# Patient Record
Sex: FEMALE | Race: WHITE | NOT HISPANIC OR LATINO | Employment: OTHER | URBAN - METROPOLITAN AREA
[De-identification: names, ages, dates, MRNs, and addresses within clinical notes are randomized per-mention and may not be internally consistent; named-entity substitution may affect disease eponyms.]

---

## 2017-06-15 ENCOUNTER — OFFICE VISIT (OUTPATIENT)
Dept: ORTHOPEDICS | Facility: CLINIC | Age: 67
End: 2017-06-15
Payer: COMMERCIAL

## 2017-06-15 ENCOUNTER — HOSPITAL ENCOUNTER (OUTPATIENT)
Dept: RADIOLOGY | Facility: HOSPITAL | Age: 67
Discharge: HOME | End: 2017-06-15
Attending: ORTHOPAEDIC SURGERY
Payer: COMMERCIAL

## 2017-06-15 VITALS
HEART RATE: 57 BPM | BODY MASS INDEX: 27.67 KG/M2 | HEIGHT: 66 IN | SYSTOLIC BLOOD PRESSURE: 154 MMHG | DIASTOLIC BLOOD PRESSURE: 64 MMHG | WEIGHT: 172.19 LBS

## 2017-06-15 DIAGNOSIS — S93.401A SPRAIN OF RIGHT ANKLE, UNSPECIFIED LIGAMENT, INITIAL ENCOUNTER: ICD-10-CM

## 2017-06-15 DIAGNOSIS — M25.571 ACUTE RIGHT ANKLE PAIN: ICD-10-CM

## 2017-06-15 DIAGNOSIS — M79.671 RIGHT FOOT PAIN: ICD-10-CM

## 2017-06-15 DIAGNOSIS — M25.571 ACUTE RIGHT ANKLE PAIN: Primary | ICD-10-CM

## 2017-06-15 PROCEDURE — 1125F AMNT PAIN NOTED PAIN PRSNT: CPT | Mod: S$GLB,,, | Performed by: PHYSICIAN ASSISTANT

## 2017-06-15 PROCEDURE — 99203 OFFICE O/P NEW LOW 30 MIN: CPT | Mod: S$GLB,,, | Performed by: PHYSICIAN ASSISTANT

## 2017-06-15 PROCEDURE — 73630 X-RAY EXAM OF FOOT: CPT | Mod: 26,RT,, | Performed by: RADIOLOGY

## 2017-06-15 PROCEDURE — 1159F MED LIST DOCD IN RCRD: CPT | Mod: S$GLB,,, | Performed by: PHYSICIAN ASSISTANT

## 2017-06-15 PROCEDURE — 73630 X-RAY EXAM OF FOOT: CPT | Mod: TC,RT

## 2017-06-15 PROCEDURE — 99999 PR PBB SHADOW E&M-EST. PATIENT-LVL III: CPT | Mod: PBBFAC,,, | Performed by: PHYSICIAN ASSISTANT

## 2017-06-15 PROCEDURE — 73610 X-RAY EXAM OF ANKLE: CPT | Mod: TC,RT

## 2017-06-15 PROCEDURE — 73610 X-RAY EXAM OF ANKLE: CPT | Mod: 26,RT,, | Performed by: RADIOLOGY

## 2017-06-15 RX ORDER — ATENOLOL 50 MG/1
50 TABLET ORAL 2 TIMES DAILY
COMMUNITY
End: 2018-03-09 | Stop reason: SDUPTHER

## 2017-06-15 RX ORDER — HYDROCODONE BITARTRATE AND ACETAMINOPHEN 5; 325 MG/1; MG/1
1 TABLET ORAL EVERY 6 HOURS PRN
COMMUNITY
End: 2017-06-15

## 2017-06-15 RX ORDER — MELOXICAM 15 MG/1
15 TABLET ORAL DAILY
Qty: 30 TABLET | Refills: 2 | Status: SHIPPED | OUTPATIENT
Start: 2017-06-15 | End: 2017-07-15

## 2017-06-15 RX ORDER — HYDROCODONE BITARTRATE AND ACETAMINOPHEN 5; 325 MG/1; MG/1
1 TABLET ORAL EVERY 6 HOURS PRN
Qty: 30 TABLET | Refills: 0 | Status: SHIPPED | OUTPATIENT
Start: 2017-06-15 | End: 2017-06-29 | Stop reason: SDUPTHER

## 2017-06-15 NOTE — PROGRESS NOTES
SUBJECTIVE:     Chief Complaint & History of Present Illness:  Alesha Toney is a  New  patient 66 y.o. female who is seen here today with a complaint of    Chief Complaint   Patient presents with    Right Ankle - Injury, Pain    .  She reports suffered an inversion injury of her right ankle approximately 7 days ago when she stepped in a hole.  Was seen and treated at [facility] emergency room x-rays demonstrated no evidence of fracture dislocation she continues to have significant swelling ecchymosis and pain in the ankle and difficulty with weightbearing.  She has been crutch walking since the time of injury is here today for continuing care  On a scale of 1-10, with 10 being worst pain imaginable, he rates this pain as 4 on good days and 7 on bad days.  she describes the pain as tender and sore.    Review of patient's allergies indicates:   Allergen Reactions    Iodinated contrast media - oral and iv dye     Pcn [penicillins]     Phenergan plain     Tramadol     Vancomycin analogues          Current Outpatient Prescriptions   Medication Sig Dispense Refill    atenolol (TENORMIN) 50 MG tablet Take 50 mg by mouth 2 (two) times daily.      multivitamin with minerals tablet Take 1 tablet by mouth once daily.      hydrocodone-acetaminophen 5-325mg (NORCO) 5-325 mg per tablet Take 1 tablet by mouth every 6 (six) hours as needed for Pain. 30 tablet 0    meloxicam (MOBIC) 15 MG tablet Take 1 tablet (15 mg total) by mouth once daily. 30 tablet 2     No current facility-administered medications for this visit.        Past Medical History:   Diagnosis Date    Cardiac arrhythmia     History of hysterectomy     20yrs ago    Hypertension        History reviewed. No pertinent surgical history.    Vital Signs (Most Recent)  Vitals:    06/15/17 0830   BP: (!) 154/64   Pulse: (!) 57       Review of Systems:  ROS:  Constitutional: no fever or chills  Eyes: no visual changes  ENT: no nasal congestion or  "sore throat  Respiratory: no cough or shortness of breath  Cardiovascular: no chest pain or palpitations  Gastrointestinal: no nausea or vomiting, tolerating diet  Genitourinary: no hematuria or dysuria  Integument/Breast: no rash or pruritis  Hematologic/Lymphatic: no easy bruising or lymphadenopathy  Musculoskeletal: no arthralgias or myalgias  Neurological: no seizures or tremors  Behavioral/Psych: no auditory or visual hallucinations  Endocrine: no heat or cold intolerance      OBJECTIVE:     PHYSICAL EXAM:  Height: 5' 6" (167.6 cm) Weight: 78.1 kg (172 lb 2.9 oz), General Appearance: Well nourished, well developed, in no acute distress.  Neurological: Mood & affect are normal.  right Foot/Ankle    Skin: bruising  Swelling: moderate and diffuse  Warmth: no warmth  Tenderness: diffuse and severe  ROM: 90 degrees dorsiflexion, 115 degrees plantarflexion, 30 degrees inversion and 25 degrees eversion  Strength: limited by pain  Gait: antalgic  Stability: anterior drawer: negative, exterior rotation test: negative, Lachman: negative and Cotton test: negative    soft tissue swelling noted over the Or/aspect of the foot from the talus to the distal metatarsals          RADIOGRAPHS:  X-rays of the ankle taken today films reviewed by me demonstrate no evidence of fracture dislocation mortise is well-maintained soft tissue swelling can be noted  X-rays of the foot demonstrate no evidence of fracture dislocation    ASSESSMENT/PLAN:     Plan:  We'll place patient in a tall fracture boot weightbearing as tolerated  Rest ice elevation  Meloxicam 15 mg daily with food  Hydrocodone 5/325 mg refilled  Follow-up in 2 weeks sooner symptoms dictate    "

## 2017-06-29 ENCOUNTER — OFFICE VISIT (OUTPATIENT)
Dept: ORTHOPEDICS | Facility: CLINIC | Age: 67
End: 2017-06-29
Payer: COMMERCIAL

## 2017-06-29 VITALS — WEIGHT: 171.94 LBS | BODY MASS INDEX: 27.63 KG/M2 | HEIGHT: 66 IN

## 2017-06-29 DIAGNOSIS — S93.401D SPRAIN OF RIGHT ANKLE, UNSPECIFIED LIGAMENT, SUBSEQUENT ENCOUNTER: Primary | ICD-10-CM

## 2017-06-29 PROCEDURE — 99213 OFFICE O/P EST LOW 20 MIN: CPT | Mod: S$GLB,,, | Performed by: PHYSICIAN ASSISTANT

## 2017-06-29 PROCEDURE — 99999 PR PBB SHADOW E&M-EST. PATIENT-LVL III: CPT | Mod: PBBFAC,,, | Performed by: PHYSICIAN ASSISTANT

## 2017-06-29 PROCEDURE — 1159F MED LIST DOCD IN RCRD: CPT | Mod: S$GLB,,, | Performed by: PHYSICIAN ASSISTANT

## 2017-06-29 PROCEDURE — 1125F AMNT PAIN NOTED PAIN PRSNT: CPT | Mod: S$GLB,,, | Performed by: PHYSICIAN ASSISTANT

## 2017-06-29 RX ORDER — HYDROCODONE BITARTRATE AND ACETAMINOPHEN 5; 325 MG/1; MG/1
1 TABLET ORAL EVERY 6 HOURS PRN
Qty: 30 TABLET | Refills: 0 | Status: SHIPPED | OUTPATIENT
Start: 2017-06-29 | End: 2017-07-20 | Stop reason: SDUPTHER

## 2017-06-29 NOTE — PROGRESS NOTES
SUBJECTIVE:     Chief Complaint & History of Present Illness:  Alesha Toney is a  Established  patient 66 y.o. female who is seen here today with a complaint of    Chief Complaint   Patient presents with    Right Ankle - Pain, Swelling    .  She is patient well known to me was last seen treated clinic 6/15/2017 for her right ankle sprain.  She reports she continues to have tenderness and soreness in the dorsal and lateral aspects of the ankle and difficulty with weightbearing.  She has been icing and elevating as best she can and removing her boot to 3 times a day for active range of motion exercises  On a scale of 1-10, with 10 being worst pain imaginable, he rates this pain as 5 on good days and 8 on bad days.  she describes the pain as tender and sore.    Review of patient's allergies indicates:   Allergen Reactions    Iodinated contrast media - oral and iv dye     Pcn [penicillins]     Phenergan plain     Tramadol     Vancomycin analogues          Current Outpatient Prescriptions   Medication Sig Dispense Refill    atenolol (TENORMIN) 50 MG tablet Take 50 mg by mouth 2 (two) times daily.      hydrocodone-acetaminophen 5-325mg (NORCO) 5-325 mg per tablet Take 1 tablet by mouth every 6 (six) hours as needed for Pain. 30 tablet 0    meloxicam (MOBIC) 15 MG tablet Take 1 tablet (15 mg total) by mouth once daily. 30 tablet 2    multivitamin with minerals tablet Take 1 tablet by mouth once daily.       No current facility-administered medications for this visit.        Past Medical History:   Diagnosis Date    Cardiac arrhythmia     History of hysterectomy     20yrs ago    Hypertension        History reviewed. No pertinent surgical history.    Vital Signs (Most Recent)  There were no vitals filed for this visit.    Review of Systems:  ROS:  Constitutional: no fever or chills  Eyes: no visual changes  ENT: no nasal congestion or sore throat  Respiratory: no cough or shortness of  "breath  Cardiovascular: no chest pain or palpitations  Gastrointestinal: no nausea or vomiting, tolerating diet  Genitourinary: no hematuria or dysuria  Integument/Breast: no rash or pruritis  Hematologic/Lymphatic: no easy bruising or lymphadenopathy  Musculoskeletal: no arthralgias or myalgias  Neurological: no seizures or tremors  Behavioral/Psych: no auditory or visual hallucinations  Endocrine: no heat or cold intolerance      OBJECTIVE:     PHYSICAL EXAM:  Height: 5' 6" (167.6 cm) Weight: 78 kg (171 lb 15.3 oz), General Appearance: Well nourished, well developed, in no acute distress.  Neurological: Mood & affect are normal.  right Foot/Ankle     Skin: bruising  Swelling: moderate and diffuse  Warmth: no warmth  Tenderness: diffuse and severe  ROM: 90 degrees dorsiflexion, 170 degrees plantarflexion, 30 degrees inversion and 35 degrees eversion  Strength: limited by pain  Gait: antalgic  Stability: anterior drawer: negative, exterior rotation test: negative, Lachman: negative and Cotton test: negative     soft tissue swelling noted over the Lateral heel and second third and fourth toes.  mild ecchymosis of the same areas              ASSESSMENT/PLAN:     Plan:  Patient continue in tall fracture boot weightbearing as tolerated ice and elevation  Physical therapy for iontophoresis ultrasound range of motion exercise  Pain medication was refilled today  Follow-up in 2 weeks sooner symptoms dictate    "

## 2017-07-16 ENCOUNTER — OFFICE VISIT (OUTPATIENT)
Dept: URGENT CARE | Facility: CLINIC | Age: 67
End: 2017-07-16
Payer: COMMERCIAL

## 2017-07-16 VITALS
WEIGHT: 165 LBS | HEART RATE: 67 BPM | TEMPERATURE: 98 F | HEIGHT: 66 IN | RESPIRATION RATE: 18 BRPM | DIASTOLIC BLOOD PRESSURE: 80 MMHG | SYSTOLIC BLOOD PRESSURE: 160 MMHG | OXYGEN SATURATION: 98 % | BODY MASS INDEX: 26.52 KG/M2

## 2017-07-16 DIAGNOSIS — M25.572 LEFT ANKLE PAIN, UNSPECIFIED CHRONICITY: ICD-10-CM

## 2017-07-16 DIAGNOSIS — S93.402A SPRAIN OF LEFT ANKLE, UNSPECIFIED LIGAMENT, INITIAL ENCOUNTER: Primary | ICD-10-CM

## 2017-07-16 PROCEDURE — 1159F MED LIST DOCD IN RCRD: CPT | Mod: S$GLB,,, | Performed by: NURSE PRACTITIONER

## 2017-07-16 PROCEDURE — 1125F AMNT PAIN NOTED PAIN PRSNT: CPT | Mod: S$GLB,,, | Performed by: NURSE PRACTITIONER

## 2017-07-16 PROCEDURE — 99203 OFFICE O/P NEW LOW 30 MIN: CPT | Mod: S$GLB,,, | Performed by: NURSE PRACTITIONER

## 2017-07-16 RX ORDER — HYDROCODONE BITARTRATE AND ACETAMINOPHEN 5; 325 MG/1; MG/1
1 TABLET ORAL EVERY 6 HOURS PRN
Qty: 15 TABLET | Refills: 0 | Status: SHIPPED | OUTPATIENT
Start: 2017-07-16 | End: 2017-07-20

## 2017-07-16 NOTE — PATIENT INSTRUCTIONS
Ankle Sprain, with X-Ray   A sprain is an injury to the ligaments that hold the ankle joint together. There are no broken bones. Most sprains take about 4 to 6 weeks to heal. A severe sprain, where the ligament is completely torn, can take several months to recover.  Mild to moderate sprains may be treated with an elastic wrap or an in-shoe splint. This will provide support and prevent re-injury. A mild sprain may not need any additional support. A severe sprain may require surgery to repair. In some cases a cast or boot may be needed.  Home care  · Stay off your injured ankle as much as possible until you can walk on it without pain. If you have a lot of pain with walking, then crutches or a walker may be prescribed. These can be rented or purchased at many pharmacies and surgical or orthopedic supply stores. Follow your healthcare providers advice about when to begin bearing weight on that leg.  · Keep your leg elevated to reduce pain and swelling. When sleeping, place a pillow under your injured ankle. When sitting, support your injured ankle and leg so they are level with your waist. This is very important during the first 48 hours.  · Place an ice pack over the injured area for no more than 20 minutes. Do this every 3 to 6 hours for the first 24 to 48 hours, or as instructed. To make an ice pack, put ice cubes in a plastic bag that seals at the top. Wrap the bag in a clean, thin towel or cloth. You can place the ice pack directly over the wrap, splint, or cast. Never place an ice pack directly on your skin. As the ice melts, be careful not to get any wrap, splint, or cast wet. Keep using ice packs as needed to ease pain and swelling.    · If you have a boot, open it to apply an ice pack, unless told otherwise by your provider. Wrap the ice pack in a clean, thin towel or cloth. Never put an ice pack directly on your skin.  · After 48 hours, it may also be helpful to apply heat for no more than 20 minutes,  several times a day. You can do this with a heating pad or warm compress. Or you may want to go back and forth between using ice and heat. Never apply heat directly to the skin. Always wrap the heating pad or warm compress in a clean, thin towel or cloth.  · You may use over-the-counter pain medicine to ease pain, unless another pain medicine was prescribed. Talk with your provider before using these medicines if you have chronic liver or kidney disease, or have ever had a stomach ulcer or GI (gastrointestinal) bleeding.  · You may return to sports after your ankle heals, when you can run without pain.  · You are at risk of getting injured again for the first 6 weeks. During that time, protect your ankle with an in-shoe splint that prevents your ankle from tilting side to side. This is very important if you do active work or play sports during that time.  Follow-up care  Any X-rays you had today dont show any broken bones, breaks, or fractures. Bruises and sprains can sometimes hurt as much as a fracture. These injuries can take time to heal completely. If your symptoms dont improve or they get worse, talk with your healthcare provider. You may need a repeat X-ray.  When to seek medical advice  Call your healthcare provider right away if any of these occur:  · The plaster cast or splint gets wet or soft  · The fiberglass cast or splint gets wet and does not dry for 24 hours  · The pain or swelling increases, or redness appears  · The cast has a bad smell  · Fever of 100.4 F (38 C) or higher, or as directed  · Your toes become cold, blue, numb, or tingly  · You re-injure your ankle  Date Last Reviewed: 11/20/2015  © 1564-3762 [employer]. [address] [place] [address], [place] [address]. All rights reserved. This information is not intended as a substitute for professional medical care. Always follow your healthcare professional's instructions.      Follow up with [facility] as previously scheduled

## 2017-07-16 NOTE — PROGRESS NOTES
Subjective:       Patient ID: Alesha Toney is a 66 y.o. female.    Chief Complaint: Trauma and Ankle Pain (Pt says she twisted her lt ankle while stepping off of a curb)    Trauma   The incident occurred 6 to 12 hours ago. The incident occurred in the street. The injury mechanism was a twisted joint. The pain is moderate. Pertinent negatives include no abdominal pain, chest pain, neck pain, numbness or weakness. There have been no prior injuries to these areas. Her tetanus status is unknown.   Ankle Pain    Pertinent negatives include no numbness.     Review of Systems   Constitution: Negative for weakness and malaise/fatigue.   HENT: Negative for nosebleeds.    Cardiovascular: Negative for chest pain and syncope.   Respiratory: Negative for shortness of breath.    Musculoskeletal: Positive for joint pain and joint swelling. Negative for back pain and neck pain.   Gastrointestinal: Negative for abdominal pain.   Genitourinary: Negative for hematuria.   Neurological: Negative for dizziness and numbness.       Objective:      Physical Exam   Constitutional: She is oriented to person, place, and time. She appears well-developed and well-nourished. She is cooperative.  Non-toxic appearance. She does not appear ill. No distress.   HENT:   Head: Normocephalic and atraumatic. Head is without abrasion, without contusion and without laceration.   Right Ear: Hearing, tympanic membrane, external ear and ear canal normal. No hemotympanum.   Left Ear: Hearing, tympanic membrane, external ear and ear canal normal. No hemotympanum.   Nose: Nose normal. No mucosal edema, rhinorrhea or nasal deformity. No epistaxis. Right sinus exhibits no maxillary sinus tenderness and no frontal sinus tenderness. Left sinus exhibits no maxillary sinus tenderness and no frontal sinus tenderness.   Mouth/Throat: Uvula is midline, oropharynx is clear and moist and mucous membranes are normal. No trismus in the jaw. Normal dentition. No  uvula swelling. No posterior oropharyngeal erythema.   Eyes: Conjunctivae, EOM and lids are normal. Pupils are equal, round, and reactive to light. Right eye exhibits no discharge. Left eye exhibits no discharge. No scleral icterus.   Sclera clear bilat   Neck: Trachea normal, normal range of motion, full passive range of motion without pain and phonation normal. Neck supple. No spinous process tenderness and no muscular tenderness present. No neck rigidity. No tracheal deviation present.   Cardiovascular: Normal rate, regular rhythm, normal heart sounds, intact distal pulses and normal pulses.    Pulmonary/Chest: Effort normal and breath sounds normal. No respiratory distress.   Abdominal: Soft. Normal appearance and bowel sounds are normal. She exhibits no distension, no pulsatile midline mass and no mass. There is no tenderness.   Musculoskeletal: She exhibits no edema or deformity.        Left ankle: She exhibits decreased range of motion and swelling. Tenderness. Lateral malleolus tenderness found.        Feet:    Neurological: She is alert and oriented to person, place, and time. She has normal strength. No cranial nerve deficit or sensory deficit. She exhibits normal muscle tone. She displays no seizure activity. Coordination normal. GCS eye subscore is 4. GCS verbal subscore is 5. GCS motor subscore is 6.   Skin: Skin is warm, dry and intact. Capillary refill takes less than 2 seconds. No abrasion, no bruising, no burn, no ecchymosis and no laceration noted. She is not diaphoretic. No pallor.   Psychiatric: She has a normal mood and affect. Her speech is normal and behavior is normal. Judgment and thought content normal. Cognition and memory are normal.   Nursing note and vitals reviewed.      Assessment:       1. Sprain of left ankle, unspecified ligament, initial encounter    2. Left ankle pain, unspecified chronicity        Plan:         Sprain of left ankle, unspecified ligament, initial encounter  -      hydrocodone-acetaminophen 5-325mg (NORCO) 5-325 mg per tablet; Take 1 tablet by mouth every 6 (six) hours as needed for Pain.  Dispense: 15 tablet; Refill: 0    Left ankle pain, unspecified chronicity  -     X-Ray Ankle Complete 3 View Left; Future; Expected date: 07/16/2017  -     hydrocodone-acetaminophen 5-325mg (NORCO) 5-325 mg per tablet; Take 1 tablet by mouth every 6 (six) hours as needed for Pain.  Dispense: 15 tablet; Refill: 0

## 2017-07-20 ENCOUNTER — OFFICE VISIT (OUTPATIENT)
Dept: ORTHOPEDICS | Facility: CLINIC | Age: 67
End: 2017-07-20
Payer: COMMERCIAL

## 2017-07-20 VITALS — BODY MASS INDEX: 28.04 KG/M2 | HEIGHT: 66 IN | WEIGHT: 174.5 LBS

## 2017-07-20 DIAGNOSIS — S93.402A SPRAIN OF LEFT ANKLE, UNSPECIFIED LIGAMENT, INITIAL ENCOUNTER: ICD-10-CM

## 2017-07-20 DIAGNOSIS — S93.401D SPRAIN OF RIGHT ANKLE, UNSPECIFIED LIGAMENT, SUBSEQUENT ENCOUNTER: Primary | ICD-10-CM

## 2017-07-20 PROCEDURE — 99999 PR PBB SHADOW E&M-EST. PATIENT-LVL III: CPT | Mod: PBBFAC,,, | Performed by: PHYSICIAN ASSISTANT

## 2017-07-20 PROCEDURE — 1159F MED LIST DOCD IN RCRD: CPT | Mod: S$GLB,,, | Performed by: PHYSICIAN ASSISTANT

## 2017-07-20 PROCEDURE — 99213 OFFICE O/P EST LOW 20 MIN: CPT | Mod: S$GLB,,, | Performed by: PHYSICIAN ASSISTANT

## 2017-07-20 RX ORDER — HYDROCODONE BITARTRATE AND ACETAMINOPHEN 5; 325 MG/1; MG/1
1 TABLET ORAL EVERY 6 HOURS PRN
Qty: 30 TABLET | Refills: 0 | Status: SHIPPED | OUTPATIENT
Start: 2017-07-20 | End: 2017-08-03 | Stop reason: SDUPTHER

## 2017-07-22 NOTE — PROGRESS NOTES
SUBJECTIVE:     Chief Complaint & History of Present Illness:  Alesha Toney is a  Established  patient 66 y.o. female who is seen here today with a complaint of    Chief Complaint   Patient presents with    Right Ankle - Pain    Left Ankle - Pain    .Patient was previously treated in clinic for right ankle sprain.  She suffered a left ankle sprain on 07/16/2017..  She reports she continues to have tenderness and soreness in the dorsal and lateral aspects of the right ankle and difficulty with weightbearing.  She has been icing and elevating.  Patient has diffuse pain to her left ankle. Pain is increased with range of motion and weight bearing. She denied numbness or tingling   She is using crutches to asssit with ambulation.     Review of patient's allergies indicates:   Allergen Reactions    Iodinated contrast media - oral and iv dye     Pcn [penicillins]     Phenergan plain     Tramadol     Vancomycin analogues          Current Outpatient Prescriptions   Medication Sig Dispense Refill    atenolol (TENORMIN) 50 MG tablet Take 50 mg by mouth 2 (two) times daily.      hydrocodone-acetaminophen 5-325mg (NORCO) 5-325 mg per tablet Take 1 tablet by mouth every 6 (six) hours as needed for Pain. 30 tablet 0    multivitamin with minerals tablet Take 1 tablet by mouth once daily.       No current facility-administered medications for this visit.        Past Medical History:   Diagnosis Date    Cardiac arrhythmia     History of hysterectomy     20yrs ago    Hypertension        No past surgical history on file.    Vital Signs (Most Recent)  There were no vitals filed for this visit.    Review of Systems:  ROS:  Constitutional: no fever or chills  Eyes: no visual changes  ENT: no nasal congestion or sore throat  Respiratory: no cough or shortness of breath  Cardiovascular: no chest pain or palpitations  Gastrointestinal: no nausea or vomiting, tolerating diet  Genitourinary: no hematuria or  "dysuria  Integument/Breast: no rash or pruritis  Hematologic/Lymphatic: no easy bruising or lymphadenopathy  Musculoskeletal: no arthralgias or myalgias  Neurological: no seizures or tremors  Behavioral/Psych: no auditory or visual hallucinations  Endocrine: no heat or cold intolerance      OBJECTIVE:     PHYSICAL EXAM:  Height: 5' 6" (167.6 cm) Weight: 79.2 kg (174 lb 7.9 oz), General Appearance: Well nourished, well developed, in no acute distress.  Neurological: Mood & affect are normal.  right Foot/Ankle     Skin: bruising  Swelling: moderate and diffuse  Warmth: no warmth  Tenderness: diffuse and mild  ROM: 90 degrees dorsiflexion, 170 degrees plantarflexion, 30 degrees inversion and 35 degrees eversion  Strength: limited by pain  Gait: antalgic  Stability: anterior drawer: negative, exterior rotation test: negative, Lachman: negative and Cotton test: negative     Left Foot/Ankle     Skin: bruising  Swelling: moderate and diffuse  Warmth: no warmth  Tenderness: diffuse and severe  ROM: 90 degrees dorsiflexion, 170 degrees plantarflexion, 30 degrees inversion and 35 degrees eversion  Strength: limited by pain  Gait: antalgic  Stability: anterior drawer: negative, exterior rotation test: negative, Lachman: negative and Cotton test: negative         ASSESSMENT/PLAN:     Plan:  weightbearing as tolerated ice and elevation  - OTC lace up ankle brace  Physical therapy for iontophoresis ultrasound range of motion exercise, previously placed on right only  Pain medication was refilled today  Follow-up in 4 weeks if continued symptoms  sooner symptoms dictate  "

## 2017-07-27 ENCOUNTER — PATIENT MESSAGE (OUTPATIENT)
Dept: ORTHOPEDICS | Facility: CLINIC | Age: 67
End: 2017-07-27

## 2017-07-27 ENCOUNTER — HOSPITAL ENCOUNTER (OUTPATIENT)
Dept: RADIOLOGY | Facility: HOSPITAL | Age: 67
Discharge: HOME | End: 2017-07-27
Attending: ORTHOPAEDIC SURGERY
Payer: COMMERCIAL

## 2017-07-27 DIAGNOSIS — S93.402A SPRAIN OF LEFT ANKLE, UNSPECIFIED LIGAMENT, INITIAL ENCOUNTER: Primary | ICD-10-CM

## 2017-07-27 DIAGNOSIS — S93.402A SPRAIN OF LEFT ANKLE, UNSPECIFIED LIGAMENT, INITIAL ENCOUNTER: ICD-10-CM

## 2017-07-27 PROCEDURE — 73630 X-RAY EXAM OF FOOT: CPT | Mod: TC,LT

## 2017-07-27 PROCEDURE — 73630 X-RAY EXAM OF FOOT: CPT | Mod: 26,LT,, | Performed by: RADIOLOGY

## 2017-08-02 ENCOUNTER — PATIENT MESSAGE (OUTPATIENT)
Dept: ORTHOPEDICS | Facility: CLINIC | Age: 67
End: 2017-08-02

## 2017-08-03 RX ORDER — HYDROCODONE BITARTRATE AND ACETAMINOPHEN 5; 325 MG/1; MG/1
1 TABLET ORAL EVERY 6 HOURS PRN
Qty: 30 TABLET | Refills: 0 | Status: SHIPPED | OUTPATIENT
Start: 2017-08-03 | End: 2017-08-03 | Stop reason: SDUPTHER

## 2017-08-03 RX ORDER — HYDROCODONE BITARTRATE AND ACETAMINOPHEN 5; 325 MG/1; MG/1
1 TABLET ORAL EVERY 6 HOURS PRN
Qty: 30 TABLET | Refills: 0 | Status: SHIPPED | OUTPATIENT
Start: 2017-08-03 | End: 2017-10-24 | Stop reason: ALTCHOICE

## 2017-08-07 ENCOUNTER — OFFICE VISIT (OUTPATIENT)
Dept: URGENT CARE | Facility: CLINIC | Age: 67
End: 2017-08-07
Payer: COMMERCIAL

## 2017-08-07 VITALS
DIASTOLIC BLOOD PRESSURE: 110 MMHG | HEIGHT: 66 IN | TEMPERATURE: 99 F | BODY MASS INDEX: 27.97 KG/M2 | HEART RATE: 76 BPM | OXYGEN SATURATION: 98 % | SYSTOLIC BLOOD PRESSURE: 180 MMHG | WEIGHT: 174 LBS | RESPIRATION RATE: 18 BRPM

## 2017-08-07 DIAGNOSIS — N75.0 CYST OF LEFT BARTHOLIN'S GLAND: Primary | ICD-10-CM

## 2017-08-07 PROCEDURE — 99213 OFFICE O/P EST LOW 20 MIN: CPT | Mod: 25,S$GLB,, | Performed by: FAMILY MEDICINE

## 2017-08-07 PROCEDURE — 56420 I&D BARTHOLINS GLAND ABSCESS: CPT | Mod: S$GLB,,, | Performed by: FAMILY MEDICINE

## 2017-08-07 PROCEDURE — 1159F MED LIST DOCD IN RCRD: CPT | Mod: S$GLB,,, | Performed by: FAMILY MEDICINE

## 2017-08-07 PROCEDURE — 3008F BODY MASS INDEX DOCD: CPT | Mod: S$GLB,,, | Performed by: FAMILY MEDICINE

## 2017-08-07 RX ORDER — HYDROCODONE BITARTRATE AND ACETAMINOPHEN 5; 325 MG/1; MG/1
1 TABLET ORAL EVERY 8 HOURS PRN
Qty: 6 TABLET | Refills: 0 | Status: SHIPPED | OUTPATIENT
Start: 2017-08-07 | End: 2017-08-07 | Stop reason: SDUPTHER

## 2017-08-07 RX ORDER — CLINDAMYCIN HYDROCHLORIDE 300 MG/1
300 CAPSULE ORAL 2 TIMES DAILY
COMMUNITY
End: 2017-10-24 | Stop reason: SDUPTHER

## 2017-08-07 RX ORDER — CLINDAMYCIN HYDROCHLORIDE 300 MG/1
300 CAPSULE ORAL EVERY 8 HOURS
Qty: 15 CAPSULE | Refills: 0 | Status: SHIPPED | OUTPATIENT
Start: 2017-08-07 | End: 2017-08-12

## 2017-08-07 RX ORDER — HYDROCODONE BITARTRATE AND ACETAMINOPHEN 5; 325 MG/1; MG/1
1 TABLET ORAL EVERY 8 HOURS PRN
Qty: 6 TABLET | Refills: 0 | Status: SHIPPED | OUTPATIENT
Start: 2017-08-07 | End: 2017-10-24 | Stop reason: SDUPTHER

## 2017-08-08 NOTE — PATIENT INSTRUCTIONS
Understanding Bartholin Cyst and Abscess    A woman has two Bartholin glands. They are located on the sides of the vaginal opening. These pea-sized glands make mucus. This mucus lubricates the outside part of the vagina (vulva). If a tube (duct) in one of these glands becomes blocked, it can cause a cyst or abscess.  What causes a Bartholin cyst or abscess?  A cyst can form when the duct of a Bartholin gland becomes blocked. The mucus cant come out of the gland. It builds up. If the cyst becomes infected, it may turn into an abscess.  A blockage in the gland can occur from an injury or an infection. It may also be linked to a sexually transmitted disease.  Symptoms of a Bartholin cyst or abscess  A Bartholin cyst starts as a small bump. It may cause no other symptoms. Or it may grow bigger. It can then cause swelling and pain. If an abscess forms, it can be very painful. You may have trouble walking, sitting, or having sex.  Treatment for a Bartholin cyst or abscess  A cyst that doesnt cause any symptoms may not need to be treated. It may go away on its own. But if you feel discomfort or pain, treatment options include:  · Medicine. Over-the-counter pain medicines can help. In some cases, you may need antibiotics if an infection is severe or a cyst or abscess comes back.  · Sitz bath. Soaking the genital area in warm water can ease pain.  · Drainage. Cutting open the cyst allows the fluid inside it to drain. This eases discomfort and pain. The doctor may insert a tube (catheter) to help with drainage. This catheter may need to stay in place for up to 3 weeks.  · Surgery. You may need to have the Bartholin glands removed if other treatments dont work.  When to call your healthcare provider  Call your healthcare provider right away if you have any of these:  · Fever of 100.4°F (38°C) or higher, or as directed  · Redness, swelling, or fluid leaking from the cyst that gets worse  · Pain that gets worse  · Symptoms  that dont get better, or get worse  · New symptoms   Date Last Reviewed: 6/1/2016  © 1688-8439 [employer]. [address] [place] [address], [place] [address]. All rights reserved. This information is not intended as a substitute for professional medical care. Always follow your healthcare professional's instructions.      RECHECK IN 48 HOURS HERE AS WELL AS FOLLOW UP WITH OB/GYN OR PCP AS INDICATED  Alesha was seen today for abscess.    Diagnoses and all orders for this visit:    Cyst of left Bartholin's gland  -     INCISION AND DRAINAGE  -     clindamycin (CLEOCIN) 300 MG capsule; Take 1 capsule (300 mg total) by mouth every 8 (eight) hours.  -     hydrocodone-acetaminophen 5-325mg (NORCO) 5-325 mg per tablet; Take 1 tablet by mouth every 8 (eight) hours as needed for Pain.            Follow Up Comments   Make sure that you follow up with your primary care doctor in the next 2-5 days if needed .  Return to the Urgent Care if signs or symptoms change and certainly if you have worsening symptoms go to the nearest emergency department for further evaluation.     Rossana Ag MD

## 2017-08-08 NOTE — PROGRESS NOTES
"Subjective:       Patient ID: Alesha Toney is a 66 y.o. female.    Vitals:  height is 5' 6" (1.676 m) and weight is 78.9 kg (174 lb). Her temperature is 98.6 °F (37 °C). Her blood pressure is 180/110 (abnormal) and her pulse is 76. Her respiration is 18 and oxygen saturation is 98%.     Chief Complaint: Abscess    Abscess   Chronicity:  NewProgression Since Onset: gradually worsening  Abscess location: LT LABIA.  Associated Symptoms: no fever, no chills  Characteristics: painful and redness    Pain Scale:  6/10  Treatments Tried:  Oral antibiotics and warm water soaks  Relieved by:  Nothing    Review of Systems   Constitution: Negative for chills and fever.   HENT: Negative for sore throat.    Respiratory: Negative for shortness of breath.    Skin: Negative for itching and rash.   Musculoskeletal: Negative for joint pain.       Objective:      Physical Exam   Genitourinary:       There is tenderness on the left labia. There is no lesion or injury on the left labia.   Lymphadenopathy:        Left: Inguinal adenopathy present.       Assessment:       1. Cyst of left Bartholin's gland        Plan:         Cyst of left Bartholin's gland  -     INCISION AND DRAINAGE  -     clindamycin (CLEOCIN) 300 MG capsule; Take 1 capsule (300 mg total) by mouth every 8 (eight) hours.  Dispense: 15 capsule; Refill: 0  -     hydrocodone-acetaminophen 5-325mg (NORCO) 5-325 mg per tablet; Take 1 tablet by mouth every 8 (eight) hours as needed for Pain.  Dispense: 6 tablet; Refill: 0      Alesha was seen today for abscess.    Diagnoses and all orders for this visit:    Cyst of left Bartholin's gland  -     INCISION AND DRAINAGE  -     clindamycin (CLEOCIN) 300 MG capsule; Take 1 capsule (300 mg total) by mouth every 8 (eight) hours.  -     hydrocodone-acetaminophen 5-325mg (NORCO) 5-325 mg per tablet; Take 1 tablet by mouth every 8 (eight) hours as needed for Pain.      Patient is advised to follow up here in 48 hours for " recheck as well as with PCP as described.       Follow Up Comments   Make sure that you follow up with your primary care doctor in the next 2-5 days if needed .  Return to the Urgent Care if signs or symptoms change and certainly if you have worsening symptoms go to the nearest emergency department for further evaluation.     Rossana Ag MD

## 2017-08-08 NOTE — PROCEDURES
"Incision & Drainage  Date/Time: 8/7/2017 7:31 PM  Performed by: ELGIN GUTIERREZ  Authorized by: ELGIN GUTIERREZ     Time out: Immediately prior to procedure a "time out" was called to verify the correct patient, procedure, equipment, support staff and site/side marked as required.    Consent Done?:  Yes (Verbal)    Type:  Abscess  Body area:  Anogenital  Location details:  Bartholin's gland  Anesthesia:  Local infiltration  Local anesthetic: lidocaine 1% with epinephrine  Anesthetic total (ml):  4  Scalpel size:  11  Incision type:  Single straight  Complexity:  Simple  Drainage:  Bloody and purulent  Drainage amount:  Moderate  Wound treatment:  Wound packed  Packing material:  1/4 in gauze  Patient tolerance:  Patient tolerated the procedure well with no immediate complications      "

## 2017-08-09 ENCOUNTER — OFFICE VISIT (OUTPATIENT)
Dept: URGENT CARE | Facility: CLINIC | Age: 67
End: 2017-08-09

## 2017-08-09 VITALS
HEART RATE: 70 BPM | WEIGHT: 170 LBS | RESPIRATION RATE: 20 BRPM | BODY MASS INDEX: 27.32 KG/M2 | SYSTOLIC BLOOD PRESSURE: 171 MMHG | HEIGHT: 66 IN | DIASTOLIC BLOOD PRESSURE: 80 MMHG | TEMPERATURE: 99 F | OXYGEN SATURATION: 97 %

## 2017-08-09 DIAGNOSIS — N75.0 CYST OF LEFT BARTHOLIN'S GLAND: Primary | ICD-10-CM

## 2017-08-09 PROCEDURE — 99024 POSTOP FOLLOW-UP VISIT: CPT | Mod: S$GLB,,, | Performed by: FAMILY MEDICINE

## 2017-08-09 PROCEDURE — 99499 UNLISTED E&M SERVICE: CPT | Mod: S$GLB,,, | Performed by: FAMILY MEDICINE

## 2017-08-09 NOTE — PATIENT INSTRUCTIONS
Understanding Bartholin Cyst and Abscess    A woman has two Bartholin glands. They are located on the sides of the vaginal opening. These pea-sized glands make mucus. This mucus lubricates the outside part of the vagina (vulva). If a tube (duct) in one of these glands becomes blocked, it can cause a cyst or abscess.  What causes a Bartholin cyst or abscess?  A cyst can form when the duct of a Bartholin gland becomes blocked. The mucus cant come out of the gland. It builds up. If the cyst becomes infected, it may turn into an abscess.  A blockage in the gland can occur from an injury or an infection. It may also be linked to a sexually transmitted disease.  Symptoms of a Bartholin cyst or abscess  A Bartholin cyst starts as a small bump. It may cause no other symptoms. Or it may grow bigger. It can then cause swelling and pain. If an abscess forms, it can be very painful. You may have trouble walking, sitting, or having sex.  Treatment for a Bartholin cyst or abscess  A cyst that doesnt cause any symptoms may not need to be treated. It may go away on its own. But if you feel discomfort or pain, treatment options include:  · Medicine. Over-the-counter pain medicines can help. In some cases, you may need antibiotics if an infection is severe or a cyst or abscess comes back.  · Sitz bath. Soaking the genital area in warm water can ease pain.  · Drainage. Cutting open the cyst allows the fluid inside it to drain. This eases discomfort and pain. The doctor may insert a tube (catheter) to help with drainage. This catheter may need to stay in place for up to 3 weeks.  · Surgery. You may need to have the Bartholin glands removed if other treatments dont work.  When to call your healthcare provider  Call your healthcare provider right away if you have any of these:  · Fever of 100.4°F (38°C) or higher, or as directed  · Redness, swelling, or fluid leaking from the cyst that gets worse  · Pain that gets worse  · Symptoms  that dont get better, or get worse  · New symptoms   Date Last Reviewed: 6/1/2016  © 3401-3714 [employer]. [address] [place] [address], [place] [address]. All rights reserved. This information is not intended as a substitute for professional medical care. Always follow your healthcare professional's instructions.      FOR RECURRENCE, FOLLOW UP WITH YOUR GYNECOLOGIST.  CONTINUE YOUR ANTIBIOTIC UNTIL GONE AND WARM SOAKS EITHER AS SITZ BATHS OR TOPICALLY.

## 2017-08-09 NOTE — PROGRESS NOTES
"Subjective:       Patient ID: Alesha Toney is a 66 y.o. female.    Vitals:  height is 5' 6" (1.676 m) and weight is 77.1 kg (170 lb). Her oral temperature is 98.7 °F (37.1 °C). Her blood pressure is 171/80 (abnormal) and her pulse is 70. Her respiration is 20 and oxygen saturation is 97%.     Chief Complaint: Wound Check    Wound Check   She was originally treated 3 to 5 days ago (monday). Previous treatment included I&D of abscess. Her temperature was unmeasured prior to arrival. The temperature was taken using an oral thermometer. There has been clear discharge from the wound. The redness has improved. The swelling has improved. The pain has improved. She has no difficulty moving the affected extremity or digit.     Review of Systems   Skin: Positive for suspicious lesions.       Objective:      Physical Exam   Constitutional: She is oriented to person, place, and time. She appears well-developed and well-nourished.   HENT:   Head: Normocephalic and atraumatic. Head is without abrasion, without contusion and without laceration.   Eyes: Lids are normal.   Neck: Trachea normal, full passive range of motion without pain and phonation normal.   Pulmonary/Chest: Effort normal.   Genitourinary:       There is tenderness on the left labia.   Musculoskeletal: Normal range of motion.   Neurological: She is alert and oriented to person, place, and time.   Skin: Skin is warm, dry and intact. Capillary refill takes less than 2 seconds. No abrasion, no bruising, no burn, no ecchymosis, no laceration, no lesion and no rash noted. There is erythema.   Psychiatric: She has a normal mood and affect. Her speech is normal and behavior is normal. Judgment and thought content normal. Cognition and memory are normal.   Nursing note and vitals reviewed.      Assessment:       1. Cyst of left Bartholin's gland        Plan:         Cyst of left Bartholin's gland        Alesha was seen today for wound check.    Diagnoses and all " orders for this visit:    Cyst of left Bartholin's gland            Follow Up Comments   Make sure that you follow up with your primary care doctor in the next 2-5 days if needed .  Return to the Urgent Care if signs or symptoms change and certainly if you have worsening symptoms go to the nearest emergency department for further evaluation.     Rossana Ag MD

## 2017-10-20 ENCOUNTER — OFFICE VISIT (OUTPATIENT)
Dept: URGENT CARE | Facility: CLINIC | Age: 67
End: 2017-10-20
Payer: COMMERCIAL

## 2017-10-20 VITALS
DIASTOLIC BLOOD PRESSURE: 78 MMHG | HEIGHT: 66 IN | HEART RATE: 71 BPM | OXYGEN SATURATION: 99 % | TEMPERATURE: 98 F | SYSTOLIC BLOOD PRESSURE: 186 MMHG

## 2017-10-20 DIAGNOSIS — S50.362A INFECTED INSECT BITE OF LEFT ELBOW, INITIAL ENCOUNTER: Primary | ICD-10-CM
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
## 2017-10-20 DIAGNOSIS — L08.9 INFECTED INSECT BITE OF LEFT ELBOW, INITIAL ENCOUNTER: Primary | ICD-10-CM

## 2017-10-20 PROCEDURE — 99214 OFFICE O/P EST MOD 30 MIN: CPT | Mod: S$GLB,,, | Performed by: FAMILY MEDICINE

## 2017-10-20 RX ORDER — PREDNISONE 20 MG/1
40 TABLET ORAL DAILY
Qty: 10 TABLET | Refills: 0 | Status: SHIPPED | OUTPATIENT
Start: 2017-10-20 | End: 2017-10-24 | Stop reason: ALTCHOICE

## 2017-10-20 RX ORDER — CLINDAMYCIN HYDROCHLORIDE 300 MG/1
300 CAPSULE ORAL EVERY 8 HOURS
Qty: 15 CAPSULE | Refills: 0 | Status: SHIPPED | OUTPATIENT
Start: 2017-10-20 | End: 2017-10-24 | Stop reason: ALTCHOICE

## 2017-10-20 NOTE — PROGRESS NOTES
"Subjective:       Patient ID: Alesha Toney is a 67 y.o. female.    Vitals:  height is 5' 6" (1.676 m). Her oral temperature is 98.1 °F (36.7 °C). Her blood pressure is 186/78 (abnormal) and her pulse is 71. Her oxygen saturation is 99%.     Chief Complaint: Insect Bite (left elbow )    Insect Bite   This is a new problem. The current episode started in the past 7 days. The problem has been gradually worsening. Pertinent negatives include no chills, fever, rash or sore throat. She has tried ice (OTC steroid creams) for the symptoms. The treatment provided no relief.     Review of Systems   Constitution: Negative for chills and fever.   HENT: Negative for sore throat.    Respiratory: Negative for shortness of breath.    Skin: Positive for itching. Negative for rash.        Insect bite left elbow   Musculoskeletal: Negative for joint pain.       Objective:      Physical Exam   Constitutional: She is oriented to person, place, and time. She appears well-developed and well-nourished.   HENT:   Head: Normocephalic and atraumatic. Head is without abrasion, without contusion and without laceration.   Right Ear: External ear normal.   Left Ear: External ear normal.   Nose: Nose normal.   Mouth/Throat: Oropharynx is clear and moist.   Eyes: Conjunctivae, EOM and lids are normal. Pupils are equal, round, and reactive to light.   Neck: Trachea normal, full passive range of motion without pain and phonation normal. Neck supple.   Cardiovascular: Normal rate, regular rhythm and normal heart sounds.    Pulmonary/Chest: Effort normal and breath sounds normal. No stridor. No respiratory distress.   Musculoskeletal: Normal range of motion.   Neurological: She is alert and oriented to person, place, and time.   Skin: Skin is warm, dry and intact. Capillary refill takes less than 2 seconds. No abrasion, no bruising, no burn, no ecchymosis, no laceration, no lesion and no rash noted. No erythema.   Left elbow swelling 6 cm and " redness and pruritus. No red streaking.   Psychiatric: She has a normal mood and affect. Her speech is normal and behavior is normal. Judgment and thought content normal. Cognition and memory are normal.   Nursing note and vitals reviewed.      Assessment:       1. Infected insect bite of left elbow, initial encounter        Plan:     Alesha was seen today for insect bite.    Diagnoses and all orders for this visit:    Infected insect bite of left elbow, initial encounter    Other orders  -     clindamycin (CLEOCIN) 300 MG capsule; Take 1 capsule (300 mg total) by mouth every 8 (eight) hours.  -     predniSONE (DELTASONE) 20 MG tablet; Take 2 tablets (40 mg total) by mouth once daily.      Cold compresses.  Wear long sleeves.  Monitor for signs of infection.   FF up with Specialist if not any better after 5 days.  Go to ER for any severe signs of infection as discussed.  Patientvoiced agreement and understanding.

## 2017-10-20 NOTE — LETTER
October 20, 2017      [facility]  [place] [address]  Phone: [phone]  Fax: [phone]       Patient: Alesha Toney   YOB: 1950  Date of Visit: 10/20/2017    To Whom It May Concern:    Steff Toney  was at [facility] on 10/20/2017. SHE may return to work/school on 10/21/2017 with no restrictions. If you have any questions or concerns, or if I can be of further assistance, please do not hesitate to contact me.    Sincerely,    Sydney Angeles MD

## 2017-10-23 ENCOUNTER — TELEPHONE (OUTPATIENT)
Dept: URGENT CARE | Facility: CLINIC | Age: 67
End: 2017-10-23

## 2017-10-24 ENCOUNTER — OFFICE VISIT (OUTPATIENT)
Dept: INTERNAL MEDICINE | Facility: CLINIC | Age: 67
End: 2017-10-24
Payer: COMMERCIAL

## 2017-10-24 VITALS
HEIGHT: 66 IN | TEMPERATURE: 99 F | DIASTOLIC BLOOD PRESSURE: 72 MMHG | BODY MASS INDEX: 28.38 KG/M2 | HEART RATE: 56 BPM | OXYGEN SATURATION: 98 % | SYSTOLIC BLOOD PRESSURE: 143 MMHG | WEIGHT: 176.56 LBS

## 2017-10-24 DIAGNOSIS — Z23 NEED FOR VACCINATION FOR ZOSTER: ICD-10-CM

## 2017-10-24 DIAGNOSIS — Z78.0 POSTMENOPAUSAL ESTROGEN DEFICIENCY: ICD-10-CM

## 2017-10-24 DIAGNOSIS — Z76.89 ENCOUNTER TO ESTABLISH CARE WITH NEW DOCTOR: ICD-10-CM

## 2017-10-24 DIAGNOSIS — Z12.39 SCREENING FOR BREAST CANCER: ICD-10-CM

## 2017-10-24 DIAGNOSIS — I47.10 SVT (SUPRAVENTRICULAR TACHYCARDIA): ICD-10-CM

## 2017-10-24 DIAGNOSIS — Z00.00 ANNUAL PHYSICAL EXAM: Primary | ICD-10-CM

## 2017-10-24 DIAGNOSIS — Z11.59 ENCOUNTER FOR HEPATITIS C SCREENING TEST FOR LOW RISK PATIENT: ICD-10-CM

## 2017-10-24 DIAGNOSIS — Z23 NEED FOR TD VACCINE: ICD-10-CM

## 2017-10-24 DIAGNOSIS — R21 RASH: ICD-10-CM

## 2017-10-24 DIAGNOSIS — R03.0 ELEVATED BP WITHOUT DIAGNOSIS OF HYPERTENSION: ICD-10-CM

## 2017-10-24 DIAGNOSIS — Z23 VACCINE FOR STREPTOCOCCUS PNEUMONIAE AND INFLUENZA: ICD-10-CM

## 2017-10-24 PROBLEM — I10 HTN (HYPERTENSION): Status: ACTIVE | Noted: 2017-10-24

## 2017-10-24 PROBLEM — I10 HTN (HYPERTENSION): Status: RESOLVED | Noted: 2017-10-24 | Resolved: 2017-10-24

## 2017-10-24 PROCEDURE — 90471 IMMUNIZATION ADMIN: CPT | Mod: S$GLB,,, | Performed by: INTERNAL MEDICINE

## 2017-10-24 PROCEDURE — 99387 INIT PM E/M NEW PAT 65+ YRS: CPT | Mod: 25,S$GLB,, | Performed by: INTERNAL MEDICINE

## 2017-10-24 PROCEDURE — 90732 PPSV23 VACC 2 YRS+ SUBQ/IM: CPT | Mod: S$GLB,,, | Performed by: INTERNAL MEDICINE

## 2017-10-24 PROCEDURE — 99999 PR PBB SHADOW E&M-EST. PATIENT-LVL IV: CPT | Mod: PBBFAC,,, | Performed by: INTERNAL MEDICINE

## 2017-10-24 NOTE — PROGRESS NOTES
Subjective:       Patient ID: Alesha Toney is a 67 y.o. female.    Chief Complaint: Annual Exam and Establish Care    HPI   67 y.o. female here for annual physical exam.     Chronic medical issues:  Borderline Bp: she has no hx of HTN, no CP no SOB, no dizziness/lightheadedness, has not been checking it at home    SVT: currently on atenolol, no palpitation, stable    Recent UTI x 2 with hx of kidney stones, follows with urology, will make follow up appt, had dysuria and hematuria with these but has resolved, no fevers, chills, weight loss or night sweats    Hx tobacco abuse- quit 4 years ago, 30 pack year hx    Health Maintenance    Vaccines: Influenza UTD   Tetanus due   Pneumovax due   Prevnar due  Zoster (>59yo) due   A1c: due  HIV: declines ages 15-65  Sexual Screening: negative  STD screening: declines  Eye exam: UTD Annual  DDS exam: UTD q6 mos.  Breast Cancer: Mammogram due   Cervical Cancer: Pap Smear declines , s/p RENNY  Colorectal Cancer: Colonoscopy 2015, due in 5 years 2020  Hepatitis C: due one time born between 4920-2280  Lipid disorders: due ages >/= 45 or >/= 20 if increased ASCVD risk   Lung Cancer: CT chest done in 2010  Osteoporosis: DEXA due >/= 65, every 2 years      Exercise: she was exercising but sprained her ankles, she is starting back on exercising  Diet: eats healthy for most part, avoids too much fried food     Past Medical History:   Diagnosis Date    Cardiac arrhythmia     History of hysterectomy     20yrs ago      Past Surgical History:   Procedure Laterality Date    HYSTERECTOMY       Social History     Social History    Marital status: Single     Spouse name: N/A    Number of children: N/A    Years of education: N/A     Occupational History    Not on file.     Social History Main Topics    Smoking status: Former Smoker     Types: Cigarettes    Smokeless tobacco: Never Used    Alcohol use Yes    Drug use: No    Sexual activity: Yes     Partners: Female     Other  Topics Concern    Not on file     Social History Narrative    No narrative on file     Review of patient's allergies indicates:   Allergen Reactions    Iodinated contrast- oral and iv dye     Pcn [penicillins]     Phenergan plain     Tramadol     Vancomycin analogues      Ms. Toney had no medications administered during this visit.      Review of Systems   Constitutional: Positive for unexpected weight change. Negative for activity change, chills and fever.   HENT: Negative for hearing loss, rhinorrhea and trouble swallowing.    Eyes: Negative for discharge and visual disturbance.   Respiratory: Negative for chest tightness and wheezing.    Cardiovascular: Negative for chest pain and palpitations.   Gastrointestinal: Negative for blood in stool, constipation, diarrhea and vomiting.   Endocrine: Negative for polydipsia and polyuria.   Genitourinary: Positive for dysuria (with UTI) and hematuria (with UTI). Negative for difficulty urinating and menstrual problem.   Musculoskeletal: Positive for arthralgias. Negative for joint swelling and neck pain.   Neurological: Negative for weakness and headaches.   Psychiatric/Behavioral: Negative for confusion and dysphoric mood.     Objective:     Vitals:    10/24/17 1453   BP: (!) 143/72   Pulse: (!) 56   Temp: 98.7 °F (37.1 °C)    Body mass index is 28.5 kg/m².  Physical Exam   Constitutional: She is oriented to person, place, and time. She appears well-developed and well-nourished.   HENT:   Head: Normocephalic and atraumatic.   Mouth/Throat: Oropharynx is clear and moist.   Eyes: Conjunctivae are normal. Pupils are equal, round, and reactive to light.   Neck: Normal range of motion. No tracheal deviation present. No thyromegaly present.   Cardiovascular: Normal rate, regular rhythm, normal heart sounds and intact distal pulses.  Exam reveals no gallop and no friction rub.    No murmur heard.  Pulmonary/Chest: Effort normal and breath sounds normal. She has no  wheezes. She has no rales.   Abdominal: Soft. Bowel sounds are normal. There is no tenderness. There is no guarding.   Musculoskeletal: She exhibits no edema.   Lymphadenopathy:     She has no cervical adenopathy.   Neurological: She is alert and oriented to person, place, and time.   Skin: Skin is warm and dry. Rash noted.   Erythematous urticarial rash noted on legs, central punctum   Psychiatric: She has a normal mood and affect. Judgment normal.     Assessment:     1. Annual physical exam    2. Encounter to establish care with new doctor    3. Postmenopausal estrogen deficiency    4. Screening for breast cancer    5. Encounter for hepatitis C screening test for low risk patient    6. Need for vaccination for zoster    7. Need for TD vaccine    8. SVT (supraventricular tachycardia)    9. Rash    10. Vaccine for streptococcus pneumoniae and influenza    11. Elevated BP without diagnosis of hypertension      Plan:   1. Annual physical exam  - Comprehensive metabolic panel; Future  - CBC auto differential; Future  - Hemoglobin A1c; Future  - Lipid panel; Future  - TSH; Future  - Urinalysis Microscopic; Future  - Counseled patient on need to get regular exercise at least 30 minutes a day at high intensity 5 days a week.  - counseled on heart healthy diet, increasing protein    2. Encounter to establish care with new doctor    3. Postmenopausal estrogen deficiency  - DXA Bone Density Spine And Hip; Future    4. Screening for breast cancer  - Mammo Digital Screening Bilat with CAD; Future    5. Encounter for hepatitis C screening test for low risk patient  - Hepatitis C antibody; Future    6. Need for vaccination for zoster  - zoster vaccine live, PF, (ZOSTAVAX, PF,) 19,400 unit/0.65 mL injection; Inject 19,400 Units into the skin once.  Dispense: 1 vial; Refill: 0    7. Need for TD vaccine  - tetanus toxoid, adsorbed, PF, 5 LF unit/0.5 mL Susp; Inject 0.5 mLs into the muscle once.  Dispense: 0.5 mL; Refill: 0    8.  SVT (supraventricular tachycardia)  - Ambulatory Referral to Cardiology  - stable on atenolol    9. Rash  - start OTC anti-histamine such as zyrtec, claritin    10. Need for pneumovax  - given today    11. Elevated BP without HTN  - will monitor BP at home daily and keep log  - will send to me by portal in 2 weeks      Return to clinic in one year for annual exam or sooner if dictated by labs or illness.

## 2017-11-01 ENCOUNTER — PATIENT MESSAGE (OUTPATIENT)
Dept: INTERNAL MEDICINE | Facility: CLINIC | Age: 67
End: 2017-11-01

## 2017-11-01 DIAGNOSIS — I10 HYPERTENSION, UNSPECIFIED TYPE: Primary | ICD-10-CM

## 2017-11-01 DIAGNOSIS — I27.20 PULMONARY HYPERTENSION, MILD: ICD-10-CM

## 2017-11-02 ENCOUNTER — HOSPITAL ENCOUNTER (OUTPATIENT)
Dept: RADIOLOGY | Facility: HOSPITAL | Age: 67
Discharge: HOME | End: 2017-11-02
Attending: INTERNAL MEDICINE
Payer: COMMERCIAL

## 2017-11-02 DIAGNOSIS — Z12.39 SCREENING FOR BREAST CANCER: ICD-10-CM

## 2017-11-02 PROCEDURE — 77063 BREAST TOMOSYNTHESIS BI: CPT | Mod: 26,,, | Performed by: RADIOLOGY

## 2017-11-02 PROCEDURE — 77067 SCR MAMMO BI INCL CAD: CPT | Mod: TC

## 2017-11-02 PROCEDURE — 77067 SCR MAMMO BI INCL CAD: CPT | Mod: 26,,, | Performed by: RADIOLOGY

## 2017-11-02 RX ORDER — AMLODIPINE BESYLATE 5 MG/1
5 TABLET ORAL DAILY
Qty: 30 TABLET | Refills: 2 | Status: SHIPPED | OUTPATIENT
Start: 2017-11-02 | End: 2018-06-25

## 2017-11-02 NOTE — TELEPHONE ENCOUNTER
Will start norvasc 5mg    Please schedule 1 month follow up for BP check    Also will refer to pulmonology for pulm HTN.  Please arrange.      Cori Galloway MD

## 2017-11-02 NOTE — PATIENT INSTRUCTIONS
Taking Amlodipine  Amlodipine ([id number]) is a calcium channel blocker. It helps relax your blood vessels and get more blood and oxygen to your heart. Relaxing the blood vessels also helps lower your blood pressure and relieve any chest pain you may have.     Each time you take your medicine, jackie it on the calendar so you won't forget.     Medication tips  · Read the fact sheet that comes with your medicine. It tells you when and how to take it. Ask for a sheet if you dont get one.  · Take your medicine at the same time each day. If it upsets your stomach, take it with food or milk.  · If you miss a dose, take it as soon as you remember -- unless it is almost time for your next dose. If so, skip the missed dose. Do not take a double dose.  · Call your doctor or pharmacist if you have any questions about taking your medicine.  · Take your medicine even if you feel well. Most people with high blood pressure dont feel sick.  For your safety  · Ask your doctor or pharmacist if there are any foods or medicines you should avoid.  · To prevent dizziness, get up slowly after sitting or lying down.  · Do not stop taking your medicine unless your doctor tells you to. Doing so can make your condition worse. When stopping this medicine, the dose may need to be slowly decreased.  · Tell your doctor or pharmacist before taking any other prescription or over-the-counter medicines. This includes vitamin or mineral supplements and herbal remedies.  · Talk to your doctor or pharmacist about whether drinking alcohol is safe while taking amlodipine.  · Be sure to refill your prescription before you run out.  · Do not share your medicine with anyone.  · Ask your doctor how often you should have your blood pressure checked.  When to seek medical advice  Call your healthcare provider right away if any of these occur:  · You notice swelling in your ankles or feet or your skin flushes  · You have a headache or nausea  · You feel  tired or weak  · You have severe dizziness  · You feel chest pain  · You have trouble breathing  · You develop a skin rash or itching   Date Last Reviewed: 6/1/2016  © 7564-2392 [employer]. [address] [place] [address], [place] [address]. All rights reserved. This information is not intended as a substitute for professional medical care. Always follow your healthcare professional's instructions.

## 2017-11-03 ENCOUNTER — TELEPHONE (OUTPATIENT)
Dept: INTERNAL MEDICINE | Facility: CLINIC | Age: 67
End: 2017-11-03

## 2017-11-03 DIAGNOSIS — Z12.11 COLON CANCER SCREENING: ICD-10-CM

## 2017-11-03 DIAGNOSIS — I27.20 PULMONARY HTN: Primary | ICD-10-CM

## 2017-11-04 LAB
ALBUMIN SERPL-MCNC: 4.3 G/DL (ref 3.6–5.1)
ALBUMIN/GLOB SERPL: 1.6 (CALC) (ref 1–2.5)
ALP SERPL-CCNC: 47 U/L (ref 33–130)
ALT SERPL-CCNC: 17 U/L (ref 6–29)
APPEARANCE UR: CLEAR
AST SERPL-CCNC: 21 U/L (ref 10–35)
BACTERIA #/AREA URNS HPF: NORMAL /HPF
BASOPHILS # BLD AUTO: 57 CELLS/UL (ref 0–200)
BASOPHILS NFR BLD AUTO: 0.7 %
BILIRUB SERPL-MCNC: 0.4 MG/DL (ref 0.2–1.2)
BILIRUB UR QL STRIP: NEGATIVE
BUN SERPL-MCNC: 20 MG/DL (ref 7–25)
BUN/CREAT SERPL: ABNORMAL (CALC) (ref 6–22)
CALCIUM SERPL-MCNC: 10 MG/DL (ref 8.6–10.4)
CHLORIDE SERPL-SCNC: 106 MMOL/L (ref 98–110)
CHOLEST SERPL-MCNC: 197 MG/DL
CHOLEST/HDLC SERPL: 3.5 (CALC)
CO2 SERPL-SCNC: 30 MMOL/L (ref 20–31)
COLOR UR: NORMAL
CREAT SERPL-MCNC: 0.99 MG/DL (ref 0.5–0.99)
EOSINOPHIL # BLD AUTO: 389 CELLS/UL (ref 15–500)
EOSINOPHIL NFR BLD AUTO: 4.8 %
ERYTHROCYTE [DISTWIDTH] IN BLOOD BY AUTOMATED COUNT: 12.3 % (ref 11–15)
GFR SERPL CREATININE-BSD FRML MDRD: 59 ML/MIN/1.73M2
GLOBULIN SER CALC-MCNC: 2.7 G/DL (CALC) (ref 1.9–3.7)
GLUCOSE SERPL-MCNC: 114 MG/DL (ref 65–99)
GLUCOSE UR QL STRIP: NEGATIVE
HBA1C MFR BLD: 5.8 % OF TOTAL HGB
HCT VFR BLD AUTO: 43.4 % (ref 35–45)
HCV AB S/CO SERPL IA: 0.01
HCV AB SERPL QL IA: NORMAL
HDLC SERPL-MCNC: 56 MG/DL
HGB BLD-MCNC: 14.3 G/DL (ref 11.7–15.5)
HGB UR QL STRIP: NEGATIVE
HYALINE CASTS #/AREA URNS LPF: NORMAL /LPF
KETONES UR QL STRIP: NEGATIVE
LDLC SERPL CALC-MCNC: 120 MG/DL (CALC)
LEUKOCYTE ESTERASE UR QL STRIP: NEGATIVE
LYMPHOCYTES # BLD AUTO: 2867 CELLS/UL (ref 850–3900)
LYMPHOCYTES NFR BLD AUTO: 35.4 %
MCH RBC QN AUTO: 29.9 PG (ref 27–33)
MCHC RBC AUTO-ENTMCNC: 32.9 G/DL (ref 32–36)
MCV RBC AUTO: 90.8 FL (ref 80–100)
MONOCYTES # BLD AUTO: 737 CELLS/UL (ref 200–950)
MONOCYTES NFR BLD AUTO: 9.1 %
NEUTROPHILS # BLD AUTO: 4050 CELLS/UL (ref 1500–7800)
NEUTROPHILS NFR BLD AUTO: 50 %
NITRITE UR QL STRIP: NEGATIVE
NONHDLC SERPL-MCNC: 141 MG/DL (CALC)
PH UR STRIP: 6 [PH] (ref 5–8)
PLATELET # BLD AUTO: 254 THOUSAND/UL (ref 140–400)
PMV BLD REES-ECKER: 11.5 FL (ref 7.5–12.5)
POTASSIUM SERPL-SCNC: 5.3 MMOL/L (ref 3.5–5.3)
PROT SERPL-MCNC: 7 G/DL (ref 6.1–8.1)
PROT UR QL STRIP: NEGATIVE
RBC # BLD AUTO: 4.78 MILLION/UL (ref 3.8–5.1)
RBC #/AREA URNS HPF: NORMAL /HPF
SODIUM SERPL-SCNC: 143 MMOL/L (ref 135–146)
SP GR UR STRIP: 1.02 (ref 1–1.03)
SQUAMOUS #/AREA URNS HPF: NORMAL /HPF
TRIGL SERPL-MCNC: 104 MG/DL
TSH SERPL-ACNC: 1.11 MIU/L (ref 0.4–4.5)
WBC # BLD AUTO: 8.1 THOUSAND/UL (ref 3.8–10.8)
WBC #/AREA URNS HPF: NORMAL /HPF

## 2017-11-06 ENCOUNTER — PATIENT MESSAGE (OUTPATIENT)
Dept: INTERNAL MEDICINE | Facility: CLINIC | Age: 67
End: 2017-11-06

## 2017-11-06 ENCOUNTER — HOSPITAL ENCOUNTER (OUTPATIENT)
Dept: RADIOLOGY | Facility: HOSPITAL | Age: 67
Discharge: HOME | End: 2017-11-06
Attending: INTERNAL MEDICINE
Payer: COMMERCIAL

## 2017-11-06 ENCOUNTER — TELEPHONE (OUTPATIENT)
Dept: INTERNAL MEDICINE | Facility: CLINIC | Age: 67
End: 2017-11-06

## 2017-11-06 DIAGNOSIS — Z78.0 POSTMENOPAUSAL ESTROGEN DEFICIENCY: ICD-10-CM

## 2017-11-06 DIAGNOSIS — R73.03 PREDIABETES: ICD-10-CM

## 2017-11-06 DIAGNOSIS — N18.30 CKD (CHRONIC KIDNEY DISEASE) STAGE 3, GFR 30-59 ML/MIN: ICD-10-CM

## 2017-11-06 DIAGNOSIS — E78.5 HYPERLIPIDEMIA, UNSPECIFIED HYPERLIPIDEMIA TYPE: Primary | ICD-10-CM

## 2017-11-06 PROCEDURE — 77080 DXA BONE DENSITY AXIAL: CPT | Mod: TC

## 2017-11-06 PROCEDURE — 77080 DXA BONE DENSITY AXIAL: CPT | Mod: 26,,, | Performed by: RADIOLOGY

## 2017-11-06 NOTE — TELEPHONE ENCOUNTER
Prediabetic, borderline HLD    CKD stage 3- counseled on no NSAIDs    Recheck labs in 3 months    I have reviewed rest of lab results which are normal or stable. Results released to patient portal.      Cori Galloway MD

## 2017-11-06 NOTE — TELEPHONE ENCOUNTER
Bone density consistent with osteopenia.  Recommend OTC vitamin D/calcium supplement.  No signs of osteoporosis.  Repeat in 2 years.    Results released to patient portal.    Cori Galloway MD

## 2017-11-14 ENCOUNTER — OFFICE VISIT (OUTPATIENT)
Dept: CARDIOLOGY | Facility: CLINIC | Age: 67
End: 2017-11-14
Payer: COMMERCIAL

## 2017-11-14 VITALS
HEART RATE: 68 BPM | HEIGHT: 66 IN | SYSTOLIC BLOOD PRESSURE: 166 MMHG | DIASTOLIC BLOOD PRESSURE: 84 MMHG | BODY MASS INDEX: 27.92 KG/M2 | WEIGHT: 173.75 LBS

## 2017-11-14 DIAGNOSIS — I10 ESSENTIAL HYPERTENSION: Primary | ICD-10-CM

## 2017-11-14 DIAGNOSIS — I47.10 PAROXYSMAL SVT (SUPRAVENTRICULAR TACHYCARDIA): ICD-10-CM

## 2017-11-14 PROCEDURE — 99243 OFF/OP CNSLTJ NEW/EST LOW 30: CPT | Mod: S$GLB,,, | Performed by: INTERNAL MEDICINE

## 2017-11-14 PROCEDURE — 93000 ELECTROCARDIOGRAM COMPLETE: CPT | Mod: S$GLB,,, | Performed by: INTERNAL MEDICINE

## 2017-11-14 PROCEDURE — 99999 PR PBB SHADOW E&M-EST. PATIENT-LVL III: CPT | Mod: PBBFAC,,, | Performed by: INTERNAL MEDICINE

## 2017-11-14 NOTE — PROGRESS NOTES
Subjective:     Patient ID:  Alesha Toney is a 67 y.o. female who presents for evaluation of SVT      Problem List:  COPD - mild  SVT (atrial tachycardia)  HTN onset 2017    HPI:     Alesha Toney is being referred by Dr. Galloway. She takes atenolol for a history of SVT. Recently her BPs were elevated and amlodipine was prescribed, but she has not started it yet. She attributes the increase in BP to recent weight gain that she hopes to be able to reverse. She reports shortness of breath described as difficulty taking in a deep breath. This occurs at rest and also sometimes with exertion. She recalls a smothering sensation that occurred only while sleeping on her back x2.  An exercise treadmill test 5 years ago was negative. A Holter in 11/10 revelead short runs of atrial tachycardia.  She was evaluated by Dr. Hearn in 2010 for mild COPD. Dr. Hearn documented that she was evaluated by Dr. Deng at [facility] for nonsustained VT. The patient met with Dr. Osorio once but he did not have Dr. Deng's notes. The patient then established with Dr. Freeman Shukla at [facility]. An echo ordered by him in 6/17 revealed normal LV systolic function, EF 60%, mild mitral regurgitation, LA enlargement and a PA systolic pressure in the 30s.  She smoked 1 ppd x 30 yrs and quit in 6/15.      Review of Systems   Constitution: Positive for weight gain. Negative for weakness, malaise/fatigue and weight loss.   Cardiovascular: Positive for dyspnea on exertion, irregular heartbeat and palpitations. Negative for chest pain, claudication, leg swelling, orthopnea, paroxysmal nocturnal dyspnea and syncope.   Respiratory: Negative for cough, sputum production and wheezing.    Musculoskeletal: Positive for back pain. Negative for falls, joint pain, muscle cramps, muscle weakness and myalgias.   Gastrointestinal: Positive for heartburn. Negative for abdominal pain and melena.   Genitourinary: Negative for frequency, hematuria and nocturia.  "  Neurological: Negative for dizziness, light-headedness, loss of balance and paresthesias.   Psychiatric/Behavioral: Negative for depression.         Current Outpatient Prescriptions   Medication Sig    atenolol (TENORMIN) 50 MG tablet Take 50 mg by mouth 2 (two) times daily.    multivitamin with minerals tablet Take 1 tablet by mouth once daily.    amLODIPine (NORVASC) 5 MG tablet Not started     No current facility-administered medications for this visit.          Past Medical History:   Diagnosis Date    Breast cyst     Cardiac arrhythmia     Fibrocystic breast     History of hysterectomy     20yrs ago         Social History   Substance Use Topics    Smoking status: Former Smoker     Packs/day: 1.00     Years: 30.00     Types: Cigarettes     Quit date: 6/1/2005    Smokeless tobacco: Never Used    Alcohol use Yes         Family History   Problem Relation Age of Onset    Stroke Mother     Cancer Father      colon cancer         Objective:     Physical Exam   Constitutional: She is oriented to person, place, and time. She appears well-developed and well-nourished.   BP (!) 166/84   Pulse 68   Ht 5' 6" (1.676 m)   Wt 78.8 kg (173 lb 11.6 oz)   LMP  (LMP Unknown)   BMI 28.04 kg/m²      HENT:   Head: Normocephalic.   Neck: No JVD present. Carotid bruit is not present.   Cardiovascular: Normal rate, regular rhythm, S1 normal and S2 normal.  Exam reveals no gallop.    No murmur heard.  Pulses:       Posterior tibial pulses are 2+ on the right side, and 2+ on the left side.   Pulmonary/Chest: Effort normal. She has no wheezes. She has no rales. Chest wall is not dull to percussion.   Abdominal: Soft. She exhibits no abdominal bruit. There is no splenomegaly or hepatomegaly. There is no tenderness.   Musculoskeletal:        Right lower leg: She exhibits no edema.        Left lower leg: She exhibits no edema.   Neurological: She is alert and oriented to person, place, and time. Gait normal.   Skin: Skin is " warm and dry. No bruising and no rash noted. No cyanosis. Nails show no clubbing.   Psychiatric: She has a normal mood and affect. Her speech is normal and behavior is normal. Judgment normal. Cognition and memory are normal.         Lab Results   Component Value Date    CHOL 197 11/03/2017    CHOL 203 (H) 06/25/2008    CHOL 184 03/24/2004     Lab Results   Component Value Date    HDL 56 11/03/2017    HDL 49 06/25/2008    HDL 57.0 03/24/2004     Lab Results   Component Value Date    LDLCALC 120 (H) 11/03/2017    LDLCALC 136.4 (H) 06/25/2008    LDLCALC 116.6 03/24/2004     Lab Results   Component Value Date    TRIG 104 11/03/2017    TRIG 88 06/25/2008    TRIG 52 03/24/2004     Lab Results   Component Value Date    CHOLHDL 3.5 11/03/2017    CHOLHDL 24.1 06/25/2008    CHOLHDL 31.0 03/24/2004     Lab Results   Component Value Date    ALT 17 11/03/2017     Lab Results   Component Value Date    ALT 17 11/03/2017    AST 21 11/03/2017    ALKPHOS 47 11/03/2017    BILITOT 0.4 11/03/2017      Lab Results   Component Value Date    CREATININE 0.9 11/16/2017    BUN 19 11/16/2017     11/16/2017    K 4.7 11/16/2017     11/16/2017    CO2 31 (H) 11/16/2017         Assessment and Plan:     1. Essential hypertension    2. Paroxysmal SVT (supraventricular tachycardia)         Essential hypertension  Comments:  Monitor BP at home and review in 1-2 weeks. Consider losartan if K is normal instead of amlodipine.  Orders:  -     IN OFFICE EKG 12-LEAD (to [facility]); Future  -     Basic metabolic panel; Future; Expected date: 04/14/2018    Paroxysmal SVT (supraventricular tachycardia)  Comments:  Continue atenolol      RTC 6 months.

## 2017-11-14 NOTE — PATIENT INSTRUCTIONS
LDL - bad type - improves with diet and medications: typically statins; most other                   medications that lower LDL but have not been proven to prevent heart attacks.             May not improve significantly with exercise alone.             Ideally less than 100    HDL - good type - improves with exercise             Ideally greater than 50    TGs (triglycerides) - also bad - can change very quickly and considerably with food -           improve with diet and exercise            In some cases a low carbohydrate diet will lower TGs better than a low fat diet.            Ideal range     Sugar, fat and cholesterol in food:     A sensible diet that limits the intake of sugars, saturated (bad) fats and trans fats while increasing the intake of unsaturated (good)  fats and plant proteins is the basis of the current dietary recommendations.      We now recommend drastically reducing the intake of sugar. There is less emphasis on excluding fat.   Cholesterol in our food is no longer a significant concern, because it is generally present in small amounts. However please do not confuse this with the role of cholesterol in our blood and arteries. Make no mistake, it is cholesterol that clogs up our arteries whether it comes from our food or is manufactured by our bodies.       Most foods that are high in cholesterol are also high in saturated fat. But there is way more saturated fat than cholesterol in these foods. On the other hand there are a handful of foods that are high in cholesterol but do not contain much saturated fat: eggs, shrimp, crab legs and crawfish are OK to eat.       Saturated fat is the bad fat - you should limit your intake of this. Deep fried foods, meats and other animal fats are high in saturated fat. Cookies, donuts and most dessert and cakes are usually high in both saturated fat and sugar.       Unsaturated fat is the good fat. It contains the same number of calories as saturated  fat but does not get deposited in our arteries. The Mediterranean style diet encourages the intake of unsaturated fat - olive oil, avocado and unsalted nuts.      You should eat a few servings of vegetables (and fruit as long as you are not diabetic) everyday. Substitute some plant proteins in place of meat: beans, lentils, quinoa and oatmeal.      Do not use stick butter or stick margarine. Butter that comes in a tub is soft butter. It consists of 1/2 butter and 1/2 canola or another type of vegetable oil. It is fine to use that.       Trans fats should definitely be avoided. Most foods that are labelled as containing 0 gms of trans fat can still contain several hundred milligrams of trans fat: creamer, margarine, refrigerator dough, deep fried foods, ready made frosting, potato, corn and torilla chips, cakes, cookies, pie crusts and crackers containing shortening made with hydrogenated vegetable oil.

## 2017-11-14 NOTE — LETTER
November 17, 2017      Cori Galloway MD  [address] [place] [address]           [facility]  [address] [address]  Phone: [phone]          Patient: Alesha Toney   MR Number: [id number]   YOB: 1950   Date of Visit: 11/14/2017       Dear Dr. Cori Galloway:    Thank you for referring Alesha Toney to me for evaluation. Attached you will find relevant portions of my assessment and plan of care.    If you have questions, please do not hesitate to call me. I look forward to following Alesha Toney along with you.    Sincerely,    Davion Dugan  CC:  No Recipients    If you would like to receive this communication electronically, please contact [email or web address] or [phone] to request more information on [facility] access.    For providers and/or their staff who would like to refer a patient to [facility], please contact us through our one-stop-shop provider referral line, [facility], at [phone].    If you feel you have received this communication in error or would no longer like to receive these types of communications, please e-mail [email or web address]

## 2017-11-16 ENCOUNTER — LAB VISIT (OUTPATIENT)
Dept: LAB | Facility: HOSPITAL | Age: 67
End: 2017-11-16
Attending: INTERNAL MEDICINE
Payer: COMMERCIAL

## 2017-11-16 DIAGNOSIS — I10 ESSENTIAL HYPERTENSION: ICD-10-CM

## 2017-11-16 LAB
ANION GAP SERPL CALC-SCNC: 7 MMOL/L
BUN SERPL-MCNC: 19 MG/DL
CALCIUM SERPL-MCNC: 10.2 MG/DL
CHLORIDE SERPL-SCNC: 105 MMOL/L
CO2 SERPL-SCNC: 31 MMOL/L
CREAT SERPL-MCNC: 0.9 MG/DL
EST. GFR  (AFRICAN AMERICAN): >60 ML/MIN/1.73 M^2
EST. GFR  (NON AFRICAN AMERICAN): >60 ML/MIN/1.73 M^2
GLUCOSE SERPL-MCNC: 113 MG/DL
POTASSIUM SERPL-SCNC: 4.7 MMOL/L
SODIUM SERPL-SCNC: 143 MMOL/L

## 2017-11-16 PROCEDURE — 80048 BASIC METABOLIC PNL TOTAL CA: CPT

## 2017-11-16 PROCEDURE — 36415 COLL VENOUS BLD VENIPUNCTURE: CPT

## 2017-11-17 ENCOUNTER — PATIENT MESSAGE (OUTPATIENT)
Dept: CARDIOLOGY | Facility: CLINIC | Age: 67
End: 2017-11-17

## 2017-11-19 ENCOUNTER — TELEPHONE (OUTPATIENT)
Dept: CARDIOLOGY | Facility: CLINIC | Age: 67
End: 2017-11-19

## 2017-11-19 DIAGNOSIS — I10 ESSENTIAL HYPERTENSION: Primary | ICD-10-CM

## 2017-11-19 RX ORDER — LOSARTAN POTASSIUM 50 MG/1
50 TABLET ORAL DAILY
Qty: 30 TABLET | Refills: 11 | Status: SHIPPED | OUTPATIENT
Start: 2017-11-19 | End: 2018-06-25

## 2017-11-20 NOTE — TELEPHONE ENCOUNTER
Labs OK. Begin losartan 50 mg instead of amlodipine. If BPs not well controlled or K is high at a later date, will switch losartan to amlodipine.

## 2017-11-24 NOTE — TELEPHONE ENCOUNTER
I had already sent her a prescription for 50 mg of losartan. Pl order a BMP in 1 mo, if not already done.

## 2017-12-01 ENCOUNTER — OFFICE VISIT (OUTPATIENT)
Dept: INTERNAL MEDICINE | Facility: CLINIC | Age: 67
End: 2017-12-01
Payer: COMMERCIAL

## 2017-12-01 VITALS
OXYGEN SATURATION: 99 % | BODY MASS INDEX: 27.77 KG/M2 | TEMPERATURE: 98 F | HEIGHT: 66 IN | HEART RATE: 69 BPM | RESPIRATION RATE: 16 BRPM | DIASTOLIC BLOOD PRESSURE: 82 MMHG | SYSTOLIC BLOOD PRESSURE: 180 MMHG | WEIGHT: 172.81 LBS

## 2017-12-01 DIAGNOSIS — F41.9 ANXIETY: ICD-10-CM

## 2017-12-01 DIAGNOSIS — Z09 FOLLOW UP: ICD-10-CM

## 2017-12-01 DIAGNOSIS — I10 ESSENTIAL HYPERTENSION: Primary | ICD-10-CM

## 2017-12-01 PROCEDURE — 99999 PR PBB SHADOW E&M-EST. PATIENT-LVL III: CPT | Mod: PBBFAC,,, | Performed by: INTERNAL MEDICINE

## 2017-12-01 PROCEDURE — 99214 OFFICE O/P EST MOD 30 MIN: CPT | Mod: S$GLB,,, | Performed by: INTERNAL MEDICINE

## 2017-12-01 RX ORDER — HYDROXYZINE HYDROCHLORIDE 25 MG/1
25 TABLET, FILM COATED ORAL 3 TIMES DAILY PRN
Qty: 30 TABLET | Refills: 2 | Status: SHIPPED | OUTPATIENT
Start: 2017-12-01 | End: 2018-06-25

## 2017-12-01 RX ORDER — HYDRALAZINE HYDROCHLORIDE 25 MG/1
25 TABLET, FILM COATED ORAL 3 TIMES DAILY PRN
Qty: 30 TABLET | Refills: 2 | Status: SHIPPED | OUTPATIENT
Start: 2017-12-01 | End: 2017-12-01 | Stop reason: CLARIF

## 2017-12-01 NOTE — PROGRESS NOTES
Subjective:       Patient ID: Alesha Toney is a 67 y.o. female.    Chief Complaint: Follow-up (HTN)    HPI   Patient here for follow up of HTN.  She notes she was given losartan by cardiology and has not started taking it yet due to concern for side effects.  She has a lot of anxiety that is mostly situational.  She has uses xanax in the past.  She notes most anxiety at night when trying to fall asleep.  She denies any Cp, SOB, n/v, dizziness/lightheadedness or syncope.  She is checking BP at home and running 150's systolic.  She has started exercise and diet and has lost about 4 lbs since last visit.     Review of Systems   Constitutional: Negative for activity change, chills and fever.   HENT: Negative for congestion, sinus pain, sinus pressure and sore throat.    Eyes: Negative for pain and redness.   Respiratory: Negative for cough and shortness of breath.    Cardiovascular: Negative for chest pain.   Gastrointestinal: Negative for abdominal pain, constipation, nausea and vomiting.   Genitourinary: Negative for difficulty urinating.   Musculoskeletal: Negative for arthralgias and joint swelling.   Skin: Negative for rash.   Neurological: Negative for dizziness and light-headedness.   Psychiatric/Behavioral: Negative for dysphoric mood and sleep disturbance. The patient is nervous/anxious.      Objective:     Vitals:    12/01/17 1053   BP: (!) 180/82, recheck 150/64   Pulse: 69   Resp: 16   Temp: 97.7 °F (36.5 °C)    Body mass index is 27.9 kg/m².  Physical Exam   Constitutional: She is oriented to person, place, and time. She appears well-developed and well-nourished.   HENT:   Head: Normocephalic and atraumatic.   Mouth/Throat: Oropharynx is clear and moist.   Eyes: Conjunctivae are normal. Pupils are equal, round, and reactive to light.   Neck: Normal range of motion.   Cardiovascular: Normal rate, regular rhythm, normal heart sounds and intact distal pulses.  Exam reveals no gallop and no friction  rub.    No murmur heard.  Pulmonary/Chest: Effort normal and breath sounds normal. She has no wheezes. She has no rales.   Musculoskeletal: She exhibits no edema.   Neurological: She is alert and oriented to person, place, and time.   Skin: Skin is warm and dry. No rash noted.   Psychiatric: She has a normal mood and affect. Judgment normal.     Assessment:     1. Essential hypertension    2. Anxiety    3. Follow up      Plan:   1. Essential hypertension  - counseled on importance of taking medication to control BP  - continue to monitor BP at home  - continue exercise and weight loss    2. Anxiety  - hydroxyzine prn    3. Follow up        Return to clinic in six months for follow up or sooner if dictated by labs or illness.

## 2017-12-14 ENCOUNTER — PATIENT MESSAGE (OUTPATIENT)
Dept: INTERNAL MEDICINE | Facility: CLINIC | Age: 67
End: 2017-12-14

## 2017-12-14 DIAGNOSIS — F41.1 GAD (GENERALIZED ANXIETY DISORDER): Primary | ICD-10-CM

## 2017-12-14 RX ORDER — ALPRAZOLAM 0.25 MG/1
0.25 TABLET ORAL
Qty: 30 TABLET | Refills: 0 | Status: SHIPPED | OUTPATIENT
Start: 2017-12-14 | End: 2017-12-18 | Stop reason: SDUPTHER

## 2017-12-14 NOTE — PROGRESS NOTES
I will give a few tabs to use only on as needed basis.  If she is needing them more frequently, then please schedule follow up to discuss more long-acting medication.  No driving while on medication.  Script sent to pharmacy.      Cori Galloway MD

## 2017-12-18 ENCOUNTER — TELEPHONE (OUTPATIENT)
Dept: INTERNAL MEDICINE | Facility: CLINIC | Age: 67
End: 2017-12-18

## 2017-12-18 DIAGNOSIS — F41.1 GAD (GENERALIZED ANXIETY DISORDER): ICD-10-CM

## 2017-12-18 RX ORDER — ALPRAZOLAM 0.25 MG/1
0.25 TABLET ORAL DAILY PRN
Qty: 30 TABLET | Refills: 0 | Status: SHIPPED | OUTPATIENT
Start: 2017-12-18 | End: 2018-06-25

## 2017-12-20 ENCOUNTER — PATIENT MESSAGE (OUTPATIENT)
Dept: INTERNAL MEDICINE | Facility: CLINIC | Age: 67
End: 2017-12-20

## 2018-01-11 ENCOUNTER — TELEPHONE (OUTPATIENT)
Dept: INTERNAL MEDICINE | Facility: CLINIC | Age: 68
End: 2018-01-11

## 2018-01-11 NOTE — TELEPHONE ENCOUNTER
Lata, (power of ), stating that the patient has received a bill from [employer] for labs drawn 11/3/17.  They were coded as preventive.    Can you recode? Please advise.

## 2018-01-11 NOTE — TELEPHONE ENCOUNTER
I informed Lata that Dr. Galloway recoded some of the labs. I also gave her the number to billing to see if they can assist.

## 2018-01-22 ENCOUNTER — PATIENT MESSAGE (OUTPATIENT)
Dept: INTERNAL MEDICINE | Facility: CLINIC | Age: 68
End: 2018-01-22

## 2018-01-22 NOTE — TELEPHONE ENCOUNTER
Received a $500.00 lab bill from [facility].  The labs you ordered on 11/03/17.  I need you to recode them with appropriate ICD codes.  Please complete as soon as possible.  The new codes can be faxed to Amanda at [facility] [phone].

## 2018-01-26 ENCOUNTER — TELEPHONE (OUTPATIENT)
Dept: ELECTROPHYSIOLOGY | Facility: CLINIC | Age: 68
End: 2018-01-26

## 2018-01-29 DIAGNOSIS — I49.9 CARDIAC ARRHYTHMIA, UNSPECIFIED CARDIAC ARRHYTHMIA TYPE: Primary | ICD-10-CM

## 2018-01-30 ENCOUNTER — TELEPHONE (OUTPATIENT)
Dept: ELECTROPHYSIOLOGY | Facility: CLINIC | Age: 68
End: 2018-01-30

## 2018-01-30 DIAGNOSIS — M54.50 LUMBAR SPINE PAIN: Primary | ICD-10-CM

## 2018-01-30 DIAGNOSIS — I49.9 CARDIAC ARRHYTHMIA, UNSPECIFIED CARDIAC ARRHYTHMIA TYPE: Primary | ICD-10-CM

## 2018-01-30 NOTE — TELEPHONE ENCOUNTER
"Called pt to review new patient questions for clinic visit in AM with Dr. Lora. Pt reports [facility] to [facility] in AM) and was referred to Dr. Cheek ([facility]). Notified pt still do not have [facility] records. She states no testing performed, Dr. Cheek only increased atenolol at time of visit which helped but recently she has experienced more "arrhythmias" and complains of increased symptoms than in the past. She is requesting visit with Dr. Lora and event monitor.     Pt previously saw Dr. Osorio in 2010. Offered to change apt to Dr. Osorio; however, pt states she does not recall seeing him and would like to see Dr. Lora at this time.     Confirmed apt in AM and requested pt arrive at 8:30 for EKG prior. Pt verbalized understanding and again stated she would [facility].   "

## 2018-01-31 ENCOUNTER — HOSPITAL ENCOUNTER (OUTPATIENT)
Dept: RADIOLOGY | Facility: HOSPITAL | Age: 68
Discharge: HOME | End: 2018-01-31
Attending: ORTHOPAEDIC SURGERY
Payer: COMMERCIAL

## 2018-01-31 ENCOUNTER — OFFICE VISIT (OUTPATIENT)
Dept: ELECTROPHYSIOLOGY | Facility: CLINIC | Age: 68
End: 2018-01-31
Payer: COMMERCIAL

## 2018-01-31 VITALS
SYSTOLIC BLOOD PRESSURE: 138 MMHG | WEIGHT: 175.25 LBS | HEIGHT: 66 IN | BODY MASS INDEX: 28.16 KG/M2 | HEART RATE: 59 BPM | DIASTOLIC BLOOD PRESSURE: 82 MMHG

## 2018-01-31 DIAGNOSIS — I49.9 CARDIAC ARRHYTHMIA, UNSPECIFIED CARDIAC ARRHYTHMIA TYPE: ICD-10-CM

## 2018-01-31 DIAGNOSIS — I47.29 NSVT (NONSUSTAINED VENTRICULAR TACHYCARDIA): ICD-10-CM

## 2018-01-31 DIAGNOSIS — I10 ESSENTIAL HYPERTENSION: Primary | ICD-10-CM

## 2018-01-31 DIAGNOSIS — I47.10 SVT (SUPRAVENTRICULAR TACHYCARDIA): ICD-10-CM

## 2018-01-31 DIAGNOSIS — M54.50 LUMBAR SPINE PAIN: ICD-10-CM

## 2018-01-31 PROCEDURE — 99203 OFFICE O/P NEW LOW 30 MIN: CPT | Mod: S$GLB,,, | Performed by: INTERNAL MEDICINE

## 2018-01-31 PROCEDURE — 93000 ELECTROCARDIOGRAM COMPLETE: CPT | Mod: S$GLB,,, | Performed by: INTERNAL MEDICINE

## 2018-01-31 PROCEDURE — 99999 PR PBB SHADOW E&M-EST. PATIENT-LVL III: CPT | Mod: PBBFAC,,, | Performed by: INTERNAL MEDICINE

## 2018-01-31 PROCEDURE — 72120 X-RAY BEND ONLY L-S SPINE: CPT | Mod: 26,,, | Performed by: RADIOLOGY

## 2018-01-31 PROCEDURE — 72100 X-RAY EXAM L-S SPINE 2/3 VWS: CPT | Mod: TC

## 2018-01-31 PROCEDURE — 72100 X-RAY EXAM L-S SPINE 2/3 VWS: CPT | Mod: 26,,, | Performed by: RADIOLOGY

## 2018-01-31 NOTE — PROGRESS NOTES
Subjective:    Patient ID:  Alesha Toney is a 67 y.o. female who presents for evaluation of Irregular Heart Beat      HPI 66 yo female with palpitations, SVT, Htn.  She is a nurse for [employer].  Has seen Dr. Cheek and Dr. Osorio in the past.  More recently followed by Dr. Robbins at  and now Dr. Mccoy.  Presented with palpitations in 2001.  There is one strip from a hospitalization 12/01 that reveals nsr transitioning to sustained SVT,suspect AT.  Placed on Atenolol.  Event monitor 11/10 revealed nonsustained AT.  Echo 6/12/17 normal biventricular structure and function.  She also brings in a monitor from  6/10.  This reveals nonsustained AT and a 5 beat run of NSVT.  Had an episode in 2010 while driving.  +Presyncope.  Tried to cough to terminate it >> ultimately terminated it.  Hospitalized overnight on [facility].  Atenolol increased to 50 mg BID last year.  Still having episodes, 2-3 times a week, lasting a couple of seconds.  Nothing sustained.  Associated with lightheadedness and anxiety.  ECG reveals nsr with normal baseline intervals.  Walks without problems.  She indicates she is very sensitive to medications.    Review of Systems   Constitution: Negative. Negative for weakness and malaise/fatigue.   Cardiovascular: Positive for palpitations. Negative for chest pain, dyspnea on exertion, irregular heartbeat, leg swelling, near-syncope, orthopnea, paroxysmal nocturnal dyspnea and syncope.   Respiratory: Negative for cough and shortness of breath.    Neurological: Positive for light-headedness. Negative for dizziness.   All other systems reviewed and are negative.       Objective:    Physical Exam   Constitutional: She is oriented to person, place, and time. She appears well-developed and well-nourished.   Eyes: Conjunctivae are normal. No scleral icterus.   Neck: No JVD present. No tracheal deviation present.   Cardiovascular: Normal rate and regular rhythm.  PMI is not displaced.     Pulmonary/Chest: Effort normal and breath sounds normal. No respiratory distress.   Abdominal: Soft. There is no hepatosplenomegaly. There is no tenderness.   Musculoskeletal: She exhibits no edema or tenderness.   Neurological: She is alert and oriented to person, place, and time.   Skin: Skin is warm and dry. No rash noted.   Psychiatric: She has a normal mood and affect. Her behavior is normal.         Assessment:       1. Essential hypertension    2. SVT (supraventricular tachycardia)    3. NSVT (nonsustained ventricular tachycardia)         Plan:       Recurrent symptomatic SVT, strips c/w atrial tachycardia.  Unclear morphology/location.  Discussed options, which include:  1) Observing.  I do not love this approach, as, given her age, frequent AT may ultimately result in AF.  2) Anti-arrhythmic therapy.  Consider a class IC agent.  3) RFA.  We discussed risks and benefits of RFA, and the particular challenge of mapping an atrial tachycardia that is nonsustained.    My recommendation was attempt at RFA first, and if we are not able to induce, consider Propafenone.  She is going to consider her options, and let us know.  I am going to schedule her for 6 month f/u, but she will let us know.

## 2018-02-05 ENCOUNTER — OFFICE VISIT (OUTPATIENT)
Dept: ORTHOPEDICS | Facility: CLINIC | Age: 68
End: 2018-02-05
Payer: COMMERCIAL

## 2018-02-05 VITALS — BODY MASS INDEX: 28.33 KG/M2 | WEIGHT: 176.25 LBS | HEIGHT: 66 IN

## 2018-02-05 DIAGNOSIS — M54.16 LUMBAR RADICULOPATHY: Primary | ICD-10-CM

## 2018-02-05 PROCEDURE — 1159F MED LIST DOCD IN RCRD: CPT | Mod: S$GLB,,, | Performed by: ORTHOPAEDIC SURGERY

## 2018-02-05 PROCEDURE — 3008F BODY MASS INDEX DOCD: CPT | Mod: S$GLB,,, | Performed by: ORTHOPAEDIC SURGERY

## 2018-02-05 PROCEDURE — 99999 PR PBB SHADOW E&M-EST. PATIENT-LVL II: CPT | Mod: PBBFAC,,, | Performed by: ORTHOPAEDIC SURGERY

## 2018-02-05 PROCEDURE — 99214 OFFICE O/P EST MOD 30 MIN: CPT | Mod: S$GLB,,, | Performed by: ORTHOPAEDIC SURGERY

## 2018-02-05 PROCEDURE — 1125F AMNT PAIN NOTED PAIN PRSNT: CPT | Mod: S$GLB,,, | Performed by: ORTHOPAEDIC SURGERY

## 2018-02-05 RX ORDER — METHYLPREDNISOLONE 4 MG/1
TABLET ORAL
Qty: 1 PACKAGE | Refills: 0 | Status: SHIPPED | OUTPATIENT
Start: 2018-02-05 | End: 2018-05-30

## 2018-02-05 RX ORDER — ACETAMINOPHEN AND CODEINE PHOSPHATE 300; 30 MG/1; MG/1
1 TABLET ORAL EVERY 6 HOURS PRN
Qty: 28 TABLET | Refills: 0 | Status: SHIPPED | OUTPATIENT
Start: 2018-02-05 | End: 2018-02-12

## 2018-02-05 NOTE — PROGRESS NOTES
DATE: 2/5/2018  PATIENT: Alesha Toney    Attending Physician: Greyson Bansal M.D.    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 The pain has been present for 2 weeks. She notes that the leg pain is worse than the back pain. The patient describes the pain as achy in nature. Her pain was presents in the groin, which is sharp in nature however the aching pain is posterior and extends to the medial aspect of the ankle.  The pain is worse with activities (onset following waxing of floors) and improved by medications. There is no associated numbness and tingling. There is no subjective weakness. Prior treatments have included NSAIDs, tyelnol, but no surgeries, physical therapy or injections.    The Patient denies myelopathic symptoms such as handwriting changes or difficulty with buttons/coins/keys. Denies perineal paresthesias, bowel/bladder dysfunction.    PAST MEDICAL/SURGICAL HISTORY:  Past Medical History:   Diagnosis Date    Breast cyst     Cardiac arrhythmia     Fibrocystic breast     History of hysterectomy     20yrs ago     Past Surgical History:   Procedure Laterality Date    BREAST BIOPSY Left     at least 40yrs ago in her 20's    BREAST CYST ASPIRATION      BREAST CYST EXCISION      HYSTERECTOMY      @47yrs of age    OOPHORECTOMY      @47yrs of age       Current Medications:   Current Outpatient Prescriptions:     atenolol (TENORMIN) 50 MG tablet, Take 50 mg by mouth 2 (two) times daily., Disp: , Rfl:     multivitamin with minerals tablet, Take 1 tablet by mouth once daily., Disp: , Rfl:     ALPRAZolam (XANAX) 0.25 MG tablet, Take 1 tablet (0.25 mg total) by mouth daily as needed for Anxiety., Disp: 30 tablet, Rfl: 0    amLODIPine (NORVASC) 5 MG tablet, Take 1 tablet (5 mg total) by mouth once daily., Disp: 30 tablet, Rfl: 2    hydrOXYzine HCl  "(ATARAX) 25 MG tablet, Take 1 tablet (25 mg total) by mouth 3 (three) times daily as needed for Itching., Disp: 30 tablet, Rfl: 2    losartan (COZAAR) 50 MG tablet, Take 1 tablet (50 mg total) by mouth once daily., Disp: 30 tablet, Rfl: 11    Social History:   Social History     Social History    Marital status: Single     Spouse name: N/A    Number of children: N/A    Years of education: N/A     Occupational History    Not on file.     Social History Main Topics    Smoking status: Former Smoker     Packs/day: 1.00     Years: 30.00     Types: Cigarettes     Quit date: 6/1/2005    Smokeless tobacco: Never Used    Alcohol use Yes    Drug use: No    Sexual activity: Yes     Partners: Female     Other Topics Concern    Not on file     Social History Narrative    No narrative on file       REVIEW OF SYSTEMS:  Constitution: Negative. Negative for chills, fever and night sweats.   Cardiovascular: Negative for chest pain and syncope.   Respiratory: Negative for cough and shortness of breath.   Gastrointestinal: See HPI. Negative for nausea/vomiting. Negative for abdominal pain.  Genitourinary: See HPI. Negative for discoloration or dysuria.  Hematologic/Lymphatic: nil for bleeding/clotting disorders.   Musculoskeletal: Negative for falls and muscle weakness.   Neurological: See HPI. nil history of seizures. nil history of cranial surgery or shunts.  Neurological: See HPI. No seizures.   Endocrine: Negative for polydipsia, polyphagia and polyuria.   Allergic/Immunologic: Negative for hives and persistent infections.     EXAM:  Ht 5' 6" (1.676 m)   Wt 79.9 kg (176 lb 4.1 oz)   LMP  (LMP Unknown)   BMI 28.45 kg/m²     PHYSICAL EXAMINATION:    General: The patient is a 67 y.o. female in no apparent distress, the patient is orientatied to person, place and time.  Psych: Normal mood and affect  HEENT: Vision grossly intact, hearing intact to the spoken word.  Lungs: Respirations unlabored.  Gait: Normal station and " gait, no difficulty with toe or heel walk.   Skin: Dorsal lumbar skin negative for rashes, lesions, hairy patches and surgical scars. There is mild lumbar tenderness to palpation.  Range of motion: Lumbar range of motion is acceptable.  Spinal Balance: Global saggital and coronal spinal balance acceptable, no significant for scoliosis and kyphosis.  Musculoskeletal: No pain with the range of motion of the bilateral hips. No trochanteric tenderness to palpation.  Vascular: Bilateral lower extremities warm and well perfused, Dorsalis pedis pulses 2+ bilaterally.  Neurological: Normal strength and tone in all major motor groups in the bilateral lower extremities. Normal sensation to light touch in the L2-S1 dermatomes bilaterally.  Deep tendon reflexes symmetric 2+ in the bilateral lower extremities.  Negative Babinski bilaterally. Straight leg raise negative bilaterally.    IMAGING:      Today I personally reviewed AP, Lat and Flex/Ex  upright L-spine that demonstrate mild diffuse DDD with no significant spondylolisthesis or malalignment     ASSESSMENT/PLAN:    Diagnoses and all orders for this visit:    Lumbar radiculopathy    Other orders  -     methylPREDNISolone (MEDROL DOSEPACK) 4 mg tablet; use as directed  -     acetaminophen-codeine 300-30mg (TYLENOL #3) 300-30 mg Tab; Take 1 tablet by mouth every 6 (six) hours as needed.    66yo female with acute onset of LBP and RLE radiculopathy.    Discussed care plan with patient in detail. Her symptoms are acute in nature and likely secondary to her recent increase in activity. Given her CKD and advised avoidance of NSAIDs by her PCP, will arrange for Medrol dose pack. Patient to modify activities and begin home health exercise program. She may follow up as needed pending her symptoms.

## 2018-02-12 ENCOUNTER — TELEPHONE (OUTPATIENT)
Dept: INTERNAL MEDICINE | Facility: CLINIC | Age: 68
End: 2018-02-12

## 2018-02-12 NOTE — TELEPHONE ENCOUNTER
----- Message from Jasper Nassar sent at 2/12/2018 12:43 PM CST -----  Contact: Self [phone]  Pt states her labs were coded incorrectly and needs the new lab order with codes sent to LENNY Patel at [phone] ,please call

## 2018-02-14 ENCOUNTER — PATIENT MESSAGE (OUTPATIENT)
Dept: ORTHOPEDICS | Facility: CLINIC | Age: 68
End: 2018-02-14

## 2018-02-14 ENCOUNTER — TELEPHONE (OUTPATIENT)
Dept: ORTHOPEDICS | Facility: CLINIC | Age: 68
End: 2018-02-14

## 2018-02-14 DIAGNOSIS — M54.2 CERVICAL SPINE PAIN: Primary | ICD-10-CM

## 2018-02-15 ENCOUNTER — HOSPITAL ENCOUNTER (OUTPATIENT)
Dept: RADIOLOGY | Facility: HOSPITAL | Age: 68
Discharge: HOME | End: 2018-02-15
Attending: ORTHOPAEDIC SURGERY
Payer: COMMERCIAL

## 2018-02-15 DIAGNOSIS — M54.2 CERVICAL SPINE PAIN: ICD-10-CM

## 2018-02-15 PROCEDURE — 72050 X-RAY EXAM NECK SPINE 4/5VWS: CPT | Mod: 26,,, | Performed by: RADIOLOGY

## 2018-02-15 PROCEDURE — 72050 X-RAY EXAM NECK SPINE 4/5VWS: CPT | Mod: TC,FY

## 2018-02-19 ENCOUNTER — OFFICE VISIT (OUTPATIENT)
Dept: ORTHOPEDICS | Facility: CLINIC | Age: 68
End: 2018-02-19
Payer: COMMERCIAL

## 2018-02-19 VITALS — BODY MASS INDEX: 27.79 KG/M2 | WEIGHT: 172.94 LBS | HEIGHT: 66 IN

## 2018-02-19 DIAGNOSIS — M54.12 CERVICAL RADICULOPATHY: Primary | ICD-10-CM

## 2018-02-19 PROCEDURE — 99999 PR PBB SHADOW E&M-EST. PATIENT-LVL III: CPT | Mod: PBBFAC,,, | Performed by: ORTHOPAEDIC SURGERY

## 2018-02-19 PROCEDURE — 1125F AMNT PAIN NOTED PAIN PRSNT: CPT | Mod: S$GLB,,, | Performed by: ORTHOPAEDIC SURGERY

## 2018-02-19 PROCEDURE — 1159F MED LIST DOCD IN RCRD: CPT | Mod: S$GLB,,, | Performed by: ORTHOPAEDIC SURGERY

## 2018-02-19 PROCEDURE — 3008F BODY MASS INDEX DOCD: CPT | Mod: S$GLB,,, | Performed by: ORTHOPAEDIC SURGERY

## 2018-02-19 PROCEDURE — 99212 OFFICE O/P EST SF 10 MIN: CPT | Mod: S$GLB,,, | Performed by: ORTHOPAEDIC SURGERY

## 2018-02-19 RX ORDER — DIAZEPAM 5 MG/1
5 TABLET ORAL EVERY 6 HOURS PRN
Qty: 1 TABLET | Refills: 0 | Status: SHIPPED | OUTPATIENT
Start: 2018-02-19 | End: 2018-06-14 | Stop reason: SDUPTHER

## 2018-02-19 RX ORDER — ACETAMINOPHEN AND CODEINE PHOSPHATE 300; 30 MG/1; MG/1
1 TABLET ORAL
Qty: 28 TABLET | Refills: 0 | Status: SHIPPED | OUTPATIENT
Start: 2018-02-19 | End: 2018-03-01

## 2018-02-19 NOTE — PROGRESS NOTES
The carmelita returns for follow up.    At our last visit she was having bothersome RLE radiculopathy.    I gave her a medrol dosepak that resolved her pain in the leg, however she has developed increasing LUE radicular pain.    She now reports 8/10 L periscapular pain and L hand paresthesias in the thumb    She has no myelopathic findings.    On exam she has diffuse 4/5 strength in the LUE, possibly limited by pain.  She has decreased SLE over the L thumb    Cervical spine films demonstrate spondylosis at C5/6 with loss of cervical lordosis.    A) LUE radiculopathy  P) MRI C-spine, RTC for results.

## 2018-02-19 NOTE — TELEPHONE ENCOUNTER
Patient informed that Dr. Galloway has tried to recode labs and is unsuccessful. Patient will check with [facility] for further options.

## 2018-02-23 ENCOUNTER — PATIENT MESSAGE (OUTPATIENT)
Dept: ORTHOPEDICS | Facility: CLINIC | Age: 68
End: 2018-02-23

## 2018-02-28 ENCOUNTER — OFFICE VISIT (OUTPATIENT)
Dept: ORTHOPEDICS | Facility: CLINIC | Age: 68
End: 2018-02-28
Payer: COMMERCIAL

## 2018-02-28 ENCOUNTER — HOSPITAL ENCOUNTER (OUTPATIENT)
Dept: RADIOLOGY | Facility: HOSPITAL | Age: 68
Discharge: HOME | End: 2018-02-28
Attending: ORTHOPAEDIC SURGERY
Payer: COMMERCIAL

## 2018-02-28 VITALS — BODY MASS INDEX: 27.77 KG/M2 | WEIGHT: 172.81 LBS | HEIGHT: 66 IN

## 2018-02-28 DIAGNOSIS — M54.12 CERVICAL RADICULOPATHY: Primary | ICD-10-CM

## 2018-02-28 DIAGNOSIS — M54.12 CERVICAL RADICULOPATHY: ICD-10-CM

## 2018-02-28 PROCEDURE — 72141 MRI NECK SPINE W/O DYE: CPT | Mod: TC

## 2018-02-28 PROCEDURE — 72141 MRI NECK SPINE W/O DYE: CPT | Mod: 26,,, | Performed by: RADIOLOGY

## 2018-02-28 PROCEDURE — 99999 PR PBB SHADOW E&M-EST. PATIENT-LVL III: CPT | Mod: PBBFAC,,, | Performed by: ORTHOPAEDIC SURGERY

## 2018-02-28 PROCEDURE — 99212 OFFICE O/P EST SF 10 MIN: CPT | Mod: S$GLB,,, | Performed by: ORTHOPAEDIC SURGERY

## 2018-02-28 RX ORDER — HYDROCODONE BITARTRATE AND ACETAMINOPHEN 5; 325 MG/1; MG/1
1 TABLET ORAL EVERY 6 HOURS PRN
Qty: 28 TABLET | Refills: 0 | Status: SHIPPED | OUTPATIENT
Start: 2018-02-28 | End: 2018-03-12 | Stop reason: SDUPTHER

## 2018-03-02 NOTE — PROGRESS NOTES
The patient returns for follow up.    She has 3 weeks of symptoms of a L C6 radiculopathy.    She has not had any benefit from steroids.    She still has 6/10 L neck and arm pain.    Her MRI demonstrates L sided C5/6 foraminal stenosis.    Today we discussed options.    I have recommended a course of PT.    We can consider an LOBO in the future, she is not interested in this at this time.    I spent 10 minutes with the patient of which greater than 1/2 the time was devoted to counciling the patient regarding treatment options.

## 2018-03-09 ENCOUNTER — PATIENT MESSAGE (OUTPATIENT)
Dept: INTERNAL MEDICINE | Facility: CLINIC | Age: 68
End: 2018-03-09

## 2018-03-09 DIAGNOSIS — I10 ESSENTIAL HYPERTENSION: Primary | ICD-10-CM

## 2018-03-09 RX ORDER — ATENOLOL 50 MG/1
50 TABLET ORAL 2 TIMES DAILY
Qty: 180 TABLET | Refills: 3 | Status: SHIPPED | OUTPATIENT
Start: 2018-03-09 | End: 2018-07-26 | Stop reason: SDUPTHER

## 2018-03-09 NOTE — TELEPHONE ENCOUNTER
Can only write script up to 1 year but will give her enough for that period of time    Cori Galloway MD

## 2018-03-09 NOTE — TELEPHONE ENCOUNTER
Patient need a refill on her Atenolol 50mg BID. She will  a hard copy of the script, because her place of employment has a pharmacy that long term meds are sent to.  She needs the prescription written for a 90 day supply (180 tabs) with 4 refills.  She would like to  that prescription today.  Please let me know when it is ready.

## 2018-03-12 DIAGNOSIS — M54.12 CERVICAL RADICULOPATHY: Primary | ICD-10-CM

## 2018-03-12 RX ORDER — HYDROCODONE BITARTRATE AND ACETAMINOPHEN 5; 325 MG/1; MG/1
1 TABLET ORAL EVERY 6 HOURS PRN
Qty: 28 TABLET | Refills: 0 | Status: SHIPPED | OUTPATIENT
Start: 2018-03-12 | End: 2018-03-29 | Stop reason: SDUPTHER

## 2018-03-14 ENCOUNTER — CLINICAL SUPPORT (OUTPATIENT)
Dept: REHABILITATION | Facility: HOSPITAL | Age: 68
End: 2018-03-14
Payer: COMMERCIAL

## 2018-03-14 DIAGNOSIS — R29.3 POOR POSTURE: ICD-10-CM

## 2018-03-14 DIAGNOSIS — M54.2 NECK PAIN: ICD-10-CM

## 2018-03-14 PROCEDURE — 97110 THERAPEUTIC EXERCISES: CPT | Mod: PN

## 2018-03-14 PROCEDURE — 97162 PT EVAL MOD COMPLEX 30 MIN: CPT | Mod: PN

## 2018-03-14 NOTE — PLAN OF CARE
TIME RECORD    Date: 3/14/2018    Start Time:  8:08 am   Stop Time:  8:53 am     PROCEDURES:    TIMED  Procedure Min.   TE 12'                      UNTIMED  Procedure Min.   Initial evaluation 33'          Total Timed Minutes:  12'   Total Timed Units:  1  Total Untimed Units:  1  Charges Billed/# of units:  2 ( 1 IE, 1 TE)     OUTPATIENT PHYSICAL THERAPY   PATIENT EVALUATION  Onset Date: acute flare up of chronic neck pain   Primary Diagnosis: neck pain, intermittent radiating pain in (L) UE, numbness in (L) thumb, decreased postural awareness  Treatment Diagnosis:   1. Neck pain     2. Poor posture       Past Medical History:   Diagnosis Date    Breast cyst     Cardiac arrhythmia     Fibrocystic breast     History of hysterectomy     20yrs ago     Precautions: standard, HTN  Prior Therapy: n/a  Medications: Alesha Toney has a current medication list which includes the following prescription(s): alprazolam, amlodipine, atenolol, diazepam, hydrocodone-acetaminophen 5-325mg, hydroxyzine hcl, losartan, methylprednisolone, and multivitamin with minerals.  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 Pt stated that she lives with another nurse.   Place of Residence (Steps/Adaptations): n/a  Functional Deficits Leading to Referral/Nature of Injury: sitting at computer for prolonged period of time, looking up, turning head to (L)   Patient Therapy Goals: decrease pain    Subjective     Alesha Toney states that she has had neck problems for 15 years. Pt stated that about a month ago she was at [facility] and carried rolling suitcase with (L) UE and woke up with pain in (L) UE. Pt stated that initially pain was radiating into both UE. Pt stated that after a few days pain subsided, but then pain came back, and she  "started expereincing numbness in (L) thumb. Pt stated that she has undergone an xray and MRI and was told bulging disc at c5-c6. Pt reported that radiating pain into (L) UE was constant, but is now intermittent. Pt stated that symptoms have been gradually improving. Pt stated that she is right hand dominant.     Pain:   Location:  neck pain with intermittent pain radiating into (L) UE  Description: Aching and Grabbing, tingling  Activities Which Increase Pain: sitting at computer for prolonged period time, (L) side bending, looking up, turning head to (L)   Activities Which Decrease Pain: pain medication and heating pad  Pain Scale: 2/10 at best 2/10 now  9/10 at worst    Objective     Posture: rounded shoulders, forward head, flexed trunk in sitting  Palpation: Pt c/o tenderness to palpation along (B) UT region (L) > (R), C5 spinous process   Sensation: (B) UE grossly intact to light touch sensation     Range of Motion/Strength:     Cervical AROM: Pain/Dysfunction with Movement:   Flexion 45 degrees    Extension 35 degrees C/o increased pain in (L) UT region    Right side bending 30 degrees C/o pain/pulling in (L) UT region    Left side bending 30 degrees C/o Tingling in (L) thumb    Right rotation 65 degrees    Left rotation 60 degrees      Shoulder Right  Left  Pain/Dysfunction with Movement    AROM MMT AROM MMT    flexion WFL 5/5 WFL 5/5    abduction WFL 5/5 WFL 5/5    Internal rotation WFL 5/5 WFL 5/5    ER at 90° abd WFL NT WFL NT    ER at 0° abd NT 5/5 NT 5/5        Flexibility: decreased (B) UT, LS, and pectoralis flexibility  Special Tests: n/a      Tool: FOTO neck SURVEY  Limitation: 48%      TREATMENT     Time In: 8:41 am   Time Out: 8:53 am    PT Evaluation Completed? Yes  Discussed Plan of Care with patient: Yes    Alesha received 12 minutes of therapeutic exercise & instruction including:    Date 3/14/18   Visit 1/20   POC 3/14/18   FOTO 1/5   MHP    MT    UT Str. 3x30" L   LV Str. 3x30" L   Pec Str. " "3x30" corner   Chin Tuck -   DNF    Cervical Arnol    Lats    Rows RTB 2x10   Horizontal Abd    Scaption    Wall Slides    Initials ALAN Eduardo received 0 minutes of manual therapy including:      Written Home Exercises Provided: yes see above ( see media section)   Alesha demo good understanding of the education provided. Patient demo good return demo of skill of exercises.      Assessment       Initial Assessment (Pertinent finding, problem list and factors affecting outcome): Ms. Toney is a 67 year old female with c/o neck pain, intermittent radiating pain into (L) UE, and intermittent numbness in (L) thumb. Pt presents with decreased/painful cervical AROM, decreased postural awareness, and decreased functional mobility. Pt will benefit from skilled PT to address the limitations listed above in order to return pt to her highest level of function with decreased pain and limitation.     History  Co-morbidities and personal factors that may impact the plan of care Examination  Body Structures and Functions, activity limitations and participation restrictions that may impact the plan of care    Clinical Presentation   Co-morbidities:   HTN        Personal Factors:   no deficits Body Regions:   neck  upper extremities    Body Systems:    ROM  strength  sensation            Participation Restrictions:   n/a     Activity limitations:   Learning and applying knowledge  no deficits    General Tasks and Commands  no deficits    Communication  no deficits    Mobility  no deficits    Self care  no deficits    Domestic Life  no deficits    Interactions/Relationships  no deficits    Life Areas  no deficits    Community and Social Life  no deficits         evolving clinical presentation with changing clinical characteristics                      moderate   moderate  moderate Decision Making/ Complexity Score:  moderate       Rehab Potiential: good  Barriers to Rehab: none  Short Term Goals (4 Weeks): 4/14/18    1. Pt will " be independent with HEP to supplement PT in improving pain free cervical mobility  2. Pt will improve cervical AROM 10 deg in all planes to improve cervical mobility.  3. Pt will demonstrate improved sitting posture to decrease pain experienced in head and neck.    Long Term Goals (8 Weeks): 8  1. Pt will improve FOTO to </=30% limitation to improve perceived limitation with changing and maintaining mobility.  2. Pt will improve cervical AROM to WFL in all planes to improve cervical mobility.  3. Pt will report pain </= 2/10 at worst to promote return to PLOF.  4. Pt will report pain </= 2/10 with cervical AROM in all planes to promote functional QOL.      Plan     Certification Period: 3/14/18 to 5/14/18  Recommended Treatment Plan: 2 times per week for 8 weeks: Group Therapy, Locomotor Training, Manual Therapy, Moist Heat/ Ice, Neuromuscular Re-ed, Patient Education, Self Care, Therapeutic Activites and Therapeutic Exercise  Other Recommendations: modalities prn, ASTYM prn, kinesiotape prn, Functional Dry Needling prn       Therapist: Kathy Ross, PT    I CERTIFY THE NEED FOR THESE SERVICES FURNISHED UNDER THIS PLAN OF TREATMENT AND WHILE UNDER MY CARE    Physician's comments: ________________________________________________________________________________________________________________________________________________      Physician's Name: ___________________________________

## 2018-03-20 ENCOUNTER — CLINICAL SUPPORT (OUTPATIENT)
Dept: REHABILITATION | Facility: HOSPITAL | Age: 68
End: 2018-03-20
Payer: COMMERCIAL

## 2018-03-20 DIAGNOSIS — R29.3 POOR POSTURE: ICD-10-CM

## 2018-03-20 DIAGNOSIS — M54.2 NECK PAIN: ICD-10-CM

## 2018-03-20 PROCEDURE — 97140 MANUAL THERAPY 1/> REGIONS: CPT | Mod: PN

## 2018-03-20 PROCEDURE — 97110 THERAPEUTIC EXERCISES: CPT | Mod: PN

## 2018-03-20 NOTE — PROGRESS NOTES
"DAILY TREATMENT NOTE      Start Time:  6:00 pm   Stop Time:  6:35 pm     PROCEDURES:    TIMED  Procedure Min.   TE 20'   MT 15'                 UNTIMED  Procedure Min.             Total Timed Minutes:  35'   Total Timed Units:  2  Total Untimed Units:  0  Charges Billed/# of units:  2 ( 1 TE, 1 MT)       Progress/Current Status    Subjective:     Patient ID: Alesha Toney is a 67 y.o. female.  Diagnosis:   1. Neck pain     2. Poor posture       Pain: 3/10 (L) UT region prior to therapy session.   Pt stated that last Wednesday night she started experiencing a lot of pain radiating into (L) UE. Pt stated that she had to take pain medicine. Pt stated that she is feeling better now, but is still experiencing pain in (L) UT region. Pt stated that every once in a while gets shooting pain down her (L) UE. Pt stated that she has been doing her exercises and none of the exercises bother her or increase her pain.     Objective:     Pt received MT consisting of gentle STM to (B) UT region, focus on (L) x15'. Pt participated in therex per log below 1:1 with PT x 20'.     Date 3/20/18 3/14/18   Visit 2/20 1/20   POC 5/14/18 3/14/18   FOTO 2/5 1/5   MHP      MT      UT Str. 3x30" L only 3x30" L   LV Str. 3x30" L only 3x30" L   Pec Str. 3x30"  3x30" corner   Chin Tuck 5"x10  -   DNF      Cervical Arnol      Lats RTB 2x10 w/scap retract     Rows RTB 2x10 RTB 2x10   Horizontal Abd      Scaption      Wall Slides      Initials CK CK         Assessment:     Pt was instructed to notify therapist if any therex caused increased pain and if so that particular therex would be held. Pt verbalized understanding. Pt reported increased pain in (L) UE at the end of performing lat therex. Pt was able to tolerate all other therex without any c/o increased pain/numbness. Pt reported experiencing 4/10 pain at the end of therapy session. Monitor pt's response to therapy session and will progress as tolerated.     Patient Education/Response: "     Continue with HEP. Pt was instructed to stop performing particular therex if she experiences increased pain when performing particular therex. Pt verbalized understanding.     Plans and Goals:   Continue per POC, progress as tolerated    Short Term Goals (4 Weeks): 4/14/18     1. Pt will be independent with HEP to supplement PT in improving pain free cervical mobility  2. Pt will improve cervical AROM 10 deg in all planes to improve cervical mobility.  3. Pt will demonstrate improved sitting posture to decrease pain experienced in head and neck.     Long Term Goals (8 Weeks): 8  1. Pt will improve FOTO to </=30% limitation to improve perceived limitation with changing and maintaining mobility.  2. Pt will improve cervical AROM to WFL in all planes to improve cervical mobility.  3. Pt will report pain </= 2/10 at worst to promote return to PLOF.  4. Pt will report pain </= 2/10 with cervical AROM in all planes to promote functional QOL.

## 2018-03-26 ENCOUNTER — CLINICAL SUPPORT (OUTPATIENT)
Dept: REHABILITATION | Facility: HOSPITAL | Age: 68
End: 2018-03-26
Payer: COMMERCIAL

## 2018-03-26 DIAGNOSIS — M54.2 NECK PAIN: ICD-10-CM

## 2018-03-26 DIAGNOSIS — R29.3 POOR POSTURE: ICD-10-CM

## 2018-03-26 PROCEDURE — 97140 MANUAL THERAPY 1/> REGIONS: CPT | Mod: PN

## 2018-03-26 PROCEDURE — 97110 THERAPEUTIC EXERCISES: CPT | Mod: PN

## 2018-03-26 NOTE — PROGRESS NOTES
"DAILY TREATMENT NOTE      Start Time:  6:03 pm   Stop Time:  6:35 pm     PROCEDURES:    TIMED  Procedure Min.   TE 22'   MT 10'                  UNTIMED  Procedure Min.             Total Timed Minutes:  32'  Total Timed Units:  2  Total Untimed Units:  0  Charges Billed/# of units:  2 ( 1 TE, 1 MT)       Progress/Current Status    Subjective:     Patient ID: Alesha Toney is a 67 y.o. female.  Diagnosis:   1. Neck pain     2. Poor posture       Pain: 3 /10 (L) UT region prior to therapy session  Pt stated that she felt a little sore after last session, but no increased pain past week.     Objective:     Pt received MT consisting of gentle STM to (L) UT region x 10'. Pt participated in therex per log below x22'     Date 3/26/18 3/20/18 3/14/18   Visit 3/20 2/20 1/20   POC 5/14/18 5/14/18 3/14/18   FOTO 3/5 2/5 1/5   MHP       MT 10'       UT Str. 3x30" L only 3x30" L only 3x30" L   LV Str. 3x30" L only  3x30" L only 3x30" L   Pec Str. 3x30" 3x30"  3x30" corner   Chin Tuck 5"x15 5"x10  -   SL gator 2x10 L w/scap retract     SL ER 2x10 L w/scap retract     DNF       Cervical Arnol       Lats - RTB 2x10 w/scap retract     Rows RTB 2x15 RTB 2x10 RTB 2x10   Horizontal Abd       Scaption       Wall Slides       Initials CK CK CK       Assessment:     During MT pt stated that she started experiencing radiating pain into (L) UE proximal to elbow. Additional MT was held. After MT was stopped pt reported that radiating pain gradually dissipated. Pt was able to tolerate all therex including additional therex noted per log above without c/o increased pain. Pt reported no increased pain at the end of therapy session.     Patient Education/Response:     Continue with HE    Plans and Goals:     Continue per POC, progress as tolerated    Short Term Goals (4 Weeks): 4/14/18     1. Pt will be independent with HEP to supplement PT in improving pain free cervical mobility  2. Pt will improve cervical AROM 10 deg in all planes to " improve cervical mobility.  3. Pt will demonstrate improved sitting posture to decrease pain experienced in head and neck.     Long Term Goals (8 Weeks): 8  1. Pt will improve FOTO to </=30% limitation to improve perceived limitation with changing and maintaining mobility.  2. Pt will improve cervical AROM to WFL in all planes to improve cervical mobility.  3. Pt will report pain </= 2/10 at worst to promote return to PLOF.  4. Pt will report pain </= 2/10 with cervical AROM in all planes to promote functional QOL.

## 2018-03-29 DIAGNOSIS — M54.12 CERVICAL RADICULOPATHY: ICD-10-CM

## 2018-04-02 RX ORDER — HYDROCODONE BITARTRATE AND ACETAMINOPHEN 5; 325 MG/1; MG/1
1 TABLET ORAL EVERY 6 HOURS PRN
Qty: 28 TABLET | Refills: 0 | Status: SHIPPED | OUTPATIENT
Start: 2018-04-02 | End: 2018-05-30

## 2018-04-03 ENCOUNTER — CLINICAL SUPPORT (OUTPATIENT)
Dept: REHABILITATION | Facility: HOSPITAL | Age: 68
End: 2018-04-03
Payer: COMMERCIAL

## 2018-04-03 DIAGNOSIS — M54.2 NECK PAIN: ICD-10-CM

## 2018-04-03 DIAGNOSIS — R29.3 POOR POSTURE: ICD-10-CM

## 2018-04-03 PROCEDURE — 97110 THERAPEUTIC EXERCISES: CPT | Mod: PN

## 2018-04-03 NOTE — PROGRESS NOTES
"DAILY TREATMENT NOTE    DATE: 4/3/2018    Start Time:  4:02 pm   Stop Time:  4:29 pm    PROCEDURES:    TIMED  Procedure Min.   TE 27'                      UNTIMED  Procedure Min.             Total Timed Minutes:  27'  Total Timed Units:  2  Total Untimed Units:  0  Charges Billed/# of units:  2 TE      Progress/Current Status    Subjective:     Patient ID: Alesha Toney is a 67 y.o. female.  Diagnosis:   1. Neck pain     2. Poor posture       Pain: 0 /10  Pt stated that she was feeling pretty good today. Pt stated that she had a headache earlier today extending (L) side of eye to head, but went away. Pt stated that she felt fine after last therapy session.     Objective:     Pt participated in therex per log below x27' 1:1 with PT    Date 4/3/18 3/26/18 3/20/18 3/14/18   Visit 4/20 3/20 2/20 1/20   POC 5/14/18 5/14/18 5/14/18 3/14/18   FOTO 4/5 3/5 2/5 1/5   MHP        MT - 10'       UT Str. 3x30" L only 3x30" L only 3x30" L only 3x30" L   LV Str. 3x30" L only  3x30" L only  3x30" L only 3x30" L   Pec Str. 3x30"  3x30" 3x30"  3x30" corner   Chin Tuck 5"x20  5"x15 5"x10  -   SL gator 2x10 B w/ scap retract 2x10 L w/scap retract     SL ER 2x10 L w/scap retract 2x10 L w/scap retract     DNF        Cervical Arnol        Lats  - RTB 2x10 w/scap retract     Rows RTB 2x15  RTB 2x15 RTB 2x10 RTB 2x10   Horizontal Abd        Scaption        Wall Slides        Initials CK CK CK CK       Assessment:     Pt was able to tolerate all therex noted per log above without any c/o increased pain during or after therapy session. Pt will continue to benefit from progression of periscapular strengthening therex as tolerated.     Patient Education/Response:     Continue with HEP. Pt was educated on and given handout on updated HEP. Pt demo/verbalized understanding. (see media section)     Plans and Goals:     Continue per POC, progress as tolerated    Short Term Goals (4 Weeks): 4/14/18     1. Pt will be independent with HEP to " supplement PT in improving pain free cervical mobility  2. Pt will improve cervical AROM 10 deg in all planes to improve cervical mobility.  3. Pt will demonstrate improved sitting posture to decrease pain experienced in head and neck.     Long Term Goals (8 Weeks): 8  1. Pt will improve FOTO to </=30% limitation to improve perceived limitation with changing and maintaining mobility.  2. Pt will improve cervical AROM to WFL in all planes to improve cervical mobility.  3. Pt will report pain </= 2/10 at worst to promote return to PLOF.  4. Pt will report pain </= 2/10 with cervical AROM in all planes to promote functional QOL.

## 2018-04-17 ENCOUNTER — CLINICAL SUPPORT (OUTPATIENT)
Dept: REHABILITATION | Facility: HOSPITAL | Age: 68
End: 2018-04-17
Payer: COMMERCIAL

## 2018-04-17 DIAGNOSIS — R29.3 POOR POSTURE: ICD-10-CM

## 2018-04-17 DIAGNOSIS — M54.2 NECK PAIN: ICD-10-CM

## 2018-04-17 PROCEDURE — 97110 THERAPEUTIC EXERCISES: CPT | Mod: PN

## 2018-04-17 NOTE — PROGRESS NOTES
"DAILY TREATMENT NOTE    DATE: 4/17/2018    Start Time:  6:05 pm   Stop Time:  6:40 pm     PROCEDURES:    TIMED  Procedure Min.   TE 35'                     UNTIMED  Procedure Min.             Total Timed Minutes:  35'   Total Timed Units:  2  Total Untimed Units:  0  Charges Billed/# of units:  2 TE      Progress/Current Status    Subjective:     Patient ID: Alesha Toney is a 67 y.o. female.  Diagnosis:   1. Neck pain     2. Poor posture       Pain: 3/10 pain   Pt stated that she went to [employer] two weekends ago and lifted suitcase out of trunk, which flared up pain in neck and radiating pain into (L) UE. Pt stated that pain is gradually getting better. Pt also reported that sitting at computer is taxing on her.     Objective:     Pt participated in therex per log below x 35'.     Date 4/17/18 4/3/18 3/26/18 3/20/18 3/14/18   Visit 5/20 4/20 3/20 2/20 1/20   POC 5/14/18 5/14/18 5/14/18 5/14/18 3/14/18   FOTO 5/5   done 4/5 3/5 2/5 1/5   MHP         MT  - 10'       UT Str. 3x30" L only  3x30" L only 3x30" L only 3x30" L only 3x30" L   LV Str. 3x30" L only  3x30" L only  3x30" L only  3x30" L only 3x30" L   Pec Str. 3x30"  3x30"  3x30" 3x30"  3x30" corner   Chin Tuck 5"x20  5"x20  5"x15 5"x10  -   DNF 3"x5       SL gator 2x12 B w/scap retract 2x10 B w/ scap retract 2x10 L w/scap retract     SL ER 2x12 B w/scap retract 2x10 L w/scap retract 2x10 L w/scap retract     SL flexion  2x10 B w/scap retract        Cervical Arnol         Lats   - RTB 2x10 w/scap retract     Rows RTB 2x15 RTB 2x15  RTB 2x15 RTB 2x10 RTB 2x10   Horizontal Abd         Scaption         Wall Slides         Initials CK CK CK CK CK       Assessment:     Pt was able to tolerate progression of therex noted per log above. Pt tolerated all therex without any c/o increased pain during or after therapy session.     Patient Education/Response:     Continue with HEP    Plans and Goals:     Continue per POC, progress as tolerated    Short Term " Goals (4 Weeks): 4/14/18     1. Pt will be independent with HEP to supplement PT in improving pain free cervical mobility  2. Pt will improve cervical AROM 10 deg in all planes to improve cervical mobility.  3. Pt will demonstrate improved sitting posture to decrease pain experienced in head and neck.     Long Term Goals (8 Weeks): 8  1. Pt will improve FOTO to </=30% limitation to improve perceived limitation with changing and maintaining mobility.  2. Pt will improve cervical AROM to WFL in all planes to improve cervical mobility.  3. Pt will report pain </= 2/10 at worst to promote return to PLOF.  4. Pt will report pain </= 2/10 with cervical AROM in all planes to promote functional QOL.

## 2018-04-26 DIAGNOSIS — M54.12 CERVICAL RADICULOPATHY: ICD-10-CM

## 2018-04-27 ENCOUNTER — TELEPHONE (OUTPATIENT)
Dept: CARDIOLOGY | Facility: CLINIC | Age: 68
End: 2018-04-27

## 2018-04-27 RX ORDER — HYDROCODONE BITARTRATE AND ACETAMINOPHEN 5; 325 MG/1; MG/1
1 TABLET ORAL EVERY 6 HOURS PRN
Qty: 28 TABLET | Refills: 0 | OUTPATIENT
Start: 2018-04-27

## 2018-04-27 NOTE — TELEPHONE ENCOUNTER
Called pt to schedule a follow up visit with . Pt states she is not interested in scheduling a follow up appointment. 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 Pt states she will continue to monitor BP and follow up with her PCP at her yearly visit.

## 2018-05-01 ENCOUNTER — DOCUMENTATION ONLY (OUTPATIENT)
Dept: REHABILITATION | Facility: HOSPITAL | Age: 68
End: 2018-05-01

## 2018-05-02 NOTE — PROGRESS NOTES
Pt arrived to therapy session and stated that she could not participate in therapy today. Pt reported that last Thursday she was drying her hair with hair dryer in her (R) hand and since then she has been experiencing radiating pain into (R) UE and extending to (R) scapula. Pt stated that she is not currently able to tolerate performing HEP at home 2/2 to pain. Recommended that pt schedule an apt with MD. Pt verbalized understanding.

## 2018-05-30 ENCOUNTER — OFFICE VISIT (OUTPATIENT)
Dept: ORTHOPEDICS | Facility: CLINIC | Age: 68
End: 2018-05-30
Payer: COMMERCIAL

## 2018-05-30 ENCOUNTER — HOSPITAL ENCOUNTER (OUTPATIENT)
Dept: RADIOLOGY | Facility: HOSPITAL | Age: 68
Discharge: HOME | End: 2018-05-30
Attending: PHYSICIAN ASSISTANT
Payer: COMMERCIAL

## 2018-05-30 VITALS — DIASTOLIC BLOOD PRESSURE: 72 MMHG | SYSTOLIC BLOOD PRESSURE: 154 MMHG | HEART RATE: 69 BPM | HEIGHT: 66 IN

## 2018-05-30 DIAGNOSIS — M25.511 RIGHT SHOULDER PAIN, UNSPECIFIED CHRONICITY: Primary | ICD-10-CM

## 2018-05-30 DIAGNOSIS — M25.511 RIGHT SHOULDER PAIN, UNSPECIFIED CHRONICITY: ICD-10-CM

## 2018-05-30 DIAGNOSIS — M54.2 CERVICAL SPINE PAIN: ICD-10-CM

## 2018-05-30 DIAGNOSIS — M54.12 CERVICAL RADICULOPATHY: ICD-10-CM

## 2018-05-30 PROCEDURE — 99999 PR PBB SHADOW E&M-EST. PATIENT-LVL III: CPT | Mod: PBBFAC,,, | Performed by: PHYSICIAN ASSISTANT

## 2018-05-30 PROCEDURE — 3078F DIAST BP <80 MM HG: CPT | Mod: CPTII,S$GLB,, | Performed by: PHYSICIAN ASSISTANT

## 2018-05-30 PROCEDURE — 3077F SYST BP >= 140 MM HG: CPT | Mod: CPTII,S$GLB,, | Performed by: PHYSICIAN ASSISTANT

## 2018-05-30 PROCEDURE — 73030 X-RAY EXAM OF SHOULDER: CPT | Mod: 26,RT,, | Performed by: RADIOLOGY

## 2018-05-30 PROCEDURE — 99213 OFFICE O/P EST LOW 20 MIN: CPT | Mod: S$GLB,,, | Performed by: PHYSICIAN ASSISTANT

## 2018-05-30 PROCEDURE — 73030 X-RAY EXAM OF SHOULDER: CPT | Mod: TC,RT

## 2018-05-30 RX ORDER — METHYLPREDNISOLONE 4 MG/1
TABLET ORAL
Qty: 1 PACKAGE | Refills: 0 | Status: SHIPPED | OUTPATIENT
Start: 2018-05-30 | End: 2018-06-25

## 2018-05-30 RX ORDER — ACETAMINOPHEN AND CODEINE PHOSPHATE 300; 30 MG/1; MG/1
1-2 TABLET ORAL EVERY 6 HOURS PRN
Qty: 30 TABLET | Refills: 0 | Status: SHIPPED | OUTPATIENT
Start: 2018-05-30 | End: 2018-06-14 | Stop reason: SDUPTHER

## 2018-05-30 RX ORDER — CYCLOBENZAPRINE HCL 10 MG
10 TABLET ORAL 3 TIMES DAILY PRN
Qty: 30 TABLET | Refills: 0 | Status: SHIPPED | OUTPATIENT
Start: 2018-05-30 | End: 2018-06-09

## 2018-05-30 NOTE — PROGRESS NOTES
SUBJECTIVE:     Chief Complaint & History of Present Illness:  Alesha Toney is a  Established  patient 67 y.o. female who is seen here today with a complaint of    Chief Complaint   Patient presents with    Right Shoulder - Pain    .  She well known to us was last seen treated clinic O2/28/2018 for a cervical radiculopathy primarily of the left cervical region.  Underwent a course of physical therapy and has done very well.  She's a return of symptoms but this time more on the right with the pain in the triceps region radiating down the shoulder towards the elbow and the forearm.  Exacerbated with rotation of the neck.  There's been no history of trauma or injury to the shoulder  On a scale of 1-10, with 10 being worst pain imaginable, he rates this pain as 3 on good days and 6 on bad days.  she describes the pain as sore and aching.    Review of patient's allergies indicates:   Allergen Reactions    Iodinated contrast- oral and iv dye     Pcn [penicillins]     Phenergan plain     Tramadol     Vancomycin analogues          Current Outpatient Prescriptions   Medication Sig Dispense Refill    atenolol (TENORMIN) 50 MG tablet Take 1 tablet (50 mg total) by mouth 2 (two) times daily. 180 tablet 3    multivitamin with minerals tablet Take 1 tablet by mouth once daily.      acetaminophen-codeine 300-30mg (TYLENOL #3) 300-30 mg Tab Take 1-2 tablets by mouth every 6 (six) hours as needed (Pain). 30 tablet 0    ALPRAZolam (XANAX) 0.25 MG tablet Take 1 tablet (0.25 mg total) by mouth daily as needed for Anxiety. 30 tablet 0    amLODIPine (NORVASC) 5 MG tablet Take 1 tablet (5 mg total) by mouth once daily. 30 tablet 2    cyclobenzaprine (FLEXERIL) 10 MG tablet Take 1 tablet (10 mg total) by mouth 3 (three) times daily as needed for Muscle spasms. 30 tablet 0    diazePAM (VALIUM) 5 MG tablet Take 1 tablet (5 mg total) by mouth every 6 (six) hours as needed for Anxiety (once). 1 tablet 0    hydrOXYzine  "HCl (ATARAX) 25 MG tablet Take 1 tablet (25 mg total) by mouth 3 (three) times daily as needed for Itching. 30 tablet 2    losartan (COZAAR) 50 MG tablet Take 1 tablet (50 mg total) by mouth once daily. 30 tablet 11    methylPREDNISolone (MEDROL DOSEPACK) 4 mg tablet use as directed 1 Package 0     No current facility-administered medications for this visit.        Past Medical History:   Diagnosis Date    Breast cyst     Cardiac arrhythmia     Fibrocystic breast     History of hysterectomy     20yrs ago       Past Surgical History:   Procedure Laterality Date    BREAST BIOPSY Left     at least 40yrs ago in her 20's    BREAST CYST ASPIRATION      BREAST CYST EXCISION      HYSTERECTOMY      @47yrs of age    OOPHORECTOMY      @47yrs of age       Vital Signs (Most Recent)  Vitals:    05/30/18 1540   BP: (!) 154/72   Pulse: 69       Review of Systems:  ROS:  Constitutional: no fever or chills  Eyes: no visual changes  ENT: no nasal congestion or sore throat  Respiratory: no cough or shortness of breath  Cardiovascular: no chest pain or palpitations  Gastrointestinal: no nausea or vomiting, tolerating diet  Genitourinary: no hematuria or dysuria  Integument/Breast: no rash or pruritis  Hematologic/Lymphatic: no easy bruising or lymphadenopathy  Musculoskeletal: no arthralgias or myalgias  Neurological: no seizures or tremors  Behavioral/Psych: no auditory or visual hallucinations  Endocrine: no heat or cold intolerance        OBJECTIVE:     PHYSICAL EXAM:  Height: 5' 6" (167.6 cm)  , General Appearance: Well nourished, well developed, in no acute distress.  Neurological: Mood & affect are normal.  Shoulder exam: right  Tenderness: deltoid muscle, trapezius muscle  ROM: forward flexion 180/180, extension 45/45, full abduction 180/180, abduction-glenohumeral 90/90, external rotation 50/50, pain at the extremes of mobility  Shoulder Strength: biceps 5/5, triceps 5/5, abduction 5/5, adduction 5/5, external " rotation 5/5 with shoulder at side, flexion 5/5, and extension 5/5  normal active ROM, no tenderness, no impingement sign  Stability tests: anterior apprehension test negative and posterior apprehension test negative  Special Tests:Cross-chest abduction: negative                     RADIOGRAPHS:  X-rays the shoulder taken today films reviewed by me demonstrate well-preserved joint spaces throughout the shoulder no evidence of fracture dislocation or advanced degenerative joint disease    ASSESSMENT/PLAN:     Plan: We discussed with the patient at length all the different treatment options available for her rightshoulder including anti-inflammatories, acetaminophen, rest, ice, Physical therapy to include strengthening exercise, occasional cortisone injections for temporary relief, arthroscopic surgical repair, and finally shoulder arthroplasty.   Medrol Dosepak as per package directions  Cyclobenzaprine 10 mg 3 times a day ×2 days followed by every afternoon  Tylenol 3 for breakthrough pain  Follow-up with spine service

## 2018-06-01 ENCOUNTER — OFFICE VISIT (OUTPATIENT)
Dept: INTERNAL MEDICINE | Facility: CLINIC | Age: 68
End: 2018-06-01
Payer: COMMERCIAL

## 2018-06-01 VITALS
SYSTOLIC BLOOD PRESSURE: 184 MMHG | WEIGHT: 171.5 LBS | BODY MASS INDEX: 27.56 KG/M2 | TEMPERATURE: 98 F | OXYGEN SATURATION: 99 % | HEIGHT: 66 IN | HEART RATE: 70 BPM | DIASTOLIC BLOOD PRESSURE: 98 MMHG | RESPIRATION RATE: 16 BRPM

## 2018-06-01 DIAGNOSIS — I10 ESSENTIAL HYPERTENSION: ICD-10-CM

## 2018-06-01 DIAGNOSIS — R91.8 LUNG MASS: Primary | ICD-10-CM

## 2018-06-01 DIAGNOSIS — M54.2 NECK PAIN: ICD-10-CM

## 2018-06-01 DIAGNOSIS — Z87.891 FORMER SMOKER: ICD-10-CM

## 2018-06-01 PROCEDURE — 99214 OFFICE O/P EST MOD 30 MIN: CPT | Mod: S$GLB,,, | Performed by: INTERNAL MEDICINE

## 2018-06-01 PROCEDURE — 99999 PR PBB SHADOW E&M-EST. PATIENT-LVL IV: CPT | Mod: PBBFAC,,, | Performed by: INTERNAL MEDICINE

## 2018-06-01 PROCEDURE — 3080F DIAST BP >= 90 MM HG: CPT | Mod: CPTII,S$GLB,, | Performed by: INTERNAL MEDICINE

## 2018-06-01 PROCEDURE — 3077F SYST BP >= 140 MM HG: CPT | Mod: CPTII,S$GLB,, | Performed by: INTERNAL MEDICINE

## 2018-06-01 NOTE — PROGRESS NOTES
"Subjective:       Patient ID: Alesha Toney is a 67 y.o. female.    Chief Complaint: Follow-up (RT shoulder showed "mass at RT apex of lung" radiologist suggested follow up with PCP -per pt ) and Hypertension ("situational hypertension"; pt is extremely anxious and nervous after reading results of latest imaging)    Patient reported:  Hypertension   Associated symptoms include chest pain (from neck pain) and neck pain. Pertinent negatives include no headaches or shortness of breath.   Back Pain   This is a recurrent problem. The current episode started 1 to 4 weeks ago. The problem occurs constantly. The problem has been waxing and waning since onset. The pain is present in the thoracic spine. The quality of the pain is described as aching and burning. The pain is at a severity of 7/10. The pain is moderate. The pain is worse during the day. The symptoms are aggravated by sitting and standing. Stiffness is present all day. Associated symptoms include chest pain (from neck pain). Pertinent negatives include no abdominal pain, bladder incontinence, bowel incontinence, dysuria, fever, headaches, leg pain, numbness, paresis, paresthesias, pelvic pain, perianal numbness, tingling, weakness or weight loss. She has tried analgesics for the symptoms. The treatment provided no relief.      Patient presenting following abnormal x-ray result.  Patient has history of emphysematous changes and had seen pulmonology, Dr. Hearn in the past.  Patient had a x-ray of her right shoulder done 5/30/18 which showed a lesion in the right upper lobe concerning for malignancy.  On CT scan done in 2010 that lesion was also present.  Radiology is recommending a CT scan for further workup.  Patient denies any fevers, chills, night sweats, or weight loss.  She denies any appetite change.  She denies any cough or hemoptysis.  Patient notes a lot of anxiety associated with this x-ray results.  She also notes neck pain and has appointment " with Neurosurgery coming up to discuss this further.  Of note, patient does have history of hypertension.  She notes she had been checking at home and numbers were running normal. Since she read this x-ray result, she has been very anxious and stressed about the result.  She also has a lot of pain in her shoulder and thinks this is contributing to her elevated blood pressure.  She is a former smoker.      XR right shoulder    Impression       See above    Right upper lobe lung mass worrisome for malignancy.  CT is recommended.  No bone trauma seen.    This report was flagged in Epic as abnormal.      Electronically signed by: Trae Gonzalez MD  Date: 05/30/2018         Review of Systems   Constitutional: Negative for activity change, chills, fever and weight loss.   HENT: Negative for congestion, sinus pain, sinus pressure and sore throat.    Eyes: Negative for pain and redness.   Respiratory: Negative for cough and shortness of breath.    Cardiovascular: Positive for chest pain (from neck pain).   Gastrointestinal: Negative for abdominal pain, bowel incontinence, constipation, nausea and vomiting.   Genitourinary: Negative for bladder incontinence, difficulty urinating, dysuria, hematuria and pelvic pain.   Musculoskeletal: Positive for back pain and neck pain. Negative for arthralgias and joint swelling.   Skin: Negative for rash.   Neurological: Negative for dizziness, tingling, weakness, light-headedness, numbness, headaches and paresthesias.   Psychiatric/Behavioral: Negative for dysphoric mood and sleep disturbance.     Objective:     Vitals:    06/01/18 1505   BP: (!) 184/98   Pulse: 70   Resp: 16   Temp: 98 °F (36.7 °C)    Body mass index is 27.68 kg/m².  Physical Exam   Constitutional: She is oriented to person, place, and time. She appears well-developed and well-nourished.   HENT:   Head: Normocephalic and atraumatic.   Mouth/Throat: Oropharynx is clear and moist.   Eyes: Conjunctivae are normal. Pupils  are equal, round, and reactive to light.   Neck: Normal range of motion. No tracheal deviation present. No thyromegaly present.   Cardiovascular: Normal rate, regular rhythm, normal heart sounds and intact distal pulses.  Exam reveals no gallop and no friction rub.    No murmur heard.  Pulmonary/Chest: Effort normal and breath sounds normal. She has no wheezes. She has no rales.   Musculoskeletal: She exhibits no edema.   Lymphadenopathy:     She has no cervical adenopathy.     She has no axillary adenopathy.   Neurological: She is alert and oriented to person, place, and time.   Skin: Skin is warm and dry. No rash noted.   Psychiatric: She has a normal mood and affect. Judgment normal.     Assessment:     1. Lung mass    2. Essential hypertension    3. Former smoker    4. Neck pain      Plan:   1. Lung mass  - she will check with insurance to see if cheaper to do at OSH  - CT Chest Without Contrast; Future  - CT Chest Without Contrast; Future  - CT Chest Without Contrast    2. Essential hypertension  - instructed to continue to monitor  - will notify me if no improvement    3. Former smoker    4. Neck pain  - follow up with neurosx      No follow up on file

## 2018-06-05 ENCOUNTER — OFFICE VISIT (OUTPATIENT)
Dept: SPINE | Facility: CLINIC | Age: 68
End: 2018-06-05
Payer: COMMERCIAL

## 2018-06-05 VITALS
SYSTOLIC BLOOD PRESSURE: 178 MMHG | WEIGHT: 171 LBS | DIASTOLIC BLOOD PRESSURE: 78 MMHG | HEIGHT: 66 IN | BODY MASS INDEX: 27.48 KG/M2 | HEART RATE: 57 BPM

## 2018-06-05 DIAGNOSIS — M47.812 CERVICAL SPONDYLOSIS WITHOUT MYELOPATHY: ICD-10-CM

## 2018-06-05 DIAGNOSIS — M54.12 BRACHIAL NEURITIS: ICD-10-CM

## 2018-06-05 DIAGNOSIS — M54.2 NECK PAIN: ICD-10-CM

## 2018-06-05 DIAGNOSIS — M48.02 FORAMINAL STENOSIS OF CERVICAL REGION: Primary | ICD-10-CM

## 2018-06-05 DIAGNOSIS — M25.511 RIGHT SHOULDER PAIN, UNSPECIFIED CHRONICITY: ICD-10-CM

## 2018-06-05 PROCEDURE — 99999 PR PBB SHADOW E&M-EST. PATIENT-LVL IV: CPT | Mod: PBBFAC,,, | Performed by: PHYSICIAN ASSISTANT

## 2018-06-05 PROCEDURE — 3078F DIAST BP <80 MM HG: CPT | Mod: CPTII,S$GLB,, | Performed by: PHYSICIAN ASSISTANT

## 2018-06-05 PROCEDURE — 3077F SYST BP >= 140 MM HG: CPT | Mod: CPTII,S$GLB,, | Performed by: PHYSICIAN ASSISTANT

## 2018-06-05 PROCEDURE — 99214 OFFICE O/P EST MOD 30 MIN: CPT | Mod: S$GLB,,, | Performed by: PHYSICIAN ASSISTANT

## 2018-06-05 RX ORDER — GABAPENTIN 300 MG/1
CAPSULE ORAL
Qty: 90 CAPSULE | Refills: 2 | Status: SHIPPED | OUTPATIENT
Start: 2018-06-05 | End: 2018-06-25

## 2018-06-05 NOTE — PROGRESS NOTES
Subjective:      Patient ID: Alesha Toney is a 67 y.o. female.    Chief Complaint: Follow-up      HPI  ([place])    History of HTN and SVT. Undergoing workup for possible lung mass- she had workup of this area 8 years ago and it was found to be scar tissue.     She saw Nimisha back in February. Known L sided C5/6 foraminal stenosis. He recommended PT and discussed possible LOBO if no better.      Her left sided pain improved with PT. About 4 weeks ago, she was drying her hair and had immediate onset of pain in right shoulder blade, right anterior shoulder, and right arm pain into her middle/ring finger. This pain is intermittent. Shoulder blade pain > right arm pain. No left sided neck or arm pain. Pain is better with laying flat, but it comes back as soon as she gets up. Pain is worse with driving or doing things out in front of her such as typing. She rates her on a scale of 1-10. No numbness, tingling, or weakness in right arm. She is right hand dominant.     She was in PT, but they stopped it when she developed new symptoms. She has one more day of medrol dose pack with no relief. No improvement with tylenol #3. She thinks the flexeril helps at night. No cervical ESIs or surgery.     Patient denies balance issues, changes in handwriting, problems with hand dexterity, and frequent dropping of items.      Review of Systems   Constitution: Negative for chills, fever, night sweats and weight gain.   Gastrointestinal: Negative for bowel incontinence, nausea and vomiting.   Genitourinary: Negative for bladder incontinence.   Neurological: Negative for disturbances in coordination and loss of balance.         Objective:        General: Alesha is well-developed, well-nourished, appears stated age, in no acute distress, alert and oriented to time, place and person.     Ortho/SPM Exam    Gait: normal, Romberg is normal, tandem walking is normal and she is able to heel/toe stand.     On exam of the cervical  spine, pt has no posterior cervical tenderness.     Patient has limited ROM of cervical spine with looking over left shoulder and putting ear to right/left shoulder.     Skin in the cervical region is warm to the touch without visible rashes.     Strength Testing of Bilateral UEs shows  Right :  +5/5   Left :  +5/5  Right deltoid:  +5/5   Left deltoid:  +5/5  Right biceps:  +5/5   Left biceps:  +5/5  Right triceps:  +5/5   Left triceps:  +5/5  Right wrist extension:  +5/5  Left wrist extension:  +5/5  Right wrist flexion:  +5/5  Left wrist flexion:  +5/5  Right interosseus:  +5/5  Left interosseus:  +5/5    Sensation is grossly intact to light touch in C5, C6, C7, C8, T1 distribution.     Right shoulder has pain with IR/ER. She has limited abduction and forward flexion with ROM. She has clicking and pain over sternoclavicular joint on right side. She has pain with stress of RC.    Left shoulder has no pain with IR/ER. Good ROM of shoulder and no tenderness.     negative clonus in bilateral LEs.    negative hoffmans bilateral UEs.    DTRs:  Right biceps:  +2     Left biceps:  +2   Right brachioradialis:  +2  Left brachioradialis:  +2      XRAY INTERPRETATION:  X-rays of cervical spine (AP/lat/flex/ext) dated 2/15/18 are personally reviewed and show cervical spondylosis C4-C7. I do not appreciate a slip at C2-C3.    MRI of cervical spine dated 2/28/18 is personally reviewed and shows spur/disc C5-C6 with bilateral foraminal stenosis left > right. Right sided spur/disc C6-C7.         Assessment:       1. Foraminal stenosis of cervical region    2. Right shoulder pain, unspecified chronicity    3. Cervical spondylosis without myelopathy    4. Brachial neuritis    5. Neck pain           Plan:       Orders Placed This Encounter    Ambulatory referral to Physical Therapy - Cervical    gabapentin (NEURONTIN) 300 MG capsule       Improvement in previous left sided neck/shoulder pain. Now with 4 week history of  intermittent pain in right shoulder blade, right anterior shoulder, and right arm pain into her middle/ring finger. Shoulder blade pain > right arm pain. No left sided neck or arm pain. Known cervical spondylosis with spur/disc C5-C6 with bilateral foraminal stenosis left > right and right sided spur/disc C6-C7. Pain likely from both levels, but mostly C5-C6 along with myofascial component. Treatment options reviewed with patient along with above cervical XRs/MRI. Following plan made:     - Restart PT for cervical spine and right shoulder. No obvious instability on cervical XRs. Okay to do traction. Internal orders done.   - Refer back to ortho for evaluation of right shoulder pain and clicking/pain sternoclavicular joint.   - Trial of neurontin 300mg to start q hs and increase to tid as tolerated. Reviewed dosing and side effects.   - Continue prn flexeril.   - If no improvement with above, consider cervical LOBO (C7-T1 ([place]) with pain management.   - She will email me with update.     Follow-up: Follow-up if symptoms worsen or fail to improve. If there are any questions prior to this, the patient was instructed to contact the office.

## 2018-06-06 ENCOUNTER — TELEPHONE (OUTPATIENT)
Dept: INTERNAL MEDICINE | Facility: CLINIC | Age: 68
End: 2018-06-06

## 2018-06-06 NOTE — TELEPHONE ENCOUNTER
----- Message from Fabiola Martin sent at 6/6/2018 11:37 AM CDT -----  Contact: Rhea from [facility] [phone]   Calling to get a prior authorization completed for pts CT scan. Please advise.

## 2018-06-07 ENCOUNTER — TELEPHONE (OUTPATIENT)
Dept: INTERNAL MEDICINE | Facility: CLINIC | Age: 68
End: 2018-06-07

## 2018-06-07 NOTE — TELEPHONE ENCOUNTER
----- Message from My Miller sent at 6/7/2018  1:52 PM CDT -----  Contact: Oliver MARINA [employer]. [phone]  ext  [phone]  She need to know what type of Ct Scan you ordered for patient.

## 2018-06-12 ENCOUNTER — HOSPITAL ENCOUNTER (OUTPATIENT)
Dept: RADIOLOGY | Facility: HOSPITAL | Age: 68
Discharge: HOME | End: 2018-06-12
Attending: INTERNAL MEDICINE
Payer: COMMERCIAL

## 2018-06-12 DIAGNOSIS — R91.8 LUNG MASS: ICD-10-CM

## 2018-06-12 PROCEDURE — 71250 CT THORAX DX C-: CPT | Mod: TC

## 2018-06-12 PROCEDURE — 71250 CT THORAX DX C-: CPT | Mod: 26,,, | Performed by: RADIOLOGY

## 2018-06-13 ENCOUNTER — PATIENT MESSAGE (OUTPATIENT)
Dept: HEPATOLOGY | Facility: HOSPITAL | Age: 68
End: 2018-06-13

## 2018-06-13 DIAGNOSIS — R91.8 LUNG MASS: Primary | ICD-10-CM

## 2018-06-13 NOTE — TELEPHONE ENCOUNTER
Called patient with CT result, shows a right upper lobe lung mass measuring 3.5 cm, recommend IR guided biopsy to evaluate further.  It is concerning for malignancy and I explained this to the patient.  She has a follow-up with her pulmonologist in 4 weeks but if biopsy comes back positive we can try to get that sooner.      Please arrange IR guided biopsy

## 2018-06-14 ENCOUNTER — PATIENT MESSAGE (OUTPATIENT)
Dept: INTERNAL MEDICINE | Facility: CLINIC | Age: 68
End: 2018-06-14

## 2018-06-14 ENCOUNTER — TELEPHONE (OUTPATIENT)
Dept: INTERNAL MEDICINE | Facility: CLINIC | Age: 68
End: 2018-06-14

## 2018-06-14 DIAGNOSIS — M25.511 CHRONIC RIGHT SHOULDER PAIN: Primary | ICD-10-CM

## 2018-06-14 DIAGNOSIS — G89.29 CHRONIC RIGHT SHOULDER PAIN: Primary | ICD-10-CM

## 2018-06-14 DIAGNOSIS — R91.8 LUNG MASS: Primary | ICD-10-CM

## 2018-06-14 DIAGNOSIS — G47.00 INSOMNIA, UNSPECIFIED TYPE: ICD-10-CM

## 2018-06-14 RX ORDER — ACETAMINOPHEN AND CODEINE PHOSPHATE 300; 30 MG/1; MG/1
1-2 TABLET ORAL EVERY 8 HOURS PRN
Qty: 30 TABLET | Refills: 0 | Status: SHIPPED | OUTPATIENT
Start: 2018-06-14 | End: 2018-06-22

## 2018-06-14 RX ORDER — DIAZEPAM 5 MG/1
5 TABLET ORAL NIGHTLY PRN
Qty: 30 TABLET | Refills: 0 | Status: SHIPPED | OUTPATIENT
Start: 2018-06-14 | End: 2018-07-20 | Stop reason: SDUPTHER

## 2018-06-14 NOTE — TELEPHONE ENCOUNTER
Please see [employer] message and advise.  Daniela is working on scheduling the biopsy.  Patient would like a refill on Tylenol #3 due to pain in right shoulder,chest, and arm.   Patient is also requesting Valium Rx due to anxiety.

## 2018-06-14 NOTE — TELEPHONE ENCOUNTER
Will sent refill on Tylenol 3, can you ask if she still has the xanax that I prescribed before?  She can use that for sleep.  If not I can send short course of valium    Thanks  Cori Galloway MD

## 2018-06-14 NOTE — TELEPHONE ENCOUNTER
Dr. Galloway ordered a IR Biopsy Lung to be done stat.  Order needs to be changed to read as follows:  CT BIOPSY LUNG.

## 2018-06-14 NOTE — TELEPHONE ENCOUNTER
Will write for short course, please do not drink alcohol on medication or operate a car after taking it, it is an addictive medication so only take it you really need to

## 2018-06-15 ENCOUNTER — TELEPHONE (OUTPATIENT)
Dept: INTERNAL MEDICINE | Facility: CLINIC | Age: 68
End: 2018-06-15

## 2018-06-15 DIAGNOSIS — R91.1 LUNG NODULE: Primary | ICD-10-CM

## 2018-06-19 ENCOUNTER — TELEPHONE (OUTPATIENT)
Dept: INTERVENTIONAL RADIOLOGY/VASCULAR | Facility: HOSPITAL | Age: 68
End: 2018-06-19

## 2018-06-20 ENCOUNTER — HOSPITAL ENCOUNTER (OUTPATIENT)
Facility: HOSPITAL | Age: 68
Discharge: HOME | End: 2018-06-20
Attending: RADIOLOGY | Admitting: RADIOLOGY
Payer: COMMERCIAL

## 2018-06-20 VITALS
BODY MASS INDEX: 27.32 KG/M2 | DIASTOLIC BLOOD PRESSURE: 83 MMHG | TEMPERATURE: 99 F | HEART RATE: 52 BPM | OXYGEN SATURATION: 96 % | HEIGHT: 66 IN | RESPIRATION RATE: 16 BRPM | SYSTOLIC BLOOD PRESSURE: 137 MMHG | WEIGHT: 170 LBS

## 2018-06-20 DIAGNOSIS — R91.1 LUNG NODULE: ICD-10-CM

## 2018-06-20 LAB
ALBUMIN SERPL BCP-MCNC: 4.3 G/DL
ALP SERPL-CCNC: 55 U/L
ALT SERPL W/O P-5'-P-CCNC: 24 U/L
ANION GAP SERPL CALC-SCNC: 9 MMOL/L
APTT BLDCRRT: 27.8 SEC
AST SERPL-CCNC: 26 U/L
BASOPHILS # BLD AUTO: 0.03 K/UL
BASOPHILS NFR BLD: 0.3 %
BILIRUB SERPL-MCNC: 0.5 MG/DL
BUN SERPL-MCNC: 18 MG/DL
CALCIUM SERPL-MCNC: 10.4 MG/DL
CHLORIDE SERPL-SCNC: 102 MMOL/L
CO2 SERPL-SCNC: 29 MMOL/L
CREAT SERPL-MCNC: 1 MG/DL
DIFFERENTIAL METHOD: ABNORMAL
EOSINOPHIL # BLD AUTO: 0.2 K/UL
EOSINOPHIL NFR BLD: 2.1 %
ERYTHROCYTE [DISTWIDTH] IN BLOOD BY AUTOMATED COUNT: 12.7 %
EST. GFR  (AFRICAN AMERICAN): >60 ML/MIN/1.73 M^2
EST. GFR  (NON AFRICAN AMERICAN): 58 ML/MIN/1.73 M^2
GLUCOSE SERPL-MCNC: 120 MG/DL
HCT VFR BLD AUTO: 44.2 %
HGB BLD-MCNC: 14.3 G/DL
INR PPP: 0.9
LYMPHOCYTES # BLD AUTO: 2.4 K/UL
LYMPHOCYTES NFR BLD: 20.9 %
MCH RBC QN AUTO: 30 PG
MCHC RBC AUTO-ENTMCNC: 32.4 G/DL
MCV RBC AUTO: 93 FL
MONOCYTES # BLD AUTO: 0.8 K/UL
MONOCYTES NFR BLD: 6.6 %
NEUTROPHILS # BLD AUTO: 8.1 K/UL
NEUTROPHILS NFR BLD: 70 %
PLATELET # BLD AUTO: 249 K/UL
PMV BLD AUTO: 10.8 FL
POTASSIUM SERPL-SCNC: 4.1 MMOL/L
PROT SERPL-MCNC: 7.9 G/DL
PROTHROMBIN TIME: 10 SEC
RBC # BLD AUTO: 4.77 M/UL
SODIUM SERPL-SCNC: 140 MMOL/L
WBC # BLD AUTO: 11.5 K/UL

## 2018-06-20 PROCEDURE — 88305 TISSUE EXAM BY PATHOLOGIST: CPT | Performed by: PATHOLOGY

## 2018-06-20 PROCEDURE — 88305 TISSUE EXAM BY PATHOLOGIST: CPT | Mod: 26,,, | Performed by: PATHOLOGY

## 2018-06-20 PROCEDURE — 88333 PATH CONSLTJ SURG CYTO XM 1: CPT | Mod: 26,,, | Performed by: PATHOLOGY

## 2018-06-20 PROCEDURE — 80053 COMPREHEN METABOLIC PANEL: CPT

## 2018-06-20 PROCEDURE — 85610 PROTHROMBIN TIME: CPT

## 2018-06-20 PROCEDURE — 25000003 PHARM REV CODE 250: Performed by: RADIOLOGY

## 2018-06-20 PROCEDURE — 36415 COLL VENOUS BLD VENIPUNCTURE: CPT

## 2018-06-20 PROCEDURE — 85730 THROMBOPLASTIN TIME PARTIAL: CPT

## 2018-06-20 PROCEDURE — 63600175 PHARM REV CODE 636 W HCPCS: Performed by: RADIOLOGY

## 2018-06-20 PROCEDURE — 85025 COMPLETE CBC W/AUTO DIFF WBC: CPT

## 2018-06-20 RX ORDER — SODIUM CHLORIDE 9 MG/ML
INJECTION, SOLUTION INTRAVENOUS CONTINUOUS
Status: DISCONTINUED | OUTPATIENT
Start: 2018-06-20 | End: 2018-06-21 | Stop reason: HOSPADM

## 2018-06-20 RX ORDER — OXYCODONE AND ACETAMINOPHEN 10; 325 MG/1; MG/1
1 TABLET ORAL ONCE
Status: COMPLETED | OUTPATIENT
Start: 2018-06-20 | End: 2018-06-20

## 2018-06-20 RX ORDER — FENTANYL CITRATE 50 UG/ML
INJECTION, SOLUTION INTRAMUSCULAR; INTRAVENOUS CODE/TRAUMA/SEDATION MEDICATION
Status: COMPLETED | OUTPATIENT
Start: 2018-06-20 | End: 2018-06-20

## 2018-06-20 RX ORDER — MIDAZOLAM HYDROCHLORIDE 1 MG/ML
INJECTION INTRAMUSCULAR; INTRAVENOUS CODE/TRAUMA/SEDATION MEDICATION
Status: COMPLETED | OUTPATIENT
Start: 2018-06-20 | End: 2018-06-20

## 2018-06-20 RX ADMIN — MIDAZOLAM HYDROCHLORIDE 1 MG: 1 INJECTION, SOLUTION INTRAMUSCULAR; INTRAVENOUS at 09:06

## 2018-06-20 RX ADMIN — OXYCODONE HYDROCHLORIDE AND ACETAMINOPHEN 1 TABLET: 10; 325 TABLET ORAL at 10:06

## 2018-06-20 RX ADMIN — FENTANYL CITRATE 50 MCG: 50 INJECTION, SOLUTION INTRAMUSCULAR; INTRAVENOUS at 09:06

## 2018-06-20 RX ADMIN — MIDAZOLAM HYDROCHLORIDE 2 MG: 1 INJECTION, SOLUTION INTRAMUSCULAR; INTRAVENOUS at 09:06

## 2018-06-20 NOTE — DISCHARGE SUMMARY
Radiology Discharge Summary      Hospital Course: No complications    Admit Date: 6/20/2018  Discharge Date: 06/20/2018     Instructions Given to Patient: Yes  Diet: Resume prior diet  Activity: activity as tolerated and no driving for today    Description of Condition on Discharge: Stable  Vital Signs (Most Recent): Temp: 98.8 °F (37.1 °C) (06/20/18 0826)  Pulse: (!) 59 (06/20/18 1000)  Resp: 15 (06/20/18 1000)  BP: (!) 134/58 (06/20/18 1000)  SpO2: 100 % (06/20/18 1000)    Discharge Disposition: Home    Discharge Diagnosis: Right lung mass s/p lung biopsy. Follow up as scheduled.    Arsalan Lantigua M.D.  Diagnostic and Interventional Radiologist  Department of Radiology  Pager: [phone]

## 2018-06-20 NOTE — PROCEDURES
Radiology Post-Procedure Note    Pre Op Diagnosis: Right lung mass    Post Op Diagnosis: Right lung mass    Procedure: right lung biopsy    Procedure performed by: Arsalan Lantigua MD    Written Informed Consent Obtained: Yes    Specimen Removed: YES 8 x 18 gauge cores    Estimated Blood Loss: Minimal    Findings:   Using CT guidance a 17g sheath needle was placed into a Right lung mass. 18g monopty biopsy gun used to take 8 core biopsy samples. Specimen sent to pathology.     Additional specimen sent to lab: No    Patient tolerated procedure well.    Arsalan Lantigua M.D.  Diagnostic and Interventional Radiologist  Department of Radiology  Pager: [phone]

## 2018-06-20 NOTE — H&P
Radiology History & Physical      SUBJECTIVE:     Chief Complaint: Lung mass    History of Present Illness:  Alesha Toney is a 67 y.o. female who presents for right lung mass biopsy  Past Medical History:   Diagnosis Date    Breast cyst     Cardiac arrhythmia     Fibrocystic breast     History of hysterectomy     20yrs ago     Past Surgical History:   Procedure Laterality Date    BREAST BIOPSY Left     at least 40yrs ago in her 20's    BREAST CYST ASPIRATION      BREAST CYST EXCISION      HYSTERECTOMY      @47yrs of age    OOPHORECTOMY      @47yrs of age       Home Meds:   Prior to Admission medications    Medication Sig Start Date End Date Taking? Authorizing Provider   acetaminophen-codeine 300-30mg (TYLENOL #3) 300-30 mg Tab Take 1-2 tablets by mouth every 8 (eight) hours as needed (Pain). 6/14/18  Yes Cori Galloway MD   amLODIPine (NORVASC) 5 MG tablet Take 1 tablet (5 mg total) by mouth once daily. 11/2/17 11/2/18 Yes Cori Galloway MD   atenolol (TENORMIN) 50 MG tablet Take 1 tablet (50 mg total) by mouth 2 (two) times daily. 3/9/18  Yes Cori Galloway MD   diazePAM (VALIUM) 5 MG tablet Take 1 tablet (5 mg total) by mouth nightly as needed for Insomnia. 6/14/18 7/14/18 Yes Cori Galloway MD   gabapentin (NEURONTIN) 300 MG capsule 1 po q hs x 3 days, then 1 po bid x 3 days, then 1 po tid. 6/5/18  Yes Marleny Moran PA-C   losartan (COZAAR) 50 MG tablet Take 1 tablet (50 mg total) by mouth once daily. 11/19/17 11/19/18 Yes Carlosyabelinda Mccoy MD   multivitamin with minerals tablet Take 1 tablet by mouth once daily.   Yes Historical Provider, MD   ALPRAZolam (XANAX) 0.25 MG tablet Take 1 tablet (0.25 mg total) by mouth daily as needed for Anxiety. 12/18/17 1/31/18  Cori Galloway MD   hydrOXYzine HCl (ATARAX) 25 MG tablet Take 1 tablet (25 mg total) by mouth 3 (three) times daily as needed for Itching. 12/1/17   Cori Galloway MD   methylPREDNISolone (MEDROL DOSEPACK) 4 mg tablet use as  directed 5/30/18   Albin Tapia PA-C     Anticoagulants/Antiplatelets: no anticoagulation    Allergies:   Review of patient's allergies indicates:   Allergen Reactions    Iodinated contrast- oral and iv dye     Pcn [penicillins]     Phenergan plain     Tramadol     Vancomycin analogues      Sedation History:  no adverse reactions    Review of Systems:   Hematological: no known coagulopathies  Respiratory: no shortness of breath  Cardiovascular: no chest pain  Gastrointestinal: no abdominal pain  Genito-Urinary: no dysuria  Musculoskeletal: negative  Neurological: no TIA or stroke symptoms         OBJECTIVE:     Vital Signs (Most Recent)  Temp: 98.8 °F (37.1 °C) (06/20/18 0826)  Pulse: 69 (06/20/18 0826)  Resp: 16 (06/20/18 0826)  BP: (!) 195/81 (06/20/18 0826)  SpO2: 99 % (06/20/18 0826)    Physical Exam:  ASA: 2  Mallampati: 2    General: no acute distress  Mental Status: alert and oriented to person, place and time  HEENT: normocephalic, atraumatic  Chest: unlabored breathing  Heart: regular heart rate  Abdomen: nondistended  Extremity: moves all extremities    Laboratory  Lab Results   Component Value Date    INR 0.9 06/20/2018       Lab Results   Component Value Date    WBC 11.50 06/20/2018    HGB 14.3 06/20/2018    HCT 44.2 06/20/2018    MCV 93 06/20/2018     06/20/2018      Lab Results   Component Value Date     (H) 06/20/2018     06/20/2018    K 4.1 06/20/2018     06/20/2018    CO2 29 06/20/2018    BUN 18 06/20/2018    CREATININE 1.0 06/20/2018    CALCIUM 10.4 06/20/2018    MG 2.2 04/30/2010    ALT 24 06/20/2018    AST 26 06/20/2018    ALBUMIN 4.3 06/20/2018    BILITOT 0.5 06/20/2018       ASSESSMENT/PLAN:     Sedation Plan: Moderate  Patient will undergo right lung mass biopsy.    Arsalan Lantigua M.D.  Diagnostic and Interventional Radiologist  Department of Radiology  Pager: [phone]

## 2018-06-22 ENCOUNTER — TELEPHONE (OUTPATIENT)
Dept: INTERNAL MEDICINE | Facility: CLINIC | Age: 68
End: 2018-06-22

## 2018-06-22 ENCOUNTER — TELEPHONE (OUTPATIENT)
Dept: HEMATOLOGY/ONCOLOGY | Facility: CLINIC | Age: 68
End: 2018-06-22

## 2018-06-22 ENCOUNTER — OFFICE VISIT (OUTPATIENT)
Dept: INTERNAL MEDICINE | Facility: CLINIC | Age: 68
End: 2018-06-22
Payer: COMMERCIAL

## 2018-06-22 VITALS
HEART RATE: 76 BPM | SYSTOLIC BLOOD PRESSURE: 202 MMHG | WEIGHT: 171.94 LBS | RESPIRATION RATE: 16 BRPM | BODY MASS INDEX: 27.63 KG/M2 | DIASTOLIC BLOOD PRESSURE: 90 MMHG | OXYGEN SATURATION: 98 % | HEIGHT: 66 IN | TEMPERATURE: 98 F

## 2018-06-22 DIAGNOSIS — C34.90 ADENOCARCINOMA OF LUNG, UNSPECIFIED LATERALITY: Primary | ICD-10-CM

## 2018-06-22 DIAGNOSIS — C34.90 ADENOCARCINOMA OF LUNG, UNSPECIFIED LATERALITY: ICD-10-CM

## 2018-06-22 DIAGNOSIS — G89.3 CANCER-RELATED PAIN: Primary | ICD-10-CM

## 2018-06-22 PROCEDURE — 99999 PR PBB SHADOW E&M-EST. PATIENT-LVL IV: CPT | Mod: PBBFAC,,, | Performed by: INTERNAL MEDICINE

## 2018-06-22 PROCEDURE — 99214 OFFICE O/P EST MOD 30 MIN: CPT | Mod: S$GLB,,, | Performed by: INTERNAL MEDICINE

## 2018-06-22 PROCEDURE — 3077F SYST BP >= 140 MM HG: CPT | Mod: CPTII,S$GLB,, | Performed by: INTERNAL MEDICINE

## 2018-06-22 PROCEDURE — 3080F DIAST BP >= 90 MM HG: CPT | Mod: CPTII,S$GLB,, | Performed by: INTERNAL MEDICINE

## 2018-06-22 RX ORDER — HYDROCODONE BITARTRATE AND ACETAMINOPHEN 5; 325 MG/1; MG/1
1 TABLET ORAL EVERY 8 HOURS PRN
Qty: 21 TABLET | Refills: 0 | Status: SHIPPED | OUTPATIENT
Start: 2018-06-22 | End: 2018-07-06 | Stop reason: SDUPTHER

## 2018-06-22 NOTE — PATIENT INSTRUCTIONS
What Is Lung Cancer?     Lung cancer can spread to the other lung, the liver, brain, or bones.      Lung cancer causes some cells in the lungs to grow out of control. These cells are called cancer cells. Cancer cells grow at a different rate than normal cells. Their size and shape are also not normal. Cancer cells can spread to other areas in the lungs. Or, they can travel to other parts of the body.  Non-small cell lung cancer  Most cases of lung cancer are called non-small cell. There are a few different types of non-small cell lung cancer. These include squamous cell carcinoma, adenocarcinoma, and large cell carcinoma. When cancer cells grow together in one area, they form a tumor.  Small cell lung cancer  Small cell lung cancer is less common than non-small cell lung cancer. Small cell cancer is sometimes called oat cell. (The cells are shaped like oats when viewed under a microscope.) This type of lung cancer may grow and spread faster than non-small cell cancer.  How cancer spreads  Cancer cells dont always stay in one place. They may enter the lymph system (a network of nodes and vessels that help your body fight disease). Cancer can also enter the bloodstream and spread to other parts of the body. When cancer spreads, the process is called metastasis. Lung cancer that spreads (metastasizes) often goes to the other lung or the liver, brain, or bones.   Date Last Reviewed: 1/3/2016  © 2515-7480 [employer]. [address] [place] [address]. All rights reserved. This information is not intended as a substitute for professional medical care. Always follow your healthcare professional's instructions.

## 2018-06-22 NOTE — PROGRESS NOTES
Subjective:       Patient ID: Alesha Toney is a 67 y.o. female.    Chief Complaint: Results (discuss results (lung biopsy) )    HPI   Patient here with her significant other to discuss lung biopsy result.  Spoke with patient this morning and notified her that the lung biopsy consistent with non-small cell lung cancer.  We are in the process of getting her an appointment with Hematology Oncology.  Patient just wanted to discuss more about the process from here.  Patient also notes that she still has a lot of pain in that right shoulder which is where the cancer is.  She has been using Tylenol 3 with no relief.  She also has been using her Valium as needed for anxiety.  She notes she is only taking a quarter of a tab.    Review of Systems   Constitutional: Negative for activity change, chills and fever.   HENT: Negative for congestion, sinus pain, sinus pressure and sore throat.    Eyes: Negative for pain and redness.   Respiratory: Negative for cough and shortness of breath.    Cardiovascular: Negative for chest pain.   Gastrointestinal: Negative for abdominal pain, constipation, nausea and vomiting.   Genitourinary: Negative for difficulty urinating.   Musculoskeletal: Positive for arthralgias (Right shoulder). Negative for joint swelling.   Skin: Negative for rash.   Neurological: Negative for dizziness and light-headedness.   Psychiatric/Behavioral: Negative for dysphoric mood and sleep disturbance.     Objective:     Vitals:    06/22/18 1058   BP: (!) 202/90   Pulse: 76   Resp: 16   Temp: 98 °F (36.7 °C)    Body mass index is 27.75 kg/m².  Physical Exam   Constitutional: She is oriented to person, place, and time. She appears well-developed and well-nourished.   HENT:   Head: Normocephalic and atraumatic.   Mouth/Throat: Oropharynx is clear and moist.   Eyes: Conjunctivae are normal. Pupils are equal, round, and reactive to light.   Neck: Normal range of motion. No tracheal deviation present. No  thyromegaly present.   Musculoskeletal: She exhibits no edema.   Lymphadenopathy:     She has no cervical adenopathy.   Neurological: She is alert and oriented to person, place, and time.   Skin: Skin is warm and dry. No rash noted.   Psychiatric: Judgment normal. Her mood appears anxious (Due to recent cancer diagnosis).     Assessment:     1. Cancer-related pain    2. Adenocarcinoma of lung, unspecified laterality      Plan:   1. Cancer-related pain  - discontinue Tylenol 3  - continue Valium as needed  - HYDROcodone-acetaminophen (NORCO) 5-325 mg per tablet; Take 1 tablet by mouth every 8 (eight) hours as needed for Pain.  Dispense: 21 tablet; Refill: 0    2. Adenocarcinoma of lung, unspecified laterality  - already referred to Hematology Oncology in process of getting appointment  - long discussion with patient about the next steps in the process including staging and treatment options, patient interested in going to [facility] for 2nd opinion and I agree if she is interested that would be a good idea   - I would like her to get established with Heme-Onc here in case she does need to be followed for treatment locally  - answered all questions from patient and spouse      20 minute appointment (face to face time) with greater than half spent counseling patient on lung cancer diagnosis

## 2018-06-22 NOTE — TELEPHONE ENCOUNTER
Called patient with lung bx results, positive for adenocarcinoma of the lung, will refer to hem/onc for stat appointment, all questions answered      PLEASE ARRANGE HEM/ONC APPT FOR NEXT WEEK IF POSSIBLE

## 2018-06-25 ENCOUNTER — INITIAL CONSULT (OUTPATIENT)
Dept: HEMATOLOGY/ONCOLOGY | Facility: CLINIC | Age: 68
End: 2018-06-25
Payer: COMMERCIAL

## 2018-06-25 VITALS
HEIGHT: 66 IN | TEMPERATURE: 98 F | WEIGHT: 171.75 LBS | HEART RATE: 58 BPM | DIASTOLIC BLOOD PRESSURE: 92 MMHG | RESPIRATION RATE: 18 BRPM | SYSTOLIC BLOOD PRESSURE: 218 MMHG | BODY MASS INDEX: 27.6 KG/M2 | OXYGEN SATURATION: 99 %

## 2018-06-25 DIAGNOSIS — C34.11 MALIGNANT NEOPLASM OF UPPER LOBE OF RIGHT LUNG: Primary | ICD-10-CM

## 2018-06-25 PROBLEM — C34.31 MALIGNANT NEOPLASM OF LOWER LOBE OF RIGHT LUNG: Status: ACTIVE | Noted: 2018-06-20

## 2018-06-25 PROCEDURE — 3077F SYST BP >= 140 MM HG: CPT | Mod: CPTII,S$GLB,, | Performed by: INTERNAL MEDICINE

## 2018-06-25 PROCEDURE — 99999 PR PBB SHADOW E&M-EST. PATIENT-LVL III: CPT | Mod: PBBFAC,,, | Performed by: INTERNAL MEDICINE

## 2018-06-25 PROCEDURE — 3080F DIAST BP >= 90 MM HG: CPT | Mod: CPTII,S$GLB,, | Performed by: INTERNAL MEDICINE

## 2018-06-25 PROCEDURE — 99205 OFFICE O/P NEW HI 60 MIN: CPT | Mod: S$GLB,,, | Performed by: INTERNAL MEDICINE

## 2018-06-25 NOTE — PROGRESS NOTES
REASON FOR VISIT:  New diagnosis of lung cancer.    REFERRING PHYSICIAN:  Dr. Galloway with Internal Medicine.    HISTORY OF PRESENT ILLNESS:  Ms. Alesha Toney is a 67-year-old otherwise   healthy female who started having some shoulder pain, which was lasting for over   six weeks.  She went and saw her primary care physician and underwent an x-ray,   which revealed right upper lobe lung mass worrisome for malignancy and a CT   scan was recommended, which was done on and that revealed a newly developing   mass, pleural based, lateral posterior segment, right upper lobe 3.5 x 3.5 cm,   surrounding stellate margin and patchy infiltrates, particularly superiorly,   anteriorly infiltrates extend perihilar into the anterior segment.  She then   underwent CT-guided biopsy, which revealed well-differentiated adenocarcinoma.    The markers are pending.  She comes in to discuss initial management.  Her main   complaint is the right shoulder pain.  Otherwise, she feels well and denies any   complaints at this time.  She is accompanied to the clinic today with her   girlfriend.    REVIEW OF SYSTEMS:  CONSTITUTIONAL:  Negative for appetite change, fatigue and unexpected weight   change.  HEENT:  Negative for mouth sores.  EYES:  Negative for visual disturbance.  RESPIRATORY:  Negative for cough and shortness of breath.  CARDIOVASCULAR:  Negative for chest pain.  GASTROINTESTINAL:  Negative for diarrhea.  GENITOURINARY:  Negative for frequency.  MUSCULOSKELETAL:  Reports right shoulder pain.  SKIN:  Negative for rash.  NEUROLOGIC:  Negative for headaches.  HEMATOLOGIC:  Negative for adenopathy.  PSYCHIATRIC AND BEHAVIORAL:  She is not nervous or anxious.    COMORBID MEDICAL CONDITIONS:  Include hypertension, cardiac arrhythmias,   fibrocystic disease, history of hysterectomy.    PAST SURGICAL HISTORY:  Breast cyst aspiration, breast cyst excision,   hysterectomy, oophorectomy, appendectomy and tonsillectomy.    FAMILY HISTORY:   Brother alive with diabetes, heart failure and hypertension.    Father  with colon cancer.  Paternal aunt, diabetes and heart problems.    SOCIAL HISTORY:  Former smoker, quit in 2015, one pack per day for 30   years.  Drinks alcohol weekly.    PHYSICAL EXAMINATION:  VITAL SIGNS:  Reviewed in EPIC.  GENERAL:  The patient is oriented to person, place and time, appears moderately   built, moderately nourished, in no acute distress.  HEENT:  Right and left ears are normal.  Oropharynx is clear and moist, no   oropharyngeal exudate.  Teeth, gums and lips are normal.  No sinus tenderness.    Palate, tongue, posterior pharynx are normal.  EYES:  Conjunctivae and lids are normal.  NECK:  Supple.  No JVD.  No thyromegaly.  CARDIOVASCULAR:  Regular rate and rhythm.  Normal heart sounds.  Intact distal   pulses.  No murmurs heard.  No edema or tenderness in the extremities.  PULMONARY AND CHEST:  Bilateral breath sounds heard.  No rales, rhonchi or   wheezes.  ABDOMEN:  Soft, nontender, nondistended.  No hepatomegaly or splenomegaly.  MUSCULOSKELETAL:  Normal range of motion.  Gait is normal.  No clubbing or   cyanosis.  LYMPHADENOPATHY:  No submental, submandibular, cervical, supraclavicular lymph   nodes noted.  She is alert and oriented to person, place and time.  Normal   strength, normal reflexes.  No sensory deficit.  Gait is normal.  SKIN:  Warm, dry and intact.  No bruising, no lesions.  No rashes.  No cyanosis.    Nails show no clubbing.  PSYCHIATRIC:  Normal mood and affect.  Speech, behavior, judgment, thought   content, cognition and memory are normal.    LABORATORY DATA:  From 2018 reveals a normal CBC and a normal CMP except   mildly elevated glucose at 120.    IMAGING:  As discussed above.    ASSESSMENT AND PLAN:  Ms. Alesha Toney is a 67-year-old female with a new   diagnosis of right upper lobe lung adenocarcinoma.  She requires completion of   staging including PET scan and MRI brain  for completion of staging and then the   decision on treatment planning.  The rationale for this was extensively   discussed with the patient.  We briefly discussed managements of stage I, II and   III as well as stage IV lung cancer.  All of the patient's questions were   answered to her satisfaction.  Distress score is 5 and no intervention is   indicated.      SPS/HN  dd: 06/25/2018 17:09:57 (CDT)  td: 06/26/2018 12:28:08 (CDT)  Doc ID   #[id number]  Job ID #[id number]    CC:        Distress Score    Distress Score: 5        Practical Problems Physical Problems   : No Appearance: No   Housing: No Bathing / Dressing: No   Insurance / Financial: No Breathing: No    Transportation: No  Changes in Urination: No    Work / School: No  Constipation: No   Treatment Decisions: Yes  Diarrhea: No     Eating: No    Family Problems Fatigue: No    Dealing with Children: No Feeling Swollen: No    Dealing with Partner: No Fevers: No    Ability to Have Children: No  Getting Around: No    Family Health Issues: No  Indigestion: No     Memory / Concentration: No   Emotional Problems Mouth Sores: No    Depression: No  Nausea: No    Fears: No  Nose Dry / Congested: No    Nervousness: Yes  Pain: No    Sadness: No Sexual: No    Worry: Yes Skin Dry / Itchy: No    Loss of Interest in Usual Activities: No Sleep: No     Substance Abuse: No    Spiritual/Religions Concerns Tingling in Hands / Feet: No   Spritual / Protestant Concerns: No         Other Problems                Answers for HPI/ROS submitted by the patient on 6/24/2018   appetite change : No  unexpected weight change: No  visual disturbance: No  cough: No  shortness of breath: No  chest pain: Yes  abdominal pain: No  diarrhea: No  frequency: No  back pain: Yes  rash: No  headaches: No  adenopathy: No  nervous/ anxious: Yes    Dictated # [id number]

## 2018-06-25 NOTE — LETTER
June 25, 2018      Cori Galloway MD  [address] [facility]  [place] [address]           [facility] Hematology Oncology  [address]  [place] [address]  Phone: [phone]          Patient: Alesha Toney   MR Number: [id number]   YOB: 1950   Date of Visit: 6/25/2018       Dear Dr. Cori Galloway:    Thank you for referring Alesha Toney to me for evaluation. Attached you will find relevant portions of my assessment and plan of care.    If you have questions, please do not hesitate to call me. I look forward to following Alesha Toney along with you.    Sincerely,    Pam Dhaliwal MD    Enclosure  CC:  No Recipients    If you would like to receive this communication electronically, please contact [email or web address] or [phone] to request more information on [employer] Link access.    For providers and/or their staff who would like to refer a patient to [facility], please contact us through our one-stop-shop provider referral line, [facility] , at [phone].    If you feel you have received this communication in error or would no longer like to receive these types of communications, please e-mail [email or web address]

## 2018-06-26 ENCOUNTER — PATIENT MESSAGE (OUTPATIENT)
Dept: HEMATOLOGY/ONCOLOGY | Facility: CLINIC | Age: 68
End: 2018-06-26

## 2018-06-26 ENCOUNTER — TELEPHONE (OUTPATIENT)
Dept: HEMATOLOGY/ONCOLOGY | Facility: CLINIC | Age: 68
End: 2018-06-26

## 2018-06-26 NOTE — TELEPHONE ENCOUNTER
----- Message from Pam Dhaliwal MD sent at 6/25/2018  5:11 PM CDT -----  Can you schedule her to see me probably Friday

## 2018-06-26 NOTE — TELEPHONE ENCOUNTER
She rescheduled her MRI and PET scan to end of June. So schedule her next week to see me after the tests

## 2018-06-27 ENCOUNTER — PATIENT MESSAGE (OUTPATIENT)
Dept: HEMATOLOGY/ONCOLOGY | Facility: CLINIC | Age: 68
End: 2018-06-27

## 2018-06-27 ENCOUNTER — HOSPITAL ENCOUNTER (OUTPATIENT)
Dept: RADIOLOGY | Facility: HOSPITAL | Age: 68
Discharge: HOME | End: 2018-06-27
Attending: INTERNAL MEDICINE
Payer: COMMERCIAL

## 2018-06-27 ENCOUNTER — PATIENT MESSAGE (OUTPATIENT)
Dept: INTERNAL MEDICINE | Facility: CLINIC | Age: 68
End: 2018-06-27

## 2018-06-27 DIAGNOSIS — I10 ESSENTIAL HYPERTENSION: ICD-10-CM

## 2018-06-27 DIAGNOSIS — C34.11 MALIGNANT NEOPLASM OF UPPER LOBE OF RIGHT LUNG: ICD-10-CM

## 2018-06-27 DIAGNOSIS — I10 ESSENTIAL HYPERTENSION: Primary | ICD-10-CM

## 2018-06-27 PROCEDURE — 70553 MRI BRAIN STEM W/O & W/DYE: CPT | Mod: TC

## 2018-06-27 PROCEDURE — 25500020 PHARM REV CODE 255: Performed by: INTERNAL MEDICINE

## 2018-06-27 PROCEDURE — A9585 GADOBUTROL INJECTION: HCPCS | Performed by: INTERNAL MEDICINE

## 2018-06-27 PROCEDURE — 70553 MRI BRAIN STEM W/O & W/DYE: CPT | Mod: 26,,, | Performed by: RADIOLOGY

## 2018-06-27 RX ORDER — GADOBUTROL 604.72 MG/ML
8 INJECTION INTRAVENOUS
Status: COMPLETED | OUTPATIENT
Start: 2018-06-27 | End: 2018-06-27

## 2018-06-27 RX ORDER — CLONIDINE HYDROCHLORIDE 0.1 MG/1
0.1 TABLET ORAL EVERY 8 HOURS PRN
Qty: 60 TABLET | Refills: 1 | Status: SHIPPED | OUTPATIENT
Start: 2018-06-27 | End: 2018-06-27 | Stop reason: SDUPTHER

## 2018-06-27 RX ORDER — CLONIDINE HYDROCHLORIDE 0.1 MG/1
TABLET ORAL
Qty: 270 TABLET | Refills: 1 | Status: SHIPPED | OUTPATIENT
Start: 2018-06-27 | End: 2019-05-31

## 2018-06-27 RX ADMIN — GADOBUTROL 8 ML: 604.72 INJECTION INTRAVENOUS at 06:06

## 2018-06-29 ENCOUNTER — PATIENT MESSAGE (OUTPATIENT)
Dept: INTERNAL MEDICINE | Facility: CLINIC | Age: 68
End: 2018-06-29

## 2018-06-29 ENCOUNTER — HOSPITAL ENCOUNTER (OUTPATIENT)
Dept: RADIOLOGY | Facility: HOSPITAL | Age: 68
Discharge: HOME | End: 2018-06-29
Attending: INTERNAL MEDICINE
Payer: COMMERCIAL

## 2018-06-29 DIAGNOSIS — C34.11 MALIGNANT NEOPLASM OF UPPER LOBE OF RIGHT LUNG: ICD-10-CM

## 2018-06-29 LAB — POCT GLUCOSE: 118 MG/DL (ref 70–110)

## 2018-06-29 PROCEDURE — A9552 F18 FDG: HCPCS

## 2018-06-29 PROCEDURE — 78815 PET IMAGE W/CT SKULL-THIGH: CPT | Mod: TC

## 2018-06-29 PROCEDURE — 78815 PET IMAGE W/CT SKULL-THIGH: CPT | Mod: 26,PI,, | Performed by: RADIOLOGY

## 2018-07-02 ENCOUNTER — TELEPHONE (OUTPATIENT)
Dept: PULMONOLOGY | Facility: CLINIC | Age: 68
End: 2018-07-02

## 2018-07-02 ENCOUNTER — OFFICE VISIT (OUTPATIENT)
Dept: HEMATOLOGY/ONCOLOGY | Facility: CLINIC | Age: 68
End: 2018-07-02
Payer: COMMERCIAL

## 2018-07-02 VITALS
BODY MASS INDEX: 27.6 KG/M2 | TEMPERATURE: 98 F | SYSTOLIC BLOOD PRESSURE: 202 MMHG | WEIGHT: 171.75 LBS | RESPIRATION RATE: 18 BRPM | HEIGHT: 66 IN | DIASTOLIC BLOOD PRESSURE: 84 MMHG | HEART RATE: 76 BPM | OXYGEN SATURATION: 100 %

## 2018-07-02 DIAGNOSIS — C34.91 BRONCHOGENIC CANCER OF RIGHT LUNG: Primary | ICD-10-CM

## 2018-07-02 DIAGNOSIS — C34.11 MALIGNANT NEOPLASM OF UPPER LOBE OF RIGHT LUNG: Primary | ICD-10-CM

## 2018-07-02 PROCEDURE — 3079F DIAST BP 80-89 MM HG: CPT | Mod: CPTII,S$GLB,, | Performed by: INTERNAL MEDICINE

## 2018-07-02 PROCEDURE — 99215 OFFICE O/P EST HI 40 MIN: CPT | Mod: S$GLB,,, | Performed by: INTERNAL MEDICINE

## 2018-07-02 PROCEDURE — 3077F SYST BP >= 140 MM HG: CPT | Mod: CPTII,S$GLB,, | Performed by: INTERNAL MEDICINE

## 2018-07-02 PROCEDURE — 99999 PR PBB SHADOW E&M-EST. PATIENT-LVL III: CPT | Mod: PBBFAC,,, | Performed by: INTERNAL MEDICINE

## 2018-07-02 RX ORDER — LOSARTAN POTASSIUM 50 MG/1
25 TABLET ORAL NIGHTLY
COMMUNITY
End: 2019-02-07 | Stop reason: SDUPTHER

## 2018-07-02 RX ORDER — ACETAMINOPHEN AND CODEINE PHOSPHATE 300; 15 MG/1; MG/1
1 TABLET ORAL EVERY 4 HOURS PRN
COMMUNITY
End: 2018-07-06 | Stop reason: SDUPTHER

## 2018-07-02 NOTE — PROGRESS NOTES
Subjective:       Patient ID: Alesha Toney is a 67 y.o. female.    Chief Complaint: Malignant neoplasm of upper lobe of right lung  Ms. Alesha Toney is a 67-year-old otherwise healthy female who started having some shoulder pain, which was lasting for over six weeks.  She went and saw her primary care physician and underwent an x-ray, which revealed right upper lobe lung mass worrisome for malignancy and a CT scan was recommended, which was done on 6/12/18 and that revealed a newly developing mass, pleural based, lateral posterior segment, right upper lobe 3.5 x 3.5 cm, surrounding stellate margin and patchy infiltrates, particularly superiorly,   anteriorly infiltrates extend perihilar into the anterior segment.  She then underwent CT-guided biopsy, which revealed well-differentiated adenocarcinoma.      [facility] [place] in to review her PET scan which reveals There is physiologic intracranial, head, and neck activity.  There is a large right upper lobe mass SUV max 9.11.  No local or distant metastatic disease seen.  There is physiologic liver, spleen, GI and  activity.  There are a few liver cysts.  No adenopathy is seen.  The adrenal glands are not enlarged.  No adenopathy is seen.  No suspicious bone lesions are seen.  Left lung is clear.  MRI brain is negative    Review of Systems   Constitutional: Negative for appetite change, fatigue and unexpected weight change.   HENT: Negative for mouth sores.    Eyes: Negative for visual disturbance.   Respiratory: Negative for cough and shortness of breath.    Cardiovascular: Negative for chest pain.   Gastrointestinal: Negative for abdominal pain and diarrhea.   Genitourinary: Negative for frequency.   Musculoskeletal: Negative for back pain.   Skin: Negative for rash.   Neurological: Negative for headaches.   Hematological: Negative for adenopathy.   Psychiatric/Behavioral: The patient is not nervous/anxious.    All other systems reviewed and are negative.       PMFSH: all information reviewed and updated as relevant to today's visit  Objective:      Physical Exam   Constitutional: She is oriented to person, place, and time. She appears well-developed and well-nourished.   HENT:   Mouth/Throat: No oropharyngeal exudate.   Cardiovascular: Normal rate and normal heart sounds.    Pulmonary/Chest: Effort normal and breath sounds normal. She has no wheezes.   Abdominal: Soft. Bowel sounds are normal. There is no tenderness.   Musculoskeletal: She exhibits no edema or tenderness.   Lymphadenopathy:     She has no cervical adenopathy.   Neurological: She is alert and oriented to person, place, and time. Coordination normal.   Skin: Skin is warm and dry. No rash noted.   Psychiatric: She has a normal mood and affect. Judgment and thought content normal.   Vitals reviewed.      LABS:  WBC   Date Value Ref Range Status   06/20/2018 11.50 3.90 - 12.70 K/uL Final     Hemoglobin   Date Value Ref Range Status   06/20/2018 14.3 12.0 - 16.0 g/dL Final     Hematocrit   Date Value Ref Range Status   06/20/2018 44.2 37.0 - 48.5 % Final     Platelets   Date Value Ref Range Status   06/20/2018 249 150 - 350 K/uL Final     Gran # (ANC)   Date Value Ref Range Status   06/20/2018 8.1 (H) 1.8 - 7.7 K/uL Final     Gran%   Date Value Ref Range Status   06/20/2018 70.0 38.0 - 73.0 % Final       Chemistry        Component Value Date/Time     06/20/2018 0822    K 4.1 06/20/2018 0822     06/20/2018 0822    CO2 29 06/20/2018 0822    BUN 18 06/20/2018 0822    CREATININE 1.0 06/20/2018 0822     (H) 06/20/2018 0822        Component Value Date/Time    CALCIUM 10.4 06/20/2018 0822    ALKPHOS 55 06/20/2018 0822    AST 26 06/20/2018 0822    ALT 24 06/20/2018 0822    BILITOT 0.5 06/20/2018 0822    ESTGFRAFRICA >60 06/20/2018 0822    EGFRNONAA 58 (A) 06/20/2018 0822          Assessment:       1. Malignant neoplasm of upper lobe of right lung        Plan:        1. Reviewed her PET scan [facility]  patient discussed case with both Dr. Messina and Dr. Hearn and plan to do EBUS. Dr. Hearn's office to contact patient to schedule EBUS. Also might need MRI chest to further evaluate resection    Above care plan was discussed with patient and accompanying girl friend and all questions were addressed to their satisfaction

## 2018-07-05 ENCOUNTER — OFFICE VISIT (OUTPATIENT)
Dept: PULMONOLOGY | Facility: CLINIC | Age: 68
End: 2018-07-05
Payer: COMMERCIAL

## 2018-07-05 ENCOUNTER — HOSPITAL ENCOUNTER (OUTPATIENT)
Dept: PULMONOLOGY | Facility: CLINIC | Age: 68
Discharge: HOME | End: 2018-07-05
Payer: COMMERCIAL

## 2018-07-05 ENCOUNTER — PATIENT MESSAGE (OUTPATIENT)
Dept: INTERNAL MEDICINE | Facility: CLINIC | Age: 68
End: 2018-07-05

## 2018-07-05 ENCOUNTER — PATIENT MESSAGE (OUTPATIENT)
Dept: SURGERY | Facility: HOSPITAL | Age: 68
End: 2018-07-05

## 2018-07-05 VITALS
SYSTOLIC BLOOD PRESSURE: 170 MMHG | WEIGHT: 172.19 LBS | BODY MASS INDEX: 27.67 KG/M2 | HEART RATE: 64 BPM | HEIGHT: 66 IN | OXYGEN SATURATION: 99 % | DIASTOLIC BLOOD PRESSURE: 80 MMHG

## 2018-07-05 DIAGNOSIS — J43.9 PULMONARY EMPHYSEMA, UNSPECIFIED EMPHYSEMA TYPE: ICD-10-CM

## 2018-07-05 DIAGNOSIS — C34.91 PRIMARY LUNG ADENOCARCINOMA, RIGHT: ICD-10-CM

## 2018-07-05 DIAGNOSIS — C34.91: ICD-10-CM

## 2018-07-05 DIAGNOSIS — I47.10 SVT (SUPRAVENTRICULAR TACHYCARDIA): ICD-10-CM

## 2018-07-05 DIAGNOSIS — C78.7: ICD-10-CM

## 2018-07-05 DIAGNOSIS — J43.9 PULMONARY EMPHYSEMA, UNSPECIFIED EMPHYSEMA TYPE: Primary | ICD-10-CM

## 2018-07-05 LAB
PRE FEV1 FVC: 65
PRE FEV1: 2.19
PRE FVC: 3.35
PREDICTED FEV1 FVC: 78
PREDICTED FEV1: 2.44
PREDICTED FVC: 3.12

## 2018-07-05 PROCEDURE — 99244 OFF/OP CNSLTJ NEW/EST MOD 40: CPT | Mod: 25,S$GLB,, | Performed by: INTERNAL MEDICINE

## 2018-07-05 PROCEDURE — 94010 BREATHING CAPACITY TEST: CPT | Mod: S$GLB,,, | Performed by: INTERNAL MEDICINE

## 2018-07-05 PROCEDURE — 99999 PR PBB SHADOW E&M-EST. PATIENT-LVL II: CPT | Mod: PBBFAC,,, | Performed by: INTERNAL MEDICINE

## 2018-07-05 PROCEDURE — 94729 DIFFUSING CAPACITY: CPT | Mod: S$GLB,,, | Performed by: INTERNAL MEDICINE

## 2018-07-05 NOTE — ASSESSMENT & PLAN NOTE
Mediastinal staging for micrometastasis scheduled for July 10.  PFT today to plan for surgery  Patient oriented to procedure and patient verbalized an understanding.  All questions were answered to patient's satisfaction.  Written consent was signed and will be placed on chart.

## 2018-07-05 NOTE — PROGRESS NOTES
"Subjective:       Patient ID: Alesha Toney is a 67 y.o. female.  Consult from Dr. Dhaliwal for mediastinal staging.  Chief Complaint: neoplasm lung    67 year old former smoker who quit 5 years ago.  I had previously seen her in 2010 for a right upper lobe apical scar.  Patient complained of right scapular pain radiating to her arm and a CXR was found to be abnormal.  3.5 cm RUL lung mass was found and percutaneous biopsy was positive for adenocarcinoma.  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      Review of Systems   Constitutional: Negative for weight loss and weight gain.   HENT: Negative for trouble swallowing.    Eyes: Negative for itching.   Respiratory: Negative for cough, wheezing and dyspnea on extertion.    Cardiovascular: Negative for leg swelling.   Genitourinary: Negative for difficulty urinating.   Endocrine: Negative for cold intolerance and heat intolerance.    Musculoskeletal: Positive for back pain. Negative for arthralgias.   Gastrointestinal: Negative for acid reflux.   Neurological: Negative for headaches.   Hematological: Negative for adenopathy.   Psychiatric/Behavioral: Negative for confusion.       Past medical and surgical history reviewed.  Social and family history reviewed.  Allergies and medications reviewed.  Objective:       Vitals:    07/05/18 1536   BP: (!) 170/80   Pulse: 64   SpO2: 99%   Weight: 78.1 kg (172 lb 2.9 oz)   Height: 5' 6" (1.676 m)     Physical Exam   Constitutional: She is oriented to person, place, and time. She appears well-developed and well-nourished.   HENT:   Head: Normocephalic.   Nose: Nose normal.   Neck: Normal range of motion. Neck supple. No tracheal deviation present.   Cardiovascular: Normal rate, regular rhythm, normal heart sounds and intact distal pulses.    Pulmonary/Chest: Normal expansion, symmetric chest wall expansion, effort normal and breath sounds normal.   Abdominal: Soft. Bowel sounds " are normal. There is no hepatosplenomegaly.   Musculoskeletal: Normal range of motion. She exhibits no edema.   Lymphadenopathy: No supraclavicular adenopathy is present.     She has no cervical adenopathy.   Neurological: She is alert and oriented to person, place, and time. No cranial nerve deficit.   Skin: Skin is warm and dry.   Psychiatric: She has a normal mood and affect.   Vitals reviewed.    Personal Diagnostic Review  CT of chest performed on 6/2018  revealed RUL mass no adenopathy or hypermetabolic activity..  No flowsheet data found.      Assessment:       1. Pulmonary emphysema, unspecified emphysema type    2. Primary lung adenocarcinoma, right    3. SVT (supraventricular tachycardia)        Outpatient Encounter Prescriptions as of 7/5/2018   Medication Sig Dispense Refill    atenolol (TENORMIN) 50 MG tablet Take 1 tablet (50 mg total) by mouth 2 (two) times daily. 180 tablet 3    diazePAM (VALIUM) 5 MG tablet Take 1 tablet (5 mg total) by mouth nightly as needed for Insomnia. 30 tablet 0    losartan (COZAAR) 50 MG tablet Take 25 mg by mouth 2 (two) times daily.      multivitamin with minerals tablet Take 1 tablet by mouth once daily.      [DISCONTINUED] acetaminophen-codeine 300-15mg (TYLENOL #2) 300-15 mg per tablet Take 1 tablet by mouth every 4 (four) hours as needed for Pain.      cloNIDine (CATAPRES) 0.1 MG tablet TAKE 1 TABLET BY MOUTH EVERY 8 HOURS AS NEEDED 270 tablet 1    [DISCONTINUED] HYDROcodone-acetaminophen (NORCO) 5-325 mg per tablet Take 1 tablet by mouth every 8 (eight) hours as needed for Pain. 21 tablet 0     No facility-administered encounter medications on file as of 7/5/2018.      Orders Placed This Encounter   Procedures    Spirometry with/without bronchodilator     Standing Status:   Future     Number of Occurrences:   1     Standing Expiration Date:   7/5/2019    DLCO-Carbon Monoxide Diffusing Capacity     Standing Status:   Future     Number of Occurrences:   1      Standing Expiration Date:   7/5/2019     Plan:     Problem List Items Addressed This Visit     SVT (supraventricular tachycardia)    Current Assessment & Plan     Patient is instructed to continue beta blocker  See cards notes.         Pulmonary emphysema - Primary    Relevant Orders    Spirometry with/without bronchodilator (Completed)    DLCO-Carbon Monoxide Diffusing Capacity (Completed)    Primary lung adenocarcinoma, right    Current Assessment & Plan     Mediastinal staging for micrometastasis scheduled for July 10.  PFT today to plan for surgery  Patient oriented to procedure and patient verbalized an understanding.  All questions were answered to patient's satisfaction.  Written consent was signed and will be placed on chart.         Relevant Orders    Spirometry with/without bronchodilator (Completed)    DLCO-Carbon Monoxide Diffusing Capacity (Completed)

## 2018-07-05 NOTE — LETTER
July 5, 2018      Cori Galloway MD  [address] [facility] [place] [address]           [facility] - Pulmonary Services  [address]  [place] [address]  Phone: [phone]          Patient: Alesha Toney   MR Number: [id number]   YOB: 1950   Date of Visit: 7/5/2018       Dear Dr. Cori Galloway:    Thank you for referring Alesha Toney to me for evaluation. Attached you will find relevant portions of my assessment and plan of care.    If you have questions, please do not hesitate to call me. I look forward to following Alesha Toney along with you.    Sincerely,    Sri Hearn MD    Enclosure  CC:  No Recipients    If you would like to receive this communication electronically, please contact [email or web address] or [phone] to request more information on [employer] Link access.    For providers and/or their staff who would like to refer a patient to [facility], please contact us through our one-stop-shop provider referral line, [facility] , at [phone].    If you feel you have received this communication in error or would no longer like to receive these types of communications, please e-mail [email or web address]

## 2018-07-06 ENCOUNTER — PATIENT MESSAGE (OUTPATIENT)
Dept: HEMATOLOGY/ONCOLOGY | Facility: CLINIC | Age: 68
End: 2018-07-06

## 2018-07-06 DIAGNOSIS — G89.3 CANCER-RELATED PAIN: ICD-10-CM

## 2018-07-06 RX ORDER — ACETAMINOPHEN AND CODEINE PHOSPHATE 300; 15 MG/1; MG/1
1 TABLET ORAL EVERY 4 HOURS PRN
Qty: 90 TABLET | Refills: 0 | Status: SHIPPED | OUTPATIENT
Start: 2018-07-06 | End: 2018-07-09 | Stop reason: ALTCHOICE

## 2018-07-06 RX ORDER — HYDROCODONE BITARTRATE AND ACETAMINOPHEN 5; 325 MG/1; MG/1
1 TABLET ORAL EVERY 6 HOURS PRN
Qty: 120 TABLET | Refills: 0 | Status: ON HOLD | OUTPATIENT
Start: 2018-07-06 | End: 2018-08-09

## 2018-07-06 NOTE — TELEPHONE ENCOUNTER
We are not treating her presently--would you advise her to continue getting pain medications from her original prescriber?   terrell

## 2018-07-08 ENCOUNTER — PATIENT MESSAGE (OUTPATIENT)
Dept: HEMATOLOGY/ONCOLOGY | Facility: CLINIC | Age: 68
End: 2018-07-08

## 2018-07-08 DIAGNOSIS — R52 PAIN: Primary | ICD-10-CM

## 2018-07-09 RX ORDER — ACETAMINOPHEN AND CODEINE PHOSPHATE 300; 60 MG/1; MG/1
1 TABLET ORAL EVERY 6 HOURS PRN
Qty: 60 TABLET | Refills: 0 | Status: SHIPPED | OUTPATIENT
Start: 2018-07-09 | End: 2018-07-19

## 2018-07-10 ENCOUNTER — HOSPITAL ENCOUNTER (OUTPATIENT)
Facility: HOSPITAL | Age: 68
Discharge: HOME | End: 2018-07-10
Attending: INTERNAL MEDICINE | Admitting: INTERNAL MEDICINE
Payer: COMMERCIAL

## 2018-07-10 ENCOUNTER — SURGERY (OUTPATIENT)
Age: 68
End: 2018-07-10

## 2018-07-10 ENCOUNTER — ANESTHESIA EVENT (OUTPATIENT)
Dept: SURGERY | Facility: HOSPITAL | Age: 68
End: 2018-07-10
Payer: COMMERCIAL

## 2018-07-10 ENCOUNTER — ANESTHESIA (OUTPATIENT)
Dept: SURGERY | Facility: HOSPITAL | Age: 68
End: 2018-07-10
Payer: COMMERCIAL

## 2018-07-10 ENCOUNTER — PATIENT MESSAGE (OUTPATIENT)
Dept: HEMATOLOGY/ONCOLOGY | Facility: CLINIC | Age: 68
End: 2018-07-10

## 2018-07-10 VITALS
WEIGHT: 169 LBS | OXYGEN SATURATION: 100 % | RESPIRATION RATE: 12 BRPM | BODY MASS INDEX: 27.16 KG/M2 | DIASTOLIC BLOOD PRESSURE: 76 MMHG | HEIGHT: 66 IN | HEART RATE: 53 BPM | TEMPERATURE: 98 F | SYSTOLIC BLOOD PRESSURE: 172 MMHG

## 2018-07-10 DIAGNOSIS — C34.91 PRIMARY LUNG ADENOCARCINOMA, RIGHT: ICD-10-CM

## 2018-07-10 PROCEDURE — 36000704 HC OR TIME LEV I 1ST 15 MIN: Performed by: INTERNAL MEDICINE

## 2018-07-10 PROCEDURE — D9220A PRA ANESTHESIA: Mod: CRNA,,, | Performed by: NURSE ANESTHETIST, CERTIFIED REGISTERED

## 2018-07-10 PROCEDURE — 88177 CYTP FNA EVAL EA ADDL: CPT | Mod: 26,,, | Performed by: PATHOLOGY

## 2018-07-10 PROCEDURE — D9220A PRA ANESTHESIA: Mod: ANES,,, | Performed by: ANESTHESIOLOGY

## 2018-07-10 PROCEDURE — 88305 TISSUE EXAM BY PATHOLOGIST: CPT | Performed by: PATHOLOGY

## 2018-07-10 PROCEDURE — 71000044 HC DOSC ROUTINE RECOVERY FIRST HOUR: Performed by: INTERNAL MEDICINE

## 2018-07-10 PROCEDURE — 36000705 HC OR TIME LEV I EA ADD 15 MIN: Performed by: INTERNAL MEDICINE

## 2018-07-10 PROCEDURE — 27201423 OPTIME MED/SURG SUP & DEVICES STERILE SUPPLY: Performed by: INTERNAL MEDICINE

## 2018-07-10 PROCEDURE — 63600175 PHARM REV CODE 636 W HCPCS: Performed by: NURSE ANESTHETIST, CERTIFIED REGISTERED

## 2018-07-10 PROCEDURE — 88172 CYTP DX EVAL FNA 1ST EA SITE: CPT | Mod: 26,,, | Performed by: PATHOLOGY

## 2018-07-10 PROCEDURE — 25000003 PHARM REV CODE 250: Performed by: NURSE ANESTHETIST, CERTIFIED REGISTERED

## 2018-07-10 PROCEDURE — 88173 CYTOPATH EVAL FNA REPORT: CPT | Mod: 26,,, | Performed by: PATHOLOGY

## 2018-07-10 PROCEDURE — 27800903 OPTIME MED/SURG SUP & DEVICES OTHER IMPLANTS: Performed by: INTERNAL MEDICINE

## 2018-07-10 PROCEDURE — 71000015 HC POSTOP RECOV 1ST HR: Performed by: INTERNAL MEDICINE

## 2018-07-10 PROCEDURE — 25000003 PHARM REV CODE 250: Performed by: INTERNAL MEDICINE

## 2018-07-10 PROCEDURE — 31653 BRONCH EBUS SAMPLNG 3/> NODE: CPT | Mod: ,,, | Performed by: INTERNAL MEDICINE

## 2018-07-10 PROCEDURE — 37000008 HC ANESTHESIA 1ST 15 MINUTES: Performed by: INTERNAL MEDICINE

## 2018-07-10 PROCEDURE — 37000009 HC ANESTHESIA EA ADD 15 MINS: Performed by: INTERNAL MEDICINE

## 2018-07-10 RX ORDER — PROPOFOL 10 MG/ML
VIAL (ML) INTRAVENOUS
Status: DISCONTINUED | OUTPATIENT
Start: 2018-07-10 | End: 2018-07-10

## 2018-07-10 RX ORDER — SODIUM CHLORIDE 9 MG/ML
INJECTION, SOLUTION INTRAVENOUS CONTINUOUS PRN
Status: DISCONTINUED | OUTPATIENT
Start: 2018-07-10 | End: 2018-07-10

## 2018-07-10 RX ORDER — FENTANYL CITRATE 50 UG/ML
25 INJECTION, SOLUTION INTRAMUSCULAR; INTRAVENOUS EVERY 5 MIN PRN
Status: DISCONTINUED | OUTPATIENT
Start: 2018-07-10 | End: 2018-07-10 | Stop reason: HOSPADM

## 2018-07-10 RX ORDER — MIDAZOLAM HYDROCHLORIDE 1 MG/ML
INJECTION, SOLUTION INTRAMUSCULAR; INTRAVENOUS
Status: DISCONTINUED | OUTPATIENT
Start: 2018-07-10 | End: 2018-07-10

## 2018-07-10 RX ORDER — FENTANYL CITRATE 50 UG/ML
INJECTION, SOLUTION INTRAMUSCULAR; INTRAVENOUS
Status: DISCONTINUED | OUTPATIENT
Start: 2018-07-10 | End: 2018-07-10

## 2018-07-10 RX ORDER — PROPOFOL 10 MG/ML
VIAL (ML) INTRAVENOUS CONTINUOUS PRN
Status: DISCONTINUED | OUTPATIENT
Start: 2018-07-10 | End: 2018-07-10

## 2018-07-10 RX ORDER — LIDOCAINE HCL/PF 100 MG/5ML
SYRINGE (ML) INTRAVENOUS
Status: DISCONTINUED | OUTPATIENT
Start: 2018-07-10 | End: 2018-07-10

## 2018-07-10 RX ORDER — LIDOCAINE HYDROCHLORIDE 10 MG/ML
INJECTION, SOLUTION EPIDURAL; INFILTRATION; INTRACAUDAL; PERINEURAL
Status: DISCONTINUED | OUTPATIENT
Start: 2018-07-10 | End: 2018-07-10 | Stop reason: HOSPADM

## 2018-07-10 RX ORDER — SODIUM CHLORIDE 0.9 % (FLUSH) 0.9 %
3 SYRINGE (ML) INJECTION
Status: DISCONTINUED | OUTPATIENT
Start: 2018-07-10 | End: 2018-07-10 | Stop reason: HOSPADM

## 2018-07-10 RX ADMIN — LIDOCAINE HYDROCHLORIDE 12 ML: 10 INJECTION, SOLUTION EPIDURAL; INFILTRATION; INTRACAUDAL; PERINEURAL at 07:07

## 2018-07-10 RX ADMIN — PROPOFOL 50 MG: 10 INJECTION, EMULSION INTRAVENOUS at 07:07

## 2018-07-10 RX ADMIN — LIDOCAINE HYDROCHLORIDE 100 MG: 20 INJECTION, SOLUTION INTRAVENOUS at 07:07

## 2018-07-10 RX ADMIN — FENTANYL CITRATE 25 MCG: 50 INJECTION, SOLUTION INTRAMUSCULAR; INTRAVENOUS at 07:07

## 2018-07-10 RX ADMIN — PROPOFOL 100 MG: 10 INJECTION, EMULSION INTRAVENOUS at 07:07

## 2018-07-10 RX ADMIN — PROPOFOL 150 MCG/KG/MIN: 10 INJECTION, EMULSION INTRAVENOUS at 07:07

## 2018-07-10 RX ADMIN — MIDAZOLAM HYDROCHLORIDE 2 MG: 1 INJECTION, SOLUTION INTRAMUSCULAR; INTRAVENOUS at 06:07

## 2018-07-10 RX ADMIN — SODIUM CHLORIDE: 0.9 INJECTION, SOLUTION INTRAVENOUS at 06:07

## 2018-07-10 NOTE — INTERVAL H&P NOTE
The patient has been examined and the H&P has been reviewed:    I concur with the findings and no changes have occurred since H&P was written.    Anesthesia/Surgery risks, benefits and alternative options discussed and understood by patient/family.          Active Hospital Problems    Diagnosis  POA    Primary lung adenocarcinoma, right [C34.91]  Yes      Resolved Hospital Problems    Diagnosis Date Resolved POA   No resolved problems to display.     I have explained the risks, benefits and alternatives of the procedure in detail.  The patient voices understanding and all questions have been answered.  The patient agrees to proceed as planned.

## 2018-07-10 NOTE — TELEPHONE ENCOUNTER
You are presenting her at conference tomorrow, if I remember correctly---everything will be [place] [facility]  [place]rell

## 2018-07-10 NOTE — TRANSFER OF CARE
"Anesthesia Transfer of Care Note    Patient: Alesha Toney    Procedure(s) Performed: Procedure(s) (LRB):  ULTRASOUND, ENDOBRONCHIAL (N/A)    Patient location: [facility]    Anesthesia Type: general    Transport from OR: Transported from OR on 6-10 L/min O2 by face mask with adequate spontaneous ventilation    Post pain: adequate analgesia    Post assessment: no apparent anesthetic complications and tolerated procedure well    Post vital signs: stable    Level of consciousness: sedated    Nausea/Vomiting: no nausea/vomiting    Complications: none    Transfer of care protocol was followed      Last vitals:   Visit Vitals  /60 (BP Location: Right arm, Patient Position: Lying)   Pulse (!) 59   Temp 36 °C (96.8 °F) (Temporal)   Resp (!) 8   Ht 5' 6" (1.676 m)   Wt 76.7 kg (169 lb)   LMP  (LMP Unknown)   SpO2 100%   Breastfeeding? No   BMI 27.28 kg/m²     "

## 2018-07-10 NOTE — ANESTHESIA POSTPROCEDURE EVALUATION
"Anesthesia Post Evaluation    Patient: Alesha Toney    Procedure(s) Performed: Procedure(s) (LRB):  ULTRASOUND, ENDOBRONCHIAL (N/A)    Final Anesthesia Type: general  Patient location during evaluation: PACU  Patient participation: Yes- Able to Participate  Level of consciousness: awake and alert and oriented  Post-procedure vital signs: reviewed and stable  Pain management: adequate  Airway patency: patent  PONV status at discharge: No PONV  Anesthetic complications: no      Cardiovascular status: blood pressure returned to baseline  Respiratory status: unassisted, spontaneous ventilation and room air  Hydration status: euvolemic  Follow-up not needed.        Visit Vitals  /63 (BP Location: Right arm, Patient Position: Lying)   Pulse (!) 52   Temp 36 °C (96.8 °F) (Temporal)   Resp 10   Ht 5' 6" (1.676 m)   Wt 76.7 kg (169 lb)   LMP  (LMP Unknown)   SpO2 100%   Breastfeeding? No   BMI 27.28 kg/m²       Pain/Grady Score: Pain Assessment Performed: Yes (7/10/2018  8:22 AM)  Presence of Pain: non-verbal indicators absent (7/10/2018  8:22 AM)  Grady Score: 4 (7/10/2018  8:22 AM)      "

## 2018-07-10 NOTE — DISCHARGE SUMMARY
[facility]  Pulmonology  Discharge Summary      Patient Name: Alesha Toney  MRN: [id number]  Admission Date: 7/10/2018  Hospital Length of Stay: 0 days  Discharge Date and Time:  07/10/2018 8:15 AM  Attending Physician: Sri Hearn MD   Discharging Provider: Sri Hearn MD  Primary Care Provider: Cori Galloway MD    HPI: Patient with RUL adenocarcinoma with need for mediastinal staging    Procedure(s) (LRB):  ULTRASOUND, ENDOBRONCHIAL (N/A)    Indwelling Lines/Drains at Time of Discharge:   Lines/Drains/Airways          No matching active lines, drains, or airways          Hospital Course: Bronchoscopy and EBUS complete.  Tolerated well.        Significant Labs:  All pertinent labs within the past 24 hours have been reviewed.    Significant Imaging:  I have reviewed all pertinent imaging results/findings within the past 24 hours.    Pending Diagnostic Studies:     None        Final Active Diagnoses:    Diagnosis Date Noted POA    Primary lung adenocarcinoma, right [C34.91]  Yes      Problems Resolved During this Admission:    Diagnosis Date Noted Date Resolved POA       Discharged Condition: stable    Disposition:     Follow Up:  Follow-up Information     Call in 1 week to follow up.               Patient Instructions:     Diet general       Medications:  Reconciled Home Medications:      Medication List      CONTINUE taking these medications    acetaminophen-codeine 300-60mg 300-60 mg Tab  Commonly known as:  TYLENOL #4  Take 1 tablet by mouth every 6 (six) hours as needed.     atenolol 50 MG tablet  Commonly known as:  TENORMIN  Take 1 tablet (50 mg total) by mouth 2 (two) times daily.     cloNIDine 0.1 MG tablet  Commonly known as:  CATAPRES  TAKE 1 TABLET BY MOUTH EVERY 8 HOURS AS NEEDED     diazePAM 5 MG tablet  Commonly known as:  VALIUM  Take 1 tablet (5 mg total) by mouth nightly as needed for Insomnia.     HYDROcodone-acetaminophen 5-325 mg per tablet  Commonly known as:   NORCO  Take 1 tablet by mouth every 6 (six) hours as needed for Pain.     losartan 50 MG tablet  Commonly known as:  COZAAR  Take 25 mg by mouth 2 (two) times daily.     multivitamin with minerals tablet  Take 1 tablet by mouth once daily.            Sri Hearn MD  Pulmonology  [facility]

## 2018-07-10 NOTE — DISCHARGE INSTRUCTIONS
Flexible Bronchoscopy  A flexible bronchoscopy is an exam of the airways of your lungs. A thin, flexible tube called a bronchoscope is used. It has a light and small camera that allow the healthcare provider to view your airways.    Before your test  · Follow your healthcare provider's instructions carefully. If you dont, the exam may be canceled. Or you may need to take it again.  · If you are taking blood-thinning medicine, ask your healthcare provider if you should stop taking the medicine before this test.  · Have no food or drink for at least 8 hours before the test. Also, avoid smoking for 24 hours before the test.  · You will need to remove any dentures or removable devices from your mouth.  · Right before the test, you will be given sedating medicines to help you relax. The medicine may be given by an IV (intravenously) into one of your veins. In addition, your nose and throat may be numbed with a special spray to help prevent gagging and coughing.  · If you are having this test as an outpatient, make sure you have an adult friend or family member to drive you home.  During your test  Bronchoscopy takes 45 to 60 minutes and includes the following steps:  · You may be given medicine (anesthesia) so that you are unconscious or asleep during the procedure.  · The healthcare provider inserts the tube into your nose or mouth.  · If you have not been given anesthesia, you might feel a gagging sensation. To help ease this feeling, you will be told to swallow or take deep breaths. Your airway will remain open even with the tube in place. But you wont be able to talk.  · The provider checks your breathing passage. He or she may also remove tiny tissue samples for biopsy.  After your test  · You may have a mild sore throat or cough. Your voice may also be hoarse.  · Don't eat or drink until the anesthesia wears off.  · If you had a biopsy, you might see traces of blood being coughed up.  When to call your  healthcare provider  Call your healthcare provider right away if you have any of the following:  · Shortness of breath  · Chest pain  · Bleeding from your nose or throat  · Coughing up a large amount of blood  · A fever above 100.4°F (38°C) for more than 24 hours  Call 911  Call 911 if you have:  · Chest pain  · Severe shortness of breath

## 2018-07-10 NOTE — TELEPHONE ENCOUNTER
Plan was to do EBUS, if negative for cancer then Dr. Messina will see her. So I am waiting for confirmation on path. In the interim will also discuss at conference

## 2018-07-10 NOTE — ANESTHESIA PREPROCEDURE EVALUATION
07/10/2018  Alesha Toney is a 67 y.o., female.    Anesthesia Evaluation    I have reviewed the Patient Summary Reports.    I have reviewed the Nursing Notes.   I have reviewed the Medications.     Review of Systems  Anesthesia Hx:  No problems with previous Anesthesia  History of prior surgery of interest to airway management or planning: Denies Family Hx of Anesthesia complications.   Denies Personal Hx of Anesthesia complications.   Hematology/Oncology:  Hematology Normal      Current/Recent Cancer.   EENT/Dental:EENT/Dental Normal   Cardiovascular:   Exercise tolerance: good Hypertension, well controlled Dysrhythmias (pSVT) ECG has been reviewed.    Pulmonary:   COPD, moderate    Renal/:  Renal/ Normal     Hepatic/GI:   GERD, well controlled    Musculoskeletal:  Musculoskeletal Normal    Neurological:  Neurology Normal    Endocrine:  Endocrine Normal    Dermatological:  Skin Normal    Psych:  Psychiatric Normal           Physical Exam  General:  Well nourished    Airway/Jaw/Neck:  Airway Findings: Mouth Opening: Normal Tongue: Normal  General Airway Assessment: Adult  Mallampati: II  TM Distance: Normal, at least 6 cm        Eyes/Ears/Nose:  EYES/EARS/NOSE FINDINGS: Normal   Dental:  Dental Findings:    Chest/Lungs:  Chest/Lungs Clear    Heart/Vascular:  Heart Findings: Normal Heart murmur: negative Vascular Findings: Normal    Abdomen:  Abdomen Findings: Normal    Musculoskeletal:  Musculoskeletal Findings: Normal   Skin:  Skin Findings: Normal    Mental Status:  Mental Status Findings: Normal        Anesthesia Plan  Type of Anesthesia, risks & benefits discussed:  Anesthesia Type:  general  Patient's Preference:   Intra-op Monitoring Plan: standard ASA monitors  Intra-op Monitoring Plan Comments:   Post Op Pain Control Plan:   Post Op Pain Control Plan Comments:   Induction:   IV  Beta Blocker:   Patient is on a Beta-Blocker and has received one dose within the past 24 hours (No further documentation required).       Informed Consent: Patient understands risks and agrees with Anesthesia plan.  Questions answered. Anesthesia consent signed with patient.  ASA Score: 3     Day of Surgery Review of History & Physical:    H&P update referred to the provider.         Ready For Surgery From Anesthesia Perspective.

## 2018-07-10 NOTE — PROGRESS NOTES
Pt recovered to baseline and verbalizes readiness for discharge; discharge instructions reviewed with Pt and Pt's family; understanding verbalized and handout copy given. VSS. No c/o of nausea; only mild c/o of pain rated at a 3/10 to throat noted. Pt tolerating clear liquids by mouth without difficulty. PIV removed as ordered. Pt escorted to vehicle via wheelchair.

## 2018-07-11 ENCOUNTER — TELEPHONE (OUTPATIENT)
Dept: HEMATOLOGY/ONCOLOGY | Facility: CLINIC | Age: 68
End: 2018-07-11

## 2018-07-11 ENCOUNTER — DOCUMENTATION ONLY (OUTPATIENT)
Dept: CARDIOTHORACIC SURGERY | Facility: HOSPITAL | Age: 68
End: 2018-07-11

## 2018-07-11 ENCOUNTER — PATIENT MESSAGE (OUTPATIENT)
Dept: HEMATOLOGY/ONCOLOGY | Facility: CLINIC | Age: 68
End: 2018-07-11

## 2018-07-11 DIAGNOSIS — Z12.11 COLON CANCER SCREENING: Primary | ICD-10-CM

## 2018-07-11 NOTE — PATIENT CARE CONFERENCE
[facility]      THORACIC MULTIDISCIPLINARY TUMOR BOARD  PATIENT REVIEW FORM  ________________________________________________________________________    CLINIC #: [id number]  DATE: 07/11/2018    Diagnosis:   NSCLC    PRESENTER:   Dr. Dhaliwal     PATIENT SUMMARY:   67 year old former smoker with SVT and newly diagnosed RUL adenocarcinoma. Previously seen in 2010 for right upper lobe apical scar but recently presented with right scapular pain radiating to arm. CXR prompted Chest CT and IR biopsy which was positive for adenocarcinoma. PET with uptake focal RUL SUV max 9.11 but no other local or distant metastatic disease. Brain MRI negative. PFTs - FEV1 2.19 90%, DLCO 18  96%. EBUS to evaluate mediastinum - pathology pending.   Quit smoking 5 years ago.        BOARD RECOMMENDATIONS:   Mass does not appear to be a true superior sulcus tumor to cause initial shoulder pain. EBUS pathology pending. If EBUS pathology negative refer to Thoracic Surgery for evaluation of resection. If the EBUS is positive then treat with definitive chemoradiation.   Also recommend colonoscopy/TUAN to evaluate rectal uptake on PET.      CONSULT NEEDED:     [x] Surgery    [] Hem/Onc    [] Rad/Onc    [] Dietary                 [] Social Service    [] Psychology       [] Pulmonology    Clinical Stage: Tumor 2b Node(s) 0 Metastasis 0  Pathologic Stage: Tumor  Node(s)  Metastasis       GROUP STAGE:     [] O    [] 1A    [] IB    [x] IIA    [] IIB     [] IIIA     [] IIIB     [] IIIC    [] IV                               [] Local recurrence     [] Regional recurrence     [] Distant recurrence                   [] NSCLC     [] SCLC     Tumor type     Unstageable:      [] Yes     [] No  Metastatic site(s): n/a         [x] Rita'l Treatment Guidelines reviewed and care planned is consistent with guidelines.         (i.e., NCCN, NCI, PD, ACO, AUA, etc.)    PRESENTATION AT CANCER CONFERENCE:         [] Prospective    [] Retrospective     []  Follow-Up          [] Eligible for clinical trial

## 2018-07-11 NOTE — TELEPHONE ENCOUNTER
Case discussed in Thoracic conference and consensus is to wait on results of EBUS and if negative she will proceed with surgery. She also needs a colonoscopy to evaluate uptake noted in rectum.  Above discussed with patient

## 2018-07-11 NOTE — TELEPHONE ENCOUNTER
Spoke with patient's wife, sol---  She is calling to ask nurse to review what was just told to her wife by dr kenney.  Nurse informed sol--she was not certain what was exactly said--as nurse was not at conference---but did see dr kenney's phone note----  Nurse will call her back after speaking with dr kenney for clarification.    Explained to patient and wife----  petscan report did not mention anything concerning r/t rectum.  Patient had EBUS yesterday----path is pending.  Case was presented at conference today---radiologist there noticed uptake in rectum---which needs to be evaluated---consensus was colonoscopy.  If EBUS path is negative and c-scope is OK---she will go to surgery with dr mandel.  Wife and patient voiced understanding.

## 2018-07-11 NOTE — TELEPHONE ENCOUNTER
----- Message from Alicia Armijo sent at 7/11/2018  3:12 PM CDT -----  Contact: Significant Other Lata [phone]  She says the pt just was given some test results but they would like a call back to discuss them more at length. Please call back to discuss/

## 2018-07-12 ENCOUNTER — PATIENT MESSAGE (OUTPATIENT)
Dept: HEMATOLOGY/ONCOLOGY | Facility: CLINIC | Age: 68
End: 2018-07-12

## 2018-07-12 ENCOUNTER — PATIENT MESSAGE (OUTPATIENT)
Dept: PULMONOLOGY | Facility: CLINIC | Age: 68
End: 2018-07-12

## 2018-07-12 DIAGNOSIS — C34.11 MALIGNANT NEOPLASM OF UPPER LOBE OF RIGHT LUNG: Primary | ICD-10-CM

## 2018-07-12 NOTE — TELEPHONE ENCOUNTER
That's great news. Please let her know that her EBUS is negative. Please schedule her to see Dr. Messina or Dr. Johnson in Thoracic surgery. Al[place]so did she schedule her colonoscopy?

## 2018-07-12 NOTE — TELEPHONE ENCOUNTER
EBUS is negative.  Please let me know when you can see for consult.  I am more than happy to contact her with appointment.  ~sol

## 2018-07-13 ENCOUNTER — OFFICE VISIT (OUTPATIENT)
Dept: CARDIOTHORACIC SURGERY | Facility: CLINIC | Age: 68
End: 2018-07-13
Payer: COMMERCIAL

## 2018-07-13 VITALS
DIASTOLIC BLOOD PRESSURE: 76 MMHG | HEART RATE: 68 BPM | SYSTOLIC BLOOD PRESSURE: 173 MMHG | BODY MASS INDEX: 27.63 KG/M2 | HEIGHT: 66 IN | WEIGHT: 171.94 LBS | OXYGEN SATURATION: 100 %

## 2018-07-13 DIAGNOSIS — I47.10 SVT (SUPRAVENTRICULAR TACHYCARDIA): Primary | ICD-10-CM

## 2018-07-13 DIAGNOSIS — C34.91 PRIMARY LUNG ADENOCARCINOMA, RIGHT: Primary | ICD-10-CM

## 2018-07-13 PROCEDURE — 3077F SYST BP >= 140 MM HG: CPT | Mod: CPTII,S$GLB,, | Performed by: THORACIC SURGERY (CARDIOTHORACIC VASCULAR SURGERY)

## 2018-07-13 PROCEDURE — 99999 PR PBB SHADOW E&M-EST. PATIENT-LVL IV: CPT | Mod: PBBFAC,,, | Performed by: THORACIC SURGERY (CARDIOTHORACIC VASCULAR SURGERY)

## 2018-07-13 PROCEDURE — 99204 OFFICE O/P NEW MOD 45 MIN: CPT | Mod: S$GLB,,, | Performed by: THORACIC SURGERY (CARDIOTHORACIC VASCULAR SURGERY)

## 2018-07-13 PROCEDURE — 3078F DIAST BP <80 MM HG: CPT | Mod: CPTII,S$GLB,, | Performed by: THORACIC SURGERY (CARDIOTHORACIC VASCULAR SURGERY)

## 2018-07-13 NOTE — PROGRESS NOTES
History & Physical    SUBJECTIVE:     History of Present Illness:  Patient is a 67 y.o. female previous smoker, h/o SVT referred for new adenocarcinoma of the RUL. EBUS bx negative, PET without distant mets (although hypermetabolic area in rectum needs f/u). She had an xray done for shoulder pain which identified the mass. She denies dyspnea, chest pain, cough, fatigue, weight loss. 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    Chief Complaint   Patient presents with    Consult       Review of patient's allergies indicates:   Allergen Reactions    Iodinated contrast- oral and iv dye     Pcn [penicillins]     Phenergan plain     Tramadol     Vancomycin analogues     Adhesive Blisters, Itching and Rash       Current Outpatient Prescriptions   Medication Sig Dispense Refill    acetaminophen-codeine 300-60mg (TYLENOL #4) 300-60 mg Tab Take 1 tablet by mouth every 6 (six) hours as needed. 60 tablet 0    atenolol (TENORMIN) 50 MG tablet Take 1 tablet (50 mg total) by mouth 2 (two) times daily. 180 tablet 3    cloNIDine (CATAPRES) 0.1 MG tablet TAKE 1 TABLET BY MOUTH EVERY 8 HOURS AS NEEDED 270 tablet 1    diazePAM (VALIUM) 5 MG tablet Take 1 tablet (5 mg total) by mouth nightly as needed for Insomnia. 30 tablet 0    HYDROcodone-acetaminophen (NORCO) 5-325 mg per tablet Take 1 tablet by mouth every 6 (six) hours as needed for Pain. 120 tablet 0    losartan (COZAAR) 50 MG tablet Take 25 mg by mouth 2 (two) times daily.      multivitamin with minerals tablet Take 1 tablet by mouth once daily.       No current facility-administered medications for this visit.        Past Medical History:   Diagnosis Date    Breast cyst     Cardiac arrhythmia     Fibrocystic breast     History of hysterectomy     20yrs ago    History of kidney stones     Lung cancer      Past Surgical History:   Procedure Laterality Date    ADENOIDECTOMY  17yo    APPENDECTOMY      BONE RESECTION, RIB  1980    BREAST  "BIOPSY Left     at least 40yrs ago in her 20's    BREAST CYST ASPIRATION      BREAST CYST EXCISION      ENDOBRONCHIAL ULTRASOUND N/A 7/10/2018    Procedure: ULTRASOUND, ENDOBRONCHIAL;  Surgeon: Sri Hearn MD;  Location: [facility] OR [address] [place] [address];  Service: Pulmonary;  Laterality: N/A;    HYSTERECTOMY      @47yrs of age    KIDNEY SURGERY      17yo    OOPHORECTOMY      @47yrs of age    TONSILLECTOMY       Family History   Problem Relation Age of Onset    Stroke Mother     Cancer Father         colon cancer    Diabetes Brother     Heart failure Brother     Hypertension Brother     Heart attack Paternal Aunt     Heart attack Maternal Grandfather     Diabetes Paternal Aunt      Social History   Substance Use Topics    Smoking status: Former Smoker     Packs/day: 1.00     Years: 30.00     Types: Cigarettes     Quit date: 2015    Smokeless tobacco: Never Used    Alcohol use 4.2 oz/week     7 Glasses of wine per week      Comment: socially         Review of Systems:  Review of Systems   Constitutional: Negative for activity change, appetite change, chills and fever.   HENT: Negative for congestion and trouble swallowing.    Eyes: Negative for visual disturbance.   Respiratory: Negative for cough and shortness of breath.    Cardiovascular: Negative for chest pain and leg swelling.   Gastrointestinal: Negative for abdominal distention, abdominal pain, constipation, diarrhea, nausea and vomiting.   Endocrine: Negative for cold intolerance and heat intolerance.   Genitourinary: Negative for difficulty urinating and dysuria.   Musculoskeletal: Negative for arthralgias and back pain.   Skin: Negative for rash and wound.   Neurological: Negative for dizziness and headaches.   Psychiatric/Behavioral: Negative for agitation and behavioral problems.       OBJECTIVE:     Vital Signs (Most Recent)  Pulse: 68 (07/13/18 1109)  BP: (!) 173/76 (07/13/18 1109)  SpO2: 100 % (07/13/18 1109)  5' 6" (1.676 m)  78 kg (171 lb 15.3 oz) "     Physical Exam:  Physical Exam   Constitutional: She is oriented to person, place, and time. She appears well-developed and well-nourished. No distress.   Cardiovascular: Normal rate and regular rhythm.  Exam reveals no gallop and no friction rub.    No murmur heard.  Pulmonary/Chest: Effort normal and breath sounds normal. No respiratory distress. She has no wheezes. She has no rales.   Musculoskeletal: Normal range of motion. She exhibits no edema.   Neurological: She is alert and oriented to person, place, and time.   Skin: Skin is warm and dry.   Psychiatric: She has a normal mood and affect. Her behavior is normal.       Laboratory  Reviewed    Diagnostic Results:  PET Scan: Reviewed  Large mass in the RUL that also appears to involve the superior segment of the RLL. No distant mets (but hypermetabolic activity in rectum)     PFT   FEV1 2.19, 90%  DLCO 18, 96%    ASSESSMENT/PLAN:     68yo woman with new large right lung adenocarcinoma  -SVT well controlled on meds    PLAN:Plan     Plan for VATS approach, likely open. Need to resect RUL and at least part of the RLL (superior segment). Possible chest wall resection. Lymphadenectomy.   Counseled that this tumor is likely not the cause of her pain and this will not improve with surgery.  Counseled on need for colonoscopy (she has never had one).     ATTENDING ATTESTATION:    I evaluated the patient and I agree with the assessment and plan.  She will need a preoperative stress test.  I will consult the Acute Pain Service for epidural catheter placement.  I will perform a right VATS to assess the relation between the tumor and chest wall in an attempt to plan chest wall resection.  I will perform as much dissection as possible thoracoscopically.  I will then convert to a right posterior or parascapular thoracotomy based upon intraoperative conditions. I have discussed the technical aspects, risks and benefits of the procedure with the patient.  I did inform the  patient that the risks are the most common risks and that there are other less likely risks that are too numerous to elaborate.  The patient is aware and has agreed to undergo the procedure as detailed on the consent form.

## 2018-07-13 NOTE — LETTER
[place] - Thoracic Surgery  [address]  [facility] [address]  Phone: [phone]  Fax: [phone] July 13, 2018        Pam Dhaliwal MD  [address]  [facility] [address]    Patient: Alesha Toney   MR Number: [id number]   YOB: 1950   Date of Visit: 7/13/2018       Dear Dr. Dhaliwal:    Thank you for referring Alesha Toney to me for evaluation. She presents with a newly diagnosed right upper lobe adenocarcinoma with possible chest wall invasion. Ms. Toney has some pain radiation into her right arm but doesn't have radiographic evidence of a Pancoast Tumor.    As discussed in lung conference, I will offer resection.  I will perform a right VATS to assess the relation between the tumor and chest wall in an attempt to plan chest wall resection.  I will perform as much dissection as possible thoracoscopically.  I will then convert to a right posterior or parascapular thoracotomy based upon intraoperative conditions.     If you have questions, please do not hesitate to call me. I look forward to following Alesha along with you.    Sincerely,      Philip Messina MD, ARIELLA, FACS  Department of Surgery  Section of Cardiothoracic Surgery        CC  Cori Galloway MD

## 2018-07-18 ENCOUNTER — TELEPHONE (OUTPATIENT)
Dept: INTERNAL MEDICINE | Facility: CLINIC | Age: 68
End: 2018-07-18

## 2018-07-18 ENCOUNTER — PATIENT MESSAGE (OUTPATIENT)
Dept: SURGERY | Facility: HOSPITAL | Age: 68
End: 2018-07-18

## 2018-07-18 NOTE — TELEPHONE ENCOUNTER
----- Message from Cori Galloway MD sent at 7/18/2018  9:52 AM CDT -----  Regarding: RE: Surgery Clearance  Alexa-    Can we get her in for pre-op please?    Thanks  ----- Message -----  From: Diaz Guzman MA  Sent: 7/18/2018   9:40 AM  To: Pat Porter RN, Cori Galloway MD  Subject: Surgery Clearance                                Ms. Alesha Toney is scheduled for surgery on 8/9/18 with Dr. Bret Messina . Anesthesia Pre-Op is requesting an clearance. Thank you.

## 2018-07-19 NOTE — PROGRESS NOTES
Subjective:       Patient ID: Alesha Toney is a 67 y.o. female.    Chief Complaint: Pre-op Exam    HPI   67 y.o. female here for pre-op evaluation of VATS with lobectomy by Dr. Messina planned for 8/9/18.    History of prior anesthetic complications: -  Chronic Steroid usage: -  - tobacco, - EtOH, - Illicit substances     Surgical Risk Assessment     Active cardiac issues:  Active decompensated heart failure? No   Unstable angina?  No   Significant uncontrolled arrhythmias? No   Severe valvular heart disease-Aortic or Mitral Stenosis? No   Recent MI or coronary revascularization < 30 days? No     Clinical risk factors predicting perioperative major adverse cardiac events per RCRI  High risk surgery (suprainguinal vascular, intraperitoneal, or intrathoracic surgery)? Yes   History of CAD/ischemic heart disease? No   History of cerebrovascular disease (CVA or TIA)? No   History of compensated heart failure? No   Type 2 diabetes requiring insulin? No   Serum Creatinine > 2? No   Total cardiac risk factors 1     0 predictors = 0.4%, 1 predictor = 0.9%, 2 predictors = 6.6%, ?3 predictors = >11%    According to the revised cardiac risk index, the risk of megan-procedural major cardiac complications (cardiac death, nonfatal MI, nonfatal cardiac arrest, postoperative cardiogenic pulmonary edema, complete heart block) is: 0.9%     If patient has a low risk of MACE (<1%), proceed to surgery. If the patient is at elevated risk of MACE, then determine functional capacity (pt reported activity or DASI model).     If the patient has moderate, good, or excellent functional capacity (?4 METs), then proceed to surgery without further evaluation. If patient has poor or unknown functional capacity, will further testing impact decision making or perioperative care? If yes, then pharmacological stress testing is appropriate. In those patients with unknown functional capacity, exercise stress testing may be reasonable to perform.   Can also refer to cardiology if appropriate.       Patient's functional mets: >4 METS    < 4 METs -unable to walk > 2 blocks on level ground without stopping due to symptoms  - eating, dressing, toileting, walking indoors, light housework. POOR   > 4 METs -climbing > 1 flight of stairs without stopping  -walking up hill > 1-2 blocks  -scrubbing floors  -moving furniture  - golf, bowling, dancing or tennis  -running short distance MODERATE to EXCELLENT     OR   [email or web address]        Review of Systems   Constitutional: Negative for activity change, chills and fever.   HENT: Negative for congestion, sinus pain, sinus pressure and sore throat.    Eyes: Negative for pain and redness.   Respiratory: Negative for cough and shortness of breath.    Cardiovascular: Negative for chest pain.   Gastrointestinal: Negative for abdominal pain, constipation, nausea and vomiting.   Genitourinary: Negative for difficulty urinating.   Musculoskeletal: Negative for arthralgias and joint swelling.   Skin: Negative for rash.   Neurological: Negative for dizziness and light-headedness.   Psychiatric/Behavioral: Negative for dysphoric mood and sleep disturbance. The patient is nervous/anxious (with cancer diagnosis).      Objective:     Vitals:    07/20/18 1253   BP: (!) 152/62   Pulse: (!) 54   Resp: 18   Temp: 98.7 °F (37.1 °C)    Body mass index is 27.3 kg/m².  Physical Exam   Constitutional: She is oriented to person, place, and time. She appears well-developed and well-nourished.   HENT:   Head: Normocephalic and atraumatic.   Mouth/Throat: Oropharynx is clear and moist.   Eyes: Conjunctivae are normal. Pupils are equal, round, and reactive to light.   Neck: Normal range of motion. No tracheal deviation present. No thyromegaly present.   Cardiovascular: Normal rate, regular rhythm, normal heart sounds and intact distal pulses.  Exam reveals no gallop and no friction rub.    No murmur  heard.  Pulmonary/Chest: Effort normal and breath sounds normal. She has no wheezes. She has no rales.   Abdominal: Soft. Bowel sounds are normal. There is no tenderness. There is no guarding.   Musculoskeletal: She exhibits no edema.   Lymphadenopathy:     She has no cervical adenopathy.   Neurological: She is alert and oriented to person, place, and time.   Skin: Skin is warm and dry. No rash noted.   Psychiatric: She has a normal mood and affect. Judgment normal.     Assessment:     1. Preop examination    2. Insomnia, unspecified type    3. Primary lung adenocarcinoma, right    4. Pre-op chest exam    5. Pre-operative cardiovascular examination    6. Anxiety    7. SVT (supraventricular tachycardia)      Plan:   1. Preop examination  - IN OFFICE EKG 12-LEAD (to Muse)  - CBC auto differential; Future  - Basic metabolic panel; Future  - TYPE & SCREEN; Future  - Ambulatory Referral to Cardiology    2. Insomnia, unspecified type  - Checked  for LA, AL, AR, MS, Tx.  Patient has no prescriptions that are concerning.    - diazePAM (VALIUM) 5 MG tablet; Take 1 tablet (5 mg total) by mouth nightly as needed for Anxiety or Insomnia.  Dispense: 30 tablet; Refill: 0    3. Primary lung adenocarcinoma, right  - follow up with CT surgery and hem/onc as scheduled    4. Pre-op chest exam  - IN OFFICE EKG 12-LEAD (to Muse)  - X-Ray Chest PA And Lateral; Future    5. Pre-operative cardiovascular examination  - Ambulatory Referral to Cardiology  - with high risk surgery, would like to have cardiac clearance  - echo scheduled for next week    6. Anxiety  - diazePAM (VALIUM) 5 MG tablet; Take 1 tablet (5 mg total) by mouth nightly as needed for Anxiety or Insomnia.  Dispense: 30 tablet; Refill: 0    7. SVT (supraventricular tachycardia)  - Ambulatory Referral to Cardiology      According to the revised cardiac risk index, the risk of megan-procedural major cardiac complications (cardiac death, nonfatal MI, nonfatal cardiac arrest,  postoperative cardiogenic pulmonary edema, complete heart block) is: 0.9%       Follow up with cards for cardiac clearance

## 2018-07-20 ENCOUNTER — HOSPITAL ENCOUNTER (OUTPATIENT)
Dept: RADIOLOGY | Facility: HOSPITAL | Age: 68
Discharge: HOME | End: 2018-07-20
Attending: INTERNAL MEDICINE
Payer: COMMERCIAL

## 2018-07-20 ENCOUNTER — OFFICE VISIT (OUTPATIENT)
Dept: INTERNAL MEDICINE | Facility: CLINIC | Age: 68
End: 2018-07-20
Payer: COMMERCIAL

## 2018-07-20 ENCOUNTER — ANESTHESIA EVENT (OUTPATIENT)
Dept: SURGERY | Facility: HOSPITAL | Age: 68
DRG: 164 | End: 2018-07-20
Payer: COMMERCIAL

## 2018-07-20 ENCOUNTER — PATIENT MESSAGE (OUTPATIENT)
Dept: INTERNAL MEDICINE | Facility: CLINIC | Age: 68
End: 2018-07-20

## 2018-07-20 VITALS
TEMPERATURE: 99 F | RESPIRATION RATE: 18 BRPM | SYSTOLIC BLOOD PRESSURE: 152 MMHG | WEIGHT: 171.75 LBS | HEART RATE: 54 BPM | BODY MASS INDEX: 26.96 KG/M2 | DIASTOLIC BLOOD PRESSURE: 62 MMHG | OXYGEN SATURATION: 98 % | HEIGHT: 67 IN

## 2018-07-20 DIAGNOSIS — Z01.818 PREOP TESTING: Primary | ICD-10-CM

## 2018-07-20 DIAGNOSIS — Z01.811 PRE-OP CHEST EXAM: ICD-10-CM

## 2018-07-20 DIAGNOSIS — G47.00 INSOMNIA, UNSPECIFIED TYPE: ICD-10-CM

## 2018-07-20 DIAGNOSIS — Z01.810 PRE-OPERATIVE CARDIOVASCULAR EXAMINATION: ICD-10-CM

## 2018-07-20 DIAGNOSIS — C34.91 PRIMARY LUNG ADENOCARCINOMA, RIGHT: ICD-10-CM

## 2018-07-20 DIAGNOSIS — F41.9 ANXIETY: ICD-10-CM

## 2018-07-20 DIAGNOSIS — Z01.818 PREOP EXAMINATION: Primary | ICD-10-CM

## 2018-07-20 DIAGNOSIS — I47.10 SVT (SUPRAVENTRICULAR TACHYCARDIA): ICD-10-CM

## 2018-07-20 PROCEDURE — 71046 X-RAY EXAM CHEST 2 VIEWS: CPT | Mod: 26,,, | Performed by: RADIOLOGY

## 2018-07-20 PROCEDURE — 71046 X-RAY EXAM CHEST 2 VIEWS: CPT | Mod: TC,PO

## 2018-07-20 PROCEDURE — 99214 OFFICE O/P EST MOD 30 MIN: CPT | Mod: S$GLB,,, | Performed by: INTERNAL MEDICINE

## 2018-07-20 PROCEDURE — 93005 ELECTROCARDIOGRAM TRACING: CPT | Mod: S$GLB,,, | Performed by: INTERNAL MEDICINE

## 2018-07-20 PROCEDURE — 3078F DIAST BP <80 MM HG: CPT | Mod: CPTII,S$GLB,, | Performed by: INTERNAL MEDICINE

## 2018-07-20 PROCEDURE — 3077F SYST BP >= 140 MM HG: CPT | Mod: CPTII,S$GLB,, | Performed by: INTERNAL MEDICINE

## 2018-07-20 PROCEDURE — 99999 PR PBB SHADOW E&M-EST. PATIENT-LVL IV: CPT | Mod: PBBFAC,,, | Performed by: INTERNAL MEDICINE

## 2018-07-20 PROCEDURE — 93010 ELECTROCARDIOGRAM REPORT: CPT | Mod: S$GLB,,, | Performed by: INTERNAL MEDICINE

## 2018-07-20 RX ORDER — DIAZEPAM 5 MG/1
5 TABLET ORAL NIGHTLY PRN
Qty: 30 TABLET | Refills: 0 | Status: SHIPPED | OUTPATIENT
Start: 2018-07-20 | End: 2018-09-13

## 2018-07-20 NOTE — ANESTHESIA PREPROCEDURE EVALUATION
" Anesthesia Assessment: Preoperative EQUATION    Planned Procedure: Procedure(s) (LRB):  VATS, WITH LOBECTOMY Possible chest wall resection (Right)  BRONCHOSCOPY, WITH BIOPSY (N/A)  THORACOTOMY (Right)  LYMPHADENECTOMY (N/A)  Requested Anesthesia Type:Epidural  Surgeon: Philip Messina MD  Service: Thoracic  Known or anticipated Date of Surgery:8/9/2018    Surgeon notes: reviewed and Primary lung adenocarcinoma, right    Previous anesthesia records:7/10/18-Ultrasound-Endobronchial-General-no apparent anesthetic complications and tolerated procedure well    Last PCP note: within 3 months , [facility] , focused visit   Subspecialty notes: Cardiology: General, Hematology/Oncology, Pulmonary, Orthopedics/Spine Services/Urgent Care  Tests already available:  Results have been reviewed.Labs-6/29/18-POCT glucose/ 6/20/18-APTT/PT-INR/CBC/CMP/CXR 1 view-6/20/18/ EKG-1/31/18      Plan:    Testing:  T&S      Patient  has previously scheduled Medical Appointment:Appointments on 7/25/18, 7/26/18, & 7/31/18, prior to surgery date.    Navigation: Tests Scheduled. Lab-T&S on 7/31/18 @ 9:30a             Consults scheduled.POC on 7/31/18 @ 10a & PCP-Dr. KACY Galloway appt. on 7/20/18, @ 1p:PCP did not "Clear" patient for surgery &  referred pt. to see Cards-Dr. Mccoy on  7/26/18, @ 8:30a for "Clearance"-PENDING             Results will be tracked by Preop Clinic.                Pat Porter RN  7/20/18    Addendum: Patient "Cleared" for surgery by Cards.- on 7/26/18.  Pat Porter RN  7/27/18 07/20/2018  Alesha Toney is a 67 y.o., female.    Anesthesia Evaluation         Review of Systems  Anesthesia Hx:  No problems with previous Anesthesia History of prior surgery of interest to airway management or planning: Previous anesthesia: General EBUS 7/10/18 with general anesthesia.  Procedure performed at an " [facility]. Airway issues documented on chart review include laryngeal mask airway used  Denies Family Hx of Anesthesia complications.    Social:  Former Smoker Quit 6/2015  1ppd x 30 yrs  Wine 1 glass 3 times weekly   Hematology/Oncology:  Hematology Normal      Current/Recent Cancer. (RUL lung)   EENT/Dental:   Contact lenses   Cardiovascular:   Hypertension Dysrhythmias (SVT, NSVT-takes atenolol every 6 hrs (this schedule is used to prevent dizziness)  Functional Capacity good / => 4 METS, home exercises, walks the dog, 7000 steps a day; denies SOB , CP     Pulmonary:   COPD Denies Sleep Apnea. RUL Lung cancer   Renal/:   renal calculi    Hepatic/GI:   GERD (Tums or Zantac prn)    Musculoskeletal:   MRI 2/2018 Spine Disorders: cervical Disc disease    Neurological:  Neurology Normal  Pain , onset is acute , location of R shoulder, upper chest , quality of aching/dull , radiating to R arm into fingers , severity is a 8    Endocrine:   Denies Diabetes.    Psych:   anxiety          Physical Exam  General:  Well nourished    Airway/Jaw/Neck:  Airway Findings: Mouth Opening: Normal Tongue: Normal  General Airway Assessment: Adult  Jaw/Neck Findings:     Neck ROM: Normal ROM      Dental:  Dental Findings: molar caps   Chest/Lungs:  Chest/Lungs Findings: Clear to auscultation, Normal Respiratory Rate     Heart/Vascular:  Heart Findings: Rate: Bradycardia  Rhythm: Regular Rhythm  Sounds: Normal        Mental Status:  Mental Status Findings:  Cooperative, Alert and Oriented       Pt was seen in POC 7/31/18; cardiology clearance by Dr Mccoy 7/26/18:  Cleared for surgery and anesthesia.  Continue atenolol 25 mg q.6 hours.  SVT (supraventricular tachycardia)              This is a longstanding problem and does not require ablation.               Recommend continuing atenolol perioperatively exactly the same way that she is currently taking.  If blood pressure drops perioperatively losartan should be discontinued  and if possible atenolol should be continued.               Findings discussed with Dr Boogie; pt to have epidural catheter for pain control per Dr Messina's note/Tierney Marcano RN        Anesthesia Plan  Type of Anesthesia, risks & benefits discussed:  Anesthesia Type:  general  Patient's Preference: General  Intra-op Monitoring Plan: standard ASA monitors and arterial line  Intra-op Monitoring Plan Comments: Standard ASA monitors.   Post Op Pain Control Plan: per primary service following discharge from PACU, epidural analgesia and multimodal analgesia  Post Op Pain Control Plan Comments: Per primary service.     Induction:   IV  Beta Blocker:  Patient is not currently on a Beta-Blocker (No further documentation required).       Informed Consent: Patient understands risks and agrees with Anesthesia plan.  Questions answered. Anesthesia consent signed with patient.  ASA Score: 3     Day of Surgery Review of History & Physical:    H&P update referred to the surgeon.     Anesthesia Plan Notes: Chart reviewed, patient interviewed and examined.  The plan for general anesthesia was explained.  Questions were answered and the consent was signed.  Mone MACKENZIE         Ready For Surgery From Anesthesia Perspective.

## 2018-07-20 NOTE — PRE ADMISSION SCREENING
"Anesthesia Assessment: Preoperative EQUATION    Planned Procedure: Procedure(s) (LRB):  VATS, WITH LOBECTOMY Possible chest wall resection (Right)  BRONCHOSCOPY, WITH BIOPSY (N/A)  THORACOTOMY (Right)  LYMPHADENECTOMY (N/A)  Requested Anesthesia Type:Epidural  Surgeon: Philip Messina MD  Service: Thoracic  Known or anticipated Date of Surgery:8/9/2018    Surgeon notes: reviewed and Primary lung adenocarcinoma, right    Previous anesthesia records:7/10/18-Ultrasound-Endobronchial-General-no apparent anesthetic complications and tolerated procedure well    Last PCP note: within 3 months , [facility]   Subspecialty notes: Cardiology: General, Hematology/Oncology, Pulmonary, Orthopedics/Spine Services/Urgent Care  Tests already available:  Results have been reviewed.Labs-6/29/18-POCT glucose/ 6/20/18-APTT/PT-INR/CBC/CMP/CXR 1 view-6/20/18/ EKG-1/31/18      Plan:    Testing:  T&S      Patient  has previously scheduled Medical Appointment:Appointments on 7/25/18, 7/26/18, & 7/31/18, prior to surgery date.    Navigation: Tests Scheduled. Lab-T&S on 7/31/18 @ 9:30a             Consults scheduled.POC on 7/31/18 @ 10a & PCP-Dr. KACY Galloway appt. on 7/20/18, @ 1p:PCP did not "Clear" patient for surgery &  referred pt. to see Cards-Dr. Mccoy on  7/26/18, @ 8:30a for "Clearance"-PENDING             Results will be tracked by Preop Clinic.                Pat Porter RN  7/20/18  "

## 2018-07-25 ENCOUNTER — PATIENT MESSAGE (OUTPATIENT)
Dept: CARDIOTHORACIC SURGERY | Facility: HOSPITAL | Age: 68
End: 2018-07-25

## 2018-07-25 ENCOUNTER — HOSPITAL ENCOUNTER (OUTPATIENT)
Dept: CARDIOLOGY | Facility: CLINIC | Age: 68
Discharge: HOME | End: 2018-07-25
Attending: PHYSICIAN ASSISTANT
Payer: COMMERCIAL

## 2018-07-25 DIAGNOSIS — I47.10 SVT (SUPRAVENTRICULAR TACHYCARDIA): ICD-10-CM

## 2018-07-25 LAB
DIASTOLIC DYSFUNCTION: NO
MITRAL VALVE MOBILITY: NORMAL
RETIRED EF AND QEF - SEE NOTES: 60 (ref 55–65)

## 2018-07-25 PROCEDURE — 93351 STRESS TTE COMPLETE: CPT | Mod: S$GLB,,, | Performed by: INTERNAL MEDICINE

## 2018-07-25 PROCEDURE — 93321 DOPPLER ECHO F-UP/LMTD STD: CPT | Mod: S$GLB,,, | Performed by: INTERNAL MEDICINE

## 2018-07-26 ENCOUNTER — OFFICE VISIT (OUTPATIENT)
Dept: CARDIOLOGY | Facility: CLINIC | Age: 68
End: 2018-07-26
Payer: COMMERCIAL

## 2018-07-26 VITALS
SYSTOLIC BLOOD PRESSURE: 148 MMHG | DIASTOLIC BLOOD PRESSURE: 66 MMHG | BODY MASS INDEX: 27.39 KG/M2 | WEIGHT: 170.44 LBS | HEART RATE: 60 BPM | HEIGHT: 66 IN

## 2018-07-26 DIAGNOSIS — J43.9 PULMONARY EMPHYSEMA, UNSPECIFIED EMPHYSEMA TYPE: ICD-10-CM

## 2018-07-26 DIAGNOSIS — C34.11 MALIGNANT NEOPLASM OF UPPER LOBE OF RIGHT LUNG: Primary | ICD-10-CM

## 2018-07-26 DIAGNOSIS — I47.10 SVT (SUPRAVENTRICULAR TACHYCARDIA): ICD-10-CM

## 2018-07-26 DIAGNOSIS — I10 ESSENTIAL HYPERTENSION: ICD-10-CM

## 2018-07-26 PROCEDURE — 3077F SYST BP >= 140 MM HG: CPT | Mod: CPTII,S$GLB,, | Performed by: INTERNAL MEDICINE

## 2018-07-26 PROCEDURE — 3078F DIAST BP <80 MM HG: CPT | Mod: CPTII,S$GLB,, | Performed by: INTERNAL MEDICINE

## 2018-07-26 PROCEDURE — 99214 OFFICE O/P EST MOD 30 MIN: CPT | Mod: S$GLB,,, | Performed by: INTERNAL MEDICINE

## 2018-07-26 PROCEDURE — 99999 PR PBB SHADOW E&M-EST. PATIENT-LVL III: CPT | Mod: PBBFAC,,, | Performed by: INTERNAL MEDICINE

## 2018-07-26 RX ORDER — ATENOLOL 50 MG/1
25 TABLET ORAL 4 TIMES DAILY
Qty: 180 TABLET | Refills: 3 | Status: SHIPPED | OUTPATIENT
Start: 2018-07-26 | End: 2019-02-27 | Stop reason: SDUPTHER

## 2018-07-26 NOTE — LETTER
July 26, 2018      Cori Galloway MD  [address] [address]           [place] - Cardiology  [address] [address]  Phone: [phone]          Patient: Alesha Toney   MR Number: [id number]   YOB: 1950   Date of Visit: 7/26/2018       Dear Dr. Cori Galloway:    Thank you for referring Alesha Toney to me for evaluation. Attached you will find relevant portions of my assessment and plan of care.    If you have questions, please do not hesitate to call me. I look forward to following Alesha Toney along with you.    Sincerely,    Mitra Mccoy MD    Enclosure  CC:  No Recipients    If you would like to receive this communication electronically, please contact [email or web address] or [phone] to request more information on n[employer]rypted Cloud Link access.    For providers and/or their staff who would like to refer a patient to [facility], please contact us through our one-stop-shop provider referral line, [facility], at [phone].    If you feel you have received this communication in error or would no longer like to receive these types of communications, please e-mail [email or web address]

## 2018-07-26 NOTE — PROGRESS NOTES
Subjective:   Patient ID:  Alesha Toney is a 67 y.o. female who presents for follow-up of preop clearance      Problem List:  COPD - mild  SVT (atrial tachycardia)  HTN onset 2017  Adenocarcinoma lung 6/18    HPI:   Alesha Toney has a h/o SVT. She prefers to take atenolol 25 mg 4 times a day. She became lightheaded/dizzy when taking 50 mg bid.  She also takes a losartan 25 mg b.i.d. for hypertension.  Rare palpitations.  She does not report chest discomfort with exertion.    An x-ray performed for evaluation of pain in the shoulder revealed a lung mass.  Further workup revealed that it was an adenocarcinoma.  She is scheduled to have surgery with Dr. Messina at the end of the month.  A stress echocardiogram was recommended and completed yesterday.  It revealed normal LV systolic function at rest and no echo evidence of ischemia.  A short burst of SVT occurred during early recovery at an average rate of 220 bpm.    She smoked 1 ppd x 30 yrs and quit in 6/15.      Review of Systems   Constitution: Negative for malaise/fatigue, weight gain and weight loss.   Cardiovascular: Positive for chest pain, dyspnea on exertion and irregular heartbeat. Negative for leg swelling and palpitations.   Respiratory: Negative for cough and hemoptysis.    Hematologic/Lymphatic: Does not bruise/bleed easily.   Musculoskeletal: Negative for arthritis and muscle cramps.   Gastrointestinal: Negative for abdominal pain, heartburn and melena.   Genitourinary: Negative for hematuria and nocturia.   Neurological: Negative for headaches, light-headedness and seizures.   Psychiatric/Behavioral: Negative for depression.       Current Outpatient Prescriptions   Medication Sig    acetaminophen with codeine (TYLENOL-CODEINE #4 ORAL) Take 1 tablet by mouth every 6 (six) hours as needed.    atenolol (TENORMIN) 50 MG tablet Take 1/2 tablet (25 mg total) by mouth 4 (four) times daily.    cloNIDine (CATAPRES) 0.1 MG tablet TAKE 1 TABLET BY  "MOUTH EVERY 8 HOURS AS NEEDED    diazePAM (VALIUM) 5 MG tablet Take 1 tablet (5 mg total) by mouth nightly as needed for Anxiety or Insomnia.    HYDROcodone-acetaminophen (NORCO) 5-325 mg per tablet Take 1 tablet by mouth every 6 (six) hours as needed for Pain.    losartan (COZAAR) 50 MG tablet Take 25 mg by mouth 2 (two) times daily.    multivitamin with minerals tablet Take 1 tablet by mouth once daily.         Social History   Substance Use Topics    Smoking status: Former Smoker     Packs/day: 1.00     Years: 30.00     Types: Cigarettes     Quit date: 2015    Smokeless tobacco: Never Used    Alcohol use 4.2 oz/week     7 Glasses of wine per week      Comment: socially          Objective:     Physical Exam   Constitutional: She is oriented to person, place, and time. She appears well-developed and well-nourished.   BP (!) 148/66   Pulse 60   Ht 5' 6" (1.676 m)   Wt 77.3 kg (170 lb 6.7 oz)   BMI 27.51 kg/m²      Neck: No JVD present.   Cardiovascular: Normal rate and regular rhythm.    No murmur heard.  Pulses:       Radial pulses are 2+ on the right side, and 2+ on the left side.   Pulmonary/Chest: She has no decreased breath sounds. She has no wheezes. She has no rales. Chest wall is not dull to percussion.   Abdominal: Soft. Normal appearance. There is no splenomegaly or hepatomegaly. There is no tenderness.   Musculoskeletal:        Right lower leg: She exhibits no edema.        Left lower leg: She exhibits no edema.   Neurological: She is alert and oriented to person, place, and time.   Skin: Skin is warm. No bruising noted. Nails show no clubbing.   Psychiatric: Her speech is normal and behavior is normal. Cognition and memory are normal.           Lab Results   Component Value Date    CHOL 197 11/03/2017    HDL 56 11/03/2017    LDLCALC 120 (H) 11/03/2017    TRIG 104 11/03/2017    CHOLHDL 3.5 11/03/2017     Lab Results   Component Value Date     07/20/2018    CREATININE 1.0 07/20/2018    BUN " 18 07/20/2018     07/20/2018    K 4.7 07/20/2018     07/20/2018    CO2 29 07/20/2018     Lab Results   Component Value Date    ALT 24 06/20/2018    AST 26 06/20/2018    ALKPHOS 55 06/20/2018    BILITOT 0.5 06/20/2018           Assessment and Plan:     Malignant neoplasm of upper lobe of right lung   Cleared for surgery and anesthesia.  Continue atenolol 25 mg q.6 hours.    SVT (supraventricular tachycardia)   This is a longstanding problem and does not require ablation.    Recommend continuing atenolol perioperatively exactly the same way that she is currently taking.  If blood pressure drops perioperatively losartan should be discontinued and if possible atenolol should be continued.    If she requires chemotherapy, agents that may cause tachyarrhythmias are not contraindicated.    Essential hypertension   Continue losartan 25 mg b.i.d.    Pulmonary emphysema, unspecified emphysema type        Follow-up if symptoms worsen or fail to improve.

## 2018-07-26 NOTE — Clinical Note
Cleared for surgery and anesthesia. MAXWELL Mandel, FACC, CH[facility]  [facility] Tel: [phone]

## 2018-07-31 ENCOUNTER — HOSPITAL ENCOUNTER (OUTPATIENT)
Dept: PREADMISSION TESTING | Facility: HOSPITAL | Age: 68
Discharge: HOME | End: 2018-07-31
Attending: ANESTHESIOLOGY
Payer: COMMERCIAL

## 2018-07-31 VITALS
RESPIRATION RATE: 18 BRPM | DIASTOLIC BLOOD PRESSURE: 74 MMHG | HEART RATE: 52 BPM | HEIGHT: 66 IN | SYSTOLIC BLOOD PRESSURE: 170 MMHG | OXYGEN SATURATION: 97 % | BODY MASS INDEX: 27.43 KG/M2 | TEMPERATURE: 98 F | WEIGHT: 170.69 LBS

## 2018-07-31 NOTE — DISCHARGE INSTRUCTIONS
Your surgery has been scheduled for:__________________________________________    You should report to:  ____[facility], located on the [place] side of the first floor of the           [facility] ([phone])  ____[facility], located on the [facility] side of the            Second floor of the [facility] ([phone])  ____3rd Floor [facility] located on the [facility] side of the [facility] [phone]  Please Note   - Tell your doctor if you take Aspirin, products containing Aspirin, herbal medications  or blood thinners, such as Coumadin, Ticlid, or Plavix.  (Consult your provider regarding holding or stopping before surgery).  - Arrange for someone to drive you home following surgery.  You will not be allowed to leave the surgical facility alone or drive yourself home following sedation and anesthesia.  Before Surgery  - Stop taking all herbal medications 14days prior to surgery  - No Motrin/Advil (Ibuprofen) 7 days before surgery  - No Aleve (Naproxen) 7 days before surgery  - Stop Taking Asprin, products containing Asprin _____days before surgery  - Stop taking blood thinners_______days before surgery  - No Goody's/BC  Powder 7 days before surgery  - Refrain from drinking alcoholic beverages for 24hours before and after surgery  - Stop or limit smoking _________days before surgery  - You may take Tylenol for pain  Night before Surgery  NOTHING TO EAT OR DRINK AFTER MIDNIGHT (or follow surgeon's instructions)  - Take a shower or bath (shower is recommended).  Bathe with Hibiclens soap or an antibacterial soap from the neck down.  If not supplied by your surgeon, hibiclens soap will need to be purchased over the counter in pharmacy.  Rinse soap off thoroughly.  - Shampoo your hair with your regular shampoo  The Day of Surgery  ·  If you are told to take medication on the morning of surgery, it may be taken with  a sip of water.   - Take another bath or shower with hibiclens or any antibacterial soap, to reduce the chance of infection.  - Take heart and blood pressure medications with a small sip of water, as advised by the perioperative team.  - Do not take fluid pills  - You may brush your teeth and rinse your mouth, but do not swall any additional water.   - Do not apply perfumes, powder, body lotions or deodorant on the day of surgery.  - Nail polish should be removed.  - Do not wear makeup or moisturizer  - Wear comfortable clothes, such as a button front shirt and loose fitting pants.  - Leave all jewelry, including body piercings, and valuables at home.    - Bring any devices you will neeed after surgery such as crutches or canes.  - If you have sleep apnea, please bring your CPAP machine  In the event that your physical condition changes including the onset of a cold or respiratory illness, or if you have to delay or cancel your surgery, please notify your surgeon.    Anesthesia: General Anesthesia  Youre due to have surgery. During surgery, youll be given medication called anesthesia. (It is also called anesthetic.) This will keep you comfortable and pain-free. Your anesthesia provider will use general anesthesia. This sheet tells you more about it.  What is general anesthesia?     You are watched continuously during your procedure by the anesthesia provider   General anesthesia puts you into a state like deep sleep. It goes into the bloodstream (IV anesthetics), into the lungs (gas anesthetics), or both. You feel nothing during the procedure. You will not remember it. During the procedure, the anesthesia provider monitors you continuously. He or she checks your heart rate and rhythm, blood pressure, breathing, and blood oxygen.  · IV Anesthetics. IV anesthetics are given through an IV line in your arm. Theyre often given first. This is so you are asleep before a gas anesthetic is started. Some kinds of IV  anesthetics relieve pain. Others relax you. Your doctor will decide which kind is best in your case.  · Gas Anesthetics. Gas anesthetics are breathed into the lungs. They are often used to keep you asleep. They can be given through a facemask or a tube placed in your larynx or trachea (breathing tube).  ? If you have a facemask, your anesthesia provider will most likely place it over your nose and mouth while youre still awake. Youll breathe oxygen through the mask as your IV anesthetic is started. Gas anesthetic may be added through the mask.  ? If you have a tube in the larynx or trachea, it will be inserted into your throat after youre asleep.  Anesthesia tools and medications  You will likely have:  · IV anesthetics. These are put into an IV line into your bloodstream.  · Gas anesthetics. You breathe these anesthetics into your lungs, where they pass into your bloodstream.  · Pulse oximeter. This is a small clip that is attached to the end of your finger. This measures your blood oxygen level.  · Electrocardiography leads (electrodes). These are small sticky pads that are placed on your chest. They record your heart rate and rhythm.  · Blood pressure cuff. This reads your blood pressure.  Risks and possible complications  General anesthesia has some risks. These include:  · Breathing problems  · Nausea and vomiting  · Sore throat or hoarseness (usually temporary)  · Allergic reaction to the anesthetic  · Irregular heartbeat (rare)  · Cardiac arrest (rare)   Anesthesia safety  · Follow all instructions you are given for how long not to eat or drink before your procedure.  · Be sure your doctor knows what medications and drugs you take. This includes over-the-counter medications, herbs, supplements, alcohol or other drugs. You will be asked when those were last taken.  · Have an adult family member or friend drive you home after the procedure.  · For the first 24 hours after your surgery:  ? Do not drive or use  heavy equipment.  ? Have a trusted family member or spouse make important decisions or sign documents.  ? Avoid alcohol.  ? Have a responsible adult stay with you. He or she can watch for problems and help keep you safe.  Date Last Reviewed: 10/16/2014  © 1676-0723 [employer]. [address] [place] [address]. All rights reserved. This information is not intended as a substitute for professional medical care. Always follow your healthcare professional's instructions.

## 2018-08-04 ENCOUNTER — HOSPITAL ENCOUNTER (EMERGENCY)
Facility: HOSPITAL | Age: 68
Discharge: HOME | End: 2018-08-04
Attending: EMERGENCY MEDICINE
Payer: COMMERCIAL

## 2018-08-04 VITALS
DIASTOLIC BLOOD PRESSURE: 88 MMHG | OXYGEN SATURATION: 98 % | WEIGHT: 170 LBS | SYSTOLIC BLOOD PRESSURE: 179 MMHG | HEART RATE: 57 BPM | HEIGHT: 66 IN | TEMPERATURE: 99 F | BODY MASS INDEX: 27.32 KG/M2 | RESPIRATION RATE: 16 BRPM

## 2018-08-04 DIAGNOSIS — S20.211A CONTUSION OF RIB ON RIGHT SIDE, INITIAL ENCOUNTER: Primary | ICD-10-CM

## 2018-08-04 DIAGNOSIS — T14.90XA TRAUMA: ICD-10-CM

## 2018-08-04 PROCEDURE — 99283 EMERGENCY DEPT VISIT LOW MDM: CPT | Mod: 25

## 2018-08-04 PROCEDURE — 25000003 PHARM REV CODE 250: Performed by: EMERGENCY MEDICINE

## 2018-08-04 PROCEDURE — 94799 UNLISTED PULMONARY SVC/PX: CPT

## 2018-08-04 RX ORDER — HYDROCODONE BITARTRATE AND ACETAMINOPHEN 5; 325 MG/1; MG/1
1 TABLET ORAL
Status: COMPLETED | OUTPATIENT
Start: 2018-08-04 | End: 2018-08-04

## 2018-08-04 RX ORDER — KETOROLAC TROMETHAMINE 30 MG/ML
30 INJECTION, SOLUTION INTRAMUSCULAR; INTRAVENOUS
Status: DISCONTINUED | OUTPATIENT
Start: 2018-08-04 | End: 2018-08-04 | Stop reason: HOSPADM

## 2018-08-04 RX ADMIN — HYDROCODONE BITARTRATE AND ACETAMINOPHEN 1 TABLET: 5; 325 TABLET ORAL at 07:08

## 2018-08-04 NOTE — ED TRIAGE NOTES
Pt reports was in back yard with dog on leash and dog ran causing her to fall onto outstretched L arm and on R side.  Pain to R cheek from hitting face on , pain to R ribs from fall, mild pain to l wrist with no deformity noted.  Abrasion to distal 3rd digit at nailbed and palmar base of L 5th digit. Denies loss of consciousness.

## 2018-08-05 ENCOUNTER — PATIENT MESSAGE (OUTPATIENT)
Dept: SURGERY | Facility: HOSPITAL | Age: 68
End: 2018-08-05

## 2018-08-05 NOTE — ED NOTES
Dr. Ovalle notified that patient is reporting that she feels as if there is a splinter in her R hand near the puncture/laceration wound.  MD states he will assess.  Suture removal kit placed at bedside.

## 2018-08-05 NOTE — ED NOTES
Dr Ovalle notified that patient is requesting to take Norco for pain because she was instructed not to take NSAIDS due to upcoming VATS procedure.  She has a Rx for Norco at home. Her pain is 6/10 at this time. Awaiting further orders

## 2018-08-05 NOTE — ED PROVIDER NOTES
NAME:  Alesha Toney  CSN:     [id number]  MRN:    [id number]  ADMIT DATE: 8/4/2018        eMERGENCY dEPARTMENT eNCOUnter    CHIEF COMPLAINT    Chief Complaint   Patient presents with    Fall     pt reports she was outside with dog on the leash who was trying to jump the fence. pt fell foward and fell on right gordon eof body and face. pt c/o right rib pain. denies LOC  pt reports she is scheduled for right lobectomy on Thursday        HPI      Alesha Toney is a 67 y.o. female who presents to the ED for evaluation of chest pain after fall.  Patient states that the dog pulled her over and she landed on the right side of her chest on a brick paper.  The patient is now complaining of severe pain to the right lateral chest wall.  She complains of pain with respirations.  She states that she struck her face when she fell but there is no LOC.  She complains of some pain to the right cheek.  Patient is not on blood thinners.        ALLERGIES    Review of patient's allergies indicates:   Allergen Reactions    Adhesive Itching, Rash and Blisters     INCLUDES EKG PADS    Iodinated contrast- oral and iv dye     Pcn [penicillins]     Phenergan plain     Tramadol     Vancomycin analogues        PAST MEDICAL HISTORY  Past Medical History:   Diagnosis Date    Breast cyst     Cardiac arrhythmia     Fibrocystic breast     History of hysterectomy     20yrs ago    History of kidney stones     Lung cancer        SURGICAL HISTORY    Past Surgical History:   Procedure Laterality Date    ADENOIDECTOMY  17yo    APPENDECTOMY      BONE RESECTION, RIB  1980    BREAST BIOPSY Left     at least 40yrs ago in her 20's    BREAST CYST ASPIRATION      BREAST CYST EXCISION      ENDOBRONCHIAL ULTRASOUND N/A 7/10/2018    Procedure: ULTRASOUND, ENDOBRONCHIAL;  Surgeon: Sri Hearn MD;  Location: [facility] [address] [place] [address];  Service: Pulmonary;  Laterality: N/A;    HYSTERECTOMY      @47yrs of age    KIDNEY SURGERY      17yo    OOPHORECTOMY    "   @47yrs of age    TONSILLECTOMY         SOCIAL HISTORY    Social History     Social History    Marital status: Single     Spouse name: N/A    Number of children: N/A    Years of education: N/A     Social History Main Topics    Smoking status: Former Smoker     Packs/day: 1.00     Years: 30.00     Types: Cigarettes     Quit date: 2015    Smokeless tobacco: Never Used    Alcohol use 4.2 oz/week     7 Glasses of wine per week      Comment: socially     Drug use: No    Sexual activity: Yes     Partners: Female     Other Topics Concern    None     Social History Narrative    None       FAMILY HISTORY    Family History   Problem Relation Age of Onset    Stroke Mother     Cancer Father         colon cancer    Diabetes Brother     Heart failure Brother     Hypertension Brother     Heart attack Paternal Aunt     Heart attack Maternal Grandfather     Diabetes Paternal Aunt     Anesthesia problems Neg Hx        REVIEW OF SYSTEMS   ROS  All Systems otherwise negative except as noted in the History of Present Illness.        PHYSICAL EXAM    Reviewed Triage Note  VITAL SIGNS:   ED Triage Vitals [08/04/18 1748]   Enc Vitals Group      BP (!) 183/78      Pulse 61      Resp 19      Temp 98.8 °F (37.1 °C)      Temp src Oral      SpO2 96 %      Weight 170 lb      Height 5' 6"      Head Circumference       Peak Flow       Pain Score       Pain Loc       Pain Edu?       Excl. in GC?        Patient Vitals for the past 24 hrs:   BP Temp Temp src Pulse Resp SpO2 Height Weight   08/04/18 1748 (!) 183/78 98.8 °F (37.1 °C) Oral 61 19 96 % 5' 6" (1.676 m) 77.1 kg (170 lb)           Physical Exam    Constitutional:  Well-developed, well-nourished. No acute distress  HENT:  Normocephalic, atraumatic.  Eyes:  EOMI. Conjunctiva normal without discharge.   Neck: Normal range of motion.No stridor. No meningismus.   Respiratory:  No respiratory distress, retractions, or conversational dyspnea.   Chest:  Tenderness to palpation " of right lateral chest wall  Cardiovascular:  Normal heart rate. No pitting lower extremity edema.   Musculoskeletal:  No gross deformity or limited range of motion of all major joints.   Integument:  Warm and dry. No rash.  Neurologic:  Normal motor function. No focal deficits noted. Alert and Interactive.  Psychiatric:  Affect normal. Mood normal.         LABS  Pertinent labs reviewed. (See chart for details)   Labs Reviewed - No data to display      RADIOLOGY    Imaging Results          X-Ray Ribs 2 View Right (Final result)  Result time 08/04/18 19:20:28    Final result by Silas Espinoza MD (08/04/18 19:20:28)                 Impression:      No displaced fracture or bone destruction involving the right rib cage.    Previously demonstrated right upper lobe mass and right upper lobe atelectasis appear similar to prior.      Electronically signed by: Silas Espinoza MD  Date:    08/04/2018  Time:    19:20             Narrative:    EXAMINATION:  XR RIBS 2 VIEW RIGHT    CLINICAL HISTORY:  Injury, unspecified, initial encounter    TECHNIQUE:  Two views of the right ribs were performed.    COMPARISON:  Chest x-ray July 20, 2018.    FINDINGS:  No displaced fracture or bone destruction involving the right rib cage.    Previously demonstrated right upper lobe mass and right upper lobe atelectasis appear similar to prior.                                PROCEDURES    Procedures      EKG     Interpreted by ERP:         ED COURSE & MEDICAL DECISION MAKING    Pertinent & Imaging studies reviewed. (See chart for details and specific orders.)        Medications   ketorolac injection 30 mg (30 mg Intramuscular Not Given 8/4/18 1900)   HYDROcodone-acetaminophen 5-325 mg per tablet 1 tablet (1 tablet Oral Given 8/4/18 1919)          No obvious fracture on rib series.  Patient likely suffering from a rib contusion versus nondisplaced rib fracture.  She will be discharged with an incentive spirometer.  Patient is already on  Norco at home.       DISPOSITION  Patient discharged in stable condition at No discharge date for patient encounter.      DISCHARGE INSTRUCTIONS & MEDS     Alesha Toney   Home Medication Instructions SUMEET:[id number]    Printed on:08/04/18 2016   Medication Information                      acetaminophen with codeine (TYLENOL-CODEINE #4 ORAL)  Take 1 tablet by mouth every 6 (six) hours as needed.             atenolol (TENORMIN) 50 MG tablet  Take 0.5 tablets (25 mg total) by mouth 4 (four) times daily.             cloNIDine (CATAPRES) 0.1 MG tablet  TAKE 1 TABLET BY MOUTH EVERY 8 HOURS AS NEEDED             diazePAM (VALIUM) 5 MG tablet  Take 1 tablet (5 mg total) by mouth nightly as needed for Anxiety or Insomnia.             HYDROcodone-acetaminophen (NORCO) 5-325 mg per tablet  Take 1 tablet by mouth every 6 (six) hours as needed for Pain.             losartan (COZAAR) 50 MG tablet  Take 25 mg by mouth every evening.              multivitamin with minerals tablet  Take 1 tablet by mouth once daily.             ranitidine (ZANTAC) 75 MG tablet  Take 75 mg by mouth nightly.                   New Prescriptions    No medications on file           FINAL IMPRESSION    1. Contusion of rib on right side, initial encounter    2. Trauma              Blood Pressure Follow-Up Advised  Patient advised to follow up with PCP within 3-5 days for blood pressure re-check if blood pressure is equal to or greater than 120/80.         Critical care time spent with this patient (not including separately billable items) was  0 minutes.     DISCLAIMER: This note was prepared with Dragon NaturallySpeaking voice recognition transcription software. Garbled syntax, mangled pronouns, and other bizarre constructions may be attributed to that software system.      Rick Ovalle MD  08/04/2018  8:16 PM          Rick Ovalle MD  08/04/18 2019

## 2018-08-08 ENCOUNTER — TELEPHONE (OUTPATIENT)
Dept: CARDIOTHORACIC SURGERY | Facility: CLINIC | Age: 68
End: 2018-08-08

## 2018-08-09 ENCOUNTER — HOSPITAL ENCOUNTER (INPATIENT)
Facility: HOSPITAL | Age: 68
LOS: 4 days | Discharge: HOME | DRG: 164 | End: 2018-08-13
Attending: THORACIC SURGERY (CARDIOTHORACIC VASCULAR SURGERY) | Admitting: THORACIC SURGERY (CARDIOTHORACIC VASCULAR SURGERY)
Payer: COMMERCIAL

## 2018-08-09 ENCOUNTER — ANESTHESIA (OUTPATIENT)
Dept: SURGERY | Facility: HOSPITAL | Age: 68
DRG: 164 | End: 2018-08-09
Payer: COMMERCIAL

## 2018-08-09 DIAGNOSIS — C34.90 NON-SMALL CELL LUNG CANCER (NSCLC): ICD-10-CM

## 2018-08-09 DIAGNOSIS — C34.11 MALIGNANT NEOPLASM OF UPPER LOBE OF RIGHT LUNG: Primary | ICD-10-CM

## 2018-08-09 PROBLEM — M54.2 NECK PAIN: Status: RESOLVED | Noted: 2018-03-14 | Resolved: 2018-08-09

## 2018-08-09 PROBLEM — I47.29 NSVT (NONSUSTAINED VENTRICULAR TACHYCARDIA): Status: RESOLVED | Noted: 2018-01-31 | Resolved: 2018-08-09

## 2018-08-09 PROBLEM — L08.9: Status: RESOLVED | Noted: 2017-10-20 | Resolved: 2018-08-09

## 2018-08-09 PROBLEM — N75.0 CYST OF LEFT BARTHOLIN'S GLAND: Status: RESOLVED | Noted: 2017-08-07 | Resolved: 2018-08-09

## 2018-08-09 PROBLEM — R29.3 POOR POSTURE: Status: RESOLVED | Noted: 2018-03-14 | Resolved: 2018-08-09

## 2018-08-09 PROBLEM — S50.362A: Status: RESOLVED | Noted: 2017-10-20 | Resolved: 2018-08-09
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
## 2018-08-09 LAB
GLUCOSE SERPL-MCNC: 144 MG/DL (ref 70–110)
HCO3 UR-SCNC: 25.3 MMOL/L (ref 24–28)
HCT VFR BLD CALC: 33 %PCV (ref 36–54)
PCO2 BLDA: 37.7 MMHG (ref 35–45)
PH SMN: 7.43 [PH] (ref 7.35–7.45)
PO2 BLDA: 137 MMHG (ref 80–100)
POC BE: 1 MMOL/L
POC IONIZED CALCIUM: 1.16 MMOL/L (ref 1.06–1.42)
POC SATURATED O2: 99 % (ref 95–100)
POC TCO2: 26 MMOL/L (ref 23–27)
POTASSIUM BLD-SCNC: 3.9 MMOL/L (ref 3.5–5.1)
SAMPLE: ABNORMAL
SODIUM BLD-SCNC: 139 MMOL/L (ref 136–145)

## 2018-08-09 PROCEDURE — 25000003 PHARM REV CODE 250: Performed by: SURGERY

## 2018-08-09 PROCEDURE — 25000003 PHARM REV CODE 250: Performed by: STUDENT IN AN ORGANIZED HEALTH CARE EDUCATION/TRAINING PROGRAM

## 2018-08-09 PROCEDURE — 25000003 PHARM REV CODE 250: Performed by: NURSE ANESTHETIST, CERTIFIED REGISTERED

## 2018-08-09 PROCEDURE — 27201423 OPTIME MED/SURG SUP & DEVICES STERILE SUPPLY: Performed by: THORACIC SURGERY (CARDIOTHORACIC VASCULAR SURGERY)

## 2018-08-09 PROCEDURE — 88331 PATH CONSLTJ SURG 1 BLK 1SPC: CPT | Performed by: PATHOLOGY

## 2018-08-09 PROCEDURE — 0BTC4ZZ RESECTION OF RIGHT UPPER LUNG LOBE, PERCUTANEOUS ENDOSCOPIC APPROACH: ICD-10-PCS | Performed by: THORACIC SURGERY (CARDIOTHORACIC VASCULAR SURGERY)

## 2018-08-09 PROCEDURE — 27201037 HC PRESSURE MONITORING SET UP

## 2018-08-09 PROCEDURE — D9220A PRA ANESTHESIA: Mod: CRNA,,, | Performed by: NURSE ANESTHETIST, CERTIFIED REGISTERED

## 2018-08-09 PROCEDURE — 63600175 PHARM REV CODE 636 W HCPCS: Performed by: ANESTHESIOLOGY

## 2018-08-09 PROCEDURE — 32663 THORACOSCOPY W/LOBECTOMY: CPT | Mod: RT,,, | Performed by: THORACIC SURGERY (CARDIOTHORACIC VASCULAR SURGERY)

## 2018-08-09 PROCEDURE — 36620 INSERTION CATHETER ARTERY: CPT | Mod: 59,,, | Performed by: ANESTHESIOLOGY

## 2018-08-09 PROCEDURE — 88309 TISSUE EXAM BY PATHOLOGIST: CPT | Mod: 26,,, | Performed by: PATHOLOGY

## 2018-08-09 PROCEDURE — 88331 PATH CONSLTJ SURG 1 BLK 1SPC: CPT | Mod: 26,,, | Performed by: PATHOLOGY

## 2018-08-09 PROCEDURE — 71000039 HC RECOVERY, EACH ADD'L HOUR: Performed by: THORACIC SURGERY (CARDIOTHORACIC VASCULAR SURGERY)

## 2018-08-09 PROCEDURE — 94761 N-INVAS EAR/PLS OXIMETRY MLT: CPT

## 2018-08-09 PROCEDURE — 27000221 HC OXYGEN, UP TO 24 HOURS

## 2018-08-09 PROCEDURE — 88305 TISSUE EXAM BY PATHOLOGIST: CPT | Mod: 26,,, | Performed by: PATHOLOGY

## 2018-08-09 PROCEDURE — 07B74ZX EXCISION OF THORAX LYMPHATIC, PERCUTANEOUS ENDOSCOPIC APPROACH, DIAGNOSTIC: ICD-10-PCS | Performed by: THORACIC SURGERY (CARDIOTHORACIC VASCULAR SURGERY)

## 2018-08-09 PROCEDURE — 88305 TISSUE EXAM BY PATHOLOGIST: CPT | Performed by: PATHOLOGY

## 2018-08-09 PROCEDURE — 32674 THORACOSCOPY LYMPH NODE EXC: CPT | Mod: ,,, | Performed by: THORACIC SURGERY (CARDIOTHORACIC VASCULAR SURGERY)

## 2018-08-09 PROCEDURE — 36000710: Performed by: THORACIC SURGERY (CARDIOTHORACIC VASCULAR SURGERY)

## 2018-08-09 PROCEDURE — 25000003 PHARM REV CODE 250: Performed by: PHYSICIAN ASSISTANT

## 2018-08-09 PROCEDURE — S0077 INJECTION, CLINDAMYCIN PHOSP: HCPCS | Performed by: PHYSICIAN ASSISTANT

## 2018-08-09 PROCEDURE — 71000033 HC RECOVERY, INTIAL HOUR: Performed by: THORACIC SURGERY (CARDIOTHORACIC VASCULAR SURGERY)

## 2018-08-09 PROCEDURE — D9220A PRA ANESTHESIA: Mod: ANES,,, | Performed by: ANESTHESIOLOGY

## 2018-08-09 PROCEDURE — 63600175 PHARM REV CODE 636 W HCPCS: Performed by: NURSE ANESTHETIST, CERTIFIED REGISTERED

## 2018-08-09 PROCEDURE — C1729 CATH, DRAINAGE: HCPCS | Performed by: THORACIC SURGERY (CARDIOTHORACIC VASCULAR SURGERY)

## 2018-08-09 PROCEDURE — 37000008 HC ANESTHESIA 1ST 15 MINUTES: Performed by: THORACIC SURGERY (CARDIOTHORACIC VASCULAR SURGERY)

## 2018-08-09 PROCEDURE — 36000711: Performed by: THORACIC SURGERY (CARDIOTHORACIC VASCULAR SURGERY)

## 2018-08-09 PROCEDURE — 63600175 PHARM REV CODE 636 W HCPCS: Performed by: STUDENT IN AN ORGANIZED HEALTH CARE EDUCATION/TRAINING PROGRAM

## 2018-08-09 PROCEDURE — 20600001 HC STEP DOWN PRIVATE ROOM

## 2018-08-09 PROCEDURE — 37000009 HC ANESTHESIA EA ADD 15 MINS: Performed by: THORACIC SURGERY (CARDIOTHORACIC VASCULAR SURGERY)

## 2018-08-09 PROCEDURE — C1751 CATH, INF, PER/CENT/MIDLINE: HCPCS | Performed by: NURSE ANESTHETIST, CERTIFIED REGISTERED

## 2018-08-09 PROCEDURE — 27100025 HC TUBING, SET FLUID WARMER: Performed by: NURSE ANESTHETIST, CERTIFIED REGISTERED

## 2018-08-09 RX ORDER — CLINDAMYCIN PHOSPHATE 900 MG/50ML
900 INJECTION, SOLUTION INTRAVENOUS
Status: COMPLETED | OUTPATIENT
Start: 2018-08-09 | End: 2018-08-09

## 2018-08-09 RX ORDER — OXYCODONE HYDROCHLORIDE 5 MG/1
15 TABLET ORAL
Status: DISCONTINUED | OUTPATIENT
Start: 2018-08-09 | End: 2018-08-13 | Stop reason: HOSPADM

## 2018-08-09 RX ORDER — ONDANSETRON 2 MG/ML
4 INJECTION INTRAMUSCULAR; INTRAVENOUS EVERY 8 HOURS PRN
Status: DISCONTINUED | OUTPATIENT
Start: 2018-08-09 | End: 2018-08-10

## 2018-08-09 RX ORDER — NALBUPHINE HYDROCHLORIDE 10 MG/ML
2.5 INJECTION, SOLUTION INTRAMUSCULAR; INTRAVENOUS; SUBCUTANEOUS
Status: DISCONTINUED | OUTPATIENT
Start: 2018-08-09 | End: 2018-08-10

## 2018-08-09 RX ORDER — DEXMEDETOMIDINE HYDROCHLORIDE 100 UG/ML
INJECTION, SOLUTION INTRAVENOUS
Status: DISCONTINUED | OUTPATIENT
Start: 2018-08-09 | End: 2018-08-09

## 2018-08-09 RX ORDER — HYDROMORPHONE HYDROCHLORIDE 1 MG/ML
0.25 INJECTION, SOLUTION INTRAMUSCULAR; INTRAVENOUS; SUBCUTANEOUS
Status: DISCONTINUED | OUTPATIENT
Start: 2018-08-09 | End: 2018-08-10

## 2018-08-09 RX ORDER — OXYCODONE HYDROCHLORIDE 5 MG/1
5 TABLET ORAL
Status: DISCONTINUED | OUTPATIENT
Start: 2018-08-09 | End: 2018-08-13 | Stop reason: HOSPADM

## 2018-08-09 RX ORDER — SODIUM CHLORIDE 9 MG/ML
INJECTION, SOLUTION INTRAVENOUS CONTINUOUS
Status: DISCONTINUED | OUTPATIENT
Start: 2018-08-09 | End: 2018-08-10

## 2018-08-09 RX ORDER — FENTANYL CITRATE 50 UG/ML
INJECTION, SOLUTION INTRAMUSCULAR; INTRAVENOUS
Status: DISCONTINUED | OUTPATIENT
Start: 2018-08-09 | End: 2018-08-09

## 2018-08-09 RX ORDER — ONDANSETRON 2 MG/ML
INJECTION INTRAMUSCULAR; INTRAVENOUS
Status: DISCONTINUED | OUTPATIENT
Start: 2018-08-09 | End: 2018-08-09

## 2018-08-09 RX ORDER — FENTANYL CITRATE 50 UG/ML
25 INJECTION, SOLUTION INTRAMUSCULAR; INTRAVENOUS EVERY 5 MIN PRN
Status: COMPLETED | OUTPATIENT
Start: 2018-08-09 | End: 2018-08-09

## 2018-08-09 RX ORDER — HEPARIN SODIUM 5000 [USP'U]/ML
5000 INJECTION, SOLUTION INTRAVENOUS; SUBCUTANEOUS EVERY 12 HOURS
Status: DISCONTINUED | OUTPATIENT
Start: 2018-08-10 | End: 2018-08-09

## 2018-08-09 RX ORDER — HYDROMORPHONE HYDROCHLORIDE 1 MG/ML
1 INJECTION, SOLUTION INTRAMUSCULAR; INTRAVENOUS; SUBCUTANEOUS
Status: DISCONTINUED | OUTPATIENT
Start: 2018-08-09 | End: 2018-08-13 | Stop reason: HOSPADM

## 2018-08-09 RX ORDER — ACETAMINOPHEN 10 MG/ML
1000 INJECTION, SOLUTION INTRAVENOUS ONCE
Status: COMPLETED | OUTPATIENT
Start: 2018-08-09 | End: 2018-08-09

## 2018-08-09 RX ORDER — CALCIUM CARBONATE 200(500)MG
1000 TABLET,CHEWABLE ORAL 3 TIMES DAILY PRN
Status: DISCONTINUED | OUTPATIENT
Start: 2018-08-09 | End: 2018-08-13 | Stop reason: HOSPADM

## 2018-08-09 RX ORDER — NALOXONE HCL 0.4 MG/ML
0.02 VIAL (ML) INJECTION
Status: DISCONTINUED | OUTPATIENT
Start: 2018-08-09 | End: 2018-08-13 | Stop reason: HOSPADM

## 2018-08-09 RX ORDER — FENTANYL CITRATE 50 UG/ML
25 INJECTION, SOLUTION INTRAMUSCULAR; INTRAVENOUS EVERY 5 MIN PRN
Status: DISCONTINUED | OUTPATIENT
Start: 2018-08-09 | End: 2018-08-09

## 2018-08-09 RX ORDER — POLYETHYLENE GLYCOL 3350 17 G/17G
17 POWDER, FOR SOLUTION ORAL DAILY
Status: DISCONTINUED | OUTPATIENT
Start: 2018-08-10 | End: 2018-08-13 | Stop reason: HOSPADM

## 2018-08-09 RX ORDER — HYDROMORPHONE HYDROCHLORIDE 1 MG/ML
0.5 INJECTION, SOLUTION INTRAMUSCULAR; INTRAVENOUS; SUBCUTANEOUS
Status: DISCONTINUED | OUTPATIENT
Start: 2018-08-09 | End: 2018-08-10

## 2018-08-09 RX ORDER — SODIUM CHLORIDE 9 MG/ML
INJECTION, SOLUTION INTRAVENOUS CONTINUOUS PRN
Status: DISCONTINUED | OUTPATIENT
Start: 2018-08-09 | End: 2018-08-09

## 2018-08-09 RX ORDER — IPRATROPIUM BROMIDE AND ALBUTEROL SULFATE 2.5; .5 MG/3ML; MG/3ML
3 SOLUTION RESPIRATORY (INHALATION) EVERY 4 HOURS PRN
Status: DISCONTINUED | OUTPATIENT
Start: 2018-08-09 | End: 2018-08-09 | Stop reason: HOSPADM

## 2018-08-09 RX ORDER — DIPHENHYDRAMINE HYDROCHLORIDE 50 MG/ML
25 INJECTION INTRAMUSCULAR; INTRAVENOUS EVERY 6 HOURS PRN
Status: DISCONTINUED | OUTPATIENT
Start: 2018-08-09 | End: 2018-08-09 | Stop reason: HOSPADM

## 2018-08-09 RX ORDER — LIDOCAINE HYDROCHLORIDE 10 MG/ML
1 INJECTION, SOLUTION EPIDURAL; INFILTRATION; INTRACAUDAL; PERINEURAL ONCE
Status: DISCONTINUED | OUTPATIENT
Start: 2018-08-09 | End: 2018-08-09

## 2018-08-09 RX ORDER — ONDANSETRON 2 MG/ML
4 INJECTION INTRAMUSCULAR; INTRAVENOUS EVERY 8 HOURS PRN
Status: DISCONTINUED | OUTPATIENT
Start: 2018-08-09 | End: 2018-08-09

## 2018-08-09 RX ORDER — ACETAMINOPHEN 500 MG
1000 TABLET ORAL EVERY 6 HOURS
Status: DISPENSED | OUTPATIENT
Start: 2018-08-09 | End: 2018-08-11

## 2018-08-09 RX ORDER — KETAMINE HCL IN 0.9 % NACL 50 MG/5 ML
SYRINGE (ML) INTRAVENOUS
Status: DISCONTINUED | OUTPATIENT
Start: 2018-08-09 | End: 2018-08-09

## 2018-08-09 RX ORDER — ROCURONIUM BROMIDE 10 MG/ML
INJECTION, SOLUTION INTRAVENOUS
Status: DISCONTINUED | OUTPATIENT
Start: 2018-08-09 | End: 2018-08-09

## 2018-08-09 RX ORDER — AMOXICILLIN 250 MG
1 CAPSULE ORAL 2 TIMES DAILY
Status: DISCONTINUED | OUTPATIENT
Start: 2018-08-10 | End: 2018-08-13 | Stop reason: HOSPADM

## 2018-08-09 RX ORDER — ATENOLOL 25 MG/1
25 TABLET ORAL 4 TIMES DAILY
Status: DISCONTINUED | OUTPATIENT
Start: 2018-08-09 | End: 2018-08-13 | Stop reason: HOSPADM

## 2018-08-09 RX ORDER — MIDAZOLAM HYDROCHLORIDE 1 MG/ML
INJECTION, SOLUTION INTRAMUSCULAR; INTRAVENOUS
Status: DISCONTINUED | OUTPATIENT
Start: 2018-08-09 | End: 2018-08-09

## 2018-08-09 RX ORDER — PROPOFOL 10 MG/ML
VIAL (ML) INTRAVENOUS
Status: DISCONTINUED | OUTPATIENT
Start: 2018-08-09 | End: 2018-08-09

## 2018-08-09 RX ORDER — FAMOTIDINE 20 MG/1
20 TABLET, FILM COATED ORAL 2 TIMES DAILY
Status: DISCONTINUED | OUTPATIENT
Start: 2018-08-09 | End: 2018-08-13 | Stop reason: HOSPADM

## 2018-08-09 RX ORDER — PREGABALIN 150 MG/1
150 CAPSULE ORAL 2 TIMES DAILY
Status: DISCONTINUED | OUTPATIENT
Start: 2018-08-09 | End: 2018-08-11

## 2018-08-09 RX ORDER — EPHEDRINE SULFATE 50 MG/ML
INJECTION, SOLUTION INTRAVENOUS
Status: DISCONTINUED | OUTPATIENT
Start: 2018-08-09 | End: 2018-08-09

## 2018-08-09 RX ORDER — OXYCODONE HYDROCHLORIDE 5 MG/1
10 TABLET ORAL
Status: DISCONTINUED | OUTPATIENT
Start: 2018-08-09 | End: 2018-08-13 | Stop reason: HOSPADM

## 2018-08-09 RX ORDER — SODIUM CHLORIDE 9 MG/ML
INJECTION, SOLUTION INTRAVENOUS CONTINUOUS
Status: DISCONTINUED | OUTPATIENT
Start: 2018-08-09 | End: 2018-08-09

## 2018-08-09 RX ORDER — MIDAZOLAM HYDROCHLORIDE 1 MG/ML
0.5 INJECTION INTRAMUSCULAR; INTRAVENOUS
Status: DISCONTINUED | OUTPATIENT
Start: 2018-08-09 | End: 2018-08-09

## 2018-08-09 RX ADMIN — ROCURONIUM BROMIDE 20 MG: 10 INJECTION, SOLUTION INTRAVENOUS at 10:08

## 2018-08-09 RX ADMIN — FAMOTIDINE 20 MG: 20 TABLET ORAL at 09:08

## 2018-08-09 RX ADMIN — FENTANYL CITRATE 25 MCG: 50 INJECTION INTRAMUSCULAR; INTRAVENOUS at 01:08

## 2018-08-09 RX ADMIN — EPHEDRINE SULFATE 5 MG: 50 INJECTION, SOLUTION INTRAMUSCULAR; INTRAVENOUS; SUBCUTANEOUS at 12:08

## 2018-08-09 RX ADMIN — FENTANYL CITRATE 5 ML/HR: 50 INJECTION, SOLUTION INTRAMUSCULAR; INTRAVENOUS at 01:08

## 2018-08-09 RX ADMIN — PROPOFOL 50 MG: 10 INJECTION, EMULSION INTRAVENOUS at 08:08

## 2018-08-09 RX ADMIN — MIDAZOLAM HYDROCHLORIDE 1 MG: 1 INJECTION, SOLUTION INTRAMUSCULAR; INTRAVENOUS at 07:08

## 2018-08-09 RX ADMIN — Medication 20 MG: at 08:08

## 2018-08-09 RX ADMIN — ACETAMINOPHEN 1000 MG: 10 INJECTION, SOLUTION INTRAVENOUS at 01:08

## 2018-08-09 RX ADMIN — DEXMEDETOMIDINE HYDROCHLORIDE 8 MCG: 100 INJECTION, SOLUTION, CONCENTRATE INTRAVENOUS at 11:08

## 2018-08-09 RX ADMIN — ONDANSETRON 4 MG: 2 INJECTION INTRAMUSCULAR; INTRAVENOUS at 02:08

## 2018-08-09 RX ADMIN — FENTANYL CITRATE 50 MCG: 50 INJECTION, SOLUTION INTRAMUSCULAR; INTRAVENOUS at 12:08

## 2018-08-09 RX ADMIN — CLINDAMYCIN IN 5 PERCENT DEXTROSE 900 MG: 18 INJECTION, SOLUTION INTRAVENOUS at 08:08

## 2018-08-09 RX ADMIN — ACETAMINOPHEN 500 MG: 500 TABLET ORAL at 11:08

## 2018-08-09 RX ADMIN — ROCURONIUM BROMIDE 10 MG: 10 INJECTION, SOLUTION INTRAVENOUS at 10:08

## 2018-08-09 RX ADMIN — SUGAMMADEX 200 MG: 100 INJECTION, SOLUTION INTRAVENOUS at 12:08

## 2018-08-09 RX ADMIN — EPHEDRINE SULFATE 5 MG: 50 INJECTION, SOLUTION INTRAMUSCULAR; INTRAVENOUS; SUBCUTANEOUS at 09:08

## 2018-08-09 RX ADMIN — ATENOLOL 25 MG: 25 TABLET ORAL at 05:08

## 2018-08-09 RX ADMIN — PREGABALIN 150 MG: 150 CAPSULE ORAL at 09:08

## 2018-08-09 RX ADMIN — FENTANYL CITRATE 100 MCG: 50 INJECTION INTRAMUSCULAR; INTRAVENOUS at 07:08

## 2018-08-09 RX ADMIN — EPHEDRINE SULFATE 5 MG: 50 INJECTION, SOLUTION INTRAMUSCULAR; INTRAVENOUS; SUBCUTANEOUS at 11:08

## 2018-08-09 RX ADMIN — ATENOLOL 25 MG: 25 TABLET ORAL at 10:08

## 2018-08-09 RX ADMIN — SODIUM CHLORIDE: 0.9 INJECTION, SOLUTION INTRAVENOUS at 01:08

## 2018-08-09 RX ADMIN — OXYCODONE HYDROCHLORIDE 15 MG: 5 TABLET ORAL at 09:08

## 2018-08-09 RX ADMIN — HYDROMORPHONE HYDROCHLORIDE 0.5 MG: 1 INJECTION, SOLUTION INTRAMUSCULAR; INTRAVENOUS; SUBCUTANEOUS at 10:08

## 2018-08-09 RX ADMIN — MIDAZOLAM HYDROCHLORIDE 2 MG: 1 INJECTION, SOLUTION INTRAMUSCULAR; INTRAVENOUS at 07:08

## 2018-08-09 RX ADMIN — ACETAMINOPHEN 1000 MG: 500 TABLET ORAL at 06:08

## 2018-08-09 RX ADMIN — ROCURONIUM BROMIDE 10 MG: 10 INJECTION, SOLUTION INTRAVENOUS at 09:08

## 2018-08-09 RX ADMIN — ROCURONIUM BROMIDE 10 MG: 10 INJECTION, SOLUTION INTRAVENOUS at 11:08

## 2018-08-09 RX ADMIN — Medication 10 MG: at 09:08

## 2018-08-09 RX ADMIN — Medication 10 MG: at 11:08

## 2018-08-09 RX ADMIN — SODIUM CHLORIDE, SODIUM GLUCONATE, SODIUM ACETATE, POTASSIUM CHLORIDE, MAGNESIUM CHLORIDE, SODIUM PHOSPHATE, DIBASIC, AND POTASSIUM PHOSPHATE: .53; .5; .37; .037; .03; .012; .00082 INJECTION, SOLUTION INTRAVENOUS at 07:08

## 2018-08-09 RX ADMIN — DEXMEDETOMIDINE HYDROCHLORIDE 12 MCG: 100 INJECTION, SOLUTION, CONCENTRATE INTRAVENOUS at 12:08

## 2018-08-09 RX ADMIN — PROPOFOL 150 MG: 10 INJECTION, EMULSION INTRAVENOUS at 07:08

## 2018-08-09 RX ADMIN — ROCURONIUM BROMIDE 10 MG: 10 INJECTION, SOLUTION INTRAVENOUS at 08:08

## 2018-08-09 RX ADMIN — FENTANYL CITRATE 50 MCG: 50 INJECTION, SOLUTION INTRAMUSCULAR; INTRAVENOUS at 08:08

## 2018-08-09 RX ADMIN — FENTANYL CITRATE 50 MCG: 50 INJECTION, SOLUTION INTRAMUSCULAR; INTRAVENOUS at 07:08

## 2018-08-09 RX ADMIN — SODIUM CHLORIDE, SODIUM GLUCONATE, SODIUM ACETATE, POTASSIUM CHLORIDE, MAGNESIUM CHLORIDE, SODIUM PHOSPHATE, DIBASIC, AND POTASSIUM PHOSPHATE: .53; .5; .37; .037; .03; .012; .00082 INJECTION, SOLUTION INTRAVENOUS at 08:08

## 2018-08-09 RX ADMIN — SODIUM CHLORIDE: 0.9 INJECTION, SOLUTION INTRAVENOUS at 06:08

## 2018-08-09 RX ADMIN — DEXMEDETOMIDINE HYDROCHLORIDE 8 MCG: 100 INJECTION, SOLUTION, CONCENTRATE INTRAVENOUS at 12:08

## 2018-08-09 RX ADMIN — Medication 5 ML/HR: at 08:08

## 2018-08-09 RX ADMIN — ONDANSETRON 4 MG: 2 INJECTION INTRAMUSCULAR; INTRAVENOUS at 12:08

## 2018-08-09 RX ADMIN — ROCURONIUM BROMIDE 50 MG: 10 INJECTION, SOLUTION INTRAVENOUS at 07:08

## 2018-08-09 NOTE — INTERVAL H&P NOTE
The patient has been examined and the H&P has been reviewed:    I concur with the findings and no changes have occurred since H&P was written.     Recent visit to the ED for fall on her right ribs. No fractures or dislocations. Discharged without new prescriptions. Took her home norco for pain.       Anesthesia/Surgery risks, benefits and alternative options discussed and understood by patient/family.          Active Hospital Problems    Diagnosis  POA    Non-small cell lung cancer (NSCLC) [C34.90]  Yes      Resolved Hospital Problems    Diagnosis Date Resolved POA   No resolved problems to display.

## 2018-08-09 NOTE — NURSING
Patient arrived to floor A&Ox4. VSS on RA. Chest tubes x2 intact. Oriented to room, call light, bed locked in lowest position. Patient resting quietly. Will continue to monitor.

## 2018-08-09 NOTE — BRIEF OP NOTE
[facility]  Brief Operative Note    SUMMARY     Surgery Date: 8/9/2018     Surgeon(s) and Role:     * Ro Smith MD - Resident - Assisting     * Philip Messina MD - Primary        Rick Strauss Resident        Pre-op Diagnosis:  Lung cancer right lung    Post-op Diagnosis:  Post-Op Diagnosis Codes:  Same    Procedure(s) (LRB):  VATS, WITH LOBECTOMY Upper right lobe  LYMPHADENECTOMY/mediastinal (N/A)    Anesthesia: Epidural    Description of Procedure: As above    Description of the findings of the procedure: As above    Estimated Blood Loss: 200 mL         Specimens:   Specimen (12h ago through future)    Start     Ordered    08/09/18 1208  Specimen to Pathology - Surgery  Once     Comments:  1.Level 10 lymph node - F.S.2. Level 10 lympn node - perm3. Level 12 lymph node - perm4. Level 7 lymph node superior -perm5. RUL- perm6. Level 2 & 4 lymph nodes - perm7. Level 9 lymph node- perm8.  Level 7 lymphnode-perm      08/09/18 1209

## 2018-08-09 NOTE — NURSING TRANSFER
Nursing Transfer Note      8/9/2018     Transfer 603A  Transfer via bed    Transfer with iv pole, chest tubes, continuous monitoring  Transported by RN and Tech    Medicines sent: epidural     Chart send with patient: yes  Notified: Partner    Patient reassessed at: 8/9 @ 7168

## 2018-08-09 NOTE — ANESTHESIA PROCEDURE NOTES
Arterial    Diagnosis: lung cancer    Patient location during procedure: done in OR  Procedure start time: 8/9/2018 8:05 AM  Procedure end time: 8/9/2018 8:10 AM  Staffing  Anesthesiologist: HERACLIO VASQUES  Resident/CRNA: ALIN KING  Performed: anesthesiologist and resident/CRNA   Anesthesiologist was present at the time of the procedure.  Preanesthetic Checklist  Completed: patient identified, site marked, surgical consent, pre-op evaluation, timeout performed, IV checked, risks and benefits discussed, monitors and equipment checked and anesthesia consent givenArterial  Skin Prep: chlorhexidine gluconate  Local Infiltration: none  Orientation: left  Location: radial  Catheter Size: 20 G  Catheter placement by Anatomical landmarks. Heme positive aspiration all ports.Insertion Attempts: 2  Assessment  Dressing: secured with tape and tegaderm

## 2018-08-09 NOTE — ANESTHESIA PROCEDURE NOTES
Thoracic Epidural    Patient location during procedure: pre-op  Block not for primary anesthetic.  Reason for block: at surgeon's request, post-op pain management  Post-op Pain Location: right chest wall pain  Start time: 8/9/2018 6:48 AM  Timeout: 8/9/2018 6:46 AM  End time: 8/9/2018 7:22 AM  Staffing  Anesthesiologist: Sandra Flores MD  Other anesthesia staff: Jim Shaver MD  Performed: anesthesiologist   Preanesthetic Checklist  Completed: patient identified, site marked, surgical consent, pre-op evaluation, timeout performed, IV checked, risks and benefits discussed, monitors and equipment checked, anesthesia consent given, hand hygiene performed and patient being monitored  Preparation  Patient position: sitting  Prep: ChloraPrep  Patient monitoring: ECG, Pulse Ox and Blood Pressure  Epidural  Skin Anesthetic: lidocaine 1%  Skin Wheal: 3 mL  Administration type: continuous  Approach: right paramedian  Interspace: T3-4  Injection technique: NICOLE saline  Needle and Epidural Catheter  Needle type: Tuohy   Needle gauge: 17  Needle length: 3.5 inches  Needle insertion depth: 4 cm  Catheter type: springwound  Catheter size: 19 G  Catheter at skin depth: 8 cm  Test dose: 3 mL of lidocaine 1.5% with Epi 1-to-200,000  Additional Documentation: negative aspiration for heme and CSF and no paresthesia on injection  Needle localization: anatomical landmarks  Medications:  Bolus administered: 5 mL of 0.25% bupivacaine  Epinephrine added: none  Assessment  Ease of block: difficult  Patient's tolerance of the procedure: comfortable throughout block

## 2018-08-09 NOTE — ANESTHESIA POSTPROCEDURE EVALUATION
"Anesthesia Post Evaluation    Patient: Alesha Toney    Procedure(s) Performed: Procedure(s) (LRB):  VATS, WITH LOBECTOMY Possible chest wall resection (Right)  THORACOTOMY (Right)  LYMPHADENECTOMY/mediastinal (N/A)    Final Anesthesia Type: general  Patient location during evaluation: PACU  Patient participation: Yes- Able to Participate  Level of consciousness: awake and alert and oriented  Post-procedure vital signs: reviewed and stable  Pain management: adequate  Airway patency: patent  PONV status at discharge: No PONV  Anesthetic complications: no      Cardiovascular status: blood pressure returned to baseline and hemodynamically stable  Respiratory status: unassisted  Hydration status: euvolemic  Follow-up not needed.        Visit Vitals  BP (!) 145/67   Pulse 72   Temp 36.5 °C (97.7 °F) (Temporal)   Resp 14   Ht 5' 6" (1.676 m)   Wt 76.7 kg (169 lb)   LMP  (LMP Unknown)   SpO2 99%   Breastfeeding? No   BMI 27.28 kg/m²       Pain/Grady Score: Pain Assessment Performed: Yes (8/9/2018  5:55 AM)  Presence of Pain: denies (8/9/2018  7:18 AM)  Pain Assessment Performed: Yes (8/9/2018  1:00 PM)  Presence of Pain: non-verbal indicators absent (8/9/2018  1:00 PM)  Pain Rating Prior to Med Admin: 10 (8/9/2018  1:52 PM)  Grady Score: 6 (8/9/2018  1:00 PM)      "

## 2018-08-09 NOTE — H&P (VIEW-ONLY)
History & Physical    SUBJECTIVE:     History of Present Illness:  Patient is a 67 y.o. female previous smoker, h/o SVT referred for new adenocarcinoma of the RUL. EBUS bx negative, PET without distant mets (although hypermetabolic area in rectum needs f/u). She had an xray done for shoulder pain which identified the mass. She denies dyspnea, chest pain, cough, fatigue, weight loss. 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    Chief Complaint   Patient presents with    Consult       Review of patient's allergies indicates:   Allergen Reactions    Iodinated contrast- oral and iv dye     Pcn [penicillins]     Phenergan plain     Tramadol     Vancomycin analogues     Adhesive Blisters, Itching and Rash       Current Outpatient Prescriptions   Medication Sig Dispense Refill    acetaminophen-codeine 300-60mg (TYLENOL #4) 300-60 mg Tab Take 1 tablet by mouth every 6 (six) hours as needed. 60 tablet 0    atenolol (TENORMIN) 50 MG tablet Take 1 tablet (50 mg total) by mouth 2 (two) times daily. 180 tablet 3    cloNIDine (CATAPRES) 0.1 MG tablet TAKE 1 TABLET BY MOUTH EVERY 8 HOURS AS NEEDED 270 tablet 1    diazePAM (VALIUM) 5 MG tablet Take 1 tablet (5 mg total) by mouth nightly as needed for Insomnia. 30 tablet 0    HYDROcodone-acetaminophen (NORCO) 5-325 mg per tablet Take 1 tablet by mouth every 6 (six) hours as needed for Pain. 120 tablet 0    losartan (COZAAR) 50 MG tablet Take 25 mg by mouth 2 (two) times daily.      multivitamin with minerals tablet Take 1 tablet by mouth once daily.       No current facility-administered medications for this visit.        Past Medical History:   Diagnosis Date    Breast cyst     Cardiac arrhythmia     Fibrocystic breast     History of hysterectomy     20yrs ago    History of kidney stones     Lung cancer      Past Surgical History:   Procedure Laterality Date    ADENOIDECTOMY  17yo    APPENDECTOMY      BONE RESECTION, RIB  1980    BREAST  "BIOPSY Left     at least 40yrs ago in her 20's    BREAST CYST ASPIRATION      BREAST CYST EXCISION      ENDOBRONCHIAL ULTRASOUND N/A 7/10/2018    Procedure: ULTRASOUND, ENDOBRONCHIAL;  Surgeon: Sri Hearn MD;  Location: [facility] OR [address] [place] [address];  Service: Pulmonary;  Laterality: N/A;    HYSTERECTOMY      @47yrs of age    KIDNEY SURGERY      17yo    OOPHORECTOMY      @47yrs of age    TONSILLECTOMY       Family History   Problem Relation Age of Onset    Stroke Mother     Cancer Father         colon cancer    Diabetes Brother     Heart failure Brother     Hypertension Brother     Heart attack Paternal Aunt     Heart attack Maternal Grandfather     Diabetes Paternal Aunt      Social History   Substance Use Topics    Smoking status: Former Smoker     Packs/day: 1.00     Years: 30.00     Types: Cigarettes     Quit date: 2015    Smokeless tobacco: Never Used    Alcohol use 4.2 oz/week     7 Glasses of wine per week      Comment: socially         Review of Systems:  Review of Systems   Constitutional: Negative for activity change, appetite change, chills and fever.   HENT: Negative for congestion and trouble swallowing.    Eyes: Negative for visual disturbance.   Respiratory: Negative for cough and shortness of breath.    Cardiovascular: Negative for chest pain and leg swelling.   Gastrointestinal: Negative for abdominal distention, abdominal pain, constipation, diarrhea, nausea and vomiting.   Endocrine: Negative for cold intolerance and heat intolerance.   Genitourinary: Negative for difficulty urinating and dysuria.   Musculoskeletal: Negative for arthralgias and back pain.   Skin: Negative for rash and wound.   Neurological: Negative for dizziness and headaches.   Psychiatric/Behavioral: Negative for agitation and behavioral problems.       OBJECTIVE:     Vital Signs (Most Recent)  Pulse: 68 (07/13/18 1109)  BP: (!) 173/76 (07/13/18 1109)  SpO2: 100 % (07/13/18 1109)  5' 6" (1.676 m)  78 kg (171 lb 15.3 oz) "     Physical Exam:  Physical Exam   Constitutional: She is oriented to person, place, and time. She appears well-developed and well-nourished. No distress.   Cardiovascular: Normal rate and regular rhythm.  Exam reveals no gallop and no friction rub.    No murmur heard.  Pulmonary/Chest: Effort normal and breath sounds normal. No respiratory distress. She has no wheezes. She has no rales.   Musculoskeletal: Normal range of motion. She exhibits no edema.   Neurological: She is alert and oriented to person, place, and time.   Skin: Skin is warm and dry.   Psychiatric: She has a normal mood and affect. Her behavior is normal.       Laboratory  Reviewed    Diagnostic Results:  PET Scan: Reviewed  Large mass in the RUL that also appears to involve the superior segment of the RLL. No distant mets (but hypermetabolic activity in rectum)     PFT   FEV1 2.19, 90%  DLCO 18, 96%    ASSESSMENT/PLAN:     68yo woman with new large right lung adenocarcinoma  -SVT well controlled on meds    PLAN:Plan     Plan for VATS approach, likely open. Need to resect RUL and at least part of the RLL (superior segment). Possible chest wall resection. Lymphadenectomy.   Counseled that this tumor is likely not the cause of her pain and this will not improve with surgery.  Counseled on need for colonoscopy (she has never had one).     ATTENDING ATTESTATION:    I evaluated the patient and I agree with the assessment and plan.  She will need a preoperative stress test.  I will consult the Acute Pain Service for epidural catheter placement.  I will perform a right VATS to assess the relation between the tumor and chest wall in an attempt to plan chest wall resection.  I will perform as much dissection as possible thoracoscopically.  I will then convert to a right posterior or parascapular thoracotomy based upon intraoperative conditions. I have discussed the technical aspects, risks and benefits of the procedure with the patient.  I did inform the  patient that the risks are the most common risks and that there are other less likely risks that are too numerous to elaborate.  The patient is aware and has agreed to undergo the procedure as detailed on the consent form.

## 2018-08-09 NOTE — TRANSFER OF CARE
"Anesthesia Transfer of Care Note    Patient: Alesha Toney    Procedure(s) Performed: Procedure(s) (LRB):  VATS, WITH LOBECTOMY Possible chest wall resection (Right)  THORACOTOMY (Right)  LYMPHADENECTOMY/mediastinal (N/A)    Patient location: PACU    Anesthesia Type: general and epidural    Transport from OR: Transported from OR on 6-10 L/min O2 by face mask with adequate spontaneous ventilation    Post pain: adequate analgesia    Post assessment: no apparent anesthetic complications    Post vital signs: stable    Level of consciousness: responds to stimulation and sedated    Nausea/Vomiting: no nausea/vomiting    Complications: none    Transfer of care protocol was followed      Last vitals:   Visit Vitals  BP (!) 172/67 (BP Location: Right arm, Patient Position: Lying)   Pulse (!) 57   Temp 36.7 °C (98 °F) (Oral)   Resp 18   Ht 5' 6" (1.676 m)   Wt 76.7 kg (169 lb)   LMP  (LMP Unknown)   SpO2 100%   Breastfeeding? No   BMI 27.28 kg/m²     "

## 2018-08-10 LAB
ALBUMIN SERPL BCP-MCNC: 3.1 G/DL
ALP SERPL-CCNC: 40 U/L
ALT SERPL W/O P-5'-P-CCNC: 18 U/L
ANION GAP SERPL CALC-SCNC: 5 MMOL/L
AST SERPL-CCNC: 30 U/L
BASOPHILS # BLD AUTO: 0.03 K/UL
BASOPHILS NFR BLD: 0.3 %
BILIRUB SERPL-MCNC: 0.6 MG/DL
BUN SERPL-MCNC: 16 MG/DL
CALCIUM SERPL-MCNC: 8.6 MG/DL
CHLORIDE SERPL-SCNC: 103 MMOL/L
CO2 SERPL-SCNC: 27 MMOL/L
CREAT SERPL-MCNC: 0.9 MG/DL
DIFFERENTIAL METHOD: ABNORMAL
EOSINOPHIL # BLD AUTO: 0 K/UL
EOSINOPHIL NFR BLD: 0.3 %
ERYTHROCYTE [DISTWIDTH] IN BLOOD BY AUTOMATED COUNT: 12.8 %
EST. GFR  (AFRICAN AMERICAN): >60 ML/MIN/1.73 M^2
EST. GFR  (NON AFRICAN AMERICAN): >60 ML/MIN/1.73 M^2
GLUCOSE SERPL-MCNC: 130 MG/DL
HCT VFR BLD AUTO: 36.3 %
HGB BLD-MCNC: 12.1 G/DL
IMM GRANULOCYTES # BLD AUTO: 0.02 K/UL
IMM GRANULOCYTES NFR BLD AUTO: 0.2 %
LYMPHOCYTES # BLD AUTO: 2.5 K/UL
LYMPHOCYTES NFR BLD: 25.1 %
MAGNESIUM SERPL-MCNC: 1.9 MG/DL
MCH RBC QN AUTO: 31.2 PG
MCHC RBC AUTO-ENTMCNC: 33.3 G/DL
MCV RBC AUTO: 94 FL
MONOCYTES # BLD AUTO: 1.2 K/UL
MONOCYTES NFR BLD: 11.6 %
NEUTROPHILS # BLD AUTO: 6.2 K/UL
NEUTROPHILS NFR BLD: 62.5 %
NRBC BLD-RTO: 0 /100 WBC
PHOSPHATE SERPL-MCNC: 3.6 MG/DL
PLATELET # BLD AUTO: 210 K/UL
PMV BLD AUTO: 11.4 FL
POTASSIUM SERPL-SCNC: 3.9 MMOL/L
PROT SERPL-MCNC: 5.8 G/DL
RBC # BLD AUTO: 3.88 M/UL
SODIUM SERPL-SCNC: 135 MMOL/L
WBC # BLD AUTO: 9.9 K/UL

## 2018-08-10 PROCEDURE — 63600175 PHARM REV CODE 636 W HCPCS: Performed by: PHYSICIAN ASSISTANT

## 2018-08-10 PROCEDURE — 83735 ASSAY OF MAGNESIUM: CPT

## 2018-08-10 PROCEDURE — 80053 COMPREHEN METABOLIC PANEL: CPT

## 2018-08-10 PROCEDURE — 84100 ASSAY OF PHOSPHORUS: CPT

## 2018-08-10 PROCEDURE — 25000003 PHARM REV CODE 250: Performed by: STUDENT IN AN ORGANIZED HEALTH CARE EDUCATION/TRAINING PROGRAM

## 2018-08-10 PROCEDURE — 85025 COMPLETE CBC W/AUTO DIFF WBC: CPT

## 2018-08-10 PROCEDURE — 25000003 PHARM REV CODE 250: Performed by: SURGERY

## 2018-08-10 PROCEDURE — 20600001 HC STEP DOWN PRIVATE ROOM

## 2018-08-10 PROCEDURE — 94761 N-INVAS EAR/PLS OXIMETRY MLT: CPT

## 2018-08-10 PROCEDURE — 25000003 PHARM REV CODE 250: Performed by: PHYSICIAN ASSISTANT

## 2018-08-10 PROCEDURE — 01996 DLY HOSP MGMT EDRL RX ADMIN: CPT | Mod: ,,, | Performed by: ANESTHESIOLOGY

## 2018-08-10 PROCEDURE — 63600175 PHARM REV CODE 636 W HCPCS: Performed by: ANESTHESIOLOGY

## 2018-08-10 RX ORDER — ONDANSETRON 2 MG/ML
4 INJECTION INTRAMUSCULAR; INTRAVENOUS EVERY 6 HOURS PRN
Status: DISCONTINUED | OUTPATIENT
Start: 2018-08-10 | End: 2018-08-11

## 2018-08-10 RX ORDER — ENOXAPARIN SODIUM 100 MG/ML
40 INJECTION SUBCUTANEOUS DAILY
Status: DISCONTINUED | OUTPATIENT
Start: 2018-08-10 | End: 2018-08-13 | Stop reason: HOSPADM

## 2018-08-10 RX ORDER — LOSARTAN POTASSIUM 25 MG/1
25 TABLET ORAL NIGHTLY
Status: DISCONTINUED | OUTPATIENT
Start: 2018-08-10 | End: 2018-08-13 | Stop reason: HOSPADM

## 2018-08-10 RX ORDER — ONDANSETRON 2 MG/ML
4 INJECTION INTRAMUSCULAR; INTRAVENOUS EVERY 4 HOURS PRN
Status: DISCONTINUED | OUTPATIENT
Start: 2018-08-10 | End: 2018-08-10

## 2018-08-10 RX ADMIN — OXYCODONE HYDROCHLORIDE 5 MG: 5 TABLET ORAL at 12:08

## 2018-08-10 RX ADMIN — LOSARTAN POTASSIUM 25 MG: 25 TABLET, FILM COATED ORAL at 10:08

## 2018-08-10 RX ADMIN — ATENOLOL 25 MG: 25 TABLET ORAL at 12:08

## 2018-08-10 RX ADMIN — SENNOSIDES AND DOCUSATE SODIUM 1 TABLET: 8.6; 5 TABLET ORAL at 08:08

## 2018-08-10 RX ADMIN — ACETAMINOPHEN 1000 MG: 500 TABLET ORAL at 05:08

## 2018-08-10 RX ADMIN — OXYCODONE HYDROCHLORIDE 5 MG: 5 TABLET ORAL at 10:08

## 2018-08-10 RX ADMIN — SENNOSIDES AND DOCUSATE SODIUM 1 TABLET: 8.6; 5 TABLET ORAL at 10:08

## 2018-08-10 RX ADMIN — OXYCODONE HYDROCHLORIDE 5 MG: 5 TABLET ORAL at 07:08

## 2018-08-10 RX ADMIN — ONDANSETRON 4 MG: 2 INJECTION INTRAMUSCULAR; INTRAVENOUS at 08:08

## 2018-08-10 RX ADMIN — ATENOLOL 25 MG: 25 TABLET ORAL at 05:08

## 2018-08-10 RX ADMIN — ACETAMINOPHEN 1000 MG: 500 TABLET ORAL at 12:08

## 2018-08-10 RX ADMIN — ENOXAPARIN SODIUM 40 MG: 100 INJECTION SUBCUTANEOUS at 08:08

## 2018-08-10 RX ADMIN — FAMOTIDINE 20 MG: 20 TABLET ORAL at 08:08

## 2018-08-10 RX ADMIN — ATENOLOL 25 MG: 25 TABLET ORAL at 08:08

## 2018-08-10 RX ADMIN — FAMOTIDINE 20 MG: 20 TABLET ORAL at 10:08

## 2018-08-10 RX ADMIN — ATENOLOL 25 MG: 25 TABLET ORAL at 10:08

## 2018-08-10 NOTE — PLAN OF CARE
Problem: Patient Care Overview  Goal: Plan of Care Review  Outcome: Revised  Care plan reviewed and understood by patient and partner. Resp rate even and unlabored. VSS. Right chest tube intact and patent. Patient with reports of nausea zofran given. Peppermint infusing in room for nausea control. Patient ate very little but did state nausea is better. Patient remain free of falls. No acute pain or distress noted. Will continue to monitor.

## 2018-08-10 NOTE — ADDENDUM NOTE
Addendum  created 08/10/18 0910 by Dariusz Diaz MD    Charge Capture section accepted, Sign clinical note

## 2018-08-10 NOTE — ANESTHESIA POST-OP PAIN MANAGEMENT
Acute Pain Service Progress Note  Called to bedside, patient complaining 10/10 pain and leaking from epidural site. Patient had no level when assessed by ice. Epidural site checked, catheter at 5 cm. Catheter removed, tip intact.  Patient with multimodals already ordered. Will add oxycodone po prn and IV breakthrough medications. Plan discussed with patient and RN.    Evaluator Vivek Rubio

## 2018-08-10 NOTE — HOSPITAL COURSE
8/9/18 - Right VATS RULobectomy. Epidural per anesthesia. Not working post-op, removed. Transitioned to PO pain meds. CLD.    8/10/18 - Regular diet. 1 chest tube removed. Remaining to water seal.    8/11/18 - clamp trial on remaining chest tube. Regular diet. PO pain meds with prn nausea meds   8/12/18 - ambulating/ good UOP. No BM. Stable on RA.   8/13/18 - magnesium citrate. VSS. Stable for discharge.

## 2018-08-10 NOTE — HPI
Patient is a 67 y.o. female previous smoker, TOS s/p 1st rib resection,  h/o SVT referred for new adenocarcinoma of the RUL. EBUS bx negative, PET without distant mets (although hypermetabolic area in rectum needs f/u). She had an xray done for shoulder pain which identified the mass. She denies dyspnea, chest pain, cough, fatigue, weight loss. 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

## 2018-08-10 NOTE — ANESTHESIA POST-OP PAIN MANAGEMENT
Acute Pain Service Progress Note    Alesha Toney is a 67 y.o., female, [id number]. Overnight patient's epidural catheter was dislodged and subsequently removed due to  in analgesia. Patient reports adequate pain control this morning with oral medications.     Surgery:  VATS    Post Op Day #: 1    Catheter type: epidural T3/T4    Infusion type: Ropivacaine 0.1% with Fentanyl 5mcg/mL  5 basal    Problem List:    Active Hospital Problems    Diagnosis  POA    *Non-small cell lung cancer (NSCLC) [C34.90]  Yes    Malignant neoplasm of upper lobe of right lung [C34.11]  Yes    SVT (supraventricular tachycardia) [I47.1]  Yes      Resolved Hospital Problems    Diagnosis Date Resolved POA   No resolved problems to display.       Subjective:     General appearance of alert, oriented, no complaints   Pain with rest: 2    Numbers   Pain with movement: 7    Numbers   Side Effects    1. Pruritis No    2. Nausea No    Objective:      Vitals   Vitals:    08/10/18 0400   BP: (!) 112/52   Pulse: 63   Resp: 11   Temp:         Labs    Admission on 2018   Component Date Value Ref Range Status    POC PH 2018 7.434  7.35 - 7.45 Final    POC PCO2 2018 37.7  35 - 45 mmHg Final    POC PO2 2018 137* 80 - 100 mmHg Final    POC HCO3 2018 25.3  24 - 28 mmol/L Final    POC BE 2018 1  -2 to 2 mmol/L Final    POC SATURATED O2 2018 99  95 - 100 % Final    POC Glucose 2018 144* 70 - 110 mg/dL Final    POC Sodium 2018 139  136 - 145 mmol/L Final    POC Potassium 2018 3.9  3.5 - 5.1 mmol/L Final    POC TCO2 2018 26  23 - 27 mmol/L Final    POC Ionized Calcium 2018 1.16  1.06 - 1.42 mmol/L Final    POC Hematocrit 2018 33* 36 - 54 %PCV Final    Sample 2018 ARTERIAL   Final    Sodium 08/10/2018 135* 136 - 145 mmol/L Final    Potassium 08/10/2018 3.9  3.5 - 5.1 mmol/L Final    Chloride 08/10/2018 103  95 - 110 mmol/L Final    CO2 08/10/2018 27  23  - 29 mmol/L Final    Glucose 08/10/2018 130* 70 - 110 mg/dL Final    BUN, Bld 08/10/2018 16  8 - 23 mg/dL Final    Creatinine 08/10/2018 0.9  0.5 - 1.4 mg/dL Final    Calcium 08/10/2018 8.6* 8.7 - 10.5 mg/dL Final    Total Protein 08/10/2018 5.8* 6.0 - 8.4 g/dL Final    Albumin 08/10/2018 3.1* 3.5 - 5.2 g/dL Final    Total Bilirubin 08/10/2018 0.6  0.1 - 1.0 mg/dL Final    Alkaline Phosphatase 08/10/2018 40* 55 - 135 U/L Final    AST 08/10/2018 30  10 - 40 U/L Final    ALT 08/10/2018 18  10 - 44 U/L Final    Anion Gap 08/10/2018 5* 8 - 16 mmol/L Final    eGFR if African American 08/10/2018 >60.0  >60 mL/min/1.73 m^2 Final    eGFR if non African American 08/10/2018 >60.0  >60 mL/min/1.73 m^2 Final    Magnesium 08/10/2018 1.9  1.6 - 2.6 mg/dL Final    WBC 08/10/2018 9.90  3.90 - 12.70 K/uL Final    RBC 08/10/2018 3.88* 4.00 - 5.40 M/uL Final    Hemoglobin 08/10/2018 12.1  12.0 - 16.0 g/dL Final    Hematocrit 08/10/2018 36.3* 37.0 - 48.5 % Final    MCV 08/10/2018 94  82 - 98 fL Final    MCH 08/10/2018 31.2* 27.0 - 31.0 pg Final    MCHC 08/10/2018 33.3  32.0 - 36.0 g/dL Final    RDW 08/10/2018 12.8  11.5 - 14.5 % Final    Platelets 08/10/2018 210  150 - 350 K/uL Final    MPV 08/10/2018 11.4  9.2 - 12.9 fL Final    Immature Granulocytes 08/10/2018 0.2  0.0 - 0.5 % Final    Gran # (ANC) 08/10/2018 6.2  1.8 - 7.7 K/uL Final    Immature Grans (Abs) 08/10/2018 0.02  0.00 - 0.04 K/uL Final    Lymph # 08/10/2018 2.5  1.0 - 4.8 K/uL Final    Mono # 08/10/2018 1.2* 0.3 - 1.0 K/uL Final    Eos # 08/10/2018 0.0  0.0 - 0.5 K/uL Final    Baso # 08/10/2018 0.03  0.00 - 0.20 K/uL Final    nRBC 08/10/2018 0  0 /100 WBC Final    Gran% 08/10/2018 62.5  38.0 - 73.0 % Final    Lymph% 08/10/2018 25.1  18.0 - 48.0 % Final    Mono% 08/10/2018 11.6  4.0 - 15.0 % Final    Eosinophil% 08/10/2018 0.3  0.0 - 8.0 % Final    Basophil% 08/10/2018 0.3  0.0 - 1.9 % Final    Differential Method 08/10/2018 Automated    Final    Phosphorus 08/10/2018 3.6  2.7 - 4.5 mg/dL Final        Meds   Current Facility-Administered Medications   Medication Dose Route Frequency Provider Last Rate Last Dose    acetaminophen tablet 1,000 mg  1,000 mg Oral Q6H Renetta Rizzo MD   500 mg at 08/09/18 2359    atenolol tablet 25 mg  25 mg Oral QID Ro Smith MD   25 mg at 08/09/18 2215    calcium carbonate 200 mg calcium (500 mg) chewable tablet 1,000 mg  1,000 mg Oral TID PRN Ro Smith MD        enoxaparin injection 40 mg  40 mg Subcutaneous Daily Aminta Bahena PA-C        famotidine tablet 20 mg  20 mg Oral BID Ro Smith MD   20 mg at 08/09/18 2113    oxyCODONE immediate release tablet 5 mg  5 mg Oral Q3H PRN Vivek Sena MD   5 mg at 08/10/18 0015    And    oxyCODONE immediate release tablet 10 mg  10 mg Oral Q3H PRN Vivek Sena MD        And    oxyCODONE immediate release tablet 15 mg  15 mg Oral Q3H PRN Vivek Sena MD   15 mg at 08/09/18 2113    And    HYDROmorphone injection 0.25 mg  0.25 mg Intravenous Q3H PRN Vivek Sena MD        And    HYDROmorphone injection 0.5 mg  0.5 mg Intravenous Q3H PRN Vivek Sena MD   0.5 mg at 08/09/18 2216    And    HYDROmorphone injection 1 mg  1 mg Intravenous Q3H PRN Vivek Sena MD        naloxone 0.4 mg/mL injection 0.02 mg  0.02 mg Intravenous PRN Jim Shaver MD        ondansetron injection 4 mg  4 mg Intravenous Q8H PRN Jim Shaver MD   4 mg at 08/09/18 1430    polyethylene glycol packet 17 g  17 g Oral Daily Ro Smith MD        pregabalin capsule 150 mg  150 mg Oral BID Renetta Rizzo MD   150 mg at 08/09/18 2113    senna-docusate 8.6-50 mg per tablet 1 tablet  1 tablet Oral BID Ro Smith MD            Anticoagulant dose lovenox 40mg daily    Assessment:     Pain control adequate    Plan:     Patient doing well, continue present treatment.     - tylenol 1000mg q6hrs   -lyrica 150mg daily   -hydrocodone 5/10/15mg PRN   - dilaudid for pain not controlled with oral medications    Renetta Rizzo, PGY-4  Pain Management Service  [id number]    I have seen the patient, reviewed the Resident's assessment and plan. I have personally interviewed and examined the patient at bedside and: agree with the findings.  Epidural inadvertently removed overnight.  Pain controlled now on oral medications.  Will sign off.  Please call with questions.

## 2018-08-10 NOTE — OP NOTE
DATE OF PROCEDURE:  08/09/2018.    PRIMARY SURGEON:  Philip Messina M.D.    RESIDENT SURGEON:  Ro Smith M.D. (RES) and Rick Strauss, Resident.    PREOPERATIVE DIAGNOSIS:  Right upper lobe lung cancer.    POSTOPERATIVE DIAGNOSIS:  Right upper lobe lung cancer.    PROCEDURES:  1.  VATS with right upper lobectomy.  2.  Mediastinal lymphadenectomy.    ANESTHESIA:  Epidural, general endotracheal anesthesia, and double lumen   endotracheal tube.    ESTIMATED BLOOD LOSS:  200 mL.    SPECIMENS:  Level 10, 12, 7, 5, 2, 4, 9, 7 lymph node and right upper lobectomy.    COMPLICATIONS:  None.    INDICATION FOR PROCEDURE:  Alesha Toney is a 67-year-old woman who was   referred to [facility] with a new diagnosis of a right upper lobe   non-small cell lung cancer.  She had undergone a negative EBUS and a PET scan   did not show any distant metastases other than a small hypermetabolic area in   the rectum that will require followup.  She underwent preoperative testing and   was found to be a good candidate for upfront surgery.  We recommended a right   upper lobectomy with possible chest wall resection due to the location and size   of the tumor.  She did agree to proceed.    PROCEDURE IN DETAIL:  After informed consent was obtained, the patient was taken   to the Operating Room and placed supine on the operating table.  A previous   epidural catheter was placed and general endotracheal anesthesia was induced.  A   double lumen endotracheal tube was confirmed to be in the correct position via   bronchoscopy by Anesthesia.  She was placed in the left lateral decubitus   position and prepped and draped in the usual sterile fashion.  A timeout was   called prior to beginning of the procedure and antibiotics were confirmed to be   administered prior to incision.  Access to the chest cavity was obtained on   single lung ventilation at the sixth rib interspace.  An 8-mm incision was   created with a #10 blade  and dissection was taken down with Bovie cautery.  The   chest cavity was entered bluntly with a tonsil.  The chest cavity was inspected   and no distant metastases were noted.  We placed additional trocars,   one anterior at the sixth rib interspace and two posterior at the sixth   rib interspace and fifth rib interspaces.  A utility incision was made at the fourth  rib interspace anterior axillary line.  A small Michael retractor was placed   in this wound.  The chest cavity was then inspected and we found the tumor to be   well contained within the right upper lobe without any evidence of invasion   into the other lobes, structures or chest wall.  The lung was elevated and we   began mobilizing the hilar structures.  The superior and inferior pulmonary   veins were identified.  The superior pulmonary vein was dissected and isolated.    We were then able to find the truncus arteriosus and this was dissected as well   and encircled with a vessel loop.  The superior pulmonary vein was divided using a   linear endostapler vascular load, sparing the middle lobe branch.  The truncus arteriosus  was divided using a vascular load.   The right upper lobe bronchus was divided with a  green load after confirming that the middle and lower lobe bronchii were widely patent.  We had good hemostasis from the stump.  The posterior segmental artery was divided using hemoclips.  The horizontal and oblique fissures were  divided in a sequential fashion utilizing a   blue load stapler.  The specimen was then placed in a large bag and removed and   sent for Pathology.  Lymphadenectomy was performed harvesting lymph nodes from   levels 9, 10, 7, 2, 4 and 12.  The dissection site was then inspected for   hemostasis and pneumostasis.  Jasmin was placed in the dissection plane.  We   divided the inferior pulmonary ligament.  Two chest tubes were placed in the   apex of the lung utilizing the existing trocar sites.  The lung was reinflated    under direct vision.    The chest tubes were sutured in place utilizing heavy   silk suture. All incisions  were closed in a layered fashion utilizing a deep 2-0 suture   and a 3-0 Vicryl and 4-0 Monocryl suture.  Sterile dressings were applied.    The patient was awoken in the Operating Room and taken to the PACU in   good condition.  Dr. Messina was present and scrubbed for the entirety of the procedure.    DICTATED BY:  Ro Smith M.D. (RES).      LORIE/GIO  dd: 08/10/2018 09:14:43 (CDT)  td: 08/10/2018 10:12:58 (CDT)  Doc ID   #[id number]  Job ID #[id number]    CC:

## 2018-08-10 NOTE — ASSESSMENT & PLAN NOTE
68 yo female s/p right VATS RULobectomy with MLND.     Epidural removed overnight. PO pain meds  Remove chest tube with lower output. Chest tube to water seal.   Regular diet   OOB, IS, ambulation  PT/OT  Dvt/gi ppx   Wean supplemental oxygen for spO2 > 90%

## 2018-08-10 NOTE — PLAN OF CARE
Problem: Patient Care Overview  Goal: Plan of Care Review  Outcome: Ongoing (interventions implemented as appropriate)  Patient is lethargic this am. States pain is being controlled with current pain med regimen.  VS stable. Telemetry monitoring. 2  Right sided chest tube to suction.Voids per bedpan. Verbalizes  Understanding of plan of care. Nurse will continue to monitor.

## 2018-08-10 NOTE — PROGRESS NOTES
[facility]  Thoracic Surgery  Progress Note    Subjective:     History of Present Illness:  Patient is a 67 y.o. female previous smoker, TOS s/p 1st rib resection,  h/o SVT referred for new adenocarcinoma of the RUL. EBUS bx negative, PET without distant mets (although hypermetabolic area in rectum needs f/u). She had an xray done for shoulder pain which identified the mass. She denies dyspnea, chest pain, cough, fatigue, weight loss. 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         Post-Op Info:  Procedure(s) (LRB):  VATS, WITH LOBECTOMY Possible chest wall resection (Right)  THORACOTOMY (Right)  LYMPHADENECTOMY/mediastinal (N/A)   1 Day Post-Op     Interval History: NAEON. Chest tubes with 150, 70. SS output, no air leak. CLD. Epidural removed overnight. PO pain meds. Good UOP.     Medications:  Continuous Infusions:  Scheduled Meds:   acetaminophen  1,000 mg Oral Q6H    atenolol  25 mg Oral QID    famotidine  20 mg Oral BID    polyethylene glycol  17 g Oral Daily    pregabalin  150 mg Oral BID    senna-docusate 8.6-50 mg  1 tablet Oral BID     PRN Meds:calcium carbonate, oxyCODONE **AND** oxyCODONE **AND** oxyCODONE **AND** HYDROmorphone **AND** HYDROmorphone **AND** HYDROmorphone, naloxone, ondansetron     Review of patient's allergies indicates:   Allergen Reactions    Adhesive Itching, Rash and Blisters     INCLUDES EKG PADS    Iodinated contrast- oral and iv dye     Pcn [penicillins]     Phenergan plain     Tramadol     Vancomycin analogues      Objective:     Vital Signs (Most Recent):  Temp: 99 °F (37.2 °C) (08/10/18 0000)  Pulse: 63 (08/10/18 0400)  Resp: 11 (08/10/18 0400)  BP: (!) 112/52 (08/10/18 0400)  SpO2: 97 % (08/10/18 0400) Vital Signs (24h Range):  Temp:  [97.7 °F (36.5 °C)-99 °F (37.2 °C)] 99 °F (37.2 °C)  Pulse:  [63-92] 63  Resp:  [10-28] 11  SpO2:  [92 %-100 %] 97 %  BP: (107-182)/(51-77) 112/52  Arterial Line BP: (152-157)/(55-59) 157/59      Intake/Output - Last 3 Shifts       08/08 0700 - 08/09 0659 08/09 0700 - 08/10 0659 08/10 0700 - 08/11 0659    P.O.  240     I.V. (mL/kg)  2341.3 (30.5)     Total Intake(mL/kg)  2581.3 (33.7)     Urine (mL/kg/hr)  885 (0.5)     Blood  200 (0.1)     Chest Tube  220 (0.1)     Total Output   1305      Net   +1276.3                   SpO2: 97 %  O2 Device (Oxygen Therapy): nasal cannula    Physical Exam   Constitutional: She is oriented to person, place, and time. She appears well-nourished. No distress.   HENT:   Head: Normocephalic and atraumatic.   Eyes: EOM are normal.   Neck: Neck supple. No tracheal deviation present.   Cardiovascular: Normal rate, regular rhythm and normal heart sounds.    Pulmonary/Chest: Effort normal. She exhibits tenderness (expected).   2 right sided chest tubes, ss, no air leak    Abdominal: Soft.   Musculoskeletal: Normal range of motion.   Neurological: She is alert and oriented to person, place, and time.   Skin: Skin is warm and dry.   Psychiatric: She has a normal mood and affect. Thought content normal.   Vitals reviewed.      Significant Labs:  BMP:   Recent Labs  Lab 08/10/18  0530   *   *   K 3.9      CO2 27   BUN 16   CREATININE 0.9   CALCIUM 8.6*   MG 1.9     CBC:   Recent Labs  Lab 08/10/18  0530   WBC 9.90   RBC 3.88*   HGB 12.1   HCT 36.3*      MCV 94   MCH 31.2*   MCHC 33.3       Significant Diagnostics:  CXR: I have reviewed all pertinent results/findings within the past 24 hours    VTE Risk Mitigation         Ordered     IP VTE HIGH RISK PATIENT  Once      08/09/18 1241        Assessment/Plan:     * Non-small cell lung cancer (NSCLC)    68 yo female s/p right VATS RULobectomy with MLND.     Epidural removed overnight. PO pain meds  Remove chest tube with lower output. Will do clamp trial on remaining chest tube.   Regular diet   OOB, IS, ambulation  PT/OT  Dvt/gi ppx   Wean supplemental oxygen for spO2 > 90%        Essential hypertension     Restart [place] meds             Aminta Bahena PA-C  Thoracic Surgery  [facility]

## 2018-08-10 NOTE — PROGRESS NOTES
Notified Dr. Cheung (anesthesia) that patient is leaking from epidural site. Pain rating 8 out of 10. MD stated to assess epidural by placing ice on stomach and left lateral chest. Test results indicated epidural not working. Anesthesia came to bedside to assess and order pain management meds. Nurse will continue to monitor.

## 2018-08-10 NOTE — SUBJECTIVE & OBJECTIVE
Interval History: NAEON. Chest tubes with 150, 70. SS output, no air leak. CLD. Epidural removed overnight. PO pain meds. Good UOP.     Medications:  Continuous Infusions:  Scheduled Meds:   acetaminophen  1,000 mg Oral Q6H    atenolol  25 mg Oral QID    famotidine  20 mg Oral BID    polyethylene glycol  17 g Oral Daily    pregabalin  150 mg Oral BID    senna-docusate 8.6-50 mg  1 tablet Oral BID     PRN Meds:calcium carbonate, oxyCODONE **AND** oxyCODONE **AND** oxyCODONE **AND** HYDROmorphone **AND** HYDROmorphone **AND** HYDROmorphone, naloxone, ondansetron     Review of patient's allergies indicates:   Allergen Reactions    Adhesive Itching, Rash and Blisters     INCLUDES EKG PADS    Iodinated contrast- oral and iv dye     Pcn [penicillins]     Phenergan plain     Tramadol     Vancomycin analogues      Objective:     Vital Signs (Most Recent):  Temp: 99 °F (37.2 °C) (08/10/18 0000)  Pulse: 63 (08/10/18 0400)  Resp: 11 (08/10/18 0400)  BP: (!) 112/52 (08/10/18 0400)  SpO2: 97 % (08/10/18 0400) Vital Signs (24h Range):  Temp:  [97.7 °F (36.5 °C)-99 °F (37.2 °C)] 99 °F (37.2 °C)  Pulse:  [63-92] 63  Resp:  [10-28] 11  SpO2:  [92 %-100 %] 97 %  BP: (107-182)/(51-77) 112/52  Arterial Line BP: (152-157)/(55-59) 157/59     Intake/Output - Last 3 Shifts       08/08 0700 - 08/09 0659 08/09 0700 - 08/10 0659 08/10 0700 - 08/11 0659    P.O.  240     I.V. (mL/kg)  2341.3 (30.5)     Total Intake(mL/kg)  2581.3 (33.7)     Urine (mL/kg/hr)  885 (0.5)     Blood  200 (0.1)     Chest Tube  220 (0.1)     Total Output   1305      Net   +1276.3                   SpO2: 97 %  O2 Device (Oxygen Therapy): nasal cannula    Physical Exam   Constitutional: She is oriented to person, place, and time. She appears well-nourished. No distress.   HENT:   Head: Normocephalic and atraumatic.   Eyes: EOM are normal.   Neck: Neck supple. No tracheal deviation present.   Cardiovascular: Normal rate, regular rhythm and normal heart  sounds.    Pulmonary/Chest: Effort normal. She exhibits tenderness (expected).   2 right sided chest tubes, ss, no air leak    Abdominal: Soft.   Musculoskeletal: Normal range of motion.   Neurological: She is alert and oriented to person, place, and time.   Skin: Skin is warm and dry.   Psychiatric: She has a normal mood and affect. Thought content normal.   Vitals reviewed.      Significant Labs:  BMP:   Recent Labs  Lab 08/10/18  0530   *   *   K 3.9      CO2 27   BUN 16   CREATININE 0.9   CALCIUM 8.6*   MG 1.9     CBC:   Recent Labs  Lab 08/10/18  0530   WBC 9.90   RBC 3.88*   HGB 12.1   HCT 36.3*      MCV 94   MCH 31.2*   MCHC 33.3       Significant Diagnostics:  CXR: I have reviewed all pertinent results/findings within the past 24 hours    VTE Risk Mitigation         Ordered     IP VTE HIGH RISK PATIENT  Once      08/09/18 1241

## 2018-08-10 NOTE — PLAN OF CARE
VATS, WITH LOBECTOMY Upper right lobe  LYMPHADENECTOMY/mediastinal on 8/9/18. CM met with pt and Geo) to discuss LOS and dc needs. Pt is a nurse here. No dc needs noted at this time. Will cont to follow.        08/10/18 1526   Discharge Assessment   Assessment Type Discharge Planning Assessment   Confirmed/corrected address and phone number on facesheet? Yes   Assessment information obtained from? Patient   Expected Length of Stay (days) 4.5   Communicated expected length of stay with patient/caregiver yes   Prior to hospitilization cognitive status: Alert/Oriented   Prior to hospitalization functional status: Independent   Current cognitive status: Alert/Oriented   Current Functional Status: Independent   Lives With significant other   Able to Return to Prior Arrangements yes   Is patient able to care for self after discharge? Yes   Who are your caregiver(s) and their phone number(s)? andrez Guido [phone]   Patient's perception of discharge disposition home or selfcare   Readmission Within The Last 30 Days no previous admission in last 30 days   Patient currently being followed by outpatient case management? No   Patient currently receives any other outside agency services? No   Equipment Currently Used at Home none   Do you have any problems affording any of your prescribed medications? No   Is the patient taking medications as prescribed? yes   Does the patient have transportation home? Yes   Transportation Available family or friend will provide   Does the patient receive services at the [facility]? No   Discharge Plan A Home with family   Discharge Plan B Home with family;Home Health   Patient/Family In Agreement With Plan yes

## 2018-08-11 PROCEDURE — 25000003 PHARM REV CODE 250: Performed by: PHYSICIAN ASSISTANT

## 2018-08-11 PROCEDURE — 25000003 PHARM REV CODE 250: Performed by: SURGERY

## 2018-08-11 PROCEDURE — G8987 SELF CARE CURRENT STATUS: HCPCS | Mod: CJ

## 2018-08-11 PROCEDURE — 99900035 HC TECH TIME PER 15 MIN (STAT)

## 2018-08-11 PROCEDURE — 97530 THERAPEUTIC ACTIVITIES: CPT

## 2018-08-11 PROCEDURE — 94799 UNLISTED PULMONARY SVC/PX: CPT

## 2018-08-11 PROCEDURE — 97165 OT EVAL LOW COMPLEX 30 MIN: CPT

## 2018-08-11 PROCEDURE — 20600001 HC STEP DOWN PRIVATE ROOM

## 2018-08-11 PROCEDURE — 25000003 PHARM REV CODE 250: Performed by: STUDENT IN AN ORGANIZED HEALTH CARE EDUCATION/TRAINING PROGRAM

## 2018-08-11 PROCEDURE — 63600175 PHARM REV CODE 636 W HCPCS: Performed by: PHYSICIAN ASSISTANT

## 2018-08-11 PROCEDURE — G8988 SELF CARE GOAL STATUS: HCPCS | Mod: CI

## 2018-08-11 RX ORDER — ONDANSETRON 2 MG/ML
4 INJECTION INTRAMUSCULAR; INTRAVENOUS EVERY 6 HOURS
Status: DISCONTINUED | OUTPATIENT
Start: 2018-08-11 | End: 2018-08-12

## 2018-08-11 RX ORDER — SCOLOPAMINE TRANSDERMAL SYSTEM 1 MG/1
1 PATCH, EXTENDED RELEASE TRANSDERMAL
Status: DISCONTINUED | OUTPATIENT
Start: 2018-08-11 | End: 2018-08-13 | Stop reason: HOSPADM

## 2018-08-11 RX ORDER — KETOROLAC TROMETHAMINE 30 MG/ML
30 INJECTION, SOLUTION INTRAMUSCULAR; INTRAVENOUS EVERY 6 HOURS
Status: DISCONTINUED | OUTPATIENT
Start: 2018-08-11 | End: 2018-08-13 | Stop reason: HOSPADM

## 2018-08-11 RX ADMIN — ATENOLOL 25 MG: 25 TABLET ORAL at 05:08

## 2018-08-11 RX ADMIN — ATENOLOL 25 MG: 25 TABLET ORAL at 08:08

## 2018-08-11 RX ADMIN — SENNOSIDES AND DOCUSATE SODIUM 1 TABLET: 8.6; 5 TABLET ORAL at 08:08

## 2018-08-11 RX ADMIN — FAMOTIDINE 20 MG: 20 TABLET ORAL at 08:08

## 2018-08-11 RX ADMIN — POLYETHYLENE GLYCOL 3350 17 G: 17 POWDER, FOR SOLUTION ORAL at 08:08

## 2018-08-11 RX ADMIN — OXYCODONE HYDROCHLORIDE 5 MG: 5 TABLET ORAL at 08:08

## 2018-08-11 RX ADMIN — LOSARTAN POTASSIUM 25 MG: 25 TABLET, FILM COATED ORAL at 08:08

## 2018-08-11 RX ADMIN — OXYCODONE HYDROCHLORIDE 5 MG: 5 TABLET ORAL at 12:08

## 2018-08-11 RX ADMIN — OXYCODONE HYDROCHLORIDE 5 MG: 5 TABLET ORAL at 06:08

## 2018-08-11 RX ADMIN — OXYCODONE HYDROCHLORIDE 5 MG: 5 TABLET ORAL at 02:08

## 2018-08-11 RX ADMIN — ACETAMINOPHEN 1000 MG: 500 TABLET ORAL at 02:08

## 2018-08-11 RX ADMIN — OXYCODONE HYDROCHLORIDE 5 MG: 5 TABLET ORAL at 09:08

## 2018-08-11 RX ADMIN — ATENOLOL 25 MG: 25 TABLET ORAL at 12:08

## 2018-08-11 RX ADMIN — ENOXAPARIN SODIUM 40 MG: 100 INJECTION SUBCUTANEOUS at 08:08

## 2018-08-11 NOTE — PT/OT/SLP EVAL
Occupational Therapy   Evaluation    Name: Alesha Toney  MRN: [id number]  Admitting Diagnosis:  Non-small cell lung cancer (NSCLC) 2 Days Post-Op    Recommendations:     Discharge Recommendations: home health OT  Discharge Equipment Recommendations:   (TBD)  Barriers to discharge:  None    History:     Occupational Profile:  Living Environment: Pt lives with partner in 1SH with threshold GUILLERMINA. Home has tub/shower and walk-in shower; pt uses walk-in and reports has grab bars  Previous level of function: PTA, pt was independent with all ADL and IADL, including driving. Pt was not using any AD for ambulation or ADLs. Pt works full-time as a HH RN. Pt reports dog at home has caused falls and injuries in the past  Roles and Routines: partner, HH RN  Equipment Owned:  none  Assistance upon Discharge: Partner to assist as needed following d/c     Past Medical History:   Diagnosis Date    Breast cyst     Cardiac arrhythmia     Fibrocystic breast     History of hysterectomy     20yrs ago    History of kidney stones     Lung cancer        Past Surgical History:   Procedure Laterality Date    ADENOIDECTOMY  19yo    APPENDECTOMY      BONE RESECTION, RIB  1980    BREAST BIOPSY Left     at least 40yrs ago in her 20's    BREAST CYST ASPIRATION      BREAST CYST EXCISION      ENDOBRONCHIAL ULTRASOUND N/A 7/10/2018    Procedure: ULTRASOUND, ENDOBRONCHIAL;  Surgeon: Sri Hearn MD;  Location: [facility] OR [address] [place] [address];  Service: Pulmonary;  Laterality: N/A;    HYSTERECTOMY      @47yrs of age    KIDNEY SURGERY      19yo    OOPHORECTOMY      @47yrs of age    TONSILLECTOMY      VIDEO-ASSISTED THORACOSCOPIC LOBECTOMY Right 8/9/2018    Procedure: VATS, WITH LOBECTOMY Possible chest wall resection;  Surgeon: Philip Messina MD;  Location: [facility] OR [address] [place] [address];  Service: Thoracic;  Laterality: Right;  Have open pan available.       Subjective     Chief Complaint: pain  Patient/Family stated goals: return to PLOF  Communicated with:  RN prior to session.  Pain/Comfort:  · Pain Rating 1: 3/10  · Location - Side 1: Right  · Location - Orientation 1: upper  · Location 1: chest  · Pain Addressed 1: Reposition, Distraction, Cessation of Activity, Nurse notified  · Pain Rating Post-Intervention 1: 3/10    Patients cultural, spiritual, Latter-day conflicts given the current situation: none reported    Objective:     Patient found with: telemetry, pulse ox (continuous), chest tube    General Precautions: Standard, fall   Orthopedic Precautions:N/A   Braces: N/A     Occupational Performance:    Bed Mobility:    · Patient completed Scooting/Bridging with contact guard assistance  · Patient completed Supine to Sit with minimum assistance    Functional Mobility/Transfers:  · Patient completed Sit <> Stand Transfer with contact guard assistance  with  no assistive device   · Patient completed Bed <> Chair Transfer using Step Transfer technique with contact guard assistance with no assistive device  · Functional Mobility: Pt ambulate 75 ft with CGA using no AD; pt use slow, small steps during ambulation    Activities of Daily Living:  · Upper Body Dressing: minimum assistance to don backward gown while seated EOB 2/2 decreased ROM from R side pain  · Lower Body Dressing: independence to manage B socks while seated UIC using figure-four position    Cognitive/Visual Perceptual:  Cognitive/Psychosocial Skills:     -       Oriented to: Person, Place and Situation   -       Follows Commands/attention:Follows multistep  commands  -       Communication: clear/fluent  -       Memory: No Deficits noted  -       Safety awareness/insight to disability: intact   -       Mood/Affect/Coping skills/emotional control: Appropriate to situation  Visual/Perceptual:      -wears glasses    Physical Exam:  Balance:    -       good sitting/standing balance  Postural examination/scapula alignment:    -       No postural abnormalities identified  Skin integrity: Visible skin  "intact  Edema:  Mild BLE  Sensation:    -       Intact  Dominant hand:    -       R hand  Upper Extremity Range of Motion:     -       Right Upper Extremity: shoulder AROM to 90 2/2 pain; elbow ROM is WFL  -       Left Upper Extremity: WFL  Upper Extremity Strength:    -       Right Upper Extremity: grossly 4-/5; pain-limited  -       Left Upper Extremity: WFL   Strength:    -       Right Upper Extremity: WFL  -       Left Upper Extremity: WFL  Fine Motor Coordination:    -       Intact  Gross motor coordination:   WFL    Patient left up in chair with all lines intact, call button in reach, RN notified and partner, friend present    [facility] 6 Click:  [facility] Total Score: 21    Treatment & Education:  Pt educated on role of OT/POC  Pt educated on importance of ambulation/UIC  Pt educated on and in agreement with  rec  Pt encouraged to ambulate with partner and RN throughout day  White board/communication board updated  Education:    Assessment:     Alesha Toney is a 67 y.o. female with a medical diagnosis of Non-small cell lung cancer (NSCLC).  She presents with pain limiting RUE ROM and independent functional mobility. Pt cautious about movement and taking small, slow steps during ambulation.  Performance deficits affecting function are weakness, impaired endurance, impaired functional mobilty, impaired self care skills, impaired balance, gait instability, pain, decreased upper extremity function.      Rehab Prognosis:  Good; patient would benefit from acute skilled OT services to address these deficits and reach maximum level of function.         Clinical Decision Makin.  OT Low:  "Pt evaluation falls under low complexity for evaluation coding due to performance deficits noted in 1-3 areas as stated above and 0 co-morbities affecting current functional status. Data obtained from problem focused assessments. No modifications or assistance was required for completion of evaluation. Only brief " "occupational profile and history review completed."     Plan:     Patient to be seen 3 x/week to address the above listed problems via self-care/home management, therapeutic activities, therapeutic exercises  · Plan of Care Expires: 09/10/18  · Plan of Care Reviewed with: patient, significant other    This Plan of care has been discussed with the patient who was involved in its development and understands and is in agreement with the identified goals and treatment plan    GOALS:    Occupational Therapy Goals        Problem: Occupational Therapy Goal    Goal Priority Disciplines Outcome Interventions   Occupational Therapy Goal     OT, PT/OT Ongoing (interventions implemented as appropriate)    Description:  Goals to be met by: 8/18/18     Patient will increase functional independence with ADLs by performing:    UE Dressing with Set-up Assistance.  Grooming while standing at sink with Supervision.  Toileting from toilet with Supervision for hygiene and clothing management.   Supine to sit with Supervision.  Toilet transfer to toilet with Supervision.                      Time Tracking:     OT Date of Treatment: 08/11/18  OT Start Time: 0935  OT Stop Time: 1003  OT Total Time (min): 28 min    Billable Minutes:Evaluation 20  Therapeutic Activity 8    Maria Luisa Downing OT  8/11/2018    "

## 2018-08-11 NOTE — PLAN OF CARE
Problem: Patient Care Overview  Goal: Plan of Care Review  Outcome: Ongoing (interventions implemented as appropriate)  Plan of Care reviewed w/ pt and family at bedside. BPs elevated up to 170s/70s, resolved to 160/60s w/ scheduled atenolol. Sats >90% on RA-1L NC. R chest tube incision w/ gauze and tegaderm CDI, R chest tube now to -20 wall sxn. Tolerating regular diet, voiding CYU in bathroom. Ambulating in halls w/ standby assist. PRN Oxycodone for pain w/ effect.

## 2018-08-11 NOTE — PROGRESS NOTES
[facility]  Thoracic Surgery  Progress Note    Subjective:     History of Present Illness:  Patient is a 67 y.o. female previous smoker, TOS s/p 1st rib resection,  h/o SVT referred for new adenocarcinoma of the RUL. EBUS bx negative, PET without distant mets (although hypermetabolic area in rectum needs f/u). She had an xray done for shoulder pain which identified the mass. She denies dyspnea, chest pain, cough, fatigue, weight loss. 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         Post-Op Info:  Procedure(s) (LRB):  VATS, WITH LOBECTOMY Possible chest wall resection (Right)  THORACOTOMY (Right)  LYMPHADENECTOMY/mediastinal (N/A)   2 Days Post-Op     Interval History: Having pain, didn't take any pain medicine yesterday. Nauseated. Overall a bit better this am though. No respiratory distress. Hasn't walked in blas.      Medications:  Continuous Infusions:  Scheduled Meds:   acetaminophen  1,000 mg Oral Q6H    atenolol  25 mg Oral QID    enoxaparin  40 mg Subcutaneous Daily    famotidine  20 mg Oral BID    losartan  25 mg Oral QHS    polyethylene glycol  17 g Oral Daily    pregabalin  150 mg Oral BID    senna-docusate 8.6-50 mg  1 tablet Oral BID     PRN Meds:calcium carbonate, oxyCODONE **AND** oxyCODONE **AND** oxyCODONE **AND** [DISCONTINUED] HYDROmorphone **AND** [DISCONTINUED] HYDROmorphone **AND** HYDROmorphone, naloxone, ondansetron     Review of patient's allergies indicates:   Allergen Reactions    Adhesive Itching, Rash and Blisters     INCLUDES EKG PADS    Iodinated contrast- oral and iv dye     Pcn [penicillins]     Phenergan plain     Tramadol     Vancomycin analogues      Objective:     Vital Signs (Most Recent):  Temp: 98.4 °F (36.9 °C) (08/11/18 0815)  Pulse: 67 (08/11/18 0815)  Resp: 20 (08/11/18 0815)  BP: (!) 178/79 (08/11/18 0815)  SpO2: 96 % (08/11/18 0815) Vital Signs (24h Range):  Temp:  [98.1 °F (36.7 °C)-99 °F (37.2 °C)] 98.4 °F (36.9  °C)  Pulse:  [56-80] 67  Resp:  [8-29] 20  SpO2:  [91 %-99 %] 96 %  BP: (140-178)/(61-79) 178/79     Intake/Output - Last 3 Shifts       08/09 0700 - 08/10 0659 08/10 0700 - 08/11 0659 08/11 0700 - 08/12 0659    P.O. 240 1690     I.V. (mL/kg) 2461.3 (32.1)      Total Intake(mL/kg) 2701.3 (35.2) 1690 (22)     Urine (mL/kg/hr) 885 (0.5) 1475 (0.8)     Blood 200 (0.1)      Chest Tube 291 (0.2) 310 (0.2)     Total Output 1376 1785      Net +1325.3 -95             Urine Occurrence 0 x 1 x           SpO2: 96 %  O2 Device (Oxygen Therapy): room air    Physical Exam   Constitutional: She is oriented to person, place, and time. She appears well-nourished. No distress.   HENT:   Head: Normocephalic and atraumatic.   Eyes: EOM are normal.   Neck: Neck supple. No tracheal deviation present.   Cardiovascular: Normal rate, regular rhythm and normal heart sounds.    Pulmonary/Chest: Effort normal. She exhibits tenderness (expected).   No airleak today. SS drainage   Abdominal: Soft.   Musculoskeletal: Normal range of motion.   Neurological: She is alert and oriented to person, place, and time.   Skin: Skin is warm and dry.   Psychiatric: She has a normal mood and affect. Thought content normal.   Vitals reviewed.      Significant Labs:  BMP:     Recent Labs  Lab 08/10/18  0530   *   *   K 3.9      CO2 27   BUN 16   CREATININE 0.9   CALCIUM 8.6*   MG 1.9     CBC:     Recent Labs  Lab 08/10/18  0530   WBC 9.90   RBC 3.88*   HGB 12.1   HCT 36.3*      MCV 94   MCH 31.2*   MCHC 33.3       Significant Diagnostics:  CXR: I have reviewed all pertinent results/findings within the past 24 hours    VTE Risk Mitigation         Ordered     enoxaparin injection 40 mg  Daily      08/10/18 0724     IP VTE HIGH RISK PATIENT  Once      08/09/18 1241        Assessment/Plan:     * Non-small cell lung cancer (NSCLC)    66 yo female s/p right VATS RULobectomy with MLND.     Encouraged appropriate pain management. Ambulate  today, she is motivated.  Chest tube clamped, remove if no issues and CXR stable at 11am  Regular diet   OOB, IS, ambulation  PT/OT  Dvt/gi ppx   Wean supplemental oxygen for spO2 > 90%    Dispo: plan for d/c tomorrow        Essential hypertension    Restart home meds             Ro Smith MD  Thoracic Surgery  [facility]

## 2018-08-11 NOTE — SUBJECTIVE & OBJECTIVE
Interval History: Having pain, didn't take any pain medicine yesterday. Nauseated. Overall a bit better this am though. No respiratory distress. Hasn't walked in blas.      Medications:  Continuous Infusions:  Scheduled Meds:   acetaminophen  1,000 mg Oral Q6H    atenolol  25 mg Oral QID    enoxaparin  40 mg Subcutaneous Daily    famotidine  20 mg Oral BID    losartan  25 mg Oral QHS    polyethylene glycol  17 g Oral Daily    pregabalin  150 mg Oral BID    senna-docusate 8.6-50 mg  1 tablet Oral BID     PRN Meds:calcium carbonate, oxyCODONE **AND** oxyCODONE **AND** oxyCODONE **AND** [DISCONTINUED] HYDROmorphone **AND** [DISCONTINUED] HYDROmorphone **AND** HYDROmorphone, naloxone, ondansetron     Review of patient's allergies indicates:   Allergen Reactions    Adhesive Itching, Rash and Blisters     INCLUDES EKG PADS    Iodinated contrast- oral and iv dye     Pcn [penicillins]     Phenergan plain     Tramadol     Vancomycin analogues      Objective:     Vital Signs (Most Recent):  Temp: 98.4 °F (36.9 °C) (08/11/18 0815)  Pulse: 67 (08/11/18 0815)  Resp: 20 (08/11/18 0815)  BP: (!) 178/79 (08/11/18 0815)  SpO2: 96 % (08/11/18 0815) Vital Signs (24h Range):  Temp:  [98.1 °F (36.7 °C)-99 °F (37.2 °C)] 98.4 °F (36.9 °C)  Pulse:  [56-80] 67  Resp:  [8-29] 20  SpO2:  [91 %-99 %] 96 %  BP: (140-178)/(61-79) 178/79     Intake/Output - Last 3 Shifts       08/09 0700 - 08/10 0659 08/10 0700 - 08/11 0659 08/11 0700 - 08/12 0659    P.O. 240 1690     I.V. (mL/kg) 2461.3 (32.1)      Total Intake(mL/kg) 2701.3 (35.2) 1690 (22)     Urine (mL/kg/hr) 885 (0.5) 1475 (0.8)     Blood 200 (0.1)      Chest Tube 291 (0.2) 310 (0.2)     Total Output 1376 1785      Net +1325.3 -95             Urine Occurrence 0 x 1 x           SpO2: 96 %  O2 Device (Oxygen Therapy): room air    Physical Exam   Constitutional: She is oriented to person, place, and time. She appears well-nourished. No distress.   HENT:   Head: Normocephalic and  atraumatic.   Eyes: EOM are normal.   Neck: Neck supple. No tracheal deviation present.   Cardiovascular: Normal rate, regular rhythm and normal heart sounds.    Pulmonary/Chest: Effort normal. She exhibits tenderness (expected).   No airleak today. SS drainage   Abdominal: Soft.   Musculoskeletal: Normal range of motion.   Neurological: She is alert and oriented to person, place, and time.   Skin: Skin is warm and dry.   Psychiatric: She has a normal mood and affect. Thought content normal.   Vitals reviewed.      Significant Labs:  BMP:     Recent Labs  Lab 08/10/18  0530   *   *   K 3.9      CO2 27   BUN 16   CREATININE 0.9   CALCIUM 8.6*   MG 1.9     CBC:     Recent Labs  Lab 08/10/18  0530   WBC 9.90   RBC 3.88*   HGB 12.1   HCT 36.3*      MCV 94   MCH 31.2*   MCHC 33.3       Significant Diagnostics:  CXR: I have reviewed all pertinent results/findings within the past 24 hours    VTE Risk Mitigation         Ordered     enoxaparin injection 40 mg  Daily      08/10/18 0724     IP VTE HIGH RISK PATIENT  Once      08/09/18 1241

## 2018-08-11 NOTE — RESIDENT HANDOFF
After speaking with Dr. Whitaker, decision was made to pull CT.  2 hour post CT x-ray ordered and will f/u.

## 2018-08-11 NOTE — PLAN OF CARE
Problem: Patient Care Overview  Goal: Plan of Care Review  Outcome: Ongoing (interventions implemented as appropriate)  Patient is awake, alert, and oriented. Complains of incisional pain. States pain is being controlled with current pain med regimen.  VS stable. Telemetry monitoring. Right sided chest tube to water seal. Ambulated to bathroom. Verbalizes  understanding of plan of care. Nurse will continue to monitor.

## 2018-08-11 NOTE — PLAN OF CARE
Problem: Occupational Therapy Goal  Goal: Occupational Therapy Goal  Goals to be met by: 8/18/18     Patient will increase functional independence with ADLs by performing:    UE Dressing with Set-up Assistance.  Grooming while standing at sink with Supervision.  Toileting from toilet with Supervision for hygiene and clothing management.   Supine to sit with Supervision.  Toilet transfer to toilet with Supervision.    Outcome: Ongoing (interventions implemented as appropriate)  Eval completed; goals established    Comments: Initiate OT JACK Downing OT  8/11/2018

## 2018-08-11 NOTE — ASSESSMENT & PLAN NOTE
66 yo female s/p right VATS RULobectomy with MLND.     Encouraged appropriate pain management. Ambulate today, she is motivated.  Chest tube clamped, remove if no issues and CXR stable at 11am  Regular diet   OOB, IS, ambulation  PT/OT  Dvt/gi ppx   Wean supplemental oxygen for spO2 > 90%    Dispo: plan for d/c tomorrow

## 2018-08-12 PROBLEM — C34.90 NON-SMALL CELL LUNG CANCER (NSCLC): Chronic | Status: ACTIVE | Noted: 2018-08-09

## 2018-08-12 PROCEDURE — 63600175 PHARM REV CODE 636 W HCPCS: Performed by: SURGERY

## 2018-08-12 PROCEDURE — 94761 N-INVAS EAR/PLS OXIMETRY MLT: CPT

## 2018-08-12 PROCEDURE — 25000003 PHARM REV CODE 250: Performed by: PHYSICIAN ASSISTANT

## 2018-08-12 PROCEDURE — 94799 UNLISTED PULMONARY SVC/PX: CPT

## 2018-08-12 PROCEDURE — 20600001 HC STEP DOWN PRIVATE ROOM

## 2018-08-12 PROCEDURE — 97161 PT EVAL LOW COMPLEX 20 MIN: CPT

## 2018-08-12 PROCEDURE — 63600175 PHARM REV CODE 636 W HCPCS: Performed by: PHYSICIAN ASSISTANT

## 2018-08-12 PROCEDURE — 25000003 PHARM REV CODE 250: Performed by: STUDENT IN AN ORGANIZED HEALTH CARE EDUCATION/TRAINING PROGRAM

## 2018-08-12 PROCEDURE — 25000003 PHARM REV CODE 250: Performed by: SURGERY

## 2018-08-12 RX ORDER — CLONIDINE HYDROCHLORIDE 0.1 MG/1
0.1 TABLET ORAL 3 TIMES DAILY
Status: DISCONTINUED | OUTPATIENT
Start: 2018-08-12 | End: 2018-08-12

## 2018-08-12 RX ORDER — ADHESIVE BANDAGE
30 BANDAGE TOPICAL ONCE
Status: COMPLETED | OUTPATIENT
Start: 2018-08-12 | End: 2018-08-12

## 2018-08-12 RX ORDER — ONDANSETRON 4 MG/1
4 TABLET, FILM COATED ORAL EVERY 6 HOURS PRN
Status: DISCONTINUED | OUTPATIENT
Start: 2018-08-12 | End: 2018-08-13

## 2018-08-12 RX ORDER — OXYCODONE AND ACETAMINOPHEN 5; 325 MG/1; MG/1
TABLET ORAL
Qty: 41 TABLET | Refills: 0 | Status: SHIPPED | OUTPATIENT
Start: 2018-08-12 | End: 2018-09-11 | Stop reason: SDUPTHER

## 2018-08-12 RX ORDER — POLYETHYLENE GLYCOL 3350 17 G/17G
17 POWDER, FOR SOLUTION ORAL DAILY
Qty: 119 G | Refills: 0 | Status: SHIPPED | OUTPATIENT
Start: 2018-08-12 | End: 2018-10-04 | Stop reason: CLARIF

## 2018-08-12 RX ORDER — ONDANSETRON 4 MG/1
4 TABLET, ORALLY DISINTEGRATING ORAL EVERY 8 HOURS PRN
Qty: 20 TABLET | Refills: 0 | Status: ON HOLD | OUTPATIENT
Start: 2018-08-12 | End: 2018-10-15 | Stop reason: HOSPADM

## 2018-08-12 RX ADMIN — OXYCODONE HYDROCHLORIDE 5 MG: 5 TABLET ORAL at 08:08

## 2018-08-12 RX ADMIN — FAMOTIDINE 20 MG: 20 TABLET ORAL at 08:08

## 2018-08-12 RX ADMIN — ONDANSETRON HYDROCHLORIDE 4 MG: 4 TABLET, FILM COATED ORAL at 08:08

## 2018-08-12 RX ADMIN — ATENOLOL 25 MG: 25 TABLET ORAL at 07:08

## 2018-08-12 RX ADMIN — ATENOLOL 25 MG: 25 TABLET ORAL at 12:08

## 2018-08-12 RX ADMIN — ATENOLOL 25 MG: 25 TABLET ORAL at 04:08

## 2018-08-12 RX ADMIN — MAGNESIUM HYDROXIDE 2400 MG: 400 SUSPENSION ORAL at 10:08

## 2018-08-12 RX ADMIN — LOSARTAN POTASSIUM 25 MG: 25 TABLET, FILM COATED ORAL at 08:08

## 2018-08-12 RX ADMIN — POLYETHYLENE GLYCOL 3350 17 G: 17 POWDER, FOR SOLUTION ORAL at 09:08

## 2018-08-12 RX ADMIN — OXYCODONE HYDROCHLORIDE 5 MG: 5 TABLET ORAL at 09:08

## 2018-08-12 RX ADMIN — FAMOTIDINE 20 MG: 20 TABLET ORAL at 09:08

## 2018-08-12 RX ADMIN — SENNOSIDES AND DOCUSATE SODIUM 1 TABLET: 8.6; 5 TABLET ORAL at 09:08

## 2018-08-12 RX ADMIN — ONDANSETRON 4 MG: 2 INJECTION INTRAMUSCULAR; INTRAVENOUS at 07:08

## 2018-08-12 RX ADMIN — ENOXAPARIN SODIUM 40 MG: 100 INJECTION SUBCUTANEOUS at 09:08

## 2018-08-12 RX ADMIN — SENNOSIDES AND DOCUSATE SODIUM 1 TABLET: 8.6; 5 TABLET ORAL at 08:08

## 2018-08-12 RX ADMIN — ATENOLOL 25 MG: 25 TABLET ORAL at 08:08

## 2018-08-12 NOTE — ASSESSMENT & PLAN NOTE
68 yo female s/p right VATS RULobectomy with MLND.     Regular diet   OOB, IS, ambulation  PT/OT  Dvt/gi ppx   Wean supplemental oxygen for spO2 > 90%    Dispo: d/c today

## 2018-08-12 NOTE — PROGRESS NOTES
Pt BP in the 180s/70s even after scheduled home BP meds given; HR in the 60s. Pt says she is fine and it is only high because of the stress of being in the hospital. Pt does not want any PRN BP meds to be given to her. MD on call notified of BP and pts refusal. MD verbalized understanding. No new orders at this time. Will continue to monitor.

## 2018-08-12 NOTE — PLAN OF CARE
Problem: Patient Care Overview  Goal: Plan of Care Review  Outcome: Ongoing (interventions implemented as appropriate)  Plan of Care reviewed w/ pt and family at bedside. SBPs elevated up to 180s, resolved to 140s w/ scheduled atenolol. Sats >90% on RA. R chest tube incision w/ gauze and tegaderm CDI. Tolerating regular diet, voiding CYU in bathroom. Ambulating in halls w/ standby assist. PRN Oxycodone for pain w/ effect. Plan for discharge home tomorrow.

## 2018-08-12 NOTE — PLAN OF CARE
Problem: Physical Therapy Goal  Goal: Physical Therapy Goal  Pt does not require additional acute PT services at this time as pt able to manage self care c assistance from partner as needed. Amb 150ft c S no AD.    Pt educated on asking medical staff for PT consult if changes in functional status occurs. - v/u        Outcome: Outcome(s) achieved Date Met: 08/12/18  Priti and D/C from acute PT services    Rick Negrete DPT  8/12/2018

## 2018-08-12 NOTE — PT/OT/SLP EVAL
"Physical Therapy Evaluation and Discharge Note    Patient Name:  Alesha Toney   MRN:  [id number]    Recommendations:     Discharge Recommendations:  home health PT, home health OT   Discharge Equipment Recommendations:     Barriers to discharge: None    Assessment:     Alesha Toney is a 67 y.o. female admitted with a medical diagnosis of Non-small cell lung cancer (NSCLC). .  At this time, patient is functioning at their prior level of function and does not require further acute PT services.     Recent Surgery: Procedure(s) (LRB):  VATS, WITH LOBECTOMY Possible chest wall resection (Right)  THORACOTOMY (Right)  LYMPHADENECTOMY/mediastinal (N/A) 3 Days Post-Op    Plan:     During this hospitalization, patient does not require further acute PT services.  Please re-consult if situation changes.     Plan of Care Reviewed with: patient, family    Subjective     Communicated with RN prior to session.  Patient found HOB elevated and partner present upon PT entry to room, agreeable to evaluation.      Chief Complaint: R flank pain  Patient comments/goals: "It's a burning pain and it gets worse when I stand up." - referring to flank pain  Pain/Comfort:  · Pain Rating 1: 6/10  · Location - Side 1: Right  · Location - Orientation 1: upper  · Location 1: flank  · Pain Addressed 1: Distraction, Reposition, Nurse notified  · Pain Rating Post-Intervention 1: 6/10    Patients cultural, spiritual, Yazidism conflicts given the current situation: none    Living Environment:  Pt lives c partner in [facility] c [facility].  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  PTA pt reports multiple falls d/t dog at home.  Prior to admission, patients level of function was independent.  Patient has the following equipment: none.  DME owned (not currently used): none.  Upon discharge, patient will have assistance from partner.    Objective:     Patient found with: telemetry, pulse ox (continuous)     General Precautions: Standard, fall   Orthopedic " Precautions:N/A   Braces: N/A     Exams:  · Cognitive Exam:  Patient is oriented to Person, Place, Time and Situation and follows 100% of all commands   · Gross Motor Coordination:  WFL  · Sensation:    · -       Intact  · RLE ROM: WFL  · RLE Strength: WFL  · LLE ROM: WFL  · LLE Strength: WFL    Functional Mobility:  · Bed Mobility:     · Rolling Right: independence  · Scooting: independence  · Supine to Sit: independence  · Sit to Supine: independence  · Transfers:     · Sit to Stand:  supervision with no AD 2x from bed  · Gait: 150ft c no AD S  · Steady gait, decreased gait speed, narrow TAO, c/o R flank pain  · Balance: dynamic standing (S)    AM-PAC 6 CLICK MOBILITY  Total Score:24       Therapeutic Activities and Exercises:  Educated on PT role/POC; safety c mobility; benefits of OOB activities; d/c recs - v/u    Patient left HOB elevated with all lines intact, call button in reach, RN notified and partner present.    GOALS:   Multidisciplinary Problems     Physical Therapy Goals     Not on file          Multidisciplinary Problems (Resolved)        Problem: Physical Therapy Goal    Goal Priority Disciplines Outcome Goal Variances Interventions   Physical Therapy Goal   (Resolved)     PT, PT/OT Outcome(s) achieved     Description:  Pt does not require additional acute PT services at this time as pt able to manage self care c assistance from partner as needed. Amb 150ft c S no AD.    Pt educated on asking medical staff for PT consult if changes in functional status occurs. - v/u                          History:     Past Medical History:   Diagnosis Date    Breast cyst     Cardiac arrhythmia     Fibrocystic breast     History of hysterectomy     20yrs ago    History of kidney stones     Lung cancer        Past Surgical History:   Procedure Laterality Date    ADENOIDECTOMY  17yo    APPENDECTOMY      BONE RESECTION, RIB  1980    BREAST BIOPSY Left     at least 40yrs ago in her 20's    BREAST CYST  ASPIRATION      BREAST CYST EXCISION      HYSTERECTOMY      @47yrs of age    KIDNEY SURGERY      17yo    OOPHORECTOMY      @47yrs of age    TONSILLECTOMY         Clinical Decision Making:     History  Co-morbidities and personal factors that may impact the plan of care Examination  Body Structures and Functions, activity limitations and participation restrictions that may impact the plan of care Clinical Presentation   Decision Making/ Complexity Score   Co-morbidities:   [] Time since onset of injury / illness / exacerbation  [] Status of current condition  []Patient's cognitive status and safety concerns    [] Multiple Medical Problems (see med hx)  Personal Factors:   [] Patient's age  [] Prior Level of function   [] Patient's home situation (environment and family support)  [] Patient's level of motivation  [] Expected progression of patient      HISTORY:(criteria)    [x] 12880 - no personal factors/history    [] 47021 - has 1-2 personal factor/comorbidity     [] 67883 - has >3 personal factor/comorbidity     Body Regions:  [] Objective examination findings  [] Head     []  Neck  [] Trunk   [] Upper Extremity  [] Lower Extremity    Body Systems:  [] For communication ability, affect, cognition, language, and learning style: the assessment of the ability to make needs known, consciousness, orientation (person, place, and time), expected emotional /behavioral responses, and learning preferences (eg, learning barriers, education  needs)  [] For the neuromuscular system: a general assessment of gross coordinated movement (eg, balance, gait, locomotion, transfers, and transitions) and motor function  (motor control and motor learning)  [] For the musculoskeletal system: the assessment of gross symmetry, gross range of motion, gross strength, height, and weight  [] For the integumentary system: the assessment of pliability(texture), presence of scar formation, skin color, and skin integrity  [x] For  cardiovascular/pulmonary system: the assessment of heart rate, respiratory rate, blood pressure, and edema     Activity limitations:    [] Patient's cognitive status and saf ety concerns          [] Status of current condition      [] Weight bearing restriction  [] Cardiopulmunary Restriction    Participation Restrictions:   [] Goals and goal agreement with the patient     [] Rehab potential (prognosis) and probable outcome      Examination of Body System: (criteria)    [x] 06302 - addressing 1-2 elements    [] 46308 - addressing a total of 3 or more elements     [] 78950 -  Addressing a total of 4 or more elements         Clinical Presentation: (criteria)  Stable - 61521     On examination of body system using standardized tests and measures patient presents with 1-2 elements from any of the following: body structures and functions, activity limitations, and/or participation restrictions.  Leading to a clinical presentation that is considered stable and/or uncomplicated                              Clinical Decision Making  (Eval Complexity):  Low- 37122     Time Tracking:     PT Received On: 08/12/18  PT Start Time: 0850     PT Stop Time: 0901  PT Total Time (min): 11 min     Billable Minutes: Evaluation 11 min      Rick Negrete, PT  08/12/2018

## 2018-08-12 NOTE — PROGRESS NOTES
[facility]  Thoracic Surgery  Progress Note    Subjective:     History of Present Illness:  Patient is a 67 y.o. female previous smoker, TOS s/p 1st rib resection,  h/o SVT referred for new adenocarcinoma of the RUL. EBUS bx negative, PET without distant mets (although hypermetabolic area in rectum needs f/u). She had an xray done for shoulder pain which identified the mass. She denies dyspnea, chest pain, cough, fatigue, weight loss. 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         Post-Op Info:  Procedure(s) (LRB):  VATS, WITH LOBECTOMY Possible chest wall resection (Right)  THORACOTOMY (Right)  LYMPHADENECTOMY/mediastinal (N/A)   3 Days Post-Op     Interval History: Patient is doing better since ct out. Ambulated yesterday. Hasn't had a bm and is nauseated still.     Medications:  Continuous Infusions:  Scheduled Meds:   atenolol  25 mg Oral QID    enoxaparin  40 mg Subcutaneous Daily    famotidine  20 mg Oral BID    ketorolac  30 mg Intravenous Q6H    losartan  25 mg Oral QHS    magnesium hydroxide 400 mg/5 ml  30 mL Oral Once    ondansetron  4 mg Intravenous Q6H    polyethylene glycol  17 g Oral Daily    scopolamine  1 patch Transdermal Q3 Days    senna-docusate 8.6-50 mg  1 tablet Oral BID     PRN Meds:calcium carbonate, oxyCODONE **AND** oxyCODONE **AND** oxyCODONE **AND** [DISCONTINUED] HYDROmorphone **AND** [DISCONTINUED] HYDROmorphone **AND** HYDROmorphone, naloxone     Review of patient's allergies indicates:   Allergen Reactions    Adhesive Itching, Rash and Blisters     INCLUDES EKG PADS    Iodinated contrast- oral and iv dye     Pcn [penicillins]     Phenergan plain     Tramadol     Vancomycin analogues      Objective:     Vital Signs (Most Recent):  Temp: 98.1 °F (36.7 °C) (08/12/18 0412)  Pulse: 67 (08/12/18 0412)  Resp: 16 (08/12/18 0412)  BP: (!) 167/72(MD aware) (08/12/18 0412)  SpO2: 96 % (08/12/18 0412) Vital Signs (24h Range):  Temp:  [98  °F (36.7 °C)-99.8 °F (37.7 °C)] 98.1 °F (36.7 °C)  Pulse:  [56-72] 67  Resp:  [9-38] 16  SpO2:  [86 %-99 %] 96 %  BP: (160-189)/(72-80) 167/72     Intake/Output - Last 3 Shifts     ** Patient Encounter Information Not Found **          SpO2: 96 %  O2 Device (Oxygen Therapy): nasal cannula    Physical Exam   Constitutional: She is oriented to person, place, and time. She appears well-nourished. No distress.   HENT:   Head: Normocephalic and atraumatic.   Eyes: EOM are normal.   Neck: Neck supple. No tracheal deviation present.   Cardiovascular: Normal rate, regular rhythm and normal heart sounds.   Pulmonary/Chest: Effort normal. She exhibits tenderness (expected).   Incisions c/d/i   Abdominal: Soft.   Musculoskeletal: Normal range of motion.   Neurological: She is alert and oriented to person, place, and time.   Skin: Skin is warm and dry.   Psychiatric: She has a normal mood and affect. Thought content normal.   Vitals reviewed.      Significant Labs:  BMP:   No results for input(s): GLU, NA, K, CL, CO2, BUN, CREATININE, CALCIUM, MG in the last 48 hours.  CBC:   No results for input(s): WBC, RBC, HGB, HCT, PLT, MCV, MCH, MCHC in the last 48 hours.    Significant Diagnostics:  CXR: I have reviewed all pertinent results/findings within the past 24 hours    VTE Risk Mitigation (From admission, onward)        Ordered     enoxaparin injection 40 mg  Daily      08/10/18 0724     IP VTE HIGH RISK PATIENT  Once      08/09/18 1241        Assessment/Plan:     * Non-small cell lung cancer (NSCLC)    68 yo female s/p right VATS RULobectomy with MLND.     Regular diet   OOB, IS, ambulation  PT/OT  Dvt/gi ppx   Wean supplemental oxygen for spO2 > 90%    Dispo: d/c today        Essential hypertension    Restart home meds             Ro Smith MD  Thoracic Surgery  [facility]

## 2018-08-12 NOTE — PLAN OF CARE
Problem: Patient Care Overview  Goal: Plan of Care Review  Outcome: Ongoing (interventions implemented as appropriate)  POC reviewed with pt who verbalized understanding. AAOx4. BP elevated (see previous note), VSS otherwise. Remains free of falls and injury. Right chest incisions intact with gauze and tegaderm. Pain managed with PO PRN meds per MAR. Up with standby assist to bathroom. Ambulated in halls 1x this shift. No SOB or O2 sat drop after ambulation, however, pt will drop into the 80s while sleeping - placed on 1L NC (MD aware). Tolerating diet; no complaints of nausea. Tele and  monitored throughout shift. TEDs in place. IS at bedside. Resting well between care. No acute events. No distress noted. Family at bedside. Will continue to monitor.

## 2018-08-12 NOTE — SUBJECTIVE & OBJECTIVE
Interval History: Patient is doing better since ct out. Ambulated yesterday. Hasn't had a bm and is nauseated still.     Medications:  Continuous Infusions:  Scheduled Meds:   atenolol  25 mg Oral QID    enoxaparin  40 mg Subcutaneous Daily    famotidine  20 mg Oral BID    ketorolac  30 mg Intravenous Q6H    losartan  25 mg Oral QHS    magnesium hydroxide 400 mg/5 ml  30 mL Oral Once    ondansetron  4 mg Intravenous Q6H    polyethylene glycol  17 g Oral Daily    scopolamine  1 patch Transdermal Q3 Days    senna-docusate 8.6-50 mg  1 tablet Oral BID     PRN Meds:calcium carbonate, oxyCODONE **AND** oxyCODONE **AND** oxyCODONE **AND** [DISCONTINUED] HYDROmorphone **AND** [DISCONTINUED] HYDROmorphone **AND** HYDROmorphone, naloxone     Review of patient's allergies indicates:   Allergen Reactions    Adhesive Itching, Rash and Blisters     INCLUDES EKG PADS    Iodinated contrast- oral and iv dye     Pcn [penicillins]     Phenergan plain     Tramadol     Vancomycin analogues      Objective:     Vital Signs (Most Recent):  Temp: 98.1 °F (36.7 °C) (08/12/18 0412)  Pulse: 67 (08/12/18 0412)  Resp: 16 (08/12/18 0412)  BP: (!) 167/72(MD aware) (08/12/18 0412)  SpO2: 96 % (08/12/18 0412) Vital Signs (24h Range):  Temp:  [98 °F (36.7 °C)-99.8 °F (37.7 °C)] 98.1 °F (36.7 °C)  Pulse:  [56-72] 67  Resp:  [9-38] 16  SpO2:  [86 %-99 %] 96 %  BP: (160-189)/(72-80) 167/72     Intake/Output - Last 3 Shifts     ** Patient Encounter Information Not Found **          SpO2: 96 %  O2 Device (Oxygen Therapy): nasal cannula    Physical Exam   Constitutional: She is oriented to person, place, and time. She appears well-nourished. No distress.   HENT:   Head: Normocephalic and atraumatic.   Eyes: EOM are normal.   Neck: Neck supple. No tracheal deviation present.   Cardiovascular: Normal rate, regular rhythm and normal heart sounds.   Pulmonary/Chest: Effort normal. She exhibits tenderness (expected).   Incisions c/d/i    Abdominal: Soft.   Musculoskeletal: Normal range of motion.   Neurological: She is alert and oriented to person, place, and time.   Skin: Skin is warm and dry.   Psychiatric: She has a normal mood and affect. Thought content normal.   Vitals reviewed.      Significant Labs:  BMP:   No results for input(s): GLU, NA, K, CL, CO2, BUN, CREATININE, CALCIUM, MG in the last 48 hours.  CBC:   No results for input(s): WBC, RBC, HGB, HCT, PLT, MCV, MCH, MCHC in the last 48 hours.    Significant Diagnostics:  CXR: I have reviewed all pertinent results/findings within the past 24 hours    VTE Risk Mitigation (From admission, onward)        Ordered     enoxaparin injection 40 mg  Daily      08/10/18 0724     IP VTE HIGH RISK PATIENT  Once      08/09/18 1241

## 2018-08-13 VITALS
HEART RATE: 59 BPM | BODY MASS INDEX: 27.16 KG/M2 | SYSTOLIC BLOOD PRESSURE: 159 MMHG | RESPIRATION RATE: 14 BRPM | TEMPERATURE: 98 F | WEIGHT: 169 LBS | HEIGHT: 66 IN | DIASTOLIC BLOOD PRESSURE: 68 MMHG | OXYGEN SATURATION: 96 %

## 2018-08-13 DIAGNOSIS — C34.90 MALIGNANT NEOPLASM OF LUNG, UNSPECIFIED LATERALITY, UNSPECIFIED PART OF LUNG: Primary | ICD-10-CM

## 2018-08-13 PROCEDURE — 25000003 PHARM REV CODE 250: Performed by: SURGERY

## 2018-08-13 PROCEDURE — 63600175 PHARM REV CODE 636 W HCPCS: Performed by: PHYSICIAN ASSISTANT

## 2018-08-13 PROCEDURE — 25000003 PHARM REV CODE 250: Performed by: PHYSICIAN ASSISTANT

## 2018-08-13 PROCEDURE — 25000003 PHARM REV CODE 250: Performed by: STUDENT IN AN ORGANIZED HEALTH CARE EDUCATION/TRAINING PROGRAM

## 2018-08-13 RX ORDER — ONDANSETRON 4 MG/1
4 TABLET, ORALLY DISINTEGRATING ORAL ONCE AS NEEDED
Status: COMPLETED | OUTPATIENT
Start: 2018-08-13 | End: 2018-08-13

## 2018-08-13 RX ORDER — SYRING-NEEDL,DISP,INSUL,0.3 ML 29 G X1/2"
296 SYRINGE, EMPTY DISPOSABLE MISCELLANEOUS ONCE
Status: COMPLETED | OUTPATIENT
Start: 2018-08-13 | End: 2018-08-13

## 2018-08-13 RX ADMIN — OXYCODONE HYDROCHLORIDE 5 MG: 5 TABLET ORAL at 04:08

## 2018-08-13 RX ADMIN — FAMOTIDINE 20 MG: 20 TABLET ORAL at 08:08

## 2018-08-13 RX ADMIN — OXYCODONE HYDROCHLORIDE 5 MG: 5 TABLET ORAL at 10:08

## 2018-08-13 RX ADMIN — MAGESIUM CITRATE 296 ML: 1.75 LIQUID ORAL at 08:08

## 2018-08-13 RX ADMIN — SENNOSIDES AND DOCUSATE SODIUM 1 TABLET: 8.6; 5 TABLET ORAL at 08:08

## 2018-08-13 RX ADMIN — ENOXAPARIN SODIUM 40 MG: 100 INJECTION SUBCUTANEOUS at 08:08

## 2018-08-13 RX ADMIN — POLYETHYLENE GLYCOL 3350 17 G: 17 POWDER, FOR SOLUTION ORAL at 08:08

## 2018-08-13 RX ADMIN — ONDANSETRON 4 MG: 4 TABLET, ORALLY DISINTEGRATING ORAL at 10:08

## 2018-08-13 RX ADMIN — ONDANSETRON HYDROCHLORIDE 4 MG: 4 TABLET, FILM COATED ORAL at 04:08

## 2018-08-13 RX ADMIN — ATENOLOL 25 MG: 25 TABLET ORAL at 08:08

## 2018-08-13 NOTE — NURSING
Discharge instructions provided to the pt and her daughter, both verbalized understanding.  Medications reconciled, pt verbalized understanding.  2 PIVs DCd, pt tolerated.  Patient waiting for wheelchair and escort.

## 2018-08-13 NOTE — PLAN OF CARE
Problem: Patient Care Overview  Goal: Plan of Care Review  Outcome: Ongoing (interventions implemented as appropriate)  POC reviewed with pt who verbalized understanding. AAOx4. VSS on 1L NC. Remains free of falls and injury. Right chest incision intact with steri strips. Old chest tube site dressing C/D/I. Pain and nausea managed with PO PRN meds per MAR. Up with standby assist to bathroom. Tolerating diet; no complaints of nausea. Tele and  monitored throughout shift. TEDs in place. IS at bedside. Resting well between care. No acute events. No distress noted. Family at bedside. Will continue to monitor.

## 2018-08-13 NOTE — DISCHARGE SUMMARY
[facility]  Thoracic Surgery  Discharge Summary    Patient Name: Alesha Toney  MRN: [id number]  Admission Date: 8/9/2018  Hospital Length of Stay: 4 days  Discharge Date and Time:  08/13/2018 8:19 AM  Attending Physician: Philip Messina MD   Discharging Provider: Aminta Bahena PA-C  Primary Care Provider: Cori Galloway MD    HPI:   Patient is a 67 y.o. female previous smoker, TOS s/p 1st rib resection,  h/o SVT referred for new adenocarcinoma of the RUL. EBUS bx negative, PET without distant mets (although hypermetabolic area in rectum needs f/u). She had an xray done for shoulder pain which identified the mass. She denies dyspnea, chest pain, cough, fatigue, weight loss. 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         Procedure(s) (LRB):  VATS, WITH LOBECTOMY Possible chest wall resection (Right)  THORACOTOMY (Right)  LYMPHADENECTOMY/mediastinal (N/A)      Hospital Course: 8/9/18 - Right VATS RULobectomy. Epidural per anesthesia. Not working post-op, removed. Transitioned to PO pain meds. CLD.    8/10/18 - Regular diet. 1 chest tube removed. Remaining to water seal.    8/11/18 - clamp trial on remaining chest tube. Regular diet. PO pain meds with prn nausea meds   8/12/18 - ambulating/ good UOP. No BM. Stable on RA.   8/13/18 - magnesium citrate. VSS. Stable for discharge.         Significant Diagnostic Studies: Radiology: X-Ray: CXR: X-Ray Chest 1 View (CXR):   Results for orders placed or performed during the hospital encounter of 08/09/18   X-Ray Chest 1 View    Narrative    EXAMINATION:  XR CHEST 1 VIEW    CLINICAL HISTORY:  s/p thoracic surgery;    TECHNIQUE:  Single frontal view of the chest was performed.    COMPARISON:  08/11/2018    FINDINGS:  Mild elevation RIGHT hemidiaphragm.  Lungs are clear.  No evidence for congestion, effusion, or infiltrate.  Osseous thorax is intact.      Impression    No acute cardiopulmonary disease or interval  change.      Electronically signed by: Bill Alejandro MD  Date:    08/12/2018  Time:    11:42       Pending Diagnostic Studies:     None        Final Active Diagnoses:    Diagnosis Date Noted POA    PRINCIPAL PROBLEM:  Non-small cell lung cancer (NSCLC) [C34.90] 08/09/2018 Yes     Chronic    Malignant neoplasm of upper lobe of right lung [C34.11] 06/20/2018 Yes    SVT (supraventricular tachycardia) [I47.1] 01/31/2018 Yes    Essential hypertension [I10] 10/24/2017 Yes      Problems Resolved During this Admission:      Discharged Condition: good    Disposition: Home or Self Care    Follow Up:    Patient Instructions:      Diet Adult Regular     Call MD for:  temperature >100.4     Call MD for:  persistent nausea and vomiting or diarrhea     Call MD for:  severe uncontrolled pain     Call MD for:  redness, tenderness, or signs of infection (pain, swelling, redness, odor or green/yellow discharge around incision site)     Call MD for:  difficulty breathing or increased cough     Call MD for:  severe persistent headache     Call MD for:  worsening rash     Call MD for:  persistent dizziness, light-headedness, or visual disturbances     Call MD for:  increased confusion or weakness     Remove dressing in 24 hours   Order Comments: Steri strips will fall off on their own     Activity as tolerated     Shower on day dressing removed (No bath)     Medications:  Reconciled Home Medications:      Medication List      START taking these medications    lactulose 20 gram Pack  Commonly known as:  CEPHULAC  Take 1 packet (20 g total) by mouth daily as needed (constipation).     ondansetron 4 MG Tbdl  Commonly known as:  ZOFRAN-ODT  Take 1 tablet (4 mg total) by mouth every 8 (eight) hours as needed.     oxyCODONE-acetaminophen 5-325 mg per tablet  Commonly known as:  PERCOCET  Take one tablet by mouth every four hours as needed for pain     polyethylene glycol 17 gram/dose powder  Commonly known as:  GLYCOLAX  Take 17 g by  mouth once daily.        CONTINUE taking these medications    atenolol 50 MG tablet  Commonly known as:  TENORMIN  Take 0.5 tablets (25 mg total) by mouth 4 (four) times daily.     CALCIUM ANTACID 300 mg (750 mg) Chew  Generic drug:  calcium carbonate  Take by mouth.     cloNIDine 0.1 MG tablet  Commonly known as:  CATAPRES  TAKE 1 TABLET BY MOUTH EVERY 8 HOURS AS NEEDED     diazePAM 5 MG tablet  Commonly known as:  VALIUM  Take 1 tablet (5 mg total) by mouth nightly as needed for Anxiety or Insomnia.     losartan 50 MG tablet  Commonly known as:  COZAAR  Take 25 mg by mouth every evening.     multivitamin with minerals tablet  Take 1 tablet by mouth once daily.     ranitidine 75 MG tablet  Commonly known as:  ZANTAC  Take 150 mg by mouth nightly.        STOP taking these medications    HYDROcodone-acetaminophen 5-325 mg per tablet  Commonly known as:  NORCO     TYLENOL-CODEINE #4 ORAL            Aminta Bahena PA-C  Thoracic Surgery  [facility]

## 2018-08-14 ENCOUNTER — TELEPHONE (OUTPATIENT)
Dept: CARDIOTHORACIC SURGERY | Facility: CLINIC | Age: 68
End: 2018-08-14

## 2018-08-14 NOTE — TELEPHONE ENCOUNTER
Called patent to see how she is doing on her first day home from the hospital.  Patient has pain on both sides of lungs at the base of ribs in the back,  Also wants to use a heating pad  Heating pad okay.  Instructed patient to place a barrier between her skin and the heating pad.  Patient wants directions for taking extra strength Tylenol.  Patient instructed not to exceed a total of 3 grams of Tylenol a day and to count the tylenol in the percocet.     Patient states that she has been sitting up and is doing good.  She states that she had a bowel movement this morning.  Patient's follow-up appointment given.  Patient verbalizes her understanding of the above.l

## 2018-08-22 NOTE — ADDENDUM NOTE
Addendum  created 08/22/18 1125 by Jim Shaver MD    Intraprocedure Blocks edited, LDA created via procedure documentation, Sign clinical note

## 2018-08-22 NOTE — ADDENDUM NOTE
Addendum  created 08/22/18 1120 by Jim Shaver MD    Intraprocedure Blocks edited, Sign clinical note

## 2018-08-24 ENCOUNTER — OFFICE VISIT (OUTPATIENT)
Dept: CARDIOTHORACIC SURGERY | Facility: CLINIC | Age: 68
End: 2018-08-24
Payer: COMMERCIAL

## 2018-08-24 ENCOUNTER — HOSPITAL ENCOUNTER (OUTPATIENT)
Dept: RADIOLOGY | Facility: HOSPITAL | Age: 68
Discharge: HOME | End: 2018-08-24
Attending: THORACIC SURGERY (CARDIOTHORACIC VASCULAR SURGERY)
Payer: COMMERCIAL

## 2018-08-24 VITALS
OXYGEN SATURATION: 96 % | BODY MASS INDEX: 26.79 KG/M2 | DIASTOLIC BLOOD PRESSURE: 80 MMHG | HEART RATE: 68 BPM | HEIGHT: 66 IN | WEIGHT: 166.69 LBS | SYSTOLIC BLOOD PRESSURE: 176 MMHG

## 2018-08-24 DIAGNOSIS — C34.90 MALIGNANT NEOPLASM OF LUNG, UNSPECIFIED LATERALITY, UNSPECIFIED PART OF LUNG: ICD-10-CM

## 2018-08-24 DIAGNOSIS — C34.11 MALIGNANT NEOPLASM OF UPPER LOBE OF RIGHT LUNG: Primary | ICD-10-CM

## 2018-08-24 PROCEDURE — 99999 PR PBB SHADOW E&M-EST. PATIENT-LVL III: CPT | Mod: PBBFAC,,, | Performed by: THORACIC SURGERY (CARDIOTHORACIC VASCULAR SURGERY)

## 2018-08-24 PROCEDURE — 99024 POSTOP FOLLOW-UP VISIT: CPT | Mod: S$GLB,,, | Performed by: THORACIC SURGERY (CARDIOTHORACIC VASCULAR SURGERY)

## 2018-08-24 PROCEDURE — 71046 X-RAY EXAM CHEST 2 VIEWS: CPT | Mod: 26,,, | Performed by: RADIOLOGY

## 2018-08-24 PROCEDURE — 71046 X-RAY EXAM CHEST 2 VIEWS: CPT | Mod: TC,FY

## 2018-08-24 RX ORDER — OXYCODONE AND ACETAMINOPHEN 5; 325 MG/1; MG/1
1 TABLET ORAL EVERY 4 HOURS PRN
Qty: 28 TABLET | Refills: 0 | Status: SHIPPED | OUTPATIENT
Start: 2018-08-24 | End: 2018-09-11

## 2018-08-24 NOTE — PROGRESS NOTES
Subjective:       Patient ID: Alesha Toney is a 67 y.o. female.    Chief Complaint: Post-op Evaluation    Diagnosis:  adenocarcinoma    Pre-operative therapy: none    Procedure(s) and date(s): right VATS lower lobectomy with MLND    Pathology:  7cm moderately differentiated mixed invasive adenocarcinoma, level 2,4,7,9,10,12 negative, no VPI, no LVI, pT3N0     Post-operative therapy: chemotherapy     HPI   68 yo female here today for 2 week follow-up s/p right VATS lower lobectomy with MLND. Pathology as above. Uncomplicated post-operative course. Today she is recovering well but does endorse incisional pain. Breathing improving.     Review of Systems   Constitutional: Positive for activity change (improving). Negative for fever.   Eyes: Negative.    Respiratory: Positive for chest tightness (incisional pain ). Negative for shortness of breath.    Cardiovascular: Negative for chest pain and palpitations.   Gastrointestinal: Negative.    Genitourinary: Negative.    Musculoskeletal: Negative.    Skin: Negative.    Hematological: Negative.    Psychiatric/Behavioral: Negative.          Objective:      Physical Exam   Constitutional: She is oriented to person, place, and time. She appears well-developed and well-nourished.   HENT:   Head: Normocephalic and atraumatic.   Eyes: EOM are normal.   Cardiovascular: Normal rate and intact distal pulses.   Pulmonary/Chest: Effort normal and breath sounds normal. She exhibits tenderness (incisional ).   Neurological: She is alert and oriented to person, place, and time.   Skin: Skin is warm and dry.   VATS incisions healing well with no drainage or erythema   Psychiatric: She has a normal mood and affect. Thought content normal.   Vitals reviewed.        Assessment:       68 yo female here today for 2 week follow-up s/p right VATS lower lobectomy with MLND for a T3N0 adenocarcinoma.     Plan:       Doing well from surgery standpoint. Still reports incisional pain. Will refill  rx for percocet and sned rx for pain cream.   She will see Dr. Dhaliwal for adjuvant chemotherapy.     ATTENDING ATTESTATION:    I evaluated the patient and I agree with the assessment and plan.  There is a survival benefit from adjuvant chemotherapy based upon the tumor size.  RTC in 6 months.  Adjuvant chemotherapy per Dr. Dhaliwal.

## 2018-08-26 ENCOUNTER — PATIENT MESSAGE (OUTPATIENT)
Dept: CARDIOTHORACIC SURGERY | Facility: CLINIC | Age: 68
End: 2018-08-26

## 2018-08-27 ENCOUNTER — PATIENT MESSAGE (OUTPATIENT)
Dept: INTERNAL MEDICINE | Facility: CLINIC | Age: 68
End: 2018-08-27

## 2018-09-07 ENCOUNTER — TELEPHONE (OUTPATIENT)
Dept: HEMATOLOGY/ONCOLOGY | Facility: CLINIC | Age: 68
End: 2018-09-07

## 2018-09-07 NOTE — TELEPHONE ENCOUNTER
Scheduled patient for next Tuesday with dr kenney.  Patient is agreeable to date/time and thanked nurse.

## 2018-09-07 NOTE — TELEPHONE ENCOUNTER
"----- Message from Pam Dhaliwal MD sent at 9/7/2018  9:51 AM CDT -----  Regarding: RE: patient f/u  OMG she needs to see me sol Del Rosario, Please schedule her to see me  ----- Message -----  From: Lavern Hicks RN  Sent: 9/7/2018   8:56 AM  To: Pam hDaliwal MD, Nayeli Morataya RN, #  Subject: patient f/u                                      Dr. Dhaliwal,    I was screening for trials and came across Mrs. Toney on Dr. Messina's schedule on 8/24/18. Per his note, "There is a survival benefit from adjuvant chemotherapy based upon the tumor size.  RTC in 6 months.  Adjuvant chemotherapy per Dr. Dhaliwal." I have not seen any scheduled appointments with you since. Is she still a patient of yours? I was thinking ALCHEMIST post adjuvant chemo if so.    Thanks,    Lavern     "

## 2018-09-11 ENCOUNTER — PATIENT MESSAGE (OUTPATIENT)
Dept: HEMATOLOGY/ONCOLOGY | Facility: CLINIC | Age: 68
End: 2018-09-11

## 2018-09-11 ENCOUNTER — OFFICE VISIT (OUTPATIENT)
Dept: HEMATOLOGY/ONCOLOGY | Facility: CLINIC | Age: 68
End: 2018-09-11
Payer: COMMERCIAL

## 2018-09-11 ENCOUNTER — TELEPHONE (OUTPATIENT)
Dept: HEMATOLOGY/ONCOLOGY | Facility: CLINIC | Age: 68
End: 2018-09-11

## 2018-09-11 VITALS
HEART RATE: 71 BPM | DIASTOLIC BLOOD PRESSURE: 82 MMHG | HEIGHT: 66 IN | OXYGEN SATURATION: 99 % | TEMPERATURE: 98 F | WEIGHT: 167.56 LBS | SYSTOLIC BLOOD PRESSURE: 182 MMHG | RESPIRATION RATE: 19 BRPM | BODY MASS INDEX: 26.93 KG/M2

## 2018-09-11 DIAGNOSIS — C34.11 MALIGNANT NEOPLASM OF UPPER LOBE OF RIGHT LUNG: Primary | ICD-10-CM

## 2018-09-11 DIAGNOSIS — G89.12 ACUTE POST-THORACOTOMY PAIN: ICD-10-CM

## 2018-09-11 PROCEDURE — 96372 THER/PROPH/DIAG INJ SC/IM: CPT | Mod: S$GLB,,, | Performed by: INTERNAL MEDICINE

## 2018-09-11 PROCEDURE — 99999 PR PBB SHADOW E&M-EST. PATIENT-LVL III: CPT | Mod: PBBFAC,,, | Performed by: INTERNAL MEDICINE

## 2018-09-11 PROCEDURE — 1101F PT FALLS ASSESS-DOCD LE1/YR: CPT | Mod: CPTII,S$GLB,, | Performed by: INTERNAL MEDICINE

## 2018-09-11 PROCEDURE — 3077F SYST BP >= 140 MM HG: CPT | Mod: CPTII,S$GLB,, | Performed by: INTERNAL MEDICINE

## 2018-09-11 PROCEDURE — 99215 OFFICE O/P EST HI 40 MIN: CPT | Mod: 25,S$GLB,, | Performed by: INTERNAL MEDICINE

## 2018-09-11 PROCEDURE — 3079F DIAST BP 80-89 MM HG: CPT | Mod: CPTII,S$GLB,, | Performed by: INTERNAL MEDICINE

## 2018-09-11 RX ORDER — PROMETHAZINE HYDROCHLORIDE 25 MG/1
25 TABLET ORAL EVERY 6 HOURS PRN
Qty: 60 TABLET | Refills: 7 | Status: SHIPPED | OUTPATIENT
Start: 2018-09-11 | End: 2018-09-13

## 2018-09-11 RX ORDER — DEXAMETHASONE 6 MG/1
TABLET ORAL
Qty: 32 TABLET | Refills: 0 | Status: SHIPPED | OUTPATIENT
Start: 2018-09-11 | End: 2018-09-13 | Stop reason: SDUPTHER

## 2018-09-11 RX ORDER — ONDANSETRON 8 MG/1
8 TABLET, ORALLY DISINTEGRATING ORAL EVERY 6 HOURS PRN
Qty: 60 TABLET | Refills: 4 | Status: ON HOLD | OUTPATIENT
Start: 2018-09-11 | End: 2018-10-15 | Stop reason: HOSPADM

## 2018-09-11 RX ORDER — CYANOCOBALAMIN 1000 UG/ML
1000 INJECTION, SOLUTION INTRAMUSCULAR; SUBCUTANEOUS ONCE
Status: COMPLETED | OUTPATIENT
Start: 2018-09-11 | End: 2018-09-11

## 2018-09-11 RX ORDER — OXYCODONE AND ACETAMINOPHEN 5; 325 MG/1; MG/1
TABLET ORAL
Qty: 41 TABLET | Refills: 0 | Status: ON HOLD | OUTPATIENT
Start: 2018-09-11 | End: 2018-10-15 | Stop reason: HOSPADM

## 2018-09-11 RX ORDER — FOLIC ACID 1 MG/1
1 TABLET ORAL DAILY
Qty: 100 TABLET | Refills: 3 | Status: SHIPPED | OUTPATIENT
Start: 2018-09-11 | End: 2019-02-13

## 2018-09-11 RX ADMIN — CYANOCOBALAMIN 1000 MCG: 1000 INJECTION, SOLUTION INTRAMUSCULAR; SUBCUTANEOUS at 08:09

## 2018-09-11 NOTE — TELEPHONE ENCOUNTER
It is only 4 cycle. The plan pull up like that. I usually delete the 2 cycles when treatment starts

## 2018-09-11 NOTE — TELEPHONE ENCOUNTER
spoke with pt on today in regards to upcoming appointment on 09/13/18 for signing consent, pt is aware and has confirm.

## 2018-09-11 NOTE — PATIENT INSTRUCTIONS
Cisplatin injection  What is this medicine?  CISPLATIN (SIS carmen tin) is a chemotherapy drug. It targets fast dividing cells, like cancer cells, and causes these cells to die. This medicine is used to treat many types of cancer like bladder, ovarian, and testicular cancers.  How should I use this medicine?  This drug is given as an infusion into a vein. It is administered in a hospital or clinic by a specially trained health care professional.  Talk to your pediatrician regarding the use of this medicine in children. Special care may be needed.  What side effects may I notice from receiving this medicine?  Side effects that you should report to your doctor or health care professional as soon as possible:  · allergic reactions like skin rash, itching or hives, swelling of the face, lips, or tongue  · signs of infection - fever or chills, cough, sore throat, pain or difficulty passing urine  · signs of decreased platelets or bleeding - bruising, pinpoint red spots on the skin, black, tarry stools, nosebleeds  · signs of decreased red blood cells - unusually weak or tired, fainting spells, lightheadedness  · breathing problems  · changes in hearing  · gout pain  · low blood counts - This drug may decrease the number of white blood cells, red blood cells and platelets. You may be at increased risk for infections and bleeding.  · nausea and vomiting  · pain, swelling, redness or irritation at the injection site  · pain, tingling, numbness in the hands or feet  · problems with balance, movement  · trouble passing urine or change in the amount of urine  Side effects that usually do not require medical attention (report to your doctor or health care professional if they continue or are bothersome):  · changes in vision  · loss of appetite  · metallic taste in the mouth or changes in taste  What may interact with this medicine?  · dofetilide  · foscarnet  · medicines for seizures  · medicines to increase blood counts like  filgrastim, pegfilgrastim, sargramostim  · probenecid  · pyridoxine used with altretamine  · rituximab  · some antibiotics like amikacin, gentamicin, neomycin, polymyxin B, streptomycin, tobramycin  · sulfinpyrazone  · vaccines  · zalcitabine  Talk to your doctor or health care professional before taking any of these medicines:  · acetaminophen  · aspirin  · ibuprofen  · ketoprofen  · naproxen  What if I miss a dose?  It is important not to miss a dose. Call your doctor or health care professional if you are unable to keep an appointment.  Where should I keep my medicine?  This drug is given in a hospital or clinic and will not be stored at home.  What should I tell my health care provider before I take this medicine?  They need to know if you have any of these conditions:  · blood disorders  · hearing problems  · kidney disease  · recent or ongoing radiation therapy  · an unusual or allergic reaction to cisplatin, carboplatin, other chemotherapy, other medicines, foods, dyes, or preservatives  · pregnant or trying to get pregnant  · breast-feeding  What should I watch for while using this medicine?  Your condition will be monitored carefully while you are receiving this medicine. You will need important blood work done while you are taking this medicine.  This drug may make you feel generally unwell. This is not uncommon, as chemotherapy can affect healthy cells as well as cancer cells. Report any side effects. Continue your course of treatment even though you feel ill unless your doctor tells you to stop.  In some cases, you may be given additional medicines to help with side effects. Follow all directions for their use.  Call your doctor or health care professional for advice if you get a fever, chills or sore throat, or other symptoms of a cold or flu. Do not treat yourself. This drug decreases your body's ability to fight infections. Try to avoid being around people who are sick.  This medicine may increase  your risk to bruise or bleed. Call your doctor or health care professional if you notice any unusual bleeding.  Be careful brushing and flossing your teeth or using a toothpick because you may get an infection or bleed more easily. If you have any dental work done, tell your dentist you are receiving this medicine.  Avoid taking products that contain aspirin, acetaminophen, ibuprofen, naproxen, or ketoprofen unless instructed by your doctor. These medicines may hide a fever.  Do not become pregnant while taking this medicine. Women should inform their doctor if they wish to become pregnant or think they might be pregnant. There is a potential for serious side effects to an unborn child. Talk to your health care professional or pharmacist for more information. Do not breast-feed an infant while taking this medicine.  Drink fluids as directed while you are taking this medicine. This will help protect your kidneys.  Call your doctor or health care professional if you get diarrhea. Do not treat yourself.  NOTE:This sheet is a summary. It may not cover all possible information. If you have questions about this medicine, talk to your doctor, pharmacist, or health care provider. Copyright© 2017 [employer]        Pemetrexed injection  What is this medicine?  PEMETREXED (PEM e TREX ed) is a chemotherapy drug. This medicine affects cells that are rapidly growing, such as cancer cells and cells in your mouth and stomach. It is usually used to treat lung cancers like non-small cell lung cancer and mesothelioma. It may also be used to treat other cancers.  How should I use this medicine?  This drug is given as an infusion into a vein. It is administered in a hospital or clinic by a specially trained health care professional.  Talk to your pediatrician regarding the use of this medicine in children. Special care may be needed.  What side effects may I notice from receiving this medicine?  Side effects that you should report to  your doctor or health care professional as soon as possible:  · allergic reactions like skin rash, itching or hives, swelling of the face, lips, or tongue  · low blood counts - this medicine may decrease the number of white blood cells, red blood cells and platelets. You may be at increased risk for infections and bleeding.  · signs of infection - fever or chills, cough, sore throat, pain or difficulty passing urine  · signs of decreased platelets or bleeding - bruising, pinpoint red spots on the skin, black, tarry stools, blood in the urine  · signs of decreased red blood cells - unusually weak or tired, fainting spells, lightheadedness  · breathing problems, like a dry cough  · changes in emotions or moods  · chest pain  · confusion  · diarrhea  · high blood pressure  · mouth or throat sores or ulcers  · pain, swelling, warmth in the leg  · pain on swallowing  · swelling of the ankles, feet, hands  · trouble passing urine or change in the amount of urine  · vomiting  · yellowing of the eyes or skin  Side effects that usually do not require medical attention (report to your doctor or health care professional if they continue or are bothersome):  · hair loss  · loss of appetite  · nausea  · stomach upset  What may interact with this medicine?  · aspirin and aspirin-like medicines  · medicines to increase blood counts like filgrastim, pegfilgrastim, sargramostim  · methotrexate  · NSAIDS, medicines for pain and inflammation, like ibuprofen or naproxen  · probenecid  · pyrimethamine  · vaccines  Talk to your doctor or health care professional before taking any of these medicines:  · acetaminophen  · aspirin  · ibuprofen  · ketoprofen  · naproxen  What if I miss a dose?  It is important not to miss your dose. Call your doctor or health care professional if you are unable to keep an appointment.  Where should I keep my medicine?  This drug is given in a hospital or clinic and will not be stored at home.  What should I  tell my health care provider before I take this medicine?  They need to know if you have any of these conditions:  · if you frequently drink alcohol containing beverages  · infection (especially a virus infection such as chickenpox, cold sores, or herpes)  · kidney disease  · liver disease  · low blood counts, like low platelets, red bloods, or white blood cells  · an unusual or allergic reaction to pemetrexed, mannitol, other medicines, foods, dyes, or preservatives  · pregnant or trying to get pregnant  · breast-feeding  What should I watch for while using this medicine?  Visit your doctor for checks on your progress. This drug may make you feel generally unwell. This is not uncommon, as chemotherapy can affect healthy cells as well as cancer cells. Report any side effects. Continue your course of treatment even though you feel ill unless your doctor tells you to stop.  In some cases, you may be given additional medicines to help with side effects. Follow all directions for their use.  Call your doctor or health care professional for advice if you get a fever, chills or sore throat, or other symptoms of a cold or flu. Do not treat yourself. This drug decreases your body's ability to fight infections. Try to avoid being around people who are sick.  This medicine may increase your risk to bruise or bleed. Call your doctor or health care professional if you notice any unusual bleeding.  Be careful brushing and flossing your teeth or using a toothpick because you may get an infection or bleed more easily. If you have any dental work done, tell your dentist you are receiving this medicine.  Avoid taking products that contain aspirin, acetaminophen, ibuprofen, naproxen, or ketoprofen unless instructed by your doctor. These medicines may hide a fever.  Call your doctor or health care professional if you get diarrhea or mouth sores. Do not treat yourself.  To protect your kidneys, drink water or other fluids as directed  while you are taking this medicine.  Men and women must use effective birth control while taking this medicine. You may also need to continue using effective birth control for a time after stopping this medicine. Do not become pregnant while taking this medicine. Tell your doctor right away if you think that you or your partner might be pregnant. There is a potential for serious side effects to an unborn child. Talk to your health care professional or pharmacist for more information. Do not breast-feed an infant while taking this medicine. This medicine may lower sperm counts.  NOTE:This sheet is a summary. It may not cover all possible information. If you have questions about this medicine, talk to your doctor, pharmacist, or health care provider. Copyright© 2017 [employer]

## 2018-09-11 NOTE — PROGRESS NOTES
"Subjective:       Patient ID: Alesha Toney is a 67 y.o. female.    Chief Complaint: Lung Cancer; 5/10 breast/axillary/back pain; Shortness of Breath; and dry cough  Ms. Alesha Toney is a 67-year-old otherwise healthy female who started having some shoulder pain, which was lasting for over six weeks.  She went and saw her primary care physician and underwent an x-ray, which revealed right upper lobe lung mass worrisome for malignancy and a CT scan was recommended, which was done on 6/12/18 and that revealed a newly developing mass, pleural based, lateral posterior segment, right upper lobe 3.5 x 3.5 cm, surrounding stellate margin and patchy infiltrates, particularly superiorly,   anteriorly infiltrates extend perihilar into the anterior segment.  She then underwent CT-guided biopsy, which revealed well-differentiated adenocarcinoma.       Her PET scan from 6/29/18 There is physiologic intracranial, head, and neck activity.  There is a large right upper lobe mass SUV max 9.11.  No local or distant metastatic disease seen.  There is physiologic liver, spleen, GI and  activity.  There are a few liver cysts.  No adenopathy is seen.  The adrenal glands are not enlarged.  No adenopathy is seen.  No suspicious bone lesions are seen.    Renuka underwent VATS with right upper lobectomy. Mediastinal lymphadenectomy on 8/9/18. Pathology revealed "Tumor site: Upper lobe.Tumor size: 7 x 4 x 2.7 cm.Tumor focality: Single tumor.  Histologic type: Mixed invasive mucinous and nonmucinous adenocarcinoma. Histologic grade: G2: Moderately differentiated.Spread through air spaces (STATS): Present. Visceral pleura invasion: Not identified.  Lymphovascular invasion: Not identified. Direct invasion of adjacent structures: No adjacent structures present. Margins: All margins are uninvolved by carcinoma.Distance of invasive carcinoma from closest margin: 1 cm from the bronchial margin.Treatment effect: No known presurgical therapy. " "Regional lymph nodes: Number of lymph nodes involved: 0. Number of lymph nodes examined: 11. Pathologic Stage Classification: pT3 N0"      Review of Systems   Constitutional: Negative for appetite change and unexpected weight change.   Eyes: Negative for visual disturbance.   Respiratory: Positive for cough and shortness of breath.    Cardiovascular: Positive for chest pain.   Gastrointestinal: Negative for abdominal pain and diarrhea.   Genitourinary: Negative for frequency.   Musculoskeletal: Positive for back pain.   Skin: Negative for rash.   Neurological: Negative for headaches.   Hematological: Negative for adenopathy.   Psychiatric/Behavioral: The patient is not nervous/anxious.        Objective:      Physical Exam   Constitutional: She is oriented to person, place, and time. She appears well-developed and well-nourished.   HENT:   Mouth/Throat: No oropharyngeal exudate.   Cardiovascular: Normal rate and normal heart sounds.   Pulmonary/Chest: Effort normal and breath sounds normal. She has no wheezes.   Abdominal: Soft. Bowel sounds are normal. There is no tenderness.   Musculoskeletal: She exhibits no edema or tenderness.   Lymphadenopathy:     She has no cervical adenopathy.   Neurological: She is alert and oriented to person, place, and time. Coordination normal.   Skin: Skin is warm and dry. No rash noted.   Psychiatric: She has a normal mood and affect. Judgment and thought content normal.   Vitals reviewed.      LABS:  WBC   Date Value Ref Range Status   08/10/2018 9.90 3.90 - 12.70 K/uL Final     Hemoglobin   Date Value Ref Range Status   08/10/2018 12.1 12.0 - 16.0 g/dL Final     POC Hematocrit   Date Value Ref Range Status   08/09/2018 33 (L) 36 - 54 %PCV Final     Hematocrit   Date Value Ref Range Status   08/10/2018 36.3 (L) 37.0 - 48.5 % Final     Platelets   Date Value Ref Range Status   08/10/2018 210 150 - 350 K/uL Final     Gran # (ANC)   Date Value Ref Range Status   08/10/2018 6.2 1.8 - 7.7 " K/uL Final     Gran%   Date Value Ref Range Status   08/10/2018 62.5 38.0 - 73.0 % Final       Chemistry        Component Value Date/Time     (L) 08/10/2018 0530    K 3.9 08/10/2018 0530     08/10/2018 0530    CO2 27 08/10/2018 0530    BUN 16 08/10/2018 0530    CREATININE 0.9 08/10/2018 0530     (H) 08/10/2018 0530        Component Value Date/Time    CALCIUM 8.6 (L) 08/10/2018 0530    ALKPHOS 40 (L) 08/10/2018 0530    AST 30 08/10/2018 0530    ALT 18 08/10/2018 0530    BILITOT 0.6 08/10/2018 0530    ESTGFRAFRICA >60.0 08/10/2018 0530    EGFRNONAA >60.0 08/10/2018 0530          Assessment:       1. Malignant neoplasm of upper lobe of right lung    2. Acute post-thoracotomy pain        Plan:        1,2. SHe will procee dwith adjuvant chemo with Cisplatin and ALimta. Briefly discussed ALCHEMIST trial and she is interested, will start after completion of adjuvant chemo. Will plan on CT scans witjhout contrast prior to starting adjuvant chemo.  She will take gabapentin and refilled Oxycodone      Above care plan was discussed with patient and accompanying significant other and all questions were addressed to their satisfaction

## 2018-09-11 NOTE — Clinical Note
Schedule CBC,CMp and see me in 1 week and start Cisplatin and ALimta. IV fluids day 2.Needs to start next week as it will be 6 weeks since surgery.Chemo needs auth

## 2018-09-13 ENCOUNTER — TELEPHONE (OUTPATIENT)
Dept: HEMATOLOGY/ONCOLOGY | Facility: CLINIC | Age: 68
End: 2018-09-13

## 2018-09-13 ENCOUNTER — OFFICE VISIT (OUTPATIENT)
Dept: HEMATOLOGY/ONCOLOGY | Facility: CLINIC | Age: 68
End: 2018-09-13
Payer: COMMERCIAL

## 2018-09-13 VITALS
SYSTOLIC BLOOD PRESSURE: 186 MMHG | DIASTOLIC BLOOD PRESSURE: 82 MMHG | OXYGEN SATURATION: 99 % | TEMPERATURE: 99 F | BODY MASS INDEX: 27.07 KG/M2 | HEART RATE: 70 BPM | HEIGHT: 66 IN | RESPIRATION RATE: 20 BRPM | WEIGHT: 168.44 LBS

## 2018-09-13 DIAGNOSIS — C34.11 MALIGNANT NEOPLASM OF UPPER LOBE OF RIGHT LUNG: ICD-10-CM

## 2018-09-13 DIAGNOSIS — C34.11 MALIGNANT NEOPLASM OF UPPER LOBE OF RIGHT LUNG: Primary | ICD-10-CM

## 2018-09-13 DIAGNOSIS — T45.1X5A CHEMOTHERAPY INDUCED NAUSEA AND VOMITING: ICD-10-CM

## 2018-09-13 DIAGNOSIS — F41.9 ANXIETY: ICD-10-CM

## 2018-09-13 DIAGNOSIS — R11.2 CHEMOTHERAPY INDUCED NAUSEA AND VOMITING: ICD-10-CM

## 2018-09-13 DIAGNOSIS — C34.90 MALIGNANT NEOPLASM OF LUNG, UNSPECIFIED LATERALITY, UNSPECIFIED PART OF LUNG: Primary | ICD-10-CM

## 2018-09-13 PROCEDURE — 99215 OFFICE O/P EST HI 40 MIN: CPT | Mod: S$GLB,,, | Performed by: INTERNAL MEDICINE

## 2018-09-13 PROCEDURE — 3079F DIAST BP 80-89 MM HG: CPT | Mod: CPTII,S$GLB,, | Performed by: INTERNAL MEDICINE

## 2018-09-13 PROCEDURE — 3077F SYST BP >= 140 MM HG: CPT | Mod: CPTII,S$GLB,, | Performed by: INTERNAL MEDICINE

## 2018-09-13 PROCEDURE — 99999 PR PBB SHADOW E&M-EST. PATIENT-LVL IV: CPT | Mod: PBBFAC,,, | Performed by: INTERNAL MEDICINE

## 2018-09-13 PROCEDURE — 1101F PT FALLS ASSESS-DOCD LE1/YR: CPT | Mod: CPTII,S$GLB,, | Performed by: INTERNAL MEDICINE

## 2018-09-13 RX ORDER — DEXAMETHASONE 6 MG/1
TABLET ORAL
Qty: 32 TABLET | Refills: 0 | Status: SHIPPED | OUTPATIENT
Start: 2018-09-13 | End: 2018-09-13 | Stop reason: SDUPTHER

## 2018-09-13 RX ORDER — ALPRAZOLAM 0.5 MG/1
0.5 TABLET ORAL 3 TIMES DAILY PRN
Qty: 45 TABLET | Refills: 0 | Status: SHIPPED | OUTPATIENT
Start: 2018-09-13 | End: 2018-11-13

## 2018-09-13 RX ORDER — HEPARIN 100 UNIT/ML
500 SYRINGE INTRAVENOUS
Status: CANCELLED | OUTPATIENT
Start: 2018-11-09

## 2018-09-13 RX ORDER — OLANZAPINE 10 MG/1
TABLET ORAL
Qty: 32 TABLET | Refills: 2 | Status: SHIPPED | OUTPATIENT
Start: 2018-09-13 | End: 2018-09-26

## 2018-09-13 RX ORDER — SODIUM CHLORIDE 0.9 % (FLUSH) 0.9 %
10 SYRINGE (ML) INJECTION
Status: CANCELLED | OUTPATIENT
Start: 2018-11-09

## 2018-09-13 RX ORDER — SODIUM CHLORIDE 0.9 % (FLUSH) 0.9 %
10 SYRINGE (ML) INJECTION
Status: CANCELLED | OUTPATIENT
Start: 2018-11-08

## 2018-09-13 RX ORDER — DEXAMETHASONE 6 MG/1
TABLET ORAL
Qty: 32 TABLET | Refills: 0 | Status: SHIPPED | OUTPATIENT
Start: 2018-09-13 | End: 2019-02-13

## 2018-09-13 RX ORDER — HEPARIN 100 UNIT/ML
500 SYRINGE INTRAVENOUS
Status: CANCELLED | OUTPATIENT
Start: 2018-11-08

## 2018-09-13 NOTE — PROGRESS NOTES
"Subjective:       Patient ID: Alesha Toney is a 67 y.o. female.    Chief Complaint: Malignant neoplasm of upper lobe of right lung  Ms. Alesha Toney is a 67-year-old otherwise healthy female who started having some shoulder pain, which was lasting for over six weeks.  She went and saw her primary care physician and underwent an x-ray, which revealed right upper lobe lung mass worrisome for malignancy and a CT scan was recommended, which was done on 6/12/18 and that revealed a newly developing mass, pleural based, lateral posterior segment, right upper lobe 3.5 x 3.5 cm, surrounding stellate margin and patchy infiltrates, particularly superiorly,   anteriorly infiltrates extend perihilar into the anterior segment.  She then underwent CT-guided biopsy, which revealed well-differentiated adenocarcinoma.       Her PET scan from 6/29/18 There is physiologic intracranial, head, and neck activity.  There is a large right upper lobe mass SUV max 9.11.  No local or distant metastatic disease seen.  There is physiologic liver, spleen, GI and  activity.  There are a few liver cysts.  No adenopathy is seen.  The adrenal glands are not enlarged.  No adenopathy is seen.  No suspicious bone lesions are seen.     She underwent VATS with right upper lobectomy. Mediastinal lymphadenectomy on 8/9/18. Pathology revealed "Tumor site: Upper lobe.Tumor size: 7 x 4 x 2.7 cm.Tumor focality: Single tumor.  Histologic type: Mixed invasive mucinous and nonmucinous adenocarcinoma. Histologic grade: G2: Moderately differentiated.Spread through air spaces (STATS): Present. Visceral pleura invasion: Not identified.  Lymphovascular invasion: Not identified. Direct invasion of adjacent structures: No adjacent structures present. Margins: All margins are uninvolved by carcinoma.Distance of invasive carcinoma from closest margin: 1 cm from the bronchial margin.Treatment effect: No known presurgical therapy. Regional lymph nodes: Number of " "lymph nodes involved: 0. Number of lymph nodes examined: 11. Pathologic Stage Classification: pT3 N0"    HPI She come in today to start adjuvant chemo    Review of Systems   Constitutional: Negative for appetite change and unexpected weight change.   Eyes: Negative for visual disturbance.   Respiratory: Positive for cough and shortness of breath.    Cardiovascular: Positive for chest pain.   Gastrointestinal: Negative for abdominal pain and diarrhea.   Genitourinary: Negative for frequency.   Musculoskeletal: Positive for back pain.   Skin: Negative for rash.   Neurological: Negative for headaches.   Hematological: Negative for adenopathy.   Psychiatric/Behavioral: The patient is nervous/anxious.        Objective:      Physical Exam   Constitutional: She is oriented to person, place, and time. She appears well-developed and well-nourished.   HENT:   Mouth/Throat: No oropharyngeal exudate.   Cardiovascular: Normal rate and normal heart sounds.   Pulmonary/Chest: Effort normal and breath sounds normal. She has no wheezes.   Abdominal: Soft. Bowel sounds are normal. There is no tenderness.   Musculoskeletal: She exhibits no edema or tenderness.   Lymphadenopathy:     She has no cervical adenopathy.   Neurological: She is alert and oriented to person, place, and time. Coordination normal.   Skin: Skin is warm and dry. No rash noted.   Psychiatric: She has a normal mood and affect. Judgment and thought content normal.   Vitals reviewed.        LABS:  WBC   Date Value Ref Range Status   08/10/2018 9.90 3.90 - 12.70 K/uL Final     Hemoglobin   Date Value Ref Range Status   08/10/2018 12.1 12.0 - 16.0 g/dL Final     POC Hematocrit   Date Value Ref Range Status   08/09/2018 33 (L) 36 - 54 %PCV Final     Hematocrit   Date Value Ref Range Status   08/10/2018 36.3 (L) 37.0 - 48.5 % Final     Platelets   Date Value Ref Range Status   08/10/2018 210 150 - 350 K/uL Final     Gran # (ANC)   Date Value Ref Range Status   08/10/2018 " 6.2 1.8 - 7.7 K/uL Final     Gran%   Date Value Ref Range Status   08/10/2018 62.5 38.0 - 73.0 % Final       Chemistry        Component Value Date/Time     (L) 08/10/2018 0530    K 3.9 08/10/2018 0530     08/10/2018 0530    CO2 27 08/10/2018 0530    BUN 16 08/10/2018 0530    CREATININE 0.9 08/10/2018 0530     (H) 08/10/2018 0530        Component Value Date/Time    CALCIUM 8.6 (L) 08/10/2018 0530    ALKPHOS 40 (L) 08/10/2018 0530    AST 30 08/10/2018 0530    ALT 18 08/10/2018 0530    BILITOT 0.6 08/10/2018 0530    ESTGFRAFRICA >60.0 08/10/2018 0530    EGFRNONAA >60.0 08/10/2018 0530           Assessment:       1. Malignant neoplasm of upper lobe of right lung    2. Chemotherapy induced nausea and vomiting    3. Anxiety        Plan:        1,2,3. She is doing well clinically and will proceed with cycle 1 of Cisplatin and ALimta on 9/27/18 per her request and will return in 3 weeks for cycle 2.    Patient was consented for chemotherapy today 9/13/2018 .   An extensive discussion was had which included a thorough discussion of the risk and benefits of treatment and alternatives.  Risks, including but not limited to, possible hair loss, bone marrow damage (anemia, thrombocytopenia, immune suppression, neutropenia), damage to body organs (brain, heart, liver, kidney, lungs, nervous system, skin, and others), allergic reactions, sterility, nausea/vomiting, constipation/diarrhea, sores in the mouth, secondary cancers, local damage at possible injection sites, and rarely death were all discussed.  The patient agrees with the plan, and all questions have been answered to their satisfaction.  Consent was signed the patient, provider, and a third party witness.      2. Escribed Olanzapine for delayed nausea and vomiting  3. Escribed Xanax.    Above care plan was discussed with patient and all questions were addressed to her satisfaction

## 2018-09-13 NOTE — TELEPHONE ENCOUNTER
----- Message from Pam Dhaliwal MD sent at 9/13/2018  1:49 PM CDT -----  Also needs IR PORT on 9/25 or 9/26  Also schedule weekly labs CBC,CMp, Mag and phose between the cycle 1 and cycle 2 of chemo

## 2018-09-13 NOTE — Clinical Note
Schedule Cisplatin and Alimta on 9/27/18 (thursday). IV fluids on day 2(Cancel chemo on 9/17/18)Schedule CBC, CMp and see me 3 weeks after the above and for Cisplatin and ALimta. IV fluids on day 2

## 2018-09-13 NOTE — Clinical Note
Also needs IR PORT on 9/25 or 9/26Also schedule weekly labs CBC,CMp, Mag and phose between the cycle 1 and cycle 2 of chemo

## 2018-09-14 ENCOUNTER — TELEPHONE (OUTPATIENT)
Dept: HEMATOLOGY/ONCOLOGY | Facility: CLINIC | Age: 68
End: 2018-09-14

## 2018-09-14 ENCOUNTER — HOSPITAL ENCOUNTER (OUTPATIENT)
Dept: RADIOLOGY | Facility: HOSPITAL | Age: 68
Discharge: HOME | End: 2018-09-14
Attending: INTERNAL MEDICINE
Payer: COMMERCIAL

## 2018-09-14 DIAGNOSIS — C34.11 MALIGNANT NEOPLASM OF UPPER LOBE OF RIGHT LUNG: ICD-10-CM

## 2018-09-14 PROCEDURE — 71250 CT THORAX DX C-: CPT | Mod: 26,,, | Performed by: RADIOLOGY

## 2018-09-14 PROCEDURE — 74150 CT ABDOMEN W/O CONTRAST: CPT | Mod: 26,,, | Performed by: RADIOLOGY

## 2018-09-14 PROCEDURE — 71250 CT THORAX DX C-: CPT | Mod: TC

## 2018-09-14 PROCEDURE — A9698 NON-RAD CONTRAST MATERIALNOC: HCPCS | Performed by: INTERNAL MEDICINE

## 2018-09-14 PROCEDURE — 74150 CT ABDOMEN W/O CONTRAST: CPT | Mod: TC

## 2018-09-14 PROCEDURE — 25500020 PHARM REV CODE 255: Performed by: INTERNAL MEDICINE

## 2018-09-14 RX ADMIN — Medication 450 ML: at 09:09

## 2018-09-17 ENCOUNTER — TELEPHONE (OUTPATIENT)
Dept: HEMATOLOGY/ONCOLOGY | Facility: CLINIC | Age: 68
End: 2018-09-17

## 2018-09-17 ENCOUNTER — PATIENT MESSAGE (OUTPATIENT)
Dept: HEMATOLOGY/ONCOLOGY | Facility: CLINIC | Age: 68
End: 2018-09-17

## 2018-09-17 NOTE — LETTER
September 17, 2018    Alesha Toney  [address]  [place] [address]             [facility]  [address]  [place] [address]  Phone: [phone] To Whom It May Concern:    Alesha Toney is presently under my care for a diagnosis of Lung Cancer, for which she is receiving active chemotherapy. Please extend her [employer] A/FMLA dated 9/20/2018 to 11/20/2018, at which time I will reassess her. Please feel free to contact me at my office if you need any additional information.    Sincerely,          Pam Dhaliwal MD

## 2018-09-17 NOTE — TELEPHONE ENCOUNTER
spoke with pt on today in regards to port placement schedule on 09/25/18 @11:30, pt is aware and has confirm.

## 2018-09-18 ENCOUNTER — PATIENT MESSAGE (OUTPATIENT)
Dept: CARDIOTHORACIC SURGERY | Facility: CLINIC | Age: 68
End: 2018-09-18

## 2018-09-18 DIAGNOSIS — C34.90 MALIGNANT NEOPLASM OF LUNG, UNSPECIFIED LATERALITY, UNSPECIFIED PART OF LUNG: Primary | ICD-10-CM

## 2018-09-21 ENCOUNTER — TELEPHONE (OUTPATIENT)
Dept: HEMATOLOGY/ONCOLOGY | Facility: CLINIC | Age: 68
End: 2018-09-21

## 2018-09-21 DIAGNOSIS — C34.90 NON-SMALL CELL LUNG CANCER, UNSPECIFIED LATERALITY: Primary | ICD-10-CM

## 2018-09-21 RX ORDER — CYCLOBENZAPRINE HCL 5 MG
5 TABLET ORAL 3 TIMES DAILY PRN
Qty: 31 TABLET | Refills: 0 | Status: SHIPPED | OUTPATIENT
Start: 2018-09-21 | End: 2018-10-01

## 2018-09-21 NOTE — TELEPHONE ENCOUNTER
Spoke with wife. She notes increasingly worsening pain to patient's R shoulder--radiating to chest and back, since this past Wednesday, after spending a few hours [facility]. This is the exact same pain which she had prior to surgery. Patient's vats/lobectomy was 8/9---and she had been doing great until this past wed. Wife states patient called her home from work, as it was too painful to sit up and/or walk. She rates her pain always at a 7-8/10.  Nurse spoke with cary in thoracic sx---she reviewed recent CT which revealed nothing concerning for this pain. Chest Xray ordered----nurse left  for wife to contact clinic to coordinate time.  Nurse left  informing wife cary prescribed a muscle relaxant for her.    Message routed to dr kenney/cary

## 2018-09-21 NOTE — TELEPHONE ENCOUNTER
spoke with Marty lopez today in regards to x-ray schedule at [facility], pt is aware and has confirm.

## 2018-09-21 NOTE — TELEPHONE ENCOUNTER
----- Message from Angela English sent at 9/21/2018  9:57 AM CDT -----  Contact: Lata   Patient Returning Call from [facility]    Who Left Message for Patient:Nurse Lata   Communication Preference:Phone [phone]  Additional Information: would like to the chest xray can be done at [place]   Thank You  JASWINDER English

## 2018-09-21 NOTE — TELEPHONE ENCOUNTER
----- Message from Angela English sent at 9/21/2018  8:36 AM CDT -----  Contact: sol caregiver   sol caregiver called to speak with nurse sol on pt having pain in the chest where they did the surgery  Callback#[phone]  Thank You  JASWINDER English

## 2018-09-22 ENCOUNTER — HOSPITAL ENCOUNTER (EMERGENCY)
Facility: HOSPITAL | Age: 68
Discharge: HOME | End: 2018-09-22
Attending: EMERGENCY MEDICINE
Payer: COMMERCIAL

## 2018-09-22 VITALS
TEMPERATURE: 99 F | WEIGHT: 168 LBS | HEART RATE: 58 BPM | RESPIRATION RATE: 18 BRPM | HEIGHT: 66 IN | DIASTOLIC BLOOD PRESSURE: 62 MMHG | OXYGEN SATURATION: 97 % | SYSTOLIC BLOOD PRESSURE: 141 MMHG | BODY MASS INDEX: 27 KG/M2

## 2018-09-22 DIAGNOSIS — M54.9 UPPER BACK PAIN ON RIGHT SIDE: ICD-10-CM

## 2018-09-22 DIAGNOSIS — R07.89 RIGHT-SIDED CHEST WALL PAIN: Primary | ICD-10-CM

## 2018-09-22 LAB
ALBUMIN SERPL BCP-MCNC: 4.2 G/DL
ALP SERPL-CCNC: 63 U/L
ALT SERPL W/O P-5'-P-CCNC: 26 U/L
AMORPH CRY UR QL COMP ASSIST: ABNORMAL
ANION GAP SERPL CALC-SCNC: 9 MMOL/L
AST SERPL-CCNC: 29 U/L
BACTERIA #/AREA URNS AUTO: ABNORMAL /HPF
BASOPHILS # BLD AUTO: 0.04 K/UL
BASOPHILS NFR BLD: 0.4 %
BILIRUB SERPL-MCNC: 0.4 MG/DL
BILIRUB UR QL STRIP: NEGATIVE
BUN SERPL-MCNC: 19 MG/DL
CALCIUM SERPL-MCNC: 10.6 MG/DL
CHLORIDE SERPL-SCNC: 103 MMOL/L
CLARITY UR REFRACT.AUTO: ABNORMAL
CO2 SERPL-SCNC: 27 MMOL/L
COLOR UR AUTO: YELLOW
CREAT SERPL-MCNC: 0.9 MG/DL
D DIMER PPP IA.FEU-MCNC: 0.41 MG/L FEU
DIFFERENTIAL METHOD: NORMAL
EOSINOPHIL # BLD AUTO: 0.1 K/UL
EOSINOPHIL NFR BLD: 1.5 %
ERYTHROCYTE [DISTWIDTH] IN BLOOD BY AUTOMATED COUNT: 12.2 %
EST. GFR  (AFRICAN AMERICAN): >60 ML/MIN/1.73 M^2
EST. GFR  (NON AFRICAN AMERICAN): >60 ML/MIN/1.73 M^2
GLUCOSE SERPL-MCNC: 130 MG/DL
GLUCOSE UR QL STRIP: NEGATIVE
HCT VFR BLD AUTO: 44.9 %
HGB BLD-MCNC: 15.3 G/DL
HGB UR QL STRIP: NEGATIVE
IMM GRANULOCYTES # BLD AUTO: 0.03 K/UL
IMM GRANULOCYTES NFR BLD AUTO: 0.3 %
KETONES UR QL STRIP: NEGATIVE
LEUKOCYTE ESTERASE UR QL STRIP: NEGATIVE
LYMPHOCYTES # BLD AUTO: 2 K/UL
LYMPHOCYTES NFR BLD: 20.2 %
MCH RBC QN AUTO: 30.9 PG
MCHC RBC AUTO-ENTMCNC: 34.1 G/DL
MCV RBC AUTO: 91 FL
MICROSCOPIC COMMENT: ABNORMAL
MONOCYTES # BLD AUTO: 0.6 K/UL
MONOCYTES NFR BLD: 5.9 %
NEUTROPHILS # BLD AUTO: 6.9 K/UL
NEUTROPHILS NFR BLD: 71.7 %
NITRITE UR QL STRIP: NEGATIVE
NRBC BLD-RTO: 0 /100 WBC
PH UR STRIP: 7 [PH] (ref 5–8)
PLATELET # BLD AUTO: 279 K/UL
PMV BLD AUTO: 10.4 FL
POTASSIUM SERPL-SCNC: 4.5 MMOL/L
PROT SERPL-MCNC: 7.9 G/DL
PROT UR QL STRIP: NEGATIVE
RBC # BLD AUTO: 4.95 M/UL
RBC #/AREA URNS AUTO: 2 /HPF (ref 0–4)
SODIUM SERPL-SCNC: 139 MMOL/L
SP GR UR STRIP: 1.01 (ref 1–1.03)
SQUAMOUS #/AREA URNS AUTO: 0 /HPF
TROPONIN I SERPL DL<=0.01 NG/ML-MCNC: 0.01 NG/ML
URN SPEC COLLECT METH UR: ABNORMAL
UROBILINOGEN UR STRIP-ACNC: NEGATIVE EU/DL
WBC # BLD AUTO: 9.63 K/UL

## 2018-09-22 PROCEDURE — 80053 COMPREHEN METABOLIC PANEL: CPT

## 2018-09-22 PROCEDURE — 96374 THER/PROPH/DIAG INJ IV PUSH: CPT

## 2018-09-22 PROCEDURE — 81001 URINALYSIS AUTO W/SCOPE: CPT

## 2018-09-22 PROCEDURE — 99285 EMERGENCY DEPT VISIT HI MDM: CPT | Mod: 25

## 2018-09-22 PROCEDURE — 63600175 PHARM REV CODE 636 W HCPCS: Performed by: PHYSICIAN ASSISTANT

## 2018-09-22 PROCEDURE — 99285 EMERGENCY DEPT VISIT HI MDM: CPT | Mod: ,,, | Performed by: EMERGENCY MEDICINE

## 2018-09-22 PROCEDURE — 85025 COMPLETE CBC W/AUTO DIFF WBC: CPT

## 2018-09-22 PROCEDURE — 93010 ELECTROCARDIOGRAM REPORT: CPT | Mod: ,,, | Performed by: INTERNAL MEDICINE

## 2018-09-22 PROCEDURE — 84484 ASSAY OF TROPONIN QUANT: CPT

## 2018-09-22 PROCEDURE — 96376 TX/PRO/DX INJ SAME DRUG ADON: CPT

## 2018-09-22 PROCEDURE — 93005 ELECTROCARDIOGRAM TRACING: CPT

## 2018-09-22 PROCEDURE — 85379 FIBRIN DEGRADATION QUANT: CPT

## 2018-09-22 RX ORDER — MORPHINE SULFATE 4 MG/ML
4 INJECTION, SOLUTION INTRAMUSCULAR; INTRAVENOUS
Status: COMPLETED | OUTPATIENT
Start: 2018-09-22 | End: 2018-09-22

## 2018-09-22 RX ORDER — NAPROXEN 500 MG/1
500 TABLET ORAL 2 TIMES DAILY WITH MEALS
Qty: 20 TABLET | Refills: 0 | Status: SHIPPED | OUTPATIENT
Start: 2018-09-22 | End: 2018-09-23 | Stop reason: SDUPTHER

## 2018-09-22 RX ORDER — DIAZEPAM 5 MG/1
5 TABLET ORAL EVERY 6 HOURS PRN
COMMUNITY
End: 2018-11-13 | Stop reason: SDUPTHER

## 2018-09-22 RX ORDER — METHOCARBAMOL 500 MG/1
1000 TABLET, FILM COATED ORAL 3 TIMES DAILY
Qty: 30 TABLET | Refills: 0 | Status: SHIPPED | OUTPATIENT
Start: 2018-09-22 | End: 2018-09-27

## 2018-09-22 RX ORDER — KETOROLAC TROMETHAMINE 30 MG/ML
10 INJECTION, SOLUTION INTRAMUSCULAR; INTRAVENOUS
Status: DISCONTINUED | OUTPATIENT
Start: 2018-09-22 | End: 2018-09-22 | Stop reason: HOSPADM

## 2018-09-22 RX ADMIN — MORPHINE SULFATE 4 MG: 4 INJECTION INTRAVENOUS at 11:09

## 2018-09-22 RX ADMIN — MORPHINE SULFATE 4 MG: 4 INJECTION INTRAVENOUS at 10:09

## 2018-09-22 RX ADMIN — MORPHINE SULFATE 4 MG: 4 INJECTION INTRAVENOUS at 09:09

## 2018-09-22 NOTE — ED NOTES
LOC: The patient is awake and alert; oriented x 3 and speaking appropriately.  APPEARANCE: Patient resting comfortably but pain begins w/ movement, patient is clean and well groomed  SKIN: warm and dry, normal skin turgor & moist mucus membranes, skin intact, no breakdown noted.  MUSCULOSKELETAL: Patient moving all extremities well, no obvious swelling or deformities noted. Pain in rt shoulder blade radiating to rt arm to fingers  RESPIRATORY: Airway is open and patent,  respirations are spontaneous, normal effort and rate  CARDIAC: Patient has a normal rate, no peripheral edema noted, capillary refill < 3 seconds; No complaints of chest pain   ABDOMEN: Soft and non tender to palpation, no distention noted. Denies abd pain .

## 2018-09-22 NOTE — ED TRIAGE NOTES
Rt shoulder pain radiating down rt arm to back x 2 day . Denies trauma.  Hx lung cancer . Has not started chemo yet- scheduled for 9/27 w/ portacath to be inserted prior to that. Having a numbness to rt fingers all started 2 days ago. Pain is in rt shoulder blade when she flexes her neck or moves radiating down arm to fingers. Rates pain as 7/10. Last took 1/2 a  percocet PTA this am approx 7am. Denies fever or chills or SOB or abd pain . Took zofran PTA b/c pain med on empty stomach.

## 2018-09-22 NOTE — ED PROVIDER NOTES
"Encounter Date: 9/22/2018    SCRIBE #1 NOTE: I, Gisella Johnson, am scribing for, and in the presence of, . I have scribed the following portions of the note - the APC attestation.       History     Chief Complaint   Patient presents with    Shoulder Pain     non traumatic, right shoulder pain radiating to back     67-year-old white female with history of lung cancer scheduled to start chemotherapy on 9/27 s/p VATS with right upper lobectomy on 8/9/2018 presents to the ED complaining of right upper chest pain and right upper back pain since Thursday.  The pain is worse with any movement and is currently 7/10.  Pain is not been relieved with her at-home Percocet.  She denies any falls or trauma. She has had a dry cough since her lobectomy that has been improving since onset.  Pain is not worsened with deep breaths.  She denies fever, chills, shortness of breath, abdominal pain, nausea, vomiting, dysuria, diarrhea, headache, lightheadedness.  She quit smoking 3 years ago.      The history is provided by the patient.     Review of patient's allergies indicates:   Allergen Reactions    Adhesive Itching, Rash and Blisters     INCLUDES EKG PADS    Iodinated contrast- oral and iv dye     Pcn [penicillins]     Phenergan plain Other (See Comments)     "crazy behavior"    Tramadol     Vancomycin analogues      Past Medical History:   Diagnosis Date    Breast cyst     Cardiac arrhythmia     Fibrocystic breast     History of hysterectomy     20yrs ago    History of kidney stones     Hypertension     Lung cancer     Lung cancer      Past Surgical History:   Procedure Laterality Date    ADENOIDECTOMY  17yo    APPENDECTOMY      BONE RESECTION, RIB  1980    BREAST BIOPSY Left     at least 40yrs ago in her 20's    BREAST CYST ASPIRATION      BREAST CYST EXCISION      ENDOBRONCHIAL ULTRASOUND N/A 7/10/2018    Procedure: ULTRASOUND, ENDOBRONCHIAL;  Surgeon: Sri Hearn MD;  [facility];  " Service: Pulmonary;  Laterality: N/A;    HYSTERECTOMY      @47yrs of age    KIDNEY SURGERY      19yo    LYMPHADENECTOMY/mediastinal N/A 8/9/2018    Performed by [facility] at [facility] OR [address] [place] [address]    OOPHORECTOMY      @47yrs of age    THORACOTOMY Right 8/9/2018    Performed by [facility] MD at [facility] OR [address] [place] [address]    TONSILLECTOMY      ULTRASOUND, ENDOBRONCHIAL N/A 7/10/2018    Performed by [facility]n MD at [facility] OR [address] [place] [address]    VATS, WITH LOBECTOMY Possible chest wall resection Right 8/9/2018    Performed by [facility] MD at [facility] OR [address] [place] [address]    VIDEO-ASSISTED THORACOSCOPIC LOBECTOMY Right 8/9/2018    Procedure: VATS, WITH LOBECTOMY Possible chest wall resection;  Surgeon: Philip Messina MD;  Location: [facility] OR [address] [place] [address];  Service: Thoracic;  Laterality: Right;  Have open pan available.     Family History   Problem Relation Age of Onset    Stroke Mother     Cancer Father         colon cancer    Diabetes Brother     Heart failure Brother     Hypertension Brother     Heart attack Paternal Aunt     Heart attack Maternal Grandfather     Diabetes Paternal Aunt     Anesthesia problems Neg Hx      Social History     Tobacco Use    Smoking status: Former Smoker     Packs/day: 1.00     Years: 30.00     Pack years: 30.00     Types: Cigarettes     Last attempt to quit: 2015     Years since quitting: 3.7    Smokeless tobacco: Never Used   Substance Use Topics    Alcohol use: Yes     Alcohol/week: 4.2 oz     Types: 7 Glasses of wine per week     Comment: socially     Drug use: No     Review of Systems   Constitutional: Negative for chills and fever.   HENT: Negative for congestion, rhinorrhea and sore throat.    Eyes: Negative for photophobia and visual disturbance.   Respiratory: Positive for cough (dry x 1 month, improving). Negative for shortness of breath.    Cardiovascular: Positive for chest pain. Negative for leg swelling.   Gastrointestinal: Negative for abdominal pain,  constipation, diarrhea, nausea and vomiting.   Genitourinary: Negative for dysuria and hematuria.   Musculoskeletal: Positive for arthralgias and back pain. Negative for neck pain and neck stiffness.   Skin: Negative for rash and wound.   Neurological: Negative for light-headedness, numbness and headaches.   Psychiatric/Behavioral: Negative for confusion.       Physical Exam     Initial Vitals [09/22/18 0855]   BP Pulse Resp Temp SpO2   (!) 170/80 70 16 98.5 °F (36.9 °C) 100 %      MAP       --         Physical Exam    Nursing note and vitals reviewed.  Constitutional: She appears well-developed and well-nourished. She is not diaphoretic. No distress.   HENT:   Head: Normocephalic and atraumatic.   Neck: Normal range of motion. Neck supple.   Cardiovascular: Normal rate, regular rhythm and normal heart sounds. Exam reveals no gallop and no friction rub.    No murmur heard.  No LE edema   Pulmonary/Chest: She has decreased breath sounds in the right upper field. She has no wheezes. She has no rhonchi. She has no rales. She exhibits tenderness (minimal R upper chest wall tenderness.).   Abdominal: Soft. Bowel sounds are normal. There is no tenderness. There is no rebound and no guarding.   Musculoskeletal: Normal range of motion.   Neurological: She is alert and oriented to person, place, and time. GCS score is 15. GCS eye subscore is 4. GCS verbal subscore is 5. GCS motor subscore is 6.   Skin: Skin is warm and dry. No rash noted. No erythema.   Psychiatric: She has a normal mood and affect.         ED Course   Procedures  Labs Reviewed   COMPREHENSIVE METABOLIC PANEL - Abnormal; Notable for the following components:       Result Value    Glucose 130 (*)     Calcium 10.6 (*)     All other components within normal limits   URINALYSIS, REFLEX TO URINE CULTURE - Abnormal; Notable for the following components:    Appearance, UA Cloudy (*)     All other components within normal limits    Narrative:     Preferred  Collection Type->Urine, Clean Catch  CUP   URINALYSIS MICROSCOPIC - Abnormal; Notable for the following components:    Bacteria, UA Few (*)     Amorphous, UA Many (*)     All other components within normal limits    Narrative:     Preferred Collection Type->Urine, Clean Catch  CUP   CBC W/ AUTO DIFFERENTIAL   TROPONIN I   D DIMER, QUANTITATIVE          Imaging Results          CT Chest Without Contrast (Final result)     Abnormal  Result time 09/22/18 12:28:55    Final result by Nola Almeida MD (09/22/18 12:28:55)                 Impression:      Left lower lobe centrilobular nodularity, likely infectious/inflammatory bronchiolitis.  Recommend 6-8 week imaging follow-up to ensure resolution.  Small right pleural effusion.    Stable postoperative change status post right upper lobectomy.  No evidence for recurrent or metastatic disease, noting limited evaluation of the liver on this noncontrast exam.    This report was flagged in Epic as abnormal.    Electronically signed by resident: Ck Durham  Date:    09/22/2018  Time:    12:01    Electronically signed by: Nola Almeida  Date:    09/22/2018  Time:    12:28             Narrative:    EXAMINATION:  CT CHEST WITHOUT CONTRAST    CLINICAL HISTORY:  R upper chest pain; h/o lung cancer s/p R upper lobectomy;    TECHNIQUE:  Low dose axial images, sagittal and coronal reformations were obtained from the thoracic inlet to the lung bases. Contrast was not administered.    COMPARISON:  CT chest abdomen 09/14/2018    FINDINGS:  Base of Neck: Stable 14 mm left thyroid nodule.    Thoracic soft tissues: Normal.    Aorta: Left-sided aortic arch which is normal in caliber, contour, and course with mild calcific atherosclerosis.    Heart: Normal size. No effusion.  Left anterior descending coronary artery calcifications.    Pulmonary vasculature: Remaining pulmonary vasculature distributes normally without obvious abnormality on this noncontrast examination.    Judy/Mediastinum:  Postoperative change present.  No significant lymphadenopathy.    Airways: Trachea and remaining proximal airways remain patent without significant abnormality.    Lungs/Pleura: Postoperative change status post right upper lobectomy with postoperative scarring and surgical clips noted within the mediastinum and right hilum.  Lungs well expanded bilaterally without significant consolidation.  No pneumothorax.  No suspicious pulmonary nodules or masses.  Small right pleural effusion.  Redemonstration of small cluster of centrilobular nodules within the left lower lobe likely representing infectious/inflammatory process.    Esophagus: Normal.    Upper Abdomen: Redemonstration of multiple hepatic cysts with the largest measuring approximately 4.8 cm in greatest diameter.  Again, evaluation for liver metastases limited without intravenous contrast.  No free intraperitoneal air, abnormal bowel dilatation, or biliary dilatation.    Bones: No acute fracture or aggressive osseous lesion.                               X-Ray Chest PA And Lateral (Final result)  Result time 09/22/18 10:36:07    Final result by Nola Almeida MD (09/22/18 10:36:07)                 Impression:      No acute cardiopulmonary abnormality.      Electronically signed by: Nola Almeida  Date:    09/22/2018  Time:    10:36             Narrative:    EXAMINATION:  XR CHEST PA AND LATERAL    CLINICAL HISTORY:  R upper chest pain; h/o lung cancer s/p R upper lobectomy;    TECHNIQUE:  PA and lateral views of the chest were performed.    COMPARISON:  Multiple priors, most recent 08/24/2018; CT chest 09/14/2018    FINDINGS:  Surgical changes at the right upper lobe.  No focal consolidation, pleural effusion or pneumothorax.  Normal cardiomediastinal contour.    No acute or aggressive osseous abnormality.                                 Medical Decision Making:   History:   Old Medical Records: I decided to obtain old medical records.  Old Records Summarized:  records from previous admission(s) and records from clinic visits.       <> Summary of Records: H/o lung cancer followed by Dr Dhaliwal. Scheduled to start chemo on 9/27/18. Had VATS with R upper lobectomy and mediastinal lymphadenectomy on 8/9/18.  Clinical Tests:   Lab Tests: Ordered and Reviewed  Radiological Study: Ordered and Reviewed  Medical Tests: Reviewed and Ordered       APC / Resident Notes:   67-year-old white female with history of lung cancer scheduled to start chemotherapy on 9/27 s/p VATS with right upper lobectomy on 8/9/2018 presents to the ED complaining of right upper chest pain and right upper back pain since Thursday. VSS. Decreased breath sounds to R upper lung. R upper chest wall tenderness but pain is not completely reproducible with palpation. No LE edema. Abdomen soft and nontender. DDx includes but is not limited to worsening lung cancer, pneumothorax, pneumonia, pleural effusion, PE. Will get labs, CXR.    UA with no infection. No leukocytosis. Minimal hypercalcemia noted at 10.6. Troponin normal.    CXR with no acute abnormality.    Patient states she thinks the pain is coming from her neck. With movement of her neck she has some pain radiating down the R arm but she denies any neck pain. When she moves she grads her R upper chest. Patient unable to have IV contrast due to anaphylaxis allergy to iodine. She had CT chest last Friday but this pain is new and began after that CT. Will obtain CT chest without contrast and add on D-Dimer. Concern for PE.     D-dimer negative. CT chest with no acute abnormality.    Symptoms likely muscular. I do not feel that she needs any further labs or imaging at this time. Stable for discharge.    She was discharged with prescriptions for robaxin and naproxen.  She will follow up with her oncologist, Dr Dhaliwal.  All of the patient's questions were answered.  I reviewed the patient's chart, labs, and imaging and discussed the case with my supervising  physician.            Attending Attestation:     Physician Attestation Statement for NP/PA:   I have conducted a face to face encounter with this patient in addition to the NP/PA, due to Medical Complexity    Other NP/PA Attestation Additions:      Medical Decision Making: Patient with shoulder and back pain, worsened with movement in certain positions. Pain relieved in other positions. CT shows no findings, appears to be musculoskeletal. Will discharge with anti-inflammatory and muscle relaxer.                    Clinical Impression:     1. Right-sided chest wall pain    2. Upper back pain on right side            Disposition:   Disposition: Discharged  Condition: Stable                        CHRISTIANA NavarreteC  09/22/18 3099

## 2018-09-23 ENCOUNTER — PATIENT MESSAGE (OUTPATIENT)
Dept: HEMATOLOGY/ONCOLOGY | Facility: CLINIC | Age: 68
End: 2018-09-23

## 2018-09-23 DIAGNOSIS — M54.9 BACK PAIN, UNSPECIFIED BACK LOCATION, UNSPECIFIED BACK PAIN LATERALITY, UNSPECIFIED CHRONICITY: Primary | ICD-10-CM

## 2018-09-24 ENCOUNTER — TELEPHONE (OUTPATIENT)
Dept: SPINE | Facility: CLINIC | Age: 68
End: 2018-09-24

## 2018-09-24 DIAGNOSIS — C34.90 MALIGNANT NEOPLASM OF LUNG, UNSPECIFIED LATERALITY, UNSPECIFIED PART OF LUNG: Primary | ICD-10-CM

## 2018-09-24 DIAGNOSIS — M50.30 DDD (DEGENERATIVE DISC DISEASE), CERVICAL: Primary | ICD-10-CM

## 2018-09-24 RX ORDER — NAPROXEN 500 MG/1
TABLET ORAL
Qty: 180 TABLET | Refills: 0 | Status: ON HOLD | OUTPATIENT
Start: 2018-09-24 | End: 2018-10-15 | Stop reason: HOSPADM

## 2018-09-25 ENCOUNTER — HOSPITAL ENCOUNTER (OUTPATIENT)
Facility: HOSPITAL | Age: 68
Discharge: HOME | End: 2018-09-25
Attending: INTERNAL MEDICINE | Admitting: INTERNAL MEDICINE
Payer: COMMERCIAL

## 2018-09-25 VITALS
BODY MASS INDEX: 26.2 KG/M2 | HEIGHT: 66 IN | OXYGEN SATURATION: 97 % | TEMPERATURE: 97 F | RESPIRATION RATE: 18 BRPM | WEIGHT: 163 LBS | HEART RATE: 65 BPM | DIASTOLIC BLOOD PRESSURE: 59 MMHG | SYSTOLIC BLOOD PRESSURE: 143 MMHG

## 2018-09-25 DIAGNOSIS — C34.90 MALIGNANT NEOPLASM OF LUNG, UNSPECIFIED LATERALITY, UNSPECIFIED PART OF LUNG: ICD-10-CM

## 2018-09-25 PROCEDURE — 63600175 PHARM REV CODE 636 W HCPCS: Performed by: RADIOLOGY

## 2018-09-25 RX ORDER — MIDAZOLAM HYDROCHLORIDE 1 MG/ML
INJECTION INTRAMUSCULAR; INTRAVENOUS CODE/TRAUMA/SEDATION MEDICATION
Status: COMPLETED | OUTPATIENT
Start: 2018-09-25 | End: 2018-09-25

## 2018-09-25 RX ORDER — FENTANYL CITRATE 50 UG/ML
50 INJECTION, SOLUTION INTRAMUSCULAR; INTRAVENOUS
Status: DISCONTINUED | OUTPATIENT
Start: 2018-09-25 | End: 2018-09-25 | Stop reason: HOSPADM

## 2018-09-25 RX ORDER — HEPARIN SODIUM 200 [USP'U]/100ML
500 INJECTION, SOLUTION INTRAVENOUS CONTINUOUS
Status: DISCONTINUED | OUTPATIENT
Start: 2018-09-25 | End: 2018-09-25 | Stop reason: HOSPADM

## 2018-09-25 RX ORDER — MIDAZOLAM HYDROCHLORIDE 1 MG/ML
1 INJECTION INTRAMUSCULAR; INTRAVENOUS
Status: DISCONTINUED | OUTPATIENT
Start: 2018-09-25 | End: 2018-09-25 | Stop reason: HOSPADM

## 2018-09-25 RX ORDER — IBUPROFEN 200 MG
200 TABLET ORAL EVERY 6 HOURS PRN
Status: ON HOLD | COMMUNITY
End: 2018-10-15 | Stop reason: HOSPADM

## 2018-09-25 RX ORDER — CLINDAMYCIN PHOSPHATE 900 MG/50ML
900 INJECTION, SOLUTION INTRAVENOUS ONCE
Status: DISCONTINUED | OUTPATIENT
Start: 2018-09-25 | End: 2018-09-25

## 2018-09-25 RX ORDER — CEFAZOLIN SODIUM 1 G/50ML
SOLUTION INTRAVENOUS
Status: COMPLETED | OUTPATIENT
Start: 2018-09-25 | End: 2018-09-25

## 2018-09-25 RX ORDER — SODIUM CHLORIDE 9 MG/ML
500 INJECTION, SOLUTION INTRAVENOUS ONCE
Status: DISCONTINUED | OUTPATIENT
Start: 2018-09-25 | End: 2018-09-25 | Stop reason: HOSPADM

## 2018-09-25 RX ORDER — FENTANYL CITRATE 50 UG/ML
INJECTION, SOLUTION INTRAMUSCULAR; INTRAVENOUS CODE/TRAUMA/SEDATION MEDICATION
Status: COMPLETED | OUTPATIENT
Start: 2018-09-25 | End: 2018-09-25

## 2018-09-25 RX ADMIN — FENTANYL CITRATE 50 MCG: 50 INJECTION, SOLUTION INTRAMUSCULAR; INTRAVENOUS at 03:09

## 2018-09-25 RX ADMIN — MIDAZOLAM HYDROCHLORIDE 1 MG: 1 INJECTION, SOLUTION INTRAMUSCULAR; INTRAVENOUS at 03:09

## 2018-09-25 RX ADMIN — MIDAZOLAM HYDROCHLORIDE 1 MG: 1 INJECTION, SOLUTION INTRAMUSCULAR; INTRAVENOUS at 02:09

## 2018-09-25 RX ADMIN — CEFAZOLIN SODIUM 1 G: 1 SOLUTION INTRAVENOUS at 03:09

## 2018-09-25 NOTE — DISCHARGE INSTRUCTIONS
For scheduling: Call Ana at [phone]    For questions or concerns call: BEN MON[facility] 8 AM- 5PM [phone].   After hours call Radiology resident on call at [phone].    For immediate concerns that are not emergent, you may call our radiology clinic at: [phone]

## 2018-09-25 NOTE — H&P
Radiology History & Physical      SUBJECTIVE:     Chief Complaint: preprocedure    History of Present Illness:  Alesha Toney is a 67 y.o. female with a history of lung cancer scheduled to start chemotherapy on 9/27 s/p VATS with right upper lobectomy on 8/9/2018 who presents for port placement.    with a hisotry who presents for port placement.   Past Medical History:   Diagnosis Date    Breast cyst     Cardiac arrhythmia     Fibrocystic breast     History of hysterectomy     20yrs ago    History of kidney stones     Hypertension     Lung cancer     Lung cancer      Past Surgical History:   Procedure Laterality Date    ADENOIDECTOMY  19yo    APPENDECTOMY      BONE RESECTION, RIB  1980    BREAST BIOPSY Left     at least 40yrs ago in her 20's    BREAST CYST ASPIRATION      BREAST CYST EXCISION      ENDOBRONCHIAL ULTRASOUND N/A 7/10/2018    Procedure: ULTRASOUND, ENDOBRONCHIAL;  Surgeon: Sri Hearn MD;  Location: [facility] OR [address] [place] [address];  Service: Pulmonary;  Laterality: N/A;    HYSTERECTOMY      @47yrs of age    KIDNEY SURGERY      19yo    LYMPHADENECTOMY/mediastinal N/A 8/9/2018    Performed by Philip Messina MD at [facility] OR [address] [place] [address]    OOPHORECTOMY      @47yrs of age    THORACOTOMY Right 8/9/2018    Performed by Philip Messina MD at [facility] OR [address] [place] [address]    TONSILLECTOMY      ULTRASOUND, ENDOBRONCHIAL N/A 7/10/2018    Performed by Sri Hearn MD at [facility] OR [address] [place] [address]    VATS, WITH LOBECTOMY Possible chest wall resection Right 8/9/2018    Performed by Philip Messina MD at [facility] OR [address] [place] [address]    VIDEO-ASSISTED THORACOSCOPIC LOBECTOMY Right 8/9/2018    Procedure: VATS, WITH LOBECTOMY Possible chest wall resection;  Surgeon: Philip Messina MD;  Location: [facility] OR [address] [place] [address];  Service: Thoracic;  Laterality: Right;  Have open pan available.       Home Meds:   Prior to Admission medications    Medication Sig Start Date End Date Taking? Authorizing Provider   atenolol (TENORMIN) 50 MG tablet Take  0.5 tablets (25 mg total) by mouth 4 (four) times daily. 7/26/18  Yes Mitra Mccoy MD   calcium carbonate (CALCIUM ANTACID) 300 mg (750 mg) Chew Take by mouth.   Yes Historical Provider, MD   diazePAM (VALIUM) 5 MG tablet Take 5 mg by mouth every 6 (six) hours as needed for Anxiety.   Yes Historical Provider, MD   folic acid (FOLVITE) 1 MG tablet Take 1 tablet (1 mg total) by mouth once daily. Start today 9/11/18 9/11/19 Yes Pam Dhaliwal MD   ibuprofen (ADVIL,MOTRIN) 200 MG tablet Take 200 mg by mouth every 6 (six) hours as needed for Pain.   Yes Historical Provider, MD   losartan (COZAAR) 50 MG tablet Take 25 mg by mouth every evening.    Yes Historical Provider, MD   multivitamin with minerals tablet Take 1 tablet by mouth once daily.   Yes Historical Provider, MD   ondansetron (ZOFRAN-ODT) 4 MG TbDL Take 1 tablet (4 mg total) by mouth every 8 (eight) hours as needed. 8/12/18  Yes Ro Smith MD   oxyCODONE-acetaminophen (PERCOCET) 5-325 mg per tablet Take one tablet by mouth every four hours as needed for pain 9/11/18  Yes Pam Dhaliwal MD   ALPRAZolam (XANAX) 0.5 MG tablet Take 1 tablet (0.5 mg total) by mouth 3 (three) times daily as needed for Insomnia or Anxiety. 9/13/18 9/13/19  Pam Dhaliwal MD   cloNIDine (CATAPRES) 0.1 MG tablet TAKE 1 TABLET BY MOUTH EVERY 8 HOURS AS NEEDED 6/27/18   Cori Galloway MD   cyclobenzaprine (FLEXERIL) 5 MG tablet Take 1 tablet (5 mg total) by mouth 3 (three) times daily as needed for Muscle spasms (Muscle pain). 9/21/18 10/1/18  GENNARO Adhikari   dexamethasone (DECADRON) 6 MG tablet Take one tablet by mouth twice a day with meals. Start the day before chemo and take for 3 days after chemo. Do not take on the day of chemo. 9/13/18   Pam Dhaliwal MD   lactulose (CEPHULAC) 20 gram Pack Take 1 packet (20 g total) by mouth daily as needed (constipation). 8/13/18   Aminta Bahena PA-C   methocarbamol (ROBAXIN) 500 MG Tab Take 2 tablets (1,000 mg total)  "by mouth 3 (three) times daily. for 5 days 9/22/18 9/27/18  Alison Baker PA-C   naproxen (NAPROSYN) 500 MG tablet TAKE 1 TABLET BY MOUTH TWICE DAILY WITH MEALS 9/24/18   Pam Dhaliwal MD   OLANZapine (ZYPREXA) 10 MG tablet Take 1 tablet starting on day of chemo and take for total of 4 days 9/13/18   Pam Dhaliwal MD   ondansetron (ZOFRAN-ODT) 8 MG TbDL Take 1 tablet (8 mg total) by mouth every 6 (six) hours as needed. 9/11/18 9/11/19  Pam Dhaliwal MD   polyethylene glycol (GLYCOLAX) 17 gram/dose powder Take 17 g by mouth once daily. 8/12/18   Ro Smith MD   ranitidine (ZANTAC) 75 MG tablet Take 150 mg by mouth nightly.     Historical Provider, MD     Anticoagulants/Antiplatelets: no anticoagulation    Allergies:   Review of patient's allergies indicates:   Allergen Reactions    Adhesive Itching, Rash and Blisters     INCLUDES EKG PADS    Iodinated contrast- oral and iv dye     Pcn [penicillins]     Phenergan plain Other (See Comments)     "crazy behavior"    Tramadol     Vancomycin analogues      Sedation History:  no adverse reactions    Review of Systems:   Hematological: no known coagulopathies  Respiratory: no shortness of breath  Cardiovascular: no chest pain  Gastrointestinal: no abdominal pain  Genito-Urinary: no dysuria  Musculoskeletal: negative  Neurological: no TIA or stroke symptoms         OBJECTIVE:     Vital Signs (Most Recent)  Temp: 98.2 °F (36.8 °C) (09/25/18 1213)  Pulse: 71 (09/25/18 1213)  Resp: 16 (09/25/18 1213)  BP: (!) 192/84 (09/25/18 1213)  SpO2: 98 % (09/25/18 1213)    Physical Exam:  ASA: 2  Mallampati: 2  General: no acute distress  Mental Status: alert and oriented to person, place and time  HEENT: normocephalic, atraumatic  Chest: unlabored breathing  Heart: regular heart rate  Abdomen: nondistended  Extremity: moves all extremities    Laboratory  Lab Results   Component Value Date    INR 0.9 09/25/2018       Lab Results   Component Value Date    WBC 9.63 " 09/22/2018    HGB 15.3 09/22/2018    HCT 44.9 09/22/2018    MCV 91 09/22/2018     09/22/2018      Lab Results   Component Value Date     (H) 09/22/2018     09/22/2018    K 4.5 09/22/2018     09/22/2018    CO2 27 09/22/2018    BUN 19 09/22/2018    CREATININE 0.9 09/22/2018    CALCIUM 10.6 (H) 09/22/2018    MG 1.9 08/10/2018    ALT 26 09/22/2018    AST 29 09/22/2018    ALBUMIN 4.2 09/22/2018    BILITOT 0.4 09/22/2018       ASSESSMENT/PLAN:     Sedation Plan: Moderate Sedation  Patient will undergo port placement.    Daron Mendosa MD  Interventional Radiology PGY-2  [facility]

## 2018-09-25 NOTE — PLAN OF CARE
Pt given discharge instructions and handout.  Family at bedside.  Verbalized understanding of instructions.  Dsg to left chest CDI.  IV d/c'd with cath tip intact.  NAD noted.  Pt to use wheelchair for discharge.

## 2018-09-25 NOTE — PROGRESS NOTES
Per Dr. Galindo, patient can have Ancef 1 gm, per pharmacy, based on patient's history of taking Keeflex without adverse affects or allergy

## 2018-09-25 NOTE — PROCEDURES
"Radiology Post-Procedure Note    Pre Op Diagnosis: Lung Cancer    Post Op Diagnosis: Same    Procedure: PORT placement    Procedure performed by: Lupillo    Written Informed Consent Obtained: Yes    Specimen Removed: No    Estimated Blood Loss: Minimal    Findings:   Using realtime U/S guidance a 8 Fr port catheter was placed into the left Internal Jugular vein with tip of the catheter in the SVC.    Port is ready for use.     Bill Shelby MD ([facility])  Radiology PGY-5  [phone]      "

## 2018-09-26 ENCOUNTER — HOSPITAL ENCOUNTER (OUTPATIENT)
Dept: RADIOLOGY | Facility: HOSPITAL | Age: 68
Discharge: HOME | End: 2018-09-26
Attending: ORTHOPAEDIC SURGERY
Payer: COMMERCIAL

## 2018-09-26 ENCOUNTER — OFFICE VISIT (OUTPATIENT)
Dept: ORTHOPEDICS | Facility: CLINIC | Age: 68
End: 2018-09-26
Payer: COMMERCIAL

## 2018-09-26 VITALS — BODY MASS INDEX: 26.59 KG/M2 | WEIGHT: 165.44 LBS | HEIGHT: 66 IN

## 2018-09-26 DIAGNOSIS — M54.12 CERVICAL RADICULOPATHY: Primary | ICD-10-CM

## 2018-09-26 DIAGNOSIS — C34.90 NON-SMALL CELL LUNG CANCER, UNSPECIFIED LATERALITY: Chronic | ICD-10-CM

## 2018-09-26 DIAGNOSIS — M50.30 DDD (DEGENERATIVE DISC DISEASE), CERVICAL: ICD-10-CM

## 2018-09-26 PROCEDURE — 1101F PT FALLS ASSESS-DOCD LE1/YR: CPT | Mod: CPTII,S$GLB,, | Performed by: ORTHOPAEDIC SURGERY

## 2018-09-26 PROCEDURE — 99213 OFFICE O/P EST LOW 20 MIN: CPT | Mod: S$GLB,,, | Performed by: ORTHOPAEDIC SURGERY

## 2018-09-26 PROCEDURE — 99999 PR PBB SHADOW E&M-EST. PATIENT-LVL III: CPT | Mod: PBBFAC,,, | Performed by: ORTHOPAEDIC SURGERY

## 2018-09-26 PROCEDURE — 72050 X-RAY EXAM NECK SPINE 4/5VWS: CPT | Mod: TC

## 2018-09-26 PROCEDURE — 72050 X-RAY EXAM NECK SPINE 4/5VWS: CPT | Mod: 26,,, | Performed by: RADIOLOGY

## 2018-09-28 ENCOUNTER — PATIENT MESSAGE (OUTPATIENT)
Dept: HEMATOLOGY/ONCOLOGY | Facility: CLINIC | Age: 68
End: 2018-09-28

## 2018-09-28 ENCOUNTER — HOSPITAL ENCOUNTER (OUTPATIENT)
Dept: RADIOLOGY | Facility: HOSPITAL | Age: 68
Discharge: HOME | End: 2018-09-28
Attending: ORTHOPAEDIC SURGERY
Payer: COMMERCIAL

## 2018-09-28 DIAGNOSIS — M54.12 CERVICAL RADICULOPATHY: ICD-10-CM

## 2018-09-28 DIAGNOSIS — C34.90 NON-SMALL CELL LUNG CANCER, UNSPECIFIED LATERALITY: Chronic | ICD-10-CM

## 2018-09-28 PROCEDURE — 72156 MRI NECK SPINE W/O & W/DYE: CPT | Mod: 26,,, | Performed by: RADIOLOGY

## 2018-09-28 PROCEDURE — A9585 GADOBUTROL INJECTION: HCPCS | Performed by: ORTHOPAEDIC SURGERY

## 2018-09-28 PROCEDURE — 25500020 PHARM REV CODE 255: Performed by: ORTHOPAEDIC SURGERY

## 2018-09-28 PROCEDURE — 72156 MRI NECK SPINE W/O & W/DYE: CPT | Mod: TC

## 2018-09-28 RX ORDER — GADOBUTROL 604.72 MG/ML
8 INJECTION INTRAVENOUS
Status: COMPLETED | OUTPATIENT
Start: 2018-09-28 | End: 2018-09-28

## 2018-09-28 RX ADMIN — GADOBUTROL 8 ML: 604.72 INJECTION INTRAVENOUS at 06:09

## 2018-09-29 NOTE — PROGRESS NOTES
The patient returns for follow up.    She is a 67 year old female s/p R upper lobectomy in august for Non Small Cell lung CA.    6 days ago she awoke with severe neck and R periscapular pain with radiation into the R index finger.    She went to the ER.    She now endorses RUE weakness and difficulty with fine motor.    On examination she has notable 4/5 RUE tripceps and wrist flexor.    Today we discussed options, given her history of CA, new onset weakenss and mild myelopathic sympotms I have ordered an MRI C spine w/wo contrast.    F/U to discuss.    I spent 15 minutes with the patient of which greater than 1/2 the time was devoted to counciling the patient regarding treatment options.

## 2018-10-01 ENCOUNTER — OFFICE VISIT (OUTPATIENT)
Dept: ORTHOPEDICS | Facility: CLINIC | Age: 68
End: 2018-10-01
Payer: COMMERCIAL

## 2018-10-01 VITALS — WEIGHT: 164 LBS | HEIGHT: 66 IN | BODY MASS INDEX: 26.36 KG/M2

## 2018-10-01 DIAGNOSIS — M54.12 CERVICAL RADICULOPATHY: Primary | ICD-10-CM

## 2018-10-01 PROCEDURE — 1101F PT FALLS ASSESS-DOCD LE1/YR: CPT | Mod: CPTII,S$GLB,, | Performed by: ORTHOPAEDIC SURGERY

## 2018-10-01 PROCEDURE — 99213 OFFICE O/P EST LOW 20 MIN: CPT | Mod: S$GLB,,, | Performed by: ORTHOPAEDIC SURGERY

## 2018-10-01 PROCEDURE — 99999 PR PBB SHADOW E&M-EST. PATIENT-LVL III: CPT | Mod: PBBFAC,,, | Performed by: ORTHOPAEDIC SURGERY

## 2018-10-01 RX ORDER — OXYCODONE AND ACETAMINOPHEN 10; 325 MG/1; MG/1
1 TABLET ORAL EVERY 4 HOURS PRN
Qty: 60 TABLET | Refills: 0 | Status: ON HOLD | OUTPATIENT
Start: 2018-10-01 | End: 2018-10-15 | Stop reason: HOSPADM

## 2018-10-01 RX ORDER — DIAZEPAM 5 MG/1
5 TABLET ORAL NIGHTLY PRN
Qty: 60 TABLET | Refills: 0 | Status: SHIPPED | OUTPATIENT
Start: 2018-10-01 | End: 2018-10-31

## 2018-10-01 RX ORDER — MUPIROCIN 20 MG/G
OINTMENT TOPICAL
Status: CANCELLED | OUTPATIENT
Start: 2018-10-01

## 2018-10-01 RX ORDER — SODIUM CHLORIDE 9 MG/ML
INJECTION, SOLUTION INTRAVENOUS CONTINUOUS
Status: CANCELLED | OUTPATIENT
Start: 2018-10-01

## 2018-10-01 RX ORDER — METHYLPREDNISOLONE 4 MG/1
TABLET ORAL
Qty: 1 PACKAGE | Refills: 0 | Status: SHIPPED | OUTPATIENT
Start: 2018-10-01 | End: 2018-10-25

## 2018-10-02 ENCOUNTER — PATIENT MESSAGE (OUTPATIENT)
Dept: HEMATOLOGY/ONCOLOGY | Facility: CLINIC | Age: 68
End: 2018-10-02

## 2018-10-02 ENCOUNTER — PATIENT MESSAGE (OUTPATIENT)
Dept: ORTHOPEDICS | Facility: CLINIC | Age: 68
End: 2018-10-02

## 2018-10-02 NOTE — TELEPHONE ENCOUNTER
see other note.   Dr. Bansal gave her medrol dose gail to see if it will help. He understands chemo should be started within 8 weeks  I told him that I am going to start. I think regla will see how she does with medro dose gail and then decide

## 2018-10-02 NOTE — TELEPHONE ENCOUNTER
Dr. Bansal did contact me and I told him that her benefit of adjuvant chemo is if she starts within 8 weeks. He was under the impression that the benefit is upto 120 days based on what patient told him. He then asked how long will the chemo be and I told him 3 months and I am waitibng to hear from him

## 2018-10-03 ENCOUNTER — PATIENT MESSAGE (OUTPATIENT)
Dept: HEMATOLOGY/ONCOLOGY | Facility: CLINIC | Age: 68
End: 2018-10-03

## 2018-10-03 ENCOUNTER — TELEPHONE (OUTPATIENT)
Dept: HEMATOLOGY/ONCOLOGY | Facility: CLINIC | Age: 68
End: 2018-10-03

## 2018-10-03 NOTE — TELEPHONE ENCOUNTER
----- Message from Pam Dhaliwal MD sent at 10/3/2018 11:00 AM CDT -----  As long as she understands the risks of not doing adjuvant chemo in time  ----- Message -----  From: Greyson Bansal MD  Sent: 10/3/2018   9:58 AM  To: Pam Dhaliwal MD    Apparently she wants surgery now, plan for 10/15. Is that ok with you?  ----- Message -----  From: Pam Dhaliwal MD  Sent: 10/2/2018   3:17 PM  To: Lata Flores RN, Greyson Bansal MD    Thank you. I will let her and at least get the first cycle going this week  ----- Message -----  From: Greyson Bansal MD  Sent: 10/2/2018   3:10 PM  To: Pam Dhaliwal MD    Tough situation. Lets see how she does over the next few days.    ----- Message -----  From: Pam Dhaliwal MD  Sent: 10/2/2018  10:16 AM  To: Greyson Bansal MD    So can she wait.   She is reluctant to start chemo unless you feel it is OK to wait on the surgery  ----- Message -----  From: Greyson Bansal MD  Sent: 10/1/2018   4:36 PM  To: MD Aster Ruiz. That is a while. Maybe we can see how she does with the medrol dosepak I gave her.    ----- Message -----  From: Pam Dhaliwal MD  Sent: 10/1/2018   4:31 PM  To: Greyson Bansal MD    3 months total   ----- Message -----  From: Greyson Bansal MD  Sent: 10/1/2018   4:15 PM  To: Pam Dhaliwal MD    Ok, she had mentioned 120 days, but obviously that is not true. How long does it go for?    ----- Message -----  From: Pam Dhaliwal MD  Sent: 10/1/2018   3:54 PM  To: Greyson Bansal MD    So she had surgery on 8/9/18 and her benefit from adjuvant chemo is maximum when chemo is started between 6-8 weeks.  She was originally scheduled to start chemo on 9/27 and she rescheduled it to 10/4. I am worried that if we push chemo to early September then we will loose the benefit of the chemo    ----- Message -----  From: Greyson Bansal MD  Sent: 10/1/2018   3:50 PM  To: Pam Dhaliwal MD    Good Afternoon,    Ms Toney has a very symptomatic  cervical disc herniation. We are discussing surgery, however I am aware that you are planning on starting chemotherapy. I was thinking about surgery 10/15. She could start chemo 3 weeks afterwards. Is this a reasonable plan from your perspective. She is pretty miserable.  -PC

## 2018-10-03 NOTE — PROGRESS NOTES
The patient returns for follow up.    She has a history of a Right UE myeloradiculopathy.    Her new MRI demonstrates a large R C6/7 disc herniation.    Today we discussed options, she is miserable and having RUE weakness. I think she is a reasonable candidate for a C6/7 ACDF. This will slightly delay her chemotherapy for her lung CA.    I will discuss with Dr. Dhaliwal as well, potentially plan for a C6/7 ACDF on 10/15.    I spent 15 minutes with the patient of which greater than 1/2 the time was devoted to counciling the patient regarding treatment options.

## 2018-10-04 ENCOUNTER — TELEPHONE (OUTPATIENT)
Dept: INTERNAL MEDICINE | Facility: CLINIC | Age: 68
End: 2018-10-04

## 2018-10-04 ENCOUNTER — ANESTHESIA EVENT (OUTPATIENT)
Dept: SURGERY | Facility: HOSPITAL | Age: 68
End: 2018-10-04
Payer: COMMERCIAL

## 2018-10-04 DIAGNOSIS — Z01.818 PREOPERATIVE TESTING: Primary | ICD-10-CM

## 2018-10-04 NOTE — PRE-PROCEDURE INSTRUCTIONS
Patient stated has not had problems with anesthesia in the past. Will need medical clearance from your PCP. She said Dr. Cori Galloway is no longer there but was recommended Dr. Yolanda Katz. She will try to make an appt with her. Only other thing needed is T& S . Will get this lab in the am of surgery.Preop instructions given. Hold asa, asa containing products, nsaids, vitamins and supplements one week prior to surgery.Nothing to eat after midnight prior to surgery. May have gum, and hard candy until 8 hours before surgery/procedure time.  May have clear liquids( water, gatorade, powerade or apple juice) until 2 hours prior to surgery/procedure time.  No red or orange drinks. If in doubt , drink water. Nothing to drink 2 hours before arrival time for surgery/procedure. If you are told to take medication in the morning of surgery, it may be taken with a sip of water. If your doctor tells you something different pertaining to when to stop eating or drinking, follow your doctor's instructions. Call for changes in status or questions.

## 2018-10-04 NOTE — TELEPHONE ENCOUNTER
----- Message from Shelbie Talbot RN sent at 10/4/2018  4:08 PM CDT -----  Patient was patient of Dr. Cori Britton. Patient says she is no longer there and was recommended to see Dr. Katz as her new PCP. Patient is scheduled for Cervical spine discectomy, anterior approach with fusion on 10/15/2018 with Dr. Bansal.( approximately 180 minutes of general anesthesia) . Patient needs an appt for medical clearance for this surgery. Thanks!  Shelbie Talbot RN BSN  [facility]

## 2018-10-04 NOTE — PRE ADMISSION SCREENING
Anesthesia Assessment: Preoperative EQUATION    Planned Procedure: Procedure(s) (LRB):  DISCECTOMY, SPINE, CERVICAL, ANTERIOR APPROACH, WITH FUSION C6/7  [employer] SNS: Motors/SSEP/Vocal Cord Regular OR table (N/A)  Requested Anesthesia Type:General  Surgeon: Greyson Bansal MD  Service: Neurosurgery  Known or anticipated Date of Surgery:10/15/2018    Surgeon notes: reviewed    Electronic QUestionnaire Assessment completed via nurse interview with patient.    NO AQ    Triage considerations:     The patient has no apparent active cardiac condition (No unstable coronary Syndrome such as severe unstable angina or recent [<1 month] myocardial infarction, decompensated CHF, severe valvular   disease or significant arrhythmia)    Previous anesthesia records:GETA and No problems  8/9/2018 VATS WITH LOBECTOMY(R CHEST), THORACOTOMY(R CHEST), LYMPHADENECTOMY  Airway/Jaw/Neck:  Airway Findings: Mouth Opening: Normal Tongue: Normal  General Airway Assessment: Adult  Jaw/Neck Findings:     Neck ROM: Normal ROM      Airway Present Prior to Hospital Arrival?: No Placement Date: 08/09/18 Placement Time: 0755 Method of Intubation: Fiberoptic Intubation , First attempt direct laryngoscopy - could not pass tube; switched to fiberoptic  Inserted by: Anesthesia MD Airway Device: Endotracheal Tube;Other (Comment) , Left Double Lumen Tube  Mask Ventilation: Easy - oral Intubated: Postinduction Blade: Trevino #2 Airway Device Size: -- Style: Cuffed Cuff Inflation: Minimal occlusive pressure Placement Verified By: Auscultation;Capnometry Grade: Grade I Complicating Factors: None Findings Post-Intubation: Positive EtCO2;Bilateral breath sounds;Atraumatic/Condition of teeth unchanged Secured at: Lips Complications: None Breath Sounds: Equal Bilateral Insertion attempts (enter comment if more than 2 attempts): 1 Removal Date: 08/09/18 Removal Time: 0755          Last PCP note: within 3 months , outside [facility]   Subspecialty notes: Cardiology:  General, Hematology/Oncology, CARDIOTHORACIC, ORTHOPEDICS, AND SPINE SERVICE    Other important co-morbidities:HTN, H/O SVT, NSC LUNG CANCER S/P RECENT R LOBECTOMY,       Tests already available:  Available tests,  within 1 month , within [facility] .9/26/2018 MRI CERVICAL SPINE W WO CONTRAST 9/25/2018 PT/INR, 9/24/2018 XRAY CERVICAL SPINE AP LAT WITH FLES AND EXT, 9/22/2018 EKG, UA, MICRO UA, CMP, CBC,              Instructions given. (See in Nurse's note)    Optimization:  Anesthesia Preop Clinic Assessment  Indicated    Medical Opinion Indicated           Plan:    Testing:  T&S   Pre-anesthesia  visit          Consultation:PCP for reevaluationa and optimization statement / medical clearance     Patient  has previously scheduled Medical Appointment:none    Navigation: Tests Scheduled. tbd             Consults scheduled.tbd             Results will be tracked by [facility].                 10/4 Discussed case with Dr. Leopold. Last surgery patient had double lumen ETTube placed by fiberoptic intubation. No complicating factors listed. May want to have a glidescope in the room in case is needed.

## 2018-10-04 NOTE — ANESTHESIA PREPROCEDURE EVALUATION
Anesthesia Assessment: Preoperative EQUATION     Planned Procedure: Procedure(s) (LRB):  DISCECTOMY, SPINE, CERVICAL, ANTERIOR APPROACH, WITH FUSION C6/7  [employer] SNS: [employer]/SSEP/V[employer] Regular OR table (N/A)  Requested Anesthesia Type:General  Surgeon: Greyson Bansal MD  Service: Neurosurgery  Known or anticipated Date of Surgery:10/15/2018     Surgeon notes: reviewed     Electronic QUestionnaire Assessment completed via nurse interview with patient.    NO AQ     Triage considerations:      The patient has no apparent active cardiac condition (No unstable coronary Syndrome such as severe unstable angina or recent [<1 month] myocardial infarction, decompensated CHF, severe valvular   disease or significant arrhythmia)     Previous anesthesia records:GETA and No problems  8/9/2018 VATS WITH LOBECTOMY(R CHEST), THORACOTOMY(R CHEST), LYMPHADENECTOMY  Airway/Jaw/Neck:  Airway Findings: Mouth Opening: Normal Tongue: Normal  General Airway Assessment: Adult  Jaw/Neck Findings:     Neck ROM: Normal ROM      Airway Present Prior to Hospital Arrival?: No Placement Date: 08/09/18 Placement Time: 0755 Method of Intubation: Fiberoptic Intubation , First attempt direct laryngoscopy - could not pass tube; switched to fiberoptic  Inserted by: Anesthesia MD Airway Device: Endotracheal Tube;Other (Comment) , Left Double Lumen Tube  Mask Ventilation: Easy - oral Intubated: Postinduction Blade: Trevino #2 Airway Device Size: -- Style: Cuffed Cuff Inflation: Minimal occlusive pressure Placement Verified By: Auscultation;Capnometry Grade: Grade I Complicating Factors: None Findings Post-Intubation: Positive EtCO2;Bilateral breath sounds;Atraumatic/Condition of teeth unchanged Secured at: Lips Complications: None Breath Sounds: Equal Bilateral Insertion attempts (enter comment if more than 2 attempts): 1 Removal Date: 08/09/18 Removal Time: 0755            Last PCP note: within 3 months , outside [facility]   Subspecialty notes:  Cardiology: General, Hematology/Oncology, CARDIOTHORACIC, ORTHOPEDICS, AND SPINE SERVICE     Other important co-morbidities:HTN, H/O SVT, NSC LUNG CANCER S/P RECENT R LOBECTOMY,       Tests already available:  Available tests,  within 1 month , within [facility] .9/26/2018 MRI CERVICAL SPINE W WO CONTRAST 9/25/2018 PT/INR, 9/24/2018 XRAY CERVICAL SPINE AP LAT WITH FLES AND EXT, 9/22/2018 EKG, UA, MICRO UA, CMP, CBC,                              Instructions given. (See in Nurse's note)     Optimization:  Anesthesia Preop Clinic Assessment  Indicated    Medical Opinion Indicated                                        Plan:    Testing:  T&S   Pre-anesthesia  visit                                                                 Consultation:PCP for reevaluationa and optimization statement / medical clearance                           Patient  has previously scheduled Medical Appointment:none     Navigation: Tests Scheduled. tbd                        Consults scheduled.tbd                        Results will be tracked by [facility].                           10/4 Discussed case with Dr. Leopold. Last surgery patient had double lumen ETTube placed by fiberoptic intubation. No complicating factors listed. May want to have a glidescope in the room in case is needed.          Electronically signed by Shelbie Talbot RN at 10/4/2018  3:52 PM       Pre-admit on 10/15/2018            Detailed Report      10/10 Medical clearance for surgery by Dr. Yolanda Katz, not dated 10/9.   Shelbie Talbot RN BSN  [facility]                                                                                                            10/04/2018  Alesha Toney is a 67 y.o., female with a pre-operative diagnosis of Cervical radiculopathy [M54.12] who is scheduled for Procedure(s) (LRB):  DISCECTOMY, SPINE, CERVICAL, ANTERIOR APPROACH, WITH FUSION C6/7  [facility] SNS: Motors/SSEP/Vocal Cord Regular OR table (N/A).     Requested anesthesia type:  "General  Surgeon: Greyson Bansal MD  Allergies:   Review of patient's allergies indicates:   Allergen Reactions    Adhesive Itching, Rash and Blisters     INCLUDES EKG PADS    Iodinated contrast- oral and iv dye     Pcn [penicillins]      rash    Phenergan plain Other (See Comments)     "crazy behavior"    Tramadol     Vancomycin analogues      Red man syndrome     Vital Sign Range:    Chronic Medications:   Medications Prior to Admission   Medication Sig Dispense Refill Last Dose    atenolol (TENORMIN) 50 MG tablet Take 0.5 tablets (25 mg total) by mouth 4 (four) times daily. 180 tablet 3 Taking    calcium carbonate (CALCIUM ANTACID) 300 mg (750 mg) Chew Take by mouth.   Taking    diazePAM (VALIUM) 5 MG tablet Take 1 tablet (5 mg total) by mouth nightly as needed for Anxiety (muscle spasms). 60 tablet 0 Taking    ibuprofen (ADVIL,MOTRIN) 200 MG tablet Take 200 mg by mouth every 6 (six) hours as needed for Pain.   Taking    losartan (COZAAR) 50 MG tablet Take 25 mg by mouth every evening.    Taking    methylPREDNISolone (MEDROL DOSEPACK) 4 mg tablet use as directed 1 Package 0 Taking    naproxen (NAPROSYN) 500 MG tablet TAKE 1 TABLET BY MOUTH TWICE DAILY WITH MEALS 180 tablet 0 Taking    oxyCODONE-acetaminophen (PERCOCET) 5-325 mg per tablet Take one tablet by mouth every four hours as needed for pain 41 tablet 0 Taking    ALPRAZolam (XANAX) 0.5 MG tablet Take 1 tablet (0.5 mg total) by mouth 3 (three) times daily as needed for Insomnia or Anxiety. 45 tablet 0 Taking    cloNIDine (CATAPRES) 0.1 MG tablet TAKE 1 TABLET BY MOUTH EVERY 8 HOURS AS NEEDED 270 tablet 1 Taking    dexamethasone (DECADRON) 6 MG tablet Take one tablet by mouth twice a day with meals. Start the day before chemo and take for 3 days after chemo. Do not take on the day of chemo. 32 tablet 0 Taking    diazePAM (VALIUM) 5 MG tablet Take 5 mg by mouth every 6 (six) hours as needed for Anxiety.   Taking    folic acid (FOLVITE) 1 " MG tablet Take 1 tablet (1 mg total) by mouth once daily. Start today 100 tablet 3 Taking    multivitamin with minerals tablet Take 1 tablet by mouth once daily.   Taking    ondansetron (ZOFRAN-ODT) 4 MG TbDL Take 1 tablet (4 mg total) by mouth every 8 (eight) hours as needed. 20 tablet 0 Taking    ondansetron (ZOFRAN-ODT) 8 MG TbDL Take 1 tablet (8 mg total) by mouth every 6 (six) hours as needed. 60 tablet 4 Taking    oxyCODONE-acetaminophen (PERCOCET)  mg per tablet Take 1 tablet by mouth every 4 (four) hours as needed. 60 tablet 0 Taking    ranitidine (ZANTAC) 75 MG tablet Take 150 mg by mouth nightly.    Taking     Current Medications:   No current facility-administered medications for this encounter.      Medical History:   Past Medical History:   Diagnosis Date    Breast cyst     Cardiac arrhythmia     Fibrocystic breast     History of hysterectomy     20yrs ago    History of kidney stones     Hypertension     Lung cancer     Lung cancer      .    Anesthesia Evaluation    I have reviewed the Patient Summary Reports.    I have reviewed the Nursing Notes.   I have reviewed the Medications.     Review of Systems  Anesthesia Hx:  No problems with previous Anesthesia  History of prior surgery of interest to airway management or planning: Previous anesthesia: General EBUS 7/10/18 with general anesthesia.  Procedure performed at an [facility] Facility. Airway issues documented on chart review include laryngeal mask airway used  Denies Family Hx of Anesthesia complications.   Denies Personal Hx of Anesthesia complications.   Social:  Former Smoker, No Alcohol Use Quit 6/2015  1ppd x 30 yrs  Wine 1 glass 3 times weekly   Hematology/Oncology:  Hematology Normal       Lung Current/Recent Cancer. (NSC LUNG CANCER, RECENT R LOBECTOMY) right surgery  Oncology Comments: lung     EENT/Dental:  EENT/Dental Normal Contact lenses   Cardiovascular:   Hypertension Dysrhythmias (SVT, NSVT-takes atenolol every 6 hrs  (this schedule is used to prevent dizziness)  Denies Angina.  Functional Capacity 4 METS, ABLE TO CLIMB 2 FLIGHTS OF STAIRS  Hypertension, Essential Hypertension  Disorder of Cardiac Rhythm (H/O SVT)    Pulmonary:   COPD Denies Recent URI.  Denies Sleep Apnea. RUL Lung cancer Pulmonary Symptoms: (A LITTLE SOB, RECENT R LOBECTOMY)  Hx of Lung/Chest Surgery or Procedure: Recent, right, Lobectomy    Renal/:  Renal/ Normal     Hepatic/GI:   GERD (Tums or Zantac prn)  Hepatic/GI Symptoms:  Esophageal / Stomach Disorders Gerd (CONTROLLED WITH TUMS)    Musculoskeletal:   MRI 2/2018 Musculoskeletal General/Symptoms: Functional capacity is ambulatory without assistance. CHEST PAIN RELATED TO RECENT R LUNG LOBECTOMY SURGERY Spine Disorders: cervical Disc disease  Cervical Spine Disorder, Cervical Disc Disease, Radiculopathy    Neurological:   Neuromuscular Disease,  Neuro Symptoms RUE WEAKNESS, R INDEX FINGER PAINPain , onset is acute , location of R shoulder, upper chest , quality of aching/dull , radiating to R arm into fingers , severity is a 8   Peripheral Neuropathy  Neuromuscular Disease CERVICAL RADICULOPATHY  Endocrine:  Endocrine Normal Denies Diabetes.    Psych:  Psychiatric Normal           Physical Exam  General:  Well nourished    Airway/Jaw/Neck:  Airway Findings: Mouth Opening: Normal Tongue: Normal  General Airway Assessment: Adult  Mallampati: II  TM Distance: Normal, at least 6 cm  Jaw/Neck Findings:     Neck ROM: Normal ROM      Dental:  Dental Findings: molar caps, In tact    Chest/Lungs:  Chest/Lungs Findings: Clear to auscultation, Normal Respiratory Rate, Decreased Breathe Sounds Right     Heart/Vascular:  Heart Findings: Rate: Bradycardia  Rhythm: Regular Rhythm  Sounds: Normal        Mental Status:  Mental Status Findings:  Cooperative, Alert and Oriented         Anesthesia Plan  Type of Anesthesia, risks & benefits discussed:  Anesthesia Type:  general  Patient's Preference: as  indicated  Intra-op Monitoring Plan: standard ASA monitors  Intra-op Monitoring Plan Comments:   Post Op Pain Control Plan: per primary service following discharge from PACU  Post Op Pain Control Plan Comments:   Induction:   IV  Beta Blocker:  Patient is not currently on a Beta-Blocker (No further documentation required).       Informed Consent: Patient understands risks and agrees with Anesthesia plan.  Questions answered. Anesthesia consent signed with patient.  ASA Score: 3     Day of Surgery Review of History & Physical:  There are no significant changes.  H&P update referred to the provider.     Anesthesia Plan Notes: Risks of dental and eye injury reviewed with patient, agrees to proceed. Reassurance given.        Ready For Surgery From Anesthesia Perspective.

## 2018-10-05 ENCOUNTER — PATIENT MESSAGE (OUTPATIENT)
Dept: SURGERY | Facility: HOSPITAL | Age: 68
End: 2018-10-05

## 2018-10-09 ENCOUNTER — OFFICE VISIT (OUTPATIENT)
Dept: INTERNAL MEDICINE | Facility: CLINIC | Age: 68
End: 2018-10-09
Payer: COMMERCIAL

## 2018-10-09 VITALS
HEIGHT: 66 IN | DIASTOLIC BLOOD PRESSURE: 74 MMHG | HEART RATE: 63 BPM | WEIGHT: 166.44 LBS | SYSTOLIC BLOOD PRESSURE: 148 MMHG | TEMPERATURE: 98 F | BODY MASS INDEX: 26.75 KG/M2 | RESPIRATION RATE: 16 BRPM

## 2018-10-09 DIAGNOSIS — Z01.818 PRE-OP EXAMINATION: Primary | ICD-10-CM

## 2018-10-09 DIAGNOSIS — I47.10 SVT (SUPRAVENTRICULAR TACHYCARDIA): ICD-10-CM

## 2018-10-09 DIAGNOSIS — Z23 NEED FOR TETANUS BOOSTER: ICD-10-CM

## 2018-10-09 PROCEDURE — 1101F PT FALLS ASSESS-DOCD LE1/YR: CPT | Mod: CPTII,S$GLB,, | Performed by: INTERNAL MEDICINE

## 2018-10-09 PROCEDURE — 99999 PR PBB SHADOW E&M-EST. PATIENT-LVL III: CPT | Mod: PBBFAC,,, | Performed by: INTERNAL MEDICINE

## 2018-10-09 PROCEDURE — 3077F SYST BP >= 140 MM HG: CPT | Mod: CPTII,S$GLB,, | Performed by: INTERNAL MEDICINE

## 2018-10-09 PROCEDURE — 99214 OFFICE O/P EST MOD 30 MIN: CPT | Mod: 25,S$GLB,, | Performed by: INTERNAL MEDICINE

## 2018-10-09 PROCEDURE — 3078F DIAST BP <80 MM HG: CPT | Mod: CPTII,S$GLB,, | Performed by: INTERNAL MEDICINE

## 2018-10-09 PROCEDURE — 90662 IIV NO PRSV INCREASED AG IM: CPT | Mod: S$GLB,,, | Performed by: INTERNAL MEDICINE

## 2018-10-09 PROCEDURE — 90471 IMMUNIZATION ADMIN: CPT | Mod: S$GLB,,, | Performed by: INTERNAL MEDICINE

## 2018-10-09 NOTE — PROGRESS NOTES
Subjective:       Patient ID: Alesha Toney is a 67 y.o. female.    Chief Complaint: Establish Care and Pre-op Exam    Back Pain   This is a recurrent problem. The current episode started 1 to 4 weeks ago. The problem occurs constantly. The problem has been waxing and waning since onset. The pain is present in the thoracic spine. The quality of the pain is described as aching. The pain is at a severity of 6/10. The pain is moderate. The pain is the same all the time. The symptoms are aggravated by sitting. Stiffness is present all day. Associated symptoms include chest pain (d/t lobectomy and referred neck pain), numbness (d/t cervical radiculopathy) and weakness (d/t cervical neck pain). Pertinent negatives include no abdominal pain, bladder incontinence, bowel incontinence, dysuria, fever, headaches, leg pain, paresis, paresthesias, pelvic pain, perianal numbness, tingling or weight loss. Risk factors include history of cancer. She has tried analgesics, NSAIDs, heat and ice for the symptoms. The treatment provided no relief.       67 y.o. female here for pre-op evaluation of C6/C7 Discectomy by Dr. Bansal planned for 10/15/18.    History of prior anesthetic complications: no  Chronic Steroid usage: no  - tobacco, - EtOH, - Illicit substances     Surgical Risk Assessment     Active cardiac issues:  Active decompensated heart failure? No   Unstable angina?  No   Significant uncontrolled arrhythmias? No   Severe valvular heart disease-Aortic or Mitral Stenosis? No   Recent MI or coronary revascularization < 30 days? No     Clinical risk factors predicting perioperative major adverse cardiac events per RCRI  High risk surgery (suprainguinal vascular, intraperitoneal, or intrathoracic surgery)? No   History of CAD/ischemic heart disease? No   History of cerebrovascular disease (CVA or TIA)? No   History of compensated heart failure? No   Type 2 diabetes requiring insulin? No   Serum Creatinine > 2? No   Total  cardiac risk factors 0     0 predictors = 0.4%, 1 predictor = 0.9%, 2 predictors = 6.6%, ?3 predictors = >11%    According to the revised cardiac risk index, the risk of megan-procedural major cardiac complications (cardiac death, nonfatal MI, nonfatal cardiac arrest, postoperative cardiogenic pulmonary edema, complete heart block) is: 0.4%     If patient has a low risk of MACE (<1%), proceed to surgery. If the patient is at elevated risk of MACE, then determine functional capacity (pt reported activity or DASI model).     If the patient has moderate, good, or excellent functional capacity (?4 METs), then proceed to surgery without further evaluation. If patient has poor or unknown functional capacity, will further testing impact decision making or perioperative care? If yes, then pharmacological stress testing is appropriate. In those patients with unknown functional capacity, exercise stress testing may be reasonable to perform.     Patient's functional mets: >4 METs    < 4 METs -unable to walk > 2 blocks on level ground without stopping due to symptoms  - eating, dressing, toileting, walking indoors, light housework. POOR   > 4 METs -climbing > 1 flight of stairs without stopping  -walking up hill > 1-2 blocks  -scrubbing floors  -moving furniture  - golf, bowling, dancing or tennis  -running short distance MODERATE to EXCELLENT     OR   [email or web address]    Review of Systems   Constitutional: Negative for chills, fever and weight loss.   HENT: Negative for trouble swallowing.    Respiratory: Negative for shortness of breath.    Cardiovascular: Positive for chest pain (d/t lobectomy and referred neck pain).   Gastrointestinal: Negative for abdominal pain and bowel incontinence.   Genitourinary: Negative for bladder incontinence, dysuria, hematuria and pelvic pain.   Musculoskeletal: Positive for back pain and neck pain.   Neurological: Positive for weakness (d/t cervical neck  "pain) and numbness (d/t cervical radiculopathy). Negative for tingling, headaches and paresthesias.       Objective:        Vitals:    10/09/18 1531 10/09/18 1600   BP: (!) 151/75 (!) 148/74   Pulse: 63    Resp: 16    Temp: 98.4 °F (36.9 °C)    TempSrc: Oral    Weight: 75.5 kg (166 lb 7.2 oz)    Height: 5' 6" (1.676 m)        Body mass index is 26.87 kg/m².    Physical Exam   Constitutional: She is oriented to person, place, and time. She appears well-developed and well-nourished. No distress.   HENT:   Head: Normocephalic and atraumatic.   Nose: Nose normal.   Mouth/Throat: Oropharynx is clear and moist.   Eyes: Conjunctivae and EOM are normal. Right eye exhibits no discharge. Left eye exhibits no discharge.   Neck: Normal range of motion. Neck supple.   Cardiovascular: Normal rate, regular rhythm, normal heart sounds and intact distal pulses.   No murmur heard.  Pulmonary/Chest: Effort normal and breath sounds normal. She has no wheezes.   Port incision along left anterior chest wall healing well   Abdominal: Soft. Bowel sounds are normal.   Musculoskeletal: She exhibits no edema.   Neurological: She is alert and oriented to person, place, and time.   Skin: Skin is warm and dry. She is not diaphoretic. No erythema.   Psychiatric: She has a normal mood and affect. Her behavior is normal. Thought content normal.       Assessment:     1. Pre-op examination    2. SVT (supraventricular tachycardia)    3. Need for tetanus booster           Plan:         1. Pre-op examination  - No contraindications to anesthesia or surgery at this time. Proceed to planned surgery.  - continue atenolol perioperatively. Okay to take losartan the night before surgery.   - hold all NSAIDs prior to surgery.      2. SVT (supraventricular tachycardia)  - continue atenolol    3. Need for tetanus booster  - DIPH,PERTUSS,ACEL,,TET VAC,PF, ADULT (ADACEL) 2 Lf-(2.5-5-3-5 mcg)-5Lf/0.5 mL Syrg; Inject 0.5 mLs into the muscle once. for 1 dose  " Dispense: 0.5 mL; Refill: 0           Patient note was created using MModal Dictation.  Any errors in syntax or even information may not have been identified and edited on initial review prior to signing this note.

## 2018-10-12 ENCOUNTER — PATIENT MESSAGE (OUTPATIENT)
Dept: SURGERY | Facility: HOSPITAL | Age: 68
End: 2018-10-12

## 2018-10-15 ENCOUNTER — HOSPITAL ENCOUNTER (OUTPATIENT)
Facility: HOSPITAL | Age: 68
Discharge: HOME | End: 2018-10-16
Attending: ORTHOPAEDIC SURGERY | Admitting: ORTHOPAEDIC SURGERY
Payer: COMMERCIAL

## 2018-10-15 ENCOUNTER — ANESTHESIA (OUTPATIENT)
Dept: SURGERY | Facility: HOSPITAL | Age: 68
End: 2018-10-15
Payer: COMMERCIAL

## 2018-10-15 DIAGNOSIS — Z01.818 PREOPERATIVE TESTING: ICD-10-CM

## 2018-10-15 DIAGNOSIS — M54.12 CERVICAL RADICULOPATHY: ICD-10-CM

## 2018-10-15 LAB
ABO + RH BLD: NORMAL
ANION GAP SERPL CALC-SCNC: 8 MMOL/L
BLD GP AB SCN CELLS X3 SERPL QL: NORMAL
BUN SERPL-MCNC: 15 MG/DL
CALCIUM SERPL-MCNC: 9.5 MG/DL
CHLORIDE SERPL-SCNC: 109 MMOL/L
CO2 SERPL-SCNC: 22 MMOL/L
CREAT SERPL-MCNC: 0.8 MG/DL
EST. GFR  (AFRICAN AMERICAN): >60 ML/MIN/1.73 M^2
EST. GFR  (NON AFRICAN AMERICAN): >60 ML/MIN/1.73 M^2
GLUCOSE SERPL-MCNC: 115 MG/DL
POTASSIUM SERPL-SCNC: 4 MMOL/L
SODIUM SERPL-SCNC: 139 MMOL/L

## 2018-10-15 PROCEDURE — 80048 BASIC METABOLIC PNL TOTAL CA: CPT

## 2018-10-15 PROCEDURE — 25000003 PHARM REV CODE 250: Performed by: NURSE ANESTHETIST, CERTIFIED REGISTERED

## 2018-10-15 PROCEDURE — 88304 TISSUE EXAM BY PATHOLOGIST: CPT | Mod: 26,,, | Performed by: PATHOLOGY

## 2018-10-15 PROCEDURE — D9220A PRA ANESTHESIA: Mod: ANES,,, | Performed by: ANESTHESIOLOGY

## 2018-10-15 PROCEDURE — 27201423 OPTIME MED/SURG SUP & DEVICES STERILE SUPPLY: Performed by: ORTHOPAEDIC SURGERY

## 2018-10-15 PROCEDURE — 25000003 PHARM REV CODE 250: Performed by: STUDENT IN AN ORGANIZED HEALTH CARE EDUCATION/TRAINING PROGRAM

## 2018-10-15 PROCEDURE — 27800903 OPTIME MED/SURG SUP & DEVICES OTHER IMPLANTS: Performed by: ORTHOPAEDIC SURGERY

## 2018-10-15 PROCEDURE — 36000711: Performed by: ORTHOPAEDIC SURGERY

## 2018-10-15 PROCEDURE — S0028 INJECTION, FAMOTIDINE, 20 MG: HCPCS | Performed by: ANESTHESIOLOGY

## 2018-10-15 PROCEDURE — C1729 CATH, DRAINAGE: HCPCS | Performed by: ORTHOPAEDIC SURGERY

## 2018-10-15 PROCEDURE — 36000710: Performed by: ORTHOPAEDIC SURGERY

## 2018-10-15 PROCEDURE — 22853 INSJ BIOMECHANICAL DEVICE: CPT | Mod: ,,, | Performed by: ORTHOPAEDIC SURGERY

## 2018-10-15 PROCEDURE — 71000033 HC RECOVERY, INTIAL HOUR: Performed by: ORTHOPAEDIC SURGERY

## 2018-10-15 PROCEDURE — 25000003 PHARM REV CODE 250: Performed by: ANESTHESIOLOGY

## 2018-10-15 PROCEDURE — 94640 AIRWAY INHALATION TREATMENT: CPT

## 2018-10-15 PROCEDURE — 20930 SP BONE ALGRFT MORSEL ADD-ON: CPT | Mod: ,,, | Performed by: ORTHOPAEDIC SURGERY

## 2018-10-15 PROCEDURE — 27000221 HC OXYGEN, UP TO 24 HOURS

## 2018-10-15 PROCEDURE — 86901 BLOOD TYPING SEROLOGIC RH(D): CPT

## 2018-10-15 PROCEDURE — 22845 INSERT SPINE FIXATION DEVICE: CPT | Mod: 59,,, | Performed by: ORTHOPAEDIC SURGERY

## 2018-10-15 PROCEDURE — 94761 N-INVAS EAR/PLS OXIMETRY MLT: CPT

## 2018-10-15 PROCEDURE — 37000009 HC ANESTHESIA EA ADD 15 MINS: Performed by: ORTHOPAEDIC SURGERY

## 2018-10-15 PROCEDURE — 20936 SP BONE AGRFT LOCAL ADD-ON: CPT | Mod: ,,, | Performed by: ORTHOPAEDIC SURGERY

## 2018-10-15 PROCEDURE — 25000242 PHARM REV CODE 250 ALT 637 W/ HCPCS: Performed by: STUDENT IN AN ORGANIZED HEALTH CARE EDUCATION/TRAINING PROGRAM

## 2018-10-15 PROCEDURE — C1713 ANCHOR/SCREW BN/BN,TIS/BN: HCPCS | Performed by: ORTHOPAEDIC SURGERY

## 2018-10-15 PROCEDURE — 71000039 HC RECOVERY, EACH ADD'L HOUR: Performed by: ORTHOPAEDIC SURGERY

## 2018-10-15 PROCEDURE — D9220A PRA ANESTHESIA: Mod: CRNA,,, | Performed by: NURSE ANESTHETIST, CERTIFIED REGISTERED

## 2018-10-15 PROCEDURE — 37000008 HC ANESTHESIA 1ST 15 MINUTES: Performed by: ORTHOPAEDIC SURGERY

## 2018-10-15 PROCEDURE — 88304 TISSUE EXAM BY PATHOLOGIST: CPT | Performed by: PATHOLOGY

## 2018-10-15 PROCEDURE — 36415 COLL VENOUS BLD VENIPUNCTURE: CPT

## 2018-10-15 PROCEDURE — 63600175 PHARM REV CODE 636 W HCPCS: Performed by: NURSE ANESTHETIST, CERTIFIED REGISTERED

## 2018-10-15 PROCEDURE — 25000003 PHARM REV CODE 250: Performed by: ORTHOPAEDIC SURGERY

## 2018-10-15 PROCEDURE — 63600175 PHARM REV CODE 636 W HCPCS: Performed by: ANESTHESIOLOGY

## 2018-10-15 PROCEDURE — 63600175 PHARM REV CODE 636 W HCPCS: Performed by: STUDENT IN AN ORGANIZED HEALTH CARE EDUCATION/TRAINING PROGRAM

## 2018-10-15 PROCEDURE — 22551 ARTHRD ANT NTRBDY CERVICAL: CPT | Mod: ,,, | Performed by: ORTHOPAEDIC SURGERY

## 2018-10-15 DEVICE — SCREW BONE SPINAL ANT 16MM: Type: IMPLANTABLE DEVICE | Site: SPINE CERVICAL | Status: FUNCTIONAL

## 2018-10-15 DEVICE — PUTTY DBX 1CC: Type: IMPLANTABLE DEVICE | Site: SPINE CERVICAL | Status: FUNCTIONAL

## 2018-10-15 DEVICE — IMPLANTABLE DEVICE: Type: IMPLANTABLE DEVICE | Site: SPINE CERVICAL | Status: FUNCTIONAL

## 2018-10-15 RX ORDER — MUPIROCIN 20 MG/G
OINTMENT TOPICAL
Status: DISCONTINUED | OUTPATIENT
Start: 2018-10-15 | End: 2018-10-15

## 2018-10-15 RX ORDER — DEXAMETHASONE SODIUM PHOSPHATE 4 MG/ML
INJECTION, SOLUTION INTRA-ARTICULAR; INTRALESIONAL; INTRAMUSCULAR; INTRAVENOUS; SOFT TISSUE
Status: DISCONTINUED | OUTPATIENT
Start: 2018-10-15 | End: 2018-10-15

## 2018-10-15 RX ORDER — SODIUM CHLORIDE 9 MG/ML
INJECTION, SOLUTION INTRAVENOUS CONTINUOUS PRN
Status: DISCONTINUED | OUTPATIENT
Start: 2018-10-15 | End: 2018-10-15

## 2018-10-15 RX ORDER — DIAZEPAM 5 MG/1
5 TABLET ORAL NIGHTLY PRN
Status: DISCONTINUED | OUTPATIENT
Start: 2018-10-15 | End: 2018-10-16 | Stop reason: HOSPADM

## 2018-10-15 RX ORDER — PROPOFOL 10 MG/ML
VIAL (ML) INTRAVENOUS
Status: DISCONTINUED | OUTPATIENT
Start: 2018-10-15 | End: 2018-10-15

## 2018-10-15 RX ORDER — LACTULOSE 10 G/15ML
20 SOLUTION ORAL EVERY 6 HOURS PRN
Status: DISCONTINUED | OUTPATIENT
Start: 2018-10-15 | End: 2018-10-16 | Stop reason: HOSPADM

## 2018-10-15 RX ORDER — ACETAMINOPHEN 10 MG/ML
1000 INJECTION, SOLUTION INTRAVENOUS EVERY 8 HOURS
Status: COMPLETED | OUTPATIENT
Start: 2018-10-15 | End: 2018-10-16

## 2018-10-15 RX ORDER — ACETAMINOPHEN 10 MG/ML
INJECTION, SOLUTION INTRAVENOUS
Status: DISCONTINUED | OUTPATIENT
Start: 2018-10-15 | End: 2018-10-15

## 2018-10-15 RX ORDER — HYDROMORPHONE HYDROCHLORIDE 1 MG/ML
1 INJECTION, SOLUTION INTRAMUSCULAR; INTRAVENOUS; SUBCUTANEOUS EVERY 4 HOURS PRN
Status: DISCONTINUED | OUTPATIENT
Start: 2018-10-15 | End: 2018-10-16 | Stop reason: HOSPADM

## 2018-10-15 RX ORDER — MEPERIDINE HYDROCHLORIDE 50 MG/ML
12.5 INJECTION INTRAMUSCULAR; INTRAVENOUS; SUBCUTANEOUS ONCE AS NEEDED
Status: DISCONTINUED | OUTPATIENT
Start: 2018-10-15 | End: 2018-10-15 | Stop reason: HOSPADM

## 2018-10-15 RX ORDER — PROPOFOL 10 MG/ML
VIAL (ML) INTRAVENOUS CONTINUOUS PRN
Status: DISCONTINUED | OUTPATIENT
Start: 2018-10-15 | End: 2018-10-15

## 2018-10-15 RX ORDER — OXYCODONE HYDROCHLORIDE 5 MG/1
10 TABLET ORAL EVERY 4 HOURS PRN
Status: DISCONTINUED | OUTPATIENT
Start: 2018-10-15 | End: 2018-10-16 | Stop reason: HOSPADM

## 2018-10-15 RX ORDER — ONDANSETRON 8 MG/1
8 TABLET, ORALLY DISINTEGRATING ORAL EVERY 8 HOURS PRN
Status: DISCONTINUED | OUTPATIENT
Start: 2018-10-15 | End: 2018-10-16 | Stop reason: HOSPADM

## 2018-10-15 RX ORDER — ONDANSETRON HYDROCHLORIDE 8 MG/1
8 TABLET, FILM COATED ORAL EVERY 12 HOURS PRN
Qty: 60 TABLET | Refills: 0 | Status: SHIPPED | OUTPATIENT
Start: 2018-10-15 | End: 2019-05-29

## 2018-10-15 RX ORDER — LOSARTAN POTASSIUM 25 MG/1
25 TABLET ORAL NIGHTLY
Status: DISCONTINUED | OUTPATIENT
Start: 2018-10-15 | End: 2018-10-16 | Stop reason: HOSPADM

## 2018-10-15 RX ORDER — LORAZEPAM 2 MG/ML
0.25 INJECTION INTRAMUSCULAR ONCE AS NEEDED
Status: DISCONTINUED | OUTPATIENT
Start: 2018-10-15 | End: 2018-10-15 | Stop reason: HOSPADM

## 2018-10-15 RX ORDER — ROCURONIUM BROMIDE 10 MG/ML
INJECTION, SOLUTION INTRAVENOUS
Status: DISCONTINUED | OUTPATIENT
Start: 2018-10-15 | End: 2018-10-15

## 2018-10-15 RX ORDER — BACITRACIN 50000 [IU]/1
INJECTION, POWDER, FOR SOLUTION INTRAMUSCULAR
Status: DISCONTINUED | OUTPATIENT
Start: 2018-10-15 | End: 2018-10-15 | Stop reason: HOSPADM

## 2018-10-15 RX ORDER — LIDOCAINE HCL/PF 100 MG/5ML
SYRINGE (ML) INTRAVENOUS
Status: DISCONTINUED | OUTPATIENT
Start: 2018-10-15 | End: 2018-10-15

## 2018-10-15 RX ORDER — SODIUM CHLORIDE 9 MG/ML
INJECTION, SOLUTION INTRAVENOUS CONTINUOUS
Status: ACTIVE | OUTPATIENT
Start: 2018-10-15 | End: 2018-10-15

## 2018-10-15 RX ORDER — FENTANYL CITRATE 50 UG/ML
25 INJECTION, SOLUTION INTRAMUSCULAR; INTRAVENOUS EVERY 5 MIN PRN
Status: COMPLETED | OUTPATIENT
Start: 2018-10-15 | End: 2018-10-15

## 2018-10-15 RX ORDER — CEFAZOLIN SODIUM 1 G/3ML
INJECTION, POWDER, FOR SOLUTION INTRAMUSCULAR; INTRAVENOUS
Status: DISCONTINUED | OUTPATIENT
Start: 2018-10-15 | End: 2018-10-15

## 2018-10-15 RX ORDER — CLONIDINE HYDROCHLORIDE 0.1 MG/1
0.1 TABLET ORAL EVERY 8 HOURS PRN
Status: DISCONTINUED | OUTPATIENT
Start: 2018-10-15 | End: 2018-10-16 | Stop reason: HOSPADM

## 2018-10-15 RX ORDER — GLYCOPYRROLATE 0.2 MG/ML
INJECTION INTRAMUSCULAR; INTRAVENOUS
Status: DISCONTINUED | OUTPATIENT
Start: 2018-10-15 | End: 2018-10-15

## 2018-10-15 RX ORDER — ALPRAZOLAM 0.25 MG/1
0.5 TABLET ORAL 3 TIMES DAILY PRN
Status: DISCONTINUED | OUTPATIENT
Start: 2018-10-15 | End: 2018-10-16 | Stop reason: HOSPADM

## 2018-10-15 RX ORDER — LIDOCAINE HYDROCHLORIDE 10 MG/ML
1 INJECTION, SOLUTION EPIDURAL; INFILTRATION; INTRACAUDAL; PERINEURAL ONCE
Status: COMPLETED | OUTPATIENT
Start: 2018-10-15 | End: 2018-10-15

## 2018-10-15 RX ORDER — SUCCINYLCHOLINE CHLORIDE 20 MG/ML
INJECTION INTRAMUSCULAR; INTRAVENOUS
Status: DISCONTINUED | OUTPATIENT
Start: 2018-10-15 | End: 2018-10-15

## 2018-10-15 RX ORDER — ONDANSETRON 2 MG/ML
INJECTION INTRAMUSCULAR; INTRAVENOUS
Status: DISCONTINUED | OUTPATIENT
Start: 2018-10-15 | End: 2018-10-15

## 2018-10-15 RX ORDER — FAMOTIDINE 10 MG/ML
20 INJECTION INTRAVENOUS ONCE
Status: COMPLETED | OUTPATIENT
Start: 2018-10-15 | End: 2018-10-15

## 2018-10-15 RX ORDER — MIDAZOLAM HYDROCHLORIDE 1 MG/ML
INJECTION, SOLUTION INTRAMUSCULAR; INTRAVENOUS
Status: DISCONTINUED | OUTPATIENT
Start: 2018-10-15 | End: 2018-10-15

## 2018-10-15 RX ORDER — FAMOTIDINE 20 MG/1
20 TABLET, FILM COATED ORAL 2 TIMES DAILY
Status: DISCONTINUED | OUTPATIENT
Start: 2018-10-15 | End: 2018-10-16 | Stop reason: HOSPADM

## 2018-10-15 RX ORDER — POLYETHYLENE GLYCOL 3350 17 G/17G
17 POWDER, FOR SOLUTION ORAL DAILY
Status: DISCONTINUED | OUTPATIENT
Start: 2018-10-15 | End: 2018-10-16 | Stop reason: HOSPADM

## 2018-10-15 RX ORDER — DOCUSATE SODIUM 100 MG/1
100 CAPSULE, LIQUID FILLED ORAL 2 TIMES DAILY
Qty: 60 CAPSULE | Refills: 0 | Status: SHIPPED | OUTPATIENT
Start: 2018-10-15 | End: 2018-10-25

## 2018-10-15 RX ORDER — OXYCODONE AND ACETAMINOPHEN 10; 325 MG/1; MG/1
1 TABLET ORAL EVERY 4 HOURS PRN
Qty: 91 TABLET | Refills: 0 | Status: SHIPPED | OUTPATIENT
Start: 2018-10-15 | End: 2018-11-26

## 2018-10-15 RX ORDER — SODIUM CHLORIDE 0.9 % (FLUSH) 0.9 %
5 SYRINGE (ML) INJECTION
Status: DISCONTINUED | OUTPATIENT
Start: 2018-10-15 | End: 2018-10-16 | Stop reason: HOSPADM

## 2018-10-15 RX ORDER — ONDANSETRON 2 MG/ML
4 INJECTION INTRAMUSCULAR; INTRAVENOUS DAILY PRN
Status: DISCONTINUED | OUTPATIENT
Start: 2018-10-15 | End: 2018-10-15 | Stop reason: HOSPADM

## 2018-10-15 RX ORDER — KETAMINE HYDROCHLORIDE 10 MG/ML
INJECTION, SOLUTION INTRAMUSCULAR; INTRAVENOUS
Status: DISCONTINUED | OUTPATIENT
Start: 2018-10-15 | End: 2018-10-15

## 2018-10-15 RX ORDER — OXYCODONE HYDROCHLORIDE 5 MG/1
5 TABLET ORAL EVERY 4 HOURS PRN
Status: DISCONTINUED | OUTPATIENT
Start: 2018-10-15 | End: 2018-10-16 | Stop reason: HOSPADM

## 2018-10-15 RX ORDER — ATENOLOL 25 MG/1
25 TABLET ORAL 4 TIMES DAILY
Status: DISCONTINUED | OUTPATIENT
Start: 2018-10-15 | End: 2018-10-16 | Stop reason: HOSPADM

## 2018-10-15 RX ORDER — SODIUM CHLORIDE 9 MG/ML
INJECTION, SOLUTION INTRAVENOUS CONTINUOUS
Status: DISCONTINUED | OUTPATIENT
Start: 2018-10-15 | End: 2018-10-15

## 2018-10-15 RX ORDER — LIDOCAINE HYDROCHLORIDE AND EPINEPHRINE 10; 10 MG/ML; UG/ML
INJECTION, SOLUTION INFILTRATION; PERINEURAL
Status: DISCONTINUED | OUTPATIENT
Start: 2018-10-15 | End: 2018-10-15 | Stop reason: HOSPADM

## 2018-10-15 RX ORDER — IPRATROPIUM BROMIDE AND ALBUTEROL SULFATE 2.5; .5 MG/3ML; MG/3ML
3 SOLUTION RESPIRATORY (INHALATION)
Status: DISCONTINUED | OUTPATIENT
Start: 2018-10-15 | End: 2018-10-16 | Stop reason: HOSPADM

## 2018-10-15 RX ORDER — PHENYLEPHRINE HYDROCHLORIDE 10 MG/ML
INJECTION INTRAVENOUS
Status: DISCONTINUED | OUTPATIENT
Start: 2018-10-15 | End: 2018-10-15

## 2018-10-15 RX ORDER — DIPHENHYDRAMINE HCL 25 MG
25 CAPSULE ORAL EVERY 6 HOURS PRN
Status: DISCONTINUED | OUTPATIENT
Start: 2018-10-15 | End: 2018-10-16 | Stop reason: HOSPADM

## 2018-10-15 RX ORDER — SODIUM CHLORIDE 0.9 % (FLUSH) 0.9 %
3 SYRINGE (ML) INJECTION
Status: DISCONTINUED | OUTPATIENT
Start: 2018-10-15 | End: 2018-10-15 | Stop reason: HOSPADM

## 2018-10-15 RX ADMIN — ONDANSETRON 4 MG: 2 INJECTION INTRAMUSCULAR; INTRAVENOUS at 09:10

## 2018-10-15 RX ADMIN — KETAMINE HYDROCHLORIDE 20 MG: 10 INJECTION, SOLUTION INTRAMUSCULAR; INTRAVENOUS at 07:10

## 2018-10-15 RX ADMIN — LIDOCAINE HYDROCHLORIDE 100 MG: 20 INJECTION, SOLUTION INTRAVENOUS at 07:10

## 2018-10-15 RX ADMIN — GLYCOPYRROLATE 0.2 MG: 0.2 INJECTION, SOLUTION INTRAMUSCULAR; INTRAVENOUS at 07:10

## 2018-10-15 RX ADMIN — HYDROMORPHONE HYDROCHLORIDE 1 MG: 1 INJECTION, SOLUTION INTRAMUSCULAR; INTRAVENOUS; SUBCUTANEOUS at 11:10

## 2018-10-15 RX ADMIN — LOSARTAN POTASSIUM 25 MG: 25 TABLET, FILM COATED ORAL at 08:10

## 2018-10-15 RX ADMIN — DEXAMETHASONE SODIUM PHOSPHATE 10 MG: 4 INJECTION, SOLUTION INTRAMUSCULAR; INTRAVENOUS at 08:10

## 2018-10-15 RX ADMIN — FENTANYL CITRATE 25 MCG: 50 INJECTION INTRAMUSCULAR; INTRAVENOUS at 10:10

## 2018-10-15 RX ADMIN — ATENOLOL 25 MG: 25 TABLET ORAL at 12:10

## 2018-10-15 RX ADMIN — FAMOTIDINE 20 MG: 20 TABLET ORAL at 08:10

## 2018-10-15 RX ADMIN — SUCCINYLCHOLINE CHLORIDE 160 MG: 20 INJECTION, SOLUTION INTRAMUSCULAR; INTRAVENOUS at 07:10

## 2018-10-15 RX ADMIN — SODIUM CHLORIDE 1000 ML: 0.9 INJECTION, SOLUTION INTRAVENOUS at 06:10

## 2018-10-15 RX ADMIN — REMIFENTANIL HYDROCHLORIDE 0.05 MCG/KG/MIN: 1 INJECTION, POWDER, LYOPHILIZED, FOR SOLUTION INTRAVENOUS at 07:10

## 2018-10-15 RX ADMIN — DIAZEPAM 5 MG: 5 TABLET ORAL at 09:10

## 2018-10-15 RX ADMIN — SODIUM CHLORIDE, SODIUM GLUCONATE, SODIUM ACETATE, POTASSIUM CHLORIDE, MAGNESIUM CHLORIDE, SODIUM PHOSPHATE, DIBASIC, AND POTASSIUM PHOSPHATE: .53; .5; .37; .037; .03; .012; .00082 INJECTION, SOLUTION INTRAVENOUS at 07:10

## 2018-10-15 RX ADMIN — PROPOFOL 150 MCG/KG/MIN: 10 INJECTION, EMULSION INTRAVENOUS at 07:10

## 2018-10-15 RX ADMIN — IPRATROPIUM BROMIDE AND ALBUTEROL SULFATE 3 ML: .5; 3 SOLUTION RESPIRATORY (INHALATION) at 04:10

## 2018-10-15 RX ADMIN — IPRATROPIUM BROMIDE AND ALBUTEROL SULFATE 3 ML: .5; 3 SOLUTION RESPIRATORY (INHALATION) at 11:10

## 2018-10-15 RX ADMIN — FAMOTIDINE 20 MG: 10 INJECTION, SOLUTION INTRAVENOUS at 06:10

## 2018-10-15 RX ADMIN — PHENYLEPHRINE HYDROCHLORIDE 100 MCG: 10 INJECTION INTRAVENOUS at 09:10

## 2018-10-15 RX ADMIN — SODIUM CHLORIDE: 0.9 INJECTION, SOLUTION INTRAVENOUS at 07:10

## 2018-10-15 RX ADMIN — LIDOCAINE HYDROCHLORIDE: 10 INJECTION, SOLUTION EPIDURAL; INFILTRATION; INTRACAUDAL; PERINEURAL at 07:10

## 2018-10-15 RX ADMIN — PHENYLEPHRINE HYDROCHLORIDE 50 MCG: 10 INJECTION INTRAVENOUS at 08:10

## 2018-10-15 RX ADMIN — CEFAZOLIN 2 G: 330 INJECTION, POWDER, FOR SOLUTION INTRAMUSCULAR; INTRAVENOUS at 08:10

## 2018-10-15 RX ADMIN — FENTANYL CITRATE 25 MCG: 50 INJECTION INTRAMUSCULAR; INTRAVENOUS at 11:10

## 2018-10-15 RX ADMIN — ATENOLOL 25 MG: 25 TABLET ORAL at 08:10

## 2018-10-15 RX ADMIN — PHENYLEPHRINE HYDROCHLORIDE 100 MCG: 10 INJECTION INTRAVENOUS at 08:10

## 2018-10-15 RX ADMIN — ATENOLOL 25 MG: 25 TABLET ORAL at 04:10

## 2018-10-15 RX ADMIN — ACETAMINOPHEN 1000 MG: 10 INJECTION, SOLUTION INTRAVENOUS at 09:10

## 2018-10-15 RX ADMIN — ROCURONIUM BROMIDE 5 MG: 10 INJECTION, SOLUTION INTRAVENOUS at 07:10

## 2018-10-15 RX ADMIN — OXYCODONE HYDROCHLORIDE 5 MG: 5 TABLET ORAL at 08:10

## 2018-10-15 RX ADMIN — FAMOTIDINE 20 MG: 20 TABLET ORAL at 10:10

## 2018-10-15 RX ADMIN — SODIUM CHLORIDE: 0.9 INJECTION, SOLUTION INTRAVENOUS at 10:10

## 2018-10-15 RX ADMIN — PROPOFOL 150 MG: 10 INJECTION, EMULSION INTRAVENOUS at 07:10

## 2018-10-15 RX ADMIN — ACETAMINOPHEN 1000 MG: 10 INJECTION, SOLUTION INTRAVENOUS at 06:10

## 2018-10-15 RX ADMIN — HYDROMORPHONE HYDROCHLORIDE 1 MG: 1 INJECTION, SOLUTION INTRAMUSCULAR; INTRAVENOUS; SUBCUTANEOUS at 01:10

## 2018-10-15 RX ADMIN — OXYCODONE HYDROCHLORIDE 5 MG: 5 TABLET ORAL at 04:10

## 2018-10-15 RX ADMIN — OXYCODONE HYDROCHLORIDE 10 MG: 5 TABLET ORAL at 10:10

## 2018-10-15 RX ADMIN — MIDAZOLAM HYDROCHLORIDE 2 MG: 1 INJECTION, SOLUTION INTRAMUSCULAR; INTRAVENOUS at 07:10

## 2018-10-15 RX ADMIN — IPRATROPIUM BROMIDE AND ALBUTEROL SULFATE 3 ML: .5; 3 SOLUTION RESPIRATORY (INHALATION) at 06:10

## 2018-10-15 RX ADMIN — ONDANSETRON 8 MG: 8 TABLET, ORALLY DISINTEGRATING ORAL at 11:10

## 2018-10-15 RX ADMIN — MUPIROCIN: 20 OINTMENT TOPICAL at 06:10

## 2018-10-15 RX ADMIN — POLYETHYLENE GLYCOL 3350 17 G: 17 POWDER, FOR SOLUTION ORAL at 10:10

## 2018-10-15 NOTE — ANESTHESIA POSTPROCEDURE EVALUATION
"Anesthesia Post Evaluation    Patient: Alesha Toney    Procedure(s) Performed: Procedure(s) (LRB):  DISCECTOMY, SPINE, CERVICAL, ANTERIOR APPROACH, WITH FUSION C6/7  [facility]/SSEP/Vocal Cord Regular OR table (N/A)    Final Anesthesia Type: general  Patient location during evaluation: PACU  Patient participation: Yes- Able to Participate  Level of consciousness: awake and alert and oriented  Post-procedure vital signs: reviewed and stable  Pain management: adequate  Airway patency: patent  PONV status at discharge: No PONV  Anesthetic complications: no      Cardiovascular status: stable  Respiratory status: unassisted, spontaneous ventilation and room air  Hydration status: euvolemic  Follow-up not needed.        Visit Vitals  BP (!) 161/72 (BP Location: Right arm, Patient Position: Lying)   Pulse 63   Temp 36.4 °C (97.5 °F) (Temporal)   Resp 12   Ht 5' 6" (1.676 m)   Wt 73.9 kg (163 lb)   LMP  (LMP Unknown)   SpO2 100%   Breastfeeding? No   BMI 26.31 kg/m²     Vocal cord phonation of A, E and whisper are within normal limits.    Pain/Grady Score: Pain Assessment Performed: Yes (10/15/2018 10:50 AM)  Presence of Pain: complains of pain/discomfort (10/15/2018 10:50 AM)  Pain Rating Prior to Med Admin: 6 (10/15/2018 11:20 AM)  Grady Score: 9 (10/15/2018 10:50 AM)        "

## 2018-10-15 NOTE — PROGRESS NOTES
Dr. Ewing with Anesthesia notified of patient's elevated BP (195/93). Dr. Watt states he will address when patient is in OR

## 2018-10-15 NOTE — PLAN OF CARE
"[facility]    HOME HEALTH ORDERS  FACE TO FACE ENCOUNTER    Patient Name: Alesha Toney  YOB: 1950    PCP: Yolanda Katz MD   PCP Address: [address] [place] [address]  PCP Phone Number: [phone]  PCP Fax: [phone]    Encounter Date: 10/15/2018    Admit to Home Health    Diagnoses:  Active Hospital Problems    Diagnosis  POA    *Cervical radiculopathy [M54.12]  Yes      Resolved Hospital Problems   No resolved problems to display.       Future Appointments   Date Time Provider Department Center   11/2/2018  9:30 AM LAB, HEMONC CANCER BLDG [facility]   11/8/2018  7:45 AM Pam Dhaliwal MD [facility] HEM ONC [facility]   11/8/2018  8:30 AM [facility], [facility]   11/9/2018  2:00 PM [facility], [facility]           I have seen and examined this patient face to face today. My clinical findings that support the need for the home health skilled services and home bound status are the following:  Requiring assistive device to leave home due to unsteady gait caused by Surgery.    Allergies:  Review of patient's allergies indicates:   Allergen Reactions    Adhesive Itching, Rash and Blisters     INCLUDES EKG PADS    Iodinated contrast- oral and iv dye     Pcn [penicillins]      rash    Phenergan plain Other (See Comments)     "crazy behavior"    Tramadol     Vancomycin analogues      Red man syndrome       Diet: regular diet    Activities: activity as tolerated    Home Health Admitting Clinician:   SN/PT to complete comprehensive assessment including routine vital signs. Instruct on disease process and s/s of complications to report to MD. Follow specific home health arthoplasty protocol. Review/verify medication list sent home with the patient at time of discharge  and instruct patient/caregiver as needed. If coumadin ordered, coumadin clinic to manage INR with INR draws 2x per week with a goal to maintain INR between 1.8 " and 2.2. Frequency may be adjusted depending on start of care date.    Notify MD if SBP > 160 or < 90; DBP > 90 or < 50; HR > 120 or < 50; Temp > 101    Home Medical Equipment:  Walker, 3-1 bedside commode, transfer tub bench    CONSULTS:    Physical Therapy may admit if patient not on coumadin, PT to perform comprehensive assessment if performing admit visit and generate therapy plan of care. Evaluate for home safety and equipment needs; Establish/upgrade home exercise program. Perform/instruct on therapeutic exercises, gait training, transfer training, and Range of Motion.      OTHER: (only select if patient needs other therapy disciplines)  Occupational Therapy to evaluate and treat. Evaluate home environment for safety and equipment needs. Perform/Instruct on transfers, ADL training, ROM, and therapeutic exercises.    MISCELLANEOUS CARE:      WOUND CARE ORDERS:  Assess Surgical Incision/DSRG each TX  Aquacel AG drsg applied post-op leave on 14 days post op. Call MD if any drainage reaches border to border of drsg horizontally, s/s of infection, temp >101, induration, swelling or redness.  If dressing is removed per MD order, then apply island dressing, change/teach caregiver to perform daily dressing change if island dressing present.    Medications: Review discharge medications with patient and family and provide education.      Current Discharge Medication List      START taking these medications    Details   docusate sodium (COLACE) 100 MG capsule Take 1 capsule (100 mg total) by mouth 2 (two) times daily. Available OTC as well  Qty: 60 capsule, Refills: 0      ondansetron (ZOFRAN) 8 MG tablet Take 1 tablet (8 mg total) by mouth every 12 (twelve) hours as needed for Nausea.  Qty: 60 tablet, Refills: 0         CONTINUE these medications which have CHANGED    Details   oxyCODONE-acetaminophen (PERCOCET)  mg per tablet Take 1 tablet by mouth every 4 (four) hours as needed for Pain.  Qty: 91 tablet,  Refills: 0         CONTINUE these medications which have NOT CHANGED    Details   atenolol (TENORMIN) 50 MG tablet Take 0.5 tablets (25 mg total) by mouth 4 (four) times daily.  Qty: 180 tablet, Refills: 3    Associated Diagnoses: Essential hypertension      calcium carbonate (CALCIUM ANTACID) 300 mg (750 mg) Chew Take by mouth.      !! diazePAM (VALIUM) 5 MG tablet Take 5 mg by mouth every 6 (six) hours as needed for Anxiety.      !! diazePAM (VALIUM) 5 MG tablet Take 1 tablet (5 mg total) by mouth nightly as needed for Anxiety (muscle spasms).  Qty: 60 tablet, Refills: 0      losartan (COZAAR) 50 MG tablet Take 25 mg by mouth every evening.       multivitamin with minerals tablet Take 1 tablet by mouth once daily.      ranitidine (ZANTAC) 75 MG tablet Take 150 mg by mouth nightly.       ALPRAZolam (XANAX) 0.5 MG tablet Take 1 tablet (0.5 mg total) by mouth 3 (three) times daily as needed for Insomnia or Anxiety.  Qty: 45 tablet, Refills: 0    Associated Diagnoses: Anxiety      cloNIDine (CATAPRES) 0.1 MG tablet TAKE 1 TABLET BY MOUTH EVERY 8 HOURS AS NEEDED  Qty: 270 tablet, Refills: 1    Comments: **Patient requests 90 days supply**  Associated Diagnoses: Essential hypertension      dexamethasone (DECADRON) 6 MG tablet Take one tablet by mouth twice a day with meals. Start the day before chemo and take for 3 days after chemo. Do not take on the day of chemo.  Qty: 32 tablet, Refills: 0    Associated Diagnoses: Malignant neoplasm of upper lobe of right lung      folic acid (FOLVITE) 1 MG tablet Take 1 tablet (1 mg total) by mouth once daily. Start today  Qty: 100 tablet, Refills: 3    Associated Diagnoses: Malignant neoplasm of upper lobe of right lung      methylPREDNISolone (MEDROL DOSEPACK) 4 mg tablet use as directed  Qty: 1 Package, Refills: 0       !! - Potential duplicate medications found. Please discuss with provider.      STOP taking these medications       ibuprofen (ADVIL,MOTRIN) 200 MG tablet  Comments:   Reason for Stopping:         naproxen (NAPROSYN) 500 MG tablet Comments:   Reason for Stopping:         oxyCODONE-acetaminophen (PERCOCET) 5-325 mg per tablet Comments:   Reason for Stopping:         ondansetron (ZOFRAN-ODT) 4 MG TbDL Comments:   Reason for Stopping:         ondansetron (ZOFRAN-ODT) 8 MG TbDL Comments:   Reason for Stopping:               I certify that this patient is confined to her home and needs intermittent skilled nursing care, physical therapy and occupational therapy.

## 2018-10-15 NOTE — BRIEF OP NOTE
DATE OF PROCEDURE: 10/15/2018.     SURGEON: Greyson Bansal M.D.     FIRST ASSISTANT SURGEON: Sheng Mesa MD, PGY-5    POSTOPERATIVE DIAGNOSIS: C6/7 disc herniation with myeloradiculopathy.     PROCEDURES PERFORMED:  1. C6/7 ACDF     FINDINGS: Large fragmented disc herniation     ESTIMATED BLOOD LOSS: 25 mL.      SPECIMENS: Disc herniation to pathology.

## 2018-10-15 NOTE — ANESTHESIA RELEASE NOTE
"Anesthesia Release from PACU Note    Patient: Alesha Toney    Procedure(s) Performed: Procedure(s) (LRB):  DISCECTOMY, SPINE, CERVICAL, ANTERIOR APPROACH, WITH FUSION C6/7  [place] SNS: Motors/SSEP/Vocal Cord Regular OR table (N/A)    Anesthesia type: GEN    Post pain: Adequate analgesia reported    Post assessment: no apparent anesthetic complications, tolerated procedure well and no evidence of recall    Post vital signs: BP (!) 142/63 (BP Location: Right arm, Patient Position: Lying)   Pulse 62   Temp 36.4 °C (97.5 °F) (Temporal)   Resp 10   Ht 5' 6" (1.676 m)   Wt 73.9 kg (163 lb)   LMP  (LMP Unknown)   SpO2 100%   Breastfeeding? No   BMI 26.31 kg/m²     Level of consciousness: awake, alert and oriented    Nausea/Vomiting: no nausea/no vomiting    Complications: none    Airway Patency: patent    Respiratory: unassisted, spontaneous ventilation, room air    Cardiovascular: stable and blood pressure at baseline    Hydration: euvolemic    "

## 2018-10-15 NOTE — DISCHARGE INSTRUCTIONS
DR. HAVEN MAURER    POSTOPERATIVE ANTERIOR CERVICAL    DISCECTOMY AND FUSION INSTRUCTIONS    Antibiotics: You do not need additional antibiotics at home.    NSAIDs: Please refrain from taking ibuprofen (Advil), naproxen (Aleve), and other non steroidal anti-inflammatory medications as they may inhibit bone healing in the postoperative period.    Wound Care: You may remove your dressing and shower 5 days after surgery. Until then please keep your wound clean and dry. Sponge baths are acceptable. Do not go in a pool or hot tub until seen in clinic. Please leave the small steri-strips covering your wound in place until they fall off naturally (2 weeks). You may notice clear suture ends hanging from the sides of your incision after the steri-strips are removed, it is ok to clip these with scissors.    Cervical Collar: If given a collar after surgery you should wear it at all times. The only times it may be removed are for eating and showering.    Pain: You will be given a prescription for pain medicines before discharge. If you pain is not adequately controlled with these medications, please call [phone].    Difficulty Swallowing: This is very common in the postoperative period. You may find it easier to eat a pureed diet for the first 1-2 weeks after surgery. Ice chips and menthol cough drops may also be soothing.    Difficulty Breathing: This is uncommon after surgery and may represent dangerous swelling in your neck. If you experience difficulty breathing please call [phone] immediately.    Infection: Signs of infection include increasing wound drainage and redness around the wound, as well as a temperature over 101.5 degrees. It is unnecessary to take your temperature on a routine basis. Please call the above number if you are concerned about an infection.    Driving and Work: It is ok to return to driving and work as long as you are not taking narcotic pain medications or wearing your cervical collar. Please  do not lift over 5 pounds or participate in exercise or sports until cleared by Dr. Bansal.    Deep Venous Thrombosis (Blood Clots): Symptoms include swelling in the legs and shortness of breath. Please call the of?ce or proceed to the nearest emergency room if you have any of these symptoms.    Physical Therapy: The best physical therapy after surgery is walking. Please try to walk as much as possible.    Follow-up: You will be scheduled for a follow-up appointment in 2-3 weeks.    Questions: During business hours please call [phone] for routine questions. For after hours questions please call [phone] and ask to speak with the orthopaedic resident on call.

## 2018-10-15 NOTE — TRANSFER OF CARE
"Anesthesia Transfer of Care Note    Patient: Alesha Toney    Procedure(s) Performed: Procedure(s) (LRB):  DISCECTOMY, SPINE, CERVICAL, ANTERIOR APPROACH, WITH FUSION C6/7  [place] SNS: Motors/SSEP/Vocal Cord Regular OR table (N/A)    Patient location: PACU    Anesthesia Type: general    Transport from OR: Transported from OR on 2-3 L/min O2 by NC with adequate spontaneous ventilation    Post pain: adequate analgesia    Post assessment: no apparent anesthetic complications    Post vital signs: stable    Level of consciousness: sedated    Nausea/Vomiting: no nausea/vomiting    Complications: none    Transfer of care protocol was followed      Last vitals:   Visit Vitals  BP (!) 144/64 (BP Location: Right arm, Patient Position: Lying)   Pulse 82   Temp 36.4 °C (97.5 °F) (Temporal)   Resp 18   Ht 5' 6" (1.676 m)   Wt 73.9 kg (163 lb)   LMP  (LMP Unknown)   SpO2 100%   Breastfeeding? No   BMI 26.31 kg/m²     "

## 2018-10-15 NOTE — OP NOTE
DATE OF PROCEDURE: 10/15/2018.     SURGEON: Greyson Bansal M.D.     FIRST ASSISTANT SURGEON: Sheng Mesa MD, PGY-5     PREOPERATIVE DIAGNOSIS: Cervical Myeloradiculopathy.     POSTOPERATIVE DIAGNOSIS: Cervical Myeloradiculopathy.     PROCEDURES PERFORMED:  1. Anterior cervical diskectomy and instrumented spinal fusion, including decompression of neurological elements, C6/7.  2. Application of carbon fiber reinforced polyetheretherketone titanium intervertebral spacer to C6/7 disk space.  3. Anterior instrumentation, C6/7.  4. Cadaveric and local bone grafting.     ANESTHESIA: General endotracheal anesthesia.     ESTIMATED BLOOD LOSS: 25 mL.     IMPLANTS: Depuy [place] Plate and [place] CFRP Cage.     SPECIMENS: None.     FINDINGS: Large disc herniation at the C6/7 interspace.     DRAINS: One deep.     COMPLICATIONS: None.     SPONGE AND NEEDLE COUNT: Correct x2.     NEUROLOGICAL MONITORING: Motor evoked potentials, somatosensory evoked    potential, free-running EMG, and vocal fold monitoring.  There were no changes to preincisional MEP and SSEP baselines.  There was intermittent recurrent laryngeal nerve activity that responded to decreasing air in the ETT balloon and no significant EMG activity.     REASON FOR OPERATION AND BRIEF HISTORY AND PHYSICAL: Alesha Howard a    68 y.o. female with refractory cervical radiculopathy. They have failed conservative management and are here for surgery today.      DESCRIPTION OF INFORMED CONSENT: Please see my last clinic note for a full    description of the informed consent process that I had with the patient.     DESCRIPTION OF PROCEDURE: The patient was met in the preoperative area where    their right-sided neck was marked as the operative site. Subsequently, they were    brought to the Operating Room where they was placed under general endotracheal    anesthesia. Sequential compression devices were placed in the patient's    bilateral calves and run throughout the  case. A Estrella catheter was not placed. At  this point, a shoulder roll was placed and the patient's neck was gently    hyperextended. The neck was stacked in multiple stack sheets as well as a gel    roll. At this point, the patient's anterior cervical spine was prepped and    draped in a normal sterile fashion.     A full timeout was then called, identifying the patient, the procedural site and  levels, the availability of all instruments and implants, and no specific    nursing, surgical, anesthetic, or neurological monitoring concerns. Finally, it  was safe to proceed with surgery and the patient was given weight-appropriate    dose of Ancef by the Anesthesia Service as well as 8 mg of Decadron.     I then infiltrated my planned incision with 10 mL of 0.5% Marcaine with    epinephrine.     I then made a transverse incision centered over the patient's anterior cervical    spine at the level of the hyoid and carried dissection down through skin and    subcutaneous tissues using a combination of sharp dissection and electrocautery    where necessary. The platysma was identified and transected in line with the    skin incision and subplatysmal flaps were elevated. At this point, I performed a standard Dawn Alcantara approach tothe anterior cervical spine. Any large bridging veins were tied and ligated.  Small veins were coagulated with bipolar electrocautery. At this point, I  visualized the anterior cervical spine. Peanut dissection allowed me to    visualize the vertebral body. I placed a needle in what I took to be the C6 body     took a lateral radiograph and thus confirmed my  level. I then finalized my exposure. At the conclusion of my exposure, I could see from the inferior half of the C6 vertebrae to the superior half of the C7 vertebra  and the disk space from uncinate process to uncinate process.     I then performed a subtotal diskectomy under loupe magnification using a disk    knife as well as straight  and angled curettes, Kerrison punches, and pituitary    rongeurs.     The Operating Room microscope was then brought in, and I drilled down the    posterior aspect of the disk space with a high-speed drill to reveal the    posterior longitudinal ligament. I used a 4-0 forward-angle curette to enter    the median raphe of the ligament, followed by #1 and #2 Kerrison punches to    resect the posterior longitudinal ligament. At the end of my neurological    decompression, I could see dura from uncinate process to uncinate process.    Epidural hemostasis was finalized with patties and FloSeal. The wound was then    thoroughly irrigated. The disk space was sized for a size 6 wide spacer and this was  filled with 1 mL of DBX putty as well as locally harvested bone and gently impacted into place. An anterior  plate was then applied in the standard fashion, spanning C6/7.     AP and lateral  radiographs were taken, demonstrating correct level as well as adequate positionof all implants. The wound was then thoroughly irrigated. The plate screw    capture mechanism was then locked with the torque wrench. A deep drain was placed. The platysma was  closed with 3-0 Vicryl, followed by 4-0 and 5-0 Monocryl for the skin. A 2-0  silk suture was used to secure down the drain. A soft dressing was placed. The  patient was subsequently transferred to his hospital bed, awoken, and    transferred to the Recovery Room in stable condition.

## 2018-10-15 NOTE — PLAN OF CARE
Pt AAOx4 Pt remained stable during pacu stay. VSS. Patient states pain is tolerable after IV and po pain meds. No N&V. Tolerated sips of water and pill intake. Dressing to anterior of neck intact with LORIE drain in place to bulb suction.  Teds/SCD's in place throughout duration in PACU.

## 2018-10-16 VITALS
DIASTOLIC BLOOD PRESSURE: 81 MMHG | WEIGHT: 163 LBS | HEIGHT: 66 IN | TEMPERATURE: 99 F | OXYGEN SATURATION: 97 % | RESPIRATION RATE: 16 BRPM | SYSTOLIC BLOOD PRESSURE: 148 MMHG | BODY MASS INDEX: 26.2 KG/M2 | HEART RATE: 84 BPM

## 2018-10-16 PROCEDURE — 97165 OT EVAL LOW COMPLEX 30 MIN: CPT

## 2018-10-16 PROCEDURE — G8980 MOBILITY D/C STATUS: HCPCS | Mod: CH

## 2018-10-16 PROCEDURE — 97161 PT EVAL LOW COMPLEX 20 MIN: CPT

## 2018-10-16 PROCEDURE — G8979 MOBILITY GOAL STATUS: HCPCS | Mod: CH

## 2018-10-16 PROCEDURE — G8978 MOBILITY CURRENT STATUS: HCPCS | Mod: CH

## 2018-10-16 PROCEDURE — 63600175 PHARM REV CODE 636 W HCPCS: Performed by: STUDENT IN AN ORGANIZED HEALTH CARE EDUCATION/TRAINING PROGRAM

## 2018-10-16 PROCEDURE — 25000003 PHARM REV CODE 250: Performed by: STUDENT IN AN ORGANIZED HEALTH CARE EDUCATION/TRAINING PROGRAM

## 2018-10-16 RX ADMIN — OXYCODONE HYDROCHLORIDE 5 MG: 5 TABLET ORAL at 06:10

## 2018-10-16 RX ADMIN — ATENOLOL 25 MG: 25 TABLET ORAL at 09:10

## 2018-10-16 RX ADMIN — OXYCODONE HYDROCHLORIDE 5 MG: 5 TABLET ORAL at 03:10

## 2018-10-16 RX ADMIN — FAMOTIDINE 20 MG: 20 TABLET ORAL at 09:10

## 2018-10-16 RX ADMIN — ACETAMINOPHEN 1000 MG: 10 INJECTION, SOLUTION INTRAVENOUS at 02:10

## 2018-10-16 RX ADMIN — OXYCODONE HYDROCHLORIDE 5 MG: 5 TABLET ORAL at 10:10

## 2018-10-16 RX ADMIN — ACETAMINOPHEN 1000 MG: 10 INJECTION, SOLUTION INTRAVENOUS at 10:10

## 2018-10-16 NOTE — NURSING TRANSFER
Nursing Transfer Note      10/15/2018     Transfer To: [id number]    Transfer via stretcher    Transfer with     Transported by PCT    Medicines sent: n/a    Chart send with patient: Yes    Notified: friend    Patient reassessed at:10/15/18    Upon arrival to floor:

## 2018-10-16 NOTE — SUBJECTIVE & OBJECTIVE
"Principal Problem:Cervical radiculopathy    Principal Orthopedic Problem: Cervical Radiculopathy - S/p C6/7 ACDF    Interval History: NAEO, Pain controlled, Denies N/V or difficulty swallowing. No voice change. No drain output overnight    Review of patient's allergies indicates:   Allergen Reactions    Adhesive Itching, Rash and Blisters     INCLUDES EKG PADS    Iodinated contrast- oral and iv dye     Pcn [penicillins]      rash    Phenergan plain Other (See Comments)     "crazy behavior"    Tramadol     Vancomycin analogues      Red man syndrome       Current Facility-Administered Medications   Medication    acetaminophen (10 mg/mL) injection 1,000 mg    albuterol-ipratropium 2.5 mg-0.5 mg/3 mL nebulizer solution 3 mL    ALPRAZolam tablet 0.5 mg    atenolol tablet 25 mg    cloNIDine tablet 0.1 mg    diazePAM tablet 5 mg    diphenhydrAMINE capsule 25 mg    famotidine tablet 20 mg    HYDROmorphone injection 1 mg    lactulose 20 gram/30 mL solution Soln 20 g    losartan tablet 25 mg    ondansetron disintegrating tablet 8 mg    oxyCODONE immediate release tablet 10 mg    oxyCODONE immediate release tablet 5 mg    polyethylene glycol packet 17 g    sodium chloride 0.9% flush 5 mL     Objective:     Vital Signs (Most Recent):  Temp: 97.7 °F (36.5 °C) (10/16/18 0307)  Pulse: 70 (10/16/18 0307)  Resp: 16 (10/16/18 0307)  BP: (!) 145/66 (10/16/18 0307)  SpO2: 98 % (10/16/18 0307) Vital Signs (24h Range):  Temp:  [97.3 °F (36.3 °C)-98.3 °F (36.8 °C)] 97.7 °F (36.5 °C)  Pulse:  [62-82] 70  Resp:  [10-20] 16  SpO2:  [95 %-100 %] 98 %  BP: (133-195)/(61-93) 145/66     Weight: 73.9 kg (163 lb)  Height: 5' 6" (167.6 cm)  Body mass index is 26.31 kg/m².      Intake/Output Summary (Last 24 hours) at 10/16/2018 0611  Last data filed at 10/16/2018 0200  Gross per 24 hour   Intake 940 ml   Output 400 ml   Net 540 ml       Ortho/SPM Exam   PE:    AA&O x 4.  NAD  HEENT:  NCAT, sclera nonicteric  Lungs:  " Respirations are equal and unlabored.  CV:  2+ bilateral upper and lower extremity pulses.  Skin:  Intact throughout.    MSK:  Incision CDI  No soft tissue edema  Motor intact to BUE / BLE  SILT +ve          Significant Labs: All pertinent labs within the past 24 hours have been reviewed.

## 2018-10-16 NOTE — PT/OT/SLP EVAL
Occupational Therapy   Evaluation and Discharge Note    Name: Alesha Toney  MRN: [id number]  Admitting Diagnosis:  Cervical radiculopathy 1 Day Post-Op    Recommendations:     Discharge Recommendations: home  Discharge Equipment Recommendations:  none  Barriers to discharge:  None    History:     Occupational Profile:  Living Environment: Pt lives with daughter who will provide all required assistance   Previous level of function: Pt was independent with self care prior to procedure   Equipment Owned:  none  Assistance upon Discharge: Daughter available to assist     Past Medical History:   Diagnosis Date    Breast cyst     Cardiac arrhythmia     Fibrocystic breast     History of hysterectomy     20yrs ago    History of kidney stones     Hypertension     Lung cancer     Lung cancer        Past Surgical History:   Procedure Laterality Date    ADENOIDECTOMY  19yo    ANTERIOR CERVICAL DISCECTOMY W/ FUSION N/A 10/15/2018    Procedure: DISCECTOMY, SPINE, CERVICAL, ANTERIOR APPROACH, WITH FUSION C6/7  Depuy SNS: Motors/SSEP/Vocal Cord Regular OR table;  Surgeon: Greyson Bansal MD;  Location: [address];  Service: Neurosurgery;  Laterality: N/A;    APPENDECTOMY      BONE RESECTION, RIB  1980    BREAST BIOPSY Left     at least 40yrs ago in her 20's    BREAST CYST ASPIRATION      BREAST CYST EXCISION      DISCECTOMY, SPINE, CERVICAL, ANTERIOR APPROACH, WITH FUSION C6/7  Depuy SNS: Motors/SSEP/Vocal Cord Regular OR table N/A 10/15/2018    Performed by Greyson Bansal MD at [facility] OR [address] [place] [address]    ENDOBRONCHIAL ULTRASOUND N/A 7/10/2018    Procedure: ULTRASOUND, ENDOBRONCHIAL;  Surgeon: Sri Hearn MD;  Location: [facility] OR [address] [place] [address];  Service: Pulmonary;  Laterality: N/A;    HYSTERECTOMY      @47yrs of age    INSERTION OF TUNNELED CENTRAL VENOUS CATHETER (CVC) WITH SUBCUTANEOUS PORT N/A 9/25/2018    Procedure: [id number]-CATH;  Surgeon: Dosc Diagnostic Provider;  Location: [facility] OR [address] [place] [address];  Service:  Radiology;  Laterality: N/A;    [id number]-SDCD-Q-CATH N/A 9/25/2018    Performed by [facility] Diagnostic Provider at [facility] OR [address] [place] [address]    KIDNEY SURGERY      17yo    LYMPHADENECTOMY/mediastinal N/A 8/9/2018    Performed by Philip Messina MD at [facility] OR [address] [place] [address]    OOPHORECTOMY      @47yrs of age    THORACOTOMY Right 8/9/2018    Performed by Philip Messina MD at [facility] OR [address] [place] [address]    TONSILLECTOMY      ULTRASOUND, ENDOBRONCHIAL N/A 7/10/2018    Performed by Sri Hearn MD at [facility] OR [address] [place] [address]    VATS, WITH LOBECTOMY Possible chest wall resection Right 8/9/2018    Performed by Philip Messina MD at [facility] OR [address] [place] [address]    VIDEO-ASSISTED THORACOSCOPIC LOBECTOMY Right 8/9/2018    Procedure: VATS, WITH LOBECTOMY Possible chest wall resection;  Surgeon: Philip Messina MD;  Location: [facility] OR [address] [place] [address];  Service: Thoracic;  Laterality: Right;  Have open pan available.       Subjective     Chief Complaint: no complaints and prepared for DC   Patient/Family stated goals: return to home   Communicated with: RN prior to session.  Pain/Comfort:  · Pain Rating 1: 0/10    Patients cultural, spiritual, Latter day conflicts given the current situation: none stated     Objective:     Patient found with:  family present and all lines intact    General Precautions: Standard, fall   Orthopedic Precautions:spinal precautions   Braces: N/A     Occupational Performance:    Bed Mobility:    · Pt with SBA for safe bed mobility     Activities of Daily Living:  · Pt with overall very min A for self care completion     Cognitive/Visual Perceptual:  Cognitive/Psychosocial Skills:     -       Oriented to: Person, Place, Time and Situation   -       Follows Commands/attention:Follows two-step commands  -       Communication: clear/fluent  -       Memory: No Deficits noted  -       Safety awareness/insight to disability: intact   -       Mood/Affect/Coping skills/emotional control: Appropriate to situation    Physical Exam:  Upper  "Extremity Range of Motion:     -       Right Upper Extremity: WFL    -       Left Upper Extremity: WFL      Upper Extremity Strength:    -       Right Upper Extremity: WFL  -       Left Upper Extremity: WFL      Patient left up in chair with all lines intact    AMPA 6 Click:  [facility] Total Score: 21    Treatment & Education:  Evaluation complete.  Pt educated on safety, role of OT, importance of increased participation in self care for gains , expectations for participation, expectations for gains, POC, energy conservation, caregiver strain. White board updated.     Education:    Assessment:     Alesha Toney is a 68 y.o. female with a medical diagnosis of Cervical radiculopathy. At this time, patient is functioning at their prior level of function and does not require further acute OT services.     Clinical Decision Makin.  OT Low:  "Pt evaluation falls under low complexity for evaluation coding due to performance deficits noted in 1-3 areas as stated above and 0 co-morbities affecting current functional status. Data obtained from problem focused assessments. No modifications or assistance was required for completion of evaluation. Only brief occupational profile and history review completed."     Plan:     During this hospitalization, patient does not require further acute OT services.  Please re-consult if situation changes.    · Plan of Care Reviewed with: patient, family    This Plan of care has been discussed with the patient who was involved in its development and understands and is in agreement with the identified goals and treatment plan    GOALS:   Multidisciplinary Problems     Occupational Therapy Goals     Not on file                Time Tracking:     OT Date of Treatment: 10/16/18  OT Start Time: 0700  OT Stop Time: 0715  OT Total Time (min): 15 min    Billable Minutes:Evaluation 15    Phuong Ro, OT  10/16/2018    "

## 2018-10-16 NOTE — PT/OT/SLP EVAL
Physical Therapy Evaluation and Discharge Note    Patient Name:  Alesha Toney   MRN:  [id number]    Recommendations:     Discharge Recommendations:  outpatient PT   Discharge Equipment Recommendations: none   Barriers to discharge: None    Assessment:     Alehsa Toney is a 68 y.o. female admitted with a medical diagnosis of Cervical radiculopathy. .  At this time, patient is functioning at their prior level of function and does not require further acute PT services.     Recent Surgery: Procedure(s) (LRB):  DISCECTOMY, SPINE, CERVICAL, ANTERIOR APPROACH, WITH FUSION C6/7  Depuy SNS: [employer]/SSEP/Vocal Cord Regular OR table (N/A) 1 Day Post-Op    Plan:     During this hospitalization, patient does not require further acute PT services.  Please re-consult if situation changes.      Subjective     Chief Complaint: none stated   Patient/Family Comments/goals: pt states she wants to get back to therapy  Pain/Comfort:  · Pain Rating 1: 0/10  · Pain Rating Post-Intervention 1: 0/10    Patients cultural, spiritual, Mosque conflicts given the current situation: none stated    Living Environment:  Pt lives in a single story home with threshold to enter with her daughter.   Prior to admission, patients level of function was ( I), working and driving.  Equipment used at home: none.  DME owned (not currently used): none.  Upon discharge, patient will have assistance from family.    Objective:     Communicated with RN  prior to session.  Patient found supine with HOB elevated upon PT entry to room found with: (no active lines)     General Precautions: Standard, fall   Orthopedic Precautions:spinal precautions(cervical)   Braces: N/A     Exams:  · Cognitive Exam:  Patient is oriented to Person, Place, Time and Situation  · Fine Motor Coordination: -       Intact  · Gross Motor Coordination:  WFL  · Postural Exam:  Patient presented with the following abnormalities: -       Rounded shoulders  · -       Forward  head  · Sensation: -       Impaired  light/touch at R index finger; pt states all other sensation intact and improved compared to prior to sx  · Skin Integrity/Edema:      · -       Skin integrity: Visible skin intact  · RLE ROM: WFL  · RLE Strength: WFL  · LLE ROM: WFL  · LLE Strength: WFL   · Pt with 3+/5 R triceps compared to 4-/5 L     Functional Mobility:  · Bed Mobility:  Rolling Left:  independence  · Rolling Right: independence  · Scooting: independence  · Supine to Sit: independence  · Transfers:  Sit to Stand:  independence with no AD  · Gait: x >250 feet ( I) with no AD; no increase in sway or LOB  · Balance: ( I) with gait and sitting balance    AM-PAC 6 CLICK MOBILITY  Total Score:24       Therapeutic Activities and Exercises:  ·  Whiteboard updated in patients room to current assistance level  · All of patients questions were answered within the scope of PT    Patient education  · Patient educated on the role of PT and POC  · Patient educated on importance  activity while in the hosptial per tolerance for improved endurance and to limit deconditioning   · Patient educated on safe transfers with nursing as appropriate  · Patient educated on energy conservation, pursed lip breathing  · Patient educated on proper transfer mechanics and safety    AM-PAC 6 CLICK MOBILITY  Total Score:24     Patient left sitting EOB  with all lines intact, call button in reach and family present.    GOALS:   Multidisciplinary Problems     Physical Therapy Goals     Not on file          Multidisciplinary Problems (Resolved)        Problem: Physical Therapy Goal    Goal Priority Disciplines Outcome Goal Variances Interventions   Physical Therapy Goal   (Resolved)     PT, PT/OT Outcome(s) achieved                     History:     Past Medical History:   Diagnosis Date    Breast cyst     Cardiac arrhythmia     Fibrocystic breast     History of hysterectomy     20yrs ago    History of kidney stones     Hypertension      Lung cancer     Lung cancer        Past Surgical History:   Procedure Laterality Date    ADENOIDECTOMY  17yo    ANTERIOR CERVICAL DISCECTOMY W/ FUSION N/A 10/15/2018    Procedure: DISCECTOMY, SPINE, CERVICAL, ANTERIOR APPROACH, WITH FUSION C6/7  Depuy SNS: Motors/SSEP/Vocal Cord Regular OR table;  Surgeon: Greyson Bansal MD;  Location: [address];  Service: Neurosurgery;  Laterality: N/A;    APPENDECTOMY      BONE RESECTION, RIB  1980    BREAST BIOPSY Left     at least 40yrs ago in her 20's    BREAST CYST ASPIRATION      BREAST CYST EXCISION      DISCECTOMY, SPINE, CERVICAL, ANTERIOR APPROACH, WITH FUSION C6/7  Depuy SNS: Motors/SSEP/Vocal Cord Regular OR table N/A 10/15/2018    Performed by Greyson Bansal MD at [facility] OR [address] [place] [address]    ENDOBRONCHIAL ULTRASOUND N/A 7/10/2018    Procedure: ULTRASOUND, ENDOBRONCHIAL;  Surgeon: Sri Hearn MD;  Location: [address];  Service: Pulmonary;  Laterality: N/A;    HYSTERECTOMY      @47yrs of age    INSERTION OF TUNNELED CENTRAL VENOUS CATHETER (CVC) WITH SUBCUTANEOUS PORT N/A 9/25/2018    Procedure: [id number]-CATH;  Surgeon: [facility] Diagnostic Provider;  Location: [address];  Service: Radiology;  Laterality: N/A;    [id number]-O-CATH N/A 9/25/2018    Performed by [facility] Diagnostic Provider at [facility] OR [address] [place] [address]    KIDNEY SURGERY      17yo    LYMPHADENECTOMY/mediastinal N/A 8/9/2018    Performed by Philip Messina MD at [facility] OR [address] [place] [address]    OOPHORECTOMY      @47yrs of age    THORACOTOMY Right 8/9/2018    Performed by Philip Messina MD at [facility] OR [address] [place] [address]    TONSILLECTOMY      ULTRASOUND, ENDOBRONCHIAL N/A 7/10/2018    Performed by Sri Hearn MD at [facility] OR [address] [place] [address]    VATS, WITH LOBECTOMY Possible chest wall resection Right 8/9/2018    Performed by Philip Messina MD at [facility] OR [address] [place] [address]    VIDEO-ASSISTED THORACOSCOPIC LOBECTOMY Right 8/9/2018    Procedure: VATS, WITH LOBECTOMY Possible chest wall resection;  Surgeon: Philip  DALI Messina MD;  Location: [facility] OR [address] [place] [address];  Service: Thoracic;  Laterality: Right;  Have open pan available.       Clinical Decision Making:     History  Co-morbidities and personal factors that may impact the plan of care Examination  Body Structures and Functions, activity limitations and participation restrictions that may impact the plan of care Clinical Presentation   Decision Making/ Complexity Score   Co-morbidities:   [] Time since onset of injury / illness / exacerbation  [] Status of current condition  []Patient's cognitive status and safety concerns    [x] Multiple Medical Problems (see med hx)  Personal Factors:   [x] Patient's age  [] Prior Level of function   [] Patient's home situation (environment and family support)  [] Patient's level of motivation  [] Expected progression of patient      HISTORY:(criteria)    [] 57519 - no personal factors/history    [x] 36275 - has 1-2 personal factor/comorbidity     [] 12357 - has >3 personal factor/comorbidity     Body Regions:  [] Objective examination findings  [] Head     []  Neck  [] Trunk   [] Upper Extremity  [x] Lower Extremity    Body Systems:  [] For communication ability, affect, cognition, language, and learning style: the assessment of the ability to make needs known, consciousness, orientation (person, place, and time), expected emotional /behavioral responses, and learning preferences (eg, learning barriers, education  needs)  [] For the neuromuscular system: a general assessment of gross coordinated movement (eg, balance, gait, locomotion, transfers, and transitions) and motor function  (motor control and motor learning)  [x] For the musculoskeletal system: the assessment of gross symmetry, gross range of motion, gross strength, height, and weight  [] For the integumentary system: the assessment of pliability(texture), presence of scar formation, skin color, and skin integrity  [] For cardiovascular/pulmonary system: the assessment of heart  rate, respiratory rate, blood pressure, and edema     Activity limitations:    [] Patient's cognitive status and saf ety concerns          [] Status of current condition      [] Weight bearing restriction  [] Cardiopulmunary Restriction    Participation Restrictions:   [] Goals and goal agreement with the patient     [] Rehab potential (prognosis) and probable outcome      Examination of Body System: (criteria)    [] 35707 - addressing 1-2 elements    [x] 50179 - addressing a total of 3 or more elements     [] 02200 -  Addressing a total of 4 or more elements         Clinical Presentation: (criteria)  Stable - 89538     On examination of body system using standardized tests and measures patient presents with 1-2 elements from any of the following: body structures and functions, activity limitations, and/or participation restrictions.  Leading to a clinical presentation that is considered stable and/or uncomplicated                              Clinical Decision Making  (Eval Complexity):  Low- 38900     Time Tracking:     PT Received On: 10/16/18  PT Start Time: 1158     PT Stop Time: 1212  PT Total Time (min): 14 min     Billable Minutes: Evaluation 14 min      Jay Jay Cohen PT  10/16/2018

## 2018-10-16 NOTE — DISCHARGE SUMMARY
"[facility]  Orthopedics  Discharge Summary      Patient Name: Alesha Toney  MRN: [id number]  Admission Date: 10/15/2018  Hospital Length of Stay: 0 days  Discharge Date and Time: 10/16/2018  1:17 PM  Attending Physician: No att. providers found   Discharging Provider: Sheng Aguilera MD  Primary Care Provider: Yolanda Katz MD      Procedure(s) (LRB):  DISCECTOMY, SPINE, CERVICAL, ANTERIOR APPROACH, WITH FUSION C6/7  Depuy SNS: Motors/SSEP/Vocal Cord Regular OR table (N/A)      Hospital Course:  On 10/15, the patient arrived to the [facility] on the second floor of [facility] for proper pre-operative management.  Upon completion of pre-operative preparation, the patient was taken back to the operative theatre.  A ACDF was performed without complication and the patient was transported to the post anesthesia care unit in stable condition.   Estrella: nil    Drain: yes    Pain Management:     - Regional Anesthetics: nil    After appropriate recovery from the anaesthetic agents used during the surgery the patient was transported to the IP floor for overnight observation. The duration of said observation period was without complication and the patient was discharged home on post-operative day one without complication.        Consults (From admission, onward)        Status Ordering Provider     Inpatient consult to Social Work/Case Management  Once     Provider:  (Not yet assigned)    Acknowledged SHENG AGUILERA          Significant Diagnostic Studies:     Pending Diagnostic Studies:     None        Final Active Diagnoses:    Diagnosis Date Noted POA    PRINCIPAL PROBLEM:  Cervical radiculopathy [M54.12] 10/01/2018 Yes      Problems Resolved During this Admission:      Discharged Condition: fair    Disposition: Home or Self Care    Follow Up:    Patient Instructions:      WALKER FOR HOME USE     Order Specific Question Answer Comments   Type of Walker: Adult (5'4"-6'6")    With " "wheels? No    Height: 5' 6" (1.676 m)    Weight: 73.9 kg (163 lb)    Length of need (1-99 months): 99      Other restrictions (specify):   Order Comments: Per instructions sheet     Notify your health care provider if you experience any of the following:  increased confusion or weakness     Notify your health care provider if you experience any of the following:  persistent dizziness, light-headedness, or visual disturbances     Notify your health care provider if you experience any of the following:  worsening rash     Notify your health care provider if you experience any of the following:  severe persistent headache     Notify your health care provider if you experience any of the following:  difficulty breathing or increased cough     Notify your health care provider if you experience any of the following:  redness, tenderness, or signs of infection (pain, swelling, redness, odor or green/yellow discharge around incision site)     Notify your health care provider if you experience any of the following:  severe uncontrolled pain     Notify your health care provider if you experience any of the following:  persistent nausea and vomiting or diarrhea     Notify your health care provider if you experience any of the following:  temperature >100.4     Change dressing (specify)   Order Comments: Per instructions sheet     Medications:  Reconciled Home Medications:      Medication List      START taking these medications    docusate sodium 100 MG capsule  Commonly known as:  COLACE  Take 1 capsule (100 mg total) by mouth 2 (two) times daily. Available OTC as well     ondansetron 8 MG tablet  Commonly known as:  ZOFRAN  Take 1 tablet (8 mg total) by mouth every 12 (twelve) hours as needed for Nausea.        CHANGE how you take these medications    oxyCODONE-acetaminophen  mg per tablet  Commonly known as:  PERCOCET  Take 1 tablet by mouth every 4 (four) hours as needed for Pain.  What changed:    · reasons to take " this  · Another medication with the same name was removed. Continue taking this medication, and follow the directions you see here.        CONTINUE taking these medications    ALPRAZolam 0.5 MG tablet  Commonly known as:  XANAX  Take 1 tablet (0.5 mg total) by mouth 3 (three) times daily as needed for Insomnia or Anxiety.     atenolol 50 MG tablet  Commonly known as:  TENORMIN  Take 0.5 tablets (25 mg total) by mouth 4 (four) times daily.     CALCIUM ANTACID 300 mg (750 mg) Chew  Generic drug:  calcium carbonate  Take by mouth.     cloNIDine 0.1 MG tablet  Commonly known as:  CATAPRES  TAKE 1 TABLET BY MOUTH EVERY 8 HOURS AS NEEDED     dexamethasone 6 MG tablet  Commonly known as:  DECADRON  Take one tablet by mouth twice a day with meals. Start the day before chemo and take for 3 days after chemo. Do not take on the day of chemo.     * diazePAM 5 MG tablet  Commonly known as:  VALIUM  Take 5 mg by mouth every 6 (six) hours as needed for Anxiety.     * diazePAM 5 MG tablet  Commonly known as:  VALIUM  Take 1 tablet (5 mg total) by mouth nightly as needed for Anxiety (muscle spasms).     folic acid 1 MG tablet  Commonly known as:  FOLVITE  Take 1 tablet (1 mg total) by mouth once daily. Start today     losartan 50 MG tablet  Commonly known as:  COZAAR  Take 25 mg by mouth every evening.     methylPREDNISolone 4 mg tablet  Commonly known as:  MEDROL DOSEPACK  use as directed     multivitamin with minerals tablet  Take 1 tablet by mouth once daily.     ranitidine 75 MG tablet  Commonly known as:  ZANTAC  Take 150 mg by mouth nightly.         * This list has 2 medication(s) that are the same as other medications prescribed for you. Read the directions carefully, and ask your doctor or other care provider to review them with you.            STOP taking these medications    ibuprofen 200 MG tablet  Commonly known as:  ADVIL,MOTRIN     naproxen 500 MG tablet  Commonly known as:  NAPROSYN     ondansetron 4 MG Tbdl  Commonly  known as:  ZOFRAN-ODT     ondansetron 8 MG Tbdl  Commonly known as:  ZOFRAN-ODT            Sheng Mesa MD  Orthopedics  [facility]

## 2018-10-16 NOTE — PLAN OF CARE
Problem: Physical Therapy Goal  Goal: Physical Therapy Goal  Outcome: Outcome(s) achieved Date Met: 10/16/18  Patient evaluated today. All goals established are appropriate for patient progression at this time.   Jay Jay Cohen PT, DPT  10/16/2018  Pager: [phone]

## 2018-10-16 NOTE — PROGRESS NOTES
"[facility]  Orthopedics  Progress Note    Patient Name: Alesha Toney  MRN: [id number]  Admission Date: 10/15/2018  Hospital Length of Stay: 0 days  Attending Provider: Greyson Bansal MD  Primary Care Provider: Yolanda Katz MD  Follow-up For: Procedure(s) (LRB):  DISCECTOMY, SPINE, CERVICAL, ANTERIOR APPROACH, WITH FUSION C6/7  Depuy SNS: Motors/SSEP/Vocal Cord Regular OR table (N/A)    Post-Operative Day: 1 Day Post-Op  Subjective:     Principal Problem:Cervical radiculopathy    Principal Orthopedic Problem: Cervical Radiculopathy - S/p C6/7 ACDF    Interval History: NAEO, Pain controlled, Denies N/V or difficulty swallowing. No voice change. No drain output overnight    Review of patient's allergies indicates:   Allergen Reactions    Adhesive Itching, Rash and Blisters     INCLUDES EKG PADS    Iodinated contrast- oral and iv dye     Pcn [penicillins]      rash    Phenergan plain Other (See Comments)     "crazy behavior"    Tramadol     Vancomycin analogues      Red man syndrome       Current Facility-Administered Medications   Medication    acetaminophen (10 mg/mL) injection 1,000 mg    albuterol-ipratropium 2.5 mg-0.5 mg/3 mL nebulizer solution 3 mL    ALPRAZolam tablet 0.5 mg    atenolol tablet 25 mg    cloNIDine tablet 0.1 mg    diazePAM tablet 5 mg    diphenhydrAMINE capsule 25 mg    famotidine tablet 20 mg    HYDROmorphone injection 1 mg    lactulose 20 gram/30 mL solution Soln 20 g    losartan tablet 25 mg    ondansetron disintegrating tablet 8 mg    oxyCODONE immediate release tablet 10 mg    oxyCODONE immediate release tablet 5 mg    polyethylene glycol packet 17 g    sodium chloride 0.9% flush 5 mL     Objective:     Vital Signs (Most Recent):  Temp: 97.7 °F (36.5 °C) (10/16/18 0307)  Pulse: 70 (10/16/18 0307)  Resp: 16 (10/16/18 0307)  BP: (!) 145/66 (10/16/18 0307)  SpO2: 98 % (10/16/18 0307) Vital Signs (24h Range):  Temp:  [97.3 °F (36.3 °C)-98.3 °F (36.8 " "°C)] 97.7 °F (36.5 °C)  Pulse:  [62-82] 70  Resp:  [10-20] 16  SpO2:  [95 %-100 %] 98 %  BP: (133-195)/(61-93) 145/66     Weight: 73.9 kg (163 lb)  Height: 5' 6" (167.6 cm)  Body mass index is 26.31 kg/m².      Intake/Output Summary (Last 24 hours) at 10/16/2018 0611  Last data filed at 10/16/2018 0200  Gross per 24 hour   Intake 940 ml   Output 400 ml   Net 540 ml       Ortho/SPM Exam   PE:    AA&O x 4.  NAD  HEENT:  NCAT, sclera nonicteric  Lungs:  Respirations are equal and unlabored.  CV:  2+ bilateral upper and lower extremity pulses.  Skin:  Intact throughout.    MSK:  Incision CDI  No soft tissue edema  Motor intact to BUE / BLE  SILT +ve          Significant Labs: All pertinent labs within the past 24 hours have been reviewed.      Assessment/Plan:     * Cervical radiculopathy    68 y.o. female POD 1 status post: C6/7 ACDF     Pain control  PT/OT: WBAT   DVT PPx: SCDs at all times when not ambulating  Abx: Postop  Labs: Reviewed  Drain(s): Deep: Minimal output     Dispo: Stable, plan for XR this morning with PT/OT. Discharge home this afternoon                    Sheng Mesa MD  Orthopedics  [facility]  "

## 2018-10-16 NOTE — PLAN OF CARE
Problem: Patient Care Overview  Goal: Plan of Care Review  Outcome: Ongoing (interventions implemented as appropriate)  Pt received from PACU. Pt  AAOx4. VSS. Ambulated x2 to bathroom with assistance. Neurochecks WNL baseline numbness to right index  Finger improved post op . Pt and significant other oriented to room. Minimal drainage to LORIE drain- < 5cc. Pain managed throughout shift.  MARIELA stockings and SCD in place. IS encouraged and witness by RN.  Call bell placed within reach and use encouraged.

## 2018-10-16 NOTE — ASSESSMENT & PLAN NOTE
68 y.o. female POD 1 status post: C6/7 ACDF     Pain control  PT/OT: WBAT   DVT PPx: SCDs at all times when not ambulating  Abx: Postop  Labs: Reviewed  Drain(s): Deep: Minimal output     Dispo: Stable, plan for XR this morning with PT/OT. Discharge home this afternoon

## 2018-10-17 ENCOUNTER — PATIENT MESSAGE (OUTPATIENT)
Dept: ORTHOPEDICS | Facility: CLINIC | Age: 68
End: 2018-10-17

## 2018-10-22 ENCOUNTER — PATIENT MESSAGE (OUTPATIENT)
Dept: HEMATOLOGY/ONCOLOGY | Facility: CLINIC | Age: 68
End: 2018-10-22

## 2018-10-25 ENCOUNTER — INFUSION (OUTPATIENT)
Dept: INFUSION THERAPY | Facility: HOSPITAL | Age: 68
End: 2018-10-25
Attending: INTERNAL MEDICINE
Payer: COMMERCIAL

## 2018-10-25 DIAGNOSIS — Z12.31 ENCOUNTER FOR SCREENING MAMMOGRAM FOR MALIGNANT NEOPLASM OF BREAST: Primary | ICD-10-CM

## 2018-10-25 DIAGNOSIS — C34.90 MALIGNANT NEOPLASM OF LUNG: Primary | ICD-10-CM

## 2018-10-25 PROCEDURE — 63600175 PHARM REV CODE 636 W HCPCS: Performed by: INTERNAL MEDICINE

## 2018-10-25 PROCEDURE — A4216 STERILE WATER/SALINE, 10 ML: HCPCS | Performed by: INTERNAL MEDICINE

## 2018-10-25 PROCEDURE — 25000003 PHARM REV CODE 250: Performed by: INTERNAL MEDICINE

## 2018-10-25 PROCEDURE — 96523 IRRIG DRUG DELIVERY DEVICE: CPT

## 2018-10-25 RX ORDER — SODIUM CHLORIDE 0.9 % (FLUSH) 0.9 %
10 SYRINGE (ML) INJECTION
Status: CANCELLED | OUTPATIENT
Start: 2018-10-25

## 2018-10-25 RX ORDER — HEPARIN 100 UNIT/ML
500 SYRINGE INTRAVENOUS
Status: CANCELLED | OUTPATIENT
Start: 2018-10-25

## 2018-10-25 RX ORDER — SODIUM CHLORIDE 0.9 % (FLUSH) 0.9 %
10 SYRINGE (ML) INJECTION
Status: COMPLETED | OUTPATIENT
Start: 2018-10-25 | End: 2018-10-25

## 2018-10-25 RX ORDER — HEPARIN 100 UNIT/ML
500 SYRINGE INTRAVENOUS
Status: COMPLETED | OUTPATIENT
Start: 2018-10-25 | End: 2018-10-25

## 2018-10-25 RX ADMIN — SODIUM CHLORIDE, PRESERVATIVE FREE 10 ML: 5 INJECTION INTRAVENOUS at 11:10

## 2018-10-25 RX ADMIN — HEPARIN 500 UNITS: 100 SYRINGE at 11:10

## 2018-10-25 NOTE — NURSING
Pt arrived for port flush.  Port flushes easily with good blood return.  Port de accessed.  Pt discharged to home

## 2018-10-29 ENCOUNTER — TELEPHONE (OUTPATIENT)
Dept: HEMATOLOGY/ONCOLOGY | Facility: CLINIC | Age: 68
End: 2018-10-29

## 2018-10-29 ENCOUNTER — OFFICE VISIT (OUTPATIENT)
Dept: SPINE | Facility: CLINIC | Age: 68
End: 2018-10-29
Attending: ORTHOPAEDIC SURGERY
Payer: COMMERCIAL

## 2018-10-29 VITALS
BODY MASS INDEX: 26.2 KG/M2 | WEIGHT: 163 LBS | HEART RATE: 61 BPM | HEIGHT: 66 IN | DIASTOLIC BLOOD PRESSURE: 77 MMHG | SYSTOLIC BLOOD PRESSURE: 184 MMHG

## 2018-10-29 DIAGNOSIS — Z98.1 HISTORY OF FUSION OF CERVICAL SPINE: Primary | ICD-10-CM

## 2018-10-29 PROCEDURE — 99999 PR PBB SHADOW E&M-EST. PATIENT-LVL III: CPT | Mod: PBBFAC,,, | Performed by: ORTHOPAEDIC SURGERY

## 2018-10-29 PROCEDURE — 99024 POSTOP FOLLOW-UP VISIT: CPT | Mod: S$GLB,,, | Performed by: ORTHOPAEDIC SURGERY

## 2018-10-29 NOTE — PROGRESS NOTES
Date of Surgery: 10/15/18    Procedure: C6/7 ACDF    History: Alesha Toney is seen today for follow-up following the above listed procedure. Overall the patient is doing well but today notes mild dysphagia that is persistent since surgery. Able to tolerate all foods without issue except steak. Very pleased with progress thus far. Denies hoarseness to voice. Minimal narcotic usage currently.    Exam: Incision is healing well. There is no sign of infection. Neuro exam is stable. No signs of DVT.    Assessment/Plan: Doing well postoperatively. I will plan to see the patient back for the next postop visit in 6 weeks. Thank you for the opportunity to participate in this patient's care. Please give me a call if there are any concerns or questions.

## 2018-10-29 NOTE — TELEPHONE ENCOUNTER
----- Message from Greyson Bansal MD sent at 10/29/2018  2:02 PM CDT -----  She is ready to start Chemotherapy.

## 2018-10-31 ENCOUNTER — PATIENT MESSAGE (OUTPATIENT)
Dept: HEMATOLOGY/ONCOLOGY | Facility: CLINIC | Age: 68
End: 2018-10-31

## 2018-11-05 ENCOUNTER — PATIENT MESSAGE (OUTPATIENT)
Dept: HEMATOLOGY/ONCOLOGY | Facility: CLINIC | Age: 68
End: 2018-11-05

## 2018-11-05 ENCOUNTER — CLINICAL SUPPORT (OUTPATIENT)
Dept: HEMATOLOGY/ONCOLOGY | Facility: CLINIC | Age: 68
End: 2018-11-05
Payer: COMMERCIAL

## 2018-11-05 ENCOUNTER — LAB VISIT (OUTPATIENT)
Dept: LAB | Facility: HOSPITAL | Age: 68
End: 2018-11-05
Attending: INTERNAL MEDICINE
Payer: COMMERCIAL

## 2018-11-05 DIAGNOSIS — R11.2 NAUSEA AND VOMITING, INTRACTABILITY OF VOMITING NOT SPECIFIED, UNSPECIFIED VOMITING TYPE: Primary | ICD-10-CM

## 2018-11-05 DIAGNOSIS — C34.11 MALIGNANT NEOPLASM OF UPPER LOBE OF RIGHT LUNG: ICD-10-CM

## 2018-11-05 LAB
ALBUMIN SERPL BCP-MCNC: 3.7 G/DL
ALP SERPL-CCNC: 66 U/L
ALT SERPL W/O P-5'-P-CCNC: 20 U/L
ANION GAP SERPL CALC-SCNC: 7 MMOL/L
AST SERPL-CCNC: 24 U/L
BILIRUB SERPL-MCNC: 0.3 MG/DL
BUN SERPL-MCNC: 18 MG/DL
CALCIUM SERPL-MCNC: 10.3 MG/DL
CHLORIDE SERPL-SCNC: 106 MMOL/L
CO2 SERPL-SCNC: 28 MMOL/L
CREAT SERPL-MCNC: 0.9 MG/DL
ERYTHROCYTE [DISTWIDTH] IN BLOOD BY AUTOMATED COUNT: 12.2 %
EST. GFR  (AFRICAN AMERICAN): >60 ML/MIN/1.73 M^2
EST. GFR  (NON AFRICAN AMERICAN): >60 ML/MIN/1.73 M^2
GLUCOSE SERPL-MCNC: 115 MG/DL
HCT VFR BLD AUTO: 39.6 %
HGB BLD-MCNC: 13.1 G/DL
IMM GRANULOCYTES # BLD AUTO: 0.01 K/UL
MCH RBC QN AUTO: 30.8 PG
MCHC RBC AUTO-ENTMCNC: 33.1 G/DL
MCV RBC AUTO: 93 FL
NEUTROPHILS # BLD AUTO: 3.6 K/UL
PLATELET # BLD AUTO: 236 K/UL
PMV BLD AUTO: 10.8 FL
POTASSIUM SERPL-SCNC: 4.7 MMOL/L
PROT SERPL-MCNC: 7 G/DL
RBC # BLD AUTO: 4.25 M/UL
SODIUM SERPL-SCNC: 141 MMOL/L
WBC # BLD AUTO: 6.59 K/UL

## 2018-11-05 PROCEDURE — 36415 COLL VENOUS BLD VENIPUNCTURE: CPT

## 2018-11-05 PROCEDURE — 80053 COMPREHEN METABOLIC PANEL: CPT

## 2018-11-05 PROCEDURE — 99999 PR PBB SHADOW E&M-EST. PATIENT-LVL I: CPT | Mod: PBBFAC,,,

## 2018-11-05 PROCEDURE — 85027 COMPLETE CBC AUTOMATED: CPT

## 2018-11-05 RX ORDER — OLANZAPINE 10 MG/1
TABLET ORAL
Qty: 20 TABLET | Refills: 0 | Status: SHIPPED | OUTPATIENT
Start: 2018-11-05 | End: 2018-11-08

## 2018-11-05 NOTE — PROGRESS NOTES
Patient completed chemo class:  Viewed video presentation, received binder that has medication information specific to the patient, participated in Q&A.    Speakers included the  (Gayla) and Dietician (Nena).   Navigator had one-on-one discussion of patient's treatment regimen.  The group was oriented to the Infusion Center.

## 2018-11-08 ENCOUNTER — OFFICE VISIT (OUTPATIENT)
Dept: HEMATOLOGY/ONCOLOGY | Facility: CLINIC | Age: 68
End: 2018-11-08
Payer: COMMERCIAL

## 2018-11-08 ENCOUNTER — SOCIAL WORK (OUTPATIENT)
Dept: HEMATOLOGY/ONCOLOGY | Facility: CLINIC | Age: 68
End: 2018-11-08

## 2018-11-08 ENCOUNTER — INFUSION (OUTPATIENT)
Dept: INFUSION THERAPY | Facility: HOSPITAL | Age: 68
End: 2018-11-08
Attending: INTERNAL MEDICINE
Payer: COMMERCIAL

## 2018-11-08 VITALS
DIASTOLIC BLOOD PRESSURE: 76 MMHG | BODY MASS INDEX: 26.12 KG/M2 | OXYGEN SATURATION: 100 % | RESPIRATION RATE: 18 BRPM | SYSTOLIC BLOOD PRESSURE: 183 MMHG | TEMPERATURE: 98 F | HEIGHT: 66 IN | WEIGHT: 162.5 LBS | HEART RATE: 84 BPM

## 2018-11-08 VITALS
HEART RATE: 74 BPM | TEMPERATURE: 98 F | BODY MASS INDEX: 26.03 KG/M2 | WEIGHT: 162 LBS | SYSTOLIC BLOOD PRESSURE: 150 MMHG | RESPIRATION RATE: 18 BRPM | DIASTOLIC BLOOD PRESSURE: 67 MMHG | HEIGHT: 66 IN

## 2018-11-08 DIAGNOSIS — R11.0 NAUSEA: ICD-10-CM

## 2018-11-08 DIAGNOSIS — C34.90 NON-SMALL CELL LUNG CANCER, UNSPECIFIED LATERALITY: Primary | ICD-10-CM

## 2018-11-08 DIAGNOSIS — C34.11 MALIGNANT NEOPLASM OF UPPER LOBE OF RIGHT LUNG: Primary | ICD-10-CM

## 2018-11-08 DIAGNOSIS — Z09 CHEMOTHERAPY FOLLOW-UP EXAMINATION: ICD-10-CM

## 2018-11-08 PROCEDURE — 96411 CHEMO IV PUSH ADDL DRUG: CPT

## 2018-11-08 PROCEDURE — 3078F DIAST BP <80 MM HG: CPT | Mod: CPTII,S$GLB,, | Performed by: INTERNAL MEDICINE

## 2018-11-08 PROCEDURE — 63600175 PHARM REV CODE 636 W HCPCS: Mod: JG | Performed by: INTERNAL MEDICINE

## 2018-11-08 PROCEDURE — 1101F PT FALLS ASSESS-DOCD LE1/YR: CPT | Mod: CPTII,S$GLB,, | Performed by: INTERNAL MEDICINE

## 2018-11-08 PROCEDURE — 99999 PR PBB SHADOW E&M-EST. PATIENT-LVL IV: CPT | Mod: PBBFAC,,, | Performed by: INTERNAL MEDICINE

## 2018-11-08 PROCEDURE — A4216 STERILE WATER/SALINE, 10 ML: HCPCS | Performed by: INTERNAL MEDICINE

## 2018-11-08 PROCEDURE — 96367 TX/PROPH/DG ADDL SEQ IV INF: CPT

## 2018-11-08 PROCEDURE — 99215 OFFICE O/P EST HI 40 MIN: CPT | Mod: S$GLB,,, | Performed by: INTERNAL MEDICINE

## 2018-11-08 PROCEDURE — 96365 THER/PROPH/DIAG IV INF INIT: CPT

## 2018-11-08 PROCEDURE — 96366 THER/PROPH/DIAG IV INF ADDON: CPT

## 2018-11-08 PROCEDURE — 25000003 PHARM REV CODE 250: Performed by: INTERNAL MEDICINE

## 2018-11-08 PROCEDURE — 96413 CHEMO IV INFUSION 1 HR: CPT

## 2018-11-08 PROCEDURE — 3077F SYST BP >= 140 MM HG: CPT | Mod: CPTII,S$GLB,, | Performed by: INTERNAL MEDICINE

## 2018-11-08 RX ORDER — HEPARIN 100 UNIT/ML
500 SYRINGE INTRAVENOUS
Status: DISCONTINUED | OUTPATIENT
Start: 2018-11-08 | End: 2018-11-08 | Stop reason: HOSPADM

## 2018-11-08 RX ORDER — LORAZEPAM 1 MG/1
0.5 TABLET ORAL
Status: CANCELLED
Start: 2018-11-08

## 2018-11-08 RX ORDER — LORAZEPAM 0.5 MG/1
0.5 TABLET ORAL
Status: DISCONTINUED | OUTPATIENT
Start: 2018-11-08 | End: 2018-11-08 | Stop reason: HOSPADM

## 2018-11-08 RX ORDER — SODIUM CHLORIDE 0.9 % (FLUSH) 0.9 %
10 SYRINGE (ML) INJECTION
Status: DISCONTINUED | OUTPATIENT
Start: 2018-11-08 | End: 2018-11-08 | Stop reason: HOSPADM

## 2018-11-08 RX ORDER — METOCLOPRAMIDE 10 MG/1
10 TABLET ORAL
Qty: 90 TABLET | Refills: 1 | Status: SHIPPED | OUTPATIENT
Start: 2018-11-08 | End: 2019-02-13

## 2018-11-08 RX ADMIN — DEXAMETHASONE SODIUM PHOSPHATE 10 MG: 4 INJECTION, SOLUTION INTRA-ARTICULAR; INTRALESIONAL; INTRAMUSCULAR; INTRAVENOUS; SOFT TISSUE at 10:11

## 2018-11-08 RX ADMIN — HEPARIN 500 UNITS: 100 SYRINGE at 03:11

## 2018-11-08 RX ADMIN — CISPLATIN 140 MG: 1 INJECTION INTRAVENOUS at 12:11

## 2018-11-08 RX ADMIN — PALONOSETRON HYDROCHLORIDE 0.25 MG: 0.25 INJECTION, SOLUTION INTRAVENOUS at 11:11

## 2018-11-08 RX ADMIN — SODIUM CHLORIDE 925 MG: 9 INJECTION, SOLUTION INTRAVENOUS at 12:11

## 2018-11-08 RX ADMIN — Medication 10 ML: at 03:11

## 2018-11-08 RX ADMIN — MAGNESIUM SULFATE HEPTAHYDRATE: 500 INJECTION, SOLUTION INTRAMUSCULAR; INTRAVENOUS at 01:11

## 2018-11-08 RX ADMIN — SODIUM CHLORIDE 150 MG: 0.9 INJECTION, SOLUTION INTRAVENOUS at 11:11

## 2018-11-08 RX ADMIN — MAGNESIUM SULFATE: 500 INJECTION, SOLUTION INTRAMUSCULAR; INTRAVENOUS at 09:11

## 2018-11-08 NOTE — PLAN OF CARE
Problem: Chemotherapy Effects (Adult)  Goal: Signs and Symptoms of Listed Potential Problems Will be Absent, Minimized or Managed (Chemotherapy Effects)  Signs and symptoms of listed potential problems will be absent, minimized or managed by discharge/transition of care (reference Chemotherapy Effects (Adult) CPG).  Outcome: Ongoing (interventions implemented as appropriate)  Pt here for Alimta/Cisplatin/ IVF D1C1, labs, hx, meds, allergies reviewed, pt with no complaints at this time, did attend chemo class. Pt recline din chair, warm blanket provided, continue to monitor

## 2018-11-08 NOTE — PROGRESS NOTES
"Subjective:       Patient ID: Alesha Toney is a 68 y.o. female.    Chief Complaint: Malignant neoplasm of upper lobe of right lung  Ms. Alesha Toney is a 67-year-old otherwise healthy female who started having some shoulder pain, which was lasting for over six weeks.  She went and saw her primary care physician and underwent an x-ray, which revealed right upper lobe lung mass worrisome for malignancy and a CT scan was recommended, which was done on 6/12/18 and that revealed a newly developing mass, pleural based, lateral posterior segment, right upper lobe 3.5 x 3.5 cm, surrounding stellate margin and patchy infiltrates, particularly superiorly,   anteriorly infiltrates extend perihilar into the anterior segment.  She then underwent CT-guided biopsy, which revealed well-differentiated adenocarcinoma.       Her PET scan from 6/29/18 There is physiologic intracranial, head, and neck activity.  There is a large right upper lobe mass SUV max 9.11.  No local or distant metastatic disease seen.  There is physiologic liver, spleen, GI and  activity.  There are a few liver cysts.  No adenopathy is seen.  The adrenal glands are not enlarged.  No adenopathy is seen.  No suspicious bone lesions are seen.     She underwent VATS with right upper lobectomy. Mediastinal lymphadenectomy on 8/9/18. Pathology revealed "Tumor site: Upper lobe.Tumor size: 7 x 4 x 2.7 cm.Tumor focality: Single tumor.  Histologic type: Mixed invasive mucinous and nonmucinous adenocarcinoma. Histologic grade: G2: Moderately differentiated.Spread through air spaces (STATS): Present. Visceral pleura invasion: Not identified.  Lymphovascular invasion: Not identified. Direct invasion of adjacent structures: No adjacent structures present. Margins: All margins are uninvolved by carcinoma.Distance of invasive carcinoma from closest margin: 1 cm from the bronchial margin.Treatment effect: No known presurgical therapy. Regional lymph nodes: Number of " "lymph nodes involved: 0. Number of lymph nodes examined: 11. Pathologic Stage Classification: pT3 N0"     HPI She come in today to start adjuvant chemo with Cisplatin and Alimta.  She feels well and denies any new complaints      Review of Systems   Constitutional: Negative for appetite change, fatigue and unexpected weight change.   HENT: Negative for mouth sores.    Eyes: Negative for visual disturbance.   Respiratory: Negative for cough and shortness of breath.    Cardiovascular: Negative for chest pain.   Gastrointestinal: Negative for abdominal pain and diarrhea.   Genitourinary: Negative for frequency.   Musculoskeletal: Negative for back pain.   Skin: Negative for rash.   Neurological: Negative for headaches.   Hematological: Negative for adenopathy.   Psychiatric/Behavioral: The patient is not nervous/anxious.    All other systems reviewed and are negative.      Objective:      Physical Exam   Constitutional: She is oriented to person, place, and time. She appears well-developed and well-nourished.   HENT:   Mouth/Throat: No oropharyngeal exudate.   Cardiovascular: Normal rate and normal heart sounds.   Pulmonary/Chest: Effort normal and breath sounds normal. She has no wheezes.   Abdominal: Soft. Bowel sounds are normal. There is no tenderness.   Musculoskeletal: She exhibits no edema or tenderness.   Lymphadenopathy:     She has no cervical adenopathy.   Neurological: She is alert and oriented to person, place, and time. Coordination normal.   Skin: Skin is warm and dry. No rash noted.   Psychiatric: She has a normal mood and affect. Judgment and thought content normal.   Vitals reviewed.      LABS:  WBC   Date Value Ref Range Status   11/05/2018 6.59 3.90 - 12.70 K/uL Final     Hemoglobin   Date Value Ref Range Status   11/05/2018 13.1 12.0 - 16.0 g/dL Final     POC Hematocrit   Date Value Ref Range Status   08/09/2018 33 (L) 36 - 54 %PCV Final     Hematocrit   Date Value Ref Range Status   11/05/2018 " 39.6 37.0 - 48.5 % Final     Platelets   Date Value Ref Range Status   11/05/2018 236 150 - 350 K/uL Final     Gran # (ANC)   Date Value Ref Range Status   11/05/2018 3.6 1.8 - 7.7 K/uL Final     Comment:     The ANC is based on a white cell differential from an   automated cell counter. It has not been microscopically   reviewed for the presence of abnormal cells. Clinical   correlation is required.         Chemistry        Component Value Date/Time     11/05/2018 1334    K 4.7 11/05/2018 1334     11/05/2018 1334    CO2 28 11/05/2018 1334    BUN 18 11/05/2018 1334    CREATININE 0.9 11/05/2018 1334     (H) 11/05/2018 1334        Component Value Date/Time    CALCIUM 10.3 11/05/2018 1334    ALKPHOS 66 11/05/2018 1334    AST 24 11/05/2018 1334    ALT 20 11/05/2018 1334    BILITOT 0.3 11/05/2018 1334    ESTGFRAFRICA >60.0 11/05/2018 1334    EGFRNONAA >60.0 11/05/2018 1334          Assessment:       1. Malignant neoplasm of upper lobe of right lung    2. Chemotherapy follow-up examination    3. Nausea        Plan:        1,2,3. She is finally starting adjuvant chemo with Cisplatin and Alimta and will return in 3 weeks for next cycle      Above care plan was discussed with patient and accompanying partner and all questions were addressed to their satisfaction

## 2018-11-08 NOTE — PLAN OF CARE
Problem: Patient Care Overview  Goal: Plan of Care Review  Outcome: Ongoing (interventions implemented as appropriate)  Pt tolerated alimta/cisplatin anf ivf without issue, pt to rtc 11/9/18 for ivf, no distress noted upon d/c to home

## 2018-11-08 NOTE — PROGRESS NOTES
Received message in Epic from phone staff as follows:    [facility]   Caller: Lata - Significant other (Today, 11:35 AM)             Needs Advice     Reason for call: has a question regarding FMLA paper work. Hopes you can answer it for her.          Communication Preference:[phone]     Additional Information:        Called and spoke with patient's significant other Lata and with patient. Met with both in the [facility]fusion [facility]. Patient received a letter and copy of the FMLA form that had been completed and submitted previously; request for more specific information documented by the physician. Patient requested that her chemo treatment schedule, drugs, and potential side effects be noted on the form. Today is the first of the four planned chemotherapy treatments, with three weeks in between treatments. Patient is anticipating being able to return to work some time in January 2019. Delivered copy of the letter and form to NAVJOT Guido/Dr. Dhaliwal in clinic. Patient given the original letter and copy of the form back. No other needs indicated at this time. Will continue to follow.

## 2018-11-09 ENCOUNTER — PATIENT MESSAGE (OUTPATIENT)
Dept: HEMATOLOGY/ONCOLOGY | Facility: CLINIC | Age: 68
End: 2018-11-09

## 2018-11-09 ENCOUNTER — INFUSION (OUTPATIENT)
Dept: INFUSION THERAPY | Facility: HOSPITAL | Age: 68
End: 2018-11-09
Attending: INTERNAL MEDICINE
Payer: COMMERCIAL

## 2018-11-09 VITALS
TEMPERATURE: 98 F | HEART RATE: 82 BPM | HEIGHT: 66 IN | RESPIRATION RATE: 18 BRPM | DIASTOLIC BLOOD PRESSURE: 82 MMHG | BODY MASS INDEX: 27.16 KG/M2 | SYSTOLIC BLOOD PRESSURE: 190 MMHG | WEIGHT: 169 LBS

## 2018-11-09 DIAGNOSIS — C34.90 NON-SMALL CELL LUNG CANCER, UNSPECIFIED LATERALITY: Primary | ICD-10-CM

## 2018-11-09 PROCEDURE — 96360 HYDRATION IV INFUSION INIT: CPT

## 2018-11-09 PROCEDURE — A4216 STERILE WATER/SALINE, 10 ML: HCPCS | Performed by: INTERNAL MEDICINE

## 2018-11-09 PROCEDURE — 25000003 PHARM REV CODE 250: Performed by: INTERNAL MEDICINE

## 2018-11-09 PROCEDURE — 63600175 PHARM REV CODE 636 W HCPCS: Performed by: INTERNAL MEDICINE

## 2018-11-09 RX ORDER — SODIUM CHLORIDE 0.9 % (FLUSH) 0.9 %
10 SYRINGE (ML) INJECTION
Status: DISCONTINUED | OUTPATIENT
Start: 2018-11-09 | End: 2018-11-09 | Stop reason: HOSPADM

## 2018-11-09 RX ORDER — HEPARIN 100 UNIT/ML
500 SYRINGE INTRAVENOUS
Status: DISCONTINUED | OUTPATIENT
Start: 2018-11-09 | End: 2018-11-09 | Stop reason: HOSPADM

## 2018-11-09 RX ADMIN — Medication 10 ML: at 03:11

## 2018-11-09 RX ADMIN — HEPARIN 500 UNITS: 100 SYRINGE at 03:11

## 2018-11-09 RX ADMIN — SODIUM CHLORIDE: 0.9 INJECTION, SOLUTION INTRAVENOUS at 03:11

## 2018-11-09 NOTE — PLAN OF CARE
Problem: Patient Care Overview  Goal: Plan of Care Review  Outcome: Ongoing (interventions implemented as appropriate)  Pt tolerated ivf without issue, pt to rtc 11/29/18, no distress noted upon d/c to home

## 2018-11-09 NOTE — PLAN OF CARE
Problem: Chemotherapy Effects (Adult)  Goal: Signs and Symptoms of Listed Potential Problems Will be Absent, Minimized or Managed (Chemotherapy Effects)  Signs and symptoms of listed potential problems will be absent, minimized or managed by discharge/transition of care (reference Chemotherapy Effects (Adult) CPG).  Outcome: Ongoing (interventions implemented as appropriate)  Pt here for ivf, had  A weight gain since yesterday 7 pounds and concerned, blood work ordered, will monitor, pt concerned about getting extra fluids, pt with no edema or shortness of breath, will monitor

## 2018-11-12 ENCOUNTER — PATIENT MESSAGE (OUTPATIENT)
Dept: HEMATOLOGY/ONCOLOGY | Facility: CLINIC | Age: 68
End: 2018-11-12

## 2018-11-12 NOTE — TELEPHONE ENCOUNTER
She needs to try the Reglan. Other option is she can come and get IV fluids and IV antiemetics.   Can you check to see what was issue on her Olanzepine

## 2018-11-13 ENCOUNTER — PATIENT MESSAGE (OUTPATIENT)
Dept: HEMATOLOGY/ONCOLOGY | Facility: CLINIC | Age: 68
End: 2018-11-13

## 2018-11-13 DIAGNOSIS — R11.0 NAUSEA: ICD-10-CM

## 2018-11-13 DIAGNOSIS — F41.9 ANXIETY: ICD-10-CM

## 2018-11-13 DIAGNOSIS — F41.9 ANXIETY: Primary | ICD-10-CM

## 2018-11-13 RX ORDER — ONDANSETRON 4 MG/1
4 TABLET, ORALLY DISINTEGRATING ORAL EVERY 6 HOURS PRN
Qty: 90 TABLET | Refills: 3 | Status: SHIPPED | OUTPATIENT
Start: 2018-11-13 | End: 2019-05-29

## 2018-11-13 RX ORDER — DIAZEPAM 5 MG/1
5 TABLET ORAL EVERY 6 HOURS PRN
Qty: 60 TABLET | Refills: 0 | Status: SHIPPED | OUTPATIENT
Start: 2018-11-13 | End: 2019-03-09 | Stop reason: SDUPTHER

## 2018-11-13 RX ORDER — OLANZAPINE 10 MG/1
10 TABLET ORAL
COMMUNITY
End: 2018-11-13 | Stop reason: SDUPTHER

## 2018-11-13 RX ORDER — DIAZEPAM 5 MG/1
5 TABLET ORAL EVERY 6 HOURS PRN
Qty: 60 TABLET | Refills: 0 | Status: SHIPPED | OUTPATIENT
Start: 2018-11-13 | End: 2018-11-13 | Stop reason: SDUPTHER

## 2018-11-13 RX ORDER — OLANZAPINE 10 MG/1
TABLET ORAL
Qty: 16 TABLET | Refills: 0 | Status: SHIPPED | OUTPATIENT
Start: 2018-11-13 | End: 2018-12-20

## 2018-11-13 RX ORDER — ONDANSETRON 4 MG/1
4 TABLET, ORALLY DISINTEGRATING ORAL EVERY 6 HOURS PRN
Qty: 90 TABLET | Refills: 3 | Status: SHIPPED | OUTPATIENT
Start: 2018-11-13 | End: 2018-11-13 | Stop reason: SDUPTHER

## 2018-11-13 RX ORDER — ONDANSETRON 4 MG/1
4 TABLET, ORALLY DISINTEGRATING ORAL EVERY 6 HOURS PRN
COMMUNITY
End: 2018-11-13 | Stop reason: SDUPTHER

## 2018-11-14 ENCOUNTER — LAB VISIT (OUTPATIENT)
Dept: LAB | Facility: HOSPITAL | Age: 68
End: 2018-11-14
Attending: INTERNAL MEDICINE
Payer: COMMERCIAL

## 2018-11-14 ENCOUNTER — PATIENT MESSAGE (OUTPATIENT)
Dept: HEMATOLOGY/ONCOLOGY | Facility: CLINIC | Age: 68
End: 2018-11-14

## 2018-11-14 DIAGNOSIS — C34.11 MALIGNANT NEOPLASM OF UPPER LOBE OF RIGHT LUNG: ICD-10-CM

## 2018-11-14 LAB
ALBUMIN SERPL BCP-MCNC: 3.5 G/DL
ALP SERPL-CCNC: 51 U/L
ALT SERPL W/O P-5'-P-CCNC: 57 U/L
ANION GAP SERPL CALC-SCNC: 11 MMOL/L
AST SERPL-CCNC: 45 U/L
BILIRUB SERPL-MCNC: 0.4 MG/DL
BUN SERPL-MCNC: 25 MG/DL
CALCIUM SERPL-MCNC: 9.3 MG/DL
CHLORIDE SERPL-SCNC: 103 MMOL/L
CO2 SERPL-SCNC: 21 MMOL/L
CREAT SERPL-MCNC: 1.1 MG/DL
ERYTHROCYTE [DISTWIDTH] IN BLOOD BY AUTOMATED COUNT: 12 %
EST. GFR  (AFRICAN AMERICAN): 60 ML/MIN/1.73 M^2
EST. GFR  (NON AFRICAN AMERICAN): 52 ML/MIN/1.73 M^2
GLUCOSE SERPL-MCNC: 179 MG/DL
HCT VFR BLD AUTO: 40 %
HGB BLD-MCNC: 13.7 G/DL
MAGNESIUM SERPL-MCNC: 2.1 MG/DL
MCH RBC QN AUTO: 30.4 PG
MCHC RBC AUTO-ENTMCNC: 34.3 G/DL
MCV RBC AUTO: 89 FL
NEUTROPHILS # BLD AUTO: 2.8 K/UL
PLATELET # BLD AUTO: 201 K/UL
PMV BLD AUTO: 11.1 FL
POTASSIUM SERPL-SCNC: 3.8 MMOL/L
PROT SERPL-MCNC: 6.6 G/DL
RBC # BLD AUTO: 4.51 M/UL
SODIUM SERPL-SCNC: 135 MMOL/L
WBC # BLD AUTO: 4.96 K/UL

## 2018-11-14 PROCEDURE — 80053 COMPREHEN METABOLIC PANEL: CPT

## 2018-11-14 PROCEDURE — 36415 COLL VENOUS BLD VENIPUNCTURE: CPT

## 2018-11-14 PROCEDURE — 85027 COMPLETE CBC AUTOMATED: CPT

## 2018-11-14 PROCEDURE — 83735 ASSAY OF MAGNESIUM: CPT

## 2018-11-15 ENCOUNTER — PATIENT MESSAGE (OUTPATIENT)
Dept: HEMATOLOGY/ONCOLOGY | Facility: CLINIC | Age: 68
End: 2018-11-15

## 2018-11-16 ENCOUNTER — PATIENT MESSAGE (OUTPATIENT)
Dept: HEMATOLOGY/ONCOLOGY | Facility: CLINIC | Age: 68
End: 2018-11-16

## 2018-11-20 ENCOUNTER — PATIENT MESSAGE (OUTPATIENT)
Dept: HEMATOLOGY/ONCOLOGY | Facility: CLINIC | Age: 68
End: 2018-11-20

## 2018-11-21 ENCOUNTER — LAB VISIT (OUTPATIENT)
Dept: LAB | Facility: HOSPITAL | Age: 68
End: 2018-11-21
Attending: INTERNAL MEDICINE
Payer: COMMERCIAL

## 2018-11-21 DIAGNOSIS — C34.11 MALIGNANT NEOPLASM OF UPPER LOBE OF RIGHT LUNG: ICD-10-CM

## 2018-11-21 LAB
ALBUMIN SERPL BCP-MCNC: 3.6 G/DL
ALP SERPL-CCNC: 60 U/L
ALT SERPL W/O P-5'-P-CCNC: 31 U/L
ANION GAP SERPL CALC-SCNC: 6 MMOL/L
AST SERPL-CCNC: 25 U/L
BILIRUB SERPL-MCNC: 0.4 MG/DL
BUN SERPL-MCNC: 14 MG/DL
CALCIUM SERPL-MCNC: 10 MG/DL
CHLORIDE SERPL-SCNC: 104 MMOL/L
CO2 SERPL-SCNC: 30 MMOL/L
CREAT SERPL-MCNC: 0.8 MG/DL
ERYTHROCYTE [DISTWIDTH] IN BLOOD BY AUTOMATED COUNT: 12.8 %
EST. GFR  (AFRICAN AMERICAN): >60 ML/MIN/1.73 M^2
EST. GFR  (NON AFRICAN AMERICAN): >60 ML/MIN/1.73 M^2
GLUCOSE SERPL-MCNC: 128 MG/DL
HCT VFR BLD AUTO: 38.6 %
HGB BLD-MCNC: 12.6 G/DL
MAGNESIUM SERPL-MCNC: 1.9 MG/DL
MCH RBC QN AUTO: 30.4 PG
MCHC RBC AUTO-ENTMCNC: 32.6 G/DL
MCV RBC AUTO: 93 FL
NEUTROPHILS # BLD AUTO: 2.9 K/UL
PLATELET # BLD AUTO: 200 K/UL
PMV BLD AUTO: 10 FL
POTASSIUM SERPL-SCNC: 4.6 MMOL/L
PROT SERPL-MCNC: 6.6 G/DL
RBC # BLD AUTO: 4.15 M/UL
SODIUM SERPL-SCNC: 140 MMOL/L
WBC # BLD AUTO: 5.46 K/UL

## 2018-11-21 PROCEDURE — 83735 ASSAY OF MAGNESIUM: CPT

## 2018-11-21 PROCEDURE — 36415 COLL VENOUS BLD VENIPUNCTURE: CPT

## 2018-11-21 PROCEDURE — 85027 COMPLETE CBC AUTOMATED: CPT

## 2018-11-21 PROCEDURE — 80053 COMPREHEN METABOLIC PANEL: CPT

## 2018-11-26 ENCOUNTER — PATIENT MESSAGE (OUTPATIENT)
Dept: HEMATOLOGY/ONCOLOGY | Facility: CLINIC | Age: 68
End: 2018-11-26

## 2018-11-26 DIAGNOSIS — G89.3 NEOPLASM RELATED PAIN: Primary | ICD-10-CM

## 2018-11-26 RX ORDER — HYDROCODONE BITARTRATE AND ACETAMINOPHEN 5; 325 MG/1; MG/1
1 TABLET ORAL EVERY 4 HOURS PRN
Qty: 90 TABLET | Refills: 0 | Status: SHIPPED | OUTPATIENT
Start: 2018-11-26 | End: 2019-01-09 | Stop reason: SDUPTHER

## 2018-11-26 RX ORDER — HYDROCODONE BITARTRATE AND ACETAMINOPHEN 5; 325 MG/1; MG/1
1 TABLET ORAL EVERY 4 HOURS PRN
COMMUNITY
End: 2018-11-26 | Stop reason: SDUPTHER

## 2018-11-26 NOTE — TELEPHONE ENCOUNTER
Are you OK with prescribing NORCO for her?    I asked her about percocet, as this is on her med card.

## 2018-11-28 ENCOUNTER — LAB VISIT (OUTPATIENT)
Dept: LAB | Facility: HOSPITAL | Age: 68
End: 2018-11-28
Attending: INTERNAL MEDICINE
Payer: COMMERCIAL

## 2018-11-28 ENCOUNTER — PATIENT MESSAGE (OUTPATIENT)
Dept: HEMATOLOGY/ONCOLOGY | Facility: CLINIC | Age: 68
End: 2018-11-28

## 2018-11-28 DIAGNOSIS — C34.11 MALIGNANT NEOPLASM OF UPPER LOBE OF RIGHT LUNG: ICD-10-CM

## 2018-11-28 LAB
ALBUMIN SERPL BCP-MCNC: 3.7 G/DL
ALP SERPL-CCNC: 69 U/L
ALT SERPL W/O P-5'-P-CCNC: 24 U/L
ANION GAP SERPL CALC-SCNC: 4 MMOL/L
AST SERPL-CCNC: 23 U/L
BILIRUB SERPL-MCNC: 0.3 MG/DL
BUN SERPL-MCNC: 20 MG/DL
CALCIUM SERPL-MCNC: 10.2 MG/DL
CHLORIDE SERPL-SCNC: 105 MMOL/L
CO2 SERPL-SCNC: 30 MMOL/L
CREAT SERPL-MCNC: 0.9 MG/DL
ERYTHROCYTE [DISTWIDTH] IN BLOOD BY AUTOMATED COUNT: 13 %
EST. GFR  (AFRICAN AMERICAN): >60 ML/MIN/1.73 M^2
EST. GFR  (NON AFRICAN AMERICAN): >60 ML/MIN/1.73 M^2
GLUCOSE SERPL-MCNC: 142 MG/DL
HCT VFR BLD AUTO: 38.6 %
HGB BLD-MCNC: 12.5 G/DL
MAGNESIUM SERPL-MCNC: 2.2 MG/DL
MCH RBC QN AUTO: 30.6 PG
MCHC RBC AUTO-ENTMCNC: 32.4 G/DL
MCV RBC AUTO: 94 FL
NEUTROPHILS # BLD AUTO: 1.2 K/UL
PLATELET # BLD AUTO: 277 K/UL
PMV BLD AUTO: 9.6 FL
POTASSIUM SERPL-SCNC: 5 MMOL/L
PROT SERPL-MCNC: 7.2 G/DL
RBC # BLD AUTO: 4.09 M/UL
SODIUM SERPL-SCNC: 139 MMOL/L
WBC # BLD AUTO: 3.38 K/UL

## 2018-11-28 PROCEDURE — 80053 COMPREHEN METABOLIC PANEL: CPT

## 2018-11-28 PROCEDURE — 36415 COLL VENOUS BLD VENIPUNCTURE: CPT

## 2018-11-28 PROCEDURE — 83735 ASSAY OF MAGNESIUM: CPT

## 2018-11-28 PROCEDURE — 85027 COMPLETE CBC AUTOMATED: CPT

## 2018-11-29 ENCOUNTER — OFFICE VISIT (OUTPATIENT)
Dept: HEMATOLOGY/ONCOLOGY | Facility: CLINIC | Age: 68
End: 2018-11-29
Payer: COMMERCIAL

## 2018-11-29 ENCOUNTER — INFUSION (OUTPATIENT)
Dept: INFUSION THERAPY | Facility: HOSPITAL | Age: 68
End: 2018-11-29
Attending: INTERNAL MEDICINE
Payer: COMMERCIAL

## 2018-11-29 VITALS
TEMPERATURE: 98 F | TEMPERATURE: 98 F | RESPIRATION RATE: 18 BRPM | WEIGHT: 162 LBS | HEART RATE: 75 BPM | WEIGHT: 162.25 LBS | RESPIRATION RATE: 18 BRPM | OXYGEN SATURATION: 100 % | BODY MASS INDEX: 26.03 KG/M2 | DIASTOLIC BLOOD PRESSURE: 79 MMHG | BODY MASS INDEX: 26.08 KG/M2 | SYSTOLIC BLOOD PRESSURE: 177 MMHG | HEIGHT: 66 IN | HEART RATE: 87 BPM | DIASTOLIC BLOOD PRESSURE: 80 MMHG | SYSTOLIC BLOOD PRESSURE: 180 MMHG | HEIGHT: 66 IN

## 2018-11-29 DIAGNOSIS — Z91.041 CONTRAST MEDIA ALLERGY: ICD-10-CM

## 2018-11-29 DIAGNOSIS — C34.90 NON-SMALL CELL LUNG CANCER, UNSPECIFIED LATERALITY: Primary | ICD-10-CM

## 2018-11-29 DIAGNOSIS — R60.9 EDEMA, UNSPECIFIED TYPE: ICD-10-CM

## 2018-11-29 DIAGNOSIS — C34.90 MALIGNANT NEOPLASM OF LUNG, UNSPECIFIED LATERALITY, UNSPECIFIED PART OF LUNG: Primary | ICD-10-CM

## 2018-11-29 DIAGNOSIS — Z91.041 CONTRAST MEDIA ALLERGY: Primary | ICD-10-CM

## 2018-11-29 DIAGNOSIS — Z09 CHEMOTHERAPY FOLLOW-UP EXAMINATION: ICD-10-CM

## 2018-11-29 DIAGNOSIS — C34.11 MALIGNANT NEOPLASM OF UPPER LOBE OF RIGHT LUNG: Primary | ICD-10-CM

## 2018-11-29 PROCEDURE — 96365 THER/PROPH/DIAG IV INF INIT: CPT | Mod: 59

## 2018-11-29 PROCEDURE — 1101F PT FALLS ASSESS-DOCD LE1/YR: CPT | Mod: CPTII,S$GLB,, | Performed by: INTERNAL MEDICINE

## 2018-11-29 PROCEDURE — 96367 TX/PROPH/DG ADDL SEQ IV INF: CPT

## 2018-11-29 PROCEDURE — 96413 CHEMO IV INFUSION 1 HR: CPT

## 2018-11-29 PROCEDURE — 3078F DIAST BP <80 MM HG: CPT | Mod: CPTII,S$GLB,, | Performed by: INTERNAL MEDICINE

## 2018-11-29 PROCEDURE — 99999 PR PBB SHADOW E&M-EST. PATIENT-LVL III: CPT | Mod: PBBFAC,,, | Performed by: INTERNAL MEDICINE

## 2018-11-29 PROCEDURE — 96372 THER/PROPH/DIAG INJ SC/IM: CPT

## 2018-11-29 PROCEDURE — 36593 DECLOT VASCULAR DEVICE: CPT

## 2018-11-29 PROCEDURE — 25000003 PHARM REV CODE 250: Performed by: INTERNAL MEDICINE

## 2018-11-29 PROCEDURE — 96411 CHEMO IV PUSH ADDL DRUG: CPT

## 2018-11-29 PROCEDURE — A4216 STERILE WATER/SALINE, 10 ML: HCPCS | Performed by: INTERNAL MEDICINE

## 2018-11-29 PROCEDURE — 99215 OFFICE O/P EST HI 40 MIN: CPT | Mod: S$GLB,,, | Performed by: INTERNAL MEDICINE

## 2018-11-29 PROCEDURE — 63600175 PHARM REV CODE 636 W HCPCS: Performed by: INTERNAL MEDICINE

## 2018-11-29 PROCEDURE — 96366 THER/PROPH/DIAG IV INF ADDON: CPT

## 2018-11-29 PROCEDURE — 3077F SYST BP >= 140 MM HG: CPT | Mod: CPTII,S$GLB,, | Performed by: INTERNAL MEDICINE

## 2018-11-29 RX ORDER — FUROSEMIDE 20 MG/1
20 TABLET ORAL DAILY PRN
Qty: 30 TABLET | Refills: 11 | Status: SHIPPED | OUTPATIENT
Start: 2018-11-29 | End: 2019-05-29

## 2018-11-29 RX ORDER — HEPARIN 100 UNIT/ML
500 SYRINGE INTRAVENOUS
Status: CANCELLED | OUTPATIENT
Start: 2018-11-30

## 2018-11-29 RX ORDER — DIPHENHYDRAMINE HCL 25 MG
CAPSULE ORAL
Qty: 2 CAPSULE | Refills: 0 | Status: SHIPPED | OUTPATIENT
Start: 2018-11-29 | End: 2018-12-13 | Stop reason: SDUPTHER

## 2018-11-29 RX ORDER — CYANOCOBALAMIN 1000 UG/ML
1000 INJECTION, SOLUTION INTRAMUSCULAR; SUBCUTANEOUS ONCE
Status: COMPLETED | OUTPATIENT
Start: 2018-11-29 | End: 2018-11-29

## 2018-11-29 RX ORDER — SODIUM CHLORIDE 0.9 % (FLUSH) 0.9 %
10 SYRINGE (ML) INJECTION
Status: DISCONTINUED | OUTPATIENT
Start: 2018-11-29 | End: 2018-11-29 | Stop reason: HOSPADM

## 2018-11-29 RX ORDER — HEPARIN 100 UNIT/ML
500 SYRINGE INTRAVENOUS
Status: CANCELLED | OUTPATIENT
Start: 2018-11-29

## 2018-11-29 RX ORDER — LORAZEPAM 1 MG/1
0.5 TABLET ORAL
Status: CANCELLED
Start: 2018-11-29

## 2018-11-29 RX ORDER — CYANOCOBALAMIN 1000 UG/ML
1000 INJECTION, SOLUTION INTRAMUSCULAR; SUBCUTANEOUS ONCE
Status: CANCELLED
Start: 2018-11-29

## 2018-11-29 RX ORDER — SODIUM CHLORIDE 0.9 % (FLUSH) 0.9 %
10 SYRINGE (ML) INJECTION
Status: CANCELLED | OUTPATIENT
Start: 2018-11-30

## 2018-11-29 RX ORDER — HEPARIN 100 UNIT/ML
500 SYRINGE INTRAVENOUS
Status: DISCONTINUED | OUTPATIENT
Start: 2018-11-29 | End: 2018-11-29 | Stop reason: HOSPADM

## 2018-11-29 RX ORDER — PREDNISONE 50 MG/1
TABLET ORAL
Qty: 3 TABLET | Refills: 0 | Status: SHIPPED | OUTPATIENT
Start: 2018-11-29 | End: 2018-12-13 | Stop reason: SDUPTHER

## 2018-11-29 RX ORDER — LORAZEPAM 1 MG/1
0.5 TABLET ORAL
Status: DISCONTINUED | OUTPATIENT
Start: 2018-11-29 | End: 2018-11-29 | Stop reason: HOSPADM

## 2018-11-29 RX ORDER — SODIUM CHLORIDE 0.9 % (FLUSH) 0.9 %
10 SYRINGE (ML) INJECTION
Status: CANCELLED | OUTPATIENT
Start: 2018-11-29

## 2018-11-29 RX ADMIN — MAGNESIUM SULFATE HEPTAHYDRATE: 500 INJECTION, SOLUTION INTRAMUSCULAR; INTRAVENOUS at 03:11

## 2018-11-29 RX ADMIN — PALONOSETRON HYDROCHLORIDE 0.25 MG: 0.25 INJECTION, SOLUTION INTRAVENOUS at 12:11

## 2018-11-29 RX ADMIN — HEPARIN 500 UNITS: 100 SYRINGE at 03:11

## 2018-11-29 RX ADMIN — CYANOCOBALAMIN 1000 MCG: 1000 INJECTION, SOLUTION INTRAMUSCULAR at 02:11

## 2018-11-29 RX ADMIN — MAGNESIUM SULFATE HEPTAHYDRATE: 500 INJECTION, SOLUTION INTRAMUSCULAR; INTRAVENOUS at 11:11

## 2018-11-29 RX ADMIN — ALTEPLASE 2 MG: 2.2 INJECTION, POWDER, LYOPHILIZED, FOR SOLUTION INTRAVENOUS at 01:11

## 2018-11-29 RX ADMIN — ALTEPLASE 2 MG: 2.2 INJECTION, POWDER, LYOPHILIZED, FOR SOLUTION INTRAVENOUS at 09:11

## 2018-11-29 RX ADMIN — DEXAMETHASONE SODIUM PHOSPHATE 10 MG: 4 INJECTION, SOLUTION INTRA-ARTICULAR; INTRALESIONAL; INTRAMUSCULAR; INTRAVENOUS; SOFT TISSUE at 11:11

## 2018-11-29 RX ADMIN — Medication 10 ML: at 03:11

## 2018-11-29 RX ADMIN — SODIUM CHLORIDE 925 MG: 9 INJECTION, SOLUTION INTRAVENOUS at 01:11

## 2018-11-29 RX ADMIN — CISPLATIN 140 MG: 100 INJECTION, SOLUTION INTRAVENOUS at 01:11

## 2018-11-29 NOTE — PLAN OF CARE
Problem: Chemotherapy Effects (Adult)  Goal: Signs and Symptoms of Listed Potential Problems Will be Absent, Minimized or Managed (Chemotherapy Effects)  Signs and symptoms of listed potential problems will be absent, minimized or managed by discharge/transition of care (reference Chemotherapy Effects (Adult) CPG).  Outcome: Ongoing (interventions implemented as appropriate)  Pt here for alimta/cisplatin and IVF, labs, hx, meds, allergies reviewed, pt with no complaints at this time, reclined in chair, continue to monitor

## 2018-11-29 NOTE — PROGRESS NOTES
"Subjective:       Patient ID: Alesha Toney is a 68 y.o. female.    Chief Complaint: Malignant neoplasm of upper lobe of right lung  Ms. Alesha Toney is a 67-year-old otherwise healthy female who started having some shoulder pain, which was lasting for over six weeks.  She went and saw her primary care physician and underwent an x-ray, which revealed right upper lobe lung mass worrisome for malignancy and a CT scan was recommended, which was done on 6/12/18 and that revealed a newly developing mass, pleural based, lateral posterior segment, right upper lobe 3.5 x 3.5 cm, surrounding stellate margin and patchy infiltrates, particularly superiorly,   anteriorly infiltrates extend perihilar into the anterior segment.  She then underwent CT-guided biopsy, which revealed well-differentiated adenocarcinoma.       Her PET scan from 6/29/18 There is physiologic intracranial, head, and neck activity.  There is a large right upper lobe mass SUV max 9.11.  No local or distant metastatic disease seen.  There is physiologic liver, spleen, GI and  activity.  There are a few liver cysts.  No adenopathy is seen.  The adrenal glands are not enlarged.  No adenopathy is seen.  No suspicious bone lesions are seen.     She underwent VATS with right upper lobectomy. Mediastinal lymphadenectomy on 8/9/18. Pathology revealed "Tumor site: Upper lobe.Tumor size: 7 x 4 x 2.7 cm.Tumor focality: Single tumor.  Histologic type: Mixed invasive mucinous and nonmucinous adenocarcinoma. Histologic grade: G2: Moderately differentiated.Spread through air spaces (STATS): Present. Visceral pleura invasion: Not identified.  Lymphovascular invasion: Not identified. Direct invasion of adjacent structures: No adjacent structures present. Margins: All margins are uninvolved by carcinoma.Distance of invasive carcinoma from closest margin: 1 cm from the bronchial margin.Treatment effect: No known presurgical therapy. Regional lymph nodes: Number of " "lymph nodes involved: 0. Number of lymph nodes examined: 11. Pathologic Stage Classification: pT3 N0"      HPI She comes in today for cycle 2 of adjuvant chemo with Cisplatin and Alimta. She noted nausea after cycle 1 of chemo for 5 days after chemo  She denies any vomiting, diarrhea, constipation, abdominal pain, weight loss or loss of appetite, chest pain, shortness of breath, leg swelling, fatigue, pain, headache, dizziness, or mood changes. Her ECOG PS is zero. She is accompanied by her significant other        Review of Systems   Constitutional: Negative for appetite change, fatigue and unexpected weight change.   HENT: Negative for mouth sores.    Eyes: Negative for visual disturbance.   Respiratory: Negative for cough and shortness of breath.    Cardiovascular: Negative for chest pain.   Gastrointestinal: Negative for abdominal pain and diarrhea.   Genitourinary: Negative for frequency.   Musculoskeletal: Negative for back pain.   Skin: Negative for rash.   Neurological: Negative for headaches.   Hematological: Negative for adenopathy.   Psychiatric/Behavioral: The patient is not nervous/anxious.    All other systems reviewed and are negative.      PMFSH: all information reviewed and updated as relevant to today's visit  Objective:      Physical Exam   Constitutional: She is oriented to person, place, and time. She appears well-developed and well-nourished.   HENT:   Mouth/Throat: No oropharyngeal exudate.   Cardiovascular: Normal rate and normal heart sounds.   Pulmonary/Chest: Effort normal and breath sounds normal. She has no wheezes.   Abdominal: Soft. Bowel sounds are normal. There is no tenderness.   Musculoskeletal: She exhibits no edema or tenderness.   Lymphadenopathy:     She has no cervical adenopathy.   Neurological: She is alert and oriented to person, place, and time. Coordination normal.   Skin: Skin is warm and dry. No rash noted.   Psychiatric: She has a normal mood and affect. Judgment and " thought content normal.   Vitals reviewed.        LABS:  WBC   Date Value Ref Range Status   11/28/2018 3.38 (L) 3.90 - 12.70 K/uL Final     Hemoglobin   Date Value Ref Range Status   11/28/2018 12.5 12.0 - 16.0 g/dL Final     POC Hematocrit   Date Value Ref Range Status   08/09/2018 33 (L) 36 - 54 %PCV Final     Hematocrit   Date Value Ref Range Status   11/28/2018 38.6 37.0 - 48.5 % Final     Platelets   Date Value Ref Range Status   11/28/2018 277 150 - 350 K/uL Final     Gran # (ANC)   Date Value Ref Range Status   11/28/2018 1.2 (L) 1.8 - 7.7 K/uL Final     Comment:     The ANC is based on a white cell differential from an   automated cell counter. It has not been microscopically   reviewed for the presence of abnormal cells. Clinical   correlation is required.         Chemistry        Component Value Date/Time     11/28/2018 1145    K 5.0 11/28/2018 1145     11/28/2018 1145    CO2 30 (H) 11/28/2018 1145    BUN 20 11/28/2018 1145    CREATININE 0.9 11/28/2018 1145     (H) 11/28/2018 1145        Component Value Date/Time    CALCIUM 10.2 11/28/2018 1145    ALKPHOS 69 11/28/2018 1145    AST 23 11/28/2018 1145    ALT 24 11/28/2018 1145    BILITOT 0.3 11/28/2018 1145    ESTGFRAFRICA >60 11/28/2018 1145    EGFRNONAA >60 11/28/2018 1145          Assessment:       1. Malignant neoplasm of upper lobe of right lung    2. Chemotherapy follow-up examination    3. Edema, unspecified type        Plan:         1,2,3 She will proceed with cycle 2 of Cisplatin and ALimta and will return in 3 weeks for next cycle.    Above care plan was discussed with patient and accompanying wife and all questions were addressed to their satisfaction

## 2018-11-29 NOTE — NURSING
0930 unable to aspirate blood from port after multiple flushing and position change attempts, 2 ml cath laura instilled to port, continue to monitor

## 2018-11-29 NOTE — Clinical Note
Schedule CBC,CMP, mag and phos and see me in 3 weeks and for Cisplatin and ALimta. IV fluids, IV zofran and Neulasta on day 2.Schedule IV Zofran and neulasta on 11/30/18

## 2018-11-29 NOTE — NURSING
1100 scant amount blood aspirated from port, will start peripheral for tx, port reaccessed per policy without issue, flushes well still unable to aspirate blood.    1300 using peripheral for chemo, cath laura 2 ml instilled to port, will monitor

## 2018-11-29 NOTE — PLAN OF CARE
Problem: Patient Care Overview  Goal: Plan of Care Review  Outcome: Ongoing (interventions implemented as appropriate)  Pt tolerated alimta,cisapltin.ivf without issue, after 2 doses cath laura still unable to aspirate blood, was able to aspirate blood tinged cath laura only, sol owens aware, pt for port study on Monday, left #24 to right hand in for use for tomorrow ivf, no distress noted upon d/c to home

## 2018-11-30 ENCOUNTER — INFUSION (OUTPATIENT)
Dept: INFUSION THERAPY | Facility: HOSPITAL | Age: 68
End: 2018-11-30
Attending: INTERNAL MEDICINE
Payer: COMMERCIAL

## 2018-11-30 ENCOUNTER — PATIENT MESSAGE (OUTPATIENT)
Dept: HEMATOLOGY/ONCOLOGY | Facility: CLINIC | Age: 68
End: 2018-11-30

## 2018-11-30 VITALS
DIASTOLIC BLOOD PRESSURE: 72 MMHG | RESPIRATION RATE: 18 BRPM | HEART RATE: 72 BPM | TEMPERATURE: 98 F | SYSTOLIC BLOOD PRESSURE: 182 MMHG

## 2018-11-30 DIAGNOSIS — C34.90 NON-SMALL CELL LUNG CANCER, UNSPECIFIED LATERALITY: Primary | ICD-10-CM

## 2018-11-30 DIAGNOSIS — C34.90 MALIGNANT NEOPLASM OF LUNG: ICD-10-CM

## 2018-11-30 PROCEDURE — 96372 THER/PROPH/DIAG INJ SC/IM: CPT

## 2018-11-30 PROCEDURE — 63600175 PHARM REV CODE 636 W HCPCS: Mod: JG | Performed by: INTERNAL MEDICINE

## 2018-11-30 PROCEDURE — 25000003 PHARM REV CODE 250: Performed by: INTERNAL MEDICINE

## 2018-11-30 PROCEDURE — 96365 THER/PROPH/DIAG IV INF INIT: CPT

## 2018-11-30 PROCEDURE — 96361 HYDRATE IV INFUSION ADD-ON: CPT

## 2018-11-30 PROCEDURE — A4216 STERILE WATER/SALINE, 10 ML: HCPCS | Performed by: INTERNAL MEDICINE

## 2018-11-30 RX ORDER — ONDANSETRON HCL IN 0.9 % NACL 8 MG/50 ML
8 INTRAVENOUS SOLUTION, PIGGYBACK (ML) INTRAVENOUS
Status: COMPLETED | OUTPATIENT
Start: 2018-11-30 | End: 2018-11-30

## 2018-11-30 RX ORDER — SODIUM CHLORIDE 0.9 % (FLUSH) 0.9 %
10 SYRINGE (ML) INJECTION
Status: CANCELLED | OUTPATIENT
Start: 2018-11-30

## 2018-11-30 RX ORDER — HEPARIN 100 UNIT/ML
500 SYRINGE INTRAVENOUS
Status: COMPLETED | OUTPATIENT
Start: 2018-11-30 | End: 2018-11-30

## 2018-11-30 RX ORDER — HEPARIN 100 UNIT/ML
500 SYRINGE INTRAVENOUS
Status: DISCONTINUED | OUTPATIENT
Start: 2018-11-30 | End: 2018-11-30 | Stop reason: HOSPADM

## 2018-11-30 RX ORDER — SODIUM CHLORIDE 0.9 % (FLUSH) 0.9 %
10 SYRINGE (ML) INJECTION
Status: COMPLETED | OUTPATIENT
Start: 2018-11-30 | End: 2018-11-30

## 2018-11-30 RX ORDER — HEPARIN 100 UNIT/ML
500 SYRINGE INTRAVENOUS
Status: CANCELLED | OUTPATIENT
Start: 2018-11-30

## 2018-11-30 RX ORDER — SODIUM CHLORIDE 0.9 % (FLUSH) 0.9 %
10 SYRINGE (ML) INJECTION
Status: DISCONTINUED | OUTPATIENT
Start: 2018-11-30 | End: 2018-11-30 | Stop reason: HOSPADM

## 2018-11-30 RX ADMIN — ONDANSETRON 8 MG: 2 INJECTION INTRAMUSCULAR; INTRAVENOUS at 02:11

## 2018-11-30 RX ADMIN — Medication 10 ML: at 04:11

## 2018-11-30 RX ADMIN — HEPARIN 500 UNITS: 100 SYRINGE at 04:11

## 2018-11-30 RX ADMIN — PEGFILGRASTIM 6 MG: 6 INJECTION SUBCUTANEOUS at 04:11

## 2018-11-30 RX ADMIN — SODIUM CHLORIDE: 0.9 INJECTION, SOLUTION INTRAVENOUS at 02:11

## 2018-11-30 NOTE — PLAN OF CARE
Problem: Chemotherapy Effects (Adult)  Goal: Signs and Symptoms of Listed Potential Problems Will be Absent, Minimized or Managed (Chemotherapy Effects)  Signs and symptoms of listed potential problems will be absent, minimized or managed by discharge/transition of care (reference Chemotherapy Effects (Adult) CPG).  Outcome: Ongoing (interventions implemented as appropriate)  Here for day 2 ivf's, Zofran, and Neulasta.

## 2018-11-30 NOTE — TELEPHONE ENCOUNTER
So she will be done with Dexamethasone by then? She was supposed to take 3 s\days after chemo but I told her to do 2 days this time. When is her last dose of dex?

## 2018-12-02 ENCOUNTER — PATIENT MESSAGE (OUTPATIENT)
Dept: HEMATOLOGY/ONCOLOGY | Facility: CLINIC | Age: 68
End: 2018-12-02

## 2018-12-03 ENCOUNTER — HOSPITAL ENCOUNTER (OUTPATIENT)
Dept: INTERVENTIONAL RADIOLOGY/VASCULAR | Facility: HOSPITAL | Age: 68
Discharge: HOME | End: 2018-12-03
Attending: INTERNAL MEDICINE

## 2018-12-03 ENCOUNTER — PATIENT MESSAGE (OUTPATIENT)
Dept: HEMATOLOGY/ONCOLOGY | Facility: CLINIC | Age: 68
End: 2018-12-03

## 2018-12-05 ENCOUNTER — LAB VISIT (OUTPATIENT)
Dept: LAB | Facility: HOSPITAL | Age: 68
End: 2018-12-05
Attending: INTERNAL MEDICINE
Payer: COMMERCIAL

## 2018-12-05 DIAGNOSIS — C34.11 MALIGNANT NEOPLASM OF UPPER LOBE OF RIGHT LUNG: ICD-10-CM

## 2018-12-05 LAB
ALBUMIN SERPL BCP-MCNC: 3.4 G/DL
ALP SERPL-CCNC: 128 U/L
ALT SERPL W/O P-5'-P-CCNC: 19 U/L
ANION GAP SERPL CALC-SCNC: 10 MMOL/L
AST SERPL-CCNC: 25 U/L
BILIRUB SERPL-MCNC: 0.3 MG/DL
BUN SERPL-MCNC: 19 MG/DL
CALCIUM SERPL-MCNC: 9.7 MG/DL
CHLORIDE SERPL-SCNC: 101 MMOL/L
CO2 SERPL-SCNC: 27 MMOL/L
CREAT SERPL-MCNC: 1 MG/DL
ERYTHROCYTE [DISTWIDTH] IN BLOOD BY AUTOMATED COUNT: 13.6 %
EST. GFR  (AFRICAN AMERICAN): >60 ML/MIN/1.73 M^2
EST. GFR  (NON AFRICAN AMERICAN): 58 ML/MIN/1.73 M^2
GLUCOSE SERPL-MCNC: 168 MG/DL
HCT VFR BLD AUTO: 38.8 %
HGB BLD-MCNC: 12.6 G/DL
MAGNESIUM SERPL-MCNC: 1.8 MG/DL
MCH RBC QN AUTO: 30 PG
MCHC RBC AUTO-ENTMCNC: 32.5 G/DL
MCV RBC AUTO: 92 FL
NEUTROPHILS # BLD AUTO: 11.3 K/UL
PHOSPHATE SERPL-MCNC: 3.5 MG/DL
PLATELET # BLD AUTO: 143 K/UL
PMV BLD AUTO: 11.5 FL
POTASSIUM SERPL-SCNC: 4.4 MMOL/L
PROT SERPL-MCNC: 6.3 G/DL
RBC # BLD AUTO: 4.2 M/UL
SODIUM SERPL-SCNC: 138 MMOL/L
WBC # BLD AUTO: 15.17 K/UL

## 2018-12-05 PROCEDURE — 85027 COMPLETE CBC AUTOMATED: CPT

## 2018-12-05 PROCEDURE — 80053 COMPREHEN METABOLIC PANEL: CPT

## 2018-12-05 PROCEDURE — 83735 ASSAY OF MAGNESIUM: CPT

## 2018-12-05 PROCEDURE — 84100 ASSAY OF PHOSPHORUS: CPT

## 2018-12-05 PROCEDURE — 36415 COLL VENOUS BLD VENIPUNCTURE: CPT

## 2018-12-06 ENCOUNTER — PATIENT MESSAGE (OUTPATIENT)
Dept: HEMATOLOGY/ONCOLOGY | Facility: CLINIC | Age: 68
End: 2018-12-06

## 2018-12-09 ENCOUNTER — PATIENT MESSAGE (OUTPATIENT)
Dept: HEMATOLOGY/ONCOLOGY | Facility: CLINIC | Age: 68
End: 2018-12-09

## 2018-12-11 ENCOUNTER — HOSPITAL ENCOUNTER (OUTPATIENT)
Dept: INTERVENTIONAL RADIOLOGY/VASCULAR | Facility: HOSPITAL | Age: 68
Discharge: HOME | End: 2018-12-11
Attending: INTERNAL MEDICINE

## 2018-12-12 ENCOUNTER — LAB VISIT (OUTPATIENT)
Dept: LAB | Facility: HOSPITAL | Age: 68
End: 2018-12-12
Attending: INTERNAL MEDICINE
Payer: COMMERCIAL

## 2018-12-12 DIAGNOSIS — C34.11 MALIGNANT NEOPLASM OF UPPER LOBE OF RIGHT LUNG: ICD-10-CM

## 2018-12-12 LAB
ALBUMIN SERPL BCP-MCNC: 3.7 G/DL
ALP SERPL-CCNC: 102 U/L
ALT SERPL W/O P-5'-P-CCNC: 17 U/L
ANION GAP SERPL CALC-SCNC: 11 MMOL/L
AST SERPL-CCNC: 19 U/L
BILIRUB SERPL-MCNC: 0.2 MG/DL
BUN SERPL-MCNC: 14 MG/DL
CALCIUM SERPL-MCNC: 10 MG/DL
CHLORIDE SERPL-SCNC: 103 MMOL/L
CO2 SERPL-SCNC: 30 MMOL/L
CREAT SERPL-MCNC: 0.9 MG/DL
ERYTHROCYTE [DISTWIDTH] IN BLOOD BY AUTOMATED COUNT: 13.6 %
EST. GFR  (AFRICAN AMERICAN): >60 ML/MIN/1.73 M^2
EST. GFR  (NON AFRICAN AMERICAN): >60 ML/MIN/1.73 M^2
GLUCOSE SERPL-MCNC: 140 MG/DL
HCT VFR BLD AUTO: 37.9 %
HGB BLD-MCNC: 12.3 G/DL
MAGNESIUM SERPL-MCNC: 2.2 MG/DL
MCH RBC QN AUTO: 30.6 PG
MCHC RBC AUTO-ENTMCNC: 32.5 G/DL
MCV RBC AUTO: 94 FL
NEUTROPHILS # BLD AUTO: 6.8 K/UL
PHOSPHATE SERPL-MCNC: 3 MG/DL
PLATELET # BLD AUTO: 222 K/UL
PMV BLD AUTO: 9.8 FL
POTASSIUM SERPL-SCNC: 5.7 MMOL/L
PROT SERPL-MCNC: 6.8 G/DL
RBC # BLD AUTO: 4.02 M/UL
SODIUM SERPL-SCNC: 144 MMOL/L
WBC # BLD AUTO: 11.1 K/UL

## 2018-12-12 PROCEDURE — 83735 ASSAY OF MAGNESIUM: CPT

## 2018-12-12 PROCEDURE — 85027 COMPLETE CBC AUTOMATED: CPT

## 2018-12-12 PROCEDURE — 84100 ASSAY OF PHOSPHORUS: CPT

## 2018-12-12 PROCEDURE — 36415 COLL VENOUS BLD VENIPUNCTURE: CPT

## 2018-12-12 PROCEDURE — 80053 COMPREHEN METABOLIC PANEL: CPT

## 2018-12-12 RX ORDER — LOSARTAN POTASSIUM 50 MG/1
TABLET ORAL
Qty: 30 TABLET | Refills: 0 | Status: SHIPPED | OUTPATIENT
Start: 2018-12-12 | End: 2019-01-15 | Stop reason: SDUPTHER

## 2018-12-13 DIAGNOSIS — Z91.041 CONTRAST MEDIA ALLERGY: Primary | ICD-10-CM

## 2018-12-13 RX ORDER — DIPHENHYDRAMINE HCL 50 MG
50 CAPSULE ORAL ONCE
Qty: 1 CAPSULE | Refills: 0 | Status: SHIPPED | OUTPATIENT
Start: 2018-12-13 | End: 2018-12-13

## 2018-12-13 RX ORDER — PREDNISONE 50 MG/1
TABLET ORAL
Qty: 3 TABLET | Refills: 0 | Status: SHIPPED | OUTPATIENT
Start: 2018-12-13 | End: 2019-02-07

## 2018-12-14 ENCOUNTER — HOSPITAL ENCOUNTER (OUTPATIENT)
Dept: INTERVENTIONAL RADIOLOGY/VASCULAR | Facility: HOSPITAL | Age: 68
Discharge: HOME | End: 2018-12-14
Attending: INTERNAL MEDICINE
Payer: COMMERCIAL

## 2018-12-14 DIAGNOSIS — C34.90 MALIGNANT NEOPLASM OF LUNG, UNSPECIFIED LATERALITY, UNSPECIFIED PART OF LUNG: ICD-10-CM

## 2018-12-14 PROCEDURE — 25500020 PHARM REV CODE 255: Performed by: INTERNAL MEDICINE

## 2018-12-14 PROCEDURE — 36598 INJ W/FLUOR EVAL CV DEVICE: CPT

## 2018-12-14 PROCEDURE — 36598 INJ W/FLUOR EVAL CV DEVICE: CPT | Mod: ,,, | Performed by: RADIOLOGY

## 2018-12-14 RX ADMIN — IOHEXOL 5 ML: 300 INJECTION, SOLUTION INTRAVENOUS at 12:12

## 2018-12-14 NOTE — PROCEDURES
Radiology Post-Procedure Note    Pre Op Diagnosis: port check    Post Op Diagnosis: Same    Procedure: port check    Procedure performed by: Sumit/Josie    Written Informed Consent Obtained: Yes    Specimen Removed: No    Estimated Blood Loss: Minimal    Findings:     There is a fibrin sheath which prevents aspiration, the port flushes easily. Port can be used for administrations of medications. If blood draws are needed the port could be exchanged.      Joon Arana MD  Radiology

## 2018-12-14 NOTE — H&P
Radiology History & Physical      SUBJECTIVE:     Chief Complaint: Concern for port dysfunction.    History of Present Illness:  Alesha Toney is a 68 y.o. female who presents for port check. Patient premedicated with 13 hour prep for contrast allergy which consists of hives and tachycardia.    Past Medical History:   Diagnosis Date    Breast cyst     Cardiac arrhythmia     Fibrocystic breast     History of hysterectomy     20yrs ago    History of kidney stones     Hypertension     Lung cancer     Lung cancer      Past Surgical History:   Procedure Laterality Date    ADENOIDECTOMY  19yo    ANTERIOR CERVICAL DISCECTOMY W/ FUSION N/A 10/15/2018    Procedure: DISCECTOMY, SPINE, CERVICAL, ANTERIOR APPROACH, WITH FUSION C6/7  Depuy SNS: [employer]/S[employer] Regular OR table;  Surgeon: Greyson Bansal MD;  Location: [facility] OR [address] [place] [address];  Service: Neurosurgery;  Laterality: N/A;    APPENDECTOMY      BONE RESECTION, RIB  1980    BREAST BIOPSY Left     at least 40yrs ago in her 20's    BREAST CYST ASPIRATION      BREAST CYST EXCISION      DISCECTOMY, SPINE, CERVICAL, ANTERIOR APPROACH, WITH FUSION C6/7  Depuy SNS: [employer]/SSEP/Vocal Cord Regular OR table N/A 10/15/2018    Performed by Greyson Bansal MD at [facility] OR [address] [place] [address]    ENDOBRONCHIAL ULTRASOUND N/A 7/10/2018    Procedure: ULTRASOUND, ENDOBRONCHIAL;  Surgeon: Sri Hearn MD;  Location: [address];  Service: Pulmonary;  Laterality: N/A;    HYSTERECTOMY      @47yrs of age    INSERTION OF TUNNELED CENTRAL VENOUS CATHETER (CVC) WITH SUBCUTANEOUS PORT N/A 9/25/2018    Procedure: [id number]-CATH;  Surgeon: [facility] Diagnostic Provider;  Location: [address];  Service: Radiology;  Laterality: N/A;    HWAOPLQYP-VTVQ-G-CATH N/A 9/25/2018    Performed by [facility] Diagnostic Provider at [facility] OR [address] [place] [address]    KIDNEY SURGERY      19yo    LYMPHADENECTOMY/mediastinal N/A 8/9/2018    Performed by Philip Messina MD at [address]     OOPHORECTOMY      @47yrs of age    THORACOTOMY Right 8/9/2018    [facility]    TONSILLECTOMY      ULTRASOUND, ENDOBRONCHIAL N/A 7/10/2018    [facility]    VATS, WITH LOBECTOMY Possible chest wall resection Right 8/9/2018    [facility]    VIDEO-ASSISTED THORACOSCOPIC LOBECTOMY Right 8/9/2018    Procedure: VATS, WITH LOBECTOMY Possible chest wall resection;  Surgeon: Philip Messina MD;  Location: [facility] OR [address] [place] [address];  Service: Thoracic;  Laterality: Right;  Have open pan available.       Home Meds:   Prior to Admission medications    Medication Sig Start Date End Date Taking? Authorizing Provider   atenolol (TENORMIN) 50 MG tablet Take 0.5 tablets (25 mg total) by mouth 4 (four) times daily. 7/26/18   Homeyar GENO Mccoy MD   calcium carbonate (CALCIUM ANTACID) 300 mg (750 mg) Chew Take by mouth.    Historical Provider, MD   cloNIDine (CATAPRES) 0.1 MG tablet TAKE 1 TABLET BY MOUTH EVERY 8 HOURS AS NEEDED 6/27/18   Cori Galloway MD   dexamethasone (DECADRON) 6 MG tablet Take one tablet by mouth twice a day with meals. Start the day before chemo and take for 3 days after chemo. Do not take on the day of chemo. 9/13/18   Pam Dhaliwal MD   diazePAM (VALIUM) 5 MG tablet Take 1 tablet (5 mg total) by mouth every 6 (six) hours as needed for Anxiety. 11/13/18   Pam Dhaliwal MD   diphenhydrAMINE (BENADRYL) 50 MG capsule Take 1 capsule (50 mg total) by mouth once. With last dose of prednisone for 1 dose 12/13/18 12/13/18  Lata Monaco, NP   folic acid (FOLVITE) 1 MG tablet Take 1 tablet (1 mg total) by mouth once daily. Start today 9/11/18 9/11/19  Pam Dhaliwal MD   furosemide (LASIX) 20 MG tablet Take 1 tablet (20 mg total) by mouth daily as needed. 11/29/18 11/29/19  Pam Dhaliwal MD   HYDROcodone-acetaminophen (NORCO) 5-325 mg per tablet Take 1 tablet by mouth every 4 (four)  "hours as needed. 11/26/18   Pam Dhaliwal MD   losartan (COZAAR) 50 MG tablet Take 25 mg by mouth every evening.     Historical Provider, MD   losartan (COZAAR) 50 MG tablet TAKE 1 TABLET(50 MG) BY MOUTH EVERY DAY 12/12/18   Mitra Mccoy MD   metoclopramide HCl (REGLAN) 10 MG tablet Take 1 tablet (10 mg total) by mouth 3 (three) times daily with meals. 11/8/18 11/8/19  Pam Dhaliwal MD   multivitamin with minerals tablet Take 1 tablet by mouth once daily.    Historical Provider, MD   OLANZapine (ZYPREXA) 10 MG tablet Take 1 tablet (10mg) on the day of chemo and take for a total for 4 days 11/13/18   Pam Dhaliwal MD   ondansetron (ZOFRAN) 8 MG tablet Take 1 tablet (8 mg total) by mouth every 12 (twelve) hours as needed for Nausea. 10/15/18   Sheng Mesa MD   ondansetron (ZOFRAN-ODT) 4 MG TbDL Dissolve 1 tablet (4 mg total) by mouth every 6 (six) hours as needed. 11/13/18   Pam Dhaliwal MD   predniSONE (DELTASONE) 50 MG Tab Please take one tablet 13 hours, 6 hours, and 1 hour prior to port study 12/13/18   Lata Monaco, NP   ranitidine (ZANTAC) 150 MG tablet Please take one tablet 6 hours and 1 hour prior to port study 11/29/18   Pam Dhaliwal MD   ranitidine (ZANTAC) 75 MG tablet Take 150 mg by mouth nightly.     Historical Provider, MD     Anticoagulants/Antiplatelets: no anticoagulation    Allergies:   Review of patient's allergies indicates:   Allergen Reactions    Adhesive Itching, Rash and Blisters     INCLUDES EKG PADS    Iodinated contrast- oral and iv dye     Pcn [penicillins]      rash    Phenergan plain Other (See Comments)     "crazy behavior"    Tramadol     Vancomycin analogues      Red man syndrome     Sedation History:  no adverse reactions    Review of Systems:   Hematological: no known coagulopathies  Respiratory: no shortness of breath  Cardiovascular: no chest pain  Gastrointestinal: no abdominal pain  Genito-Urinary: no dysuria  Musculoskeletal: " negative  Neurological: no TIA or stroke symptoms         OBJECTIVE:     Vital Signs (Most Recent)       Physical Exam:  ASA: 3  Mallampati: 2    General: no acute distress  Mental Status: alert and oriented to person, place and time  HEENT: normocephalic, atraumatic  Chest: unlabored breathing  Heart: no heaves.  Abdomen: nondistended  Extremity: moves all extremities    Laboratory  Lab Results   Component Value Date    INR 0.9 09/25/2018       Lab Results   Component Value Date    WBC 11.10 12/12/2018    HGB 12.3 12/12/2018    HCT 37.9 12/12/2018    MCV 94 12/12/2018     12/12/2018      Lab Results   Component Value Date     (H) 12/12/2018     12/12/2018    K 5.7 (H) 12/12/2018     12/12/2018    CO2 30 (H) 12/12/2018    BUN 14 12/12/2018    CREATININE 0.9 12/12/2018    CALCIUM 10.0 12/12/2018    MG 2.2 12/12/2018    ALT 17 12/12/2018    AST 19 12/12/2018    ALBUMIN 3.7 12/12/2018    BILITOT 0.2 12/12/2018       ASSESSMENT/PLAN:     Sedation Plan: none  Patient will undergo port check.    Joon Arana MD  Radiology

## 2018-12-19 ENCOUNTER — PATIENT MESSAGE (OUTPATIENT)
Dept: HEMATOLOGY/ONCOLOGY | Facility: CLINIC | Age: 68
End: 2018-12-19

## 2018-12-19 ENCOUNTER — LAB VISIT (OUTPATIENT)
Dept: LAB | Facility: HOSPITAL | Age: 68
End: 2018-12-19
Attending: INTERNAL MEDICINE
Payer: COMMERCIAL

## 2018-12-19 DIAGNOSIS — C34.11 MALIGNANT NEOPLASM OF UPPER LOBE OF RIGHT LUNG: ICD-10-CM

## 2018-12-19 LAB
ALBUMIN SERPL BCP-MCNC: 3.7 G/DL
ALP SERPL-CCNC: 81 U/L
ALT SERPL W/O P-5'-P-CCNC: 17 U/L
ANION GAP SERPL CALC-SCNC: 10 MMOL/L
AST SERPL-CCNC: 20 U/L
BILIRUB SERPL-MCNC: 0.3 MG/DL
BUN SERPL-MCNC: 16 MG/DL
CALCIUM SERPL-MCNC: 10 MG/DL
CHLORIDE SERPL-SCNC: 104 MMOL/L
CO2 SERPL-SCNC: 26 MMOL/L
CREAT SERPL-MCNC: 0.9 MG/DL
ERYTHROCYTE [DISTWIDTH] IN BLOOD BY AUTOMATED COUNT: 13.9 %
EST. GFR  (AFRICAN AMERICAN): >60 ML/MIN/1.73 M^2
EST. GFR  (NON AFRICAN AMERICAN): >60 ML/MIN/1.73 M^2
GLUCOSE SERPL-MCNC: 139 MG/DL
HCT VFR BLD AUTO: 38.4 %
HGB BLD-MCNC: 12.3 G/DL
MAGNESIUM SERPL-MCNC: 2.1 MG/DL
MCH RBC QN AUTO: 29.9 PG
MCHC RBC AUTO-ENTMCNC: 32 G/DL
MCV RBC AUTO: 93 FL
NEUTROPHILS # BLD AUTO: 6.5 K/UL
PHOSPHATE SERPL-MCNC: 2.8 MG/DL
PLATELET # BLD AUTO: 292 K/UL
PMV BLD AUTO: 10.2 FL
POTASSIUM SERPL-SCNC: 4.8 MMOL/L
PROT SERPL-MCNC: 6.8 G/DL
RBC # BLD AUTO: 4.12 M/UL
SODIUM SERPL-SCNC: 140 MMOL/L
WBC # BLD AUTO: 9.29 K/UL

## 2018-12-19 PROCEDURE — 84100 ASSAY OF PHOSPHORUS: CPT

## 2018-12-19 PROCEDURE — 83735 ASSAY OF MAGNESIUM: CPT

## 2018-12-19 PROCEDURE — 80053 COMPREHEN METABOLIC PANEL: CPT

## 2018-12-19 PROCEDURE — 85027 COMPLETE CBC AUTOMATED: CPT

## 2018-12-19 PROCEDURE — 36415 COLL VENOUS BLD VENIPUNCTURE: CPT

## 2018-12-20 ENCOUNTER — TELEPHONE (OUTPATIENT)
Dept: HEMATOLOGY/ONCOLOGY | Facility: CLINIC | Age: 68
End: 2018-12-20

## 2018-12-20 ENCOUNTER — OFFICE VISIT (OUTPATIENT)
Dept: HEMATOLOGY/ONCOLOGY | Facility: CLINIC | Age: 68
End: 2018-12-20
Payer: COMMERCIAL

## 2018-12-20 ENCOUNTER — RESEARCH ENCOUNTER (OUTPATIENT)
Dept: HEMATOLOGY/ONCOLOGY | Facility: CLINIC | Age: 68
End: 2018-12-20

## 2018-12-20 ENCOUNTER — INFUSION (OUTPATIENT)
Dept: INFUSION THERAPY | Facility: HOSPITAL | Age: 68
End: 2018-12-20
Attending: INTERNAL MEDICINE
Payer: COMMERCIAL

## 2018-12-20 VITALS
HEART RATE: 95 BPM | WEIGHT: 160.5 LBS | DIASTOLIC BLOOD PRESSURE: 79 MMHG | TEMPERATURE: 99 F | SYSTOLIC BLOOD PRESSURE: 186 MMHG | HEIGHT: 66 IN | BODY MASS INDEX: 25.79 KG/M2 | RESPIRATION RATE: 18 BRPM | OXYGEN SATURATION: 100 %

## 2018-12-20 VITALS
TEMPERATURE: 99 F | WEIGHT: 160.5 LBS | HEART RATE: 78 BPM | SYSTOLIC BLOOD PRESSURE: 149 MMHG | BODY MASS INDEX: 25.79 KG/M2 | RESPIRATION RATE: 18 BRPM | DIASTOLIC BLOOD PRESSURE: 70 MMHG | HEIGHT: 66 IN

## 2018-12-20 DIAGNOSIS — C34.90 MALIGNANT NEOPLASM OF LUNG, UNSPECIFIED LATERALITY, UNSPECIFIED PART OF LUNG: Primary | ICD-10-CM

## 2018-12-20 DIAGNOSIS — C34.90 NON-SMALL CELL LUNG CANCER, UNSPECIFIED LATERALITY: Primary | ICD-10-CM

## 2018-12-20 DIAGNOSIS — R06.02 SHORTNESS OF BREATH: ICD-10-CM

## 2018-12-20 DIAGNOSIS — C34.11 MALIGNANT NEOPLASM OF UPPER LOBE OF RIGHT LUNG: Primary | ICD-10-CM

## 2018-12-20 PROCEDURE — 1101F PT FALLS ASSESS-DOCD LE1/YR: CPT | Mod: CPTII,S$GLB,, | Performed by: INTERNAL MEDICINE

## 2018-12-20 PROCEDURE — 99999 PR PBB SHADOW E&M-EST. PATIENT-LVL III: CPT | Mod: PBBFAC,,, | Performed by: INTERNAL MEDICINE

## 2018-12-20 PROCEDURE — 3077F SYST BP >= 140 MM HG: CPT | Mod: CPTII,S$GLB,, | Performed by: INTERNAL MEDICINE

## 2018-12-20 PROCEDURE — 96411 CHEMO IV PUSH ADDL DRUG: CPT

## 2018-12-20 PROCEDURE — 25000003 PHARM REV CODE 250: Performed by: INTERNAL MEDICINE

## 2018-12-20 PROCEDURE — 3078F DIAST BP <80 MM HG: CPT | Mod: CPTII,S$GLB,, | Performed by: INTERNAL MEDICINE

## 2018-12-20 PROCEDURE — 63600175 PHARM REV CODE 636 W HCPCS: Mod: JG | Performed by: INTERNAL MEDICINE

## 2018-12-20 PROCEDURE — 96367 TX/PROPH/DG ADDL SEQ IV INF: CPT

## 2018-12-20 PROCEDURE — 99215 OFFICE O/P EST HI 40 MIN: CPT | Mod: S$GLB,,, | Performed by: INTERNAL MEDICINE

## 2018-12-20 PROCEDURE — 96413 CHEMO IV INFUSION 1 HR: CPT

## 2018-12-20 RX ORDER — FUROSEMIDE 20 MG/1
20 TABLET ORAL
Status: CANCELLED
Start: 2018-12-20

## 2018-12-20 RX ORDER — SODIUM CHLORIDE 0.9 % (FLUSH) 0.9 %
10 SYRINGE (ML) INJECTION
Status: CANCELLED | OUTPATIENT
Start: 2018-12-20

## 2018-12-20 RX ORDER — SODIUM CHLORIDE 0.9 % (FLUSH) 0.9 %
10 SYRINGE (ML) INJECTION
Status: CANCELLED | OUTPATIENT
Start: 2018-12-21

## 2018-12-20 RX ORDER — FUROSEMIDE 20 MG/1
20 TABLET ORAL
Status: COMPLETED | OUTPATIENT
Start: 2018-12-20 | End: 2018-12-20

## 2018-12-20 RX ORDER — CYANOCOBALAMIN 1000 UG/ML
1000 INJECTION, SOLUTION INTRAMUSCULAR; SUBCUTANEOUS
Status: CANCELLED | OUTPATIENT
Start: 2018-12-20

## 2018-12-20 RX ORDER — HEPARIN 100 UNIT/ML
500 SYRINGE INTRAVENOUS
Status: CANCELLED | OUTPATIENT
Start: 2018-12-20

## 2018-12-20 RX ORDER — HEPARIN 100 UNIT/ML
500 SYRINGE INTRAVENOUS
Status: CANCELLED | OUTPATIENT
Start: 2018-12-21

## 2018-12-20 RX ORDER — HEPARIN 100 UNIT/ML
500 SYRINGE INTRAVENOUS
Status: DISCONTINUED | OUTPATIENT
Start: 2018-12-20 | End: 2018-12-20 | Stop reason: HOSPADM

## 2018-12-20 RX ORDER — SODIUM CHLORIDE 0.9 % (FLUSH) 0.9 %
10 SYRINGE (ML) INJECTION
Status: DISCONTINUED | OUTPATIENT
Start: 2018-12-20 | End: 2018-12-20 | Stop reason: HOSPADM

## 2018-12-20 RX ORDER — LORAZEPAM 1 MG/1
0.5 TABLET ORAL
Status: CANCELLED
Start: 2018-12-20

## 2018-12-20 RX ADMIN — SODIUM CHLORIDE 925 MG: 9 INJECTION, SOLUTION INTRAVENOUS at 02:12

## 2018-12-20 RX ADMIN — CISPLATIN 140 MG: 1 INJECTION INTRAVENOUS at 02:12

## 2018-12-20 RX ADMIN — HEPARIN 500 UNITS: 100 SYRINGE at 05:12

## 2018-12-20 RX ADMIN — MAGNESIUM SULFATE: 500 INJECTION, SOLUTION INTRAMUSCULAR; INTRAVENOUS at 03:12

## 2018-12-20 RX ADMIN — PALONOSETRON 0.25 MG: 0.25 INJECTION, SOLUTION INTRAVENOUS at 01:12

## 2018-12-20 RX ADMIN — MAGNESIUM SULFATE: 500 INJECTION, SOLUTION INTRAMUSCULAR; INTRAVENOUS at 11:12

## 2018-12-20 RX ADMIN — DEXAMETHASONE SODIUM PHOSPHATE 10 MG: 4 INJECTION, SOLUTION INTRA-ARTICULAR; INTRALESIONAL; INTRAMUSCULAR; INTRAVENOUS; SOFT TISSUE at 01:12

## 2018-12-20 RX ADMIN — FUROSEMIDE 20 MG: 20 TABLET ORAL at 05:12

## 2018-12-20 RX ADMIN — SODIUM CHLORIDE 150 MG: 0.9 INJECTION, SOLUTION INTRAVENOUS at 01:12

## 2018-12-20 NOTE — PROGRESS NOTES
"Subjective:       Patient ID: Alesha Toney is a 68 y.o. female.    Chief Complaint: Malignant neoplasm of upper lobe of right lung  Ms. Alesha Toney is a 67-year-old otherwise healthy female who started having some shoulder pain, which was lasting for over six weeks.  She went and saw her primary care physician and underwent an x-ray, which revealed right upper lobe lung mass worrisome for malignancy and a CT scan was recommended, which was done on 6/12/18 and that revealed a newly developing mass, pleural based, lateral posterior segment, right upper lobe 3.5 x 3.5 cm, surrounding stellate margin and patchy infiltrates, particularly superiorly,   anteriorly infiltrates extend perihilar into the anterior segment.  She then underwent CT-guided biopsy, which revealed well-differentiated adenocarcinoma.       Her PET scan from 6/29/18 There is physiologic intracranial, head, and neck activity.  There is a large right upper lobe mass SUV max 9.11.  No local or distant metastatic disease seen.  There is physiologic liver, spleen, GI and  activity.  There are a few liver cysts.  No adenopathy is seen.  The adrenal glands are not enlarged.  No adenopathy is seen.  No suspicious bone lesions are seen.     She underwent VATS with right upper lobectomy. Mediastinal lymphadenectomy on 8/9/18. Pathology revealed "Tumor site: Upper lobe.Tumor size: 7 x 4 x 2.7 cm.Tumor focality: Single tumor.  Histologic type: Mixed invasive mucinous and nonmucinous adenocarcinoma. Histologic grade: G2: Moderately differentiated.Spread through air spaces (STATS): Present. Visceral pleura invasion: Not identified.  Lymphovascular invasion: Not identified. Direct invasion of adjacent structures: No adjacent structures present. Margins: All margins are uninvolved by carcinoma.Distance of invasive carcinoma from closest margin: 1 cm from the bronchial margin.Treatment effect: No known presurgical therapy. Regional lymph nodes: Number of " "lymph nodes involved: 0. Number of lymph nodes examined: 11. Pathologic Stage Classification: pT3 N0"        HPI She comes in today for cycle 3 of adjuvant chemo with Cisplatin and Alimta  She notes shortness of breath right after chemo as she gained weight of 8 lbs overnight    Review of Systems   Constitutional: Negative for appetite change, fatigue and unexpected weight change.   HENT: Negative for mouth sores.    Eyes: Negative for visual disturbance.   Respiratory: Positive for shortness of breath. Negative for cough.    Cardiovascular: Negative for chest pain.   Gastrointestinal: Negative for abdominal pain and diarrhea.   Genitourinary: Negative for frequency.   Musculoskeletal: Negative for back pain.   Skin: Negative for rash.   Neurological: Negative for headaches.   Hematological: Negative for adenopathy.   Psychiatric/Behavioral: The patient is not nervous/anxious.    All other systems reviewed and are negative.      PMFSH: all information reviewed and updated as relevant to today's visit  Objective:      Physical Exam   Constitutional: She is oriented to person, place, and time. She appears well-developed and well-nourished.   HENT:   Mouth/Throat: No oropharyngeal exudate.   Cardiovascular: Normal rate and normal heart sounds.   Pulmonary/Chest: Effort normal and breath sounds normal. She has no wheezes.   Abdominal: Soft. Bowel sounds are normal. There is no tenderness.   Musculoskeletal: She exhibits no edema or tenderness.   Lymphadenopathy:     She has no cervical adenopathy.   Neurological: She is alert and oriented to person, place, and time. Coordination normal.   Skin: Skin is warm and dry. No rash noted.   Psychiatric: She has a normal mood and affect. Judgment and thought content normal.   Vitals reviewed.      LABS:  WBC   Date Value Ref Range Status   12/19/2018 9.29 3.90 - 12.70 K/uL Final     Hemoglobin   Date Value Ref Range Status   12/19/2018 12.3 12.0 - 16.0 g/dL Final     POC " Hematocrit   Date Value Ref Range Status   08/09/2018 33 (L) 36 - 54 %PCV Final     Hematocrit   Date Value Ref Range Status   12/19/2018 38.4 37.0 - 48.5 % Final     Platelets   Date Value Ref Range Status   12/19/2018 292 150 - 350 K/uL Final     Gran # (ANC)   Date Value Ref Range Status   12/19/2018 6.5 1.8 - 7.7 K/uL Final     Comment:     The ANC is based on a white cell differential from an   automated cell counter. It has not been microscopically   reviewed for the presence of abnormal cells. Clinical   correlation is required.         Chemistry        Component Value Date/Time     12/19/2018 1130    K 4.8 12/19/2018 1130     12/19/2018 1130    CO2 26 12/19/2018 1130    BUN 16 12/19/2018 1130    CREATININE 0.9 12/19/2018 1130     (H) 12/19/2018 1130        Component Value Date/Time    CALCIUM 10.0 12/19/2018 1130    ALKPHOS 81 12/19/2018 1130    AST 20 12/19/2018 1130    ALT 17 12/19/2018 1130    BILITOT 0.3 12/19/2018 1130    ESTGFRAFRICA >60 12/19/2018 1130    EGFRNONAA >60 12/19/2018 1130          Assessment:       1. Malignant neoplasm of upper lobe of right lung    2. Shortness of breath        Plan:        1,2. Obtain VQ Scan and 2 D ECHO to evaluate shortness of breath. She will proceed with cycle 3 of Cisplatin and ALimta and will return in 3 weeks for last cycle    Above care plan was discussed with patient and accompanying friend and all questions were addressed to their satisfaction

## 2018-12-20 NOTE — PROGRESS NOTES
Alesha Toney  # [id number]  [id number] (ALCHEMIST)  12/20/2018    INFORMED CONSENT REVIEW    Patient was seen in clinic this morning by Dr Dhaliwal to receive Cycle 3 of adjuvant chemo. Dr Dhaliwal introduced ALCHEMIST trial to patient. I met with the patient today at Dr. Dhaliwal's request in the chemo infusion suite to discuss the trial further. Patient is accompanied by a close friend. She is in good spirits and is interested in clinical trial review.      Informed Consent was reviewed in detail with patient and friend including: purpose; What is the usual approach to my cancer; procedures, including the chart format of trial; What are the possible treatment studies she could be offered based on her genetic test results; extra tests and procedures needed if taking part in the screening study; length of study; risks; potential benefit; costs; payment for participation and/or reimbursement of expenses; alternative methods and treatments; study related questions and compensation for injury; questions about your rights; voluntary participation and withdrawal from the research; confidentiality/privacy; HIPAA section.     Patient and her friend asked multiple questions throughout the consenting process and were also given an opportunity to ask question once consent was completed. All questions were answered to their satisfaction. Pt would like to review the consent w/ her partner prior to signing. Pt stated she would call me if she is interested. Pt was given a copy of consent, along w/ my contact information. I encouraged pt to call or email me  Should any questions arise.     Patient also wanted to know more information regarding the potential tx trial she may fall into ([id number]). There was a brief review of this trial as well. She requested a copy of this consent, and this was provided to her. She had several concerns regarding co-pays and deductibles needing to paid out of pocket while on the trial. Pt stated this might be  an issue for her if she is randomized into the treatment group, she would need time to think about it.    I have updated Dr Dhaliwal and will wait to her back from the pt. .

## 2018-12-20 NOTE — Clinical Note
Schedule V/Q scan ASAP, 2 D ECHO ASAPSchedule CBC, CMP, mag and phos and see me and for Cisplatin and Alimta. IV fluids and Zofran on day 2. Neulasta on day 2

## 2018-12-20 NOTE — TELEPHONE ENCOUNTER
----- Message from Pam Dhaliwal MD sent at 12/20/2018  9:50 AM CST -----  Schedule V/Q scan ASAP, 2 D ECHO ASAP  Schedule CBC, CMP, mag and phos and see me and for Cisplatin and Alimta. IV fluids and Zofran on day 2. Neulasta on day 2

## 2018-12-20 NOTE — PLAN OF CARE
Problem: Nausea and Vomiting (Chemotherapy Effects)  Goal: Fluid and Electrolyte Balance  Outcome: Ongoing (interventions implemented as appropriate)  Patient here for Alimta/cisplatin.  Assessment completed and labs reviewed.  VSS.  No needs at this time.  Chair reclined and blanket offered.  Will continue to monitor.

## 2018-12-21 ENCOUNTER — PATIENT MESSAGE (OUTPATIENT)
Dept: HEMATOLOGY/ONCOLOGY | Facility: CLINIC | Age: 68
End: 2018-12-21

## 2018-12-21 ENCOUNTER — INFUSION (OUTPATIENT)
Dept: INFUSION THERAPY | Facility: HOSPITAL | Age: 68
End: 2018-12-21
Attending: INTERNAL MEDICINE
Payer: COMMERCIAL

## 2018-12-21 VITALS
RESPIRATION RATE: 18 BRPM | WEIGHT: 166 LBS | HEIGHT: 66 IN | SYSTOLIC BLOOD PRESSURE: 180 MMHG | HEART RATE: 64 BPM | BODY MASS INDEX: 26.68 KG/M2 | TEMPERATURE: 98 F | DIASTOLIC BLOOD PRESSURE: 80 MMHG

## 2018-12-21 DIAGNOSIS — C34.90 NON-SMALL CELL LUNG CANCER, UNSPECIFIED LATERALITY: Primary | ICD-10-CM

## 2018-12-21 PROCEDURE — 96361 HYDRATE IV INFUSION ADD-ON: CPT

## 2018-12-21 PROCEDURE — 63600175 PHARM REV CODE 636 W HCPCS: Performed by: INTERNAL MEDICINE

## 2018-12-21 PROCEDURE — A4216 STERILE WATER/SALINE, 10 ML: HCPCS | Performed by: INTERNAL MEDICINE

## 2018-12-21 PROCEDURE — 96367 TX/PROPH/DG ADDL SEQ IV INF: CPT

## 2018-12-21 PROCEDURE — 25000003 PHARM REV CODE 250: Performed by: INTERNAL MEDICINE

## 2018-12-21 PROCEDURE — 96372 THER/PROPH/DIAG INJ SC/IM: CPT

## 2018-12-21 RX ORDER — HEPARIN 100 UNIT/ML
500 SYRINGE INTRAVENOUS
Status: DISCONTINUED | OUTPATIENT
Start: 2018-12-21 | End: 2018-12-21 | Stop reason: HOSPADM

## 2018-12-21 RX ORDER — ONDANSETRON HCL IN 0.9 % NACL 8 MG/50 ML
8 INTRAVENOUS SOLUTION, PIGGYBACK (ML) INTRAVENOUS
Status: COMPLETED | OUTPATIENT
Start: 2018-12-21 | End: 2018-12-21

## 2018-12-21 RX ORDER — SODIUM CHLORIDE 0.9 % (FLUSH) 0.9 %
10 SYRINGE (ML) INJECTION
Status: DISCONTINUED | OUTPATIENT
Start: 2018-12-21 | End: 2018-12-21 | Stop reason: HOSPADM

## 2018-12-21 RX ADMIN — ONDANSETRON 8 MG: 2 INJECTION, SOLUTION INTRAMUSCULAR; INTRAVENOUS at 02:12

## 2018-12-21 RX ADMIN — Medication 10 ML: at 03:12

## 2018-12-21 RX ADMIN — HEPARIN 500 UNITS: 100 SYRINGE at 03:12

## 2018-12-21 RX ADMIN — SODIUM CHLORIDE: 0.9 INJECTION, SOLUTION INTRAVENOUS at 02:12

## 2018-12-21 RX ADMIN — PEGFILGRASTIM 6 MG: 6 INJECTION SUBCUTANEOUS at 03:12

## 2018-12-21 NOTE — PLAN OF CARE
Problem: Adult Inpatient Plan of Care  Goal: Plan of Care Review  Outcome: Ongoing (interventions implemented as appropriate)  Pt tolerated IVF and Neulasta injection without issue, pt to rtc 1/10/18, no distress noted upon d/c to home Dr Dhaliwal did see pt at bedside

## 2018-12-21 NOTE — NURSING
1400 pt here for D2 ivf, zofran, neulasta , hx, meds, allergies reviewed, pt c/o feeling tired and weak, concerned with what appears to be broken blood vessels in her hands, reported to [facility] nurse per pt request, continue to monitor

## 2018-12-26 ENCOUNTER — TELEPHONE (OUTPATIENT)
Dept: CARDIOLOGY | Facility: CLINIC | Age: 68
End: 2018-12-26

## 2018-12-26 ENCOUNTER — HOSPITAL ENCOUNTER (OUTPATIENT)
Dept: CARDIOLOGY | Facility: CLINIC | Age: 68
Discharge: HOME | End: 2018-12-26
Attending: INTERNAL MEDICINE
Payer: COMMERCIAL

## 2018-12-26 VITALS
DIASTOLIC BLOOD PRESSURE: 72 MMHG | HEART RATE: 75 BPM | SYSTOLIC BLOOD PRESSURE: 166 MMHG | WEIGHT: 166 LBS | HEIGHT: 66 IN | BODY MASS INDEX: 26.68 KG/M2

## 2018-12-26 DIAGNOSIS — I25.10 CORONARY ARTERY DISEASE DUE TO CALCIFIED CORONARY LESION: Primary | ICD-10-CM

## 2018-12-26 DIAGNOSIS — I25.84 CORONARY ARTERY DISEASE DUE TO CALCIFIED CORONARY LESION: Primary | ICD-10-CM

## 2018-12-26 DIAGNOSIS — C34.90 MALIGNANT NEOPLASM OF LUNG, UNSPECIFIED LATERALITY, UNSPECIFIED PART OF LUNG: ICD-10-CM

## 2018-12-26 LAB
ASCENDING AORTA: 2.92 CM
BSA FOR ECHO PROCEDURE: 1.87 M2
CV ECHO LV RWT: 0.31 CM
DOP CALC LVOT AREA: 2.8 CM2
DOP CALC LVOT DIAMETER: 1.89 CM
DOP CALC LVOT STROKE VOLUME: 80.79 CM3
DOP CALCLVOT PEAK VEL VTI: 28.81 CM
E WAVE DECELERATION TIME: 183.55 MSEC
E/A RATIO: 0.87
E/E' RATIO: 10.27
ECHO LV POSTERIOR WALL: 0.68 CM (ref 0.6–1.1)
FRACTIONAL SHORTENING: 41 % (ref 28–44)
INTERVENTRICULAR SEPTUM: 0.81 CM (ref 0.6–1.1)
IVRT: 0.11 MSEC
LA MAJOR: 5.02 CM
LA WIDTH: 2.91 CM
LEFT ATRIUM SIZE: 3.56 CM
LEFT INTERNAL DIMENSION IN SYSTOLE: 2.58 CM (ref 2.1–4)
LEFT VENTRICLE DIASTOLIC VOLUME INDEX: 46.82 ML/M2
LEFT VENTRICLE DIASTOLIC VOLUME: 86.52 ML
LEFT VENTRICLE MASS INDEX: 53.4 G/M2
LEFT VENTRICLE SYSTOLIC VOLUME INDEX: 13.1 ML/M2
LEFT VENTRICLE SYSTOLIC VOLUME: 24.19 ML
LEFT VENTRICULAR INTERNAL DIMENSION IN DIASTOLE: 4.37 CM (ref 3.5–6)
LEFT VENTRICULAR MASS: 98.59 G
LV LATERAL E/E' RATIO: 9.63
LV SEPTAL E/E' RATIO: 11
MV PEAK A VEL: 0.89 M/S
MV PEAK E VEL: 0.77 M/S
PISA TR MAX VEL: 3.01 M/S
PULM VEIN S/D RATIO: 1.52
PV PEAK D VEL: 0.54 M/S
PV PEAK S VEL: 0.82 M/S
RA MAJOR: 4.59 CM
RA PRESSURE: 3 MMHG
RA WIDTH: 2.79 CM
RIGHT VENTRICULAR END-DIASTOLIC DIMENSION: 3.4 CM
RV TISSUE DOPPLER FREE WALL SYSTOLIC VELOCITY 1 (APICAL 4 CHAMBER VIEW): 14.24 M/S
SINUS: 3.21 CM
STJ: 2.68 CM
TDI LATERAL: 0.08
TDI SEPTAL: 0.07
TDI: 0.08
TR MAX PG: 36.24 MMHG
TRICUSPID ANNULAR PLANE SYSTOLIC EXCURSION: 2.02 CM
TV REST PULMONARY ARTERY PRESSURE: 39 MMHG

## 2018-12-26 PROCEDURE — 93306 TTE W/DOPPLER COMPLETE: CPT | Mod: S$GLB,,, | Performed by: INTERNAL MEDICINE

## 2018-12-26 RX ORDER — ROSUVASTATIN CALCIUM 10 MG/1
10 TABLET, COATED ORAL DAILY
Qty: 90 TABLET | Refills: 3 | Status: SHIPPED | OUTPATIENT
Start: 2018-12-26 | End: 2019-05-29

## 2018-12-26 NOTE — TELEPHONE ENCOUNTER
Please tell patient that I was reading her echocardiogram ordered by another physician.  The echocardiogram showed normal heart function.  However I noted that she also had a CT scan of the chest ordered by another physician some time ago. It showed some hardening of the arteries within the heart.  Therefore, even though her cholesterol numbers are not elevated, I think she should take a statin.  I sent a prescription for 10 mg of Crestor

## 2018-12-28 ENCOUNTER — PATIENT MESSAGE (OUTPATIENT)
Dept: HEMATOLOGY/ONCOLOGY | Facility: CLINIC | Age: 68
End: 2018-12-28

## 2019-01-03 ENCOUNTER — PATIENT MESSAGE (OUTPATIENT)
Dept: HEMATOLOGY/ONCOLOGY | Facility: CLINIC | Age: 69
End: 2019-01-03

## 2019-01-09 ENCOUNTER — PATIENT MESSAGE (OUTPATIENT)
Dept: HEMATOLOGY/ONCOLOGY | Facility: CLINIC | Age: 69
End: 2019-01-09

## 2019-01-09 ENCOUNTER — LAB VISIT (OUTPATIENT)
Dept: LAB | Facility: HOSPITAL | Age: 69
End: 2019-01-09
Attending: INTERNAL MEDICINE
Payer: COMMERCIAL

## 2019-01-09 DIAGNOSIS — C34.11 MALIGNANT NEOPLASM OF UPPER LOBE OF RIGHT LUNG: ICD-10-CM

## 2019-01-09 DIAGNOSIS — G89.3 NEOPLASM RELATED PAIN: ICD-10-CM

## 2019-01-09 LAB
ALBUMIN SERPL BCP-MCNC: 3.7 G/DL
ALP SERPL-CCNC: 84 U/L
ALT SERPL W/O P-5'-P-CCNC: 17 U/L
ANION GAP SERPL CALC-SCNC: 8 MMOL/L
AST SERPL-CCNC: 20 U/L
BILIRUB SERPL-MCNC: 0.2 MG/DL
BUN SERPL-MCNC: 16 MG/DL
CALCIUM SERPL-MCNC: 10.2 MG/DL
CHLORIDE SERPL-SCNC: 105 MMOL/L
CO2 SERPL-SCNC: 28 MMOL/L
CREAT SERPL-MCNC: 0.9 MG/DL
ERYTHROCYTE [DISTWIDTH] IN BLOOD BY AUTOMATED COUNT: 15.3 %
EST. GFR  (AFRICAN AMERICAN): >60 ML/MIN/1.73 M^2
EST. GFR  (NON AFRICAN AMERICAN): >60 ML/MIN/1.73 M^2
GLUCOSE SERPL-MCNC: 105 MG/DL
HCT VFR BLD AUTO: 36 %
HGB BLD-MCNC: 11.5 G/DL
MAGNESIUM SERPL-MCNC: 2.1 MG/DL
MCH RBC QN AUTO: 30.8 PG
MCHC RBC AUTO-ENTMCNC: 31.9 G/DL
MCV RBC AUTO: 97 FL
NEUTROPHILS # BLD AUTO: 5.6 K/UL
PHOSPHATE SERPL-MCNC: 3.3 MG/DL
PLATELET # BLD AUTO: 287 K/UL
PMV BLD AUTO: 9.9 FL
POTASSIUM SERPL-SCNC: 4.8 MMOL/L
PROT SERPL-MCNC: 6.9 G/DL
RBC # BLD AUTO: 3.73 M/UL
SODIUM SERPL-SCNC: 141 MMOL/L
WBC # BLD AUTO: 8.69 K/UL

## 2019-01-09 PROCEDURE — 84100 ASSAY OF PHOSPHORUS: CPT

## 2019-01-09 PROCEDURE — 80053 COMPREHEN METABOLIC PANEL: CPT

## 2019-01-09 PROCEDURE — 36415 COLL VENOUS BLD VENIPUNCTURE: CPT

## 2019-01-09 PROCEDURE — 85027 COMPLETE CBC AUTOMATED: CPT

## 2019-01-09 PROCEDURE — 83735 ASSAY OF MAGNESIUM: CPT

## 2019-01-09 RX ORDER — HYDROCODONE BITARTRATE AND ACETAMINOPHEN 5; 325 MG/1; MG/1
1 TABLET ORAL EVERY 4 HOURS PRN
Qty: 90 TABLET | Refills: 0 | Status: SHIPPED | OUTPATIENT
Start: 2019-01-09 | End: 2019-05-23 | Stop reason: ALTCHOICE

## 2019-01-10 ENCOUNTER — RESEARCH ENCOUNTER (OUTPATIENT)
Dept: HEMATOLOGY/ONCOLOGY | Facility: CLINIC | Age: 69
End: 2019-01-10

## 2019-01-10 ENCOUNTER — OFFICE VISIT (OUTPATIENT)
Dept: HEMATOLOGY/ONCOLOGY | Facility: CLINIC | Age: 69
End: 2019-01-10
Payer: COMMERCIAL

## 2019-01-10 ENCOUNTER — INFUSION (OUTPATIENT)
Dept: INFUSION THERAPY | Facility: HOSPITAL | Age: 69
End: 2019-01-10
Attending: INTERNAL MEDICINE
Payer: COMMERCIAL

## 2019-01-10 VITALS
HEART RATE: 83 BPM | BODY MASS INDEX: 26.08 KG/M2 | TEMPERATURE: 99 F | OXYGEN SATURATION: 100 % | SYSTOLIC BLOOD PRESSURE: 190 MMHG | HEIGHT: 66 IN | DIASTOLIC BLOOD PRESSURE: 84 MMHG | WEIGHT: 162.25 LBS

## 2019-01-10 VITALS
DIASTOLIC BLOOD PRESSURE: 72 MMHG | TEMPERATURE: 99 F | SYSTOLIC BLOOD PRESSURE: 159 MMHG | RESPIRATION RATE: 18 BRPM | HEART RATE: 74 BPM

## 2019-01-10 DIAGNOSIS — C34.11 MALIGNANT NEOPLASM OF UPPER LOBE OF RIGHT LUNG: Primary | ICD-10-CM

## 2019-01-10 DIAGNOSIS — C34.90 NON-SMALL CELL LUNG CANCER, UNSPECIFIED LATERALITY: Primary | ICD-10-CM

## 2019-01-10 DIAGNOSIS — H57.11 EYE PAIN, RIGHT: ICD-10-CM

## 2019-01-10 PROCEDURE — 96367 TX/PROPH/DG ADDL SEQ IV INF: CPT

## 2019-01-10 PROCEDURE — 1101F PR PT FALLS ASSESS DOC 0-1 FALLS W/OUT INJ PAST YR: ICD-10-PCS | Mod: CPTII,S$GLB,, | Performed by: INTERNAL MEDICINE

## 2019-01-10 PROCEDURE — 3077F SYST BP >= 140 MM HG: CPT | Mod: CPTII,S$GLB,, | Performed by: INTERNAL MEDICINE

## 2019-01-10 PROCEDURE — 96366 THER/PROPH/DIAG IV INF ADDON: CPT

## 2019-01-10 PROCEDURE — 96411 CHEMO IV PUSH ADDL DRUG: CPT

## 2019-01-10 PROCEDURE — 96413 CHEMO IV INFUSION 1 HR: CPT

## 2019-01-10 PROCEDURE — 1101F PT FALLS ASSESS-DOCD LE1/YR: CPT | Mod: CPTII,S$GLB,, | Performed by: INTERNAL MEDICINE

## 2019-01-10 PROCEDURE — 99999 PR PBB SHADOW E&M-EST. PATIENT-LVL V: CPT | Mod: PBBFAC,,, | Performed by: INTERNAL MEDICINE

## 2019-01-10 PROCEDURE — 99215 OFFICE O/P EST HI 40 MIN: CPT | Mod: S$GLB,,, | Performed by: INTERNAL MEDICINE

## 2019-01-10 PROCEDURE — 3079F PR MOST RECENT DIASTOLIC BLOOD PRESSURE 80-89 MM HG: ICD-10-PCS | Mod: CPTII,S$GLB,, | Performed by: INTERNAL MEDICINE

## 2019-01-10 PROCEDURE — 25000003 PHARM REV CODE 250: Performed by: INTERNAL MEDICINE

## 2019-01-10 PROCEDURE — 3079F DIAST BP 80-89 MM HG: CPT | Mod: CPTII,S$GLB,, | Performed by: INTERNAL MEDICINE

## 2019-01-10 PROCEDURE — 3077F PR MOST RECENT SYSTOLIC BLOOD PRESSURE >= 140 MM HG: ICD-10-PCS | Mod: CPTII,S$GLB,, | Performed by: INTERNAL MEDICINE

## 2019-01-10 PROCEDURE — 99999 PR PBB SHADOW E&M-EST. PATIENT-LVL V: ICD-10-PCS | Mod: PBBFAC,,, | Performed by: INTERNAL MEDICINE

## 2019-01-10 PROCEDURE — 99215 PR OFFICE/OUTPT VISIT, EST, LEVL V, 40-54 MIN: ICD-10-PCS | Mod: S$GLB,,, | Performed by: INTERNAL MEDICINE

## 2019-01-10 PROCEDURE — 63600175 PHARM REV CODE 636 W HCPCS: Performed by: INTERNAL MEDICINE

## 2019-01-10 RX ORDER — SODIUM CHLORIDE 0.9 % (FLUSH) 0.9 %
10 SYRINGE (ML) INJECTION
Status: DISCONTINUED | OUTPATIENT
Start: 2019-01-10 | End: 2019-01-10 | Stop reason: HOSPADM

## 2019-01-10 RX ORDER — HEPARIN 100 UNIT/ML
500 SYRINGE INTRAVENOUS
Status: DISCONTINUED | OUTPATIENT
Start: 2019-01-10 | End: 2019-01-10 | Stop reason: HOSPADM

## 2019-01-10 RX ORDER — SODIUM CHLORIDE 0.9 % (FLUSH) 0.9 %
10 SYRINGE (ML) INJECTION
Status: CANCELLED | OUTPATIENT
Start: 2019-01-11

## 2019-01-10 RX ORDER — FUROSEMIDE 20 MG/1
20 TABLET ORAL
Status: COMPLETED | OUTPATIENT
Start: 2019-01-10 | End: 2019-01-10

## 2019-01-10 RX ORDER — HEPARIN 100 UNIT/ML
500 SYRINGE INTRAVENOUS
Status: CANCELLED | OUTPATIENT
Start: 2019-01-10

## 2019-01-10 RX ORDER — FUROSEMIDE 20 MG/1
20 TABLET ORAL
Status: CANCELLED
Start: 2019-01-11

## 2019-01-10 RX ORDER — HEPARIN 100 UNIT/ML
500 SYRINGE INTRAVENOUS
Status: CANCELLED | OUTPATIENT
Start: 2019-01-11

## 2019-01-10 RX ORDER — SODIUM CHLORIDE 0.9 % (FLUSH) 0.9 %
10 SYRINGE (ML) INJECTION
Status: CANCELLED | OUTPATIENT
Start: 2019-01-10

## 2019-01-10 RX ORDER — FUROSEMIDE 20 MG/1
20 TABLET ORAL
Status: CANCELLED
Start: 2019-01-10

## 2019-01-10 RX ADMIN — MAGNESIUM SULFATE: 500 INJECTION, SOLUTION INTRAMUSCULAR; INTRAVENOUS at 10:01

## 2019-01-10 RX ADMIN — SODIUM CHLORIDE 925 MG: 9 INJECTION, SOLUTION INTRAVENOUS at 02:01

## 2019-01-10 RX ADMIN — DEXAMETHASONE SODIUM PHOSPHATE 10 MG: 4 INJECTION, SOLUTION INTRA-ARTICULAR; INTRALESIONAL; INTRAMUSCULAR; INTRAVENOUS; SOFT TISSUE at 12:01

## 2019-01-10 RX ADMIN — PALONOSETRON 0.25 MG: 0.25 INJECTION, SOLUTION INTRAVENOUS at 12:01

## 2019-01-10 RX ADMIN — CISPLATIN 140 MG: 1 INJECTION INTRAVENOUS at 02:01

## 2019-01-10 RX ADMIN — FUROSEMIDE 20 MG: 20 TABLET ORAL at 05:01

## 2019-01-10 RX ADMIN — SODIUM CHLORIDE 150 MG: 0.9 INJECTION, SOLUTION INTRAVENOUS at 01:01

## 2019-01-10 RX ADMIN — HEPARIN SODIUM (PORCINE) LOCK FLUSH IV SOLN 100 UNIT/ML 500 UNITS: 100 SOLUTION at 05:01

## 2019-01-10 RX ADMIN — MAGNESIUM SULFATE: 500 INJECTION, SOLUTION INTRAMUSCULAR; INTRAVENOUS at 03:01

## 2019-01-10 NOTE — PROGRESS NOTES
"Alesha Toney  # [id number]      Study: "Use of a Clinical Trial Screening Tool to Address Cancer Health Disparities in the [facility])  Protocol #: DCP-001   IRB# 2017.201.N     01/10/2019     Informed consent note      Patient was seen in chemo infusion this afternoon to review IC for trial. Pt signed IC today and will be screened for the [id number] trial. I discussed DCP-001 trial with patient as well.      The consent was discussed in full including: The Usual Approach to Data Collection; Purpose; Length of Study and Number of Participants; Procedure; Risks; Potential Benefits; Costs; Payment for Participation and/or Reimbursement; Alternative Methods/Treatments; Study Related Questions and Compensation for Injury; Questions about your Rights; Voluntary Participation and Withdrawal from the Research; Confidentiality/Privacy; HIPAA Authorization to Release Information for Research.      She agreed to sign consent and participate in trial. I reviewed the [facility] Clinical Trial Screening Adult CRF: DCP-001 tool with the patient that the study is requesting. She is aware that the data collection will take place each time that she is screened for a NCI clinical trial. Patient was given the opportunity to ask questions and verbalized understanding of all information. She understands that her participation is strictly voluntary.  Patient given a copy of signed consent along with my contact information. I encouraged her to call if she thinks of any questions.      Eligibility Note     Patient deemed eligible for DCP-001 since she is being screened for [id number].      "

## 2019-01-10 NOTE — PROGRESS NOTES
Alesha Toney  # [id number]  [id number] (ALCHEMIST)  01/10/2019      INFORMED CONSENT     Patient returned to clinic today for Cycle 4 of her chemo. She contacted me while she was in the chemo infusion suite and asked me to come talk to her about the trial b/c she is ready to sign the consent. Pt was accompanied by her partner. Both have had time to read IC and I briefly reviewed the main procedures of the screening process. Both pt and her partner had the opportunity to ask questions and neither had any questions. Pt would like to proceed w/ signing the IC and getting the screening process started.      Informed Consent was signed by the pt and witnessed by myself. Pt was given a copy of the signed IC.      Pt understands I will now request tumor specimen from pathology to send off for testing. Pt has f/u appt w/ Dr Dhaliwal in 4 weeks. I instructed pt that we should have all the results from the testing back by that time and I will follow-up with her at that appt. I have encouraged pt to contact me should she have any questions/concerns in the interim. Pt verbalized understanding.     I will update Dr Dhaliwal and request tumor specimen.

## 2019-01-10 NOTE — LETTER
January 10, 2019    Alesha Toney  [address]  [place] [address]             [facility]  [address]  [place] [address]  Phone: [phone] To Whom It May Concern:    Lata Mathews was under [place] for her wife's surgery from 8/9/18-8/19/18. She returned to work on 8/20/18.      Sincerely,          Pam Dhaliwal MD

## 2019-01-10 NOTE — PROGRESS NOTES
"Subjective:       Patient ID: Alesha Toney is a 68 y.o. female.    Chief Complaint: Malignant neoplasm of upper lobe of right lung  Ms. Alesha Toney is a 67-year-old otherwise healthy female who started having some shoulder pain, which was lasting for over six weeks.  She went and saw her primary care physician and underwent an x-ray, which revealed right upper lobe lung mass worrisome for malignancy and a CT scan was recommended, which was done on 6/12/18 and that revealed a newly developing mass, pleural based, lateral posterior segment, right upper lobe 3.5 x 3.5 cm, surrounding stellate margin and patchy infiltrates, particularly superiorly,   anteriorly infiltrates extend perihilar into the anterior segment.  She then underwent CT-guided biopsy, which revealed well-differentiated adenocarcinoma.       Her PET scan from 6/29/18 There is physiologic intracranial, head, and neck activity.  There is a large right upper lobe mass SUV max 9.11.  No local or distant metastatic disease seen.  There is physiologic liver, spleen, GI and  activity.  There are a few liver cysts.  No adenopathy is seen.  The adrenal glands are not enlarged.  No adenopathy is seen.  No suspicious bone lesions are seen.     She underwent VATS with right upper lobectomy. Mediastinal lymphadenectomy on 8/9/18. Pathology revealed "Tumor site: Upper lobe.Tumor size: 7 x 4 x 2.7 cm.Tumor focality: Single tumor.  Histologic type: Mixed invasive mucinous and nonmucinous adenocarcinoma. Histologic grade: G2: Moderately differentiated.Spread through air spaces (STATS): Present. Visceral pleura invasion: Not identified.  Lymphovascular invasion: Not identified. Direct invasion of adjacent structures: No adjacent structures present. Margins: All margins are uninvolved by carcinoma.Distance of invasive carcinoma from closest margin: 1 cm from the bronchial margin.Treatment effect: No known presurgical therapy. Regional lymph nodes: Number of " "lymph nodes involved: 0. Number of lymph nodes examined: 11. Pathologic Stage Classification: pT3 N0"        HPI She comes in today for cycle 4 of adjuvant chemo with Cisplatin and Alimta  She notes right eye watering and blurred vision X 3 weeks and headache since this morning. Denies ey discharge, no redness, no itching, hurts when she moves her eye towards her nose/medial  On anabella Off nausea, takes Zofran for that  She denies any nausea, constipation, abdominal pain, weight loss or loss of appetite, chest pain, shortness of breath, leg swelling, fatigue, pain, headache, dizziness, or mood changes. Her ECOG PS is zero She is accompanied by her significant other          Review of Systems   Constitutional: Negative for appetite change, fatigue and unexpected weight change.   HENT: Negative for mouth sores.    Eyes: Positive for pain. Negative for visual disturbance.   Respiratory: Negative for cough and shortness of breath.    Cardiovascular: Negative for chest pain.   Gastrointestinal: Negative for abdominal pain and diarrhea.   Genitourinary: Negative for frequency.   Musculoskeletal: Negative for back pain.   Skin: Negative for rash.   Neurological: Negative for headaches.   Hematological: Negative for adenopathy.   Psychiatric/Behavioral: The patient is not nervous/anxious.    All other systems reviewed and are negative.      LABS:  WBC   Date Value Ref Range Status   01/09/2019 8.69 3.90 - 12.70 K/uL Final     Hemoglobin   Date Value Ref Range Status   01/09/2019 11.5 (L) 12.0 - 16.0 g/dL Final     POC Hematocrit   Date Value Ref Range Status   08/09/2018 33 (L) 36 - 54 %PCV Final     Hematocrit   Date Value Ref Range Status   01/09/2019 36.0 (L) 37.0 - 48.5 % Final     Platelets   Date Value Ref Range Status   01/09/2019 287 150 - 350 K/uL Final     Gran # (ANC)   Date Value Ref Range Status   01/09/2019 5.6 1.8 - 7.7 K/uL Final     Comment:     The ANC is based on a white cell differential from an   automated " cell counter. It has not been microscopically   reviewed for the presence of abnormal cells. Clinical   correlation is required.         Chemistry        Component Value Date/Time     01/09/2019 1115    K 4.8 01/09/2019 1115     01/09/2019 1115    CO2 28 01/09/2019 1115    BUN 16 01/09/2019 1115    CREATININE 0.9 01/09/2019 1115     01/09/2019 1115        Component Value Date/Time    CALCIUM 10.2 01/09/2019 1115    ALKPHOS 84 01/09/2019 1115    AST 20 01/09/2019 1115    ALT 17 01/09/2019 1115    BILITOT 0.2 01/09/2019 1115    ESTGFRAFRICA >60 01/09/2019 1115    EGFRNONAA >60 01/09/2019 1115      ,  Objective:      Physical Exam   Constitutional: She is oriented to person, place, and time. She appears well-developed and well-nourished.   HENT:   Mouth/Throat: No oropharyngeal exudate.   Cardiovascular: Normal rate and normal heart sounds.   Pulmonary/Chest: Effort normal and breath sounds normal. She has no wheezes.   Abdominal: Soft. Bowel sounds are normal. There is no tenderness.   Musculoskeletal: She exhibits no edema or tenderness.   Lymphadenopathy:     She has no cervical adenopathy.   Neurological: She is alert and oriented to person, place, and time. Coordination normal.   Skin: Skin is warm and dry. No rash noted.   Psychiatric: She has a normal mood and affect. Judgment and thought content normal.   Vitals reviewed.      LABS:  WBC   Date Value Ref Range Status   01/09/2019 8.69 3.90 - 12.70 K/uL Final     Hemoglobin   Date Value Ref Range Status   01/09/2019 11.5 (L) 12.0 - 16.0 g/dL Final     POC Hematocrit   Date Value Ref Range Status   08/09/2018 33 (L) 36 - 54 %PCV Final     Hematocrit   Date Value Ref Range Status   01/09/2019 36.0 (L) 37.0 - 48.5 % Final     Platelets   Date Value Ref Range Status   01/09/2019 287 150 - 350 K/uL Final     Gran # (ANC)   Date Value Ref Range Status   01/09/2019 5.6 1.8 - 7.7 K/uL Final     Comment:     The ANC is based on a white cell  differential from an   automated cell counter. It has not been microscopically   reviewed for the presence of abnormal cells. Clinical   correlation is required.         Chemistry        Component Value Date/Time     01/09/2019 1115    K 4.8 01/09/2019 1115     01/09/2019 1115    CO2 28 01/09/2019 1115    BUN 16 01/09/2019 1115    CREATININE 0.9 01/09/2019 1115     01/09/2019 1115        Component Value Date/Time    CALCIUM 10.2 01/09/2019 1115    ALKPHOS 84 01/09/2019 1115    AST 20 01/09/2019 1115    ALT 17 01/09/2019 1115    BILITOT 0.2 01/09/2019 1115    ESTGFRAFRICA >60 01/09/2019 1115    EGFRNONAA >60 01/09/2019 1115           Assessment:       1. Malignant neoplasm of upper lobe of right lung    2. Eye pain, right        Plan:        1. She will proceed with cycle 4 of chemo with Cisplatin and Alimta and will return in 4 weeks with labs and CT scans. She notes fatigue which starts 2 weeks after chemo and lasts 4 weeks after chemo  She will call Research coordinator today about ALCHEMIST trial  2. Refer to Ophtho  Above care plan was discussed with patient and accompanying significant other and all questions were addressed to their satisfaction

## 2019-01-11 ENCOUNTER — INFUSION (OUTPATIENT)
Dept: INFUSION THERAPY | Facility: HOSPITAL | Age: 69
End: 2019-01-11
Attending: INTERNAL MEDICINE
Payer: COMMERCIAL

## 2019-01-11 ENCOUNTER — PATIENT MESSAGE (OUTPATIENT)
Dept: HEMATOLOGY/ONCOLOGY | Facility: CLINIC | Age: 69
End: 2019-01-11

## 2019-01-11 ENCOUNTER — TELEPHONE (OUTPATIENT)
Dept: HEMATOLOGY/ONCOLOGY | Facility: CLINIC | Age: 69
End: 2019-01-11

## 2019-01-11 VITALS
WEIGHT: 162 LBS | RESPIRATION RATE: 18 BRPM | HEART RATE: 68 BPM | SYSTOLIC BLOOD PRESSURE: 176 MMHG | BODY MASS INDEX: 26.03 KG/M2 | DIASTOLIC BLOOD PRESSURE: 78 MMHG | TEMPERATURE: 98 F | HEIGHT: 66 IN

## 2019-01-11 DIAGNOSIS — C34.90 NON-SMALL CELL LUNG CANCER, UNSPECIFIED LATERALITY: Primary | ICD-10-CM

## 2019-01-11 PROCEDURE — 63600175 PHARM REV CODE 636 W HCPCS: Performed by: INTERNAL MEDICINE

## 2019-01-11 PROCEDURE — 96361 HYDRATE IV INFUSION ADD-ON: CPT

## 2019-01-11 PROCEDURE — 96372 THER/PROPH/DIAG INJ SC/IM: CPT

## 2019-01-11 PROCEDURE — 96375 TX/PRO/DX INJ NEW DRUG ADDON: CPT

## 2019-01-11 PROCEDURE — 25000003 PHARM REV CODE 250: Performed by: INTERNAL MEDICINE

## 2019-01-11 PROCEDURE — A4216 STERILE WATER/SALINE, 10 ML: HCPCS | Performed by: INTERNAL MEDICINE

## 2019-01-11 RX ORDER — HEPARIN 100 UNIT/ML
500 SYRINGE INTRAVENOUS
Status: DISCONTINUED | OUTPATIENT
Start: 2019-01-11 | End: 2019-01-11 | Stop reason: HOSPADM

## 2019-01-11 RX ORDER — FUROSEMIDE 20 MG/1
20 TABLET ORAL
Status: DISCONTINUED | OUTPATIENT
Start: 2019-01-11 | End: 2019-01-11 | Stop reason: HOSPADM

## 2019-01-11 RX ORDER — ONDANSETRON HCL IN 0.9 % NACL 8 MG/50 ML
8 INTRAVENOUS SOLUTION, PIGGYBACK (ML) INTRAVENOUS
Status: DISCONTINUED | OUTPATIENT
Start: 2019-01-11 | End: 2019-01-11 | Stop reason: HOSPADM

## 2019-01-11 RX ORDER — SODIUM CHLORIDE 0.9 % (FLUSH) 0.9 %
10 SYRINGE (ML) INJECTION
Status: DISCONTINUED | OUTPATIENT
Start: 2019-01-11 | End: 2019-01-11 | Stop reason: HOSPADM

## 2019-01-11 RX ORDER — ONDANSETRON 2 MG/ML
4 INJECTION INTRAMUSCULAR; INTRAVENOUS
Status: COMPLETED | OUTPATIENT
Start: 2019-01-11 | End: 2019-01-11

## 2019-01-11 RX ADMIN — Medication 10 ML: at 02:01

## 2019-01-11 RX ADMIN — SODIUM CHLORIDE: 0.9 INJECTION, SOLUTION INTRAVENOUS at 01:01

## 2019-01-11 RX ADMIN — ONDANSETRON 4 MG: 2 INJECTION INTRAMUSCULAR; INTRAVENOUS at 02:01

## 2019-01-11 RX ADMIN — HEPARIN SODIUM (PORCINE) LOCK FLUSH IV SOLN 100 UNIT/ML 500 UNITS: 100 SOLUTION at 02:01

## 2019-01-11 RX ADMIN — PEGFILGRASTIM 6 MG: 6 INJECTION SUBCUTANEOUS at 02:01

## 2019-01-11 NOTE — PLAN OF CARE
Problem: Adult Inpatient Plan of Care  Goal: Plan of Care Review  Outcome: Outcome(s) achieved Date Met: 01/11/19  Pt tolerated D2 fluids without issue, pt rang bell with friends by her side, no distress noted upon d/c to home

## 2019-01-11 NOTE — PLAN OF CARE
Problem: Adult Inpatient Plan of Care  Goal: Plan of Care Review  Outcome: Ongoing (interventions implemented as appropriate)  Pt here for ivf, zofran and neulasta injection, hx, meds, allergies reviewd, pt with no complaints just feel tired, pt will ring bell today, reclined in chair, continue to monitor

## 2019-01-11 NOTE — PLAN OF CARE
Problem: Adult Inpatient Plan of Care  Goal: Plan of Care Review  Outcome: Ongoing (interventions implemented as appropriate)  1740:  Patient tolerated treatment well, VSS, NAD noted, denies any new issues.  Portacath flushed per protocol and de-accessed at her request.  Will return for IVF's tomorrow.  AVS declined.  Released in stable condition, ambulated off unit, accompanied by a friend.

## 2019-01-16 ENCOUNTER — TELEPHONE (OUTPATIENT)
Dept: OPTOMETRY | Facility: CLINIC | Age: 69
End: 2019-01-16

## 2019-01-16 RX ORDER — LOSARTAN POTASSIUM 50 MG/1
TABLET ORAL
Qty: 30 TABLET | Refills: 11 | Status: SHIPPED | OUTPATIENT
Start: 2019-01-16 | End: 2019-02-27 | Stop reason: SDUPTHER

## 2019-01-16 NOTE — TELEPHONE ENCOUNTER
[facility] [employer] BENEFITS as of 19:       Member Name : PARVIN CARROLL  ID : [id number]  Group : [facility], [facility]   Patient Name : PARVIN CARROLL  Relationship : MEMBER   : 1950     Authorization Issued       Authorization Number: [id number]    Issue Date: 2019    Expiration Date: 2019    Services: Examination    Examination Copayment: $10.00

## 2019-01-17 ENCOUNTER — PATIENT MESSAGE (OUTPATIENT)
Dept: HEMATOLOGY/ONCOLOGY | Facility: CLINIC | Age: 69
End: 2019-01-17

## 2019-01-23 ENCOUNTER — RESEARCH ENCOUNTER (OUTPATIENT)
Dept: HEMATOLOGY/ONCOLOGY | Facility: CLINIC | Age: 69
End: 2019-01-23

## 2019-01-23 NOTE — PROGRESS NOTES
Alesha Toney  # [id number]  ALCHEMIST  ([id number])  01/23/2019        Eligibility (Pre-registration and Registration)     Pre-Registration  Pt satisfies eligibility criteria as evidenced by:  Pt is a post surgical pt who is s/p VATS w/ Rt. Lobectomy on 08/09/2018. Per path all margins are negative and pt has adenocarcinoma. Pt is a Stage IIA, per Dr Dhaliwal, based on the 7th edition (T2bN0).  Per Dr Dhaliwal, pt is ECOG 0   Pt is 69 y/o and has completed her adjuvant chemo treatment.   Pt denies any prior or concurrent malignancies.   Pt has not started any targeted tx.  Pt is not pregnant or lactating.  Pt had EGFR/ALK/ROS testing done and was found to have no mutation. PDL testing done and resulted 0%  Pt has not had post resection CT Scan yet, however there has been no clinical evidence of recurrence, per Dr Dhaliwal.  Pt does have tissue available for required analysis and will be sent off today.     Pt will be pre-registered and registered today, per protocol.

## 2019-01-24 NOTE — PROGRESS NOTES
Alesha Toney  # [id number]  DCP-001  01/24/2019        Registration Note:    Pt was registered this morning through OPEN and assigned ID # [id number].

## 2019-01-24 NOTE — PROGRESS NOTES
Alesha Toney  # [id number]  ALCHEMIST  ([id number])  01/23/2019        Registration and Tumor Submission    Pt was registered to trial today through OPEN and assigned PID # [id number]. Tumor block logged into the [employer] system, shipping manifest and redacted copy of path report printed and packaged w/ block. Block shipped out this afternoon via [employer].

## 2019-01-29 ENCOUNTER — PATIENT MESSAGE (OUTPATIENT)
Dept: HEMATOLOGY/ONCOLOGY | Facility: CLINIC | Age: 69
End: 2019-01-29

## 2019-02-05 ENCOUNTER — PATIENT MESSAGE (OUTPATIENT)
Dept: HEMATOLOGY/ONCOLOGY | Facility: CLINIC | Age: 69
End: 2019-02-05

## 2019-02-06 ENCOUNTER — HOSPITAL ENCOUNTER (OUTPATIENT)
Dept: RADIOLOGY | Facility: HOSPITAL | Age: 69
Discharge: HOME | End: 2019-02-06
Attending: INTERNAL MEDICINE
Payer: COMMERCIAL

## 2019-02-06 DIAGNOSIS — C34.11 MALIGNANT NEOPLASM OF UPPER LOBE OF RIGHT LUNG: ICD-10-CM

## 2019-02-06 DIAGNOSIS — C34.90 MALIGNANT NEOPLASM OF BRONCHUS AND LUNG: Primary | ICD-10-CM

## 2019-02-06 DIAGNOSIS — Z00.6 EXAMINATION OF PARTICIPANT IN CLINICAL TRIAL: ICD-10-CM

## 2019-02-06 PROCEDURE — 71250 CT THORAX DX C-: CPT | Mod: TC

## 2019-02-06 PROCEDURE — 74150 CT ABDOMEN W/O CONTRAST: CPT | Mod: TC

## 2019-02-06 PROCEDURE — A9698 NON-RAD CONTRAST MATERIALNOC: HCPCS | Performed by: INTERNAL MEDICINE

## 2019-02-06 PROCEDURE — 74150 CT ABDOMEN WITHOUT CONTRAST: ICD-10-PCS | Mod: 26,,, | Performed by: RADIOLOGY

## 2019-02-06 PROCEDURE — 71250 CT CHEST WITHOUT CONTRAST: ICD-10-PCS | Mod: 26,,, | Performed by: RADIOLOGY

## 2019-02-06 PROCEDURE — 71250 CT THORAX DX C-: CPT | Mod: 26,,, | Performed by: RADIOLOGY

## 2019-02-06 PROCEDURE — 74150 CT ABDOMEN W/O CONTRAST: CPT | Mod: 26,,, | Performed by: RADIOLOGY

## 2019-02-06 PROCEDURE — 25500020 PHARM REV CODE 255: Performed by: INTERNAL MEDICINE

## 2019-02-06 RX ADMIN — Medication 900 ML: at 07:02

## 2019-02-07 ENCOUNTER — RESEARCH ENCOUNTER (OUTPATIENT)
Dept: HEMATOLOGY/ONCOLOGY | Facility: CLINIC | Age: 69
End: 2019-02-07

## 2019-02-07 ENCOUNTER — OFFICE VISIT (OUTPATIENT)
Dept: HEMATOLOGY/ONCOLOGY | Facility: CLINIC | Age: 69
End: 2019-02-07
Payer: COMMERCIAL

## 2019-02-07 ENCOUNTER — LAB VISIT (OUTPATIENT)
Dept: LAB | Facility: HOSPITAL | Age: 69
End: 2019-02-07
Attending: INTERNAL MEDICINE
Payer: COMMERCIAL

## 2019-02-07 ENCOUNTER — INFUSION (OUTPATIENT)
Dept: INFUSION THERAPY | Facility: HOSPITAL | Age: 69
End: 2019-02-07
Attending: INTERNAL MEDICINE
Payer: COMMERCIAL

## 2019-02-07 VITALS
OXYGEN SATURATION: 100 % | HEART RATE: 70 BPM | DIASTOLIC BLOOD PRESSURE: 79 MMHG | RESPIRATION RATE: 18 BRPM | TEMPERATURE: 98 F | WEIGHT: 163.81 LBS | BODY MASS INDEX: 26.33 KG/M2 | SYSTOLIC BLOOD PRESSURE: 191 MMHG | HEIGHT: 66 IN

## 2019-02-07 DIAGNOSIS — C34.11 MALIGNANT NEOPLASM OF UPPER LOBE OF RIGHT LUNG: ICD-10-CM

## 2019-02-07 DIAGNOSIS — H53.8 BLURRED VISION: ICD-10-CM

## 2019-02-07 DIAGNOSIS — C34.90 MALIGNANT NEOPLASM OF LUNG: Primary | ICD-10-CM

## 2019-02-07 DIAGNOSIS — C34.90 MALIGNANT NEOPLASM OF BRONCHUS AND LUNG: ICD-10-CM

## 2019-02-07 DIAGNOSIS — Z00.6 EXAMINATION OF PARTICIPANT IN CLINICAL TRIAL: ICD-10-CM

## 2019-02-07 DIAGNOSIS — C34.90 NON-SMALL CELL LUNG CANCER, UNSPECIFIED LATERALITY: Primary | Chronic | ICD-10-CM

## 2019-02-07 LAB
ALBUMIN SERPL BCP-MCNC: 3.7 G/DL
ALP SERPL-CCNC: 57 U/L
ALT SERPL W/O P-5'-P-CCNC: 16 U/L
ANION GAP SERPL CALC-SCNC: 7 MMOL/L
AST SERPL-CCNC: 21 U/L
BASOPHILS # BLD AUTO: 0.04 K/UL
BASOPHILS NFR BLD: 0.8 %
BILIRUB SERPL-MCNC: 0.3 MG/DL
BUN SERPL-MCNC: 19 MG/DL
CALCIUM SERPL-MCNC: 10 MG/DL
CHLORIDE SERPL-SCNC: 106 MMOL/L
CO2 SERPL-SCNC: 28 MMOL/L
CREAT SERPL-MCNC: 1.1 MG/DL
DIFFERENTIAL METHOD: ABNORMAL
DRUG STUDY SPECIMEN TYPE: NORMAL
DRUG STUDY TEST NAME: NORMAL
DRUG STUDY TEST RESULT: NORMAL
EOSINOPHIL # BLD AUTO: 0.1 K/UL
EOSINOPHIL NFR BLD: 1.4 %
ERYTHROCYTE [DISTWIDTH] IN BLOOD BY AUTOMATED COUNT: 15.9 %
EST. GFR  (AFRICAN AMERICAN): 59.6 ML/MIN/1.73 M^2
EST. GFR  (NON AFRICAN AMERICAN): 51.7 ML/MIN/1.73 M^2
GLUCOSE SERPL-MCNC: 135 MG/DL
HCT VFR BLD AUTO: 30.7 %
HGB BLD-MCNC: 9.9 G/DL
IMM GRANULOCYTES # BLD AUTO: 0.01 K/UL
IMM GRANULOCYTES NFR BLD AUTO: 0.2 %
LYMPHOCYTES # BLD AUTO: 1.6 K/UL
LYMPHOCYTES NFR BLD: 32.5 %
MCH RBC QN AUTO: 33.1 PG
MCHC RBC AUTO-ENTMCNC: 32.2 G/DL
MCV RBC AUTO: 103 FL
MONOCYTES # BLD AUTO: 0.6 K/UL
MONOCYTES NFR BLD: 11.7 %
NEUTROPHILS # BLD AUTO: 2.6 K/UL
NEUTROPHILS NFR BLD: 53.4 %
NRBC BLD-RTO: 0 /100 WBC
PLATELET # BLD AUTO: 240 K/UL
PMV BLD AUTO: 10.2 FL
POTASSIUM SERPL-SCNC: 4.6 MMOL/L
PROT SERPL-MCNC: 6.7 G/DL
RBC # BLD AUTO: 2.99 M/UL
SODIUM SERPL-SCNC: 141 MMOL/L
WBC # BLD AUTO: 4.89 K/UL

## 2019-02-07 PROCEDURE — A4216 STERILE WATER/SALINE, 10 ML: HCPCS | Performed by: INTERNAL MEDICINE

## 2019-02-07 PROCEDURE — 25000003 PHARM REV CODE 250: Performed by: INTERNAL MEDICINE

## 2019-02-07 PROCEDURE — 63600175 PHARM REV CODE 636 W HCPCS: Performed by: INTERNAL MEDICINE

## 2019-02-07 PROCEDURE — 96523 IRRIG DRUG DELIVERY DEVICE: CPT

## 2019-02-07 PROCEDURE — 99999 PR PBB SHADOW E&M-EST. PATIENT-LVL V: CPT | Mod: PBBFAC,,, | Performed by: INTERNAL MEDICINE

## 2019-02-07 PROCEDURE — 80053 COMPREHEN METABOLIC PANEL: CPT

## 2019-02-07 PROCEDURE — 1101F PR PT FALLS ASSESS DOC 0-1 FALLS W/OUT INJ PAST YR: ICD-10-PCS | Mod: CPTII,S$GLB,, | Performed by: INTERNAL MEDICINE

## 2019-02-07 PROCEDURE — 3078F PR MOST RECENT DIASTOLIC BLOOD PRESSURE < 80 MM HG: ICD-10-PCS | Mod: CPTII,S$GLB,, | Performed by: INTERNAL MEDICINE

## 2019-02-07 PROCEDURE — 3077F SYST BP >= 140 MM HG: CPT | Mod: CPTII,S$GLB,, | Performed by: INTERNAL MEDICINE

## 2019-02-07 PROCEDURE — 3078F DIAST BP <80 MM HG: CPT | Mod: CPTII,S$GLB,, | Performed by: INTERNAL MEDICINE

## 2019-02-07 PROCEDURE — 99214 OFFICE O/P EST MOD 30 MIN: CPT | Mod: S$GLB,,, | Performed by: INTERNAL MEDICINE

## 2019-02-07 PROCEDURE — 1101F PT FALLS ASSESS-DOCD LE1/YR: CPT | Mod: CPTII,S$GLB,, | Performed by: INTERNAL MEDICINE

## 2019-02-07 PROCEDURE — 99999 PR PBB SHADOW E&M-EST. PATIENT-LVL V: ICD-10-PCS | Mod: PBBFAC,,, | Performed by: INTERNAL MEDICINE

## 2019-02-07 PROCEDURE — 99214 PR OFFICE/OUTPT VISIT, EST, LEVL IV, 30-39 MIN: ICD-10-PCS | Mod: S$GLB,,, | Performed by: INTERNAL MEDICINE

## 2019-02-07 PROCEDURE — 99000 SPECIMEN HANDLING OFFICE-LAB: CPT

## 2019-02-07 PROCEDURE — 36415 COLL VENOUS BLD VENIPUNCTURE: CPT

## 2019-02-07 PROCEDURE — 85025 COMPLETE CBC W/AUTO DIFF WBC: CPT

## 2019-02-07 PROCEDURE — 3077F PR MOST RECENT SYSTOLIC BLOOD PRESSURE >= 140 MM HG: ICD-10-PCS | Mod: CPTII,S$GLB,, | Performed by: INTERNAL MEDICINE

## 2019-02-07 RX ORDER — NEOMYCIN SULFATE, POLYMYXIN B SULFATE AND DEXAMETHASONE 3.5; 10000; 1 MG/ML; [USP'U]/ML; MG/ML
SUSPENSION/ DROPS OPHTHALMIC
Refills: 0 | COMMUNITY
Start: 2019-01-30 | End: 2019-03-25

## 2019-02-07 RX ORDER — HEPARIN 100 UNIT/ML
500 SYRINGE INTRAVENOUS
Status: COMPLETED | OUTPATIENT
Start: 2019-02-07 | End: 2019-02-07

## 2019-02-07 RX ORDER — SODIUM CHLORIDE 0.9 % (FLUSH) 0.9 %
10 SYRINGE (ML) INJECTION
Status: CANCELLED | OUTPATIENT
Start: 2019-02-07

## 2019-02-07 RX ORDER — HEPARIN 100 UNIT/ML
500 SYRINGE INTRAVENOUS
Status: CANCELLED | OUTPATIENT
Start: 2019-02-07

## 2019-02-07 RX ORDER — SODIUM CHLORIDE 0.9 % (FLUSH) 0.9 %
10 SYRINGE (ML) INJECTION
Status: COMPLETED | OUTPATIENT
Start: 2019-02-07 | End: 2019-02-07

## 2019-02-07 RX ADMIN — Medication 10 ML: at 12:02

## 2019-02-07 RX ADMIN — HEPARIN 500 UNITS: 100 SYRINGE at 12:02

## 2019-02-07 NOTE — PROGRESS NOTES
"Subjective:       Patient ID: Alesha Toney is a 68 y.o. female.    Chief Complaint: Malignant neoplasm of upper lobe of right lung  Ms. Alesha Toney is a 67-year-old otherwise healthy female who started having some shoulder pain, which was lasting for over six weeks.  She went and saw her primary care physician and underwent an x-ray, which revealed right upper lobe lung mass worrisome for malignancy and a CT scan was recommended, which was done on 6/12/18 and that revealed a newly developing mass, pleural based, lateral posterior segment, right upper lobe 3.5 x 3.5 cm, surrounding stellate margin and patchy infiltrates, particularly superiorly,   anteriorly infiltrates extend perihilar into the anterior segment.  She then underwent CT-guided biopsy, which revealed well-differentiated adenocarcinoma.       Her PET scan from 6/29/18 There is physiologic intracranial, head, and neck activity.  There is a large right upper lobe mass SUV max 9.11.  No local or distant metastatic disease seen.  There is physiologic liver, spleen, GI and  activity.  There are a few liver cysts.  No adenopathy is seen.  The adrenal glands are not enlarged.  No adenopathy is seen.  No suspicious bone lesions are seen.     She underwent VATS with right upper lobectomy. Mediastinal lymphadenectomy on 8/9/18. Pathology revealed "Tumor site: Upper lobe.Tumor size: 7 x 4 x 2.7 cm.Tumor focality: Single tumor.  Histologic type: Mixed invasive mucinous and nonmucinous adenocarcinoma. Histologic grade: G2: Moderately differentiated.Spread through air spaces (STATS): Present. Visceral pleura invasion: Not identified.  Lymphovascular invasion: Not identified. Direct invasion of adjacent structures: No adjacent structures present. Margins: All margins are uninvolved by carcinoma.Distance of invasive carcinoma from closest margin: 1 cm from the bronchial margin.Treatment effect: No known presurgical therapy. Regional lymph nodes: Number of " "lymph nodes involved: 0. Number of lymph nodes examined: 11. Pathologic Stage Classification: pT3 N0"         HPI She completed 4 cycles of adjuvant chemo with Cisplatin and Alimta as of 1/10/19   CT scans from 2/6/19 "Stable postoperative change of right upper lobectomy with unchanged ground-glass opacity at the inferior margin.  Recommend continued attention on follow-up exam. Unchanged left lower lobe centrilobular nodular opacity likely representing chronic/subacute infectious/inflammatory bronchiolitis versus aspiration.  Continue attention on follow-up exam.  There are no measurable lesions per RECIST criteria"  She qualifies for the ALCHEMIST trial but is hesitant as she is concerned about copays.  She still notes fatigue from chemo and is requesting time off for another month from work.    Review of Systems   Constitutional: Positive for fatigue. Negative for appetite change and unexpected weight change.   HENT: Negative for mouth sores.    Eyes: Negative for visual disturbance.   Respiratory: Negative for cough and shortness of breath.    Cardiovascular: Negative for chest pain.   Gastrointestinal: Negative for abdominal pain and diarrhea.   Genitourinary: Negative for frequency.   Musculoskeletal: Negative for back pain.   Skin: Negative for rash.   Neurological: Negative for headaches.   Hematological: Negative for adenopathy.   Psychiatric/Behavioral: The patient is not nervous/anxious.    All other systems reviewed and are negative.      PMFSH: all information reviewed and updated as relevant to today's visit  Objective:      Physical Exam   Constitutional: She is oriented to person, place, and time. She appears well-developed and well-nourished.   HENT:   Mouth/Throat: No oropharyngeal exudate.   Cardiovascular: Normal rate and normal heart sounds.   Pulmonary/Chest: Effort normal and breath sounds normal. She has no wheezes.   Abdominal: Soft. Bowel sounds are normal. There is no tenderness. "   Musculoskeletal: She exhibits no edema or tenderness.   Lymphadenopathy:     She has no cervical adenopathy.   Neurological: She is alert and oriented to person, place, and time. Coordination normal.   Skin: Skin is warm and dry. No rash noted.   Psychiatric: She has a normal mood and affect. Judgment and thought content normal.   Vitals reviewed.        LABS:  WBC   Date Value Ref Range Status   02/07/2019 4.89 3.90 - 12.70 K/uL Final     Hemoglobin   Date Value Ref Range Status   02/07/2019 9.9 (L) 12.0 - 16.0 g/dL Final     POC Hematocrit   Date Value Ref Range Status   08/09/2018 33 (L) 36 - 54 %PCV Final     Hematocrit   Date Value Ref Range Status   02/07/2019 30.7 (L) 37.0 - 48.5 % Final     Platelets   Date Value Ref Range Status   02/07/2019 240 150 - 350 K/uL Final     Gran # (ANC)   Date Value Ref Range Status   02/07/2019 2.6 1.8 - 7.7 K/uL Final     Gran%   Date Value Ref Range Status   02/07/2019 53.4 38.0 - 73.0 % Final       Chemistry        Component Value Date/Time     02/07/2019 0950    K 4.6 02/07/2019 0950     02/07/2019 0950    CO2 28 02/07/2019 0950    BUN 19 02/07/2019 0950    CREATININE 1.1 02/07/2019 0950     (H) 02/07/2019 0950        Component Value Date/Time    CALCIUM 10.0 02/07/2019 0950    ALKPHOS 57 02/07/2019 0950    AST 21 02/07/2019 0950    ALT 16 02/07/2019 0950    BILITOT 0.3 02/07/2019 0950    ESTGFRAFRICA 59.6 (A) 02/07/2019 0950    EGFRNONAA 51.7 (A) 02/07/2019 0950          Assessment:       1. Non-small cell lung cancer, unspecified laterality    2. Malignant neoplasm of upper lobe of right lung    3. Blurred vision        Plan:         1,2,3. She has completed chemo but does not want to go on ALCHEMIST trial   She will be monitored with CT scans every 3 months  She will labs in 1 month for CBC, CMP monitoring to check trend back to baseline.    Above care plan was discussed with patient and accompanying wife and all questions were addressed to their  satisfaction

## 2019-02-07 NOTE — PROGRESS NOTES
"Alesha Toney  # [id number]  ALCHEMIST  ([id number])  02/06/2019    Pt ID # [id number]      Follow-up visit after chemo completion:    Pt had post chemo, restaging scans done yesterday. She had labs, including required research specimens, drawn prior to her appt w/ Dr Dhaliwal. Pt was accompanied by her partner. She is in good spirits. She was seen by Dr Dhaliwal and H&P done.     CT Scans were reviewed and noted per Dr Dhaliwal. These results were discussed w/ the pt and her partner.     ALCHEMIST screening results are still pending. Dr Dhaliwal and pt are aware. Dr Dhaliwal presented the ANVIL trial ([id number]) to the pt and her partner. Pt was given the IC for this trial prior to appt today. She has read over it and is still undecided about whether or not she is going to participate (once we receive the results). Her main complaint/concern is the co-pays she will be responsible for if she would get randomized to the ARM which gets treated every 4 weeks. Pt states she has a $6000 deductible she has to meet.   Per protocol, pt has 240 days from date of sx to be randomized. Her sx was 8/9/2018, which would give her until 4/06/2019. Dr Dhaliwal, the pt and her partner are all aware. Pt stated she will call me before this if she is interested in participating. I have also informed pt that if she decides to participate after the next 4 weeks, she would prob need another CT Scan b/c the current one will have fallen out of window for study.   Pt understands all of the above and has no further questions at this time.    Pt had required research blood drawn (whole blood) this morning for the timepoint of "30 days following registration". Specimens were logged into [employer] system and shipping manifest was printed out and packaged w/ blood. Specimens will be sent out ambient today to [facility] via [employer], per protocol.    For the time being, Dr Dhaliwal will schedule pt to RTC in 3 months for repeat CT Scan/MD visit. Should pt decide to participate in the study " those appts can be changed. Pt has my contact information.

## 2019-02-07 NOTE — Clinical Note
Schedule labs, CBC,CMP in 4 weeksAlso schedule with Ophthalmology next available, not Optometry Schedule CBC, CMP and CT chest, abdomen/pelvis and see me in 3 months

## 2019-02-08 ENCOUNTER — PATIENT MESSAGE (OUTPATIENT)
Dept: HEMATOLOGY/ONCOLOGY | Facility: CLINIC | Age: 69
End: 2019-02-08

## 2019-02-08 ENCOUNTER — TELEPHONE (OUTPATIENT)
Dept: OPHTHALMOLOGY | Facility: CLINIC | Age: 69
End: 2019-02-08

## 2019-02-10 ENCOUNTER — PATIENT MESSAGE (OUTPATIENT)
Dept: HEMATOLOGY/ONCOLOGY | Facility: CLINIC | Age: 69
End: 2019-02-10

## 2019-02-11 NOTE — TELEPHONE ENCOUNTER
Contrast shows mediastinum better.  Typically I do alternate CT chest with contrast wnd without contrast every 3 months. However if she concerned just do without contrast

## 2019-02-13 ENCOUNTER — OFFICE VISIT (OUTPATIENT)
Dept: OPTOMETRY | Facility: CLINIC | Age: 69
End: 2019-02-13
Payer: COMMERCIAL

## 2019-02-13 DIAGNOSIS — H04.123 BILATERAL DRY EYES: ICD-10-CM

## 2019-02-13 DIAGNOSIS — H01.02B SQUAMOUS BLEPHARITIS OF UPPER AND LOWER EYELIDS OF BOTH EYES: ICD-10-CM

## 2019-02-13 DIAGNOSIS — H04.203 BILATERAL EPIPHORA: Primary | ICD-10-CM

## 2019-02-13 DIAGNOSIS — H01.02A SQUAMOUS BLEPHARITIS OF UPPER AND LOWER EYELIDS OF BOTH EYES: ICD-10-CM

## 2019-02-13 PROCEDURE — 99999 PR PBB SHADOW E&M-EST. PATIENT-LVL II: CPT | Mod: PBBFAC,,, | Performed by: OPTOMETRIST

## 2019-02-13 PROCEDURE — 92002 PR EYE EXAM, NEW PATIENT,INTERMED: ICD-10-PCS | Mod: S$GLB,,, | Performed by: OPTOMETRIST

## 2019-02-13 PROCEDURE — 99999 PR PBB SHADOW E&M-EST. PATIENT-LVL II: ICD-10-PCS | Mod: PBBFAC,,, | Performed by: OPTOMETRIST

## 2019-02-13 PROCEDURE — 92002 INTRM OPH EXAM NEW PATIENT: CPT | Mod: S$GLB,,, | Performed by: OPTOMETRIST

## 2019-02-13 NOTE — LETTER
February 13, 2019      Pam Dhaliwal MD  [address]  [facility] [address]           [place] [facility]  [address]  [place] [address]  Phone: [phone]  Fax: [phone]          Patient: Alesha Tnoey   MR Number: [id number]   YOB: 1950   Date of Visit: 2/13/2019       Dear Dr. Pam Dhaliwal:    Thank you for referring Alesha Toney to me for evaluation. Attached you will find relevant portions of my assessment and plan of care.    If you have questions, please do not hesitate to call me. I look forward to following Alesha Toney along with you.    Sincerely,    Kj Tomlinson, OD    Enclosure  CC:  No Recipients    If you would like to receive this communication electronically, please contact [email or web address] or [phone] to request more information on [employer] access.    For providers and/or their staff who would like to refer a patient to [facility], please contact us through our one-stop-shop provider referral line, [facility], at [phone].    If you feel you have received this communication in error or would no longer like to receive these types of communications, please e-mail [email or web address]

## 2019-02-13 NOTE — PROGRESS NOTES
SCOTT URIAS about 1 yr. Ago elsewhere.  Had problem related exam about 2 weeks   ago elsewhere and had punctal dilation and irrigation for epiphora OD   which helped some but not completely.  Patient recently finished chemo   treatment and has noticed problems for the past couple months with OD>OS   watering since.  Patient has noticed blurred vision distance  And near for   the past couple months.  Patient's glasses and contacts are about 1 1/2   ys. Old and they are both corrected to monovision.    Last edited by Thuy Hart on 2/13/2019  3:19 PM. (History)            Assessment /Plan     For exam results, see Encounter Report.    Bilateral epiphora    Squamous blepharitis of upper and lower eyelids of both eyes    Bilateral dry eyes      1. S/P D&I from Dr. Kenny. Some improvement. May consider refer for Nick if no help with lid wipes and art tears for dry eyes/bleph. RTC for follow up and dfe. Pt wants to bring .   2,3. Start lid wipes and art tears for bleph and dry eyes.

## 2019-02-24 ENCOUNTER — PATIENT MESSAGE (OUTPATIENT)
Dept: HEMATOLOGY/ONCOLOGY | Facility: CLINIC | Age: 69
End: 2019-02-24

## 2019-02-25 ENCOUNTER — PATIENT MESSAGE (OUTPATIENT)
Dept: HEMATOLOGY/ONCOLOGY | Facility: CLINIC | Age: 69
End: 2019-02-25

## 2019-02-25 ENCOUNTER — OFFICE VISIT (OUTPATIENT)
Dept: ORTHOPEDICS | Facility: CLINIC | Age: 69
End: 2019-02-25
Payer: COMMERCIAL

## 2019-02-25 VITALS — HEIGHT: 66 IN | WEIGHT: 165.25 LBS | BODY MASS INDEX: 26.56 KG/M2

## 2019-02-25 DIAGNOSIS — Z98.1 HISTORY OF FUSION OF CERVICAL SPINE: ICD-10-CM

## 2019-02-25 DIAGNOSIS — M54.12 CERVICAL RADICULOPATHY: Primary | ICD-10-CM

## 2019-02-25 PROCEDURE — 99213 PR OFFICE/OUTPT VISIT, EST, LEVL III, 20-29 MIN: ICD-10-PCS | Mod: S$GLB,,, | Performed by: ORTHOPAEDIC SURGERY

## 2019-02-25 PROCEDURE — 99213 OFFICE O/P EST LOW 20 MIN: CPT | Mod: S$GLB,,, | Performed by: ORTHOPAEDIC SURGERY

## 2019-02-25 PROCEDURE — 1101F PT FALLS ASSESS-DOCD LE1/YR: CPT | Mod: CPTII,S$GLB,, | Performed by: ORTHOPAEDIC SURGERY

## 2019-02-25 PROCEDURE — 99999 PR PBB SHADOW E&M-EST. PATIENT-LVL III: CPT | Mod: PBBFAC,,, | Performed by: ORTHOPAEDIC SURGERY

## 2019-02-25 PROCEDURE — 1101F PR PT FALLS ASSESS DOC 0-1 FALLS W/OUT INJ PAST YR: ICD-10-PCS | Mod: CPTII,S$GLB,, | Performed by: ORTHOPAEDIC SURGERY

## 2019-02-25 PROCEDURE — 99999 PR PBB SHADOW E&M-EST. PATIENT-LVL III: ICD-10-PCS | Mod: PBBFAC,,, | Performed by: ORTHOPAEDIC SURGERY

## 2019-02-25 RX ORDER — MELOXICAM 15 MG/1
15 TABLET ORAL DAILY
Qty: 21 TABLET | Refills: 0 | Status: SHIPPED | OUTPATIENT
Start: 2019-02-25 | End: 2019-03-25

## 2019-02-25 RX ORDER — METHOCARBAMOL 750 MG/1
750 TABLET, FILM COATED ORAL 3 TIMES DAILY
Qty: 60 TABLET | Refills: 0 | Status: SHIPPED | OUTPATIENT
Start: 2019-02-25 | End: 2019-03-17

## 2019-02-26 ENCOUNTER — PATIENT MESSAGE (OUTPATIENT)
Dept: HEMATOLOGY/ONCOLOGY | Facility: CLINIC | Age: 69
End: 2019-02-26

## 2019-02-27 ENCOUNTER — OFFICE VISIT (OUTPATIENT)
Dept: INTERNAL MEDICINE | Facility: CLINIC | Age: 69
End: 2019-02-27
Payer: COMMERCIAL

## 2019-02-27 ENCOUNTER — PATIENT MESSAGE (OUTPATIENT)
Dept: HEMATOLOGY/ONCOLOGY | Facility: CLINIC | Age: 69
End: 2019-02-27

## 2019-02-27 ENCOUNTER — OFFICE VISIT (OUTPATIENT)
Dept: OPTOMETRY | Facility: CLINIC | Age: 69
End: 2019-02-27
Payer: COMMERCIAL

## 2019-02-27 VITALS
HEART RATE: 72 BPM | OXYGEN SATURATION: 99 % | WEIGHT: 164 LBS | HEIGHT: 66 IN | SYSTOLIC BLOOD PRESSURE: 152 MMHG | BODY MASS INDEX: 26.36 KG/M2 | DIASTOLIC BLOOD PRESSURE: 64 MMHG | TEMPERATURE: 99 F

## 2019-02-27 DIAGNOSIS — H04.203 BILATERAL EPIPHORA: Primary | ICD-10-CM

## 2019-02-27 DIAGNOSIS — I47.10 SVT (SUPRAVENTRICULAR TACHYCARDIA): ICD-10-CM

## 2019-02-27 DIAGNOSIS — H01.02A SQUAMOUS BLEPHARITIS OF UPPER AND LOWER EYELIDS OF BOTH EYES: ICD-10-CM

## 2019-02-27 DIAGNOSIS — H25.13 NUCLEAR SCLEROSIS OF BOTH EYES: ICD-10-CM

## 2019-02-27 DIAGNOSIS — D53.9 MACROCYTIC ANEMIA: ICD-10-CM

## 2019-02-27 DIAGNOSIS — I25.84 CORONARY ARTERY DISEASE DUE TO CALCIFIED CORONARY LESION: ICD-10-CM

## 2019-02-27 DIAGNOSIS — I10 ESSENTIAL HYPERTENSION: Primary | ICD-10-CM

## 2019-02-27 DIAGNOSIS — H01.02B SQUAMOUS BLEPHARITIS OF UPPER AND LOWER EYELIDS OF BOTH EYES: ICD-10-CM

## 2019-02-27 DIAGNOSIS — R73.03 PREDIABETES: ICD-10-CM

## 2019-02-27 DIAGNOSIS — L65.9 HAIR LOSS: ICD-10-CM

## 2019-02-27 DIAGNOSIS — I25.10 CORONARY ARTERY DISEASE DUE TO CALCIFIED CORONARY LESION: ICD-10-CM

## 2019-02-27 PROCEDURE — 99214 OFFICE O/P EST MOD 30 MIN: CPT | Mod: S$GLB,,, | Performed by: INTERNAL MEDICINE

## 2019-02-27 PROCEDURE — 99999 PR PBB SHADOW E&M-EST. PATIENT-LVL IV: CPT | Mod: PBBFAC,,, | Performed by: INTERNAL MEDICINE

## 2019-02-27 PROCEDURE — 1101F PT FALLS ASSESS-DOCD LE1/YR: CPT | Mod: CPTII,S$GLB,, | Performed by: INTERNAL MEDICINE

## 2019-02-27 PROCEDURE — 3078F PR MOST RECENT DIASTOLIC BLOOD PRESSURE < 80 MM HG: ICD-10-PCS | Mod: CPTII,S$GLB,, | Performed by: INTERNAL MEDICINE

## 2019-02-27 PROCEDURE — 99999 PR PBB SHADOW E&M-EST. PATIENT-LVL IV: ICD-10-PCS | Mod: PBBFAC,,, | Performed by: INTERNAL MEDICINE

## 2019-02-27 PROCEDURE — 92014 PR EYE EXAM, EST PATIENT,COMPREHESV: ICD-10-PCS | Mod: S$GLB,,, | Performed by: OPTOMETRIST

## 2019-02-27 PROCEDURE — 99214 PR OFFICE/OUTPT VISIT, EST, LEVL IV, 30-39 MIN: ICD-10-PCS | Mod: S$GLB,,, | Performed by: INTERNAL MEDICINE

## 2019-02-27 PROCEDURE — 1101F PR PT FALLS ASSESS DOC 0-1 FALLS W/OUT INJ PAST YR: ICD-10-PCS | Mod: CPTII,S$GLB,, | Performed by: INTERNAL MEDICINE

## 2019-02-27 PROCEDURE — 3077F PR MOST RECENT SYSTOLIC BLOOD PRESSURE >= 140 MM HG: ICD-10-PCS | Mod: CPTII,S$GLB,, | Performed by: INTERNAL MEDICINE

## 2019-02-27 PROCEDURE — 99999 PR PBB SHADOW E&M-EST. PATIENT-LVL II: ICD-10-PCS | Mod: PBBFAC,,, | Performed by: OPTOMETRIST

## 2019-02-27 PROCEDURE — 99999 PR PBB SHADOW E&M-EST. PATIENT-LVL II: CPT | Mod: PBBFAC,,, | Performed by: OPTOMETRIST

## 2019-02-27 PROCEDURE — 3078F DIAST BP <80 MM HG: CPT | Mod: CPTII,S$GLB,, | Performed by: INTERNAL MEDICINE

## 2019-02-27 PROCEDURE — 3077F SYST BP >= 140 MM HG: CPT | Mod: CPTII,S$GLB,, | Performed by: INTERNAL MEDICINE

## 2019-02-27 PROCEDURE — 92014 COMPRE OPH EXAM EST PT 1/>: CPT | Mod: S$GLB,,, | Performed by: OPTOMETRIST

## 2019-02-27 RX ORDER — ATENOLOL 50 MG/1
25 TABLET ORAL 4 TIMES DAILY
Qty: 180 TABLET | Refills: 3 | Status: SHIPPED | OUTPATIENT
Start: 2019-02-27 | End: 2020-02-27 | Stop reason: SDUPTHER

## 2019-02-27 RX ORDER — LOSARTAN POTASSIUM 50 MG/1
25 TABLET ORAL 2 TIMES DAILY
Qty: 90 TABLET | Refills: 3 | Status: SHIPPED | OUTPATIENT
Start: 2019-02-27 | End: 2019-05-29 | Stop reason: ALTCHOICE

## 2019-02-27 NOTE — PROGRESS NOTES
HPI     Pt is here for epiphora f/u and DFE. Pt reports using Systance and   ocusoft as directed x 2 weeks.   No improvement, OD watering seems worse than OS.  S/P D&I by Dr. Kenny 1 mo ago.  Pt glasses are corrected for monovision.        Last edited by Kj Tomlinson, OD on 2/27/2019  4:05 PM. (History)            Assessment /Plan     For exam results, see Encounter Report.    Bilateral epiphora    Squamous blepharitis of upper and lower eyelids of both eyes    Nuclear sclerosis of both eyes      1. No help with D&I, no help with tobradex, no help with ocusoft and art tears, Refer to Nick for eval of epiphora.  2. Tried ocusoft and tobradex with no help with tearing.  3. Educated pt on presence of cataracts and effects on vision. No surgery at this time. Recheck in one year.

## 2019-02-27 NOTE — MEDICAL/APP STUDENT
Pt is here for epiphora f/u and DFE. Pt reports using Systance and ocusoft as directed x 2 weeks.   No improvement, OD watering seems worse than OS.  S/P D&I by Dr. Kenny 1 mo ago.  Pt glasses are corrected for monovision.

## 2019-02-27 NOTE — PROGRESS NOTES
"Subjective:       Patient ID: Alesha Toney is a 68 y.o. female.    Chief Complaint: Medication Refill and Blood Pressure Check    HPI    68 y.o. female presents for follow up of chronic medical problems including     HTN - Patient is currently on losartan 25mg BID. She does  check her BP at home, and it runs wnl. Side effects of medications note: none.     SVT: atenolol 25mg QID    CAD d/t calcified LAD: rosuvastatin 10mg daily prescribed by Dr. Mccoy, but she is not taking the medication. She would like to have cholesterol checked first.     Hair loss: started after she completed chemo. She reports she was given B12 injection during chemo, but the level was never checked.     Review of Systems   Constitutional: Negative for activity change, diaphoresis and fever.   Eyes: Positive for discharge (watery). Negative for visual disturbance.   Respiratory: Positive for shortness of breath. Negative for chest tightness.    Cardiovascular: Negative for chest pain and leg swelling.   Gastrointestinal: Negative for abdominal pain, blood in stool and vomiting.   Genitourinary: Negative for difficulty urinating.   Musculoskeletal: Negative for myalgias.   Skin: Negative for color change.   Neurological: Negative for syncope, weakness and numbness.       Objective:        Vitals:    02/27/19 1330 02/27/19 1356   BP: (!) 164/63 (!) 152/64   BP Location: Left arm    Pulse: 72    Temp: 99.1 °F (37.3 °C)    TempSrc: Oral    SpO2: 99%    Weight: 74.4 kg (164 lb 0.4 oz)    Height: 5' 6" (1.676 m)        Body mass index is 26.47 kg/m².    Physical Exam   Constitutional: She is oriented to person, place, and time. She appears well-developed and well-nourished. No distress.   HENT:   Head: Normocephalic and atraumatic.   Nose: Nose normal.   Mouth/Throat: Oropharynx is clear and moist.   Eyes: Conjunctivae and EOM are normal. Right eye exhibits no discharge. Left eye exhibits no discharge.   Neck: Normal range of motion. Neck " supple.   Cardiovascular: Normal rate, regular rhythm, normal heart sounds and intact distal pulses.   No murmur heard.  Pulmonary/Chest: Effort normal and breath sounds normal. She has no wheezes.   Abdominal: Soft. Bowel sounds are normal.   Musculoskeletal: She exhibits no edema.   Neurological: She is alert and oriented to person, place, and time.   Skin: Skin is warm and dry. She is not diaphoretic. No erythema.   Psychiatric: She has a normal mood and affect. Her behavior is normal. Thought content normal.       Assessment:     1. Essential hypertension    2. SVT (supraventricular tachycardia)    3. Coronary artery disease due to calcified coronary lesion    4. Macrocytic anemia    5. Prediabetes    6. Hair loss           Plan:         1. Essential hypertension  - losartan (COZAAR) 50 MG tablet; Take 0.5 tablets (25 mg total) by mouth 2 (two) times daily.  Dispense: 90 tablet; Refill: 3  - atenolol (TENORMIN) 50 MG tablet; Take 0.5 tablets (25 mg total) by mouth 4 (four) times daily.  Dispense: 180 tablet; Refill: 3    2. SVT (supraventricular tachycardia)  - atenolol (TENORMIN) 50 MG tablet; Take 0.5 tablets (25 mg total) by mouth 4 (four) times daily.  Dispense: 180 tablet; Refill: 3    3. Coronary artery disease due to calcified coronary lesion  - Lipid panel; Future    4. Macrocytic anemia  - Vitamin B12; Future  - Methylmalonic acid, serum; Future    5. Prediabetes  - Hemoglobin A1c; Future    6. Hair loss  - TSH; Future      rtc after seeing Dr. Dhaliwal in a few months - will discuss further immunizations and healthcare maintenance.     Patient note was created using MModal Dictation.  Any errors in syntax or even information may not have been identified and edited on initial review prior to signing this note.

## 2019-03-01 ENCOUNTER — TELEPHONE (OUTPATIENT)
Dept: OPTOMETRY | Facility: CLINIC | Age: 69
End: 2019-03-01

## 2019-03-03 NOTE — PROGRESS NOTES
Date of Surgery: 10/15/18    Procedure: C6/7 ACDF    History: Alesha Toney is seen today for follow-up following the above listed procedure. Overall the patient had been doing wel. She has a history of a lung mass s/p resection and chemotherapy. For the past 3 weeks she has had recurrent RUE radicular symptoms, there was no inciting event but she has increased her activity level since finishing chemotherapy.    Exam: Incision is healed. There is no sign of infection. Neuro exam is stable. No signs of DVT.    Radiographs: no new films today.    Assessment/Plan: Today we discussed options, I have recommended a course of mobic, gabapentin and robaxin. If not improved in 3 weeks we can get a new xray and further investigate.    I spent 15 minutes with the patient of which greater than 1/2 the time was devoted to counciling the patient regarding treatment options.

## 2019-03-06 ENCOUNTER — PATIENT MESSAGE (OUTPATIENT)
Dept: INTERNAL MEDICINE | Facility: CLINIC | Age: 69
End: 2019-03-06

## 2019-03-06 ENCOUNTER — PATIENT MESSAGE (OUTPATIENT)
Dept: HEMATOLOGY/ONCOLOGY | Facility: CLINIC | Age: 69
End: 2019-03-06

## 2019-03-06 DIAGNOSIS — E04.1 THYROID NODULE: Primary | ICD-10-CM

## 2019-03-06 NOTE — TELEPHONE ENCOUNTER
I know cis and alimta are 1-6% for alopecia. i'm thinking this may be delayed from the chemo she received---but could be thyroid related as well...  What are your thoughts?  ~sol

## 2019-03-07 ENCOUNTER — HOSPITAL ENCOUNTER (OUTPATIENT)
Dept: RADIOLOGY | Facility: HOSPITAL | Age: 69
Discharge: HOME | End: 2019-03-07
Attending: INTERNAL MEDICINE
Payer: COMMERCIAL

## 2019-03-07 ENCOUNTER — LAB VISIT (OUTPATIENT)
Dept: LAB | Facility: HOSPITAL | Age: 69
End: 2019-03-07
Attending: INTERNAL MEDICINE
Payer: COMMERCIAL

## 2019-03-07 ENCOUNTER — DOCUMENTATION ONLY (OUTPATIENT)
Dept: REHABILITATION | Facility: HOSPITAL | Age: 69
End: 2019-03-07

## 2019-03-07 ENCOUNTER — INFUSION (OUTPATIENT)
Dept: INFUSION THERAPY | Facility: HOSPITAL | Age: 69
End: 2019-03-07
Attending: INTERNAL MEDICINE
Payer: COMMERCIAL

## 2019-03-07 DIAGNOSIS — D53.9 MACROCYTIC ANEMIA: ICD-10-CM

## 2019-03-07 DIAGNOSIS — L65.9 HAIR LOSS: ICD-10-CM

## 2019-03-07 DIAGNOSIS — I25.84 CORONARY ARTERY DISEASE DUE TO CALCIFIED CORONARY LESION: ICD-10-CM

## 2019-03-07 DIAGNOSIS — R73.03 PREDIABETES: ICD-10-CM

## 2019-03-07 DIAGNOSIS — C34.90 MALIGNANT NEOPLASM OF LUNG: Primary | ICD-10-CM

## 2019-03-07 DIAGNOSIS — C34.90 NON-SMALL CELL LUNG CANCER, UNSPECIFIED LATERALITY: Chronic | ICD-10-CM

## 2019-03-07 DIAGNOSIS — I25.10 CORONARY ARTERY DISEASE DUE TO CALCIFIED CORONARY LESION: ICD-10-CM

## 2019-03-07 DIAGNOSIS — Z12.31 ENCOUNTER FOR SCREENING MAMMOGRAM FOR MALIGNANT NEOPLASM OF BREAST: ICD-10-CM

## 2019-03-07 LAB
ALBUMIN SERPL BCP-MCNC: 3.8 G/DL
ALP SERPL-CCNC: 54 U/L
ALT SERPL W/O P-5'-P-CCNC: 26 U/L
ANION GAP SERPL CALC-SCNC: 8 MMOL/L
AST SERPL-CCNC: 31 U/L
BILIRUB SERPL-MCNC: 0.4 MG/DL
BUN SERPL-MCNC: 15 MG/DL
CALCIUM SERPL-MCNC: 10.5 MG/DL
CHLORIDE SERPL-SCNC: 106 MMOL/L
CHOLEST SERPL-MCNC: 218 MG/DL
CHOLEST/HDLC SERPL: 4 {RATIO}
CO2 SERPL-SCNC: 29 MMOL/L
CREAT SERPL-MCNC: 1.1 MG/DL
ERYTHROCYTE [DISTWIDTH] IN BLOOD BY AUTOMATED COUNT: 12.4 %
EST. GFR  (AFRICAN AMERICAN): 59.6 ML/MIN/1.73 M^2
EST. GFR  (NON AFRICAN AMERICAN): 51.7 ML/MIN/1.73 M^2
ESTIMATED AVG GLUCOSE: 108 MG/DL
GLUCOSE SERPL-MCNC: 106 MG/DL
HBA1C MFR BLD HPLC: 5.4 %
HCT VFR BLD AUTO: 34 %
HDLC SERPL-MCNC: 55 MG/DL
HDLC SERPL: 25.2 %
HGB BLD-MCNC: 11 G/DL
IMM GRANULOCYTES # BLD AUTO: 0.02 K/UL
LDLC SERPL CALC-MCNC: 138.8 MG/DL
MCH RBC QN AUTO: 32.5 PG
MCHC RBC AUTO-ENTMCNC: 32.4 G/DL
MCV RBC AUTO: 101 FL
NEUTROPHILS # BLD AUTO: 2.8 K/UL
NONHDLC SERPL-MCNC: 163 MG/DL
PLATELET # BLD AUTO: 204 K/UL
PMV BLD AUTO: 10.5 FL
POTASSIUM SERPL-SCNC: 4.7 MMOL/L
PROT SERPL-MCNC: 6.7 G/DL
RBC # BLD AUTO: 3.38 M/UL
SODIUM SERPL-SCNC: 143 MMOL/L
TRIGL SERPL-MCNC: 121 MG/DL
TSH SERPL DL<=0.005 MIU/L-ACNC: 0.93 UIU/ML
VIT B12 SERPL-MCNC: 913 PG/ML
WBC # BLD AUTO: 5.37 K/UL

## 2019-03-07 PROCEDURE — 96523 IRRIG DRUG DELIVERY DEVICE: CPT

## 2019-03-07 PROCEDURE — 77063 BREAST TOMOSYNTHESIS BI: CPT | Mod: 26,,, | Performed by: RADIOLOGY

## 2019-03-07 PROCEDURE — 77067 SCR MAMMO BI INCL CAD: CPT | Mod: 26,,, | Performed by: RADIOLOGY

## 2019-03-07 PROCEDURE — 85027 COMPLETE CBC AUTOMATED: CPT

## 2019-03-07 PROCEDURE — 82607 VITAMIN B-12: CPT

## 2019-03-07 PROCEDURE — 25000003 PHARM REV CODE 250: Performed by: INTERNAL MEDICINE

## 2019-03-07 PROCEDURE — 83036 HEMOGLOBIN GLYCOSYLATED A1C: CPT

## 2019-03-07 PROCEDURE — 83921 ORGANIC ACID SINGLE QUANT: CPT

## 2019-03-07 PROCEDURE — 63600175 PHARM REV CODE 636 W HCPCS: Performed by: INTERNAL MEDICINE

## 2019-03-07 PROCEDURE — 77067 MAMMO DIGITAL SCREENING BILAT WITH TOMOSYNTHESIS_CAD: ICD-10-PCS | Mod: 26,,, | Performed by: RADIOLOGY

## 2019-03-07 PROCEDURE — 77063 MAMMO DIGITAL SCREENING BILAT WITH TOMOSYNTHESIS_CAD: ICD-10-PCS | Mod: 26,,, | Performed by: RADIOLOGY

## 2019-03-07 PROCEDURE — 80061 LIPID PANEL: CPT

## 2019-03-07 PROCEDURE — A4216 STERILE WATER/SALINE, 10 ML: HCPCS | Performed by: INTERNAL MEDICINE

## 2019-03-07 PROCEDURE — 84443 ASSAY THYROID STIM HORMONE: CPT

## 2019-03-07 PROCEDURE — 80053 COMPREHEN METABOLIC PANEL: CPT

## 2019-03-07 PROCEDURE — 77067 SCR MAMMO BI INCL CAD: CPT | Mod: TC

## 2019-03-07 RX ORDER — HEPARIN 100 UNIT/ML
500 SYRINGE INTRAVENOUS
Status: CANCELLED | OUTPATIENT
Start: 2019-03-07

## 2019-03-07 RX ORDER — SODIUM CHLORIDE 0.9 % (FLUSH) 0.9 %
10 SYRINGE (ML) INJECTION
Status: COMPLETED | OUTPATIENT
Start: 2019-03-07 | End: 2019-03-07

## 2019-03-07 RX ORDER — SODIUM CHLORIDE 0.9 % (FLUSH) 0.9 %
10 SYRINGE (ML) INJECTION
Status: CANCELLED | OUTPATIENT
Start: 2019-03-07

## 2019-03-07 RX ORDER — HEPARIN 100 UNIT/ML
500 SYRINGE INTRAVENOUS
Status: COMPLETED | OUTPATIENT
Start: 2019-03-07 | End: 2019-03-07

## 2019-03-07 RX ADMIN — Medication 10 ML: at 10:03

## 2019-03-07 RX ADMIN — HEPARIN SODIUM (PORCINE) LOCK FLUSH IV SOLN 100 UNIT/ML 500 UNITS: 100 SOLUTION at 10:03

## 2019-03-07 NOTE — PROGRESS NOTES
REHAB SERVICES OUTPATIENT DISCHARGE SUMMARY  Physical Therapy      Name:  Alesha Toney  Date:  3/7/19  Date of Evaluation:  3/14/18  Physician:  Greyson Bansal MD  Total # Of Visits:  5   Diagnosis:  No diagnosis found.    Physical/Functional Status:  At time of discharge, patient status unknown 2/2 to pt not attending PT since 4/17/18.     The patient is to be discharged from our Therapy service for the following reason(s):  Patient has not attended therapy since 4/17/18    Degree of Goal Achievement:  Unable to determine    Discharge Plan:  Other:  DC from PT

## 2019-03-08 ENCOUNTER — PATIENT MESSAGE (OUTPATIENT)
Dept: INTERNAL MEDICINE | Facility: CLINIC | Age: 69
End: 2019-03-08

## 2019-03-08 ENCOUNTER — HOSPITAL ENCOUNTER (OUTPATIENT)
Dept: RADIOLOGY | Facility: HOSPITAL | Age: 69
Discharge: HOME | End: 2019-03-08
Attending: INTERNAL MEDICINE
Payer: COMMERCIAL

## 2019-03-08 DIAGNOSIS — E04.1 THYROID NODULE: ICD-10-CM

## 2019-03-08 PROCEDURE — 76536 US SOFT TISSUE HEAD NECK THYROID: ICD-10-PCS | Mod: 26,,, | Performed by: RADIOLOGY

## 2019-03-08 PROCEDURE — 76536 US EXAM OF HEAD AND NECK: CPT | Mod: TC

## 2019-03-08 PROCEDURE — 76536 US EXAM OF HEAD AND NECK: CPT | Mod: 26,,, | Performed by: RADIOLOGY

## 2019-03-09 ENCOUNTER — PATIENT MESSAGE (OUTPATIENT)
Dept: INTERNAL MEDICINE | Facility: CLINIC | Age: 69
End: 2019-03-09

## 2019-03-09 DIAGNOSIS — F41.9 ANXIETY: ICD-10-CM

## 2019-03-09 DIAGNOSIS — E04.1 THYROID NODULE: ICD-10-CM

## 2019-03-09 RX ORDER — DIAZEPAM 5 MG/1
5 TABLET ORAL EVERY 8 HOURS PRN
Qty: 60 TABLET | Refills: 0 | Status: SHIPPED | OUTPATIENT
Start: 2019-03-09 | End: 2019-05-31 | Stop reason: SDUPTHER

## 2019-03-12 LAB — METHYLMALONATE SERPL-SCNC: 0.18 UMOL/L

## 2019-03-13 ENCOUNTER — PATIENT MESSAGE (OUTPATIENT)
Dept: HEMATOLOGY/ONCOLOGY | Facility: CLINIC | Age: 69
End: 2019-03-13

## 2019-03-13 NOTE — TELEPHONE ENCOUNTER
You mentioned at her prior questioning on her hair thinning, this is most likely not a delayed reaction to chemo---  Maybe stress induced?  terrell

## 2019-03-14 ENCOUNTER — PATIENT MESSAGE (OUTPATIENT)
Dept: INTERNAL MEDICINE | Facility: CLINIC | Age: 69
End: 2019-03-14

## 2019-03-14 DIAGNOSIS — L65.9 HAIR LOSS: Primary | ICD-10-CM

## 2019-03-19 NOTE — PROGRESS NOTES
Subjective:      Patient ID: Alesha Toney is a 68 y.o. female.    Chief Complaint:  Thyroid Problem (New patient )    History of Present Illness  Alesha Toney presents today for Evaluation & management of a thyroid nodule. This is her first visit with me.     She has a history of lung cancer and was found incidentally on CT scan. She went through surgery (no mets) she went through chemo.     CT Scan:  9/22/2018  FINDINGS:  Base of Neck: Stable 14 mm left thyroid nodule.    6/12/2018  Nodule left inferior thyroid pole 0.8 cm stable.    Last thyroid US 3/8/2019  FINDINGS:  The right and left lobes of the thyroid measure 4.9 x 1.7 x 1.3 cm and 4.7 x 1.7 x 1.3 cm respectively.  The right lobe contains a 0.5 cm hypoechoic focus within its midportion.  The left lobe contains 2 solid mildly hypoechoic nodules both of which within the lower lobes the largest of which measuring 1.4 x 1 x 1 cm which is the more superficial of the 2.  There are no enlarged cervical lymph nodes.      Impression     Bilateral thyroid nodules the largest of which on the left meets the size criteria for fine needle aspiration.     No difficulty breathing or swallowing   occ feels a globus sensation   No voice changes  No FH of thyroid cancer  No personal history of radiation treatment or exposure     + weight loss 2 surgeries for lung CA & Chemo  No bowel changes  No heat or cold intolerance   No nail or skin changes  + hair loss   No cp, palpations  + sob (occ with lobectomy)     Review of Systems   Constitutional: Negative for fatigue and unexpected weight change.   Eyes: Negative for visual disturbance.   Respiratory: Positive for shortness of breath (occ). Negative for cough.    Cardiovascular: Negative for chest pain.   Gastrointestinal: Negative for abdominal pain.   Endocrine: Negative for cold intolerance, heat intolerance, polydipsia, polyphagia and polyuria.   Musculoskeletal: Negative for arthralgias.   Skin: Negative for wound.    Neurological: Negative for headaches.   Hematological: Does not bruise/bleed easily.   Psychiatric/Behavioral: Negative for sleep disturbance.     Objective:   Physical Exam   Constitutional: She appears well-developed.   HENT:   Right Ear: External ear normal.   Left Ear: External ear normal.   Nose: Nose normal.   Hearing Normal     Neck: No tracheal deviation present. Thyromegaly present.   Cardiovascular: Normal rate.   No murmur heard.  No edema present   Pulmonary/Chest: Effort normal and breath sounds normal.   Left chest port   Abdominal: Soft. She exhibits no mass. No hernia.   Neurological: She is alert. No cranial nerve deficit or sensory deficit.   Skin: No rash noted.   Left thyroid nodule palpable    Psychiatric: She has a normal mood and affect. Judgment normal.   Vitals reviewed.    Body mass index is 26.49 kg/m².    Lab Review:   Lab Results   Component Value Date    HGBA1C 5.4 03/07/2019     Lab Results   Component Value Date    CHOL 218 (H) 03/07/2019    HDL 55 03/07/2019    LDLCALC 138.8 03/07/2019    TRIG 121 03/07/2019    CHOLHDL 25.2 03/07/2019     Lab Results   Component Value Date     03/07/2019    K 4.7 03/07/2019     03/07/2019    CO2 29 03/07/2019     03/07/2019    BUN 15 03/07/2019    CREATININE 1.1 03/07/2019    CALCIUM 10.5 03/07/2019    PROT 6.7 03/07/2019    ALBUMIN 3.8 03/07/2019    BILITOT 0.4 03/07/2019    ALKPHOS 54 (L) 03/07/2019    AST 31 03/07/2019    ALT 26 03/07/2019    ANIONGAP 8 03/07/2019    ESTGFRAFRICA 59.6 (A) 03/07/2019    EGFRNONAA 51.7 (A) 03/07/2019    TSH 0.932 03/07/2019       Assessment and Plan     1. Thyroid nodule  US FNA Thyroid, 1st Lesion     Thyroid nodule  I have reviewed management options including observation, FNA or surgery.  All of the patients questions were answered.  Will proceed with FNA  Discussed indications for repeat FNA as well as surgical indications (abnormal FNA, compressive symptoms or interval change).  Discussed  possible results of FNA- Benign, Malignant, FLUS, or insufficient cells.   She is not on any blood thinners     If FNA is negative then plan RTO in 1 year with TSH and thyroid u/s prior      Follow up in about 1 year (around 3/25/2020).

## 2019-03-21 ENCOUNTER — INITIAL CONSULT (OUTPATIENT)
Dept: DERMATOLOGY | Facility: CLINIC | Age: 69
End: 2019-03-21
Payer: COMMERCIAL

## 2019-03-21 DIAGNOSIS — L65.0 TELOGEN EFFLUVIUM: ICD-10-CM

## 2019-03-21 DIAGNOSIS — L65.9 HAIR LOSS DISORDER: Primary | ICD-10-CM

## 2019-03-21 PROCEDURE — 1101F PR PT FALLS ASSESS DOC 0-1 FALLS W/OUT INJ PAST YR: ICD-10-PCS | Mod: CPTII,S$GLB,, | Performed by: NURSE PRACTITIONER

## 2019-03-21 PROCEDURE — 99999 PR PBB SHADOW E&M-EST. PATIENT-LVL II: ICD-10-PCS | Mod: PBBFAC,,, | Performed by: NURSE PRACTITIONER

## 2019-03-21 PROCEDURE — 99202 OFFICE O/P NEW SF 15 MIN: CPT | Mod: S$GLB,,, | Performed by: NURSE PRACTITIONER

## 2019-03-21 PROCEDURE — 1101F PT FALLS ASSESS-DOCD LE1/YR: CPT | Mod: CPTII,S$GLB,, | Performed by: NURSE PRACTITIONER

## 2019-03-21 PROCEDURE — 99999 PR PBB SHADOW E&M-EST. PATIENT-LVL II: CPT | Mod: PBBFAC,,, | Performed by: NURSE PRACTITIONER

## 2019-03-21 PROCEDURE — 99202 PR OFFICE/OUTPT VISIT, NEW, LEVL II, 15-29 MIN: ICD-10-PCS | Mod: S$GLB,,, | Performed by: NURSE PRACTITIONER

## 2019-03-21 RX ORDER — BIMATOPROST 3 UG/ML
SOLUTION TOPICAL
Qty: 3 ML | Refills: 6 | Status: SHIPPED | OUTPATIENT
Start: 2019-03-21 | End: 2020-06-03

## 2019-03-21 RX ORDER — KETOCONAZOLE 20 MG/ML
SHAMPOO, SUSPENSION TOPICAL
Qty: 120 ML | Refills: 5 | Status: SHIPPED | OUTPATIENT
Start: 2019-03-21 | End: 2019-05-29

## 2019-03-21 RX ORDER — BIMATOPROST 3 UG/ML
SOLUTION TOPICAL
Qty: 3 ML | Refills: 6 | Status: SHIPPED | OUTPATIENT
Start: 2019-03-21 | End: 2019-03-21 | Stop reason: SDUPTHER

## 2019-03-21 NOTE — LETTER
March 21, 2019      Yolanda Katz MD  [address]  [place] [address]           [facility]  [address]  [place] [address]  Phone: [phone]  Fax: [phone]          Patient: Alesha Toney   MR Number: [id number]   YOB: 1950   Date of Visit: 3/21/2019       Dear Dr. Yolanda Katz:    Thank you for referring Alesha Toney to me for evaluation. Attached you will find relevant portions of my assessment and plan of care.    If you have questions, please do not hesitate to call me. I look forward to following Alesha Toney along with you.    Sincerely,    Kelley Millard, NP    Enclosure  CC:  No Recipients    If you would like to receive this communication electronically, please contact [email or web address] or [phone] to request more information on [employer] Link access.    For providers and/or their staff who would like to refer a patient to [facility], please contact us through our one-stop-shop provider referral line, [facility], at [phone].    If you feel you have received this communication in error or would no longer like to receive these types of communications, please e-mail [email or web address]

## 2019-03-21 NOTE — PROGRESS NOTES
Subjective:       Patient ID:  Alesha Toney is a 68 y.o. female who presents for   Chief Complaint   Patient presents with    Hair Loss     thinning, [id number], no tx      Hair Loss  - Initial  Affected locations: scalp  Duration: 2 months  Severity: mild to moderate  Timing: constant  Aggravated by: nothing  Relieving factors/Treatments tried: nothing  Improvement on treatment: no relief      S/p chemo- started Nov 10, 2018 Q 3 weeks for 4 cycles last cycle Jan 2019    Reports diagnosed w/ lung cancer in May 2018  S/p RUL lobectomy 8/2018  ACF surgery  9/2019      Review of Systems   Constitutional: Positive for fatigue. Negative for fever and chills.   Genitourinary: Negative for irregular periods (total hysterectomy ).        Objective:    Physical Exam   Constitutional: She appears well-developed and well-nourished. No distress.   Neurological: She is alert and oriented to person, place, and time. She is not disoriented.   Psychiatric: She has a normal mood and affect.   Skin:   Areas Examined (abnormalities noted in diagram):   Scalp / Hair Palpated and Inspected  Head / Face Inspection Performed  Neck Inspection Performed  Nails and Digits Inspection Performed                  Diagram Legend     Erythematous scaling macule/papule c/w actinic keratosis       Vascular papule c/w angioma      Pigmented verrucoid papule/plaque c/w seborrheic keratosis      Yellow umbilicated papule c/w sebaceous hyperplasia      Irregularly shaped tan macule c/w lentigo     1-2 mm smooth white papules consistent with Milia      Movable subcutaneous cyst with punctum c/w epidermal inclusion cyst      Subcutaneous movable cyst c/w pilar cyst      Firm pink to brown papule c/w dermatofibroma      Pedunculated fleshy papule(s) c/w skin tag(s)      Evenly pigmented macule c/w junctional nevus     Mildly variegated pigmented, slightly irregular-bordered macule c/w mildly atypical nevus      Flesh colored to evenly pigmented papule  c/w intradermal nevus       Pink pearly papule/plaque c/w basal cell carcinoma      Erythematous hyperkeratotic cursted plaque c/w SCC      Surgical scar with no sign of skin cancer recurrence      Open and closed comedones      Inflammatory papules and pustules      Verrucoid papule consistent consistent with wart     Erythematous eczematous patches and plaques     Dystrophic onycholytic nail with subungual debris c/w onychomycosis     Umbilicated papule    Erythematous-base heme-crusted tan verrucoid plaque consistent with inflamed seborrheic keratosis     Erythematous Silvery Scaling Plaque c/w Psoriasis     See annotation    Lab Results   Component Value Date    TSH 0.932 03/07/2019     Lab Results   Component Value Date    WBC 5.37 03/07/2019    HGB 11.0 (L) 03/07/2019    HCT 34.0 (L) 03/07/2019     (H) 03/07/2019     03/07/2019     CMP  Sodium   Date Value Ref Range Status   03/07/2019 143 136 - 145 mmol/L Final     Potassium   Date Value Ref Range Status   03/07/2019 4.7 3.5 - 5.1 mmol/L Final     Chloride   Date Value Ref Range Status   03/07/2019 106 95 - 110 mmol/L Final     CO2   Date Value Ref Range Status   03/07/2019 29 23 - 29 mmol/L Final     Glucose   Date Value Ref Range Status   03/07/2019 106 70 - 110 mg/dL Final     BUN, Bld   Date Value Ref Range Status   03/07/2019 15 8 - 23 mg/dL Final     Creatinine   Date Value Ref Range Status   03/07/2019 1.1 0.5 - 1.4 mg/dL Final     Calcium   Date Value Ref Range Status   03/07/2019 10.5 8.7 - 10.5 mg/dL Final     Total Protein   Date Value Ref Range Status   03/07/2019 6.7 6.0 - 8.4 g/dL Final     Albumin   Date Value Ref Range Status   03/07/2019 3.8 3.5 - 5.2 g/dL Final     Total Bilirubin   Date Value Ref Range Status   03/07/2019 0.4 0.1 - 1.0 mg/dL Final     Comment:     For infants and newborns, interpretation of results should be based  on gestational age, weight and in agreement with clinical  observations.  Premature Infant  recommended reference ranges:  Up to 24 hours.............<8.0 mg/dL  Up to 48 hours............<12.0 mg/dL  3-5 days..................<15.0 mg/dL  6-29 days.................<15.0 mg/dL       Alkaline Phosphatase   Date Value Ref Range Status   03/07/2019 54 (L) 55 - 135 U/L Final     AST   Date Value Ref Range Status   03/07/2019 31 10 - 40 U/L Final     ALT   Date Value Ref Range Status   03/07/2019 26 10 - 44 U/L Final     Anion Gap   Date Value Ref Range Status   03/07/2019 8 8 - 16 mmol/L Final     eGFR if    Date Value Ref Range Status   03/07/2019 59.6 (A) >60 mL/min/1.73 m^2 Final     eGFR if non    Date Value Ref Range Status   03/07/2019 51.7 (A) >60 mL/min/1.73 m^2 Final     Comment:     Calculation used to obtain the estimated glomerular filtration  rate (eGFR) is the CKD-EPI equation.            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 However, exam & h/o consistent w/ TE  -     ketoconazole (NIZORAL) 2 % shampoo; Wash hair with medicated shampoo at least 2x/week - let sit on scalp at least 5 minutes prior to rinsing  Dispense: 120 mL; Refill: 5  -     Discontinue: bimatoprost (LATISSE) 0.03 % ophthalmic solution; Place one drop on applicator and apply evenly along the skin of the upper eyelid at base of eyelashes qhs; repeat for second eye  Dispense: 3 mL; Refill: 6  -     bimatoprost (LATISSE) 0.03 % ophthalmic solution; Place one drop on applicator and apply evenly along the skin of the upper eyelid at base of eyelashes qhs; repeat for second eye  Dispense: 3 mL; Refill: 6                 Follow-up if symptoms worsen or fail to improve.

## 2019-03-25 ENCOUNTER — OFFICE VISIT (OUTPATIENT)
Dept: ENDOCRINOLOGY | Facility: CLINIC | Age: 69
End: 2019-03-25
Payer: COMMERCIAL

## 2019-03-25 VITALS
HEART RATE: 88 BPM | WEIGHT: 164.13 LBS | BODY MASS INDEX: 26.38 KG/M2 | SYSTOLIC BLOOD PRESSURE: 130 MMHG | HEIGHT: 66 IN | DIASTOLIC BLOOD PRESSURE: 82 MMHG

## 2019-03-25 DIAGNOSIS — E04.1 THYROID NODULE: Primary | ICD-10-CM

## 2019-03-25 PROCEDURE — 3075F PR MOST RECENT SYSTOLIC BLOOD PRESS GE 130-139MM HG: ICD-10-PCS | Mod: CPTII,S$GLB,, | Performed by: NURSE PRACTITIONER

## 2019-03-25 PROCEDURE — 3075F SYST BP GE 130 - 139MM HG: CPT | Mod: CPTII,S$GLB,, | Performed by: NURSE PRACTITIONER

## 2019-03-25 PROCEDURE — 99204 PR OFFICE/OUTPT VISIT, NEW, LEVL IV, 45-59 MIN: ICD-10-PCS | Mod: S$GLB,,, | Performed by: NURSE PRACTITIONER

## 2019-03-25 PROCEDURE — 99204 OFFICE O/P NEW MOD 45 MIN: CPT | Mod: S$GLB,,, | Performed by: NURSE PRACTITIONER

## 2019-03-25 PROCEDURE — 3079F DIAST BP 80-89 MM HG: CPT | Mod: CPTII,S$GLB,, | Performed by: NURSE PRACTITIONER

## 2019-03-25 PROCEDURE — 99999 PR PBB SHADOW E&M-EST. PATIENT-LVL III: CPT | Mod: PBBFAC,,, | Performed by: NURSE PRACTITIONER

## 2019-03-25 PROCEDURE — 3079F PR MOST RECENT DIASTOLIC BLOOD PRESSURE 80-89 MM HG: ICD-10-PCS | Mod: CPTII,S$GLB,, | Performed by: NURSE PRACTITIONER

## 2019-03-25 PROCEDURE — 1101F PR PT FALLS ASSESS DOC 0-1 FALLS W/OUT INJ PAST YR: ICD-10-PCS | Mod: CPTII,S$GLB,, | Performed by: NURSE PRACTITIONER

## 2019-03-25 PROCEDURE — 1101F PT FALLS ASSESS-DOCD LE1/YR: CPT | Mod: CPTII,S$GLB,, | Performed by: NURSE PRACTITIONER

## 2019-03-25 PROCEDURE — 99999 PR PBB SHADOW E&M-EST. PATIENT-LVL III: ICD-10-PCS | Mod: PBBFAC,,, | Performed by: NURSE PRACTITIONER

## 2019-03-25 NOTE — ASSESSMENT & PLAN NOTE
I have reviewed management options including observation, FNA or surgery.  All of the patients questions were answered.  Will proceed with FNA  Discussed indications for repeat FNA as well as surgical indications (abnormal FNA, compressive symptoms or interval change).  Discussed possible results of FNA- Benign, Malignant, FLUS, or insufficient cells.   She is not on any blood thinners     If FNA is negative then plan RTO in 1 year with TSH and thyroid u/s prior

## 2019-03-25 NOTE — LETTER
March 25, 2019      Yolanda Katz MD  [address]  [place] [address]           [facility]  [address]  [place] [address]  Phone: [phone]          Patient: Alesha Toney   MR Number: [id number]   YOB: 1950   Date of Visit: 3/25/2019       Dear Dr. Yolanda Katz:    Thank you for referring Alesha Toney to me for evaluation. Attached you will find relevant portions of my assessment and plan of care.    If you have questions, please do not hesitate to call me. I look forward to following Alesha Toney along with you.    Sincerely,    Lili Clark, DOREEN    Enclosure  CC:  No Recipients    If you would like to receive this communication electronically, please contact [email or web address] or [phone] to request more information on [employer] Link access.    For providers and/or their staff who would like to refer a patient to [facility], please contact us through our one-stop-shop provider referral line, [facility], at [phone].    If you feel you have received this communication in error or would no longer like to receive these types of communications, please e-mail [email or web address]

## 2019-03-28 ENCOUNTER — PATIENT MESSAGE (OUTPATIENT)
Dept: ENDOCRINOLOGY | Facility: CLINIC | Age: 69
End: 2019-03-28

## 2019-03-29 ENCOUNTER — PATIENT MESSAGE (OUTPATIENT)
Dept: ENDOCRINOLOGY | Facility: CLINIC | Age: 69
End: 2019-03-29

## 2019-04-02 ENCOUNTER — PATIENT MESSAGE (OUTPATIENT)
Dept: HEMATOLOGY/ONCOLOGY | Facility: CLINIC | Age: 69
End: 2019-04-02

## 2019-04-02 DIAGNOSIS — C34.90 MALIGNANT NEOPLASM OF LUNG, UNSPECIFIED LATERALITY, UNSPECIFIED PART OF LUNG: Primary | ICD-10-CM

## 2019-04-09 ENCOUNTER — HOSPITAL ENCOUNTER (OUTPATIENT)
Dept: ENDOCRINOLOGY | Facility: CLINIC | Age: 69
Discharge: HOME | End: 2019-04-09
Attending: NURSE PRACTITIONER
Payer: COMMERCIAL

## 2019-04-09 ENCOUNTER — INFUSION (OUTPATIENT)
Dept: INFUSION THERAPY | Facility: HOSPITAL | Age: 69
End: 2019-04-09
Attending: INTERNAL MEDICINE
Payer: COMMERCIAL

## 2019-04-09 DIAGNOSIS — C34.90 MALIGNANT NEOPLASM OF LUNG: Primary | ICD-10-CM

## 2019-04-09 DIAGNOSIS — E04.1 THYROID NODULE: ICD-10-CM

## 2019-04-09 PROCEDURE — A4216 STERILE WATER/SALINE, 10 ML: HCPCS | Performed by: INTERNAL MEDICINE

## 2019-04-09 PROCEDURE — 88173 CYTOPATH EVAL FNA REPORT: CPT | Mod: 26,,, | Performed by: PATHOLOGY

## 2019-04-09 PROCEDURE — 88173 CYTOLOGY SPECIMEN-FNA NON-RADIOLOGY CLINICIAN PERFORMED W/O ON SITE: ICD-10-PCS | Mod: 26,,, | Performed by: PATHOLOGY

## 2019-04-09 PROCEDURE — 25000003 PHARM REV CODE 250: Performed by: INTERNAL MEDICINE

## 2019-04-09 PROCEDURE — 88173 CYTOPATH EVAL FNA REPORT: CPT | Performed by: PATHOLOGY

## 2019-04-09 PROCEDURE — 10005 US FINE NEEDLE ASPIRATION THYROID, FIRST LESION: ICD-10-PCS | Mod: S$GLB,,, | Performed by: INTERNAL MEDICINE

## 2019-04-09 PROCEDURE — 63600175 PHARM REV CODE 636 W HCPCS: Performed by: INTERNAL MEDICINE

## 2019-04-09 PROCEDURE — 10005 FNA BX W/US GDN 1ST LES: CPT

## 2019-04-09 PROCEDURE — 10005 FNA BX W/US GDN 1ST LES: CPT | Mod: S$GLB,,, | Performed by: INTERNAL MEDICINE

## 2019-04-09 PROCEDURE — 96523 IRRIG DRUG DELIVERY DEVICE: CPT

## 2019-04-09 RX ORDER — HEPARIN 100 UNIT/ML
500 SYRINGE INTRAVENOUS
Status: CANCELLED | OUTPATIENT
Start: 2019-04-09

## 2019-04-09 RX ORDER — HEPARIN 100 UNIT/ML
500 SYRINGE INTRAVENOUS
Status: COMPLETED | OUTPATIENT
Start: 2019-04-09 | End: 2019-04-09

## 2019-04-09 RX ORDER — SODIUM CHLORIDE 0.9 % (FLUSH) 0.9 %
10 SYRINGE (ML) INJECTION
Status: CANCELLED | OUTPATIENT
Start: 2019-04-09

## 2019-04-09 RX ORDER — SODIUM CHLORIDE 0.9 % (FLUSH) 0.9 %
10 SYRINGE (ML) INJECTION
Status: COMPLETED | OUTPATIENT
Start: 2019-04-09 | End: 2019-04-09

## 2019-04-09 RX ADMIN — HEPARIN 500 UNITS: 100 SYRINGE at 01:04

## 2019-04-09 RX ADMIN — Medication 10 ML: at 01:04

## 2019-04-09 NOTE — NURSING
Patient here for port flush-left chest port accesses easily without blood return- after much flushing and changing position-no pain or swelling at site-tolerated well.

## 2019-04-11 ENCOUNTER — RESEARCH ENCOUNTER (OUTPATIENT)
Dept: HEMATOLOGY/ONCOLOGY | Facility: CLINIC | Age: 69
End: 2019-04-11

## 2019-04-11 NOTE — PROGRESS NOTES
Alesha Toney  # [id number]  ALCHEMIST  ([id number])  04/11/2019    Pt ID # [id number]     NON-ENROLLMENT    Pt has fallen out of window to enroll onto the treatment trial [id number]. Per our previous conversation at her last visit pt did not want me to call her about it. She stated she would call me if she was interested. Pt understood she had until 4/6/19 in order to proceed.     Non-Enrollment Form will be completed in [facility]. Per section 6.1 pt will be followed every 6 months for 5 years on [id number].

## 2019-05-06 ENCOUNTER — PATIENT MESSAGE (OUTPATIENT)
Dept: HEMATOLOGY/ONCOLOGY | Facility: CLINIC | Age: 69
End: 2019-05-06

## 2019-05-06 DIAGNOSIS — T45.1X5A CHEMOTHERAPY-INDUCED PERIPHERAL NEUROPATHY: Primary | ICD-10-CM

## 2019-05-06 DIAGNOSIS — G62.0 CHEMOTHERAPY-INDUCED PERIPHERAL NEUROPATHY: Primary | ICD-10-CM

## 2019-05-06 RX ORDER — GABAPENTIN 300 MG/1
300 CAPSULE ORAL NIGHTLY
COMMUNITY
End: 2019-05-06 | Stop reason: SDUPTHER

## 2019-05-06 RX ORDER — GABAPENTIN 300 MG/1
300 CAPSULE ORAL NIGHTLY
Qty: 30 CAPSULE | Refills: 6 | Status: SHIPPED | OUTPATIENT
Start: 2019-05-06 | End: 2019-05-23

## 2019-05-06 NOTE — TELEPHONE ENCOUNTER
She is not on active chemo. She is requesting NORCO for neuropathy.     NORCO will not help with neuropathy pain very well, right?    Would you like to place referral for neurology or start nel ?      ~sol

## 2019-05-08 ENCOUNTER — PATIENT MESSAGE (OUTPATIENT)
Dept: HEMATOLOGY/ONCOLOGY | Facility: CLINIC | Age: 69
End: 2019-05-08

## 2019-05-13 ENCOUNTER — PATIENT MESSAGE (OUTPATIENT)
Dept: HEMATOLOGY/ONCOLOGY | Facility: CLINIC | Age: 69
End: 2019-05-13

## 2019-05-15 ENCOUNTER — PATIENT MESSAGE (OUTPATIENT)
Dept: HEMATOLOGY/ONCOLOGY | Facility: CLINIC | Age: 69
End: 2019-05-15

## 2019-05-15 DIAGNOSIS — C34.90 NON-SMALL CELL LUNG CANCER, UNSPECIFIED LATERALITY: Primary | ICD-10-CM

## 2019-05-20 ENCOUNTER — PATIENT MESSAGE (OUTPATIENT)
Dept: HEMATOLOGY/ONCOLOGY | Facility: CLINIC | Age: 69
End: 2019-05-20

## 2019-05-21 ENCOUNTER — PATIENT MESSAGE (OUTPATIENT)
Dept: HEMATOLOGY/ONCOLOGY | Facility: CLINIC | Age: 69
End: 2019-05-21

## 2019-05-21 NOTE — TELEPHONE ENCOUNTER
Peer to peer for her CT chest which is scheduled for tomorrow  Phone number is [phone].  Case number is [id number]    Would you like me to get them on the line?    terrell

## 2019-05-23 ENCOUNTER — OFFICE VISIT (OUTPATIENT)
Dept: HEMATOLOGY/ONCOLOGY | Facility: CLINIC | Age: 69
End: 2019-05-23
Payer: COMMERCIAL

## 2019-05-23 ENCOUNTER — PATIENT MESSAGE (OUTPATIENT)
Dept: HEMATOLOGY/ONCOLOGY | Facility: CLINIC | Age: 69
End: 2019-05-23

## 2019-05-23 ENCOUNTER — TELEPHONE (OUTPATIENT)
Dept: HEMATOLOGY/ONCOLOGY | Facility: CLINIC | Age: 69
End: 2019-05-23

## 2019-05-23 VITALS
BODY MASS INDEX: 26.26 KG/M2 | TEMPERATURE: 98 F | HEIGHT: 66 IN | DIASTOLIC BLOOD PRESSURE: 69 MMHG | HEART RATE: 63 BPM | WEIGHT: 163.38 LBS | OXYGEN SATURATION: 100 % | RESPIRATION RATE: 18 BRPM | SYSTOLIC BLOOD PRESSURE: 160 MMHG

## 2019-05-23 DIAGNOSIS — G62.9 NEUROPATHY: ICD-10-CM

## 2019-05-23 DIAGNOSIS — Z85.118 HISTORY OF LUNG CANCER: Primary | ICD-10-CM

## 2019-05-23 DIAGNOSIS — N18.30 STAGE 3 CHRONIC KIDNEY DISEASE: ICD-10-CM

## 2019-05-23 PROCEDURE — 3077F SYST BP >= 140 MM HG: CPT | Mod: CPTII,S$GLB,, | Performed by: INTERNAL MEDICINE

## 2019-05-23 PROCEDURE — 99999 PR PBB SHADOW E&M-EST. PATIENT-LVL V: ICD-10-PCS | Mod: PBBFAC,,, | Performed by: INTERNAL MEDICINE

## 2019-05-23 PROCEDURE — 3078F DIAST BP <80 MM HG: CPT | Mod: CPTII,S$GLB,, | Performed by: INTERNAL MEDICINE

## 2019-05-23 PROCEDURE — 1101F PR PT FALLS ASSESS DOC 0-1 FALLS W/OUT INJ PAST YR: ICD-10-PCS | Mod: CPTII,S$GLB,, | Performed by: INTERNAL MEDICINE

## 2019-05-23 PROCEDURE — 3077F PR MOST RECENT SYSTOLIC BLOOD PRESSURE >= 140 MM HG: ICD-10-PCS | Mod: CPTII,S$GLB,, | Performed by: INTERNAL MEDICINE

## 2019-05-23 PROCEDURE — 99999 PR PBB SHADOW E&M-EST. PATIENT-LVL V: CPT | Mod: PBBFAC,,, | Performed by: INTERNAL MEDICINE

## 2019-05-23 PROCEDURE — 99214 PR OFFICE/OUTPT VISIT, EST, LEVL IV, 30-39 MIN: ICD-10-PCS | Mod: S$GLB,,, | Performed by: INTERNAL MEDICINE

## 2019-05-23 PROCEDURE — 99214 OFFICE O/P EST MOD 30 MIN: CPT | Mod: S$GLB,,, | Performed by: INTERNAL MEDICINE

## 2019-05-23 PROCEDURE — 1101F PT FALLS ASSESS-DOCD LE1/YR: CPT | Mod: CPTII,S$GLB,, | Performed by: INTERNAL MEDICINE

## 2019-05-23 PROCEDURE — 3078F PR MOST RECENT DIASTOLIC BLOOD PRESSURE < 80 MM HG: ICD-10-PCS | Mod: CPTII,S$GLB,, | Performed by: INTERNAL MEDICINE

## 2019-05-23 RX ORDER — ACETAMINOPHEN AND CODEINE PHOSPHATE 300; 30 MG/1; MG/1
1 TABLET ORAL EVERY 8 HOURS PRN
Qty: 60 TABLET | Refills: 0 | Status: SHIPPED | OUTPATIENT
Start: 2019-05-23 | End: 2019-06-02

## 2019-05-23 NOTE — PROGRESS NOTES
"Subjective:       Patient ID: Alesha Toney is a 68 y.o. female.    Chief Complaint: Non-small cell lung cancer, unspecified laterality  Ms. Alesha Toney is a 67-year-old otherwise healthy female who started having some shoulder pain, which was lasting for over six weeks.  She went and saw her primary care physician and underwent an x-ray, which revealed right upper lobe lung mass worrisome for malignancy and a CT scan was recommended, which was done on 6/12/18 and that revealed a newly developing mass, pleural based, lateral posterior segment, right upper lobe 3.5 x 3.5 cm, surrounding stellate margin and patchy infiltrates, particularly superiorly,   anteriorly infiltrates extend perihilar into the anterior segment.  She then underwent CT-guided biopsy, which revealed well-differentiated adenocarcinoma.       Her PET scan from 6/29/18 There is physiologic intracranial, head, and neck activity.  There is a large right upper lobe mass SUV max 9.11.  No local or distant metastatic disease seen.  There is physiologic liver, spleen, GI and  activity.  There are a few liver cysts.  No adenopathy is seen.  The adrenal glands are not enlarged.  No adenopathy is seen.  No suspicious bone lesions are seen.     She underwent VATS with right upper lobectomy. Mediastinal lymphadenectomy on 8/9/18. Pathology revealed "Tumor site: Upper lobe.Tumor size: 7 x 4 x 2.7 cm.Tumor focality: Single tumor.  Histologic type: Mixed invasive mucinous and nonmucinous adenocarcinoma. Histologic grade: G2: Moderately differentiated.Spread through air spaces (STATS): Present. Visceral pleura invasion: Not identified.  Lymphovascular invasion: Not identified. Direct invasion of adjacent structures: No adjacent structures present. Margins: All margins are uninvolved by carcinoma.Distance of invasive carcinoma from closest margin: 1 cm from the bronchial margin.Treatment effect: No known presurgical therapy. Regional lymph nodes: Number of " "lymph nodes involved: 0. Number of lymph nodes examined: 11. Pathologic Stage Classification: pT3 N0"        She completed 4 cycles of adjuvant chemo with Cisplatin and Alimta as of 1/10/19   She is on surveillance.    Renuka comes in to review labs, She continues to have cough and shortness of breath but her insurance is not approving her CT chest    Review of Systems   Constitutional: Negative for appetite change and unexpected weight change.   Eyes: Negative for visual disturbance.   Respiratory: Positive for shortness of breath. Negative for cough.    Cardiovascular: Positive for chest pain.   Gastrointestinal: Negative for abdominal pain and diarrhea.   Genitourinary: Negative for frequency.   Musculoskeletal: Negative for back pain.   Skin: Negative for rash.   Neurological: Negative for headaches.   Hematological: Negative for adenopathy.   Psychiatric/Behavioral: The patient is not nervous/anxious.        Objective:      Physical Exam   Constitutional: She is oriented to person, place, and time. She appears well-developed and well-nourished.   HENT:   Mouth/Throat: No oropharyngeal exudate.   Cardiovascular: Normal rate and normal heart sounds.   Pulmonary/Chest: Effort normal and breath sounds normal. She has no wheezes.   Abdominal: Soft. Bowel sounds are normal. There is no tenderness.   Musculoskeletal: She exhibits no edema or tenderness.   Lymphadenopathy:     She has no cervical adenopathy.   Neurological: She is alert and oriented to person, place, and time. Coordination normal.   Skin: Skin is warm and dry. No rash noted.   Psychiatric: She has a normal mood and affect. Judgment and thought content normal.   Vitals reviewed.        LABS:  WBC   Date Value Ref Range Status   05/22/2019 5.66 3.90 - 12.70 K/uL Final     Hemoglobin   Date Value Ref Range Status   05/22/2019 12.6 12.0 - 16.0 g/dL Final     POC Hematocrit   Date Value Ref Range Status   08/09/2018 33 (L) 36 - 54 %PCV Final     Hematocrit "   Date Value Ref Range Status   05/22/2019 38.1 37.0 - 48.5 % Final     Platelets   Date Value Ref Range Status   05/22/2019 202 150 - 350 K/uL Final     Gran # (ANC)   Date Value Ref Range Status   05/22/2019 2.3 1.8 - 7.7 K/uL Final     Comment:     The ANC is based on a white cell differential from an   automated cell counter. It has not been microscopically   reviewed for the presence of abnormal cells. Clinical   correlation is required.         Chemistry        Component Value Date/Time     05/22/2019 0810    K 4.6 05/22/2019 0810     05/22/2019 0810    CO2 30 (H) 05/22/2019 0810    BUN 20 05/22/2019 0810    CREATININE 1.3 05/22/2019 0810     (H) 05/22/2019 0810        Component Value Date/Time    CALCIUM 10.7 (H) 05/22/2019 0810    ALKPHOS 50 (L) 05/22/2019 0810    AST 26 05/22/2019 0810    ALT 20 05/22/2019 0810    BILITOT 0.3 05/22/2019 0810    ESTGFRAFRICA 49 (A) 05/22/2019 0810    EGFRNONAA 42 (A) 05/22/2019 0810          Assessment:       1. History of lung cancer    2. Stage 3 chronic kidney disease    3. Neuropathy        Plan:        1. Will re request CT chest as patient continues to have cough and shortness of breath  2. Obtain kidney ultrasound and she will see ehr PCP  3. Refilled Tylenol # 3 and have her see Neurology    Above care plan was discussed with patient and all questions were addressed to her satisfaction     Addendum:  Received peer to peer phone call about the most recent CT chest as patient now has symptoms of cough and shortness of breath. CT chest approved. Will call with results. Next Ct will be in 3-6 months based on results

## 2019-05-23 NOTE — Clinical Note
Please call Nadine at [phone], option 1 Case # [id number] called and they told her they are waiting for a peer to peer from us, but we did not get that info.

## 2019-05-27 DIAGNOSIS — Z85.118 HISTORY OF LUNG CANCER: Primary | ICD-10-CM

## 2019-05-28 ENCOUNTER — HOSPITAL ENCOUNTER (OUTPATIENT)
Dept: RADIOLOGY | Facility: HOSPITAL | Age: 69
Discharge: HOME | End: 2019-05-28
Attending: INTERNAL MEDICINE
Payer: COMMERCIAL

## 2019-05-28 DIAGNOSIS — Z85.118 HISTORY OF LUNG CANCER: ICD-10-CM

## 2019-05-28 DIAGNOSIS — N18.30 STAGE 3 CHRONIC KIDNEY DISEASE: ICD-10-CM

## 2019-05-28 PROCEDURE — 76770 US EXAM ABDO BACK WALL COMP: CPT | Mod: 26,,, | Performed by: RADIOLOGY

## 2019-05-28 PROCEDURE — 76770 US EXAM ABDO BACK WALL COMP: CPT | Mod: TC

## 2019-05-28 PROCEDURE — 76770 US RETROPERITONEAL COMPLETE: ICD-10-PCS | Mod: 26,,, | Performed by: RADIOLOGY

## 2019-05-28 PROCEDURE — 71250 CT THORAX DX C-: CPT | Mod: 26,,, | Performed by: RADIOLOGY

## 2019-05-28 PROCEDURE — 71250 CT CHEST WITHOUT CONTRAST: ICD-10-PCS | Mod: 26,,, | Performed by: RADIOLOGY

## 2019-05-28 PROCEDURE — 71250 CT THORAX DX C-: CPT | Mod: TC

## 2019-05-29 ENCOUNTER — LAB VISIT (OUTPATIENT)
Dept: LAB | Facility: HOSPITAL | Age: 69
End: 2019-05-29
Attending: INTERNAL MEDICINE
Payer: COMMERCIAL

## 2019-05-29 ENCOUNTER — PATIENT MESSAGE (OUTPATIENT)
Dept: INTERNAL MEDICINE | Facility: CLINIC | Age: 69
End: 2019-05-29

## 2019-05-29 ENCOUNTER — OFFICE VISIT (OUTPATIENT)
Dept: INTERNAL MEDICINE | Facility: CLINIC | Age: 69
End: 2019-05-29
Payer: COMMERCIAL

## 2019-05-29 VITALS
DIASTOLIC BLOOD PRESSURE: 76 MMHG | SYSTOLIC BLOOD PRESSURE: 166 MMHG | HEART RATE: 62 BPM | TEMPERATURE: 100 F | WEIGHT: 160.94 LBS | BODY MASS INDEX: 25.86 KG/M2 | RESPIRATION RATE: 16 BRPM | HEIGHT: 66 IN

## 2019-05-29 DIAGNOSIS — R73.03 PREDIABETES: ICD-10-CM

## 2019-05-29 DIAGNOSIS — N18.30 STAGE 3 CHRONIC KIDNEY DISEASE: ICD-10-CM

## 2019-05-29 DIAGNOSIS — E83.52 HYPERCALCEMIA: ICD-10-CM

## 2019-05-29 DIAGNOSIS — I25.84 CORONARY ARTERY DISEASE DUE TO CALCIFIED CORONARY LESION: ICD-10-CM

## 2019-05-29 DIAGNOSIS — I10 ESSENTIAL HYPERTENSION: Primary | ICD-10-CM

## 2019-05-29 DIAGNOSIS — I10 ESSENTIAL HYPERTENSION: ICD-10-CM

## 2019-05-29 DIAGNOSIS — I25.10 CORONARY ARTERY DISEASE DUE TO CALCIFIED CORONARY LESION: ICD-10-CM

## 2019-05-29 LAB
25(OH)D3+25(OH)D2 SERPL-MCNC: 38 NG/ML (ref 30–96)
ALBUMIN SERPL BCP-MCNC: 3.9 G/DL (ref 3.5–5.2)
ALP SERPL-CCNC: 50 U/L (ref 55–135)
ALT SERPL W/O P-5'-P-CCNC: 22 U/L (ref 10–44)
ANION GAP SERPL CALC-SCNC: 8 MMOL/L (ref 8–16)
AST SERPL-CCNC: 27 U/L (ref 10–40)
BILIRUB SERPL-MCNC: 0.3 MG/DL (ref 0.1–1)
BUN SERPL-MCNC: 23 MG/DL (ref 8–23)
CALCIUM SERPL-MCNC: 11 MG/DL (ref 8.7–10.5)
CHLORIDE SERPL-SCNC: 107 MMOL/L (ref 95–110)
CO2 SERPL-SCNC: 28 MMOL/L (ref 23–29)
CREAT SERPL-MCNC: 1.4 MG/DL (ref 0.5–1.4)
EST. GFR  (AFRICAN AMERICAN): 44.5 ML/MIN/1.73 M^2
EST. GFR  (NON AFRICAN AMERICAN): 38.6 ML/MIN/1.73 M^2
ESTIMATED AVG GLUCOSE: 120 MG/DL (ref 68–131)
GLUCOSE SERPL-MCNC: 115 MG/DL (ref 70–110)
HBA1C MFR BLD HPLC: 5.8 % (ref 4–5.6)
PHOSPHATE SERPL-MCNC: 3.6 MG/DL (ref 2.7–4.5)
POTASSIUM SERPL-SCNC: 4.7 MMOL/L (ref 3.5–5.1)
PROT SERPL-MCNC: 7.2 G/DL (ref 6–8.4)
PTH-INTACT SERPL-MCNC: 60 PG/ML (ref 9–77)
SODIUM SERPL-SCNC: 143 MMOL/L (ref 136–145)

## 2019-05-29 PROCEDURE — 3078F PR MOST RECENT DIASTOLIC BLOOD PRESSURE < 80 MM HG: ICD-10-PCS | Mod: CPTII,S$GLB,, | Performed by: INTERNAL MEDICINE

## 2019-05-29 PROCEDURE — 3077F PR MOST RECENT SYSTOLIC BLOOD PRESSURE >= 140 MM HG: ICD-10-PCS | Mod: CPTII,S$GLB,, | Performed by: INTERNAL MEDICINE

## 2019-05-29 PROCEDURE — 36415 COLL VENOUS BLD VENIPUNCTURE: CPT | Mod: PO

## 2019-05-29 PROCEDURE — 83036 HEMOGLOBIN GLYCOSYLATED A1C: CPT

## 2019-05-29 PROCEDURE — 99214 OFFICE O/P EST MOD 30 MIN: CPT | Mod: S$GLB,,, | Performed by: INTERNAL MEDICINE

## 2019-05-29 PROCEDURE — 84100 ASSAY OF PHOSPHORUS: CPT

## 2019-05-29 PROCEDURE — 99214 PR OFFICE/OUTPT VISIT, EST, LEVL IV, 30-39 MIN: ICD-10-PCS | Mod: S$GLB,,, | Performed by: INTERNAL MEDICINE

## 2019-05-29 PROCEDURE — 82306 VITAMIN D 25 HYDROXY: CPT

## 2019-05-29 PROCEDURE — 83970 ASSAY OF PARATHORMONE: CPT

## 2019-05-29 PROCEDURE — 3077F SYST BP >= 140 MM HG: CPT | Mod: CPTII,S$GLB,, | Performed by: INTERNAL MEDICINE

## 2019-05-29 PROCEDURE — 1101F PT FALLS ASSESS-DOCD LE1/YR: CPT | Mod: CPTII,S$GLB,, | Performed by: INTERNAL MEDICINE

## 2019-05-29 PROCEDURE — 80053 COMPREHEN METABOLIC PANEL: CPT

## 2019-05-29 PROCEDURE — 99999 PR PBB SHADOW E&M-EST. PATIENT-LVL III: CPT | Mod: PBBFAC,,, | Performed by: INTERNAL MEDICINE

## 2019-05-29 PROCEDURE — 99999 PR PBB SHADOW E&M-EST. PATIENT-LVL III: ICD-10-PCS | Mod: PBBFAC,,, | Performed by: INTERNAL MEDICINE

## 2019-05-29 PROCEDURE — 1101F PR PT FALLS ASSESS DOC 0-1 FALLS W/OUT INJ PAST YR: ICD-10-PCS | Mod: CPTII,S$GLB,, | Performed by: INTERNAL MEDICINE

## 2019-05-29 PROCEDURE — 3078F DIAST BP <80 MM HG: CPT | Mod: CPTII,S$GLB,, | Performed by: INTERNAL MEDICINE

## 2019-05-29 RX ORDER — AMLODIPINE BESYLATE 5 MG/1
5 TABLET ORAL DAILY
Qty: 30 TABLET | Refills: 11 | Status: SHIPPED | OUTPATIENT
Start: 2019-05-29 | End: 2019-09-03

## 2019-05-29 NOTE — PROGRESS NOTES
"Subjective:       Patient ID: Alesha Toney is a 68 y.o. female.    Chief Complaint: Follow-up    HPI  68 y.o. female presents for follow up of chronic medical problems:     HTN - Patient is currently on losartan 25mg BID. She does  check her BP at home, and it runs wnl. Side effects of medications note: none.      SVT: atenolol 25mg QID     CAD d/t calcified LAD: rosuvastatin 10mg daily prescribed by Dr. Mccoy, but she is not taking the medication. She would like to try lifestyle/dietary changes before starting statin.     CKD3: recent diagnosis based on labs by oncologist. She has been off chemo for several months. PPI use- Nexium a while ago, not recently. Only new med was hair/skin/nails MVI. No nsaids. Stays hydrated. No uti symptoms. She denies change in UOP or edema/swelling.    SID: valium prn- no side effects. Rare use.     Review of Systems   Constitutional: Negative for activity change and unexpected weight change.   HENT: Negative for hearing loss, rhinorrhea and trouble swallowing.    Eyes: Negative for discharge and visual disturbance.   Respiratory: Negative for chest tightness and wheezing.    Cardiovascular: Negative for chest pain and palpitations.   Gastrointestinal: Negative for blood in stool, constipation, diarrhea and vomiting.   Endocrine: Negative for polydipsia and polyuria.   Genitourinary: Negative for difficulty urinating, dysuria, hematuria and menstrual problem.   Musculoskeletal: Negative for arthralgias, joint swelling and neck pain.   Neurological: Negative for weakness and headaches.   Psychiatric/Behavioral: Negative for confusion and dysphoric mood.       Objective:        Vitals:    05/29/19 0838   BP: (!) 166/76   Pulse: 62   Resp: 16   Temp: 99.6 °F (37.6 °C)   TempSrc: Oral   Weight: 73 kg (160 lb 15 oz)   Height: 5' 6" (1.676 m)       Body mass index is 25.98 kg/m².    Physical Exam   Constitutional: She is oriented to person, place, and time. She appears well-developed " and well-nourished. No distress.   HENT:   Head: Normocephalic and atraumatic.   Right Ear: External ear normal.   Left Ear: External ear normal.   Eyes: Conjunctivae and EOM are normal.   Neck: Normal range of motion.   Cardiovascular: Normal rate, regular rhythm and intact distal pulses.   Pulmonary/Chest: Effort normal. No respiratory distress.   Musculoskeletal: She exhibits no edema.   Neurological: She is alert and oriented to person, place, and time.   Skin: Skin is warm and dry. No rash noted.   Psychiatric: She has a normal mood and affect. Her behavior is normal.       Assessment:     1. Essential hypertension    2. Coronary artery disease due to calcified coronary lesion    3. Stage 3 chronic kidney disease    4. Prediabetes    5. Hypercalcemia           Plan:         1. Essential hypertension  - stable, continue current meds. BP elevated in clinic due to anxiety regarding medical problems.   - Comprehensive metabolic panel; Future    2. Coronary artery disease due to calcified coronary lesion  - recommend statin    3. Stage 3 chronic kidney disease  - avoid nsaids and other nephrotoxic medications  - continue losartan for now, if creatinine increases will d/c and find alternative anti-HTN.   - if stable/worsening, will refer to nephrology  - Vitamin D; Future  - Urinalysis; Future  - Protein / creatinine ratio, urine; Future  - PHOSPHORUS; Future    4. Prediabetes  - resolved at last check. Monitor d/t decreasing kidney function.   - Hemoglobin A1c; Future    5. Hypercalcemia  - Comprehensive metabolic panel; Future  - Vitamin D; Future  - PTH, intact; Future           Patient note was created using [employer] Dictation.  Any errors in syntax or even information may not have been identified and edited on initial review prior to signing this note.

## 2019-05-30 ENCOUNTER — TELEPHONE (OUTPATIENT)
Dept: INTERVENTIONAL RADIOLOGY/VASCULAR | Facility: HOSPITAL | Age: 69
End: 2019-05-30

## 2019-05-30 ENCOUNTER — TELEPHONE (OUTPATIENT)
Dept: HEMATOLOGY/ONCOLOGY | Facility: CLINIC | Age: 69
End: 2019-05-30

## 2019-05-30 ENCOUNTER — PATIENT MESSAGE (OUTPATIENT)
Dept: HEMATOLOGY/ONCOLOGY | Facility: CLINIC | Age: 69
End: 2019-05-30

## 2019-05-30 ENCOUNTER — PATIENT MESSAGE (OUTPATIENT)
Dept: INTERNAL MEDICINE | Facility: CLINIC | Age: 69
End: 2019-05-30

## 2019-05-30 NOTE — TELEPHONE ENCOUNTER
May I tell her her scan/US look stable---will you just keep a close eye on that new spot?  terrell

## 2019-05-30 NOTE — TELEPHONE ENCOUNTER
"Spoke with wife, sol.  She is calling to verify with dr kenney that the CT report is incorrect in its documentation, as she does not believe the patient has any nodules on the right side. Prior to this scan, all nodules have been only on the left side. Wife wants dr kenney to review imaging to double check the report is accurate. She is OK with nurse calling her back with the information. She states, "paty is just very worried about it being something new."    Message routed to dr kenney   "

## 2019-05-30 NOTE — TELEPHONE ENCOUNTER
----- Message from Cassandra Garay sent at 5/30/2019  1:46 PM CDT -----  Contact: Pt's significant other  Lata called regarding CT results and have questions. Lata would like for Dr. Dhaliwal's nurse to contact her. Thank you.    Contact number [phone]

## 2019-05-30 NOTE — TELEPHONE ENCOUNTER
I tried calling Radiology they are not answering. I looked at CT scan. They are on the right side but look inflammatory, but I can review in thoracic conference next week to be sure  Please add to Thoracic conference

## 2019-05-31 ENCOUNTER — PATIENT MESSAGE (OUTPATIENT)
Dept: HEMATOLOGY/ONCOLOGY | Facility: CLINIC | Age: 69
End: 2019-05-31

## 2019-05-31 ENCOUNTER — INFUSION (OUTPATIENT)
Dept: INFUSION THERAPY | Facility: HOSPITAL | Age: 69
End: 2019-05-31
Attending: INTERNAL MEDICINE
Payer: COMMERCIAL

## 2019-05-31 DIAGNOSIS — C34.90 MALIGNANT NEOPLASM OF LUNG: Primary | ICD-10-CM

## 2019-05-31 DIAGNOSIS — F41.9 ANXIETY: ICD-10-CM

## 2019-05-31 PROCEDURE — A4216 STERILE WATER/SALINE, 10 ML: HCPCS | Performed by: INTERNAL MEDICINE

## 2019-05-31 PROCEDURE — 96523 IRRIG DRUG DELIVERY DEVICE: CPT

## 2019-05-31 PROCEDURE — 25000003 PHARM REV CODE 250: Performed by: INTERNAL MEDICINE

## 2019-05-31 PROCEDURE — 63600175 PHARM REV CODE 636 W HCPCS: Performed by: INTERNAL MEDICINE

## 2019-05-31 RX ORDER — HEPARIN 100 UNIT/ML
500 SYRINGE INTRAVENOUS
Status: COMPLETED | OUTPATIENT
Start: 2019-05-31 | End: 2019-05-31

## 2019-05-31 RX ORDER — SODIUM CHLORIDE 0.9 % (FLUSH) 0.9 %
10 SYRINGE (ML) INJECTION
Status: COMPLETED | OUTPATIENT
Start: 2019-05-31 | End: 2019-05-31

## 2019-05-31 RX ORDER — DIAZEPAM 5 MG/1
5 TABLET ORAL EVERY 8 HOURS PRN
Qty: 60 TABLET | Refills: 0 | Status: SHIPPED | OUTPATIENT
Start: 2019-05-31 | End: 2019-09-20 | Stop reason: SDUPTHER

## 2019-05-31 RX ORDER — SODIUM CHLORIDE 0.9 % (FLUSH) 0.9 %
10 SYRINGE (ML) INJECTION
Status: CANCELLED | OUTPATIENT
Start: 2019-05-31

## 2019-05-31 RX ORDER — HEPARIN 100 UNIT/ML
500 SYRINGE INTRAVENOUS
Status: CANCELLED | OUTPATIENT
Start: 2019-05-31

## 2019-05-31 RX ADMIN — SODIUM CHLORIDE, PRESERVATIVE FREE 10 ML: 5 INJECTION INTRAVENOUS at 11:05

## 2019-05-31 RX ADMIN — HEPARIN 500 UNITS: 100 SYRINGE at 11:05

## 2019-06-04 ENCOUNTER — OFFICE VISIT (OUTPATIENT)
Dept: NEPHROLOGY | Facility: CLINIC | Age: 69
End: 2019-06-04
Payer: COMMERCIAL

## 2019-06-04 VITALS
WEIGHT: 161.19 LBS | DIASTOLIC BLOOD PRESSURE: 70 MMHG | BODY MASS INDEX: 25.9 KG/M2 | OXYGEN SATURATION: 99 % | HEART RATE: 65 BPM | SYSTOLIC BLOOD PRESSURE: 150 MMHG | HEIGHT: 66 IN

## 2019-06-04 DIAGNOSIS — C34.90 NON-SMALL CELL LUNG CANCER, UNSPECIFIED LATERALITY: Chronic | ICD-10-CM

## 2019-06-04 DIAGNOSIS — I10 ESSENTIAL HYPERTENSION: ICD-10-CM

## 2019-06-04 DIAGNOSIS — N17.9 AKI (ACUTE KIDNEY INJURY): Primary | ICD-10-CM

## 2019-06-04 DIAGNOSIS — I25.84 CORONARY ARTERY DISEASE DUE TO CALCIFIED CORONARY LESION: ICD-10-CM

## 2019-06-04 DIAGNOSIS — R73.03 PREDIABETES: ICD-10-CM

## 2019-06-04 DIAGNOSIS — I25.10 CORONARY ARTERY DISEASE DUE TO CALCIFIED CORONARY LESION: ICD-10-CM

## 2019-06-04 DIAGNOSIS — N18.30 STAGE 3 CHRONIC KIDNEY DISEASE: ICD-10-CM

## 2019-06-04 PROCEDURE — 99999 PR PBB SHADOW E&M-EST. PATIENT-LVL III: ICD-10-PCS | Mod: PBBFAC,,, | Performed by: INTERNAL MEDICINE

## 2019-06-04 PROCEDURE — 99244 OFF/OP CNSLTJ NEW/EST MOD 40: CPT | Mod: S$GLB,,, | Performed by: INTERNAL MEDICINE

## 2019-06-04 PROCEDURE — 99244 PR OFFICE CONSULTATION,LEVEL IV: ICD-10-PCS | Mod: S$GLB,,, | Performed by: INTERNAL MEDICINE

## 2019-06-04 PROCEDURE — 99999 PR PBB SHADOW E&M-EST. PATIENT-LVL III: CPT | Mod: PBBFAC,,, | Performed by: INTERNAL MEDICINE

## 2019-06-04 NOTE — LETTER
June 13, 2019      Yolanda Katz MD  [address] [facility] [place] [address]           [facility]  [address] [facility] [address] [place] [address]  Phone: [phone]          Patient: Alesha Toney   MR Number: [id number]   YOB: 1950   Date of Visit: 6/4/2019       Dear Dr. Yolanda Katz:    Thank you for referring Alesha Toney to me for evaluation. Attached you will find relevant portions of my assessment and plan of care.    If you have questions, please do not hesitate to call me. I look forward to following Alesha Toney along with you.    Sincerely,    Davion Dugan  CC:  No Recipients    If you would like to receive this communication electronically, please contact [email or web address] or [phone] to request more information on [employer] Link access.    For providers and/or their staff who would like to refer a patient to [facility], please contact us through our one-stop-shop provider referral line, [facility], at [phone].    If you feel you have received this communication in error or would no longer like to receive these types of communications, please e-mail [email or web address]

## 2019-06-05 ENCOUNTER — PATIENT MESSAGE (OUTPATIENT)
Dept: HEMATOLOGY/ONCOLOGY | Facility: CLINIC | Age: 69
End: 2019-06-05

## 2019-06-05 ENCOUNTER — DOCUMENTATION ONLY (OUTPATIENT)
Dept: CARDIOTHORACIC SURGERY | Facility: HOSPITAL | Age: 69
End: 2019-06-05

## 2019-06-05 ENCOUNTER — TELEPHONE (OUTPATIENT)
Dept: HEMATOLOGY/ONCOLOGY | Facility: CLINIC | Age: 69
End: 2019-06-05

## 2019-06-05 NOTE — TELEPHONE ENCOUNTER
Reviewed CT chest in thoracic conference which reveal stable ground glass densities in the right lung, the new 0.5 cm lesion in right lower lobe needs to be watched , so will obtain CT chest in 3 months

## 2019-06-05 NOTE — PATIENT CARE CONFERENCE
[facility]      THORACIC MULTIDISCIPLINARY TUMOR BOARD  PATIENT REVIEW FORM  ________________________________________________________________________    CLINIC #: [id number]  DATE: 06/05/2019    DIAGNOSIS: NSCLC     PRESENTER: Dr. Dhaliwal    PATIENT SUMMARY: - 68 year old female with history of right lower lobe adenocarcinoma diagnosed in July 2018. Underwent a right VATS right lower lobectomy with MLND on 8/9/2018. Surgical pathology showed a 7cm moderately differentiated mixed invasive adenocarcinoma, level 2,4,7,9,10,12 negative, no VPI, no LVI, pT3N0. She completed 4 cycles of adjuvant chemo with Cisplatin and Alimta as of 1/10/19. She is on surveillance. On surveillance CT on 5/28/19, showed an ill-defined GGO in lateral segment of RLL and new solid 0.5cm nodule in the superior segment of RLL. Superior segment nodule is at the location of a cluster of micro nodules identified on prior CT.      BOARD RECOMMENDATIONS: On radiology review, the chest CT findings do appear new since February 2019. Recommend close follow up with chest CT in 3 months.     CONSULT NEEDED:     [] Surgery    [] Hem/Onc    [] Rad/Onc    [] Dietary                 [] Social Service    [] Psychology       [] Pulmonology    Clinical Stage: Tumor  Node(s)  Metastasis     Pathologic Stage: Tumor- T3 Node(s)- N0 Metastasis- Mx      GROUP STAGE:     [] O    [] 1A    [] IB    [] IIA    [x] IIB     [] IIIA     [] IIIB     [] IIIC    [] IV                               [] Local recurrence     [] Regional recurrence     [] Distant recurrence                   [x] NSCLC     [] SCLC     Tumor type- Adenocarcinoma    Unstageable:      [] Yes     [] No  Metastatic site(s):          [x] Rita'l Treatment Guidelines reviewed and care planned is consistent with guidelines.         (i.e., NCCN, NCI, PD, ACO, AUA, etc.)    PRESENTATION AT CANCER CONFERENCE:         [] Prospective    [] Retrospective     [] Follow-Up          [] Eligible for clinical  trial

## 2019-06-10 ENCOUNTER — PATIENT MESSAGE (OUTPATIENT)
Dept: NEPHROLOGY | Facility: CLINIC | Age: 69
End: 2019-06-10

## 2019-06-11 ENCOUNTER — LAB VISIT (OUTPATIENT)
Dept: LAB | Facility: HOSPITAL | Age: 69
End: 2019-06-11
Attending: PSYCHIATRY & NEUROLOGY
Payer: COMMERCIAL

## 2019-06-11 ENCOUNTER — OFFICE VISIT (OUTPATIENT)
Dept: NEUROLOGY | Facility: CLINIC | Age: 69
End: 2019-06-11
Payer: COMMERCIAL

## 2019-06-11 ENCOUNTER — PATIENT MESSAGE (OUTPATIENT)
Dept: NEPHROLOGY | Facility: CLINIC | Age: 69
End: 2019-06-11

## 2019-06-11 VITALS
HEART RATE: 61 BPM | SYSTOLIC BLOOD PRESSURE: 156 MMHG | WEIGHT: 160.94 LBS | BODY MASS INDEX: 25.86 KG/M2 | DIASTOLIC BLOOD PRESSURE: 79 MMHG | HEIGHT: 66 IN

## 2019-06-11 DIAGNOSIS — N17.9 AKI (ACUTE KIDNEY INJURY): ICD-10-CM

## 2019-06-11 DIAGNOSIS — E83.52 HYPERCALCEMIA: ICD-10-CM

## 2019-06-11 DIAGNOSIS — I10 ESSENTIAL HYPERTENSION: Primary | ICD-10-CM

## 2019-06-11 DIAGNOSIS — G62.9 PERIPHERAL POLYNEUROPATHY: Primary | ICD-10-CM

## 2019-06-11 DIAGNOSIS — G62.9 PERIPHERAL POLYNEUROPATHY: ICD-10-CM

## 2019-06-11 DIAGNOSIS — R20.2 RIGHT HAND PARESTHESIA: ICD-10-CM

## 2019-06-11 PROCEDURE — 3077F PR MOST RECENT SYSTOLIC BLOOD PRESSURE >= 140 MM HG: ICD-10-PCS | Mod: CPTII,S$GLB,, | Performed by: PSYCHIATRY & NEUROLOGY

## 2019-06-11 PROCEDURE — 1101F PT FALLS ASSESS-DOCD LE1/YR: CPT | Mod: CPTII,S$GLB,, | Performed by: PSYCHIATRY & NEUROLOGY

## 2019-06-11 PROCEDURE — 1101F PR PT FALLS ASSESS DOC 0-1 FALLS W/OUT INJ PAST YR: ICD-10-PCS | Mod: CPTII,S$GLB,, | Performed by: PSYCHIATRY & NEUROLOGY

## 2019-06-11 PROCEDURE — 99203 PR OFFICE/OUTPT VISIT, NEW, LEVL III, 30-44 MIN: ICD-10-PCS | Mod: S$GLB,,, | Performed by: PSYCHIATRY & NEUROLOGY

## 2019-06-11 PROCEDURE — 84165 PATHOLOGIST INTERPRETATION SPE: ICD-10-PCS | Mod: 26,,, | Performed by: PATHOLOGY

## 2019-06-11 PROCEDURE — 84165 PROTEIN E-PHORESIS SERUM: CPT

## 2019-06-11 PROCEDURE — 99203 OFFICE O/P NEW LOW 30 MIN: CPT | Mod: S$GLB,,, | Performed by: PSYCHIATRY & NEUROLOGY

## 2019-06-11 PROCEDURE — 86334 PATHOLOGIST INTERPRETATION IFE: ICD-10-PCS | Mod: 26,,, | Performed by: PATHOLOGY

## 2019-06-11 PROCEDURE — 3078F DIAST BP <80 MM HG: CPT | Mod: CPTII,S$GLB,, | Performed by: PSYCHIATRY & NEUROLOGY

## 2019-06-11 PROCEDURE — 99999 PR PBB SHADOW E&M-EST. PATIENT-LVL III: CPT | Mod: PBBFAC,,, | Performed by: PSYCHIATRY & NEUROLOGY

## 2019-06-11 PROCEDURE — 3078F PR MOST RECENT DIASTOLIC BLOOD PRESSURE < 80 MM HG: ICD-10-PCS | Mod: CPTII,S$GLB,, | Performed by: PSYCHIATRY & NEUROLOGY

## 2019-06-11 PROCEDURE — 99999 PR PBB SHADOW E&M-EST. PATIENT-LVL III: ICD-10-PCS | Mod: PBBFAC,,, | Performed by: PSYCHIATRY & NEUROLOGY

## 2019-06-11 PROCEDURE — 3077F SYST BP >= 140 MM HG: CPT | Mod: CPTII,S$GLB,, | Performed by: PSYCHIATRY & NEUROLOGY

## 2019-06-11 PROCEDURE — 84165 PROTEIN E-PHORESIS SERUM: CPT | Mod: 26,,, | Performed by: PATHOLOGY

## 2019-06-11 PROCEDURE — 86334 IMMUNOFIX E-PHORESIS SERUM: CPT

## 2019-06-11 PROCEDURE — 36415 COLL VENOUS BLD VENIPUNCTURE: CPT

## 2019-06-11 PROCEDURE — 86334 IMMUNOFIX E-PHORESIS SERUM: CPT | Mod: 26,,, | Performed by: PATHOLOGY

## 2019-06-11 NOTE — LETTER
July 1, 2019      Pam Dhaliwal MD  [address]  [facility] [address]           [facility]  [address] [place] [address] [address], [address], [facility] [place] [address]  Phone: [phone]  Fax: [phone]          Patient: Alesha Toney   MR Number: [id number]   YOB: 1950   Date of Visit: 6/11/2019       Dear Dr. Pam Dhaliwal:    Thank you for referring Alesha Toney to me for evaluation. Attached you will find relevant portions of my assessment and plan of care.    If you have questions, please do not hesitate to call me. I look forward to following Alesha Toney along with you.    Sincerely,    Rafy Jacobson MD    Enclosure  CC:  No Recipients    If you would like to receive this communication electronically, please contact [email or web address] or [phone] to request more information on [employer] Link access.    For providers and/or their staff who would like to refer a patient to [facility], please contact us through our one-stop-shop provider referral line, [facility], at [phone].    If you feel you have received this communication in error or would no longer like to receive these types of communications, please e-mail [email or web address]

## 2019-06-11 NOTE — PROGRESS NOTES
Neurology Clinic Visit  Primary Care Provider: Yolanda Katz MD   Referring Provider: Pam Dhaliwal MD   Date of Visit: 06/11/2019       chief complaint:   Chief Complaint   Patient presents with    Peripheral Neuropathy     feet and right hand 2nd and 3rd digit       History of Present Illness  Alesha Toney is a 68 y.o. right handed female I have been asked to consult for evaluation of neuropathy. She reports around December 2018 (while being treated for cancer), she developed numbness of right hand primarily fingers 1-2, as well as pins and needle sensation in her feet. She denies neck pain but she does have a history of anterior cervical fusion. She does report mild lower back pain (flank more than back). She does report the paresthesias are painful at times.       Patient Active Problem List    Diagnosis Date Noted    Stage 3 chronic kidney disease 05/29/2019    Thyroid nodule 03/09/2019    Prediabetes 02/27/2019    Macrocytic anemia 02/27/2019    Coronary artery disease due to calcified coronary lesion 12/26/2018    Cervical radiculopathy 10/01/2018    Malignant neoplasm of lung 09/25/2018    Non-small cell lung cancer (NSCLC) 08/09/2018    History of lung cancer 06/20/2018    SVT (supraventricular tachycardia) 01/31/2018    Essential hypertension 10/24/2017     Past Medical History:   Diagnosis Date    Breast cyst     Cardiac arrhythmia     Fibrocystic breast     History of hysterectomy     20yrs ago    History of kidney stones     Hypertension     Lung cancer     Lung cancer      Past Surgical History:   Procedure Laterality Date    ADENOIDECTOMY  19yo    APPENDECTOMY      BONE RESECTION, RIB  1980    BREAST BIOPSY Left     at least 40yrs ago in her 20's    BREAST CYST ASPIRATION      BREAST CYST EXCISION      DISCECTOMY, SPINE, CERVICAL, ANTERIOR APPROACH, WITH FUSION C6/7  Depuy SNS: [employer]/SSE[employer]/Vocal Cord Regular OR table N/A 10/15/2018    Performed by Greyson Bansal MD  at [facility] OR 2ND FLR    HYSTERECTOMY      @47yrs of age    [id number] N/A 9/25/2018    Performed by [facility] at [facility] OR [address] [place] [address]    KIDNEY SURGERY      19yo    LYMPHADENECTOMY/mediastinal N/A 8/9/2018    [facility]    OOPHORECTOMY      @47yrs of age    THORACOTOMY Right 8/9/2018    Performed by Philip Messina MD at [facility] OR [address] [place] [address]    TONSILLECTOMY      ULTRASOUND, ENDOBRONCHIAL N/A 7/10/2018    [facility]n [facility]    VATS, WITH LOBECTOMY Possible chest wall resection Right 8/9/2018    Performed by Philip Messina MD at [facility] OR [address] [place] [address]     Family History   Problem Relation Age of Onset    Stroke Mother     Cataracts Mother     Cancer Father         colon cancer    Diabetes Brother     Heart failure Brother     Hypertension Brother     Heart attack Paternal Aunt     Heart attack Maternal Grandfather     Diabetes Paternal Aunt     Anesthesia problems Neg Hx     Blindness Neg Hx     Amblyopia Neg Hx     Glaucoma Neg Hx     Macular degeneration Neg Hx     Retinal detachment Neg Hx     Strabismus Neg Hx     Thyroid disease Neg Hx          Current Outpatient Medications   Medication Sig    atenolol (TENORMIN) 50 MG tablet Take 0.5 tablets (25 mg total) by mouth 4 (four) times daily.    bimatoprost (LATISSE) 0.03 % ophthalmic solution Place one drop on applicator and apply evenly along the skin of the upper eyelid at base of eyelashes qhs; repeat for second eye    diazePAM (VALIUM) 5 MG tablet Take 1 tablet (5 mg total) by mouth every 8 (eight) hours as needed for Anxiety.    ranitidine (ZANTAC) 75 MG tablet Take 150 mg by mouth nightly.     amLODIPine (NORVASC) 5 MG tablet Take 1 tablet (5 mg total) by mouth once daily.    calcium carbonate (CALCIUM ANTACID) 300 mg (750 mg) Chew Take by mouth.    multivit-min/iron/folic/adl809 (HAIR, SKIN AND NAILS ADVANCED ORAL) Take by mouth 3  "(three) times daily.     No current facility-administered medications for this visit.          Review of patient's allergies indicates:   Allergen Reactions    Adhesive Itching, Rash and Blisters     INCLUDES EKG PADS    Ciprofloxacin Hives     Hives    Iodinated contrast- oral and iv dye     Pcn [penicillins]      rash    Penicillin v potassium Hives     Hives    Phenergan plain Other (See Comments)     "crazy behavior"    Tramadol     Vancomycin analogues      Red man syndrome    Vancomycin hcl Hives     Hives     Social History     Socioeconomic History    Marital status: Single     Spouse name: Not on file    Number of children: Not on file    Years of education: Not on file    Highest education level: Not on file   Occupational History    Not on file   Social Needs    Financial resource strain: Not hard at all    Food insecurity:     Worry: Never true     Inability: Never true    Transportation needs:     Medical: No     Non-medical: No   Tobacco Use    Smoking status: Former Smoker     Packs/day: 1.00     Years: 30.00     Pack years: 30.00     Types: Cigarettes     Last attempt to quit:      Years since quittin.4    Smokeless tobacco: Never Used   Substance and Sexual Activity    Alcohol use: Yes     Alcohol/week: 4.2 oz     Types: 7 Glasses of wine per week     Frequency: Never     Drinks per session: Patient refused     Binge frequency: Never     Comment: socially     Drug use: No    Sexual activity: Yes     Partners: Female   Lifestyle    Physical activity:     Days per week: 0 days     Minutes per session: 0 min    Stress: To some extent   Relationships    Social connections:     Talks on phone: More than three times a week     Gets together: Twice a week     Attends Confucianism service: Not on file     Active member of club or organization: No     Attends meetings of clubs or organizations: Never     Relationship status: Living with partner   Other Topics Concern    Not on " "file   Social History Narrative    Not on file       Review of Systems  Constitutional: negative  Eyes: negative  Ears, nose, mouth, throat, and face: negative  Respiratory: negative  Cardiovascular: negative  Gastrointestinal: negative  Genitourinary:negative  Integument/breast: negative  Hematologic/lymphatic: negative  Musculoskeletal:negative  Neurological: negative  Behavioral/Psych: negative  Endocrine: negative  Allergic/Immunologic: negative    Objective:  Vital signs in last 24 hours:    Vitals:    06/11/19 1332   BP: (!) 156/79   Pulse: 61   Weight: 73 kg (160 lb 15.3 oz)   Height: 5' 6" (1.676 m)       Body mass index is 25.98 kg/m².     General: no acute distress, well nourished, well-groomed  CVS: RRR, no murmur, gallops or rubs  Respiratory: Clear to ausculation  Extremities: no edema    Neurological Examination:    HIGHER INTEGRATIVE FUNCTIONS:  -Normal attention span and concentration; immediately responds to questions and commands  -Oriented to time, place and person  -Recent and remote memory intact  -Language normal (no aphasia or dysarthria)  -Normal fund of knowledge    CN:  -PERRLA, visual fields full, unable to visualize optic discs due to small pupils on fundus exam   -EOMI with normal saccades and smooth pursuit  -Facial sensation intact bilaterally  -Facial strength/movement intact bilaterally  -Hearing intact to voice  -Palate elevates symmetrically  -Normal shoulder shrug and head turn  -Tongue protrudes midline    MOTOR: (left/right graded 1-5)  -UE: 5/5 deltoids; 5/5 biceps, triceps; 5/5 wrist flexors, extensors; 5/5 interosseous; 5/5   -LEs: 5/5 hip flexion, extension; 5/5 knee flexion, extension; 5/5 ankle flexion, extension  -Tone: normal  -No pronator drift, no orbiting    SENSORY:  -Light touch, temperature sensation intact bilaterally; decrease vibration in feet distally    REFLEXES:  -2+ upper and lower bilaterally  -Flexor plantar reflex bilaterally  -No " clonus    COORDINATION:  -FNF, HKS, MARTINA intact bilaterally    GAIT:  -cautious      Assessment/Plan:    1. Peripheral neuropathy, likely from chemotherapy  2. Right hand paresthesia, rule out CTS    Plan:  EMG/NCS  Will send for neuropathy labs    I discussed assessment and plan with patient and answered the questions that he had.     Patient note was created using Dragon Dictation.  Any errors in syntax or even information may not have been identified and edited on initial review prior to signing this note.

## 2019-06-12 ENCOUNTER — LAB VISIT (OUTPATIENT)
Dept: LAB | Facility: HOSPITAL | Age: 69
End: 2019-06-12
Attending: INTERNAL MEDICINE
Payer: COMMERCIAL

## 2019-06-12 DIAGNOSIS — I10 ESSENTIAL HYPERTENSION: ICD-10-CM

## 2019-06-12 DIAGNOSIS — N17.9 AKI (ACUTE KIDNEY INJURY): ICD-10-CM

## 2019-06-12 DIAGNOSIS — E83.52 HYPERCALCEMIA: ICD-10-CM

## 2019-06-12 LAB
ALBUMIN SERPL BCP-MCNC: 3.7 G/DL (ref 3.5–5.2)
ALBUMIN SERPL ELPH-MCNC: 4.2 G/DL (ref 3.35–5.55)
ALPHA1 GLOB SERPL ELPH-MCNC: 0.27 G/DL (ref 0.17–0.41)
ALPHA2 GLOB SERPL ELPH-MCNC: 0.67 G/DL (ref 0.43–0.99)
ANION GAP SERPL CALC-SCNC: 7 MMOL/L (ref 8–16)
B-GLOBULIN SERPL ELPH-MCNC: 0.67 G/DL (ref 0.5–1.1)
BUN SERPL-MCNC: 24 MG/DL (ref 8–23)
CALCIUM SERPL-MCNC: 10.1 MG/DL (ref 8.7–10.5)
CHLORIDE SERPL-SCNC: 106 MMOL/L (ref 95–110)
CO2 SERPL-SCNC: 28 MMOL/L (ref 23–29)
CREAT SERPL-MCNC: 1.3 MG/DL (ref 0.5–1.4)
EST. GFR  (AFRICAN AMERICAN): 49 ML/MIN/1.73 M^2
EST. GFR  (NON AFRICAN AMERICAN): 42 ML/MIN/1.73 M^2
GAMMA GLOB SERPL ELPH-MCNC: 1 G/DL (ref 0.67–1.58)
GLUCOSE SERPL-MCNC: 110 MG/DL (ref 70–110)
INTERPRETATION SERPL IFE-IMP: NORMAL
PATHOLOGIST INTERPRETATION IFE: NORMAL
PATHOLOGIST INTERPRETATION SPE: NORMAL
PHOSPHATE SERPL-MCNC: 3.9 MG/DL (ref 2.7–4.5)
POTASSIUM SERPL-SCNC: 5 MMOL/L (ref 3.5–5.1)
PROT SERPL-MCNC: 6.8 G/DL (ref 6–8.4)
SODIUM SERPL-SCNC: 141 MMOL/L (ref 136–145)

## 2019-06-12 PROCEDURE — 82652 VIT D 1 25-DIHYDROXY: CPT

## 2019-06-12 PROCEDURE — 82397 CHEMILUMINESCENT ASSAY: CPT

## 2019-06-12 PROCEDURE — 36415 COLL VENOUS BLD VENIPUNCTURE: CPT

## 2019-06-12 PROCEDURE — 80069 RENAL FUNCTION PANEL: CPT

## 2019-06-13 ENCOUNTER — OFFICE VISIT (OUTPATIENT)
Dept: HEMATOLOGY/ONCOLOGY | Facility: CLINIC | Age: 69
End: 2019-06-13
Payer: COMMERCIAL

## 2019-06-13 VITALS
TEMPERATURE: 98 F | WEIGHT: 160.94 LBS | HEIGHT: 66 IN | HEART RATE: 63 BPM | OXYGEN SATURATION: 99 % | SYSTOLIC BLOOD PRESSURE: 190 MMHG | DIASTOLIC BLOOD PRESSURE: 79 MMHG | BODY MASS INDEX: 25.86 KG/M2

## 2019-06-13 DIAGNOSIS — Z85.118 HISTORY OF LUNG CANCER: Primary | ICD-10-CM

## 2019-06-13 PROCEDURE — 3078F DIAST BP <80 MM HG: CPT | Mod: CPTII,S$GLB,, | Performed by: INTERNAL MEDICINE

## 2019-06-13 PROCEDURE — 3077F SYST BP >= 140 MM HG: CPT | Mod: CPTII,S$GLB,, | Performed by: INTERNAL MEDICINE

## 2019-06-13 PROCEDURE — 99214 PR OFFICE/OUTPT VISIT, EST, LEVL IV, 30-39 MIN: ICD-10-PCS | Mod: S$GLB,,, | Performed by: INTERNAL MEDICINE

## 2019-06-13 PROCEDURE — 99999 PR PBB SHADOW E&M-EST. PATIENT-LVL III: CPT | Mod: PBBFAC,,, | Performed by: INTERNAL MEDICINE

## 2019-06-13 PROCEDURE — 1101F PT FALLS ASSESS-DOCD LE1/YR: CPT | Mod: CPTII,S$GLB,, | Performed by: INTERNAL MEDICINE

## 2019-06-13 PROCEDURE — 3078F PR MOST RECENT DIASTOLIC BLOOD PRESSURE < 80 MM HG: ICD-10-PCS | Mod: CPTII,S$GLB,, | Performed by: INTERNAL MEDICINE

## 2019-06-13 PROCEDURE — 3077F PR MOST RECENT SYSTOLIC BLOOD PRESSURE >= 140 MM HG: ICD-10-PCS | Mod: CPTII,S$GLB,, | Performed by: INTERNAL MEDICINE

## 2019-06-13 PROCEDURE — 1101F PR PT FALLS ASSESS DOC 0-1 FALLS W/OUT INJ PAST YR: ICD-10-PCS | Mod: CPTII,S$GLB,, | Performed by: INTERNAL MEDICINE

## 2019-06-13 PROCEDURE — 99999 PR PBB SHADOW E&M-EST. PATIENT-LVL III: ICD-10-PCS | Mod: PBBFAC,,, | Performed by: INTERNAL MEDICINE

## 2019-06-13 PROCEDURE — 99214 OFFICE O/P EST MOD 30 MIN: CPT | Mod: S$GLB,,, | Performed by: INTERNAL MEDICINE

## 2019-06-13 NOTE — PROGRESS NOTES
"Subjective:       Patient ID: Alesha Toney is a 68 y.o. female.    Chief Complaint: History of lung cancer  Ms. Alesha Toney is a 67-year-old otherwise healthy female who started having some shoulder pain, which was lasting for over six weeks.  She went and saw her primary care physician and underwent an x-ray, which revealed right upper lobe lung mass worrisome for malignancy and a CT scan was recommended, which was done on 6/12/18 and that revealed a newly developing mass, pleural based, lateral posterior segment, right upper lobe 3.5 x 3.5 cm, surrounding stellate margin and patchy infiltrates, particularly superiorly,   anteriorly infiltrates extend perihilar into the anterior segment.  She then underwent CT-guided biopsy, which revealed well-differentiated adenocarcinoma.       Her PET scan from 6/29/18 There is physiologic intracranial, head, and neck activity.  There is a large right upper lobe mass SUV max 9.11.  No local or distant metastatic disease seen.  There is physiologic liver, spleen, GI and  activity.  There are a few liver cysts.  No adenopathy is seen.  The adrenal glands are not enlarged.  No adenopathy is seen.  No suspicious bone lesions are seen.     She underwent VATS with right upper lobectomy. Mediastinal lymphadenectomy on 8/9/18. Pathology revealed "Tumor site: Upper lobe.Tumor size: 7 x 4 x 2.7 cm.Tumor focality: Single tumor.  Histologic type: Mixed invasive mucinous and nonmucinous adenocarcinoma. Histologic grade: G2: Moderately differentiated.Spread through air spaces (STATS): Present. Visceral pleura invasion: Not identified.  Lymphovascular invasion: Not identified. Direct invasion of adjacent structures: No adjacent structures present. Margins: All margins are uninvolved by carcinoma.Distance of invasive carcinoma from closest margin: 1 cm from the bronchial margin.Treatment effect: No known presurgical therapy. Regional lymph nodes: Number of lymph nodes involved: 0. " "Number of lymph nodes examined: 11. Pathologic Stage Classification: pT3 N0"        She completed 4 cycles of adjuvant chemo with Cisplatin and Alimta as of 1/10/19   She is on surveillance.     HPI She comes in to review labs and CT chest which reveals "Operative change of right upper lobectomy for small-cell lung cancer with several scattered subsolid opacities and a new solid nodule in the right lower lobe measuring up to 0.5 cm.  We recommend continued surveillance with the role and schedule for such surveillance to be determined by clinical considerations. Sided chest port distal tip terminating at the confluence of the brachiocephalic vein in the SVC.  Correlate with device functioning. Apically predominant emphysematous changes, left greater than right. Aortic annular calcification and multi-vessel coronary artery atherosclerosis. Numerous unchanged hepatic hypodensities, likely cysts"  She notes cough and shortness of breath.      Review of Systems   Constitutional: Negative for appetite change and unexpected weight change.   Eyes: Negative for visual disturbance.   Respiratory: Positive for cough and shortness of breath.    Cardiovascular: Negative for chest pain.   Gastrointestinal: Negative for abdominal pain and diarrhea.   Genitourinary: Negative for frequency.   Musculoskeletal: Negative for back pain.   Skin: Negative for rash.   Neurological: Negative for headaches.   Hematological: Negative for adenopathy.   Psychiatric/Behavioral: The patient is nervous/anxious.        Objective:      Physical Exam   Constitutional: She is oriented to person, place, and time. She appears well-developed and well-nourished.   HENT:   Mouth/Throat: No oropharyngeal exudate.   Cardiovascular: Normal rate and normal heart sounds.   Pulmonary/Chest: Effort normal and breath sounds normal. She has no wheezes.   Abdominal: Soft. Bowel sounds are normal. There is no tenderness.   Musculoskeletal: She exhibits no edema or " tenderness.   Lymphadenopathy:     She has no cervical adenopathy.   Neurological: She is alert and oriented to person, place, and time. Coordination normal.   Skin: Skin is warm and dry. No rash noted.   Psychiatric: She has a normal mood and affect. Judgment and thought content normal.   Vitals reviewed.        LABs:  WBC   Date Value Ref Range Status   05/22/2019 5.66 3.90 - 12.70 K/uL Final     Hemoglobin   Date Value Ref Range Status   05/22/2019 12.6 12.0 - 16.0 g/dL Final     POC Hematocrit   Date Value Ref Range Status   08/09/2018 33 (L) 36 - 54 %PCV Final     Hematocrit   Date Value Ref Range Status   05/22/2019 38.1 37.0 - 48.5 % Final     Platelets   Date Value Ref Range Status   05/22/2019 202 150 - 350 K/uL Final     Gran # (ANC)   Date Value Ref Range Status   05/22/2019 2.3 1.8 - 7.7 K/uL Final     Comment:     The ANC is based on a white cell differential from an   automated cell counter. It has not been microscopically   reviewed for the presence of abnormal cells. Clinical   correlation is required.         Chemistry        Component Value Date/Time     06/12/2019 1638    K 5.0 06/12/2019 1638     06/12/2019 1638    CO2 28 06/12/2019 1638    BUN 24 (H) 06/12/2019 1638    CREATININE 1.3 06/12/2019 1638     06/12/2019 1638        Component Value Date/Time    CALCIUM 10.1 06/12/2019 1638    ALKPHOS 50 (L) 05/29/2019 0930    AST 27 05/29/2019 0930    ALT 22 05/29/2019 0930    BILITOT 0.3 05/29/2019 0930    ESTGFRAFRICA 49 (A) 06/12/2019 1638    EGFRNONAA 42 (A) 06/12/2019 1638          Assessment:       1. History of lung cancer        Plan:        1. Personally reviewed CT scans. She has ground glass lesions which need close monitoring. Will repeat CT scan in 6 months unless symptoms worsen then will repeat in 3 months    Above care plan was discussed with patient and accompanying wife and all questions were addressed to their satisfaction

## 2019-06-14 LAB — 1,25(OH)2D3 SERPL-MCNC: 41 PG/ML (ref 20–79)

## 2019-06-16 ENCOUNTER — PATIENT MESSAGE (OUTPATIENT)
Dept: NEPHROLOGY | Facility: CLINIC | Age: 69
End: 2019-06-16

## 2019-06-16 DIAGNOSIS — N17.9 AKI (ACUTE KIDNEY INJURY): Primary | ICD-10-CM

## 2019-06-17 NOTE — PROGRESS NOTES
Subjective:       Patient ID: Alesha Toney is a 68 y.o. White female who presents for new evaluation of Acute Kidney Injury    HPI     She is referred by her PCP for worsening kidney function. She denies NSAID use, recent IV contrast. No recent n/v/d. No new medications. Losartan use for 2 years. Her PCP noted hypercalcemia and did a mini work up, PTH was not elevated, rather low normal. She denies change in urination. No rashes, no increased joint pain. No known family history of kidney disease.    Review of Systems   Constitutional: Negative for activity change, appetite change, fatigue and unexpected weight change.   HENT: Negative for facial swelling.    Eyes: Negative for visual disturbance.   Respiratory: Negative for shortness of breath.    Cardiovascular: Negative for chest pain and leg swelling.   Gastrointestinal: Negative for abdominal distention, diarrhea, nausea and vomiting.   Genitourinary: Negative for difficulty urinating, dysuria, flank pain and hematuria.   Musculoskeletal: Negative for arthralgias.   Skin: Negative for rash.   Neurological: Negative for weakness.   Hematological: Does not bruise/bleed easily.   Psychiatric/Behavioral: Negative for behavioral problems.       Objective:      Physical Exam   Constitutional: She is oriented to person, place, and time. She appears well-nourished.   HENT:   Mouth/Throat: Oropharynx is clear and moist.   Eyes: No scleral icterus.   Neck: No JVD present.   Cardiovascular: S1 normal and S2 normal. Exam reveals no friction rub.   Pulmonary/Chest: Breath sounds normal. She has no wheezes. She has no rales.   Abdominal: Soft.   Musculoskeletal: She exhibits no edema.   Neurological: She is alert and oriented to person, place, and time.   Skin: Skin is warm and dry.   Psychiatric: She has a normal mood and affect.   Nursing note and vitals reviewed.      Assessment:       1. TRAM (acute kidney injury)    2. Stage 3 chronic kidney disease    3. Non-small  cell lung cancer, unspecified laterality    4. Prediabetes    5. Essential hypertension    6. Coronary artery disease due to calcified coronary lesion        Plan:           TRAM on mild CKD  - per timeline this correlates with hypercalcemia which causes vasoconstriction  - she has stopped calcium supplements as well as 'skin and nails' supplement  - complete hypercalcemia work up and will check urine studies as well as renal u/s  - her PCP held losartan, advised her to hold as well but do not start Norvasc quite yet but can use clonidine prn if BP spikes    HTN  - as above but also her HTN is anxiety related      Labs and renal u/s, RTC pending results

## 2019-06-18 LAB — PTH RELATED PROT SERPL-SCNC: 0.7 PMOL/L

## 2019-06-19 ENCOUNTER — OFFICE VISIT (OUTPATIENT)
Dept: DERMATOLOGY | Facility: CLINIC | Age: 69
End: 2019-06-19
Payer: COMMERCIAL

## 2019-06-19 DIAGNOSIS — D48.5 NEOPLASM OF UNCERTAIN BEHAVIOR OF SKIN: Primary | ICD-10-CM

## 2019-06-19 PROCEDURE — 99999 PR PBB SHADOW E&M-EST. PATIENT-LVL III: CPT | Mod: PBBFAC,,, | Performed by: NURSE PRACTITIONER

## 2019-06-19 PROCEDURE — 88305 TISSUE EXAM BY PATHOLOGIST: CPT | Performed by: PATHOLOGY

## 2019-06-19 PROCEDURE — 99499 NO LOS: ICD-10-PCS | Mod: S$GLB,,, | Performed by: NURSE PRACTITIONER

## 2019-06-19 PROCEDURE — 99999 PR PBB SHADOW E&M-EST. PATIENT-LVL III: ICD-10-PCS | Mod: PBBFAC,,, | Performed by: NURSE PRACTITIONER

## 2019-06-19 PROCEDURE — 11102 PR TANGENTIAL BIOPSY, SKIN, SINGLE LESION: ICD-10-PCS | Mod: S$GLB,,, | Performed by: NURSE PRACTITIONER

## 2019-06-19 PROCEDURE — 11102 TANGNTL BX SKIN SINGLE LES: CPT | Mod: S$GLB,,, | Performed by: NURSE PRACTITIONER

## 2019-06-19 PROCEDURE — 99499 UNLISTED E&M SERVICE: CPT | Mod: S$GLB,,, | Performed by: NURSE PRACTITIONER

## 2019-06-19 PROCEDURE — 88305 TISSUE SPECIMEN TO PATHOLOGY, DERMATOLOGY: ICD-10-PCS | Mod: 26,,, | Performed by: PATHOLOGY

## 2019-06-19 NOTE — PATIENT INSTRUCTIONS
Shave Biopsy Wound Care    Your doctor has performed a shave biopsy today.  A band aid and vaseline ointment has been placed over the site.  This should remain in place for 24 hours.  It is recommended that you keep the area dry for the first 24 hours.  After 24 hours, you may remove the band aid and wash the area with warm soap and water and apply Vaseline jelly.  Many patients prefer to use Neosporin or Bacitracin ointment.  This is acceptable; however, know that you can develop an allergy to this medication even if you have used it safely for years.  It is important to keep the area moist.  Letting it dry out and get air slows healing time, and will worsen the scar.  Band aid is optional after first 24 hours.      If you notice increasing redness, tenderness, pain, or yellow drainage at the biopsy site, please notify your doctor.  These are signs of an infection.    If your biopsy site is bleeding, apply firm pressure for 15 minutes straight.  Repeat for another 15 minutes, if it is still bleeding.   If the surgical site continues to bleed, then please contact your doctor.      [facility] [place] [address]/ [phone] FAX/ [email or web address]

## 2019-06-19 NOTE — PROGRESS NOTES
Subjective:       Patient ID:  Alesha Toney is a 68 y.o. female who presents for   Chief Complaint   Patient presents with    Spot     spot on my left upper back x 2 years. I think it may be cancer. it itches, does not bleeds.     Spot  - Initial  Affected locations: back  Duration: 2 years  Signs / symptoms: tender and redness  Severity: mild  Timing: constant  Aggravated by: nothing  Relieving factors/Treatments tried: nothing (patient had been undergoing chemo for lung cancer, assumed chemo would take care of it.)  Improvement on treatment: no relief      Reports previous h/o SCC to rt shoulder ~10 years ago.   Last skin cancer screening was ~10 years ago    Review of Systems   Constitutional: Negative for fever and chills.   Skin: Positive for activity-related sunscreen use. Negative for daily sunscreen use.   Hematologic/Lymphatic: Bruises/bleeds easily.        Objective:    Physical Exam   Constitutional: She appears well-developed and well-nourished. No distress.   Neurological: She is alert and oriented to person, place, and time. She is not disoriented.   Psychiatric: She has a normal mood and affect.   Skin:   Areas Examined (abnormalities noted in diagram):   Chest / Axilla Inspection Performed  Back Inspection Performed              Diagram Legend     Erythematous scaling macule/papule c/w actinic keratosis       Vascular papule c/w angioma      Pigmented verrucoid papule/plaque c/w seborrheic keratosis      Yellow umbilicated papule c/w sebaceous hyperplasia      Irregularly shaped tan macule c/w lentigo     1-2 mm smooth white papules consistent with Milia      Movable subcutaneous cyst with punctum c/w epidermal inclusion cyst      Subcutaneous movable cyst c/w pilar cyst      Firm pink to brown papule c/w dermatofibroma      Pedunculated fleshy papule(s) c/w skin tag(s)      Evenly pigmented macule c/w junctional nevus     Mildly variegated pigmented, slightly irregular-bordered macule c/w  mildly atypical nevus      Flesh colored to evenly pigmented papule c/w intradermal nevus       Pink pearly papule/plaque c/w basal cell carcinoma      Erythematous hyperkeratotic cursted plaque c/w SCC      Surgical scar with no sign of skin cancer recurrence      Open and closed comedones      Inflammatory papules and pustules      Verrucoid papule consistent consistent with wart     Erythematous eczematous patches and plaques     Dystrophic onycholytic nail with subungual debris c/w onychomycosis     Umbilicated papule    Erythematous-base heme-crusted tan verrucoid plaque consistent with inflamed seborrheic keratosis     Erythematous Silvery Scaling Plaque c/w Psoriasis     See annotation              Assessment / Plan:      Pathology Orders:     Normal Orders This Visit    Tissue Specimen To Pathology, Dermatology     Questions:    Directional Terms:  Other(comment)    Clinical Information:  left upper back    Specific Site:  r/o scc vs other        Neoplasm of uncertain behavior of skin  -     Tissue Specimen To Pathology, Dermatology    Shave biopsy procedure note:    Shave biopsy performed after verbal consent including risk of infection, scar, recurrence, need for additional treatment of site. Area prepped with alcohol, anesthetized with approximately 1.0cc of 1% lidocaine with epinephrine. Lesional tissue shaved with razor blade. Hemostasis achieved with application of aluminum chloride followed by hyfrecation. No complications. Dressing applied. Wound care explained.    If biopsy positive for malignancy, refer to Dr. Borjas for Mohs surgery consultation.           Follow up for pending bx results.

## 2019-06-27 ENCOUNTER — PATIENT MESSAGE (OUTPATIENT)
Dept: HEMATOLOGY/ONCOLOGY | Facility: CLINIC | Age: 69
End: 2019-06-27

## 2019-06-28 ENCOUNTER — PATIENT MESSAGE (OUTPATIENT)
Dept: DERMATOLOGY | Facility: CLINIC | Age: 69
End: 2019-06-28

## 2019-07-02 ENCOUNTER — LAB VISIT (OUTPATIENT)
Dept: LAB | Facility: HOSPITAL | Age: 69
End: 2019-07-02
Attending: INTERNAL MEDICINE
Payer: COMMERCIAL

## 2019-07-02 DIAGNOSIS — N17.9 AKI (ACUTE KIDNEY INJURY): ICD-10-CM

## 2019-07-02 LAB
ALBUMIN SERPL BCP-MCNC: 3.5 G/DL (ref 3.5–5.2)
ANION GAP SERPL CALC-SCNC: 7 MMOL/L (ref 8–16)
BUN SERPL-MCNC: 20 MG/DL (ref 8–23)
CALCIUM SERPL-MCNC: 10.1 MG/DL (ref 8.7–10.5)
CHLORIDE SERPL-SCNC: 104 MMOL/L (ref 95–110)
CO2 SERPL-SCNC: 30 MMOL/L (ref 23–29)
CREAT SERPL-MCNC: 1.3 MG/DL (ref 0.5–1.4)
EST. GFR  (AFRICAN AMERICAN): 49 ML/MIN/1.73 M^2
EST. GFR  (NON AFRICAN AMERICAN): 42 ML/MIN/1.73 M^2
GLUCOSE SERPL-MCNC: 100 MG/DL (ref 70–110)
PHOSPHATE SERPL-MCNC: 3.6 MG/DL (ref 2.7–4.5)
POTASSIUM SERPL-SCNC: 4.8 MMOL/L (ref 3.5–5.1)
SODIUM SERPL-SCNC: 141 MMOL/L (ref 136–145)

## 2019-07-02 PROCEDURE — 80069 RENAL FUNCTION PANEL: CPT

## 2019-07-02 PROCEDURE — 36415 COLL VENOUS BLD VENIPUNCTURE: CPT

## 2019-07-05 ENCOUNTER — PATIENT MESSAGE (OUTPATIENT)
Dept: NEPHROLOGY | Facility: CLINIC | Age: 69
End: 2019-07-05

## 2019-07-05 DIAGNOSIS — I10 ESSENTIAL HYPERTENSION: Primary | ICD-10-CM

## 2019-07-10 ENCOUNTER — INFUSION (OUTPATIENT)
Dept: INFUSION THERAPY | Facility: HOSPITAL | Age: 69
End: 2019-07-10
Attending: INTERNAL MEDICINE
Payer: COMMERCIAL

## 2019-07-10 DIAGNOSIS — C34.90 MALIGNANT NEOPLASM OF LUNG: Primary | ICD-10-CM

## 2019-07-10 PROCEDURE — 25000003 PHARM REV CODE 250: Performed by: INTERNAL MEDICINE

## 2019-07-10 PROCEDURE — 63600175 PHARM REV CODE 636 W HCPCS: Performed by: INTERNAL MEDICINE

## 2019-07-10 PROCEDURE — A4216 STERILE WATER/SALINE, 10 ML: HCPCS | Performed by: INTERNAL MEDICINE

## 2019-07-10 PROCEDURE — 96523 IRRIG DRUG DELIVERY DEVICE: CPT

## 2019-07-10 RX ORDER — HEPARIN 100 UNIT/ML
500 SYRINGE INTRAVENOUS
Status: CANCELLED | OUTPATIENT
Start: 2019-07-10

## 2019-07-10 RX ORDER — SODIUM CHLORIDE 0.9 % (FLUSH) 0.9 %
10 SYRINGE (ML) INJECTION
Status: COMPLETED | OUTPATIENT
Start: 2019-07-10 | End: 2019-07-10

## 2019-07-10 RX ORDER — SODIUM CHLORIDE 0.9 % (FLUSH) 0.9 %
10 SYRINGE (ML) INJECTION
Status: CANCELLED | OUTPATIENT
Start: 2019-07-10

## 2019-07-10 RX ORDER — HEPARIN 100 UNIT/ML
500 SYRINGE INTRAVENOUS
Status: COMPLETED | OUTPATIENT
Start: 2019-07-10 | End: 2019-07-10

## 2019-07-10 RX ADMIN — Medication 10 ML: at 03:07

## 2019-07-10 RX ADMIN — HEPARIN 500 UNITS: 100 SYRINGE at 03:07

## 2019-07-10 NOTE — NURSING
Patient here for port flush-left chest port accesses easily without blood return-line flushed without swelling or pain at site-needle removed-patient tolerated well.

## 2019-07-12 ENCOUNTER — TELEPHONE (OUTPATIENT)
Dept: INTERNAL MEDICINE | Facility: CLINIC | Age: 69
End: 2019-07-12

## 2019-07-12 ENCOUNTER — PATIENT MESSAGE (OUTPATIENT)
Dept: NEPHROLOGY | Facility: CLINIC | Age: 69
End: 2019-07-12

## 2019-07-19 ENCOUNTER — PATIENT MESSAGE (OUTPATIENT)
Dept: ORTHOPEDICS | Facility: CLINIC | Age: 69
End: 2019-07-19

## 2019-07-22 ENCOUNTER — TELEPHONE (OUTPATIENT)
Dept: ORTHOPEDICS | Facility: CLINIC | Age: 69
End: 2019-07-22

## 2019-07-22 DIAGNOSIS — Z98.890 S/P SPINAL SURGERY: Primary | ICD-10-CM

## 2019-08-07 ENCOUNTER — PROCEDURE VISIT (OUTPATIENT)
Dept: DERMATOLOGY | Facility: CLINIC | Age: 69
End: 2019-08-07
Payer: COMMERCIAL

## 2019-08-07 VITALS
HEIGHT: 66 IN | BODY MASS INDEX: 25.71 KG/M2 | SYSTOLIC BLOOD PRESSURE: 179 MMHG | DIASTOLIC BLOOD PRESSURE: 78 MMHG | HEART RATE: 71 BPM | WEIGHT: 160 LBS

## 2019-08-07 DIAGNOSIS — C44.519 BASAL CELL CARCINOMA OF BACK: Primary | ICD-10-CM

## 2019-08-07 PROCEDURE — 13101 PR RECMPL WND TRUNK 2.6-7.5 CM: ICD-10-PCS | Mod: 59,S$GLB,, | Performed by: DERMATOLOGY

## 2019-08-07 PROCEDURE — 17313: ICD-10-PCS | Mod: S$GLB,,, | Performed by: DERMATOLOGY

## 2019-08-07 PROCEDURE — 17313 MOHS 1 STAGE T/A/L: CPT | Mod: S$GLB,,, | Performed by: DERMATOLOGY

## 2019-08-07 PROCEDURE — 99499 UNLISTED E&M SERVICE: CPT | Mod: S$GLB,,, | Performed by: DERMATOLOGY

## 2019-08-07 PROCEDURE — 17314: ICD-10-PCS | Mod: S$GLB,,, | Performed by: DERMATOLOGY

## 2019-08-07 PROCEDURE — 99499 NO LOS: ICD-10-PCS | Mod: S$GLB,,, | Performed by: DERMATOLOGY

## 2019-08-07 PROCEDURE — 17314 MOHS ADDL STAGE T/A/L: CPT | Mod: S$GLB,,, | Performed by: DERMATOLOGY

## 2019-08-07 PROCEDURE — 13101 CMPLX RPR TRUNK 2.6-7.5 CM: CPT | Mod: 59,S$GLB,, | Performed by: DERMATOLOGY

## 2019-08-07 RX ORDER — CEPHALEXIN 500 MG/1
500 CAPSULE ORAL 3 TIMES DAILY
Qty: 30 CAPSULE | Refills: 0 | Status: SHIPPED | OUTPATIENT
Start: 2019-08-07 | End: 2019-08-17

## 2019-08-07 RX ORDER — HYDROCODONE BITARTRATE AND ACETAMINOPHEN 5; 325 MG/1; MG/1
1 TABLET ORAL EVERY 6 HOURS PRN
Qty: 10 TABLET | Refills: 0 | Status: SHIPPED | OUTPATIENT
Start: 2019-08-07 | End: 2019-09-03

## 2019-08-07 NOTE — PROGRESS NOTES
PROCEDURE: Mohs' Micrographic Surgery    INDICATION: Tumors with aggressive clinical behavior (rapidly growing, greater than 1 cm in diameter). Tumor with ill-defined borders.    REFERRING PROVIDER: Kelley Millard NP    CASE NUMBER:     ANESTHETIC: 13 cc 0.5% Lidocaine with Epi 1:200,000 mixed 1:1 with 0.5% Bupivacaine    SURGICAL PREP: Hibiclens    SURGEON: Tracee Borjas MD    ASSISTANTS: Enma Sales PA-C and Scarlett Sutton, Surg Tech    PREOPERATIVE DIAGNOSIS: basal cell carcinoma    POSTOPERATIVE DIAGNOSIS: basal cell carcinoma    PATHOLOGIC DIAGNOSIS: basal cell carcinoma- superficial, nodular    HISTOLOGY OF SPECIMENS IN FIRST STAGE:   Tumor Type: Tumor seen. Superficial basal cell carcinoma: Foci of basaloid cells with peripheral palisading and focal retraction artifact arising along the dermoepidermal junction and extending into the papillary dermis.  Nodular basal cell carcinoma: Nodular tumor in dermis composed of basaloid cells exhibiting peripheral palisading and retraction artifact.   Depth of Invasion: epidermis and dermis  Perineural Invasion: No    HISTOLOGY OF SPECIMENS IN SUBSEQUENT STAGES:  · Tumor Type: No tumor seen.    STAGES OF MOHS' SURGERY PERFORMED: 2    TUMOR-FREE PLANE ACHIEVED: Yes    HEMOSTASIS: electrocoagulation     SPECIMENS: 5 (4 in stage A and 1 in stage B)    LOCATION: left upper back. Patient verified location by looking at photo taken prior to procedure.     INITIAL LESION SIZE: 1.5 x 1.5 cm    FINAL DEFECT SIZE: 2.1 x 3.0 cm    WOUND REPAIR/DISPOSITION: The patient tolerated Mohs' Micrographic Surgery for a basal cell carcinoma very well. When the tumor was completely removed, a repair of the surgical defect was undertaken.      PROCEDURE: Complex Linear Repair    INDICATION: Status post Mohs' Micrographic Surgery for basal cell carcinoma.    CASE NUMBER:     SURGEON: Tracee Borjas MD    ASSISTANTS: Enma Sales PA-C and Scarlett Sutton, Surg Tech    ANESTHETIC: 6  "cc 1% Lidocaine with Epinephrine 1:100,000    SURGICAL PREP: Hibiclens, prepped by Scarlett Sutton Surg Tech    LOCATION: left upper back    DEFECT SIZE: 2.1 x 3.0 cm    WOUND REPAIR/DISPOSITION:  After the patient's carcinoma had been completely removed with Mohs' Micrographic Surgery, a repair of the surgical defect was undertaken. The patient was returned to the operating suite where the area of left upper back was prepped, draped, and anesthetized in the usual sterile fashion. The wound was widely undermined in all directions. Then, electrocoagulation was used to obtain meticulous hemostasis. 3-0 Vicryl buried vertical mattress sutures were placed into the subcutaneous and dermal plane to close the wound and alex the cutaneous wound edge. Bilateral dog ears were identified and were removed by a standard Burow's triangle technique. The cutaneous wound edges were closed using interrupted 4-0 Prolene suture.    The patient tolerated the procedure well.    The area was cleaned and dressed appropriately and the patient was given wound care instructions, as well as appointment for follow-up evaluation. Patient was placed on Norco 5-325 mg prn postop pain and Keflex 500 mg TID x 10 days.    LENGTH OF REPAIR: 5.6 cm    Vitals:    08/07/19 1257   BP: (!) 179/78   Pulse: 71   Weight: 72.6 kg (160 lb)   Height: 5' 6.4" (1.687 m)         "

## 2019-08-09 ENCOUNTER — HOSPITAL ENCOUNTER (OUTPATIENT)
Dept: RADIOLOGY | Facility: HOSPITAL | Age: 69
Discharge: HOME | End: 2019-08-09
Attending: ORTHOPAEDIC SURGERY
Payer: COMMERCIAL

## 2019-08-09 ENCOUNTER — OFFICE VISIT (OUTPATIENT)
Dept: ORTHOPEDICS | Facility: CLINIC | Age: 69
End: 2019-08-09
Payer: COMMERCIAL

## 2019-08-09 VITALS — BODY MASS INDEX: 26.19 KG/M2 | HEIGHT: 66 IN | WEIGHT: 162.94 LBS

## 2019-08-09 DIAGNOSIS — Z98.890 S/P SPINAL SURGERY: ICD-10-CM

## 2019-08-09 DIAGNOSIS — M54.12 CERVICAL RADICULOPATHY: Primary | ICD-10-CM

## 2019-08-09 PROCEDURE — 1101F PT FALLS ASSESS-DOCD LE1/YR: CPT | Mod: CPTII,S$GLB,, | Performed by: ORTHOPAEDIC SURGERY

## 2019-08-09 PROCEDURE — 1101F PR PT FALLS ASSESS DOC 0-1 FALLS W/OUT INJ PAST YR: ICD-10-PCS | Mod: CPTII,S$GLB,, | Performed by: ORTHOPAEDIC SURGERY

## 2019-08-09 PROCEDURE — 99999 PR PBB SHADOW E&M-EST. PATIENT-LVL III: ICD-10-PCS | Mod: PBBFAC,,, | Performed by: ORTHOPAEDIC SURGERY

## 2019-08-09 PROCEDURE — 99213 OFFICE O/P EST LOW 20 MIN: CPT | Mod: S$GLB,,, | Performed by: ORTHOPAEDIC SURGERY

## 2019-08-09 PROCEDURE — 72040 X-RAY EXAM NECK SPINE 2-3 VW: CPT | Mod: TC

## 2019-08-09 PROCEDURE — 72040 XR CERVICAL SPINE AP LATERAL: ICD-10-PCS | Mod: 26,,, | Performed by: RADIOLOGY

## 2019-08-09 PROCEDURE — 99999 PR PBB SHADOW E&M-EST. PATIENT-LVL III: CPT | Mod: PBBFAC,,, | Performed by: ORTHOPAEDIC SURGERY

## 2019-08-09 PROCEDURE — 99213 PR OFFICE/OUTPT VISIT, EST, LEVL III, 20-29 MIN: ICD-10-PCS | Mod: S$GLB,,, | Performed by: ORTHOPAEDIC SURGERY

## 2019-08-09 PROCEDURE — 72040 X-RAY EXAM NECK SPINE 2-3 VW: CPT | Mod: 26,,, | Performed by: RADIOLOGY

## 2019-08-09 RX ORDER — ACETAMINOPHEN AND CODEINE PHOSPHATE 300; 30 MG/1; MG/1
1 TABLET ORAL NIGHTLY PRN
Qty: 30 TABLET | Refills: 0 | Status: CANCELLED | OUTPATIENT
Start: 2019-08-09 | End: 2019-08-19

## 2019-08-09 NOTE — TELEPHONE ENCOUNTER
Left message for pt advising that per Dr. Bansal, he is unable to fill the tylenol 3 for her, as he can only fill narcotics prescriptions following surgery. I suggested that she contact her pcp for refill.

## 2019-08-13 ENCOUNTER — PATIENT MESSAGE (OUTPATIENT)
Dept: NEPHROLOGY | Facility: CLINIC | Age: 69
End: 2019-08-13

## 2019-08-13 ENCOUNTER — LAB VISIT (OUTPATIENT)
Dept: LAB | Facility: HOSPITAL | Age: 69
End: 2019-08-13
Attending: INTERNAL MEDICINE
Payer: COMMERCIAL

## 2019-08-13 DIAGNOSIS — I10 ESSENTIAL HYPERTENSION: ICD-10-CM

## 2019-08-13 LAB
BACTERIA #/AREA URNS HPF: NORMAL /HPF
BILIRUB UR QL STRIP: NEGATIVE
CLARITY UR: CLEAR
COLOR UR: YELLOW
GLUCOSE UR QL STRIP: NEGATIVE
HGB UR QL STRIP: NEGATIVE
KETONES UR QL STRIP: NEGATIVE
LEUKOCYTE ESTERASE UR QL STRIP: NEGATIVE
MICROSCOPIC COMMENT: NORMAL
NITRITE UR QL STRIP: NEGATIVE
PH UR STRIP: 6 [PH] (ref 5–8)
PROT UR QL STRIP: NEGATIVE
RBC #/AREA URNS HPF: 0 /HPF (ref 0–4)
SP GR UR STRIP: 1.01 (ref 1–1.03)
SQUAMOUS #/AREA URNS HPF: 0 /HPF
URN SPEC COLLECT METH UR: NORMAL
UROBILINOGEN UR STRIP-ACNC: NEGATIVE EU/DL
WBC #/AREA URNS HPF: 0 /HPF (ref 0–5)

## 2019-08-13 PROCEDURE — 81000 URINALYSIS NONAUTO W/SCOPE: CPT

## 2019-08-14 NOTE — PROGRESS NOTES
Date of Surgery: 10/15/18    Procedure: C6/7 ACDF    History: Alesha Toney is seen today for follow-up following the above listed procedure. She has done very well since surgery, of note she did have a right lung lobectomy for lung cancer.  Recently she reports right index finger pain swelling, and paresthesias.  This is fairly bothersome.  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  She is unable to take gabapentin because of a history of chronic kidney disease.    Exam: Incision is healed. There is no sign of infection. Neuro exam is stable. No signs of DVT.    Radiographs:  New radiographs demonstrate stable implants, no evidence of hardware failure.    Assessment/Plan:  I am concerned for current radiculopathy, although certainly this could be related to her lung surgery as well.  I recommended a MRI of his cervical spine with and without contrast, I will see her back for results    I spent 15 minutes with the patient of which greater than 1/2 the time was devoted to counciling the patient regarding treatment options.

## 2019-08-19 ENCOUNTER — PATIENT MESSAGE (OUTPATIENT)
Dept: ORTHOPEDICS | Facility: CLINIC | Age: 69
End: 2019-08-19

## 2019-08-21 ENCOUNTER — OFFICE VISIT (OUTPATIENT)
Dept: DERMATOLOGY | Facility: CLINIC | Age: 69
End: 2019-08-21
Payer: COMMERCIAL

## 2019-08-21 DIAGNOSIS — Z09 POSTOP CHECK: Primary | ICD-10-CM

## 2019-08-21 PROCEDURE — 99024 POSTOP FOLLOW-UP VISIT: CPT | Mod: S$GLB,,, | Performed by: DERMATOLOGY

## 2019-08-21 PROCEDURE — 99999 PR PBB SHADOW E&M-EST. PATIENT-LVL III: ICD-10-PCS | Mod: PBBFAC,,, | Performed by: DERMATOLOGY

## 2019-08-21 PROCEDURE — 99024 PR POST-OP FOLLOW-UP VISIT: ICD-10-PCS | Mod: S$GLB,,, | Performed by: DERMATOLOGY

## 2019-08-21 PROCEDURE — 99999 PR PBB SHADOW E&M-EST. PATIENT-LVL III: CPT | Mod: PBBFAC,,, | Performed by: DERMATOLOGY

## 2019-08-21 NOTE — PROGRESS NOTES
68 y.o. female patient is here for suture removal following Mohs' surgery.    Patient reports no problems.    WOUND PE:  The L upper back sutures intact. Wound healing well. Good skin edges. No signs or symptoms of infection.    IMPRESSION:  Healing operative site from Mohs' surgery, BCC L upper back s/p Mohs with CLC, postop day #14.    PLAN:  Sutures removed today. Steri-strips applied.  Continue wound care.  Keep moist with Aquaphor.    RTC:  In 3-6 months with Kelley Millard NP for skin check or sooner if new concern arises.

## 2019-08-28 ENCOUNTER — PATIENT MESSAGE (OUTPATIENT)
Dept: HEMATOLOGY/ONCOLOGY | Facility: CLINIC | Age: 69
End: 2019-08-28

## 2019-09-03 ENCOUNTER — INFUSION (OUTPATIENT)
Dept: INFUSION THERAPY | Facility: HOSPITAL | Age: 69
End: 2019-09-03
Attending: INTERNAL MEDICINE
Payer: MEDICARE

## 2019-09-03 ENCOUNTER — OFFICE VISIT (OUTPATIENT)
Dept: INTERNAL MEDICINE | Facility: CLINIC | Age: 69
End: 2019-09-03
Payer: MEDICARE

## 2019-09-03 ENCOUNTER — PATIENT MESSAGE (OUTPATIENT)
Dept: INTERNAL MEDICINE | Facility: CLINIC | Age: 69
End: 2019-09-03

## 2019-09-03 VITALS
DIASTOLIC BLOOD PRESSURE: 84 MMHG | BODY MASS INDEX: 26.05 KG/M2 | HEIGHT: 66 IN | OXYGEN SATURATION: 99 % | WEIGHT: 162.06 LBS | HEART RATE: 65 BPM | SYSTOLIC BLOOD PRESSURE: 140 MMHG

## 2019-09-03 DIAGNOSIS — E78.5 HYPERLIPIDEMIA, UNSPECIFIED HYPERLIPIDEMIA TYPE: ICD-10-CM

## 2019-09-03 DIAGNOSIS — I47.10 SVT (SUPRAVENTRICULAR TACHYCARDIA): ICD-10-CM

## 2019-09-03 DIAGNOSIS — I10 ESSENTIAL HYPERTENSION: ICD-10-CM

## 2019-09-03 DIAGNOSIS — N18.30 STAGE 3 CHRONIC KIDNEY DISEASE: ICD-10-CM

## 2019-09-03 DIAGNOSIS — Z13.6 SCREENING FOR CARDIOVASCULAR CONDITION: ICD-10-CM

## 2019-09-03 DIAGNOSIS — C34.90 NON-SMALL CELL LUNG CANCER, UNSPECIFIED LATERALITY: Chronic | ICD-10-CM

## 2019-09-03 DIAGNOSIS — R13.10 DYSPHAGIA, UNSPECIFIED TYPE: ICD-10-CM

## 2019-09-03 DIAGNOSIS — E04.1 THYROID NODULE: Primary | ICD-10-CM

## 2019-09-03 DIAGNOSIS — C44.519 BASAL CELL CARCINOMA OF BACK: ICD-10-CM

## 2019-09-03 DIAGNOSIS — C34.90 MALIGNANT NEOPLASM OF LUNG: Primary | ICD-10-CM

## 2019-09-03 PROCEDURE — 96523 IRRIG DRUG DELIVERY DEVICE: CPT

## 2019-09-03 PROCEDURE — 99213 OFFICE O/P EST LOW 20 MIN: CPT | Mod: PBBFAC,25 | Performed by: INTERNAL MEDICINE

## 2019-09-03 PROCEDURE — 99999 PR PBB SHADOW E&M-EST. PATIENT-LVL III: CPT | Mod: PBBFAC,,, | Performed by: INTERNAL MEDICINE

## 2019-09-03 PROCEDURE — 99214 PR OFFICE/OUTPT VISIT, EST, LEVL IV, 30-39 MIN: ICD-10-PCS | Mod: S$PBB,,, | Performed by: INTERNAL MEDICINE

## 2019-09-03 PROCEDURE — 99214 OFFICE O/P EST MOD 30 MIN: CPT | Mod: S$PBB,,, | Performed by: INTERNAL MEDICINE

## 2019-09-03 PROCEDURE — 25000003 PHARM REV CODE 250: Performed by: INTERNAL MEDICINE

## 2019-09-03 PROCEDURE — 63600175 PHARM REV CODE 636 W HCPCS: Performed by: INTERNAL MEDICINE

## 2019-09-03 PROCEDURE — A4216 STERILE WATER/SALINE, 10 ML: HCPCS | Performed by: INTERNAL MEDICINE

## 2019-09-03 PROCEDURE — 99999 PR PBB SHADOW E&M-EST. PATIENT-LVL III: ICD-10-PCS | Mod: PBBFAC,,, | Performed by: INTERNAL MEDICINE

## 2019-09-03 RX ORDER — LOSARTAN POTASSIUM 25 MG/1
25 TABLET ORAL DAILY
Qty: 90 TABLET | Refills: 3
Start: 2019-09-03 | End: 2019-09-20

## 2019-09-03 RX ORDER — ATORVASTATIN CALCIUM 20 MG/1
20 TABLET, FILM COATED ORAL DAILY
Qty: 90 TABLET | Refills: 3 | Status: SHIPPED | OUTPATIENT
Start: 2019-09-03 | End: 2019-12-16

## 2019-09-03 RX ORDER — LOSARTAN POTASSIUM 25 MG/1
TABLET ORAL
Refills: 3 | COMMUNITY
Start: 2019-08-13 | End: 2019-09-03 | Stop reason: SDUPTHER

## 2019-09-03 RX ORDER — ATORVASTATIN CALCIUM 20 MG/1
20 TABLET, FILM COATED ORAL DAILY
Qty: 90 TABLET | Refills: 3 | Status: SHIPPED | OUTPATIENT
Start: 2019-09-03 | End: 2019-09-03 | Stop reason: SDUPTHER

## 2019-09-03 RX ORDER — ACETAMINOPHEN AND CODEINE PHOSPHATE 300; 30 MG/1; MG/1
60 TABLET ORAL
COMMUNITY
End: 2019-09-03 | Stop reason: SDUPTHER

## 2019-09-03 RX ORDER — HEPARIN 100 UNIT/ML
500 SYRINGE INTRAVENOUS
Status: COMPLETED | OUTPATIENT
Start: 2019-09-03 | End: 2019-09-03

## 2019-09-03 RX ORDER — HEPARIN 100 UNIT/ML
500 SYRINGE INTRAVENOUS
Status: CANCELLED | OUTPATIENT
Start: 2019-09-03

## 2019-09-03 RX ORDER — ACETAMINOPHEN AND CODEINE PHOSPHATE 300; 30 MG/1; MG/1
1 TABLET ORAL EVERY 6 HOURS PRN
Qty: 60 TABLET | Refills: 0 | Status: SHIPPED | OUTPATIENT
Start: 2019-09-03 | End: 2019-09-04 | Stop reason: SDUPTHER

## 2019-09-03 RX ORDER — SODIUM CHLORIDE 0.9 % (FLUSH) 0.9 %
10 SYRINGE (ML) INJECTION
Status: CANCELLED | OUTPATIENT
Start: 2019-09-03

## 2019-09-03 RX ORDER — SODIUM CHLORIDE 0.9 % (FLUSH) 0.9 %
10 SYRINGE (ML) INJECTION
Status: COMPLETED | OUTPATIENT
Start: 2019-09-03 | End: 2019-09-03

## 2019-09-03 RX ADMIN — HEPARIN SODIUM (PORCINE) LOCK FLUSH IV SOLN 100 UNIT/ML 500 UNITS: 100 SOLUTION at 01:09

## 2019-09-03 RX ADMIN — Medication 10 ML: at 01:09

## 2019-09-03 NOTE — PROGRESS NOTES
Subjective:       Patient ID: Alesha Toney is a 68 y.o. female.    Chief Complaint: Establish Care    HPI   Retired RN.   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      H/o large localized lung cancer (adeno), managed by Dr Dhaliwal.  New nodule RLL is being monitored.  She is apprehensive.  Thoracotomy followed by chemo.    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    10/18- ant cervical fusion for herniated disc, which has caused r hand numbness.    Also with ckd 3, due to cisplatin, managed by Dr Donato.    MN thyroid.   Trouble swallowing.  Chokes on liquid at times.  coughs a lot when turning head certain way.  She has reflux, for which she takes zantac, which controls symptoms.  Symptoms not bad enough for EGD.    Svt an htn.      LAD calcifications by CT scan 9/18. She isn't interested in statin.                Review of Systems   Constitutional: Negative for fever and unexpected weight change.   HENT: Negative for congestion and postnasal drip.    Eyes: Negative for pain, discharge and visual disturbance.   Respiratory: Positive for shortness of breath. Negative for cough, chest tightness and wheezing.    Cardiovascular: Negative for palpitations and leg swelling. Chest pain: at surgical scar - numbness; cw tenderess.   Gastrointestinal: Negative for abdominal pain, constipation, diarrhea and nausea.   Genitourinary: Negative for difficulty urinating, dysuria and hematuria.   Musculoskeletal: Positive for neck pain.   Skin: Negative for rash.   Neurological: Negative for headaches.   Psychiatric/Behavioral: Negative for dysphoric mood and sleep disturbance. The patient is nervous/anxious (before ct scan, etc takes half a valium).        Objective:      Physical Exam   Constitutional: She is oriented to person, place, and time. She appears well-developed and well-nourished. No distress.   HENT:   Head: Normocephalic and atraumatic.   Right Ear: External  ear normal.   Left Ear: External ear normal.   Nose: Nose normal.   Mouth/Throat: Oropharynx is clear and moist.   Eyes: Pupils are equal, round, and reactive to light. Conjunctivae and EOM are normal. Right eye exhibits no discharge. Left eye exhibits no discharge. No scleral icterus.   Neck: Normal range of motion. Neck supple. No JVD present. No thyromegaly present.   Cardiovascular: Normal rate, regular rhythm and normal heart sounds. Exam reveals no gallop.   No murmur heard.  Pulmonary/Chest: Effort normal and breath sounds normal. No respiratory distress. She has no wheezes. She has no rales.   Abdominal: Soft. Bowel sounds are normal. She exhibits no distension and no mass. There is no tenderness. There is no rebound and no guarding.   Musculoskeletal: Normal range of motion. She exhibits no edema or tenderness.   Lymphadenopathy:     She has no cervical adenopathy.   Neurological: She is alert and oriented to person, place, and time. No cranial nerve deficit. Coordination normal.   Skin: Skin is warm and dry. No rash noted.   Psychiatric: She has a normal mood and affect. Her behavior is normal. Judgment and thought content normal.       The 10-year ASCVD risk score ([place] DC Jr., et al., 2013) is: 12.5%    Values used to calculate the score:      Age: 68 years      Sex: Female      Is Non- : No      Diabetic: No      Tobacco smoker: No      Systolic Blood Pressure: 140 mmHg      Is BP treated: Yes      HDL Cholesterol: 55 mg/dL      Total Cholesterol: 218 mg/dL  Assessment:       1. Thyroid nodule    2. SVT (supraventricular tachycardia)    3. Stage 3 chronic kidney disease    4. Non-small cell lung cancer, unspecified laterality    5. Essential hypertension    6. Screening for cardiovascular condition    7. Hyperlipidemia, unspecified hyperlipidemia type        Plan:       Alesha was seen today for establish care.    Diagnoses and all orders for this visit:    Thyroid nodule    SVT  (supraventricular tachycardia)    Stage 3 chronic kidney disease    Non-small cell lung cancer, unspecified laterality    Essential hypertension    Screening for cardiovascular condition  -     Lipid panel; Future    Hyperlipidemia, unspecified hyperlipidemia type  -     Lipid panel; Future  -     Lipid panel; Future  -     AST (SGOT); Future  -     ALT (SGPT); Future    Other orders  -  INCREASE    losartan (COZAAR) 25 MG tablet; Take 1 tablet (25 mg total) by mouth once daily.  -  START   atorvastatin (LIPITOR) 20 MG tablet; Take 1 tablet (20 mg total) by mouth once daily.       we discussed EGD to evaluate dysphagia.     Will observe for now      M onitor home bp.

## 2019-09-03 NOTE — NURSING
Arrives to clinic for PAC flush no blood rtn noted accessed using sterile tech. Hep locked de accessed tolerated well. Covered with band aide. Leaves clinic ambulatory NAD noted instructed to contact provider with changes questions or concern.

## 2019-09-04 ENCOUNTER — TELEPHONE (OUTPATIENT)
Dept: PULMONOLOGY | Facility: CLINIC | Age: 69
End: 2019-09-04

## 2019-09-04 ENCOUNTER — TELEPHONE (OUTPATIENT)
Dept: INTERNAL MEDICINE | Facility: CLINIC | Age: 69
End: 2019-09-04

## 2019-09-04 DIAGNOSIS — R06.02 SOB (SHORTNESS OF BREATH): Primary | ICD-10-CM

## 2019-09-04 RX ORDER — ACETAMINOPHEN AND CODEINE PHOSPHATE 300; 30 MG/1; MG/1
1 TABLET ORAL EVERY 6 HOURS PRN
Qty: 60 TABLET | Refills: 0 | Status: SHIPPED | OUTPATIENT
Start: 2019-09-04 | End: 2019-10-04 | Stop reason: SDUPTHER

## 2019-09-04 NOTE — TELEPHONE ENCOUNTER
----- Message from Leti Garnett sent at 9/4/2019  3:55 PM CDT -----  Contact: [facility]/ Mera [phone]  Patient is returning a phone call.  Who left a message for the Pharmacist; Jodie  Does The Pharmacy know what this is regarding:  Yes, Regarding acetaminophen-codeine 300-30mg (TYLENOL #3) 300-30 mg Tab  she needx the diagnostic code, some history on the patient since she has never filled with them before etc.    She can get this verbally    Thank you

## 2019-09-04 NOTE — TELEPHONE ENCOUNTER
Pharmacy states Tylenol 3 has a drug interaction with Diazepam, can cause an increase in depression      Please advise if you would like to continue with filling the tylenol 3

## 2019-09-04 NOTE — TELEPHONE ENCOUNTER
I spoke with Pharmacist, Mera, from [facility]. She needs to know if prescription for patient tylenol 300mg is for Chronic or Acute pain, and if it for chronic pain she will need a diagnosis code      Please Advise  Thank You

## 2019-09-11 ENCOUNTER — PATIENT MESSAGE (OUTPATIENT)
Dept: INTERNAL MEDICINE | Facility: CLINIC | Age: 69
End: 2019-09-11

## 2019-09-12 ENCOUNTER — PATIENT MESSAGE (OUTPATIENT)
Dept: HEMATOLOGY/ONCOLOGY | Facility: CLINIC | Age: 69
End: 2019-09-12

## 2019-09-12 ENCOUNTER — OFFICE VISIT (OUTPATIENT)
Dept: PULMONOLOGY | Facility: CLINIC | Age: 69
End: 2019-09-12
Payer: MEDICARE

## 2019-09-12 ENCOUNTER — HOSPITAL ENCOUNTER (OUTPATIENT)
Dept: PULMONOLOGY | Facility: CLINIC | Age: 69
Discharge: HOME | End: 2019-09-12
Payer: MEDICARE

## 2019-09-12 VITALS
HEIGHT: 66 IN | SYSTOLIC BLOOD PRESSURE: 179 MMHG | HEART RATE: 57 BPM | BODY MASS INDEX: 26.03 KG/M2 | HEIGHT: 66 IN | WEIGHT: 162 LBS | OXYGEN SATURATION: 98 % | BODY MASS INDEX: 26.03 KG/M2 | WEIGHT: 162 LBS | DIASTOLIC BLOOD PRESSURE: 78 MMHG

## 2019-09-12 DIAGNOSIS — R06.02 SOB (SHORTNESS OF BREATH): ICD-10-CM

## 2019-09-12 DIAGNOSIS — J43.8 OTHER EMPHYSEMA: Primary | ICD-10-CM

## 2019-09-12 DIAGNOSIS — R91.1 LUNG NODULE: ICD-10-CM

## 2019-09-12 DIAGNOSIS — C34.90 NON-SMALL CELL LUNG CANCER, UNSPECIFIED LATERALITY: Chronic | ICD-10-CM

## 2019-09-12 LAB
DLCO ADJ PRE: 15.26 ML/(MIN*MMHG) (ref 17.17–28.64)
DLCO SINGLE BREATH LLN: 17.17
DLCO SINGLE BREATH PRE REF: 66.6 %
DLCO SINGLE BREATH REF: 22.9
DLCOC SBVA LLN: 2.97
DLCOC SBVA PRE REF: 74.9 %
DLCOC SBVA REF: 4.32
DLCOC SINGLE BREATH LLN: 17.17
DLCOC SINGLE BREATH PRE REF: 66.6 %
DLCOC SINGLE BREATH REF: 22.9
DLCOCSBVAULN: 5.67
DLCOCSINGLEBREATHULN: 28.64
DLCOSINGLEBREATHULN: 28.64
DLCOVA LLN: 2.97
DLCOVA PRE REF: 74.9 %
DLCOVA PRE: 3.24 ML/(MIN*MMHG*L) (ref 2.97–5.67)
DLCOVA REF: 4.32
DLCOVAULN: 5.67
DLVAADJ PRE: 3.24 ML/(MIN*MMHG*L) (ref 2.97–5.67)
FEF 25 75 LLN: 0.94
FEF 25 75 PRE REF: 53.4 %
FEF 25 75 REF: 2
FEV05 LLN: 0.98
FEV05 REF: 1.83
FEV1 FVC LLN: 65
FEV1 FVC PRE REF: 79 %
FEV1 FVC REF: 78
FEV1 LLN: 1.76
FEV1 PRE REF: 81.8 %
FEV1 REF: 2.4
FVC LLN: 2.28
FVC PRE REF: 102.7 %
FVC REF: 3.11
IVC PRE: 2.89 L (ref 2.28–3.94)
IVC SINGLE BREATH LLN: 2.28
IVC SINGLE BREATH PRE REF: 92.9 %
IVC SINGLE BREATH REF: 3.11
IVCSINGLEBREATHULN: 3.94
PEF LLN: 4.3
PEF PRE REF: 74.4 %
PEF REF: 6.13
PRE DLCO: 15.26 ML/(MIN*MMHG) (ref 17.17–28.64)
PRE FEF 25 75: 1.07 L/S (ref 0.94–3.07)
PRE FET 100: 7.18 SEC
PRE FEV05 REF: 76.7 %
PRE FEV1 FVC: 61.59 % (ref 64.91–91.07)
PRE FEV1: 1.97 L (ref 1.76–3.05)
PRE FEV5: 1.41 L (ref 0.98–2.69)
PRE FVC: 3.19 L (ref 2.28–3.94)
PRE PEF: 4.56 L/S (ref 4.3–7.95)
VA PRE: 4.72 L (ref 5.15–5.15)
VA SINGLE BREATH LLN: 5.15
VA SINGLE BREATH PRE REF: 91.7 %
VA SINGLE BREATH REF: 5.15
VASINGLEBREATHULN: 5.15

## 2019-09-12 PROCEDURE — 99999 PR PBB SHADOW E&M-EST. PATIENT-LVL III: ICD-10-PCS | Mod: PBBFAC,,, | Performed by: INTERNAL MEDICINE

## 2019-09-12 PROCEDURE — 94618 PULMONARY STRESS TESTING: CPT | Mod: PBBFAC | Performed by: INTERNAL MEDICINE

## 2019-09-12 PROCEDURE — 99999 PR PBB SHADOW E&M-EST. PATIENT-LVL III: CPT | Mod: PBBFAC,,, | Performed by: INTERNAL MEDICINE

## 2019-09-12 PROCEDURE — 99213 OFFICE O/P EST LOW 20 MIN: CPT | Mod: PBBFAC,25 | Performed by: INTERNAL MEDICINE

## 2019-09-12 PROCEDURE — 94729 PR C02/MEMBANE DIFFUSE CAPACITY: ICD-10-PCS | Mod: 26,S$PBB,, | Performed by: INTERNAL MEDICINE

## 2019-09-12 PROCEDURE — 99214 OFFICE O/P EST MOD 30 MIN: CPT | Mod: 25,S$PBB,, | Performed by: INTERNAL MEDICINE

## 2019-09-12 PROCEDURE — 94729 DIFFUSING CAPACITY: CPT | Mod: 26,S$PBB,, | Performed by: INTERNAL MEDICINE

## 2019-09-12 PROCEDURE — 99214 PR OFFICE/OUTPT VISIT, EST, LEVL IV, 30-39 MIN: ICD-10-PCS | Mod: 25,S$PBB,, | Performed by: INTERNAL MEDICINE

## 2019-09-12 PROCEDURE — 94618 PULMONARY STRESS TESTING: CPT | Mod: 26,S$PBB,, | Performed by: INTERNAL MEDICINE

## 2019-09-12 PROCEDURE — 94010 BREATHING CAPACITY TEST: ICD-10-PCS | Mod: 26,S$PBB,, | Performed by: INTERNAL MEDICINE

## 2019-09-12 PROCEDURE — 94010 BREATHING CAPACITY TEST: CPT | Mod: PBBFAC | Performed by: INTERNAL MEDICINE

## 2019-09-12 PROCEDURE — 94010 BREATHING CAPACITY TEST: CPT | Mod: 26,S$PBB,, | Performed by: INTERNAL MEDICINE

## 2019-09-12 PROCEDURE — 94729 DIFFUSING CAPACITY: CPT | Mod: PBBFAC | Performed by: INTERNAL MEDICINE

## 2019-09-12 PROCEDURE — 94618 PULMONARY STRESS TESTING: ICD-10-PCS | Mod: 26,S$PBB,, | Performed by: INTERNAL MEDICINE

## 2019-09-12 RX ORDER — ALBUTEROL SULFATE 90 UG/1
2 AEROSOL, METERED RESPIRATORY (INHALATION) EVERY 6 HOURS PRN
Qty: 1 EACH | Refills: 11 | Status: SHIPPED | OUTPATIENT
Start: 2019-09-12 | End: 2019-12-16

## 2019-09-12 NOTE — H&P (VIEW-ONLY)
"Subjective:       Patient ID: Alesha Toney is a 68 y.o. female.    Chief Complaint: Shortness of Breath    68 year old with a history of lobectomy for non small cell lung cancer.  Patient is doing well but describes intermittent BAIRD on exertion with certain activities.  No wheezes or rales.     Review of Systems    Objective:       Vitals:    09/12/19 0950   BP: (!) 179/78   Pulse: (!) 57   SpO2: 98%   Weight: 73.5 kg (162 lb)   Height: 5' 6" (1.676 m)     Physical Exam   Constitutional: She is oriented to person, place, and time. She appears well-developed and well-nourished.   HENT:   Head: Normocephalic.   Neck: Normal range of motion. Neck supple.   Cardiovascular: Normal rate and regular rhythm.   Pulmonary/Chest: Normal expansion. She has wheezes. She has no rales.   Musculoskeletal: Normal range of motion. She exhibits no edema.   Lymphadenopathy: No supraclavicular adenopathy is present.     She has no cervical adenopathy.   Neurological: She is alert and oriented to person, place, and time.   Skin: Skin is warm.   Psychiatric: She has a normal mood and affect.        Personal Diagnostic Review  CT of chest performed on 5/28/2019 without contrast revealed new 5 mm pulmonary nodules with stable GGO/tree in bud.  Pulmonary function tests: FEV1: 1.97  (82 % predicted), FVC:  3.19 (103 % predicted), FEV1/FVC:  62, DLCO: 15.3 (67 % predicted), No obstruction but mild decrease in diffusion    09/12/2019---------Distance: 426.72 meters (1400 feet)       O2 Sat % Supplemental Oxygen Heart Rate Blood Pressure Josefa Scale   Pre-exercise  (Resting) 100 % Room Air 73 bpm 184/84 mmHg 0   During Exercise 99 % Room Air 98 bpm 200/84 mmHg 2   Post-exercise  (Recovery) 100 % Room Air  71 bpm 179/78 mmHg         No flowsheet data found.      Assessment:       1. Other emphysema    2. Lung nodule    3. Non-small cell lung cancer, unspecified laterality        Outpatient Encounter Medications as of 9/12/2019   Medication Sig " Dispense Refill    acetaminophen-codeine 300-30mg (TYLENOL #3) 300-30 mg Tab Take 1 tablet by mouth every 6 (six) hours as needed. 1 tb po q 6h for chronic neuropathic pain.  Medically necessary 60 tablet 0    atenolol (TENORMIN) 50 MG tablet Take 0.5 tablets (25 mg total) by mouth 4 (four) times daily. 180 tablet 3    atorvastatin (LIPITOR) 20 MG tablet Take 1 tablet (20 mg total) by mouth once daily. 90 tablet 3    bimatoprost (LATISSE) 0.03 % ophthalmic solution Place one drop on applicator and apply evenly along the skin of the upper eyelid at base of eyelashes qhs; repeat for second eye 3 mL 6    calcium carbonate (CALCIUM ANTACID) 300 mg (750 mg) Chew Take by mouth.      diazePAM (VALIUM) 5 MG tablet Take 1 tablet (5 mg total) by mouth every 8 (eight) hours as needed for Anxiety. 60 tablet 0    losartan (COZAAR) 25 MG tablet Take 1 tablet (25 mg total) by mouth once daily. (Patient taking differently: Take 50 mg by mouth once daily. ) 90 tablet 3    ranitidine (ZANTAC) 75 MG tablet Take 150 mg by mouth nightly.       albuterol (VENTOLIN HFA) 90 mcg/actuation inhaler Inhale 2 puffs into the lungs every 6 (six) hours as needed for Wheezing. Rescue 1 each 11     No facility-administered encounter medications on file as of 9/12/2019.      No orders of the defined types were placed in this encounter.    Plan:     Problem List Items Addressed This Visit     Non-small cell lung cancer (NSCLC) (Chronic)    Overview     Status post resection 8/2018 status post adjuvant chemo         Other emphysema - Primary    Overview     Mild.  Visible on CT of chest.  Trial of albuterol with activities on a prn/rescue or prevention.         Relevant Medications    albuterol (VENTOLIN HFA) 90 mcg/actuation inhaler    Lung nodule    Overview     New 5 mm in RLL, detected May 2019.  Schedule follow up.           Scheduled CT ordered by Dr. Dhaliwal.  Will review results as I cc'd to determine if further evaluation for nodule  warranted    RTC on an as needed basis.

## 2019-09-17 ENCOUNTER — HOSPITAL ENCOUNTER (OUTPATIENT)
Dept: RADIOLOGY | Facility: HOSPITAL | Age: 69
Discharge: HOME | End: 2019-09-17
Attending: INTERNAL MEDICINE
Payer: MEDICARE

## 2019-09-17 DIAGNOSIS — Z85.118 HISTORY OF LUNG CANCER: ICD-10-CM

## 2019-09-17 PROCEDURE — 71250 CT CHEST WITHOUT CONTRAST: ICD-10-PCS | Mod: 26,,, | Performed by: RADIOLOGY

## 2019-09-17 PROCEDURE — 71250 CT THORAX DX C-: CPT | Mod: 26,,, | Performed by: RADIOLOGY

## 2019-09-17 PROCEDURE — 71250 CT THORAX DX C-: CPT | Mod: TC

## 2019-09-17 NOTE — PROCEDURES
Alesha Toney is a 68 y.o.  female patient, who presents for a 6 minute walk test ordered by Sir Hearn MD.  The diagnosis is Shortness of Breath.  The patient's BMI is 26.2 kg/m2.  Predicted distance (lower limit of normal) is 318.07 meters.      Test Results:    The test was completed without stopping.  The total time walked was 360 seconds.  During walking, the patient reported:  No complaints.  The patient used no assistive devices during testing.     09/12/2019---------Distance: 426.72 meters (1400 feet)     O2 Sat % Supplemental Oxygen Heart Rate Blood Pressure Josefa Scale   Pre-exercise  (Resting) 100 % Room Air 73 bpm 184/84 mmHg 0   During Exercise 99 % Room Air 98 bpm 200/84 mmHg 2   Post-exercise  (Recovery) 100 % Room Air  71 bpm 179/78 mmHg      Recovery Time:  121 seconds    Performing nurse/tech:  Monroe CERVANTES      PREVIOUS STUDY:   The patient has not had a previous study.      CLINICAL INTERPRETATION:  Six minute walk distance is 426.72 meters (1400 feet) with light dyspnea.  During exercise, there was no significant desaturation while breathing room air.  Blood pressure remained stable and Heart rate increased significantly with walking.  Hypertension was present prior to exercise.  The patient did not report non-pulmonary symptoms during exercise.  No previous study performed.  Based upon age and body mass index, exercise capacity is normal.

## 2019-09-18 ENCOUNTER — TELEPHONE (OUTPATIENT)
Dept: PULMONOLOGY | Facility: CLINIC | Age: 69
End: 2019-09-18

## 2019-09-18 ENCOUNTER — TELEPHONE (OUTPATIENT)
Dept: HEMATOLOGY/ONCOLOGY | Facility: CLINIC | Age: 69
End: 2019-09-18

## 2019-09-18 DIAGNOSIS — C34.91 NON-SMALL CELL CANCER OF RIGHT LUNG: Chronic | ICD-10-CM

## 2019-09-18 DIAGNOSIS — R91.1 PULMONARY NODULE: Primary | ICD-10-CM

## 2019-09-18 NOTE — TELEPHONE ENCOUNTER
Reviewed CT chest in Thoracic conference today about the growing lesion near staple line,, Dr. Hearn will do EBUS. Patient informed and is agreeable with the plan

## 2019-09-18 NOTE — TELEPHONE ENCOUNTER
----- Message from Pam Dhaliwal MD sent at 9/18/2019  2:30 PM CDT -----  I called her and told her what we discussed so you can get her in. Thanks

## 2019-09-19 ENCOUNTER — PATIENT MESSAGE (OUTPATIENT)
Dept: SURGERY | Facility: HOSPITAL | Age: 69
End: 2019-09-19

## 2019-09-19 ENCOUNTER — TELEPHONE (OUTPATIENT)
Dept: PULMONOLOGY | Facility: CLINIC | Age: 69
End: 2019-09-19

## 2019-09-19 NOTE — TELEPHONE ENCOUNTER
I spoke to Ms Toney. I went over EBUS instructions and answered all questions. Pt scheduled for EBUS on 9/24/19 arrival to [facility] for 12pm with Dr Hearn. Pt verbalized understanding.

## 2019-09-19 NOTE — TELEPHONE ENCOUNTER
Called patient and explained location of nodule and why we need the biopsy.  Scheduled for next Tuesday.

## 2019-09-20 ENCOUNTER — PATIENT MESSAGE (OUTPATIENT)
Dept: INTERNAL MEDICINE | Facility: CLINIC | Age: 69
End: 2019-09-20

## 2019-09-20 DIAGNOSIS — F41.9 ANXIETY: ICD-10-CM

## 2019-09-20 DIAGNOSIS — I10 ESSENTIAL HYPERTENSION: Primary | ICD-10-CM

## 2019-09-20 RX ORDER — LOSARTAN POTASSIUM 100 MG/1
100 TABLET ORAL DAILY
Qty: 30 TABLET | Refills: 11 | Status: SHIPPED | OUTPATIENT
Start: 2019-09-20 | End: 2019-10-08 | Stop reason: ALTCHOICE

## 2019-09-20 RX ORDER — DIAZEPAM 5 MG/1
5 TABLET ORAL EVERY 8 HOURS PRN
Qty: 60 TABLET | Refills: 0 | Status: SHIPPED | OUTPATIENT
Start: 2019-09-20 | End: 2019-12-19 | Stop reason: SDUPTHER

## 2019-09-23 ENCOUNTER — PATIENT MESSAGE (OUTPATIENT)
Dept: INTERNAL MEDICINE | Facility: CLINIC | Age: 69
End: 2019-09-23

## 2019-09-24 ENCOUNTER — ANESTHESIA EVENT (OUTPATIENT)
Dept: SURGERY | Facility: HOSPITAL | Age: 69
End: 2019-09-24
Payer: MEDICARE

## 2019-09-24 ENCOUNTER — HOSPITAL ENCOUNTER (OUTPATIENT)
Facility: HOSPITAL | Age: 69
Discharge: HOME | End: 2019-09-24
Attending: INTERNAL MEDICINE | Admitting: INTERNAL MEDICINE
Payer: MEDICARE

## 2019-09-24 ENCOUNTER — ANESTHESIA (OUTPATIENT)
Dept: SURGERY | Facility: HOSPITAL | Age: 69
End: 2019-09-24
Payer: MEDICARE

## 2019-09-24 VITALS
HEIGHT: 66 IN | WEIGHT: 160 LBS | RESPIRATION RATE: 18 BRPM | SYSTOLIC BLOOD PRESSURE: 130 MMHG | OXYGEN SATURATION: 97 % | TEMPERATURE: 98 F | DIASTOLIC BLOOD PRESSURE: 62 MMHG | HEART RATE: 66 BPM | BODY MASS INDEX: 25.71 KG/M2

## 2019-09-24 DIAGNOSIS — R91.8 HILAR MASS: Primary | ICD-10-CM

## 2019-09-24 PROCEDURE — 88173 CYTOLOGY SPECIMEN- PULMONARY MEDICAL CYTOLOGY: ICD-10-PCS | Mod: 26,,, | Performed by: PATHOLOGY

## 2019-09-24 PROCEDURE — 31625 PR BRONCHOSCOPY,BIOPSY: ICD-10-PCS | Mod: 59,RT,, | Performed by: INTERNAL MEDICINE

## 2019-09-24 PROCEDURE — 88305 TISSUE EXAM BY PATHOLOGIST: CPT | Mod: 59 | Performed by: PATHOLOGY

## 2019-09-24 PROCEDURE — 63600175 PHARM REV CODE 636 W HCPCS: Performed by: NURSE ANESTHETIST, CERTIFIED REGISTERED

## 2019-09-24 PROCEDURE — D9220A PRA ANESTHESIA: Mod: ANES,,, | Performed by: ANESTHESIOLOGY

## 2019-09-24 PROCEDURE — 71000015 HC POSTOP RECOV 1ST HR: Performed by: INTERNAL MEDICINE

## 2019-09-24 PROCEDURE — 88305 CYTOLOGY SPECIMEN- PULMONARY MEDICAL CYTOLOGY: ICD-10-PCS | Mod: 26,,, | Performed by: PATHOLOGY

## 2019-09-24 PROCEDURE — 88172 CYTOLOGY SPECIMEN- PULMONARY MEDICAL CYTOLOGY: ICD-10-PCS | Mod: 26,,, | Performed by: PATHOLOGY

## 2019-09-24 PROCEDURE — 88305 TISSUE EXAM BY PATHOLOGIST: CPT | Mod: 26,,, | Performed by: PATHOLOGY

## 2019-09-24 PROCEDURE — 27201423 OPTIME MED/SURG SUP & DEVICES STERILE SUPPLY: Performed by: INTERNAL MEDICINE

## 2019-09-24 PROCEDURE — 25000003 PHARM REV CODE 250: Performed by: INTERNAL MEDICINE

## 2019-09-24 PROCEDURE — 31652 BRONCH EBUS SAMPLNG 1/2 NODE: CPT | Mod: ,,, | Performed by: INTERNAL MEDICINE

## 2019-09-24 PROCEDURE — 31625 BRONCHOSCOPY W/BIOPSY(S): CPT | Mod: 59,RT,, | Performed by: INTERNAL MEDICINE

## 2019-09-24 PROCEDURE — 88305 TISSUE SPECIMEN TO PATHOLOGY - SURGERY: ICD-10-PCS | Mod: 26,,, | Performed by: PATHOLOGY

## 2019-09-24 PROCEDURE — C1726 CATH, BAL DIL, NON-VASCULAR: HCPCS | Performed by: INTERNAL MEDICINE

## 2019-09-24 PROCEDURE — 25000003 PHARM REV CODE 250: Performed by: NURSE ANESTHETIST, CERTIFIED REGISTERED

## 2019-09-24 PROCEDURE — 37000008 HC ANESTHESIA 1ST 15 MINUTES: Performed by: INTERNAL MEDICINE

## 2019-09-24 PROCEDURE — 88172 CYTP DX EVAL FNA 1ST EA SITE: CPT | Mod: 26,,, | Performed by: PATHOLOGY

## 2019-09-24 PROCEDURE — D9220A PRA ANESTHESIA: ICD-10-PCS | Mod: CRNA,,, | Performed by: NURSE ANESTHETIST, CERTIFIED REGISTERED

## 2019-09-24 PROCEDURE — 37000009 HC ANESTHESIA EA ADD 15 MINS: Performed by: INTERNAL MEDICINE

## 2019-09-24 PROCEDURE — 71000044 HC DOSC ROUTINE RECOVERY FIRST HOUR: Performed by: INTERNAL MEDICINE

## 2019-09-24 PROCEDURE — 88172 CYTP DX EVAL FNA 1ST EA SITE: CPT | Performed by: PATHOLOGY

## 2019-09-24 PROCEDURE — D9220A PRA ANESTHESIA: ICD-10-PCS | Mod: ANES,,, | Performed by: ANESTHESIOLOGY

## 2019-09-24 PROCEDURE — 88173 CYTOPATH EVAL FNA REPORT: CPT | Mod: 26,,, | Performed by: PATHOLOGY

## 2019-09-24 PROCEDURE — 36000704 HC OR TIME LEV I 1ST 15 MIN: Performed by: INTERNAL MEDICINE

## 2019-09-24 PROCEDURE — 88305 TISSUE EXAM BY PATHOLOGIST: CPT | Performed by: PATHOLOGY

## 2019-09-24 PROCEDURE — D9220A PRA ANESTHESIA: Mod: CRNA,,, | Performed by: NURSE ANESTHETIST, CERTIFIED REGISTERED

## 2019-09-24 PROCEDURE — 36000705 HC OR TIME LEV I EA ADD 15 MIN: Performed by: INTERNAL MEDICINE

## 2019-09-24 PROCEDURE — 31652 PR BRONCH W/ EBUS, SAMPLING 1 OR 2 NODES, INCL GUIDE: ICD-10-PCS | Mod: ,,, | Performed by: INTERNAL MEDICINE

## 2019-09-24 RX ORDER — ONDANSETRON 2 MG/ML
INJECTION INTRAMUSCULAR; INTRAVENOUS
Status: DISCONTINUED | OUTPATIENT
Start: 2019-09-24 | End: 2019-09-24

## 2019-09-24 RX ORDER — PROPOFOL 10 MG/ML
INJECTION, EMULSION INTRAVENOUS
Status: DISCONTINUED | OUTPATIENT
Start: 2019-09-24 | End: 2019-09-24

## 2019-09-24 RX ORDER — PROPOFOL 10 MG/ML
VIAL (ML) INTRAVENOUS CONTINUOUS PRN
Status: DISCONTINUED | OUTPATIENT
Start: 2019-09-24 | End: 2019-09-24

## 2019-09-24 RX ORDER — LIDOCAINE HCL/PF 100 MG/5ML
SYRINGE (ML) INTRAVENOUS
Status: DISCONTINUED | OUTPATIENT
Start: 2019-09-24 | End: 2019-09-24

## 2019-09-24 RX ORDER — DEXAMETHASONE SODIUM PHOSPHATE 4 MG/ML
INJECTION, SOLUTION INTRA-ARTICULAR; INTRALESIONAL; INTRAMUSCULAR; INTRAVENOUS; SOFT TISSUE
Status: DISCONTINUED | OUTPATIENT
Start: 2019-09-24 | End: 2019-09-24

## 2019-09-24 RX ORDER — SODIUM CHLORIDE 9 MG/ML
INJECTION, SOLUTION INTRAVENOUS CONTINUOUS PRN
Status: DISCONTINUED | OUTPATIENT
Start: 2019-09-24 | End: 2019-09-24

## 2019-09-24 RX ORDER — MIDAZOLAM HYDROCHLORIDE 1 MG/ML
INJECTION, SOLUTION INTRAMUSCULAR; INTRAVENOUS
Status: DISCONTINUED | OUTPATIENT
Start: 2019-09-24 | End: 2019-09-24

## 2019-09-24 RX ORDER — FENTANYL CITRATE 50 UG/ML
INJECTION, SOLUTION INTRAMUSCULAR; INTRAVENOUS
Status: DISCONTINUED | OUTPATIENT
Start: 2019-09-24 | End: 2019-09-24

## 2019-09-24 RX ORDER — GLYCOPYRROLATE 0.2 MG/ML
INJECTION INTRAMUSCULAR; INTRAVENOUS
Status: DISCONTINUED | OUTPATIENT
Start: 2019-09-24 | End: 2019-09-24

## 2019-09-24 RX ORDER — PHENYLEPHRINE HYDROCHLORIDE 10 MG/ML
INJECTION INTRAVENOUS
Status: DISCONTINUED | OUTPATIENT
Start: 2019-09-24 | End: 2019-09-24

## 2019-09-24 RX ORDER — LIDOCAINE HYDROCHLORIDE 10 MG/ML
INJECTION, SOLUTION EPIDURAL; INFILTRATION; INTRACAUDAL; PERINEURAL
Status: DISCONTINUED | OUTPATIENT
Start: 2019-09-24 | End: 2019-09-24 | Stop reason: HOSPADM

## 2019-09-24 RX ADMIN — MIDAZOLAM HYDROCHLORIDE 1 MG: 1 INJECTION, SOLUTION INTRAMUSCULAR; INTRAVENOUS at 11:09

## 2019-09-24 RX ADMIN — ONDANSETRON 4 MG: 2 INJECTION INTRAMUSCULAR; INTRAVENOUS at 12:09

## 2019-09-24 RX ADMIN — PROPOFOL 30 MG: 10 INJECTION, EMULSION INTRAVENOUS at 12:09

## 2019-09-24 RX ADMIN — PROPOFOL 200 MCG/KG/MIN: 10 INJECTION, EMULSION INTRAVENOUS at 11:09

## 2019-09-24 RX ADMIN — PHENYLEPHRINE HYDROCHLORIDE 50 MCG: 10 INJECTION INTRAVENOUS at 12:09

## 2019-09-24 RX ADMIN — FENTANYL CITRATE 50 MCG: 50 INJECTION, SOLUTION INTRAMUSCULAR; INTRAVENOUS at 11:09

## 2019-09-24 RX ADMIN — PROPOFOL 140 MG: 10 INJECTION, EMULSION INTRAVENOUS at 11:09

## 2019-09-24 RX ADMIN — PHENYLEPHRINE HYDROCHLORIDE 100 MCG: 10 INJECTION INTRAVENOUS at 12:09

## 2019-09-24 RX ADMIN — SODIUM CHLORIDE: 0.9 INJECTION, SOLUTION INTRAVENOUS at 11:09

## 2019-09-24 RX ADMIN — GLYCOPYRROLATE 0.2 MG: 0.2 INJECTION, SOLUTION INTRAMUSCULAR; INTRAVENOUS at 12:09

## 2019-09-24 RX ADMIN — LIDOCAINE HYDROCHLORIDE 60 MG: 20 INJECTION, SOLUTION INTRAVENOUS at 11:09

## 2019-09-24 RX ADMIN — DEXAMETHASONE SODIUM PHOSPHATE 4 MG: 4 INJECTION, SOLUTION INTRAMUSCULAR; INTRAVENOUS at 12:09

## 2019-09-24 NOTE — TRANSFER OF CARE
"Anesthesia Transfer of Care Note    Patient: Alesha Toney    Procedure(s) Performed: Procedure(s) (LRB):  ENDOBRONCHIAL ULTRASOUND (EBUS) (N/A)    Patient location: PACU    Anesthesia Type: general    Transport from OR: Transported from OR on 6-10 L/min O2 by face mask with adequate spontaneous ventilation    Post pain: adequate analgesia    Post assessment: no apparent anesthetic complications and tolerated procedure well    Post vital signs: stable    Level of consciousness: awake and alert    Nausea/Vomiting: no nausea/vomiting    Complications: none    Transfer of care protocol was followed      Last vitals:   Visit Vitals  BP (!) 115/57   Pulse 73   Temp 36.3 °C (97.3 °F) (Temporal)   Resp 18   Ht 5' 6" (1.676 m)   Wt 72.6 kg (160 lb)   LMP  (LMP Unknown)   SpO2 100%   Breastfeeding? No   BMI 25.82 kg/m²     "

## 2019-09-24 NOTE — INTERVAL H&P NOTE
The patient has been examined and the H&P has been reviewed:    I concur with the findings and changes have been noted since the H&P was written: Surveillance CT for lung cancer with a growing soft tissue nodule at the suture sight just distal to right mainstem take off.    Anesthesia/Surgery risks, benefits and alternative options discussed and understood by patient/family.          Active Hospital Problems    Diagnosis  POA    Hilar mass [R91.8]  Yes      Resolved Hospital Problems   No resolved problems to display.     Patient will have diagnostic bronchoscopy with EBUS today.  I have explained the risks, benefits and alternatives of the procedure in detail.  The patient voices understanding and all questions have been answered.  The patient agrees to proceed as planned.

## 2019-09-24 NOTE — DISCHARGE SUMMARY
[facility]  Pulmonology  Discharge Summary      Patient Name: Alesha Toney  MRN: [id number]  Admission Date: 9/24/2019  Hospital Length of Stay: 0 days  Discharge Date and Time:  09/24/2019 1:06 PM  Attending Physician: Sri Hearn MD   Discharging Provider: Sri Hearn MD  Primary Care Provider: Margo Caldwell MD    HPI: Patient with history of T3 tumor with mucinous adenoca.  Surveillance scan to monitor for recurrence with right mainstem soft tissue opacity in the area of the suture line.  Tolerated bronchoscopy with EBUS    Procedure(s) (LRB):  ENDOBRONCHIAL ULTRASOUND (EBUS) (N/A)    Indwelling Lines/Drains at Time of Discharge:   Lines/Drains/Airways     Central Venous Catheter Line                 Port A Cath Single Lumen 09/25/18 1543 left subclavian 363 days          Epidural Line                 Neuraxial Analgesia/Anesthesia Assessment (using dermatomes) Epidural 08/09/18 0648 411 days                Hospital Course: Bronchoscopy complete, tolerated well.        Significant Labs:  All pertinent labs within the past 24 hours have been reviewed.    Significant Imaging:  I have reviewed all pertinent imaging results/findings within the past 24 hours.    Pending Diagnostic Studies:     Procedure Component Value Units Date/Time    Cytology Specimen- Pulmonary Medical Cytology [[id number]] Collected:  09/24/19 1230    Order Status:  Sent Lab Status:  No result     Specimen:  Other specimen location (comment)         Final Active Diagnoses:    Diagnosis Date Noted POA    PRINCIPAL PROBLEM:  Hilar mass [R91.8] 09/24/2019 Yes      Problems Resolved During this Admission:       Discharged Condition: stable    Disposition: Home or Self Care    Follow Up: Call in one week    Patient Instructions:      Diet general     Medications:  Reconciled Home Medications:      Medication List      CONTINUE taking these medications    acetaminophen-codeine 300-30mg 300-30 mg Tab  Commonly known as:   TYLENOL #3  Take 1 tablet by mouth every 6 (six) hours as needed. 1 tb po q 6h for chronic neuropathic pain.  Medically necessary     albuterol 90 mcg/actuation inhaler  Commonly known as:  VENTOLIN HFA  Inhale 2 puffs into the lungs every 6 (six) hours as needed for Wheezing. Rescue     atenolol 50 MG tablet  Commonly known as:  TENORMIN  Take 0.5 tablets (25 mg total) by mouth 4 (four) times daily.     atorvastatin 20 MG tablet  Commonly known as:  LIPITOR  Take 1 tablet (20 mg total) by mouth once daily.     bimatoprost 0.03 % ophthalmic solution  Commonly known as:  LATISSE  Place one drop on applicator and apply evenly along the skin of the upper eyelid at base of eyelashes qhs; repeat for second eye     CALCIUM ANTACID 300 mg (750 mg) Chew  Generic drug:  calcium carbonate  Take by mouth.     diazePAM 5 MG tablet  Commonly known as:  VALIUM  Take 1 tablet (5 mg total) by mouth every 8 (eight) hours as needed for Anxiety.     losartan 100 MG tablet  Commonly known as:  COZAAR  Take 1 tablet (100 mg total) by mouth once daily.     ranitidine 75 MG tablet  Commonly known as:  ZANTAC  Take 150 mg by mouth nightly.            Sri Hearn MD  Pulmonology  [facility]

## 2019-09-24 NOTE — PLAN OF CARE
Discharge instructions discussed with patient. Pt verbalizes understanding. Consents in chart, vital signs stable, no complaints. Pt NPO for hour after procedure. Pt tolerating PO liquids.

## 2019-09-24 NOTE — ANESTHESIA RELEASE NOTE
"Anesthesia Release from PACU Note    Patient: Alesha Toney    Procedure(s) Performed: Procedure(s) (LRB):  ENDOBRONCHIAL ULTRASOUND (EBUS) (N/A)    Anesthesia type: general    Post pain: Adequate analgesia    Post assessment: no apparent anesthetic complications    Last Vitals:   Visit Vitals  BP (!) 143/65   Pulse 66   Temp 36.3 °C (97.3 °F) (Temporal)   Resp 18   Ht 5' 6" (1.676 m)   Wt 72.6 kg (160 lb)   LMP  (LMP Unknown)   SpO2 97%   Breastfeeding? No   BMI 25.82 kg/m²       Post vital signs: stable    Level of consciousness: awake    Nausea/Vomiting: no nausea/no vomiting    Complications: none    Airway Patency: patent    Respiratory: unassisted    Cardiovascular: stable and blood pressure at baseline    Hydration: euvolemic  "

## 2019-09-24 NOTE — ANESTHESIA POSTPROCEDURE EVALUATION
Anesthesia Post Evaluation    Patient: Alehsa Toney    Procedure(s) Performed: Procedure(s) (LRB):  ENDOBRONCHIAL ULTRASOUND (EBUS) (N/A)    Final Anesthesia Type: general  Patient location during evaluation: PACU  Patient participation: Yes- Able to Participate  Level of consciousness: awake and alert  Post-procedure vital signs: reviewed and stable  Pain management: adequate  Airway patency: patent  PONV status at discharge: No PONV  Anesthetic complications: no      Cardiovascular status: blood pressure returned to baseline  Respiratory status: unassisted  Hydration status: euvolemic  Follow-up not needed.          Vitals Value Taken Time   /65 9/24/2019  1:00 PM   Temp 36.3 °C (97.3 °F) 9/24/2019 12:38 PM   Pulse 66 9/24/2019  1:00 PM   Resp 18 9/24/2019  1:00 PM   SpO2 97 % 9/24/2019  1:00 PM         No case tracking events are documented in the log.      Pain/Grady Score: No data recorded

## 2019-09-24 NOTE — ANESTHESIA PREPROCEDURE EVALUATION
09/24/2019  Alesha Toney is a 68 y.o., female.    Anesthesia Evaluation         Review of Systems  Anesthesia Hx:  No problems with previous Anesthesia   Social:  Former Smoker, No Alcohol Use    Hematology/Oncology:        Oncology Comments: Non-small cell lung cancer (NSCLC)  Basal cell carcinoma   Cardiovascular:   Hypertension CAD  Dysrhythmias (SVT)     Pulmonary:   COPD Lung cancer   Renal/:   Chronic Renal Disease, CRI    Neurological:   Neuromuscular Disease,        Physical Exam  General:  Well nourished    Airway/Jaw/Neck:  Airway Findings: Mouth Opening: Normal Tongue: Normal  General Airway Assessment: Adult  Mallampati: II  TM Distance: Normal, at least 6 cm  Jaw/Neck Findings:     Neck ROM: Normal ROM      Dental:  Dental Findings: molar caps, In tact         Mental Status:  Mental Status Findings:  Cooperative, Alert and Oriented         Anesthesia Plan  Type of Anesthesia, risks & benefits discussed:  Anesthesia Type:  general  Patient's Preference:   Intra-op Monitoring Plan: standard ASA monitors  Intra-op Monitoring Plan Comments:   Post Op Pain Control Plan:   Post Op Pain Control Plan Comments:   Induction:   IV  Beta Blocker:  Patient is not currently on a Beta-Blocker (No further documentation required).       Informed Consent: Patient understands risks and agrees with Anesthesia plan.  Questions answered. Anesthesia consent signed with patient.  ASA Score: 3     Day of Surgery Review of History & Physical:    H&P update referred to the provider.         Ready For Surgery From Anesthesia Perspective.

## 2019-09-24 NOTE — DISCHARGE INSTRUCTIONS
Recovery After Procedural Sedation (Adult)  You have been given medicine by vein to make you sleep during your surgery. This may have included both a pain medicine and sleeping medicine. Most of the effects have worn off. But you may still have some drowsiness for the next 6 to 8 hours.  Home care  Follow these guidelines when you get home:  · For the next 8 hours, you should be watched by a responsible adult. This person should make sure your condition is not getting worse.  · Don't drink any alcohol for the next 24 hours.  · Don't drive, operate dangerous machinery, or make important business or personal decisions during the next 24 hours.  Note: Your healthcare provider may tell you not to take any medicine by mouth for pain or sleep in the next 4 hours. These medicines may react with the medicines you were given in the hospital. This could cause a much stronger response than usual.  Follow-up care  Follow up with your healthcare provider if you are not alert and back to your usual level of activity within 12 hours.  When to seek medical advice  Call your healthcare provider right away if any of these occur:  · Drowsiness gets worse  · Weakness or dizziness gets worse  · Repeated vomiting  · You can't be awakened   Date Last Reviewed: 10/18/2016  © 4007-9295 [employer]. [address] [place] [address], [place] [address]. All rights reserved. This information is not intended as a substitute for professional medical care. Always follow your healthcare professional's instructions.  Endoscopic Diagnosis of Chest, Lung Problems  Youve been told you need an endoscopic procedure to diagnose a problem in your chest or lung. This procedure allows your healthcare provider to view the airway of your lungs and take a tissue sample (biopsy) or treat a lung condition, if needed.     With bronchoscopy, a flexible scope allows the healthcare provider to view and biopsy the airway.   Bronchoscopy  A bronchoscopy allows  the healthcare provider to look directly into the airways in your lungs. This is done using a bronchoscope. A bronchoscope is a thin, flexible, hollow, lighted tube that lets the doctor see inside the lung. Tools can be passed down the middle of the scope.  Transbronchial biopsy  Transbronchial biopsy (TBB) is a procedure used mainly to take samples of tissue near the airway. This is done using a bronchoscope and tiny forceps. The forceps are passed through the scope into the airway, and a sample is taken.  Endobronchial ultrasound  Endobronchial ultrasound (EBUS) is a type of bronchoscopy. With EBUS, the lungs and the space between the lungs (mediastinum) are looked at using a flexible bronchoscope and ultrasound (images created using sound waves). Ultrasound guides the healthcare provider and allows him or her to see through the airway walls.  Preparing for the procedure  Before your procedure, do the following:  · Follow your healthcare providers instructions about eating and drinking.  · Tell your healthcare provider about the medicines you take. You may need to stop taking some of them before the procedure, especially aspirin, Coumadin, or other blood thinners.  · Talk with your healthcare provider about any allergies and health problems you have.  · Tell your healthcare provider if you are pregnant.  During the procedure  You will get medicine through an intravenous (IV) line to help you relax and sleep during the procedure. You may also receive local anesthesia (numbing medicine) with a needle. Then a special spray is used to numb your throat and nose or mouth. This is to help keep you comfortable and prevent coughing during the procedure.  After the procedure  You are sent to the recovery room until the sedation wears off. This takes about 1 to 2 hours. Once you are fully awake, you can go home. You will need an adult family member or friend to drive you home. Your throat will be sore for a day or two. At  first, there may be a small amount of blood in your sputum. This is normal. It should go away after the second day.  Risks and complications  · Bleeding  · Infection  · Injury to vocal cords  · Pneumothorax (collapsed lung)   When to call your healthcare provider  · Large amounts of blood in sputum  · Blood in sputum after 2 days  · Shortness of breath  · Chest pain  · Fever of 100.4ºF (38°C) or higher, or as directed by your healthcare provider  · Hoarseness that wont go away   Date Last Reviewed: 11/1/2016  © 5723-4446 [employer]. [address] [place] [address]. All rights reserved. This information is not intended as a substitute for professional medical care. Always follow your healthcare professional's instructions.

## 2019-09-25 ENCOUNTER — DOCUMENTATION ONLY (OUTPATIENT)
Dept: CARDIOTHORACIC SURGERY | Facility: CLINIC | Age: 69
End: 2019-09-25

## 2019-09-25 NOTE — PATIENT CARE CONFERENCE
[facility]      THORACIC MULTIDISCIPLINARY TUMOR BOARD  PATIENT REVIEW FORM  ________________________________________________________________________    CLINIC #: [id number]  DATE: 09/25/2019    DIAGNOSIS:   NSCLC - adenocarcinoma     PRESENTER:   Dr. Hearn/ Dr. Dhaliwal     PATIENT SUMMARY:   68 year old female with history of right lower lobe adenocarcinoma diagnosed in July 2018. Underwent a right VATS right lower lobectomy with MLND on 8/9/2018. Surgical pathology showed a 7cm moderately differentiated mixed invasive adenocarcinoma, level 2,4,7,9,10,12 negative, no VPI, no LVI, pT3N0. She completed 4 cycles of adjuvant chemo with Cisplatin and Alimta as of 1/10/19. On surveillance CT on 5/28/19, showed an ill-defined GGO in lateral segment of RLL and new solid 0.5cm nodule in the superior segment of RLL. Superior segment nodule is at the location of a cluster of micro nodules identified on prior CT. Most recently CT surveillance for lung cancer showed growing soft tissue nodule at the suture line. Bronchoscopy and EBUS on 9/24/19 with prelim reports positive for NSCLC.    BOARD RECOMMENDATIONS:   If final pathology confirms local recurrence, recommend PET scan. If only local recurrance recommend referral to radiation oncology. If distant disease will need evaluation by oncology.       CONSULT NEEDED:     [] Surgery    [] Hem/Onc    [x] Rad/Onc    [] Dietary                 [] Social Service    [] Psychology       [] Pulmonology    Clinical Stage: Tumor  Node(s)  Metastasis   Pathologic Stage: Tumor 3 Node(s) 0 Metastasis 0    Pathology pending from EBUS and bronchoscopy     GROUP STAGE:     [] O    [] 1A    [] IB    [] IIA    [] IIB     [] IIIA     [] IIIB     [] IIIC    [] IV                               [x] Local recurrence     [] Regional recurrence     [] Distant recurrence                   [x] NSCLC     [] SCLC     Tumor type:    Unstageable:      [] Yes     [] No  Metastatic site(s):          [x]  Rita'l Treatment Guidelines reviewed and care planned is consistent with guidelines.         (i.e., NCCN, NCI, PD, ACO, AUA, etc.)    PRESENTATION AT CANCER CONFERENCE:         [] Prospective    [] Retrospective     [] Follow-Up          [] Eligible for clinical trial

## 2019-09-26 ENCOUNTER — TELEPHONE (OUTPATIENT)
Dept: HEMATOLOGY/ONCOLOGY | Facility: CLINIC | Age: 69
End: 2019-09-26

## 2019-09-26 DIAGNOSIS — C34.2 MALIGNANT NEOPLASM OF MIDDLE LOBE OF RIGHT LUNG: Primary | ICD-10-CM

## 2019-09-26 NOTE — TELEPHONE ENCOUNTER
Spoke with patient. Scheduled pet scan and RT appointment---for patient. She is agreeable to date/time.  She thanked nurse.

## 2019-09-26 NOTE — TELEPHONE ENCOUNTER
Called to discuss conference recs from yesterday, Cytology from EBUS is positive, spoke to Dr. Hearn, even though it says station 7, it is actually along the staple line. Plan is to do PET scan and then see radiation for focal RT.

## 2019-10-01 ENCOUNTER — HOSPITAL ENCOUNTER (OUTPATIENT)
Dept: RADIOLOGY | Facility: HOSPITAL | Age: 69
Discharge: HOME | End: 2019-10-01
Attending: INTERNAL MEDICINE
Payer: MEDICARE

## 2019-10-01 ENCOUNTER — PATIENT MESSAGE (OUTPATIENT)
Dept: INTERNAL MEDICINE | Facility: CLINIC | Age: 69
End: 2019-10-01

## 2019-10-01 ENCOUNTER — INITIAL CONSULT (OUTPATIENT)
Dept: RADIATION ONCOLOGY | Facility: CLINIC | Age: 69
End: 2019-10-01
Payer: MEDICARE

## 2019-10-01 ENCOUNTER — PATIENT MESSAGE (OUTPATIENT)
Dept: RADIATION ONCOLOGY | Facility: CLINIC | Age: 69
End: 2019-10-01

## 2019-10-01 VITALS
BODY MASS INDEX: 26.38 KG/M2 | DIASTOLIC BLOOD PRESSURE: 85 MMHG | SYSTOLIC BLOOD PRESSURE: 169 MMHG | HEART RATE: 67 BPM | WEIGHT: 164.13 LBS | RESPIRATION RATE: 18 BRPM | OXYGEN SATURATION: 99 % | HEIGHT: 66 IN | TEMPERATURE: 98 F

## 2019-10-01 DIAGNOSIS — C34.2 MALIGNANT NEOPLASM OF MIDDLE LOBE OF RIGHT LUNG: ICD-10-CM

## 2019-10-01 DIAGNOSIS — C34.2 MALIGNANT NEOPLASM OF MIDDLE LOBE OF RIGHT LUNG: Primary | ICD-10-CM

## 2019-10-01 LAB — POCT GLUCOSE: 117 MG/DL (ref 70–110)

## 2019-10-01 PROCEDURE — 99214 OFFICE O/P EST MOD 30 MIN: CPT | Mod: PBBFAC,25 | Performed by: RADIOLOGY

## 2019-10-01 PROCEDURE — 78815 NM PET CT ROUTINE: ICD-10-PCS | Mod: 26,PS,, | Performed by: RADIOLOGY

## 2019-10-01 PROCEDURE — 99999 PR PBB SHADOW E&M-EST. PATIENT-LVL IV: CPT | Mod: PBBFAC,,, | Performed by: RADIOLOGY

## 2019-10-01 PROCEDURE — 99205 OFFICE O/P NEW HI 60 MIN: CPT | Mod: S$PBB,,, | Performed by: RADIOLOGY

## 2019-10-01 PROCEDURE — 78815 PET IMAGE W/CT SKULL-THIGH: CPT | Mod: TC

## 2019-10-01 PROCEDURE — 99205 PR OFFICE/OUTPT VISIT, NEW, LEVL V, 60-74 MIN: ICD-10-PCS | Mod: S$PBB,,, | Performed by: RADIOLOGY

## 2019-10-01 PROCEDURE — A9552 F18 FDG: HCPCS

## 2019-10-01 PROCEDURE — 99999 PR PBB SHADOW E&M-EST. PATIENT-LVL IV: ICD-10-PCS | Mod: PBBFAC,,, | Performed by: RADIOLOGY

## 2019-10-01 PROCEDURE — 78815 PET IMAGE W/CT SKULL-THIGH: CPT | Mod: 26,PS,, | Performed by: RADIOLOGY

## 2019-10-01 NOTE — PLAN OF CARE
START OFF PATHWAY REGIMEN - Non-Small Cell Lung            Paclitaxel (Taxol(R))       Carboplatin (Paraplatin(R))           Additional Orders: Given with concurrent chemoradiotherapy.  GCSF   therapy with RT is a relative contraindication.    **Always confirm dose/schedule in your pharmacy ordering system**    Patient Characteristics:  Local Recurrence  AJCC T Category: T4  Current Disease Status: Local Recurrence  AJCC N Category: NX  AJCC M Category: M0  AJCC 8 Stage Grouping: Unknown  Intent of Therapy:  Curative Intent, Discussed with Patient

## 2019-10-01 NOTE — PROGRESS NOTES
10/01/2019    Radiation Oncology Consultation    Assessment   This is a 68 y.o. y/o female with h/o Stage IIB (pT3 pN0 M0; negative margins) RUL NSCLC (adeno) s/p lobectomy by Dr. Messina 8/9/2018 followed by adjuvant Cis/Alimta with Dr. Dhaliwal completed 1/2019. Surveillance CT Chest 9/17/19 demonstrated uptake in a 1.6 cm area adjacent to staple line next to main airway. This is confirmed recurrent disease by EBUS (9/24/19 with Dr. Hearn) and PET/CT 10/1/19. No evidence of other lung-related disease, though she has persistent uptake in mid-rectal lesion that was present on prior PET 6/2018. She is referred for consideration of treatment options.     Given the location next to large bronchus, I do not recommend SBRT for this focal recurrence. I would prefer definitive concurrent chemo-RT to 60 Gy in 30 fractions, though if she were not a candidate for additional chemotherapy I would consider hypofractionated RT to 50-60 Gy in 15 fractions. Potential short- and long-term side effects of thoracic radiation were reviewed. At the end of our discussion, she indicated she plans to get 2nd opinion at [facility], which I support. After meeting with them, she will notify us if she wants to pursue treatment there or at [facility].         Plan   1) Patient to get 2nd opinion at [facility] and notify us if she wants treatment at [facility].   2) Advised patient to undergo colonoscopy to assess FDG-avid rectal lesion.   3) If patient wants treatment at [facility], will schedule CT Simulation for radiation treatment planning.        Chief Complaint   Patient presents with    Lung Cancer     consult       HPI: Mrs. Toney is a 67 y/o female who underwent resection of RUL pT3 pN0 NSCLC (adeno) by Dr. Messina 8/9/18. Pathology demonstrated negative margins. She received adjuvant Cis/Alimta completed 1/2019. She was followed with surveillance imaging when CT Chest 9/17/19 demonstrated a 1.6 cm soft tissue density near the staple line that was increased  from prior. EBUS by Dr. Hearn 9/24/19 revealed recurrent adenocarcinoma in the lesion. PET/CT today demonstrates slight uptake in that area consistent with recurrence. She also has an FDG avid lesion in the mid-rectum that is stable from her PET in 6/2018. Patient has declined to undergo colonoscopy due to dealing with lung cancer over the past year.     In clinic today, the patient is accompanied by her daughter. She reports stable dyspnea on exertion but denies fever, weight loss, cough, hemoptysis, or difficulty swallowing.       Prior Radiation History: None    Past Medical History:   Diagnosis Date    Basal cell carcinoma     Breast cyst     Cardiac arrhythmia     Disorder of kidney and ureter     renal stone    Fibrocystic breast     Hypertension     Lung cancer     Lung cancer     Squamous cell carcinoma        Past Surgical History:   Procedure Laterality Date    ADENOIDECTOMY  19yo    ANTERIOR CERVICAL DISCECTOMY W/ FUSION N/A 10/15/2018    Procedure: DISCECTOMY, SPINE, CERVICAL, ANTERIOR APPROACH, WITH FUSION C6/7  Depuy SNS: Motors/SSEP/Vocal Cord Regular OR table;  Surgeon: Greyson Bansal MD;  Location: [facility] OR [address] [place] [address];  Service: Neurosurgery;  Laterality: N/A;    APPENDECTOMY      BONE RESECTION, RIB  1980    BREAST BIOPSY Left     at least 40yrs ago in her 20's    BREAST CYST ASPIRATION      BREAST CYST EXCISION      ENDOBRONCHIAL ULTRASOUND N/A 7/10/2018    Procedure: ULTRASOUND, ENDOBRONCHIAL;  Surgeon: Sri Hearn MD;  [facility];  Service: Pulmonary;  Laterality: N/A;    ENDOBRONCHIAL ULTRASOUND N/A 9/24/2019    Procedure: ENDOBRONCHIAL ULTRASOUND (EBUS);  Surgeon: Sri Hearn MD;  [facility];  Service: Pulmonary;  Laterality: N/A;    HYSTERECTOMY      @47yrs of age    INSERTION OF TUNNELED CENTRAL VENOUS CATHETER (CVC) WITH SUBCUTANEOUS PORT N/A 9/25/2018    Procedure: [id number];  [facility];  Location:  [facility] OR 2ND FLR;  Service: Radiology;  Laterality: N/A;    KIDNEY SURGERY      19yo    OOPHORECTOMY      @47yrs of age    TONSILLECTOMY      VIDEO-ASSISTED THORACOSCOPIC LOBECTOMY Right 2018    Procedure: VATS, WITH LOBECTOMY Possible chest wall resection;  Surgeon: Philip Messina MD;  Location: [facility] OR [address] [place] [address];  Service: Thoracic;  Laterality: Right;  Have open pan available.       Social History     Tobacco Use    Smoking status: Former Smoker     Packs/day: 1.00     Years: 30.00     Pack years: 30.00     Types: Cigarettes     Last attempt to quit:      Years since quittin.7    Smokeless tobacco: Never Used   Substance Use Topics    Alcohol use: Not Currently     Frequency: Never     Drinks per session: Patient refused     Binge frequency: Never     Comment: socially     Drug use: No       Cancer-related family history includes Cancer in her father.    Current Outpatient Medications on File Prior to Visit   Medication Sig Dispense Refill    acetaminophen-codeine 300-30mg (TYLENOL #3) 300-30 mg Tab Take 1 tablet by mouth every 6 (six) hours as needed. 1 tb po q 6h for chronic neuropathic pain.  Medically necessary 60 tablet 0    atenolol (TENORMIN) 50 MG tablet Take 0.5 tablets (25 mg total) by mouth 4 (four) times daily. 180 tablet 3    bimatoprost (LATISSE) 0.03 % ophthalmic solution Place one drop on applicator and apply evenly along the skin of the upper eyelid at base of eyelashes qhs; repeat for second eye 3 mL 6    calcium carbonate (CALCIUM ANTACID) 300 mg (750 mg) Chew Take by mouth.      diazePAM (VALIUM) 5 MG tablet Take 1 tablet (5 mg total) by mouth every 8 (eight) hours as needed for Anxiety. 60 tablet 0    losartan (COZAAR) 100 MG tablet Take 1 tablet (100 mg total) by mouth once daily. 30 tablet 11    ranitidine (ZANTAC) 75 MG tablet Take 150 mg by mouth nightly.       albuterol (VENTOLIN HFA) 90 mcg/actuation inhaler Inhale 2 puffs into the lungs every 6 (six) hours  "as needed for Wheezing. Rescue (Patient not taking: Reported on 10/1/2019) 1 each 11    atorvastatin (LIPITOR) 20 MG tablet Take 1 tablet (20 mg total) by mouth once daily. (Patient not taking: Reported on 10/1/2019) 90 tablet 3     No current facility-administered medications on file prior to visit.        Review of patient's allergies indicates:   Allergen Reactions    Adhesive Itching, Rash and Blisters     INCLUDES EKG PADS    Ciprofloxacin Hives     Hives    Iodinated contrast media     Pcn [penicillins]      rash    Phenergan plain Other (See Comments)     "crazy behavior"    Tramadol     Vancomycin analogues      Red man syndrome       Review of Systems   Constitutional: Negative for fever and weight loss.   HENT: Negative for ear pain and sore throat.    Eyes: Negative for blurred vision and double vision.   Respiratory: Positive for shortness of breath. Negative for cough and hemoptysis.    Cardiovascular: Positive for palpitations. Negative for chest pain and leg swelling.   Gastrointestinal: Negative for abdominal pain, constipation, diarrhea, heartburn and nausea.   Genitourinary: Negative for dysuria and hematuria.   Musculoskeletal: Positive for joint pain and neck pain. Negative for falls.   Neurological: Positive for tingling. Negative for speech change, focal weakness, seizures and headaches.   Psychiatric/Behavioral: Negative for depression. The patient is nervous/anxious.         Vital Signs: BP (!) 169/85 (BP Location: Right arm, Patient Position: Sitting)   Pulse 67   Temp 98.1 °F (36.7 °C) (Oral)   Resp 18   Ht 5' 6" (1.676 m)   Wt 74.4 kg (164 lb 1.6 oz)   LMP  (LMP Unknown)   SpO2 99%   BMI 26.49 kg/m²     ECOG Performance Status: 0 - Fully Active    Physical Exam   Constitutional: She is oriented to person, place, and time and well-developed, well-nourished, and in no distress.   HENT:   Head: Normocephalic and atraumatic.   Mouth/Throat: Oropharynx is clear and moist. "   Eyes: EOM are normal. No scleral icterus.   Neck: Normal range of motion. Neck supple.   Pulmonary/Chest: No accessory muscle usage. No respiratory distress.   Abdominal: Soft. Normal appearance. She exhibits no distension.   Musculoskeletal: Normal range of motion. She exhibits no edema.   Lymphadenopathy:     She has no cervical adenopathy.        Right: No supraclavicular adenopathy present.        Left: No supraclavicular adenopathy present.   Neurological: She is alert and oriented to person, place, and time. No cranial nerve deficit. Gait normal.   Skin: Skin is warm and dry.   Psychiatric: Mood, affect and judgment normal.   Vitals reviewed.       Labs:    Imaging: I have personally reviewed the patient's available images and reports and summarized pertinent findings above in HPI.     Pathology: I have personally reviewed the patient's available pathology and summarized pertinent findings above in HPI.       - Thank you for allowing me to participate in the care of your patient.    Viktor Dawn MD, PhD

## 2019-10-01 NOTE — LETTER
October 1, 2019      Pam Dhaliwal MD  [address]  [place] [address]           [facility] - Radiation Oncology  [address]  [place] [address]  Phone: [phone]          Patient: Alesha Toney   MR Number: [id number]   YOB: 1950   Date of Visit: 10/1/2019       Dear Dr. Pam Dhaliwal:    Thank you for referring Alesha Toney to me for evaluation. Attached you will find relevant portions of my assessment and plan of care.    If you have questions, please do not hesitate to call me. I look forward to following Alesha Toney along with you.    Sincerely,    Viktor Dawn MD    Enclosure  CC:  No Recipients    If you would like to receive this communication electronically, please contact [email or web address] or [phone] to request more information on [facility] Link access.    For providers and/or their staff who would like to refer a patient to [facility], please contact us through our one-stop-shop provider referral line, [facility], at [phone].    If you feel you have received this communication in error or would no longer like to receive these types of communications, please e-mail [email or web address]

## 2019-10-02 ENCOUNTER — PATIENT MESSAGE (OUTPATIENT)
Dept: HEMATOLOGY/ONCOLOGY | Facility: CLINIC | Age: 69
End: 2019-10-02

## 2019-10-04 ENCOUNTER — LAB VISIT (OUTPATIENT)
Dept: LAB | Facility: HOSPITAL | Age: 69
End: 2019-10-04
Attending: INTERNAL MEDICINE
Payer: MEDICARE

## 2019-10-04 ENCOUNTER — PATIENT MESSAGE (OUTPATIENT)
Dept: INTERNAL MEDICINE | Facility: CLINIC | Age: 69
End: 2019-10-04

## 2019-10-04 ENCOUNTER — TELEPHONE (OUTPATIENT)
Dept: INTERNAL MEDICINE | Facility: CLINIC | Age: 69
End: 2019-10-04

## 2019-10-04 ENCOUNTER — OFFICE VISIT (OUTPATIENT)
Dept: HEMATOLOGY/ONCOLOGY | Facility: CLINIC | Age: 69
End: 2019-10-04
Payer: MEDICARE

## 2019-10-04 VITALS
SYSTOLIC BLOOD PRESSURE: 187 MMHG | BODY MASS INDEX: 26.29 KG/M2 | RESPIRATION RATE: 20 BRPM | DIASTOLIC BLOOD PRESSURE: 84 MMHG | HEART RATE: 71 BPM | TEMPERATURE: 99 F | HEIGHT: 66 IN | OXYGEN SATURATION: 100 % | WEIGHT: 163.56 LBS

## 2019-10-04 DIAGNOSIS — C34.2 MALIGNANT NEOPLASM OF MIDDLE LOBE OF RIGHT LUNG: Primary | ICD-10-CM

## 2019-10-04 DIAGNOSIS — C34.2 MALIGNANT NEOPLASM OF MIDDLE LOBE OF RIGHT LUNG: ICD-10-CM

## 2019-10-04 LAB
ALBUMIN SERPL BCP-MCNC: 3.9 G/DL (ref 3.5–5.2)
ALP SERPL-CCNC: 54 U/L (ref 55–135)
ALT SERPL W/O P-5'-P-CCNC: 15 U/L (ref 10–44)
ANION GAP SERPL CALC-SCNC: 6 MMOL/L (ref 8–16)
AST SERPL-CCNC: 19 U/L (ref 10–40)
BILIRUB SERPL-MCNC: 0.3 MG/DL (ref 0.1–1)
BUN SERPL-MCNC: 19 MG/DL (ref 8–23)
CALCIUM SERPL-MCNC: 9.9 MG/DL (ref 8.7–10.5)
CHLORIDE SERPL-SCNC: 108 MMOL/L (ref 95–110)
CO2 SERPL-SCNC: 29 MMOL/L (ref 23–29)
CREAT SERPL-MCNC: 1.2 MG/DL (ref 0.5–1.4)
ERYTHROCYTE [DISTWIDTH] IN BLOOD BY AUTOMATED COUNT: 12.5 % (ref 11.5–14.5)
EST. GFR  (AFRICAN AMERICAN): 53.7 ML/MIN/1.73 M^2
EST. GFR  (NON AFRICAN AMERICAN): 46.5 ML/MIN/1.73 M^2
GLUCOSE SERPL-MCNC: 111 MG/DL (ref 70–110)
HCT VFR BLD AUTO: 38.9 % (ref 37–48.5)
HGB BLD-MCNC: 12.3 G/DL (ref 12–16)
IMM GRANULOCYTES # BLD AUTO: 0.02 K/UL (ref 0–0.04)
MCH RBC QN AUTO: 30.2 PG (ref 27–31)
MCHC RBC AUTO-ENTMCNC: 31.6 G/DL (ref 32–36)
MCV RBC AUTO: 96 FL (ref 82–98)
NEUTROPHILS # BLD AUTO: 2.7 K/UL (ref 1.8–7.7)
PLATELET # BLD AUTO: 196 K/UL (ref 150–350)
PMV BLD AUTO: 10.7 FL (ref 9.2–12.9)
POTASSIUM SERPL-SCNC: 4.9 MMOL/L (ref 3.5–5.1)
PROT SERPL-MCNC: 6.6 G/DL (ref 6–8.4)
RBC # BLD AUTO: 4.07 M/UL (ref 4–5.4)
SODIUM SERPL-SCNC: 143 MMOL/L (ref 136–145)
WBC # BLD AUTO: 5.27 K/UL (ref 3.9–12.7)

## 2019-10-04 PROCEDURE — 99999 PR PBB SHADOW E&M-EST. PATIENT-LVL III: CPT | Mod: PBBFAC,,, | Performed by: INTERNAL MEDICINE

## 2019-10-04 PROCEDURE — 80053 COMPREHEN METABOLIC PANEL: CPT

## 2019-10-04 PROCEDURE — 85027 COMPLETE CBC AUTOMATED: CPT

## 2019-10-04 PROCEDURE — 99213 OFFICE O/P EST LOW 20 MIN: CPT | Mod: PBBFAC | Performed by: INTERNAL MEDICINE

## 2019-10-04 PROCEDURE — 99214 OFFICE O/P EST MOD 30 MIN: CPT | Mod: S$PBB,,, | Performed by: INTERNAL MEDICINE

## 2019-10-04 PROCEDURE — 36415 COLL VENOUS BLD VENIPUNCTURE: CPT

## 2019-10-04 PROCEDURE — 99999 PR PBB SHADOW E&M-EST. PATIENT-LVL III: ICD-10-PCS | Mod: PBBFAC,,, | Performed by: INTERNAL MEDICINE

## 2019-10-04 PROCEDURE — 99214 PR OFFICE/OUTPT VISIT, EST, LEVL IV, 30-39 MIN: ICD-10-PCS | Mod: S$PBB,,, | Performed by: INTERNAL MEDICINE

## 2019-10-04 RX ORDER — ACETAMINOPHEN AND CODEINE PHOSPHATE 300; 30 MG/1; MG/1
1 TABLET ORAL EVERY 6 HOURS PRN
Qty: 30 TABLET | Refills: 0 | Status: SHIPPED | OUTPATIENT
Start: 2019-10-04 | End: 2019-10-23 | Stop reason: SDUPTHER

## 2019-10-04 NOTE — TELEPHONE ENCOUNTER
----- Message from Kavita Claudio sent at 10/4/2019  2:38 PM CDT -----  Contact: [facility]   Pharmacy is calling to clarify an RX.  RX name:  acetaminophen-codeine 300-30mg (TYLENOL #3) 300-30 mg Tab  What do they need to clarify:  Diagnosis code, drug interaction with diazePAM (VALIUM) 5 MG tablet and documentation of length of time the patient has been on the acetaminophen-codeine 300-30mg (TYLENOL #3) 300-30 mg Tab  Comments:

## 2019-10-04 NOTE — PROGRESS NOTES
"Subjective:       Patient ID: Alesha Toney is a 68 y.o. female.    Chief Complaint: History of lung cancer  Ms. Alesha Toney is a 67-year-old otherwise healthy female who started having some shoulder pain, which was lasting for over six weeks.  She went and saw her primary care physician and underwent an x-ray, which revealed right upper lobe lung mass worrisome for malignancy and a CT scan was recommended, which was done on 6/12/18 and that revealed a newly developing mass, pleural based, lateral posterior segment, right upper lobe 3.5 x 3.5 cm, surrounding stellate margin and patchy infiltrates, particularly superiorly,   anteriorly infiltrates extend perihilar into the anterior segment.  She then underwent CT-guided biopsy, which revealed well-differentiated adenocarcinoma.       Her PET scan from 6/29/18 There is physiologic intracranial, head, and neck activity.  There is a large right upper lobe mass SUV max 9.11.  No local or distant metastatic disease seen.  There is physiologic liver, spleen, GI and  activity.  There are a few liver cysts.  No adenopathy is seen.  The adrenal glands are not enlarged.  No adenopathy is seen.  No suspicious bone lesions are seen.     She underwent VATS with right upper lobectomy. Mediastinal lymphadenectomy on 8/9/18. Pathology revealed "Tumor site: Upper lobe.Tumor size: 7 x 4 x 2.7 cm.Tumor focality: Single tumor.  Histologic type: Mixed invasive mucinous and nonmucinous adenocarcinoma. Histologic grade: G2: Moderately differentiated.Spread through air spaces (STATS): Present. Visceral pleura invasion: Not identified.  Lymphovascular invasion: Not identified. Direct invasion of adjacent structures: No adjacent structures present. Margins: All margins are uninvolved by carcinoma.Distance of invasive carcinoma from closest margin: 1 cm from the bronchial margin.Treatment effect: No known presurgical therapy. Regional lymph nodes: Number of lymph nodes involved: 0. " "Number of lymph nodes examined: 11. Pathologic Stage Classification: pT3 N0"        She completed 4 cycles of adjuvant chemo with Cisplatin and Alimta as of 1/10/19   She is on surveillance.     CT chest of 9/17/19 which reveals "Operative change of right upper lobectomy for small-cell lung cancer with several scattered subsolid opacities and a new solid nodule in the right lower lobe measuring up to 0.5 cm.  We recommend continued surveillance with the role and schedule for such surveillance to be determined by clinical considerations. Sided chest port distal tip terminating at the confluence of the brachiocephalic vein in the SVC.  Correlate with device functioning. Apically predominant emphysematous changes, left greater than right. Aortic annular calcification and multi-vessel coronary artery atherosclerosis. Numerous unchanged hepatic hypodensities, likely cysts"    HPIPET scan from 10/1/19 reveals "1.6 cm soft tissue lesion re-identified posterior to the bronchus intermedius.  No corresponding focal increased radiotracer uptake to suggest local recurrence or metastatic disease. Stable subcentimeter opacities throughout the bilateral lower lobes, too small to characterize with PET-CT. Focal increased radiotracer uptake and subtle wall thickening in the rectum.  Findings are concerning for primary neoplastic process.  Recommend further evaluation with direct visualization"  She feels well but is very worried.    Review of Systems   Constitutional: Negative for appetite change and unexpected weight change.   Eyes: Negative for visual disturbance.   Respiratory: Positive for shortness of breath. Negative for cough.    Cardiovascular: Positive for chest pain.   Gastrointestinal: Negative for abdominal pain and diarrhea.   Genitourinary: Negative for frequency.   Musculoskeletal: Positive for back pain.   Skin: Negative for rash.   Neurological: Negative for headaches.   Hematological: Negative for adenopathy. "   Psychiatric/Behavioral: The patient is nervous/anxious.        Objective:      Physical Exam   Constitutional: She is oriented to person, place, and time. She appears well-developed and well-nourished.   HENT:   Mouth/Throat: No oropharyngeal exudate.   Cardiovascular: Normal rate and normal heart sounds.   Pulmonary/Chest: Effort normal and breath sounds normal. She has no wheezes.   Abdominal: Soft. Bowel sounds are normal. There is no tenderness.   Musculoskeletal: She exhibits no edema or tenderness.   Lymphadenopathy:     She has no cervical adenopathy.   Neurological: She is alert and oriented to person, place, and time. Coordination normal.   Skin: Skin is warm and dry. No rash noted.   Psychiatric: She has a normal mood and affect. Judgment and thought content normal.   Vitals reviewed.      LABS:  WBC   Date Value Ref Range Status   10/04/2019 5.27 3.90 - 12.70 K/uL Final     Hemoglobin   Date Value Ref Range Status   10/04/2019 12.3 12.0 - 16.0 g/dL Final     POC Hematocrit   Date Value Ref Range Status   08/09/2018 33 (L) 36 - 54 %PCV Final     Hematocrit   Date Value Ref Range Status   10/04/2019 38.9 37.0 - 48.5 % Final     Platelets   Date Value Ref Range Status   10/04/2019 196 150 - 350 K/uL Final     Gran # (ANC)   Date Value Ref Range Status   10/04/2019 2.7 1.8 - 7.7 K/uL Final     Comment:     The ANC is based on a white cell differential from an   automated cell counter. It has not been microscopically   reviewed for the presence of abnormal cells. Clinical   correlation is required.         Chemistry        Component Value Date/Time     10/04/2019 0834    K 4.9 10/04/2019 0834     10/04/2019 0834    CO2 29 10/04/2019 0834    BUN 19 10/04/2019 0834    CREATININE 1.2 10/04/2019 0834     (H) 10/04/2019 0834        Component Value Date/Time    CALCIUM 9.9 10/04/2019 0834    ALKPHOS 54 (L) 10/04/2019 0834    AST 19 10/04/2019 0834    ALT 15 10/04/2019 0834    BILITOT 0.3  10/04/2019 0834    ESTGFRAFRICA 53.7 (A) 10/04/2019 0834    EGFRNONAA 46.5 (A) 10/04/2019 0834           Assessment:       1. Malignant neoplasm of middle lobe of right lung        Plan:        1. Reviewed recent conference recs. She has a staple line recurrence. We discussed options of RT alone versus chemo/RT. She is going to [facility] in 2 weeks and then will decide and let us know how she would want to proceed.    Above care plan was discussed with patient and all questions were addressed to her satisfaction

## 2019-10-04 NOTE — TELEPHONE ENCOUNTER
I understand potential drug interaction    She takes hydrocodone on occasion for neuropathic pain.  D55.1 - it was started may, 2018  They asked the same question last month  pls ask them to put in their records that we have discussed twice.  thanks

## 2019-10-04 NOTE — TELEPHONE ENCOUNTER
Mera from [facility] stated that she did not see anything documented on file. So she documented this time so they can know

## 2019-10-04 NOTE — Clinical Note
Please have her see me after she returns from [facility]. She is going there around 10/16/19 so schedule with me the week of 10/21

## 2019-10-08 ENCOUNTER — OFFICE VISIT (OUTPATIENT)
Dept: INTERNAL MEDICINE | Facility: CLINIC | Age: 69
End: 2019-10-08
Payer: MEDICARE

## 2019-10-08 ENCOUNTER — PATIENT MESSAGE (OUTPATIENT)
Dept: INTERNAL MEDICINE | Facility: CLINIC | Age: 69
End: 2019-10-08

## 2019-10-08 VITALS
BODY MASS INDEX: 26.36 KG/M2 | HEIGHT: 66 IN | HEART RATE: 64 BPM | RESPIRATION RATE: 18 BRPM | TEMPERATURE: 98 F | WEIGHT: 164 LBS | DIASTOLIC BLOOD PRESSURE: 72 MMHG | SYSTOLIC BLOOD PRESSURE: 170 MMHG

## 2019-10-08 DIAGNOSIS — Z23 NEED FOR INFLUENZA VACCINATION: ICD-10-CM

## 2019-10-08 DIAGNOSIS — N18.30 STAGE 3 CHRONIC KIDNEY DISEASE: ICD-10-CM

## 2019-10-08 DIAGNOSIS — I10 ESSENTIAL HYPERTENSION: Primary | ICD-10-CM

## 2019-10-08 PROCEDURE — 90662 IIV NO PRSV INCREASED AG IM: CPT | Mod: PBBFAC,PO

## 2019-10-08 PROCEDURE — 99213 OFFICE O/P EST LOW 20 MIN: CPT | Mod: S$PBB,,, | Performed by: INTERNAL MEDICINE

## 2019-10-08 PROCEDURE — 99213 PR OFFICE/OUTPT VISIT, EST, LEVL III, 20-29 MIN: ICD-10-PCS | Mod: S$PBB,,, | Performed by: INTERNAL MEDICINE

## 2019-10-08 PROCEDURE — 99213 OFFICE O/P EST LOW 20 MIN: CPT | Mod: PBBFAC,PO,25 | Performed by: INTERNAL MEDICINE

## 2019-10-08 PROCEDURE — 99999 PR PBB SHADOW E&M-EST. PATIENT-LVL III: CPT | Mod: PBBFAC,,, | Performed by: INTERNAL MEDICINE

## 2019-10-08 PROCEDURE — 99999 PR PBB SHADOW E&M-EST. PATIENT-LVL III: ICD-10-PCS | Mod: PBBFAC,,, | Performed by: INTERNAL MEDICINE

## 2019-10-08 RX ORDER — FAMOTIDINE 10 MG/1
10 TABLET ORAL DAILY
COMMUNITY
End: 2019-12-16

## 2019-10-08 RX ORDER — AMLODIPINE BESYLATE 5 MG/1
5 TABLET ORAL DAILY
Qty: 30 TABLET | Refills: 11 | Status: SHIPPED | OUTPATIENT
Start: 2019-10-08 | End: 2019-12-16

## 2019-10-08 NOTE — PROGRESS NOTES
"Subjective:       Patient ID: Alesha Toney is a 68 y.o. female.    Chief Complaint: Hypertension and Flu Vaccine    Hypertension   This is a chronic problem. The current episode started more than 1 year ago. The problem has been gradually worsening since onset. The problem is resistant. Associated symptoms include anxiety and shortness of breath. Pertinent negatives include no chest pain, headaches or palpitations. There are no associated agents to hypertension. Risk factors for coronary artery disease include stress. Past treatments include angiotensin blockers and beta blockers. The current treatment provides mild improvement. There are no compliance problems.      Presents for follow-up of hypertension.  She is compliant with losartan 100 mg daily.  She reports that the lowest systolic blood pressure is 140 to 150s.  She denies chest pains or headaches.  She does endorse chronic stable dyspnea since lobectomy.  She believes her blood pressure is elevated in response to anxiety over recurrence of her lung cancer.    Review of Systems   Eyes: Negative for visual disturbance.   Respiratory: Positive for shortness of breath. Negative for chest tightness.    Cardiovascular: Negative for chest pain and palpitations.   Gastrointestinal: Negative for abdominal pain.   Genitourinary: Negative for decreased urine volume and difficulty urinating.   Neurological: Negative for syncope and headaches.   Psychiatric/Behavioral: The patient is nervous/anxious.        Objective:        Vitals:    10/08/19 1033   BP: (!) 170/72   BP Location: Right arm   Patient Position: Sitting   BP Method: Medium (Manual)   Pulse: 64   Resp: 18   Temp: 97.9 °F (36.6 °C)   TempSrc: Oral   Weight: 74.4 kg (164 lb 0.4 oz)   Height: 5' 6" (1.676 m)       Body mass index is 26.47 kg/m².    Physical Exam   Constitutional: She is oriented to person, place, and time. She appears well-developed and well-nourished. No distress.   HENT:   Head: " Normocephalic and atraumatic.   Nose: Nose normal.   Eyes: Conjunctivae and EOM are normal. Right eye exhibits no discharge. Left eye exhibits no discharge.   Neck: Neck supple.   Cardiovascular: Normal rate, regular rhythm, normal heart sounds and intact distal pulses.   Pulmonary/Chest: Effort normal and breath sounds normal.   Abdominal: Soft.   Neurological: She is alert and oriented to person, place, and time.   Skin: Skin is warm and dry. She is not diaphoretic. No erythema.   Psychiatric: She has a normal mood and affect. Her behavior is normal. Thought content normal.       Assessment:     1. Essential hypertension    2. Stage 3 chronic kidney disease    3. Need for influenza vaccination           Plan:         1. Essential hypertension  - d/c losartan- CKD3 precludes us from adding diuretic for potassium balance and additional BP control so will switch to amlodipine. She will continue losartan for the next 5 days while amlodipine is taking effect. Continue to monitor home BP and use clonidine prn. Notify clinic if BP persistently elevated, can increase amlodipine to 10mg. She plans to visit [facility] for second opinion on treatment for her lung cancer next week, repeat bmp will likely be obtained at that time- d/w pt that bmp could be rechecked to monitor K and Cr.   - amLODIPine (NORVASC) 5 MG tablet; Take 1 tablet (5 mg total) by mouth once daily.  Dispense: 30 tablet; Refill: 11    2. Stage 3 chronic kidney disease  - see above    3. Need for influenza vaccination  - flu shot today           Patient note was created using MModal Dictation.  Any errors in syntax or even information may not have been identified and edited on initial review prior to signing this note.

## 2019-10-11 ENCOUNTER — PATIENT MESSAGE (OUTPATIENT)
Dept: INTERNAL MEDICINE | Facility: CLINIC | Age: 69
End: 2019-10-11

## 2019-10-17 ENCOUNTER — PATIENT MESSAGE (OUTPATIENT)
Dept: HEMATOLOGY/ONCOLOGY | Facility: CLINIC | Age: 69
End: 2019-10-17

## 2019-10-17 NOTE — TELEPHONE ENCOUNTER
As far as I know it is in the staple line but it I think they feel that is is very close to the central area. Not in a lymph node as far as I know

## 2019-10-22 ENCOUNTER — PATIENT MESSAGE (OUTPATIENT)
Dept: RADIATION ONCOLOGY | Facility: CLINIC | Age: 69
End: 2019-10-22

## 2019-10-23 ENCOUNTER — PATIENT MESSAGE (OUTPATIENT)
Dept: HEMATOLOGY/ONCOLOGY | Facility: CLINIC | Age: 69
End: 2019-10-23

## 2019-10-23 ENCOUNTER — PATIENT MESSAGE (OUTPATIENT)
Dept: RADIATION ONCOLOGY | Facility: CLINIC | Age: 69
End: 2019-10-23

## 2019-10-23 ENCOUNTER — TELEPHONE (OUTPATIENT)
Dept: HEMATOLOGY/ONCOLOGY | Facility: CLINIC | Age: 69
End: 2019-10-23

## 2019-10-23 ENCOUNTER — OFFICE VISIT (OUTPATIENT)
Dept: HEMATOLOGY/ONCOLOGY | Facility: CLINIC | Age: 69
End: 2019-10-23
Payer: MEDICARE

## 2019-10-23 VITALS — HEIGHT: 66 IN | WEIGHT: 163.81 LBS | BODY MASS INDEX: 26.33 KG/M2

## 2019-10-23 DIAGNOSIS — C34.2 MALIGNANT NEOPLASM OF MIDDLE LOBE OF RIGHT LUNG: Primary | ICD-10-CM

## 2019-10-23 DIAGNOSIS — T45.1X5A CHEMOTHERAPY INDUCED NAUSEA AND VOMITING: ICD-10-CM

## 2019-10-23 DIAGNOSIS — G62.9 NEUROPATHY: ICD-10-CM

## 2019-10-23 DIAGNOSIS — R11.2 CHEMOTHERAPY INDUCED NAUSEA AND VOMITING: ICD-10-CM

## 2019-10-23 DIAGNOSIS — Z45.2 ENCOUNTER FOR ADJUSTMENT AND MANAGEMENT OF VASCULAR ACCESS DEVICE: ICD-10-CM

## 2019-10-23 PROCEDURE — 95115 IMMUNOTHERAPY ONE INJECTION: CPT | Mod: PBBFAC

## 2019-10-23 PROCEDURE — 99215 PR OFFICE/OUTPT VISIT, EST, LEVL V, 40-54 MIN: ICD-10-PCS | Mod: S$PBB,,, | Performed by: INTERNAL MEDICINE

## 2019-10-23 PROCEDURE — 99999 PR PBB SHADOW E&M-EST. PATIENT-LVL II: CPT | Mod: PBBFAC,,, | Performed by: INTERNAL MEDICINE

## 2019-10-23 PROCEDURE — 99215 OFFICE O/P EST HI 40 MIN: CPT | Mod: S$PBB,,, | Performed by: INTERNAL MEDICINE

## 2019-10-23 PROCEDURE — 99212 OFFICE O/P EST SF 10 MIN: CPT | Mod: PBBFAC,25 | Performed by: INTERNAL MEDICINE

## 2019-10-23 PROCEDURE — 96372 THER/PROPH/DIAG INJ SC/IM: CPT | Mod: PBBFAC

## 2019-10-23 PROCEDURE — 99999 PR PBB SHADOW E&M-EST. PATIENT-LVL II: ICD-10-PCS | Mod: PBBFAC,,, | Performed by: INTERNAL MEDICINE

## 2019-10-23 RX ORDER — CYANOCOBALAMIN 1000 UG/ML
1000 INJECTION, SOLUTION INTRAMUSCULAR; SUBCUTANEOUS ONCE
Status: COMPLETED | OUTPATIENT
Start: 2019-10-23 | End: 2019-10-23

## 2019-10-23 RX ORDER — DEXAMETHASONE 6 MG/1
TABLET ORAL
Qty: 60 TABLET | Refills: 0 | Status: SHIPPED | OUTPATIENT
Start: 2019-10-23 | End: 2020-01-09

## 2019-10-23 RX ORDER — PROMETHAZINE HYDROCHLORIDE 25 MG/1
25 TABLET ORAL EVERY 6 HOURS PRN
Qty: 60 TABLET | Refills: 3 | Status: SHIPPED | OUTPATIENT
Start: 2019-10-23 | End: 2019-10-23

## 2019-10-23 RX ORDER — FOLIC ACID 1 MG/1
1 TABLET ORAL DAILY
Qty: 30 TABLET | Refills: 3 | Status: SHIPPED | OUTPATIENT
Start: 2019-10-23 | End: 2020-06-03

## 2019-10-23 RX ORDER — ACETAMINOPHEN AND CODEINE PHOSPHATE 300; 30 MG/1; MG/1
1 TABLET ORAL EVERY 6 HOURS PRN
Qty: 30 TABLET | Refills: 0 | Status: SHIPPED | OUTPATIENT
Start: 2019-10-23 | End: 2019-12-16

## 2019-10-23 RX ORDER — ONDANSETRON 8 MG/1
8 TABLET, ORALLY DISINTEGRATING ORAL EVERY 6 HOURS PRN
Qty: 60 TABLET | Refills: 3 | Status: SHIPPED | OUTPATIENT
Start: 2019-10-23 | End: 2020-06-03

## 2019-10-23 RX ADMIN — CYANOCOBALAMIN 1000 MCG: 1000 INJECTION, SOLUTION INTRAMUSCULAR at 12:10

## 2019-10-23 NOTE — PLAN OF CARE
DISCONTINUE OFF PATHWAY REGIMEN - Non-Small Cell Lung            Paclitaxel (Taxol(R))       Carboplatin (Paraplatin(R))           Additional Orders: Given with concurrent chemoradiotherapy.  GCSF   therapy with RT is a relative contraindication.    **Always confirm dose/schedule in your pharmacy ordering system**    REASON: Other Reason  PRIOR TREATMENT: Off Pathway: Carboplatin + Paclitaxel (2/50) + RT weekly x 6   weeks  TREATMENT RESPONSE: Unable to Evaluate    START OFF PATHWAY REGIMEN - Non-Small Cell Lung            Pemetrexed (Alimta(R))       Carboplatin (Paraplatin(R))           Additional Orders: Note: Patient to receive the following prior to the   initiation of therapy:  1) Dexamethasone 4 mg orally twice daily x 6 doses.    First dose 24 hours before chemotherapy.  2) Folic acid ? 400 mcg orally daily.    First dose at least 5 days prior to the first dose of pemetrexed.  3) Vitamin   B12 1,000 mcg intramuscularly every 9 weeks.  First dose at least 5 days prior   to the first dose of pemetrexed.    All AUC calculations intended to be used in   [place] Formula    **Always confirm dose/schedule in your pharmacy ordering system**    Patient Characteristics:  Local Recurrence  AJCC T Category: T4  Current Disease Status: Local Recurrence  AJCC N Category: NX  AJCC M Category: M0  AJCC 8 Stage Grouping: Unknown  Intent of Therapy:  Curative Intent, Discussed with Patient

## 2019-10-23 NOTE — TELEPHONE ENCOUNTER
----- Message from Pam Dhaliwal MD sent at 10/23/2019 11:47 AM CDT -----  PORT check and removal if not working. Can they place PICC line as she is only going to receive 2 cycles of chemo  Also get auth for Carboplatin and Alimta

## 2019-10-23 NOTE — PROGRESS NOTES
"Subjective:       Patient ID: Alesha Toney is a 69 y.o. female.    Chief Complaint: Malignant neoplasm of middle lobe of right lung  Ms. Alesha Toney is a 67-year-old otherwise healthy female who started having some shoulder pain, which was lasting for over six weeks.  She went and saw her primary care physician and underwent an x-ray, which revealed right upper lobe lung mass worrisome for malignancy and a CT scan was recommended, which was done on 6/12/18 and that revealed a newly developing mass, pleural based, lateral posterior segment, right upper lobe 3.5 x 3.5 cm, surrounding stellate margin and patchy infiltrates, particularly superiorly,   anteriorly infiltrates extend perihilar into the anterior segment.  She then underwent CT-guided biopsy, which revealed well-differentiated adenocarcinoma.       Her PET scan from 6/29/18 There is physiologic intracranial, head, and neck activity.  There is a large right upper lobe mass SUV max 9.11.  No local or distant metastatic disease seen.  There is physiologic liver, spleen, GI and  activity.  There are a few liver cysts.  No adenopathy is seen.  The adrenal glands are not enlarged.  No adenopathy is seen.  No suspicious bone lesions are seen.     She underwent VATS with right upper lobectomy. Mediastinal lymphadenectomy on 8/9/18. Pathology revealed "Tumor site: Upper lobe.Tumor size: 7 x 4 x 2.7 cm.Tumor focality: Single tumor.  Histologic type: Mixed invasive mucinous and nonmucinous adenocarcinoma. Histologic grade: G2: Moderately differentiated.Spread through air spaces (STATS): Present. Visceral pleura invasion: Not identified.  Lymphovascular invasion: Not identified. Direct invasion of adjacent structures: No adjacent structures present. Margins: All margins are uninvolved by carcinoma.Distance of invasive carcinoma from closest margin: 1 cm from the bronchial margin.Treatment effect: No known presurgical therapy. Regional lymph nodes: Number of " "lymph nodes involved: 0. Number of lymph nodes examined: 11. Pathologic Stage Classification: pT3 N0"        She completed 4 cycles of adjuvant chemo with Cisplatin and Alimta as of 1/10/19   She is on surveillance.     CT chest of 9/17/19 which reveals "Operative change of right upper lobectomy for small-cell lung cancer with several scattered subsolid opacities and a new solid nodule in the right lower lobe measuring up to 0.5 cm.  We recommend continued surveillance with the role and schedule for such surveillance to be determined by clinical considerations. Sided chest port distal tip terminating at the confluence of the brachiocephalic vein in the SVC.  Correlate with device functioning. Apically predominant emphysematous changes, left greater than right. Aortic annular calcification and multi-vessel coronary artery atherosclerosis. Numerous unchanged hepatic hypodensities, likely cysts"     PET scan from 10/1/19 reveals "1.6 cm soft tissue lesion re-identified posterior to the bronchus intermedius.  No corresponding focal increased radiotracer uptake to suggest local recurrence or metastatic disease. Stable subcentimeter opacities throughout the bilateral lower lobes, too small to characterize with PET-CT. Focal increased radiotracer uptake and subtle wall thickening in the rectum.  Findings are concerning for primary neoplastic process.  Recommend further evaluation with direct visualization"    Renuka has just returned back from [facility] and was advised to proceed with chemo/RT with either Docetaxel/platinum or Carboplatin/Alimta.  She has not decided yet.      Review of Systems   Constitutional: Negative for appetite change and unexpected weight change.   Eyes: Negative for visual disturbance.   Respiratory: Positive for shortness of breath. Negative for cough.    Cardiovascular: Positive for chest pain.   Gastrointestinal: Negative for abdominal pain and diarrhea.   Genitourinary: Negative for " frequency.   Musculoskeletal: Negative for back pain.   Skin: Negative for rash.   Neurological: Negative for headaches.   Hematological: Negative for adenopathy.   Psychiatric/Behavioral: The patient is nervous/anxious.        Objective:      Physical Exam   Constitutional: She is oriented to person, place, and time. She appears well-developed and well-nourished.   HENT:   Mouth/Throat: No oropharyngeal exudate.   Cardiovascular: Normal rate and normal heart sounds.   Pulmonary/Chest: Effort normal and breath sounds normal. She has no wheezes.   Abdominal: Soft. Bowel sounds are normal. There is no tenderness.   Musculoskeletal: She exhibits no edema or tenderness.   Lymphadenopathy:     She has no cervical adenopathy.   Neurological: She is alert and oriented to person, place, and time. Coordination normal.   Skin: Skin is warm and dry. No rash noted.   Psychiatric: She has a normal mood and affect. Judgment and thought content normal.   Vitals reviewed.        LABS:  WBC   Date Value Ref Range Status   10/04/2019 5.27 3.90 - 12.70 K/uL Final     Hemoglobin   Date Value Ref Range Status   10/04/2019 12.3 12.0 - 16.0 g/dL Final     POC Hematocrit   Date Value Ref Range Status   08/09/2018 33 (L) 36 - 54 %PCV Final     Hematocrit   Date Value Ref Range Status   10/04/2019 38.9 37.0 - 48.5 % Final     Platelets   Date Value Ref Range Status   10/04/2019 196 150 - 350 K/uL Final     Gran # (ANC)   Date Value Ref Range Status   10/04/2019 2.7 1.8 - 7.7 K/uL Final     Comment:     The ANC is based on a white cell differential from an   automated cell counter. It has not been microscopically   reviewed for the presence of abnormal cells. Clinical   correlation is required.         Chemistry        Component Value Date/Time     10/04/2019 0834    K 4.9 10/04/2019 0834     10/04/2019 0834    CO2 29 10/04/2019 0834    BUN 19 10/04/2019 0834    CREATININE 1.2 10/04/2019 0834     (H) 10/04/2019 0834         Component Value Date/Time    CALCIUM 9.9 10/04/2019 0834    ALKPHOS 54 (L) 10/04/2019 0834    AST 19 10/04/2019 0834    ALT 15 10/04/2019 0834    BILITOT 0.3 10/04/2019 0834    ESTGFRAFRICA 53.7 (A) 10/04/2019 0834    EGFRNONAA 46.5 (A) 10/04/2019 0834           Assessment:       1. Malignant neoplasm of middle lobe of right lung    2. Neuropathy        Plan:        1. Re discussed treatment plan extensively. She is agreeable with proceed with chemo/RT.  2. Escribed Tylenol # 3.    Above care plan was discussed with patient and all questions were addressed to her satisfaction

## 2019-10-23 NOTE — Clinical Note
PORT check and removal if not working. Can they place PICC line as she is only going to receive 2 cycles of chemoAlso get auth for Carboplatin and Alimta

## 2019-10-24 ENCOUNTER — PATIENT MESSAGE (OUTPATIENT)
Dept: HEMATOLOGY/ONCOLOGY | Facility: CLINIC | Age: 69
End: 2019-10-24

## 2019-10-24 NOTE — TELEPHONE ENCOUNTER
----- Message from Angela English sent at 10/24/2019  9:06 AM CDT -----  Type:  Pharmacy Calling to Clarify an RX    Name of Caller:avel  Pharmacy Name:[facility]   Prescription Name:acetaminophen-codeine 300-30mg (TYLENOL #3) 300-30 mg Tab  What do they need to clarify?:have some questions about medication   Best Call Back Number:[phone]  Additional Information:   Thank You  JASWINDER English

## 2019-10-24 NOTE — TELEPHONE ENCOUNTER
Hr creatinine clearance on Cockcroft gault equation is 53 so she is good. It has to stay over 45. Planning only 2 cycles of chemo. We will check labs prior to each chemo. She will need to hydrate herself well

## 2019-10-24 NOTE — TELEPHONE ENCOUNTER
Spoke with Olu vallecillo---she is calling to state patient has been getting tylenol #3 from dr thompson for the past few months, last script being from 10/5 for #30 tablets. Nurse spoke with dr kenney-----canceled script with moira.   pharmD thanked nurse.

## 2019-10-25 ENCOUNTER — PATIENT MESSAGE (OUTPATIENT)
Dept: RADIATION ONCOLOGY | Facility: CLINIC | Age: 69
End: 2019-10-25

## 2019-10-28 ENCOUNTER — PATIENT MESSAGE (OUTPATIENT)
Dept: HEMATOLOGY/ONCOLOGY | Facility: CLINIC | Age: 69
End: 2019-10-28

## 2019-10-28 DIAGNOSIS — C34.2 MALIGNANT NEOPLASM OF MIDDLE LOBE OF RIGHT LUNG: Primary | ICD-10-CM

## 2019-10-29 ENCOUNTER — TELEPHONE (OUTPATIENT)
Dept: INTERVENTIONAL RADIOLOGY/VASCULAR | Facility: HOSPITAL | Age: 69
End: 2019-10-29

## 2019-10-29 ENCOUNTER — TELEPHONE (OUTPATIENT)
Dept: HEMATOLOGY/ONCOLOGY | Facility: CLINIC | Age: 69
End: 2019-10-29

## 2019-10-29 NOTE — TELEPHONE ENCOUNTER
tried reaching out to pt on today to discuss appointment , but no answer,a detail message left on v/m.

## 2019-10-30 ENCOUNTER — PATIENT MESSAGE (OUTPATIENT)
Dept: HEMATOLOGY/ONCOLOGY | Facility: CLINIC | Age: 69
End: 2019-10-30

## 2019-10-30 DIAGNOSIS — C34.2 MALIGNANT NEOPLASM OF MIDDLE LOBE OF RIGHT LUNG: Primary | ICD-10-CM

## 2019-10-30 RX ORDER — DIPHENHYDRAMINE HCL 25 MG
CAPSULE ORAL
Qty: 2 CAPSULE | Refills: 0 | Status: CANCELLED | OUTPATIENT
Start: 2019-10-30

## 2019-10-30 RX ORDER — PREDNISONE 50 MG/1
TABLET ORAL
Qty: 3 TABLET | Refills: 0 | Status: CANCELLED | OUTPATIENT
Start: 2019-10-30

## 2019-11-01 ENCOUNTER — HOSPITAL ENCOUNTER (OUTPATIENT)
Dept: RADIATION THERAPY | Facility: HOSPITAL | Age: 69
Discharge: HOME | End: 2019-11-01
Attending: RADIOLOGY
Payer: MEDICARE

## 2019-11-01 ENCOUNTER — PATIENT MESSAGE (OUTPATIENT)
Dept: HEMATOLOGY/ONCOLOGY | Facility: CLINIC | Age: 69
End: 2019-11-01

## 2019-11-01 ENCOUNTER — TELEPHONE (OUTPATIENT)
Dept: PALLIATIVE MEDICINE | Facility: CLINIC | Age: 69
End: 2019-11-01

## 2019-11-01 PROCEDURE — 77290 THER RAD SIMULAJ FIELD CPLX: CPT | Mod: 26,,, | Performed by: RADIOLOGY

## 2019-11-01 PROCEDURE — 77263 THER RADIOLOGY TX PLNG CPLX: CPT | Mod: ,,, | Performed by: RADIOLOGY

## 2019-11-01 PROCEDURE — 77334 RADIATION TREATMENT AID(S): CPT | Mod: 26,,, | Performed by: RADIOLOGY

## 2019-11-01 PROCEDURE — 77334 RADIATION TREATMENT AID(S): CPT | Mod: TC | Performed by: RADIOLOGY

## 2019-11-01 PROCEDURE — 77334 PR  RADN TREATMENT AID(S) COMPLX: ICD-10-PCS | Mod: 26,,, | Performed by: RADIOLOGY

## 2019-11-01 PROCEDURE — 77014 HC CT GUIDANCE RADIATION THERAPY FLDS PLACEMENT: CPT | Mod: TC | Performed by: RADIOLOGY

## 2019-11-01 PROCEDURE — 77263 PR  RADIATION THERAPY PLAN COMPLEX: ICD-10-PCS | Mod: ,,, | Performed by: RADIOLOGY

## 2019-11-01 PROCEDURE — 77290 PR  SET RADN THERAPY FIELD COMPLEX: ICD-10-PCS | Mod: 26,,, | Performed by: RADIOLOGY

## 2019-11-01 PROCEDURE — 77290 THER RAD SIMULAJ FIELD CPLX: CPT | Mod: TC | Performed by: RADIOLOGY

## 2019-11-06 PROCEDURE — 77301 PR  INTEN MOD RADIOTHER PLAN W/DOSE VOL HIST: ICD-10-PCS | Mod: 26,,, | Performed by: RADIOLOGY

## 2019-11-06 PROCEDURE — 77301 RADIOTHERAPY DOSE PLAN IMRT: CPT | Mod: TC | Performed by: RADIOLOGY

## 2019-11-06 PROCEDURE — 77301 RADIOTHERAPY DOSE PLAN IMRT: CPT | Mod: 26,,, | Performed by: RADIOLOGY

## 2019-11-07 PROCEDURE — 77338 PR  MLC IMRT DESIGN & CONSTRUCTION PER IMRT PLAN: ICD-10-PCS | Mod: 26,,, | Performed by: RADIOLOGY

## 2019-11-07 PROCEDURE — 77470 SPECIAL RADIATION TREATMENT: CPT | Mod: 26,59,, | Performed by: RADIOLOGY

## 2019-11-07 PROCEDURE — 77470 SPECIAL RADIATION TREATMENT: CPT | Mod: 59,TC | Performed by: RADIOLOGY

## 2019-11-07 PROCEDURE — 77300 PR RADIATION THERAPY,DOSIMETRY PLAN: ICD-10-PCS | Mod: 26,,, | Performed by: RADIOLOGY

## 2019-11-07 PROCEDURE — 77338 DESIGN MLC DEVICE FOR IMRT: CPT | Mod: 26,,, | Performed by: RADIOLOGY

## 2019-11-07 PROCEDURE — 77338 DESIGN MLC DEVICE FOR IMRT: CPT | Mod: TC | Performed by: RADIOLOGY

## 2019-11-07 PROCEDURE — 77300 RADIATION THERAPY DOSE PLAN: CPT | Mod: TC | Performed by: RADIOLOGY

## 2019-11-07 PROCEDURE — 77470 PR  SPECIAL RADIATION TREATMENT: ICD-10-PCS | Mod: 26,59,, | Performed by: RADIOLOGY

## 2019-11-07 PROCEDURE — 77300 RADIATION THERAPY DOSE PLAN: CPT | Mod: 26,,, | Performed by: RADIOLOGY

## 2019-11-09 ENCOUNTER — PATIENT MESSAGE (OUTPATIENT)
Dept: HEMATOLOGY/ONCOLOGY | Facility: CLINIC | Age: 69
End: 2019-11-09

## 2019-11-11 ENCOUNTER — PATIENT MESSAGE (OUTPATIENT)
Dept: NEPHROLOGY | Facility: CLINIC | Age: 69
End: 2019-11-11

## 2019-11-11 NOTE — TELEPHONE ENCOUNTER
I cannot predict but at least going into this next round of treatment, her kidney function looks quite good .  We will need to follow it.  I would reassure her.

## 2019-11-12 ENCOUNTER — PATIENT MESSAGE (OUTPATIENT)
Dept: HEMATOLOGY/ONCOLOGY | Facility: CLINIC | Age: 69
End: 2019-11-12

## 2019-11-13 ENCOUNTER — LAB VISIT (OUTPATIENT)
Dept: LAB | Facility: HOSPITAL | Age: 69
End: 2019-11-13
Attending: INTERNAL MEDICINE
Payer: MEDICARE

## 2019-11-13 ENCOUNTER — TELEPHONE (OUTPATIENT)
Dept: HEMATOLOGY/ONCOLOGY | Facility: CLINIC | Age: 69
End: 2019-11-13

## 2019-11-13 DIAGNOSIS — Z85.118 HISTORY OF LUNG CANCER: ICD-10-CM

## 2019-11-13 LAB
ALBUMIN SERPL BCP-MCNC: 3.9 G/DL (ref 3.5–5.2)
ALP SERPL-CCNC: 60 U/L (ref 55–135)
ALT SERPL W/O P-5'-P-CCNC: 20 U/L (ref 10–44)
ANION GAP SERPL CALC-SCNC: 5 MMOL/L (ref 8–16)
AST SERPL-CCNC: 23 U/L (ref 10–40)
BILIRUB SERPL-MCNC: 0.3 MG/DL (ref 0.1–1)
BUN SERPL-MCNC: 17 MG/DL (ref 8–23)
CALCIUM SERPL-MCNC: 10.2 MG/DL (ref 8.7–10.5)
CHLORIDE SERPL-SCNC: 103 MMOL/L (ref 95–110)
CO2 SERPL-SCNC: 31 MMOL/L (ref 23–29)
CREAT SERPL-MCNC: 1.2 MG/DL (ref 0.5–1.4)
ERYTHROCYTE [DISTWIDTH] IN BLOOD BY AUTOMATED COUNT: 12.8 % (ref 11.5–14.5)
EST. GFR  (AFRICAN AMERICAN): 53 ML/MIN/1.73 M^2
EST. GFR  (NON AFRICAN AMERICAN): 46 ML/MIN/1.73 M^2
GLUCOSE SERPL-MCNC: 133 MG/DL (ref 70–110)
HCT VFR BLD AUTO: 39.9 % (ref 37–48.5)
HGB BLD-MCNC: 12.9 G/DL (ref 12–16)
MCH RBC QN AUTO: 30.1 PG (ref 27–31)
MCHC RBC AUTO-ENTMCNC: 32.3 G/DL (ref 32–36)
MCV RBC AUTO: 93 FL (ref 82–98)
NEUTROPHILS # BLD AUTO: 5.6 K/UL (ref 1.8–7.7)
PLATELET # BLD AUTO: 209 K/UL (ref 150–350)
PMV BLD AUTO: 9.8 FL (ref 9.2–12.9)
POTASSIUM SERPL-SCNC: 5.3 MMOL/L (ref 3.5–5.1)
PROT SERPL-MCNC: 7 G/DL (ref 6–8.4)
RBC # BLD AUTO: 4.29 M/UL (ref 4–5.4)
SODIUM SERPL-SCNC: 139 MMOL/L (ref 136–145)
WBC # BLD AUTO: 7.47 K/UL (ref 3.9–12.7)

## 2019-11-13 PROCEDURE — 85027 COMPLETE CBC AUTOMATED: CPT

## 2019-11-13 PROCEDURE — 36415 COLL VENOUS BLD VENIPUNCTURE: CPT

## 2019-11-13 PROCEDURE — 80053 COMPREHEN METABOLIC PANEL: CPT

## 2019-11-14 ENCOUNTER — DOCUMENTATION ONLY (OUTPATIENT)
Dept: RADIATION ONCOLOGY | Facility: CLINIC | Age: 69
End: 2019-11-14

## 2019-11-14 ENCOUNTER — INFUSION (OUTPATIENT)
Dept: INFUSION THERAPY | Facility: HOSPITAL | Age: 69
End: 2019-11-14
Attending: INTERNAL MEDICINE
Payer: MEDICARE

## 2019-11-14 ENCOUNTER — OFFICE VISIT (OUTPATIENT)
Dept: HEMATOLOGY/ONCOLOGY | Facility: CLINIC | Age: 69
End: 2019-11-14
Payer: MEDICARE

## 2019-11-14 VITALS
RESPIRATION RATE: 18 BRPM | BODY MASS INDEX: 26.2 KG/M2 | WEIGHT: 163 LBS | DIASTOLIC BLOOD PRESSURE: 78 MMHG | SYSTOLIC BLOOD PRESSURE: 189 MMHG | HEIGHT: 66 IN | HEART RATE: 60 BPM | TEMPERATURE: 98 F

## 2019-11-14 VITALS
BODY MASS INDEX: 26.22 KG/M2 | DIASTOLIC BLOOD PRESSURE: 74 MMHG | WEIGHT: 163.13 LBS | HEIGHT: 66 IN | OXYGEN SATURATION: 100 % | HEART RATE: 69 BPM | TEMPERATURE: 98 F | SYSTOLIC BLOOD PRESSURE: 176 MMHG

## 2019-11-14 DIAGNOSIS — N18.30 STAGE 3 CHRONIC KIDNEY DISEASE: ICD-10-CM

## 2019-11-14 DIAGNOSIS — C34.90 NON-SMALL CELL LUNG CANCER, UNSPECIFIED LATERALITY: ICD-10-CM

## 2019-11-14 DIAGNOSIS — C34.2 MALIGNANT NEOPLASM OF MIDDLE LOBE OF RIGHT LUNG: Primary | ICD-10-CM

## 2019-11-14 DIAGNOSIS — C34.90 MALIGNANT NEOPLASM OF LUNG, UNSPECIFIED LATERALITY, UNSPECIFIED PART OF LUNG: Primary | ICD-10-CM

## 2019-11-14 DIAGNOSIS — E87.5 HYPERKALEMIA: ICD-10-CM

## 2019-11-14 PROCEDURE — 96367 TX/PROPH/DG ADDL SEQ IV INF: CPT

## 2019-11-14 PROCEDURE — 99213 OFFICE O/P EST LOW 20 MIN: CPT | Mod: S$PBB,,, | Performed by: PHYSICIAN ASSISTANT

## 2019-11-14 PROCEDURE — 96413 CHEMO IV INFUSION 1 HR: CPT

## 2019-11-14 PROCEDURE — 77386 HC IMRT, COMPLEX: CPT | Performed by: RADIOLOGY

## 2019-11-14 PROCEDURE — 25000003 PHARM REV CODE 250: Performed by: INTERNAL MEDICINE

## 2019-11-14 PROCEDURE — 96411 CHEMO IV PUSH ADDL DRUG: CPT

## 2019-11-14 PROCEDURE — 99215 OFFICE O/P EST HI 40 MIN: CPT | Mod: PBBFAC,25 | Performed by: PHYSICIAN ASSISTANT

## 2019-11-14 PROCEDURE — 77014 HC CT GUIDANCE RADIATION THERAPY FLDS PLACEMENT: CPT | Mod: TC | Performed by: RADIOLOGY

## 2019-11-14 PROCEDURE — 96376 TX/PRO/DX INJ SAME DRUG ADON: CPT

## 2019-11-14 PROCEDURE — 99999 PR PBB SHADOW E&M-EST. PATIENT-LVL V: CPT | Mod: PBBFAC,,, | Performed by: PHYSICIAN ASSISTANT

## 2019-11-14 PROCEDURE — 63600175 PHARM REV CODE 636 W HCPCS: Mod: JG | Performed by: INTERNAL MEDICINE

## 2019-11-14 PROCEDURE — A4216 STERILE WATER/SALINE, 10 ML: HCPCS | Performed by: INTERNAL MEDICINE

## 2019-11-14 PROCEDURE — 99999 PR PBB SHADOW E&M-EST. PATIENT-LVL V: ICD-10-PCS | Mod: PBBFAC,,, | Performed by: PHYSICIAN ASSISTANT

## 2019-11-14 PROCEDURE — 99213 PR OFFICE/OUTPT VISIT, EST, LEVL III, 20-29 MIN: ICD-10-PCS | Mod: S$PBB,,, | Performed by: PHYSICIAN ASSISTANT

## 2019-11-14 RX ORDER — HEPARIN 100 UNIT/ML
500 SYRINGE INTRAVENOUS
Status: DISCONTINUED | OUTPATIENT
Start: 2019-11-14 | End: 2019-11-14 | Stop reason: HOSPADM

## 2019-11-14 RX ORDER — DIPHENHYDRAMINE HYDROCHLORIDE 50 MG/ML
25 INJECTION INTRAMUSCULAR; INTRAVENOUS ONCE
Status: COMPLETED | OUTPATIENT
Start: 2019-11-14 | End: 2019-11-14

## 2019-11-14 RX ORDER — SODIUM CHLORIDE 0.9 % (FLUSH) 0.9 %
10 SYRINGE (ML) INJECTION
Status: CANCELLED | OUTPATIENT
Start: 2019-11-14

## 2019-11-14 RX ORDER — SODIUM CHLORIDE 0.9 % (FLUSH) 0.9 %
10 SYRINGE (ML) INJECTION
Status: DISCONTINUED | OUTPATIENT
Start: 2019-11-14 | End: 2019-11-14 | Stop reason: HOSPADM

## 2019-11-14 RX ORDER — HEPARIN 100 UNIT/ML
500 SYRINGE INTRAVENOUS
Status: CANCELLED | OUTPATIENT
Start: 2019-11-14

## 2019-11-14 RX ORDER — DIPHENHYDRAMINE HCL 25 MG
25 CAPSULE ORAL
Status: CANCELLED
Start: 2019-11-14

## 2019-11-14 RX ADMIN — DEXAMETHASONE SODIUM PHOSPHATE: 4 INJECTION, SOLUTION INTRA-ARTICULAR; INTRALESIONAL; INTRAMUSCULAR; INTRAVENOUS; SOFT TISSUE at 12:11

## 2019-11-14 RX ADMIN — DIPHENHYDRAMINE HYDROCHLORIDE 25 MG: 50 INJECTION INTRAMUSCULAR; INTRAVENOUS at 12:11

## 2019-11-14 RX ADMIN — SODIUM CHLORIDE: 9 INJECTION, SOLUTION INTRAVENOUS at 12:11

## 2019-11-14 RX ADMIN — APREPITANT 130 MG: 130 INJECTION, EMULSION INTRAVENOUS at 12:11

## 2019-11-14 RX ADMIN — SODIUM CHLORIDE 925 MG: 9 INJECTION, SOLUTION INTRAVENOUS at 01:11

## 2019-11-14 RX ADMIN — Medication 10 ML: at 02:11

## 2019-11-14 RX ADMIN — CARBOPLATIN 385 MG: 10 INJECTION INTRAVENOUS at 01:11

## 2019-11-14 RX ADMIN — HEPARIN 500 UNITS: 100 SYRINGE at 02:11

## 2019-11-14 NOTE — PLAN OF CARE
1210 pt here for carbo/alimta infusion, labs, hx, meds, allergies reviewed, pt with no complaints at this time, reclined in chair, continue to monitor

## 2019-11-14 NOTE — PLAN OF CARE
1415 pt tolerated carbo/alimta infusion without issue, pt to rtc 12/5/19, no distress noted upon d/c to home

## 2019-11-14 NOTE — Clinical Note
Please schedule f/u in 3 weeks with Dr. Dhaliwal with a CBC, CMP and consideration to receive cycle 2 of chemo RT with carboplatin and alimta. Thank you

## 2019-11-14 NOTE — PROGRESS NOTES
Subjective:       Patient ID: Alesha Toney is a 69 y.o. female.    Chief Complaint: Malignant neoplasm of middle lobe of right lung      History:  Past Medical History:   Diagnosis Date    Basal cell carcinoma     Breast cyst     Cardiac arrhythmia     Disorder of kidney and ureter     renal stone    Fibrocystic breast     Hypertension     Lung cancer     Lung cancer     Squamous cell carcinoma      Past Surgical History:   Procedure Laterality Date    ADENOIDECTOMY  19yo    ANTERIOR CERVICAL DISCECTOMY W/ FUSION N/A 10/15/2018    Procedure: DISCECTOMY, SPINE, CERVICAL, ANTERIOR APPROACH, WITH FUSION C6/7  [employer] SNS: [employer] Regular OR table;  Surgeon: Greyson Bansal MD;  [facility];  Service: Neurosurgery;  Laterality: N/A;    APPENDECTOMY      BONE RESECTION, RIB  1980    BREAST BIOPSY Left     at least 40yrs ago in her 20's    BREAST CYST ASPIRATION      BREAST CYST EXCISION      ENDOBRONCHIAL ULTRASOUND N/A 7/10/2018    Procedure: ULTRASOUND, ENDOBRONCHIAL;  Surgeon: Sri Hearn MD;  Location: [facility];  Service: Pulmonary;  Laterality: N/A;    ENDOBRONCHIAL ULTRASOUND N/A 9/24/2019    Procedure: ENDOBRONCHIAL ULTRASOUND (EBUS);  Surgeon: Sri Hearn MD;  Location: [facility];  Service: Pulmonary;  Laterality: N/A;    HYSTERECTOMY      @47yrs of age    INSERTION OF TUNNELED CENTRAL VENOUS CATHETER (CVC) WITH SUBCUTANEOUS PORT N/A 9/25/2018    Procedure: [id number];  Surgeon: [facility] Diagnostic Provider;  Location: [facility] OR [address] [place] [address];  Service: Radiology;  Laterality: N/A;    KIDNEY SURGERY      19yo    OOPHORECTOMY      @47yrs of age    TONSILLECTOMY      VIDEO-ASSISTED THORACOSCOPIC LOBECTOMY Right 8/9/2018    Procedure: VATS, WITH LOBECTOMY Possible chest wall resection;  Surgeon: Philip Messina MD;  Location: [facility];  Service: Thoracic;  Laterality: Right;  Have open pan available.         Non-small cell lung  cancer (NSCLC)    8/9/2018 Initial Diagnosis     Non-small cell lung cancer (NSCLC)      10/8/2019 - 10/8/2019 Chemotherapy     Treatment Summary   Plan Name: OP ESOPHAGEAL PACLITAXEL CARBOPLATIN WEEKLY  Treatment Goal: Curative  Status: Inactive  Start Date: [No treatment day found]  End Date: [No treatment day found]  Provider: Pam Dhaliwal MD  Chemotherapy: CARBOplatin (PARAPLATIN) in sodium chloride 0.9% 250 mL chemo infusion, , Intravenous, Clinic/HOD 1 time, 0 of 1 cycle  PACLitaxel (TAXOL) 50 mg/m2 = 96 mg in sodium chloride 0.9% 250 mL chemo infusion, 50 mg/m2, Intravenous, Clinic/HOD 1 time, 0 of 1 cycle      10/30/2019 -  Chemotherapy     Treatment Summary   Plan Name: OP NSCLC PEMETREXED + CARBOPLATIN (AUC) Q3W  Treatment Goal: Curative  Status: Active  Start Date: 10/30/2019 (Planned)  End Date: 2/12/2020 (Planned)  Provider: Pam Dhaliwal MD  Chemotherapy: CARBOplatin (PARAPLATIN) in sodium chloride 0.9% 250 mL chemo infusion, , Intravenous, Clinic/HOD 1 time, 0 of 6 cycles  PEMEtrexed (ALIMTA) 925 mg in sodium chloride 0.9% 100 mL chemo infusion, 500 mg/m2, Intravenous, Clinic/HOD 1 time, 0 of 6 cycles        Malignant neoplasm of lung    9/25/2018 Initial Diagnosis     Malignant neoplasm of lung      10/30/2019 -  Chemotherapy     Treatment Summary   Plan Name: OP NSCLC PEMETREXED + CARBOPLATIN (AUC) Q3W  Treatment Goal: Curative  Status: Active  Start Date: 10/30/2019 (Planned)  End Date: 2/12/2020 (Planned)  Provider: Pam Dhaliwal MD  Chemotherapy: CARBOplatin (PARAPLATIN) in sodium chloride 0.9% 250 mL chemo infusion, , Intravenous, Clinic/HOD 1 time, 0 of 6 cycles  PEMEtrexed (ALIMTA) 925 mg in sodium chloride 0.9% 100 mL chemo infusion, 500 mg/m2, Intravenous, Clinic/HOD 1 time, 0 of 6 cycles          Allergies:  Review of patient's allergies indicates:   Allergen Reactions    Adhesive Itching, Rash and Blisters     INCLUDES EKG PADS    Ciprofloxacin Hives     Hives    Iodinated contrast  "media     Pcn [penicillins]      rash    Phenergan plain Other (See Comments)     "crazy behavior"    Phenergan [promethazine]     Tramadol     Vancomycin analogues      Red man syndrome        HPI   Oncological History: Ms. Alesha Toney is a 67-year-old otherwise healthy female who started having some shoulder pain, which was lasting for over six weeks.  She went and saw her primary care physician and underwent an x-ray, which revealed right upper lobe lung mass worrisome for malignancy and a CT scan was recommended, which was done on 6/12/18 and that revealed a newly developing mass, pleural based, lateral posterior segment, right upper lobe 3.5 x 3.5 cm, surrounding stellate margin and patchy infiltrates, particularly superiorly,   anteriorly infiltrates extend perihilar into the anterior segment.  She then underwent CT-guided biopsy, which revealed well-differentiated adenocarcinoma.       Her PET scan from 6/29/18 There is physiologic intracranial, head, and neck activity.  There is a large right upper lobe mass SUV max 9.11.  No local or distant metastatic disease seen.  There is physiologic liver, spleen, GI and  activity.  There are a few liver cysts.  No adenopathy is seen.  The adrenal glands are not enlarged.  No adenopathy is seen.  No suspicious bone lesions are seen.     She underwent VATS with right upper lobectomy. Mediastinal lymphadenectomy on 8/9/18. Pathology revealed "Tumor site: Upper lobe.Tumor size: 7 x 4 x 2.7 cm.Tumor focality: Single tumor.  Histologic type: Mixed invasive mucinous and nonmucinous adenocarcinoma. Histologic grade: G2: Moderately differentiated.Spread through air spaces (STATS): Present. Visceral pleura invasion: Not identified.  Lymphovascular invasion: Not identified. Direct invasion of adjacent structures: No adjacent structures present. Margins: All margins are uninvolved by carcinoma.Distance of invasive carcinoma from closest margin: 1 cm from the bronchial " "margin.Treatment effect: No known presurgical therapy. Regional lymph nodes: Number of lymph nodes involved: 0. Number of lymph nodes examined: 11. Pathologic Stage Classification: pT3 N0"      She completed 4 cycles of adjuvant chemo with Cisplatin and Alimta as of 1/10/19   She is on surveillance.     CT chest of 9/17/19 which reveals "Operative change of right upper lobectomy for small-cell lung cancer with several scattered subsolid opacities and a new solid nodule in the right lower lobe measuring up to 0.5 cm.  We recommend continued surveillance with the role and schedule for such surveillance to be determined by clinical considerations. Sided chest port distal tip terminating at the confluence of the brachiocephalic vein in the SVC.  Correlate with device functioning. Apically predominant emphysematous changes, left greater than right. Aortic annular calcification and multi-vessel coronary artery atherosclerosis. Numerous unchanged hepatic hypodensities, likely cysts"     PET scan from 10/1/19 reveals "1.6 cm soft tissue lesion re-identified posterior to the bronchus intermedius.  No corresponding focal increased radiotracer uptake to suggest local recurrence or metastatic disease. Stable subcentimeter opacities throughout the bilateral lower lobes, too small to characterize with PET-CT. Focal increased radiotracer uptake and subtle wall thickening in the rectum.  Findings are concerning for primary neoplastic process.  Recommend further evaluation with direct visualization"     She presented to [facility] and was advised to proceed with chemo/RT with either Docetaxel/platinum or Carboplatin/Alimta. She has elected to begin chemo/RT with Carboplatin and Alimta. She will start her first cycle of concurrent chemo/rad today.     She states that she is feeling fairly well. She is a little anxious about starting a new treatment regimen; however, she tolerated her first round of radiation without complication. " She does have a little fatigue but does not have any weakness. Pte denies fever, n/v, abdominal pain, diarrhea, constipation, chest pain, shortness of breath, hematochezia, hematemesis, dysphagia, loss of appetite or unexplained weight loss. She did have a concern about her potassium level continuing to increase. She denies any heart palpitations or flutter. She thinks that it may be due to her blood pressure medication. She does continue to remain physically active. She eats well and has a good appetite. Overall, she is doing fairly and is ready to start chemotherapy today.     Consent has been signed.        ECO    Review of Systems   Constitutional: Negative for activity change, appetite change, chills, fatigue, fever and unexpected weight change.   HENT: Negative for mouth sores, sore throat and trouble swallowing.    Eyes: Negative for visual disturbance.   Respiratory: Negative for cough, chest tightness and shortness of breath.    Cardiovascular: Negative for chest pain and leg swelling.   Gastrointestinal: Negative for abdominal distention, abdominal pain, anal bleeding, blood in stool, constipation, diarrhea, nausea, rectal pain and vomiting.   Genitourinary: Negative for dysuria and hematuria.   Musculoskeletal: Negative for back pain and gait problem.   Skin: Negative for color change and rash.   Neurological: Negative for dizziness, syncope, weakness, light-headedness and numbness.   Hematological: Negative for adenopathy.   Psychiatric/Behavioral: Negative for behavioral problems, confusion, sleep disturbance and suicidal ideas. The patient is not nervous/anxious.        Objective:      Physical Exam   Constitutional: She is oriented to person, place, and time. She appears well-developed and well-nourished. No distress.   HENT:   Head: Normocephalic and atraumatic.   Mouth/Throat: No oropharyngeal exudate.   Eyes: Pupils are equal, round, and reactive to light. Conjunctivae and EOM are normal. No  scleral icterus.   Neck: Normal range of motion. Neck supple.   Cardiovascular: Normal rate, regular rhythm and normal heart sounds. Exam reveals no gallop and no friction rub.   No murmur heard.  Pulmonary/Chest: Breath sounds normal. No stridor. No respiratory distress. She has no wheezes. She has no rales. She exhibits no tenderness.   Abdominal: Soft. Bowel sounds are normal. She exhibits no distension and no mass. There is no tenderness. There is no rebound and no guarding. No hernia.   Musculoskeletal: Normal range of motion. She exhibits no edema or tenderness.   Lymphadenopathy:     She has no cervical adenopathy.   Neurological: She is alert and oriented to person, place, and time.   Skin: Skin is warm and dry. Capillary refill takes less than 2 seconds. No rash noted. No erythema.   Psychiatric: She has a normal mood and affect. Her behavior is normal. Judgment and thought content normal.   Nursing note and vitals reviewed.      Results:   CBC Oncology   Order: [id number]   Status:  Final result   Visible to patient:  Yes (Patient Portal)   Next appt:  Today at 11:00 AM in Chemotherapy ([facility], CHEMO)   Dx:  History of lung cancer    Ref Range & Units 1d ago   WBC 3.90 - 12.70 K/uL 7.47    RBC 4.00 - 5.40 M/uL 4.29    Hemoglobin 12.0 - 16.0 g/dL 12.9    Hematocrit 37.0 - 48.5 % 39.9    Mean Corpuscular Volume 82 - 98 fL 93    Mean Corpuscular Hemoglobin 27.0 - 31.0 pg 30.1    Mean Corpuscular Hemoglobin Conc 32.0 - 36.0 g/dL 32.3    RDW 11.5 - 14.5 % 12.8    Platelets 150 - 350 K/uL 209    MPV 9.2 - 12.9 fL 9.8    Gran # (ANC) 1.8 - 7.7 K/uL 5.6    Comment: The ANC is based on a white cell differential from an   automated cell counter. It has not been microscopically   reviewed for the presence of abnormal cells. Clinical   correlation is required.            Comprehensive metabolic panel   Order: [id number]   Status:  Final result   Visible to patient:  Yes (Patient Portal)   Next appt:  Today at 11:00 AM in  Chemotherapy ([facility], CHEMO)   Dx:  History of lung cancer    Ref Range & Units 1d ago   Sodium 136 - 145 mmol/L 139    Potassium 3.5 - 5.1 mmol/L 5.3High     Chloride 95 - 110 mmol/L 103    CO2 23 - 29 mmol/L 31High     Glucose 70 - 110 mg/dL 133High     BUN, Bld 8 - 23 mg/dL 17    Creatinine 0.5 - 1.4 mg/dL 1.2    Calcium 8.7 - 10.5 mg/dL 10.2    Total Protein 6.0 - 8.4 g/dL 7.0    Albumin 3.5 - 5.2 g/dL 3.9    Total Bilirubin 0.1 - 1.0 mg/dL 0.3    Comment: For infants and newborns, interpretation of results should be based   on gestational age, weight and in agreement with clinical   observations.   Premature Infant recommended reference ranges:   Up to 24 hours.............<8.0 mg/dL   Up to 48 hours............<12.0 mg/dL   3-5 days..................<15.0 mg/dL   6-29 days.................<15.0 mg/dL    Alkaline Phosphatase 55 - 135 U/L 60    AST 10 - 40 U/L 23    ALT 10 - 44 U/L 20    Anion Gap 8 - 16 mmol/L 5Low     eGFR if African American >60 mL/min/1.73 m^2 53Abnormal     eGFR if non African American >60 mL/min/1.73 m^2 46Abnormal     Comment: Calculation used to obtain the estimated glomerular filtration   rate (eGFR) is the CKD-EPI equation.            Contains abnormal data NM PET CT Routine Skull to Mid Thigh   Order: [id number]   Status:  Final result   Visible to patient:  Yes (Patient Portal)   Next appt:  Today at 11:00 AM in Chemotherapy ([facility], CHEMO)   Dx:  Malignant neoplasm of middle lobe of ...   Details     Reading Physician Reading Date Result Priority   Rick Sutton MD 10/1/2019 Routine      Narrative     EXAMINATION:  NM PET CT ROUTINE    CLINICAL HISTORY:  lung cancer staging; Malignant neoplasm of middle lobe, bronchus or lung    TECHNIQUE:  11.99 mCi of F18-FDG was administered intravenously in the left hand.  After an approximately 60 min distribution time, PET/CT images were acquired from the skull base to the mid thigh. Transmission images were acquired to correct for attenuation  using a whole body low-dose CT scan without contrast with the arms positioned above the head. Glycemia at the time of injection was 117 mg/dL.    COMPARISON:  CT chest without from 09/17/2019 and multiple prior examinations.  FDG PET/CT from 06/29/2018.    FINDINGS:  Quality of the study: Adequate.    Soft tissue lesion re-identified along the inferior margin of the lobectomy stable line, posterior to the bronchus intermedius measuring up to 1.6 cm (previously 1.6 cm).  No corresponding focal increased radiotracer uptake.    Stable subcentimeter opacities throughout the bilateral lower lobes.  Lesions are too small to characterize with PET-CT.    Focal increased radiotracer uptake re-identified in the right aspect of the rectum with subtle underlying wall thickening on noncontrast images, more prominent on today's exam.  SUV max measures 11.7 (previously 7.0).    Physiologic uptake of the tracer is present within the brain, salivary glands, myocardium, GI and  tracts.    Incidental CT findings: Postoperative changes again noted status post right upper lobectomy.  Stable apical predominant emphysematous changes.      Impression       1.6 cm soft tissue lesion re-identified posterior to the bronchus intermedius.  No corresponding focal increased radiotracer uptake to suggest local recurrence or metastatic disease.    Stable subcentimeter opacities throughout the bilateral lower lobes, too small to characterize with PET-CT.    Focal increased radiotracer uptake and subtle wall thickening in the rectum.  Findings are concerning for primary neoplastic process.  Recommend further evaluation with direct visualization.    This report was flagged in Epic as abnormal.    I, Rick Sutton MD, attest that I reviewed and interpreted the images.    Electronically signed by resident: Mark Isaacs  Date: 10/01/2019  Time: 11:18           Assessment:   Alesha Toney is a pleasant 70 y/o female that returns to the clinic to  begin Chemo R/T with Carboplatin AUC 5 with 500mg/m2 Alimta q3 weeks. Pte would like to have benadryl added to her chemotherapy regimen.    1. Malignant neoplasm of middle lobe of R lung  2. Hyperkalemia  3. STG 3 chronic kidney disease  Plan:   1. Begin Chemo RT with Carbo AUC 5 with Alimta 500mg/m2 q3 weeks.   -will return in 3 weeks for physical exam, CBC, CMP and consideration to receive cycle 2. The goal is for her to complete 6 cycles  2. Will continue to monitor. Clinically, pte is stable without dizziness, increased heart rate or concerning palpitations. She is not diaphoretic; however, her increased potassium level could be 2/2 to the Losartan. Hence, she will f/u with her PCP to determine if she can transition to Amlodipine.   3. Stable. Continue to maintain fluid hydration.    Pte and family members displayed understanding of the above encounter and treatment plan. All thoughtful questions were answered to their satisfaction. Pte was advised to notify the care team or proceed to the ER if signs and symptoms worsen.      STAFF NOTE:  Agree with above

## 2019-11-15 PROCEDURE — G6002 STEREOSCOPIC X-RAY GUIDANCE: HCPCS | Mod: 26,,, | Performed by: RADIOLOGY

## 2019-11-15 PROCEDURE — G6002 PR STEREOSCOPIC XRAY GUIDE FOR RADIATION TX DELIV: ICD-10-PCS | Mod: 26,,, | Performed by: RADIOLOGY

## 2019-11-15 PROCEDURE — 77417 THER RADIOLOGY PORT IMAGE(S): CPT | Performed by: RADIOLOGY

## 2019-11-15 PROCEDURE — 77386 HC IMRT, COMPLEX: CPT | Performed by: RADIOLOGY

## 2019-11-18 PROCEDURE — G6002 STEREOSCOPIC X-RAY GUIDANCE: HCPCS | Mod: 26,,, | Performed by: RADIOLOGY

## 2019-11-18 PROCEDURE — 77386 HC IMRT, COMPLEX: CPT | Performed by: RADIOLOGY

## 2019-11-18 PROCEDURE — G6002 PR STEREOSCOPIC XRAY GUIDE FOR RADIATION TX DELIV: ICD-10-PCS | Mod: 26,,, | Performed by: RADIOLOGY

## 2019-11-19 ENCOUNTER — PATIENT OUTREACH (OUTPATIENT)
Dept: ADMINISTRATIVE | Facility: HOSPITAL | Age: 69
End: 2019-11-19

## 2019-11-19 PROCEDURE — G6002 PR STEREOSCOPIC XRAY GUIDE FOR RADIATION TX DELIV: ICD-10-PCS | Mod: 26,,, | Performed by: RADIOLOGY

## 2019-11-19 PROCEDURE — 77386 HC IMRT, COMPLEX: CPT | Performed by: RADIOLOGY

## 2019-11-19 PROCEDURE — G6002 STEREOSCOPIC X-RAY GUIDANCE: HCPCS | Mod: 26,,, | Performed by: RADIOLOGY

## 2019-11-20 ENCOUNTER — DOCUMENTATION ONLY (OUTPATIENT)
Dept: RADIATION ONCOLOGY | Facility: CLINIC | Age: 69
End: 2019-11-20

## 2019-11-20 PROCEDURE — G6002 PR STEREOSCOPIC XRAY GUIDE FOR RADIATION TX DELIV: ICD-10-PCS | Mod: 26,,, | Performed by: RADIOLOGY

## 2019-11-20 PROCEDURE — G6002 STEREOSCOPIC X-RAY GUIDANCE: HCPCS | Mod: 26,,, | Performed by: RADIOLOGY

## 2019-11-20 PROCEDURE — 77386 HC IMRT, COMPLEX: CPT | Performed by: RADIOLOGY

## 2019-11-20 NOTE — PLAN OF CARE
Day 5 of outpatient XRT to the right lung. Reports worsening neuropathy in left and and feet. Denies esophagitis and dyspnea.

## 2019-11-21 PROCEDURE — 77386 HC IMRT, COMPLEX: CPT | Performed by: RADIOLOGY

## 2019-11-21 PROCEDURE — 77014 HC CT GUIDANCE RADIATION THERAPY FLDS PLACEMENT: CPT | Mod: TC | Performed by: RADIOLOGY

## 2019-11-21 PROCEDURE — 77336 RADIATION PHYSICS CONSULT: CPT | Performed by: RADIOLOGY

## 2019-11-22 PROCEDURE — G6002 STEREOSCOPIC X-RAY GUIDANCE: HCPCS | Mod: 26,,, | Performed by: RADIOLOGY

## 2019-11-22 PROCEDURE — G6002 PR STEREOSCOPIC XRAY GUIDE FOR RADIATION TX DELIV: ICD-10-PCS | Mod: 26,,, | Performed by: RADIOLOGY

## 2019-11-22 PROCEDURE — 77386 HC IMRT, COMPLEX: CPT | Performed by: RADIOLOGY

## 2019-11-22 PROCEDURE — 77417 THER RADIOLOGY PORT IMAGE(S): CPT | Performed by: RADIOLOGY

## 2019-11-24 ENCOUNTER — DOCUMENTATION ONLY (OUTPATIENT)
Dept: RADIATION ONCOLOGY | Facility: CLINIC | Age: 69
End: 2019-11-24

## 2019-11-24 PROCEDURE — G6002 STEREOSCOPIC X-RAY GUIDANCE: HCPCS | Mod: 26,,, | Performed by: RADIOLOGY

## 2019-11-24 PROCEDURE — G6002 PR STEREOSCOPIC XRAY GUIDE FOR RADIATION TX DELIV: ICD-10-PCS | Mod: 26,,, | Performed by: RADIOLOGY

## 2019-11-24 PROCEDURE — 77386 HC IMRT, COMPLEX: CPT | Performed by: RADIOLOGY

## 2019-11-25 ENCOUNTER — PATIENT MESSAGE (OUTPATIENT)
Dept: INTERNAL MEDICINE | Facility: CLINIC | Age: 69
End: 2019-11-25

## 2019-11-25 DIAGNOSIS — I10 ESSENTIAL HYPERTENSION: Primary | ICD-10-CM

## 2019-11-25 DIAGNOSIS — N18.30 STAGE 3 CHRONIC KIDNEY DISEASE: ICD-10-CM

## 2019-11-25 PROCEDURE — G6002 STEREOSCOPIC X-RAY GUIDANCE: HCPCS | Mod: 26,,, | Performed by: RADIOLOGY

## 2019-11-25 PROCEDURE — 77386 HC IMRT, COMPLEX: CPT | Performed by: RADIOLOGY

## 2019-11-25 PROCEDURE — G6002 PR STEREOSCOPIC XRAY GUIDE FOR RADIATION TX DELIV: ICD-10-PCS | Mod: 26,,, | Performed by: RADIOLOGY

## 2019-11-26 ENCOUNTER — LAB VISIT (OUTPATIENT)
Dept: LAB | Facility: HOSPITAL | Age: 69
End: 2019-11-26
Attending: INTERNAL MEDICINE
Payer: MEDICARE

## 2019-11-26 DIAGNOSIS — I10 ESSENTIAL HYPERTENSION: ICD-10-CM

## 2019-11-26 DIAGNOSIS — E78.5 HYPERLIPIDEMIA, UNSPECIFIED HYPERLIPIDEMIA TYPE: ICD-10-CM

## 2019-11-26 DIAGNOSIS — N18.30 STAGE 3 CHRONIC KIDNEY DISEASE: ICD-10-CM

## 2019-11-26 LAB
ALBUMIN SERPL BCP-MCNC: 3.8 G/DL (ref 3.5–5.2)
ALT SERPL W/O P-5'-P-CCNC: 47 U/L (ref 10–44)
ANION GAP SERPL CALC-SCNC: 9 MMOL/L (ref 8–16)
AST SERPL-CCNC: 37 U/L (ref 10–40)
BUN SERPL-MCNC: 16 MG/DL (ref 8–23)
CALCIUM SERPL-MCNC: 10.1 MG/DL (ref 8.7–10.5)
CHLORIDE SERPL-SCNC: 108 MMOL/L (ref 95–110)
CHOLEST SERPL-MCNC: 222 MG/DL (ref 120–199)
CHOLEST/HDLC SERPL: 4.1 {RATIO} (ref 2–5)
CO2 SERPL-SCNC: 28 MMOL/L (ref 23–29)
CREAT SERPL-MCNC: 1.1 MG/DL (ref 0.5–1.4)
EST. GFR  (AFRICAN AMERICAN): 59 ML/MIN/1.73 M^2
EST. GFR  (NON AFRICAN AMERICAN): 51 ML/MIN/1.73 M^2
GLUCOSE SERPL-MCNC: 113 MG/DL (ref 70–110)
HDLC SERPL-MCNC: 54 MG/DL (ref 40–75)
HDLC SERPL: 24.3 % (ref 20–50)
LDLC SERPL CALC-MCNC: 134 MG/DL (ref 63–159)
NONHDLC SERPL-MCNC: 168 MG/DL
PHOSPHATE SERPL-MCNC: 3 MG/DL (ref 2.7–4.5)
POTASSIUM SERPL-SCNC: 5.1 MMOL/L (ref 3.5–5.1)
SODIUM SERPL-SCNC: 145 MMOL/L (ref 136–145)
TRIGL SERPL-MCNC: 170 MG/DL (ref 30–150)

## 2019-11-26 PROCEDURE — G6002 PR STEREOSCOPIC XRAY GUIDE FOR RADIATION TX DELIV: ICD-10-PCS | Mod: 26,,, | Performed by: RADIOLOGY

## 2019-11-26 PROCEDURE — 80061 LIPID PANEL: CPT

## 2019-11-26 PROCEDURE — 84450 TRANSFERASE (AST) (SGOT): CPT

## 2019-11-26 PROCEDURE — 80069 RENAL FUNCTION PANEL: CPT

## 2019-11-26 PROCEDURE — 84460 ALANINE AMINO (ALT) (SGPT): CPT

## 2019-11-26 PROCEDURE — 77386 HC IMRT, COMPLEX: CPT | Performed by: RADIOLOGY

## 2019-11-26 PROCEDURE — G6002 STEREOSCOPIC X-RAY GUIDANCE: HCPCS | Mod: 26,,, | Performed by: RADIOLOGY

## 2019-11-26 PROCEDURE — 36415 COLL VENOUS BLD VENIPUNCTURE: CPT

## 2019-11-27 PROCEDURE — G6002 PR STEREOSCOPIC XRAY GUIDE FOR RADIATION TX DELIV: ICD-10-PCS | Mod: 26,,, | Performed by: RADIOLOGY

## 2019-11-27 PROCEDURE — G6002 STEREOSCOPIC X-RAY GUIDANCE: HCPCS | Mod: 26,,, | Performed by: RADIOLOGY

## 2019-11-27 PROCEDURE — 77386 HC IMRT, COMPLEX: CPT | Performed by: RADIOLOGY

## 2019-11-27 PROCEDURE — 77336 RADIATION PHYSICS CONSULT: CPT | Performed by: RADIOLOGY

## 2019-12-02 ENCOUNTER — PATIENT MESSAGE (OUTPATIENT)
Dept: HEMATOLOGY/ONCOLOGY | Facility: CLINIC | Age: 69
End: 2019-12-02

## 2019-12-02 ENCOUNTER — HOSPITAL ENCOUNTER (OUTPATIENT)
Dept: RADIATION THERAPY | Facility: HOSPITAL | Age: 69
Discharge: HOME | End: 2019-12-02
Attending: RADIOLOGY
Payer: MEDICARE

## 2019-12-02 PROCEDURE — 77014 HC CT GUIDANCE RADIATION THERAPY FLDS PLACEMENT: CPT | Mod: TC | Performed by: RADIOLOGY

## 2019-12-02 PROCEDURE — 77386 HC IMRT, COMPLEX: CPT | Performed by: RADIOLOGY

## 2019-12-03 ENCOUNTER — OFFICE VISIT (OUTPATIENT)
Dept: INTERNAL MEDICINE | Facility: CLINIC | Age: 69
End: 2019-12-03
Payer: MEDICARE

## 2019-12-03 VITALS
DIASTOLIC BLOOD PRESSURE: 70 MMHG | SYSTOLIC BLOOD PRESSURE: 170 MMHG | HEART RATE: 69 BPM | HEIGHT: 66 IN | BODY MASS INDEX: 26.42 KG/M2 | WEIGHT: 164.38 LBS | OXYGEN SATURATION: 99 % | TEMPERATURE: 98 F

## 2019-12-03 DIAGNOSIS — C34.90 MALIGNANT NEOPLASM OF LUNG, UNSPECIFIED LATERALITY, UNSPECIFIED PART OF LUNG: Primary | ICD-10-CM

## 2019-12-03 DIAGNOSIS — I10 ESSENTIAL HYPERTENSION: ICD-10-CM

## 2019-12-03 DIAGNOSIS — G62.9 PERIPHERAL POLYNEUROPATHY: ICD-10-CM

## 2019-12-03 DIAGNOSIS — F41.9 ANXIETY: ICD-10-CM

## 2019-12-03 PROBLEM — E87.5 HYPERKALEMIA: Status: RESOLVED | Noted: 2019-11-14 | Resolved: 2019-12-03

## 2019-12-03 PROBLEM — R91.1 LUNG NODULE: Status: RESOLVED | Noted: 2019-09-12 | Resolved: 2019-12-03

## 2019-12-03 PROCEDURE — 99213 OFFICE O/P EST LOW 20 MIN: CPT | Mod: PBBFAC | Performed by: INTERNAL MEDICINE

## 2019-12-03 PROCEDURE — 77417 THER RADIOLOGY PORT IMAGE(S): CPT | Performed by: RADIOLOGY

## 2019-12-03 PROCEDURE — 77386 HC IMRT, COMPLEX: CPT | Performed by: RADIOLOGY

## 2019-12-03 PROCEDURE — 99214 PR OFFICE/OUTPT VISIT, EST, LEVL IV, 30-39 MIN: ICD-10-PCS | Mod: S$PBB,,, | Performed by: INTERNAL MEDICINE

## 2019-12-03 PROCEDURE — 1125F AMNT PAIN NOTED PAIN PRSNT: CPT | Mod: ,,, | Performed by: INTERNAL MEDICINE

## 2019-12-03 PROCEDURE — 99214 OFFICE O/P EST MOD 30 MIN: CPT | Mod: S$PBB,,, | Performed by: INTERNAL MEDICINE

## 2019-12-03 PROCEDURE — 1159F MED LIST DOCD IN RCRD: CPT | Mod: ,,, | Performed by: INTERNAL MEDICINE

## 2019-12-03 PROCEDURE — 1125F PR PAIN SEVERITY QUANTIFIED, PAIN PRESENT: ICD-10-PCS | Mod: ,,, | Performed by: INTERNAL MEDICINE

## 2019-12-03 PROCEDURE — 1159F PR MEDICATION LIST DOCUMENTED IN MEDICAL RECORD: ICD-10-PCS | Mod: ,,, | Performed by: INTERNAL MEDICINE

## 2019-12-03 PROCEDURE — 99999 PR PBB SHADOW E&M-EST. PATIENT-LVL III: ICD-10-PCS | Mod: PBBFAC,,, | Performed by: INTERNAL MEDICINE

## 2019-12-03 PROCEDURE — G6002 PR STEREOSCOPIC XRAY GUIDE FOR RADIATION TX DELIV: ICD-10-PCS | Mod: 26,,, | Performed by: RADIOLOGY

## 2019-12-03 PROCEDURE — G6002 STEREOSCOPIC X-RAY GUIDANCE: HCPCS | Mod: 26,,, | Performed by: RADIOLOGY

## 2019-12-03 PROCEDURE — 99999 PR PBB SHADOW E&M-EST. PATIENT-LVL III: CPT | Mod: PBBFAC,,, | Performed by: INTERNAL MEDICINE

## 2019-12-03 RX ORDER — ESCITALOPRAM OXALATE 10 MG/1
10 TABLET ORAL DAILY
Qty: 30 TABLET | Refills: 11 | Status: SHIPPED | OUTPATIENT
Start: 2019-12-03 | End: 2020-06-03

## 2019-12-03 RX ORDER — ESCITALOPRAM OXALATE 10 MG/1
10 TABLET ORAL DAILY
Qty: 30 TABLET | Refills: 11 | Status: SHIPPED | OUTPATIENT
Start: 2019-12-03 | End: 2019-12-03 | Stop reason: SDUPTHER

## 2019-12-03 RX ORDER — LOSARTAN POTASSIUM 50 MG/1
50 TABLET ORAL 2 TIMES DAILY
Qty: 30 TABLET | Refills: 11 | Status: SHIPPED | OUTPATIENT
Start: 2019-12-03 | End: 2020-02-27 | Stop reason: SDUPTHER

## 2019-12-03 RX ORDER — GABAPENTIN 100 MG/1
CAPSULE ORAL
Qty: 90 CAPSULE | Refills: 11 | Status: SHIPPED | OUTPATIENT
Start: 2019-12-03 | End: 2020-06-03

## 2019-12-03 RX ORDER — HYDROCODONE BITARTRATE AND ACETAMINOPHEN 7.5; 325 MG/1; MG/1
1 TABLET ORAL 3 TIMES DAILY PRN
Qty: 90 TABLET | Refills: 0 | Status: SHIPPED | OUTPATIENT
Start: 2019-12-03 | End: 2020-01-02

## 2019-12-03 RX ORDER — LOSARTAN POTASSIUM 50 MG/1
50 TABLET ORAL 2 TIMES DAILY
COMMUNITY
End: 2019-12-03 | Stop reason: SDUPTHER

## 2019-12-03 NOTE — PROGRESS NOTES
Subjective:       Patient ID: Alesha Toney is a 69 y.o. female.    Chief Complaint: Hypertension (follow up)    HPI   Chemo every 3 weeks.  Radiation daily for 30 treatments.  Fatigued.  Also with pain at site of tumor.  Tumor is very small.  Pain awakens her from sleep.   Tylenol No 3 not helpful     Denies depression.  Anxious most of the time.     BP as been controlled at home but consistently elevated in office.      Review of Systems   Constitutional: Negative for fever and unexpected weight change.   HENT: Negative for congestion and postnasal drip.    Eyes: Negative for pain, discharge and visual disturbance.   Respiratory: Positive for shortness of breath. Negative for cough, chest tightness and wheezing.    Cardiovascular: Positive for chest pain. Negative for leg swelling.   Gastrointestinal: Negative for abdominal pain, constipation, diarrhea and nausea.   Genitourinary: Negative for difficulty urinating, dysuria and hematuria.   Skin: Negative for rash.   Neurological: Negative for headaches.   Psychiatric/Behavioral: Negative for dysphoric mood and sleep disturbance. The patient is not nervous/anxious.        Objective:      Physical Exam   Constitutional: She is oriented to person, place, and time. She appears well-developed and well-nourished. No distress.   Neurological: She is alert and oriented to person, place, and time.   Psychiatric: She has a normal mood and affect. Her behavior is normal.       184/78  Results for orders placed or performed in visit on 11/26/19   Lipid panel   Result Value Ref Range    Cholesterol 222 (H) 120 - 199 mg/dL    Triglycerides 170 (H) 30 - 150 mg/dL    HDL 54 40 - 75 mg/dL    LDL Cholesterol 134.0 63.0 - 159.0 mg/dL    Hdl/Cholesterol Ratio 24.3 20.0 - 50.0 %    Total Cholesterol/HDL Ratio 4.1 2.0 - 5.0    Non-HDL Cholesterol 168 mg/dL   AST (SGOT)   Result Value Ref Range    AST 37 10 - 40 U/L   ALT (SGPT)   Result Value Ref Range    ALT 47 (H) 10 - 44 U/L    Renal function panel   Result Value Ref Range    Glucose 113 (H) 70 - 110 mg/dL    Sodium 145 136 - 145 mmol/L    Potassium 5.1 3.5 - 5.1 mmol/L    Chloride 108 95 - 110 mmol/L    CO2 28 23 - 29 mmol/L    BUN, Bld 16 8 - 23 mg/dL    Calcium 10.1 8.7 - 10.5 mg/dL    Creatinine 1.1 0.5 - 1.4 mg/dL    Albumin 3.8 3.5 - 5.2 g/dL    Phosphorus 3.0 2.7 - 4.5 mg/dL    eGFR if African American 59 (A) >60 mL/min/1.73 m^2    eGFR if non African American 51 (A) >60 mL/min/1.73 m^2    Anion Gap 9 8 - 16 mmol/L     Assessment:       1. Malignant neoplasm of lung, unspecified laterality, unspecified part of lung    2. Anxiety - untx   3. Essential hypertension - uncontrolled   4. Peripheral polyneuropathy, due to chemo - untx       Plan:       Alesha was seen today for hypertension.    Diagnoses and all orders for this visit:    Malignant neoplasm of lung, unspecified laterality, unspecified part of lung    Anxiety    Essential hypertension    Peripheral polyneuropathy    Other orders  -     HYDROcodone-acetaminophen (NORCO) 7.5-325 mg per tablet; Take 1 tablet by mouth 3 (three) times daily as needed for Pain.  -     escitalopram oxalate (LEXAPRO) 10 MG tablet; Take 1 tablet (10 mg total) by mouth once daily.  -     losartan (COZAAR) 50 MG tablet; Take 1 tablet (50 mg total) by mouth 2 (two) times daily.  -     gabapentin (NEURONTIN) 100 MG capsule; 1-3 tabs po tid for foot pain       Start norvasc     Lexapro half- 1 tab daily     Start Gabapentin 1-3 tabs 3 times a day.

## 2019-12-04 ENCOUNTER — LAB VISIT (OUTPATIENT)
Dept: LAB | Facility: HOSPITAL | Age: 69
End: 2019-12-04
Attending: INTERNAL MEDICINE
Payer: MEDICARE

## 2019-12-04 ENCOUNTER — DOCUMENTATION ONLY (OUTPATIENT)
Dept: RADIATION ONCOLOGY | Facility: CLINIC | Age: 69
End: 2019-12-04

## 2019-12-04 DIAGNOSIS — C34.90 NON-SMALL CELL LUNG CANCER, UNSPECIFIED LATERALITY: Chronic | ICD-10-CM

## 2019-12-04 DIAGNOSIS — C34.11 MALIGNANT NEOPLASM OF UPPER LOBE OF RIGHT LUNG: ICD-10-CM

## 2019-12-04 LAB
ALBUMIN SERPL BCP-MCNC: 3.8 G/DL (ref 3.5–5.2)
ALP SERPL-CCNC: 61 U/L (ref 55–135)
ALT SERPL W/O P-5'-P-CCNC: 100 U/L (ref 10–44)
ANION GAP SERPL CALC-SCNC: 10 MMOL/L (ref 8–16)
AST SERPL-CCNC: 63 U/L (ref 10–40)
BILIRUB SERPL-MCNC: 0.3 MG/DL (ref 0.1–1)
BUN SERPL-MCNC: 13 MG/DL (ref 8–23)
CALCIUM SERPL-MCNC: 9.9 MG/DL (ref 8.7–10.5)
CHLORIDE SERPL-SCNC: 106 MMOL/L (ref 95–110)
CO2 SERPL-SCNC: 26 MMOL/L (ref 23–29)
CREAT SERPL-MCNC: 1.1 MG/DL (ref 0.5–1.4)
ERYTHROCYTE [DISTWIDTH] IN BLOOD BY AUTOMATED COUNT: 13.2 % (ref 11.5–14.5)
EST. GFR  (AFRICAN AMERICAN): 59 ML/MIN/1.73 M^2
EST. GFR  (NON AFRICAN AMERICAN): 51 ML/MIN/1.73 M^2
GLUCOSE SERPL-MCNC: 144 MG/DL (ref 70–110)
HCT VFR BLD AUTO: 34.7 % (ref 37–48.5)
HGB BLD-MCNC: 11.4 G/DL (ref 12–16)
MCH RBC QN AUTO: 30.4 PG (ref 27–31)
MCHC RBC AUTO-ENTMCNC: 32.9 G/DL (ref 32–36)
MCV RBC AUTO: 93 FL (ref 82–98)
NEUTROPHILS # BLD AUTO: 1 K/UL (ref 1.8–7.7)
PLATELET # BLD AUTO: 182 K/UL (ref 150–350)
PMV BLD AUTO: 9.5 FL (ref 9.2–12.9)
POTASSIUM SERPL-SCNC: 5 MMOL/L (ref 3.5–5.1)
PROT SERPL-MCNC: 6.8 G/DL (ref 6–8.4)
RBC # BLD AUTO: 3.75 M/UL (ref 4–5.4)
SODIUM SERPL-SCNC: 142 MMOL/L (ref 136–145)
WBC # BLD AUTO: 2.73 K/UL (ref 3.9–12.7)

## 2019-12-04 PROCEDURE — 85027 COMPLETE CBC AUTOMATED: CPT

## 2019-12-04 PROCEDURE — 80053 COMPREHEN METABOLIC PANEL: CPT

## 2019-12-04 PROCEDURE — G6002 PR STEREOSCOPIC XRAY GUIDE FOR RADIATION TX DELIV: ICD-10-PCS | Mod: 26,,, | Performed by: RADIOLOGY

## 2019-12-04 PROCEDURE — G6002 STEREOSCOPIC X-RAY GUIDANCE: HCPCS | Mod: 26,,, | Performed by: RADIOLOGY

## 2019-12-04 PROCEDURE — 36415 COLL VENOUS BLD VENIPUNCTURE: CPT

## 2019-12-04 PROCEDURE — 77386 HC IMRT, COMPLEX: CPT | Performed by: RADIOLOGY

## 2019-12-05 ENCOUNTER — OFFICE VISIT (OUTPATIENT)
Dept: HEMATOLOGY/ONCOLOGY | Facility: CLINIC | Age: 69
End: 2019-12-05
Payer: MEDICARE

## 2019-12-05 ENCOUNTER — INFUSION (OUTPATIENT)
Dept: INFUSION THERAPY | Facility: HOSPITAL | Age: 69
End: 2019-12-05
Attending: INTERNAL MEDICINE
Payer: MEDICARE

## 2019-12-05 ENCOUNTER — PATIENT MESSAGE (OUTPATIENT)
Dept: HEMATOLOGY/ONCOLOGY | Facility: CLINIC | Age: 69
End: 2019-12-05

## 2019-12-05 VITALS
BODY MASS INDEX: 26.19 KG/M2 | HEART RATE: 87 BPM | SYSTOLIC BLOOD PRESSURE: 184 MMHG | WEIGHT: 162.94 LBS | OXYGEN SATURATION: 100 % | DIASTOLIC BLOOD PRESSURE: 81 MMHG | RESPIRATION RATE: 16 BRPM | HEIGHT: 66 IN | TEMPERATURE: 99 F

## 2019-12-05 VITALS
WEIGHT: 162 LBS | HEART RATE: 77 BPM | RESPIRATION RATE: 18 BRPM | DIASTOLIC BLOOD PRESSURE: 76 MMHG | SYSTOLIC BLOOD PRESSURE: 182 MMHG | TEMPERATURE: 98 F | HEIGHT: 66 IN | BODY MASS INDEX: 26.03 KG/M2

## 2019-12-05 DIAGNOSIS — T45.1X5A CHEMOTHERAPY INDUCED NEUTROPENIA: ICD-10-CM

## 2019-12-05 DIAGNOSIS — C34.2 MALIGNANT NEOPLASM OF MIDDLE LOBE OF RIGHT LUNG: Primary | ICD-10-CM

## 2019-12-05 DIAGNOSIS — D70.1 CHEMOTHERAPY INDUCED NEUTROPENIA: ICD-10-CM

## 2019-12-05 DIAGNOSIS — C34.90 NON-SMALL CELL LUNG CANCER, UNSPECIFIED LATERALITY: ICD-10-CM

## 2019-12-05 DIAGNOSIS — C34.90 MALIGNANT NEOPLASM OF LUNG, UNSPECIFIED LATERALITY, UNSPECIFIED PART OF LUNG: Primary | ICD-10-CM

## 2019-12-05 PROCEDURE — G6002 PR STEREOSCOPIC XRAY GUIDE FOR RADIATION TX DELIV: ICD-10-PCS | Mod: 26,,, | Performed by: RADIOLOGY

## 2019-12-05 PROCEDURE — 96375 TX/PRO/DX INJ NEW DRUG ADDON: CPT

## 2019-12-05 PROCEDURE — 99214 OFFICE O/P EST MOD 30 MIN: CPT | Mod: PBBFAC,25 | Performed by: INTERNAL MEDICINE

## 2019-12-05 PROCEDURE — 99999 PR PBB SHADOW E&M-EST. PATIENT-LVL IV: CPT | Mod: PBBFAC,,, | Performed by: INTERNAL MEDICINE

## 2019-12-05 PROCEDURE — 1126F PR PAIN SEVERITY QUANTIFIED, NO PAIN PRESENT: ICD-10-PCS | Mod: ,,, | Performed by: INTERNAL MEDICINE

## 2019-12-05 PROCEDURE — G6002 STEREOSCOPIC X-RAY GUIDANCE: HCPCS | Mod: 26,,, | Performed by: RADIOLOGY

## 2019-12-05 PROCEDURE — 77386 HC IMRT, COMPLEX: CPT | Performed by: RADIOLOGY

## 2019-12-05 PROCEDURE — 99215 OFFICE O/P EST HI 40 MIN: CPT | Mod: S$PBB,,, | Performed by: INTERNAL MEDICINE

## 2019-12-05 PROCEDURE — 1159F PR MEDICATION LIST DOCUMENTED IN MEDICAL RECORD: ICD-10-PCS | Mod: ,,, | Performed by: INTERNAL MEDICINE

## 2019-12-05 PROCEDURE — 96411 CHEMO IV PUSH ADDL DRUG: CPT

## 2019-12-05 PROCEDURE — 96413 CHEMO IV INFUSION 1 HR: CPT

## 2019-12-05 PROCEDURE — 99999 PR PBB SHADOW E&M-EST. PATIENT-LVL IV: ICD-10-PCS | Mod: PBBFAC,,, | Performed by: INTERNAL MEDICINE

## 2019-12-05 PROCEDURE — 63600175 PHARM REV CODE 636 W HCPCS: Performed by: INTERNAL MEDICINE

## 2019-12-05 PROCEDURE — 99215 PR OFFICE/OUTPT VISIT, EST, LEVL V, 40-54 MIN: ICD-10-PCS | Mod: S$PBB,,, | Performed by: INTERNAL MEDICINE

## 2019-12-05 PROCEDURE — 96367 TX/PROPH/DG ADDL SEQ IV INF: CPT

## 2019-12-05 PROCEDURE — 1159F MED LIST DOCD IN RCRD: CPT | Mod: ,,, | Performed by: INTERNAL MEDICINE

## 2019-12-05 PROCEDURE — 1126F AMNT PAIN NOTED NONE PRSNT: CPT | Mod: ,,, | Performed by: INTERNAL MEDICINE

## 2019-12-05 RX ORDER — SODIUM CHLORIDE 0.9 % (FLUSH) 0.9 %
10 SYRINGE (ML) INJECTION
Status: DISCONTINUED | OUTPATIENT
Start: 2019-12-05 | End: 2019-12-05 | Stop reason: HOSPADM

## 2019-12-05 RX ORDER — DIPHENHYDRAMINE HYDROCHLORIDE 50 MG/ML
25 INJECTION INTRAMUSCULAR; INTRAVENOUS
Status: COMPLETED | OUTPATIENT
Start: 2019-12-05 | End: 2019-12-05

## 2019-12-05 RX ORDER — HEPARIN 100 UNIT/ML
500 SYRINGE INTRAVENOUS
Status: DISCONTINUED | OUTPATIENT
Start: 2019-12-05 | End: 2019-12-05 | Stop reason: HOSPADM

## 2019-12-05 RX ORDER — SODIUM CHLORIDE 0.9 % (FLUSH) 0.9 %
10 SYRINGE (ML) INJECTION
Status: CANCELLED | OUTPATIENT
Start: 2019-12-05

## 2019-12-05 RX ORDER — DIPHENHYDRAMINE HYDROCHLORIDE 50 MG/ML
25 INJECTION INTRAMUSCULAR; INTRAVENOUS
Status: CANCELLED
Start: 2019-12-05

## 2019-12-05 RX ORDER — HEPARIN 100 UNIT/ML
500 SYRINGE INTRAVENOUS
Status: CANCELLED | OUTPATIENT
Start: 2019-12-05

## 2019-12-05 RX ADMIN — APREPITANT 130 MG: 130 INJECTION, EMULSION INTRAVENOUS at 10:12

## 2019-12-05 RX ADMIN — PALONOSETRON HYDROCHLORIDE: 0.25 INJECTION INTRAVENOUS at 09:12

## 2019-12-05 RX ADMIN — DIPHENHYDRAMINE HYDROCHLORIDE 25 MG: 50 INJECTION INTRAMUSCULAR; INTRAVENOUS at 09:12

## 2019-12-05 RX ADMIN — CARBOPLATIN 490 MG: 10 INJECTION, SOLUTION INTRAVENOUS at 10:12

## 2019-12-05 RX ADMIN — SODIUM CHLORIDE 925 MG: 9 INJECTION, SOLUTION INTRAVENOUS at 11:12

## 2019-12-05 RX ADMIN — SODIUM CHLORIDE: 0.9 INJECTION, SOLUTION INTRAVENOUS at 09:12

## 2019-12-05 NOTE — PLAN OF CARE
1149 pt tolerated carbo/Alimta infusion without issue, pt has no upcoming appts scheduled at this time, no distress noted upon d/c to home, did send message to [facility] concerning port issue(see notes)

## 2019-12-05 NOTE — NURSING
0950 pts port accessed without issue , njo blood return which has been ongoing issue, dr kenney aware and ok to use, upon pushing IV benadryl pt complained of burning to chest area around port, will deaccess and start peripheral and send note to dr kenney nurse

## 2019-12-05 NOTE — PROGRESS NOTES
"Subjective:       Patient ID: Alesha Toney is a 69 y.o. female.    Chief Complaint: Malignant neoplasm of middle lobe of right lung  Ms. Alesha Toney is a 67-year-old otherwise healthy female who started having some shoulder pain, which was lasting for over six weeks.  She went and saw her primary care physician and underwent an x-ray, which revealed right upper lobe lung mass worrisome for malignancy and a CT scan was recommended, which was done on 6/12/18 and that revealed a newly developing mass, pleural based, lateral posterior segment, right upper lobe 3.5 x 3.5 cm, surrounding stellate margin and patchy infiltrates, particularly superiorly,   anteriorly infiltrates extend perihilar into the anterior segment.  She then underwent CT-guided biopsy, which revealed well-differentiated adenocarcinoma.       Her PET scan from 6/29/18 There is physiologic intracranial, head, and neck activity.  There is a large right upper lobe mass SUV max 9.11.  No local or distant metastatic disease seen.  There is physiologic liver, spleen, GI and  activity.  There are a few liver cysts.  No adenopathy is seen.  The adrenal glands are not enlarged.  No adenopathy is seen.  No suspicious bone lesions are seen.     She underwent VATS with right upper lobectomy. Mediastinal lymphadenectomy on 8/9/18. Pathology revealed "Tumor site: Upper lobe.Tumor size: 7 x 4 x 2.7 cm.Tumor focality: Single tumor.  Histologic type: Mixed invasive mucinous and nonmucinous adenocarcinoma. Histologic grade: G2: Moderately differentiated.Spread through air spaces (STATS): Present. Visceral pleura invasion: Not identified.  Lymphovascular invasion: Not identified. Direct invasion of adjacent structures: No adjacent structures present. Margins: All margins are uninvolved by carcinoma.Distance of invasive carcinoma from closest margin: 1 cm from the bronchial margin.Treatment effect: No known presurgical therapy. Regional lymph nodes: Number of " "lymph nodes involved: 0. Number of lymph nodes examined: 11. Pathologic Stage Classification: pT3 N0"        She completed 4 cycles of adjuvant chemo with Cisplatin and Alimta as of 1/10/19   She is on surveillance.     CT chest of 9/17/19 which reveals "Operative change of right upper lobectomy for small-cell lung cancer with several scattered subsolid opacities and a new solid nodule in the right lower lobe measuring up to 0.5 cm.  We recommend continued surveillance with the role and schedule for such surveillance to be determined by clinical considerations. Sided chest port distal tip terminating at the confluence of the brachiocephalic vein in the SVC.  Correlate with device functioning. Apically predominant emphysematous changes, left greater than right. Aortic annular calcification and multi-vessel coronary artery atherosclerosis. Numerous unchanged hepatic hypodensities, likely cysts"     PET scan from 10/1/19 reveals "1.6 cm soft tissue lesion re-identified posterior to the bronchus intermedius.  No corresponding focal increased radiotracer uptake to suggest local recurrence or metastatic disease. Stable subcentimeter opacities throughout the bilateral lower lobes, too small to characterize with PET-CT. Focal increased radiotracer uptake and subtle wall thickening in the rectum.  Findings are concerning for primary neoplastic process.  Recommend further evaluation with direct visualization"     She returned from [facility] and was advised to proceed with chemo/RT with either Docetaxel/platinum or Carboplatin/Alimta.    HPINereyda is on chemo/RT with cycle 2 of Carboplatin and Alimta with RT. She did well with chemo and denies any new issues.  She notes increased acid reflux takes nexium. She denies any nausea, vomiting, diarrhea, constipation, abdominal pain, weight loss or loss of appetite, chest pain, shortness of breath, leg swelling, fatigue, pain, headache, dizziness, or mood changes. Her ECOG PS is zero. " She is accompanied by her significant other.              Review of Systems   Constitutional: Negative for appetite change, fatigue and unexpected weight change.   HENT: Negative for mouth sores.    Eyes: Negative for visual disturbance.   Respiratory: Negative for cough and shortness of breath.    Cardiovascular: Negative for chest pain.   Gastrointestinal: Negative for abdominal pain and diarrhea.   Genitourinary: Negative for frequency.   Musculoskeletal: Negative for back pain.   Skin: Negative for rash.   Neurological: Negative for headaches.   Hematological: Negative for adenopathy.   Psychiatric/Behavioral: The patient is not nervous/anxious.    All other systems reviewed and are negative.      Objective:      Physical Exam   Constitutional: She is oriented to person, place, and time. She appears well-developed and well-nourished.   HENT:   Mouth/Throat: No oropharyngeal exudate.   Cardiovascular: Normal rate and normal heart sounds.   Pulmonary/Chest: Effort normal and breath sounds normal. She has no wheezes.   Abdominal: Soft. Bowel sounds are normal. There is no tenderness.   Musculoskeletal: She exhibits no edema or tenderness.   Lymphadenopathy:     She has no cervical adenopathy.   Neurological: She is alert and oriented to person, place, and time. Coordination normal.   Skin: Skin is warm and dry. No rash noted.   Psychiatric: She has a normal mood and affect. Judgment and thought content normal.   Vitals reviewed.        LABS:  WBC   Date Value Ref Range Status   12/04/2019 2.73 (L) 3.90 - 12.70 K/uL Final     Hemoglobin   Date Value Ref Range Status   12/04/2019 11.4 (L) 12.0 - 16.0 g/dL Final     POC Hematocrit   Date Value Ref Range Status   08/09/2018 33 (L) 36 - 54 %PCV Final     Hematocrit   Date Value Ref Range Status   12/04/2019 34.7 (L) 37.0 - 48.5 % Final     Platelets   Date Value Ref Range Status   12/04/2019 182 150 - 350 K/uL Final     Gran # (ANC)   Date Value Ref Range Status    12/04/2019 1.0 (L) 1.8 - 7.7 K/uL Final     Comment:     The ANC is based on a white cell differential from an   automated cell counter. It has not been microscopically   reviewed for the presence of abnormal cells. Clinical   correlation is required.         Chemistry        Component Value Date/Time     12/04/2019 1130    K 5.0 12/04/2019 1130     12/04/2019 1130    CO2 26 12/04/2019 1130    BUN 13 12/04/2019 1130    CREATININE 1.1 12/04/2019 1130     (H) 12/04/2019 1130        Component Value Date/Time    CALCIUM 9.9 12/04/2019 1130    ALKPHOS 61 12/04/2019 1130    AST 63 (H) 12/04/2019 1130     (H) 12/04/2019 1130    BILITOT 0.3 12/04/2019 1130    ESTGFRAFRICA 59 (A) 12/04/2019 1130    EGFRNONAA 51 (A) 12/04/2019 1130          Assessment:       1. Malignant neoplasm of middle lobe of right lung    2. Chemotherapy induced neutropenia        Plan:        1,2. She is doing well clinically. Will proceed with carboplatin and ALimta with RT,. Neupogen X 2 days./ Will plan on seeing her 3 weeks after RT is completed with restaging CT scans.    Above care plan was discussed with patient and significant other and all questions were addressed to their satisfaction

## 2019-12-05 NOTE — PLAN OF CARE
0915 pt here for carbo/Alimta D1C2, labs, hx, meds, allergies reviewed, pt with no complaints at this time, reclined in chair, warm blanket provided, continue to monitor

## 2019-12-05 NOTE — PLAN OF CARE
Day 14 of outpatient XRT to the right lung. Reports pain along surgical scar site. Denies dyspnea and difficulty swallowing.

## 2019-12-05 NOTE — Clinical Note
Schedule Neupogen X 2 days, 12/6 and 12/7. Schedule CBC,CMP, CT scans 3 weeks after she completes RT (she completes RT around 12/27/19) so schedule CT scans and see me in 1/20/20)

## 2019-12-06 ENCOUNTER — PATIENT MESSAGE (OUTPATIENT)
Dept: HEMATOLOGY/ONCOLOGY | Facility: CLINIC | Age: 69
End: 2019-12-06

## 2019-12-06 ENCOUNTER — INFUSION (OUTPATIENT)
Dept: INFUSION THERAPY | Facility: HOSPITAL | Age: 69
End: 2019-12-06
Attending: INTERNAL MEDICINE
Payer: MEDICARE

## 2019-12-06 DIAGNOSIS — C34.90 NON-SMALL CELL LUNG CANCER, UNSPECIFIED LATERALITY: ICD-10-CM

## 2019-12-06 DIAGNOSIS — C34.90 MALIGNANT NEOPLASM OF LUNG, UNSPECIFIED LATERALITY, UNSPECIFIED PART OF LUNG: Primary | ICD-10-CM

## 2019-12-06 PROCEDURE — G6002 STEREOSCOPIC X-RAY GUIDANCE: HCPCS | Mod: 26,,, | Performed by: RADIOLOGY

## 2019-12-06 PROCEDURE — 77386 HC IMRT, COMPLEX: CPT | Performed by: RADIOLOGY

## 2019-12-06 PROCEDURE — 77336 RADIATION PHYSICS CONSULT: CPT | Performed by: RADIOLOGY

## 2019-12-06 PROCEDURE — 96372 THER/PROPH/DIAG INJ SC/IM: CPT

## 2019-12-06 PROCEDURE — 63600175 PHARM REV CODE 636 W HCPCS: Mod: JG | Performed by: INTERNAL MEDICINE

## 2019-12-06 PROCEDURE — G6002 PR STEREOSCOPIC XRAY GUIDE FOR RADIATION TX DELIV: ICD-10-PCS | Mod: 26,,, | Performed by: RADIOLOGY

## 2019-12-06 RX ADMIN — FILGRASTIM 480 MCG: 480 INJECTION, SOLUTION INTRAVENOUS; SUBCUTANEOUS at 01:12

## 2019-12-06 NOTE — NURSING
Patient in clinic for Neupogen injection. Medication given SQ into abdomen with no complications. Patient has no c/o pain or discomfort. Reports no changes since last appointment. Patient educated on side effects of medication. AVS provided. Discharged home.

## 2019-12-07 ENCOUNTER — INFUSION (OUTPATIENT)
Dept: INFUSION THERAPY | Facility: HOSPITAL | Age: 69
End: 2019-12-07
Attending: INTERNAL MEDICINE
Payer: MEDICARE

## 2019-12-07 VITALS
HEART RATE: 78 BPM | DIASTOLIC BLOOD PRESSURE: 76 MMHG | RESPIRATION RATE: 18 BRPM | SYSTOLIC BLOOD PRESSURE: 178 MMHG | TEMPERATURE: 98 F

## 2019-12-07 DIAGNOSIS — C34.90 NON-SMALL CELL LUNG CANCER, UNSPECIFIED LATERALITY: ICD-10-CM

## 2019-12-07 DIAGNOSIS — C34.90 MALIGNANT NEOPLASM OF LUNG, UNSPECIFIED LATERALITY, UNSPECIFIED PART OF LUNG: Primary | ICD-10-CM

## 2019-12-07 PROCEDURE — 96372 THER/PROPH/DIAG INJ SC/IM: CPT

## 2019-12-07 NOTE — NURSING
1120 pt here for neupogen injection, tolerated well to abdomen, no distress noted upon d/c to home

## 2019-12-09 PROCEDURE — 77014 HC CT GUIDANCE RADIATION THERAPY FLDS PLACEMENT: CPT | Mod: TC | Performed by: RADIOLOGY

## 2019-12-09 PROCEDURE — 77386 HC IMRT, COMPLEX: CPT | Performed by: RADIOLOGY

## 2019-12-10 ENCOUNTER — PATIENT MESSAGE (OUTPATIENT)
Dept: HEMATOLOGY/ONCOLOGY | Facility: CLINIC | Age: 69
End: 2019-12-10

## 2019-12-10 PROCEDURE — 77386 HC IMRT, COMPLEX: CPT | Performed by: RADIOLOGY

## 2019-12-10 PROCEDURE — G6002 PR STEREOSCOPIC XRAY GUIDE FOR RADIATION TX DELIV: ICD-10-PCS | Mod: 26,,, | Performed by: RADIOLOGY

## 2019-12-10 PROCEDURE — G6002 STEREOSCOPIC X-RAY GUIDANCE: HCPCS | Mod: 26,,, | Performed by: RADIOLOGY

## 2019-12-10 PROCEDURE — 77417 THER RADIOLOGY PORT IMAGE(S): CPT | Performed by: RADIOLOGY

## 2019-12-11 ENCOUNTER — DOCUMENTATION ONLY (OUTPATIENT)
Dept: RADIATION ONCOLOGY | Facility: CLINIC | Age: 69
End: 2019-12-11

## 2019-12-11 PROCEDURE — G6002 STEREOSCOPIC X-RAY GUIDANCE: HCPCS | Mod: 26,,, | Performed by: RADIOLOGY

## 2019-12-11 PROCEDURE — 77386 HC IMRT, COMPLEX: CPT | Performed by: RADIOLOGY

## 2019-12-11 PROCEDURE — G6002 PR STEREOSCOPIC XRAY GUIDE FOR RADIATION TX DELIV: ICD-10-PCS | Mod: 26,,, | Performed by: RADIOLOGY

## 2019-12-12 ENCOUNTER — PATIENT MESSAGE (OUTPATIENT)
Dept: RADIATION ONCOLOGY | Facility: CLINIC | Age: 69
End: 2019-12-12

## 2019-12-12 ENCOUNTER — PATIENT MESSAGE (OUTPATIENT)
Dept: HEMATOLOGY/ONCOLOGY | Facility: CLINIC | Age: 69
End: 2019-12-12

## 2019-12-12 ENCOUNTER — LAB VISIT (OUTPATIENT)
Dept: LAB | Facility: HOSPITAL | Age: 69
End: 2019-12-12
Attending: INTERNAL MEDICINE
Payer: MEDICARE

## 2019-12-12 DIAGNOSIS — C34.2 MALIGNANT NEOPLASM OF MIDDLE LOBE OF RIGHT LUNG: ICD-10-CM

## 2019-12-12 DIAGNOSIS — C34.11 MALIGNANT NEOPLASM OF UPPER LOBE OF RIGHT LUNG: ICD-10-CM

## 2019-12-12 LAB
ALBUMIN SERPL BCP-MCNC: 3.7 G/DL (ref 3.5–5.2)
ALP SERPL-CCNC: 66 U/L (ref 55–135)
ALT SERPL W/O P-5'-P-CCNC: 35 U/L (ref 10–44)
ANION GAP SERPL CALC-SCNC: 4 MMOL/L (ref 8–16)
AST SERPL-CCNC: 25 U/L (ref 10–40)
BILIRUB SERPL-MCNC: 0.4 MG/DL (ref 0.1–1)
BUN SERPL-MCNC: 29 MG/DL (ref 8–23)
CALCIUM SERPL-MCNC: 9.8 MG/DL (ref 8.7–10.5)
CHLORIDE SERPL-SCNC: 104 MMOL/L (ref 95–110)
CO2 SERPL-SCNC: 30 MMOL/L (ref 23–29)
CREAT SERPL-MCNC: 1.4 MG/DL (ref 0.5–1.4)
ERYTHROCYTE [DISTWIDTH] IN BLOOD BY AUTOMATED COUNT: 13.2 % (ref 11.5–14.5)
EST. GFR  (AFRICAN AMERICAN): 44.2 ML/MIN/1.73 M^2
EST. GFR  (NON AFRICAN AMERICAN): 38.4 ML/MIN/1.73 M^2
GLUCOSE SERPL-MCNC: 123 MG/DL (ref 70–110)
HCT VFR BLD AUTO: 33.9 % (ref 37–48.5)
HGB BLD-MCNC: 10.9 G/DL (ref 12–16)
IMM GRANULOCYTES # BLD AUTO: 0.01 K/UL (ref 0–0.04)
MCH RBC QN AUTO: 31 PG (ref 27–31)
MCHC RBC AUTO-ENTMCNC: 32.2 G/DL (ref 32–36)
MCV RBC AUTO: 96 FL (ref 82–98)
NEUTROPHILS # BLD AUTO: 0.7 K/UL (ref 1.8–7.7)
PLATELET # BLD AUTO: 152 K/UL (ref 150–350)
PMV BLD AUTO: 10 FL (ref 9.2–12.9)
POTASSIUM SERPL-SCNC: 5.1 MMOL/L (ref 3.5–5.1)
PROT SERPL-MCNC: 6.7 G/DL (ref 6–8.4)
RBC # BLD AUTO: 3.52 M/UL (ref 4–5.4)
SODIUM SERPL-SCNC: 138 MMOL/L (ref 136–145)
WBC # BLD AUTO: 1.72 K/UL (ref 3.9–12.7)

## 2019-12-12 PROCEDURE — G6002 PR STEREOSCOPIC XRAY GUIDE FOR RADIATION TX DELIV: ICD-10-PCS | Mod: 26,,, | Performed by: RADIOLOGY

## 2019-12-12 PROCEDURE — 36415 COLL VENOUS BLD VENIPUNCTURE: CPT

## 2019-12-12 PROCEDURE — 77336 RADIATION PHYSICS CONSULT: CPT | Performed by: RADIOLOGY

## 2019-12-12 PROCEDURE — 80053 COMPREHEN METABOLIC PANEL: CPT

## 2019-12-12 PROCEDURE — 85027 COMPLETE CBC AUTOMATED: CPT

## 2019-12-12 PROCEDURE — G6002 STEREOSCOPIC X-RAY GUIDANCE: HCPCS | Mod: 26,,, | Performed by: RADIOLOGY

## 2019-12-12 PROCEDURE — 77386 HC IMRT, COMPLEX: CPT | Performed by: RADIOLOGY

## 2019-12-13 PROCEDURE — 77386 HC IMRT, COMPLEX: CPT | Performed by: RADIOLOGY

## 2019-12-13 PROCEDURE — G6002 STEREOSCOPIC X-RAY GUIDANCE: HCPCS | Mod: 26,,, | Performed by: RADIOLOGY

## 2019-12-13 PROCEDURE — G6002 PR STEREOSCOPIC XRAY GUIDE FOR RADIATION TX DELIV: ICD-10-PCS | Mod: 26,,, | Performed by: RADIOLOGY

## 2019-12-16 ENCOUNTER — TELEPHONE (OUTPATIENT)
Dept: HEMATOLOGY/ONCOLOGY | Facility: CLINIC | Age: 69
End: 2019-12-16

## 2019-12-16 ENCOUNTER — OFFICE VISIT (OUTPATIENT)
Dept: INTERNAL MEDICINE | Facility: CLINIC | Age: 69
End: 2019-12-16
Payer: MEDICARE

## 2019-12-16 VITALS
HEIGHT: 66 IN | DIASTOLIC BLOOD PRESSURE: 74 MMHG | SYSTOLIC BLOOD PRESSURE: 154 MMHG | HEART RATE: 97 BPM | TEMPERATURE: 100 F | OXYGEN SATURATION: 98 % | BODY MASS INDEX: 25.9 KG/M2 | WEIGHT: 161.19 LBS

## 2019-12-16 DIAGNOSIS — J32.9 SINUSITIS, UNSPECIFIED CHRONICITY, UNSPECIFIED LOCATION: Primary | ICD-10-CM

## 2019-12-16 PROCEDURE — 77014 HC CT GUIDANCE RADIATION THERAPY FLDS PLACEMENT: CPT | Mod: TC | Performed by: RADIOLOGY

## 2019-12-16 PROCEDURE — 99999 PR PBB SHADOW E&M-EST. PATIENT-LVL IV: ICD-10-PCS | Mod: PBBFAC,,, | Performed by: PHYSICIAN ASSISTANT

## 2019-12-16 PROCEDURE — 77386 HC IMRT, COMPLEX: CPT | Performed by: RADIOLOGY

## 2019-12-16 PROCEDURE — 99999 PR PBB SHADOW E&M-EST. PATIENT-LVL IV: CPT | Mod: PBBFAC,,, | Performed by: PHYSICIAN ASSISTANT

## 2019-12-16 PROCEDURE — 99214 OFFICE O/P EST MOD 30 MIN: CPT | Mod: PBBFAC,25 | Performed by: PHYSICIAN ASSISTANT

## 2019-12-16 PROCEDURE — 1125F PR PAIN SEVERITY QUANTIFIED, PAIN PRESENT: ICD-10-PCS | Mod: ,,, | Performed by: PHYSICIAN ASSISTANT

## 2019-12-16 PROCEDURE — 99213 OFFICE O/P EST LOW 20 MIN: CPT | Mod: S$PBB,,, | Performed by: PHYSICIAN ASSISTANT

## 2019-12-16 PROCEDURE — 99213 PR OFFICE/OUTPT VISIT, EST, LEVL III, 20-29 MIN: ICD-10-PCS | Mod: S$PBB,,, | Performed by: PHYSICIAN ASSISTANT

## 2019-12-16 PROCEDURE — 1159F MED LIST DOCD IN RCRD: CPT | Mod: ,,, | Performed by: PHYSICIAN ASSISTANT

## 2019-12-16 PROCEDURE — 1125F AMNT PAIN NOTED PAIN PRSNT: CPT | Mod: ,,, | Performed by: PHYSICIAN ASSISTANT

## 2019-12-16 PROCEDURE — 1159F PR MEDICATION LIST DOCUMENTED IN MEDICAL RECORD: ICD-10-PCS | Mod: ,,, | Performed by: PHYSICIAN ASSISTANT

## 2019-12-16 RX ORDER — DOXYCYCLINE 100 MG/1
100 CAPSULE ORAL 2 TIMES DAILY
Qty: 20 CAPSULE | Refills: 0 | Status: SHIPPED | OUTPATIENT
Start: 2019-12-16 | End: 2019-12-26

## 2019-12-16 NOTE — TELEPHONE ENCOUNTER
----- Message from Lima Lipscomb sent at 12/16/2019  8:17 AM CST -----  Contact: Lata  Pt have a fever of 102 and right ear pain with sinus issues. Not sure if you want to see the pt or what. Please call to advise.        Pt contact # [phone].      Thanks

## 2019-12-16 NOTE — PROGRESS NOTES
Subjective:       Patient ID: Alesha Toney is a 69 y.o. female.    Chief Complaint: Fever    Patient presents with a 24 hr history of fever.  She states about 2 days ago she started with a right-sided year and jaw pain and a small lymph node in the neck.  She says the pain has continued to worsen and she now has sinus pain and pressure on that 1 side of the face.  She woke up this morning with 100.5 fever.  She is undergoing chemotherapy and radiation for lung cancer and her last white count was 1.  Seven.  She called her oncologist who advised her to go to the ER but she does not want to go to the emergency room.  She denies any runny nose, sneezing, cough.  She does endorse body aches when she has the fever.  She took 2 Vicodin over the last 24 hr to help deal with the pain. She has taken a Tylenol this morning help with the fever.  She has not tried anything else for the pain or the symptoms.    Review of Systems   Constitutional: Negative for activity change, appetite change, chills, fatigue and fever.   HENT: Positive for ear pain, sinus pressure and sinus pain. Negative for congestion, ear discharge, hearing loss, nosebleeds, postnasal drip, rhinorrhea, sneezing, sore throat, tinnitus, trouble swallowing and voice change.    Eyes: Negative for discharge, redness and visual disturbance.   Respiratory: Negative for cough, chest tightness, shortness of breath and wheezing.    Cardiovascular: Negative for chest pain and leg swelling.   Gastrointestinal: Negative.    Musculoskeletal: Negative for neck pain.   Hematological: Positive for adenopathy.       Objective:      Physical Exam   Constitutional: She appears well-developed and well-nourished. No distress.   HENT:   Head: Normocephalic and atraumatic.   Right Ear: External ear normal.   Left Ear: External ear normal.   Nose: Right sinus exhibits maxillary sinus tenderness and frontal sinus tenderness.   Mouth/Throat: Uvula is midline, oropharynx is clear  and moist and mucous membranes are normal. No oral lesions. No uvula swelling. No oropharyngeal exudate, posterior oropharyngeal edema or posterior oropharyngeal erythema.   Eyes: Pupils are equal, round, and reactive to light. Conjunctivae and EOM are normal.   Neck: Normal range of motion. Neck supple. No thyromegaly present.   Cardiovascular: Normal rate, regular rhythm and normal heart sounds. Exam reveals no gallop and no friction rub.   No murmur heard.  Pulmonary/Chest: Effort normal and breath sounds normal. No respiratory distress. She has no wheezes. She has no rales.   Abdominal: Soft. Bowel sounds are normal. There is no tenderness.   Lymphadenopathy:        Head (right side): Submandibular adenopathy present.   Skin: She is not diaphoretic.       Assessment:       1. Sinusitis, unspecified chronicity, unspecified location        Plan:       Alesha was seen today for fever.    Diagnoses and all orders for this visit:    Sinusitis, unspecified chronicity, unspecified location  -     doxycycline (MONODOX) 100 MG capsule; Take 1 capsule (100 mg total) by mouth 2 (two) times daily. for 10 days     Discussed extensively with patient that I cannot rule out an abscess in her sinuses and if symptoms worsen, fever increases or she develops new symptoms it would be in her best interest to go to the emergency room for evaluation.  She expressed understanding and will start her medication tonight.

## 2019-12-16 NOTE — TELEPHONE ENCOUNTER
Spoke with patient's SO, sol. She notes patient having R ear and sinus pressure and tooth pain since Friday. No congestion or drainage. Advised SO to have patient seen by her PCP, possibly dentist, as the patient is having fever 102K as well.   SO, sol, voiced understanding.

## 2019-12-17 PROCEDURE — G6002 STEREOSCOPIC X-RAY GUIDANCE: HCPCS | Mod: 26,,, | Performed by: RADIOLOGY

## 2019-12-17 PROCEDURE — 77417 THER RADIOLOGY PORT IMAGE(S): CPT | Performed by: RADIOLOGY

## 2019-12-17 PROCEDURE — G6002 PR STEREOSCOPIC XRAY GUIDE FOR RADIATION TX DELIV: ICD-10-PCS | Mod: 26,,, | Performed by: RADIOLOGY

## 2019-12-17 PROCEDURE — 77386 HC IMRT, COMPLEX: CPT | Performed by: RADIOLOGY

## 2019-12-18 ENCOUNTER — DOCUMENTATION ONLY (OUTPATIENT)
Dept: RADIATION ONCOLOGY | Facility: CLINIC | Age: 69
End: 2019-12-18

## 2019-12-18 PROCEDURE — G6002 STEREOSCOPIC X-RAY GUIDANCE: HCPCS | Mod: 26,,, | Performed by: RADIOLOGY

## 2019-12-18 PROCEDURE — G6002 PR STEREOSCOPIC XRAY GUIDE FOR RADIATION TX DELIV: ICD-10-PCS | Mod: 26,,, | Performed by: RADIOLOGY

## 2019-12-18 PROCEDURE — 77386 HC IMRT, COMPLEX: CPT | Performed by: RADIOLOGY

## 2019-12-18 NOTE — PLAN OF CARE
Day 24 of outpatient XRT to the right lung. Pt wearing mask due to low WBC. Pt saw PCP and is on antibiotics. Reports it started with fever but states she is feeling better.

## 2019-12-19 ENCOUNTER — PATIENT MESSAGE (OUTPATIENT)
Dept: INTERNAL MEDICINE | Facility: CLINIC | Age: 69
End: 2019-12-19

## 2019-12-19 ENCOUNTER — PATIENT MESSAGE (OUTPATIENT)
Dept: HEMATOLOGY/ONCOLOGY | Facility: CLINIC | Age: 69
End: 2019-12-19

## 2019-12-19 DIAGNOSIS — F41.9 ANXIETY: ICD-10-CM

## 2019-12-19 DIAGNOSIS — D70.9 NEUTROPENIA, UNSPECIFIED TYPE: Primary | ICD-10-CM

## 2019-12-19 DIAGNOSIS — C34.2 MALIGNANT NEOPLASM OF MIDDLE LOBE OF RIGHT LUNG: Primary | ICD-10-CM

## 2019-12-19 PROCEDURE — G6002 PR STEREOSCOPIC XRAY GUIDE FOR RADIATION TX DELIV: ICD-10-PCS | Mod: 26,,, | Performed by: RADIOLOGY

## 2019-12-19 PROCEDURE — 77386 HC IMRT, COMPLEX: CPT | Performed by: RADIOLOGY

## 2019-12-19 PROCEDURE — G6002 STEREOSCOPIC X-RAY GUIDANCE: HCPCS | Mod: 26,,, | Performed by: RADIOLOGY

## 2019-12-19 RX ORDER — DIAZEPAM 5 MG/1
5 TABLET ORAL EVERY 8 HOURS PRN
Qty: 60 TABLET | Refills: 0 | Status: SHIPPED | OUTPATIENT
Start: 2019-12-19 | End: 2020-06-03

## 2019-12-20 ENCOUNTER — PATIENT OUTREACH (OUTPATIENT)
Dept: ADMINISTRATIVE | Facility: HOSPITAL | Age: 69
End: 2019-12-20

## 2019-12-20 ENCOUNTER — TELEPHONE (OUTPATIENT)
Dept: INTERNAL MEDICINE | Facility: CLINIC | Age: 69
End: 2019-12-20

## 2019-12-20 DIAGNOSIS — M81.0 OSTEOPOROSIS, UNSPECIFIED OSTEOPOROSIS TYPE, UNSPECIFIED PATHOLOGICAL FRACTURE PRESENCE: Primary | ICD-10-CM

## 2019-12-20 PROCEDURE — 77336 RADIATION PHYSICS CONSULT: CPT | Performed by: RADIOLOGY

## 2019-12-20 NOTE — TELEPHONE ENCOUNTER
----- Message from Mariluz Goodwin sent at 12/20/2019  7:32 AM CST -----  Contact: [facility] [phone]  Pharmacy called to report a drug interaction with norco and valium. Please call to advise that you are aware of this and it is ok.

## 2019-12-23 PROCEDURE — G6002 STEREOSCOPIC X-RAY GUIDANCE: HCPCS | Mod: 26,,, | Performed by: RADIOLOGY

## 2019-12-23 PROCEDURE — G6002 PR STEREOSCOPIC XRAY GUIDE FOR RADIATION TX DELIV: ICD-10-PCS | Mod: 26,,, | Performed by: RADIOLOGY

## 2019-12-23 PROCEDURE — 77386 HC IMRT, COMPLEX: CPT | Performed by: RADIOLOGY

## 2019-12-24 DIAGNOSIS — K20.90 ESOPHAGITIS: Primary | ICD-10-CM

## 2019-12-24 PROCEDURE — G6002 PR STEREOSCOPIC XRAY GUIDE FOR RADIATION TX DELIV: ICD-10-PCS | Mod: 26,,, | Performed by: RADIOLOGY

## 2019-12-24 PROCEDURE — G6002 STEREOSCOPIC X-RAY GUIDANCE: HCPCS | Mod: 26,,, | Performed by: RADIOLOGY

## 2019-12-24 PROCEDURE — 77386 HC IMRT, COMPLEX: CPT | Performed by: RADIOLOGY

## 2019-12-26 ENCOUNTER — LAB VISIT (OUTPATIENT)
Dept: LAB | Facility: HOSPITAL | Age: 69
End: 2019-12-26
Attending: INTERNAL MEDICINE
Payer: MEDICARE

## 2019-12-26 DIAGNOSIS — D70.9 NEUTROPENIA, UNSPECIFIED TYPE: ICD-10-CM

## 2019-12-26 LAB
ALBUMIN SERPL BCP-MCNC: 3.6 G/DL (ref 3.5–5.2)
ALP SERPL-CCNC: 67 U/L (ref 55–135)
ALT SERPL W/O P-5'-P-CCNC: 55 U/L (ref 10–44)
ANION GAP SERPL CALC-SCNC: 11 MMOL/L (ref 8–16)
AST SERPL-CCNC: 47 U/L (ref 10–40)
BASOPHILS # BLD AUTO: 0.01 K/UL (ref 0–0.2)
BASOPHILS NFR BLD: 0.3 % (ref 0–1.9)
BILIRUB SERPL-MCNC: 0.2 MG/DL (ref 0.1–1)
BUN SERPL-MCNC: 13 MG/DL (ref 8–23)
CALCIUM SERPL-MCNC: 9.8 MG/DL (ref 8.7–10.5)
CHLORIDE SERPL-SCNC: 106 MMOL/L (ref 95–110)
CO2 SERPL-SCNC: 25 MMOL/L (ref 23–29)
CREAT SERPL-MCNC: 1.2 MG/DL (ref 0.5–1.4)
DIFFERENTIAL METHOD: ABNORMAL
EOSINOPHIL # BLD AUTO: 0.1 K/UL (ref 0–0.5)
EOSINOPHIL NFR BLD: 1.7 % (ref 0–8)
ERYTHROCYTE [DISTWIDTH] IN BLOOD BY AUTOMATED COUNT: 14.5 % (ref 11.5–14.5)
EST. GFR  (AFRICAN AMERICAN): 53 ML/MIN/1.73 M^2
EST. GFR  (NON AFRICAN AMERICAN): 46 ML/MIN/1.73 M^2
GLUCOSE SERPL-MCNC: 154 MG/DL (ref 70–110)
HCT VFR BLD AUTO: 29.5 % (ref 37–48.5)
HGB BLD-MCNC: 9.6 G/DL (ref 12–16)
LYMPHOCYTES # BLD AUTO: 0.9 K/UL (ref 1–4.8)
LYMPHOCYTES NFR BLD: 29.9 % (ref 18–48)
MCH RBC QN AUTO: 30.6 PG (ref 27–31)
MCHC RBC AUTO-ENTMCNC: 32.5 G/DL (ref 32–36)
MCV RBC AUTO: 94 FL (ref 82–98)
MONOCYTES # BLD AUTO: 0.6 K/UL (ref 0.3–1)
MONOCYTES NFR BLD: 21.4 % (ref 4–15)
NEUTROPHILS # BLD AUTO: 1.3 K/UL (ref 1.8–7.7)
NEUTROPHILS NFR BLD: 46.7 % (ref 38–73)
PLATELET # BLD AUTO: 213 K/UL (ref 150–350)
PMV BLD AUTO: 9.2 FL (ref 9.2–12.9)
POTASSIUM SERPL-SCNC: 4.8 MMOL/L (ref 3.5–5.1)
PROT SERPL-MCNC: 6.7 G/DL (ref 6–8.4)
RBC # BLD AUTO: 3.14 M/UL (ref 4–5.4)
SODIUM SERPL-SCNC: 142 MMOL/L (ref 136–145)
WBC # BLD AUTO: 2.94 K/UL (ref 3.9–12.7)

## 2019-12-26 PROCEDURE — 36415 COLL VENOUS BLD VENIPUNCTURE: CPT

## 2019-12-26 PROCEDURE — 85025 COMPLETE CBC W/AUTO DIFF WBC: CPT

## 2019-12-26 PROCEDURE — 80053 COMPREHEN METABOLIC PANEL: CPT

## 2019-12-27 ENCOUNTER — DOCUMENTATION ONLY (OUTPATIENT)
Dept: RADIATION ONCOLOGY | Facility: CLINIC | Age: 69
End: 2019-12-27

## 2019-12-27 ENCOUNTER — PATIENT MESSAGE (OUTPATIENT)
Dept: HEMATOLOGY/ONCOLOGY | Facility: CLINIC | Age: 69
End: 2019-12-27

## 2019-12-27 PROCEDURE — 77386 HC IMRT, COMPLEX: CPT | Performed by: RADIOLOGY

## 2019-12-27 PROCEDURE — G6002 PR STEREOSCOPIC XRAY GUIDE FOR RADIATION TX DELIV: ICD-10-PCS | Mod: 26,,, | Performed by: RADIOLOGY

## 2019-12-27 PROCEDURE — G6002 STEREOSCOPIC X-RAY GUIDANCE: HCPCS | Mod: 26,,, | Performed by: RADIOLOGY

## 2019-12-27 NOTE — PLAN OF CARE
Problem: Patient Care Overview (Adult)  Goal: Plan of Care Review  Outcome: Ongoing (interventions implemented as appropriate)    Problem: Pain, Acute (Adult)  Goal: Acceptable Pain Control/Comfort Level  Outcome: Ongoing (interventions implemented as appropriate)    Problem: Fall Risk (Adult)  Goal: Identify Related Risk Factors and Signs and Symptoms  Outcome: Ongoing (interventions implemented as appropriate)  Goal: Absence of Falls  Outcome: Ongoing (interventions implemented as appropriate)    Problem: Pneumonia (Adult)  Goal: Signs and Symptoms of Listed Potential Problems Will be Absent or Manageable (Pneumonia)  Outcome: Ongoing (interventions implemented as appropriate)       Pt. On day 28 of outpt. Xrt to right lung.  Pt. Has SOB.  No n/v.  Still with mild dysphagia.

## 2019-12-30 PROCEDURE — G6002 PR STEREOSCOPIC XRAY GUIDE FOR RADIATION TX DELIV: ICD-10-PCS | Mod: 26,,, | Performed by: RADIOLOGY

## 2019-12-30 PROCEDURE — 77386 HC IMRT, COMPLEX: CPT | Performed by: RADIOLOGY

## 2019-12-30 PROCEDURE — G6002 STEREOSCOPIC X-RAY GUIDANCE: HCPCS | Mod: 26,,, | Performed by: RADIOLOGY

## 2019-12-31 ENCOUNTER — DOCUMENTATION ONLY (OUTPATIENT)
Dept: RADIATION ONCOLOGY | Facility: CLINIC | Age: 69
End: 2019-12-31

## 2019-12-31 PROCEDURE — G6002 PR STEREOSCOPIC XRAY GUIDE FOR RADIATION TX DELIV: ICD-10-PCS | Mod: 26,,, | Performed by: RADIOLOGY

## 2019-12-31 PROCEDURE — G6002 STEREOSCOPIC X-RAY GUIDANCE: HCPCS | Mod: 26,,, | Performed by: RADIOLOGY

## 2019-12-31 PROCEDURE — 77336 RADIATION PHYSICS CONSULT: CPT | Performed by: RADIOLOGY

## 2019-12-31 PROCEDURE — 77386 HC IMRT, COMPLEX: CPT | Performed by: RADIOLOGY

## 2019-12-31 NOTE — PLAN OF CARE
Last day of outpatient XRT to the right lung. Follow up appointment made for 2 months. Appointment slip mailed.

## 2020-01-02 ENCOUNTER — LAB VISIT (OUTPATIENT)
Dept: LAB | Facility: HOSPITAL | Age: 70
End: 2020-01-02
Attending: INTERNAL MEDICINE
Payer: MEDICARE

## 2020-01-02 DIAGNOSIS — C34.2 MALIGNANT NEOPLASM OF MIDDLE LOBE OF RIGHT LUNG: ICD-10-CM

## 2020-01-02 LAB
ERYTHROCYTE [DISTWIDTH] IN BLOOD BY AUTOMATED COUNT: 15.9 % (ref 11.5–14.5)
HCT VFR BLD AUTO: 31 % (ref 37–48.5)
HGB BLD-MCNC: 10 G/DL (ref 12–16)
MCH RBC QN AUTO: 30.6 PG (ref 27–31)
MCHC RBC AUTO-ENTMCNC: 32.3 G/DL (ref 32–36)
MCV RBC AUTO: 95 FL (ref 82–98)
NEUTROPHILS # BLD AUTO: 2 K/UL (ref 1.8–7.7)
PLATELET # BLD AUTO: 310 K/UL (ref 150–350)
PMV BLD AUTO: 9.5 FL (ref 9.2–12.9)
RBC # BLD AUTO: 3.27 M/UL (ref 4–5.4)
WBC # BLD AUTO: 3.73 K/UL (ref 3.9–12.7)

## 2020-01-02 PROCEDURE — 36415 COLL VENOUS BLD VENIPUNCTURE: CPT

## 2020-01-02 PROCEDURE — 85027 COMPLETE CBC AUTOMATED: CPT

## 2020-01-03 ENCOUNTER — PATIENT MESSAGE (OUTPATIENT)
Dept: HEMATOLOGY/ONCOLOGY | Facility: CLINIC | Age: 70
End: 2020-01-03

## 2020-01-09 ENCOUNTER — OFFICE VISIT (OUTPATIENT)
Dept: INTERNAL MEDICINE | Facility: CLINIC | Age: 70
End: 2020-01-09
Payer: MEDICARE

## 2020-01-09 VITALS
DIASTOLIC BLOOD PRESSURE: 74 MMHG | SYSTOLIC BLOOD PRESSURE: 160 MMHG | HEIGHT: 66 IN | OXYGEN SATURATION: 99 % | HEART RATE: 74 BPM | WEIGHT: 163.56 LBS | BODY MASS INDEX: 26.29 KG/M2

## 2020-01-09 DIAGNOSIS — Z12.11 COLON CANCER SCREENING: ICD-10-CM

## 2020-01-09 DIAGNOSIS — I10 ESSENTIAL HYPERTENSION: Primary | ICD-10-CM

## 2020-01-09 DIAGNOSIS — F43.23 ADJUSTMENT DISORDER WITH MIXED ANXIETY AND DEPRESSED MOOD: ICD-10-CM

## 2020-01-09 DIAGNOSIS — R94.8 ABNORMAL PET SCAN OF COLON: ICD-10-CM

## 2020-01-09 PROCEDURE — 1126F AMNT PAIN NOTED NONE PRSNT: CPT | Mod: ,,, | Performed by: INTERNAL MEDICINE

## 2020-01-09 PROCEDURE — 1126F PR PAIN SEVERITY QUANTIFIED, NO PAIN PRESENT: ICD-10-PCS | Mod: ,,, | Performed by: INTERNAL MEDICINE

## 2020-01-09 PROCEDURE — 99214 PR OFFICE/OUTPT VISIT, EST, LEVL IV, 30-39 MIN: ICD-10-PCS | Mod: S$PBB,,, | Performed by: INTERNAL MEDICINE

## 2020-01-09 PROCEDURE — 1159F PR MEDICATION LIST DOCUMENTED IN MEDICAL RECORD: ICD-10-PCS | Mod: ,,, | Performed by: INTERNAL MEDICINE

## 2020-01-09 PROCEDURE — 99214 OFFICE O/P EST MOD 30 MIN: CPT | Mod: S$PBB,,, | Performed by: INTERNAL MEDICINE

## 2020-01-09 PROCEDURE — 99999 PR PBB SHADOW E&M-EST. PATIENT-LVL III: ICD-10-PCS | Mod: PBBFAC,,, | Performed by: INTERNAL MEDICINE

## 2020-01-09 PROCEDURE — 99999 PR PBB SHADOW E&M-EST. PATIENT-LVL III: CPT | Mod: PBBFAC,,, | Performed by: INTERNAL MEDICINE

## 2020-01-09 PROCEDURE — 1159F MED LIST DOCD IN RCRD: CPT | Mod: ,,, | Performed by: INTERNAL MEDICINE

## 2020-01-09 PROCEDURE — 99213 OFFICE O/P EST LOW 20 MIN: CPT | Mod: PBBFAC | Performed by: INTERNAL MEDICINE

## 2020-01-09 RX ORDER — AMLODIPINE BESYLATE 5 MG/1
5 TABLET ORAL DAILY
Qty: 90 TABLET | Refills: 3 | Status: SHIPPED | OUTPATIENT
Start: 2020-01-09 | End: 2020-02-27

## 2020-01-09 RX ORDER — AMLODIPINE BESYLATE 10 MG/1
10 TABLET ORAL DAILY
COMMUNITY
End: 2020-01-09 | Stop reason: SDUPTHER

## 2020-01-09 NOTE — PROGRESS NOTES
Subjective:       Patient ID: Alesha Toney is a 69 y.o. female.    Chief Complaint: Follow-up    HPI   We reviewed last PET scan.  Vague area of uptake in rectum.  She is now ready for colonosopy.    She has completed radiation therapy to chest.  She was concerned by chemo induced leukopenia, so CBC was ordered, which showed improvement.     -150/70 at home.  Not taking amlodipine.    Taking atenolol and losartan.    She is anxious and having trouble coping.  Denies depressed mood, but more introverted and less motivated.  She has considerable anxiety about her health. She was prescribed lexapro but has been apprehensive about starting.  She has a general hesitation to take any medication.      Review of Systems   Respiratory: Positive for shortness of breath.    Cardiovascular: Positive for chest pain. Negative for palpitations.   Musculoskeletal: Negative for neck pain.   Neurological: Negative for headaches.       Objective:      Physical Exam   Constitutional: She is oriented to person, place, and time. She appears well-developed and well-nourished. No distress.   Neurological: She is alert and oriented to person, place, and time.   Psychiatric: She has a normal mood and affect. Her behavior is normal.       Assessment:       1. Essential hypertension    2. Colon cancer screening    3. Abnormal PET scan of colon    4. Adjustment disorder with mixed anxiety and depressed mood        Plan:       Alesha was seen today for follow-up.    Diagnoses and all orders for this visit:    Essential hypertension  -     amLODIPine (NORVASC) 5 MG tablet; Take 1 tablet (5 mg total) by mouth once daily.    Colon cancer screening  -     Case request GI: COLONOSCOPY    Abnormal PET scan of colon  -     Case request GI: COLONOSCOPY    Adjustment disorder with mixed anxiety and depressed mood       Start amlodipine and lexapro

## 2020-02-13 ENCOUNTER — PATIENT OUTREACH (OUTPATIENT)
Dept: ADMINISTRATIVE | Facility: HOSPITAL | Age: 70
End: 2020-02-13

## 2020-02-13 DIAGNOSIS — Z12.11 SCREENING FOR COLON CANCER: Primary | ICD-10-CM

## 2020-02-14 ENCOUNTER — TELEPHONE (OUTPATIENT)
Dept: HEMATOLOGY/ONCOLOGY | Facility: CLINIC | Age: 70
End: 2020-02-14

## 2020-02-14 ENCOUNTER — PATIENT MESSAGE (OUTPATIENT)
Dept: HEMATOLOGY/ONCOLOGY | Facility: CLINIC | Age: 70
End: 2020-02-14

## 2020-02-14 ENCOUNTER — TELEPHONE (OUTPATIENT)
Dept: RADIATION ONCOLOGY | Facility: CLINIC | Age: 70
End: 2020-02-14

## 2020-02-14 NOTE — TELEPHONE ENCOUNTER
----- Message from Alesha Childress sent at 2/14/2020 11:13 AM CST -----  Contact: Patient      ----- Message -----  From: Chintan Abel  Sent: 2/14/2020  10:58 AM CST  To: [facility]  Pool    Returning a call     Caller name:: Pt    Who Left Message for Patient:: Alyssia    Communication preference:: [phone]    Additional info::

## 2020-02-14 NOTE — TELEPHONE ENCOUNTER
----- Message from Alyssia Don sent at 2/14/2020 11:21 AM CST -----  Contact: Patient   will be on vacation on 02/21, pt appointment has been reschedule for the following week, pt would like to know if  needs to be reschedule as well, please reach out to pt to discuss.     ----- Message -----  From: Alesha Childress  Sent: 2/14/2020  11:13 AM CST  To: Alyssia Don        ----- Message -----  From: Chintan Abel  Sent: 2/14/2020  10:58 AM CST  To: [facility]  Pool    Returning a call     Caller name:: Pt    Who Left Message for Patient:: Alyssia    Communication preference:: [phone]    Additional info::

## 2020-02-14 NOTE — TELEPHONE ENCOUNTER
tried reaching out to pt to discuss rescheduling appointments due to  being on vacation, no answer, but a detail  Message left on v/m to contact office to discuss.

## 2020-02-27 ENCOUNTER — HOSPITAL ENCOUNTER (OUTPATIENT)
Dept: RADIOLOGY | Facility: HOSPITAL | Age: 70
Discharge: HOME | End: 2020-02-27
Attending: INTERNAL MEDICINE
Payer: MEDICARE

## 2020-02-27 ENCOUNTER — OFFICE VISIT (OUTPATIENT)
Dept: INTERNAL MEDICINE | Facility: CLINIC | Age: 70
End: 2020-02-27
Payer: MEDICARE

## 2020-02-27 VITALS
DIASTOLIC BLOOD PRESSURE: 74 MMHG | SYSTOLIC BLOOD PRESSURE: 164 MMHG | OXYGEN SATURATION: 99 % | HEIGHT: 66 IN | BODY MASS INDEX: 26.65 KG/M2 | HEART RATE: 57 BPM | WEIGHT: 165.81 LBS

## 2020-02-27 DIAGNOSIS — I47.10 SVT (SUPRAVENTRICULAR TACHYCARDIA): ICD-10-CM

## 2020-02-27 DIAGNOSIS — C34.2 MALIGNANT NEOPLASM OF MIDDLE LOBE OF RIGHT LUNG: ICD-10-CM

## 2020-02-27 DIAGNOSIS — I10 ESSENTIAL HYPERTENSION: ICD-10-CM

## 2020-02-27 DIAGNOSIS — F41.9 ANXIETY: Primary | ICD-10-CM

## 2020-02-27 PROCEDURE — 99213 OFFICE O/P EST LOW 20 MIN: CPT | Mod: PBBFAC,25 | Performed by: INTERNAL MEDICINE

## 2020-02-27 PROCEDURE — 71250 CT CHEST ABDOMEN PELVIS WITHOUT CONTRAST(XPD): ICD-10-PCS | Mod: 26,,, | Performed by: RADIOLOGY

## 2020-02-27 PROCEDURE — 71250 CT THORAX DX C-: CPT | Mod: TC

## 2020-02-27 PROCEDURE — 74176 CT CHEST ABDOMEN PELVIS WITHOUT CONTRAST(XPD): ICD-10-PCS | Mod: 26,,, | Performed by: RADIOLOGY

## 2020-02-27 PROCEDURE — 99214 OFFICE O/P EST MOD 30 MIN: CPT | Mod: S$PBB,,, | Performed by: INTERNAL MEDICINE

## 2020-02-27 PROCEDURE — 71250 CT THORAX DX C-: CPT | Mod: 26,,, | Performed by: RADIOLOGY

## 2020-02-27 PROCEDURE — 99214 PR OFFICE/OUTPT VISIT, EST, LEVL IV, 30-39 MIN: ICD-10-PCS | Mod: S$PBB,,, | Performed by: INTERNAL MEDICINE

## 2020-02-27 PROCEDURE — 99999 PR PBB SHADOW E&M-EST. PATIENT-LVL III: ICD-10-PCS | Mod: PBBFAC,,, | Performed by: INTERNAL MEDICINE

## 2020-02-27 PROCEDURE — 74176 CT ABD & PELVIS W/O CONTRAST: CPT | Mod: 26,,, | Performed by: RADIOLOGY

## 2020-02-27 PROCEDURE — 74176 CT ABD & PELVIS W/O CONTRAST: CPT | Mod: TC

## 2020-02-27 PROCEDURE — 99999 PR PBB SHADOW E&M-EST. PATIENT-LVL III: CPT | Mod: PBBFAC,,, | Performed by: INTERNAL MEDICINE

## 2020-02-27 RX ORDER — LORAZEPAM 0.5 MG/1
0.5 TABLET ORAL EVERY 12 HOURS PRN
Qty: 30 TABLET | Refills: 0 | Status: SHIPPED | OUTPATIENT
Start: 2020-02-27 | End: 2020-02-27 | Stop reason: SDUPTHER

## 2020-02-27 RX ORDER — LOSARTAN POTASSIUM 50 MG/1
TABLET ORAL
Qty: 180 TABLET | Refills: 3 | Status: SHIPPED | OUTPATIENT
Start: 2020-02-27 | End: 2020-02-27 | Stop reason: SDUPTHER

## 2020-02-27 RX ORDER — ATENOLOL 50 MG/1
TABLET ORAL
Qty: 180 TABLET | Refills: 3 | Status: SHIPPED | OUTPATIENT
Start: 2020-02-27 | End: 2021-02-22 | Stop reason: SDUPTHER

## 2020-02-27 RX ORDER — HYDROCODONE BITARTRATE AND ACETAMINOPHEN 5; 325 MG/1; MG/1
1 TABLET ORAL EVERY 12 HOURS PRN
Qty: 90 TABLET | Refills: 0 | Status: SHIPPED | OUTPATIENT
Start: 2020-02-27 | End: 2020-02-27 | Stop reason: SDUPTHER

## 2020-02-27 RX ORDER — LORAZEPAM 0.5 MG/1
0.5 TABLET ORAL EVERY 12 HOURS PRN
Qty: 30 TABLET | Refills: 0 | Status: SHIPPED | OUTPATIENT
Start: 2020-02-27 | End: 2020-05-18 | Stop reason: SDUPTHER

## 2020-02-27 RX ORDER — HYDROCODONE BITARTRATE AND ACETAMINOPHEN 5; 325 MG/1; MG/1
1 TABLET ORAL EVERY 12 HOURS PRN
Qty: 90 TABLET | Refills: 0 | Status: SHIPPED | OUTPATIENT
Start: 2020-02-27 | End: 2020-05-18 | Stop reason: SDUPTHER

## 2020-02-27 RX ORDER — LOSARTAN POTASSIUM 50 MG/1
TABLET ORAL
Qty: 180 TABLET | Refills: 3 | Status: SHIPPED | OUTPATIENT
Start: 2020-02-27 | End: 2021-02-22 | Stop reason: SDUPTHER

## 2020-02-27 RX ORDER — HYDROCHLOROTHIAZIDE 25 MG/1
25 TABLET ORAL DAILY
Qty: 30 TABLET | Refills: 11 | Status: SHIPPED | OUTPATIENT
Start: 2020-02-27 | End: 2020-06-03

## 2020-02-27 NOTE — PROGRESS NOTES
"Subjective:       Patient ID: Alesha Toney is a 69 y.o. female.    Chief Complaint: Follow-up             htn  HPI    Having scans today, so anxious.  BP at home 130-140/70-80.  She stopped amlodipine, as caused "little runs of SVT."    She continues to take atenolol 25 mg qid.      Less anxious.  Saw a , but nothing officially.    Taking half a lexapro and feels better.    She never tried gabapentin.  Would like refill of hydrocodone at night for pain in R lower lateral chest attributed to surgical incision and radiation.  Tylenol not effective.   She is willing to reduce hydrocodone dose to 5 mg.  She is concerned by pain RUQ.    Review of Systems   Respiratory: Positive for shortness of breath.    Cardiovascular: Positive for chest pain. Negative for palpitations.   Musculoskeletal: Positive for neck pain.   Neurological: Negative for headaches.       Objective:      Physical Exam   Constitutional: She is oriented to person, place, and time. She appears well-developed and well-nourished. No distress.   Neurological: She is alert and oriented to person, place, and time.   Psychiatric: She has a normal mood and affect. Her behavior is normal.       Assessment:       1. Anxiety    2. Essential hypertension    3. SVT (supraventricular tachycardia)        Plan:       Alesha was seen today for follow-up.    Diagnoses and all orders for this visit:    Anxiety    Essential hypertension  -     atenoloL (TENORMIN) 50 MG tablet; Half a tab qid    SVT (supraventricular tachycardia)  -     atenoloL (TENORMIN) 50 MG tablet; Half a tab qid    Other orders  -     losartan (COZAAR) 50 MG tablet; 1 tab po bid  -     hydroCHLOROthiazide (HYDRODIURIL) 25 MG tablet; Take 1 tablet (25 mg total) by mouth once daily.  -     Discontinue: LORazepam (ATIVAN) 0.5 MG tablet; Take 1 tablet (0.5 mg total) by mouth every 12 (twelve) hours as needed for Anxiety.  -     Discontinue: HYDROcodone-acetaminophen (NORCO) 5-325 mg per " tablet; Take 1 tablet by mouth every 12 (twelve) hours as needed for Pain.  -     HYDROcodone-acetaminophen (NORCO) 5-325 mg per tablet; Take 1 tablet by mouth every 12 (twelve) hours as needed for Pain.  -     LORazepam (ATIVAN) 0.5 MG tablet; Take 1 tablet (0.5 mg total) by mouth every 12 (twelve) hours as needed for Anxiety.       see pt instructions    Start hctz    Ativan, instead of valium, prn anxiety

## 2020-02-28 ENCOUNTER — TELEPHONE (OUTPATIENT)
Dept: INTERNAL MEDICINE | Facility: CLINIC | Age: 70
End: 2020-02-28

## 2020-02-28 ENCOUNTER — OFFICE VISIT (OUTPATIENT)
Dept: HEMATOLOGY/ONCOLOGY | Facility: CLINIC | Age: 70
End: 2020-02-28
Payer: MEDICARE

## 2020-02-28 ENCOUNTER — LAB VISIT (OUTPATIENT)
Dept: LAB | Facility: HOSPITAL | Age: 70
End: 2020-02-28
Attending: INTERNAL MEDICINE
Payer: MEDICARE

## 2020-02-28 ENCOUNTER — OFFICE VISIT (OUTPATIENT)
Dept: RADIATION ONCOLOGY | Facility: CLINIC | Age: 70
End: 2020-02-28
Payer: MEDICARE

## 2020-02-28 VITALS
SYSTOLIC BLOOD PRESSURE: 195 MMHG | BODY MASS INDEX: 26.94 KG/M2 | DIASTOLIC BLOOD PRESSURE: 82 MMHG | HEIGHT: 66 IN | HEART RATE: 68 BPM | WEIGHT: 167.63 LBS

## 2020-02-28 VITALS
RESPIRATION RATE: 18 BRPM | HEART RATE: 68 BPM | OXYGEN SATURATION: 100 % | HEIGHT: 66 IN | BODY MASS INDEX: 26.54 KG/M2 | TEMPERATURE: 99 F | WEIGHT: 165.13 LBS | SYSTOLIC BLOOD PRESSURE: 179 MMHG | DIASTOLIC BLOOD PRESSURE: 79 MMHG

## 2020-02-28 DIAGNOSIS — C34.2 MALIGNANT NEOPLASM OF MIDDLE LOBE OF RIGHT LUNG: Primary | ICD-10-CM

## 2020-02-28 DIAGNOSIS — C34.2 MALIGNANT NEOPLASM OF MIDDLE LOBE OF RIGHT LUNG: ICD-10-CM

## 2020-02-28 LAB
ALBUMIN SERPL BCP-MCNC: 3.5 G/DL (ref 3.5–5.2)
ALP SERPL-CCNC: 69 U/L (ref 55–135)
ALT SERPL W/O P-5'-P-CCNC: 16 U/L (ref 10–44)
ANION GAP SERPL CALC-SCNC: 5 MMOL/L (ref 8–16)
AST SERPL-CCNC: 24 U/L (ref 10–40)
BILIRUB SERPL-MCNC: 0.3 MG/DL (ref 0.1–1)
BUN SERPL-MCNC: 13 MG/DL (ref 8–23)
CALCIUM SERPL-MCNC: 9.5 MG/DL (ref 8.7–10.5)
CHLORIDE SERPL-SCNC: 109 MMOL/L (ref 95–110)
CO2 SERPL-SCNC: 30 MMOL/L (ref 23–29)
CREAT SERPL-MCNC: 1.3 MG/DL (ref 0.5–1.4)
ERYTHROCYTE [DISTWIDTH] IN BLOOD BY AUTOMATED COUNT: 12.5 % (ref 11.5–14.5)
EST. GFR  (AFRICAN AMERICAN): 48.4 ML/MIN/1.73 M^2
EST. GFR  (NON AFRICAN AMERICAN): 42 ML/MIN/1.73 M^2
GLUCOSE SERPL-MCNC: 124 MG/DL (ref 70–110)
HCT VFR BLD AUTO: 35.3 % (ref 37–48.5)
HGB BLD-MCNC: 10.7 G/DL (ref 12–16)
IMM GRANULOCYTES # BLD AUTO: 0.01 K/UL (ref 0–0.04)
MCH RBC QN AUTO: 30.6 PG (ref 27–31)
MCHC RBC AUTO-ENTMCNC: 30.3 G/DL (ref 32–36)
MCV RBC AUTO: 101 FL (ref 82–98)
NEUTROPHILS # BLD AUTO: 1.9 K/UL (ref 1.8–7.7)
PLATELET # BLD AUTO: 168 K/UL (ref 150–350)
PMV BLD AUTO: 9.6 FL (ref 9.2–12.9)
POTASSIUM SERPL-SCNC: 5.3 MMOL/L (ref 3.5–5.1)
PROT SERPL-MCNC: 6.7 G/DL (ref 6–8.4)
RBC # BLD AUTO: 3.5 M/UL (ref 4–5.4)
SODIUM SERPL-SCNC: 144 MMOL/L (ref 136–145)
WBC # BLD AUTO: 4.03 K/UL (ref 3.9–12.7)

## 2020-02-28 PROCEDURE — 99999 PR PBB SHADOW E&M-EST. PATIENT-LVL IV: ICD-10-PCS | Mod: PBBFAC,,, | Performed by: INTERNAL MEDICINE

## 2020-02-28 PROCEDURE — 99024 PR POST-OP FOLLOW-UP VISIT: ICD-10-PCS | Mod: POP,,, | Performed by: RADIOLOGY

## 2020-02-28 PROCEDURE — 99214 OFFICE O/P EST MOD 30 MIN: CPT | Mod: S$PBB,,, | Performed by: INTERNAL MEDICINE

## 2020-02-28 PROCEDURE — 99213 OFFICE O/P EST LOW 20 MIN: CPT | Mod: PBBFAC,27 | Performed by: RADIOLOGY

## 2020-02-28 PROCEDURE — 99024 POSTOP FOLLOW-UP VISIT: CPT | Mod: POP,,, | Performed by: RADIOLOGY

## 2020-02-28 PROCEDURE — 99999 PR PBB SHADOW E&M-EST. PATIENT-LVL III: CPT | Mod: PBBFAC,,, | Performed by: RADIOLOGY

## 2020-02-28 PROCEDURE — 99214 OFFICE O/P EST MOD 30 MIN: CPT | Mod: PBBFAC | Performed by: INTERNAL MEDICINE

## 2020-02-28 PROCEDURE — 36415 COLL VENOUS BLD VENIPUNCTURE: CPT

## 2020-02-28 PROCEDURE — 99999 PR PBB SHADOW E&M-EST. PATIENT-LVL IV: CPT | Mod: PBBFAC,,, | Performed by: INTERNAL MEDICINE

## 2020-02-28 PROCEDURE — 99214 PR OFFICE/OUTPT VISIT, EST, LEVL IV, 30-39 MIN: ICD-10-PCS | Mod: S$PBB,,, | Performed by: INTERNAL MEDICINE

## 2020-02-28 PROCEDURE — 85027 COMPLETE CBC AUTOMATED: CPT

## 2020-02-28 PROCEDURE — 99999 PR PBB SHADOW E&M-EST. PATIENT-LVL III: ICD-10-PCS | Mod: PBBFAC,,, | Performed by: RADIOLOGY

## 2020-02-28 PROCEDURE — 80053 COMPREHEN METABOLIC PANEL: CPT

## 2020-02-28 RX ORDER — FAMOTIDINE 20 MG/1
20 TABLET, FILM COATED ORAL 2 TIMES DAILY
COMMUNITY
End: 2020-06-03

## 2020-02-28 RX ORDER — OMEPRAZOLE 10 MG/1
10 CAPSULE, DELAYED RELEASE ORAL DAILY
COMMUNITY
End: 2020-06-03

## 2020-02-28 NOTE — TELEPHONE ENCOUNTER
----- Message from Jaqui Cruz sent at 2/28/2020 10:41 AM CST -----  Contact: [facility] [phone] (Phone)  Pharmacy is calling to clarify an RX.  RX name:  LORazepam (ATIVAN) 0.5 MG tablet and HYDROcodone-acetaminophen (NORCO) 5-325 mg per tablet  What do they need to clarify:  Drugs interaction  Comments:

## 2020-02-28 NOTE — PROGRESS NOTES
02/28/2020    Radiation Oncology Follow-Up Visit    Prior Radiation History:    Site  Technique  Energy  Dose/Fx (Gy)  #Fx  Total Dose (Gy)  Start Date  End Date  Elapsed Days    Mediastinum  IMRT  6X  2  30 / 30  60  11/14/2019 12/31/2019  47      Assessment   This is a 69 y.o. y/o female with h/o Stage IIB (pT3 pN0 M0; negative margins) RUL NSCLC (adeno) s/p lobectomy by Dr. Messina 8/9/2018 followed by adjuvant Cis/Alimta with Dr. Dhaliwal completed 1/2019. Surveillance CT Chest 9/17/19 demonstrated uptake in a 1.6 cm area adjacent to staple line next to main airway. This was confirmed recurrent disease by EBUS (9/24/19 with Dr. Hearn) and PET/CT 10/1/19. No evidence of other lung-related disease, though she has persistent uptake in mid-rectal lesion that was present on prior PET 6/2018, which she has refused to work-up further. She completed definitive chemo-RT to 60 Gy in 30 fx to recurrent disease on 12/31/19.     Doing well post-treatment, still with some fatigue. Persistent right lateral chest wall pain since her surgery in 2018, which she reports is stable. Denies worsening dyspnea or cough. Heartburn but no esophagitis. CT C/A/P 2/27/20 demonstrated slight interval decrease in treated recurrence but new nodules in Right lateral lower lobe of unclear significance. Patient reports she did have a respiratory infection several weeks ago.         Plan   1) Patient to undergo PET and case reviewed at next week's Thoracic [facility].   2) I will see her back in 6 months. She will f/u with Dr. Dhaliwal in the interim.         Chief Complaint   Patient presents with    Lung Cancer     2mo f/u       HPI: HPI    Past Medical History:   Diagnosis Date    Basal cell carcinoma     Breast cyst     Cardiac arrhythmia     Disorder of kidney and ureter     renal stone    Fibrocystic breast     Hypertension     Lung cancer     Lung cancer     Squamous cell carcinoma        Past Surgical History:   Procedure Laterality Date     ADENOIDECTOMY  17yo    ANTERIOR CERVICAL DISCECTOMY W/ FUSION N/A 10/15/2018    Procedure: DISCECTOMY, SPINE, CERVICAL, ANTERIOR APPROACH, WITH FUSION C6/7  Depuy SNS: [employer]/S[employer] Regular OR table;  Surgeon: Greyson Bansal MD;  Location: [facility] OR [address] [place] [address];  Service: Neurosurgery;  Laterality: N/A;    APPENDECTOMY      BONE RESECTION, RIB  1980    BREAST BIOPSY Left     at least 40yrs ago in her 20's    BREAST CYST ASPIRATION      BREAST CYST EXCISION      ENDOBRONCHIAL ULTRASOUND N/A 7/10/2018    Procedure: ULTRASOUND, ENDOBRONCHIAL;  Surgeon: Sri Hearn MD;  [facility];  Service: Pulmonary;  Laterality: N/A;    ENDOBRONCHIAL ULTRASOUND N/A 2019    Procedure: ENDOBRONCHIAL ULTRASOUND (EBUS);  Surgeon: Sri Hearn MD;  Location: [facility] OR [address] [place] [address];  Service: Pulmonary;  Laterality: N/A;    HYSTERECTOMY      @47yrs of age    INSERTION OF TUNNELED CENTRAL VENOUS CATHETER (CVC) WITH SUBCUTANEOUS PORT N/A 2018    Procedure: [id number]-[id number]-CATH;  Surgeon: Dosc Diagnostic Provider;  Location: [facility] OR [address] [place] [address];  Service: Radiology;  Laterality: N/A;    KIDNEY SURGERY      17yo    OOPHORECTOMY      @47yrs of age    TONSILLECTOMY      VIDEO-ASSISTED THORACOSCOPIC LOBECTOMY Right 2018    Procedure: VATS, WITH LOBECTOMY Possible chest wall resection;  Surgeon: Philip Messina MD;  Location: [facility] OR [address] [place] [address];  Service: Thoracic;  Laterality: Right;  Have open pan available.       Social History     Tobacco Use    Smoking status: Former Smoker     Packs/day: 1.00     Years: 30.00     Pack years: 30.00     Types: Cigarettes     Last attempt to quit:      Years since quittin.1    Smokeless tobacco: Never Used   Substance Use Topics    Alcohol use: Not Currently     Frequency: Never     Drinks per session: Patient refused     Binge frequency: Never     Comment: socially     Drug use: No       Cancer-related family history includes Cancer in her  father.    Current Outpatient Medications on File Prior to Visit   Medication Sig Dispense Refill    atenoloL (TENORMIN) 50 MG tablet Half a tab qid 180 tablet 3    bimatoprost (LATISSE) 0.03 % ophthalmic solution Place one drop on applicator and apply evenly along the skin of the upper eyelid at base of eyelashes qhs; repeat for second eye 3 mL 6    diazePAM (VALIUM) 5 MG tablet Take 1 tablet (5 mg total) by mouth every 8 (eight) hours as needed for Anxiety. 60 tablet 0    HYDROcodone-acetaminophen (NORCO) 5-325 mg per tablet Take 1 tablet by mouth every 12 (twelve) hours as needed for Pain. 90 tablet 0    losartan (COZAAR) 50 MG tablet 1 tab po bid 180 tablet 3    omeprazole (PRILOSEC) 10 MG capsule Take 10 mg by mouth once daily.      calcium carbonate (CALCIUM ANTACID) 300 mg (750 mg) Chew Take by mouth.      escitalopram oxalate (LEXAPRO) 10 MG tablet Take 1 tablet (10 mg total) by mouth once daily. (Patient not taking: Reported on 12/5/2019) 30 tablet 11    famotidine (PEPCID) 20 MG tablet Take 20 mg by mouth 2 (two) times daily.      folic acid (FOLVITE) 1 MG tablet Take 1 tablet (1 mg total) by mouth once daily. 30 tablet 3    gabapentin (NEURONTIN) 100 MG capsule 1-3 tabs po tid for foot pain (Patient not taking: Reported on 12/5/2019) 90 capsule 11    hydroCHLOROthiazide (HYDRODIURIL) 25 MG tablet Take 1 tablet (25 mg total) by mouth once daily. 30 tablet 11    LORazepam (ATIVAN) 0.5 MG tablet Take 1 tablet (0.5 mg total) by mouth every 12 (twelve) hours as needed for Anxiety. 30 tablet 0    ondansetron (ZOFRAN-ODT) 8 MG TbDL Take 1 tablet (8 mg total) by mouth every 6 (six) hours as needed. 60 tablet 3     No current facility-administered medications on file prior to visit.        Review of patient's allergies indicates:   Allergen Reactions    Adhesive Itching, Rash and Blisters     INCLUDES EKG PADS    Ciprofloxacin Hives     Hives    Iodinated contrast media     Pcn [penicillins]       "rash    Phenergan plain Other (See Comments)     "crazy behavior"    Phenergan [promethazine]     Tramadol     Vancomycin analogues      Red man syndrome       Review of Systems   Constitutional: Positive for malaise/fatigue.   Respiratory: Positive for shortness of breath.    Gastrointestinal: Positive for heartburn.   Musculoskeletal: Positive for back pain.   Neurological: Positive for tingling.   Psychiatric/Behavioral: The patient is nervous/anxious.         Vital Signs: BP (!) 195/82 (BP Location: Right arm, Patient Position: Sitting, BP Method: Large (Automatic))   Pulse 68   Ht 5' 6" (1.676 m)   Wt 76 kg (167 lb 9.6 oz)   LMP  (LMP Unknown)   BMI 27.05 kg/m²     ECOG Performance Status: 1 - Ambulates, capable of light work    Physical Exam   Constitutional: She is oriented to person, place, and time and well-developed, well-nourished, and in no distress.   HENT:   Head: Normocephalic and atraumatic.   Mouth/Throat: Oropharynx is clear and moist.   Eyes: EOM are normal. No scleral icterus.   Neck: Normal range of motion. Neck supple.   Pulmonary/Chest: No accessory muscle usage. No respiratory distress.   Abdominal: Soft. Normal appearance. She exhibits no distension.   Musculoskeletal: Normal range of motion. She exhibits no edema.   Lymphadenopathy:     She has no cervical adenopathy.        Right: No supraclavicular adenopathy present.        Left: No supraclavicular adenopathy present.   Neurological: She is alert and oriented to person, place, and time. No cranial nerve deficit. Gait normal.   Skin: Skin is warm and dry.   Psychiatric: Mood, affect and judgment normal.   Vitals reviewed.       Labs:    Imaging: I have personally reviewed the patient's available images and reports and summarized pertinent findings above in HPI.     Pathology: No new path.     "

## 2020-02-28 NOTE — PROGRESS NOTES
"Subjective:       Patient ID: Alesha Toney is a 69 y.o. female.    Chief Complaint: lignant neoplasm of middle lobe of right lung  Ms. Alesha Toney is a 67-year-old otherwise healthy female who started having some shoulder pain, which was lasting for over six weeks.  She went and saw her primary care physician and underwent an x-ray, which revealed right upper lobe lung mass worrisome for malignancy and a CT scan was recommended, which was done on 6/12/18 and that revealed a newly developing mass, pleural based, lateral posterior segment, right upper lobe 3.5 x 3.5 cm, surrounding stellate margin and patchy infiltrates, particularly superiorly,   anteriorly infiltrates extend perihilar into the anterior segment.  She then underwent CT-guided biopsy, which revealed well-differentiated adenocarcinoma.       Her PET scan from 6/29/18 There is physiologic intracranial, head, and neck activity.  There is a large right upper lobe mass SUV max 9.11.  No local or distant metastatic disease seen.  There is physiologic liver, spleen, GI and  activity.  There are a few liver cysts.  No adenopathy is seen.  The adrenal glands are not enlarged.  No adenopathy is seen.  No suspicious bone lesions are seen.     She underwent VATS with right upper lobectomy. Mediastinal lymphadenectomy on 8/9/18. Pathology revealed "Tumor site: Upper lobe.Tumor size: 7 x 4 x 2.7 cm.Tumor focality: Single tumor.  Histologic type: Mixed invasive mucinous and nonmucinous adenocarcinoma. Histologic grade: G2: Moderately differentiated.Spread through air spaces (STATS): Present. Visceral pleura invasion: Not identified.  Lymphovascular invasion: Not identified. Direct invasion of adjacent structures: No adjacent structures present. Margins: All margins are uninvolved by carcinoma.Distance of invasive carcinoma from closest margin: 1 cm from the bronchial margin.Treatment effect: No known presurgical therapy. Regional lymph nodes: Number of lymph " "nodes involved: 0. Number of lymph nodes examined: 11. Pathologic Stage Classification: pT3 N0"        She completed 4 cycles of adjuvant chemo with Cisplatin and Alimta as of 1/10/19   She is on surveillance.     CT chest of 9/17/19 which reveals "Operative change of right upper lobectomy for small-cell lung cancer with several scattered subsolid opacities and a new solid nodule in the right lower lobe measuring up to 0.5 cm.  We recommend continued surveillance with the role and schedule for such surveillance to be determined by clinical considerations. Sided chest port distal tip terminating at the confluence of the brachiocephalic vein in the SVC.  Correlate with device functioning. Apically predominant emphysematous changes, left greater than right. Aortic annular calcification and multi-vessel coronary artery atherosclerosis. Numerous unchanged hepatic hypodensities, likely cysts"     PET scan from 10/1/19 reveals "1.6 cm soft tissue lesion re-identified posterior to the bronchus intermedius.  No corresponding focal increased radiotracer uptake to suggest local recurrence or metastatic disease. Stable subcentimeter opacities throughout the bilateral lower lobes, too small to characterize with PET-CT. Focal increased radiotracer uptake and subtle wall thickening in the rectum.  Findings are concerning for primary neoplastic process.  Recommend further evaluation with direct visualization"     She returned from [facility] and was advised to proceed with chemo/RT with either Docetaxel/platinum or Carboplatin/Alimta.     HPIShe completed chemo/RT with Carboplatin and Alimta as of 12/31/19  She notes cough and cold symptoms 3 weeks, no fever.    Review of Systems   Constitutional: Negative for appetite change, fatigue and unexpected weight change.   HENT: Negative for mouth sores.    Eyes: Negative for visual disturbance.   Respiratory: Negative for cough and shortness of breath.    Cardiovascular: Negative for " chest pain.   Gastrointestinal: Negative for abdominal pain and diarrhea.   Genitourinary: Negative for frequency.   Musculoskeletal: Negative for back pain.   Skin: Negative for rash.   Neurological: Negative for headaches.   Hematological: Negative for adenopathy.   Psychiatric/Behavioral: The patient is not nervous/anxious.    All other systems reviewed and are negative.      PMFSH: all information reviewed and updated as relevant to today's visit  Objective:      Physical Exam   Constitutional: She is oriented to person, place, and time. She appears well-developed and well-nourished.   HENT:   Mouth/Throat: No oropharyngeal exudate.   Cardiovascular: Normal rate and normal heart sounds.   Pulmonary/Chest: Effort normal and breath sounds normal. She has no wheezes.   Abdominal: Soft. Bowel sounds are normal. There is no tenderness.   Musculoskeletal: She exhibits no edema or tenderness.   Lymphadenopathy:     She has no cervical adenopathy.   Neurological: She is alert and oriented to person, place, and time. Coordination normal.   Skin: Skin is warm and dry. No rash noted.   Psychiatric: She has a normal mood and affect. Judgment and thought content normal.   Vitals reviewed.      LABS:  WBC   Date Value Ref Range Status   02/28/2020 4.03 3.90 - 12.70 K/uL Final     Hemoglobin   Date Value Ref Range Status   02/28/2020 10.7 (L) 12.0 - 16.0 g/dL Final     POC Hematocrit   Date Value Ref Range Status   08/09/2018 33 (L) 36 - 54 %PCV Final     Hematocrit   Date Value Ref Range Status   02/28/2020 35.3 (L) 37.0 - 48.5 % Final     Platelets   Date Value Ref Range Status   02/28/2020 168 150 - 350 K/uL Final     Gran # (ANC)   Date Value Ref Range Status   02/28/2020 1.9 1.8 - 7.7 K/uL Final     Comment:     The ANC is based on a white cell differential from an   automated cell counter. It has not been microscopically   reviewed for the presence of abnormal cells. Clinical   correlation is required.         Chemistry         Component Value Date/Time     02/28/2020 1249    K 5.3 (H) 02/28/2020 1249     02/28/2020 1249    CO2 30 (H) 02/28/2020 1249    BUN 13 02/28/2020 1249    CREATININE 1.3 02/28/2020 1249     (H) 02/28/2020 1249        Component Value Date/Time    CALCIUM 9.5 02/28/2020 1249    ALKPHOS 69 02/28/2020 1249    AST 24 02/28/2020 1249    ALT 16 02/28/2020 1249    BILITOT 0.3 02/28/2020 1249    ESTGFRAFRICA 48.4 (A) 02/28/2020 1249    EGFRNONAA 42.0 (A) 02/28/2020 1249          Assessment:       1. Malignant neoplasm of middle lobe of right lung        Plan:        1. Reviewed CT chest images and we reviewed the new findings. Will review in Thoracic conference next week. May need PET scan.    Above care plan was discussed with patient and accompanying wife and all questions were addressed to their satisfaction

## 2020-02-29 ENCOUNTER — PATIENT MESSAGE (OUTPATIENT)
Dept: HEMATOLOGY/ONCOLOGY | Facility: CLINIC | Age: 70
End: 2020-02-29

## 2020-03-04 ENCOUNTER — TELEPHONE (OUTPATIENT)
Dept: HEMATOLOGY/ONCOLOGY | Facility: CLINIC | Age: 70
End: 2020-03-04

## 2020-03-04 ENCOUNTER — TELEPHONE (OUTPATIENT)
Dept: INTERVENTIONAL RADIOLOGY/VASCULAR | Facility: HOSPITAL | Age: 70
End: 2020-03-04

## 2020-03-04 ENCOUNTER — PATIENT MESSAGE (OUTPATIENT)
Dept: INTERNAL MEDICINE | Facility: CLINIC | Age: 70
End: 2020-03-04

## 2020-03-04 ENCOUNTER — PATIENT MESSAGE (OUTPATIENT)
Dept: HEMATOLOGY/ONCOLOGY | Facility: CLINIC | Age: 70
End: 2020-03-04

## 2020-03-04 NOTE — TELEPHONE ENCOUNTER
----- Message from Alesha Childress sent at 2020  3:03 PM CST -----  Regardin/28 port disconnect       Also schedule with IR to DC pORT

## 2020-03-04 NOTE — TELEPHONE ENCOUNTER
Spoke with patient.  Informed her of consensus from tumor board. She understands ct chest is being recommended in 3 months. No need for petscan tomorrow, as the area is most likely related to inflammation. She voiced understanding.

## 2020-03-04 NOTE — TELEPHONE ENCOUNTER
We discussed her case in conference and the consensus is that it most likely inflammation and no need for PET scan so you can cancel that and she has been recommended a CT chest in 3 months. So please schedule her for that,  Please call her and let her know the above and let me know if she has any other questions

## 2020-03-05 ENCOUNTER — DOCUMENTATION ONLY (OUTPATIENT)
Dept: CARDIOTHORACIC SURGERY | Facility: HOSPITAL | Age: 70
End: 2020-03-05

## 2020-03-05 NOTE — PATIENT CARE CONFERENCE
[facility]      THORACIC MULTIDISCIPLINARY TUMOR BOARD  PATIENT REVIEW FORM  ________________________________________________________________________    CLINIC #: [id number]  DATE: 03/05/2020    DIAGNOSIS: NSCLC     PRESENTER: Dr. Dhaliwal    PATIENT SUMMARY: 69 year old female with history of right lower lobe adenocarcinoma diagnosed in July 2018. Underwent a right VATS right lower lobectomy with MLND on 8/9/2018. Surgical pathology showed a 7cm moderately differentiated mixed invasive adenocarcinoma, level 2,4,7,9,10,12 negative, no VPI, no LVI, pT3N0. She completed 4 cycles of adjuvant chemo with Cisplatin and Alimta as of 1/10/19. On surveillance CT on 5/28/19, showed an ill-defined GGO in lateral segment of RLL and new solid 0.5cm nodule in the superior segment of RLL. Superior segment nodule is at the location of a cluster of micro nodules identified on prior CT. Most recently CT surveillance for lung cancer showed growing soft tissue nodule at the suture line. Bronchoscopy and EBUS on 9/24/19 with pathology positive for recurrent NSCLC. [place] [facility] [place]. PET confirmed only local recurrence. She completed definitive chemo-RT to 60 Gy in 30 fx to recurrent disease on 12/31/19. CT C/A/P 2/27/20 demonstrated slight interval decrease in treated recurrence but new nodules in Right lateral lower lobe of unclear significance. Patient reports she did have a respiratory infection several weeks ago    BOARD RECOMMENDATIONS: On radiology review, cluster of left upper lobe nodules appear consistent with infectious or inflammatory etiology.     CONSULT NEEDED:     [] Surgery    [] Hem/Onc    [] Rad/Onc    [] Dietary                 [] Social Service    [] Psychology       [] Pulmonology    Clinical Stage: Tumor- T3 Node(s)- N0 Metastasis- M0    Pathologic Stage: Tumor  Node(s) Metastasis     GROUP STAGE:     [] O    [] 1A    [] IB    [] IIA    [x] IIB     [] IIIA     [] IIIB     [] IIIC    [] IV                                [x] Local recurrence     [] Regional recurrence     [] Distant recurrence                   [x] NSCLC     [] SCLC     Tumor type- Adenocarcinoma    Unstageable:      [] Yes     [] No  Metastatic site(s):          [] Rita'l Treatment Guidelines reviewed and care planned is consistent with guidelines.         (i.e., NCCN, NCI, PD, ACO, AUA, etc.)    PRESENTATION AT CANCER CONFERENCE:         [] Prospective    [] Retrospective     [] Follow-Up          [] Eligible for clinical trial

## 2020-03-09 ENCOUNTER — PATIENT OUTREACH (OUTPATIENT)
Dept: ADMINISTRATIVE | Facility: OTHER | Age: 70
End: 2020-03-09

## 2020-03-09 DIAGNOSIS — Z12.31 ENCOUNTER FOR SCREENING MAMMOGRAM FOR MALIGNANT NEOPLASM OF BREAST: Primary | ICD-10-CM

## 2020-03-09 NOTE — PROGRESS NOTES
Chart reviewed.   Requested updates from Care Everywhere.  Immunizations reconciled.   Order placed for mammogram.   updated.

## 2020-03-10 ENCOUNTER — PATIENT OUTREACH (OUTPATIENT)
Dept: ADMINISTRATIVE | Facility: HOSPITAL | Age: 70
End: 2020-03-10

## 2020-03-10 ENCOUNTER — TELEPHONE (OUTPATIENT)
Dept: INTERVENTIONAL RADIOLOGY/VASCULAR | Facility: HOSPITAL | Age: 70
End: 2020-03-10

## 2020-03-12 ENCOUNTER — HOSPITAL ENCOUNTER (OUTPATIENT)
Dept: RADIOLOGY | Facility: HOSPITAL | Age: 70
Discharge: HOME | End: 2020-03-12
Attending: INTERNAL MEDICINE
Payer: MEDICARE

## 2020-03-12 ENCOUNTER — OFFICE VISIT (OUTPATIENT)
Dept: GASTROENTEROLOGY | Facility: CLINIC | Age: 70
End: 2020-03-12
Payer: MEDICARE

## 2020-03-12 VITALS — WEIGHT: 164.88 LBS | BODY MASS INDEX: 26.62 KG/M2

## 2020-03-12 DIAGNOSIS — Z12.31 ENCOUNTER FOR SCREENING MAMMOGRAM FOR MALIGNANT NEOPLASM OF BREAST: ICD-10-CM

## 2020-03-12 DIAGNOSIS — Z12.11 SCREENING FOR MALIGNANT NEOPLASM OF COLON: Primary | ICD-10-CM

## 2020-03-12 DIAGNOSIS — R93.5 ABNORMAL CT OF THE ABDOMEN: ICD-10-CM

## 2020-03-12 DIAGNOSIS — K21.9 GASTROESOPHAGEAL REFLUX DISEASE, ESOPHAGITIS PRESENCE NOT SPECIFIED: ICD-10-CM

## 2020-03-12 PROCEDURE — 77067 MAMMO DIGITAL SCREENING BILAT WITH TOMOSYNTHESIS_CAD: ICD-10-PCS | Mod: 26,,, | Performed by: RADIOLOGY

## 2020-03-12 PROCEDURE — 99999 PR PBB SHADOW E&M-EST. PATIENT-LVL III: ICD-10-PCS | Mod: PBBFAC,,, | Performed by: INTERNAL MEDICINE

## 2020-03-12 PROCEDURE — 77063 MAMMO DIGITAL SCREENING BILAT WITH TOMOSYNTHESIS_CAD: ICD-10-PCS | Mod: 26,,, | Performed by: RADIOLOGY

## 2020-03-12 PROCEDURE — 99204 OFFICE O/P NEW MOD 45 MIN: CPT | Mod: S$PBB,,, | Performed by: INTERNAL MEDICINE

## 2020-03-12 PROCEDURE — 77067 SCR MAMMO BI INCL CAD: CPT | Mod: 26,,, | Performed by: RADIOLOGY

## 2020-03-12 PROCEDURE — 99999 PR PBB SHADOW E&M-EST. PATIENT-LVL III: CPT | Mod: PBBFAC,,, | Performed by: INTERNAL MEDICINE

## 2020-03-12 PROCEDURE — 77063 BREAST TOMOSYNTHESIS BI: CPT | Mod: 26,,, | Performed by: RADIOLOGY

## 2020-03-12 PROCEDURE — 77067 SCR MAMMO BI INCL CAD: CPT | Mod: TC

## 2020-03-12 PROCEDURE — 99204 PR OFFICE/OUTPT VISIT, NEW, LEVL IV, 45-59 MIN: ICD-10-PCS | Mod: S$PBB,,, | Performed by: INTERNAL MEDICINE

## 2020-03-12 PROCEDURE — 99213 OFFICE O/P EST LOW 20 MIN: CPT | Mod: PBBFAC,PO | Performed by: INTERNAL MEDICINE

## 2020-03-12 NOTE — PROGRESS NOTES
Subjective:       Patient ID: Alesha Toney is a 69 y.o. female.    Chief Complaint: Advice Only    This is a 69-year-old female with a past medical history of adenocarcinoma of the lung status post lobectomy with local recurrence status post local chest radiation here for evaluation of abnormal imaging.  This was noted on multiple modalities including a CT and PET scan as abnormal, asymmetric wall thickening in the rectum.  No current rectal symptoms, bleeding, changes in bowel habits.  She has never had a colonoscopy.  Family history is positive for her father having colon cancer in his 40s.  No unintentional weight loss.  Also significant suspected reflux with a substernal burning sensation.  This predated her local radiation therapy.  No thrush or odynophagia.  She is on Nexium daily.    The following portions of the patient's history were reviewed and updated as appropriate: allergies, current medications, past family history, past medical history, past social history, past surgical history and problem list.    (Portions of this note were dictated using voice recognition software and may contain dictation related errors in spelling/grammar/syntax not found on text review)  HPI  Review of Systems   Constitutional: Positive for fatigue. Negative for appetite change, chills and fever.   HENT: Negative for postnasal drip and trouble swallowing.    Eyes: Negative for pain and redness.   Respiratory: Negative for cough, choking, chest tightness and shortness of breath.    Cardiovascular: Negative for chest pain and leg swelling.   Gastrointestinal: Negative for abdominal distention, anal bleeding, blood in stool, constipation, nausea, rectal pain and vomiting.   Endocrine: Negative for cold intolerance and heat intolerance.   Genitourinary: Negative for difficulty urinating and hematuria.   Musculoskeletal: Negative for arthralgias and back pain.   Skin: Negative for color change and pallor.    Allergic/Immunologic: Negative for environmental allergies and food allergies.   Neurological: Negative for dizziness and light-headedness.   Hematological: Negative for adenopathy. Does not bruise/bleed easily.   Psychiatric/Behavioral: Negative for agitation and behavioral problems.         Objective:      Physical Exam   Constitutional: She is oriented to person, place, and time. She appears well-developed and well-nourished. No distress.   HENT:   Head: Normocephalic and atraumatic.   Eyes: Conjunctivae are normal. No scleral icterus.   Neck: Normal range of motion. Neck supple. No tracheal deviation present.   Cardiovascular: Normal rate. Exam reveals no friction rub.   Pulmonary/Chest: Effort normal. No respiratory distress.   Abdominal: Soft. Bowel sounds are normal. She exhibits no distension. There is no tenderness.   Musculoskeletal:        Right wrist: She exhibits normal range of motion and no tenderness.        Left wrist: She exhibits normal range of motion and no tenderness.   Lymphadenopathy:        Head (right side): No submental and no submandibular adenopathy present.        Head (left side): No submental and no submandibular adenopathy present.   Neurological: She is alert and oriented to person, place, and time.   Skin: Skin is warm and dry. No rash noted. She is not diaphoretic. No erythema.   Psychiatric: She has a normal mood and affect. Her behavior is normal.   Nursing note and vitals reviewed.        Labs/imaging; reviewed  Assessment:       1. Screening for malignant neoplasm of colon    2. Abnormal CT of the abdomen    3. Gastroesophageal reflux disease, esophagitis presence not specified        Plan:   1. Continue Nexium  2. EGD to evaluate GERD, s/p radiation therapy locally  3. Colonoscopy to evaluate abnormal ct/pet scan

## 2020-03-17 ENCOUNTER — PATIENT MESSAGE (OUTPATIENT)
Dept: INTERNAL MEDICINE | Facility: CLINIC | Age: 70
End: 2020-03-17

## 2020-03-19 ENCOUNTER — PATIENT MESSAGE (OUTPATIENT)
Dept: ENDOSCOPY | Facility: HOSPITAL | Age: 70
End: 2020-03-19

## 2020-05-04 ENCOUNTER — TELEPHONE (OUTPATIENT)
Dept: GASTROENTEROLOGY | Facility: CLINIC | Age: 70
End: 2020-05-04

## 2020-05-04 DIAGNOSIS — Z12.11 SCREENING FOR MALIGNANT NEOPLASM OF COLON: Primary | ICD-10-CM

## 2020-05-05 ENCOUNTER — PATIENT MESSAGE (OUTPATIENT)
Dept: GASTROENTEROLOGY | Facility: CLINIC | Age: 70
End: 2020-05-05

## 2020-05-06 ENCOUNTER — PATIENT MESSAGE (OUTPATIENT)
Dept: GASTROENTEROLOGY | Facility: CLINIC | Age: 70
End: 2020-05-06

## 2020-05-07 ENCOUNTER — PATIENT MESSAGE (OUTPATIENT)
Dept: INTERNAL MEDICINE | Facility: CLINIC | Age: 70
End: 2020-05-07

## 2020-05-07 ENCOUNTER — PATIENT MESSAGE (OUTPATIENT)
Dept: GASTROENTEROLOGY | Facility: CLINIC | Age: 70
End: 2020-05-07

## 2020-05-07 ENCOUNTER — PATIENT OUTREACH (OUTPATIENT)
Dept: ADMINISTRATIVE | Facility: OTHER | Age: 70
End: 2020-05-07

## 2020-05-11 ENCOUNTER — TELEPHONE (OUTPATIENT)
Dept: GASTROENTEROLOGY | Facility: CLINIC | Age: 70
End: 2020-05-11

## 2020-05-11 ENCOUNTER — OFFICE VISIT (OUTPATIENT)
Dept: GASTROENTEROLOGY | Facility: CLINIC | Age: 70
End: 2020-05-11
Payer: MEDICARE

## 2020-05-11 DIAGNOSIS — R94.8 ABNORMAL PET SCAN OF COLON: Primary | ICD-10-CM

## 2020-05-11 DIAGNOSIS — Z12.11 SCREENING FOR MALIGNANT NEOPLASM OF COLON: Primary | ICD-10-CM

## 2020-05-11 PROCEDURE — 99212 PR OFFICE/OUTPT VISIT, EST, LEVL II, 10-19 MIN: ICD-10-PCS | Mod: 95,,, | Performed by: INTERNAL MEDICINE

## 2020-05-11 PROCEDURE — 99212 OFFICE O/P EST SF 10 MIN: CPT | Mod: 95,,, | Performed by: INTERNAL MEDICINE

## 2020-05-11 NOTE — PROGRESS NOTES
Subjective:       Patient ID: Alesha Toney is a 69 y.o. female.    Chief Complaint: Follow up    This is a 69-year-old female for follow-up regarding abnormal imaging.  She has history of lung cancer local recurrence and is noted to have abnormal imaging with a CT/PET scan.  A follow-up scan is set for May.  She had wished to defer endoscopic evaluation of this finding given the COVID pandemic.  She does endorse hemorrhoids which she occasionally states will bleed but no other changes in bowel habits, change in stool caliber.  She has never had a colonoscopy.  No other exacerbating or relieving factors or other associated symptoms.    The following portions of the patient's history were reviewed and updated as appropriate: allergies, current medications, past family history, past medical history, past social history, past surgical history and problem list.    (Portions of this note were dictated using voice recognition software and may contain dictation related errors in spelling/grammar/syntax not found on text review)  HPI  Review of Systems   Constitutional: Negative for diaphoresis and unexpected weight change.   Respiratory: Negative for shortness of breath and wheezing.    Cardiovascular: Negative for palpitations and leg swelling.   Gastrointestinal: Positive for blood in stool. Negative for abdominal distention.         Objective:      Physical Exam   Constitutional: She is oriented to person, place, and time. She appears well-developed and well-nourished. No distress.   HENT:   Head: Normocephalic and atraumatic.   Eyes: Conjunctivae are normal. No scleral icterus.   Neck: No tracheal deviation present.   Pulmonary/Chest: Effort normal. No respiratory distress.   Neurological: She is alert and oriented to person, place, and time.   Psychiatric: She has a normal mood and affect. Her behavior is normal. Thought content normal.   Nursing note and vitals reviewed.        Labs/imaging;  reviewed  Assessment:       1. Abnormal PET scan of colon        Plan:   1. Discussed options at length, she wishes to pursue only a flexible sigmoidoscopy next month    The patient location is: home  The chief complaint leading to consultation is: abnormal imaging  Visit type: audiovisual  Each patient to whom he or she provides medical services by telemedicine is:  (1) informed of the relationship between the physician and patient and the respective role of any other health care provider with respect to management of the patient; and (2) notified that he or she may decline to receive medical services by telemedicine and may withdraw from such care at any time.

## 2020-05-12 ENCOUNTER — PATIENT MESSAGE (OUTPATIENT)
Dept: HEMATOLOGY/ONCOLOGY | Facility: CLINIC | Age: 70
End: 2020-05-12

## 2020-05-12 DIAGNOSIS — C34.2 MALIGNANT NEOPLASM OF MIDDLE LOBE OF RIGHT LUNG: Primary | ICD-10-CM

## 2020-05-14 ENCOUNTER — TELEPHONE (OUTPATIENT)
Dept: GASTROENTEROLOGY | Facility: CLINIC | Age: 70
End: 2020-05-14

## 2020-05-14 ENCOUNTER — PATIENT MESSAGE (OUTPATIENT)
Dept: GASTROENTEROLOGY | Facility: CLINIC | Age: 70
End: 2020-05-14

## 2020-05-18 ENCOUNTER — PATIENT MESSAGE (OUTPATIENT)
Dept: INTERNAL MEDICINE | Facility: CLINIC | Age: 70
End: 2020-05-18

## 2020-05-18 RX ORDER — HYDROCODONE BITARTRATE AND ACETAMINOPHEN 5; 325 MG/1; MG/1
1 TABLET ORAL EVERY 12 HOURS PRN
Qty: 60 TABLET | Refills: 0 | Status: SHIPPED | OUTPATIENT
Start: 2020-05-18 | End: 2020-05-19 | Stop reason: SDUPTHER

## 2020-05-18 RX ORDER — LORAZEPAM 0.5 MG/1
0.5 TABLET ORAL EVERY 12 HOURS PRN
Qty: 30 TABLET | Refills: 0 | Status: SHIPPED | OUTPATIENT
Start: 2020-05-18 | End: 2020-05-19 | Stop reason: SDUPTHER

## 2020-05-19 RX ORDER — HYDROCODONE BITARTRATE AND ACETAMINOPHEN 5; 325 MG/1; MG/1
1 TABLET ORAL EVERY 12 HOURS PRN
Qty: 60 TABLET | Refills: 0 | Status: SHIPPED | OUTPATIENT
Start: 2020-05-19 | End: 2020-07-15 | Stop reason: SDUPTHER

## 2020-05-19 RX ORDER — LORAZEPAM 0.5 MG/1
0.5 TABLET ORAL EVERY 12 HOURS PRN
Qty: 30 TABLET | Refills: 0 | Status: SHIPPED | OUTPATIENT
Start: 2020-05-19 | End: 2020-11-05 | Stop reason: SDUPTHER

## 2020-05-19 NOTE — TELEPHONE ENCOUNTER
----- Message from Justina Flores sent at 5/19/2020  8:37 AM CDT -----  Contact: Mera leon [facility] [phone]  Mera state the patient do not get the Rx HYDROcodone-acetaminophen (NORCO) 5-325 mg per tablet and LORazepam (ATIVAN) 0.5 MG tablet filled at [facility]. Mera state she gets them filled at [facility] and is canceling these Rx. Please call and advise.

## 2020-05-21 ENCOUNTER — PATIENT MESSAGE (OUTPATIENT)
Dept: INTERNAL MEDICINE | Facility: CLINIC | Age: 70
End: 2020-05-21

## 2020-05-21 RX ORDER — CEPHALEXIN 500 MG/1
500 CAPSULE ORAL EVERY 8 HOURS
Qty: 21 CAPSULE | Refills: 0 | Status: SHIPPED | OUTPATIENT
Start: 2020-05-21 | End: 2020-06-03

## 2020-05-26 ENCOUNTER — PATIENT MESSAGE (OUTPATIENT)
Dept: HEMATOLOGY/ONCOLOGY | Facility: CLINIC | Age: 70
End: 2020-05-26

## 2020-05-27 ENCOUNTER — HOSPITAL ENCOUNTER (OUTPATIENT)
Dept: RADIOLOGY | Facility: HOSPITAL | Age: 70
Discharge: HOME | End: 2020-05-27
Attending: INTERNAL MEDICINE
Payer: MEDICARE

## 2020-05-27 ENCOUNTER — TELEPHONE (OUTPATIENT)
Dept: HEMATOLOGY/ONCOLOGY | Facility: CLINIC | Age: 70
End: 2020-05-27

## 2020-05-27 DIAGNOSIS — C34.2 MALIGNANT NEOPLASM OF MIDDLE LOBE OF RIGHT LUNG: ICD-10-CM

## 2020-05-27 DIAGNOSIS — C34.2 MALIGNANT NEOPLASM OF MIDDLE LOBE OF RIGHT LUNG: Primary | ICD-10-CM

## 2020-05-27 PROCEDURE — 71250 CT CHEST WITHOUT CONTRAST: ICD-10-PCS | Mod: 26,,, | Performed by: RADIOLOGY

## 2020-05-27 PROCEDURE — 71250 CT THORAX DX C-: CPT | Mod: 26,,, | Performed by: RADIOLOGY

## 2020-05-27 PROCEDURE — 71250 CT THORAX DX C-: CPT | Mod: TC

## 2020-05-27 NOTE — TELEPHONE ENCOUNTER
Reviewed CT chest, needs PET scan STAT, will review case in Thoracic conference on 6/3/2020  Please schedule her in the afternoon with me on 6/3/2020

## 2020-05-28 ENCOUNTER — PATIENT MESSAGE (OUTPATIENT)
Dept: ENDOSCOPY | Facility: HOSPITAL | Age: 70
End: 2020-05-28

## 2020-05-28 ENCOUNTER — PATIENT MESSAGE (OUTPATIENT)
Dept: INTERNAL MEDICINE | Facility: CLINIC | Age: 70
End: 2020-05-28

## 2020-05-28 ENCOUNTER — TELEPHONE (OUTPATIENT)
Dept: HEMATOLOGY/ONCOLOGY | Facility: CLINIC | Age: 70
End: 2020-05-28

## 2020-05-28 NOTE — TELEPHONE ENCOUNTER
Called patient to give her the appt time on 6/1 for pet scan and follow up with Dr Dhaliwal.      ----- Message from Christal Solis sent at 5/28/2020  7:20 AM CDT -----  Do you want the follow up at 2pm on 6/3? She is already scheduled 6/3 at 8.    ----- Message -----  From: Sol Flores RN  Sent: 5/27/2020   5:11 PM CDT  To: [facility]  [facility]    Please contact patient to schedule PETscan ASAP.  Please schedule her on 6/3 afternoon at 2pm.  ~sol

## 2020-06-01 ENCOUNTER — HOSPITAL ENCOUNTER (OUTPATIENT)
Dept: RADIOLOGY | Facility: HOSPITAL | Age: 70
Discharge: HOME | End: 2020-06-01
Attending: INTERNAL MEDICINE
Payer: MEDICARE

## 2020-06-01 DIAGNOSIS — C34.2 MALIGNANT NEOPLASM OF MIDDLE LOBE OF RIGHT LUNG: ICD-10-CM

## 2020-06-01 PROCEDURE — 78815 PET IMAGE W/CT SKULL-THIGH: CPT | Mod: 26,PS,, | Performed by: RADIOLOGY

## 2020-06-01 PROCEDURE — 78815 NM PET CT ROUTINE: ICD-10-PCS | Mod: 26,PS,, | Performed by: RADIOLOGY

## 2020-06-01 PROCEDURE — A9552 F18 FDG: HCPCS

## 2020-06-01 PROCEDURE — 78815 PET IMAGE W/CT SKULL-THIGH: CPT | Mod: TC

## 2020-06-02 ENCOUNTER — TELEPHONE (OUTPATIENT)
Dept: HEMATOLOGY/ONCOLOGY | Facility: CLINIC | Age: 70
End: 2020-06-02

## 2020-06-02 ENCOUNTER — TELEPHONE (OUTPATIENT)
Dept: GASTROENTEROLOGY | Facility: CLINIC | Age: 70
End: 2020-06-02

## 2020-06-02 LAB — POCT GLUCOSE: 105 MG/DL (ref 70–110)

## 2020-06-03 ENCOUNTER — OFFICE VISIT (OUTPATIENT)
Dept: HEMATOLOGY/ONCOLOGY | Facility: CLINIC | Age: 70
End: 2020-06-03
Payer: MEDICARE

## 2020-06-03 ENCOUNTER — DOCUMENTATION ONLY (OUTPATIENT)
Dept: CARDIOTHORACIC SURGERY | Facility: HOSPITAL | Age: 70
End: 2020-06-03

## 2020-06-03 VITALS
HEIGHT: 66 IN | SYSTOLIC BLOOD PRESSURE: 181 MMHG | HEART RATE: 72 BPM | BODY MASS INDEX: 26.29 KG/M2 | WEIGHT: 163.56 LBS | OXYGEN SATURATION: 99 % | TEMPERATURE: 98 F | DIASTOLIC BLOOD PRESSURE: 75 MMHG | RESPIRATION RATE: 18 BRPM

## 2020-06-03 DIAGNOSIS — Z85.118 HISTORY OF LUNG CANCER: Primary | ICD-10-CM

## 2020-06-03 PROCEDURE — 99999 PR PBB SHADOW E&M-EST. PATIENT-LVL III: ICD-10-PCS | Mod: PBBFAC,,, | Performed by: INTERNAL MEDICINE

## 2020-06-03 PROCEDURE — 99213 OFFICE O/P EST LOW 20 MIN: CPT | Mod: PBBFAC | Performed by: INTERNAL MEDICINE

## 2020-06-03 PROCEDURE — 99214 PR OFFICE/OUTPT VISIT, EST, LEVL IV, 30-39 MIN: ICD-10-PCS | Mod: S$PBB,,, | Performed by: INTERNAL MEDICINE

## 2020-06-03 PROCEDURE — 99999 PR PBB SHADOW E&M-EST. PATIENT-LVL III: CPT | Mod: PBBFAC,,, | Performed by: INTERNAL MEDICINE

## 2020-06-03 PROCEDURE — 99214 OFFICE O/P EST MOD 30 MIN: CPT | Mod: S$PBB,,, | Performed by: INTERNAL MEDICINE

## 2020-06-03 RX ORDER — HYDROGEN PEROXIDE 3 %
20 SOLUTION, NON-ORAL MISCELLANEOUS
COMMUNITY
End: 2020-10-05

## 2020-06-03 NOTE — PATIENT CARE CONFERENCE
[facility]      THORACIC MULTIDISCIPLINARY TUMOR BOARD  PATIENT REVIEW FORM  ________________________________________________________________________    CLINIC #: [id number]  DATE: 06/03/2020    DIAGNOSIS:   NSCLC    PRESENTER:   Dr. Dhaliwal     PATIENT SUMMARY:   69 year old female with history of right lower lobe adenocarcinoma diagnosed in July 2018. Underwent a right VATS right lower lobectomy with MLND on 8/9/2018. Surgical pathology showed a 7cm moderately differentiated mixed invasive adenocarcinoma, level 2,4,7,9,10,12 negative, no VPI, no LVI, pT3N0. She completed 4 cycles of adjuvant chemo with Cisplatin and Alimta as of 1/10/19. On surveillance CT on 5/28/19, showed an ill-defined GGO in lateral segment of RLL and new solid 0.5cm nodule in the superior segment of RLL. Superior segment nodule is at the location of a cluster of micro nodules identified on prior CT. Most recently CT surveillance for lung cancer showed growing soft tissue nodule at the suture line. Bronchoscopy and EBUS on 9/24/19 with pathology positive for recurrent NSCLC. Last presented at [facility] on 9/19/19. PET confirmed only local recurrence. She completed definitive chemo-RT to 60 Gy in 30 fx to recurrent disease on 12/31/19. CT C/A/P 2/27/20 demonstrated slight interval decrease in treated recurrence but new nodules in Right lateral lower lobe of unclear significance. Patient reports she did have a respiratory infection several weeks ago.     Last presented on 3/5/2020, cluster of left upper lobe nodules appear consistent with infectious or inflammatory etiology. Chest CT on 5/27/20 showed enlarging soft tissue opacity at the posterior margin of staple line in RUL. Lesion measures 1.3 x 1.6cm. Additional new 1.7cm opacity at paravertebral pleural margin which is inseparable from RUL lesion. There is a 1 cm opacity in the superior or lateral basal segment of the right lower lobe which has enlarged since 09/17/2019. LLL 1cm lesion is  stable. PET CT on 6/1 with little hypermetabolic activity in RUL staple line and paramediastinal pleura. Interpreted as evolving post radiation changes. Persistent hypermetabolic rectal lesion concerning for malignancy.     BOARD RECOMMENDATIONS:   Agree with PET read that the hilar uptake is consistent with post-radiation changed. Recommend close follow-up with chest CT in 3 months.     CONSULT NEEDED:     [] Surgery    [x] Hem/Onc    [] Rad/Onc    [] Dietary                 [] Social Service    [] Psychology       [] Pulmonology    Clinical Stage: Tumor 3 Node(s) 0 Metastasis   Pathologic Stage: Tumor  Node(s)  Metastasis     GROUP STAGE:     [] O    [] 1A    [] IB    [] IIA    [x] IIB     [] IIIA     [] IIIB     [] IIIC    [] IV                               [] Local recurrence     [] Regional recurrence     [] Distant recurrence                   [x] NSCLC     [] SCLC     Tumor type     Unstageable:      [] Yes     [] No  Metastatic site(s):         [x] Rita'l Treatment Guidelines reviewed and care planned is consistent with guidelines.         (i.e., NCCN, NCI, PD, ACO, AUA, etc.)    PRESENTATION AT CANCER CONFERENCE:         [] Prospective    [] Retrospective     [] Follow-Up          [] Eligible for clinical trial

## 2020-06-03 NOTE — PROGRESS NOTES
"Subjective:       Patient ID: Alesha Toney is a 69 y.o. female.    Chief Complaint: Malignant neoplasm of middle lobe of right lung  Ms. Alesha Toney is a 67-year-old otherwise healthy female who started having some shoulder pain, which was lasting for over six weeks.  She went and saw her primary care physician and underwent an x-ray, which revealed right upper lobe lung mass worrisome for malignancy and a CT scan was recommended, which was done on 6/12/18 and that revealed a newly developing mass, pleural based, lateral posterior segment, right upper lobe 3.5 x 3.5 cm, surrounding stellate margin and patchy infiltrates, particularly superiorly,   anteriorly infiltrates extend perihilar into the anterior segment.  She then underwent CT-guided biopsy, which revealed well-differentiated adenocarcinoma.       Her PET scan from 6/29/18 There is physiologic intracranial, head, and neck activity.  There is a large right upper lobe mass SUV max 9.11.  No local or distant metastatic disease seen.  There is physiologic liver, spleen, GI and  activity.  There are a few liver cysts.  No adenopathy is seen.  The adrenal glands are not enlarged.  No adenopathy is seen.  No suspicious bone lesions are seen.     She underwent VATS with right upper lobectomy. Mediastinal lymphadenectomy on 8/9/18. Pathology revealed "Tumor site: Upper lobe.Tumor size: 7 x 4 x 2.7 cm.Tumor focality: Single tumor.  Histologic type: Mixed invasive mucinous and nonmucinous adenocarcinoma. Histologic grade: G2: Moderately differentiated.Spread through air spaces (STATS): Present. Visceral pleura invasion: Not identified.  Lymphovascular invasion: Not identified. Direct invasion of adjacent structures: No adjacent structures present. Margins: All margins are uninvolved by carcinoma.Distance of invasive carcinoma from closest margin: 1 cm from the bronchial margin.Treatment effect: No known presurgical therapy. Regional lymph nodes: Number of " "lymph nodes involved: 0. Number of lymph nodes examined: 11. Pathologic Stage Classification: pT3 N0"        She completed 4 cycles of adjuvant chemo with Cisplatin and Alimta as of 1/10/19   She is on surveillance.     CT chest of 9/17/19 which reveals "Operative change of right upper lobectomy for small-cell lung cancer with several scattered subsolid opacities and a new solid nodule in the right lower lobe measuring up to 0.5 cm.  We recommend continued surveillance with the role and schedule for such surveillance to be determined by clinical considerations. Sided chest port distal tip terminating at the confluence of the brachiocephalic vein in the SVC.  Correlate with device functioning. Apically predominant emphysematous changes, left greater than right. Aortic annular calcification and multi-vessel coronary artery atherosclerosis. Numerous unchanged hepatic hypodensities, likely cysts"     PET scan from 10/1/19 reveals "1.6 cm soft tissue lesion re-identified posterior to the bronchus intermedius.  No corresponding focal increased radiotracer uptake to suggest local recurrence or metastatic disease. Stable subcentimeter opacities throughout the bilateral lower lobes, too small to characterize with PET-CT. Focal increased radiotracer uptake and subtle wall thickening in the rectum.  Findings are concerning for primary neoplastic process.  Recommend further evaluation with direct visualization"     She returned from [facility] and was advised to proceed with chemo/RT with either Docetaxel/platinum or Carboplatin/Alimta.     She completed chemo/RT with Carboplatin and Alimta as of 12/31/19    HPIShe underwent CT chest on 5/27/2020 which revealed 1. In this patient with history of lung cancer status post right upper lobectomy, there is a soft tissue opacity at the posterior margin of the staple line.  This lesion has been enlarging progressively and now measures 1.3 x 1.6 cm.  There is also a new 1.7 x 1.7 cm " "opacity at the right paravertebral pleural margin which is inseparable from this lesion.  These findings may represent post radiation change in light of the hypermetabolic lesion in this region on PET-CT of 10/01/2019 (image 69/299).There is a 1 cm opacity in the superior or lateral basal segment of the right lower lobe (axial series 4, image 243) which has enlarged since 09/17/2019.  Nature of this lesion is unclear.  Clinical considerations will determine if percutaneous biopsy or metabolic assessment is warranted. 1.0 cm solid opacity with branching configuration (series 4, image 205) is located in the lateral basal segment of the left lower lobe, stable since 02/16/2019 (02/06/2019 axial series 4, image 310). Appearance and bifurcating character suggests mucoid impaction in mildly ectatic peripheral airway, likely not significant. Persistent clustered small centrilobular nodules in the apical segment of the right upper lobe likely reflecting small airway inflammation/infection. Partially visualized left chest port with distal catheter tip at the junction of the brachiocephalic veins and SVC, similar as on 09/17/2019"  She thus underwent PET scan on 6/1/2020 which revealed "No evidence of residual viable lung malignancy.  Evolving post radiation changes in the right paramediastinal lung, with a new irregular subpleural density which may also be related to recent radiation.  Attention on follow-up. Interval decrease in size of a soft tissue lesion along the inferior margin of the lobectomy stable line.  Several stable subcentimeter soft tissue opacities in the lower lobes bilaterally, As above.  Attention on follow-up.  Persistent hypermetabolic rectal lesion concerning for malignancy.  Recommend direct visualization and tissue sampling as clinically indicated"  I discussed her case in thoracic conference today and findings are felt to be related to RT    Review of Systems   Constitutional: Negative for appetite " change, fatigue and unexpected weight change.   HENT: Negative for mouth sores.    Eyes: Negative for visual disturbance.   Respiratory: Positive for shortness of breath. Negative for cough.    Cardiovascular: Negative for chest pain.   Gastrointestinal: Negative for abdominal pain and diarrhea.   Genitourinary: Negative for frequency.   Musculoskeletal: Negative for back pain.   Skin: Negative for rash.   Neurological: Negative for headaches.   Hematological: Negative for adenopathy.   Psychiatric/Behavioral: The patient is not nervous/anxious.    All other systems reviewed and are negative.      Objective:      Physical Exam      LABS:  WBC   Date Value Ref Range Status   05/27/2020 6.19 3.90 - 12.70 K/uL Final     Hemoglobin   Date Value Ref Range Status   05/27/2020 12.1 12.0 - 16.0 g/dL Final     POC Hematocrit   Date Value Ref Range Status   08/09/2018 33 (L) 36 - 54 %PCV Final     Hematocrit   Date Value Ref Range Status   05/27/2020 38.6 37.0 - 48.5 % Final     Platelets   Date Value Ref Range Status   05/27/2020 188 150 - 350 K/uL Final     Gran # (ANC)   Date Value Ref Range Status   05/27/2020 3.5 1.8 - 7.7 K/uL Final     Comment:     The ANC is based on a white cell differential from an   automated cell counter. It has not been microscopically   reviewed for the presence of abnormal cells. Clinical   correlation is required.         Chemistry        Component Value Date/Time     05/27/2020 1306    K 4.8 05/27/2020 1306     05/27/2020 1306    CO2 30 (H) 05/27/2020 1306    BUN 21 05/27/2020 1306    CREATININE 1.3 05/27/2020 1306     (H) 05/27/2020 1306        Component Value Date/Time    CALCIUM 9.8 05/27/2020 1306    ALKPHOS 66 05/27/2020 1306    AST 24 05/27/2020 1306    ALT 22 05/27/2020 1306    BILITOT 0.3 05/27/2020 1306    ESTGFRAFRICA 48.4 (A) 05/27/2020 1306    EGFRNONAA 42.0 (A) 05/27/2020 1306          Assessment:       1. History of lung cancer        Plan:        1.  She is  doing well clinically, reviewed both CT chest and PEt scan, no evidence of recurrence. Reviewed in thoracic conference as well. She will return in 3 months with labs and Ct chest    Above care plan was discussed with patient and accompanying significant other and all questions were addressed to their satisfaction

## 2020-06-09 ENCOUNTER — TELEPHONE (OUTPATIENT)
Dept: GASTROENTEROLOGY | Facility: CLINIC | Age: 70
End: 2020-06-09

## 2020-06-09 ENCOUNTER — LAB VISIT (OUTPATIENT)
Dept: FAMILY MEDICINE | Facility: CLINIC | Age: 70
End: 2020-06-09
Payer: MEDICARE

## 2020-06-09 ENCOUNTER — TELEPHONE (OUTPATIENT)
Dept: ENDOSCOPY | Facility: HOSPITAL | Age: 70
End: 2020-06-09

## 2020-06-09 DIAGNOSIS — Z12.11 SCREENING FOR MALIGNANT NEOPLASM OF COLON: ICD-10-CM

## 2020-06-09 PROCEDURE — U0003 INFECTIOUS AGENT DETECTION BY NUCLEIC ACID (DNA OR RNA); SEVERE ACUTE RESPIRATORY SYNDROME CORONAVIRUS 2 (SARS-COV-2) (CORONAVIRUS DISEASE [COVID-19]), AMPLIFIED PROBE TECHNIQUE, MAKING USE OF HIGH THROUGHPUT TECHNOLOGIES AS DESCRIBED BY CMS-2020-01-R: HCPCS

## 2020-06-09 NOTE — TELEPHONE ENCOUNTER
Spoke with patient about arrival time @0800 thursday     Prep instructions reviewed: the day of procedure take 2 enemas, 1 2hrs prior to arrival and 1 1 hour prior to arrival.               Medications: Do not take Insulin or oral diabetic medications the day of the procedure.  Take as prescribed: heart, seizure and blood pressure medication in the morning with a sip of water (less than an ounce).  Take any breathing medications and bring inhalers to hospital with you Leave all valuables and jewelry at home.     Wear comfortable clothes to procedure to change into hospital gown You cannot drive for 24 hours after your procedure because you will receive sedation for your procedure to make you comfortable.  A ride must be provided at discharge.

## 2020-06-09 NOTE — TELEPHONE ENCOUNTER
Called patient to give instructions on procedure she is having 6/11 Sigmoidoscopy.  Advised,  clear liquids between 7pm and Midnight.  1 enema 2 hours before leaving her house in the morning and then 1 enema 1 hour before leaving her house.  Patient voiced understanding.

## 2020-06-09 NOTE — TELEPHONE ENCOUNTER
Left message instructing patient to call dept @ [phone] between 8am-4pm.    Arrival time to be given @ 0800.  (Message sent via [facility] portal)

## 2020-06-10 LAB — SARS-COV-2 RNA RESP QL NAA+PROBE: NOT DETECTED

## 2020-06-11 ENCOUNTER — ANESTHESIA EVENT (OUTPATIENT)
Dept: ENDOSCOPY | Facility: HOSPITAL | Age: 70
End: 2020-06-11
Payer: MEDICARE

## 2020-06-11 ENCOUNTER — ANESTHESIA (OUTPATIENT)
Dept: ENDOSCOPY | Facility: HOSPITAL | Age: 70
End: 2020-06-11
Payer: MEDICARE

## 2020-06-11 ENCOUNTER — HOSPITAL ENCOUNTER (OUTPATIENT)
Facility: HOSPITAL | Age: 70
Discharge: HOME | End: 2020-06-11
Attending: INTERNAL MEDICINE | Admitting: INTERNAL MEDICINE
Payer: MEDICARE

## 2020-06-11 VITALS
SYSTOLIC BLOOD PRESSURE: 132 MMHG | TEMPERATURE: 98 F | OXYGEN SATURATION: 100 % | RESPIRATION RATE: 20 BRPM | BODY MASS INDEX: 25.71 KG/M2 | HEART RATE: 62 BPM | DIASTOLIC BLOOD PRESSURE: 58 MMHG | WEIGHT: 160 LBS | HEIGHT: 66 IN

## 2020-06-11 DIAGNOSIS — R94.8 ABNORMAL PET SCAN OF COLON: ICD-10-CM

## 2020-06-11 PROCEDURE — 88305 TISSUE EXAM BY PATHOLOGIST: CPT | Mod: 26,,, | Performed by: PATHOLOGY

## 2020-06-11 PROCEDURE — 37000009 HC ANESTHESIA EA ADD 15 MINS: Performed by: INTERNAL MEDICINE

## 2020-06-11 PROCEDURE — 88305 TISSUE EXAM BY PATHOLOGIST: CPT | Performed by: PATHOLOGY

## 2020-06-11 PROCEDURE — 63600175 PHARM REV CODE 636 W HCPCS: Performed by: NURSE ANESTHETIST, CERTIFIED REGISTERED

## 2020-06-11 PROCEDURE — 37000008 HC ANESTHESIA 1ST 15 MINUTES: Performed by: INTERNAL MEDICINE

## 2020-06-11 PROCEDURE — 88305 TISSUE EXAM BY PATHOLOGIST: ICD-10-PCS | Mod: 26,,, | Performed by: PATHOLOGY

## 2020-06-11 PROCEDURE — 25000003 PHARM REV CODE 250: Performed by: INTERNAL MEDICINE

## 2020-06-11 PROCEDURE — 45331 SIGMOIDOSCOPY AND BIOPSY: CPT | Mod: ,,, | Performed by: INTERNAL MEDICINE

## 2020-06-11 PROCEDURE — 27201012 HC FORCEPS, HOT/COLD, DISP: Performed by: INTERNAL MEDICINE

## 2020-06-11 PROCEDURE — 45331 PR SIGMOIDOSCOPY,BIOPSY: ICD-10-PCS | Mod: ,,, | Performed by: INTERNAL MEDICINE

## 2020-06-11 PROCEDURE — 45331 SIGMOIDOSCOPY AND BIOPSY: CPT | Performed by: INTERNAL MEDICINE

## 2020-06-11 RX ORDER — PROPOFOL 10 MG/ML
VIAL (ML) INTRAVENOUS CONTINUOUS PRN
Status: DISCONTINUED | OUTPATIENT
Start: 2020-06-11 | End: 2020-06-11

## 2020-06-11 RX ORDER — SODIUM CHLORIDE 9 MG/ML
INJECTION, SOLUTION INTRAVENOUS CONTINUOUS
Status: DISCONTINUED | OUTPATIENT
Start: 2020-06-11 | End: 2020-06-11 | Stop reason: HOSPADM

## 2020-06-11 RX ORDER — MIDAZOLAM HYDROCHLORIDE 1 MG/ML
INJECTION, SOLUTION INTRAMUSCULAR; INTRAVENOUS
Status: DISCONTINUED | OUTPATIENT
Start: 2020-06-11 | End: 2020-06-11

## 2020-06-11 RX ORDER — LIDOCAINE HYDROCHLORIDE 20 MG/ML
INJECTION INTRAVENOUS
Status: DISCONTINUED | OUTPATIENT
Start: 2020-06-11 | End: 2020-06-11

## 2020-06-11 RX ORDER — SODIUM CHLORIDE 0.9 % (FLUSH) 0.9 %
10 SYRINGE (ML) INJECTION
Status: DISCONTINUED | OUTPATIENT
Start: 2020-06-11 | End: 2020-06-11 | Stop reason: HOSPADM

## 2020-06-11 RX ADMIN — SODIUM CHLORIDE: 0.9 INJECTION, SOLUTION INTRAVENOUS at 09:06

## 2020-06-11 RX ADMIN — LIDOCAINE HYDROCHLORIDE 100 MG: 20 INJECTION, SOLUTION INTRAVENOUS at 09:06

## 2020-06-11 RX ADMIN — MIDAZOLAM 2 MG: 1 INJECTION INTRAMUSCULAR; INTRAVENOUS at 09:06

## 2020-06-11 RX ADMIN — PROPOFOL 150 MCG/KG/MIN: 10 INJECTION, EMULSION INTRAVENOUS at 09:06

## 2020-06-11 NOTE — H&P
[facility]  Gastroenterology  H&P    Patient Name: Alesha Toney  MRN: [id number]  Admission Date: 6/11/2020  Code Status: Full Code    Attending Provider: Gely Yeh MD   Primary Care Physician: Margo Caldwell MD  Principal Problem:<principal problem not specified>    Subjective:     History of Present Illness: Abn imaging    Past Medical History:   Diagnosis Date    Basal cell carcinoma     Breast cyst     Cardiac arrhythmia     Disorder of kidney and ureter     renal stone    Fibrocystic breast     Hypertension     Lung cancer     Lung cancer     Squamous cell carcinoma        Past Surgical History:   Procedure Laterality Date    ADENOIDECTOMY  17yo    ANTERIOR CERVICAL DISCECTOMY W/ FUSION N/A 10/15/2018    Procedure: DISCECTOMY, SPINE, CERVICAL, ANTERIOR APPROACH, WITH FUSION C6/7  Depuy SNS: [employer]/SSE[employer]/Vocal [employer] Regular OR table;  Surgeon: Greyson Bansal MD;  Location: [facility] OR [address] [place] [address];  Service: Neurosurgery;  Laterality: N/A;    APPENDECTOMY      BONE RESECTION, RIB  1980    BREAST BIOPSY Left     at least 40yrs ago in her 20's    BREAST CYST ASPIRATION      BREAST CYST EXCISION      ENDOBRONCHIAL ULTRASOUND N/A 7/10/2018    Procedure: ULTRASOUND, ENDOBRONCHIAL;  Surgeon: Sri Hearn MD;  Location: [facility] OR [address] [place] [address];  Service: Pulmonary;  Laterality: N/A;    ENDOBRONCHIAL ULTRASOUND N/A 9/24/2019    Procedure: ENDOBRONCHIAL ULTRASOUND (EBUS);  Surgeon: Sri Hearn MD;  Location: [facility] OR [address] [place] [address];  Service: Pulmonary;  Laterality: N/A;    HYSTERECTOMY      @47yrs of age    INSERTION OF TUNNELED CENTRAL VENOUS CATHETER (CVC) WITH SUBCUTANEOUS PORT N/A 9/25/2018    Procedure: [id number];  Surgeon: Dosc Diagnostic Provider;  Location: [facility] OR [address] [place] [address];  Service: Radiology;  Laterality: N/A;    KIDNEY SURGERY      17yo    OOPHORECTOMY      @47yrs of age    TONSILLECTOMY      VIDEO-ASSISTED THORACOSCOPIC LOBECTOMY Right 8/9/2018    Procedure: VATS,  "WITH LOBECTOMY Possible chest wall resection;  Surgeon: Philip Messina MD;  Location: [facility] OR [address] [place] [address];  Service: Thoracic;  Laterality: Right;  Have open pan available.       Review of patient's allergies indicates:   Allergen Reactions    Adhesive Itching, Rash and Blisters     INCLUDES EKG PADS    Ciprofloxacin Hives     Hives    Iodinated contrast media     Pcn [penicillins]      rash    Phenergan plain Other (See Comments)     "crazy behavior"    Phenergan [promethazine]     Tramadol     Vancomycin analogues      Red man syndrome     Family History     Problem Relation (Age of Onset)    Cancer Father    Cataracts Mother    Diabetes Brother, Paternal Aunt    Heart attack Paternal Aunt, Maternal Grandfather    Heart failure Brother    Hypertension Brother    Stroke Mother        Tobacco Use    Smoking status: Former Smoker     Packs/day: 1.00     Years: 30.00     Pack years: 30.00     Types: Cigarettes     Last attempt to quit:      Years since quittin.4    Smokeless tobacco: Never Used   Substance and Sexual Activity    Alcohol use: Not Currently     Frequency: Never     Drinks per session: Patient refused     Binge frequency: Never     Comment: socially     Drug use: No    Sexual activity: Yes     Partners: Female     Review of Systems   Constitutional: Negative for chills and unexpected weight change.   Respiratory: Negative for shortness of breath and wheezing.    Cardiovascular: Negative for chest pain and palpitations.   Gastrointestinal: Negative for abdominal distention.     Objective:     Vital Signs (Most Recent):  Temp: 98.3 °F (36.8 °C) (20 08)  Pulse: 72 (20 08)  Resp: 20 (20 08)  BP: (!) 189/84 (20 08)  SpO2: 99 % (20 08) Vital Signs (24h Range):  Temp:  [98.3 °F (36.8 °C)] 98.3 °F (36.8 °C)  Pulse:  [72] 72  Resp:  [20] 20  SpO2:  [99 %] 99 %  BP: (189)/(84) 189/84     Weight: 72.6 kg (160 lb) (20 0842)  Body mass index is 25.82 " kg/m².    No intake or output data in the 24 hours ending 06/11/20 0927    Lines/Drains/Airways     Central Venous Catheter Line                 Port A Cath Single Lumen 09/25/18 1543 left subclavian 624 days          Epidural Line                 Neuraxial Analgesia/Anesthesia Assessment (using dermatomes) Epidural 08/09/18 0648 672 days          Peripheral Intravenous Line                 Peripheral IV - Single Lumen 12/05/19 0955 22 G Left Forearm 188 days         Peripheral IV - Single Lumen 06/11/20 0858 20 G Left Antecubital less than 1 day                Physical Exam   Constitutional: She is oriented to person, place, and time. She appears well-developed and well-nourished. No distress.   HENT:   Head: Normocephalic and atraumatic.   Nose: Nose normal.   Eyes: Conjunctivae and EOM are normal. No scleral icterus.   Neck: Normal range of motion. Neck supple. No thyromegaly present.   Cardiovascular: Normal rate, regular rhythm and normal heart sounds. Exam reveals no gallop and no friction rub.   Pulmonary/Chest: Effort normal and breath sounds normal. No respiratory distress. She has no wheezes.   Abdominal: Soft. Bowel sounds are normal. She exhibits no distension. There is no tenderness.   Neurological: She is alert and oriented to person, place, and time.   Skin: Skin is warm and dry. No rash noted. She is not diaphoretic. No erythema.   Psychiatric: She has a normal mood and affect. Her behavior is normal.   Nursing note and vitals reviewed.          Assessment/Plan:     Active Diagnoses:    Diagnosis Date Noted POA    Abnormal PET scan of colon [R94.8] 06/11/2020 Yes      Problems Resolved During this Admission:     Plan  1. Flexible sigmoidoscopy    Gely Yeh MD  Gastroenterology  [facility]

## 2020-06-11 NOTE — [employer] PATIENT INSTRUCTIONS
Discharge Summary/Instructions after an Endoscopic Procedure  Patient Name: Alesha Toney  Patient MRN: [id number]  Patient YOB: 1950  Thursday, June 11, 2020  Gely Yeh MD  Your health is very important to us during the Covid Crisis. Following your   procedure today, you will receive a daily text for 2 weeks asking about   signs or symptoms of Covid 19.  Please respond to this text when you   receive it so we can follow up and keep you as safe as possible.   RESTRICTIONS:  During your procedure today, you received medications for sedation.  These   medications may affect your judgment, balance and coordination.  Therefore,   for 24 hours, you have the following restrictions:   - DO NOT drive a car, operate machinery, make legal/financial decisions,   sign important papers or drink alcohol.    ACTIVITY:  Today: no heavy lifting, straining or running due to procedural   sedation/anesthesia.  The following day: return to full activity including work.  DIET:  Eat and drink normally unless instructed otherwise.     TREATMENT FOR COMMON SIDE EFFECTS:  - Mild abdominal pain, nausea, belching, bloating or excessive gas:  rest,   eat lightly and use a heating pad.  - Sore Throat: treat with throat lozenges and/or gargle with warm salt   water.  - Because air was used during the procedure, expelling large amounts of air   from your rectum or belching is normal.  - If a bowel prep was taken, you may not have a bowel movement for 1-3 days.    This is normal.  SYMPTOMS TO WATCH FOR AND REPORT TO YOUR PHYSICIAN:  1. Abdominal pain or bloating, other than gas cramps.  2. Chest pain.  3. Back pain.  4. Signs of infection such as: chills or fever occurring within 24 hours   after the procedure.  5. Rectal bleeding, which would show as bright red, maroon, or black stools.   (A tablespoon of blood from the rectum is not serious, especially if   hemorrhoids are present.)  6. Vomiting.  7. Weakness or dizziness.  GO  DIRECTLY TO THE NEAREST EMERGENCY ROOM IF YOU HAVE ANY OF THE FOLLOWING:      Difficulty breathing              Chills and/or fever over 101 F   Persistent vomiting and/or vomiting blood   Severe abdominal pain   Severe chest pain   Black, tarry stools   Bleeding- more than one tablespoon   Any other symptom or condition that you feel may need urgent attention  Your doctor recommends these additional instructions:  If any biopsies were taken, your doctors clinic will contact you in 1 to 2   weeks with any results.  - Discharge patient to home.   - Patient has a contact number available for emergencies.  The signs and   symptoms of potential delayed complications were discussed with the   patient.  Return to normal activities tomorrow.  Written discharge   instructions were provided to the patient.   - Resume previous diet.   - Continue present medications.   - Await pathology results.   - Will discuss with [facility] regarding candidacy for ESD.  For questions, problems or results please call your physician - Gely Yeh MD at Work:  [phone].  EMERGENCY PHONE NUMBER: [phone],  LAB RESULTS: [phone]  IF A COMPLICATION OR EMERGENCY SITUATION ARISES AND YOU ARE UNABLE TO REACH   YOUR PHYSICIAN - GO DIRECTLY TO THE EMERGENCY ROOM.  Gely Yeh MD  6/11/2020 9:55:54 AM  This report has been verified and signed electronically.  [employer]

## 2020-06-11 NOTE — ANESTHESIA POSTPROCEDURE EVALUATION
Anesthesia Post Evaluation    Patient: Alesha Toney    Procedure(s) Performed: Procedure(s) (LRB):  SIGMOIDOSCOPY, FLEXIBLE (N/A)    Final Anesthesia Type: MAC    Patient location during evaluation: GI PACU  Patient participation: Yes- Able to Participate  Level of consciousness: awake and alert  Post-procedure vital signs: reviewed and stable  Pain management: adequate  Airway patency: patent    PONV status at discharge: No PONV  Anesthetic complications: no      Cardiovascular status: blood pressure returned to baseline and hemodynamically stable  Respiratory status: unassisted, spontaneous ventilation and room air  Hydration status: euvolemic  Follow-up not needed.          Vitals Value Taken Time   /84 6/11/2020  8:58 AM   Temp 36.8 °C (98.3 °F) 6/11/2020  8:58 AM   Pulse 72 6/11/2020  8:58 AM   Resp 20 6/11/2020  8:58 AM   SpO2 99 % 6/11/2020  8:58 AM         No case tracking events are documented in the log.      Pain/Grady Score: No data recorded

## 2020-06-11 NOTE — PLAN OF CARE
Past Medical History:   Diagnosis Date    Basal cell carcinoma     Breast cyst     Cardiac arrhythmia     Disorder of kidney and ureter     renal stone    Fibrocystic breast     Hypertension     Lung cancer     Lung cancer     Squamous cell carcinoma      Recovery complete. Patient recovered to baseline. Discharge instructions reviewed with patient. VSS.

## 2020-06-11 NOTE — ANESTHESIA PREPROCEDURE EVALUATION
06/11/2020  Alesha Toney is a 69 y.o., female presents for flex sig under MAC.    Past Medical History:   Diagnosis Date    Basal cell carcinoma     Breast cyst     Cardiac arrhythmia     Disorder of kidney and ureter     renal stone    Fibrocystic breast     Hypertension     Lung cancer     Lung cancer     Squamous cell carcinoma      Past Surgical History:   Procedure Laterality Date    ADENOIDECTOMY  17yo    ANTERIOR CERVICAL DISCECTOMY W/ FUSION N/A 10/15/2018    Procedure: DISCECTOMY, SPINE, CERVICAL, ANTERIOR APPROACH, WITH FUSION C6/7  Depuy SNS: [employer]/SSE[employer]/Vocal Cord Regular OR table;  Surgeon: Greyson Bansal MD;  [facility];  Service: Neurosurgery;  Laterality: N/A;    APPENDECTOMY      BONE RESECTION, RIB  1980    BREAST BIOPSY Left     at least 40yrs ago in her 20's    BREAST CYST ASPIRATION      BREAST CYST EXCISION      ENDOBRONCHIAL ULTRASOUND N/A 7/10/2018    Procedure: ULTRASOUND, ENDOBRONCHIAL;  Surgeon: Sri Hearn MD;  [facility];  Service: Pulmonary;  Laterality: N/A;    ENDOBRONCHIAL ULTRASOUND N/A 9/24/2019    Procedure: ENDOBRONCHIAL ULTRASOUND (EBUS);  Surgeon: Sri Hearn MD;  [facility];  Service: Pulmonary;  Laterality: N/A;    HYSTERECTOMY      @47yrs of age    INSERTION OF TUNNELED CENTRAL VENOUS CATHETER (CVC) WITH SUBCUTANEOUS PORT N/A 9/25/2018    Procedure: [id number]-AXGH-Y-CATH;  Surgeon: [facility] Provider;  Location: [facility] OR [address] [place] [address];  Service: Radiology;  Laterality: N/A;    KIDNEY SURGERY      17yo    OOPHORECTOMY      @47yrs of age    TONSILLECTOMY      VIDEO-ASSISTED THORACOSCOPIC LOBECTOMY Right 8/9/2018    Procedure: VATS, WITH LOBECTOMY Possible chest wall resection;  Surgeon: Philip Messina MD;  Location: [facility] OR [address] [place] [address];  Service: Thoracic;  Laterality: Right;  Have open  pan available.         Anesthesia Evaluation    I have reviewed the Patient Summary Reports.    I have reviewed the Nursing Notes.    I have reviewed the Medications.     Review of Systems  Anesthesia Hx:  No problems with previous Anesthesia  History of prior surgery of interest to airway management or planning: Previous anesthesia: General EBUS 7/10/18 with general anesthesia.  Procedure performed at an [facility]. Airway issues documented on chart review include laryngeal mask airway used  Denies Family Hx of Anesthesia complications.   Denies Personal Hx of Anesthesia complications.   Social:  Former Smoker, No Alcohol Use Quit 6/2015  1ppd x 30 yrs  Wine 1 glass 3 times weekly   Hematology/Oncology:  Hematology Normal       Lung Current/Recent Cancer. (NSC LUNG CANCER, RECENT R LOBECTOMY) right surgery  Oncology Comments: Non-small cell lung cancer (NSCLC)  Basal cell carcinoma     EENT/Dental:  EENT/Dental Normal Contact lenses   Cardiovascular:   Hypertension CAD  Dysrhythmias (SVT)   Denies Angina.  Functional Capacity 4 METS, ABLE TO CLIMB 2 FLIGHTS OF STAIRS  Hypertension, Essential Hypertension  Disorder of Cardiac Rhythm (H/O SVT)    Pulmonary:   COPD Denies Recent URI.  Denies Sleep Apnea. Lung cancer Pulmonary Symptoms: (A LITTLE SOB, RECENT R LOBECTOMY)  Hx of Lung/Chest Surgery or Procedure: Recent, right, Lobectomy    Renal/:   Chronic Renal Disease, CRI    Hepatic/GI:   GERD (Tums or Zantac prn)  Hepatic/GI Symptoms:  Esophageal / Stomach Disorders Gerd (CONTROLLED WITH TUMS)    Musculoskeletal:   MRI 2/2018 Musculoskeletal General/Symptoms: Functional capacity is ambulatory without assistance. CHEST PAIN RELATED TO RECENT R LUNG LOBECTOMY SURGERY Spine Disorders: cervical Disc disease  Cervical Spine Disorder, Cervical Disc Disease, Radiculopathy    Neurological:   Neuromuscular Disease,  Neuro Symptoms RUE WEAKNESS, R INDEX FINGER PAINPain , onset is acute , location of R shoulder, upper  chest , quality of aching/dull , radiating to R arm into fingers , severity is a 8   Peripheral Neuropathy  Neuromuscular Disease CERVICAL RADICULOPATHY  Endocrine:  Endocrine Normal Denies Diabetes.    Psych:  Psychiatric Normal           Physical Exam  General:  Well nourished    Airway/Jaw/Neck:  Airway Findings: Mouth Opening: Normal Tongue: Normal  General Airway Assessment: Adult  Mallampati: II  TM Distance: Normal, at least 6 cm       Chest/Lungs:  Chest/Lungs Findings: Clear to auscultation, Normal Respiratory Rate     Heart/Vascular:  Heart Findings: Rate: Normal  Rhythm: Regular Rhythm  Sounds: Normal        Mental Status:  Mental Status Findings:  Cooperative, Alert and Oriented         Anesthesia Plan  Type of Anesthesia, risks & benefits discussed:  Anesthesia Type:  MAC  Patient's Preference:   Intra-op Monitoring Plan: standard ASA monitors  Intra-op Monitoring Plan Comments:   Post Op Pain Control Plan: multimodal analgesia and per primary service following discharge from PACU  Post Op Pain Control Plan Comments:   Induction:   IV  Beta Blocker:         Informed Consent: Patient understands risks and agrees with Anesthesia plan.  Questions answered. Anesthesia consent signed with patient.  ASA Score: 3     Day of Surgery Review of History & Physical:  There are no significant changes.          Ready For Surgery From Anesthesia Perspective.

## 2020-06-11 NOTE — PLAN OF CARE
Dr. Yeh visited at bedside, discussed findings and recommendations with patient; all questions asked and answered. Verbalized understanding of information given. Handout provided at time of discharge.

## 2020-06-17 LAB
FINAL PATHOLOGIC DIAGNOSIS: NORMAL
GROSS: NORMAL

## 2020-06-17 RX ORDER — MELOXICAM 15 MG/1
TABLET ORAL
Qty: 90 TABLET | Refills: 0 | OUTPATIENT
Start: 2020-06-17

## 2020-06-22 ENCOUNTER — PATIENT MESSAGE (OUTPATIENT)
Dept: GASTROENTEROLOGY | Facility: CLINIC | Age: 70
End: 2020-06-22

## 2020-06-23 ENCOUNTER — PATIENT MESSAGE (OUTPATIENT)
Dept: HEMATOLOGY/ONCOLOGY | Facility: CLINIC | Age: 70
End: 2020-06-23

## 2020-06-23 ENCOUNTER — PATIENT MESSAGE (OUTPATIENT)
Dept: GASTROENTEROLOGY | Facility: CLINIC | Age: 70
End: 2020-06-23

## 2020-06-23 ENCOUNTER — PATIENT MESSAGE (OUTPATIENT)
Dept: INTERNAL MEDICINE | Facility: CLINIC | Age: 70
End: 2020-06-23

## 2020-06-25 ENCOUNTER — OFFICE VISIT (OUTPATIENT)
Dept: HEMATOLOGY/ONCOLOGY | Facility: CLINIC | Age: 70
End: 2020-06-25
Payer: MEDICARE

## 2020-06-25 ENCOUNTER — TELEPHONE (OUTPATIENT)
Dept: HEMATOLOGY/ONCOLOGY | Facility: CLINIC | Age: 70
End: 2020-06-25

## 2020-06-25 DIAGNOSIS — Z85.118 HISTORY OF LUNG CANCER: Primary | ICD-10-CM

## 2020-06-25 DIAGNOSIS — K62.1 RECTAL POLYP: ICD-10-CM

## 2020-06-25 PROCEDURE — 99215 PR OFFICE/OUTPT VISIT, EST, LEVL V, 40-54 MIN: ICD-10-PCS | Mod: 95,,, | Performed by: INTERNAL MEDICINE

## 2020-06-25 PROCEDURE — 99215 OFFICE O/P EST HI 40 MIN: CPT | Mod: 95,,, | Performed by: INTERNAL MEDICINE

## 2020-06-25 NOTE — TELEPHONE ENCOUNTER
----- Message from Pam Dhaliwal MD sent at 6/25/2020 10:59 AM CDT -----  Also schedule CBC,CMP, and CT chest, abdomen/pelvis with contrast and see me in early September

## 2020-06-25 NOTE — Clinical Note
Hi,  I spoke with her and recommended a full colonoscopy and then an ESD to the rectal polyp. She is agreeable, wants to know if both procedures can be done at the same time   Please let me know what you all think. Thank you

## 2020-06-25 NOTE — PROGRESS NOTES
"Subjective:       Patient ID: Alesha Toney is a 69 y.o. female.    Chief Complaint: No chief complaint on file.  Ms. Alesha Toney is a 67-year-old otherwise healthy female who started having some shoulder pain, which was lasting for over six weeks.  She went and saw her primary care physician and underwent an x-ray, which revealed right upper lobe lung mass worrisome for malignancy and a CT scan was recommended, which was done on 6/12/18 and that revealed a newly developing mass, pleural based, lateral posterior segment, right upper lobe 3.5 x 3.5 cm, surrounding stellate margin and patchy infiltrates, particularly superiorly,   anteriorly infiltrates extend perihilar into the anterior segment.  She then underwent CT-guided biopsy, which revealed well-differentiated adenocarcinoma.       Her PET scan from 6/29/18 There is physiologic intracranial, head, and neck activity.  There is a large right upper lobe mass SUV max 9.11.  No local or distant metastatic disease seen.  There is physiologic liver, spleen, GI and  activity.  There are a few liver cysts.  No adenopathy is seen.  The adrenal glands are not enlarged.  No adenopathy is seen.  No suspicious bone lesions are seen.     She underwent VATS with right upper lobectomy. Mediastinal lymphadenectomy on 8/9/18. Pathology revealed "Tumor site: Upper lobe.Tumor size: 7 x 4 x 2.7 cm.Tumor focality: Single tumor.  Histologic type: Mixed invasive mucinous and nonmucinous adenocarcinoma. Histologic grade: G2: Moderately differentiated.Spread through air spaces (STATS): Present. Visceral pleura invasion: Not identified.  Lymphovascular invasion: Not identified. Direct invasion of adjacent structures: No adjacent structures present. Margins: All margins are uninvolved by carcinoma.Distance of invasive carcinoma from closest margin: 1 cm from the bronchial margin.Treatment effect: No known presurgical therapy. Regional lymph nodes: Number of lymph nodes involved: " "0. Number of lymph nodes examined: 11. Pathologic Stage Classification: pT3 N0"        She completed 4 cycles of adjuvant chemo with Cisplatin and Alimta as of 1/10/19   She is on surveillance.     CT chest of 9/17/19 which reveals "Operative change of right upper lobectomy for small-cell lung cancer with several scattered subsolid opacities and a new solid nodule in the right lower lobe measuring up to 0.5 cm.  We recommend continued surveillance with the role and schedule for such surveillance to be determined by clinical considerations. Sided chest port distal tip terminating at the confluence of the brachiocephalic vein in the SVC.  Correlate with device functioning. Apically predominant emphysematous changes, left greater than right. Aortic annular calcification and multi-vessel coronary artery atherosclerosis. Numerous unchanged hepatic hypodensities, likely cysts"     PET scan from 10/1/19 reveals "1.6 cm soft tissue lesion re-identified posterior to the bronchus intermedius.  No corresponding focal increased radiotracer uptake to suggest local recurrence or metastatic disease. Stable subcentimeter opacities throughout the bilateral lower lobes, too small to characterize with PET-CT. Focal increased radiotracer uptake and subtle wall thickening in the rectum.  Findings are concerning for primary neoplastic process.  Recommend further evaluation with direct visualization"     She returned from [facility] and was advised to proceed with chemo/RT with either Docetaxel/platinum or Carboplatin/Alimta.     She completed chemo/RT with Carboplatin and Alimta as of 12/31/19     She underwent CT chest on 5/27/2020 which revealed 1. In this patient with history of lung cancer status post right upper lobectomy, there is a soft tissue opacity at the posterior margin of the staple line.  This lesion has been enlarging progressively and now measures 1.3 x 1.6 cm.  There is also a new 1.7 x 1.7 cm opacity at the right " "paravertebral pleural margin which is inseparable from this lesion.  These findings may represent post radiation change in light of the hypermetabolic lesion in this region on PET-CT of 10/01/2019 (image 69/[id number]).There is a 1 cm opacity in the superior or lateral basal segment of the right lower lobe (axial series 4, image 243) which has enlarged since 09/17/2019.  Nature of this lesion is unclear.  Clinical considerations will determine if percutaneous biopsy or metabolic assessment is warranted. 1.0 cm solid opacity with branching configuration (series 4, image 205) is located in the lateral basal segment of the left lower lobe, stable since 02/16/2019 (02/06/2019 axial series 4, image 310). Appearance and bifurcating character suggests mucoid impaction in mildly ectatic peripheral airway, likely not significant. Persistent clustered small centrilobular nodules in the apical segment of the right upper lobe likely reflecting small airway inflammation/infection. Partially visualized left chest port with distal catheter tip at the junction of the brachiocephalic veins and SVC, similar as on 09/17/2019"  She thus underwent PET scan on 6/1/2020 which revealed "No evidence of residual viable lung malignancy.  Evolving post radiation changes in the right paramediastinal lung, with a new irregular subpleural density which may also be related to recent radiation.  Attention on follow-up. Interval decrease in size of a soft tissue lesion along the inferior margin of the lobectomy stable line.  Several stable subcentimeter soft tissue opacities in the lower lobes bilaterally, As above.  Attention on follow-up.  Persistent hypermetabolic rectal lesion concerning for malignancy.  Recommend direct visualization and tissue sampling as clinically indicated"  I discussed her case in thoracic conference today and findings are felt to be related to RT    HPINereyda has since undergone a flexible sigmoidoscopy with revealed a "A polypoid " "non-obstructing large mass was found in the rectum. The        mass was non-circumferential. The mass measured five cm in length" Pathology reveals high grade dysplasia with no evidence of invasion/  She is concerned about this finding and come sin to discuss further management    Review of Systems   Constitutional: Negative for appetite change, fatigue and unexpected weight change.   HENT: Negative for mouth sores.    Eyes: Negative for visual disturbance.   Respiratory: Negative for cough and shortness of breath.    Cardiovascular: Negative for chest pain.   Gastrointestinal: Negative for abdominal pain and diarrhea.   Genitourinary: Negative for frequency.   Musculoskeletal: Negative for back pain.   Integumentary:  Negative for rash.   Neurological: Negative for headaches.   Hematological: Negative for adenopathy.   Psychiatric/Behavioral: The patient is nervous/anxious.    All other systems reviewed and are negative.        Objective:      Physical Exam    Assessment:       1. History of lung cancer    2. Rectal polyp        Plan:        1, Doing well. Moitor.  2. Reviewed path, recommended a full colonoscopy to make sure no other polyps or cancer in colon. Agree with ESD if it can be done.  Sent messages about the above to Dr. Durbin and Dr. Persaud.    Above care plan was discussed with patient and all questions were addressed to her satisfaction      "

## 2020-07-09 ENCOUNTER — CLINICAL SUPPORT (OUTPATIENT)
Dept: GASTROENTEROLOGY | Facility: CLINIC | Age: 70
End: 2020-07-09
Payer: MEDICARE

## 2020-07-09 PROCEDURE — 99213 PR OFFICE/OUTPT VISIT, EST, LEVL III, 20-29 MIN: ICD-10-PCS | Mod: 95,,, | Performed by: INTERNAL MEDICINE

## 2020-07-09 PROCEDURE — 99213 OFFICE O/P EST LOW 20 MIN: CPT | Mod: 95,,, | Performed by: INTERNAL MEDICINE

## 2020-07-09 NOTE — PROGRESS NOTES
"Telemedicine Video Visit    Subjective:      Patient ID: Alesha Toney is a 69 y.o. female.    Chief Complaint: No chief complaint on file.      HPI:  Alesha Toney is a 69 y.o. female with history of lung Ca followed by Dr. Dhaliwal who was found to have a large rectal polyp on recent flex sig. Biopsies showed HGD but no cancer. Never had a colonoscopy before. Patient is asymptomatic.     Review of patient's allergies indicates:   Allergen Reactions    Adhesive Itching, Rash and Blisters     INCLUDES EKG PADS    Ciprofloxacin Hives     Hives    Iodinated contrast media     Pcn [penicillins]      rash    Phenergan plain Other (See Comments)     "crazy behavior"    Phenergan [promethazine]     Tramadol     Vancomycin analogues      Red man syndrome       Past Medical History:   Diagnosis Date    Basal cell carcinoma     Breast cyst     Cardiac arrhythmia     Disorder of kidney and ureter     renal stone    Fibrocystic breast     Hypertension     Lung cancer     Lung cancer     Squamous cell carcinoma        Past Surgical History:   Procedure Laterality Date    ADENOIDECTOMY  19yo    ANTERIOR CERVICAL DISCECTOMY W/ FUSION N/A 10/15/2018    Procedure: DISCECTOMY, SPINE, CERVICAL, ANTERIOR APPROACH, WITH FUSION C6/7  [facility] Regular OR table;  Surgeon: Greyson Bansal MD;  Location: [facility] OR 2ND FLR;  Service: Neurosurgery;  Laterality: N/A;    APPENDECTOMY      BONE RESECTION, RIB  1980    BREAST BIOPSY Left     at least 40yrs ago in her 20's    BREAST CYST ASPIRATION      BREAST CYST EXCISION      ENDOBRONCHIAL ULTRASOUND N/A 7/10/2018    Procedure: ULTRASOUND, ENDOBRONCHIAL;  Surgeon: Sri Hearn MD;  [facility];  Service: Pulmonary;  Laterality: N/A;    ENDOBRONCHIAL ULTRASOUND N/A 9/24/2019    Procedure: ENDOBRONCHIAL ULTRASOUND (EBUS);  Surgeon: Sri Hearn MD;  Location: [facility] OR 2ND FLR;  Service: Pulmonary;  Laterality: N/A;    " FLEXIBLE SIGMOIDOSCOPY  06/11/2020    with biopsies    FLEXIBLE SIGMOIDOSCOPY N/A 6/11/2020    Procedure: SIGMOIDOSCOPY, FLEXIBLE;  Surgeon: Gely Yeh MD;  Location: [facility];  Service: Endoscopy;  Laterality: N/A;    HYSTERECTOMY      @47yrs of age    INSERTION OF TUNNELED CENTRAL VENOUS CATHETER (CVC) WITH SUBCUTANEOUS PORT N/A 9/25/2018    Procedure: [id number];  Surgeon: Dosc Diagnostic Provider;  Location: [facility] OR [address] [place] [address];  Service: Radiology;  Laterality: N/A;    KIDNEY SURGERY      17yo    OOPHORECTOMY      @47yrs of age    TONSILLECTOMY      VIDEO-ASSISTED THORACOSCOPIC LOBECTOMY Right 8/9/2018    Procedure: VATS, WITH LOBECTOMY Possible chest wall resection;  Surgeon: Philip Messina MD;  Location: [facility] OR [address] [place] [address];  Service: Thoracic;  Laterality: Right;  Have open pan available.       Family History   Problem Relation Age of Onset    Stroke Mother     Cataracts Mother     Cancer Father         colon cancer    Diabetes Brother     Heart failure Brother     Hypertension Brother     Heart attack Paternal Aunt     Heart attack Maternal Grandfather     Diabetes Paternal Aunt     Anesthesia problems Neg Hx     Blindness Neg Hx     Amblyopia Neg Hx     Glaucoma Neg Hx     Macular degeneration Neg Hx     Retinal detachment Neg Hx     Strabismus Neg Hx     Thyroid disease Neg Hx        Social History     Socioeconomic History    Marital status: Significant Other     Spouse name: Not on file    Number of children: Not on file    Years of education: Not on file    Highest education level: Not on file   Occupational History    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   Social Needs    Financial resource strain: Not hard at all    Food insecurity     Worry: Never true     Inability: Never true    Transportation needs     Medical: No     Non-medical: No   Tobacco Use    Smoking status: Former Smoker     Packs/day: 1.00     Years: 30.00     Pack years:  30.00     Types: Cigarettes     Quit date:      Years since quittin.5    Smokeless tobacco: Never Used   Substance and Sexual Activity    Alcohol use: Not Currently     Frequency: Never     Drinks per session: Patient refused     Binge frequency: Never     Comment: socially     Drug use: No    Sexual activity: Yes     Partners: Female   Lifestyle    Physical activity     Days per week: 0 days     Minutes per session: 0 min    Stress: To some extent   Relationships    Social connections     Talks on phone: More than three times a week     Gets together: Twice a week     Attends Nondenominational service: Not on file     Active member of club or organization: No     Attends meetings of clubs or organizations: Never     Relationship status: Living with partner   Other Topics Concern    Are you pregnant or think you may be? Not Asked    Breast-feeding Not Asked   Social History Narrative    Exercise:  Walks 3741-4500 steps daily.       Current Outpatient Medications   Medication Sig Dispense Refill    atenoloL (TENORMIN) 50 MG tablet Half a tab qid 180 tablet 3    calcium carbonate (CALCIUM ANTACID) 300 mg (750 mg) Chew Take by mouth.      esomeprazole (NEXIUM) 20 MG capsule Take 20 mg by mouth before breakfast.      HYDROcodone-acetaminophen (NORCO) 5-325 mg per tablet Take 1 tablet by mouth every 12 (twelve) hours as needed for Pain. 60 tablet 0    LORazepam (ATIVAN) 0.5 MG tablet Take 1 tablet (0.5 mg total) by mouth every 12 (twelve) hours as needed for Anxiety. 30 tablet 0    losartan (COZAAR) 50 MG tablet 1 tab po bid 180 tablet 3     No current facility-administered medications for this visit.           Assessment/Plan:     -Rectal polyp  Bulky per flex sig pictures.   Non cancerous per biopsies.   Appears to be endoscopically removable.   -Will schedule for EMR or hybrid EMR with a full colonoscopy.  -Discussed risks and benefits with the potential of possible surgery if polyp can't be removed  endoscopically or major complications encountered.     The patient location is:  Patient Home   The chief complaint leading to consultation is: rectal polyp.   Visit type: Virtual visit with synchronous audio and video  Total time spent with patient: 20 min  Each patient to whom he or she provides medical services by telemedicine is:  (1) informed of the relationship between the physician and patient and the respective role of any other health care provider with respect to management of the patient; and (2) notified that he or she may decline to receive medical services by telemedicine and may withdraw from such care at any time.

## 2020-07-14 ENCOUNTER — TELEPHONE (OUTPATIENT)
Dept: GASTROENTEROLOGY | Facility: CLINIC | Age: 70
End: 2020-07-14

## 2020-07-14 ENCOUNTER — TELEPHONE (OUTPATIENT)
Dept: GASTROENTEROLOGY | Facility: HOSPITAL | Age: 70
End: 2020-07-14

## 2020-07-14 DIAGNOSIS — Z01.812 PRE-PROCEDURE LAB EXAM: Primary | ICD-10-CM

## 2020-07-14 NOTE — TELEPHONE ENCOUNTER
Dr Marbin Carbajal please let me know what prep is needed for this procedure EMR/ESD and what instruments you need from [facility]. Thank you

## 2020-07-14 NOTE — TELEPHONE ENCOUNTER
Called patient to get her scheduled for rectal EMR/ESD.  As per Dr Marbin Carbajal's message:  Need to schedule at [facility] all morning. Prefer mondays/tuesdays. Let me know of the time to coordinate the whole process.   Patient did not answer left message.

## 2020-07-15 ENCOUNTER — PATIENT MESSAGE (OUTPATIENT)
Dept: INTERNAL MEDICINE | Facility: CLINIC | Age: 70
End: 2020-07-15

## 2020-07-15 RX ORDER — HYDROCODONE BITARTRATE AND ACETAMINOPHEN 5; 325 MG/1; MG/1
1 TABLET ORAL EVERY 12 HOURS PRN
Qty: 60 TABLET | Refills: 0 | Status: SHIPPED | OUTPATIENT
Start: 2020-07-15 | End: 2020-08-17 | Stop reason: SDUPTHER

## 2020-08-03 DIAGNOSIS — M89.8X1 PAIN OF RIGHT SCAPULA: Primary | ICD-10-CM

## 2020-08-04 ENCOUNTER — HOSPITAL ENCOUNTER (OUTPATIENT)
Dept: RADIOLOGY | Facility: HOSPITAL | Age: 70
Discharge: HOME | End: 2020-08-04
Attending: INTERNAL MEDICINE
Payer: MEDICARE

## 2020-08-04 DIAGNOSIS — M89.8X1 PAIN OF RIGHT SCAPULA: ICD-10-CM

## 2020-08-04 PROCEDURE — 71046 XR CHEST PA AND LATERAL: ICD-10-PCS | Mod: 26,,, | Performed by: RADIOLOGY

## 2020-08-04 PROCEDURE — 71046 X-RAY EXAM CHEST 2 VIEWS: CPT | Mod: TC

## 2020-08-04 PROCEDURE — 71046 X-RAY EXAM CHEST 2 VIEWS: CPT | Mod: 26,,, | Performed by: RADIOLOGY

## 2020-08-17 ENCOUNTER — TELEPHONE (OUTPATIENT)
Dept: ORTHOPEDICS | Facility: CLINIC | Age: 70
End: 2020-08-17

## 2020-08-17 ENCOUNTER — PATIENT MESSAGE (OUTPATIENT)
Dept: INTERNAL MEDICINE | Facility: CLINIC | Age: 70
End: 2020-08-17

## 2020-08-17 DIAGNOSIS — M50.30 DDD (DEGENERATIVE DISC DISEASE), CERVICAL: Primary | ICD-10-CM

## 2020-08-17 RX ORDER — HYDROCODONE BITARTRATE AND ACETAMINOPHEN 5; 325 MG/1; MG/1
1 TABLET ORAL EVERY 12 HOURS PRN
Qty: 60 TABLET | Refills: 0 | Status: SHIPPED | OUTPATIENT
Start: 2020-08-17 | End: 2020-08-18 | Stop reason: SDUPTHER

## 2020-08-17 RX ORDER — DIAZEPAM 5 MG/1
TABLET ORAL
Qty: 30 TABLET | Refills: 0 | Status: SHIPPED | OUTPATIENT
Start: 2020-08-17 | End: 2020-08-18 | Stop reason: SDUPTHER

## 2020-08-18 ENCOUNTER — TELEPHONE (OUTPATIENT)
Dept: GASTROENTEROLOGY | Facility: CLINIC | Age: 70
End: 2020-08-18

## 2020-08-18 ENCOUNTER — TELEPHONE (OUTPATIENT)
Dept: ENDOSCOPY | Facility: HOSPITAL | Age: 70
End: 2020-08-18

## 2020-08-18 DIAGNOSIS — K63.5 POLYP OF COLON, UNSPECIFIED PART OF COLON, UNSPECIFIED TYPE: Primary | ICD-10-CM

## 2020-08-18 RX ORDER — HYDROCODONE BITARTRATE AND ACETAMINOPHEN 5; 325 MG/1; MG/1
1 TABLET ORAL EVERY 12 HOURS PRN
Qty: 60 TABLET | Refills: 0 | Status: SHIPPED | OUTPATIENT
Start: 2020-08-18 | End: 2020-09-21 | Stop reason: SDUPTHER

## 2020-08-18 RX ORDER — DIAZEPAM 5 MG/1
TABLET ORAL
Qty: 30 TABLET | Refills: 0 | Status: SHIPPED | OUTPATIENT
Start: 2020-08-18 | End: 2021-05-20

## 2020-08-18 NOTE — TELEPHONE ENCOUNTER
----- Message from Meagan Frederick LPN sent at 8/18/2020  1:04 PM CDT -----  Regarding: RE: EMR/ESD  There is no order we don't know how to order.  ----- Message -----  From: Shasha Strauss MA  Sent: 8/18/2020  12:44 PM CDT  To: Meagan Frederick LPN  Subject: FW: EMR/ESD                                      I scheduled her for 9/11. Can you send me the order?  ----- Message -----  From: Meagan Frederick LPN  Sent: 8/18/2020  11:32 AM CDT  To: Shasha Strauss MA  Subject: EMR/ESD                                          Patient stated she wants this procedure done at [facility]. please call patient.  ----- Message -----  From: Andrew Vargas  Sent: 8/18/2020  11:13 AM CDT  To: Marbin Torres    Type:  Needs Medical Advice    Who Called:  pt   Would the patient rather a call back or a response via [facility]?  Call back   Best Call Back Number:  [phone]  Additional Information:  pt would like a call back regarding scheduling a procedure

## 2020-08-18 NOTE — TELEPHONE ENCOUNTER
Spoke with patient. ESD scheduled for 9/11 at 8:30a. Endo scheduling nurse will contact patient with prep.

## 2020-08-18 NOTE — TELEPHONE ENCOUNTER
From our end it seems that it has already been sent to the [facility] in [place] [address]

## 2020-08-18 NOTE — TELEPHONE ENCOUNTER
----- Message from Andrew Vargas sent at 8/18/2020 11:13 AM CDT -----  Type:  Needs Medical Advice    Who Called:  pt   Would the patient rather a call back or a response via [facility]?  Call back   Best Call Back Number:  [phone]  Additional Information:  pt would like a call back regarding scheduling a procedure

## 2020-08-18 NOTE — TELEPHONE ENCOUNTER
Patient called asking questions regarding prep for EMR/ESD.  I informed patient this procedure had not been done here at [place] and I had to ask Shaneka. She stated she wants this procedure done at [facility]. I informed patient that Dr Marbin Carbajal will do procedure and that he has done these before at [facility]. But patient insisted that she wants it done at [facility]. Sent Shasha a message.

## 2020-09-02 ENCOUNTER — TELEPHONE (OUTPATIENT)
Dept: ENDOSCOPY | Facility: HOSPITAL | Age: 70
End: 2020-09-02

## 2020-09-02 DIAGNOSIS — Z12.11 SPECIAL SCREENING FOR MALIGNANT NEOPLASMS, COLON: Primary | ICD-10-CM

## 2020-09-02 DIAGNOSIS — K62.1 RECTAL POLYP: ICD-10-CM

## 2020-09-02 RX ORDER — SODIUM, POTASSIUM,MAG SULFATES 17.5-3.13G
1 SOLUTION, RECONSTITUTED, ORAL ORAL ONCE
Qty: 1 KIT | Refills: 0 | Status: SHIPPED | OUTPATIENT
Start: 2020-09-02 | End: 2020-09-02

## 2020-09-02 NOTE — TELEPHONE ENCOUNTER
Spoke with patient about Suprep instructions for Colonoscopy/EMR Vs ESD scheduled 9/11/20 at 0830 and covid-19 test 9/8/20 at 1010 at [facility].  Instructions sent by "[employer]antonella.

## 2020-09-08 ENCOUNTER — LAB VISIT (OUTPATIENT)
Dept: LAB | Facility: HOSPITAL | Age: 70
End: 2020-09-08
Attending: INTERNAL MEDICINE
Payer: MEDICARE

## 2020-09-08 DIAGNOSIS — Z85.118 HISTORY OF LUNG CANCER: ICD-10-CM

## 2020-09-08 LAB
ALBUMIN SERPL BCP-MCNC: 4 G/DL (ref 3.5–5.2)
ALP SERPL-CCNC: 55 U/L (ref 55–135)
ALT SERPL W/O P-5'-P-CCNC: 20 U/L (ref 10–44)
ANION GAP SERPL CALC-SCNC: 10 MMOL/L (ref 8–16)
AST SERPL-CCNC: 21 U/L (ref 10–40)
BILIRUB SERPL-MCNC: 0.4 MG/DL (ref 0.1–1)
BUN SERPL-MCNC: 28 MG/DL (ref 8–23)
CALCIUM SERPL-MCNC: 10.1 MG/DL (ref 8.7–10.5)
CHLORIDE SERPL-SCNC: 105 MMOL/L (ref 95–110)
CO2 SERPL-SCNC: 27 MMOL/L (ref 23–29)
CREAT SERPL-MCNC: 1.4 MG/DL (ref 0.5–1.4)
ERYTHROCYTE [DISTWIDTH] IN BLOOD BY AUTOMATED COUNT: 12.3 % (ref 11.5–14.5)
EST. GFR  (AFRICAN AMERICAN): 44 ML/MIN/1.73 M^2
EST. GFR  (NON AFRICAN AMERICAN): 38 ML/MIN/1.73 M^2
GLUCOSE SERPL-MCNC: 131 MG/DL (ref 70–110)
HCT VFR BLD AUTO: 38.7 % (ref 37–48.5)
HGB BLD-MCNC: 12.7 G/DL (ref 12–16)
IMM GRANULOCYTES # BLD AUTO: 0.02 K/UL (ref 0–0.04)
MCH RBC QN AUTO: 31 PG (ref 27–31)
MCHC RBC AUTO-ENTMCNC: 32.8 G/DL (ref 32–36)
MCV RBC AUTO: 94 FL (ref 82–98)
NEUTROPHILS # BLD AUTO: 2.9 K/UL (ref 1.8–7.7)
PLATELET # BLD AUTO: 219 K/UL (ref 150–350)
PMV BLD AUTO: 10.1 FL (ref 9.2–12.9)
POTASSIUM SERPL-SCNC: 5.4 MMOL/L (ref 3.5–5.1)
PROT SERPL-MCNC: 6.9 G/DL (ref 6–8.4)
RBC # BLD AUTO: 4.1 M/UL (ref 4–5.4)
SODIUM SERPL-SCNC: 142 MMOL/L (ref 136–145)
WBC # BLD AUTO: 5.2 K/UL (ref 3.9–12.7)

## 2020-09-08 PROCEDURE — 85027 COMPLETE CBC AUTOMATED: CPT

## 2020-09-08 PROCEDURE — 80053 COMPREHEN METABOLIC PANEL: CPT

## 2020-09-08 PROCEDURE — 36415 COLL VENOUS BLD VENIPUNCTURE: CPT

## 2020-09-09 ENCOUNTER — TELEPHONE (OUTPATIENT)
Dept: ENDOSCOPY | Facility: HOSPITAL | Age: 70
End: 2020-09-09

## 2020-09-09 ENCOUNTER — PATIENT MESSAGE (OUTPATIENT)
Dept: INTERNAL MEDICINE | Facility: CLINIC | Age: 70
End: 2020-09-09

## 2020-09-09 DIAGNOSIS — N28.9 KIDNEY DISEASE: Primary | ICD-10-CM

## 2020-09-09 DIAGNOSIS — Z12.11 SPECIAL SCREENING FOR MALIGNANT NEOPLASMS, COLON: Primary | ICD-10-CM

## 2020-09-09 RX ORDER — POLYETHYLENE GLYCOL 3350, SODIUM SULFATE ANHYDROUS, SODIUM BICARBONATE, SODIUM CHLORIDE, POTASSIUM CHLORIDE 236; 22.74; 6.74; 5.86; 2.97 G/4L; G/4L; G/4L; G/4L; G/4L
4 POWDER, FOR SOLUTION ORAL ONCE
Qty: 4000 ML | Refills: 0 | Status: SHIPPED | OUTPATIENT
Start: 2020-09-09 | End: 2020-09-09

## 2020-09-09 NOTE — TELEPHONE ENCOUNTER
Spoke with patient about instructions for golytely prep (instead of Suprep) for Colonoscopy/EMR vs ESD scheduled 9/11/20.  Also, discussed BMP to be drawn before procedure.  Instructions sent by [employer].

## 2020-09-11 ENCOUNTER — ANESTHESIA (OUTPATIENT)
Dept: ENDOSCOPY | Facility: HOSPITAL | Age: 70
DRG: 908 | End: 2020-09-11
Payer: MEDICARE

## 2020-09-11 ENCOUNTER — HOSPITAL ENCOUNTER (INPATIENT)
Facility: HOSPITAL | Age: 70
LOS: 1 days | Discharge: HOME | DRG: 908 | End: 2020-09-12
Attending: INTERNAL MEDICINE | Admitting: HOSPITALIST
Payer: MEDICARE

## 2020-09-11 ENCOUNTER — ANESTHESIA EVENT (OUTPATIENT)
Dept: ENDOSCOPY | Facility: HOSPITAL | Age: 70
DRG: 908 | End: 2020-09-11
Payer: MEDICARE

## 2020-09-11 DIAGNOSIS — K63.1 PERFORATION BOWEL: Primary | ICD-10-CM

## 2020-09-11 DIAGNOSIS — K62.1 RECTAL POLYP: ICD-10-CM

## 2020-09-11 DIAGNOSIS — R07.9 CHEST PAIN: ICD-10-CM

## 2020-09-11 LAB
BILIRUB UR QL STRIP: NEGATIVE
CLARITY UR REFRACT.AUTO: CLEAR
COLOR UR AUTO: COLORLESS
GLUCOSE UR QL STRIP: NEGATIVE
HGB UR QL STRIP: NEGATIVE
KETONES UR QL STRIP: NEGATIVE
LEUKOCYTE ESTERASE UR QL STRIP: NEGATIVE
NITRITE UR QL STRIP: NEGATIVE
PH UR STRIP: 7 [PH] (ref 5–8)
PROT UR QL STRIP: NEGATIVE
SP GR UR STRIP: 1 (ref 1–1.03)
URN SPEC COLLECT METH UR: ABNORMAL

## 2020-09-11 PROCEDURE — D9220A PRA ANESTHESIA: Mod: CRNA,,, | Performed by: NURSE ANESTHETIST, CERTIFIED REGISTERED

## 2020-09-11 PROCEDURE — 27202363 HC INJECTION AGENT, SUBMUCOSAL, ANY: Performed by: INTERNAL MEDICINE

## 2020-09-11 PROCEDURE — 63600175 PHARM REV CODE 636 W HCPCS: Performed by: ANESTHESIOLOGY

## 2020-09-11 PROCEDURE — 81003 URINALYSIS AUTO W/O SCOPE: CPT

## 2020-09-11 PROCEDURE — 25000003 PHARM REV CODE 250: Performed by: HOSPITALIST

## 2020-09-11 PROCEDURE — 99223 PR INITIAL HOSPITAL CARE,LEVL III: ICD-10-PCS | Mod: 25,GC,, | Performed by: INTERNAL MEDICINE

## 2020-09-11 PROCEDURE — 27201690: Performed by: INTERNAL MEDICINE

## 2020-09-11 PROCEDURE — 27201691: Performed by: INTERNAL MEDICINE

## 2020-09-11 PROCEDURE — 63600175 PHARM REV CODE 636 W HCPCS: Performed by: HOSPITALIST

## 2020-09-11 PROCEDURE — 87040 BLOOD CULTURE FOR BACTERIA: CPT

## 2020-09-11 PROCEDURE — 99223 1ST HOSP IP/OBS HIGH 75: CPT | Mod: AI,,, | Performed by: HOSPITALIST

## 2020-09-11 PROCEDURE — 27201089 HC SNARE, DISP (ANY): Performed by: INTERNAL MEDICINE

## 2020-09-11 PROCEDURE — 45390 COLONOSCOPY W/RESECTION: CPT | Performed by: INTERNAL MEDICINE

## 2020-09-11 PROCEDURE — 25000003 PHARM REV CODE 250: Performed by: NURSE ANESTHETIST, CERTIFIED REGISTERED

## 2020-09-11 PROCEDURE — 27201042 HC RETRIEVAL NET: Performed by: INTERNAL MEDICINE

## 2020-09-11 PROCEDURE — 45390 COLONOSCOPY W/RESECTION: CPT | Mod: ,,, | Performed by: INTERNAL MEDICINE

## 2020-09-11 PROCEDURE — 11000001 HC ACUTE MED/SURG PRIVATE ROOM

## 2020-09-11 PROCEDURE — D9220A PRA ANESTHESIA: ICD-10-PCS | Mod: ANES,,, | Performed by: ANESTHESIOLOGY

## 2020-09-11 PROCEDURE — 88309 TISSUE EXAM BY PATHOLOGIST: CPT | Mod: 26,,, | Performed by: PATHOLOGY

## 2020-09-11 PROCEDURE — S0030 INJECTION, METRONIDAZOLE: HCPCS | Performed by: HOSPITALIST

## 2020-09-11 PROCEDURE — 37000008 HC ANESTHESIA 1ST 15 MINUTES: Performed by: INTERNAL MEDICINE

## 2020-09-11 PROCEDURE — 88309 PR  SURG PATH,LEVEL VI: ICD-10-PCS | Mod: 26,,, | Performed by: PATHOLOGY

## 2020-09-11 PROCEDURE — D9220A PRA ANESTHESIA: Mod: ANES,,, | Performed by: ANESTHESIOLOGY

## 2020-09-11 PROCEDURE — 25000003 PHARM REV CODE 250: Performed by: INTERNAL MEDICINE

## 2020-09-11 PROCEDURE — 94761 N-INVAS EAR/PLS OXIMETRY MLT: CPT

## 2020-09-11 PROCEDURE — 88309 TISSUE EXAM BY PATHOLOGIST: CPT | Performed by: PATHOLOGY

## 2020-09-11 PROCEDURE — 99223 PR INITIAL HOSPITAL CARE,LEVL III: ICD-10-PCS | Mod: AI,,, | Performed by: HOSPITALIST

## 2020-09-11 PROCEDURE — 27201028 HC NEEDLE, SCLERO: Performed by: INTERNAL MEDICINE

## 2020-09-11 PROCEDURE — D9220A PRA ANESTHESIA: ICD-10-PCS | Mod: CRNA,,, | Performed by: NURSE ANESTHETIST, CERTIFIED REGISTERED

## 2020-09-11 PROCEDURE — 63600175 PHARM REV CODE 636 W HCPCS: Performed by: NURSE ANESTHETIST, CERTIFIED REGISTERED

## 2020-09-11 PROCEDURE — 45390: ICD-10-PCS | Mod: ,,, | Performed by: INTERNAL MEDICINE

## 2020-09-11 PROCEDURE — 27202299 HC NEEDLE KNIFE, TISSUE RESECTION: Performed by: INTERNAL MEDICINE

## 2020-09-11 PROCEDURE — 99223 1ST HOSP IP/OBS HIGH 75: CPT | Mod: 25,GC,, | Performed by: INTERNAL MEDICINE

## 2020-09-11 PROCEDURE — 37000009 HC ANESTHESIA EA ADD 15 MINS: Performed by: INTERNAL MEDICINE

## 2020-09-11 RX ORDER — SODIUM CHLORIDE 0.9 % (FLUSH) 0.9 %
10 SYRINGE (ML) INJECTION
Status: DISCONTINUED | OUTPATIENT
Start: 2020-09-11 | End: 2020-09-11

## 2020-09-11 RX ORDER — SUCRALFATE 1 G/10ML
1 SUSPENSION ORAL EVERY 6 HOURS
Status: DISCONTINUED | OUTPATIENT
Start: 2020-09-11 | End: 2020-09-12 | Stop reason: HOSPADM

## 2020-09-11 RX ORDER — ONDANSETRON 2 MG/ML
INJECTION INTRAMUSCULAR; INTRAVENOUS
Status: DISCONTINUED | OUTPATIENT
Start: 2020-09-11 | End: 2020-09-11

## 2020-09-11 RX ORDER — ACETAMINOPHEN 325 MG/1
650 TABLET ORAL EVERY 4 HOURS PRN
Status: DISCONTINUED | OUTPATIENT
Start: 2020-09-11 | End: 2020-09-12 | Stop reason: HOSPADM

## 2020-09-11 RX ORDER — PROPOFOL 10 MG/ML
VIAL (ML) INTRAVENOUS
Status: DISCONTINUED | OUTPATIENT
Start: 2020-09-11 | End: 2020-09-11

## 2020-09-11 RX ORDER — FENTANYL CITRATE 50 UG/ML
INJECTION, SOLUTION INTRAMUSCULAR; INTRAVENOUS
Status: DISCONTINUED | OUTPATIENT
Start: 2020-09-11 | End: 2020-09-11

## 2020-09-11 RX ORDER — IBUPROFEN 200 MG
16 TABLET ORAL
Status: DISCONTINUED | OUTPATIENT
Start: 2020-09-11 | End: 2020-09-12 | Stop reason: HOSPADM

## 2020-09-11 RX ORDER — LIDOCAINE HYDROCHLORIDE 20 MG/ML
INJECTION INTRAVENOUS
Status: DISCONTINUED | OUTPATIENT
Start: 2020-09-11 | End: 2020-09-11

## 2020-09-11 RX ORDER — OXYCODONE HYDROCHLORIDE 5 MG/1
5 TABLET ORAL EVERY 4 HOURS PRN
Status: DISCONTINUED | OUTPATIENT
Start: 2020-09-11 | End: 2020-09-11

## 2020-09-11 RX ORDER — MAG HYDROX/ALUMINUM HYD/SIMETH 200-200-20
30 SUSPENSION, ORAL (FINAL DOSE FORM) ORAL
Status: DISCONTINUED | OUTPATIENT
Start: 2020-09-11 | End: 2020-09-12 | Stop reason: HOSPADM

## 2020-09-11 RX ORDER — CEFTRIAXONE 1 G/1
1 INJECTION, POWDER, FOR SOLUTION INTRAMUSCULAR; INTRAVENOUS
Status: DISCONTINUED | OUTPATIENT
Start: 2020-09-11 | End: 2020-09-12 | Stop reason: HOSPADM

## 2020-09-11 RX ORDER — GLUCAGON 1 MG
1 KIT INJECTION
Status: DISCONTINUED | OUTPATIENT
Start: 2020-09-11 | End: 2020-09-12 | Stop reason: HOSPADM

## 2020-09-11 RX ORDER — SODIUM CHLORIDE 0.9 % (FLUSH) 0.9 %
10 SYRINGE (ML) INJECTION
Status: DISCONTINUED | OUTPATIENT
Start: 2020-09-11 | End: 2020-09-12 | Stop reason: HOSPADM

## 2020-09-11 RX ORDER — AMOXICILLIN 250 MG
1 CAPSULE ORAL DAILY PRN
Status: DISCONTINUED | OUTPATIENT
Start: 2020-09-11 | End: 2020-09-12 | Stop reason: HOSPADM

## 2020-09-11 RX ORDER — HYDROMORPHONE HYDROCHLORIDE 1 MG/ML
0.2 INJECTION, SOLUTION INTRAMUSCULAR; INTRAVENOUS; SUBCUTANEOUS EVERY 5 MIN PRN
Status: DISCONTINUED | OUTPATIENT
Start: 2020-09-11 | End: 2020-09-11 | Stop reason: HOSPADM

## 2020-09-11 RX ORDER — PROPOFOL 10 MG/ML
VIAL (ML) INTRAVENOUS CONTINUOUS PRN
Status: DISCONTINUED | OUTPATIENT
Start: 2020-09-11 | End: 2020-09-11

## 2020-09-11 RX ORDER — SODIUM CHLORIDE 9 MG/ML
INJECTION, SOLUTION INTRAVENOUS CONTINUOUS
Status: DISCONTINUED | OUTPATIENT
Start: 2020-09-11 | End: 2020-09-11

## 2020-09-11 RX ORDER — TALC
6 POWDER (GRAM) TOPICAL NIGHTLY PRN
Status: DISCONTINUED | OUTPATIENT
Start: 2020-09-11 | End: 2020-09-12 | Stop reason: HOSPADM

## 2020-09-11 RX ORDER — IBUPROFEN 200 MG
24 TABLET ORAL
Status: DISCONTINUED | OUTPATIENT
Start: 2020-09-11 | End: 2020-09-12 | Stop reason: HOSPADM

## 2020-09-11 RX ORDER — PHENYLEPHRINE HYDROCHLORIDE 10 MG/ML
INJECTION INTRAVENOUS
Status: DISCONTINUED | OUTPATIENT
Start: 2020-09-11 | End: 2020-09-11

## 2020-09-11 RX ORDER — ONDANSETRON 8 MG/1
8 TABLET, ORALLY DISINTEGRATING ORAL EVERY 8 HOURS PRN
Status: DISCONTINUED | OUTPATIENT
Start: 2020-09-11 | End: 2020-09-12 | Stop reason: HOSPADM

## 2020-09-11 RX ORDER — SODIUM CHLORIDE 9 MG/ML
INJECTION, SOLUTION INTRAVENOUS CONTINUOUS
Status: ACTIVE | OUTPATIENT
Start: 2020-09-11 | End: 2020-09-12

## 2020-09-11 RX ORDER — ONDANSETRON 2 MG/ML
4 INJECTION INTRAMUSCULAR; INTRAVENOUS EVERY 8 HOURS PRN
Status: DISCONTINUED | OUTPATIENT
Start: 2020-09-11 | End: 2020-09-12 | Stop reason: HOSPADM

## 2020-09-11 RX ORDER — ROCURONIUM BROMIDE 10 MG/ML
INJECTION, SOLUTION INTRAVENOUS
Status: DISCONTINUED | OUTPATIENT
Start: 2020-09-11 | End: 2020-09-11

## 2020-09-11 RX ORDER — METRONIDAZOLE 500 MG/100ML
500 INJECTION, SOLUTION INTRAVENOUS
Status: DISCONTINUED | OUTPATIENT
Start: 2020-09-11 | End: 2020-09-12 | Stop reason: HOSPADM

## 2020-09-11 RX ORDER — PANTOPRAZOLE SODIUM 40 MG/1
40 TABLET, DELAYED RELEASE ORAL DAILY
Status: DISCONTINUED | OUTPATIENT
Start: 2020-09-11 | End: 2020-09-12 | Stop reason: HOSPADM

## 2020-09-11 RX ORDER — MIDAZOLAM HYDROCHLORIDE 1 MG/ML
INJECTION, SOLUTION INTRAMUSCULAR; INTRAVENOUS
Status: DISCONTINUED | OUTPATIENT
Start: 2020-09-11 | End: 2020-09-11

## 2020-09-11 RX ORDER — OXYCODONE HYDROCHLORIDE 5 MG/1
10 TABLET ORAL EVERY 4 HOURS PRN
Status: DISCONTINUED | OUTPATIENT
Start: 2020-09-11 | End: 2020-09-11

## 2020-09-11 RX ORDER — HYDROCODONE BITARTRATE AND ACETAMINOPHEN 10; 325 MG/1; MG/1
1 TABLET ORAL EVERY 4 HOURS PRN
Status: DISCONTINUED | OUTPATIENT
Start: 2020-09-11 | End: 2020-09-12 | Stop reason: HOSPADM

## 2020-09-11 RX ORDER — HYDROCODONE BITARTRATE AND ACETAMINOPHEN 5; 325 MG/1; MG/1
1 TABLET ORAL EVERY 4 HOURS PRN
Status: DISCONTINUED | OUTPATIENT
Start: 2020-09-11 | End: 2020-09-12 | Stop reason: HOSPADM

## 2020-09-11 RX ORDER — EPHEDRINE SULFATE 50 MG/ML
INJECTION, SOLUTION INTRAVENOUS
Status: DISCONTINUED | OUTPATIENT
Start: 2020-09-11 | End: 2020-09-11

## 2020-09-11 RX ORDER — POLYETHYLENE GLYCOL 3350 17 G/17G
17 POWDER, FOR SOLUTION ORAL 2 TIMES DAILY PRN
Status: DISCONTINUED | OUTPATIENT
Start: 2020-09-11 | End: 2020-09-11

## 2020-09-11 RX ORDER — LORAZEPAM 0.5 MG/1
0.5 TABLET ORAL EVERY 12 HOURS PRN
Status: DISCONTINUED | OUTPATIENT
Start: 2020-09-11 | End: 2020-09-12 | Stop reason: HOSPADM

## 2020-09-11 RX ADMIN — HYDROCODONE BITARTRATE AND ACETAMINOPHEN 1 TABLET: 5; 325 TABLET ORAL at 08:09

## 2020-09-11 RX ADMIN — ROCURONIUM BROMIDE 20 MG: 10 INJECTION, SOLUTION INTRAVENOUS at 11:09

## 2020-09-11 RX ADMIN — SODIUM CHLORIDE 3 G: 9 INJECTION, SOLUTION INTRAVENOUS at 11:09

## 2020-09-11 RX ADMIN — ATENOLOL 25 MG: 25 TABLET ORAL at 11:09

## 2020-09-11 RX ADMIN — PROPOFOL 150 MCG/KG/MIN: 10 INJECTION, EMULSION INTRAVENOUS at 09:09

## 2020-09-11 RX ADMIN — HYDROMORPHONE HYDROCHLORIDE 0.2 MG: 1 INJECTION, SOLUTION INTRAMUSCULAR; INTRAVENOUS; SUBCUTANEOUS at 01:09

## 2020-09-11 RX ADMIN — HYDROMORPHONE HYDROCHLORIDE 0.2 MG: 1 INJECTION, SOLUTION INTRAMUSCULAR; INTRAVENOUS; SUBCUTANEOUS at 02:09

## 2020-09-11 RX ADMIN — ALUMINUM HYDROXIDE, MAGNESIUM HYDROXIDE, AND SIMETHICONE 30 ML: 200; 200; 20 SUSPENSION ORAL at 05:09

## 2020-09-11 RX ADMIN — SODIUM CHLORIDE, SODIUM GLUCONATE, SODIUM ACETATE, POTASSIUM CHLORIDE, MAGNESIUM CHLORIDE, SODIUM PHOSPHATE, DIBASIC, AND POTASSIUM PHOSPHATE: .53; .5; .37; .037; .03; .012; .00082 INJECTION, SOLUTION INTRAVENOUS at 10:09

## 2020-09-11 RX ADMIN — ROCURONIUM BROMIDE 30 MG: 10 INJECTION, SOLUTION INTRAVENOUS at 09:09

## 2020-09-11 RX ADMIN — CEFTRIAXONE SODIUM 1 G: 1 INJECTION, POWDER, FOR SOLUTION INTRAMUSCULAR; INTRAVENOUS at 06:09

## 2020-09-11 RX ADMIN — METRONIDAZOLE 500 MG: 500 INJECTION, SOLUTION INTRAVENOUS at 11:09

## 2020-09-11 RX ADMIN — FENTANYL CITRATE 25 MCG: 50 INJECTION, SOLUTION INTRAMUSCULAR; INTRAVENOUS at 12:09

## 2020-09-11 RX ADMIN — MIDAZOLAM HYDROCHLORIDE 2 MG: 1 INJECTION, SOLUTION INTRAMUSCULAR; INTRAVENOUS at 09:09

## 2020-09-11 RX ADMIN — PHENYLEPHRINE HYDROCHLORIDE 100 MCG: 10 INJECTION INTRAVENOUS at 10:09

## 2020-09-11 RX ADMIN — LORAZEPAM 0.25 MG: 0.5 TABLET ORAL at 11:09

## 2020-09-11 RX ADMIN — LIDOCAINE HYDROCHLORIDE 75 MG: 20 INJECTION, SOLUTION INTRAVENOUS at 09:09

## 2020-09-11 RX ADMIN — EPHEDRINE SULFATE 5 MG: 50 INJECTION INTRAVENOUS at 10:09

## 2020-09-11 RX ADMIN — ROCURONIUM BROMIDE 20 MG: 10 INJECTION, SOLUTION INTRAVENOUS at 09:09

## 2020-09-11 RX ADMIN — PROPOFOL 175 MG: 10 INJECTION, EMULSION INTRAVENOUS at 09:09

## 2020-09-11 RX ADMIN — FENTANYL CITRATE 50 MCG: 50 INJECTION, SOLUTION INTRAMUSCULAR; INTRAVENOUS at 09:09

## 2020-09-11 RX ADMIN — METRONIDAZOLE 500 MG: 500 INJECTION, SOLUTION INTRAVENOUS at 03:09

## 2020-09-11 RX ADMIN — SUGAMMADEX 200 MG: 100 INJECTION, SOLUTION INTRAVENOUS at 12:09

## 2020-09-11 RX ADMIN — ATENOLOL 25 MG: 25 TABLET ORAL at 05:09

## 2020-09-11 RX ADMIN — PANTOPRAZOLE SODIUM 40 MG: 40 TABLET, DELAYED RELEASE ORAL at 03:09

## 2020-09-11 RX ADMIN — SODIUM CHLORIDE: 0.9 INJECTION, SOLUTION INTRAVENOUS at 08:09

## 2020-09-11 RX ADMIN — ONDANSETRON 4 MG: 2 INJECTION, SOLUTION INTRAMUSCULAR; INTRAVENOUS at 11:09

## 2020-09-11 RX ADMIN — SODIUM CHLORIDE: 0.9 INJECTION, SOLUTION INTRAVENOUS at 03:09

## 2020-09-11 RX ADMIN — HYDROCODONE BITARTRATE AND ACETAMINOPHEN 1 TABLET: 5; 325 TABLET ORAL at 11:09

## 2020-09-11 RX ADMIN — FENTANYL CITRATE 25 MCG: 50 INJECTION, SOLUTION INTRAMUSCULAR; INTRAVENOUS at 10:09

## 2020-09-11 NOTE — CONSULTS
[facility]  Advanced Endoscopy Service  Consult Note    Patient Name: Alesha Toney  MRN: [id number]  Admission Date: 9/11/2020  Hospital Length of Stay: 0 days  Code Status: Full Code   Attending Provider: Lilibeth Carbajal MD   Consulting Provider: Aicha Lehman MD  Primary Care Physician: Margo Caldwell MD  Principal Problem:<principal problem not specified>    Inpatient consult to Advanced Endoscopy Service (AES)  Consult performed by: Aicha Lehman MD  Consult ordered by: Vane Ashby MD        Subjective:     HPI: Alesha Tnoey is a 69 y.o. female with history of adenocarcinoma of the lung status post lobectomy with local recurrence status post local chest radiation who is admitted s/p EMR of a large rectal polyp. She was initially noted to have an abnormal PET/CT scan with rectal thickening when she was first diagnosed with lung CA in 2018 which was recurrent on repeat imaging. She finally underwent a flexible sigmoidoscopy in 6/2020 that showed a large rectal polyp that was biopsied and showed a villous adenoma with high grade dysplasia. She was referred for EMR. She denied any rectal symptoms including hematochezia or abdominal pain prior to the procedure.     She underwent EMR today with the removal of a 3.5 cm polyp. During the procedure there was muscle injury without deep perforation, s/p endoscopic suturing. Due to the high risk procedure, the patient was admitted for observation. She reports having lower abdominal pain 7/10 in severity after the procedure that feels like a full bladder that improved slightly after urinating. She denies nausea or vomiting.        Past Medical History:   Diagnosis Date    Basal cell carcinoma     Breast cyst     Cardiac arrhythmia     Disorder of kidney and ureter     renal stone    Fibrocystic breast     Hypertension     Lung cancer     Rectal polyp     Squamous cell carcinoma        Past Surgical History:   Procedure  Laterality Date    ADENOIDECTOMY  19yo    ANTERIOR CERVICAL DISCECTOMY W/ FUSION N/A 10/15/2018    Procedure: DISCECTOMY, SPINE, CERVICAL, ANTERIOR APPROACH, WITH FUSION C6/7  [employer] SNS: [employer]/SSE[employer]/V[employer] Regular OR table;  Surgeon: Greyson Bansal MD;  Location: [address];  Service: Neurosurgery;  Laterality: N/A;    APPENDECTOMY      BONE RESECTION, RIB  1980    BREAST BIOPSY Left     at least 40yrs ago in her 20's    BREAST CYST ASPIRATION      BREAST CYST EXCISION      ENDOBRONCHIAL ULTRASOUND N/A 7/10/2018    Procedure: ULTRASOUND, ENDOBRONCHIAL;  Surgeon: Sri Hearn MD;  Location: [address];  Service: Pulmonary;  Laterality: N/A;    ENDOBRONCHIAL ULTRASOUND N/A 9/24/2019    Procedure: ENDOBRONCHIAL ULTRASOUND (EBUS);  Surgeon: Sri Hearn MD;  Location: [address];  Service: Pulmonary;  Laterality: N/A;    FLEXIBLE SIGMOIDOSCOPY  06/11/2020    with biopsies    FLEXIBLE SIGMOIDOSCOPY N/A 6/11/2020    Procedure: SIGMOIDOSCOPY, FLEXIBLE;  Surgeon: Gely Yeh MD;  Location: [facility];  Service: Endoscopy;  Laterality: N/A;    HYSTERECTOMY      @47yrs of age    INSERTION OF TUNNELED CENTRAL VENOUS CATHETER (CVC) WITH SUBCUTANEOUS PORT N/A 9/25/2018    Procedure: [id number];  Surgeon: Ronald Diagnostic Provider;  Location: [address];  Service: Radiology;  Laterality: N/A;    KIDNEY SURGERY      19yo    OOPHORECTOMY      @47yrs of age    TONSILLECTOMY      VIDEO-ASSISTED THORACOSCOPIC LOBECTOMY Right 8/9/2018    Procedure: VATS, WITH LOBECTOMY Possible chest wall resection;  Surgeon: Philip Messina MD;  Location: [address];  Service: Thoracic;  Laterality: Right;  Have open pan available.       Family History   Problem Relation Age of Onset    Stroke Mother     Cataracts Mother     Cancer Father         colon cancer    Diabetes Brother     Heart failure Brother     Hypertension Brother     Heart attack Paternal Aunt     Heart  attack Maternal Grandfather     Diabetes Paternal Aunt     Anesthesia problems Neg Hx     Blindness Neg Hx     Amblyopia Neg Hx     Glaucoma Neg Hx     Macular degeneration Neg Hx     Retinal detachment Neg Hx     Strabismus Neg Hx     Thyroid disease Neg Hx        Social History     Socioeconomic History    Marital status: Significant Other     Spouse name: Not on file    Number of children: Not on file    Years of education: Not on file    Highest education level: Not on file   Occupational History    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   Social Needs    Financial resource strain: Not hard at all    Food insecurity     Worry: Never true     Inability: Never true    Transportation needs     Medical: No     Non-medical: No   Tobacco Use    Smoking status: Former Smoker     Packs/day: 1.00     Years: 30.00     Pack years: 30.00     Types: Cigarettes     Quit date:      Years since quittin.6    Smokeless tobacco: Never Used   Substance and Sexual Activity    Alcohol use: Not Currently     Frequency: Never     Drinks per session: Patient refused     Binge frequency: Never     Comment: socially     Drug use: No    Sexual activity: Yes     Partners: Female   Lifestyle    Physical activity     Days per week: 0 days     Minutes per session: 0 min    Stress: To some extent   Relationships    Social connections     Talks on phone: More than three times a week     Gets together: Twice a week     Attends Latter-day service: Not on file     Active member of club or organization: No     Attends meetings of clubs or organizations: Never     Relationship status: Living with partner   Other Topics Concern    Are you pregnant or think you may be? Not Asked    Breast-feeding Not Asked   Social History Narrative    Exercise:  Walks 2190-9548 steps daily.       No current facility-administered medications on file prior to encounter.      Current Outpatient Medications on File Prior to  "Encounter   Medication Sig Dispense Refill    atenoloL (TENORMIN) 50 MG tablet Half a tab qid 180 tablet 3    diazePAM (VALIUM) 5 MG tablet 1 tab po q 12 h prn neck pain 30 tablet 0    esomeprazole (NEXIUM) 20 MG capsule Take 20 mg by mouth before breakfast.      losartan (COZAAR) 50 MG tablet 1 tab po bid 180 tablet 3    calcium carbonate (CALCIUM ANTACID) 300 mg (750 mg) Chew Take by mouth.      HYDROcodone-acetaminophen (NORCO) 5-325 mg per tablet Take 1 tablet by mouth every 12 (twelve) hours as needed for Pain. 60 tablet 0    LORazepam (ATIVAN) 0.5 MG tablet Take 1 tablet (0.5 mg total) by mouth every 12 (twelve) hours as needed for Anxiety. 30 tablet 0       Review of patient's allergies indicates:   Allergen Reactions    Adhesive Itching, Rash and Blisters     INCLUDES EKG PADS    Ciprofloxacin Hives     Hives    Iodinated contrast media     Pcn [penicillins]      rash    Phenergan plain Other (See Comments)     "crazy behavior"    Phenergan [promethazine]     Tramadol     Vancomycin analogues      Red man syndrome       Review of Systems   Constitutional: Negative.    HENT: Negative.    Respiratory: Negative.    Cardiovascular: Negative.    Gastrointestinal: Positive for abdominal pain. Negative for blood in stool, constipation, diarrhea, nausea and vomiting.   Genitourinary:        Bladder fullness   Neurological: Negative.    Psychiatric/Behavioral: Negative.         Objective:     Vitals:    09/11/20 1437   BP:    Pulse:    Resp: 16   Temp:          Constitutional:  not in acute distress and well developed  HENT: Head: Normal, normocephalic, atraumatic.  Eyes: conjunctiva clear and sclera nonicteric  Cardiovascular: regular rate and rhythm  Respiratory: normal chest expansion & respiratory effort   and no accessory muscle use  GI: soft, tender in suprapubic area  Musculoskeletal: no muscular tenderness noted  Skin: normal color  Neurological: alert, oriented x3  Psychiatric: mood and affect " are within normal limits, pt is a good historian; no memory problems were noted    Significant Labs:  Recent Labs   Lab 09/08/20  1027   HGB 12.7       Lab Results   Component Value Date    WBC 5.20 09/08/2020    HGB 12.7 09/08/2020    HCT 38.7 09/08/2020    MCV 94 09/08/2020     09/08/2020       Lab Results   Component Value Date     09/11/2020    K 4.6 09/11/2020     09/11/2020    CO2 28 09/11/2020    BUN 15 09/11/2020    CREATININE 1.2 09/11/2020    CALCIUM 9.7 09/11/2020    ANIONGAP 9 09/11/2020    ESTGFRAFRICA 53.3 (A) 09/11/2020    EGFRNONAA 46.2 (A) 09/11/2020       Lab Results   Component Value Date    ALT 20 09/08/2020    AST 21 09/08/2020    ALKPHOS 55 09/08/2020    BILITOT 0.4 09/08/2020       Lab Results   Component Value Date    INR 0.9 09/25/2018    INR 0.9 06/20/2018    INR 1.0 04/29/2010       Significant Imaging:  Reviewed pertinent radiology findings.       Assessment/Plan:     Alesha Toney is a 69 y.o. female with history of adenocarcinoma of the lung status post lobectomy with local recurrence status post local chest radiation who is admitted s/p EMR of a large rectal polyp.    Problem List:  1. Rectal polyp s/p EMR  2. Patient at risk for perforation    Patient s/p EMR with some muscle injury but no perforation, s/p endoscopic suturing. Due to it being a high risk procedure we recommend admission for observation. The patient's current abdominal pain could be due to bladder distention s/p anaesthesia which improved s/p I/O cath but could also be suggestive of early perforation.     Recommendations:  - Please obtain CT A/P without contrast to r/o perforation  - Advance to CLD for 2 days  - Watch for signs of bleeding and perforation  - Daily CBC  - Abx to cover GI jett, agree with ceftriaxone/flagyl. Can be discharged on PO cedfinir/flagyl when stable  - Repeat colonoscopy in 6 months for surveillance (we will arrange)    Thank you for involving us in the care of Alesha LOPEZ  Feng. Please call with any additional questions, concerns or changes in the patient's clinical status. We will continue to follow.    Aicha Lehman MD  Gastroenterology Fellow PGY IV   [facility]

## 2020-09-11 NOTE — [employer] PATIENT INSTRUCTIONS
Discharge Summary/Instructions after an Endoscopic Procedure  Patient Name: Alesha Toney  Patient MRN: [id number]  Patient YOB: 1950  Friday, September 11, 2020  Lilibeth Carbajal MD  RESTRICTIONS:  During your procedure today, you received medications for sedation.  These   medications may affect your judgment, balance and coordination.  Therefore,   for 24 hours, you have the following restrictions:   - DO NOT drive a car, operate machinery, make legal/financial decisions,   sign important papers or drink alcohol.    ACTIVITY:  Today: no heavy lifting, straining or running due to procedural   sedation/anesthesia.  The following day: return to full activity including work.  DIET:  Eat and drink normally unless instructed otherwise.     TREATMENT FOR COMMON SIDE EFFECTS:  - Mild abdominal pain, nausea, belching, bloating or excessive gas:  rest,   eat lightly and use a heating pad.  - Sore Throat: treat with throat lozenges and/or gargle with warm salt   water.  - Because air was used during the procedure, expelling large amounts of air   from your rectum or belching is normal.  - If a bowel prep was taken, you may not have a bowel movement for 1-3 days.    This is normal.  SYMPTOMS TO WATCH FOR AND REPORT TO YOUR PHYSICIAN:  1. Abdominal pain or bloating, other than gas cramps.  2. Chest pain.  3. Back pain.  4. Signs of infection such as: chills or fever occurring within 24 hours   after the procedure.  5. Rectal bleeding, which would show as bright red, maroon, or black stools.   (A tablespoon of blood from the rectum is not serious, especially if   hemorrhoids are present.)  6. Vomiting.  7. Weakness or dizziness.  GO DIRECTLY TO THE NEAREST EMERGENCY ROOM IF YOU HAVE ANY OF THE FOLLOWING:      Difficulty breathing              Chills and/or fever over 101 F   Persistent vomiting and/or vomiting blood   Severe abdominal pain   Severe chest pain   Black, tarry stools   Bleeding- more than one  tablespoon   Any other symptom or condition that you feel may need urgent attention  Your doctor recommends these additional instructions:  If any biopsies were taken, your doctors clinic will contact you in 1 to 2   weeks with any results.  - Admit the patient to [facility] for observation.   - Clear liquid diet for 2 days.   - Augmentin for 7 days.   - Continue present medications.   - Await pathology results.   - Repeat colonoscopy in 6 months for surveillance.   For questions, problems or results please call your physician - Lilibeth Carbajal MD at Work:  [phone].  [facility], EMERGENCY ROOM PHONE NUMBER: [phone]  IF A COMPLICATION OR EMERGENCY SITUATION ARISES AND YOU ARE UNABLE TO REACH   YOUR PHYSICIAN - GO DIRECTLY TO THE EMERGENCY ROOM.  Lilibeth Carbajal MD  9/11/2020 1:10:27 PM  This report has been verified and signed electronically.  [employer]

## 2020-09-11 NOTE — ANESTHESIA PREPROCEDURE EVALUATION
"                                                                                                             09/11/2020  Alesha Toney is a 69 y.o., female.  Pre-operative evaluation for Procedure(s) (LRB):  COLONOSCOPY/EMR vs ESD (N/A)    Alesha Toney is a 69 y.o. female with history of lung Ca now assessed for [ossible colon mass seen on PET scan. Has moderate exertional dyspnea.     LDA:     Prev airway:     Drips:     Patient Active Problem List   Diagnosis    Essential hypertension    SVT (supraventricular tachycardia)    History of lung cancer    Other emphysema    Non-small cell lung cancer (NSCLC)    Malignant neoplasm of lung    Cervical radiculopathy    Coronary artery disease due to calcified coronary lesion    Prediabetes    Macrocytic anemia    Thyroid nodule    Stage 3 chronic kidney disease    Hilar mass    Adjustment disorder with mixed anxiety and depressed mood    Abnormal PET scan of colon       Review of patient's allergies indicates:   Allergen Reactions    Adhesive Itching, Rash and Blisters     INCLUDES EKG PADS    Ciprofloxacin Hives     Hives    Iodinated contrast media     Pcn [penicillins]      rash    Phenergan plain Other (See Comments)     "crazy behavior"    Phenergan [promethazine]     Tramadol     Vancomycin analogues      Red man syndrome        No current facility-administered medications on file prior to encounter.      Current Outpatient Medications on File Prior to Encounter   Medication Sig Dispense Refill    atenoloL (TENORMIN) 50 MG tablet Half a tab qid 180 tablet 3    calcium carbonate (CALCIUM ANTACID) 300 mg (750 mg) Chew Take by mouth.      diazePAM (VALIUM) 5 MG tablet 1 tab po q 12 h prn neck pain 30 tablet 0    esomeprazole (NEXIUM) 20 MG capsule Take 20 mg by mouth before breakfast.      HYDROcodone-acetaminophen (NORCO) 5-325 mg per tablet Take 1 tablet by mouth every 12 (twelve) hours as needed for Pain. 60 tablet 0    LORazepam " (ATIVAN) 0.5 MG tablet Take 1 tablet (0.5 mg total) by mouth every 12 (twelve) hours as needed for Anxiety. 30 tablet 0    losartan (COZAAR) 50 MG tablet 1 tab po bid 180 tablet 3       Past Surgical History:   Procedure Laterality Date    ADENOIDECTOMY  19yo    ANTERIOR CERVICAL DISCECTOMY W/ FUSION N/A 10/15/2018    Procedure: DISCECTOMY, SPINE, CERVICAL, ANTERIOR APPROACH, WITH FUSION C6/7  Depuy SNS: [employer]/SSE[employer]/Vocal [employer] Regular OR table;  Surgeon: Greyson Bansal MD;  Location: [address];  Service: Neurosurgery;  Laterality: N/A;    APPENDECTOMY      BONE RESECTION, RIB  1980    BREAST BIOPSY Left     at least 40yrs ago in her 20's    BREAST CYST ASPIRATION      BREAST CYST EXCISION      ENDOBRONCHIAL ULTRASOUND N/A 7/10/2018    Procedure: ULTRASOUND, ENDOBRONCHIAL;  Surgeon: Sri Heran MD;  Location: [address];  Service: Pulmonary;  Laterality: N/A;    ENDOBRONCHIAL ULTRASOUND N/A 9/24/2019    Procedure: ENDOBRONCHIAL ULTRASOUND (EBUS);  Surgeon: Sri Hearn MD;  Location: [address];  Service: Pulmonary;  Laterality: N/A;    FLEXIBLE SIGMOIDOSCOPY  06/11/2020    with biopsies    FLEXIBLE SIGMOIDOSCOPY N/A 6/11/2020    Procedure: SIGMOIDOSCOPY, FLEXIBLE;  Surgeon: Gely Yeh MD;  [facility];  Service: Endoscopy;  Laterality: N/A;    HYSTERECTOMY      @47yrs of age    INSERTION OF TUNNELED CENTRAL VENOUS CATHETER (CVC) WITH SUBCUTANEOUS PORT N/A 9/25/2018    Procedure: [id number];  Surgeon: [facility] Diagnostic Provider;  Location: [address];  Service: Radiology;  Laterality: N/A;    KIDNEY SURGERY      19yo    OOPHORECTOMY      @47yrs of age    TONSILLECTOMY      VIDEO-ASSISTED THORACOSCOPIC LOBECTOMY Right 8/9/2018    Procedure: VATS, WITH LOBECTOMY Possible chest wall resection;  Surgeon: Philip Messina MD;  Location: [address];  Service: Thoracic;  Laterality: Right;  Have open pan available.       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   Social Needs    Financial resource strain: Not hard at all    Food insecurity     Worry: Never true     Inability: Never true    Transportation needs     Medical: No     Non-medical: No   Tobacco Use    Smoking status: Former Smoker     Packs/day: 1.00     Years: 30.00     Pack years: 30.00     Types: Cigarettes     Quit date:      Years since quittin.6    Smokeless tobacco: Never Used   Substance and Sexual Activity    Alcohol use: Not Currently     Frequency: Never     Drinks per session: Patient refused     Binge frequency: Never     Comment: socially     Drug use: No    Sexual activity: Yes     Partners: Female   Lifestyle    Physical activity     Days per week: 0 days     Minutes per session: 0 min    Stress: To some extent   Relationships    Social connections     Talks on phone: More than three times a week     Gets together: Twice a week     Attends Yazidi service: Not on file     Active member of club or organization: No     Attends meetings of clubs or organizations: Never     Relationship status: Living with partner   Other Topics Concern    Are you pregnant or think you may be? Not Asked    Breast-feeding Not Asked   Social History Narrative    Exercise:  Walks [id number] steps daily.         Vital Signs Range (Last 24H):  BP: ()/()   Arterial Line BP: ()/()       CBC:   Recent Labs     20  1027   WBC 5.20   RBC 4.10   HGB 12.7   HCT 38.7      MCV 94   MCH 31.0   MCHC 32.8       CMP:   Recent Labs     20  1027      K 5.4*      CO2 27   BUN 28*   CREATININE 1.4   *   CALCIUM 10.1   ALBUMIN 4.0   PROT 6.9   ALKPHOS 55   ALT 20   AST 21   BILITOT 0.4       INR  No  results for input(s): PT, INR, PROTIME, APTT in the last 72 hours.        Diagnostic Studies:      EKD Echo:        Anesthesia Evaluation    I have reviewed the Patient Summary Reports.    I have reviewed the Nursing Notes.    I have reviewed the Medications.     Review of Systems  Anesthesia Hx:  No problems with previous Anesthesia    Social:  Non-Smoker    Hematology/Oncology:  Hematology Normal   Oncology Normal     EENT/Dental:EENT/Dental Normal   Hepatic/GI:  Hepatic/GI Normal    Musculoskeletal:  Musculoskeletal Normal    Endocrine:  Endocrine Normal    Dermatological:  Skin Normal        Physical Exam  General:  Well nourished    Airway/Jaw/Neck:  Airway Findings: Mouth Opening: Normal Tongue: Normal  General Airway Assessment: Adult  Mallampati: I  TM Distance: Normal, at least 6 cm      Dental:  Dental Findings: In tact   Chest/Lungs:  Chest/Lungs Findings: Clear to auscultation     Heart/Vascular:  Heart Findings: Rate: Normal  Rhythm: Regular Rhythm  Sounds: Normal        Mental Status:  Mental Status Findings:  Cooperative, Alert and Oriented         Anesthesia Plan  Type of Anesthesia, risks & benefits discussed:  Anesthesia Type:  general  Patient's Preference:   Intra-op Monitoring Plan:   Intra-op Monitoring Plan Comments:   Post Op Pain Control Plan:   Post Op Pain Control Plan Comments:   Induction:   IV  Beta Blocker:         Informed Consent: Patient understands risks and agrees with Anesthesia plan.  Questions answered. Anesthesia consent signed with patient.  ASA Score: 3     Day of Surgery Review of History & Physical:            Ready For Surgery From Anesthesia Perspective.

## 2020-09-11 NOTE — ANESTHESIA PROCEDURE NOTES
Intubation  Performed by: Kathleen Wang CRNA  Authorized by: Christ Hansen MD     Intubation:     Induction:  Intravenous    Intubated:  Postinduction    Mask Ventilation:  Easy with oral airway    Attempts:  1    Attempted By:  CRNA    Method of Intubation:  Direct    Blade:  Trevino 2    Laryngeal View Grade: Grade I - full view of chords      Difficult Airway Encountered?: No      Complications:  None    Airway Device:  Oral endotracheal tube    Airway Device Size:  7.5    Style/Cuff Inflation:  Cuffed (inflated to minimal occlusive pressure)    Tube secured:  23    Secured at:  The lips    Placement Verified By:  Capnometry    Complicating Factors:  None    Findings Post-Intubation:  BS equal bilateral

## 2020-09-11 NOTE — H&P
Hospital Medicine  History and Physical Exam     Team: Networked reference to record PCT  Vane Ashby MD  Admit Date: 9/11/2020  [facility]   Principal Problem:  Colonic perforation  Patient information was obtained from patient and ER records.   Primary Care Physician: Margo Caldwell MD  Code status: Full Code    HPI: Alesha Toney is a 69 y.o. female with PMH of hypertension, lung cancer, rectal polyp, CKD who presents to [facility] today with a chief complaint of postprocedural complication.  patient with history of rectal polyp, underwent colonoscopy today.  Procedure was complicated with small perforation of the colon.  General surgery was consulted.  Perforation was repaired endoscopically.  Patient admitted for close monitoring. AES consulted.  Patient seen and evaluated in the PACU.  She denies nausea, diarrhea constipation blood in stools, fever, chills,.  Did have some abdominal pain earlier received Dilaudid is patient of urinary retention required in and out catheterization x1.    On admission, patient afebrile, hemodynamically stable, saturating 100% on 6 L O2.  Patient started on ceftriaxone, Flagyl.  Blood cultures obtained.  Patient admitted to Hospital Medicine.    Past Medical History: Patient has a past medical history of Basal cell carcinoma, Breast cyst, Cardiac arrhythmia, Disorder of kidney and ureter, Fibrocystic breast, Hypertension, Lung cancer, Rectal polyp, and Squamous cell carcinoma.    Past Surgical History: Patient has a past surgical history that includes Breast cyst aspiration; Breast cyst excision; Breast biopsy (Left); Hysterectomy; Oophorectomy; Appendectomy; Bone Resection, Rib (1980); Tonsillectomy; Adenoidectomy (17yo); Kidney surgery; Endobronchial ultrasound (N/A, 7/10/2018); Video-assisted thoracoscopic lobectomy (Right, 8/9/2018); Insertion of tunneled central venous catheter (CVC) with subcutaneous port (N/A, 9/25/2018); Anterior cervical discectomy w/ fusion (N/A, 10/15/2018);  Endobronchial ultrasound (N/A, 9/24/2019); Flexible sigmoidoscopy (06/11/2020); and Flexible sigmoidoscopy (N/A, 6/11/2020).    Social History: Patient reports that she quit smoking about 5 years ago. Her smoking use included cigarettes. She has a 30.00 pack-year smoking history. She has never used smokeless tobacco. She reports previous alcohol use. She reports that she does not use drugs.    Family History: family history includes Cancer in her father; Cataracts in her mother; Diabetes in her brother and paternal aunt; Heart attack in her maternal grandfather and paternal aunt; Heart failure in her brother; Hypertension in her brother; Stroke in her mother.    Medications: reviewed     Allergies: Patient is allergic to adhesive; ciprofloxacin; iodinated contrast media; pcn [penicillins]; phenergan plain; phenergan [promethazine]; tramadol; and vancomycin analogues.    Review of Systems: (BOLDED POSITIVE) (REMAINDER NEGATIVE)     General: fever, chills, night sweats, weakness, fatigue    Eyes/Head: vision changes, headache, dizziness   ENT: tinnitus, epistaxis, dysphagia  Respiratory: SOB, cough, wheezing, sputum, hemoptysis   CVS: chest pain, edema, syncope, palpitations, BAIRD  GI:nausea, vomiting, diarrhea, constipation, abdominal pain  : dysuria, hematuria, nocturia, incontinence  Neurological: headace, weakness, slurred speech, numbness/tingling   Heme/Lymph: anemia, easy bruising, swollen glands    MSK:arthralgia, gout, back pain, stiffness     Psychiatric: insomnia, stress, depression, anxiety, SI, HI         Physical Exam:     Temp:  [97 °F (36.1 °C)-98.3 °F (36.8 °C)]   Pulse:  [63-73]   Resp:  [16-20]   BP: (134-177)/(61-85)   SpO2:  [98 %-100 %]   Body mass index is 25.66 kg/m².     Intake/Output Summary (Last 24 hours) at 9/11/2020 1405  Last data filed at 9/11/2020 1251  Gross per 24 hour   Intake 1500 ml   Output 25 ml   Net 1475 ml          General appearance: NAD, , cooperative, alert, appears  stated age    Head: Normocephalic, without obvious abnormality, atraumatic     Eyes: conjunctivae/corneas clear. PERRLA, EOM's intact     Throat: Lips, mucosa, and tongue moist and without lesion     Neck: supple, trachea midline, no adenopathy     Lungs: clear to auscultation bilaterally, no wheezes, no crackles, no rales, no rhonchi   Heart: regular rate and rhythm, S1, S2 normal, no murmur, click, rub or gallop    Abdomen: Soft, non-tender, non-distended, normoactive bowel sounds. No masses, no organomegaly     Extremities: atraumatic, no deformities, no cyanosis or edema     Neurologic: Alert and oriented X 3, Normal symmetric reflexes. CN II-XII intact. No focal neurological deficits.     Musculoskeletal: Normal ROM, normal strength     Skin: No rash, tears, or ecchymosis, capillary refill brisk       Labs:  Recent Results (from the past 24 hour(s))   BASIC METABOLIC PANEL    Collection Time: 09/11/20  7:10 AM   Result Value Ref Range    Sodium 143 136 - 145 mmol/L    Potassium 4.6 3.5 - 5.1 mmol/L    Chloride 106 95 - 110 mmol/L    CO2 28 23 - 29 mmol/L    Glucose 119 (H) 70 - 110 mg/dL    BUN, Bld 15 8 - 23 mg/dL    Creatinine 1.2 0.5 - 1.4 mg/dL    Calcium 9.7 8.7 - 10.5 mg/dL    Anion Gap 9 8 - 16 mmol/L    eGFR if African American 53.3 (A) >60 mL/min/1.73 m^2    eGFR if non  46.2 (A) >60 mL/min/1.73 m^2       Hemoglobin A1C   Date Value Ref Range Status   05/29/2019 5.8 (H) 4.0 - 5.6 % Final     Comment:     ADA Screening Guidelines:  5.7-6.4%  Consistent with prediabetes  >or=6.5%  Consistent with diabetes  High levels of fetal hemoglobin interfere with the HbA1C  assay. Heterozygous hemoglobin variants (HbS, HgC, etc)do  not significantly interfere with this assay.   However, presence of multiple variants may affect accuracy.     03/07/2019 5.4 4.0 - 5.6 % Final     Comment:     ADA Screening Guidelines:  5.7-6.4%  Consistent with prediabetes  >or=6.5%  Consistent with diabetes  High  levels of fetal hemoglobin interfere with the HbA1C  assay. Heterozygous hemoglobin variants (HbS, HgC, etc)do  not significantly interfere with this assay.   However, presence of multiple variants may affect accuracy.         No results for input(s): POCTGLUCOSE in the last 168 hours.    Active Hospital Problems    Diagnosis  POA    Rectal polyp [K62.1]  Yes    Perforation bowel [K63.1]  Yes    Stage 3 chronic kidney disease [N18.3]  Yes    History of lung cancer [Z85.118]  Not Applicable    Essential hypertension [I10]  Yes      Resolved Hospital Problems   No resolved problems to display.       Assessment and Plan:    Colonic perforation:  -patient with history of rectal polyp, underwent colonoscopy today.  Procedure was complicated with small perforation of the colon.  General surgery was consulted.  Perforation was repaired endoscopically.  Patient admitted for close monitor  -GI consulted  -obtain blood cultures  -started on ceftriaxone, Flagyl (allergic to penicillins)  -IV fluids with NS at 100 cc/HR  -keep NPO, will advance diet once approved by GI  -pain control with p.r.n. Tylenol, Norco 5 mg q.4 hours p.r.n. for moderate pain, Norco 10 mg q.4 hours p.r.n. for severe pain, Dilaudid 0.2 mg available for breakthrough pain not controlled with oral analgesics    Hypertension:  -stable  -resume atenolol, hold losartan.  Will resume as hemodynamics permit  -monitor vitals q4h  -SBP goal of <160 in hospital    History of SVT:  -Continue to monitor  -On telemetry continue home atenolol 25 mg q.6 hours  -In maintain magnesium over 2, potassium over 4 to decrease erythematous and potential    GERD:  -continue PPI, sucralfate    DVT PPx:  SCDs    Vane Ashby MD  Hospital Medicine Staff  [phone] pager

## 2020-09-11 NOTE — DISCHARGE INSTRUCTIONS
Colonoscopy     A camera attached to a flexible tube with a viewing lens is used to take video pictures.     Colonoscopy is a test to view the inside of your lower digestive tract (colon and rectum). Sometimes it can show the last part of the small intestine (ileum). During the test, small pieces of tissue may be removed for testing. This is called a biopsy. Small growths, such as polyps, may also be removed.   Why is colonoscopy done?  The test is done to help look for colon cancer. And it can help find the source of abdominal pain, bleeding, and changes in bowel habits. It may be needed once a year, depending on factors such as your:  · Age  · Health history  · Family health history  · Symptoms  · Results from any prior colonoscopy  Risks and possible complications  These include:  · Bleeding               · A puncture or tear in the colon   · Risks of anesthesia  · A cancer lesion not being seen  Getting ready   To prepare for the test:  · Talk with your healthcare provider about the risks of the test (see below). Also ask your healthcare provider about alternatives to the test.  · Tell your healthcare provider about any medicines you take. Also tell him or her about any health conditions you may have.  · Make sure your rectum and colon are empty for the test. Follow the diet and bowel prep instructions exactly. If you dont, the test may need to be rescheduled.  · Plan for a friend or family member to drive you home after the test.     Colonoscopy provides an inside view of the entire colon.     You may discuss the results with your doctor right away or at a future visit.  During the test   The test is usually done in the hospital on an outpatient basis. This means you go home the same day. The procedure takes about 30 minutes. During that time:  · You are given relaxing (sedating) medicine through an IV line. You may be drowsy, or fully asleep.  · The healthcare provider will first give you a physical exam to  check for anal and rectal problems.  · Then the anus is lubricated and the scope inserted.  · If you are awake, you may have a feeling similar to needing to have a bowel movement. You may also feel pressure as air is pumped into the colon. Its OK to pass gas during the procedure.  · Biopsy, polyp removal, or other treatments may be done during the test.  After the test   You may have gas right after the test. It can help to try to pass it to help prevent later bloating. Your healthcare provider may discuss the results with you right away. Or you may need to schedule a follow-up visit to talk about the results. After the test, you can go back to your normal eating and other activities. You may be tired from the sedation and need to rest for a few hours.  Date Last Reviewed: 11/1/2016 © 2000-2017 [employer]. [address] [place] [address], [place] [address]. All rights reserved. This information is not intended as a substitute for professional medical care. Always follow your healthcare professional's instructions.

## 2020-09-11 NOTE — H&P
"History & Physical - Short Stay  Gastroenterology      SUBJECTIVE:     Procedure: Colonoscopy    Chief Complaint/Indication for Procedure: rectal polyp    History of Present Illness:  Patient is a 69 y.o. female with rectal polyp coming for EMR.     PTA Medications   Medication Sig    atenoloL (TENORMIN) 50 MG tablet Half a tab qid    diazePAM (VALIUM) 5 MG tablet 1 tab po q 12 h prn neck pain    esomeprazole (NEXIUM) 20 MG capsule Take 20 mg by mouth before breakfast.    losartan (COZAAR) 50 MG tablet 1 tab po bid    calcium carbonate (CALCIUM ANTACID) 300 mg (750 mg) Chew Take by mouth.    HYDROcodone-acetaminophen (NORCO) 5-325 mg per tablet Take 1 tablet by mouth every 12 (twelve) hours as needed for Pain.    LORazepam (ATIVAN) 0.5 MG tablet Take 1 tablet (0.5 mg total) by mouth every 12 (twelve) hours as needed for Anxiety.       Review of patient's allergies indicates:   Allergen Reactions    Adhesive Itching, Rash and Blisters     INCLUDES EKG PADS    Ciprofloxacin Hives     Hives    Iodinated contrast media     Pcn [penicillins]      rash    Phenergan plain Other (See Comments)     "crazy behavior"    Phenergan [promethazine]     Tramadol     Vancomycin analogues      Red man syndrome        Past Medical History:   Diagnosis Date    Basal cell carcinoma     Breast cyst     Cardiac arrhythmia     Disorder of kidney and ureter     renal stone    Fibrocystic breast     Hypertension     Lung cancer     Rectal polyp     Squamous cell carcinoma      Past Surgical History:   Procedure Laterality Date    ADENOIDECTOMY  17yo    ANTERIOR CERVICAL DISCECTOMY W/ FUSION N/A 10/15/2018    Procedure: DISCECTOMY, SPINE, CERVICAL, ANTERIOR APPROACH, WITH FUSION C6/7  Depuy SNS: [employer]/SSEP/Vocal Cord Regular OR table;  Surgeon: Greyson Bansal MD;  Location: [facility] OR [address] [place] [address];  Service: Neurosurgery;  Laterality: N/A;    APPENDECTOMY      BONE RESECTION, RIB  1980    BREAST BIOPSY Left     at " least 40yrs ago in her 20's    BREAST CYST ASPIRATION      BREAST CYST EXCISION      ENDOBRONCHIAL ULTRASOUND N/A 7/10/2018    Procedure: ULTRASOUND, ENDOBRONCHIAL;  Surgeon: Sri Hearn MD;  Location: [facility] OR [address] [place] [address];  Service: Pulmonary;  Laterality: N/A;    ENDOBRONCHIAL ULTRASOUND N/A 9/24/2019    Procedure: ENDOBRONCHIAL ULTRASOUND (EBUS);  Surgeon: Sri Hearn MD;  Location: [facility] OR [address] [place] [address];  Service: Pulmonary;  Laterality: N/A;    FLEXIBLE SIGMOIDOSCOPY  06/11/2020    with biopsies    FLEXIBLE SIGMOIDOSCOPY N/A 6/11/2020    Procedure: SIGMOIDOSCOPY, FLEXIBLE;  Surgeon: Gely Yeh MD;  Location: [facility];  Service: Endoscopy;  Laterality: N/A;    HYSTERECTOMY      @47yrs of age    INSERTION OF TUNNELED CENTRAL VENOUS CATHETER (CVC) WITH SUBCUTANEOUS PORT N/A 9/25/2018    Procedure: [id number];  Surgeon: [facility] Diagnostic Provider;  Location: [facility] OR [address] [place] [address];  Service: Radiology;  Laterality: N/A;    KIDNEY SURGERY      17yo    OOPHORECTOMY      @47yrs of age    TONSILLECTOMY      VIDEO-ASSISTED THORACOSCOPIC LOBECTOMY Right 8/9/2018    Procedure: VATS, WITH LOBECTOMY Possible chest wall resection;  Surgeon: Philip Messina MD;  Location: [address];  Service: Thoracic;  Laterality: Right;  Have open pan available.     Family History   Problem Relation Age of Onset    Stroke Mother     Cataracts Mother     Cancer Father         colon cancer    Diabetes Brother     Heart failure Brother     Hypertension Brother     Heart attack Paternal Aunt     Heart attack Maternal Grandfather     Diabetes Paternal Aunt     Anesthesia problems Neg Hx     Blindness Neg Hx     Amblyopia Neg Hx     Glaucoma Neg Hx     Macular degeneration Neg Hx     Retinal detachment Neg Hx     Strabismus Neg Hx     Thyroid disease Neg Hx      Social History     Tobacco Use    Smoking status: Former Smoker     Packs/day: 1.00     Years: 30.00     Pack years: 30.00     Types:  Cigarettes     Quit date:      Years since quittin.6    Smokeless tobacco: Never Used   Substance Use Topics    Alcohol use: Not Currently     Frequency: Never     Drinks per session: Patient refused     Binge frequency: Never     Comment: socially     Drug use: No            OBJECTIVE:     Vital Signs (Most Recent)  Temp: 98.3 °F (36.8 °C) (20)  Pulse: 73 (20)  Resp: 17 (20)  BP: (!) 177/80 (20)  SpO2: 99 % (20)       ASSESSMENT/PLAN:     Patient is a 69 y.o. female with rectal polyp coming for EMR.     Plan: Colonoscopy    Anesthesia Plan: General    ASA Grade: ASA 2 - Patient with mild systemic disease with no functional limitations

## 2020-09-11 NOTE — TRANSFER OF CARE
"Anesthesia Transfer of Care Note    Patient: Alesha Toney    Procedure(s) Performed: Procedure(s) (LRB):  RESECTION, MUCOSA, COLON, ENDOSCOPIC (N/A)    Patient location: PACU    Anesthesia Type: general    Transport from OR: Transported from OR on 6-10 L/min O2 by face mask with adequate spontaneous ventilation    Post pain: adequate analgesia    Post assessment: no apparent anesthetic complications and tolerated procedure well    Post vital signs: stable    Level of consciousness: awake and alert    Nausea/Vomiting: no nausea/vomiting    Complications: none    Transfer of care protocol was followed      Last vitals:   Visit Vitals  BP (!) 177/80 (BP Location: Left arm, Patient Position: Lying)   Pulse 73   Temp 36.8 °C (98.3 °F) (Oral)   Resp 17   Ht 5' 6" (1.676 m)   Wt 72.1 kg (159 lb)   LMP  (LMP Unknown)   SpO2 99%   Breastfeeding No   BMI 25.66 kg/m²     "

## 2020-09-12 VITALS
HEART RATE: 67 BPM | WEIGHT: 158.94 LBS | OXYGEN SATURATION: 97 % | RESPIRATION RATE: 18 BRPM | HEIGHT: 66 IN | SYSTOLIC BLOOD PRESSURE: 158 MMHG | BODY MASS INDEX: 25.54 KG/M2 | DIASTOLIC BLOOD PRESSURE: 67 MMHG | TEMPERATURE: 98 F

## 2020-09-12 LAB
ALBUMIN SERPL BCP-MCNC: 2.9 G/DL (ref 3.5–5.2)
ALP SERPL-CCNC: 42 U/L (ref 55–135)
ALT SERPL W/O P-5'-P-CCNC: 15 U/L (ref 10–44)
ANION GAP SERPL CALC-SCNC: 6 MMOL/L (ref 8–16)
AST SERPL-CCNC: 21 U/L (ref 10–40)
BASOPHILS # BLD AUTO: 0.02 K/UL (ref 0–0.2)
BASOPHILS NFR BLD: 0.4 % (ref 0–1.9)
BILIRUB SERPL-MCNC: 0.3 MG/DL (ref 0.1–1)
BUN SERPL-MCNC: 9 MG/DL (ref 8–23)
CALCIUM SERPL-MCNC: 8.2 MG/DL (ref 8.7–10.5)
CHLORIDE SERPL-SCNC: 114 MMOL/L (ref 95–110)
CO2 SERPL-SCNC: 25 MMOL/L (ref 23–29)
CREAT SERPL-MCNC: 1 MG/DL (ref 0.5–1.4)
DIFFERENTIAL METHOD: ABNORMAL
EOSINOPHIL # BLD AUTO: 0.1 K/UL (ref 0–0.5)
EOSINOPHIL NFR BLD: 2.5 % (ref 0–8)
ERYTHROCYTE [DISTWIDTH] IN BLOOD BY AUTOMATED COUNT: 12.5 % (ref 11.5–14.5)
EST. GFR  (AFRICAN AMERICAN): >60 ML/MIN/1.73 M^2
EST. GFR  (NON AFRICAN AMERICAN): 57.6 ML/MIN/1.73 M^2
GLUCOSE SERPL-MCNC: 83 MG/DL (ref 70–110)
HCT VFR BLD AUTO: 31.2 % (ref 37–48.5)
HGB BLD-MCNC: 10.2 G/DL (ref 12–16)
IMM GRANULOCYTES # BLD AUTO: 0.01 K/UL (ref 0–0.04)
IMM GRANULOCYTES NFR BLD AUTO: 0.2 % (ref 0–0.5)
LYMPHOCYTES # BLD AUTO: 1.1 K/UL (ref 1–4.8)
LYMPHOCYTES NFR BLD: 24.9 % (ref 18–48)
MAGNESIUM SERPL-MCNC: 1.7 MG/DL (ref 1.6–2.6)
MCH RBC QN AUTO: 31.6 PG (ref 27–31)
MCHC RBC AUTO-ENTMCNC: 32.7 G/DL (ref 32–36)
MCV RBC AUTO: 97 FL (ref 82–98)
MONOCYTES # BLD AUTO: 0.5 K/UL (ref 0.3–1)
MONOCYTES NFR BLD: 11.5 % (ref 4–15)
NEUTROPHILS # BLD AUTO: 2.7 K/UL (ref 1.8–7.7)
NEUTROPHILS NFR BLD: 60.5 % (ref 38–73)
NRBC BLD-RTO: 0 /100 WBC
PHOSPHATE SERPL-MCNC: 3.4 MG/DL (ref 2.7–4.5)
PLATELET # BLD AUTO: 152 K/UL (ref 150–350)
PMV BLD AUTO: 10 FL (ref 9.2–12.9)
POTASSIUM SERPL-SCNC: 3.8 MMOL/L (ref 3.5–5.1)
PROT SERPL-MCNC: 4.9 G/DL (ref 6–8.4)
RBC # BLD AUTO: 3.23 M/UL (ref 4–5.4)
SODIUM SERPL-SCNC: 145 MMOL/L (ref 136–145)
WBC # BLD AUTO: 4.45 K/UL (ref 3.9–12.7)

## 2020-09-12 PROCEDURE — 84100 ASSAY OF PHOSPHORUS: CPT

## 2020-09-12 PROCEDURE — 99239 PR HOSPITAL DISCHARGE DAY,>30 MIN: ICD-10-PCS | Mod: ,,, | Performed by: HOSPITALIST

## 2020-09-12 PROCEDURE — 83735 ASSAY OF MAGNESIUM: CPT

## 2020-09-12 PROCEDURE — 85025 COMPLETE CBC W/AUTO DIFF WBC: CPT

## 2020-09-12 PROCEDURE — 80053 COMPREHEN METABOLIC PANEL: CPT

## 2020-09-12 PROCEDURE — S0030 INJECTION, METRONIDAZOLE: HCPCS | Performed by: HOSPITALIST

## 2020-09-12 PROCEDURE — 36415 COLL VENOUS BLD VENIPUNCTURE: CPT

## 2020-09-12 PROCEDURE — 63600175 PHARM REV CODE 636 W HCPCS: Performed by: HOSPITALIST

## 2020-09-12 PROCEDURE — 99239 HOSP IP/OBS DSCHRG MGMT >30: CPT | Mod: ,,, | Performed by: HOSPITALIST

## 2020-09-12 PROCEDURE — 25000003 PHARM REV CODE 250: Performed by: HOSPITALIST

## 2020-09-12 RX ORDER — LOSARTAN POTASSIUM 50 MG/1
50 TABLET ORAL 2 TIMES DAILY
Status: DISCONTINUED | OUTPATIENT
Start: 2020-09-12 | End: 2020-09-12 | Stop reason: HOSPADM

## 2020-09-12 RX ORDER — CEFPODOXIME PROXETIL 100 MG/1
100 TABLET, FILM COATED ORAL 2 TIMES DAILY
Qty: 12 TABLET | Refills: 0 | Status: SHIPPED | OUTPATIENT
Start: 2020-09-12 | End: 2020-09-18

## 2020-09-12 RX ORDER — MAGNESIUM SULFATE HEPTAHYDRATE 40 MG/ML
2 INJECTION, SOLUTION INTRAVENOUS ONCE
Status: COMPLETED | OUTPATIENT
Start: 2020-09-12 | End: 2020-09-12

## 2020-09-12 RX ORDER — METRONIDAZOLE 500 MG/1
500 TABLET ORAL EVERY 8 HOURS
Qty: 18 TABLET | Refills: 0 | Status: SHIPPED | OUTPATIENT
Start: 2020-09-12 | End: 2020-09-18

## 2020-09-12 RX ADMIN — ATENOLOL 25 MG: 25 TABLET ORAL at 11:09

## 2020-09-12 RX ADMIN — METRONIDAZOLE 500 MG: 500 INJECTION, SOLUTION INTRAVENOUS at 07:09

## 2020-09-12 RX ADMIN — ALUMINUM HYDROXIDE, MAGNESIUM HYDROXIDE, AND SIMETHICONE 30 ML: 200; 200; 20 SUSPENSION ORAL at 07:09

## 2020-09-12 RX ADMIN — ALUMINUM HYDROXIDE, MAGNESIUM HYDROXIDE, AND SIMETHICONE 30 ML: 200; 200; 20 SUSPENSION ORAL at 10:09

## 2020-09-12 RX ADMIN — LOSARTAN POTASSIUM 50 MG: 50 TABLET ORAL at 09:09

## 2020-09-12 RX ADMIN — ONDANSETRON 8 MG: 8 TABLET, ORALLY DISINTEGRATING ORAL at 12:09

## 2020-09-12 RX ADMIN — ATENOLOL 25 MG: 25 TABLET ORAL at 07:09

## 2020-09-12 RX ADMIN — LORAZEPAM 0.25 MG: 0.5 TABLET ORAL at 10:09

## 2020-09-12 RX ADMIN — PANTOPRAZOLE SODIUM 40 MG: 40 TABLET, DELAYED RELEASE ORAL at 08:09

## 2020-09-12 RX ADMIN — HYDROCODONE BITARTRATE AND ACETAMINOPHEN 1 TABLET: 5; 325 TABLET ORAL at 07:09

## 2020-09-12 RX ADMIN — MAGNESIUM SULFATE IN WATER 2 G: 40 INJECTION, SOLUTION INTRAVENOUS at 09:09

## 2020-09-12 NOTE — PLAN OF CARE
AAOX4, vitals charted. Room air. IV infiltrated and new one inserted. SCds in place.Minimal complaints of pain, managed with prn po pain medication. Pt ambulates to restroom. Safety measures in place, call bell with in reach. No falls at this time.

## 2020-09-12 NOTE — DISCHARGE SUMMARY
Discharge Summary  Hospital Medicine       Name: Alesha Toney  YOB: 1950 (Age: 69 y.o.)  Date of Admission: 9/11/2020  Date of Discharge: 9/12/2020  Attending Provider on Discharge: Vane Ashby MD  Hospital Medicine Team: [facility]OS[facility] MED D  Code status: Full Code    Primary Care Provider: Margo Caldwell MD    Discharge Diagnosis:  Active Hospital Problems    Diagnosis  POA    *Perforation bowel [K63.1]  Yes    Rectal polyp [K62.1]  Yes    Stage 3 chronic kidney disease [N18.3]  Yes    History of lung cancer [Z85.118]  Not Applicable    Essential hypertension [I10]  Yes      Resolved Hospital Problems   No resolved problems to display.       HPI: Alesha Toney is a 69 y.o. female with PMH of hypertension, lung cancer, rectal polyp, CKD who presents to [facility] today with a chief complaint of postprocedural complication.  patient with history of rectal polyp, underwent colonoscopy today.  Procedure was complicated with small perforation of the colon.  General surgery was consulted.  Perforation was repaired endoscopically.  Patient admitted for close monitoring. AES consulted.  Patient seen and evaluated in the PACU.  She denies nausea, diarrhea constipation blood in stools, fever, chills,.  Did have some abdominal pain earlier received Dilaudid is patient of urinary retention required in and out catheterization x1. On admission, patient afebrile, hemodynamically stable, saturating 100% on 6 L O2.  Patient started on ceftriaxone, Flagyl.  Blood cultures obtained.  Patient admitted to Hospital Medicine.    Hospital Course:   Colonic perforation:  -patient with history of rectal polyp, underwent colonoscopy today.  Procedure was complicated with small perforation of the colon.  General surgery was consulted.  Perforation was repaired endoscopically.  Patient admitted for close monitoring  -CT on 9/12 shows Small amount of pneumoperitoneum in the pelvis, probably related to the perforation that  was already repaired during colonoscopy September 11, 2020.  No leak of fecal contents.  Mild perirectal pelvic inflammatory change/fat stranding, probably related to recent procedure.  -GI consulted, appreciate recs  -started on ceftriaxone, Flagyl (allergic to penicillins). Discharged on cefpodoxime and flagyl PO to finish 7 day course of abx.   -clear liquid diet for 3 days, then advance slowly as tolerated  -pain control with p.r.n. Tylenol, Norco prn     Hypertension:  -stable  -resume atenolol, losartan.       History of SVT:  -continue home atenolol 25 mg q.6 hours    GERD:  -continue PPI, sucralfate    Labs:  Recent Labs   Lab 09/08/20  1027 09/12/20  0330   WBC 5.20 4.45   HGB 12.7 10.2*   HCT 38.7 31.2*    152     Recent Labs   Lab 09/08/20  1027 09/11/20  0710 09/12/20  0329    143 145   K 5.4* 4.6 3.8    106 114*   CO2 27 28 25   BUN 28* 15 9   CREATININE 1.4 1.2 1.0   * 119* 83   CALCIUM 10.1 9.7 8.2*   MG  --   --  1.7   PHOS  --   --  3.4     Recent Labs   Lab 09/08/20  1027 09/12/20  0329   ALKPHOS 55 42*   ALT 20 15   AST 21 21   ALBUMIN 4.0 2.9*   PROT 6.9 4.9*   BILITOT 0.4 0.3      No results for input(s): POCTGLUCOSE in the last 168 hours.  No results for input(s): CPK, CPKMB, MB, TROPONINI in the last 72 hours.    ROS (Positive in Bold, otherwise negative)  Constitutional: fever, chills, night sweats  CV: chest pain, edema, palpitations  Resp: SOB, cough, sputum production  GI: changes in appetite, NVDC, pain, melena, hematochezia, GERD, hematemesis  : Dysuria, hematuria, urinary urgency, frequency  MSK: arthralgia/myalgia, joint swelling  Neuro/Psych: anxiety, depression    PEx   Temp:  [96.6 °F (35.9 °C)-97.9 °F (36.6 °C)]   Pulse:  [62-73]   Resp:  [13-20]   BP: (124-166)/(59-71)   SpO2:  [96 %-100 %]      General: no distress   Lungs: clear to ausculation anteriorly and posteriorly   Heart: regular rate and rhythm   Abdomen: normal bowel sounds, soft, no  tenderness   Extremities: no edema. No clubbing or cyanosis       Procedures:  CT abdomen    Consultants:  Gastroenterology    Current Discharge Medication List      START taking these medications    Details   cefpodoxime (VANTIN) 100 MG tablet Take 1 tablet (100 mg total) by mouth 2 (two) times daily. for 6 days  Qty: 12 tablet, Refills: 0      metroNIDAZOLE (FLAGYL) 500 MG tablet Take 1 tablet (500 mg total) by mouth every 8 (eight) hours. for 6 days  Qty: 18 tablet, Refills: 0         CONTINUE these medications which have NOT CHANGED    Details   atenoloL (TENORMIN) 50 MG tablet Half a tab qid  Qty: 180 tablet, Refills: 3    Associated Diagnoses: Essential hypertension; SVT (supraventricular tachycardia)      diazePAM (VALIUM) 5 MG tablet 1 tab po q 12 h prn neck pain  Qty: 30 tablet, Refills: 0      esomeprazole (NEXIUM) 20 MG capsule Take 20 mg by mouth before breakfast.      losartan (COZAAR) 50 MG tablet 1 tab po bid  Qty: 180 tablet, Refills: 3      calcium carbonate (CALCIUM ANTACID) 300 mg (750 mg) Chew Take by mouth.      HYDROcodone-acetaminophen (NORCO) 5-325 mg per tablet Take 1 tablet by mouth every 12 (twelve) hours as needed for Pain.  Qty: 60 tablet, Refills: 0    Comments: Quantity prescribed more than 7 day supply? Yes, quantity medically necessary      LORazepam (ATIVAN) 0.5 MG tablet Take 1 tablet (0.5 mg total) by mouth every 12 (twelve) hours as needed for Anxiety.  Qty: 30 tablet, Refills: 0             The relevant and important risks, side effects, and benefits of their medications were reviewed with patient during hospitalization and at discharge. The patient was given the opportunity to discuss and ask questions about their medications, including target symptoms, potential risks, side effects and benefits of their medications, as well as their expected prognosis if non-medication treatment options were chosen.  The patient expresses understanding of all these options and information and  voluntarily consents to treatment.    Discharge Diet:  Clear liquid diet for 3 days, advance slowly as tolerated after the 3 days over    Activity: activity as tolerated    Discharge Condition: Stable    Disposition: Home or Self Care    Tests pending at the time of discharge: none      Time spent  on the discharge of the patient including review of hospital course with the patient. reviewing discharge medications and arranging follow-up care: 39 mins    Discharge examination Patient was seen and examined on the date of discharge and determined to be suitable for discharge.    Discharge plan and follow up:  It is critical that you make your follow-up appointment(s). If you are discharged on the weekend or after business hours, or if we are unable to schedule these appointments for you for any reason, you or a family member need to call during the next business day to schedule your appointment(s).    -Follow up with you PCP, Margo Caldwell MD within 1-2 weeks as arranged with  and treatment team prior to discharge  -Take all medications as prescribed and listed above.    - Please return to ED or call your physician if you have:         1. Fevers > 101.5 unresponsive to tylenol.       2. Abdominal pain and/or distention       3. Intractable nausea, vomiting or diarrhea       4. Inability to tolerate adequate oral intake of food       5. Neurologic changes, chest pain or shortness of breath         Future Appointments   Date Time Provider Department Center   9/16/2020  2:15 PM [facility] EOS [facility] EOS [facility]   9/16/2020  3:15 PM Greyson Bansal MD [facility] SPINE [address]   10/6/2020  8:40 AM LAB, HEMONC CANCER BLDG [facility] LAB HO [address]   10/6/2020  9:30 AM [facility] BCC CT1 [facility] CT ANA [address]   10/8/2020  2:00 PM Pam Dhaliwal MD [facility] HEMONC3 [address]     Follow-up with PCP, ROSA Ashby MD  Hospital Medicine Staff  [phone] pager

## 2020-09-12 NOTE — NURSING
Pt received and verbalized understanding of AVS, x2 medications being delivered via bedside delivery, wheelchair to be ordered when pt receives medications.

## 2020-09-12 NOTE — NURSING TRANSFER
Nursing Transfer Note      9/11/2020     Transfer To: 530    Transfer via stretcher    Transfer with cardiac monitoring, via central tele    Transported by PCT    Medicines sent: N/A    Chart send with patient: Yes    Notified: Lata (significant other)    Patient reassessed at: 09/11/2020 at 8716

## 2020-09-12 NOTE — TREATMENT PLAN
AES Follow-up Note     Alesha Toney was seen and examined.   1 Day Post-Op s/p EMR  Minimal abdominal discomfort. Tolerating diet.  CT with minimal pneumoperitoneum, no leukocytosis  Vitals stable. Afebrile.     Lab Results   Component Value Date    ALT 15 09/12/2020    AST 21 09/12/2020    ALKPHOS 42 (L) 09/12/2020    BILITOT 0.3 09/12/2020    BILITOT 0.4 09/08/2020    BILITOT 0.3 05/27/2020       No sx/sx of post-EMR or other procedural complications.     -Please have patient discharge on FLD for 3 days  -7 day course of cipro/flagyl    AES will sign off. Please call if any questions/concerns.  Patient will be contacted with follow-up information.     Bigg Jeffries MD  Gastroenterology & Hepatology Fellow

## 2020-09-14 ENCOUNTER — PATIENT OUTREACH (OUTPATIENT)
Dept: ADMINISTRATIVE | Facility: HOSPITAL | Age: 70
End: 2020-09-14

## 2020-09-14 NOTE — ANESTHESIA POSTPROCEDURE EVALUATION
Anesthesia Post Evaluation    Patient: Alesha Toney    Procedure(s) Performed: Procedure(s) (LRB):  RESECTION, MUCOSA, COLON, ENDOSCOPIC (N/A)        Patient location during evaluation: PACU  Patient participation: Yes- Able to Participate  Level of consciousness: awake and alert  Post-procedure vital signs: reviewed and stable  Pain management: adequate  Airway patency: patent    PONV status at discharge: No PONV  Anesthetic complications: no      Cardiovascular status: blood pressure returned to baseline  Respiratory status: unassisted  Hydration status: euvolemic  Follow-up not needed.          Vitals Value Taken Time   /67 09/12/20 1143   Temp 36.6 °C (97.9 °F) 09/12/20 1143   Pulse 67 09/12/20 1500   Resp 18 09/12/20 1143   SpO2 97 % 09/12/20 1143         Event Time   Out of Recovery 19:05:00         Pain/Grady Score: No data recorded

## 2020-09-15 ENCOUNTER — PATIENT OUTREACH (OUTPATIENT)
Dept: ADMINISTRATIVE | Facility: OTHER | Age: 70
End: 2020-09-15

## 2020-09-16 ENCOUNTER — LAB VISIT (OUTPATIENT)
Dept: LAB | Facility: HOSPITAL | Age: 70
End: 2020-09-16
Attending: INTERNAL MEDICINE
Payer: MEDICARE

## 2020-09-16 ENCOUNTER — OFFICE VISIT (OUTPATIENT)
Dept: GASTROENTEROLOGY | Facility: CLINIC | Age: 70
End: 2020-09-16
Payer: MEDICARE

## 2020-09-16 ENCOUNTER — PATIENT MESSAGE (OUTPATIENT)
Dept: ORTHOPEDICS | Facility: CLINIC | Age: 70
End: 2020-09-16

## 2020-09-16 VITALS — HEIGHT: 66 IN | WEIGHT: 166 LBS | RESPIRATION RATE: 16 BRPM | BODY MASS INDEX: 26.68 KG/M2

## 2020-09-16 DIAGNOSIS — B37.0 THRUSH: ICD-10-CM

## 2020-09-16 DIAGNOSIS — R19.7 DIARRHEA, UNSPECIFIED TYPE: ICD-10-CM

## 2020-09-16 DIAGNOSIS — D64.9 ANEMIA, UNSPECIFIED TYPE: ICD-10-CM

## 2020-09-16 DIAGNOSIS — M54.12 CERVICAL RADICULITIS: Primary | ICD-10-CM

## 2020-09-16 DIAGNOSIS — K62.1 RECTAL POLYP: ICD-10-CM

## 2020-09-16 DIAGNOSIS — D64.9 ANEMIA, UNSPECIFIED TYPE: Primary | ICD-10-CM

## 2020-09-16 LAB
BACTERIA BLD CULT: NORMAL
BACTERIA BLD CULT: NORMAL
BASOPHILS # BLD AUTO: 0.03 K/UL (ref 0–0.2)
BASOPHILS NFR BLD: 0.4 % (ref 0–1.9)
DIFFERENTIAL METHOD: ABNORMAL
EOSINOPHIL # BLD AUTO: 0.4 K/UL (ref 0–0.5)
EOSINOPHIL NFR BLD: 5.4 % (ref 0–8)
ERYTHROCYTE [DISTWIDTH] IN BLOOD BY AUTOMATED COUNT: 12.4 % (ref 11.5–14.5)
HCT VFR BLD AUTO: 35.7 % (ref 37–48.5)
HGB BLD-MCNC: 12 G/DL (ref 12–16)
IMM GRANULOCYTES # BLD AUTO: 0.03 K/UL (ref 0–0.04)
IMM GRANULOCYTES NFR BLD AUTO: 0.4 % (ref 0–0.5)
LYMPHOCYTES # BLD AUTO: 1.6 K/UL (ref 1–4.8)
LYMPHOCYTES NFR BLD: 19.9 % (ref 18–48)
MCH RBC QN AUTO: 31.4 PG (ref 27–31)
MCHC RBC AUTO-ENTMCNC: 33.6 G/DL (ref 32–36)
MCV RBC AUTO: 94 FL (ref 82–98)
MONOCYTES # BLD AUTO: 0.7 K/UL (ref 0.3–1)
MONOCYTES NFR BLD: 8.6 % (ref 4–15)
NEUTROPHILS # BLD AUTO: 5.3 K/UL (ref 1.8–7.7)
NEUTROPHILS NFR BLD: 65.3 % (ref 38–73)
NRBC BLD-RTO: 0 /100 WBC
PLATELET # BLD AUTO: 208 K/UL (ref 150–350)
PMV BLD AUTO: 10.4 FL (ref 9.2–12.9)
RBC # BLD AUTO: 3.82 M/UL (ref 4–5.4)
WBC # BLD AUTO: 8.13 K/UL (ref 3.9–12.7)

## 2020-09-16 PROCEDURE — 99214 OFFICE O/P EST MOD 30 MIN: CPT | Mod: S$PBB,,, | Performed by: INTERNAL MEDICINE

## 2020-09-16 PROCEDURE — 99214 PR OFFICE/OUTPT VISIT, EST, LEVL IV, 30-39 MIN: ICD-10-PCS | Mod: S$PBB,,, | Performed by: INTERNAL MEDICINE

## 2020-09-16 PROCEDURE — 99999 PR PBB SHADOW E&M-EST. PATIENT-LVL III: ICD-10-PCS | Mod: PBBFAC,,, | Performed by: INTERNAL MEDICINE

## 2020-09-16 PROCEDURE — 36415 COLL VENOUS BLD VENIPUNCTURE: CPT

## 2020-09-16 PROCEDURE — 99213 OFFICE O/P EST LOW 20 MIN: CPT | Mod: PBBFAC,PO | Performed by: INTERNAL MEDICINE

## 2020-09-16 PROCEDURE — 99999 PR PBB SHADOW E&M-EST. PATIENT-LVL III: CPT | Mod: PBBFAC,,, | Performed by: INTERNAL MEDICINE

## 2020-09-16 PROCEDURE — 85025 COMPLETE CBC W/AUTO DIFF WBC: CPT

## 2020-09-16 RX ORDER — NYSTATIN 100000 [USP'U]/ML
4 SUSPENSION ORAL 4 TIMES DAILY
Qty: 160 ML | Refills: 0 | Status: SHIPPED | OUTPATIENT
Start: 2020-09-16 | End: 2020-09-26

## 2020-09-16 NOTE — PROGRESS NOTES
Subjective:       Patient ID: Alesha Toney is a 69 y.o. female.    Chief Complaint: Follow-up (had procedure this week/colonoscopy)    This is a 68yo female here for a f/u visit being seen initially for an abnormal PET/CT noting an abnormal area in the rectum. Flex sig revealed a large polypoid lesion there and she underwent an ESD on 9/11. There was a small localized perforation which was sutured, CT findings noted. She continues to improve. Some loose stools since her abx started, watery, around 4 daily. No vomiting or bleeding. Mild rlq cramping, afebrile. +thrush. She notes increased fatigue, last hct was decreased from baseline.     The following portions of the patient's history were reviewed and updated as appropriate: allergies, current medications, past family history, past medical history, past social history, past surgical history and problem list.    (Portions of this note were dictated using voice recognition software and may contain dictation related errors in spelling/grammar/syntax not found on text review)  HPI  Review of Systems   Constitutional: Positive for fatigue. Negative for unexpected weight change.   Respiratory: Negative for shortness of breath and wheezing.    Cardiovascular: Negative for chest pain and palpitations.   Gastrointestinal: Positive for abdominal pain and diarrhea.         Objective:      Physical Exam  Vitals signs and nursing note reviewed.   Constitutional:       General: She is not in acute distress.     Appearance: She is well-developed. She is not diaphoretic.   HENT:      Head: Normocephalic and atraumatic.   Eyes:      General: No scleral icterus.     Conjunctiva/sclera: Conjunctivae normal.   Neck:      Musculoskeletal: Normal range of motion and neck supple.      Thyroid: No thyromegaly.      Trachea: No tracheal deviation.   Pulmonary:      Effort: Pulmonary effort is normal. No respiratory distress.   Abdominal:      General: Bowel sounds are normal. There is  no distension.      Palpations: Abdomen is soft.      Tenderness: There is no abdominal tenderness.   Skin:     General: Skin is warm and dry.   Neurological:      Mental Status: She is alert and oriented to person, place, and time.   Psychiatric:         Behavior: Behavior normal.         Thought Content: Thought content normal.           Labs/imaging; reviewed  Assessment:       1. Anemia, unspecified type    2. Thrush    3. Rectal polyp    4. Diarrhea, unspecified type        Plan:   1. CBC  2. Stool sample for c.diff if diarrhea persists  3. Await pathology  4. Nystatin

## 2020-09-21 ENCOUNTER — PATIENT MESSAGE (OUTPATIENT)
Dept: INTERNAL MEDICINE | Facility: CLINIC | Age: 70
End: 2020-09-21

## 2020-09-21 RX ORDER — HYDROCODONE BITARTRATE AND ACETAMINOPHEN 5; 325 MG/1; MG/1
1 TABLET ORAL EVERY 12 HOURS PRN
Qty: 60 TABLET | Refills: 0 | Status: SHIPPED | OUTPATIENT
Start: 2020-09-21 | End: 2020-11-05 | Stop reason: SDUPTHER

## 2020-09-22 ENCOUNTER — PATIENT MESSAGE (OUTPATIENT)
Dept: INTERNAL MEDICINE | Facility: CLINIC | Age: 70
End: 2020-09-22

## 2020-09-22 LAB
FINAL PATHOLOGIC DIAGNOSIS: NORMAL
GROSS: NORMAL
MICROSCOPIC EXAM: NORMAL

## 2020-09-23 NOTE — PHYSICIAN QUERY
PT Name: Alesha Toney  MR #: [id number]    Pathology Findings Clarification     CDS: Brandy Capley, RN  Email: [email or web address]      This form is a permanent document in the medical record.     Query Date: September 23, 2020    By submitting this query, we are merely seeking further clarification of documentation.  Please utilize your independent clinical judgment when addressing the question(s) below.    The medical record contains the following:  Pathology Findings Location in Medical Record     FINAL PATHOLOGIC DIAGNOSIS  Rectal polyp (endoscopic mucosal resection):  Multifocal high-grade dysplasia/intramucosal carcinoma (Tis)  Arising in a villous adenoma  Lateral/mucosal and deep margins negative for dysplasia   Surgical pathology 9/11       Please clarify:    [ x ] Pathology findings noted above are ruled in/confirmed as diagnoses     [  ] Pathology findings noted above are not confirmed as diagnoses     [  ] Other diagnosis (please specify): ___________     [  ] Clinically Undetermined       Please document in your progress notes daily for the duration of treatment until resolved and include in your discharge summary.

## 2020-09-25 ENCOUNTER — PATIENT MESSAGE (OUTPATIENT)
Dept: GASTROENTEROLOGY | Facility: CLINIC | Age: 70
End: 2020-09-25

## 2020-09-30 ENCOUNTER — PATIENT MESSAGE (OUTPATIENT)
Dept: ORTHOPEDICS | Facility: CLINIC | Age: 70
End: 2020-09-30

## 2020-10-01 ENCOUNTER — OFFICE VISIT (OUTPATIENT)
Dept: GASTROENTEROLOGY | Facility: CLINIC | Age: 70
End: 2020-10-01
Payer: MEDICARE

## 2020-10-01 ENCOUNTER — PATIENT MESSAGE (OUTPATIENT)
Dept: OTHER | Facility: OTHER | Age: 70
End: 2020-10-01

## 2020-10-01 ENCOUNTER — PATIENT MESSAGE (OUTPATIENT)
Dept: INTERNAL MEDICINE | Facility: CLINIC | Age: 70
End: 2020-10-01

## 2020-10-01 DIAGNOSIS — K62.1 RECTAL POLYP: Primary | ICD-10-CM

## 2020-10-01 PROCEDURE — 99441 PR PHYSICIAN TELEPHONE EVALUATION 5-10 MIN: ICD-10-PCS | Mod: 95,POP,, | Performed by: INTERNAL MEDICINE

## 2020-10-01 PROCEDURE — 99441 PR PHYSICIAN TELEPHONE EVALUATION 5-10 MIN: CPT | Mod: 95,POP,, | Performed by: INTERNAL MEDICINE

## 2020-10-05 ENCOUNTER — OFFICE VISIT (OUTPATIENT)
Dept: DERMATOLOGY | Facility: CLINIC | Age: 70
End: 2020-10-05
Payer: MEDICARE

## 2020-10-05 DIAGNOSIS — L82.1 SK (SEBORRHEIC KERATOSIS): ICD-10-CM

## 2020-10-05 DIAGNOSIS — D48.5 NEOPLASM OF UNCERTAIN BEHAVIOR OF SKIN: Primary | ICD-10-CM

## 2020-10-05 DIAGNOSIS — L57.0 AK (ACTINIC KERATOSIS): ICD-10-CM

## 2020-10-05 DIAGNOSIS — Z85.828 HISTORY OF NONMELANOMA SKIN CANCER: ICD-10-CM

## 2020-10-05 PROCEDURE — 11102 TANGNTL BX SKIN SINGLE LES: CPT | Mod: S$PBB,,, | Performed by: DERMATOLOGY

## 2020-10-05 PROCEDURE — 99213 PR OFFICE/OUTPT VISIT, EST, LEVL III, 20-29 MIN: ICD-10-PCS | Mod: 25,S$PBB,, | Performed by: DERMATOLOGY

## 2020-10-05 PROCEDURE — 17000 DESTRUCT PREMALG LESION: CPT | Mod: 59,PBBFAC | Performed by: DERMATOLOGY

## 2020-10-05 PROCEDURE — 99999 PR PBB SHADOW E&M-EST. PATIENT-LVL III: CPT | Mod: PBBFAC,,, | Performed by: DERMATOLOGY

## 2020-10-05 PROCEDURE — 99213 OFFICE O/P EST LOW 20 MIN: CPT | Mod: 25,S$PBB,, | Performed by: DERMATOLOGY

## 2020-10-05 PROCEDURE — 11102 PR TANGENTIAL BIOPSY, SKIN, SINGLE LESION: ICD-10-PCS | Mod: S$PBB,,, | Performed by: DERMATOLOGY

## 2020-10-05 PROCEDURE — 99999 PR PBB SHADOW E&M-EST. PATIENT-LVL III: ICD-10-PCS | Mod: PBBFAC,,, | Performed by: DERMATOLOGY

## 2020-10-05 PROCEDURE — 17003 DESTRUCT PREMALG LES 2-14: CPT | Mod: 59,S$PBB,, | Performed by: DERMATOLOGY

## 2020-10-05 PROCEDURE — 99213 OFFICE O/P EST LOW 20 MIN: CPT | Mod: PBBFAC,25 | Performed by: DERMATOLOGY

## 2020-10-05 PROCEDURE — 88305 TISSUE EXAM BY PATHOLOGIST: ICD-10-PCS | Mod: 26,,, | Performed by: PATHOLOGY

## 2020-10-05 PROCEDURE — 88305 TISSUE EXAM BY PATHOLOGIST: CPT | Mod: 26,,, | Performed by: PATHOLOGY

## 2020-10-05 PROCEDURE — 17000 PR DESTRUCTION(LASER SURGERY,CRYOSURGERY,CHEMOSURGERY),PREMALIGNANT LESIONS,FIRST LESION: ICD-10-PCS | Mod: 59,S$PBB,, | Performed by: DERMATOLOGY

## 2020-10-05 PROCEDURE — 88305 TISSUE EXAM BY PATHOLOGIST: CPT | Performed by: PATHOLOGY

## 2020-10-05 PROCEDURE — 11102 TANGNTL BX SKIN SINGLE LES: CPT | Mod: PBBFAC | Performed by: DERMATOLOGY

## 2020-10-05 PROCEDURE — 17003 DESTRUCTION, PREMALIGNANT LESIONS; SECOND THROUGH 14 LESIONS: ICD-10-PCS | Mod: 59,S$PBB,, | Performed by: DERMATOLOGY

## 2020-10-05 PROCEDURE — 17003 DESTRUCT PREMALG LES 2-14: CPT | Mod: 59,PBBFAC | Performed by: DERMATOLOGY

## 2020-10-05 PROCEDURE — 17000 DESTRUCT PREMALG LESION: CPT | Mod: 59,S$PBB,, | Performed by: DERMATOLOGY

## 2020-10-05 RX ORDER — POLYETHYLENE GLYCOL-3350 AND ELECTROLYTES 236; 6.74; 5.86; 2.97; 22.74 G/274.31G; G/274.31G; G/274.31G; G/274.31G; G/274.31G
POWDER, FOR SOLUTION ORAL
COMMUNITY
Start: 2020-09-09 | End: 2021-02-22

## 2020-10-05 RX ORDER — SODIUM, POTASSIUM,MAG SULFATES 17.5-3.13G
SOLUTION, RECONSTITUTED, ORAL ORAL
COMMUNITY
Start: 2020-09-02 | End: 2021-02-22

## 2020-10-05 RX ORDER — FAMOTIDINE 10 MG/1
10 TABLET ORAL DAILY
COMMUNITY
Start: 2020-08-05 | End: 2024-01-07

## 2020-10-05 NOTE — PROGRESS NOTES
Subjective:       Patient ID:  Alesha Toney is a 69 y.o. female who presents for   Chief Complaint   Patient presents with    Skin Check     UBSE     History of Present Illness: The patient presents for follow up of skin check.    The patient was last seen on: 6/2019 by GP for bx of bcc left upper back - excised by SSW  This is a high risk patient here to check for the development of new lesions.    Other skin complaints:   Patient with new complaint of lesion(s)  Location: left thigh  Duration: 3 months  Symptoms: growing  Relieving factors/Previous treatments: picked          Review of Systems   Skin: Positive for daily sunscreen use and activity-related sunscreen use. Negative for recent sunburn.   Hematologic/Lymphatic: Does not bruise/bleed easily.        Objective:    Physical Exam   Constitutional: She appears well-developed and well-nourished. No distress.   Neurological: She is alert and oriented to person, place, and time. She is not disoriented.   Psychiatric: She has a normal mood and affect.   Skin:   Areas Examined (abnormalities noted in diagram):   Head / Face Inspection Performed  Neck Inspection Performed  Chest / Axilla Inspection Performed  Abdomen Inspection Performed  Back Inspection Performed  RUE Inspected  LUE Inspection Performed  LLE Inspection Performed                   Diagram Legend     Erythematous scaling macule/papule c/w actinic keratosis       Vascular papule c/w angioma      Pigmented verrucoid papule/plaque c/w seborrheic keratosis      Yellow umbilicated papule c/w sebaceous hyperplasia      Irregularly shaped tan macule c/w lentigo     1-2 mm smooth white papules consistent with Milia      Movable subcutaneous cyst with punctum c/w epidermal inclusion cyst      Subcutaneous movable cyst c/w pilar cyst      Firm pink to brown papule c/w dermatofibroma      Pedunculated fleshy papule(s) c/w skin tag(s)      Evenly pigmented macule c/w junctional nevus     Mildly  variegated pigmented, slightly irregular-bordered macule c/w mildly atypical nevus      Flesh colored to evenly pigmented papule c/w intradermal nevus       Pink pearly papule/plaque c/w basal cell carcinoma      Erythematous hyperkeratotic cursted plaque c/w SCC      Surgical scar with no sign of skin cancer recurrence      Open and closed comedones      Inflammatory papules and pustules      Verrucoid papule consistent consistent with wart     Erythematous eczematous patches and plaques     Dystrophic onycholytic nail with subungual debris c/w onychomycosis     Umbilicated papule    Erythematous-base heme-crusted tan verrucoid plaque consistent with inflamed seborrheic keratosis     Erythematous Silvery Scaling Plaque c/w Psoriasis     See annotation          Assessment / Plan:      Pathology Orders:     Normal Orders This Visit    Specimen to Pathology, Dermatology     Questions:    Procedure Type: Dermatology and skin neoplasms    Number of Specimens: 1    ------------------------: -------------------------    Spec 1 Procedure: Biopsy        Spec 1 Clinical Impression: r/o scc vs isk    Spec 1 Source: left medial thigh        Neoplasm of uncertain behavior of skin  -     Specimen to Pathology, Dermatology  Shave biopsy procedure note:    Shave biopsy performed after verbal consent including risk of infection, scar, recurrence, need for additional treatment of site. Area prepped with alcohol, anesthetized with approximately 1.0cc of 1% lidocaine with epinephrine. Lesional tissue shaved with razor blade. Hemostasis achieved with application of aluminum chloride followed by hyfrecation. No complications. Dressing applied. Wound care explained.    If biopsy positive, schedule procedure for definitive excision.       AK (actinic keratosis)  Cryosurgery Procedure Note    Verbal consent from the patient is obtained including, but not limited to, risk of hypopigmentation/hyperpigmentation, scar, recurrence of lesion.  The patient is aware of the precancerous quality and need for treatment of these lesions. Liquid nitrogen cryosurgery is applied to the 2 actinic keratoses, as detailed in the physical exam, to produce a freeze injury. The patient is aware that blisters may form and is instructed on wound care with gentle cleansing and use of vaseline ointment to keep moist until healed. The patient is supplied a handout on cryosurgery and is instructed to call if lesions do not completely resolve.      SK (seborrheic keratosis)  These are benign inherited growths without a malignant potential. Reassurance given to patient. No treatment is necessary.       History of nonmelanoma skin cancer  Area(s) of previous NMSC evaluated with no signs of recurrence.    Upper body skin examination performed today including at least 6 points as noted in physical examination. Suspicious lesions noted.             Follow up in about 6 months (around 4/5/2021).

## 2020-10-05 NOTE — LETTER
October 5, 2020      Margo Caldwell MD  [address]  [place] [address]           [facility] D[facility] [address]  [address]  [facility] [address]  Phone: [phone]  Fax: [phone]          Patient: Alesha Toney   MR Number: [id number]   YOB: 1950   Date of Visit: 10/5/2020       Dear Dr. Margo Caldwell:    Thank you for referring Alesha Toney to me for evaluation. Attached you will find relevant portions of my assessment and plan of care.    If you have questions, please do not hesitate to call me. I look forward to following Alesha Toney along with you.    Sincerely,    Tracy Ochoa MD    Enclosure  CC:  No Recipients    If you would like to receive this communication electronically, please contact [email or web address] or [phone] to request more information on [employer] access.    For providers and/or their staff who would like to refer a patient to [facility], please contact us through our one-stop-shop provider referral line, [facility], at [phone].    If you feel you have received this communication in error or would no longer like to receive these types of communications, please e-mail [email or web address]

## 2020-10-05 NOTE — PATIENT INSTRUCTIONS
" Shave Biopsy Wound Care    Your doctor has performed a shave biopsy today.  A band aid and vaseline ointment has been placed over the site.  This should remain in place for 24 hours.  It is recommended that you keep the area dry for the first 24 hours.  After 24 hours, you may remove the band aid and wash the area with warm soap and water and apply Vaseline jelly.  Many patients prefer to use Neosporin or Bacitracin ointment.  This is acceptable; however, know that you can develop an allergy to this medication even if you have used it safely for years.  It is important to keep the area moist.  Letting it dry out and get air slows healing time, and will worsen the scar.  Band aid is optional after first 24 hours.      If you notice increasing redness, tenderness, pain, or yellow drainage at the biopsy site, please notify your doctor.  These are signs of an infection.    If your biopsy site is bleeding, apply firm pressure for 15 minutes straight.  Repeat for another 15 minutes, if it is still bleeding.   If the surgical site continues to bleed, then please contact your doctor.      For [facility] users:   You will receive a [facility] notification after the pathologist has finished reviewing your biopsy specimen. Pathology results, however, will not be released online so you will see a "no content" message. Once your dermatologist reviews and clinically correlates your biopsy results, you will either receive a letter in the mail with the results of a phone call from your doctor's office if further explanation or treatment is warranted.       [address] [place] [address]/ [phone] FAX/ [email or web address]      CRYOSURGERY      Your doctor has used a method called cryosurgery to treat your skin condition. Cryosurgery refers to the use of very cold substances to treat a variety of skin conditions such as warts, pre-skin cancers, molluscum contagiosum, sun spots, and several benign " growths. The substance we use in cryosurgery is liquid nitrogen and is so cold (-195 degrees Celsius) that is burns when administered.     Following treatment in the office, the skin may immediately burn and become red. You may find the area around the lesion is affected as well. It is sometimes necessary to treat not only the lesion, but a small area of the surrounding normal skin to achieve a good response.     A blister, and even a blood filled blister, may form after treatment.   This is a normal response. If the blister is painful, it is acceptable to sterilize a needle and with rubbing alcohol and gently pop the blister. It is important that you gently wash the area with soap and warm water as the blister fluid may contain wart virus if a wart was treated. Do no remove the roof of the blister.     The area treated can take anywhere from 1-3 weeks to heal. Healing time depends on the kind skin lesion treated, the location, and how aggressively the lesion was treated. It is recommended that the areas treated are covered with Vaseline or bacitracin ointment and a band-aid. If a band-aid is not practical, just ointment applied several times per day will do. Keeping these areas moist will speed the healing time.    Treatment with liquid nitrogen can leave a scar. In dark skin, it may be a light or dark scar, in light skin it may be a white or pink scar. These will generally fade with time, but may never go away completely.     If you have any concerns after your treatment, please feel free to call the office.       [facility] [place] [address]/ [phone] FAX/ [email or web address]

## 2020-10-06 ENCOUNTER — PATIENT MESSAGE (OUTPATIENT)
Dept: HEMATOLOGY/ONCOLOGY | Facility: CLINIC | Age: 70
End: 2020-10-06

## 2020-10-06 ENCOUNTER — HOSPITAL ENCOUNTER (OUTPATIENT)
Dept: RADIOLOGY | Facility: HOSPITAL | Age: 70
Discharge: HOME | End: 2020-10-06
Attending: INTERNAL MEDICINE
Payer: MEDICARE

## 2020-10-06 DIAGNOSIS — Z85.118 HISTORY OF LUNG CANCER: Primary | ICD-10-CM

## 2020-10-06 DIAGNOSIS — Z85.118 HISTORY OF LUNG CANCER: ICD-10-CM

## 2020-10-06 PROCEDURE — 71250 CT THORAX DX C-: CPT | Mod: TC

## 2020-10-06 PROCEDURE — 71250 CT THORAX DX C-: CPT | Mod: 26,,, | Performed by: RADIOLOGY

## 2020-10-06 PROCEDURE — 71250 CT CHEST WITHOUT CONTRAST: ICD-10-PCS | Mod: 26,,, | Performed by: RADIOLOGY

## 2020-10-07 NOTE — PROGRESS NOTES
Subjective:       Patient ID: Alesha Toney is a 69 y.o. female.    Chief Complaint: No chief complaint on file.        The following portions of the patient's history were reviewed and updated as appropriate: allergies, current medications, past family history, past medical history, past social history, past surgical history and problem list.    (Portions of this note were dictated using voice recognition software and may contain dictation related errors in spelling/grammar/syntax not found on text review)  HPI  Review of Systems      Objective:      Physical Exam      Labs/imaging; reviewed  Assessment:       1. Rectal polyp        Plan:   - Discussed pathology, plan/results.

## 2020-10-08 ENCOUNTER — OFFICE VISIT (OUTPATIENT)
Dept: HEMATOLOGY/ONCOLOGY | Facility: CLINIC | Age: 70
End: 2020-10-08
Payer: MEDICARE

## 2020-10-08 ENCOUNTER — IMMUNIZATION (OUTPATIENT)
Dept: INTERNAL MEDICINE | Facility: CLINIC | Age: 70
End: 2020-10-08
Payer: MEDICARE

## 2020-10-08 VITALS
BODY MASS INDEX: 26.33 KG/M2 | TEMPERATURE: 98 F | OXYGEN SATURATION: 97 % | HEIGHT: 66 IN | WEIGHT: 163.81 LBS | SYSTOLIC BLOOD PRESSURE: 185 MMHG | DIASTOLIC BLOOD PRESSURE: 78 MMHG | HEART RATE: 68 BPM

## 2020-10-08 DIAGNOSIS — K62.1 RECTAL POLYP: ICD-10-CM

## 2020-10-08 DIAGNOSIS — Z85.118 HISTORY OF LUNG CANCER: Primary | ICD-10-CM

## 2020-10-08 LAB
FINAL PATHOLOGIC DIAGNOSIS: NORMAL
GROSS: NORMAL
MICROSCOPIC EXAM: NORMAL

## 2020-10-08 PROCEDURE — 99999 PR PBB SHADOW E&M-EST. PATIENT-LVL IV: ICD-10-PCS | Mod: PBBFAC,,, | Performed by: INTERNAL MEDICINE

## 2020-10-08 PROCEDURE — 99999 PR PBB SHADOW E&M-EST. PATIENT-LVL IV: CPT | Mod: PBBFAC,,, | Performed by: INTERNAL MEDICINE

## 2020-10-08 PROCEDURE — 99214 OFFICE O/P EST MOD 30 MIN: CPT | Mod: PBBFAC | Performed by: INTERNAL MEDICINE

## 2020-10-08 PROCEDURE — 90694 VACC AIIV4 NO PRSRV 0.5ML IM: CPT | Mod: PBBFAC

## 2020-10-08 PROCEDURE — G0008 ADMIN INFLUENZA VIRUS VAC: HCPCS | Mod: PBBFAC

## 2020-10-08 PROCEDURE — 99214 PR OFFICE/OUTPT VISIT, EST, LEVL IV, 30-39 MIN: ICD-10-PCS | Mod: S$PBB,,, | Performed by: INTERNAL MEDICINE

## 2020-10-08 PROCEDURE — 99214 OFFICE O/P EST MOD 30 MIN: CPT | Mod: S$PBB,,, | Performed by: INTERNAL MEDICINE

## 2020-10-08 NOTE — PROGRESS NOTES
"Subjective:       Patient ID: Alesha Toney is a 69 y.o. female.    Chief Complaint: Results and History of lung cancer  Ms. Alesha Toney is a 67-year-old otherwise healthy female who started having some shoulder pain, which was lasting for over six weeks.  She went and saw her primary care physician and underwent an x-ray, which revealed right upper lobe lung mass worrisome for malignancy and a CT scan was recommended, which was done on 6/12/18 and that revealed a newly developing mass, pleural based, lateral posterior segment, right upper lobe 3.5 x 3.5 cm, surrounding stellate margin and patchy infiltrates, particularly superiorly,   anteriorly infiltrates extend perihilar into the anterior segment.  She then underwent CT-guided biopsy, which revealed well-differentiated adenocarcinoma.       Her PET scan from 6/29/18 There is physiologic intracranial, head, and neck activity.  There is a large right upper lobe mass SUV max 9.11.  No local or distant metastatic disease seen.  There is physiologic liver, spleen, GI and  activity.  There are a few liver cysts.  No adenopathy is seen.  The adrenal glands are not enlarged.  No adenopathy is seen.  No suspicious bone lesions are seen.     She underwent VATS with right upper lobectomy. Mediastinal lymphadenectomy on 8/9/18. Pathology revealed "Tumor site: Upper lobe.Tumor size: 7 x 4 x 2.7 cm.Tumor focality: Single tumor.  Histologic type: Mixed invasive mucinous and nonmucinous adenocarcinoma. Histologic grade: G2: Moderately differentiated.Spread through air spaces (STATS): Present. Visceral pleura invasion: Not identified.  Lymphovascular invasion: Not identified. Direct invasion of adjacent structures: No adjacent structures present. Margins: All margins are uninvolved by carcinoma.Distance of invasive carcinoma from closest margin: 1 cm from the bronchial margin.Treatment effect: No known presurgical therapy. Regional lymph nodes: Number of lymph nodes " "involved: 0. Number of lymph nodes examined: 11. Pathologic Stage Classification: pT3 N0"        She completed 4 cycles of adjuvant chemo with Cisplatin and Alimta as of 1/10/19   She is on surveillance.     CT chest of 9/17/19 which reveals "Operative change of right upper lobectomy for small-cell lung cancer with several scattered subsolid opacities and a new solid nodule in the right lower lobe measuring up to 0.5 cm.  We recommend continued surveillance with the role and schedule for such surveillance to be determined by clinical considerations. Sided chest port distal tip terminating at the confluence of the brachiocephalic vein in the SVC.  Correlate with device functioning. Apically predominant emphysematous changes, left greater than right. Aortic annular calcification and multi-vessel coronary artery atherosclerosis. Numerous unchanged hepatic hypodensities, likely cysts"     PET scan from 10/1/19 reveals "1.6 cm soft tissue lesion re-identified posterior to the bronchus intermedius.  No corresponding focal increased radiotracer uptake to suggest local recurrence or metastatic disease. Stable subcentimeter opacities throughout the bilateral lower lobes, too small to characterize with PET-CT. Focal increased radiotracer uptake and subtle wall thickening in the rectum.  Findings are concerning for primary neoplastic process.  Recommend further evaluation with direct visualization"     She returned from [facility] and was advised to proceed with chemo/RT with either Docetaxel/platinum or Carboplatin/Alimta.     She completed chemo/RT with Carboplatin and Alimta as of 12/31/19     She underwent CT chest on 5/27/2020 which revealed 1. In this patient with history of lung cancer status post right upper lobectomy, there is a soft tissue opacity at the posterior margin of the staple line.  This lesion has been enlarging progressively and now measures 1.3 x 1.6 cm.  There is also a new 1.7 x 1.7 cm opacity at the " "right paravertebral pleural margin which is inseparable from this lesion.  These findings may represent post radiation change in light of the hypermetabolic lesion in this region on PET-CT of 10/01/2019 (image 69/[id number]).There is a 1 cm opacity in the superior or lateral basal segment of the right lower lobe (axial series 4, image 243) which has enlarged since 09/17/2019.  Nature of this lesion is unclear.  Clinical considerations will determine if percutaneous biopsy or metabolic assessment is warranted. 1.0 cm solid opacity with branching configuration (series 4, image 205) is located in the lateral basal segment of the left lower lobe, stable since 02/16/2019 (02/06/2019 axial series 4, image 310). Appearance and bifurcating character suggests mucoid impaction in mildly ectatic peripheral airway, likely not significant. Persistent clustered small centrilobular nodules in the apical segment of the right upper lobe likely reflecting small airway inflammation/infection. Partially visualized left chest port with distal catheter tip at the junction of the brachiocephalic veins and SVC, similar as on 09/17/2019"  She thus underwent PET scan on 6/1/2020 which revealed "No evidence of residual viable lung malignancy.  Evolving post radiation changes in the right paramediastinal lung, with a new irregular subpleural density which may also be related to recent radiation.  Attention on follow-up. Interval decrease in size of a soft tissue lesion along the inferior margin of the lobectomy stable line.  Several stable subcentimeter soft tissue opacities in the lower lobes bilaterally, As above.  Attention on follow-up.  Persistent hypermetabolic rectal lesion concerning for malignancy.  Recommend direct visualization and tissue sampling as clinically indicated"  I discussed her case in thoracic conference today and findings are felt to be related to RT    HPIShe comes in to review her CT chest which reveals "In this patient with " "history of lung cancer status post right upper lobectomy, there is persistent soft tissue opacity at the posterior margin of the staple line, similar in appearance when compared to prior study 05/27/2020.  This soft tissue opacity may represent post radiation change.  No evidence of increased opacity. Persistent 0.9 cm subpleural opacity in superior segment or lateral basal segment of the right lower lobe, previously 1.0 cm.  There is mildly increased uptake on prior PET CT and this may represent infection or noninfectious inflammation with neoplasm not excluded. Redemonstration of a 0.9 cm solid opacity with branch configuration the lateral basal segment of the left lower lobe, which may represent mucoid impaction in a mildly ectatic peripheral airway. 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 Lateral/mucosal and deep margins negative for dysplasia", repeat colonoscopy in 6 months.    Review of Systems   Constitutional: Negative for appetite change, fatigue and unexpected weight change.   HENT: Negative for mouth sores.    Eyes: Negative for visual disturbance.   Respiratory: Negative for cough and shortness of breath.    Cardiovascular: Negative for chest pain.   Gastrointestinal: Negative for abdominal pain and diarrhea.   Genitourinary: Negative for frequency.   Musculoskeletal: Negative for back pain.   Integumentary:  Negative for rash.   Neurological: Negative for headaches.   Hematological: Negative for adenopathy.   Psychiatric/Behavioral: The patient is not nervous/anxious.    All other systems reviewed and are negative.        Objective:      Physical Exam  Vitals signs reviewed.   Constitutional:       Appearance: She is well-developed.   HENT:      Mouth/Throat:      Pharynx: No oropharyngeal exudate. "   Cardiovascular:      Rate and Rhythm: Normal rate.      Heart sounds: Normal heart sounds.   Pulmonary:      Effort: Pulmonary effort is normal.      Breath sounds: Normal breath sounds. No wheezing.   Abdominal:      General: Bowel sounds are normal.      Palpations: Abdomen is soft.      Tenderness: There is no abdominal tenderness.   Musculoskeletal:         General: No tenderness.   Lymphadenopathy:      Cervical: No cervical adenopathy.   Skin:     General: Skin is warm and dry.      Findings: No rash.   Neurological:      Mental Status: She is alert and oriented to person, place, and time.      Coordination: Coordination normal.   Psychiatric:         Thought Content: Thought content normal.         Judgment: Judgment normal.           LABS:  WBC   Date Value Ref Range Status   10/06/2020 4.57 3.90 - 12.70 K/uL Final     Hemoglobin   Date Value Ref Range Status   10/06/2020 12.1 12.0 - 16.0 g/dL Final     POC Hematocrit   Date Value Ref Range Status   08/09/2018 33 (L) 36 - 54 %PCV Final     Hematocrit   Date Value Ref Range Status   10/06/2020 37.9 37.0 - 48.5 % Final     Platelets   Date Value Ref Range Status   10/06/2020 189 150 - 350 K/uL Final     Gran # (ANC)   Date Value Ref Range Status   10/06/2020 2.2 1.8 - 7.7 K/uL Final     Comment:     The ANC is based on a white cell differential from an   automated cell counter. It has not been microscopically   reviewed for the presence of abnormal cells. Clinical   correlation is required.         Chemistry        Component Value Date/Time     10/06/2020 0912    K 4.8 10/06/2020 0912     10/06/2020 0912    CO2 29 10/06/2020 0912    BUN 24 (H) 10/06/2020 0912    CREATININE 1.2 10/06/2020 0912     (H) 10/06/2020 0912        Component Value Date/Time    CALCIUM 9.7 10/06/2020 0912    ALKPHOS 52 (L) 10/06/2020 0912    AST 20 10/06/2020 0912    ALT 19 10/06/2020 0912    BILITOT 0.4 10/06/2020 0912    ESTGFRAFRICA 53.3 (A) 10/06/2020 0912     EGFRNONAA 46.2 (A) 10/06/2020 0912           Assessment:       1. History of lung cancer    2. Rectal polyp        Plan:        1. She is doing well with no evidence of recurrence and will return in 3 months with labs and CT scans  2. S/p ESD doing well, repeat colonoscopy in 6 months    Above care plan was discussed with patient and all questions were addressed to her satisfaction

## 2020-10-12 ENCOUNTER — TELEPHONE (OUTPATIENT)
Dept: DERMATOLOGY | Facility: CLINIC | Age: 70
End: 2020-10-12

## 2020-10-12 NOTE — TELEPHONE ENCOUNTER
Spoke to pt.Informed pt bx results.Procx set in November-SCC L medial thigh.pt verbalized understanding.

## 2020-10-12 NOTE — TELEPHONE ENCOUNTER
----- Message from Tracy Ochoa MD sent at 10/8/2020  1:00 PM CDT -----  Skin, left medial thigh, shave biopsy:   -SQUAMOUS CELL CARCINOMA IN-SITU (VERRUCOUS), EXTENDING TO A PERIPHERAL   BIOPSY EDGE AND BROADLY TRANSECTED THE BASE    procx

## 2020-10-23 ENCOUNTER — TELEPHONE (OUTPATIENT)
Dept: INTERVENTIONAL RADIOLOGY/VASCULAR | Facility: HOSPITAL | Age: 70
End: 2020-10-23

## 2020-11-03 ENCOUNTER — PROCEDURE VISIT (OUTPATIENT)
Dept: DERMATOLOGY | Facility: CLINIC | Age: 70
End: 2020-11-03
Payer: MEDICARE

## 2020-11-03 DIAGNOSIS — C44.729 SCC (SQUAMOUS CELL CARCINOMA), LEG, LEFT: Primary | ICD-10-CM

## 2020-11-03 PROCEDURE — 12032 INTMD RPR S/A/T/EXT 2.6-7.5: CPT | Mod: S$PBB,,, | Performed by: DERMATOLOGY

## 2020-11-03 PROCEDURE — 99499 UNLISTED E&M SERVICE: CPT | Mod: S$PBB,,, | Performed by: DERMATOLOGY

## 2020-11-03 PROCEDURE — 11602 EXC TR-EXT MAL+MARG 1.1-2 CM: CPT | Mod: PBBFAC | Performed by: DERMATOLOGY

## 2020-11-03 PROCEDURE — 88305 TISSUE EXAM BY PATHOLOGIST: ICD-10-PCS | Mod: 26,,, | Performed by: DERMATOLOGY

## 2020-11-03 PROCEDURE — 88342 CHG IMMUNOCYTOCHEMISTRY: ICD-10-PCS | Mod: 26,,, | Performed by: DERMATOLOGY

## 2020-11-03 PROCEDURE — 12032 PR LAYR CLOS WND TRUNK,ARM,LEG 2.6-7.5 CM: ICD-10-PCS | Mod: S$PBB,,, | Performed by: DERMATOLOGY

## 2020-11-03 PROCEDURE — 99499 NO LOS: ICD-10-PCS | Mod: S$PBB,,, | Performed by: DERMATOLOGY

## 2020-11-03 PROCEDURE — 88342 IMHCHEM/IMCYTCHM 1ST ANTB: CPT | Mod: 26,,, | Performed by: DERMATOLOGY

## 2020-11-03 PROCEDURE — 12032 INTMD RPR S/A/T/EXT 2.6-7.5: CPT | Mod: PBBFAC | Performed by: DERMATOLOGY

## 2020-11-03 PROCEDURE — 11602 PR EXC SKIN MALIG 1.1-2 CM TRUNK,ARM,LEG: ICD-10-PCS | Mod: S$PBB,51,, | Performed by: DERMATOLOGY

## 2020-11-03 PROCEDURE — 88342 IMHCHEM/IMCYTCHM 1ST ANTB: CPT | Performed by: DERMATOLOGY

## 2020-11-03 PROCEDURE — 88305 TISSUE EXAM BY PATHOLOGIST: CPT | Mod: 26,,, | Performed by: DERMATOLOGY

## 2020-11-03 PROCEDURE — 88305 TISSUE EXAM BY PATHOLOGIST: CPT | Performed by: DERMATOLOGY

## 2020-11-03 PROCEDURE — 11602 EXC TR-EXT MAL+MARG 1.1-2 CM: CPT | Mod: S$PBB,51,, | Performed by: DERMATOLOGY

## 2020-11-03 NOTE — PATIENT INSTRUCTIONS
Post- Operative Wound Care    Your doctor has performed local skin surgery today.  Vaseline ointment and a pressure bandage were placed after the surgery.  It is very important that you keep this bandage in place for 24 hours.  This will decrease the risk of post - operative infection and bleeding.  After 24 hours, you may remove the band aid and wash the area with warm soap and water and apply Vaseline ointment.  Many patients prefer to use Neosporin or Bacitracin ointment.  This is acceptable; however know that you can develop an allergy to this medication even if you have used it safely for years.  It is important to keep the area moist.  Letting it dry out and get air slows healing time, will worsen the scar, and make it more difficult to remove the stitches.  Band aid is optional after first 24 hours.        If you notice increasing redness, tenderness, pain, or yellow drainage at the biopsy or surgical site, please notify your doctor.  These are signs of an infection.    If your biopsy/surgical site is bleeding, apply firm pressure for 15 minutes straight.  Repeat for another 15 minutes, if it is still bleeding.   If the surgical site continues to bleed, then please contact your doctor.      For [facility] users:   You will receive your pathology results in [facility] as soon as they are available. Please be assured that your physician/provider will review your results and contact you should additional treatment be required. This is one more way [facility] is putting you first.       [facility] [place] [address]/ [phone] FAX/ [email or web address]

## 2020-11-03 NOTE — PROGRESS NOTES
PROCEDURE: Elliptical excision with intermediate layered repair in order to decrease dead space, decrease tension and close large gap.    ANESTHETIC: 9 cc 1% Xylocaine with Epinephrine 1:100,000, buffered    SURGEON: Tracy Ochoa M.D.    ASSISTANTS: Nereida Morales MA    PREOPERATIVE DIAGNOSIS:  Biopsy-proven Squamous Cell Carcinoma    POSTOPERATIVE DIAGNOSIS:  Same as preoperative diagnosis    PATHOLOGIC DIAGNOSIS: Pending    LOCATION: left medial thigh    INITIAL LESION SIZE: 0.6 x 1.0 cm    EXCISED DIAMETER: 1.8 cm    PREPARATION: The diagnosis, procedure, alternatives, benefits and risks, including but not limited to: infection, bleeding/bruising, drug reactions, pain, scar or cosmetic defect, local sensation disturbances, wound dehiscence (separation of wound edges after sutures removed) and/or recurrence of present condition were explained to the patient. The patient elected to proceed.  Patient's identity was verified using 2 patient identifiers and the side and site was verified.  Time out period with surgeon, assistant and patient in surgical suite was taken.    PROCEDURE: The location noted above was prepped, draped, and anesthetized in the usual sterile fashion per Nereida Morales MA. Lesional tissue was carefully marked with at least 4 mm margins of clinically normal skin in all directions. A fusiform elliptical excision was done with #15 blade carried down completely through the dermis into the deep subcutaneous tissues to the level of the non-muscle fascia, and dissection was carried out in that plane.  Electrocoagulation was used to obtain hemostasis. Blood loss was minimal. The wound was then approximated in a layered fashion with subcutaneous and intradermal sutures of 4.0 Monocryl, approximately 3 in number, and the wound was then superficially closed with simple interrupted sutures of 4.0 Prolene.    The patient tolerated the procedure well.    The area was cleaned and dressed  appropriately and the patient was given wound care instructions, as well as an appointment for follow-up evaluation.    LENGTH OF REPAIR: 4.0 cm

## 2020-11-05 ENCOUNTER — PATIENT MESSAGE (OUTPATIENT)
Dept: INTERNAL MEDICINE | Facility: CLINIC | Age: 70
End: 2020-11-05

## 2020-11-05 ENCOUNTER — PATIENT MESSAGE (OUTPATIENT)
Dept: DERMATOLOGY | Facility: CLINIC | Age: 70
End: 2020-11-05

## 2020-11-05 RX ORDER — LORAZEPAM 0.5 MG/1
0.5 TABLET ORAL EVERY 12 HOURS PRN
Qty: 30 TABLET | Refills: 0 | Status: SHIPPED | OUTPATIENT
Start: 2020-11-05 | End: 2021-02-03 | Stop reason: SDUPTHER

## 2020-11-05 RX ORDER — HYDROCODONE BITARTRATE AND ACETAMINOPHEN 5; 325 MG/1; MG/1
1 TABLET ORAL EVERY 12 HOURS PRN
Qty: 60 TABLET | Refills: 0 | Status: SHIPPED | OUTPATIENT
Start: 2020-11-05 | End: 2020-12-15 | Stop reason: SDUPTHER

## 2020-11-06 ENCOUNTER — TELEPHONE (OUTPATIENT)
Dept: INTERVENTIONAL RADIOLOGY/VASCULAR | Facility: HOSPITAL | Age: 70
End: 2020-11-06

## 2020-11-06 RX ORDER — DOXYCYCLINE 100 MG/1
CAPSULE ORAL
Qty: 14 CAPSULE | Refills: 0 | Status: SHIPPED | OUTPATIENT
Start: 2020-11-06 | End: 2021-02-22

## 2020-11-10 LAB
FINAL PATHOLOGIC DIAGNOSIS: NORMAL
GROSS: NORMAL
MICROSCOPIC EXAM: NORMAL

## 2020-11-17 ENCOUNTER — CLINICAL SUPPORT (OUTPATIENT)
Dept: DERMATOLOGY | Facility: CLINIC | Age: 70
End: 2020-11-17
Payer: MEDICARE

## 2020-11-17 DIAGNOSIS — Z48.02 ENCOUNTER FOR REMOVAL OF SUTURES: Primary | ICD-10-CM

## 2020-11-17 PROCEDURE — 99999 PR PBB SHADOW E&M-EST. PATIENT-LVL I: ICD-10-PCS | Mod: PBBFAC,,,

## 2020-11-17 PROCEDURE — 99999 PR PBB SHADOW E&M-EST. PATIENT-LVL I: CPT | Mod: PBBFAC,,,

## 2020-11-17 PROCEDURE — 99024 PR POST-OP FOLLOW-UP VISIT: ICD-10-PCS | Mod: POP,,, | Performed by: DERMATOLOGY

## 2020-11-17 PROCEDURE — 99024 POSTOP FOLLOW-UP VISIT: CPT | Mod: POP,,, | Performed by: DERMATOLOGY

## 2020-11-17 PROCEDURE — 99211 OFF/OP EST MAY X REQ PHY/QHP: CPT | Mod: PBBFAC

## 2020-11-17 NOTE — PROGRESS NOTES
Suture Removal note:  CC: 70 y.o. female patient is here for suture removal.         HPI: Patient is s/p excision of SCC from the left medial thigh on 11/3/20.  Patient reports no problems.    Skin, left medial thigh, excision:   -SCAR (POST-SURGICAL)   -NO RESIDUAL SQUAMOUS CELL CARCINOMA IDENTIFIED    WOUND PE:  Sutures intact.  Wound healing well.  Good approximation of skin edges.  No signs or symptoms of infection.    IMPRESSION:  Excision of SCC from the left medial thigh on 11/3/20. - margins clear.    PLAN:  Sutures removed today.  Continue wound care.    RTC: In 6 months.

## 2020-11-19 ENCOUNTER — PATIENT MESSAGE (OUTPATIENT)
Dept: HEMATOLOGY/ONCOLOGY | Facility: CLINIC | Age: 70
End: 2020-11-19

## 2020-12-11 ENCOUNTER — PATIENT MESSAGE (OUTPATIENT)
Dept: OTHER | Facility: OTHER | Age: 70
End: 2020-12-11

## 2020-12-15 ENCOUNTER — PATIENT MESSAGE (OUTPATIENT)
Dept: INTERNAL MEDICINE | Facility: CLINIC | Age: 70
End: 2020-12-15

## 2020-12-15 RX ORDER — HYDROCODONE BITARTRATE AND ACETAMINOPHEN 5; 325 MG/1; MG/1
1 TABLET ORAL EVERY 12 HOURS PRN
Qty: 60 TABLET | Refills: 0 | Status: CANCELLED | OUTPATIENT
Start: 2020-12-15

## 2020-12-15 RX ORDER — HYDROCODONE BITARTRATE AND ACETAMINOPHEN 5; 325 MG/1; MG/1
1 TABLET ORAL EVERY 12 HOURS PRN
Qty: 60 TABLET | Refills: 0 | Status: SHIPPED | OUTPATIENT
Start: 2020-12-15 | End: 2021-01-22 | Stop reason: SDUPTHER

## 2020-12-16 ENCOUNTER — OFFICE VISIT (OUTPATIENT)
Dept: INTERNAL MEDICINE | Facility: CLINIC | Age: 70
End: 2020-12-16
Payer: MEDICARE

## 2020-12-16 VITALS — SYSTOLIC BLOOD PRESSURE: 132 MMHG | DIASTOLIC BLOOD PRESSURE: 80 MMHG

## 2020-12-16 DIAGNOSIS — G89.29 OTHER CHRONIC PAIN: Primary | ICD-10-CM

## 2020-12-16 DIAGNOSIS — G62.9 PERIPHERAL POLYNEUROPATHY: ICD-10-CM

## 2020-12-16 DIAGNOSIS — I10 ESSENTIAL HYPERTENSION: ICD-10-CM

## 2020-12-16 DIAGNOSIS — N18.31 STAGE 3A CHRONIC KIDNEY DISEASE: ICD-10-CM

## 2020-12-16 DIAGNOSIS — F43.23 ADJUSTMENT DISORDER WITH MIXED ANXIETY AND DEPRESSED MOOD: ICD-10-CM

## 2020-12-16 PROBLEM — K63.1 PERFORATION BOWEL: Status: RESOLVED | Noted: 2020-09-11 | Resolved: 2020-12-16

## 2020-12-16 PROCEDURE — 99214 PR OFFICE/OUTPT VISIT, EST, LEVL IV, 30-39 MIN: ICD-10-PCS | Mod: 95,,, | Performed by: INTERNAL MEDICINE

## 2020-12-16 PROCEDURE — 99214 OFFICE O/P EST MOD 30 MIN: CPT | Mod: 95,,, | Performed by: INTERNAL MEDICINE

## 2020-12-16 NOTE — PROGRESS NOTES
Subjective:      The patient location is: home  The chief complaint leading to consultation is: chronic pain    Visit type: audiovisual    Face to Face time with patient: 25 min  30 minutes of total time spent on the encounter, which includes face to face time and non-face to face time preparing to see the patient (eg, review of tests), Obtaining and/or reviewing separately obtained history, Documenting clinical information in the electronic or other health record, Independently interpreting results (not separately reported) and communicating results to the patient/family/caregiver, or Care coordination (not separately reported).         Each patient to whom he or she provides medical services by telemedicine is:  (1) informed of the relationship between the physician and patient and the respective role of any other health care provider with respect to management of the patient; and (2) notified that he or she may decline to receive medical services by telemedicine and may withdraw from such care at any time.    Notes:    Patient ID: Alesha Toney is a 70 y.o. female.    Chief Complaint: No chief complaint on file.    HPI   Appt made at my request as taking hydrocodone chronically.    C/o persistent side effects from chemo.  Teeth are mobile, and has broken 2.    She has been taking hydrocodone for chemo induced neuropathy.  She also c/o shooting pain of temple into ear, like electric shocks.  Pain may travel into eye.  She takes an NSAid and hydrocodone prn for facial pain, with good results.  Began 3 mo ago.  Not induced by eating.    Typically takes half HC 2-3 times a day for facial, neuropathic, neck pain.    Hands swell and she gets sob when hot.  R sided cp when leisurely walking dog.  Feels better when she gets into ac.  But in summer better when in warm temperature.  She feels pain is  due to lung surgery.     132/80 bp today    Ckd 3, so avoids nsaids.    Walks [id number] steps daily.    No longer taking  ssri and doesn't want to start.  She thinks shei s coping well.                       Review of Systems   Constitutional: Positive for activity change. Negative for unexpected weight change.   HENT: Negative for hearing loss, rhinorrhea and trouble swallowing.    Eyes: Negative for discharge and visual disturbance.   Respiratory: Negative for chest tightness and wheezing.    Cardiovascular: Positive for chest pain. Negative for palpitations.   Gastrointestinal: Negative for blood in stool, constipation, diarrhea and vomiting.   Endocrine: Negative for polydipsia and polyuria.   Genitourinary: Negative for difficulty urinating, dysuria, hematuria and menstrual problem.   Musculoskeletal: Positive for neck pain. Negative for arthralgias and joint swelling.   Neurological: Negative for weakness and headaches.   Psychiatric/Behavioral: Negative for confusion and dysphoric mood.         Objective:      Physical Exam  Constitutional:       Appearance: Normal appearance. She is obese.   Neurological:      General: No focal deficit present.      Mental Status: She is alert and oriented to person, place, and time.   Psychiatric:         Mood and Affect: Mood normal.         Behavior: Behavior normal.         Assessment:       1. Other chronic pain    2. Essential hypertension    3. Peripheral polyneuropathy    4. Stage 3a chronic kidney disease    5. Adjustment disorder with mixed anxiety and depressed mood     6.     Chronic narcotic use  Plan:       Diagnoses and all orders for this visit:    Other chronic pain    Essential hypertension    Peripheral polyneuropathy    Stage 3a chronic kidney disease    Adjustment disorder with mixed anxiety and depressed mood             Restart Gabapentin 100 mg - up to 900 mg daily in divided doses.    Neuro if facial pain can't be controlled.    Face to face visit 3 mo    The patient location is: home  The chief complaint leading to consultation is: chronic pain    Visit type:  audiovisual    Face to Face time with patient: 25  35 minutes of total time spent on the encounter, which includes face to face time and non-face to face time preparing to see the patient (eg, review of tests), Obtaining and/or reviewing separately obtained history, Documenting clinical information in the electronic or other health record, Independently interpreting results (not separately reported) and communicating results to the patient/family/caregiver, or Care coordination (not separately reported).         Each patient to whom he or she provides medical services by telemedicine is:  (1) informed of the relationship between the physician and patient and the respective role of any other health care provider with respect to management of the patient; and (2) notified that he or she may decline to receive medical services by telemedicine and may withdraw from such care at any time.    Notes:

## 2020-12-17 ENCOUNTER — PATIENT MESSAGE (OUTPATIENT)
Dept: INTERNAL MEDICINE | Facility: CLINIC | Age: 70
End: 2020-12-17

## 2020-12-20 NOTE — Clinical Note
62M PMHx seizure disorder, schizophrenia, HTN here with altered mental status.    #Altered mental status, toxic metabolic encephalopathy  possible h/o schizophrenia, suspect psychosis vs. neurocognitive disorder  calm but refusing blood draws, medications  psych consult appreciated-not a candidate for IPP  Zyprexa    #tinea pedis  -Resolved s/p clotrimazole cream    #HTN  norvasc 10  added enalapril, increased dose to 10 mg  Apparently patient has been refusing meds and vitals at times.    #Seizure disorder  depakote 500 bid  keppra 750 bid  Ativan PRN for Seizures    #DVT ppx  lovenox    -DC pending case mgmt/SW; pt is placement issue, citizenship.    #Progress Note Handoff:  Pending (specify):  Consults_________, Tests________, Test Results_______, Other___discharge planning______  Family discussion:d/w pt at bedside re: treatment plan, primary dx  Disposition: Home___/SNF___/Other________/Unknown at this time____x____ Also schedule with IR to DC pORT

## 2020-12-26 ENCOUNTER — TELEPHONE (OUTPATIENT)
Dept: ENDOSCOPY | Facility: HOSPITAL | Age: 70
End: 2020-12-26

## 2020-12-26 DIAGNOSIS — K63.5 POLYP OF COLON, UNSPECIFIED PART OF COLON, UNSPECIFIED TYPE: Primary | ICD-10-CM

## 2021-01-11 ENCOUNTER — PATIENT MESSAGE (OUTPATIENT)
Dept: HEMATOLOGY/ONCOLOGY | Facility: CLINIC | Age: 71
End: 2021-01-11

## 2021-01-12 DIAGNOSIS — Z85.118 HISTORY OF LUNG CANCER: Primary | ICD-10-CM

## 2021-01-20 ENCOUNTER — HOSPITAL ENCOUNTER (OUTPATIENT)
Dept: RADIOLOGY | Facility: HOSPITAL | Age: 71
Discharge: HOME | End: 2021-01-20
Attending: INTERNAL MEDICINE
Payer: MEDICARE

## 2021-01-20 DIAGNOSIS — Z85.118 HISTORY OF LUNG CANCER: ICD-10-CM

## 2021-01-20 PROCEDURE — 74176 CT CHEST ABDOMEN PELVIS WITHOUT CONTRAST(XPD): ICD-10-PCS | Mod: 26,,, | Performed by: RADIOLOGY

## 2021-01-20 PROCEDURE — 25500020 PHARM REV CODE 255: Performed by: INTERNAL MEDICINE

## 2021-01-20 PROCEDURE — 71250 CT CHEST ABDOMEN PELVIS WITHOUT CONTRAST(XPD): ICD-10-PCS | Mod: 26,,, | Performed by: RADIOLOGY

## 2021-01-20 PROCEDURE — A9698 NON-RAD CONTRAST MATERIALNOC: HCPCS | Performed by: INTERNAL MEDICINE

## 2021-01-20 PROCEDURE — 71250 CT THORAX DX C-: CPT | Mod: 26,,, | Performed by: RADIOLOGY

## 2021-01-20 PROCEDURE — 74176 CT ABD & PELVIS W/O CONTRAST: CPT | Mod: 26,,, | Performed by: RADIOLOGY

## 2021-01-20 PROCEDURE — 71250 CT THORAX DX C-: CPT | Mod: TC

## 2021-01-20 PROCEDURE — 74176 CT ABD & PELVIS W/O CONTRAST: CPT | Mod: TC

## 2021-01-20 RX ADMIN — Medication 450 ML: at 02:01

## 2021-01-22 ENCOUNTER — LAB VISIT (OUTPATIENT)
Dept: LAB | Facility: HOSPITAL | Age: 71
End: 2021-01-22
Attending: INTERNAL MEDICINE
Payer: MEDICARE

## 2021-01-22 ENCOUNTER — PATIENT MESSAGE (OUTPATIENT)
Dept: INTERNAL MEDICINE | Facility: CLINIC | Age: 71
End: 2021-01-22

## 2021-01-22 ENCOUNTER — OFFICE VISIT (OUTPATIENT)
Dept: HEMATOLOGY/ONCOLOGY | Facility: CLINIC | Age: 71
End: 2021-01-22
Payer: MEDICARE

## 2021-01-22 VITALS
OXYGEN SATURATION: 100 % | HEIGHT: 66 IN | DIASTOLIC BLOOD PRESSURE: 72 MMHG | WEIGHT: 166.69 LBS | HEART RATE: 61 BPM | RESPIRATION RATE: 18 BRPM | BODY MASS INDEX: 26.79 KG/M2 | SYSTOLIC BLOOD PRESSURE: 185 MMHG

## 2021-01-22 DIAGNOSIS — N18.31 STAGE 3A CHRONIC KIDNEY DISEASE: ICD-10-CM

## 2021-01-22 DIAGNOSIS — C34.81 MALIGNANT NEOPLASM OF OVERLAPPING SITES OF RIGHT BRONCHUS AND LUNG: ICD-10-CM

## 2021-01-22 DIAGNOSIS — Z85.118 HISTORY OF LUNG CANCER: ICD-10-CM

## 2021-01-22 DIAGNOSIS — Z85.118 HISTORY OF LUNG CANCER: Primary | ICD-10-CM

## 2021-01-22 PROCEDURE — 99999 PR PBB SHADOW E&M-EST. PATIENT-LVL III: ICD-10-PCS | Mod: PBBFAC,,, | Performed by: INTERNAL MEDICINE

## 2021-01-22 PROCEDURE — 36415 COLL VENOUS BLD VENIPUNCTURE: CPT

## 2021-01-22 PROCEDURE — 99213 OFFICE O/P EST LOW 20 MIN: CPT | Mod: PBBFAC | Performed by: INTERNAL MEDICINE

## 2021-01-22 PROCEDURE — 99214 OFFICE O/P EST MOD 30 MIN: CPT | Mod: S$PBB,,, | Performed by: INTERNAL MEDICINE

## 2021-01-22 PROCEDURE — 99999 PR PBB SHADOW E&M-EST. PATIENT-LVL III: CPT | Mod: PBBFAC,,, | Performed by: INTERNAL MEDICINE

## 2021-01-22 PROCEDURE — 99214 PR OFFICE/OUTPT VISIT, EST, LEVL IV, 30-39 MIN: ICD-10-PCS | Mod: S$PBB,,, | Performed by: INTERNAL MEDICINE

## 2021-01-22 RX ORDER — HYDROCODONE BITARTRATE AND ACETAMINOPHEN 5; 325 MG/1; MG/1
1 TABLET ORAL EVERY 12 HOURS PRN
Qty: 60 TABLET | Refills: 0 | Status: SHIPPED | OUTPATIENT
Start: 2021-01-22 | End: 2021-02-17 | Stop reason: SDUPTHER

## 2021-01-27 ENCOUNTER — HOSPITAL ENCOUNTER (OUTPATIENT)
Dept: RADIOLOGY | Facility: HOSPITAL | Age: 71
Discharge: HOME | End: 2021-01-27
Attending: INTERNAL MEDICINE
Payer: MEDICARE

## 2021-01-27 ENCOUNTER — TELEPHONE (OUTPATIENT)
Dept: HEMATOLOGY/ONCOLOGY | Facility: CLINIC | Age: 71
End: 2021-01-27

## 2021-01-27 DIAGNOSIS — C34.81 MALIGNANT NEOPLASM OF OVERLAPPING SITES OF RIGHT BRONCHUS AND LUNG: ICD-10-CM

## 2021-01-27 DIAGNOSIS — Z85.118 HISTORY OF LUNG CANCER: ICD-10-CM

## 2021-01-27 LAB — POCT GLUCOSE: 120 MG/DL (ref 70–110)

## 2021-01-27 PROCEDURE — 78815 NM PET CT ROUTINE: ICD-10-PCS | Mod: 26,PS,, | Performed by: RADIOLOGY

## 2021-01-27 PROCEDURE — 78815 PET IMAGE W/CT SKULL-THIGH: CPT | Mod: TC,PS

## 2021-01-27 PROCEDURE — 78815 PET IMAGE W/CT SKULL-THIGH: CPT | Mod: 26,PS,, | Performed by: RADIOLOGY

## 2021-01-28 ENCOUNTER — OFFICE VISIT (OUTPATIENT)
Dept: HEMATOLOGY/ONCOLOGY | Facility: CLINIC | Age: 71
End: 2021-01-28
Payer: MEDICARE

## 2021-01-28 ENCOUNTER — TELEPHONE (OUTPATIENT)
Dept: HEMATOLOGY/ONCOLOGY | Facility: CLINIC | Age: 71
End: 2021-01-28

## 2021-01-28 ENCOUNTER — DOCUMENTATION ONLY (OUTPATIENT)
Dept: CARDIOTHORACIC SURGERY | Facility: HOSPITAL | Age: 71
End: 2021-01-28

## 2021-01-28 VITALS
HEART RATE: 67 BPM | HEIGHT: 66 IN | SYSTOLIC BLOOD PRESSURE: 191 MMHG | TEMPERATURE: 98 F | OXYGEN SATURATION: 100 % | RESPIRATION RATE: 15 BRPM | DIASTOLIC BLOOD PRESSURE: 74 MMHG | WEIGHT: 166.31 LBS | BODY MASS INDEX: 26.73 KG/M2

## 2021-01-28 DIAGNOSIS — C34.91 NON-SMALL CELL CANCER OF RIGHT LUNG: Primary | ICD-10-CM

## 2021-01-28 DIAGNOSIS — C34.91 NON-SMALL CELL CANCER OF RIGHT LUNG: Primary | Chronic | ICD-10-CM

## 2021-01-28 PROCEDURE — 99214 OFFICE O/P EST MOD 30 MIN: CPT | Mod: PBBFAC | Performed by: INTERNAL MEDICINE

## 2021-01-28 PROCEDURE — 99215 OFFICE O/P EST HI 40 MIN: CPT | Mod: S$PBB,,, | Performed by: INTERNAL MEDICINE

## 2021-01-28 PROCEDURE — 99215 PR OFFICE/OUTPT VISIT, EST, LEVL V, 40-54 MIN: ICD-10-PCS | Mod: S$PBB,,, | Performed by: INTERNAL MEDICINE

## 2021-01-28 PROCEDURE — 99999 PR PBB SHADOW E&M-EST. PATIENT-LVL IV: CPT | Mod: PBBFAC,,, | Performed by: INTERNAL MEDICINE

## 2021-01-28 PROCEDURE — 99999 PR PBB SHADOW E&M-EST. PATIENT-LVL IV: ICD-10-PCS | Mod: PBBFAC,,, | Performed by: INTERNAL MEDICINE

## 2021-01-29 ENCOUNTER — PATIENT MESSAGE (OUTPATIENT)
Dept: HEMATOLOGY/ONCOLOGY | Facility: CLINIC | Age: 71
End: 2021-01-29

## 2021-01-29 ENCOUNTER — TELEPHONE (OUTPATIENT)
Dept: INTERVENTIONAL RADIOLOGY/VASCULAR | Facility: HOSPITAL | Age: 71
End: 2021-01-29

## 2021-01-29 ENCOUNTER — PATIENT MESSAGE (OUTPATIENT)
Dept: GASTROENTEROLOGY | Facility: CLINIC | Age: 71
End: 2021-01-29

## 2021-01-29 ENCOUNTER — PATIENT MESSAGE (OUTPATIENT)
Dept: INTERNAL MEDICINE | Facility: CLINIC | Age: 71
End: 2021-01-29

## 2021-02-01 ENCOUNTER — TELEPHONE (OUTPATIENT)
Dept: PULMONOLOGY | Facility: CLINIC | Age: 71
End: 2021-02-01

## 2021-02-01 DIAGNOSIS — C34.91 NON-SMALL CELL CANCER OF RIGHT LUNG: Primary | ICD-10-CM

## 2021-02-02 ENCOUNTER — PATIENT MESSAGE (OUTPATIENT)
Dept: HEMATOLOGY/ONCOLOGY | Facility: CLINIC | Age: 71
End: 2021-02-02

## 2021-02-02 LAB
MISCELLANEOUS TEST NAME: NORMAL
REFERENCE LAB: NORMAL
SPECIMEN TYPE: NORMAL
TEST RESULT: NORMAL

## 2021-02-03 ENCOUNTER — TELEPHONE (OUTPATIENT)
Dept: PULMONOLOGY | Facility: CLINIC | Age: 71
End: 2021-02-03

## 2021-02-03 ENCOUNTER — PATIENT MESSAGE (OUTPATIENT)
Dept: HEMATOLOGY/ONCOLOGY | Facility: CLINIC | Age: 71
End: 2021-02-03

## 2021-02-03 ENCOUNTER — HOSPITAL ENCOUNTER (OUTPATIENT)
Dept: RADIOLOGY | Facility: HOSPITAL | Age: 71
Discharge: HOME | End: 2021-02-03
Attending: INTERNAL MEDICINE
Payer: MEDICARE

## 2021-02-03 ENCOUNTER — PATIENT MESSAGE (OUTPATIENT)
Dept: INTERNAL MEDICINE | Facility: CLINIC | Age: 71
End: 2021-02-03

## 2021-02-03 DIAGNOSIS — C34.91 NON-SMALL CELL CANCER OF RIGHT LUNG: Primary | ICD-10-CM

## 2021-02-03 DIAGNOSIS — R91.1 PULMONARY NODULE: Primary | ICD-10-CM

## 2021-02-03 DIAGNOSIS — C34.91 NON-SMALL CELL CANCER OF RIGHT LUNG: ICD-10-CM

## 2021-02-03 PROCEDURE — 25500020 PHARM REV CODE 255: Performed by: INTERNAL MEDICINE

## 2021-02-03 PROCEDURE — 70553 MRI BRAIN STEM W/O & W/DYE: CPT | Mod: TC

## 2021-02-03 PROCEDURE — 70553 MRI BRAIN STEM W/O & W/DYE: CPT | Mod: 26,,, | Performed by: RADIOLOGY

## 2021-02-03 PROCEDURE — A9585 GADOBUTROL INJECTION: HCPCS | Performed by: INTERNAL MEDICINE

## 2021-02-03 PROCEDURE — 70553 MRI BRAIN W WO CONTRAST: ICD-10-PCS | Mod: 26,,, | Performed by: RADIOLOGY

## 2021-02-03 RX ORDER — GADOBUTROL 604.72 MG/ML
8 INJECTION INTRAVENOUS
Status: COMPLETED | OUTPATIENT
Start: 2021-02-03 | End: 2021-02-03

## 2021-02-03 RX ORDER — LORAZEPAM 0.5 MG/1
0.5 TABLET ORAL EVERY 12 HOURS PRN
Qty: 30 TABLET | Refills: 0 | Status: SHIPPED | OUTPATIENT
Start: 2021-02-03 | End: 2021-03-25 | Stop reason: SDUPTHER

## 2021-02-03 RX ADMIN — GADOBUTROL 8 ML: 604.72 INJECTION INTRAVENOUS at 04:02

## 2021-02-04 ENCOUNTER — OFFICE VISIT (OUTPATIENT)
Dept: HEMATOLOGY/ONCOLOGY | Facility: CLINIC | Age: 71
End: 2021-02-04
Payer: MEDICARE

## 2021-02-04 ENCOUNTER — TELEPHONE (OUTPATIENT)
Dept: GASTROENTEROLOGY | Facility: CLINIC | Age: 71
End: 2021-02-04

## 2021-02-04 ENCOUNTER — TELEPHONE (OUTPATIENT)
Dept: HEMATOLOGY/ONCOLOGY | Facility: CLINIC | Age: 71
End: 2021-02-04

## 2021-02-04 VITALS
SYSTOLIC BLOOD PRESSURE: 186 MMHG | TEMPERATURE: 99 F | BODY MASS INDEX: 26.5 KG/M2 | RESPIRATION RATE: 16 BRPM | OXYGEN SATURATION: 100 % | DIASTOLIC BLOOD PRESSURE: 81 MMHG | WEIGHT: 164.88 LBS | HEIGHT: 66 IN | HEART RATE: 64 BPM

## 2021-02-04 DIAGNOSIS — F43.23 ADJUSTMENT DISORDER WITH MIXED ANXIETY AND DEPRESSED MOOD: ICD-10-CM

## 2021-02-04 DIAGNOSIS — C34.91 NON-SMALL CELL CANCER OF RIGHT LUNG: Primary | Chronic | ICD-10-CM

## 2021-02-04 DIAGNOSIS — N18.31 STAGE 3A CHRONIC KIDNEY DISEASE: ICD-10-CM

## 2021-02-04 DIAGNOSIS — E03.9 HYPOTHYROIDISM, UNSPECIFIED TYPE: ICD-10-CM

## 2021-02-04 DIAGNOSIS — I70.0 AORTIC ATHEROSCLEROSIS: ICD-10-CM

## 2021-02-04 PROCEDURE — 99215 PR OFFICE/OUTPT VISIT, EST, LEVL V, 40-54 MIN: ICD-10-PCS | Mod: S$PBB,,, | Performed by: INTERNAL MEDICINE

## 2021-02-04 PROCEDURE — 99999 PR PBB SHADOW E&M-EST. PATIENT-LVL V: CPT | Mod: PBBFAC,,, | Performed by: INTERNAL MEDICINE

## 2021-02-04 PROCEDURE — 99215 OFFICE O/P EST HI 40 MIN: CPT | Mod: PBBFAC | Performed by: INTERNAL MEDICINE

## 2021-02-04 PROCEDURE — 99215 OFFICE O/P EST HI 40 MIN: CPT | Mod: S$PBB,,, | Performed by: INTERNAL MEDICINE

## 2021-02-04 PROCEDURE — 99999 PR PBB SHADOW E&M-EST. PATIENT-LVL V: ICD-10-PCS | Mod: PBBFAC,,, | Performed by: INTERNAL MEDICINE

## 2021-02-04 RX ORDER — DEXAMETHASONE 6 MG/1
TABLET ORAL
Qty: 8 TABLET | Refills: 0 | Status: SHIPPED | OUTPATIENT
Start: 2021-02-04 | End: 2021-04-27

## 2021-02-05 ENCOUNTER — TELEPHONE (OUTPATIENT)
Dept: INFUSION THERAPY | Facility: HOSPITAL | Age: 71
End: 2021-02-05

## 2021-02-05 ENCOUNTER — PATIENT MESSAGE (OUTPATIENT)
Dept: PULMONOLOGY | Facility: CLINIC | Age: 71
End: 2021-02-05

## 2021-02-05 ENCOUNTER — PATIENT MESSAGE (OUTPATIENT)
Dept: HEMATOLOGY/ONCOLOGY | Facility: CLINIC | Age: 71
End: 2021-02-05

## 2021-02-08 ENCOUNTER — HOSPITAL ENCOUNTER (OUTPATIENT)
Dept: INTERVENTIONAL RADIOLOGY/VASCULAR | Facility: HOSPITAL | Age: 71
Discharge: HOME | End: 2021-02-08
Attending: INTERNAL MEDICINE
Payer: MEDICARE

## 2021-02-08 VITALS
RESPIRATION RATE: 15 BRPM | WEIGHT: 159 LBS | HEART RATE: 57 BPM | SYSTOLIC BLOOD PRESSURE: 151 MMHG | DIASTOLIC BLOOD PRESSURE: 70 MMHG | OXYGEN SATURATION: 98 % | TEMPERATURE: 98 F | BODY MASS INDEX: 25.55 KG/M2 | HEIGHT: 66 IN

## 2021-02-08 VITALS
SYSTOLIC BLOOD PRESSURE: 132 MMHG | HEART RATE: 58 BPM | DIASTOLIC BLOOD PRESSURE: 59 MMHG | RESPIRATION RATE: 16 BRPM | OXYGEN SATURATION: 100 %

## 2021-02-08 DIAGNOSIS — C34.91 NON-SMALL CELL CANCER OF RIGHT LUNG: ICD-10-CM

## 2021-02-08 LAB
INR PPP: 1 (ref 0.8–1.2)
PROTHROMBIN TIME: 10.4 SEC (ref 9–12.5)

## 2021-02-08 PROCEDURE — 36590 IR TUNNELED CATH REMOVAL W/PORT: ICD-10-PCS | Mod: LT,,, | Performed by: RADIOLOGY

## 2021-02-08 PROCEDURE — A4550 SURGICAL TRAYS: HCPCS

## 2021-02-08 PROCEDURE — 99152 MOD SED SAME PHYS/QHP 5/>YRS: CPT | Performed by: RADIOLOGY

## 2021-02-08 PROCEDURE — 99152 MOD SED SAME PHYS/QHP 5/>YRS: CPT

## 2021-02-08 PROCEDURE — 77001 FLUOROGUIDE FOR VEIN DEVICE: CPT | Mod: 26,,, | Performed by: RADIOLOGY

## 2021-02-08 PROCEDURE — 87070 CULTURE OTHR SPECIMN AEROBIC: CPT

## 2021-02-08 PROCEDURE — 76937 US GUIDE VASCULAR ACCESS: CPT | Mod: 26,,, | Performed by: RADIOLOGY

## 2021-02-08 PROCEDURE — 99153 MOD SED SAME PHYS/QHP EA: CPT

## 2021-02-08 PROCEDURE — 99152 MOD SED SAME PHYS/QHP 5/>YRS: CPT | Mod: ,,, | Performed by: RADIOLOGY

## 2021-02-08 PROCEDURE — 25000003 PHARM REV CODE 250: Performed by: RADIOLOGY

## 2021-02-08 PROCEDURE — 63600175 PHARM REV CODE 636 W HCPCS: Performed by: RADIOLOGY

## 2021-02-08 PROCEDURE — 99153 MOD SED SAME PHYS/QHP EA: CPT | Performed by: RADIOLOGY

## 2021-02-08 PROCEDURE — 76937 US GUIDE VASCULAR ACCESS: CPT | Mod: TC | Performed by: RADIOLOGY

## 2021-02-08 PROCEDURE — 36561 IR TUNNELED CATH PLACEMENT WITH PORT: ICD-10-PCS | Mod: LT,,, | Performed by: RADIOLOGY

## 2021-02-08 PROCEDURE — 85610 PROTHROMBIN TIME: CPT

## 2021-02-08 PROCEDURE — C1788 PORT, INDWELLING, IMP: HCPCS

## 2021-02-08 PROCEDURE — 77001 CHG FLUOROGUIDE CNTRL VEN ACCESS,PLACE,REPLACE,REMOVE: ICD-10-PCS | Mod: 26,,, | Performed by: RADIOLOGY

## 2021-02-08 PROCEDURE — 36415 COLL VENOUS BLD VENIPUNCTURE: CPT

## 2021-02-08 PROCEDURE — 99152 PR MOD CONSCIOUS SEDATION, SAME PHYS, 5+ YRS, FIRST 15 MIN: ICD-10-PCS | Mod: ,,, | Performed by: RADIOLOGY

## 2021-02-08 PROCEDURE — 76937 PR  US GUIDE, VASCULAR ACCESS: ICD-10-PCS | Mod: 26,,, | Performed by: RADIOLOGY

## 2021-02-08 PROCEDURE — 36561 INSERT TUNNELED CV CATH: CPT | Performed by: RADIOLOGY

## 2021-02-08 PROCEDURE — 77001 FLUOROGUIDE FOR VEIN DEVICE: CPT | Mod: TC | Performed by: RADIOLOGY

## 2021-02-08 PROCEDURE — 36590 REMOVAL TUNNELED CV CATH: CPT | Performed by: RADIOLOGY

## 2021-02-08 RX ORDER — HYDROCODONE BITARTRATE AND ACETAMINOPHEN 5; 325 MG/1; MG/1
1 TABLET ORAL EVERY 4 HOURS PRN
Status: DISCONTINUED | OUTPATIENT
Start: 2021-02-08 | End: 2021-02-09 | Stop reason: HOSPADM

## 2021-02-08 RX ORDER — LIDOCAINE HYDROCHLORIDE 10 MG/ML
INJECTION INFILTRATION; PERINEURAL CODE/TRAUMA/SEDATION MEDICATION
Status: COMPLETED | OUTPATIENT
Start: 2021-02-08 | End: 2021-02-08

## 2021-02-08 RX ORDER — FENTANYL CITRATE 50 UG/ML
INJECTION, SOLUTION INTRAMUSCULAR; INTRAVENOUS CODE/TRAUMA/SEDATION MEDICATION
Status: COMPLETED | OUTPATIENT
Start: 2021-02-08 | End: 2021-02-08

## 2021-02-08 RX ORDER — MORPHINE SULFATE 10 MG/ML
2 INJECTION, SOLUTION INTRAMUSCULAR; INTRAVENOUS
Status: DISCONTINUED | OUTPATIENT
Start: 2021-02-08 | End: 2021-02-09 | Stop reason: HOSPADM

## 2021-02-08 RX ORDER — MIDAZOLAM HYDROCHLORIDE 1 MG/ML
INJECTION INTRAMUSCULAR; INTRAVENOUS CODE/TRAUMA/SEDATION MEDICATION
Status: COMPLETED | OUTPATIENT
Start: 2021-02-08 | End: 2021-02-08

## 2021-02-08 RX ADMIN — MIDAZOLAM HYDROCHLORIDE 1 MG: 1 INJECTION, SOLUTION INTRAMUSCULAR; INTRAVENOUS at 09:02

## 2021-02-08 RX ADMIN — LIDOCAINE HYDROCHLORIDE 10 ML: 10 INJECTION, SOLUTION INFILTRATION; PERINEURAL at 09:02

## 2021-02-08 RX ADMIN — FENTANYL CITRATE 50 MCG: 50 INJECTION, SOLUTION INTRAMUSCULAR; INTRAVENOUS at 09:02

## 2021-02-08 RX ADMIN — CEFTRIAXONE 1 G: 1 INJECTION, SOLUTION INTRAVENOUS at 08:02

## 2021-02-09 ENCOUNTER — PATIENT MESSAGE (OUTPATIENT)
Dept: PSYCHIATRY | Facility: CLINIC | Age: 71
End: 2021-02-09

## 2021-02-09 ENCOUNTER — TELEPHONE (OUTPATIENT)
Dept: HEMATOLOGY/ONCOLOGY | Facility: CLINIC | Age: 71
End: 2021-02-09

## 2021-02-09 ENCOUNTER — TELEPHONE (OUTPATIENT)
Dept: INFUSION THERAPY | Facility: HOSPITAL | Age: 71
End: 2021-02-09

## 2021-02-11 ENCOUNTER — TELEPHONE (OUTPATIENT)
Dept: HEMATOLOGY/ONCOLOGY | Facility: CLINIC | Age: 71
End: 2021-02-11

## 2021-02-11 ENCOUNTER — PATIENT MESSAGE (OUTPATIENT)
Dept: HEMATOLOGY/ONCOLOGY | Facility: CLINIC | Age: 71
End: 2021-02-11

## 2021-02-11 ENCOUNTER — IMMUNIZATION (OUTPATIENT)
Dept: PHARMACY | Facility: CLINIC | Age: 71
End: 2021-02-11
Payer: MEDICARE

## 2021-02-11 DIAGNOSIS — Z23 NEED FOR VACCINATION: Primary | ICD-10-CM

## 2021-02-11 LAB — BACTERIA CATH TIP CULT: NO GROWTH

## 2021-02-16 ENCOUNTER — PATIENT MESSAGE (OUTPATIENT)
Dept: HEMATOLOGY/ONCOLOGY | Facility: CLINIC | Age: 71
End: 2021-02-16

## 2021-02-17 ENCOUNTER — PATIENT MESSAGE (OUTPATIENT)
Dept: INTERNAL MEDICINE | Facility: CLINIC | Age: 71
End: 2021-02-17

## 2021-02-17 ENCOUNTER — TELEPHONE (OUTPATIENT)
Dept: INFUSION THERAPY | Facility: HOSPITAL | Age: 71
End: 2021-02-17

## 2021-02-17 RX ORDER — HYDROCODONE BITARTRATE AND ACETAMINOPHEN 5; 325 MG/1; MG/1
1 TABLET ORAL EVERY 12 HOURS PRN
Qty: 60 TABLET | Refills: 0 | Status: SHIPPED | OUTPATIENT
Start: 2021-02-17 | End: 2021-03-25 | Stop reason: SDUPTHER

## 2021-02-18 ENCOUNTER — PATIENT MESSAGE (OUTPATIENT)
Dept: HEMATOLOGY/ONCOLOGY | Facility: CLINIC | Age: 71
End: 2021-02-18

## 2021-02-19 ENCOUNTER — PATIENT MESSAGE (OUTPATIENT)
Dept: INTERNAL MEDICINE | Facility: CLINIC | Age: 71
End: 2021-02-19

## 2021-02-22 ENCOUNTER — OFFICE VISIT (OUTPATIENT)
Dept: INTERNAL MEDICINE | Facility: CLINIC | Age: 71
End: 2021-02-22
Payer: MEDICARE

## 2021-02-22 VITALS
WEIGHT: 164 LBS | HEIGHT: 66 IN | OXYGEN SATURATION: 98 % | HEART RATE: 66 BPM | SYSTOLIC BLOOD PRESSURE: 130 MMHG | DIASTOLIC BLOOD PRESSURE: 70 MMHG | BODY MASS INDEX: 26.36 KG/M2

## 2021-02-22 DIAGNOSIS — K21.9 GASTROESOPHAGEAL REFLUX DISEASE, UNSPECIFIED WHETHER ESOPHAGITIS PRESENT: ICD-10-CM

## 2021-02-22 DIAGNOSIS — I10 ESSENTIAL HYPERTENSION: ICD-10-CM

## 2021-02-22 DIAGNOSIS — J43.8 OTHER EMPHYSEMA: ICD-10-CM

## 2021-02-22 DIAGNOSIS — I47.10 SVT (SUPRAVENTRICULAR TACHYCARDIA): ICD-10-CM

## 2021-02-22 DIAGNOSIS — J02.9 PHARYNGITIS, UNSPECIFIED ETIOLOGY: Primary | ICD-10-CM

## 2021-02-22 DIAGNOSIS — R06.00 DYSPNEA, UNSPECIFIED TYPE: ICD-10-CM

## 2021-02-22 PROCEDURE — 99999 PR PBB SHADOW E&M-EST. PATIENT-LVL III: CPT | Mod: PBBFAC,,, | Performed by: INTERNAL MEDICINE

## 2021-02-22 PROCEDURE — 99999 PR PBB SHADOW E&M-EST. PATIENT-LVL III: ICD-10-PCS | Mod: PBBFAC,,, | Performed by: INTERNAL MEDICINE

## 2021-02-22 PROCEDURE — 99214 OFFICE O/P EST MOD 30 MIN: CPT | Mod: S$PBB,,, | Performed by: INTERNAL MEDICINE

## 2021-02-22 PROCEDURE — 99214 PR OFFICE/OUTPT VISIT, EST, LEVL IV, 30-39 MIN: ICD-10-PCS | Mod: S$PBB,,, | Performed by: INTERNAL MEDICINE

## 2021-02-22 PROCEDURE — 99213 OFFICE O/P EST LOW 20 MIN: CPT | Mod: PBBFAC | Performed by: INTERNAL MEDICINE

## 2021-02-22 RX ORDER — ATENOLOL 50 MG/1
TABLET ORAL
Qty: 180 TABLET | Refills: 3 | Status: SHIPPED | OUTPATIENT
Start: 2021-02-22 | End: 2022-01-12 | Stop reason: SDUPTHER

## 2021-02-22 RX ORDER — LOSARTAN POTASSIUM 50 MG/1
TABLET ORAL
Qty: 180 TABLET | Refills: 3 | Status: SHIPPED | OUTPATIENT
Start: 2021-02-22 | End: 2022-01-12 | Stop reason: SDUPTHER

## 2021-02-24 ENCOUNTER — PATIENT MESSAGE (OUTPATIENT)
Dept: HEMATOLOGY/ONCOLOGY | Facility: CLINIC | Age: 71
End: 2021-02-24

## 2021-02-25 ENCOUNTER — OFFICE VISIT (OUTPATIENT)
Dept: PSYCHIATRY | Facility: CLINIC | Age: 71
End: 2021-02-25
Payer: MEDICARE

## 2021-02-25 DIAGNOSIS — F43.23 ADJUSTMENT DISORDER WITH MIXED ANXIETY AND DEPRESSED MOOD: Primary | ICD-10-CM

## 2021-02-25 PROCEDURE — 99212 OFFICE O/P EST SF 10 MIN: CPT | Mod: PBBFAC | Performed by: PSYCHOLOGIST

## 2021-02-25 PROCEDURE — 90791 PR PSYCHIATRIC DIAGNOSTIC EVALUATION: ICD-10-PCS | Mod: ,,, | Performed by: PSYCHOLOGIST

## 2021-02-25 PROCEDURE — 99999 PR PBB SHADOW E&M-EST. PATIENT-LVL II: CPT | Mod: PBBFAC,,, | Performed by: PSYCHOLOGIST

## 2021-02-25 PROCEDURE — 90791 PSYCH DIAGNOSTIC EVALUATION: CPT | Mod: ,,, | Performed by: PSYCHOLOGIST

## 2021-02-25 PROCEDURE — 99999 PR PBB SHADOW E&M-EST. PATIENT-LVL II: ICD-10-PCS | Mod: PBBFAC,,, | Performed by: PSYCHOLOGIST

## 2021-03-01 ENCOUNTER — PATIENT MESSAGE (OUTPATIENT)
Dept: HEMATOLOGY/ONCOLOGY | Facility: CLINIC | Age: 71
End: 2021-03-01

## 2021-03-03 ENCOUNTER — LAB VISIT (OUTPATIENT)
Dept: LAB | Facility: HOSPITAL | Age: 71
End: 2021-03-03
Payer: MEDICARE

## 2021-03-03 ENCOUNTER — CLINICAL SUPPORT (OUTPATIENT)
Dept: HEMATOLOGY/ONCOLOGY | Facility: CLINIC | Age: 71
End: 2021-03-03
Payer: MEDICARE

## 2021-03-03 ENCOUNTER — OFFICE VISIT (OUTPATIENT)
Dept: HEMATOLOGY/ONCOLOGY | Facility: CLINIC | Age: 71
End: 2021-03-03
Payer: MEDICARE

## 2021-03-03 VITALS
TEMPERATURE: 98 F | WEIGHT: 164.88 LBS | BODY MASS INDEX: 26.5 KG/M2 | DIASTOLIC BLOOD PRESSURE: 66 MMHG | RESPIRATION RATE: 18 BRPM | HEIGHT: 66 IN | SYSTOLIC BLOOD PRESSURE: 153 MMHG | HEART RATE: 59 BPM | OXYGEN SATURATION: 100 %

## 2021-03-03 DIAGNOSIS — E03.9 HYPOTHYROIDISM, UNSPECIFIED TYPE: ICD-10-CM

## 2021-03-03 DIAGNOSIS — Z79.899 IMMUNODEFICIENCY DUE TO DRUGS: ICD-10-CM

## 2021-03-03 DIAGNOSIS — Z13.9 ENCOUNTER FOR SCREENING: Primary | ICD-10-CM

## 2021-03-03 DIAGNOSIS — N18.31 STAGE 3A CHRONIC KIDNEY DISEASE: ICD-10-CM

## 2021-03-03 DIAGNOSIS — Z51.11 ENCOUNTER FOR ANTINEOPLASTIC CHEMOTHERAPY: ICD-10-CM

## 2021-03-03 DIAGNOSIS — C34.91 NON-SMALL CELL CANCER OF RIGHT LUNG: Primary | Chronic | ICD-10-CM

## 2021-03-03 DIAGNOSIS — C34.91 NON-SMALL CELL CANCER OF RIGHT LUNG: Chronic | ICD-10-CM

## 2021-03-03 DIAGNOSIS — F41.9 ANXIETY: ICD-10-CM

## 2021-03-03 DIAGNOSIS — D84.821 IMMUNODEFICIENCY DUE TO DRUGS: ICD-10-CM

## 2021-03-03 LAB
ALBUMIN SERPL BCP-MCNC: 3.7 G/DL (ref 3.5–5.2)
ALP SERPL-CCNC: 57 U/L (ref 55–135)
ALT SERPL W/O P-5'-P-CCNC: 18 U/L (ref 10–44)
ANION GAP SERPL CALC-SCNC: 12 MMOL/L (ref 8–16)
AST SERPL-CCNC: 22 U/L (ref 10–40)
BILIRUB SERPL-MCNC: 0.3 MG/DL (ref 0.1–1)
BUN SERPL-MCNC: 25 MG/DL (ref 8–23)
CALCIUM SERPL-MCNC: 9.6 MG/DL (ref 8.7–10.5)
CHLORIDE SERPL-SCNC: 105 MMOL/L (ref 95–110)
CO2 SERPL-SCNC: 23 MMOL/L (ref 23–29)
CORTIS SERPL-MCNC: 16 UG/DL
CREAT SERPL-MCNC: 1.3 MG/DL (ref 0.5–1.4)
ERYTHROCYTE [DISTWIDTH] IN BLOOD BY AUTOMATED COUNT: 12.5 % (ref 11.5–14.5)
EST. GFR  (AFRICAN AMERICAN): 48 ML/MIN/1.73 M^2
EST. GFR  (NON AFRICAN AMERICAN): 41.7 ML/MIN/1.73 M^2
GLUCOSE SERPL-MCNC: 164 MG/DL (ref 70–110)
HCT VFR BLD AUTO: 38.7 % (ref 37–48.5)
HGB BLD-MCNC: 12.3 G/DL (ref 12–16)
IMM GRANULOCYTES # BLD AUTO: 0.02 K/UL (ref 0–0.04)
MCH RBC QN AUTO: 30.9 PG (ref 27–31)
MCHC RBC AUTO-ENTMCNC: 31.8 G/DL (ref 32–36)
MCV RBC AUTO: 97 FL (ref 82–98)
NEUTROPHILS # BLD AUTO: 2.3 K/UL (ref 1.8–7.7)
PLATELET # BLD AUTO: 206 K/UL (ref 150–350)
PMV BLD AUTO: 10.1 FL (ref 9.2–12.9)
POTASSIUM SERPL-SCNC: 5.1 MMOL/L (ref 3.5–5.1)
PROT SERPL-MCNC: 6.9 G/DL (ref 6–8.4)
RBC # BLD AUTO: 3.98 M/UL (ref 4–5.4)
SODIUM SERPL-SCNC: 140 MMOL/L (ref 136–145)
T4 FREE SERPL-MCNC: 1.1 NG/DL (ref 0.71–1.51)
TSH SERPL DL<=0.005 MIU/L-ACNC: 1.9 UIU/ML (ref 0.4–4)
WBC # BLD AUTO: 4.67 K/UL (ref 3.9–12.7)

## 2021-03-03 PROCEDURE — 84439 ASSAY OF FREE THYROXINE: CPT

## 2021-03-03 PROCEDURE — 99999 PR PBB SHADOW E&M-EST. PATIENT-LVL III: CPT | Mod: PBBFAC,,, | Performed by: INTERNAL MEDICINE

## 2021-03-03 PROCEDURE — 99215 PR OFFICE/OUTPT VISIT, EST, LEVL V, 40-54 MIN: ICD-10-PCS | Mod: S$PBB,,, | Performed by: INTERNAL MEDICINE

## 2021-03-03 PROCEDURE — 99215 OFFICE O/P EST HI 40 MIN: CPT | Mod: S$PBB,,, | Performed by: INTERNAL MEDICINE

## 2021-03-03 PROCEDURE — 85027 COMPLETE CBC AUTOMATED: CPT

## 2021-03-03 PROCEDURE — 99999 PR PBB SHADOW E&M-EST. PATIENT-LVL III: ICD-10-PCS | Mod: PBBFAC,,, | Performed by: INTERNAL MEDICINE

## 2021-03-03 PROCEDURE — 82533 TOTAL CORTISOL: CPT

## 2021-03-03 PROCEDURE — 84443 ASSAY THYROID STIM HORMONE: CPT

## 2021-03-03 PROCEDURE — 80053 COMPREHEN METABOLIC PANEL: CPT

## 2021-03-03 PROCEDURE — 82024 ASSAY OF ACTH: CPT

## 2021-03-03 PROCEDURE — 99213 OFFICE O/P EST LOW 20 MIN: CPT | Mod: PBBFAC | Performed by: INTERNAL MEDICINE

## 2021-03-03 RX ORDER — ONDANSETRON 4 MG/1
4 TABLET, FILM COATED ORAL DAILY PRN
Qty: 60 TABLET | Refills: 5 | Status: SHIPPED | OUTPATIENT
Start: 2021-03-03 | End: 2022-03-03

## 2021-03-04 ENCOUNTER — PATIENT MESSAGE (OUTPATIENT)
Dept: HEMATOLOGY/ONCOLOGY | Facility: CLINIC | Age: 71
End: 2021-03-04

## 2021-03-04 DIAGNOSIS — R11.2 CHEMOTHERAPY INDUCED NAUSEA AND VOMITING: Primary | ICD-10-CM

## 2021-03-04 DIAGNOSIS — T45.1X5A CHEMOTHERAPY INDUCED NAUSEA AND VOMITING: Primary | ICD-10-CM

## 2021-03-04 RX ORDER — ONDANSETRON 8 MG/1
8 TABLET, ORALLY DISINTEGRATING ORAL EVERY 6 HOURS PRN
Qty: 60 TABLET | Refills: 1 | Status: SHIPPED | OUTPATIENT
Start: 2021-03-04 | End: 2021-08-20 | Stop reason: DRUGHIGH

## 2021-03-05 ENCOUNTER — CLINICAL SUPPORT (OUTPATIENT)
Dept: HEMATOLOGY/ONCOLOGY | Facility: CLINIC | Age: 71
End: 2021-03-05
Payer: MEDICARE

## 2021-03-05 ENCOUNTER — INFUSION (OUTPATIENT)
Dept: INFUSION THERAPY | Facility: HOSPITAL | Age: 71
End: 2021-03-05
Attending: INTERNAL MEDICINE
Payer: MEDICARE

## 2021-03-05 VITALS
TEMPERATURE: 98 F | DIASTOLIC BLOOD PRESSURE: 66 MMHG | SYSTOLIC BLOOD PRESSURE: 144 MMHG | HEART RATE: 64 BPM | RESPIRATION RATE: 18 BRPM

## 2021-03-05 DIAGNOSIS — C34.90 MALIGNANT NEOPLASM OF LUNG, UNSPECIFIED LATERALITY, UNSPECIFIED PART OF LUNG: Primary | ICD-10-CM

## 2021-03-05 DIAGNOSIS — C34.90 NON-SMALL CELL LUNG CANCER, UNSPECIFIED LATERALITY: ICD-10-CM

## 2021-03-05 DIAGNOSIS — Z45.2 ENCOUNTER FOR ADJUSTMENT AND MANAGEMENT OF VASCULAR ACCESS DEVICE: ICD-10-CM

## 2021-03-05 DIAGNOSIS — Z13.9 ENCOUNTER FOR SCREENING: Primary | ICD-10-CM

## 2021-03-05 DIAGNOSIS — C34.91 NON-SMALL CELL CANCER OF RIGHT LUNG: Primary | ICD-10-CM

## 2021-03-05 LAB
ACTH PLAS-MCNC: 12 PG/ML (ref 0–46)
SARS-COV-2 RDRP RESP QL NAA+PROBE: NEGATIVE

## 2021-03-05 PROCEDURE — 96367 TX/PROPH/DG ADDL SEQ IV INF: CPT

## 2021-03-05 PROCEDURE — U0002 COVID-19 LAB TEST NON-CDC: HCPCS | Performed by: INTERNAL MEDICINE

## 2021-03-05 PROCEDURE — 99211 OFF/OP EST MAY X REQ PHY/QHP: CPT | Mod: PBBFAC,25 | Performed by: INTERNAL MEDICINE

## 2021-03-05 PROCEDURE — 96375 TX/PRO/DX INJ NEW DRUG ADDON: CPT

## 2021-03-05 PROCEDURE — 25000003 PHARM REV CODE 250: Performed by: INTERNAL MEDICINE

## 2021-03-05 PROCEDURE — 63600175 PHARM REV CODE 636 W HCPCS: Performed by: INTERNAL MEDICINE

## 2021-03-05 PROCEDURE — 99999 PR PBB SHADOW E&M-EST. PATIENT-LVL I: CPT | Mod: PBBFAC,,, | Performed by: INTERNAL MEDICINE

## 2021-03-05 PROCEDURE — 99999 PR PBB SHADOW E&M-EST. PATIENT-LVL I: ICD-10-PCS | Mod: PBBFAC,,, | Performed by: INTERNAL MEDICINE

## 2021-03-05 PROCEDURE — 96417 CHEMO IV INFUS EACH ADDL SEQ: CPT

## 2021-03-05 PROCEDURE — 96413 CHEMO IV INFUSION 1 HR: CPT

## 2021-03-05 RX ORDER — DIPHENHYDRAMINE HYDROCHLORIDE 50 MG/ML
25 INJECTION INTRAMUSCULAR; INTRAVENOUS EVERY 10 MIN PRN
Status: DISCONTINUED | OUTPATIENT
Start: 2021-03-05 | End: 2021-03-05 | Stop reason: HOSPADM

## 2021-03-05 RX ORDER — EPINEPHRINE 0.3 MG/.3ML
0.3 INJECTION SUBCUTANEOUS ONCE AS NEEDED
Status: DISCONTINUED | OUTPATIENT
Start: 2021-03-05 | End: 2021-03-05 | Stop reason: HOSPADM

## 2021-03-05 RX ORDER — SODIUM CHLORIDE 0.9 % (FLUSH) 0.9 %
10 SYRINGE (ML) INJECTION
Status: DISCONTINUED | OUTPATIENT
Start: 2021-03-05 | End: 2021-03-05 | Stop reason: HOSPADM

## 2021-03-05 RX ORDER — METHYLPREDNISOLONE SOD SUCC 125 MG
125 VIAL (EA) INJECTION ONCE AS NEEDED
Status: CANCELLED | OUTPATIENT
Start: 2021-03-05

## 2021-03-05 RX ORDER — HEPARIN 100 UNIT/ML
500 SYRINGE INTRAVENOUS
Status: DISCONTINUED | OUTPATIENT
Start: 2021-03-05 | End: 2021-03-05 | Stop reason: HOSPADM

## 2021-03-05 RX ORDER — HEPARIN 100 UNIT/ML
500 SYRINGE INTRAVENOUS
Status: CANCELLED | OUTPATIENT
Start: 2021-03-05

## 2021-03-05 RX ORDER — SODIUM CHLORIDE 0.9 % (FLUSH) 0.9 %
10 SYRINGE (ML) INJECTION
Status: CANCELLED | OUTPATIENT
Start: 2021-03-05

## 2021-03-05 RX ORDER — METHYLPREDNISOLONE SOD SUCC 125 MG
125 VIAL (EA) INJECTION ONCE AS NEEDED
Status: DISCONTINUED | OUTPATIENT
Start: 2021-03-05 | End: 2021-03-05 | Stop reason: HOSPADM

## 2021-03-05 RX ORDER — DIPHENHYDRAMINE HYDROCHLORIDE 50 MG/ML
25 INJECTION INTRAMUSCULAR; INTRAVENOUS EVERY 10 MIN PRN
Status: CANCELLED | OUTPATIENT
Start: 2021-03-05 | End: 2021-03-06

## 2021-03-05 RX ORDER — EPINEPHRINE 0.3 MG/.3ML
0.3 INJECTION SUBCUTANEOUS ONCE AS NEEDED
Status: CANCELLED | OUTPATIENT
Start: 2021-03-05

## 2021-03-05 RX ADMIN — DIPHENHYDRAMINE HYDROCHLORIDE 25 MG: 50 INJECTION INTRAMUSCULAR; INTRAVENOUS at 11:03

## 2021-03-05 RX ADMIN — SODIUM CHLORIDE: 0.9 INJECTION, SOLUTION INTRAVENOUS at 11:03

## 2021-03-05 RX ADMIN — SODIUM CHLORIDE 360 MG: 9 INJECTION, SOLUTION INTRAVENOUS at 12:03

## 2021-03-05 RX ADMIN — SODIUM CHLORIDE 75 MG: 9 INJECTION, SOLUTION INTRAVENOUS at 01:03

## 2021-03-05 RX ADMIN — APREPITANT 130 MG: 130 INJECTION, EMULSION INTRAVENOUS at 12:03

## 2021-03-05 RX ADMIN — DEXAMETHASONE SODIUM PHOSPHATE 0.25 MG: 4 INJECTION, SOLUTION INTRA-ARTICULAR; INTRALESIONAL; INTRAMUSCULAR; INTRAVENOUS; SOFT TISSUE at 12:03

## 2021-03-05 RX ADMIN — PACLITAXEL 190 MG: 100 INJECTION, POWDER, LYOPHILIZED, FOR SUSPENSION INTRAVENOUS at 01:03

## 2021-03-05 RX ADMIN — CARBOPLATIN 365 MG: 10 INJECTION INTRAVENOUS at 02:03

## 2021-03-08 ENCOUNTER — PATIENT MESSAGE (OUTPATIENT)
Dept: HEMATOLOGY/ONCOLOGY | Facility: CLINIC | Age: 71
End: 2021-03-08

## 2021-03-08 DIAGNOSIS — C34.91 NON-SMALL CELL CANCER OF RIGHT LUNG: Primary | ICD-10-CM

## 2021-03-09 ENCOUNTER — PATIENT MESSAGE (OUTPATIENT)
Dept: HEMATOLOGY/ONCOLOGY | Facility: CLINIC | Age: 71
End: 2021-03-09

## 2021-03-09 ENCOUNTER — PATIENT MESSAGE (OUTPATIENT)
Dept: PSYCHIATRY | Facility: CLINIC | Age: 71
End: 2021-03-09

## 2021-03-09 ENCOUNTER — PATIENT MESSAGE (OUTPATIENT)
Dept: ADMINISTRATIVE | Facility: OTHER | Age: 71
End: 2021-03-09

## 2021-03-09 DIAGNOSIS — Z01.818 PREOP TESTING: Primary | ICD-10-CM

## 2021-03-09 DIAGNOSIS — C34.91 NON-SMALL CELL CANCER OF RIGHT LUNG: Primary | ICD-10-CM

## 2021-03-10 ENCOUNTER — PATIENT MESSAGE (OUTPATIENT)
Dept: INTERNAL MEDICINE | Facility: CLINIC | Age: 71
End: 2021-03-10

## 2021-03-10 ENCOUNTER — PATIENT MESSAGE (OUTPATIENT)
Dept: HEMATOLOGY/ONCOLOGY | Facility: CLINIC | Age: 71
End: 2021-03-10

## 2021-03-10 DIAGNOSIS — C34.91 NON-SMALL CELL CANCER OF RIGHT LUNG: Primary | ICD-10-CM

## 2021-03-10 DIAGNOSIS — C34.90 NON-SMALL CELL LUNG CANCER: ICD-10-CM

## 2021-03-10 DIAGNOSIS — R06.02 SHORTNESS OF BREATH: Primary | ICD-10-CM

## 2021-03-10 RX ORDER — SODIUM CHLORIDE 9 MG/ML
INJECTION, SOLUTION INTRAVENOUS CONTINUOUS
Status: CANCELLED | OUTPATIENT
Start: 2021-03-10

## 2021-03-10 RX ORDER — CLINDAMYCIN PHOSPHATE 600 MG/50ML
600 INJECTION, SOLUTION INTRAVENOUS
Status: CANCELLED | OUTPATIENT
Start: 2021-03-15

## 2021-03-11 ENCOUNTER — PATIENT MESSAGE (OUTPATIENT)
Dept: HEMATOLOGY/ONCOLOGY | Facility: CLINIC | Age: 71
End: 2021-03-11

## 2021-03-11 ENCOUNTER — HOSPITAL ENCOUNTER (OUTPATIENT)
Dept: RADIOLOGY | Facility: HOSPITAL | Age: 71
Discharge: HOME | End: 2021-03-11
Attending: INTERNAL MEDICINE
Payer: MEDICARE

## 2021-03-11 DIAGNOSIS — R06.02 SHORTNESS OF BREATH: ICD-10-CM

## 2021-03-11 PROCEDURE — A9540 TC99M MAA: HCPCS

## 2021-03-11 PROCEDURE — 78580 NM LUNG SCAN PERFUSION: ICD-10-PCS | Mod: 26,,, | Performed by: RADIOLOGY

## 2021-03-11 PROCEDURE — 78580 LUNG PERFUSION IMAGING: CPT | Mod: 26,,, | Performed by: RADIOLOGY

## 2021-03-12 ENCOUNTER — LAB VISIT (OUTPATIENT)
Dept: INTERNAL MEDICINE | Facility: CLINIC | Age: 71
End: 2021-03-12
Payer: MEDICARE

## 2021-03-12 ENCOUNTER — OFFICE VISIT (OUTPATIENT)
Dept: HEMATOLOGY/ONCOLOGY | Facility: CLINIC | Age: 71
End: 2021-03-12
Payer: MEDICARE

## 2021-03-12 ENCOUNTER — INFUSION (OUTPATIENT)
Dept: INFUSION THERAPY | Facility: HOSPITAL | Age: 71
End: 2021-03-12
Attending: INTERNAL MEDICINE
Payer: MEDICARE

## 2021-03-12 ENCOUNTER — TELEPHONE (OUTPATIENT)
Dept: SURGERY | Facility: CLINIC | Age: 71
End: 2021-03-12

## 2021-03-12 VITALS
SYSTOLIC BLOOD PRESSURE: 139 MMHG | OXYGEN SATURATION: 100 % | HEIGHT: 66 IN | HEART RATE: 67 BPM | BODY MASS INDEX: 26.26 KG/M2 | DIASTOLIC BLOOD PRESSURE: 62 MMHG | RESPIRATION RATE: 16 BRPM | WEIGHT: 163.38 LBS

## 2021-03-12 VITALS
DIASTOLIC BLOOD PRESSURE: 74 MMHG | WEIGHT: 163.38 LBS | HEIGHT: 66 IN | BODY MASS INDEX: 26.26 KG/M2 | SYSTOLIC BLOOD PRESSURE: 161 MMHG | RESPIRATION RATE: 18 BRPM | HEART RATE: 67 BPM | TEMPERATURE: 98 F

## 2021-03-12 DIAGNOSIS — D84.821 IMMUNODEFICIENCY DUE TO DRUGS: ICD-10-CM

## 2021-03-12 DIAGNOSIS — C34.91 NON-SMALL CELL CANCER OF RIGHT LUNG: Primary | Chronic | ICD-10-CM

## 2021-03-12 DIAGNOSIS — Z01.818 PREOP TESTING: ICD-10-CM

## 2021-03-12 DIAGNOSIS — C34.90 MALIGNANT NEOPLASM OF LUNG, UNSPECIFIED LATERALITY, UNSPECIFIED PART OF LUNG: Primary | ICD-10-CM

## 2021-03-12 DIAGNOSIS — G89.3 NEOPLASM RELATED PAIN: ICD-10-CM

## 2021-03-12 DIAGNOSIS — N18.31 STAGE 3A CHRONIC KIDNEY DISEASE: ICD-10-CM

## 2021-03-12 DIAGNOSIS — C34.01 MALIGNANT NEOPLASM OF RIGHT MAIN BRONCHUS: ICD-10-CM

## 2021-03-12 DIAGNOSIS — C34.90 NON-SMALL CELL LUNG CANCER, UNSPECIFIED LATERALITY: ICD-10-CM

## 2021-03-12 DIAGNOSIS — Z79.899 IMMUNODEFICIENCY DUE TO DRUGS: ICD-10-CM

## 2021-03-12 DIAGNOSIS — E03.9 HYPOTHYROIDISM, UNSPECIFIED TYPE: ICD-10-CM

## 2021-03-12 LAB — SARS-COV-2 RNA RESP QL NAA+PROBE: NOT DETECTED

## 2021-03-12 PROCEDURE — 99999 PR PBB SHADOW E&M-EST. PATIENT-LVL IV: CPT | Mod: PBBFAC,,, | Performed by: INTERNAL MEDICINE

## 2021-03-12 PROCEDURE — 99215 PR OFFICE/OUTPT VISIT, EST, LEVL V, 40-54 MIN: ICD-10-PCS | Mod: S$PBB,,, | Performed by: INTERNAL MEDICINE

## 2021-03-12 PROCEDURE — U0005 INFEC AGEN DETEC AMPLI PROBE: HCPCS | Performed by: SURGERY

## 2021-03-12 PROCEDURE — U0003 INFECTIOUS AGENT DETECTION BY NUCLEIC ACID (DNA OR RNA); SEVERE ACUTE RESPIRATORY SYNDROME CORONAVIRUS 2 (SARS-COV-2) (CORONAVIRUS DISEASE [COVID-19]), AMPLIFIED PROBE TECHNIQUE, MAKING USE OF HIGH THROUGHPUT TECHNOLOGIES AS DESCRIBED BY CMS-2020-01-R: HCPCS | Performed by: SURGERY

## 2021-03-12 PROCEDURE — 99999 PR PBB SHADOW E&M-EST. PATIENT-LVL IV: ICD-10-PCS | Mod: PBBFAC,,, | Performed by: INTERNAL MEDICINE

## 2021-03-12 PROCEDURE — 99214 OFFICE O/P EST MOD 30 MIN: CPT | Mod: PBBFAC | Performed by: INTERNAL MEDICINE

## 2021-03-12 PROCEDURE — 96413 CHEMO IV INFUSION 1 HR: CPT

## 2021-03-12 PROCEDURE — 96367 TX/PROPH/DG ADDL SEQ IV INF: CPT

## 2021-03-12 PROCEDURE — 63600175 PHARM REV CODE 636 W HCPCS: Performed by: INTERNAL MEDICINE

## 2021-03-12 PROCEDURE — 99215 OFFICE O/P EST HI 40 MIN: CPT | Mod: S$PBB,,, | Performed by: INTERNAL MEDICINE

## 2021-03-12 PROCEDURE — 25000003 PHARM REV CODE 250: Performed by: INTERNAL MEDICINE

## 2021-03-12 RX ORDER — HEPARIN 100 UNIT/ML
500 SYRINGE INTRAVENOUS
Status: DISCONTINUED | OUTPATIENT
Start: 2021-03-12 | End: 2021-03-12 | Stop reason: HOSPADM

## 2021-03-12 RX ORDER — DIPHENHYDRAMINE HYDROCHLORIDE 50 MG/ML
25 INJECTION INTRAMUSCULAR; INTRAVENOUS EVERY 10 MIN PRN
Status: CANCELLED | OUTPATIENT
Start: 2021-03-12 | End: 2021-03-13

## 2021-03-12 RX ORDER — METHYLPREDNISOLONE SOD SUCC 125 MG
125 VIAL (EA) INJECTION ONCE AS NEEDED
Status: CANCELLED | OUTPATIENT
Start: 2021-03-12

## 2021-03-12 RX ORDER — SODIUM CHLORIDE 0.9 % (FLUSH) 0.9 %
10 SYRINGE (ML) INJECTION
Status: CANCELLED | OUTPATIENT
Start: 2021-03-12

## 2021-03-12 RX ORDER — EPINEPHRINE 0.3 MG/.3ML
0.3 INJECTION SUBCUTANEOUS ONCE AS NEEDED
Status: CANCELLED | OUTPATIENT
Start: 2021-03-12

## 2021-03-12 RX ORDER — DIPHENHYDRAMINE HYDROCHLORIDE 50 MG/ML
25 INJECTION INTRAMUSCULAR; INTRAVENOUS EVERY 10 MIN PRN
Status: DISCONTINUED | OUTPATIENT
Start: 2021-03-12 | End: 2021-03-12 | Stop reason: HOSPADM

## 2021-03-12 RX ORDER — METHYLPREDNISOLONE SOD SUCC 125 MG
125 VIAL (EA) INJECTION ONCE AS NEEDED
Status: DISCONTINUED | OUTPATIENT
Start: 2021-03-12 | End: 2021-03-12 | Stop reason: HOSPADM

## 2021-03-12 RX ORDER — EPINEPHRINE 0.3 MG/.3ML
0.3 INJECTION SUBCUTANEOUS ONCE AS NEEDED
Status: DISCONTINUED | OUTPATIENT
Start: 2021-03-12 | End: 2021-03-12 | Stop reason: HOSPADM

## 2021-03-12 RX ORDER — SODIUM CHLORIDE 0.9 % (FLUSH) 0.9 %
10 SYRINGE (ML) INJECTION
Status: DISCONTINUED | OUTPATIENT
Start: 2021-03-12 | End: 2021-03-12 | Stop reason: HOSPADM

## 2021-03-12 RX ORDER — HEPARIN 100 UNIT/ML
500 SYRINGE INTRAVENOUS
Status: CANCELLED | OUTPATIENT
Start: 2021-03-12

## 2021-03-12 RX ADMIN — PACLITAXEL 190 MG: 100 INJECTION, POWDER, LYOPHILIZED, FOR SUSPENSION INTRAVENOUS at 09:03

## 2021-03-12 RX ADMIN — DEXAMETHASONE SODIUM PHOSPHATE 12 MG: 4 INJECTION, SOLUTION INTRAMUSCULAR; INTRAVENOUS at 09:03

## 2021-03-12 RX ADMIN — DIPHENHYDRAMINE HYDROCHLORIDE 25 MG: 50 INJECTION, SOLUTION INTRAMUSCULAR; INTRAVENOUS at 08:03

## 2021-03-12 RX ADMIN — SODIUM CHLORIDE: 0.9 INJECTION, SOLUTION INTRAVENOUS at 08:03

## 2021-03-15 ENCOUNTER — HOSPITAL ENCOUNTER (OUTPATIENT)
Facility: HOSPITAL | Age: 71
Discharge: HOME | End: 2021-03-15
Attending: SURGERY | Admitting: SURGERY
Payer: MEDICARE

## 2021-03-15 ENCOUNTER — ANESTHESIA EVENT (OUTPATIENT)
Dept: SURGERY | Facility: HOSPITAL | Age: 71
End: 2021-03-15
Payer: MEDICARE

## 2021-03-15 ENCOUNTER — NURSE TRIAGE (OUTPATIENT)
Dept: ADMINISTRATIVE | Facility: CLINIC | Age: 71
End: 2021-03-15

## 2021-03-15 ENCOUNTER — ANESTHESIA (OUTPATIENT)
Dept: SURGERY | Facility: HOSPITAL | Age: 71
End: 2021-03-15
Payer: MEDICARE

## 2021-03-15 VITALS
TEMPERATURE: 98 F | OXYGEN SATURATION: 99 % | HEART RATE: 54 BPM | WEIGHT: 160 LBS | BODY MASS INDEX: 25.82 KG/M2 | RESPIRATION RATE: 14 BRPM | SYSTOLIC BLOOD PRESSURE: 152 MMHG | DIASTOLIC BLOOD PRESSURE: 68 MMHG

## 2021-03-15 DIAGNOSIS — C34.90 NON-SMALL CELL LUNG CANCER: ICD-10-CM

## 2021-03-15 DIAGNOSIS — C34.91 NON-SMALL CELL CANCER OF RIGHT LUNG: ICD-10-CM

## 2021-03-15 PROCEDURE — 25000003 PHARM REV CODE 250

## 2021-03-15 PROCEDURE — D9220A PRA ANESTHESIA: Mod: CRNA,,, | Performed by: STUDENT IN AN ORGANIZED HEALTH CARE EDUCATION/TRAINING PROGRAM

## 2021-03-15 PROCEDURE — 25000003 PHARM REV CODE 250: Performed by: SURGERY

## 2021-03-15 PROCEDURE — 36590 REMOVAL TUNNELED CV CATH: CPT | Mod: 51,LT,, | Performed by: SURGERY

## 2021-03-15 PROCEDURE — 36561 PR INSERT TUNNELED CV CATH WITH PORT: ICD-10-PCS | Mod: RT,,, | Performed by: SURGERY

## 2021-03-15 PROCEDURE — 63600175 PHARM REV CODE 636 W HCPCS: Performed by: SURGERY

## 2021-03-15 PROCEDURE — C1769 GUIDE WIRE: HCPCS | Performed by: SURGERY

## 2021-03-15 PROCEDURE — D9220A PRA ANESTHESIA: ICD-10-PCS | Mod: CRNA,,, | Performed by: STUDENT IN AN ORGANIZED HEALTH CARE EDUCATION/TRAINING PROGRAM

## 2021-03-15 PROCEDURE — D9220A PRA ANESTHESIA: ICD-10-PCS | Mod: ANES,,, | Performed by: ANESTHESIOLOGY

## 2021-03-15 PROCEDURE — 71000044 HC DOSC ROUTINE RECOVERY FIRST HOUR: Performed by: SURGERY

## 2021-03-15 PROCEDURE — C1788 PORT, INDWELLING, IMP: HCPCS | Performed by: SURGERY

## 2021-03-15 PROCEDURE — 63600175 PHARM REV CODE 636 W HCPCS: Performed by: STUDENT IN AN ORGANIZED HEALTH CARE EDUCATION/TRAINING PROGRAM

## 2021-03-15 PROCEDURE — 37000009 HC ANESTHESIA EA ADD 15 MINS: Performed by: SURGERY

## 2021-03-15 PROCEDURE — 36561 INSERT TUNNELED CV CATH: CPT | Mod: RT,,, | Performed by: SURGERY

## 2021-03-15 PROCEDURE — 25000003 PHARM REV CODE 250: Performed by: STUDENT IN AN ORGANIZED HEALTH CARE EDUCATION/TRAINING PROGRAM

## 2021-03-15 PROCEDURE — 37000008 HC ANESTHESIA 1ST 15 MINUTES: Performed by: SURGERY

## 2021-03-15 PROCEDURE — D9220A PRA ANESTHESIA: Mod: ANES,,, | Performed by: ANESTHESIOLOGY

## 2021-03-15 PROCEDURE — 77001 FLUOROGUIDE FOR VEIN DEVICE: CPT | Mod: 26,,, | Performed by: SURGERY

## 2021-03-15 PROCEDURE — 36000706: Performed by: SURGERY

## 2021-03-15 PROCEDURE — 36590 PR REMOVAL TUNNELED CV CATH W SUBQ PORT OR PUMP: ICD-10-PCS | Mod: 51,LT,, | Performed by: SURGERY

## 2021-03-15 PROCEDURE — 77001 CHG FLUOROGUIDE CNTRL VEN ACCESS,PLACE,REPLACE,REMOVE: ICD-10-PCS | Mod: 26,,, | Performed by: SURGERY

## 2021-03-15 PROCEDURE — 36000707: Performed by: SURGERY

## 2021-03-15 PROCEDURE — 71000015 HC POSTOP RECOV 1ST HR: Performed by: SURGERY

## 2021-03-15 PROCEDURE — 27201037 HC PRESSURE MONITORING SET UP

## 2021-03-15 DEVICE — KIT POWERPORT SINGLE 8FR: Type: IMPLANTABLE DEVICE | Site: SUBCLAVIAN | Status: FUNCTIONAL

## 2021-03-15 RX ORDER — ACETAMINOPHEN 325 MG/1
650 TABLET ORAL EVERY 4 HOURS PRN
Status: DISCONTINUED | OUTPATIENT
Start: 2021-03-15 | End: 2021-03-15 | Stop reason: HOSPADM

## 2021-03-15 RX ORDER — LIDOCAINE HYDROCHLORIDE 20 MG/ML
INJECTION INTRAVENOUS
Status: DISCONTINUED | OUTPATIENT
Start: 2021-03-15 | End: 2021-03-15

## 2021-03-15 RX ORDER — OXYCODONE AND ACETAMINOPHEN 5; 325 MG/1; MG/1
1 TABLET ORAL EVERY 4 HOURS PRN
Qty: 6 TABLET | Refills: 0 | Status: SHIPPED | OUTPATIENT
Start: 2021-03-15 | End: 2021-03-19 | Stop reason: SDUPTHER

## 2021-03-15 RX ORDER — PROPOFOL 10 MG/ML
VIAL (ML) INTRAVENOUS
Status: DISCONTINUED | OUTPATIENT
Start: 2021-03-15 | End: 2021-03-15

## 2021-03-15 RX ORDER — FENTANYL CITRATE 50 UG/ML
INJECTION, SOLUTION INTRAMUSCULAR; INTRAVENOUS
Status: DISCONTINUED | OUTPATIENT
Start: 2021-03-15 | End: 2021-03-15

## 2021-03-15 RX ORDER — ONDANSETRON 2 MG/ML
INJECTION INTRAMUSCULAR; INTRAVENOUS
Status: DISCONTINUED | OUTPATIENT
Start: 2021-03-15 | End: 2021-03-15

## 2021-03-15 RX ORDER — OXYCODONE HYDROCHLORIDE 5 MG/1
10 TABLET ORAL EVERY 4 HOURS PRN
Status: DISCONTINUED | OUTPATIENT
Start: 2021-03-15 | End: 2021-03-15 | Stop reason: HOSPADM

## 2021-03-15 RX ORDER — OXYCODONE HYDROCHLORIDE 5 MG/1
5 TABLET ORAL EVERY 4 HOURS PRN
Status: DISCONTINUED | OUTPATIENT
Start: 2021-03-15 | End: 2021-03-15 | Stop reason: HOSPADM

## 2021-03-15 RX ORDER — SODIUM CHLORIDE 9 MG/ML
INJECTION, SOLUTION INTRAVENOUS CONTINUOUS
Status: DISCONTINUED | OUTPATIENT
Start: 2021-03-15 | End: 2021-03-15 | Stop reason: HOSPADM

## 2021-03-15 RX ORDER — HEPARIN SODIUM (PORCINE) LOCK FLUSH IV SOLN 100 UNIT/ML 100 UNIT/ML
SOLUTION INTRAVENOUS
Status: DISCONTINUED | OUTPATIENT
Start: 2021-03-15 | End: 2021-03-15 | Stop reason: HOSPADM

## 2021-03-15 RX ORDER — PROPOFOL 10 MG/ML
VIAL (ML) INTRAVENOUS CONTINUOUS PRN
Status: DISCONTINUED | OUTPATIENT
Start: 2021-03-15 | End: 2021-03-15

## 2021-03-15 RX ORDER — BUPIVACAINE HYDROCHLORIDE 2.5 MG/ML
INJECTION, SOLUTION EPIDURAL; INFILTRATION; INTRACAUDAL
Status: DISCONTINUED | OUTPATIENT
Start: 2021-03-15 | End: 2021-03-15 | Stop reason: HOSPADM

## 2021-03-15 RX ORDER — HYDROMORPHONE HYDROCHLORIDE 1 MG/ML
0.2 INJECTION, SOLUTION INTRAMUSCULAR; INTRAVENOUS; SUBCUTANEOUS EVERY 5 MIN PRN
Status: DISCONTINUED | OUTPATIENT
Start: 2021-03-15 | End: 2021-03-15 | Stop reason: HOSPADM

## 2021-03-15 RX ORDER — HEPARIN SODIUM 1000 [USP'U]/ML
INJECTION, SOLUTION INTRAVENOUS; SUBCUTANEOUS
Status: DISCONTINUED | OUTPATIENT
Start: 2021-03-15 | End: 2021-03-15 | Stop reason: HOSPADM

## 2021-03-15 RX ORDER — MIDAZOLAM HYDROCHLORIDE 1 MG/ML
INJECTION, SOLUTION INTRAMUSCULAR; INTRAVENOUS
Status: DISCONTINUED | OUTPATIENT
Start: 2021-03-15 | End: 2021-03-15

## 2021-03-15 RX ORDER — CLINDAMYCIN PHOSPHATE 600 MG/50ML
600 INJECTION, SOLUTION INTRAVENOUS
Status: COMPLETED | OUTPATIENT
Start: 2021-03-15 | End: 2021-03-15

## 2021-03-15 RX ADMIN — PROPOFOL 40 MG: 10 INJECTION, EMULSION INTRAVENOUS at 10:03

## 2021-03-15 RX ADMIN — LIDOCAINE HYDROCHLORIDE 100 MG: 20 INJECTION, SOLUTION INTRAVENOUS at 10:03

## 2021-03-15 RX ADMIN — CLINDAMYCIN IN 5 PERCENT DEXTROSE 900 MG: 12 INJECTION, SOLUTION INTRAVENOUS at 10:03

## 2021-03-15 RX ADMIN — PROPOFOL 100 MCG/KG/MIN: 10 INJECTION, EMULSION INTRAVENOUS at 10:03

## 2021-03-15 RX ADMIN — OXYCODONE 10 MG: 5 TABLET ORAL at 12:03

## 2021-03-15 RX ADMIN — SODIUM CHLORIDE: 0.9 INJECTION, SOLUTION INTRAVENOUS at 09:03

## 2021-03-15 RX ADMIN — MIDAZOLAM HYDROCHLORIDE 2 MG: 1 INJECTION, SOLUTION INTRAMUSCULAR; INTRAVENOUS at 09:03

## 2021-03-15 RX ADMIN — ONDANSETRON 4 MG: 2 INJECTION, SOLUTION INTRAMUSCULAR; INTRAVENOUS at 11:03

## 2021-03-15 RX ADMIN — PROPOFOL 20 MG: 10 INJECTION, EMULSION INTRAVENOUS at 11:03

## 2021-03-15 RX ADMIN — FENTANYL CITRATE 25 MCG: 50 INJECTION, SOLUTION INTRAMUSCULAR; INTRAVENOUS at 10:03

## 2021-03-16 ENCOUNTER — PATIENT MESSAGE (OUTPATIENT)
Dept: SURGERY | Facility: CLINIC | Age: 71
End: 2021-03-16

## 2021-03-19 ENCOUNTER — OFFICE VISIT (OUTPATIENT)
Dept: SURGERY | Facility: CLINIC | Age: 71
End: 2021-03-19
Payer: MEDICARE

## 2021-03-19 ENCOUNTER — TELEPHONE (OUTPATIENT)
Dept: HEMATOLOGY/ONCOLOGY | Facility: CLINIC | Age: 71
End: 2021-03-19

## 2021-03-19 ENCOUNTER — PATIENT MESSAGE (OUTPATIENT)
Dept: HEMATOLOGY/ONCOLOGY | Facility: CLINIC | Age: 71
End: 2021-03-19

## 2021-03-19 VITALS
HEART RATE: 68 BPM | BODY MASS INDEX: 26.12 KG/M2 | OXYGEN SATURATION: 100 % | TEMPERATURE: 98 F | HEIGHT: 66 IN | RESPIRATION RATE: 17 BRPM | DIASTOLIC BLOOD PRESSURE: 73 MMHG | WEIGHT: 162.5 LBS | SYSTOLIC BLOOD PRESSURE: 160 MMHG

## 2021-03-19 DIAGNOSIS — C34.91 NON-SMALL CELL CANCER OF RIGHT LUNG: Primary | Chronic | ICD-10-CM

## 2021-03-19 DIAGNOSIS — Z95.828 PORT-A-CATH IN PLACE: ICD-10-CM

## 2021-03-19 PROCEDURE — 99024 POSTOP FOLLOW-UP VISIT: CPT | Mod: POP,,, | Performed by: NURSE PRACTITIONER

## 2021-03-19 PROCEDURE — 99024 PR POST-OP FOLLOW-UP VISIT: ICD-10-PCS | Mod: POP,,, | Performed by: NURSE PRACTITIONER

## 2021-03-19 PROCEDURE — 99999 PR PBB SHADOW E&M-EST. PATIENT-LVL IV: ICD-10-PCS | Mod: PBBFAC,,, | Performed by: NURSE PRACTITIONER

## 2021-03-19 PROCEDURE — 99214 OFFICE O/P EST MOD 30 MIN: CPT | Mod: PBBFAC | Performed by: NURSE PRACTITIONER

## 2021-03-19 PROCEDURE — 99999 PR PBB SHADOW E&M-EST. PATIENT-LVL IV: CPT | Mod: PBBFAC,,, | Performed by: NURSE PRACTITIONER

## 2021-03-19 RX ORDER — OXYCODONE AND ACETAMINOPHEN 5; 325 MG/1; MG/1
1 TABLET ORAL EVERY 4 HOURS PRN
Qty: 30 TABLET | Refills: 0 | Status: SHIPPED | OUTPATIENT
Start: 2021-03-19 | End: 2021-04-05

## 2021-03-22 ENCOUNTER — PATIENT MESSAGE (OUTPATIENT)
Dept: SURGERY | Facility: CLINIC | Age: 71
End: 2021-03-22

## 2021-03-23 ENCOUNTER — PATIENT MESSAGE (OUTPATIENT)
Dept: HEMATOLOGY/ONCOLOGY | Facility: CLINIC | Age: 71
End: 2021-03-23

## 2021-03-24 ENCOUNTER — HOSPITAL ENCOUNTER (OUTPATIENT)
Dept: RADIOLOGY | Facility: HOSPITAL | Age: 71
Discharge: HOME | End: 2021-03-24
Attending: INTERNAL MEDICINE
Payer: MEDICARE

## 2021-03-24 DIAGNOSIS — C34.01 MALIGNANT NEOPLASM OF RIGHT MAIN BRONCHUS: ICD-10-CM

## 2021-03-24 DIAGNOSIS — C34.91 NON-SMALL CELL CANCER OF RIGHT LUNG: ICD-10-CM

## 2021-03-24 LAB — POCT GLUCOSE: 129 MG/DL (ref 70–110)

## 2021-03-24 PROCEDURE — 78815 NM PET CT ROUTINE: ICD-10-PCS | Mod: 26,PS,, | Performed by: RADIOLOGY

## 2021-03-24 PROCEDURE — 78815 PET IMAGE W/CT SKULL-THIGH: CPT | Mod: TC,PS

## 2021-03-24 PROCEDURE — 78815 PET IMAGE W/CT SKULL-THIGH: CPT | Mod: 26,PS,, | Performed by: RADIOLOGY

## 2021-03-25 ENCOUNTER — PATIENT MESSAGE (OUTPATIENT)
Dept: INTERNAL MEDICINE | Facility: CLINIC | Age: 71
End: 2021-03-25

## 2021-03-25 ENCOUNTER — TELEPHONE (OUTPATIENT)
Dept: INTERNAL MEDICINE | Facility: CLINIC | Age: 71
End: 2021-03-25

## 2021-03-25 RX ORDER — HYDROCODONE BITARTRATE AND ACETAMINOPHEN 5; 325 MG/1; MG/1
1 TABLET ORAL EVERY 12 HOURS PRN
Qty: 60 TABLET | Refills: 0 | Status: SHIPPED | OUTPATIENT
Start: 2021-03-25 | End: 2021-04-26 | Stop reason: SDUPTHER

## 2021-03-25 RX ORDER — LORAZEPAM 0.5 MG/1
0.5 TABLET ORAL EVERY 12 HOURS PRN
Qty: 30 TABLET | Refills: 2 | Status: SHIPPED | OUTPATIENT
Start: 2021-03-25 | End: 2021-04-26 | Stop reason: SDUPTHER

## 2021-03-26 ENCOUNTER — TELEPHONE (OUTPATIENT)
Dept: GASTROENTEROLOGY | Facility: CLINIC | Age: 71
End: 2021-03-26

## 2021-03-26 ENCOUNTER — TELEPHONE (OUTPATIENT)
Dept: ENDOSCOPY | Facility: HOSPITAL | Age: 71
End: 2021-03-26

## 2021-03-26 ENCOUNTER — OFFICE VISIT (OUTPATIENT)
Dept: HEMATOLOGY/ONCOLOGY | Facility: CLINIC | Age: 71
End: 2021-03-26
Payer: MEDICARE

## 2021-03-26 ENCOUNTER — INFUSION (OUTPATIENT)
Dept: INFUSION THERAPY | Facility: HOSPITAL | Age: 71
End: 2021-03-26
Payer: MEDICARE

## 2021-03-26 VITALS
HEIGHT: 66 IN | SYSTOLIC BLOOD PRESSURE: 162 MMHG | TEMPERATURE: 98 F | OXYGEN SATURATION: 98 % | WEIGHT: 164 LBS | HEART RATE: 65 BPM | RESPIRATION RATE: 18 BRPM | DIASTOLIC BLOOD PRESSURE: 70 MMHG | BODY MASS INDEX: 26.36 KG/M2

## 2021-03-26 VITALS
TEMPERATURE: 99 F | SYSTOLIC BLOOD PRESSURE: 177 MMHG | WEIGHT: 163.56 LBS | HEIGHT: 66 IN | HEART RATE: 76 BPM | DIASTOLIC BLOOD PRESSURE: 83 MMHG | BODY MASS INDEX: 26.29 KG/M2

## 2021-03-26 DIAGNOSIS — D84.821 IMMUNODEFICIENCY DUE TO DRUGS: ICD-10-CM

## 2021-03-26 DIAGNOSIS — C34.91 NON-SMALL CELL CANCER OF RIGHT LUNG: Primary | Chronic | ICD-10-CM

## 2021-03-26 DIAGNOSIS — Z79.899 IMMUNODEFICIENCY DUE TO DRUGS: ICD-10-CM

## 2021-03-26 DIAGNOSIS — C34.90 NON-SMALL CELL LUNG CANCER, UNSPECIFIED LATERALITY: ICD-10-CM

## 2021-03-26 DIAGNOSIS — K62.1 RECTAL POLYP: ICD-10-CM

## 2021-03-26 DIAGNOSIS — C34.90 MALIGNANT NEOPLASM OF LUNG, UNSPECIFIED LATERALITY, UNSPECIFIED PART OF LUNG: Primary | ICD-10-CM

## 2021-03-26 PROCEDURE — 96375 TX/PRO/DX INJ NEW DRUG ADDON: CPT

## 2021-03-26 PROCEDURE — 99215 OFFICE O/P EST HI 40 MIN: CPT | Mod: S$PBB,,, | Performed by: INTERNAL MEDICINE

## 2021-03-26 PROCEDURE — 99213 OFFICE O/P EST LOW 20 MIN: CPT | Mod: PBBFAC,25 | Performed by: INTERNAL MEDICINE

## 2021-03-26 PROCEDURE — 25000003 PHARM REV CODE 250: Performed by: INTERNAL MEDICINE

## 2021-03-26 PROCEDURE — 63600175 PHARM REV CODE 636 W HCPCS: Mod: JG | Performed by: INTERNAL MEDICINE

## 2021-03-26 PROCEDURE — 99999 PR PBB SHADOW E&M-EST. PATIENT-LVL III: ICD-10-PCS | Mod: PBBFAC,,, | Performed by: INTERNAL MEDICINE

## 2021-03-26 PROCEDURE — 96417 CHEMO IV INFUS EACH ADDL SEQ: CPT

## 2021-03-26 PROCEDURE — A4216 STERILE WATER/SALINE, 10 ML: HCPCS | Performed by: INTERNAL MEDICINE

## 2021-03-26 PROCEDURE — 96367 TX/PROPH/DG ADDL SEQ IV INF: CPT

## 2021-03-26 PROCEDURE — 99999 PR PBB SHADOW E&M-EST. PATIENT-LVL III: CPT | Mod: PBBFAC,,, | Performed by: INTERNAL MEDICINE

## 2021-03-26 PROCEDURE — 96413 CHEMO IV INFUSION 1 HR: CPT

## 2021-03-26 PROCEDURE — 99215 PR OFFICE/OUTPT VISIT, EST, LEVL V, 40-54 MIN: ICD-10-PCS | Mod: S$PBB,,, | Performed by: INTERNAL MEDICINE

## 2021-03-26 RX ORDER — HEPARIN 100 UNIT/ML
500 SYRINGE INTRAVENOUS
Status: DISCONTINUED | OUTPATIENT
Start: 2021-03-26 | End: 2021-03-26 | Stop reason: HOSPADM

## 2021-03-26 RX ORDER — SODIUM CHLORIDE 0.9 % (FLUSH) 0.9 %
10 SYRINGE (ML) INJECTION
Status: DISCONTINUED | OUTPATIENT
Start: 2021-03-26 | End: 2021-03-26 | Stop reason: HOSPADM

## 2021-03-26 RX ORDER — EPINEPHRINE 0.3 MG/.3ML
0.3 INJECTION SUBCUTANEOUS ONCE AS NEEDED
Status: DISCONTINUED | OUTPATIENT
Start: 2021-03-26 | End: 2021-03-26 | Stop reason: HOSPADM

## 2021-03-26 RX ORDER — METHYLPREDNISOLONE SOD SUCC 125 MG
125 VIAL (EA) INJECTION ONCE AS NEEDED
Status: CANCELLED | OUTPATIENT
Start: 2021-03-26

## 2021-03-26 RX ORDER — EPINEPHRINE 0.3 MG/.3ML
0.3 INJECTION SUBCUTANEOUS ONCE AS NEEDED
Status: CANCELLED | OUTPATIENT
Start: 2021-03-26

## 2021-03-26 RX ORDER — HEPARIN 100 UNIT/ML
500 SYRINGE INTRAVENOUS
Status: CANCELLED | OUTPATIENT
Start: 2021-03-26

## 2021-03-26 RX ORDER — SODIUM CHLORIDE 0.9 % (FLUSH) 0.9 %
10 SYRINGE (ML) INJECTION
Status: CANCELLED | OUTPATIENT
Start: 2021-03-26

## 2021-03-26 RX ORDER — DIPHENHYDRAMINE HYDROCHLORIDE 50 MG/ML
25 INJECTION INTRAMUSCULAR; INTRAVENOUS EVERY 10 MIN PRN
Status: DISCONTINUED | OUTPATIENT
Start: 2021-03-26 | End: 2021-03-26 | Stop reason: HOSPADM

## 2021-03-26 RX ORDER — DIPHENHYDRAMINE HYDROCHLORIDE 50 MG/ML
25 INJECTION INTRAMUSCULAR; INTRAVENOUS EVERY 10 MIN PRN
Status: CANCELLED | OUTPATIENT
Start: 2021-03-26 | End: 2021-03-27

## 2021-03-26 RX ORDER — METHYLPREDNISOLONE SOD SUCC 125 MG
125 VIAL (EA) INJECTION ONCE AS NEEDED
Status: DISCONTINUED | OUTPATIENT
Start: 2021-03-26 | End: 2021-03-26 | Stop reason: HOSPADM

## 2021-03-26 RX ADMIN — CARBOPLATIN 460 MG: 10 INJECTION INTRAVENOUS at 11:03

## 2021-03-26 RX ADMIN — DEXAMETHASONE SODIUM PHOSPHATE 0.25 MG: 4 INJECTION, SOLUTION INTRA-ARTICULAR; INTRALESIONAL; INTRAMUSCULAR; INTRAVENOUS; SOFT TISSUE at 09:03

## 2021-03-26 RX ADMIN — SODIUM CHLORIDE 360 MG: 9 INJECTION, SOLUTION INTRAVENOUS at 09:03

## 2021-03-26 RX ADMIN — Medication 10 ML: at 12:03

## 2021-03-26 RX ADMIN — DIPHENHYDRAMINE HYDROCHLORIDE 25 MG: 50 INJECTION INTRAMUSCULAR; INTRAVENOUS at 09:03

## 2021-03-26 RX ADMIN — PACLITAXEL 190 MG: 100 INJECTION, POWDER, LYOPHILIZED, FOR SUSPENSION INTRAVENOUS at 10:03

## 2021-03-26 RX ADMIN — APREPITANT 130 MG: 130 INJECTION, EMULSION INTRAVENOUS at 09:03

## 2021-03-26 RX ADMIN — SODIUM CHLORIDE: 0.9 INJECTION, SOLUTION INTRAVENOUS at 09:03

## 2021-03-27 ENCOUNTER — INFUSION (OUTPATIENT)
Dept: INFUSION THERAPY | Facility: HOSPITAL | Age: 71
End: 2021-03-27
Attending: INTERNAL MEDICINE
Payer: MEDICARE

## 2021-03-27 DIAGNOSIS — C34.90 MALIGNANT NEOPLASM OF LUNG, UNSPECIFIED LATERALITY, UNSPECIFIED PART OF LUNG: Primary | ICD-10-CM

## 2021-03-27 DIAGNOSIS — C34.90 NON-SMALL CELL LUNG CANCER, UNSPECIFIED LATERALITY: ICD-10-CM

## 2021-03-27 PROCEDURE — 96372 THER/PROPH/DIAG INJ SC/IM: CPT

## 2021-03-27 PROCEDURE — 63600175 PHARM REV CODE 636 W HCPCS: Mod: JG | Performed by: INTERNAL MEDICINE

## 2021-03-27 RX ADMIN — FILGRASTIM 480 MCG: 480 INJECTION, SOLUTION INTRAVENOUS; SUBCUTANEOUS at 11:03

## 2021-03-28 ENCOUNTER — PATIENT MESSAGE (OUTPATIENT)
Dept: ENDOSCOPY | Facility: HOSPITAL | Age: 71
End: 2021-03-28

## 2021-03-30 ENCOUNTER — PATIENT MESSAGE (OUTPATIENT)
Dept: SURGERY | Facility: CLINIC | Age: 71
End: 2021-03-30

## 2021-03-30 ENCOUNTER — TELEPHONE (OUTPATIENT)
Dept: SURGERY | Facility: CLINIC | Age: 71
End: 2021-03-30

## 2021-03-31 ENCOUNTER — PATIENT MESSAGE (OUTPATIENT)
Dept: HEMATOLOGY/ONCOLOGY | Facility: CLINIC | Age: 71
End: 2021-03-31

## 2021-04-01 ENCOUNTER — LAB VISIT (OUTPATIENT)
Dept: LAB | Facility: HOSPITAL | Age: 71
End: 2021-04-01
Attending: INTERNAL MEDICINE
Payer: MEDICARE

## 2021-04-01 ENCOUNTER — PATIENT MESSAGE (OUTPATIENT)
Dept: HEMATOLOGY/ONCOLOGY | Facility: CLINIC | Age: 71
End: 2021-04-01

## 2021-04-01 ENCOUNTER — TELEPHONE (OUTPATIENT)
Dept: GASTROENTEROLOGY | Facility: CLINIC | Age: 71
End: 2021-04-01

## 2021-04-01 ENCOUNTER — OFFICE VISIT (OUTPATIENT)
Dept: SURGERY | Facility: CLINIC | Age: 71
End: 2021-04-01
Payer: MEDICARE

## 2021-04-01 VITALS
SYSTOLIC BLOOD PRESSURE: 143 MMHG | WEIGHT: 164.44 LBS | TEMPERATURE: 98 F | DIASTOLIC BLOOD PRESSURE: 70 MMHG | BODY MASS INDEX: 26.55 KG/M2 | RESPIRATION RATE: 18 BRPM | HEART RATE: 65 BPM

## 2021-04-01 DIAGNOSIS — K62.1 RECTAL POLYP: Primary | ICD-10-CM

## 2021-04-01 DIAGNOSIS — C34.91 NON-SMALL CELL CANCER OF RIGHT LUNG: Primary | ICD-10-CM

## 2021-04-01 DIAGNOSIS — Z01.812 PRE-PROCEDURE LAB EXAM: ICD-10-CM

## 2021-04-01 DIAGNOSIS — C34.91 NON-SMALL CELL CANCER OF RIGHT LUNG: ICD-10-CM

## 2021-04-01 DIAGNOSIS — Z85.118 HISTORY OF LUNG CANCER: Primary | ICD-10-CM

## 2021-04-01 LAB
ALBUMIN SERPL BCP-MCNC: 3.6 G/DL (ref 3.5–5.2)
ALP SERPL-CCNC: 57 U/L (ref 55–135)
ALT SERPL W/O P-5'-P-CCNC: 19 U/L (ref 10–44)
ANION GAP SERPL CALC-SCNC: 6 MMOL/L (ref 8–16)
AST SERPL-CCNC: 22 U/L (ref 10–40)
BILIRUB SERPL-MCNC: 0.4 MG/DL (ref 0.1–1)
BUN SERPL-MCNC: 28 MG/DL (ref 8–23)
CALCIUM SERPL-MCNC: 9.1 MG/DL (ref 8.7–10.5)
CHLORIDE SERPL-SCNC: 103 MMOL/L (ref 95–110)
CO2 SERPL-SCNC: 28 MMOL/L (ref 23–29)
CREAT SERPL-MCNC: 1.1 MG/DL (ref 0.5–1.4)
ERYTHROCYTE [DISTWIDTH] IN BLOOD BY AUTOMATED COUNT: 12.9 % (ref 11.5–14.5)
EST. GFR  (AFRICAN AMERICAN): 58.8 ML/MIN/1.73 M^2
EST. GFR  (NON AFRICAN AMERICAN): 51 ML/MIN/1.73 M^2
GLUCOSE SERPL-MCNC: 135 MG/DL (ref 70–110)
HCT VFR BLD AUTO: 33.3 % (ref 37–48.5)
HGB BLD-MCNC: 10.8 G/DL (ref 12–16)
IMM GRANULOCYTES # BLD AUTO: 0.02 K/UL (ref 0–0.04)
MCH RBC QN AUTO: 31.5 PG (ref 27–31)
MCHC RBC AUTO-ENTMCNC: 32.4 G/DL (ref 32–36)
MCV RBC AUTO: 97 FL (ref 82–98)
NEUTROPHILS # BLD AUTO: 1.4 K/UL (ref 1.8–7.7)
PLATELET # BLD AUTO: 133 K/UL (ref 150–450)
PMV BLD AUTO: 9.7 FL (ref 9.2–12.9)
POTASSIUM SERPL-SCNC: 5.1 MMOL/L (ref 3.5–5.1)
PROT SERPL-MCNC: 6.4 G/DL (ref 6–8.4)
RBC # BLD AUTO: 3.43 M/UL (ref 4–5.4)
SODIUM SERPL-SCNC: 137 MMOL/L (ref 136–145)
WBC # BLD AUTO: 2.9 K/UL (ref 3.9–12.7)

## 2021-04-01 PROCEDURE — 99024 PR POST-OP FOLLOW-UP VISIT: ICD-10-PCS | Mod: POP,,, | Performed by: SURGERY

## 2021-04-01 PROCEDURE — 99024 POSTOP FOLLOW-UP VISIT: CPT | Mod: POP,,, | Performed by: SURGERY

## 2021-04-01 PROCEDURE — 99214 OFFICE O/P EST MOD 30 MIN: CPT | Mod: PBBFAC | Performed by: SURGERY

## 2021-04-01 PROCEDURE — 99999 PR PBB SHADOW E&M-EST. PATIENT-LVL IV: ICD-10-PCS | Mod: PBBFAC,,, | Performed by: SURGERY

## 2021-04-01 PROCEDURE — 99999 PR PBB SHADOW E&M-EST. PATIENT-LVL IV: CPT | Mod: PBBFAC,,, | Performed by: SURGERY

## 2021-04-01 PROCEDURE — 85027 COMPLETE CBC AUTOMATED: CPT | Performed by: INTERNAL MEDICINE

## 2021-04-01 PROCEDURE — 36415 COLL VENOUS BLD VENIPUNCTURE: CPT | Performed by: INTERNAL MEDICINE

## 2021-04-01 PROCEDURE — 80053 COMPREHEN METABOLIC PANEL: CPT | Performed by: INTERNAL MEDICINE

## 2021-04-02 ENCOUNTER — PATIENT MESSAGE (OUTPATIENT)
Dept: ADMINISTRATIVE | Facility: OTHER | Age: 71
End: 2021-04-02

## 2021-04-05 ENCOUNTER — HOSPITAL ENCOUNTER (OUTPATIENT)
Dept: RADIOLOGY | Facility: HOSPITAL | Age: 71
Discharge: HOME | End: 2021-04-05
Attending: INTERNAL MEDICINE
Payer: MEDICARE

## 2021-04-05 ENCOUNTER — OFFICE VISIT (OUTPATIENT)
Dept: HEMATOLOGY/ONCOLOGY | Facility: CLINIC | Age: 71
End: 2021-04-05
Payer: MEDICARE

## 2021-04-05 ENCOUNTER — PATIENT MESSAGE (OUTPATIENT)
Dept: HEMATOLOGY/ONCOLOGY | Facility: CLINIC | Age: 71
End: 2021-04-05

## 2021-04-05 ENCOUNTER — INFUSION (OUTPATIENT)
Dept: INFUSION THERAPY | Facility: HOSPITAL | Age: 71
End: 2021-04-05
Attending: INTERNAL MEDICINE
Payer: MEDICARE

## 2021-04-05 VITALS
HEART RATE: 82 BPM | DIASTOLIC BLOOD PRESSURE: 80 MMHG | TEMPERATURE: 97 F | WEIGHT: 163.81 LBS | RESPIRATION RATE: 18 BRPM | HEIGHT: 66 IN | OXYGEN SATURATION: 100 % | BODY MASS INDEX: 26.33 KG/M2 | SYSTOLIC BLOOD PRESSURE: 187 MMHG

## 2021-04-05 DIAGNOSIS — C34.90 MALIGNANT NEOPLASM OF LUNG, UNSPECIFIED LATERALITY, UNSPECIFIED PART OF LUNG: Primary | ICD-10-CM

## 2021-04-05 DIAGNOSIS — T45.1X5A CHEMOTHERAPY INDUCED NEUTROPENIA: ICD-10-CM

## 2021-04-05 DIAGNOSIS — D70.1 CHEMOTHERAPY INDUCED NEUTROPENIA: ICD-10-CM

## 2021-04-05 DIAGNOSIS — E07.9 THYROID DISORDER: ICD-10-CM

## 2021-04-05 DIAGNOSIS — Z79.899 IMMUNODEFICIENCY DUE TO DRUGS: ICD-10-CM

## 2021-04-05 DIAGNOSIS — M79.605 LEG PAIN, ANTERIOR, LEFT: ICD-10-CM

## 2021-04-05 DIAGNOSIS — D84.821 IMMUNODEFICIENCY DUE TO DRUGS: ICD-10-CM

## 2021-04-05 PROCEDURE — 73590 X-RAY EXAM OF LOWER LEG: CPT | Mod: 26,LT,, | Performed by: RADIOLOGY

## 2021-04-05 PROCEDURE — 63600175 PHARM REV CODE 636 W HCPCS: Mod: JG | Performed by: INTERNAL MEDICINE

## 2021-04-05 PROCEDURE — 99999 PR PBB SHADOW E&M-EST. PATIENT-LVL IV: ICD-10-PCS | Mod: PBBFAC,,, | Performed by: INTERNAL MEDICINE

## 2021-04-05 PROCEDURE — 73590 X-RAY EXAM OF LOWER LEG: CPT | Mod: TC,LT

## 2021-04-05 PROCEDURE — 96372 THER/PROPH/DIAG INJ SC/IM: CPT

## 2021-04-05 PROCEDURE — 73590 XR TIBIA FIBULA 2 VIEW LEFT: ICD-10-PCS | Mod: 26,LT,, | Performed by: RADIOLOGY

## 2021-04-05 PROCEDURE — 99215 PR OFFICE/OUTPT VISIT, EST, LEVL V, 40-54 MIN: ICD-10-PCS | Mod: S$PBB,,, | Performed by: INTERNAL MEDICINE

## 2021-04-05 PROCEDURE — 99999 PR PBB SHADOW E&M-EST. PATIENT-LVL IV: CPT | Mod: PBBFAC,,, | Performed by: INTERNAL MEDICINE

## 2021-04-05 PROCEDURE — 99214 OFFICE O/P EST MOD 30 MIN: CPT | Mod: PBBFAC,25 | Performed by: INTERNAL MEDICINE

## 2021-04-05 PROCEDURE — 99215 OFFICE O/P EST HI 40 MIN: CPT | Mod: S$PBB,,, | Performed by: INTERNAL MEDICINE

## 2021-04-05 RX ADMIN — FILGRASTIM 480 MCG: 480 INJECTION, SOLUTION INTRAVENOUS; SUBCUTANEOUS at 03:04

## 2021-04-06 ENCOUNTER — PATIENT MESSAGE (OUTPATIENT)
Dept: HEMATOLOGY/ONCOLOGY | Facility: CLINIC | Age: 71
End: 2021-04-06

## 2021-04-06 ENCOUNTER — HOSPITAL ENCOUNTER (OUTPATIENT)
Dept: CARDIOLOGY | Facility: CLINIC | Age: 71
Discharge: HOME | End: 2021-04-06
Payer: MEDICARE

## 2021-04-06 ENCOUNTER — INFUSION (OUTPATIENT)
Dept: INFUSION THERAPY | Facility: HOSPITAL | Age: 71
End: 2021-04-06
Payer: MEDICARE

## 2021-04-06 DIAGNOSIS — R00.0 TACHYCARDIA: ICD-10-CM

## 2021-04-06 DIAGNOSIS — R94.31 ABNORMAL EKG: Primary | ICD-10-CM

## 2021-04-06 DIAGNOSIS — C34.90 MALIGNANT NEOPLASM OF LUNG, UNSPECIFIED LATERALITY, UNSPECIFIED PART OF LUNG: Primary | ICD-10-CM

## 2021-04-06 DIAGNOSIS — R06.02 SHORTNESS OF BREATH: ICD-10-CM

## 2021-04-06 DIAGNOSIS — R00.0 TACHYCARDIA: Primary | ICD-10-CM

## 2021-04-06 PROCEDURE — 96372 THER/PROPH/DIAG INJ SC/IM: CPT

## 2021-04-06 PROCEDURE — 93010 ELECTROCARDIOGRAM REPORT: CPT | Mod: S$PBB,,, | Performed by: INTERNAL MEDICINE

## 2021-04-06 PROCEDURE — 63600175 PHARM REV CODE 636 W HCPCS: Mod: JG | Performed by: INTERNAL MEDICINE

## 2021-04-06 PROCEDURE — 93010 EKG 12-LEAD: ICD-10-PCS | Mod: S$PBB,,, | Performed by: INTERNAL MEDICINE

## 2021-04-06 PROCEDURE — 93005 ELECTROCARDIOGRAM TRACING: CPT | Mod: PBBFAC | Performed by: INTERNAL MEDICINE

## 2021-04-06 RX ADMIN — FILGRASTIM 480 MCG: 480 INJECTION, SOLUTION INTRAVENOUS; SUBCUTANEOUS at 02:04

## 2021-04-08 ENCOUNTER — HOSPITAL ENCOUNTER (OUTPATIENT)
Dept: CARDIOLOGY | Facility: HOSPITAL | Age: 71
Discharge: HOME | End: 2021-04-08
Attending: INTERNAL MEDICINE
Payer: MEDICARE

## 2021-04-08 VITALS
HEIGHT: 66 IN | WEIGHT: 163 LBS | HEART RATE: 71 BPM | BODY MASS INDEX: 26.2 KG/M2 | SYSTOLIC BLOOD PRESSURE: 150 MMHG | DIASTOLIC BLOOD PRESSURE: 66 MMHG

## 2021-04-08 DIAGNOSIS — R94.31 ABNORMAL EKG: ICD-10-CM

## 2021-04-08 DIAGNOSIS — R06.02 SHORTNESS OF BREATH: ICD-10-CM

## 2021-04-08 LAB
ASCENDING AORTA: 2.82 CM
AV INDEX (PROSTH): 0.91
AV MEAN GRADIENT: 7 MMHG
AV PEAK GRADIENT: 11 MMHG
AV VALVE AREA: 2.49 CM2
AV VELOCITY RATIO: 0.95
BSA FOR ECHO PROCEDURE: 1.86 M2
CV ECHO LV RWT: 0.3 CM
DOP CALC AO PEAK VEL: 1.65 M/S
DOP CALC AO VTI: 38.26 CM
DOP CALC LVOT AREA: 2.7 CM2
DOP CALC LVOT DIAMETER: 1.87 CM
DOP CALC LVOT PEAK VEL: 1.56 M/S
DOP CALC LVOT STROKE VOLUME: 95.09 CM3
DOP CALCLVOT PEAK VEL VTI: 34.64 CM
E WAVE DECELERATION TIME: 259.55 MSEC
E/A RATIO: 0.71
E/E' RATIO: 12.14 M/S
ECHO LV POSTERIOR WALL: 0.72 CM (ref 0.6–1.1)
EJECTION FRACTION: 70 %
FRACTIONAL SHORTENING: 44 % (ref 28–44)
INTERVENTRICULAR SEPTUM: 0.77 CM (ref 0.6–1.1)
IVRT: 88.49 MSEC
LA MAJOR: 5.08 CM
LA MINOR: 5.07 CM
LA WIDTH: 3.35 CM
LEFT ATRIUM SIZE: 3.33 CM
LEFT ATRIUM VOLUME INDEX MOD: 23.5 ML/M2
LEFT ATRIUM VOLUME INDEX: 26.3 ML/M2
LEFT ATRIUM VOLUME MOD: 43.03 CM3
LEFT ATRIUM VOLUME: 48.12 CM3
LEFT INTERNAL DIMENSION IN SYSTOLE: 2.66 CM (ref 2.1–4)
LEFT VENTRICLE DIASTOLIC VOLUME INDEX: 57.62 ML/M2
LEFT VENTRICLE DIASTOLIC VOLUME: 105.45 ML
LEFT VENTRICLE MASS INDEX: 62 G/M2
LEFT VENTRICLE SYSTOLIC VOLUME INDEX: 14.3 ML/M2
LEFT VENTRICLE SYSTOLIC VOLUME: 26.08 ML
LEFT VENTRICULAR INTERNAL DIMENSION IN DIASTOLE: 4.76 CM (ref 3.5–6)
LEFT VENTRICULAR MASS: 113.99 G
LV LATERAL E/E' RATIO: 12.14 M/S
LV SEPTAL E/E' RATIO: 12.14 M/S
MV PEAK A VEL: 1.2 M/S
MV PEAK E VEL: 0.85 M/S
MV STENOSIS PRESSURE HALF TIME: 75.27 MS
MV VALVE AREA P 1/2 METHOD: 2.92 CM2
PISA TR MAX VEL: 3.56 M/S
PULM VEIN S/D RATIO: 1.33
PV PEAK D VEL: 0.67 M/S
PV PEAK S VEL: 0.89 M/S
RA MAJOR: 4.7 CM
RA PRESSURE: 3 MMHG
RA WIDTH: 2.74 CM
RIGHT VENTRICULAR END-DIASTOLIC DIMENSION: 2.57 CM
SINUS: 2.51 CM
STJ: 2.17 CM
TDI LATERAL: 0.07 M/S
TDI SEPTAL: 0.07 M/S
TDI: 0.07 M/S
TR MAX PG: 51 MMHG
TRICUSPID ANNULAR PLANE SYSTOLIC EXCURSION: 1.76 CM
TV REST PULMONARY ARTERY PRESSURE: 54 MMHG

## 2021-04-08 PROCEDURE — 93306 TTE W/DOPPLER COMPLETE: CPT

## 2021-04-08 PROCEDURE — 93306 TTE W/DOPPLER COMPLETE: CPT | Mod: 26,,, | Performed by: INTERNAL MEDICINE

## 2021-04-08 PROCEDURE — 93356 MYOCRD STRAIN IMG SPCKL TRCK: CPT | Mod: ,,, | Performed by: INTERNAL MEDICINE

## 2021-04-08 PROCEDURE — 93306 ECHO (CUPID ONLY): ICD-10-PCS | Mod: 26,,, | Performed by: INTERNAL MEDICINE

## 2021-04-08 PROCEDURE — 93356 ECHO (CUPID ONLY): ICD-10-PCS | Mod: ,,, | Performed by: INTERNAL MEDICINE

## 2021-04-13 ENCOUNTER — PATIENT OUTREACH (OUTPATIENT)
Dept: ADMINISTRATIVE | Facility: OTHER | Age: 71
End: 2021-04-13

## 2021-04-13 DIAGNOSIS — Z12.31 ENCOUNTER FOR SCREENING MAMMOGRAM FOR MALIGNANT NEOPLASM OF BREAST: Primary | ICD-10-CM

## 2021-04-14 ENCOUNTER — OFFICE VISIT (OUTPATIENT)
Dept: CARDIOLOGY | Facility: CLINIC | Age: 71
End: 2021-04-14
Payer: MEDICARE

## 2021-04-14 VITALS
SYSTOLIC BLOOD PRESSURE: 159 MMHG | HEIGHT: 66 IN | DIASTOLIC BLOOD PRESSURE: 70 MMHG | WEIGHT: 163.38 LBS | BODY MASS INDEX: 26.26 KG/M2 | HEART RATE: 72 BPM

## 2021-04-14 DIAGNOSIS — I25.10 CORONARY ARTERY DISEASE DUE TO CALCIFIED CORONARY LESION: ICD-10-CM

## 2021-04-14 DIAGNOSIS — N18.31 STAGE 3A CHRONIC KIDNEY DISEASE: ICD-10-CM

## 2021-04-14 DIAGNOSIS — I47.10 SVT (SUPRAVENTRICULAR TACHYCARDIA): ICD-10-CM

## 2021-04-14 DIAGNOSIS — I25.84 CORONARY ARTERY DISEASE DUE TO CALCIFIED CORONARY LESION: ICD-10-CM

## 2021-04-14 DIAGNOSIS — R73.03 PREDIABETES: ICD-10-CM

## 2021-04-14 DIAGNOSIS — E78.2 MIXED HYPERLIPIDEMIA: ICD-10-CM

## 2021-04-14 DIAGNOSIS — R94.31 ABNORMAL EKG: Primary | ICD-10-CM

## 2021-04-14 DIAGNOSIS — T45.1X5D CHEMOTHERAPY ADVERSE REACTION, SUBSEQUENT ENCOUNTER: ICD-10-CM

## 2021-04-14 DIAGNOSIS — I10 ESSENTIAL HYPERTENSION: ICD-10-CM

## 2021-04-14 PROBLEM — C77.1 SECONDARY AND UNSPECIFIED MALIGNANT NEOPLASM OF INTRATHORACIC LYMPH NODES: Status: ACTIVE | Noted: 2018-09-25

## 2021-04-14 PROBLEM — C34.11 MALIGNANT NEOPLASM OF UPPER LOBE, RIGHT BRONCHUS OR LUNG: Status: ACTIVE | Noted: 2018-08-09

## 2021-04-14 PROCEDURE — 99214 OFFICE O/P EST MOD 30 MIN: CPT | Mod: S$PBB,,, | Performed by: INTERNAL MEDICINE

## 2021-04-14 PROCEDURE — 99214 PR OFFICE/OUTPT VISIT, EST, LEVL IV, 30-39 MIN: ICD-10-PCS | Mod: S$PBB,,, | Performed by: INTERNAL MEDICINE

## 2021-04-14 PROCEDURE — 99214 OFFICE O/P EST MOD 30 MIN: CPT | Mod: PBBFAC,PO | Performed by: INTERNAL MEDICINE

## 2021-04-14 PROCEDURE — 99999 PR PBB SHADOW E&M-EST. PATIENT-LVL IV: CPT | Mod: PBBFAC,,, | Performed by: INTERNAL MEDICINE

## 2021-04-14 PROCEDURE — 99999 PR PBB SHADOW E&M-EST. PATIENT-LVL IV: ICD-10-PCS | Mod: PBBFAC,,, | Performed by: INTERNAL MEDICINE

## 2021-04-15 ENCOUNTER — TELEPHONE (OUTPATIENT)
Dept: ENDOSCOPY | Facility: HOSPITAL | Age: 71
End: 2021-04-15

## 2021-04-15 ENCOUNTER — LAB VISIT (OUTPATIENT)
Dept: LAB | Facility: HOSPITAL | Age: 71
End: 2021-04-15
Attending: INTERNAL MEDICINE
Payer: MEDICARE

## 2021-04-15 DIAGNOSIS — C34.90 MALIGNANT NEOPLASM OF LUNG, UNSPECIFIED LATERALITY, UNSPECIFIED PART OF LUNG: ICD-10-CM

## 2021-04-15 DIAGNOSIS — E07.9 THYROID DISORDER: ICD-10-CM

## 2021-04-15 LAB
ALBUMIN SERPL BCP-MCNC: 3.9 G/DL (ref 3.5–5.2)
ALP SERPL-CCNC: 60 U/L (ref 55–135)
ALT SERPL W/O P-5'-P-CCNC: 17 U/L (ref 10–44)
ANION GAP SERPL CALC-SCNC: 8 MMOL/L (ref 8–16)
AST SERPL-CCNC: 19 U/L (ref 10–40)
BILIRUB SERPL-MCNC: 0.3 MG/DL (ref 0.1–1)
BUN SERPL-MCNC: 23 MG/DL (ref 8–23)
CALCIUM SERPL-MCNC: 9.4 MG/DL (ref 8.7–10.5)
CHLORIDE SERPL-SCNC: 107 MMOL/L (ref 95–110)
CO2 SERPL-SCNC: 26 MMOL/L (ref 23–29)
CORTIS SERPL-MCNC: 11.2 UG/DL
CREAT SERPL-MCNC: 1 MG/DL (ref 0.5–1.4)
ERYTHROCYTE [DISTWIDTH] IN BLOOD BY AUTOMATED COUNT: 14.6 % (ref 11.5–14.5)
EST. GFR  (AFRICAN AMERICAN): >60 ML/MIN/1.73 M^2
EST. GFR  (NON AFRICAN AMERICAN): 57 ML/MIN/1.73 M^2
GLUCOSE SERPL-MCNC: 102 MG/DL (ref 70–110)
HCT VFR BLD AUTO: 31.6 % (ref 37–48.5)
HGB BLD-MCNC: 10.7 G/DL (ref 12–16)
IMM GRANULOCYTES # BLD AUTO: 0.05 K/UL (ref 0–0.04)
MCH RBC QN AUTO: 32.3 PG (ref 27–31)
MCHC RBC AUTO-ENTMCNC: 33.9 G/DL (ref 32–36)
MCV RBC AUTO: 96 FL (ref 82–98)
NEUTROPHILS # BLD AUTO: 2.2 K/UL (ref 1.8–7.7)
PLATELET # BLD AUTO: 109 K/UL (ref 150–450)
PMV BLD AUTO: 9.8 FL (ref 9.2–12.9)
POTASSIUM SERPL-SCNC: 4.7 MMOL/L (ref 3.5–5.1)
PROT SERPL-MCNC: 6.9 G/DL (ref 6–8.4)
RBC # BLD AUTO: 3.31 M/UL (ref 4–5.4)
SODIUM SERPL-SCNC: 141 MMOL/L (ref 136–145)
T4 FREE SERPL-MCNC: 1.16 NG/DL (ref 0.71–1.51)
TSH SERPL DL<=0.005 MIU/L-ACNC: 1.67 UIU/ML (ref 0.4–4)
WBC # BLD AUTO: 4.3 K/UL (ref 3.9–12.7)

## 2021-04-15 PROCEDURE — 84443 ASSAY THYROID STIM HORMONE: CPT | Performed by: INTERNAL MEDICINE

## 2021-04-15 PROCEDURE — 84439 ASSAY OF FREE THYROXINE: CPT | Performed by: INTERNAL MEDICINE

## 2021-04-15 PROCEDURE — 82024 ASSAY OF ACTH: CPT | Performed by: INTERNAL MEDICINE

## 2021-04-15 PROCEDURE — 85027 COMPLETE CBC AUTOMATED: CPT | Performed by: INTERNAL MEDICINE

## 2021-04-15 PROCEDURE — 82533 TOTAL CORTISOL: CPT | Performed by: INTERNAL MEDICINE

## 2021-04-15 PROCEDURE — 80053 COMPREHEN METABOLIC PANEL: CPT | Performed by: INTERNAL MEDICINE

## 2021-04-15 PROCEDURE — 36415 COLL VENOUS BLD VENIPUNCTURE: CPT | Performed by: INTERNAL MEDICINE

## 2021-04-16 ENCOUNTER — OFFICE VISIT (OUTPATIENT)
Dept: HEMATOLOGY/ONCOLOGY | Facility: CLINIC | Age: 71
End: 2021-04-16
Payer: MEDICARE

## 2021-04-16 ENCOUNTER — INFUSION (OUTPATIENT)
Dept: INFUSION THERAPY | Facility: HOSPITAL | Age: 71
End: 2021-04-16
Attending: INTERNAL MEDICINE
Payer: MEDICARE

## 2021-04-16 VITALS
DIASTOLIC BLOOD PRESSURE: 62 MMHG | HEART RATE: 67 BPM | BODY MASS INDEX: 26.01 KG/M2 | HEIGHT: 66 IN | SYSTOLIC BLOOD PRESSURE: 133 MMHG | RESPIRATION RATE: 18 BRPM | WEIGHT: 161.81 LBS

## 2021-04-16 VITALS
RESPIRATION RATE: 16 BRPM | HEART RATE: 70 BPM | HEIGHT: 66 IN | OXYGEN SATURATION: 99 % | SYSTOLIC BLOOD PRESSURE: 168 MMHG | WEIGHT: 161.81 LBS | TEMPERATURE: 99 F | BODY MASS INDEX: 26.01 KG/M2 | DIASTOLIC BLOOD PRESSURE: 72 MMHG

## 2021-04-16 DIAGNOSIS — C34.11 MALIGNANT NEOPLASM OF UPPER LOBE, RIGHT BRONCHUS OR LUNG: ICD-10-CM

## 2021-04-16 DIAGNOSIS — C34.11 MALIGNANT NEOPLASM OF UPPER LOBE, RIGHT BRONCHUS OR LUNG: Primary | ICD-10-CM

## 2021-04-16 DIAGNOSIS — C77.1 SECONDARY AND UNSPECIFIED MALIGNANT NEOPLASM OF INTRATHORACIC LYMPH NODES: Primary | ICD-10-CM

## 2021-04-16 DIAGNOSIS — T45.1X5A CHEMOTHERAPY-INDUCED THROMBOCYTOPENIA: ICD-10-CM

## 2021-04-16 DIAGNOSIS — D69.59 CHEMOTHERAPY-INDUCED THROMBOCYTOPENIA: ICD-10-CM

## 2021-04-16 DIAGNOSIS — N18.31 STAGE 3A CHRONIC KIDNEY DISEASE: ICD-10-CM

## 2021-04-16 DIAGNOSIS — C77.1 SECONDARY AND UNSPECIFIED MALIGNANT NEOPLASM OF INTRATHORACIC LYMPH NODES: ICD-10-CM

## 2021-04-16 LAB — ACTH PLAS-MCNC: 10 PG/ML (ref 0–46)

## 2021-04-16 PROCEDURE — 96367 TX/PROPH/DG ADDL SEQ IV INF: CPT

## 2021-04-16 PROCEDURE — 25000003 PHARM REV CODE 250: Performed by: INTERNAL MEDICINE

## 2021-04-16 PROCEDURE — 99214 OFFICE O/P EST MOD 30 MIN: CPT | Mod: PBBFAC,25 | Performed by: INTERNAL MEDICINE

## 2021-04-16 PROCEDURE — 96413 CHEMO IV INFUSION 1 HR: CPT

## 2021-04-16 PROCEDURE — 99215 OFFICE O/P EST HI 40 MIN: CPT | Mod: S$PBB,,, | Performed by: INTERNAL MEDICINE

## 2021-04-16 PROCEDURE — 99215 PR OFFICE/OUTPT VISIT, EST, LEVL V, 40-54 MIN: ICD-10-PCS | Mod: S$PBB,,, | Performed by: INTERNAL MEDICINE

## 2021-04-16 PROCEDURE — 63600175 PHARM REV CODE 636 W HCPCS: Mod: JG | Performed by: INTERNAL MEDICINE

## 2021-04-16 PROCEDURE — 99999 PR PBB SHADOW E&M-EST. PATIENT-LVL IV: CPT | Mod: PBBFAC,,, | Performed by: INTERNAL MEDICINE

## 2021-04-16 PROCEDURE — 96417 CHEMO IV INFUS EACH ADDL SEQ: CPT

## 2021-04-16 PROCEDURE — A4216 STERILE WATER/SALINE, 10 ML: HCPCS | Performed by: INTERNAL MEDICINE

## 2021-04-16 PROCEDURE — 99999 PR PBB SHADOW E&M-EST. PATIENT-LVL IV: ICD-10-PCS | Mod: PBBFAC,,, | Performed by: INTERNAL MEDICINE

## 2021-04-16 RX ORDER — DIPHENHYDRAMINE HYDROCHLORIDE 50 MG/ML
25 INJECTION INTRAMUSCULAR; INTRAVENOUS EVERY 10 MIN PRN
Status: DISCONTINUED | OUTPATIENT
Start: 2021-04-16 | End: 2021-04-16 | Stop reason: HOSPADM

## 2021-04-16 RX ORDER — HEPARIN 100 UNIT/ML
500 SYRINGE INTRAVENOUS
Status: CANCELLED | OUTPATIENT
Start: 2021-04-16

## 2021-04-16 RX ORDER — SODIUM CHLORIDE 0.9 % (FLUSH) 0.9 %
10 SYRINGE (ML) INJECTION
Status: DISCONTINUED | OUTPATIENT
Start: 2021-04-16 | End: 2021-04-16 | Stop reason: HOSPADM

## 2021-04-16 RX ORDER — METHYLPREDNISOLONE SOD SUCC 125 MG
125 VIAL (EA) INJECTION ONCE AS NEEDED
Status: CANCELLED | OUTPATIENT
Start: 2021-04-16

## 2021-04-16 RX ORDER — EPINEPHRINE 0.3 MG/.3ML
0.3 INJECTION SUBCUTANEOUS ONCE AS NEEDED
Status: DISCONTINUED | OUTPATIENT
Start: 2021-04-16 | End: 2021-04-16 | Stop reason: HOSPADM

## 2021-04-16 RX ORDER — METHYLPREDNISOLONE SOD SUCC 125 MG
125 VIAL (EA) INJECTION ONCE AS NEEDED
Status: DISCONTINUED | OUTPATIENT
Start: 2021-04-16 | End: 2021-04-16 | Stop reason: HOSPADM

## 2021-04-16 RX ORDER — HEPARIN 100 UNIT/ML
500 SYRINGE INTRAVENOUS
Status: DISCONTINUED | OUTPATIENT
Start: 2021-04-16 | End: 2021-04-16 | Stop reason: HOSPADM

## 2021-04-16 RX ORDER — SODIUM CHLORIDE 0.9 % (FLUSH) 0.9 %
10 SYRINGE (ML) INJECTION
Status: CANCELLED | OUTPATIENT
Start: 2021-04-16

## 2021-04-16 RX ORDER — EPINEPHRINE 0.3 MG/.3ML
0.3 INJECTION SUBCUTANEOUS ONCE AS NEEDED
Status: CANCELLED | OUTPATIENT
Start: 2021-04-16

## 2021-04-16 RX ORDER — DIPHENHYDRAMINE HYDROCHLORIDE 50 MG/ML
25 INJECTION INTRAMUSCULAR; INTRAVENOUS EVERY 10 MIN PRN
Status: CANCELLED | OUTPATIENT
Start: 2021-04-16 | End: 2021-04-17

## 2021-04-16 RX ADMIN — HEPARIN 500 UNITS: 100 SYRINGE at 03:04

## 2021-04-16 RX ADMIN — Medication 10 ML: at 03:04

## 2021-04-16 RX ADMIN — SODIUM CHLORIDE 360 MG: 9 INJECTION, SOLUTION INTRAVENOUS at 01:04

## 2021-04-16 RX ADMIN — SODIUM CHLORIDE 75 MG: 9 INJECTION, SOLUTION INTRAVENOUS at 02:04

## 2021-04-16 RX ADMIN — DIPHENHYDRAMINE HYDROCHLORIDE 25 MG: 50 INJECTION INTRAMUSCULAR; INTRAVENOUS at 01:04

## 2021-04-19 ENCOUNTER — ANESTHESIA (OUTPATIENT)
Dept: ENDOSCOPY | Facility: HOSPITAL | Age: 71
End: 2021-04-19
Payer: MEDICARE

## 2021-04-19 ENCOUNTER — ANESTHESIA EVENT (OUTPATIENT)
Dept: ENDOSCOPY | Facility: HOSPITAL | Age: 71
End: 2021-04-19
Payer: MEDICARE

## 2021-04-19 ENCOUNTER — HOSPITAL ENCOUNTER (OUTPATIENT)
Facility: HOSPITAL | Age: 71
Discharge: HOME | End: 2021-04-19
Attending: INTERNAL MEDICINE | Admitting: INTERNAL MEDICINE
Payer: MEDICARE

## 2021-04-19 VITALS
HEART RATE: 57 BPM | BODY MASS INDEX: 25.39 KG/M2 | TEMPERATURE: 98 F | HEIGHT: 66 IN | DIASTOLIC BLOOD PRESSURE: 57 MMHG | SYSTOLIC BLOOD PRESSURE: 133 MMHG | RESPIRATION RATE: 12 BRPM | WEIGHT: 158 LBS | OXYGEN SATURATION: 100 %

## 2021-04-19 DIAGNOSIS — K62.9 RECTAL LESION: ICD-10-CM

## 2021-04-19 PROCEDURE — 45331 SIGMOIDOSCOPY AND BIOPSY: CPT | Mod: 59,,, | Performed by: INTERNAL MEDICINE

## 2021-04-19 PROCEDURE — 88305 TISSUE EXAM BY PATHOLOGIST: CPT | Mod: 59 | Performed by: STUDENT IN AN ORGANIZED HEALTH CARE EDUCATION/TRAINING PROGRAM

## 2021-04-19 PROCEDURE — 45341 PR SIGMOIDOSCOPY W/ENDOSCOPIC US EXAM: ICD-10-PCS | Mod: 51,,, | Performed by: INTERNAL MEDICINE

## 2021-04-19 PROCEDURE — 27201089 HC SNARE, DISP (ANY): Performed by: INTERNAL MEDICINE

## 2021-04-19 PROCEDURE — 37000008 HC ANESTHESIA 1ST 15 MINUTES: Performed by: INTERNAL MEDICINE

## 2021-04-19 PROCEDURE — 27201012 HC FORCEPS, HOT/COLD, DISP: Performed by: INTERNAL MEDICINE

## 2021-04-19 PROCEDURE — 37000009 HC ANESTHESIA EA ADD 15 MINS: Performed by: INTERNAL MEDICINE

## 2021-04-19 PROCEDURE — 63600175 PHARM REV CODE 636 W HCPCS: Performed by: NURSE ANESTHETIST, CERTIFIED REGISTERED

## 2021-04-19 PROCEDURE — 25000003 PHARM REV CODE 250: Performed by: INTERNAL MEDICINE

## 2021-04-19 PROCEDURE — 45349 SIGMOIDOSCOPY W/RESECTION: CPT | Performed by: INTERNAL MEDICINE

## 2021-04-19 PROCEDURE — 27201028 HC NEEDLE, SCLERO: Performed by: INTERNAL MEDICINE

## 2021-04-19 PROCEDURE — 45331 PR SIGMOIDOSCOPY,BIOPSY: ICD-10-PCS | Mod: 59,,, | Performed by: INTERNAL MEDICINE

## 2021-04-19 PROCEDURE — 88305 TISSUE EXAM BY PATHOLOGIST: CPT | Mod: 26,,, | Performed by: STUDENT IN AN ORGANIZED HEALTH CARE EDUCATION/TRAINING PROGRAM

## 2021-04-19 PROCEDURE — 45338 SIGMOIDOSCOPY W/TUMR REMOVE: CPT | Mod: ,,, | Performed by: INTERNAL MEDICINE

## 2021-04-19 PROCEDURE — 45341 SIGMOIDOSCOPY W/ULTRASOUND: CPT | Mod: 51,,, | Performed by: INTERNAL MEDICINE

## 2021-04-19 PROCEDURE — 25000003 PHARM REV CODE 250: Performed by: NURSE ANESTHETIST, CERTIFIED REGISTERED

## 2021-04-19 PROCEDURE — 88305 TISSUE EXAM BY PATHOLOGIST: ICD-10-PCS | Mod: 26,,, | Performed by: STUDENT IN AN ORGANIZED HEALTH CARE EDUCATION/TRAINING PROGRAM

## 2021-04-19 PROCEDURE — 45341 SIGMOIDOSCOPY W/ULTRASOUND: CPT | Performed by: INTERNAL MEDICINE

## 2021-04-19 PROCEDURE — 27202363 HC INJECTION AGENT, SUBMUCOSAL, ANY: Performed by: INTERNAL MEDICINE

## 2021-04-19 PROCEDURE — 45331 SIGMOIDOSCOPY AND BIOPSY: CPT | Performed by: INTERNAL MEDICINE

## 2021-04-19 PROCEDURE — 45338 PR SIGMOIDOSCOPY,REMV LESN,SNARE: ICD-10-PCS | Mod: ,,, | Performed by: INTERNAL MEDICINE

## 2021-04-19 RX ORDER — MIDAZOLAM HYDROCHLORIDE 1 MG/ML
INJECTION, SOLUTION INTRAMUSCULAR; INTRAVENOUS
Status: DISCONTINUED | OUTPATIENT
Start: 2021-04-19 | End: 2021-04-19

## 2021-04-19 RX ORDER — SODIUM CHLORIDE 9 MG/ML
INJECTION, SOLUTION INTRAVENOUS CONTINUOUS
Status: DISCONTINUED | OUTPATIENT
Start: 2021-04-19 | End: 2021-04-19 | Stop reason: HOSPADM

## 2021-04-19 RX ORDER — LIDOCAINE HCL/PF 100 MG/5ML
SYRINGE (ML) INTRAVENOUS
Status: DISCONTINUED | OUTPATIENT
Start: 2021-04-19 | End: 2021-04-19

## 2021-04-19 RX ORDER — PROPOFOL 10 MG/ML
VIAL (ML) INTRAVENOUS
Status: DISCONTINUED | OUTPATIENT
Start: 2021-04-19 | End: 2021-04-19

## 2021-04-19 RX ORDER — PROPOFOL 10 MG/ML
VIAL (ML) INTRAVENOUS CONTINUOUS PRN
Status: DISCONTINUED | OUTPATIENT
Start: 2021-04-19 | End: 2021-04-19

## 2021-04-19 RX ORDER — SODIUM CHLORIDE 0.9 % (FLUSH) 0.9 %
10 SYRINGE (ML) INJECTION
Status: DISCONTINUED | OUTPATIENT
Start: 2021-04-19 | End: 2021-04-19 | Stop reason: HOSPADM

## 2021-04-19 RX ADMIN — PROPOFOL 70 MG: 10 INJECTION, EMULSION INTRAVENOUS at 10:04

## 2021-04-19 RX ADMIN — PROPOFOL 150 MCG/KG/MIN: 10 INJECTION, EMULSION INTRAVENOUS at 10:04

## 2021-04-19 RX ADMIN — MIDAZOLAM 2 MG: 1 INJECTION INTRAMUSCULAR; INTRAVENOUS at 10:04

## 2021-04-19 RX ADMIN — SODIUM CHLORIDE: 0.9 INJECTION, SOLUTION INTRAVENOUS at 09:04

## 2021-04-19 RX ADMIN — LIDOCAINE HYDROCHLORIDE 75 MG: 20 INJECTION, SOLUTION INTRAVENOUS at 10:04

## 2021-04-20 ENCOUNTER — TELEPHONE (OUTPATIENT)
Dept: ENDOSCOPY | Facility: HOSPITAL | Age: 71
End: 2021-04-20

## 2021-04-22 LAB
COMMENT: NORMAL
FINAL PATHOLOGIC DIAGNOSIS: NORMAL
GROSS: NORMAL
Lab: NORMAL
MICROSCOPIC EXAM: NORMAL

## 2021-04-23 ENCOUNTER — PATIENT MESSAGE (OUTPATIENT)
Dept: HEMATOLOGY/ONCOLOGY | Facility: CLINIC | Age: 71
End: 2021-04-23

## 2021-04-26 ENCOUNTER — PATIENT MESSAGE (OUTPATIENT)
Dept: INTERNAL MEDICINE | Facility: CLINIC | Age: 71
End: 2021-04-26

## 2021-04-26 RX ORDER — HYDROCODONE BITARTRATE AND ACETAMINOPHEN 5; 325 MG/1; MG/1
1 TABLET ORAL EVERY 12 HOURS PRN
Qty: 60 TABLET | Refills: 0 | Status: SHIPPED | OUTPATIENT
Start: 2021-04-26 | End: 2021-05-26 | Stop reason: SDUPTHER

## 2021-04-26 RX ORDER — LORAZEPAM 0.5 MG/1
0.5 TABLET ORAL EVERY 12 HOURS PRN
Qty: 30 TABLET | Refills: 2 | Status: SHIPPED | OUTPATIENT
Start: 2021-04-26 | End: 2021-08-26 | Stop reason: SDUPTHER

## 2021-04-27 ENCOUNTER — OFFICE VISIT (OUTPATIENT)
Dept: OPTOMETRY | Facility: CLINIC | Age: 71
End: 2021-04-27
Payer: MEDICARE

## 2021-04-27 DIAGNOSIS — H04.123 DRY EYE SYNDROME OF BOTH EYES: ICD-10-CM

## 2021-04-27 DIAGNOSIS — H04.203 BILATERAL EPIPHORA: Primary | ICD-10-CM

## 2021-04-27 PROCEDURE — 92012 INTRM OPH EXAM EST PATIENT: CPT | Mod: S$PBB,,, | Performed by: OPTOMETRIST

## 2021-04-27 PROCEDURE — 99213 OFFICE O/P EST LOW 20 MIN: CPT | Mod: PBBFAC | Performed by: OPTOMETRIST

## 2021-04-27 PROCEDURE — 99999 PR PBB SHADOW E&M-EST. PATIENT-LVL III: CPT | Mod: PBBFAC,,, | Performed by: OPTOMETRIST

## 2021-04-27 PROCEDURE — 99999 PR PBB SHADOW E&M-EST. PATIENT-LVL III: ICD-10-PCS | Mod: PBBFAC,,, | Performed by: OPTOMETRIST

## 2021-04-27 PROCEDURE — 92012 PR EYE EXAM, EST PATIENT,INTERMED: ICD-10-PCS | Mod: S$PBB,,, | Performed by: OPTOMETRIST

## 2021-04-28 ENCOUNTER — PATIENT MESSAGE (OUTPATIENT)
Dept: OPTOMETRY | Facility: CLINIC | Age: 71
End: 2021-04-28

## 2021-04-28 RX ORDER — PREDNISOLONE ACETATE 10 MG/ML
SUSPENSION/ DROPS OPHTHALMIC
Qty: 1 BOTTLE | Refills: 1 | Status: SHIPPED | OUTPATIENT
Start: 2021-04-28 | End: 2021-05-20

## 2021-05-07 ENCOUNTER — PATIENT MESSAGE (OUTPATIENT)
Dept: HEMATOLOGY/ONCOLOGY | Facility: CLINIC | Age: 71
End: 2021-05-07

## 2021-05-07 ENCOUNTER — INFUSION (OUTPATIENT)
Dept: INFUSION THERAPY | Facility: HOSPITAL | Age: 71
End: 2021-05-07
Payer: MEDICARE

## 2021-05-07 ENCOUNTER — OFFICE VISIT (OUTPATIENT)
Dept: HEMATOLOGY/ONCOLOGY | Facility: CLINIC | Age: 71
End: 2021-05-07
Payer: MEDICARE

## 2021-05-07 VITALS
BODY MASS INDEX: 26.14 KG/M2 | DIASTOLIC BLOOD PRESSURE: 77 MMHG | SYSTOLIC BLOOD PRESSURE: 190 MMHG | RESPIRATION RATE: 16 BRPM | WEIGHT: 162.69 LBS | OXYGEN SATURATION: 99 % | HEIGHT: 66 IN | HEART RATE: 67 BPM | TEMPERATURE: 98 F

## 2021-05-07 VITALS
TEMPERATURE: 99 F | HEART RATE: 79 BPM | DIASTOLIC BLOOD PRESSURE: 77 MMHG | RESPIRATION RATE: 18 BRPM | SYSTOLIC BLOOD PRESSURE: 179 MMHG

## 2021-05-07 DIAGNOSIS — C77.1 SECONDARY AND UNSPECIFIED MALIGNANT NEOPLASM OF INTRATHORACIC LYMPH NODES: Primary | ICD-10-CM

## 2021-05-07 DIAGNOSIS — C34.11 MALIGNANT NEOPLASM OF UPPER LOBE, RIGHT BRONCHUS OR LUNG: ICD-10-CM

## 2021-05-07 DIAGNOSIS — C34.11 MALIGNANT NEOPLASM OF UPPER LOBE, RIGHT BRONCHUS OR LUNG: Primary | ICD-10-CM

## 2021-05-07 DIAGNOSIS — R53.0 NEOPLASTIC MALIGNANT RELATED FATIGUE: ICD-10-CM

## 2021-05-07 DIAGNOSIS — E03.9 HYPOTHYROIDISM, UNSPECIFIED TYPE: ICD-10-CM

## 2021-05-07 DIAGNOSIS — C77.1 SECONDARY AND UNSPECIFIED MALIGNANT NEOPLASM OF INTRATHORACIC LYMPH NODES: ICD-10-CM

## 2021-05-07 PROCEDURE — 96413 CHEMO IV INFUSION 1 HR: CPT

## 2021-05-07 PROCEDURE — A4216 STERILE WATER/SALINE, 10 ML: HCPCS | Performed by: INTERNAL MEDICINE

## 2021-05-07 PROCEDURE — 99215 PR OFFICE/OUTPT VISIT, EST, LEVL V, 40-54 MIN: ICD-10-PCS | Mod: S$PBB,,, | Performed by: INTERNAL MEDICINE

## 2021-05-07 PROCEDURE — 99999 PR PBB SHADOW E&M-EST. PATIENT-LVL IV: CPT | Mod: PBBFAC,,, | Performed by: INTERNAL MEDICINE

## 2021-05-07 PROCEDURE — 63600175 PHARM REV CODE 636 W HCPCS: Performed by: INTERNAL MEDICINE

## 2021-05-07 PROCEDURE — 99214 OFFICE O/P EST MOD 30 MIN: CPT | Mod: PBBFAC,25 | Performed by: INTERNAL MEDICINE

## 2021-05-07 PROCEDURE — 99215 OFFICE O/P EST HI 40 MIN: CPT | Mod: S$PBB,,, | Performed by: INTERNAL MEDICINE

## 2021-05-07 PROCEDURE — 96367 TX/PROPH/DG ADDL SEQ IV INF: CPT

## 2021-05-07 PROCEDURE — 25000003 PHARM REV CODE 250: Performed by: INTERNAL MEDICINE

## 2021-05-07 PROCEDURE — 99999 PR PBB SHADOW E&M-EST. PATIENT-LVL IV: ICD-10-PCS | Mod: PBBFAC,,, | Performed by: INTERNAL MEDICINE

## 2021-05-07 RX ORDER — METHYLPREDNISOLONE SOD SUCC 125 MG
125 VIAL (EA) INJECTION ONCE AS NEEDED
Status: CANCELLED | OUTPATIENT
Start: 2021-05-07

## 2021-05-07 RX ORDER — DIPHENHYDRAMINE HYDROCHLORIDE 50 MG/ML
25 INJECTION INTRAMUSCULAR; INTRAVENOUS EVERY 10 MIN PRN
Status: DISCONTINUED | OUTPATIENT
Start: 2021-05-07 | End: 2021-05-07 | Stop reason: HOSPADM

## 2021-05-07 RX ORDER — EPINEPHRINE 0.3 MG/.3ML
0.3 INJECTION SUBCUTANEOUS ONCE AS NEEDED
Status: CANCELLED | OUTPATIENT
Start: 2021-05-07

## 2021-05-07 RX ORDER — METHYLPHENIDATE HYDROCHLORIDE 5 MG/1
5 TABLET ORAL DAILY
Qty: 30 TABLET | Refills: 0 | Status: SHIPPED | OUTPATIENT
Start: 2021-05-07 | End: 2021-05-20

## 2021-05-07 RX ORDER — DIPHENHYDRAMINE HYDROCHLORIDE 50 MG/ML
25 INJECTION INTRAMUSCULAR; INTRAVENOUS EVERY 10 MIN PRN
Status: CANCELLED | OUTPATIENT
Start: 2021-05-07 | End: 2021-05-08

## 2021-05-07 RX ORDER — SODIUM CHLORIDE 0.9 % (FLUSH) 0.9 %
10 SYRINGE (ML) INJECTION
Status: DISCONTINUED | OUTPATIENT
Start: 2021-05-07 | End: 2021-05-07 | Stop reason: HOSPADM

## 2021-05-07 RX ORDER — EPINEPHRINE 0.3 MG/.3ML
0.3 INJECTION SUBCUTANEOUS ONCE AS NEEDED
Status: DISCONTINUED | OUTPATIENT
Start: 2021-05-07 | End: 2021-05-07 | Stop reason: HOSPADM

## 2021-05-07 RX ORDER — HEPARIN 100 UNIT/ML
500 SYRINGE INTRAVENOUS
Status: CANCELLED | OUTPATIENT
Start: 2021-05-07

## 2021-05-07 RX ORDER — HEPARIN 100 UNIT/ML
500 SYRINGE INTRAVENOUS
Status: DISCONTINUED | OUTPATIENT
Start: 2021-05-07 | End: 2021-05-07 | Stop reason: HOSPADM

## 2021-05-07 RX ORDER — METHYLPREDNISOLONE SOD SUCC 125 MG
125 VIAL (EA) INJECTION ONCE AS NEEDED
Status: DISCONTINUED | OUTPATIENT
Start: 2021-05-07 | End: 2021-05-07 | Stop reason: HOSPADM

## 2021-05-07 RX ORDER — SODIUM CHLORIDE 0.9 % (FLUSH) 0.9 %
10 SYRINGE (ML) INJECTION
Status: CANCELLED | OUTPATIENT
Start: 2021-05-07

## 2021-05-07 RX ADMIN — DIPHENHYDRAMINE HYDROCHLORIDE 25 MG: 50 INJECTION INTRAMUSCULAR; INTRAVENOUS at 02:05

## 2021-05-07 RX ADMIN — SODIUM CHLORIDE 360 MG: 9 INJECTION, SOLUTION INTRAVENOUS at 02:05

## 2021-05-07 RX ADMIN — Medication 10 ML: at 03:05

## 2021-05-07 RX ADMIN — HEPARIN 500 UNITS: 100 SYRINGE at 03:05

## 2021-05-07 RX ADMIN — SODIUM CHLORIDE: 9 INJECTION, SOLUTION INTRAVENOUS at 02:05

## 2021-05-11 ENCOUNTER — TELEPHONE (OUTPATIENT)
Dept: HEMATOLOGY/ONCOLOGY | Facility: CLINIC | Age: 71
End: 2021-05-11

## 2021-05-19 ENCOUNTER — PATIENT OUTREACH (OUTPATIENT)
Dept: ADMINISTRATIVE | Facility: OTHER | Age: 71
End: 2021-05-19

## 2021-05-20 ENCOUNTER — OFFICE VISIT (OUTPATIENT)
Dept: CARDIOLOGY | Facility: CLINIC | Age: 71
End: 2021-05-20
Payer: MEDICARE

## 2021-05-20 VITALS
BODY MASS INDEX: 26.12 KG/M2 | SYSTOLIC BLOOD PRESSURE: 146 MMHG | HEART RATE: 68 BPM | DIASTOLIC BLOOD PRESSURE: 80 MMHG | HEIGHT: 66 IN | WEIGHT: 162.5 LBS

## 2021-05-20 DIAGNOSIS — I10 ESSENTIAL HYPERTENSION: ICD-10-CM

## 2021-05-20 DIAGNOSIS — E78.2 MIXED HYPERLIPIDEMIA: ICD-10-CM

## 2021-05-20 DIAGNOSIS — I27.20 PULMONARY HYPERTENSION: Primary | ICD-10-CM

## 2021-05-20 DIAGNOSIS — I47.10 SVT (SUPRAVENTRICULAR TACHYCARDIA): ICD-10-CM

## 2021-05-20 PROCEDURE — 99215 PR OFFICE/OUTPT VISIT, EST, LEVL V, 40-54 MIN: ICD-10-PCS | Mod: S$PBB,,, | Performed by: INTERNAL MEDICINE

## 2021-05-20 PROCEDURE — 99999 PR PBB SHADOW E&M-EST. PATIENT-LVL IV: CPT | Mod: PBBFAC,,, | Performed by: INTERNAL MEDICINE

## 2021-05-20 PROCEDURE — 99214 OFFICE O/P EST MOD 30 MIN: CPT | Mod: PBBFAC,PO | Performed by: INTERNAL MEDICINE

## 2021-05-20 PROCEDURE — 99999 PR PBB SHADOW E&M-EST. PATIENT-LVL IV: ICD-10-PCS | Mod: PBBFAC,,, | Performed by: INTERNAL MEDICINE

## 2021-05-20 PROCEDURE — 99215 OFFICE O/P EST HI 40 MIN: CPT | Mod: S$PBB,,, | Performed by: INTERNAL MEDICINE

## 2021-05-20 RX ORDER — ROSUVASTATIN CALCIUM 10 MG/1
10 TABLET, COATED ORAL DAILY
Qty: 90 TABLET | Refills: 3 | Status: SHIPPED | OUTPATIENT
Start: 2021-05-20 | End: 2021-08-03

## 2021-05-20 RX ORDER — AMLODIPINE BESYLATE 2.5 MG/1
2.5 TABLET ORAL NIGHTLY
Qty: 90 TABLET | Refills: 3 | Status: SHIPPED | OUTPATIENT
Start: 2021-05-20 | End: 2021-08-03

## 2021-05-26 ENCOUNTER — LAB VISIT (OUTPATIENT)
Dept: LAB | Facility: HOSPITAL | Age: 71
End: 2021-05-26
Attending: INTERNAL MEDICINE
Payer: MEDICARE

## 2021-05-26 ENCOUNTER — OFFICE VISIT (OUTPATIENT)
Dept: INTERNAL MEDICINE | Facility: CLINIC | Age: 71
End: 2021-05-26
Payer: MEDICARE

## 2021-05-26 VITALS — SYSTOLIC BLOOD PRESSURE: 120 MMHG | DIASTOLIC BLOOD PRESSURE: 72 MMHG

## 2021-05-26 DIAGNOSIS — I27.20 PULMONARY HYPERTENSION: ICD-10-CM

## 2021-05-26 DIAGNOSIS — E03.9 HYPOTHYROIDISM, UNSPECIFIED TYPE: ICD-10-CM

## 2021-05-26 DIAGNOSIS — I10 ESSENTIAL HYPERTENSION: ICD-10-CM

## 2021-05-26 DIAGNOSIS — G89.3 CHRONIC PAIN DUE TO MALIGNANT NEOPLASTIC DISEASE: Primary | ICD-10-CM

## 2021-05-26 DIAGNOSIS — C34.11 MALIGNANT NEOPLASM OF UPPER LOBE, RIGHT BRONCHUS OR LUNG: ICD-10-CM

## 2021-05-26 LAB
ALBUMIN SERPL BCP-MCNC: 3.7 G/DL (ref 3.5–5.2)
ALP SERPL-CCNC: 52 U/L (ref 55–135)
ALT SERPL W/O P-5'-P-CCNC: 15 U/L (ref 10–44)
ANION GAP SERPL CALC-SCNC: 8 MMOL/L (ref 8–16)
AST SERPL-CCNC: 21 U/L (ref 10–40)
BILIRUB SERPL-MCNC: 0.4 MG/DL (ref 0.1–1)
BUN SERPL-MCNC: 20 MG/DL (ref 8–23)
CALCIUM SERPL-MCNC: 9.4 MG/DL (ref 8.7–10.5)
CHLORIDE SERPL-SCNC: 105 MMOL/L (ref 95–110)
CO2 SERPL-SCNC: 27 MMOL/L (ref 23–29)
CORTIS SERPL-MCNC: 12.6 UG/DL
CREAT SERPL-MCNC: 1.2 MG/DL (ref 0.5–1.4)
ERYTHROCYTE [DISTWIDTH] IN BLOOD BY AUTOMATED COUNT: 13.6 % (ref 11.5–14.5)
EST. GFR  (AFRICAN AMERICAN): 53 ML/MIN/1.73 M^2
EST. GFR  (NON AFRICAN AMERICAN): 46 ML/MIN/1.73 M^2
GLUCOSE SERPL-MCNC: 114 MG/DL (ref 70–110)
HCT VFR BLD AUTO: 34.6 % (ref 37–48.5)
HGB BLD-MCNC: 11.3 G/DL (ref 12–16)
IMM GRANULOCYTES # BLD AUTO: 0.02 K/UL (ref 0–0.04)
MCH RBC QN AUTO: 31.7 PG (ref 27–31)
MCHC RBC AUTO-ENTMCNC: 32.7 G/DL (ref 32–36)
MCV RBC AUTO: 97 FL (ref 82–98)
NEUTROPHILS # BLD AUTO: 2.3 K/UL (ref 1.8–7.7)
PLATELET # BLD AUTO: 216 K/UL (ref 150–450)
PMV BLD AUTO: 9.8 FL (ref 9.2–12.9)
POTASSIUM SERPL-SCNC: 4.8 MMOL/L (ref 3.5–5.1)
PROT SERPL-MCNC: 6.6 G/DL (ref 6–8.4)
RBC # BLD AUTO: 3.56 M/UL (ref 4–5.4)
SODIUM SERPL-SCNC: 140 MMOL/L (ref 136–145)
T4 FREE SERPL-MCNC: 1.05 NG/DL (ref 0.71–1.51)
TSH SERPL DL<=0.005 MIU/L-ACNC: 0.78 UIU/ML (ref 0.4–4)
WBC # BLD AUTO: 4.49 K/UL (ref 3.9–12.7)

## 2021-05-26 PROCEDURE — 82533 TOTAL CORTISOL: CPT | Performed by: INTERNAL MEDICINE

## 2021-05-26 PROCEDURE — 80053 COMPREHEN METABOLIC PANEL: CPT | Performed by: INTERNAL MEDICINE

## 2021-05-26 PROCEDURE — 85027 COMPLETE CBC AUTOMATED: CPT | Performed by: INTERNAL MEDICINE

## 2021-05-26 PROCEDURE — 84443 ASSAY THYROID STIM HORMONE: CPT | Performed by: INTERNAL MEDICINE

## 2021-05-26 PROCEDURE — 84439 ASSAY OF FREE THYROXINE: CPT | Performed by: INTERNAL MEDICINE

## 2021-05-26 PROCEDURE — 99213 OFFICE O/P EST LOW 20 MIN: CPT | Mod: 95,,, | Performed by: INTERNAL MEDICINE

## 2021-05-26 PROCEDURE — 99213 PR OFFICE/OUTPT VISIT, EST, LEVL III, 20-29 MIN: ICD-10-PCS | Mod: 95,,, | Performed by: INTERNAL MEDICINE

## 2021-05-26 PROCEDURE — 36415 COLL VENOUS BLD VENIPUNCTURE: CPT | Performed by: INTERNAL MEDICINE

## 2021-05-26 PROCEDURE — 82024 ASSAY OF ACTH: CPT | Performed by: INTERNAL MEDICINE

## 2021-05-26 RX ORDER — HYDROCODONE BITARTRATE AND ACETAMINOPHEN 5; 325 MG/1; MG/1
1 TABLET ORAL EVERY 8 HOURS PRN
Qty: 90 TABLET | Refills: 0 | Status: SHIPPED | OUTPATIENT
Start: 2021-05-26 | End: 2021-06-28 | Stop reason: SDUPTHER

## 2021-05-27 ENCOUNTER — OFFICE VISIT (OUTPATIENT)
Dept: HEMATOLOGY/ONCOLOGY | Facility: CLINIC | Age: 71
End: 2021-05-27
Payer: MEDICARE

## 2021-05-27 ENCOUNTER — PATIENT MESSAGE (OUTPATIENT)
Dept: CARDIOLOGY | Facility: CLINIC | Age: 71
End: 2021-05-27

## 2021-05-27 DIAGNOSIS — C77.1 SECONDARY AND UNSPECIFIED MALIGNANT NEOPLASM OF INTRATHORACIC LYMPH NODES: ICD-10-CM

## 2021-05-27 DIAGNOSIS — N18.31 STAGE 3A CHRONIC KIDNEY DISEASE: ICD-10-CM

## 2021-05-27 DIAGNOSIS — C34.11 MALIGNANT NEOPLASM OF UPPER LOBE, RIGHT BRONCHUS OR LUNG: Primary | ICD-10-CM

## 2021-05-27 DIAGNOSIS — I27.20 PULMONARY HYPERTENSION: ICD-10-CM

## 2021-05-27 PROCEDURE — 99215 OFFICE O/P EST HI 40 MIN: CPT | Mod: 95,,, | Performed by: INTERNAL MEDICINE

## 2021-05-27 PROCEDURE — 99215 PR OFFICE/OUTPT VISIT, EST, LEVL V, 40-54 MIN: ICD-10-PCS | Mod: 95,,, | Performed by: INTERNAL MEDICINE

## 2021-05-27 RX ORDER — METHYLPREDNISOLONE SOD SUCC 125 MG
125 VIAL (EA) INJECTION ONCE AS NEEDED
Status: CANCELLED | OUTPATIENT
Start: 2021-05-28

## 2021-05-27 RX ORDER — DIPHENHYDRAMINE HYDROCHLORIDE 50 MG/ML
25 INJECTION INTRAMUSCULAR; INTRAVENOUS EVERY 10 MIN PRN
Status: CANCELLED | OUTPATIENT
Start: 2021-05-28 | End: 2021-05-29

## 2021-05-27 RX ORDER — EPINEPHRINE 0.3 MG/.3ML
0.3 INJECTION SUBCUTANEOUS ONCE AS NEEDED
Status: CANCELLED | OUTPATIENT
Start: 2021-05-28

## 2021-05-27 RX ORDER — HEPARIN 100 UNIT/ML
500 SYRINGE INTRAVENOUS
Status: CANCELLED | OUTPATIENT
Start: 2021-05-28

## 2021-05-27 RX ORDER — SODIUM CHLORIDE 0.9 % (FLUSH) 0.9 %
10 SYRINGE (ML) INJECTION
Status: CANCELLED | OUTPATIENT
Start: 2021-05-28

## 2021-05-28 ENCOUNTER — INFUSION (OUTPATIENT)
Dept: INFUSION THERAPY | Facility: HOSPITAL | Age: 71
End: 2021-05-28
Payer: MEDICARE

## 2021-05-28 VITALS
HEIGHT: 66 IN | HEART RATE: 73 BPM | DIASTOLIC BLOOD PRESSURE: 72 MMHG | BODY MASS INDEX: 26.26 KG/M2 | SYSTOLIC BLOOD PRESSURE: 165 MMHG | WEIGHT: 163.38 LBS

## 2021-05-28 DIAGNOSIS — C34.11 MALIGNANT NEOPLASM OF UPPER LOBE, RIGHT BRONCHUS OR LUNG: ICD-10-CM

## 2021-05-28 DIAGNOSIS — C77.1 SECONDARY AND UNSPECIFIED MALIGNANT NEOPLASM OF INTRATHORACIC LYMPH NODES: Primary | ICD-10-CM

## 2021-05-28 LAB — ACTH PLAS-MCNC: 9 PG/ML (ref 0–46)

## 2021-05-28 PROCEDURE — 96365 THER/PROPH/DIAG IV INF INIT: CPT

## 2021-05-28 PROCEDURE — 25000003 PHARM REV CODE 250: Performed by: INTERNAL MEDICINE

## 2021-05-28 PROCEDURE — 63600175 PHARM REV CODE 636 W HCPCS: Performed by: INTERNAL MEDICINE

## 2021-05-28 PROCEDURE — 96413 CHEMO IV INFUSION 1 HR: CPT

## 2021-05-28 PROCEDURE — 96366 THER/PROPH/DIAG IV INF ADDON: CPT

## 2021-05-28 PROCEDURE — 96417 CHEMO IV INFUS EACH ADDL SEQ: CPT

## 2021-05-28 RX ORDER — METHYLPREDNISOLONE SOD SUCC 125 MG
125 VIAL (EA) INJECTION ONCE AS NEEDED
Status: DISCONTINUED | OUTPATIENT
Start: 2021-05-28 | End: 2021-05-28 | Stop reason: HOSPADM

## 2021-05-28 RX ORDER — EPINEPHRINE 0.3 MG/.3ML
0.3 INJECTION SUBCUTANEOUS ONCE AS NEEDED
Status: DISCONTINUED | OUTPATIENT
Start: 2021-05-28 | End: 2021-05-28 | Stop reason: HOSPADM

## 2021-05-28 RX ORDER — SODIUM CHLORIDE 0.9 % (FLUSH) 0.9 %
10 SYRINGE (ML) INJECTION
Status: DISCONTINUED | OUTPATIENT
Start: 2021-05-28 | End: 2021-05-28 | Stop reason: HOSPADM

## 2021-05-28 RX ORDER — DIPHENHYDRAMINE HYDROCHLORIDE 50 MG/ML
25 INJECTION INTRAMUSCULAR; INTRAVENOUS EVERY 10 MIN PRN
Status: DISCONTINUED | OUTPATIENT
Start: 2021-05-28 | End: 2021-05-28 | Stop reason: HOSPADM

## 2021-05-28 RX ORDER — HEPARIN 100 UNIT/ML
500 SYRINGE INTRAVENOUS
Status: DISCONTINUED | OUTPATIENT
Start: 2021-05-28 | End: 2021-05-28 | Stop reason: HOSPADM

## 2021-05-28 RX ADMIN — DIPHENHYDRAMINE HYDROCHLORIDE 25 MG: 50 INJECTION INTRAMUSCULAR; INTRAVENOUS at 01:05

## 2021-05-28 RX ADMIN — HEPARIN SODIUM (PORCINE) LOCK FLUSH IV SOLN 100 UNIT/ML 500 UNITS: 100 SOLUTION at 03:05

## 2021-05-28 RX ADMIN — SODIUM CHLORIDE 75 MG: 9 INJECTION, SOLUTION INTRAVENOUS at 02:05

## 2021-05-28 RX ADMIN — SODIUM CHLORIDE: 0.9 INJECTION, SOLUTION INTRAVENOUS at 01:05

## 2021-05-28 RX ADMIN — SODIUM CHLORIDE 360 MG: 9 INJECTION, SOLUTION INTRAVENOUS at 01:05

## 2021-06-04 ENCOUNTER — OFFICE VISIT (OUTPATIENT)
Dept: DERMATOLOGY | Facility: CLINIC | Age: 71
End: 2021-06-04
Payer: MEDICARE

## 2021-06-04 DIAGNOSIS — L82.1 SK (SEBORRHEIC KERATOSIS): Primary | ICD-10-CM

## 2021-06-04 DIAGNOSIS — Z85.828 HISTORY OF NONMELANOMA SKIN CANCER: ICD-10-CM

## 2021-06-04 DIAGNOSIS — L65.9 ALOPECIA: ICD-10-CM

## 2021-06-04 PROCEDURE — 99213 OFFICE O/P EST LOW 20 MIN: CPT | Mod: PBBFAC | Performed by: DERMATOLOGY

## 2021-06-04 PROCEDURE — 99212 OFFICE O/P EST SF 10 MIN: CPT | Mod: S$PBB,,, | Performed by: DERMATOLOGY

## 2021-06-04 PROCEDURE — 99999 PR PBB SHADOW E&M-EST. PATIENT-LVL III: ICD-10-PCS | Mod: PBBFAC,,, | Performed by: DERMATOLOGY

## 2021-06-04 PROCEDURE — 99999 PR PBB SHADOW E&M-EST. PATIENT-LVL III: CPT | Mod: PBBFAC,,, | Performed by: DERMATOLOGY

## 2021-06-04 PROCEDURE — 99212 PR OFFICE/OUTPT VISIT, EST, LEVL II, 10-19 MIN: ICD-10-PCS | Mod: S$PBB,,, | Performed by: DERMATOLOGY

## 2021-06-04 RX ORDER — BIMATOPROST 3 UG/ML
SOLUTION TOPICAL
Qty: 3 ML | Refills: 6 | Status: SHIPPED | OUTPATIENT
Start: 2021-06-04 | End: 2021-08-03

## 2021-06-07 ENCOUNTER — PATIENT MESSAGE (OUTPATIENT)
Dept: HEMATOLOGY/ONCOLOGY | Facility: CLINIC | Age: 71
End: 2021-06-07

## 2021-06-08 ENCOUNTER — OFFICE VISIT (OUTPATIENT)
Dept: SURGERY | Facility: CLINIC | Age: 71
End: 2021-06-08
Payer: MEDICARE

## 2021-06-08 VITALS
WEIGHT: 163.13 LBS | BODY MASS INDEX: 26.22 KG/M2 | DIASTOLIC BLOOD PRESSURE: 68 MMHG | HEART RATE: 69 BPM | SYSTOLIC BLOOD PRESSURE: 149 MMHG | HEIGHT: 66 IN

## 2021-06-08 DIAGNOSIS — K62.5 RECTAL BLEEDING: Primary | ICD-10-CM

## 2021-06-08 PROCEDURE — 99203 OFFICE O/P NEW LOW 30 MIN: CPT | Mod: S$PBB,25,, | Performed by: NURSE PRACTITIONER

## 2021-06-08 PROCEDURE — 99999 PR PBB SHADOW E&M-EST. PATIENT-LVL III: ICD-10-PCS | Mod: PBBFAC,,, | Performed by: NURSE PRACTITIONER

## 2021-06-08 PROCEDURE — 99203 PR OFFICE/OUTPT VISIT, NEW, LEVL III, 30-44 MIN: ICD-10-PCS | Mod: S$PBB,25,, | Performed by: NURSE PRACTITIONER

## 2021-06-08 PROCEDURE — 99999 PR PBB SHADOW E&M-EST. PATIENT-LVL III: CPT | Mod: PBBFAC,,, | Performed by: NURSE PRACTITIONER

## 2021-06-08 PROCEDURE — 46600 DIAGNOSTIC ANOSCOPY SPX: CPT | Mod: PBBFAC | Performed by: NURSE PRACTITIONER

## 2021-06-08 PROCEDURE — 46600 PR DIAG2STIC A2SCOPY: ICD-10-PCS | Mod: S$PBB,,, | Performed by: NURSE PRACTITIONER

## 2021-06-08 PROCEDURE — 46600 DIAGNOSTIC ANOSCOPY SPX: CPT | Mod: S$PBB,,, | Performed by: NURSE PRACTITIONER

## 2021-06-08 PROCEDURE — 99213 OFFICE O/P EST LOW 20 MIN: CPT | Mod: PBBFAC | Performed by: NURSE PRACTITIONER

## 2021-06-08 RX ORDER — MESALAMINE 1000 MG/1
1000 SUPPOSITORY RECTAL NIGHTLY
Qty: 30 SUPPOSITORY | Refills: 0 | Status: SHIPPED | OUTPATIENT
Start: 2021-06-08 | End: 2021-06-09 | Stop reason: SDUPTHER

## 2021-06-09 ENCOUNTER — PATIENT MESSAGE (OUTPATIENT)
Dept: SURGERY | Facility: CLINIC | Age: 71
End: 2021-06-09

## 2021-06-09 DIAGNOSIS — K52.9 COLITIS, ACUTE: Primary | ICD-10-CM

## 2021-06-09 RX ORDER — MESALAMINE 1000 MG/1
1000 SUPPOSITORY RECTAL NIGHTLY
Qty: 30 SUPPOSITORY | Refills: 0 | Status: SHIPPED | OUTPATIENT
Start: 2021-06-09 | End: 2021-07-13

## 2021-06-17 ENCOUNTER — OFFICE VISIT (OUTPATIENT)
Dept: HEMATOLOGY/ONCOLOGY | Facility: CLINIC | Age: 71
End: 2021-06-17
Payer: MEDICARE

## 2021-06-17 ENCOUNTER — PATIENT MESSAGE (OUTPATIENT)
Dept: HEMATOLOGY/ONCOLOGY | Facility: CLINIC | Age: 71
End: 2021-06-17

## 2021-06-17 ENCOUNTER — LAB VISIT (OUTPATIENT)
Dept: LAB | Facility: HOSPITAL | Age: 71
End: 2021-06-17
Attending: INTERNAL MEDICINE
Payer: MEDICARE

## 2021-06-17 VITALS
BODY MASS INDEX: 26.22 KG/M2 | RESPIRATION RATE: 16 BRPM | TEMPERATURE: 99 F | HEIGHT: 66 IN | DIASTOLIC BLOOD PRESSURE: 70 MMHG | HEART RATE: 65 BPM | OXYGEN SATURATION: 99 % | SYSTOLIC BLOOD PRESSURE: 159 MMHG | WEIGHT: 163.13 LBS

## 2021-06-17 DIAGNOSIS — C77.1 SECONDARY AND UNSPECIFIED MALIGNANT NEOPLASM OF INTRATHORACIC LYMPH NODES: ICD-10-CM

## 2021-06-17 DIAGNOSIS — C34.11 MALIGNANT NEOPLASM OF UPPER LOBE, RIGHT BRONCHUS OR LUNG: Primary | ICD-10-CM

## 2021-06-17 DIAGNOSIS — M79.10 MYALGIA: Primary | ICD-10-CM

## 2021-06-17 DIAGNOSIS — C34.11 MALIGNANT NEOPLASM OF UPPER LOBE, RIGHT BRONCHUS OR LUNG: ICD-10-CM

## 2021-06-17 DIAGNOSIS — K52.9 COLITIS, ACUTE: ICD-10-CM

## 2021-06-17 LAB
ALBUMIN SERPL BCP-MCNC: 3.5 G/DL (ref 3.5–5.2)
ALP SERPL-CCNC: 50 U/L (ref 55–135)
ALT SERPL W/O P-5'-P-CCNC: 15 U/L (ref 10–44)
ANION GAP SERPL CALC-SCNC: 9 MMOL/L (ref 8–16)
AST SERPL-CCNC: 22 U/L (ref 10–40)
BILIRUB SERPL-MCNC: 0.3 MG/DL (ref 0.1–1)
BUN SERPL-MCNC: 17 MG/DL (ref 8–23)
CALCIUM SERPL-MCNC: 9.7 MG/DL (ref 8.7–10.5)
CHLORIDE SERPL-SCNC: 108 MMOL/L (ref 95–110)
CO2 SERPL-SCNC: 26 MMOL/L (ref 23–29)
CREAT SERPL-MCNC: 1.1 MG/DL (ref 0.5–1.4)
ERYTHROCYTE [DISTWIDTH] IN BLOOD BY AUTOMATED COUNT: 12.6 % (ref 11.5–14.5)
EST. GFR  (AFRICAN AMERICAN): 58.8 ML/MIN/1.73 M^2
EST. GFR  (NON AFRICAN AMERICAN): 51 ML/MIN/1.73 M^2
GLUCOSE SERPL-MCNC: 115 MG/DL (ref 70–110)
HCT VFR BLD AUTO: 33.1 % (ref 37–48.5)
HGB BLD-MCNC: 10.9 G/DL (ref 12–16)
IMM GRANULOCYTES # BLD AUTO: 0.01 K/UL (ref 0–0.04)
MCH RBC QN AUTO: 32.5 PG (ref 27–31)
MCHC RBC AUTO-ENTMCNC: 32.9 G/DL (ref 32–36)
MCV RBC AUTO: 99 FL (ref 82–98)
NEUTROPHILS # BLD AUTO: 2.7 K/UL (ref 1.8–7.7)
PLATELET # BLD AUTO: 204 K/UL (ref 150–450)
PMV BLD AUTO: 9.3 FL (ref 9.2–12.9)
POTASSIUM SERPL-SCNC: 4.8 MMOL/L (ref 3.5–5.1)
PROT SERPL-MCNC: 6.3 G/DL (ref 6–8.4)
RBC # BLD AUTO: 3.35 M/UL (ref 4–5.4)
SODIUM SERPL-SCNC: 143 MMOL/L (ref 136–145)
WBC # BLD AUTO: 5.36 K/UL (ref 3.9–12.7)

## 2021-06-17 PROCEDURE — 99215 PR OFFICE/OUTPT VISIT, EST, LEVL V, 40-54 MIN: ICD-10-PCS | Mod: S$PBB,,, | Performed by: INTERNAL MEDICINE

## 2021-06-17 PROCEDURE — 99213 OFFICE O/P EST LOW 20 MIN: CPT | Mod: PBBFAC | Performed by: INTERNAL MEDICINE

## 2021-06-17 PROCEDURE — 36415 COLL VENOUS BLD VENIPUNCTURE: CPT | Performed by: INTERNAL MEDICINE

## 2021-06-17 PROCEDURE — 80053 COMPREHEN METABOLIC PANEL: CPT | Performed by: INTERNAL MEDICINE

## 2021-06-17 PROCEDURE — 99999 PR PBB SHADOW E&M-EST. PATIENT-LVL III: ICD-10-PCS | Mod: PBBFAC,,, | Performed by: INTERNAL MEDICINE

## 2021-06-17 PROCEDURE — 99215 OFFICE O/P EST HI 40 MIN: CPT | Mod: S$PBB,,, | Performed by: INTERNAL MEDICINE

## 2021-06-17 PROCEDURE — 85027 COMPLETE CBC AUTOMATED: CPT | Performed by: INTERNAL MEDICINE

## 2021-06-17 PROCEDURE — 99999 PR PBB SHADOW E&M-EST. PATIENT-LVL III: CPT | Mod: PBBFAC,,, | Performed by: INTERNAL MEDICINE

## 2021-06-17 RX ORDER — PREDNISONE 10 MG/1
70 TABLET ORAL DAILY
Qty: 60 TABLET | Refills: 3 | Status: SHIPPED | OUTPATIENT
Start: 2021-06-17 | End: 2021-08-20

## 2021-06-18 ENCOUNTER — PATIENT MESSAGE (OUTPATIENT)
Dept: HEMATOLOGY/ONCOLOGY | Facility: CLINIC | Age: 71
End: 2021-06-18

## 2021-06-21 ENCOUNTER — OFFICE VISIT (OUTPATIENT)
Dept: GASTROENTEROLOGY | Facility: CLINIC | Age: 71
End: 2021-06-21
Payer: MEDICARE

## 2021-06-21 VITALS — BODY MASS INDEX: 25.62 KG/M2 | WEIGHT: 158.75 LBS

## 2021-06-21 DIAGNOSIS — K92.1 HEMATOCHEZIA: Primary | ICD-10-CM

## 2021-06-21 PROCEDURE — 99214 PR OFFICE/OUTPT VISIT, EST, LEVL IV, 30-39 MIN: ICD-10-PCS | Mod: S$PBB,,, | Performed by: INTERNAL MEDICINE

## 2021-06-21 PROCEDURE — 99999 PR PBB SHADOW E&M-EST. PATIENT-LVL III: CPT | Mod: PBBFAC,,, | Performed by: INTERNAL MEDICINE

## 2021-06-21 PROCEDURE — 99213 OFFICE O/P EST LOW 20 MIN: CPT | Mod: PBBFAC,PO | Performed by: INTERNAL MEDICINE

## 2021-06-21 PROCEDURE — 99214 OFFICE O/P EST MOD 30 MIN: CPT | Mod: S$PBB,,, | Performed by: INTERNAL MEDICINE

## 2021-06-21 PROCEDURE — 99999 PR PBB SHADOW E&M-EST. PATIENT-LVL III: ICD-10-PCS | Mod: PBBFAC,,, | Performed by: INTERNAL MEDICINE

## 2021-06-22 ENCOUNTER — PATIENT MESSAGE (OUTPATIENT)
Dept: HEMATOLOGY/ONCOLOGY | Facility: CLINIC | Age: 71
End: 2021-06-22

## 2021-06-22 ENCOUNTER — TELEPHONE (OUTPATIENT)
Dept: ENDOSCOPY | Facility: HOSPITAL | Age: 71
End: 2021-06-22

## 2021-06-22 ENCOUNTER — PATIENT MESSAGE (OUTPATIENT)
Dept: GASTROENTEROLOGY | Facility: CLINIC | Age: 71
End: 2021-06-22

## 2021-06-22 DIAGNOSIS — K92.2 GASTROINTESTINAL HEMORRHAGE, UNSPECIFIED GASTROINTESTINAL HEMORRHAGE TYPE: Primary | ICD-10-CM

## 2021-06-23 ENCOUNTER — TELEPHONE (OUTPATIENT)
Dept: ENDOSCOPY | Facility: HOSPITAL | Age: 71
End: 2021-06-23

## 2021-06-23 ENCOUNTER — PATIENT MESSAGE (OUTPATIENT)
Dept: HEMATOLOGY/ONCOLOGY | Facility: CLINIC | Age: 71
End: 2021-06-23

## 2021-06-24 ENCOUNTER — PATIENT MESSAGE (OUTPATIENT)
Dept: HEMATOLOGY/ONCOLOGY | Facility: CLINIC | Age: 71
End: 2021-06-24

## 2021-06-24 ENCOUNTER — LAB VISIT (OUTPATIENT)
Dept: LAB | Facility: HOSPITAL | Age: 71
End: 2021-06-24
Attending: INTERNAL MEDICINE
Payer: MEDICARE

## 2021-06-24 DIAGNOSIS — C34.11 MALIGNANT NEOPLASM OF UPPER LOBE, RIGHT BRONCHUS OR LUNG: ICD-10-CM

## 2021-06-24 LAB
ERYTHROCYTE [DISTWIDTH] IN BLOOD BY AUTOMATED COUNT: 12.6 % (ref 11.5–14.5)
HCT VFR BLD AUTO: 33.7 % (ref 37–48.5)
HGB BLD-MCNC: 11.1 G/DL (ref 12–16)
IMM GRANULOCYTES # BLD AUTO: 0.02 K/UL (ref 0–0.04)
MCH RBC QN AUTO: 32.2 PG (ref 27–31)
MCHC RBC AUTO-ENTMCNC: 32.9 G/DL (ref 32–36)
MCV RBC AUTO: 98 FL (ref 82–98)
NEUTROPHILS # BLD AUTO: 2.6 K/UL (ref 1.8–7.7)
PLATELET # BLD AUTO: 213 K/UL (ref 150–450)
PMV BLD AUTO: 9.7 FL (ref 9.2–12.9)
RBC # BLD AUTO: 3.45 M/UL (ref 4–5.4)
WBC # BLD AUTO: 5.47 K/UL (ref 3.9–12.7)

## 2021-06-24 PROCEDURE — 36415 COLL VENOUS BLD VENIPUNCTURE: CPT | Performed by: INTERNAL MEDICINE

## 2021-06-24 PROCEDURE — 85027 COMPLETE CBC AUTOMATED: CPT | Performed by: INTERNAL MEDICINE

## 2021-06-25 ENCOUNTER — ANESTHESIA (OUTPATIENT)
Dept: ENDOSCOPY | Facility: HOSPITAL | Age: 71
End: 2021-06-25
Payer: MEDICARE

## 2021-06-25 ENCOUNTER — ANESTHESIA EVENT (OUTPATIENT)
Dept: ENDOSCOPY | Facility: HOSPITAL | Age: 71
End: 2021-06-25
Payer: MEDICARE

## 2021-06-25 ENCOUNTER — HOSPITAL ENCOUNTER (OUTPATIENT)
Facility: HOSPITAL | Age: 71
Discharge: HOME | End: 2021-06-25
Attending: INTERNAL MEDICINE | Admitting: INTERNAL MEDICINE
Payer: MEDICARE

## 2021-06-25 VITALS
OXYGEN SATURATION: 98 % | BODY MASS INDEX: 25.39 KG/M2 | SYSTOLIC BLOOD PRESSURE: 129 MMHG | DIASTOLIC BLOOD PRESSURE: 52 MMHG | HEART RATE: 72 BPM | WEIGHT: 158 LBS | TEMPERATURE: 97 F | HEIGHT: 66 IN | RESPIRATION RATE: 18 BRPM

## 2021-06-25 DIAGNOSIS — K92.2 GI HEMORRHAGE: ICD-10-CM

## 2021-06-25 DIAGNOSIS — R94.8 ABNORMAL PET SCAN OF COLON: Primary | ICD-10-CM

## 2021-06-25 LAB — SARS-COV-2 RDRP RESP QL NAA+PROBE: NEGATIVE

## 2021-06-25 PROCEDURE — 88305 TISSUE EXAM BY PATHOLOGIST: ICD-10-PCS | Mod: 26,,, | Performed by: PATHOLOGY

## 2021-06-25 PROCEDURE — 45341 SIGMOIDOSCOPY W/ULTRASOUND: CPT | Performed by: INTERNAL MEDICINE

## 2021-06-25 PROCEDURE — 37000009 HC ANESTHESIA EA ADD 15 MINS: Performed by: INTERNAL MEDICINE

## 2021-06-25 PROCEDURE — 63600175 PHARM REV CODE 636 W HCPCS: Performed by: NURSE ANESTHETIST, CERTIFIED REGISTERED

## 2021-06-25 PROCEDURE — 37000008 HC ANESTHESIA 1ST 15 MINUTES: Performed by: INTERNAL MEDICINE

## 2021-06-25 PROCEDURE — 45341 SIGMOIDOSCOPY W/ULTRASOUND: CPT | Mod: ,,, | Performed by: INTERNAL MEDICINE

## 2021-06-25 PROCEDURE — 88305 TISSUE EXAM BY PATHOLOGIST: CPT | Mod: 26,,, | Performed by: PATHOLOGY

## 2021-06-25 PROCEDURE — 45331 PR SIGMOIDOSCOPY,BIOPSY: ICD-10-PCS | Mod: 51,,, | Performed by: INTERNAL MEDICINE

## 2021-06-25 PROCEDURE — 88342 IMHCHEM/IMCYTCHM 1ST ANTB: CPT | Performed by: PATHOLOGY

## 2021-06-25 PROCEDURE — 27201012 HC FORCEPS, HOT/COLD, DISP: Performed by: INTERNAL MEDICINE

## 2021-06-25 PROCEDURE — 88305 TISSUE EXAM BY PATHOLOGIST: CPT | Performed by: PATHOLOGY

## 2021-06-25 PROCEDURE — 25000003 PHARM REV CODE 250: Performed by: INTERNAL MEDICINE

## 2021-06-25 PROCEDURE — 88342 IMHCHEM/IMCYTCHM 1ST ANTB: CPT | Mod: 26,,, | Performed by: PATHOLOGY

## 2021-06-25 PROCEDURE — U0002 COVID-19 LAB TEST NON-CDC: HCPCS | Performed by: INTERNAL MEDICINE

## 2021-06-25 PROCEDURE — 45331 SIGMOIDOSCOPY AND BIOPSY: CPT | Performed by: INTERNAL MEDICINE

## 2021-06-25 PROCEDURE — 25000003 PHARM REV CODE 250: Performed by: NURSE ANESTHETIST, CERTIFIED REGISTERED

## 2021-06-25 PROCEDURE — 88342 CHG IMMUNOCYTOCHEMISTRY: ICD-10-PCS | Mod: 26,,, | Performed by: PATHOLOGY

## 2021-06-25 PROCEDURE — 45331 SIGMOIDOSCOPY AND BIOPSY: CPT | Mod: 51,,, | Performed by: INTERNAL MEDICINE

## 2021-06-25 PROCEDURE — 45341 PR SIGMOIDOSCOPY W/ENDOSCOPIC US EXAM: ICD-10-PCS | Mod: ,,, | Performed by: INTERNAL MEDICINE

## 2021-06-25 RX ORDER — MIDAZOLAM HYDROCHLORIDE 1 MG/ML
INJECTION, SOLUTION INTRAMUSCULAR; INTRAVENOUS
Status: DISCONTINUED | OUTPATIENT
Start: 2021-06-25 | End: 2021-06-25

## 2021-06-25 RX ORDER — PROPOFOL 10 MG/ML
VIAL (ML) INTRAVENOUS
Status: DISCONTINUED | OUTPATIENT
Start: 2021-06-25 | End: 2021-06-25

## 2021-06-25 RX ORDER — LIDOCAINE HYDROCHLORIDE 20 MG/ML
INJECTION INTRAVENOUS
Status: DISCONTINUED | OUTPATIENT
Start: 2021-06-25 | End: 2021-06-25

## 2021-06-25 RX ORDER — SODIUM CHLORIDE 0.9 % (FLUSH) 0.9 %
3 SYRINGE (ML) INJECTION
Status: DISCONTINUED | OUTPATIENT
Start: 2021-06-25 | End: 2021-06-25 | Stop reason: HOSPADM

## 2021-06-25 RX ORDER — SODIUM CHLORIDE 9 MG/ML
INJECTION, SOLUTION INTRAVENOUS CONTINUOUS
Status: DISCONTINUED | OUTPATIENT
Start: 2021-06-25 | End: 2021-06-25 | Stop reason: HOSPADM

## 2021-06-25 RX ORDER — PROPOFOL 10 MG/ML
VIAL (ML) INTRAVENOUS CONTINUOUS PRN
Status: DISCONTINUED | OUTPATIENT
Start: 2021-06-25 | End: 2021-06-25

## 2021-06-25 RX ADMIN — LIDOCAINE HYDROCHLORIDE 60 MG: 20 INJECTION, SOLUTION INTRAVENOUS at 11:06

## 2021-06-25 RX ADMIN — SODIUM CHLORIDE: 0.9 INJECTION, SOLUTION INTRAVENOUS at 11:06

## 2021-06-25 RX ADMIN — PROPOFOL 150 MCG/KG/MIN: 10 INJECTION, EMULSION INTRAVENOUS at 11:06

## 2021-06-25 RX ADMIN — MIDAZOLAM 2 MG: 1 INJECTION INTRAMUSCULAR; INTRAVENOUS at 11:06

## 2021-06-25 RX ADMIN — PROPOFOL 50 MG: 10 INJECTION, EMULSION INTRAVENOUS at 11:06

## 2021-06-28 ENCOUNTER — PATIENT MESSAGE (OUTPATIENT)
Dept: INTERNAL MEDICINE | Facility: CLINIC | Age: 71
End: 2021-06-28

## 2021-06-28 ENCOUNTER — TELEPHONE (OUTPATIENT)
Dept: ENDOSCOPY | Facility: HOSPITAL | Age: 71
End: 2021-06-28

## 2021-06-28 ENCOUNTER — PATIENT MESSAGE (OUTPATIENT)
Dept: HEMATOLOGY/ONCOLOGY | Facility: CLINIC | Age: 71
End: 2021-06-28

## 2021-06-28 ENCOUNTER — PATIENT MESSAGE (OUTPATIENT)
Dept: GASTROENTEROLOGY | Facility: CLINIC | Age: 71
End: 2021-06-28

## 2021-06-28 DIAGNOSIS — C34.11 MALIGNANT NEOPLASM OF UPPER LOBE, RIGHT BRONCHUS OR LUNG: Primary | ICD-10-CM

## 2021-06-28 RX ORDER — HYDROCODONE BITARTRATE AND ACETAMINOPHEN 5; 325 MG/1; MG/1
1 TABLET ORAL EVERY 8 HOURS PRN
Qty: 90 TABLET | Refills: 0 | Status: SHIPPED | OUTPATIENT
Start: 2021-06-28 | End: 2021-07-28 | Stop reason: SDUPTHER

## 2021-06-29 ENCOUNTER — PATIENT MESSAGE (OUTPATIENT)
Dept: HEMATOLOGY/ONCOLOGY | Facility: CLINIC | Age: 71
End: 2021-06-29

## 2021-07-02 ENCOUNTER — PATIENT MESSAGE (OUTPATIENT)
Dept: GASTROENTEROLOGY | Facility: CLINIC | Age: 71
End: 2021-07-02

## 2021-07-05 ENCOUNTER — PATIENT MESSAGE (OUTPATIENT)
Dept: HEMATOLOGY/ONCOLOGY | Facility: CLINIC | Age: 71
End: 2021-07-05

## 2021-07-06 ENCOUNTER — PATIENT MESSAGE (OUTPATIENT)
Dept: HEMATOLOGY/ONCOLOGY | Facility: CLINIC | Age: 71
End: 2021-07-06

## 2021-07-06 DIAGNOSIS — K62.5 RECTAL BLEED: Primary | ICD-10-CM

## 2021-07-07 ENCOUNTER — PATIENT MESSAGE (OUTPATIENT)
Dept: HEMATOLOGY/ONCOLOGY | Facility: CLINIC | Age: 71
End: 2021-07-07

## 2021-07-07 ENCOUNTER — LAB VISIT (OUTPATIENT)
Dept: LAB | Facility: HOSPITAL | Age: 71
End: 2021-07-07
Attending: INTERNAL MEDICINE
Payer: MEDICARE

## 2021-07-07 DIAGNOSIS — K62.5 RECTAL BLEED: ICD-10-CM

## 2021-07-07 LAB
ERYTHROCYTE [DISTWIDTH] IN BLOOD BY AUTOMATED COUNT: 12.6 % (ref 11.5–14.5)
HCT VFR BLD AUTO: 32.3 % (ref 37–48.5)
HGB BLD-MCNC: 10.7 G/DL (ref 12–16)
IMM GRANULOCYTES # BLD AUTO: 0.02 K/UL (ref 0–0.04)
MCH RBC QN AUTO: 32 PG (ref 27–31)
MCHC RBC AUTO-ENTMCNC: 33.1 G/DL (ref 32–36)
MCV RBC AUTO: 97 FL (ref 82–98)
NEUTROPHILS # BLD AUTO: 1.9 K/UL (ref 1.8–7.7)
PLATELET # BLD AUTO: 215 K/UL (ref 150–450)
PMV BLD AUTO: 9.3 FL (ref 9.2–12.9)
RBC # BLD AUTO: 3.34 M/UL (ref 4–5.4)
WBC # BLD AUTO: 4.68 K/UL (ref 3.9–12.7)

## 2021-07-07 PROCEDURE — 36415 COLL VENOUS BLD VENIPUNCTURE: CPT | Performed by: INTERNAL MEDICINE

## 2021-07-07 PROCEDURE — 85027 COMPLETE CBC AUTOMATED: CPT | Performed by: INTERNAL MEDICINE

## 2021-07-10 ENCOUNTER — PATIENT OUTREACH (OUTPATIENT)
Dept: ADMINISTRATIVE | Facility: OTHER | Age: 71
End: 2021-07-10

## 2021-07-13 ENCOUNTER — PATIENT MESSAGE (OUTPATIENT)
Dept: GASTROENTEROLOGY | Facility: CLINIC | Age: 71
End: 2021-07-13

## 2021-07-13 ENCOUNTER — PROCEDURE VISIT (OUTPATIENT)
Dept: DERMATOLOGY | Facility: CLINIC | Age: 71
End: 2021-07-13
Payer: MEDICARE

## 2021-07-13 DIAGNOSIS — Z41.1 ELECTIVE PROCEDURE FOR UNACCEPTABLE COSMETIC APPEARANCE: Primary | ICD-10-CM

## 2021-07-13 PROCEDURE — 17110 DESTRUCTION B9 LES UP TO 14: CPT | Mod: CSM,S$GLB,, | Performed by: DERMATOLOGY

## 2021-07-13 PROCEDURE — 99499 NO LOS: ICD-10-PCS | Mod: ,,, | Performed by: DERMATOLOGY

## 2021-07-13 PROCEDURE — 99499 UNLISTED E&M SERVICE: CPT | Mod: ,,, | Performed by: DERMATOLOGY

## 2021-07-13 PROCEDURE — 17110 PR DESTRUCTION BENIGN LESIONS UP TO 14: ICD-10-PCS | Mod: CSM,S$GLB,, | Performed by: DERMATOLOGY

## 2021-07-13 RX ORDER — MESALAMINE 4 G/60ML
4 SUSPENSION RECTAL NIGHTLY
Qty: 1800 ML | Refills: 11 | Status: SHIPPED | OUTPATIENT
Start: 2021-07-13 | End: 2021-07-15 | Stop reason: SDUPTHER

## 2021-07-16 ENCOUNTER — TELEPHONE (OUTPATIENT)
Dept: GASTROENTEROLOGY | Facility: HOSPITAL | Age: 71
End: 2021-07-16

## 2021-07-16 RX ORDER — MESALAMINE 4 G/60ML
4 SUSPENSION RECTAL NIGHTLY
Qty: 1800 ML | Refills: 11 | Status: SHIPPED | OUTPATIENT
Start: 2021-07-16 | End: 2021-08-03

## 2021-07-16 RX ORDER — BUDESONIDE 3 MG/1
9 CAPSULE, COATED PELLETS ORAL DAILY
Qty: 90 CAPSULE | Refills: 0 | Status: SHIPPED | OUTPATIENT
Start: 2021-07-16 | End: 2021-08-03

## 2021-07-20 ENCOUNTER — PATIENT MESSAGE (OUTPATIENT)
Dept: HEMATOLOGY/ONCOLOGY | Facility: CLINIC | Age: 71
End: 2021-07-20

## 2021-07-21 ENCOUNTER — OFFICE VISIT (OUTPATIENT)
Dept: HEMATOLOGY/ONCOLOGY | Facility: CLINIC | Age: 71
End: 2021-07-21
Payer: MEDICARE

## 2021-07-21 ENCOUNTER — INFUSION (OUTPATIENT)
Dept: INFUSION THERAPY | Facility: HOSPITAL | Age: 71
End: 2021-07-21
Attending: INTERNAL MEDICINE
Payer: MEDICARE

## 2021-07-21 ENCOUNTER — LAB VISIT (OUTPATIENT)
Dept: LAB | Facility: HOSPITAL | Age: 71
End: 2021-07-21
Attending: INTERNAL MEDICINE
Payer: MEDICARE

## 2021-07-21 VITALS
HEIGHT: 66 IN | OXYGEN SATURATION: 98 % | BODY MASS INDEX: 25.33 KG/M2 | RESPIRATION RATE: 18 BRPM | WEIGHT: 157.63 LBS | TEMPERATURE: 99 F | SYSTOLIC BLOOD PRESSURE: 151 MMHG | DIASTOLIC BLOOD PRESSURE: 70 MMHG | HEART RATE: 67 BPM

## 2021-07-21 DIAGNOSIS — C34.11 MALIGNANT NEOPLASM OF UPPER LOBE, RIGHT BRONCHUS OR LUNG: ICD-10-CM

## 2021-07-21 DIAGNOSIS — C77.1 SECONDARY AND UNSPECIFIED MALIGNANT NEOPLASM OF INTRATHORACIC LYMPH NODES: Primary | ICD-10-CM

## 2021-07-21 DIAGNOSIS — C77.1 SECONDARY AND UNSPECIFIED MALIGNANT NEOPLASM OF INTRATHORACIC LYMPH NODES: ICD-10-CM

## 2021-07-21 DIAGNOSIS — K52.9 COLITIS: ICD-10-CM

## 2021-07-21 DIAGNOSIS — C34.11 MALIGNANT NEOPLASM OF UPPER LOBE, RIGHT BRONCHUS OR LUNG: Primary | ICD-10-CM

## 2021-07-21 LAB
ALBUMIN SERPL BCP-MCNC: 3.2 G/DL (ref 3.5–5.2)
ALP SERPL-CCNC: 59 U/L (ref 55–135)
ALT SERPL W/O P-5'-P-CCNC: 18 U/L (ref 10–44)
ANION GAP SERPL CALC-SCNC: 10 MMOL/L (ref 8–16)
AST SERPL-CCNC: 22 U/L (ref 10–40)
BILIRUB SERPL-MCNC: 0.2 MG/DL (ref 0.1–1)
BUN SERPL-MCNC: 15 MG/DL (ref 8–23)
CALCIUM SERPL-MCNC: 10.4 MG/DL (ref 8.7–10.5)
CHLORIDE SERPL-SCNC: 105 MMOL/L (ref 95–110)
CO2 SERPL-SCNC: 24 MMOL/L (ref 23–29)
CREAT SERPL-MCNC: 1.2 MG/DL (ref 0.5–1.4)
ERYTHROCYTE [DISTWIDTH] IN BLOOD BY AUTOMATED COUNT: 12.5 % (ref 11.5–14.5)
EST. GFR  (AFRICAN AMERICAN): 52.9 ML/MIN/1.73 M^2
EST. GFR  (NON AFRICAN AMERICAN): 45.9 ML/MIN/1.73 M^2
GLUCOSE SERPL-MCNC: 193 MG/DL (ref 70–110)
HCT VFR BLD AUTO: 32 % (ref 37–48.5)
HGB BLD-MCNC: 10.5 G/DL (ref 12–16)
IMM GRANULOCYTES # BLD AUTO: 0.02 K/UL (ref 0–0.04)
MCH RBC QN AUTO: 31.8 PG (ref 27–31)
MCHC RBC AUTO-ENTMCNC: 32.8 G/DL (ref 32–36)
MCV RBC AUTO: 97 FL (ref 82–98)
NEUTROPHILS # BLD AUTO: 2.7 K/UL (ref 1.8–7.7)
PLATELET # BLD AUTO: 274 K/UL (ref 150–450)
PMV BLD AUTO: 9.5 FL (ref 9.2–12.9)
POTASSIUM SERPL-SCNC: 4.7 MMOL/L (ref 3.5–5.1)
PROT SERPL-MCNC: 6.5 G/DL (ref 6–8.4)
RBC # BLD AUTO: 3.3 M/UL (ref 4–5.4)
SODIUM SERPL-SCNC: 139 MMOL/L (ref 136–145)
WBC # BLD AUTO: 4.29 K/UL (ref 3.9–12.7)

## 2021-07-21 PROCEDURE — 99214 OFFICE O/P EST MOD 30 MIN: CPT | Mod: PBBFAC | Performed by: INTERNAL MEDICINE

## 2021-07-21 PROCEDURE — 25000003 PHARM REV CODE 250: Performed by: INTERNAL MEDICINE

## 2021-07-21 PROCEDURE — 85027 COMPLETE CBC AUTOMATED: CPT | Performed by: INTERNAL MEDICINE

## 2021-07-21 PROCEDURE — 63600175 PHARM REV CODE 636 W HCPCS: Performed by: INTERNAL MEDICINE

## 2021-07-21 PROCEDURE — A4216 STERILE WATER/SALINE, 10 ML: HCPCS | Performed by: INTERNAL MEDICINE

## 2021-07-21 PROCEDURE — 36415 COLL VENOUS BLD VENIPUNCTURE: CPT | Performed by: INTERNAL MEDICINE

## 2021-07-21 PROCEDURE — 99215 PR OFFICE/OUTPT VISIT, EST, LEVL V, 40-54 MIN: ICD-10-PCS | Mod: S$PBB,,, | Performed by: INTERNAL MEDICINE

## 2021-07-21 PROCEDURE — 96523 IRRIG DRUG DELIVERY DEVICE: CPT

## 2021-07-21 PROCEDURE — 99999 PR PBB SHADOW E&M-EST. PATIENT-LVL IV: ICD-10-PCS | Mod: PBBFAC,,, | Performed by: INTERNAL MEDICINE

## 2021-07-21 PROCEDURE — 80053 COMPREHEN METABOLIC PANEL: CPT | Performed by: INTERNAL MEDICINE

## 2021-07-21 PROCEDURE — 99999 PR PBB SHADOW E&M-EST. PATIENT-LVL IV: CPT | Mod: PBBFAC,,, | Performed by: INTERNAL MEDICINE

## 2021-07-21 PROCEDURE — 99215 OFFICE O/P EST HI 40 MIN: CPT | Mod: S$PBB,,, | Performed by: INTERNAL MEDICINE

## 2021-07-21 RX ORDER — SODIUM CHLORIDE 0.9 % (FLUSH) 0.9 %
10 SYRINGE (ML) INJECTION
Status: COMPLETED | OUTPATIENT
Start: 2021-07-21 | End: 2021-07-21

## 2021-07-21 RX ORDER — HEPARIN 100 UNIT/ML
500 SYRINGE INTRAVENOUS
Status: COMPLETED | OUTPATIENT
Start: 2021-07-21 | End: 2021-07-21

## 2021-07-21 RX ADMIN — Medication 10 ML: at 04:07

## 2021-07-21 RX ADMIN — HEPARIN 500 UNITS: 100 SYRINGE at 04:07

## 2021-07-22 ENCOUNTER — PATIENT MESSAGE (OUTPATIENT)
Dept: HEMATOLOGY/ONCOLOGY | Facility: CLINIC | Age: 71
End: 2021-07-22

## 2021-07-23 DIAGNOSIS — K90.89 OTHER INTESTINAL MALABSORPTION: ICD-10-CM

## 2021-07-23 DIAGNOSIS — K52.9 COLITIS: Primary | ICD-10-CM

## 2021-07-23 DIAGNOSIS — E04.8 OTHER SPECIFIED NONTOXIC GOITER: ICD-10-CM

## 2021-07-23 DIAGNOSIS — E04.9 NONTOXIC GOITER, UNSPECIFIED: ICD-10-CM

## 2021-07-26 ENCOUNTER — LAB VISIT (OUTPATIENT)
Dept: LAB | Facility: HOSPITAL | Age: 71
End: 2021-07-26
Attending: INTERNAL MEDICINE
Payer: MEDICARE

## 2021-07-26 ENCOUNTER — PATIENT MESSAGE (OUTPATIENT)
Dept: GASTROENTEROLOGY | Facility: CLINIC | Age: 71
End: 2021-07-26

## 2021-07-26 DIAGNOSIS — E04.9 NONTOXIC GOITER, UNSPECIFIED: ICD-10-CM

## 2021-07-26 DIAGNOSIS — K52.9 COLITIS: ICD-10-CM

## 2021-07-26 DIAGNOSIS — E04.8 OTHER SPECIFIED NONTOXIC GOITER: ICD-10-CM

## 2021-07-26 DIAGNOSIS — K90.89 OTHER INTESTINAL MALABSORPTION: ICD-10-CM

## 2021-07-26 LAB
25(OH)D3+25(OH)D2 SERPL-MCNC: 41 NG/ML (ref 30–96)
ALBUMIN SERPL BCP-MCNC: 3.2 G/DL (ref 3.5–5.2)
ALP SERPL-CCNC: 50 U/L (ref 55–135)
ALT SERPL W/O P-5'-P-CCNC: 18 U/L (ref 10–44)
ANION GAP SERPL CALC-SCNC: 12 MMOL/L (ref 8–16)
AST SERPL-CCNC: 19 U/L (ref 10–40)
BASOPHILS # BLD AUTO: 0.04 K/UL (ref 0–0.2)
BASOPHILS NFR BLD: 0.4 % (ref 0–1.9)
BILIRUB SERPL-MCNC: 0.2 MG/DL (ref 0.1–1)
BUN SERPL-MCNC: 24 MG/DL (ref 8–23)
CALCIUM SERPL-MCNC: 9.9 MG/DL (ref 8.7–10.5)
CHLORIDE SERPL-SCNC: 105 MMOL/L (ref 95–110)
CO2 SERPL-SCNC: 22 MMOL/L (ref 23–29)
COMMENT: NORMAL
CREAT SERPL-MCNC: 1.4 MG/DL (ref 0.5–1.4)
CRP SERPL-MCNC: 4 MG/L (ref 0–8.2)
DIFFERENTIAL METHOD: ABNORMAL
EOSINOPHIL # BLD AUTO: 0.1 K/UL (ref 0–0.5)
EOSINOPHIL NFR BLD: 1.1 % (ref 0–8)
ERYTHROCYTE [DISTWIDTH] IN BLOOD BY AUTOMATED COUNT: 12.6 % (ref 11.5–14.5)
EST. GFR  (AFRICAN AMERICAN): 43.9 ML/MIN/1.73 M^2
EST. GFR  (NON AFRICAN AMERICAN): 38.1 ML/MIN/1.73 M^2
FINAL PATHOLOGIC DIAGNOSIS: NORMAL
GLUCOSE SERPL-MCNC: 109 MG/DL (ref 70–110)
GROSS: NORMAL
HBV CORE AB SERPL QL IA: NEGATIVE
HBV SURFACE AG SERPL QL IA: NEGATIVE
HCT VFR BLD AUTO: 31.9 % (ref 37–48.5)
HCV AB SERPL QL IA: NEGATIVE
HEPATITIS A ANTIBODY, IGG: NEGATIVE
HGB BLD-MCNC: 10.4 G/DL (ref 12–16)
HIV 1+2 AB+HIV1 P24 AG SERPL QL IA: NEGATIVE
IGA SERPL-MCNC: 145 MG/DL (ref 40–350)
IMM GRANULOCYTES # BLD AUTO: 0.14 K/UL (ref 0–0.04)
IMM GRANULOCYTES NFR BLD AUTO: 1.5 % (ref 0–0.5)
LYMPHOCYTES # BLD AUTO: 2 K/UL (ref 1–4.8)
LYMPHOCYTES NFR BLD: 21.6 % (ref 18–48)
Lab: NORMAL
MCH RBC QN AUTO: 31.6 PG (ref 27–31)
MCHC RBC AUTO-ENTMCNC: 32.6 G/DL (ref 32–36)
MCV RBC AUTO: 97 FL (ref 82–98)
MONOCYTES # BLD AUTO: 1 K/UL (ref 0.3–1)
MONOCYTES NFR BLD: 10.3 % (ref 4–15)
NEUTROPHILS # BLD AUTO: 6.1 K/UL (ref 1.8–7.7)
NEUTROPHILS NFR BLD: 65.1 % (ref 38–73)
NRBC BLD-RTO: 0 /100 WBC
PLATELET # BLD AUTO: 321 K/UL (ref 150–450)
PMV BLD AUTO: 9.4 FL (ref 9.2–12.9)
POTASSIUM SERPL-SCNC: 3.7 MMOL/L (ref 3.5–5.1)
PROT SERPL-MCNC: 6.5 G/DL (ref 6–8.4)
RBC # BLD AUTO: 3.29 M/UL (ref 4–5.4)
SODIUM SERPL-SCNC: 139 MMOL/L (ref 136–145)
SUPPLEMENTAL DIAGNOSIS: NORMAL
T4 FREE SERPL-MCNC: 1.05 NG/DL (ref 0.71–1.51)
TSH SERPL DL<=0.005 MIU/L-ACNC: 1.01 UIU/ML (ref 0.4–4)
VIT B12 SERPL-MCNC: 543 PG/ML (ref 210–950)
WBC # BLD AUTO: 9.43 K/UL (ref 3.9–12.7)

## 2021-07-26 PROCEDURE — 80053 COMPREHEN METABOLIC PANEL: CPT | Performed by: INTERNAL MEDICINE

## 2021-07-26 PROCEDURE — 85025 COMPLETE CBC W/AUTO DIFF WBC: CPT | Performed by: INTERNAL MEDICINE

## 2021-07-26 PROCEDURE — 82784 ASSAY IGA/IGD/IGG/IGM EACH: CPT | Performed by: INTERNAL MEDICINE

## 2021-07-26 PROCEDURE — 86765 RUBEOLA ANTIBODY: CPT | Performed by: INTERNAL MEDICINE

## 2021-07-26 PROCEDURE — 86735 MUMPS ANTIBODY: CPT | Performed by: INTERNAL MEDICINE

## 2021-07-26 PROCEDURE — 86787 VARICELLA-ZOSTER ANTIBODY: CPT | Performed by: INTERNAL MEDICINE

## 2021-07-26 PROCEDURE — 83516 IMMUNOASSAY NONANTIBODY: CPT | Performed by: INTERNAL MEDICINE

## 2021-07-26 PROCEDURE — 86704 HEP B CORE ANTIBODY TOTAL: CPT | Performed by: INTERNAL MEDICINE

## 2021-07-26 PROCEDURE — 84443 ASSAY THYROID STIM HORMONE: CPT | Performed by: INTERNAL MEDICINE

## 2021-07-26 PROCEDURE — 84439 ASSAY OF FREE THYROXINE: CPT | Performed by: INTERNAL MEDICINE

## 2021-07-26 PROCEDURE — 87389 HIV-1 AG W/HIV-1&-2 AB AG IA: CPT | Performed by: INTERNAL MEDICINE

## 2021-07-26 PROCEDURE — 82607 VITAMIN B-12: CPT | Performed by: INTERNAL MEDICINE

## 2021-07-26 PROCEDURE — 86706 HEP B SURFACE ANTIBODY: CPT | Performed by: INTERNAL MEDICINE

## 2021-07-26 PROCEDURE — 86140 C-REACTIVE PROTEIN: CPT | Performed by: INTERNAL MEDICINE

## 2021-07-26 PROCEDURE — 86790 VIRUS ANTIBODY NOS: CPT | Performed by: INTERNAL MEDICINE

## 2021-07-26 PROCEDURE — 82306 VITAMIN D 25 HYDROXY: CPT | Performed by: INTERNAL MEDICINE

## 2021-07-26 PROCEDURE — 87340 HEPATITIS B SURFACE AG IA: CPT | Performed by: INTERNAL MEDICINE

## 2021-07-26 PROCEDURE — 86762 RUBELLA ANTIBODY: CPT | Performed by: INTERNAL MEDICINE

## 2021-07-26 PROCEDURE — 86803 HEPATITIS C AB TEST: CPT | Performed by: INTERNAL MEDICINE

## 2021-07-26 PROCEDURE — 86480 TB TEST CELL IMMUN MEASURE: CPT | Performed by: INTERNAL MEDICINE

## 2021-07-27 ENCOUNTER — PATIENT MESSAGE (OUTPATIENT)
Dept: HEMATOLOGY/ONCOLOGY | Facility: CLINIC | Age: 71
End: 2021-07-27

## 2021-07-27 ENCOUNTER — LAB VISIT (OUTPATIENT)
Dept: LAB | Facility: HOSPITAL | Age: 71
End: 2021-07-27
Attending: INTERNAL MEDICINE
Payer: MEDICARE

## 2021-07-27 DIAGNOSIS — K52.9 COLITIS: ICD-10-CM

## 2021-07-27 LAB
RUBV IGG SER-ACNC: 39 IU/ML
RUBV IGG SER-IMP: REACTIVE

## 2021-07-27 PROCEDURE — 87045 FECES CULTURE AEROBIC BACT: CPT | Performed by: INTERNAL MEDICINE

## 2021-07-27 PROCEDURE — 87324 CLOSTRIDIUM AG IA: CPT | Performed by: INTERNAL MEDICINE

## 2021-07-27 PROCEDURE — 87449 NOS EACH ORGANISM AG IA: CPT | Performed by: INTERNAL MEDICINE

## 2021-07-27 PROCEDURE — 83993 ASSAY FOR CALPROTECTIN FECAL: CPT | Performed by: INTERNAL MEDICINE

## 2021-07-27 PROCEDURE — 87427 SHIGA-LIKE TOXIN AG IA: CPT | Performed by: INTERNAL MEDICINE

## 2021-07-27 PROCEDURE — 87046 STOOL CULTR AEROBIC BACT EA: CPT | Performed by: INTERNAL MEDICINE

## 2021-07-28 ENCOUNTER — PATIENT MESSAGE (OUTPATIENT)
Dept: INTERNAL MEDICINE | Facility: CLINIC | Age: 71
End: 2021-07-28

## 2021-07-28 LAB
C DIFF GDH STL QL: NEGATIVE
C DIFF TOX A+B STL QL IA: NEGATIVE
MUMPS IGG INTERPRETATION: POSITIVE
MUMPS IGG SCREEN: 2.64 ISR (ref 0–0.9)
RUBEOLA IGG ANTIBODY: 2.03 ISR (ref 0–0.9)
RUBEOLA INTERPRETATION: POSITIVE
VARICELLA INTERPRETATION: POSITIVE
VARICELLA ZOSTER IGG: 2.67 ISR (ref 0–0.9)

## 2021-07-28 RX ORDER — HYDROCODONE BITARTRATE AND ACETAMINOPHEN 5; 325 MG/1; MG/1
1 TABLET ORAL EVERY 8 HOURS PRN
Qty: 90 TABLET | Refills: 0 | Status: CANCELLED | OUTPATIENT
Start: 2021-07-28

## 2021-07-28 RX ORDER — HYDROCODONE BITARTRATE AND ACETAMINOPHEN 5; 325 MG/1; MG/1
1 TABLET ORAL EVERY 8 HOURS PRN
Qty: 90 TABLET | Refills: 0 | Status: SHIPPED | OUTPATIENT
Start: 2021-07-28 | End: 2021-08-26 | Stop reason: SDUPTHER

## 2021-07-29 ENCOUNTER — LAB VISIT (OUTPATIENT)
Dept: LAB | Facility: HOSPITAL | Age: 71
End: 2021-07-29
Attending: INTERNAL MEDICINE
Payer: MEDICARE

## 2021-07-29 DIAGNOSIS — C34.90 MALIGNANT NEOPLASM OF LUNG, UNSPECIFIED LATERALITY, UNSPECIFIED PART OF LUNG: ICD-10-CM

## 2021-07-29 LAB
6-METHYLMERCAPTOPURINE RIBOSIDE: 7.01 NMOL/ML/H (ref 5.04–9.57)
6-METHYLMERCAPTOPURINE: 4.67 NMOL/ML/H (ref 3–6.66)
6-METHYLTHIOGUANINE RIBOSIDE: 5.04 NMOL/ML/H (ref 2.7–5.84)
ALBUMIN SERPL BCP-MCNC: 3.1 G/DL (ref 3.5–5.2)
ALP SERPL-CCNC: 44 U/L (ref 55–135)
ALT SERPL W/O P-5'-P-CCNC: 17 U/L (ref 10–44)
ANION GAP SERPL CALC-SCNC: 3 MMOL/L (ref 8–16)
AST SERPL-CCNC: 15 U/L (ref 10–40)
BILIRUB SERPL-MCNC: 0.2 MG/DL (ref 0.1–1)
BUN SERPL-MCNC: 25 MG/DL (ref 8–23)
CALCIUM SERPL-MCNC: 9.5 MG/DL (ref 8.7–10.5)
CHLORIDE SERPL-SCNC: 106 MMOL/L (ref 95–110)
CO2 SERPL-SCNC: 29 MMOL/L (ref 23–29)
CREAT SERPL-MCNC: 1.3 MG/DL (ref 0.5–1.4)
E COLI SXT1 STL QL IA: NEGATIVE
E COLI SXT2 STL QL IA: NEGATIVE
EST. GFR  (AFRICAN AMERICAN): 48 ML/MIN/1.73 M^2
EST. GFR  (NON AFRICAN AMERICAN): 42 ML/MIN/1.73 M^2
GLUCOSE SERPL-MCNC: 114 MG/DL (ref 70–110)
HBV SURFACE AB SER QL IA: POSITIVE
HBV SURFACE AB SERPL IA-ACNC: 614 MIU/ML
POTASSIUM SERPL-SCNC: 4.5 MMOL/L (ref 3.5–5.1)
PROT SERPL-MCNC: 6 G/DL (ref 6–8.4)
SODIUM SERPL-SCNC: 138 MMOL/L (ref 136–145)
TPMT INTERPRETATION: NORMAL
TPMT REVIEWED BY: NORMAL
TTG IGA SER-ACNC: 4 UNITS

## 2021-07-29 PROCEDURE — 36415 COLL VENOUS BLD VENIPUNCTURE: CPT | Performed by: INTERNAL MEDICINE

## 2021-07-29 PROCEDURE — 80053 COMPREHEN METABOLIC PANEL: CPT | Performed by: INTERNAL MEDICINE

## 2021-07-30 ENCOUNTER — PATIENT MESSAGE (OUTPATIENT)
Dept: HEMATOLOGY/ONCOLOGY | Facility: CLINIC | Age: 71
End: 2021-07-30

## 2021-07-31 LAB
BACTERIA STL CULT: NORMAL
GAMMA INTERFERON BACKGROUND BLD IA-ACNC: 0.07 IU/ML
M TB IFN-G CD4+ BCKGRND COR BLD-ACNC: -0.01 IU/ML
MITOGEN IGNF BCKGRD COR BLD-ACNC: 6.12 IU/ML
TB GOLD PLUS: NEGATIVE
TB2 - NIL: -0.01 IU/ML

## 2021-08-03 ENCOUNTER — OFFICE VISIT (OUTPATIENT)
Dept: GASTROENTEROLOGY | Facility: CLINIC | Age: 71
End: 2021-08-03
Payer: MEDICARE

## 2021-08-03 VITALS
SYSTOLIC BLOOD PRESSURE: 143 MMHG | TEMPERATURE: 98 F | WEIGHT: 156.5 LBS | DIASTOLIC BLOOD PRESSURE: 65 MMHG | OXYGEN SATURATION: 99 % | HEART RATE: 70 BPM | BODY MASS INDEX: 25.26 KG/M2

## 2021-08-03 DIAGNOSIS — K52.9 COLITIS: ICD-10-CM

## 2021-08-03 DIAGNOSIS — C34.11 MALIGNANT NEOPLASM OF UPPER LOBE, RIGHT BRONCHUS OR LUNG: Primary | ICD-10-CM

## 2021-08-03 LAB — CALPROTECTIN STL-MCNT: ABNORMAL MCG/G

## 2021-08-03 PROCEDURE — 99214 PR OFFICE/OUTPT VISIT, EST, LEVL IV, 30-39 MIN: ICD-10-PCS | Mod: S$GLB,,, | Performed by: INTERNAL MEDICINE

## 2021-08-03 PROCEDURE — 99214 OFFICE O/P EST MOD 30 MIN: CPT | Mod: S$GLB,,, | Performed by: INTERNAL MEDICINE

## 2021-08-06 ENCOUNTER — PATIENT MESSAGE (OUTPATIENT)
Dept: HEMATOLOGY/ONCOLOGY | Facility: CLINIC | Age: 71
End: 2021-08-06

## 2021-08-13 ENCOUNTER — PATIENT MESSAGE (OUTPATIENT)
Dept: CARDIOLOGY | Facility: CLINIC | Age: 71
End: 2021-08-13

## 2021-08-13 ENCOUNTER — PATIENT MESSAGE (OUTPATIENT)
Dept: GASTROENTEROLOGY | Facility: CLINIC | Age: 71
End: 2021-08-13

## 2021-08-17 ENCOUNTER — PATIENT MESSAGE (OUTPATIENT)
Dept: CARDIOLOGY | Facility: CLINIC | Age: 71
End: 2021-08-17

## 2021-08-17 ENCOUNTER — PATIENT MESSAGE (OUTPATIENT)
Dept: HEMATOLOGY/ONCOLOGY | Facility: CLINIC | Age: 71
End: 2021-08-17

## 2021-08-17 DIAGNOSIS — C34.11 MALIGNANT NEOPLASM OF UPPER LOBE, RIGHT BRONCHUS OR LUNG: Primary | ICD-10-CM

## 2021-08-18 ENCOUNTER — PATIENT MESSAGE (OUTPATIENT)
Dept: HEMATOLOGY/ONCOLOGY | Facility: CLINIC | Age: 71
End: 2021-08-18

## 2021-08-18 ENCOUNTER — PATIENT MESSAGE (OUTPATIENT)
Dept: INTERNAL MEDICINE | Facility: CLINIC | Age: 71
End: 2021-08-18

## 2021-08-18 ENCOUNTER — PATIENT MESSAGE (OUTPATIENT)
Dept: GASTROENTEROLOGY | Facility: CLINIC | Age: 71
End: 2021-08-18

## 2021-08-18 ENCOUNTER — LAB VISIT (OUTPATIENT)
Dept: LAB | Facility: HOSPITAL | Age: 71
End: 2021-08-18
Attending: INTERNAL MEDICINE
Payer: MEDICARE

## 2021-08-18 DIAGNOSIS — C34.11 MALIGNANT NEOPLASM OF UPPER LOBE, RIGHT BRONCHUS OR LUNG: Primary | ICD-10-CM

## 2021-08-18 DIAGNOSIS — C34.11 MALIGNANT NEOPLASM OF UPPER LOBE, RIGHT BRONCHUS OR LUNG: ICD-10-CM

## 2021-08-18 LAB
ALBUMIN SERPL BCP-MCNC: 3.5 G/DL (ref 3.5–5.2)
ALP SERPL-CCNC: 48 U/L (ref 55–135)
ALT SERPL W/O P-5'-P-CCNC: 18 U/L (ref 10–44)
ANION GAP SERPL CALC-SCNC: 11 MMOL/L (ref 8–16)
AST SERPL-CCNC: 16 U/L (ref 10–40)
BILIRUB SERPL-MCNC: 0.3 MG/DL (ref 0.1–1)
BUN SERPL-MCNC: 30 MG/DL (ref 8–23)
CALCIUM SERPL-MCNC: 10 MG/DL (ref 8.7–10.5)
CHLORIDE SERPL-SCNC: 106 MMOL/L (ref 95–110)
CO2 SERPL-SCNC: 25 MMOL/L (ref 23–29)
CREAT SERPL-MCNC: 1.5 MG/DL (ref 0.5–1.4)
ERYTHROCYTE [DISTWIDTH] IN BLOOD BY AUTOMATED COUNT: 13.2 % (ref 11.5–14.5)
EST. GFR  (AFRICAN AMERICAN): 40 ML/MIN/1.73 M^2
EST. GFR  (NON AFRICAN AMERICAN): 35 ML/MIN/1.73 M^2
GLUCOSE SERPL-MCNC: 230 MG/DL (ref 70–110)
HCT VFR BLD AUTO: 35.1 % (ref 37–48.5)
HGB BLD-MCNC: 11.2 G/DL (ref 12–16)
IMM GRANULOCYTES # BLD AUTO: 0.06 K/UL (ref 0–0.04)
MCH RBC QN AUTO: 31 PG (ref 27–31)
MCHC RBC AUTO-ENTMCNC: 31.9 G/DL (ref 32–36)
MCV RBC AUTO: 97 FL (ref 82–98)
NEUTROPHILS # BLD AUTO: 7.9 K/UL (ref 1.8–7.7)
PLATELET # BLD AUTO: 221 K/UL (ref 150–450)
PMV BLD AUTO: 9.2 FL (ref 9.2–12.9)
POTASSIUM SERPL-SCNC: 5.6 MMOL/L (ref 3.5–5.1)
PROT SERPL-MCNC: 6.7 G/DL (ref 6–8.4)
RBC # BLD AUTO: 3.61 M/UL (ref 4–5.4)
SODIUM SERPL-SCNC: 142 MMOL/L (ref 136–145)
WBC # BLD AUTO: 9.46 K/UL (ref 3.9–12.7)

## 2021-08-18 PROCEDURE — 36415 COLL VENOUS BLD VENIPUNCTURE: CPT | Performed by: INTERNAL MEDICINE

## 2021-08-18 PROCEDURE — 85027 COMPLETE CBC AUTOMATED: CPT | Performed by: INTERNAL MEDICINE

## 2021-08-18 PROCEDURE — 80053 COMPREHEN METABOLIC PANEL: CPT | Performed by: INTERNAL MEDICINE

## 2021-08-19 ENCOUNTER — PATIENT MESSAGE (OUTPATIENT)
Dept: HEMATOLOGY/ONCOLOGY | Facility: CLINIC | Age: 71
End: 2021-08-19

## 2021-08-19 ENCOUNTER — PATIENT OUTREACH (OUTPATIENT)
Dept: ADMINISTRATIVE | Facility: OTHER | Age: 71
End: 2021-08-19

## 2021-08-19 ENCOUNTER — OFFICE VISIT (OUTPATIENT)
Dept: HEMATOLOGY/ONCOLOGY | Facility: CLINIC | Age: 71
End: 2021-08-19
Payer: MEDICARE

## 2021-08-19 ENCOUNTER — TELEPHONE (OUTPATIENT)
Dept: HEMATOLOGY/ONCOLOGY | Facility: CLINIC | Age: 71
End: 2021-08-19

## 2021-08-19 DIAGNOSIS — C77.1 SECONDARY AND UNSPECIFIED MALIGNANT NEOPLASM OF INTRATHORACIC LYMPH NODES: ICD-10-CM

## 2021-08-19 DIAGNOSIS — E03.9 HYPOTHYROIDISM, UNSPECIFIED TYPE: ICD-10-CM

## 2021-08-19 DIAGNOSIS — C34.11 MALIGNANT NEOPLASM OF UPPER LOBE, RIGHT BRONCHUS OR LUNG: Primary | ICD-10-CM

## 2021-08-19 DIAGNOSIS — K52.9 COLITIS: ICD-10-CM

## 2021-08-19 PROCEDURE — 99214 PR OFFICE/OUTPT VISIT, EST, LEVL IV, 30-39 MIN: ICD-10-PCS | Mod: 95,,, | Performed by: INTERNAL MEDICINE

## 2021-08-19 PROCEDURE — 99214 OFFICE O/P EST MOD 30 MIN: CPT | Mod: 95,,, | Performed by: INTERNAL MEDICINE

## 2021-08-20 ENCOUNTER — LAB VISIT (OUTPATIENT)
Dept: LAB | Facility: HOSPITAL | Age: 71
End: 2021-08-20
Attending: INTERNAL MEDICINE
Payer: MEDICARE

## 2021-08-20 ENCOUNTER — OFFICE VISIT (OUTPATIENT)
Dept: CARDIOLOGY | Facility: CLINIC | Age: 71
End: 2021-08-20
Payer: MEDICARE

## 2021-08-20 ENCOUNTER — PATIENT MESSAGE (OUTPATIENT)
Dept: GASTROENTEROLOGY | Facility: CLINIC | Age: 71
End: 2021-08-20

## 2021-08-20 VITALS
HEIGHT: 66 IN | SYSTOLIC BLOOD PRESSURE: 135 MMHG | DIASTOLIC BLOOD PRESSURE: 68 MMHG | BODY MASS INDEX: 24.08 KG/M2 | HEART RATE: 62 BPM | WEIGHT: 149.81 LBS

## 2021-08-20 DIAGNOSIS — I10 ESSENTIAL HYPERTENSION: ICD-10-CM

## 2021-08-20 DIAGNOSIS — I27.20 PULMONARY HYPERTENSION: Primary | ICD-10-CM

## 2021-08-20 DIAGNOSIS — E78.2 MIXED HYPERLIPIDEMIA: ICD-10-CM

## 2021-08-20 DIAGNOSIS — E03.9 HYPOTHYROIDISM, UNSPECIFIED TYPE: ICD-10-CM

## 2021-08-20 DIAGNOSIS — C34.90 NON-SMALL CELL LUNG CANCER, UNSPECIFIED LATERALITY: ICD-10-CM

## 2021-08-20 DIAGNOSIS — I25.84 CORONARY ARTERY DISEASE DUE TO CALCIFIED CORONARY LESION: ICD-10-CM

## 2021-08-20 DIAGNOSIS — C34.11 MALIGNANT NEOPLASM OF UPPER LOBE, RIGHT BRONCHUS OR LUNG: ICD-10-CM

## 2021-08-20 DIAGNOSIS — I25.10 CORONARY ARTERY DISEASE DUE TO CALCIFIED CORONARY LESION: ICD-10-CM

## 2021-08-20 LAB
ACTH PLAS-MCNC: 8 PG/ML (ref 0–46)
ALBUMIN SERPL BCP-MCNC: 3.2 G/DL (ref 3.5–5.2)
ALP SERPL-CCNC: 46 U/L (ref 55–135)
ALT SERPL W/O P-5'-P-CCNC: 19 U/L (ref 10–44)
ANION GAP SERPL CALC-SCNC: 8 MMOL/L (ref 8–16)
AST SERPL-CCNC: 16 U/L (ref 10–40)
BILIRUB SERPL-MCNC: 0.4 MG/DL (ref 0.1–1)
BUN SERPL-MCNC: 23 MG/DL (ref 8–23)
CALCIUM SERPL-MCNC: 9.8 MG/DL (ref 8.7–10.5)
CHLORIDE SERPL-SCNC: 103 MMOL/L (ref 95–110)
CO2 SERPL-SCNC: 28 MMOL/L (ref 23–29)
CORTIS SERPL-MCNC: 4.4 UG/DL
CREAT SERPL-MCNC: 1.1 MG/DL (ref 0.5–1.4)
ERYTHROCYTE [DISTWIDTH] IN BLOOD BY AUTOMATED COUNT: 13.3 % (ref 11.5–14.5)
EST. GFR  (AFRICAN AMERICAN): 58.8 ML/MIN/1.73 M^2
EST. GFR  (NON AFRICAN AMERICAN): 51 ML/MIN/1.73 M^2
GLUCOSE SERPL-MCNC: 94 MG/DL (ref 70–110)
HCT VFR BLD AUTO: 34.2 % (ref 37–48.5)
HGB BLD-MCNC: 11 G/DL (ref 12–16)
IMM GRANULOCYTES # BLD AUTO: 0.06 K/UL (ref 0–0.04)
MCH RBC QN AUTO: 31.4 PG (ref 27–31)
MCHC RBC AUTO-ENTMCNC: 32.2 G/DL (ref 32–36)
MCV RBC AUTO: 98 FL (ref 82–98)
NEUTROPHILS # BLD AUTO: 5.4 K/UL (ref 1.8–7.7)
PLATELET # BLD AUTO: 215 K/UL (ref 150–450)
PMV BLD AUTO: 9.6 FL (ref 9.2–12.9)
POTASSIUM SERPL-SCNC: 4.5 MMOL/L (ref 3.5–5.1)
PROT SERPL-MCNC: 6.4 G/DL (ref 6–8.4)
RBC # BLD AUTO: 3.5 M/UL (ref 4–5.4)
SODIUM SERPL-SCNC: 139 MMOL/L (ref 136–145)
T4 FREE SERPL-MCNC: 0.97 NG/DL (ref 0.71–1.51)
TSH SERPL DL<=0.005 MIU/L-ACNC: 2.05 UIU/ML (ref 0.4–4)
WBC # BLD AUTO: 8.31 K/UL (ref 3.9–12.7)

## 2021-08-20 PROCEDURE — 82533 TOTAL CORTISOL: CPT | Performed by: INTERNAL MEDICINE

## 2021-08-20 PROCEDURE — 80053 COMPREHEN METABOLIC PANEL: CPT | Performed by: INTERNAL MEDICINE

## 2021-08-20 PROCEDURE — 82024 ASSAY OF ACTH: CPT | Performed by: INTERNAL MEDICINE

## 2021-08-20 PROCEDURE — 84439 ASSAY OF FREE THYROXINE: CPT | Performed by: INTERNAL MEDICINE

## 2021-08-20 PROCEDURE — 99214 PR OFFICE/OUTPT VISIT, EST, LEVL IV, 30-39 MIN: ICD-10-PCS | Mod: 95,,, | Performed by: INTERNAL MEDICINE

## 2021-08-20 PROCEDURE — 84443 ASSAY THYROID STIM HORMONE: CPT | Performed by: INTERNAL MEDICINE

## 2021-08-20 PROCEDURE — 99214 OFFICE O/P EST MOD 30 MIN: CPT | Mod: 95,,, | Performed by: INTERNAL MEDICINE

## 2021-08-20 PROCEDURE — 85027 COMPLETE CBC AUTOMATED: CPT | Performed by: INTERNAL MEDICINE

## 2021-08-20 RX ORDER — PREDNISONE 10 MG/1
25 TABLET ORAL DAILY
COMMUNITY
Start: 2021-07-28 | End: 2022-01-12

## 2021-08-20 RX ORDER — ASCORBIC ACID 500 MG
500 TABLET ORAL DAILY
COMMUNITY
End: 2023-12-12

## 2021-08-23 ENCOUNTER — PATIENT MESSAGE (OUTPATIENT)
Dept: INTERNAL MEDICINE | Facility: CLINIC | Age: 71
End: 2021-08-23

## 2021-08-24 DIAGNOSIS — R53.1 WEAKNESS: ICD-10-CM

## 2021-08-24 DIAGNOSIS — C34.11 MALIGNANT NEOPLASM OF UPPER LOBE, RIGHT BRONCHUS OR LUNG: Primary | ICD-10-CM

## 2021-08-25 ENCOUNTER — PATIENT MESSAGE (OUTPATIENT)
Dept: HEMATOLOGY/ONCOLOGY | Facility: CLINIC | Age: 71
End: 2021-08-25

## 2021-08-25 ENCOUNTER — HOSPITAL ENCOUNTER (OUTPATIENT)
Dept: RADIOLOGY | Facility: HOSPITAL | Age: 71
Discharge: HOME | End: 2021-08-25
Attending: INTERNAL MEDICINE
Payer: MEDICARE

## 2021-08-25 DIAGNOSIS — C77.1 SECONDARY AND UNSPECIFIED MALIGNANT NEOPLASM OF INTRATHORACIC LYMPH NODES: ICD-10-CM

## 2021-08-25 DIAGNOSIS — C34.11 MALIGNANT NEOPLASM OF UPPER LOBE, RIGHT BRONCHUS OR LUNG: ICD-10-CM

## 2021-08-25 PROCEDURE — 74176 CT CHEST ABDOMEN PELVIS WITHOUT CONTRAST(XPD): ICD-10-PCS | Mod: 26,,, | Performed by: RADIOLOGY

## 2021-08-25 PROCEDURE — 71250 CT CHEST ABDOMEN PELVIS WITHOUT CONTRAST(XPD): ICD-10-PCS | Mod: 26,,, | Performed by: RADIOLOGY

## 2021-08-25 PROCEDURE — 74176 CT ABD & PELVIS W/O CONTRAST: CPT | Mod: 26,,, | Performed by: RADIOLOGY

## 2021-08-25 PROCEDURE — 74176 CT ABD & PELVIS W/O CONTRAST: CPT | Mod: TC

## 2021-08-25 PROCEDURE — 71250 CT THORAX DX C-: CPT | Mod: 26,,, | Performed by: RADIOLOGY

## 2021-08-25 PROCEDURE — 71250 CT THORAX DX C-: CPT | Mod: TC

## 2021-08-26 ENCOUNTER — PATIENT MESSAGE (OUTPATIENT)
Dept: CARDIOLOGY | Facility: CLINIC | Age: 71
End: 2021-08-26

## 2021-08-26 ENCOUNTER — OFFICE VISIT (OUTPATIENT)
Dept: INTERNAL MEDICINE | Facility: CLINIC | Age: 71
End: 2021-08-26
Payer: MEDICARE

## 2021-08-26 DIAGNOSIS — I47.10 SVT (SUPRAVENTRICULAR TACHYCARDIA): ICD-10-CM

## 2021-08-26 DIAGNOSIS — G89.3 CHRONIC PAIN DUE TO MALIGNANT NEOPLASTIC DISEASE: Primary | ICD-10-CM

## 2021-08-26 DIAGNOSIS — R00.2 HEART PALPITATIONS: ICD-10-CM

## 2021-08-26 DIAGNOSIS — F41.9 ANXIETY: ICD-10-CM

## 2021-08-26 DIAGNOSIS — I10 ESSENTIAL HYPERTENSION: ICD-10-CM

## 2021-08-26 DIAGNOSIS — K52.9 COLITIS: ICD-10-CM

## 2021-08-26 PROCEDURE — 99214 OFFICE O/P EST MOD 30 MIN: CPT | Mod: 95,,, | Performed by: INTERNAL MEDICINE

## 2021-08-26 PROCEDURE — 99214 PR OFFICE/OUTPT VISIT, EST, LEVL IV, 30-39 MIN: ICD-10-PCS | Mod: 95,,, | Performed by: INTERNAL MEDICINE

## 2021-08-26 RX ORDER — HYDROCODONE BITARTRATE AND ACETAMINOPHEN 5; 325 MG/1; MG/1
1 TABLET ORAL EVERY 8 HOURS PRN
Qty: 90 TABLET | Refills: 0 | Status: SHIPPED | OUTPATIENT
Start: 2021-08-26 | End: 2021-10-04 | Stop reason: SDUPTHER

## 2021-08-26 RX ORDER — LORAZEPAM 0.5 MG/1
0.5 TABLET ORAL EVERY 12 HOURS PRN
Qty: 30 TABLET | Refills: 2 | Status: SHIPPED | OUTPATIENT
Start: 2021-08-26 | End: 2022-02-10 | Stop reason: SDUPTHER

## 2021-08-31 RX ORDER — PREDNISONE 10 MG/1
10 TABLET ORAL DAILY
Qty: 30 TABLET | Refills: 2 | Status: CANCELLED | OUTPATIENT
Start: 2021-08-31 | End: 2021-11-29

## 2021-08-31 RX ORDER — PREDNISONE 5 MG/1
5 TABLET ORAL DAILY
Qty: 45 TABLET | Refills: 0 | Status: CANCELLED | OUTPATIENT
Start: 2021-08-31 | End: 2021-11-29

## 2021-09-06 ENCOUNTER — PATIENT MESSAGE (OUTPATIENT)
Dept: HEMATOLOGY/ONCOLOGY | Facility: CLINIC | Age: 71
End: 2021-09-06

## 2021-09-07 ENCOUNTER — OFFICE VISIT (OUTPATIENT)
Dept: HEMATOLOGY/ONCOLOGY | Facility: CLINIC | Age: 71
End: 2021-09-07
Payer: MEDICARE

## 2021-09-07 DIAGNOSIS — C34.11 MALIGNANT NEOPLASM OF UPPER LOBE, RIGHT BRONCHUS OR LUNG: Primary | ICD-10-CM

## 2021-09-07 DIAGNOSIS — K52.9 COLITIS: ICD-10-CM

## 2021-09-07 DIAGNOSIS — C77.1 SECONDARY AND UNSPECIFIED MALIGNANT NEOPLASM OF INTRATHORACIC LYMPH NODES: ICD-10-CM

## 2021-09-07 PROCEDURE — 99214 OFFICE O/P EST MOD 30 MIN: CPT | Mod: 95,,, | Performed by: INTERNAL MEDICINE

## 2021-09-07 PROCEDURE — 99214 PR OFFICE/OUTPT VISIT, EST, LEVL IV, 30-39 MIN: ICD-10-PCS | Mod: 95,,, | Performed by: INTERNAL MEDICINE

## 2021-09-19 ENCOUNTER — PATIENT MESSAGE (OUTPATIENT)
Dept: GASTROENTEROLOGY | Facility: CLINIC | Age: 71
End: 2021-09-19

## 2021-10-03 ENCOUNTER — TELEPHONE (OUTPATIENT)
Dept: ENDOSCOPY | Facility: HOSPITAL | Age: 71
End: 2021-10-03

## 2021-10-04 ENCOUNTER — PATIENT MESSAGE (OUTPATIENT)
Dept: INTERNAL MEDICINE | Facility: CLINIC | Age: 71
End: 2021-10-04

## 2021-10-04 RX ORDER — HYDROCODONE BITARTRATE AND ACETAMINOPHEN 5; 325 MG/1; MG/1
1 TABLET ORAL EVERY 8 HOURS PRN
Qty: 90 TABLET | Refills: 0 | Status: SHIPPED | OUTPATIENT
Start: 2021-10-04 | End: 2021-11-09 | Stop reason: SDUPTHER

## 2021-10-22 ENCOUNTER — TELEPHONE (OUTPATIENT)
Dept: GASTROENTEROLOGY | Facility: CLINIC | Age: 71
End: 2021-10-22

## 2021-10-22 ENCOUNTER — PATIENT MESSAGE (OUTPATIENT)
Dept: HEMATOLOGY/ONCOLOGY | Facility: CLINIC | Age: 71
End: 2021-10-22
Payer: MEDICARE

## 2021-10-25 DIAGNOSIS — C34.11 MALIGNANT NEOPLASM OF UPPER LOBE, RIGHT BRONCHUS OR LUNG: Primary | ICD-10-CM

## 2021-11-05 ENCOUNTER — HOSPITAL ENCOUNTER (OUTPATIENT)
Dept: RADIOLOGY | Facility: HOSPITAL | Age: 71
Discharge: HOME | End: 2021-11-05
Attending: INTERNAL MEDICINE
Payer: MEDICARE

## 2021-11-05 DIAGNOSIS — C34.11 MALIGNANT NEOPLASM OF UPPER LOBE, RIGHT BRONCHUS OR LUNG: ICD-10-CM

## 2021-11-05 PROCEDURE — 71250 CT CHEST ABDOMEN PELVIS WITHOUT CONTRAST(XPD): ICD-10-PCS | Mod: 26,,, | Performed by: RADIOLOGY

## 2021-11-05 PROCEDURE — 71250 CT THORAX DX C-: CPT | Mod: TC

## 2021-11-05 PROCEDURE — 74176 CT ABD & PELVIS W/O CONTRAST: CPT | Mod: 26,,, | Performed by: RADIOLOGY

## 2021-11-05 PROCEDURE — 74176 CT CHEST ABDOMEN PELVIS WITHOUT CONTRAST(XPD): ICD-10-PCS | Mod: 26,,, | Performed by: RADIOLOGY

## 2021-11-05 PROCEDURE — 74176 CT ABD & PELVIS W/O CONTRAST: CPT | Mod: TC

## 2021-11-05 PROCEDURE — 71250 CT THORAX DX C-: CPT | Mod: 26,,, | Performed by: RADIOLOGY

## 2021-11-08 ENCOUNTER — OFFICE VISIT (OUTPATIENT)
Dept: HEMATOLOGY/ONCOLOGY | Facility: CLINIC | Age: 71
End: 2021-11-08
Payer: MEDICARE

## 2021-11-08 ENCOUNTER — PATIENT MESSAGE (OUTPATIENT)
Dept: INTERNAL MEDICINE | Facility: CLINIC | Age: 71
End: 2021-11-08
Payer: MEDICARE

## 2021-11-08 ENCOUNTER — LAB VISIT (OUTPATIENT)
Dept: LAB | Facility: HOSPITAL | Age: 71
End: 2021-11-08
Attending: INTERNAL MEDICINE
Payer: MEDICARE

## 2021-11-08 VITALS
SYSTOLIC BLOOD PRESSURE: 194 MMHG | BODY MASS INDEX: 24.55 KG/M2 | DIASTOLIC BLOOD PRESSURE: 86 MMHG | RESPIRATION RATE: 18 BRPM | HEIGHT: 66 IN | WEIGHT: 152.75 LBS | TEMPERATURE: 99 F | HEART RATE: 69 BPM | OXYGEN SATURATION: 100 %

## 2021-11-08 DIAGNOSIS — C34.90 NON-SMALL CELL LUNG CANCER, UNSPECIFIED LATERALITY: ICD-10-CM

## 2021-11-08 DIAGNOSIS — C34.90 NON-SMALL CELL LUNG CANCER, UNSPECIFIED LATERALITY: Primary | ICD-10-CM

## 2021-11-08 DIAGNOSIS — C77.1 SECONDARY AND UNSPECIFIED MALIGNANT NEOPLASM OF INTRATHORACIC LYMPH NODES: ICD-10-CM

## 2021-11-08 PROCEDURE — 99999 PR PBB SHADOW E&M-EST. PATIENT-LVL IV: ICD-10-PCS | Mod: PBBFAC,,, | Performed by: INTERNAL MEDICINE

## 2021-11-08 PROCEDURE — 99214 OFFICE O/P EST MOD 30 MIN: CPT | Mod: PBBFAC | Performed by: INTERNAL MEDICINE

## 2021-11-08 PROCEDURE — 99215 PR OFFICE/OUTPT VISIT, EST, LEVL V, 40-54 MIN: ICD-10-PCS | Mod: S$PBB,,, | Performed by: INTERNAL MEDICINE

## 2021-11-08 PROCEDURE — 36415 COLL VENOUS BLD VENIPUNCTURE: CPT | Performed by: INTERNAL MEDICINE

## 2021-11-08 PROCEDURE — 99999 PR PBB SHADOW E&M-EST. PATIENT-LVL IV: CPT | Mod: PBBFAC,,, | Performed by: INTERNAL MEDICINE

## 2021-11-08 PROCEDURE — 99215 OFFICE O/P EST HI 40 MIN: CPT | Mod: S$PBB,,, | Performed by: INTERNAL MEDICINE

## 2021-11-09 RX ORDER — HYDROCODONE BITARTRATE AND ACETAMINOPHEN 5; 325 MG/1; MG/1
1 TABLET ORAL EVERY 8 HOURS PRN
Qty: 90 TABLET | Refills: 0 | Status: SHIPPED | OUTPATIENT
Start: 2021-11-09 | End: 2021-12-09 | Stop reason: SDUPTHER

## 2021-11-11 ENCOUNTER — TELEPHONE (OUTPATIENT)
Dept: HEMATOLOGY/ONCOLOGY | Facility: CLINIC | Age: 71
End: 2021-11-11
Payer: MEDICARE

## 2021-11-12 ENCOUNTER — PATIENT MESSAGE (OUTPATIENT)
Dept: HEMATOLOGY/ONCOLOGY | Facility: CLINIC | Age: 71
End: 2021-11-12
Payer: MEDICARE

## 2021-11-14 ENCOUNTER — PATIENT MESSAGE (OUTPATIENT)
Dept: HEMATOLOGY/ONCOLOGY | Facility: CLINIC | Age: 71
End: 2021-11-14
Payer: MEDICARE

## 2021-11-15 DIAGNOSIS — M19.90 ARTHRITIS: Primary | ICD-10-CM

## 2021-11-16 ENCOUNTER — TELEPHONE (OUTPATIENT)
Dept: HEMATOLOGY/ONCOLOGY | Facility: CLINIC | Age: 71
End: 2021-11-16
Payer: MEDICARE

## 2021-11-17 LAB — GUARDANT 360: NORMAL

## 2021-11-18 ENCOUNTER — PATIENT MESSAGE (OUTPATIENT)
Dept: INFECTIOUS DISEASES | Facility: CLINIC | Age: 71
End: 2021-11-18
Payer: MEDICARE

## 2021-11-18 ENCOUNTER — PATIENT MESSAGE (OUTPATIENT)
Dept: HEMATOLOGY/ONCOLOGY | Facility: CLINIC | Age: 71
End: 2021-11-18
Payer: MEDICARE

## 2021-11-22 ENCOUNTER — PATIENT MESSAGE (OUTPATIENT)
Dept: HEMATOLOGY/ONCOLOGY | Facility: CLINIC | Age: 71
End: 2021-11-22
Payer: MEDICARE

## 2021-11-23 ENCOUNTER — HOSPITAL ENCOUNTER (OUTPATIENT)
Dept: RADIOLOGY | Facility: HOSPITAL | Age: 71
Discharge: HOME | End: 2021-11-23
Attending: INTERNAL MEDICINE
Payer: MEDICARE

## 2021-11-23 ENCOUNTER — PATIENT MESSAGE (OUTPATIENT)
Dept: INFECTIOUS DISEASES | Facility: CLINIC | Age: 71
End: 2021-11-23
Payer: MEDICARE

## 2021-11-23 ENCOUNTER — RESEARCH ENCOUNTER (OUTPATIENT)
Dept: RESEARCH | Facility: HOSPITAL | Age: 71
End: 2021-11-23
Payer: MEDICARE

## 2021-11-23 DIAGNOSIS — C77.1 SECONDARY AND UNSPECIFIED MALIGNANT NEOPLASM OF INTRATHORACIC LYMPH NODES: ICD-10-CM

## 2021-11-23 DIAGNOSIS — C34.90 NON-SMALL CELL LUNG CANCER, UNSPECIFIED LATERALITY: ICD-10-CM

## 2021-11-23 LAB — POCT GLUCOSE: 116 MG/DL (ref 70–110)

## 2021-11-23 PROCEDURE — 78815 NM PET CT ROUTINE: ICD-10-PCS | Mod: 26,PS,, | Performed by: RADIOLOGY

## 2021-11-23 PROCEDURE — 78815 PET IMAGE W/CT SKULL-THIGH: CPT | Mod: TC

## 2021-11-23 PROCEDURE — 78815 PET IMAGE W/CT SKULL-THIGH: CPT | Mod: 26,PS,, | Performed by: RADIOLOGY

## 2021-11-26 ENCOUNTER — PATIENT OUTREACH (OUTPATIENT)
Dept: ADMINISTRATIVE | Facility: OTHER | Age: 71
End: 2021-11-26
Payer: MEDICARE

## 2021-11-29 ENCOUNTER — HOSPITAL ENCOUNTER (OUTPATIENT)
Dept: RADIOLOGY | Facility: HOSPITAL | Age: 71
Discharge: HOME | End: 2021-11-29
Attending: INTERNAL MEDICINE
Payer: MEDICARE

## 2021-11-29 ENCOUNTER — OFFICE VISIT (OUTPATIENT)
Dept: RHEUMATOLOGY | Facility: CLINIC | Age: 71
End: 2021-11-29
Payer: MEDICARE

## 2021-11-29 VITALS — HEIGHT: 66 IN | BODY MASS INDEX: 25.12 KG/M2 | WEIGHT: 156.31 LBS

## 2021-11-29 DIAGNOSIS — M19.90 ARTHRITIS: Primary | ICD-10-CM

## 2021-11-29 DIAGNOSIS — C34.91 NON-SMALL CELL CANCER OF RIGHT LUNG: ICD-10-CM

## 2021-11-29 DIAGNOSIS — M19.90 ARTHRITIS: ICD-10-CM

## 2021-11-29 PROCEDURE — 73630 X-RAY EXAM OF FOOT: CPT | Mod: TC,50

## 2021-11-29 PROCEDURE — 73130 XR HAND COMPLETE 3 VIEWS BILATERAL: ICD-10-PCS | Mod: 26,50,, | Performed by: RADIOLOGY

## 2021-11-29 PROCEDURE — 73130 X-RAY EXAM OF HAND: CPT | Mod: 26,50,, | Performed by: RADIOLOGY

## 2021-11-29 PROCEDURE — 99204 PR OFFICE/OUTPT VISIT, NEW, LEVL IV, 45-59 MIN: ICD-10-PCS | Mod: S$PBB,,, | Performed by: INTERNAL MEDICINE

## 2021-11-29 PROCEDURE — 73630 X-RAY EXAM OF FOOT: CPT | Mod: 26,50,, | Performed by: RADIOLOGY

## 2021-11-29 PROCEDURE — 99999 PR PBB SHADOW E&M-EST. PATIENT-LVL IV: ICD-10-PCS | Mod: PBBFAC,,, | Performed by: INTERNAL MEDICINE

## 2021-11-29 PROCEDURE — 73130 X-RAY EXAM OF HAND: CPT | Mod: TC,50

## 2021-11-29 PROCEDURE — 99204 OFFICE O/P NEW MOD 45 MIN: CPT | Mod: S$PBB,,, | Performed by: INTERNAL MEDICINE

## 2021-11-29 PROCEDURE — 73630 XR FOOT COMPLETE 3 VIEW BILATERAL: ICD-10-PCS | Mod: 26,50,, | Performed by: RADIOLOGY

## 2021-11-29 PROCEDURE — 99999 PR PBB SHADOW E&M-EST. PATIENT-LVL IV: CPT | Mod: PBBFAC,,, | Performed by: INTERNAL MEDICINE

## 2021-11-29 PROCEDURE — 99214 OFFICE O/P EST MOD 30 MIN: CPT | Mod: PBBFAC | Performed by: INTERNAL MEDICINE

## 2021-11-29 RX ORDER — GABAPENTIN 100 MG/1
100 CAPSULE ORAL 3 TIMES DAILY
Qty: 90 CAPSULE | Refills: 11 | Status: SHIPPED | OUTPATIENT
Start: 2021-11-29 | End: 2022-01-12

## 2021-11-29 RX ORDER — PREDNISONE 5 MG/1
7.5 TABLET ORAL DAILY
COMMUNITY
Start: 2021-08-31 | End: 2022-01-12

## 2021-12-01 ENCOUNTER — PATIENT MESSAGE (OUTPATIENT)
Dept: HEMATOLOGY/ONCOLOGY | Facility: CLINIC | Age: 71
End: 2021-12-01
Payer: MEDICARE

## 2021-12-01 ENCOUNTER — TELEPHONE (OUTPATIENT)
Dept: HEMATOLOGY/ONCOLOGY | Facility: CLINIC | Age: 71
End: 2021-12-01
Payer: MEDICARE

## 2021-12-09 ENCOUNTER — OFFICE VISIT (OUTPATIENT)
Dept: HEMATOLOGY/ONCOLOGY | Facility: CLINIC | Age: 71
End: 2021-12-09
Payer: MEDICARE

## 2021-12-09 ENCOUNTER — PATIENT MESSAGE (OUTPATIENT)
Dept: HEMATOLOGY/ONCOLOGY | Facility: CLINIC | Age: 71
End: 2021-12-09

## 2021-12-09 ENCOUNTER — TELEPHONE (OUTPATIENT)
Dept: HEMATOLOGY/ONCOLOGY | Facility: CLINIC | Age: 71
End: 2021-12-09
Payer: MEDICARE

## 2021-12-09 ENCOUNTER — PATIENT MESSAGE (OUTPATIENT)
Dept: INTERNAL MEDICINE | Facility: CLINIC | Age: 71
End: 2021-12-09
Payer: MEDICARE

## 2021-12-09 DIAGNOSIS — C34.11 MALIGNANT NEOPLASM OF UPPER LOBE, RIGHT BRONCHUS OR LUNG: Primary | ICD-10-CM

## 2021-12-09 DIAGNOSIS — E03.9 HYPOTHYROIDISM, UNSPECIFIED TYPE: ICD-10-CM

## 2021-12-09 DIAGNOSIS — C77.1 SECONDARY AND UNSPECIFIED MALIGNANT NEOPLASM OF INTRATHORACIC LYMPH NODES: ICD-10-CM

## 2021-12-09 PROCEDURE — 99215 OFFICE O/P EST HI 40 MIN: CPT | Mod: 95,,, | Performed by: INTERNAL MEDICINE

## 2021-12-09 PROCEDURE — 99215 PR OFFICE/OUTPT VISIT, EST, LEVL V, 40-54 MIN: ICD-10-PCS | Mod: 95,,, | Performed by: INTERNAL MEDICINE

## 2021-12-10 RX ORDER — HYDROCODONE BITARTRATE AND ACETAMINOPHEN 5; 325 MG/1; MG/1
1 TABLET ORAL EVERY 8 HOURS PRN
Qty: 90 TABLET | Refills: 0 | Status: SHIPPED | OUTPATIENT
Start: 2021-12-10 | End: 2022-01-12 | Stop reason: SDUPTHER

## 2021-12-13 ENCOUNTER — INFUSION (OUTPATIENT)
Dept: INFUSION THERAPY | Facility: HOSPITAL | Age: 71
End: 2021-12-13
Payer: MEDICARE

## 2021-12-13 VITALS
HEIGHT: 66 IN | HEART RATE: 62 BPM | TEMPERATURE: 99 F | RESPIRATION RATE: 18 BRPM | WEIGHT: 156.31 LBS | SYSTOLIC BLOOD PRESSURE: 144 MMHG | BODY MASS INDEX: 25.12 KG/M2 | DIASTOLIC BLOOD PRESSURE: 66 MMHG

## 2021-12-13 DIAGNOSIS — C34.11 MALIGNANT NEOPLASM OF UPPER LOBE, RIGHT BRONCHUS OR LUNG: ICD-10-CM

## 2021-12-13 DIAGNOSIS — C77.1 SECONDARY AND UNSPECIFIED MALIGNANT NEOPLASM OF INTRATHORACIC LYMPH NODES: Primary | ICD-10-CM

## 2021-12-13 PROCEDURE — 63600175 PHARM REV CODE 636 W HCPCS: Mod: JG | Performed by: INTERNAL MEDICINE

## 2021-12-13 PROCEDURE — 96413 CHEMO IV INFUSION 1 HR: CPT

## 2021-12-13 PROCEDURE — A4216 STERILE WATER/SALINE, 10 ML: HCPCS | Performed by: INTERNAL MEDICINE

## 2021-12-13 PROCEDURE — 25000003 PHARM REV CODE 250: Performed by: INTERNAL MEDICINE

## 2021-12-13 RX ORDER — HEPARIN 100 UNIT/ML
500 SYRINGE INTRAVENOUS
Status: DISCONTINUED | OUTPATIENT
Start: 2021-12-13 | End: 2021-12-13 | Stop reason: HOSPADM

## 2021-12-13 RX ORDER — HEPARIN 100 UNIT/ML
500 SYRINGE INTRAVENOUS
Status: CANCELLED | OUTPATIENT
Start: 2021-12-13

## 2021-12-13 RX ORDER — SODIUM CHLORIDE 0.9 % (FLUSH) 0.9 %
10 SYRINGE (ML) INJECTION
Status: DISCONTINUED | OUTPATIENT
Start: 2021-12-13 | End: 2021-12-13 | Stop reason: HOSPADM

## 2021-12-13 RX ORDER — SODIUM CHLORIDE 0.9 % (FLUSH) 0.9 %
10 SYRINGE (ML) INJECTION
Status: CANCELLED | OUTPATIENT
Start: 2021-12-13

## 2021-12-13 RX ADMIN — SODIUM CHLORIDE 240 MG: 9 INJECTION, SOLUTION INTRAVENOUS at 11:12

## 2021-12-13 RX ADMIN — HEPARIN 500 UNITS: 100 SYRINGE at 12:12

## 2021-12-13 RX ADMIN — Medication 10 ML: at 12:12

## 2021-12-20 ENCOUNTER — TELEPHONE (OUTPATIENT)
Dept: HEMATOLOGY/ONCOLOGY | Facility: CLINIC | Age: 71
End: 2021-12-20
Payer: MEDICARE

## 2021-12-22 ENCOUNTER — PATIENT MESSAGE (OUTPATIENT)
Dept: HEMATOLOGY/ONCOLOGY | Facility: CLINIC | Age: 71
End: 2021-12-22
Payer: MEDICARE

## 2021-12-27 ENCOUNTER — OFFICE VISIT (OUTPATIENT)
Dept: HEMATOLOGY/ONCOLOGY | Facility: CLINIC | Age: 71
End: 2021-12-27
Payer: MEDICARE

## 2021-12-27 ENCOUNTER — INFUSION (OUTPATIENT)
Dept: INFUSION THERAPY | Facility: HOSPITAL | Age: 71
End: 2021-12-27
Payer: MEDICARE

## 2021-12-27 VITALS
DIASTOLIC BLOOD PRESSURE: 78 MMHG | OXYGEN SATURATION: 100 % | HEIGHT: 66 IN | HEART RATE: 64 BPM | WEIGHT: 153.81 LBS | RESPIRATION RATE: 18 BRPM | TEMPERATURE: 99 F | SYSTOLIC BLOOD PRESSURE: 186 MMHG | BODY MASS INDEX: 24.72 KG/M2

## 2021-12-27 VITALS
OXYGEN SATURATION: 100 % | DIASTOLIC BLOOD PRESSURE: 68 MMHG | SYSTOLIC BLOOD PRESSURE: 155 MMHG | TEMPERATURE: 99 F | RESPIRATION RATE: 18 BRPM | HEART RATE: 66 BPM

## 2021-12-27 DIAGNOSIS — C34.90 NON-SMALL CELL LUNG CANCER, UNSPECIFIED LATERALITY: ICD-10-CM

## 2021-12-27 DIAGNOSIS — C34.11 MALIGNANT NEOPLASM OF UPPER LOBE, RIGHT BRONCHUS OR LUNG: Primary | ICD-10-CM

## 2021-12-27 DIAGNOSIS — C77.1 SECONDARY AND UNSPECIFIED MALIGNANT NEOPLASM OF INTRATHORACIC LYMPH NODES: Primary | ICD-10-CM

## 2021-12-27 DIAGNOSIS — C34.11 MALIGNANT NEOPLASM OF UPPER LOBE, RIGHT BRONCHUS OR LUNG: ICD-10-CM

## 2021-12-27 DIAGNOSIS — E03.9 HYPOTHYROIDISM, UNSPECIFIED TYPE: ICD-10-CM

## 2021-12-27 DIAGNOSIS — C77.1 SECONDARY AND UNSPECIFIED MALIGNANT NEOPLASM OF INTRATHORACIC LYMPH NODES: ICD-10-CM

## 2021-12-27 DIAGNOSIS — I10 ESSENTIAL HYPERTENSION: ICD-10-CM

## 2021-12-27 PROCEDURE — 99999 PR PBB SHADOW E&M-EST. PATIENT-LVL IV: ICD-10-PCS | Mod: PBBFAC,,, | Performed by: PHYSICIAN ASSISTANT

## 2021-12-27 PROCEDURE — 63600175 PHARM REV CODE 636 W HCPCS: Mod: JG | Performed by: STUDENT IN AN ORGANIZED HEALTH CARE EDUCATION/TRAINING PROGRAM

## 2021-12-27 PROCEDURE — 25000003 PHARM REV CODE 250: Performed by: STUDENT IN AN ORGANIZED HEALTH CARE EDUCATION/TRAINING PROGRAM

## 2021-12-27 PROCEDURE — 99214 OFFICE O/P EST MOD 30 MIN: CPT | Mod: PBBFAC,25 | Performed by: PHYSICIAN ASSISTANT

## 2021-12-27 PROCEDURE — 99215 OFFICE O/P EST HI 40 MIN: CPT | Mod: S$PBB,,, | Performed by: PHYSICIAN ASSISTANT

## 2021-12-27 PROCEDURE — 99999 PR PBB SHADOW E&M-EST. PATIENT-LVL IV: CPT | Mod: PBBFAC,,, | Performed by: PHYSICIAN ASSISTANT

## 2021-12-27 PROCEDURE — 99215 PR OFFICE/OUTPT VISIT, EST, LEVL V, 40-54 MIN: ICD-10-PCS | Mod: S$PBB,,, | Performed by: PHYSICIAN ASSISTANT

## 2021-12-27 PROCEDURE — 96413 CHEMO IV INFUSION 1 HR: CPT

## 2021-12-27 RX ORDER — HEPARIN 100 UNIT/ML
500 SYRINGE INTRAVENOUS
Status: DISCONTINUED | OUTPATIENT
Start: 2021-12-27 | End: 2021-12-27 | Stop reason: HOSPADM

## 2021-12-27 RX ORDER — SODIUM CHLORIDE 0.9 % (FLUSH) 0.9 %
10 SYRINGE (ML) INJECTION
Status: DISCONTINUED | OUTPATIENT
Start: 2021-12-27 | End: 2021-12-27 | Stop reason: HOSPADM

## 2021-12-27 RX ORDER — NIVOLUMAB 10 MG/ML
100 INJECTION INTRAVENOUS
COMMUNITY
Start: 2021-12-13 | End: 2022-09-27

## 2021-12-27 RX ADMIN — SODIUM CHLORIDE: 0.9 INJECTION, SOLUTION INTRAVENOUS at 01:12

## 2021-12-27 RX ADMIN — SODIUM CHLORIDE 240 MG: 9 INJECTION, SOLUTION INTRAVENOUS at 02:12

## 2022-01-06 ENCOUNTER — OFFICE VISIT (OUTPATIENT)
Dept: OPTOMETRY | Facility: CLINIC | Age: 72
End: 2022-01-06
Payer: MEDICARE

## 2022-01-06 ENCOUNTER — TELEPHONE (OUTPATIENT)
Dept: OPTOMETRY | Facility: CLINIC | Age: 72
End: 2022-01-06
Payer: MEDICARE

## 2022-01-06 DIAGNOSIS — H04.123 DRY EYE SYNDROME OF BOTH EYES: Primary | ICD-10-CM

## 2022-01-06 DIAGNOSIS — H04.203 BILATERAL EPIPHORA: ICD-10-CM

## 2022-01-06 PROCEDURE — 99213 OFFICE O/P EST LOW 20 MIN: CPT | Mod: PBBFAC | Performed by: OPTOMETRIST

## 2022-01-06 PROCEDURE — 92012 PR EYE EXAM, EST PATIENT,INTERMED: ICD-10-PCS | Mod: S$PBB,,, | Performed by: OPTOMETRIST

## 2022-01-06 PROCEDURE — 99999 PR PBB SHADOW E&M-EST. PATIENT-LVL III: ICD-10-PCS | Mod: PBBFAC,,, | Performed by: OPTOMETRIST

## 2022-01-06 PROCEDURE — 99999 PR PBB SHADOW E&M-EST. PATIENT-LVL III: CPT | Mod: PBBFAC,,, | Performed by: OPTOMETRIST

## 2022-01-06 PROCEDURE — 92012 INTRM OPH EXAM EST PATIENT: CPT | Mod: S$PBB,,, | Performed by: OPTOMETRIST

## 2022-01-06 RX ORDER — PREDNISOLONE ACETATE 10 MG/ML
1 SUSPENSION/ DROPS OPHTHALMIC 3 TIMES DAILY
Qty: 5 ML | Refills: 2 | Status: SHIPPED | OUTPATIENT
Start: 2022-01-06 | End: 2022-02-17 | Stop reason: ALTCHOICE

## 2022-01-06 NOTE — PROGRESS NOTES
"HPI     CC: Pt referred by both Dr. Margo Caldwell (PCP) and Dr. Stephanie Dhaliwal   (oncologist) for new patient complaints of dry eyes. Pt has been on   immunotherapy since 2021 for lung caner; pt had   medication discontinued for ulcerative colitis. Pt states that   immunotherapy resumed 2021 and has issues with her eyes ever   since. Pt states that there eyes water P.M.>A.M. ("like I'm crying.") Pt   states that wearing her CL make her eyes feels better because they don't   water as much. Pt states 2/10 eye pain ("like an ache") when she pushes on   her eyes OU. Patient removes contact lens after 10-12 hours ofwear .   Replaces contact lenses every month.  BRIDGETT: 2021 with Dr. Nichols    Which eye: OU  How lon month  Improvement: minimal  (-)redness  (-)itching   (-)pain  (-)light sensitivity   (-)changes in vision   (-)discharge  (+)tearing   (+)CL wear   (+)gtt use: (+)Refresh Plus gtts at least TID OU - minimal help    (+)POHx: (+)ADAM OU (+)cataracts OU (+)CL wearer             Last edited by Kayy Power on 2022  2:35 PM. (History)        ROS     Negative for: Constitutional, Gastrointestinal, Neurological, Skin,   Genitourinary, Musculoskeletal, HENT, Endocrine, Cardiovascular, Eyes,   Respiratory, Psychiatric, Allergic/Imm, Heme/Lymph    Last edited by Lilia Sanchez, OD on 2022  2:47 PM. (History)        Assessment /Plan     For exam results, see Encounter Report.    Dry eye syndrome of both eyes    Bilateral epiphora      MONITOR. ED PT ON ALL EXAM FINDINGS.   RX PRED FORTE QID OU.   AT'S PRN  AND WARM COMPRESSES   RTC 1-2 WEEKS/PRN     "

## 2022-01-06 NOTE — TELEPHONE ENCOUNTER
Spoke to pt RE: 01/06/2022 dry eye appt with Dr. Sanchez at 3:00 pm. Offered pt earlier time slot of 2:30 pm for appt. Pt accepted earlier time of 2:30 pm for 01/06/2022 appt. Confirmed with pt knowledge of location of appt. Appt confirmed with pt.

## 2022-01-09 ENCOUNTER — PATIENT MESSAGE (OUTPATIENT)
Dept: HEMATOLOGY/ONCOLOGY | Facility: CLINIC | Age: 72
End: 2022-01-09
Payer: MEDICARE

## 2022-01-10 ENCOUNTER — TELEPHONE (OUTPATIENT)
Dept: GASTROENTEROLOGY | Facility: CLINIC | Age: 72
End: 2022-01-10
Payer: MEDICARE

## 2022-01-10 ENCOUNTER — OFFICE VISIT (OUTPATIENT)
Dept: HEMATOLOGY/ONCOLOGY | Facility: CLINIC | Age: 72
End: 2022-01-10
Payer: MEDICARE

## 2022-01-10 ENCOUNTER — LAB VISIT (OUTPATIENT)
Dept: LAB | Facility: HOSPITAL | Age: 72
End: 2022-01-10
Attending: INTERNAL MEDICINE
Payer: MEDICARE

## 2022-01-10 DIAGNOSIS — C34.11 MALIGNANT NEOPLASM OF UPPER LOBE, RIGHT BRONCHUS OR LUNG: ICD-10-CM

## 2022-01-10 DIAGNOSIS — K62.5 RECTAL BLEED: ICD-10-CM

## 2022-01-10 DIAGNOSIS — C77.1 SECONDARY AND UNSPECIFIED MALIGNANT NEOPLASM OF INTRATHORACIC LYMPH NODES: ICD-10-CM

## 2022-01-10 DIAGNOSIS — C34.11 MALIGNANT NEOPLASM OF UPPER LOBE, RIGHT BRONCHUS OR LUNG: Primary | ICD-10-CM

## 2022-01-10 LAB
ERYTHROCYTE [DISTWIDTH] IN BLOOD BY AUTOMATED COUNT: 12.1 % (ref 11.5–14.5)
HCT VFR BLD AUTO: 39.7 % (ref 37–48.5)
HGB BLD-MCNC: 12.5 G/DL (ref 12–16)
IMM GRANULOCYTES # BLD AUTO: 0.01 K/UL (ref 0–0.04)
MCH RBC QN AUTO: 30.4 PG (ref 27–31)
MCHC RBC AUTO-ENTMCNC: 31.5 G/DL (ref 32–36)
MCV RBC AUTO: 97 FL (ref 82–98)
NEUTROPHILS # BLD AUTO: 2.6 K/UL (ref 1.8–7.7)
PLATELET # BLD AUTO: 193 K/UL (ref 150–450)
PMV BLD AUTO: 9.7 FL (ref 9.2–12.9)
RBC # BLD AUTO: 4.11 M/UL (ref 4–5.4)
WBC # BLD AUTO: 5.4 K/UL (ref 3.9–12.7)

## 2022-01-10 PROCEDURE — 99214 PR OFFICE/OUTPT VISIT, EST, LEVL IV, 30-39 MIN: ICD-10-PCS | Mod: 95,,, | Performed by: INTERNAL MEDICINE

## 2022-01-10 PROCEDURE — 36415 COLL VENOUS BLD VENIPUNCTURE: CPT | Performed by: INTERNAL MEDICINE

## 2022-01-10 PROCEDURE — 85027 COMPLETE CBC AUTOMATED: CPT | Performed by: INTERNAL MEDICINE

## 2022-01-10 PROCEDURE — 99214 OFFICE O/P EST MOD 30 MIN: CPT | Mod: 95,,, | Performed by: INTERNAL MEDICINE

## 2022-01-10 NOTE — Clinical Note
Patrice Dubois, She notes rectal bleeding after she started immunotherapy, Can you take a look please, U did the EUS last time in June 2021

## 2022-01-10 NOTE — PROGRESS NOTES
"Subjective:       Patient ID: Alesha Toney is a 71 y.o. female.    The patient location is: home   The chief complaint leading to consultation is: lung cancer    Visit type: audiovisual          Each patient to whom he or she provides medical services by telemedicine is:  (1) informed of the relationship between the physician and patient and the respective role of any other health care provider with respect to management of the patient; and (2) notified that he or she may decline to receive medical services by telemedicine and may withdraw from such care at any time.    Notes:   Chief Complaint: Lung Cancer  Ms. Alesha Toney is a 71-year-old otherwise healthy female who started having some shoulder pain, which was lasting for over six weeks.  She went and saw her primary care physician and underwent an x-ray, which revealed right upper lobe lung mass worrisome for malignancy and a CT scan was recommended, which was done on 6/12/18 and that revealed a newly developing mass, pleural based, lateral posterior segment, right upper lobe 3.5 x 3.5 cm, surrounding stellate margin and patchy infiltrates, particularly superiorly,   anteriorly infiltrates extend perihilar into the anterior segment.  She then underwent CT-guided biopsy, which revealed well-differentiated adenocarcinoma.       Her PET scan from 6/29/18 There is physiologic intracranial, head, and neck activity.  There is a large right upper lobe mass SUV max 9.11.  No local or distant metastatic disease seen.  There is physiologic liver, spleen, GI and  activity.  There are a few liver cysts.  No adenopathy is seen.  The adrenal glands are not enlarged.  No adenopathy is seen.  No suspicious bone lesions are seen.     She underwent VATS with right upper lobectomy. Mediastinal lymphadenectomy on 8/9/18. Pathology revealed "Tumor site: Upper lobe.Tumor size: 7 x 4 x 2.7 cm.Tumor focality: Single tumor.  Histologic type: Mixed invasive mucinous and " "nonmucinous adenocarcinoma. Histologic grade: G2: Moderately differentiated.Spread through air spaces (STATS): Present. Visceral pleura invasion: Not identified.  Lymphovascular invasion: Not identified. Direct invasion of adjacent structures: No adjacent structures present. Margins: All margins are uninvolved by carcinoma.Distance of invasive carcinoma from closest margin: 1 cm from the bronchial margin.Treatment effect: No known presurgical therapy. Regional lymph nodes: Number of lymph nodes involved: 0. Number of lymph nodes examined: 11. Pathologic Stage Classification: pT3 N0"        She completed 4 cycles of adjuvant chemo with Cisplatin and Alimta as of 1/10/19   She is on surveillance.     CT chest of 9/17/19 which reveals "Operative change of right upper lobectomy for small-cell lung cancer with several scattered subsolid opacities and a new solid nodule in the right lower lobe measuring up to 0.5 cm.  We recommend continued surveillance with the role and schedule for such surveillance to be determined by clinical considerations. Sided chest port distal tip terminating at the confluence of the brachiocephalic vein in the SVC.  Correlate with device functioning. Apically predominant emphysematous changes, left greater than right. Aortic annular calcification and multi-vessel coronary artery atherosclerosis. Numerous unchanged hepatic hypodensities, likely cysts"     PET scan from 10/1/19 reveals "1.6 cm soft tissue lesion re-identified posterior to the bronchus intermedius.  No corresponding focal increased radiotracer uptake to suggest local recurrence or metastatic disease. Stable subcentimeter opacities throughout the bilateral lower lobes, too small to characterize with PET-CT. Focal increased radiotracer uptake and subtle wall thickening in the rectum.  Findings are concerning for primary neoplastic process.  Recommend further evaluation with direct visualization"     She returned from [facility] and " "was advised to proceed with chemo/RT with either Docetaxel/platinum or Carboplatin/Alimta.     She completed chemo/RT with Carboplatin and Alimta as of 12/31/19     She underwent CT chest on 5/27/2020 which revealed 1. In this patient with history of lung cancer status post right upper lobectomy, there is a soft tissue opacity at the posterior margin of the staple line.  This lesion has been enlarging progressively and now measures 1.3 x 1.6 cm.  There is also a new 1.7 x 1.7 cm opacity at the right paravertebral pleural margin which is inseparable from this lesion.  These findings may represent post radiation change in light of the hypermetabolic lesion in this region on PET-CT of 10/01/2019 (image 69/299).There is a 1 cm opacity in the superior or lateral basal segment of the right lower lobe (axial series 4, image 243) which has enlarged since 09/17/2019.  Nature of this lesion is unclear.  Clinical considerations will determine if percutaneous biopsy or metabolic assessment is warranted. 1.0 cm solid opacity with branching configuration (series 4, image 205) is located in the lateral basal segment of the left lower lobe, stable since 02/16/2019 (02/06/2019 axial series 4, image 310). Appearance and bifurcating character suggests mucoid impaction in mildly ectatic peripheral airway, likely not significant. Persistent clustered small centrilobular nodules in the apical segment of the right upper lobe likely reflecting small airway inflammation/infection. Partially visualized left chest port with distal catheter tip at the junction of the brachiocephalic veins and SVC, similar as on 09/17/2019"  She thus underwent PET scan on 6/1/2020 which revealed "No evidence of residual viable lung malignancy.  Evolving post radiation changes in the right paramediastinal lung, with a new irregular subpleural density which may also be related to recent radiation.  Attention on follow-up. Interval decrease in size of a soft " "tissue lesion along the inferior margin of the lobectomy stable line.  Several stable subcentimeter soft tissue opacities in the lower lobes bilaterally, As above.  Attention on follow-up.  Persistent hypermetabolic rectal lesion concerning for malignancy.  Recommend direct visualization and tissue sampling as clinically indicated"  I discussed her case in thoracic conference today and findings are felt to be related to RT     Her restaging CT scans from 1/20/2021 which reveal "Persistent soft tissue opacity in the posterior margin of the staple line that is increased in prominence when compared to the prior examination and is worrisome for disease progression. No new lesions identified.  Stable pulmonary nodules. Stable hypodense lesions throughout the liver that likely represent hepatic cysts"        Restaging PET scan from 1/27/2021 reveals "Increased size and hypermetabolic activity involving soft tissue opacity along the inferior border of the right upper lobectomy stable line concerning for progression of disease. Mildly increased hypermetabolic activity involving the rectum compatible with known high-grade dysplasia/intramucosal carcinoma. Interval decrease in size and resolution of hypermetabolic activity involving subcentimeter subpleural opacity within the right lower lobe"     MRI brain from  2/3/2021 reveals no evidence of recurrence.  GUARDANT testing only reveals p53, PDL1 is 0 and no other targets        PET scan from 3/24/2021 reveals "In this patient with known lung cancer, there is a persistent hypermetabolic right hilar mass consistent with viable tumor.  Interval increase in size is equivocal for progression of fibrosis versus tumor growth. 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  Activity appears more focal and possibly more proximal than before.  Consider direct visualization for further evaluation. The previously " "described subcentimeter subpleural opacity within the right lower lobe is not definitely seen on today's exam"               She last received immunotherapy on 5/28/2021. She underwent EUS on 6/25/2021 which revealed "Erythematous, congested, and ulcerated mucosa  (proctitis) in rectosigmoid colon.  Pathology reveals Colon, rectosigmoid, biopsy:  Moderate active colitis     She completed steroids about 5 weeks ago.      CT scans from 11/5/2021 reveals "In this patient with lung cancer, there is postoperative changes of right upper lobe resection.  Persistent soft tissue opacity along the right hilum suture line which is slightly increased in size from prior exam.  More conspicuous appearing nodules within the right lower lobe seen on prior exam.  New nodule within the right middle lobe measuring 1.3 cm.  Overall findings are all worrisome for disease progression. Scattered areas of centrilobular nodularity within the bilateral lungs.  Nonspecific and can be seen in the setting of infectious or inflammatory etiologies. Unchanged hepatics cysts. Stable left adrenal gland nodule."     HPIShe notes rectal bleeding which started after she strained for a bowel movement but is concerned if her colitis is acting up again      Review of Systems   Constitutional: Negative for appetite change, fatigue and unexpected weight change.   HENT: Negative for mouth sores.    Eyes: Negative for visual disturbance.   Respiratory: Negative for cough and shortness of breath.    Cardiovascular: Negative for chest pain.   Gastrointestinal: Positive for blood in stool. Negative for abdominal pain and diarrhea.   Genitourinary: Negative for frequency.   Musculoskeletal: Negative for back pain.   Integumentary:  Negative for rash.   Neurological: Negative for headaches.   Hematological: Negative for adenopathy.   Psychiatric/Behavioral: The patient is not nervous/anxious.    All other systems reviewed and are negative.        Objective:    "   Physical Exam      LABS:  WBC   Date Value Ref Range Status   01/10/2022 5.40 3.90 - 12.70 K/uL Final     Hemoglobin   Date Value Ref Range Status   01/10/2022 12.5 12.0 - 16.0 g/dL Final     POC Hematocrit   Date Value Ref Range Status   08/09/2018 33 (L) 36 - 54 %PCV Final     Hematocrit   Date Value Ref Range Status   01/10/2022 39.7 37.0 - 48.5 % Final     Platelets   Date Value Ref Range Status   01/10/2022 193 150 - 450 K/uL Final     Gran # (ANC)   Date Value Ref Range Status   01/10/2022 2.6 1.8 - 7.7 K/uL Final     Comment:     The ANC is based on a white cell differential from an   automated cell counter. It has not been microscopically   reviewed for the presence of abnormal cells. Clinical   correlation is required.         Chemistry        Component Value Date/Time     01/10/2022 0835    K 5.0 01/10/2022 0835     01/10/2022 0835    CO2 28 01/10/2022 0835    BUN 20 01/10/2022 0835    CREATININE 1.1 01/10/2022 0835     (H) 01/10/2022 0835        Component Value Date/Time    CALCIUM 10.2 01/10/2022 0835    ALKPHOS 50 (L) 01/10/2022 0835    AST 20 01/10/2022 0835    ALT 14 01/10/2022 0835    BILITOT 0.4 01/10/2022 0835    ESTGFRAFRICA 58.4 (A) 01/10/2022 0835    EGFRNONAA 50.6 (A) 01/10/2022 0835          Assessment:       Problem List Items Addressed This Visit     Malignant neoplasm of upper lobe, right bronchus or lung - Primary    Secondary and unspecified malignant neoplasm of intrathoracic lymph nodes      Other Visit Diagnoses     Rectal bleed              Plan:           1,2,3. Hold Opdivo since rectal bleed, concern for colitis. ,. Sent message to GI for a EUS of rectum. Dr. Clark will contact her and bring her in for procedure    Above care plan was discussed with patient and accompanying significant other  and all questions were addressed to their satisfaction

## 2022-01-10 NOTE — TELEPHONE ENCOUNTER
Spoke with patient and appointment scheduled for 1/12/22 at 11:00 am with 10:45 am arrival time. Instructed to please complete questionnaire prior to visit.  Pt verbalized understanding to all and has no further questions at this time.

## 2022-01-12 ENCOUNTER — OFFICE VISIT (OUTPATIENT)
Dept: INTERNAL MEDICINE | Facility: CLINIC | Age: 72
End: 2022-01-12
Payer: MEDICARE

## 2022-01-12 ENCOUNTER — OFFICE VISIT (OUTPATIENT)
Dept: GASTROENTEROLOGY | Facility: CLINIC | Age: 72
End: 2022-01-12
Payer: MEDICARE

## 2022-01-12 VITALS
OXYGEN SATURATION: 99 % | WEIGHT: 152 LBS | DIASTOLIC BLOOD PRESSURE: 72 MMHG | SYSTOLIC BLOOD PRESSURE: 144 MMHG | HEART RATE: 67 BPM | HEIGHT: 66 IN | BODY MASS INDEX: 24.43 KG/M2

## 2022-01-12 VITALS
BODY MASS INDEX: 24.84 KG/M2 | DIASTOLIC BLOOD PRESSURE: 69 MMHG | OXYGEN SATURATION: 100 % | WEIGHT: 153.88 LBS | SYSTOLIC BLOOD PRESSURE: 161 MMHG | TEMPERATURE: 98 F | HEART RATE: 66 BPM

## 2022-01-12 DIAGNOSIS — I10 ESSENTIAL HYPERTENSION: ICD-10-CM

## 2022-01-12 DIAGNOSIS — K60.2 ANAL FISSURE: ICD-10-CM

## 2022-01-12 DIAGNOSIS — I47.10 SVT (SUPRAVENTRICULAR TACHYCARDIA): ICD-10-CM

## 2022-01-12 DIAGNOSIS — K62.5 RECTAL BLEEDING: Primary | ICD-10-CM

## 2022-01-12 DIAGNOSIS — K52.9 COLITIS: ICD-10-CM

## 2022-01-12 DIAGNOSIS — K59.09 CONSTIPATION, CHRONIC: ICD-10-CM

## 2022-01-12 PROCEDURE — 99214 PR OFFICE/OUTPT VISIT, EST, LEVL IV, 30-39 MIN: ICD-10-PCS | Mod: S$GLB,,, | Performed by: INTERNAL MEDICINE

## 2022-01-12 PROCEDURE — 99999 PR PBB SHADOW E&M-EST. PATIENT-LVL IV: CPT | Mod: PBBFAC,,, | Performed by: INTERNAL MEDICINE

## 2022-01-12 PROCEDURE — 99999 PR PBB SHADOW E&M-EST. PATIENT-LVL IV: ICD-10-PCS | Mod: PBBFAC,,, | Performed by: INTERNAL MEDICINE

## 2022-01-12 PROCEDURE — 99214 OFFICE O/P EST MOD 30 MIN: CPT | Mod: PBBFAC,25 | Performed by: INTERNAL MEDICINE

## 2022-01-12 PROCEDURE — 99214 OFFICE O/P EST MOD 30 MIN: CPT | Mod: S$GLB,,, | Performed by: INTERNAL MEDICINE

## 2022-01-12 PROCEDURE — 99214 OFFICE O/P EST MOD 30 MIN: CPT | Mod: S$PBB,,, | Performed by: INTERNAL MEDICINE

## 2022-01-12 PROCEDURE — 99214 PR OFFICE/OUTPT VISIT, EST, LEVL IV, 30-39 MIN: ICD-10-PCS | Mod: S$PBB,,, | Performed by: INTERNAL MEDICINE

## 2022-01-12 RX ORDER — CALCIUM CARBONATE/VITAMIN D3 500 MG-10
2000 TABLET,CHEWABLE ORAL
COMMUNITY
End: 2022-01-12 | Stop reason: SDUPTHER

## 2022-01-12 RX ORDER — ATENOLOL 50 MG/1
TABLET ORAL
Qty: 180 TABLET | Refills: 3 | Status: SHIPPED | OUTPATIENT
Start: 2022-01-12 | End: 2022-07-12

## 2022-01-12 RX ORDER — HYDROCODONE BITARTRATE AND ACETAMINOPHEN 5; 325 MG/1; MG/1
1 TABLET ORAL EVERY 8 HOURS PRN
Qty: 90 TABLET | Refills: 0 | Status: SHIPPED | OUTPATIENT
Start: 2022-01-12 | End: 2022-02-10 | Stop reason: SDUPTHER

## 2022-01-12 RX ORDER — LOSARTAN POTASSIUM 50 MG/1
TABLET ORAL
Qty: 180 TABLET | Refills: 3 | Status: SHIPPED | OUTPATIENT
Start: 2022-01-12 | End: 2023-07-12 | Stop reason: SDUPTHER

## 2022-01-12 NOTE — PATIENT INSTRUCTIONS
1. Increase Colace to 100mg twice daily   - Can increase to 200mg twice daily  2. Submit stool sample in 2 weeks  3. Continue Opdivo  4. Follow up in 3 months

## 2022-01-12 NOTE — PROGRESS NOTES
[facility]          Inflammatory Bowel Disease Follow Up Note         TODAY'S VISIT DATE:  1/12/2022    Reason for Consult:    Chief Complaint   Patient presents with    Ulcerative Colitis       PCP: Margo Caldwell      Referring MD:   No ref. provider found    History of Present Illness:  Alesha Toney who is a 71 y.o. 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  She is here today for an urgent visit.  Four weeks ago she restarted Opdivo.  She has had 2 infusions so far.  She was post receive a 3rd this week but she reported that she had passed some blood with the bowel movement and Dr. Dhaliwal was concern for possible recurrence of her immune checkpoint inhibitor colitis.  She reports that since she started back on Opdivo she has been having a lot of problems with constipation.  About a week ago she had an episode of straining and passed a very large, very hard stool.  With passing the stool there was severe anal pain and subsequently she noted dripping of blood into the toilet and bright red blood on the toilet paper.  There was no blood mixed in with the bowel movement.  She started Colace 100 mg daily and after a few days of not having a bowel movement again she took a day Senokot and again had a less painful but still somewhat painful bowel movement that was quite hard and she noted streaks of blood on the outside of a formed bowel movement.  There was no blood mixed in with the stool.  She denies any problems with diarrhea.  Today she feels better and is not having any anal pain today.    IBD History:  She has a history of lung cancer and has undergone surgical intervention as well as multiple regimens of chemotherapy.  Earlier this year she was started on immunotherapy with Opdivo and ipilimumab.  She received 3 cycles of combination treatment and 1  cycle of Opdivo alone.  In mid May she began having significant diarrhea and blood in the stools.  She underwent a flex sig for surveillance of a large polyp that was resected recently and was noted to have severe colitis extending from the rectum to 20-25 cm proximal to the anus (scope not advanced more proximal to this).  Interestingly, in April when she had a flex sig/EUS she had an area of inflamed tissue at that time that showed acute inflammation.  After the colonoscopy in June she was started on Canasa suppositories which provided some relief for 3 days but then her symptoms worsened.  She was then started on Rowasa enemas which did not seem to provide much improvement.  She was prescribed 70 mg of prednisone daily but never started this because she was concerned about the high dose.  Eventually she started on 30 mg of prednisone daily on July 21st.  She was able to wean the prednisone off and did quite well.  In December 2021 she restarted Opdivo but did not restart ipilimumab.    Pertinent Labs:  Lab Results   Component Value Date    SEDRATE 3 11/29/2021    CRP 1.5 11/29/2021     Lab Results   Component Value Date    TTGIGA 4 07/26/2021     07/26/2021     Lab Results   Component Value Date    TSH 1.782 12/13/2021    FREET4 0.95 12/13/2021     Lab Results   Component Value Date    OEWNHJGL37NA 41 07/26/2021    OKBUEFJM78 543 07/26/2021     Lab Results   Component Value Date    HEPBSAG Negative 07/26/2021    HEPBCAB Negative 07/26/2021    HEPCAB Negative 07/26/2021     Lab Results   Component Value Date    NLB25FZIV Negative 07/26/2021     Lab Results   Component Value Date    NIL 0.070 07/26/2021    MITOGENNIL 6.120 07/26/2021     Lab Results   Component Value Date    TPTMINTERP SEE BELOW 07/26/2021     Lab Results   Component Value Date    STOOLCULTURE  07/27/2021     No Salmonella,Shigella,Vibrio,Campylobacter,Yersinia isolated.    AQORZNHMLM8V Negative 07/27/2021    WIDCDXYKLB4E Negative 07/27/2021     CDIFFICILEAN Negative 07/27/2021    CDIFFTOX Negative 07/27/2021     Lab Results   Component Value Date    CALPROTECTIN 213.3 (H) 08/18/2021       Therapeutic Drug Monitoring Labs:  No results found for: PROMETH  No results found for: ANSADAINIT, INFLIXIMAB, INFLIXINTERP    Vaccinations:  No results found for: HEPBSAB  Lab Results   Component Value Date    HEPAIGG Negative 07/26/2021     Lab Results   Component Value Date    VARICELLAZOS 2.67 (H) 07/26/2021    VARICELLAINT Positive (A) 07/26/2021     Immunization History   Administered Date(s) Administered    COVID-19, MRNA, LN-S, PF (MODERNA FULL 0.5 ML DOSE) 02/11/2021    Influenza (FLUAD) - Quadrivalent - Adjuvanted - PF *Preferred* (65+) 10/08/2020    Influenza - High Dose - PF (65 years and older) 10/09/2018, 10/08/2019    Pneumococcal Polysaccharide - 23 Valent 10/24/2017         Review of Systems  Review of Systems   Constitutional: Negative for chills, fever and weight loss.   HENT: Negative for sore throat.    Eyes: Positive for pain. Negative for discharge and redness.   Respiratory: Positive for shortness of breath. Negative for cough and wheezing.    Cardiovascular: Positive for chest pain. Negative for orthopnea and leg swelling.   Gastrointestinal: Positive for blood in stool and constipation. Negative for abdominal pain, diarrhea, heartburn, melena, nausea and vomiting.   Genitourinary: Negative for dysuria, frequency and urgency.   Musculoskeletal: Negative for back pain, joint pain and myalgias.   Skin: Negative for itching and rash.   Neurological: Negative for focal weakness and seizures.   Endo/Heme/Allergies: Does not bruise/bleed easily.   Psychiatric/Behavioral: Negative for depression. The patient is not nervous/anxious.        Medical History:   Past Medical History:   Diagnosis Date    Allergy     Anxiety     Bilateral epiphora     Breast cyst     Cancer     lung cancer    Cataract     Colitis     Disorder of kidney and  ureter     renal stone    Dry eye syndrome     Fibrocystic breast     Immunodeficiency due to drugs     Rectal polyp     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     Squamous cell carcinoma of skin 10/05/2020    left medial thigh    Therapy     Thyroid nodule        Surgical History:  Past Surgical History:   Procedure Laterality Date    ADENOIDECTOMY  19yo    ANTERIOR CERVICAL DISCECTOMY W/ FUSION N/A 10/15/2018    Procedure: DISCECTOMY, SPINE, CERVICAL, ANTERIOR APPROACH, WITH FUSION C6/7  Depuy SNS: [employer]/S[employer] Regular OR table;  Surgeon: Greyson Bansal MD;  Location: [facility] OR [address] [place] [address];  Service: Neurosurgery;  Laterality: N/A;    APPENDECTOMY      BONE RESECTION, RIB  1980    BREAST BIOPSY Left     at least 40yrs ago in her 20's    BREAST CYST ASPIRATION      BREAST CYST EXCISION      COLONOSCOPY      ENDOBRONCHIAL ULTRASOUND N/A 7/10/2018    Procedure: ULTRASOUND, ENDOBRONCHIAL;  Surgeon: Sri Hearn MD;  Location: [facility] OR [address] [place] [address];  Service: Pulmonary;  Laterality: N/A;    ENDOBRONCHIAL ULTRASOUND N/A 9/24/2019    Procedure: ENDOBRONCHIAL ULTRASOUND (EBUS);  Surgeon: Sri Hearn MD;  Location: [facility] OR [address] [place] [address];  Service: Pulmonary;  Laterality: N/A;    ENDOSCOPIC MUCOSAL RESECTION OF COLON N/A 9/11/2020    Procedure: RESECTION, MUCOSA, COLON, ENDOSCOPIC;  Surgeon: Lilibeth Carbajal MD;  Location: [facility] ([address] [place] [address]);  Service: Endoscopy;  Laterality: N/A;    ENDOSCOPIC ULTRASOUND OF LOWER GASTROINTESTINAL TRACT N/A 4/19/2021    Procedure: ULTRASOUND, LOWER GI TRACT, ENDOSCOPIC;  Surgeon: Lilibeth Carbajal MD;  Location: [facility];  Service: Endoscopy;  Laterality: N/A;    ENDOSCOPIC ULTRASOUND OF LOWER GASTROINTESTINAL TRACT N/A 6/25/2021    Procedure: ULTRASOUND, LOWER GI TRACT, ENDOSCOPIC;  Surgeon: Silvino Christopher MD;  Location: [facility];  Service: Endoscopy;  Laterality: N/A;  Needs Rapid MP    FLEXIBLE SIGMOIDOSCOPY  06/11/2020    with biopsies     FLEXIBLE SIGMOIDOSCOPY N/A 6/11/2020    Procedure: SIGMOIDOSCOPY, FLEXIBLE;  Surgeon: Gely Yeh MD;  Location: [facility] ENDO;  Service: Endoscopy;  Laterality: N/A;    FLEXIBLE SIGMOIDOSCOPY N/A 4/19/2021    Procedure: SIGMOIDOSCOPY-FLEXIBLE;  Surgeon: Lilibeth Carbajal MD;  Location: [facility] ENDO;  Service: Endoscopy;  Laterality: N/A;  Covid 4/16 Brian MP    HYSTERECTOMY      @47yrs of age    INSERTION OF TUNNELED CENTRAL VENOUS CATHETER (CVC) WITH SUBCUTANEOUS PORT N/A 9/25/2018    Procedure: [id number];  Surgeon: Dosrhina Diagnostic Provider;  [facility];  Service: Radiology;  Laterality: N/A;    INSERTION OF VENOUS ACCESS PORT N/A 3/15/2021    Procedure: INSERTION, VENOUS ACCESS PORT;  Surgeon: Chang Mathews MD;  Location: [facility] OR [address] [place] [address];  Service: General;  Laterality: N/A;  NEEDS CONSENT.    KIDNEY SURGERY      19yo    MEDIPORT REMOVAL N/A 3/15/2021    Procedure: REMOVAL, CATHETER, CENTRAL VENOUS, TUNNELED, WITH PORT;  Surgeon: Chang Mathews MD;  Location: [facility] OR [address] [place] [address];  Service: General;  Laterality: N/A;    OOPHORECTOMY      @47yrs of age    SKIN BIOPSY      TONSILLECTOMY      UPPER GASTROINTESTINAL ENDOSCOPY      VIDEO-ASSISTED THORACOSCOPIC LOBECTOMY Right 8/9/2018    Procedure: VATS, WITH LOBECTOMY Possible chest wall resection;  Surgeon: Philip Messina MD;  Location: [facility] OR [address] [place] [address];  Service: Thoracic;  Laterality: Right;  Have open pan available.       Family History:   Family History   Problem Relation Age of Onset    Stroke Mother     Cataracts Mother     Cancer Father         colon cancer    Colon cancer Father     Diabetes Brother     Heart failure Brother     Hypertension Brother     Heart attack Paternal Aunt     Heart attack Maternal Grandfather     Diabetes Paternal Aunt     Anxiety disorder Paternal Grandmother         agoraphobia    Anesthesia problems Neg Hx     Blindness Neg Hx     Amblyopia Neg Hx     Glaucoma  Neg Hx     Macular degeneration Neg Hx     Retinal detachment Neg Hx     Strabismus Neg Hx     Thyroid disease Neg Hx     Melanoma Neg Hx        Social History:   Social History     Tobacco Use    Smoking status: Former Smoker     Packs/day: 1.00     Years: 30.00     Pack years: 30.00     Types: Cigarettes     Quit date: 2015     Years since quittin.6    Smokeless tobacco: Never Used   Substance Use Topics    Alcohol use: Not Currently     Alcohol/week: 0.0 standard drinks     Comment: socially     Drug use: No       Allergies: Reviewed    Home Medications:   Medication List with Changes/Refills   Current Medications    ASCORBIC ACID, VITAMIN C, (VITAMIN C) 500 MG TABLET    Take 500 mg by mouth once daily.    ATENOLOL (TENORMIN) 50 MG TABLET    Half a tab qid    CALCIUM CARBONATE (TUMS) 300 MG (750 MG) CHEW    Take by mouth as needed.     CALCIUM CARBONATE/VITAMIN D3 (VITAMIN D-3 ORAL)    Take 2,000 mg by mouth once daily.    FAMOTIDINE (PEPCID) 10 MG TABLET    Take 10 mg by mouth 2 (two) times daily as needed.     HYDROCODONE-ACETAMINOPHEN (NORCO) 5-325 MG PER TABLET    Take 1 tablet by mouth every 8 (eight) hours as needed for Pain.    LORAZEPAM (ATIVAN) 0.5 MG TABLET    Take 1 tablet (0.5 mg total) by mouth every 12 (twelve) hours as needed for Anxiety.    LOSARTAN (COZAAR) 50 MG TABLET    1 tab po bid    NIVOLUMAB (OPDIVO) 100 MG/10 ML INJECTION    100 mg by Other route.    ONDANSETRON (ZOFRAN) 4 MG TABLET    Take 1 tablet (4 mg total) by mouth daily as needed for Nausea.    PREDNISOLONE ACETATE (PRED FORTE) 1 % DRPS    Place 1 drop into both eyes 3 (three) times daily.    UNABLE TO FIND    Take 2 tablets by mouth as needed. senekot   Discontinued Medications    GABAPENTIN (NEURONTIN) 100 MG CAPSULE    Take 1 capsule (100 mg total) by mouth 3 (three) times daily.    PREDNISONE (DELTASONE) 10 MG TABLET    Take 25 mg by mouth once daily.    PREDNISONE (DELTASONE) 5 MG TABLET    Take 7.5 mg by mouth  once daily.       Physical Exam:  Vital Signs:  BP (!) 161/69 (BP Location: Left arm, Patient Position: Sitting)   Pulse 66   Temp 97.5 °F (36.4 °C)   Wt 69.8 kg (153 lb 14.1 oz)   LMP  (LMP Unknown)   SpO2 100%   BMI 24.84 kg/m²   Body mass index is 24.84 kg/m².    Physical Exam  Vitals and nursing note reviewed.   Constitutional:       General: She is not in acute distress.     Appearance: Normal appearance. She is well-developed. She is not ill-appearing or toxic-appearing.   Eyes:      Conjunctiva/sclera: Conjunctivae normal.      Pupils: Pupils are equal, round, and reactive to light.   Neck:      Thyroid: No thyromegaly.   Cardiovascular:      Rate and Rhythm: Normal rate and regular rhythm.      Heart sounds: Normal heart sounds. No murmur heard.      Pulmonary:      Effort: Pulmonary effort is normal.      Breath sounds: Normal breath sounds. No wheezing or rales.   Abdominal:      General: Bowel sounds are normal. There is no distension.      Palpations: Abdomen is soft. There is no mass.      Tenderness: There is abdominal tenderness in the left lower quadrant. There is no guarding or rebound.   Musculoskeletal:         General: No tenderness. Normal range of motion.   Lymphadenopathy:      Cervical: No cervical adenopathy.   Skin:     Findings: No erythema or rash.   Neurological:      General: No focal deficit present.      Mental Status: She is alert and oriented to person, place, and time.   Psychiatric:         Mood and Affect: Mood normal.         Behavior: Behavior normal.         Thought Content: Thought content normal.         Judgment: Judgment normal.     Rectal exam deferred due to patient preference    Labs: reviewed and pertinent noted above    Assessment/Plan:  Alesha Toney is a 71 y.o. female with immune checkpoint inhibitor associated colitis here with rectal bleeding and anal pain consistent with an anal fissure. The following issues were addresssed:    1. Rectal bleeding    2.  Anal fissure    3. Constipation, chronic    4. Colitis      1. Rectal bleeding:  Her symptoms are consistent with an anal fissure.  Overall seems to be improving.  There has not been any diarrhea and there has been no blood mixed in with the stool so I do not suspect that this is immune checkpoint inhibitor colitis.  I did recommend that she increase her Colace to 100 mg twice daily and if that does not provide significant improvement in her stool consistency she should increase the dose to 200 mg twice daily.  If she reaches that dose and still has hard bowel movements I asked her to contact me and we will come up with another plan.    2. Constipation:  See management above    3. Immune checkpoint inhibitor colitis:  Continue to monitor.  Symptoms are not consistent with colitis as there is no diarrhea and are more consistent with an anal fissure.  I did recommend that she submitted stool calprotectin level in 2 weeks (to allow healing of the fissure).  If there is any elevation of the calprotectin level we will arrange for a sigmoidoscopy in the near future.  If it remains low then we will continue to monitor periodically.  I also asked her to contact me if she has any more significant bleeding or any diarrhea issues.        Follow up: Follow up in about 3 months (around 4/12/2022).    Thank you again for sending Alesha Toney to see Dr. Baudilio Clark today at the [facility]. Please don't hesitate to contact Dr. Clark if there are any questions regarding this evaluation, or if you have any other patients with inflammatory bowel disease for whom you would like a consultation. You can reach Dr. Clark at [phone] or by email at [email or web address]    Francesco Clark MD  Department of Gastroenterology  Inflammatory Bowel Disease

## 2022-01-12 NOTE — PROGRESS NOTES
Subjective:       Patient ID: Alesha Toney is a 71 y.o. female.    Chief Complaint: Follow-up    HPI   Accompanied by partner, Lata.    Takes ativan before chemo , which controls BP.  She stopped amlodipine, prescribed by Dr Gilmore for pul htn.  BP mostly controlled at home, but may run in low 140's.  Taking losartan and atenolol as per med card.    GI note reviewed.  Strained last week with BM with subsequent rectal bleeding.  Immuno tx was held.  Dr Clark ok'd restarting immuno tx.    Optivo, her immune tx, causes bone pain and tumor pain.  Also with neuralgia L mid face, occurring every 3 months.  Asa and hydrocodone help, if she treats quickly.      She takes half hydrocodone and then another half in a hour.  Pain is worse for several days after infusion.    Also takes hydrocodone for neck, neuropathy.  Lata would like her to take a whole tab, to more effectively tx pain.    Review of Systems   Constitutional: Negative for activity change and unexpected weight change.   HENT: Negative for hearing loss, rhinorrhea and trouble swallowing.    Eyes: Positive for discharge and visual disturbance.   Respiratory: Positive for wheezing. Negative for chest tightness.    Cardiovascular: Positive for chest pain. Negative for palpitations.   Gastrointestinal: Positive for blood in stool and constipation. Negative for diarrhea and vomiting.   Endocrine: Negative for polydipsia and polyuria.   Genitourinary: Negative for difficulty urinating, dysuria, hematuria and menstrual problem.   Musculoskeletal: Positive for arthralgias and neck pain. Negative for joint swelling.   Neurological: Negative for weakness and headaches.   Psychiatric/Behavioral: Negative for confusion and dysphoric mood.         Objective:      Physical Exam  Constitutional:       General: She is not in acute distress.     Appearance: Normal appearance. She is normal weight. She is not ill-appearing, toxic-appearing or diaphoretic.    Neurological:      General: No focal deficit present.      Mental Status: She is alert and oriented to person, place, and time.   Psychiatric:         Mood and Affect: Mood normal.         Behavior: Behavior normal.         Thought Content: Thought content normal.         Judgment: Judgment normal.         Assessment:       Problem List Items Addressed This Visit     SVT (supraventricular tachycardia)    Relevant Medications    atenoloL (TENORMIN) 50 MG tablet    Essential hypertension    Relevant Medications    atenoloL (TENORMIN) 50 MG tablet          Plan:       Alesha was seen today for follow-up.    Diagnoses and all orders for this visit:    Essential hypertension  -     atenoloL (TENORMIN) 50 MG tablet; Half a tab qid    SVT (supraventricular tachycardia)  -     atenoloL (TENORMIN) 50 MG tablet; Half a tab qid    Other orders  -     losartan (COZAAR) 50 MG tablet; 1 tab po bid  -     HYDROcodone-acetaminophen (NORCO) 5-325 mg per tablet; Take 1 tablet by mouth every 8 (eight) hours as needed for Pain.           Restart gabapentin.  Take whole tab hydrocodone when pain most severe.    Flu vax     We discussed restarting lower dose of amlodipine, but she prefers to discuss with Dr Gilmore first.    Urged to complete covid vaccine series..

## 2022-01-18 ENCOUNTER — PATIENT MESSAGE (OUTPATIENT)
Dept: HEMATOLOGY/ONCOLOGY | Facility: CLINIC | Age: 72
End: 2022-01-18

## 2022-01-18 ENCOUNTER — OFFICE VISIT (OUTPATIENT)
Dept: HEMATOLOGY/ONCOLOGY | Facility: CLINIC | Age: 72
End: 2022-01-18
Payer: MEDICARE

## 2022-01-18 VITALS
HEIGHT: 66 IN | DIASTOLIC BLOOD PRESSURE: 79 MMHG | BODY MASS INDEX: 24.94 KG/M2 | SYSTOLIC BLOOD PRESSURE: 180 MMHG | OXYGEN SATURATION: 100 % | HEART RATE: 62 BPM | RESPIRATION RATE: 16 BRPM | TEMPERATURE: 98 F | WEIGHT: 155.19 LBS

## 2022-01-18 DIAGNOSIS — J43.8 OTHER EMPHYSEMA: ICD-10-CM

## 2022-01-18 DIAGNOSIS — C77.1 SECONDARY AND UNSPECIFIED MALIGNANT NEOPLASM OF INTRATHORACIC LYMPH NODES: ICD-10-CM

## 2022-01-18 DIAGNOSIS — I70.0 AORTIC ATHEROSCLEROSIS: ICD-10-CM

## 2022-01-18 DIAGNOSIS — N18.31 STAGE 3A CHRONIC KIDNEY DISEASE: ICD-10-CM

## 2022-01-18 DIAGNOSIS — C34.11 MALIGNANT NEOPLASM OF UPPER LOBE, RIGHT BRONCHUS OR LUNG: Primary | ICD-10-CM

## 2022-01-18 DIAGNOSIS — E03.9 HYPOTHYROIDISM, UNSPECIFIED TYPE: ICD-10-CM

## 2022-01-18 DIAGNOSIS — Z12.31 ENCOUNTER FOR SCREENING MAMMOGRAM FOR MALIGNANT NEOPLASM OF BREAST: ICD-10-CM

## 2022-01-18 DIAGNOSIS — Z12.39 ENCOUNTER FOR SCREENING FOR MALIGNANT NEOPLASM OF BREAST, UNSPECIFIED SCREENING MODALITY: ICD-10-CM

## 2022-01-18 PROCEDURE — 99999 PR PBB SHADOW E&M-EST. PATIENT-LVL IV: CPT | Mod: PBBFAC,,, | Performed by: INTERNAL MEDICINE

## 2022-01-18 PROCEDURE — 99215 OFFICE O/P EST HI 40 MIN: CPT | Mod: S$PBB,,, | Performed by: INTERNAL MEDICINE

## 2022-01-18 PROCEDURE — 99214 OFFICE O/P EST MOD 30 MIN: CPT | Mod: PBBFAC | Performed by: INTERNAL MEDICINE

## 2022-01-18 PROCEDURE — 99215 PR OFFICE/OUTPT VISIT, EST, LEVL V, 40-54 MIN: ICD-10-PCS | Mod: S$PBB,,, | Performed by: INTERNAL MEDICINE

## 2022-01-18 PROCEDURE — 99999 PR PBB SHADOW E&M-EST. PATIENT-LVL IV: ICD-10-PCS | Mod: PBBFAC,,, | Performed by: INTERNAL MEDICINE

## 2022-01-18 RX ORDER — HEPARIN 100 UNIT/ML
500 SYRINGE INTRAVENOUS
Status: CANCELLED | OUTPATIENT
Start: 2022-01-18

## 2022-01-18 RX ORDER — SODIUM CHLORIDE 0.9 % (FLUSH) 0.9 %
10 SYRINGE (ML) INJECTION
Status: CANCELLED | OUTPATIENT
Start: 2022-01-18

## 2022-01-18 NOTE — Clinical Note
Can you call mammogram and see if there is any contraindication to get a mammogram with a chest PORT

## 2022-01-18 NOTE — PROGRESS NOTES
"Subjective:       Patient ID: Alesha Toney is a 71 y.o. female.    Chief Complaint: Lung Cancer  Ms. Alesha Toney is a 71-year-old otherwise healthy female who started having some shoulder pain, which was lasting for over six weeks.  She went and saw her primary care physician and underwent an x-ray, which revealed right upper lobe lung mass worrisome for malignancy and a CT scan was recommended, which was done on 6/12/18 and that revealed a newly developing mass, pleural based, lateral posterior segment, right upper lobe 3.5 x 3.5 cm, surrounding stellate margin and patchy infiltrates, particularly superiorly,   anteriorly infiltrates extend perihilar into the anterior segment.  She then underwent CT-guided biopsy, which revealed well-differentiated adenocarcinoma.       Her PET scan from 6/29/18 There is physiologic intracranial, head, and neck activity.  There is a large right upper lobe mass SUV max 9.11.  No local or distant metastatic disease seen.  There is physiologic liver, spleen, GI and  activity.  There are a few liver cysts.  No adenopathy is seen.  The adrenal glands are not enlarged.  No adenopathy is seen.  No suspicious bone lesions are seen.     She underwent VATS with right upper lobectomy. Mediastinal lymphadenectomy on 8/9/18. Pathology revealed "Tumor site: Upper lobe.Tumor size: 7 x 4 x 2.7 cm.Tumor focality: Single tumor.  Histologic type: Mixed invasive mucinous and nonmucinous adenocarcinoma. Histologic grade: G2: Moderately differentiated.Spread through air spaces (STATS): Present. Visceral pleura invasion: Not identified.  Lymphovascular invasion: Not identified. Direct invasion of adjacent structures: No adjacent structures present. Margins: All margins are uninvolved by carcinoma.Distance of invasive carcinoma from closest margin: 1 cm from the bronchial margin.Treatment effect: No known presurgical therapy. Regional lymph nodes: Number of lymph nodes involved: 0. Number of " "lymph nodes examined: 11. Pathologic Stage Classification: pT3 N0"        She completed 4 cycles of adjuvant chemo with Cisplatin and Alimta as of 1/10/19   She is on surveillance.     CT chest of 9/17/19 which reveals "Operative change of right upper lobectomy for small-cell lung cancer with several scattered subsolid opacities and a new solid nodule in the right lower lobe measuring up to 0.5 cm.  We recommend continued surveillance with the role and schedule for such surveillance to be determined by clinical considerations. Sided chest port distal tip terminating at the confluence of the brachiocephalic vein in the SVC.  Correlate with device functioning. Apically predominant emphysematous changes, left greater than right. Aortic annular calcification and multi-vessel coronary artery atherosclerosis. Numerous unchanged hepatic hypodensities, likely cysts"     PET scan from 10/1/19 reveals "1.6 cm soft tissue lesion re-identified posterior to the bronchus intermedius.  No corresponding focal increased radiotracer uptake to suggest local recurrence or metastatic disease. Stable subcentimeter opacities throughout the bilateral lower lobes, too small to characterize with PET-CT. Focal increased radiotracer uptake and subtle wall thickening in the rectum.  Findings are concerning for primary neoplastic process.  Recommend further evaluation with direct visualization"     She returned from [facility] and was advised to proceed with chemo/RT with either Docetaxel/platinum or Carboplatin/Alimta.     She completed chemo/RT with Carboplatin and Alimta as of 12/31/19     She underwent CT chest on 5/27/2020 which revealed 1. In this patient with history of lung cancer status post right upper lobectomy, there is a soft tissue opacity at the posterior margin of the staple line.  This lesion has been enlarging progressively and now measures 1.3 x 1.6 cm.  There is also a new 1.7 x 1.7 cm opacity at the right paravertebral " "pleural margin which is inseparable from this lesion.  These findings may represent post radiation change in light of the hypermetabolic lesion in this region on PET-CT of 10/01/2019 (image 69/[id number]).There is a 1 cm opacity in the superior or lateral basal segment of the right lower lobe (axial series 4, image 243) which has enlarged since 09/17/2019.  Nature of this lesion is unclear.  Clinical considerations will determine if percutaneous biopsy or metabolic assessment is warranted. 1.0 cm solid opacity with branching configuration (series 4, image 205) is located in the lateral basal segment of the left lower lobe, stable since 02/16/2019 (02/06/2019 axial series 4, image 310). Appearance and bifurcating character suggests mucoid impaction in mildly ectatic peripheral airway, likely not significant. Persistent clustered small centrilobular nodules in the apical segment of the right upper lobe likely reflecting small airway inflammation/infection. Partially visualized left chest port with distal catheter tip at the junction of the brachiocephalic veins and SVC, similar as on 09/17/2019"  She thus underwent PET scan on 6/1/2020 which revealed "No evidence of residual viable lung malignancy.  Evolving post radiation changes in the right paramediastinal lung, with a new irregular subpleural density which may also be related to recent radiation.  Attention on follow-up. Interval decrease in size of a soft tissue lesion along the inferior margin of the lobectomy stable line.  Several stable subcentimeter soft tissue opacities in the lower lobes bilaterally, As above.  Attention on follow-up.  Persistent hypermetabolic rectal lesion concerning for malignancy.  Recommend direct visualization and tissue sampling as clinically indicated"  I discussed her case in thoracic conference today and findings are felt to be related to RT     Her restaging CT scans from 1/20/2021 which reveal "Persistent soft tissue opacity in the " "posterior margin of the staple line that is increased in prominence when compared to the prior examination and is worrisome for disease progression. No new lesions identified.  Stable pulmonary nodules. Stable hypodense lesions throughout the liver that likely represent hepatic cysts"        Restaging PET scan from 1/27/2021 reveals "Increased size and hypermetabolic activity involving soft tissue opacity along the inferior border of the right upper lobectomy stable line concerning for progression of disease. Mildly increased hypermetabolic activity involving the rectum compatible with known high-grade dysplasia/intramucosal carcinoma. Interval decrease in size and resolution of hypermetabolic activity involving subcentimeter subpleural opacity within the right lower lobe"     MRI brain from  2/3/2021 reveals no evidence of recurrence.  GUARDANT testing only reveals p53, PDL1 is 0 and no other targets        PET scan from 3/24/2021 reveals "In this patient with known lung cancer, there is a persistent hypermetabolic right hilar mass consistent with viable tumor.  Interval increase in size is equivocal for progression of fibrosis versus tumor growth. 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  Activity appears more focal and possibly more proximal than before.  Consider direct visualization for further evaluation. The previously described subcentimeter subpleural opacity within the right lower lobe is not definitely seen on today's exam"               She last received immunotherapy on 5/28/2021. She underwent EUS on 6/25/2021 which revealed "Erythematous, congested, and ulcerated mucosa  (proctitis) in rectosigmoid colon.  Pathology reveals Colon, rectosigmoid, biopsy:  Moderate active colitis     She completed steroids about 5 weeks ago.      CT scans from 11/5/2021 reveals "In this patient with lung cancer, there is postoperative changes of " "right upper lobe resection.  Persistent soft tissue opacity along the right hilum suture line which is slightly increased in size from prior exam.  More conspicuous appearing nodules within the right lower lobe seen on prior exam.  New nodule within the right middle lobe measuring 1.3 cm.  Overall findings are all worrisome for disease progression. Scattered areas of centrilobular nodularity within the bilateral lungs.  Nonspecific and can be seen in the setting of infectious or inflammatory etiologies. Unchanged hepatics cysts. Stable left adrenal gland nodule."  PET scan from 11/23/2021 revealed "New hypermetabolic nodule within the right lower lobe additional slightly subcentimeter nodules within the right lower lobe.  Findings concerning for disease progression, other differentials include infection or noninfectious inflammatory process. Interval decrease in size and uptake in the right hilar mass. Resolution of previous focal rectal uptake"       HPIShe comes in for Opdivo. Her Opdivo was held on 1/10/2022 due to rectal bleeding. She saw Dr. Clark and was noted to have anal fissure which contributed to rectal bleed. Her rectal bleed has since resolved.    She notes that she has bone pain 4-5 days after immunotherapy and lasts a few days and goes away, hydrocodone helps. She did see Rheumatology in 11/2021 and Dr. Rocha felt that this was more likely osteoarthritis versus IRAE    She notes some tenderness along the region of her prior right upper lobe resection. No increase in pain with deep breaths,    Review of Systems   Constitutional: Negative for appetite change, fatigue and unexpected weight change.   HENT: Negative for mouth sores.    Eyes: Negative for visual disturbance.   Respiratory: Negative for cough and shortness of breath.    Cardiovascular: Negative for chest pain.   Gastrointestinal: Negative for abdominal pain and diarrhea.   Genitourinary: Negative for frequency.   Musculoskeletal: Negative " for back pain.   Integumentary:  Negative for rash.   Neurological: Negative for headaches.   Hematological: Negative for adenopathy.   Psychiatric/Behavioral: The patient is not nervous/anxious.    All other systems reviewed and are negative.        Objective:      Physical Exam  Vitals reviewed.   Constitutional:       Appearance: She is well-developed and well-nourished.   HENT:      Mouth/Throat:      Pharynx: No oropharyngeal exudate.   Cardiovascular:      Rate and Rhythm: Normal rate.      Heart sounds: Normal heart sounds.   Pulmonary:      Effort: Pulmonary effort is normal.      Breath sounds: Normal breath sounds. No wheezing.   Abdominal:      General: Bowel sounds are normal.      Palpations: Abdomen is soft.      Tenderness: There is no abdominal tenderness.   Musculoskeletal:         General: No tenderness or edema.   Lymphadenopathy:      Cervical: No cervical adenopathy.   Skin:     General: Skin is warm and dry.      Findings: No rash.   Neurological:      Mental Status: She is alert and oriented to person, place, and time.      Coordination: Coordination normal.   Psychiatric:         Mood and Affect: Mood and affect normal.         Thought Content: Thought content normal.         Judgment: Judgment normal.           LABS:  WBC   Date Value Ref Range Status   01/10/2022 5.40 3.90 - 12.70 K/uL Final     Hemoglobin   Date Value Ref Range Status   01/10/2022 12.5 12.0 - 16.0 g/dL Final     POC Hematocrit   Date Value Ref Range Status   08/09/2018 33 (L) 36 - 54 %PCV Final     Hematocrit   Date Value Ref Range Status   01/10/2022 39.7 37.0 - 48.5 % Final     Platelets   Date Value Ref Range Status   01/10/2022 193 150 - 450 K/uL Final     Gran # (ANC)   Date Value Ref Range Status   01/10/2022 2.6 1.8 - 7.7 K/uL Final     Comment:     The ANC is based on a white cell differential from an   automated cell counter. It has not been microscopically   reviewed for the presence of abnormal cells. Clinical    correlation is required.         Chemistry        Component Value Date/Time     01/10/2022 0835    K 5.0 01/10/2022 0835     01/10/2022 0835    CO2 28 01/10/2022 0835    BUN 20 01/10/2022 0835    CREATININE 1.1 01/10/2022 0835     (H) 01/10/2022 0835        Component Value Date/Time    CALCIUM 10.2 01/10/2022 0835    ALKPHOS 50 (L) 01/10/2022 0835    AST 20 01/10/2022 0835    ALT 14 01/10/2022 0835    BILITOT 0.4 01/10/2022 0835    ESTGFRAFRICA 58.4 (A) 01/10/2022 0835    EGFRNONAA 50.6 (A) 01/10/2022 0835         ACTH: 17  Cortisol: 14.5    Assessment:       Problem List Items Addressed This Visit     Aortic atherosclerosis    Hypothyroidism    Relevant Orders    TSH    T4, Free    Malignant neoplasm of upper lobe, right bronchus or lung - Primary    Relevant Orders    CBC Oncology    Comprehensive Metabolic Panel    Other emphysema    Secondary and unspecified malignant neoplasm of intrathoracic lymph nodes    Stage 3a chronic kidney disease      Other Visit Diagnoses     Encounter for screening for malignant neoplasm of breast, unspecified screening modality        Relevant Orders    Mammo Digital Screening Bilat    Encounter for screening mammogram for malignant neoplasm of breast         Relevant Orders    Mammo Digital Screening Bilat          Plan:        1,2,3.4/ Rectal bleeding has resolved. She will proceed with Opdivo and will return in 2 weeks or next cycle  Will also obtain screening mammogram    Above care plan was discussed with patient and all questions were addressed to her satisfaction

## 2022-01-19 ENCOUNTER — INFUSION (OUTPATIENT)
Dept: INFUSION THERAPY | Facility: HOSPITAL | Age: 72
End: 2022-01-19
Attending: INTERNAL MEDICINE
Payer: MEDICARE

## 2022-01-19 VITALS
DIASTOLIC BLOOD PRESSURE: 73 MMHG | SYSTOLIC BLOOD PRESSURE: 158 MMHG | WEIGHT: 155.19 LBS | RESPIRATION RATE: 18 BRPM | TEMPERATURE: 99 F | BODY MASS INDEX: 25.05 KG/M2 | HEART RATE: 63 BPM

## 2022-01-19 DIAGNOSIS — C77.1 SECONDARY AND UNSPECIFIED MALIGNANT NEOPLASM OF INTRATHORACIC LYMPH NODES: Primary | ICD-10-CM

## 2022-01-19 DIAGNOSIS — C34.11 MALIGNANT NEOPLASM OF UPPER LOBE, RIGHT BRONCHUS OR LUNG: ICD-10-CM

## 2022-01-19 PROCEDURE — 63600175 PHARM REV CODE 636 W HCPCS: Mod: JG | Performed by: INTERNAL MEDICINE

## 2022-01-19 PROCEDURE — A4216 STERILE WATER/SALINE, 10 ML: HCPCS | Performed by: INTERNAL MEDICINE

## 2022-01-19 PROCEDURE — 96413 CHEMO IV INFUSION 1 HR: CPT

## 2022-01-19 PROCEDURE — 25000003 PHARM REV CODE 250: Performed by: INTERNAL MEDICINE

## 2022-01-19 RX ORDER — HEPARIN 100 UNIT/ML
500 SYRINGE INTRAVENOUS
Status: DISCONTINUED | OUTPATIENT
Start: 2022-01-19 | End: 2022-01-19 | Stop reason: HOSPADM

## 2022-01-19 RX ORDER — SODIUM CHLORIDE 0.9 % (FLUSH) 0.9 %
10 SYRINGE (ML) INJECTION
Status: DISCONTINUED | OUTPATIENT
Start: 2022-01-19 | End: 2022-01-19 | Stop reason: HOSPADM

## 2022-01-19 RX ADMIN — SODIUM CHLORIDE: 9 INJECTION, SOLUTION INTRAVENOUS at 08:01

## 2022-01-19 RX ADMIN — Medication 10 ML: at 10:01

## 2022-01-19 RX ADMIN — SODIUM CHLORIDE 240 MG: 9 INJECTION, SOLUTION INTRAVENOUS at 09:01

## 2022-01-19 RX ADMIN — HEPARIN 500 UNITS: 100 SYRINGE at 10:01

## 2022-01-19 NOTE — PLAN OF CARE
1015 pt tolerated opdivo infusion without issue, pt to rtc 2/4/22, no distress noted upon d/c to home

## 2022-01-19 NOTE — PLAN OF CARE
0800 pt here for opdivo infusion , labs, hx, meds, allergies reviewed, pt with no new complaints at this time, reclined in chair, continue to monitor

## 2022-01-24 ENCOUNTER — TELEPHONE (OUTPATIENT)
Dept: GASTROENTEROLOGY | Facility: CLINIC | Age: 72
End: 2022-01-24
Payer: MEDICARE

## 2022-01-24 NOTE — TELEPHONE ENCOUNTER
----- Message from Angle Crabtree RN sent at 1/12/2022 11:32 AM CST -----  Needs to turn in her stool calprotectin on or around 1/26

## 2022-01-24 NOTE — TELEPHONE ENCOUNTER
LVM for patient to call me back at [phone] re:  Please turn in stool calprotectin on Wednesday or sometime prior to the end of the week.

## 2022-01-25 ENCOUNTER — LAB VISIT (OUTPATIENT)
Dept: LAB | Facility: HOSPITAL | Age: 72
End: 2022-01-25
Attending: INTERNAL MEDICINE
Payer: MEDICARE

## 2022-01-25 DIAGNOSIS — K52.9 COLITIS: ICD-10-CM

## 2022-01-25 PROCEDURE — 83993 ASSAY FOR CALPROTECTIN FECAL: CPT | Performed by: INTERNAL MEDICINE

## 2022-01-28 ENCOUNTER — TELEPHONE (OUTPATIENT)
Dept: INTERNAL MEDICINE | Facility: CLINIC | Age: 72
End: 2022-01-28
Payer: MEDICARE

## 2022-01-28 ENCOUNTER — HOSPITAL ENCOUNTER (OUTPATIENT)
Dept: RADIOLOGY | Facility: OTHER | Age: 72
Discharge: HOME | End: 2022-01-28
Attending: INTERNAL MEDICINE
Payer: MEDICARE

## 2022-01-28 DIAGNOSIS — M79.604 PAIN OF RIGHT LOWER EXTREMITY: Primary | ICD-10-CM

## 2022-01-28 DIAGNOSIS — M79.604 PAIN OF RIGHT LOWER EXTREMITY: ICD-10-CM

## 2022-01-28 PROCEDURE — 93971 EXTREMITY STUDY: CPT | Mod: TC,RT

## 2022-01-28 PROCEDURE — 93971 EXTREMITY STUDY: CPT | Mod: 26,RT,, | Performed by: RADIOLOGY

## 2022-01-28 PROCEDURE — 93971 US LOWER EXTREMITY VEINS RIGHT: ICD-10-PCS | Mod: 26,RT,, | Performed by: RADIOLOGY

## 2022-01-31 LAB — CALPROTECTIN STL-MCNT: <27.1 MCG/G

## 2022-02-02 ENCOUNTER — LAB VISIT (OUTPATIENT)
Dept: LAB | Facility: HOSPITAL | Age: 72
End: 2022-02-02
Attending: INTERNAL MEDICINE
Payer: MEDICARE

## 2022-02-02 ENCOUNTER — PATIENT MESSAGE (OUTPATIENT)
Dept: HEMATOLOGY/ONCOLOGY | Facility: CLINIC | Age: 72
End: 2022-02-02

## 2022-02-02 ENCOUNTER — OFFICE VISIT (OUTPATIENT)
Dept: HEMATOLOGY/ONCOLOGY | Facility: CLINIC | Age: 72
End: 2022-02-02
Payer: MEDICARE

## 2022-02-02 VITALS
HEART RATE: 59 BPM | RESPIRATION RATE: 16 BRPM | BODY MASS INDEX: 24.94 KG/M2 | DIASTOLIC BLOOD PRESSURE: 65 MMHG | WEIGHT: 155.19 LBS | SYSTOLIC BLOOD PRESSURE: 154 MMHG | OXYGEN SATURATION: 99 % | HEIGHT: 66 IN

## 2022-02-02 DIAGNOSIS — N18.31 STAGE 3A CHRONIC KIDNEY DISEASE: ICD-10-CM

## 2022-02-02 DIAGNOSIS — C34.11 MALIGNANT NEOPLASM OF UPPER LOBE, RIGHT BRONCHUS OR LUNG: Primary | ICD-10-CM

## 2022-02-02 DIAGNOSIS — C77.1 SECONDARY AND UNSPECIFIED MALIGNANT NEOPLASM OF INTRATHORACIC LYMPH NODES: ICD-10-CM

## 2022-02-02 DIAGNOSIS — E03.9 HYPOTHYROIDISM, UNSPECIFIED TYPE: ICD-10-CM

## 2022-02-02 DIAGNOSIS — C34.11 MALIGNANT NEOPLASM OF UPPER LOBE, RIGHT BRONCHUS OR LUNG: ICD-10-CM

## 2022-02-02 LAB
ALBUMIN SERPL BCP-MCNC: 3.6 G/DL (ref 3.5–5.2)
ALP SERPL-CCNC: 50 U/L (ref 55–135)
ALT SERPL W/O P-5'-P-CCNC: 14 U/L (ref 10–44)
ANION GAP SERPL CALC-SCNC: 7 MMOL/L (ref 8–16)
AST SERPL-CCNC: 19 U/L (ref 10–40)
BILIRUB SERPL-MCNC: 0.5 MG/DL (ref 0.1–1)
BUN SERPL-MCNC: 24 MG/DL (ref 8–23)
CALCIUM SERPL-MCNC: 10.2 MG/DL (ref 8.7–10.5)
CHLORIDE SERPL-SCNC: 105 MMOL/L (ref 95–110)
CO2 SERPL-SCNC: 29 MMOL/L (ref 23–29)
CREAT SERPL-MCNC: 1.2 MG/DL (ref 0.5–1.4)
ERYTHROCYTE [DISTWIDTH] IN BLOOD BY AUTOMATED COUNT: 12.3 % (ref 11.5–14.5)
EST. GFR  (AFRICAN AMERICAN): 52.5 ML/MIN/1.73 M^2
EST. GFR  (NON AFRICAN AMERICAN): 45.6 ML/MIN/1.73 M^2
GLUCOSE SERPL-MCNC: 132 MG/DL (ref 70–110)
HCT VFR BLD AUTO: 39.1 % (ref 37–48.5)
HGB BLD-MCNC: 12.6 G/DL (ref 12–16)
IMM GRANULOCYTES # BLD AUTO: 0.01 K/UL (ref 0–0.04)
MCH RBC QN AUTO: 30.8 PG (ref 27–31)
MCHC RBC AUTO-ENTMCNC: 32.2 G/DL (ref 32–36)
MCV RBC AUTO: 96 FL (ref 82–98)
NEUTROPHILS # BLD AUTO: 4.3 K/UL (ref 1.8–7.7)
PLATELET # BLD AUTO: 196 K/UL (ref 150–450)
PMV BLD AUTO: 9.8 FL (ref 9.2–12.9)
POTASSIUM SERPL-SCNC: 4.9 MMOL/L (ref 3.5–5.1)
PROT SERPL-MCNC: 6.7 G/DL (ref 6–8.4)
RBC # BLD AUTO: 4.09 M/UL (ref 4–5.4)
SODIUM SERPL-SCNC: 141 MMOL/L (ref 136–145)
T4 FREE SERPL-MCNC: 0.96 NG/DL (ref 0.71–1.51)
TSH SERPL DL<=0.005 MIU/L-ACNC: 1.69 UIU/ML (ref 0.4–4)
WBC # BLD AUTO: 6.79 K/UL (ref 3.9–12.7)

## 2022-02-02 PROCEDURE — 36415 COLL VENOUS BLD VENIPUNCTURE: CPT | Performed by: INTERNAL MEDICINE

## 2022-02-02 PROCEDURE — 99214 OFFICE O/P EST MOD 30 MIN: CPT | Mod: PBBFAC | Performed by: INTERNAL MEDICINE

## 2022-02-02 PROCEDURE — 84439 ASSAY OF FREE THYROXINE: CPT | Performed by: INTERNAL MEDICINE

## 2022-02-02 PROCEDURE — 99215 OFFICE O/P EST HI 40 MIN: CPT | Mod: S$PBB,,, | Performed by: INTERNAL MEDICINE

## 2022-02-02 PROCEDURE — 84443 ASSAY THYROID STIM HORMONE: CPT | Performed by: INTERNAL MEDICINE

## 2022-02-02 PROCEDURE — 80053 COMPREHEN METABOLIC PANEL: CPT | Performed by: INTERNAL MEDICINE

## 2022-02-02 PROCEDURE — 85027 COMPLETE CBC AUTOMATED: CPT | Performed by: INTERNAL MEDICINE

## 2022-02-02 PROCEDURE — 99215 PR OFFICE/OUTPT VISIT, EST, LEVL V, 40-54 MIN: ICD-10-PCS | Mod: S$PBB,,, | Performed by: INTERNAL MEDICINE

## 2022-02-02 PROCEDURE — 99999 PR PBB SHADOW E&M-EST. PATIENT-LVL IV: CPT | Mod: PBBFAC,,, | Performed by: INTERNAL MEDICINE

## 2022-02-02 PROCEDURE — 99999 PR PBB SHADOW E&M-EST. PATIENT-LVL IV: ICD-10-PCS | Mod: PBBFAC,,, | Performed by: INTERNAL MEDICINE

## 2022-02-02 RX ORDER — HEPARIN 100 UNIT/ML
500 SYRINGE INTRAVENOUS
Status: CANCELLED | OUTPATIENT
Start: 2022-02-04

## 2022-02-02 RX ORDER — SODIUM CHLORIDE 0.9 % (FLUSH) 0.9 %
10 SYRINGE (ML) INJECTION
Status: CANCELLED | OUTPATIENT
Start: 2022-02-04

## 2022-02-02 NOTE — TELEPHONE ENCOUNTER
Chemo on 4th stays as is.    Labs, pet, dr kenney, and treatment needs to be moved from mid February to around march 3rd or so (1 month from 2/4).    ~Lata

## 2022-02-02 NOTE — PROGRESS NOTES
"Subjective:       Patient ID: Alesha Toney is a 71 y.o. female.    Chief Complaint: Malignant neoplasm of upper lobe, right bronchus or lung  Ms. Alesha Toney is a 71-year-old otherwise healthy female who started having some shoulder pain, which was lasting for over six weeks.  She went and saw her primary care physician and underwent an x-ray, which revealed right upper lobe lung mass worrisome for malignancy and a CT scan was recommended, which was done on 6/12/18 and that revealed a newly developing mass, pleural based, lateral posterior segment, right upper lobe 3.5 x 3.5 cm, surrounding stellate margin and patchy infiltrates, particularly superiorly,   anteriorly infiltrates extend perihilar into the anterior segment.  She then underwent CT-guided biopsy, which revealed well-differentiated adenocarcinoma.       Her PET scan from 6/29/18 There is physiologic intracranial, head, and neck activity.  There is a large right upper lobe mass SUV max 9.11.  No local or distant metastatic disease seen.  There is physiologic liver, spleen, GI and  activity.  There are a few liver cysts.  No adenopathy is seen.  The adrenal glands are not enlarged.  No adenopathy is seen.  No suspicious bone lesions are seen.     She underwent VATS with right upper lobectomy. Mediastinal lymphadenectomy on 8/9/18. Pathology revealed "Tumor site: Upper lobe.Tumor size: 7 x 4 x 2.7 cm.Tumor focality: Single tumor.  Histologic type: Mixed invasive mucinous and nonmucinous adenocarcinoma. Histologic grade: G2: Moderately differentiated.Spread through air spaces (STATS): Present. Visceral pleura invasion: Not identified.  Lymphovascular invasion: Not identified. Direct invasion of adjacent structures: No adjacent structures present. Margins: All margins are uninvolved by carcinoma.Distance of invasive carcinoma from closest margin: 1 cm from the bronchial margin.Treatment effect: No known presurgical therapy. Regional lymph nodes: " "Number of lymph nodes involved: 0. Number of lymph nodes examined: 11. Pathologic Stage Classification: pT3 N0"        She completed 4 cycles of adjuvant chemo with Cisplatin and Alimta as of 1/10/19   She is on surveillance.     CT chest of 9/17/19 which reveals "Operative change of right upper lobectomy for small-cell lung cancer with several scattered subsolid opacities and a new solid nodule in the right lower lobe measuring up to 0.5 cm.  We recommend continued surveillance with the role and schedule for such surveillance to be determined by clinical considerations. Sided chest port distal tip terminating at the confluence of the brachiocephalic vein in the SVC.  Correlate with device functioning. Apically predominant emphysematous changes, left greater than right. Aortic annular calcification and multi-vessel coronary artery atherosclerosis. Numerous unchanged hepatic hypodensities, likely cysts"     PET scan from 10/1/19 reveals "1.6 cm soft tissue lesion re-identified posterior to the bronchus intermedius.  No corresponding focal increased radiotracer uptake to suggest local recurrence or metastatic disease. Stable subcentimeter opacities throughout the bilateral lower lobes, too small to characterize with PET-CT. Focal increased radiotracer uptake and subtle wall thickening in the rectum.  Findings are concerning for primary neoplastic process.  Recommend further evaluation with direct visualization"     She returned from [facility] and was advised to proceed with chemo/RT with either Docetaxel/platinum or Carboplatin/Alimta.     She completed chemo/RT with Carboplatin and Alimta as of 12/31/19     She underwent CT chest on 5/27/2020 which revealed 1. In this patient with history of lung cancer status post right upper lobectomy, there is a soft tissue opacity at the posterior margin of the staple line.  This lesion has been enlarging progressively and now measures 1.3 x 1.6 cm.  There is also a new 1.7 x " "1.7 cm opacity at the right paravertebral pleural margin which is inseparable from this lesion.  These findings may represent post radiation change in light of the hypermetabolic lesion in this region on PET-CT of 10/01/2019 (image 69/[id number]).There is a 1 cm opacity in the superior or lateral basal segment of the right lower lobe (axial series 4, image 243) which has enlarged since 09/17/2019.  Nature of this lesion is unclear.  Clinical considerations will determine if percutaneous biopsy or metabolic assessment is warranted. 1.0 cm solid opacity with branching configuration (series 4, image 205) is located in the lateral basal segment of the left lower lobe, stable since 02/16/2019 (02/06/2019 axial series 4, image 310). Appearance and bifurcating character suggests mucoid impaction in mildly ectatic peripheral airway, likely not significant. Persistent clustered small centrilobular nodules in the apical segment of the right upper lobe likely reflecting small airway inflammation/infection. Partially visualized left chest port with distal catheter tip at the junction of the brachiocephalic veins and SVC, similar as on 09/17/2019"  She thus underwent PET scan on 6/1/2020 which revealed "No evidence of residual viable lung malignancy.  Evolving post radiation changes in the right paramediastinal lung, with a new irregular subpleural density which may also be related to recent radiation.  Attention on follow-up. Interval decrease in size of a soft tissue lesion along the inferior margin of the lobectomy stable line.  Several stable subcentimeter soft tissue opacities in the lower lobes bilaterally, As above.  Attention on follow-up.  Persistent hypermetabolic rectal lesion concerning for malignancy.  Recommend direct visualization and tissue sampling as clinically indicated"  I discussed her case in thoracic conference today and findings are felt to be related to RT     Her restaging CT scans from 1/20/2021 which reveal " ""Persistent soft tissue opacity in the posterior margin of the staple line that is increased in prominence when compared to the prior examination and is worrisome for disease progression. No new lesions identified.  Stable pulmonary nodules. Stable hypodense lesions throughout the liver that likely represent hepatic cysts"        Restaging PET scan from 1/27/2021 reveals "Increased size and hypermetabolic activity involving soft tissue opacity along the inferior border of the right upper lobectomy stable line concerning for progression of disease. Mildly increased hypermetabolic activity involving the rectum compatible with known high-grade dysplasia/intramucosal carcinoma. Interval decrease in size and resolution of hypermetabolic activity involving subcentimeter subpleural opacity within the right lower lobe"     MRI brain from  2/3/2021 reveals no evidence of recurrence.  GUARDANT testing only reveals p53, PDL1 is 0 and no other targets        PET scan from 3/24/2021 reveals "In this patient with known lung cancer, there is a persistent hypermetabolic right hilar mass consistent with viable tumor.  Interval increase in size is equivocal for progression of fibrosis versus tumor growth. 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  Activity appears more focal and possibly more proximal than before.  Consider direct visualization for further evaluation. The previously described subcentimeter subpleural opacity within the right lower lobe is not definitely seen on today's exam"               She last received immunotherapy on 5/28/2021. She underwent EUS on 6/25/2021 which revealed "Erythematous, congested, and ulcerated mucosa  (proctitis) in rectosigmoid colon.  Pathology reveals Colon, rectosigmoid, biopsy:  Moderate active colitis     She completed steroids about 5 weeks ago.      CT scans from 11/5/2021 reveals "In this patient with lung " "cancer, there is postoperative changes of right upper lobe resection.  Persistent soft tissue opacity along the right hilum suture line which is slightly increased in size from prior exam.  More conspicuous appearing nodules within the right lower lobe seen on prior exam.  New nodule within the right middle lobe measuring 1.3 cm.  Overall findings are all worrisome for disease progression. Scattered areas of centrilobular nodularity within the bilateral lungs.  Nonspecific and can be seen in the setting of infectious or inflammatory etiologies. Unchanged hepatics cysts. Stable left adrenal gland nodule."  PET scan from 11/23/2021 revealed "New hypermetabolic nodule within the right lower lobe additional slightly subcentimeter nodules within the right lower lobe.  Findings concerning for disease progression, other differentials include infection or noninfectious inflammatory process. Interval decrease in size and uptake in the right hilar mass. Resolution of previous focal rectal uptake"        She is on Opdivo. Her Opdivo was held on 1/10/2022 due to rectal bleeding. She saw Dr. Clark and was noted to have anal fissure which contributed to rectal bleed. Her rectal bleed has since resolved.    HPIShe comes in for Opdivo. She has joint pain which are migratory. She denies any rectal bleeding  Notes mild constipation, the intermittent chest pain is stable, also has fatigue which is at baseline as well   She denies any nausea, vomiting, diarrhea, constipation, abdominal pain, weight loss or loss of appetite, chest pain, shortness of breath, leg swelling, fatigue, pain, headache, dizziness, or mood changes. Her ECOG PS is zero. She is by herself      Review of Systems   Constitutional: Positive for fatigue. Negative for appetite change and unexpected weight change.   HENT: Negative for mouth sores.    Eyes: Negative for visual disturbance.   Respiratory: Negative for cough and shortness of breath.    Cardiovascular: " Negative for chest pain.   Gastrointestinal: Positive for constipation. Negative for abdominal pain and diarrhea.   Genitourinary: Negative for frequency.   Musculoskeletal: Negative for back pain.   Integumentary:  Negative for rash.   Neurological: Negative for headaches.   Hematological: Negative for adenopathy.   Psychiatric/Behavioral: The patient is not nervous/anxious.    All other systems reviewed and are negative.        Objective:      Physical Exam  Vitals reviewed.   Constitutional:       Appearance: She is well-developed and well-nourished.   HENT:      Mouth/Throat:      Pharynx: No oropharyngeal exudate.   Cardiovascular:      Rate and Rhythm: Normal rate.      Heart sounds: Normal heart sounds.   Pulmonary:      Effort: Pulmonary effort is normal.      Breath sounds: Normal breath sounds. No wheezing.   Abdominal:      General: Bowel sounds are normal.      Palpations: Abdomen is soft.      Tenderness: There is no abdominal tenderness.   Musculoskeletal:         General: No tenderness or edema.   Lymphadenopathy:      Cervical: No cervical adenopathy.   Skin:     General: Skin is warm and dry.      Findings: No rash.   Neurological:      Mental Status: She is alert and oriented to person, place, and time.      Coordination: Coordination normal.   Psychiatric:         Mood and Affect: Mood and affect normal.         Thought Content: Thought content normal.         Judgment: Judgment normal.           LABS:  WBC   Date Value Ref Range Status   02/02/2022 6.79 3.90 - 12.70 K/uL Final     Hemoglobin   Date Value Ref Range Status   02/02/2022 12.6 12.0 - 16.0 g/dL Final     POC Hematocrit   Date Value Ref Range Status   08/09/2018 33 (L) 36 - 54 %PCV Final     Hematocrit   Date Value Ref Range Status   02/02/2022 39.1 37.0 - 48.5 % Final     Platelets   Date Value Ref Range Status   02/02/2022 196 150 - 450 K/uL Final     Gran # (ANC)   Date Value Ref Range Status   02/02/2022 4.3 1.8 - 7.7 K/uL Final      Comment:     The ANC is based on a white cell differential from an   automated cell counter. It has not been microscopically   reviewed for the presence of abnormal cells. Clinical   correlation is required.         Chemistry        Component Value Date/Time     02/02/2022 0939    K 4.9 02/02/2022 0939     02/02/2022 0939    CO2 29 02/02/2022 0939    BUN 24 (H) 02/02/2022 0939    CREATININE 1.2 02/02/2022 0939     (H) 02/02/2022 0939        Component Value Date/Time    CALCIUM 10.2 02/02/2022 0939    ALKPHOS 50 (L) 02/02/2022 0939    AST 19 02/02/2022 0939    ALT 14 02/02/2022 0939    BILITOT 0.5 02/02/2022 0939    ESTGFRAFRICA 52.5 (A) 02/02/2022 0939    EGFRNONAA 45.6 (A) 02/02/2022 0939        TSH   Date Value Ref Range Status   02/02/2022 1.691 0.400 - 4.000 uIU/mL Final     Free T4   Date Value Ref Range Status   02/02/2022 0.96 0.71 - 1.51 ng/dL Final       Assessment:       Problem List Items Addressed This Visit     Malignant neoplasm of upper lobe, right bronchus or lung - Primary    Relevant Orders    NM PET CT Routine Skull to Mid Thigh    Comprehensive Metabolic Panel    CBC Oncology    Secondary and unspecified malignant neoplasm of intrathoracic lymph nodes    Stage 3a chronic kidney disease          Plan:        1,2,3. She will proceed with opdivo (Q4 week dosing) and will return in 4 weeks with labs and PET scan prior to next Opdivo.,    Above care plan was discussed with patient and all questions were addressed to her satisfaction

## 2022-02-04 ENCOUNTER — INFUSION (OUTPATIENT)
Dept: INFUSION THERAPY | Facility: HOSPITAL | Age: 72
End: 2022-02-04
Payer: MEDICARE

## 2022-02-04 VITALS
RESPIRATION RATE: 18 BRPM | TEMPERATURE: 98 F | DIASTOLIC BLOOD PRESSURE: 63 MMHG | HEIGHT: 66 IN | BODY MASS INDEX: 24.94 KG/M2 | SYSTOLIC BLOOD PRESSURE: 134 MMHG | WEIGHT: 155.19 LBS | HEART RATE: 62 BPM

## 2022-02-04 DIAGNOSIS — C34.11 MALIGNANT NEOPLASM OF UPPER LOBE, RIGHT BRONCHUS OR LUNG: ICD-10-CM

## 2022-02-04 DIAGNOSIS — C77.1 SECONDARY AND UNSPECIFIED MALIGNANT NEOPLASM OF INTRATHORACIC LYMPH NODES: Primary | ICD-10-CM

## 2022-02-04 PROCEDURE — A4216 STERILE WATER/SALINE, 10 ML: HCPCS | Performed by: INTERNAL MEDICINE

## 2022-02-04 PROCEDURE — 63600175 PHARM REV CODE 636 W HCPCS: Mod: JG | Performed by: INTERNAL MEDICINE

## 2022-02-04 PROCEDURE — 96413 CHEMO IV INFUSION 1 HR: CPT

## 2022-02-04 PROCEDURE — 25000003 PHARM REV CODE 250: Performed by: INTERNAL MEDICINE

## 2022-02-04 RX ORDER — HEPARIN 100 UNIT/ML
500 SYRINGE INTRAVENOUS
Status: DISCONTINUED | OUTPATIENT
Start: 2022-02-04 | End: 2022-02-04 | Stop reason: HOSPADM

## 2022-02-04 RX ORDER — SODIUM CHLORIDE 0.9 % (FLUSH) 0.9 %
10 SYRINGE (ML) INJECTION
Status: DISCONTINUED | OUTPATIENT
Start: 2022-02-04 | End: 2022-02-04 | Stop reason: HOSPADM

## 2022-02-04 RX ADMIN — HEPARIN 500 UNITS: 100 SYRINGE at 09:02

## 2022-02-04 RX ADMIN — Medication 10 ML: at 09:02

## 2022-02-04 RX ADMIN — SODIUM CHLORIDE: 0.9 INJECTION, SOLUTION INTRAVENOUS at 08:02

## 2022-02-04 RX ADMIN — SODIUM CHLORIDE 480 MG: 9 INJECTION, SOLUTION INTRAVENOUS at 08:02

## 2022-02-08 ENCOUNTER — HOSPITAL ENCOUNTER (OUTPATIENT)
Dept: RADIOLOGY | Facility: HOSPITAL | Age: 72
Discharge: HOME | End: 2022-02-08
Attending: INTERNAL MEDICINE
Payer: MEDICARE

## 2022-02-08 VITALS — WEIGHT: 151 LBS | BODY MASS INDEX: 24.37 KG/M2

## 2022-02-08 DIAGNOSIS — Z12.31 ENCOUNTER FOR SCREENING MAMMOGRAM FOR MALIGNANT NEOPLASM OF BREAST: ICD-10-CM

## 2022-02-08 DIAGNOSIS — Z12.39 ENCOUNTER FOR SCREENING FOR MALIGNANT NEOPLASM OF BREAST, UNSPECIFIED SCREENING MODALITY: ICD-10-CM

## 2022-02-08 PROCEDURE — 77067 SCR MAMMO BI INCL CAD: CPT | Mod: TC

## 2022-02-08 PROCEDURE — 77063 MAMMO DIGITAL SCREENING BILAT WITH TOMO: ICD-10-PCS | Mod: 26,,, | Performed by: RADIOLOGY

## 2022-02-08 PROCEDURE — 77063 BREAST TOMOSYNTHESIS BI: CPT | Mod: TC

## 2022-02-08 PROCEDURE — 77063 BREAST TOMOSYNTHESIS BI: CPT | Mod: 26,,, | Performed by: RADIOLOGY

## 2022-02-08 PROCEDURE — 77067 MAMMO DIGITAL SCREENING BILAT WITH TOMO: ICD-10-PCS | Mod: 26,,, | Performed by: RADIOLOGY

## 2022-02-08 PROCEDURE — 77067 SCR MAMMO BI INCL CAD: CPT | Mod: 26,,, | Performed by: RADIOLOGY

## 2022-02-10 ENCOUNTER — PATIENT MESSAGE (OUTPATIENT)
Dept: INTERNAL MEDICINE | Facility: CLINIC | Age: 72
End: 2022-02-10
Payer: MEDICARE

## 2022-02-10 NOTE — TELEPHONE ENCOUNTER
No new care gaps identified.  Powered by [employer]. Reference number: [id number].   2/10/2022 3:26:27 PM CST

## 2022-02-11 ENCOUNTER — HOSPITAL ENCOUNTER (OUTPATIENT)
Dept: CARDIOLOGY | Facility: HOSPITAL | Age: 72
Discharge: HOME | End: 2022-02-11
Attending: INTERNAL MEDICINE
Payer: MEDICARE

## 2022-02-11 VITALS
SYSTOLIC BLOOD PRESSURE: 124 MMHG | HEART RATE: 58 BPM | WEIGHT: 151 LBS | BODY MASS INDEX: 24.27 KG/M2 | HEIGHT: 66 IN | DIASTOLIC BLOOD PRESSURE: 58 MMHG

## 2022-02-11 DIAGNOSIS — I27.20 PULMONARY HYPERTENSION: ICD-10-CM

## 2022-02-11 DIAGNOSIS — I10 ESSENTIAL HYPERTENSION: ICD-10-CM

## 2022-02-11 LAB
ASCENDING AORTA: 2.79 CM
AV INDEX (PROSTH): 0.84
AV MEAN GRADIENT: 6 MMHG
AV PEAK GRADIENT: 10 MMHG
AV VALVE AREA: 2.18 CM2
AV VELOCITY RATIO: 0.87
BSA FOR ECHO PROCEDURE: 1.79 M2
CV ECHO LV RWT: 0.36 CM
DOP CALC AO PEAK VEL: 1.55 M/S
DOP CALC AO VTI: 40.49 CM
DOP CALC LVOT AREA: 2.6 CM2
DOP CALC LVOT DIAMETER: 1.82 CM
DOP CALC LVOT PEAK VEL: 1.35 M/S
DOP CALC LVOT STROKE VOLUME: 88.07 CM3
DOP CALCLVOT PEAK VEL VTI: 33.87 CM
E WAVE DECELERATION TIME: 254.52 MSEC
E/A RATIO: 0.94
E/E' RATIO: 12.71 M/S
ECHO LV POSTERIOR WALL: 0.76 CM (ref 0.6–1.1)
EJECTION FRACTION: 60 %
FRACTIONAL SHORTENING: 36 % (ref 28–44)
INTERVENTRICULAR SEPTUM: 0.8 CM (ref 0.6–1.1)
IVRT: 102.76 MSEC
LA MAJOR: 5.25 CM
LA MINOR: 5.74 CM
LA WIDTH: 3.81 CM
LEFT ATRIUM SIZE: 3.13 CM
LEFT ATRIUM VOLUME INDEX MOD: 27.6 ML/M2
LEFT ATRIUM VOLUME INDEX: 31.4 ML/M2
LEFT ATRIUM VOLUME MOD: 48.79 CM3
LEFT ATRIUM VOLUME: 55.59 CM3
LEFT INTERNAL DIMENSION IN SYSTOLE: 2.71 CM (ref 2.1–4)
LEFT VENTRICLE DIASTOLIC VOLUME INDEX: 44.55 ML/M2
LEFT VENTRICLE DIASTOLIC VOLUME: 78.85 ML
LEFT VENTRICLE MASS INDEX: 56 G/M2
LEFT VENTRICLE SYSTOLIC VOLUME INDEX: 15.4 ML/M2
LEFT VENTRICLE SYSTOLIC VOLUME: 27.18 ML
LEFT VENTRICULAR INTERNAL DIMENSION IN DIASTOLE: 4.21 CM (ref 3.5–6)
LEFT VENTRICULAR MASS: 98.34 G
LV LATERAL E/E' RATIO: 12.71 M/S
LV SEPTAL E/E' RATIO: 12.71 M/S
MV PEAK A VEL: 0.95 M/S
MV PEAK E VEL: 0.89 M/S
MV STENOSIS PRESSURE HALF TIME: 73.81 MS
MV VALVE AREA P 1/2 METHOD: 2.98 CM2
PISA TR MAX VEL: 3.38 M/S
PULM VEIN S/D RATIO: 1.29
PV PEAK D VEL: 0.59 M/S
PV PEAK S VEL: 0.76 M/S
RA MAJOR: 4.81 CM
RA PRESSURE: 3 MMHG
RA WIDTH: 2.87 CM
RIGHT VENTRICULAR END-DIASTOLIC DIMENSION: 3.35 CM
SINUS: 2.71 CM
STJ: 2.28 CM
TDI LATERAL: 0.07 M/S
TDI SEPTAL: 0.07 M/S
TDI: 0.07 M/S
TR MAX PG: 46 MMHG
TRICUSPID ANNULAR PLANE SYSTOLIC EXCURSION: 2.38 CM
TV REST PULMONARY ARTERY PRESSURE: 49 MMHG

## 2022-02-11 PROCEDURE — 93356 MYOCRD STRAIN IMG SPCKL TRCK: CPT | Mod: ,,, | Performed by: INTERNAL MEDICINE

## 2022-02-11 PROCEDURE — 93356 ECHO (CUPID ONLY): ICD-10-PCS | Mod: ,,, | Performed by: INTERNAL MEDICINE

## 2022-02-11 PROCEDURE — 93356 MYOCRD STRAIN IMG SPCKL TRCK: CPT

## 2022-02-11 PROCEDURE — 93306 TTE W/DOPPLER COMPLETE: CPT | Mod: 26,,, | Performed by: INTERNAL MEDICINE

## 2022-02-11 PROCEDURE — 93306 ECHO (CUPID ONLY): ICD-10-PCS | Mod: 26,,, | Performed by: INTERNAL MEDICINE

## 2022-02-11 RX ORDER — HYDROCODONE BITARTRATE AND ACETAMINOPHEN 5; 325 MG/1; MG/1
1 TABLET ORAL EVERY 8 HOURS PRN
Qty: 90 TABLET | Refills: 0 | Status: SHIPPED | OUTPATIENT
Start: 2022-02-11 | End: 2022-03-10 | Stop reason: SDUPTHER

## 2022-02-11 RX ORDER — LORAZEPAM 0.5 MG/1
0.5 TABLET ORAL EVERY 12 HOURS PRN
Qty: 30 TABLET | Refills: 2 | Status: SHIPPED | OUTPATIENT
Start: 2022-02-11 | End: 2022-06-14

## 2022-02-14 ENCOUNTER — IMMUNIZATION (OUTPATIENT)
Dept: PHARMACY | Facility: CLINIC | Age: 72
End: 2022-02-14
Payer: MEDICARE

## 2022-02-14 DIAGNOSIS — Z23 NEED FOR VACCINATION: Primary | ICD-10-CM

## 2022-02-15 ENCOUNTER — PATIENT OUTREACH (OUTPATIENT)
Dept: ADMINISTRATIVE | Facility: OTHER | Age: 72
End: 2022-02-15
Payer: MEDICARE

## 2022-02-16 ENCOUNTER — LAB VISIT (OUTPATIENT)
Dept: LAB | Facility: HOSPITAL | Age: 72
End: 2022-02-16
Attending: INTERNAL MEDICINE
Payer: MEDICARE

## 2022-02-16 DIAGNOSIS — E78.2 MIXED HYPERLIPIDEMIA: ICD-10-CM

## 2022-02-16 LAB
CHOLEST SERPL-MCNC: 207 MG/DL (ref 120–199)
CHOLEST/HDLC SERPL: 4.2 {RATIO} (ref 2–5)
HDLC SERPL-MCNC: 49 MG/DL (ref 40–75)
HDLC SERPL: 23.7 % (ref 20–50)
LDLC SERPL CALC-MCNC: 136.4 MG/DL (ref 63–159)
NONHDLC SERPL-MCNC: 158 MG/DL
TRIGL SERPL-MCNC: 108 MG/DL (ref 30–150)

## 2022-02-16 PROCEDURE — 36415 COLL VENOUS BLD VENIPUNCTURE: CPT | Mod: PO | Performed by: INTERNAL MEDICINE

## 2022-02-16 PROCEDURE — 80061 LIPID PANEL: CPT | Performed by: INTERNAL MEDICINE

## 2022-02-17 ENCOUNTER — OFFICE VISIT (OUTPATIENT)
Dept: CARDIOLOGY | Facility: CLINIC | Age: 72
End: 2022-02-17
Payer: MEDICARE

## 2022-02-17 VITALS
DIASTOLIC BLOOD PRESSURE: 70 MMHG | HEART RATE: 64 BPM | BODY MASS INDEX: 25.15 KG/M2 | HEIGHT: 66 IN | SYSTOLIC BLOOD PRESSURE: 158 MMHG | WEIGHT: 156.5 LBS

## 2022-02-17 DIAGNOSIS — E03.9 HYPOTHYROIDISM, UNSPECIFIED TYPE: ICD-10-CM

## 2022-02-17 DIAGNOSIS — E78.2 MIXED HYPERLIPIDEMIA: ICD-10-CM

## 2022-02-17 DIAGNOSIS — I27.20 PULMONARY HYPERTENSION: ICD-10-CM

## 2022-02-17 DIAGNOSIS — N18.31 STAGE 3A CHRONIC KIDNEY DISEASE: ICD-10-CM

## 2022-02-17 DIAGNOSIS — I25.84 CORONARY ARTERY DISEASE DUE TO CALCIFIED CORONARY LESION: Primary | ICD-10-CM

## 2022-02-17 DIAGNOSIS — R73.03 PREDIABETES: ICD-10-CM

## 2022-02-17 DIAGNOSIS — I25.10 CORONARY ARTERY DISEASE DUE TO CALCIFIED CORONARY LESION: Primary | ICD-10-CM

## 2022-02-17 DIAGNOSIS — J43.8 OTHER EMPHYSEMA: ICD-10-CM

## 2022-02-17 DIAGNOSIS — I70.0 AORTIC ATHEROSCLEROSIS: ICD-10-CM

## 2022-02-17 DIAGNOSIS — C34.91 NON-SMALL CELL CANCER OF RIGHT LUNG: ICD-10-CM

## 2022-02-17 DIAGNOSIS — I10 ESSENTIAL HYPERTENSION: ICD-10-CM

## 2022-02-17 PROCEDURE — 99214 OFFICE O/P EST MOD 30 MIN: CPT | Mod: S$PBB,,, | Performed by: NURSE PRACTITIONER

## 2022-02-17 PROCEDURE — 99999 PR PBB SHADOW E&M-EST. PATIENT-LVL IV: CPT | Mod: PBBFAC,,, | Performed by: NURSE PRACTITIONER

## 2022-02-17 PROCEDURE — 99214 OFFICE O/P EST MOD 30 MIN: CPT | Mod: PBBFAC,PO | Performed by: NURSE PRACTITIONER

## 2022-02-17 PROCEDURE — 99214 PR OFFICE/OUTPT VISIT, EST, LEVL IV, 30-39 MIN: ICD-10-PCS | Mod: S$PBB,,, | Performed by: NURSE PRACTITIONER

## 2022-02-17 PROCEDURE — 99999 PR PBB SHADOW E&M-EST. PATIENT-LVL IV: ICD-10-PCS | Mod: PBBFAC,,, | Performed by: NURSE PRACTITIONER

## 2022-02-17 RX ORDER — AMLODIPINE BESYLATE 2.5 MG/1
2.5 TABLET ORAL DAILY
Qty: 30 TABLET | Refills: 11 | Status: SHIPPED | OUTPATIENT
Start: 2022-02-17 | End: 2022-07-13

## 2022-02-17 NOTE — PATIENT INSTRUCTIONS
You can try cholestoff or benechol as another dietary supplement to help lower your cholesterol    Patient Education       Ezetimibe (ez ET i mibe)   Brand Names: US Zetia   Brand Names: Brendon ACH-Ezetimibe; ACT Ezetimibe [DSC]; AG-Ezetimibe; APO-Ezetimibe; AURO-Ezetimibe; BIO-Ezetimibe; Ezetrol; GLN-Ezetimibe; JAMP-Ezetimibe; M-Ezetimibe; Mar-Ezetimibe; MINT-Ezetimibe; NRA-Ezetimibe; PMS-Ezetimibe; Priva-Ezetimibe; RAN-Ezetimibe; LOC-Ezetimibe [DSC]; SANDOZ Ezetimibe; TEVA-Ezetimibe   What is this drug used for?   · It is used to lower cholesterol.  · This drug may be used with other drugs to treat your health condition. If you are also taking other drugs, talk with your doctor about the risks and side effects that may happen.    What do I need to tell my doctor BEFORE I take this drug?   · If you are allergic to this drug; any part of this drug; or any other drugs, foods, or substances. Tell your doctor about the allergy and what signs you had.  · If you have liver disease or raised liver enzymes.  This is not a list of all drugs or health problems that interact with this drug.  Tell your doctor and pharmacist about all of your drugs (prescription or OTC, natural products, vitamins) and health problems. You must check to make sure that it is safe for you to take this drug with all of your drugs and health problems. Do not start, stop, or change the dose of any drug without checking with your doctor.  What are some things I need to know or do while I take this drug?   · Tell all of your health care providers that you take this drug. This includes your doctors, nurses, pharmacists, and dentists.  · Have blood work checked as you have been told by the doctor. Talk with the doctor.  · Follow the diet and workout plan that your doctor told you about.  · Do not take colestipol or cholestyramine within 4 hours before or 2 hours after this drug.  · Tell your doctor if you are pregnant, plan on getting pregnant, or are  breast-feeding. You will need to talk about the benefits and risks to you and the baby.    What are some side effects that I need to call my doctor about right away?   WARNING/CAUTION: Even though it may be rare, some people may have very bad and sometimes deadly side effects when taking a drug. Tell your doctor or get medical help right away if you have any of the following signs or symptoms that may be related to a very bad side effect:  · Signs of an allergic reaction, like rash; hives; itching; red, swollen, blistered, or peeling skin with or without fever; wheezing; tightness in the chest or throat; trouble breathing, swallowing, or talking; unusual hoarseness; or swelling of the mouth, face, lips, tongue, or throat.  · Muscle pain or weakness.  What are some other side effects of this drug?   All drugs may cause side effects. However, many people have no side effects or only have minor side effects. Call your doctor or get medical help if any of these side effects or any other side effects bother you or do not go away:  · Joint pain.  · Diarrhea.  · Feeling tired or weak.  These are not all of the side effects that may occur. If you have questions about side effects, call your doctor. Call your doctor for medical advice about side effects.  You may report side effects to your national health agency.  You may report side effects to the FDA at 1-433.399.1374. You may also report side effects at https://www.fda.gov/medwatch.  How is this drug best taken?   Use this drug as ordered by your doctor. Read all information given to you. Follow all instructions closely.  · Take with or without food.  · Take this drug at the same time of day.  · Keep taking this drug as you have been told by your doctor or other health care provider, even if you feel well.  What do I do if I miss a dose?   · Take a missed dose as soon as you think about it.  · If it is close to the time for your next dose, skip the missed dose and go  back to your normal time.  · Do not take 2 doses at the same time or extra doses.    How do I store and/or throw out this drug?   · Store at room temperature in a dry place. Do not store in a bathroom.  · Keep all drugs in a safe place. Keep all drugs out of the reach of children and pets.  · Throw away unused or  drugs. Do not flush down a toilet or pour down a drain unless you are told to do so. Check with your pharmacist if you have questions about the best way to throw out drugs. There may be drug take-back programs in your area.    General drug facts   · If your symptoms or health problems do not get better or if they become worse, call your doctor.  · Do not share your drugs with others and do not take anyone else's drugs.  · Some drugs may have another patient information leaflet. If you have any questions about this drug, please talk with your doctor, nurse, pharmacist, or other health care provider.  · Some drugs may have another patient information leaflet. Check with your pharmacist. If you have any questions about this drug, please talk with your doctor, nurse, pharmacist, or other health care provider.  · If you think there has been an overdose, call your poison control center or get medical care right away. Be ready to tell or show what was taken, how much, and when it happened.    Consumer Information Use and Disclaimer   This generalized information is a limited summary of diagnosis, treatment, and/or medication information. It is not meant to be comprehensive and should be used as a tool to help the user understand and/or assess potential diagnostic and treatment options. It does NOT include all information about conditions, treatments, medications, side effects, or risks that may apply to a specific patient. It is not intended to be medical advice or a substitute for the medical advice, diagnosis, or treatment of a health care provider based on the health care provider's examination and  assessment of a patient's specific and unique circumstances. Patients must speak with a health care provider for complete information about their health, medical questions, and treatment options, including any risks or benefits regarding use of medications. This information does not endorse any treatments or medications as safe, effective, or approved for treating a specific patient. Up[employer] and its affiliates disclaim any warranty or liability relating to this information or the use thereof. The use of this information is governed by the Terms of Use, available at https://www.[employer].com/en/solutions/lexicomp/about/moshe.  Last Reviewed Date   2020-09-10  Copyright   © 2021 [employer] and its affiliates and/or licensors. All rights reserved.

## 2022-02-17 NOTE — PROGRESS NOTES
Ms. Toney is a patient of Dr. Mccoy and was last seen in [facility] Visit 8/20/21.      Subjective:   Patient ID:  Alesha Toney is a 71 y.o. female who presents for follow-up of Pulmonary Hypertension    Problem List:  COPD - mild  SVT (atrial tachycardia)  HTN onset 2017  Adenocarcinoma lung 6/2018    HPI:   Alesha Toney is in clinic today to follow up HTN.  Her home BP runs 140-150s.  She eats low sodium diet.  She walks about 20 minutes a day.   She getting at least 7 or 8 thousand steps a day.  Patient denies chest pain with exertion or at rest, palpitations,  dizziness, syncope, edema, orthopnea, PND, or claudication.      Review of Systems   Constitutional: Negative for decreased appetite, diaphoresis, malaise/fatigue, weight gain and weight loss.   Eyes: Negative for visual disturbance.   Cardiovascular: Negative for chest pain, claudication, dyspnea on exertion, irregular heartbeat, leg swelling, near-syncope, orthopnea, palpitations, paroxysmal nocturnal dyspnea and syncope.        Denies chest pressure   Respiratory: Negative for cough, hemoptysis, shortness of breath, sleep disturbances due to breathing and snoring.    Endocrine: Negative for cold intolerance and heat intolerance.   Hematologic/Lymphatic: Negative for bleeding problem. Does not bruise/bleed easily.   Musculoskeletal: Negative for myalgias.   Gastrointestinal: Negative for bloating, abdominal pain, anorexia, change in bowel habit, constipation, diarrhea, nausea and vomiting.   Neurological: Negative for difficulty with concentration, disturbances in coordination, excessive daytime sleepiness, dizziness, headaches, light-headedness, loss of balance, numbness and weakness.   Psychiatric/Behavioral: The patient does not have insomnia.        Allergies and current medications updated and reviewed:  Review of patient's allergies indicates:   Allergen Reactions    Adhesive Itching, Rash and Blisters     INCLUDES EKG PADS     "Ciprofloxacin Hives     Hives    Iodinated contrast media     Pcn [penicillins]      rash    Phenergan plain Other (See Comments)     "crazy behavior"    Phenergan [promethazine]     Tramadol     Vancomycin analogues      Red man syndrome     Current Outpatient Medications   Medication Sig    ascorbic acid, vitamin C, (VITAMIN C) 500 MG tablet Take 500 mg by mouth once daily.    atenoloL (TENORMIN) 50 MG tablet Half a tab qid    calcium carbonate (TUMS) 300 mg (750 mg) Chew Take by mouth as needed.     famotidine (PEPCID) 10 MG tablet Take 10 mg by mouth 2 (two) times daily as needed.     HYDROcodone-acetaminophen (NORCO) 5-325 mg per tablet Take 1 tablet by mouth every 8 (eight) hours as needed for Pain.    LORazepam (ATIVAN) 0.5 MG tablet Take 1 tablet (0.5 mg total) by mouth every 12 (twelve) hours as needed for Anxiety.    losartan (COZAAR) 50 MG tablet 1 tab po bid    nivolumab (OPDIVO) 100 mg/10 mL injection 100 mg by Other route.    ondansetron (ZOFRAN) 4 MG tablet Take 1 tablet (4 mg total) by mouth daily as needed for Nausea.    prednisoLONE acetate (PRED FORTE) 1 % DrpS Place 1 drop into both eyes 3 (three) times daily.    UNABLE TO FIND Take 2 tablets by mouth as needed. senekot     No current facility-administered medications for this visit.       Objective:        BP (!) 158/70   Pulse 64   Ht 5' 6" (1.676 m)   Wt 71 kg (156 lb 8.4 oz)   LMP  (LMP Unknown)   BMI 25.26 kg/m²       Physical Exam  Vitals and nursing note reviewed.   Constitutional:       General: She is active. She is not in acute distress.Vital signs are normal.      Appearance: Normal appearance. She is well-developed and well-nourished. She is not diaphoretic.   HENT:      Head: Normocephalic and atraumatic.   Eyes:      General: Lids are normal. No scleral icterus.     Conjunctiva/sclera: Conjunctivae normal.   Neck:      Vascular: Normal carotid pulses. No carotid bruit, hepatojugular reflux or JVD. "   Cardiovascular:      Rate and Rhythm: Normal rate and regular rhythm.      Chest Wall: PMI is not displaced.      Pulses: Intact distal pulses.           Carotid pulses are 2+ on the right side and 2+ on the left side.       Radial pulses are 2+ on the right side and 2+ on the left side.        Dorsalis pedis pulses are 2+ on the right side and 2+ on the left side.        Posterior tibial pulses are 1+ on the right side and 1+ on the left side.      Heart sounds: S1 normal and S2 normal. No murmur heard.  No friction rub. No gallop.    Pulmonary:      Effort: Pulmonary effort is normal. No respiratory distress.      Breath sounds: Normal breath sounds. No decreased breath sounds, wheezing, rhonchi or rales.   Chest:      Chest wall: No tenderness.   Abdominal:      General: Bowel sounds are normal. There is no distension, ascites or abdominal bruit.      Palpations: Abdomen is soft. There is no fluid wave, hepatosplenomegaly or pulsatile mass.      Tenderness: There is no abdominal tenderness.   Musculoskeletal:         General: No edema.      Cervical back: Neck supple.   Skin:     General: Skin is warm, dry and intact.      Findings: No rash.      Nails: There is no clubbing.   Neurological:      Mental Status: She is alert and oriented to person, place, and time.      Gait: Gait normal.   Psychiatric:         Mood and Affect: Mood and affect normal.         Speech: Speech normal.         Behavior: Behavior normal.         Thought Content: Thought content normal.         Cognition and Memory: Cognition and memory normal.         Judgment: Judgment normal.         Chemistry        Component Value Date/Time     02/02/2022 0939    K 4.9 02/02/2022 0939     02/02/2022 0939    CO2 29 02/02/2022 0939    BUN 24 (H) 02/02/2022 0939    CREATININE 1.2 02/02/2022 0939     (H) 02/02/2022 0939        Component Value Date/Time    CALCIUM 10.2 02/02/2022 0939    ALKPHOS 50 (L) 02/02/2022 0939    AST 19  02/02/2022 0939    ALT 14 02/02/2022 0939    BILITOT 0.5 02/02/2022 0939    ESTGFRAFRICA 52.5 (A) 02/02/2022 0939    EGFRNONAA 45.6 (A) 02/02/2022 0939        Lab Results   Component Value Date    LABA1C 5.8 (H) 11/03/2017    HGBA1C 5.8 (H) 05/29/2019     Recent Labs   Lab 02/02/22  0939 02/16/22  1722   WBC 6.79  --    Hemoglobin 12.6  --    Hematocrit 39.1  --    MCV 96  --    Platelets 196  --    TSH 1.691  --    Cholesterol  --  207 H   HDL  --  49   LDL Cholesterol  --  136.4   Triglycerides  --  108   HDL/Cholesterol Ratio  --  23.7       Recent Labs   Lab 02/08/21  0748   INR 1.0        Test(s) Reviewed  I have reviewed the following in detail:  [] Stress test   [] Angiography   [x] Echocardiogram   [x] Labs   [] Other:         Assessment/Plan:   1. Coronary artery disease due to calcified coronary lesion  Asymptomatic. Monitor.     2. Aortic atherosclerosis      3. Mixed hyperlipidemia  LDL not at goal <100. She had colitis while on treatment for her cancer.  She is concerned about taking a statin.  We talked about zetia and plant sterols as options.  Encouraged mediterranean diet and exercise.       4. Essential hypertension  BP not at goal <130/80.  Reports that she has robust responses to medications.  Will start low dose amlodipine 2.5 mg daily and have her send bp log in 2 weeks.    - amLODIPine (NORVASC) 2.5 MG tablet; Take 1 tablet (2.5 mg total) by mouth once daily.  Dispense: 30 tablet; Refill: 11    5. Pulmonary hypertension  CCB may help PH some but suspect secondary to pulmonary disease.     6. Other emphysema      7. Hypothyroidism, unspecified type  Lab Results   Component Value Date    TSH 1.691 02/02/2022    FREET4 0.96 02/02/2022       8. Prediabetes  Lab Results   Component Value Date    LABA1C 5.8 (H) 11/03/2017    HGBA1C 5.8 (H) 05/29/2019       9. Stage 3a chronic kidney disease  Cr stable 1-1.2. Monitor.     10. Non-small cell cancer of right lung       A copy of this note will be  forwarded to Dr. Mccoy and Dr. Caldwell.     Follow up in about 3 months (around 5/17/2022).

## 2022-03-02 ENCOUNTER — LAB VISIT (OUTPATIENT)
Dept: LAB | Facility: HOSPITAL | Age: 72
End: 2022-03-02
Attending: INTERNAL MEDICINE
Payer: MEDICARE

## 2022-03-02 DIAGNOSIS — C34.11 MALIGNANT NEOPLASM OF UPPER LOBE, RIGHT BRONCHUS OR LUNG: ICD-10-CM

## 2022-03-02 LAB
ALBUMIN SERPL BCP-MCNC: 3.7 G/DL (ref 3.5–5.2)
ALP SERPL-CCNC: 54 U/L (ref 55–135)
ALT SERPL W/O P-5'-P-CCNC: 12 U/L (ref 10–44)
ANION GAP SERPL CALC-SCNC: 7 MMOL/L (ref 8–16)
AST SERPL-CCNC: 19 U/L (ref 10–40)
BILIRUB SERPL-MCNC: 0.4 MG/DL (ref 0.1–1)
BUN SERPL-MCNC: 21 MG/DL (ref 8–23)
CALCIUM SERPL-MCNC: 9.8 MG/DL (ref 8.7–10.5)
CHLORIDE SERPL-SCNC: 106 MMOL/L (ref 95–110)
CO2 SERPL-SCNC: 27 MMOL/L (ref 23–29)
CREAT SERPL-MCNC: 1.1 MG/DL (ref 0.5–1.4)
ERYTHROCYTE [DISTWIDTH] IN BLOOD BY AUTOMATED COUNT: 12.6 % (ref 11.5–14.5)
EST. GFR  (AFRICAN AMERICAN): 58.4 ML/MIN/1.73 M^2
EST. GFR  (NON AFRICAN AMERICAN): 50.6 ML/MIN/1.73 M^2
GLUCOSE SERPL-MCNC: 107 MG/DL (ref 70–110)
HCT VFR BLD AUTO: 39.1 % (ref 37–48.5)
HGB BLD-MCNC: 12.8 G/DL (ref 12–16)
IMM GRANULOCYTES # BLD AUTO: 0.01 K/UL (ref 0–0.04)
MCH RBC QN AUTO: 31.1 PG (ref 27–31)
MCHC RBC AUTO-ENTMCNC: 32.7 G/DL (ref 32–36)
MCV RBC AUTO: 95 FL (ref 82–98)
NEUTROPHILS # BLD AUTO: 2.4 K/UL (ref 1.8–7.7)
PLATELET # BLD AUTO: 202 K/UL (ref 150–450)
PMV BLD AUTO: 10 FL (ref 9.2–12.9)
POTASSIUM SERPL-SCNC: 4.7 MMOL/L (ref 3.5–5.1)
PROT SERPL-MCNC: 6.7 G/DL (ref 6–8.4)
RBC # BLD AUTO: 4.11 M/UL (ref 4–5.4)
SODIUM SERPL-SCNC: 140 MMOL/L (ref 136–145)
WBC # BLD AUTO: 5.54 K/UL (ref 3.9–12.7)

## 2022-03-02 PROCEDURE — 85027 COMPLETE CBC AUTOMATED: CPT | Performed by: INTERNAL MEDICINE

## 2022-03-02 PROCEDURE — 36415 COLL VENOUS BLD VENIPUNCTURE: CPT | Performed by: INTERNAL MEDICINE

## 2022-03-02 PROCEDURE — 80053 COMPREHEN METABOLIC PANEL: CPT | Performed by: INTERNAL MEDICINE

## 2022-03-02 PROCEDURE — 84443 ASSAY THYROID STIM HORMONE: CPT | Performed by: INTERNAL MEDICINE

## 2022-03-04 ENCOUNTER — INFUSION (OUTPATIENT)
Dept: INFUSION THERAPY | Facility: HOSPITAL | Age: 72
End: 2022-03-04
Payer: MEDICARE

## 2022-03-04 ENCOUNTER — PATIENT MESSAGE (OUTPATIENT)
Dept: HEMATOLOGY/ONCOLOGY | Facility: CLINIC | Age: 72
End: 2022-03-04
Payer: MEDICARE

## 2022-03-04 VITALS
HEIGHT: 66 IN | DIASTOLIC BLOOD PRESSURE: 68 MMHG | SYSTOLIC BLOOD PRESSURE: 148 MMHG | BODY MASS INDEX: 24.94 KG/M2 | WEIGHT: 155.19 LBS | HEART RATE: 63 BPM | RESPIRATION RATE: 18 BRPM | TEMPERATURE: 98 F

## 2022-03-04 DIAGNOSIS — C77.1 SECONDARY AND UNSPECIFIED MALIGNANT NEOPLASM OF INTRATHORACIC LYMPH NODES: Primary | ICD-10-CM

## 2022-03-04 DIAGNOSIS — C34.11 MALIGNANT NEOPLASM OF UPPER LOBE, RIGHT BRONCHUS OR LUNG: ICD-10-CM

## 2022-03-04 LAB — TSH SERPL DL<=0.005 MIU/L-ACNC: 0.75 UIU/ML (ref 0.4–4)

## 2022-03-04 PROCEDURE — 25000003 PHARM REV CODE 250: Performed by: STUDENT IN AN ORGANIZED HEALTH CARE EDUCATION/TRAINING PROGRAM

## 2022-03-04 PROCEDURE — A4216 STERILE WATER/SALINE, 10 ML: HCPCS | Performed by: STUDENT IN AN ORGANIZED HEALTH CARE EDUCATION/TRAINING PROGRAM

## 2022-03-04 PROCEDURE — 63600175 PHARM REV CODE 636 W HCPCS: Mod: JG | Performed by: STUDENT IN AN ORGANIZED HEALTH CARE EDUCATION/TRAINING PROGRAM

## 2022-03-04 PROCEDURE — 96413 CHEMO IV INFUSION 1 HR: CPT

## 2022-03-04 RX ORDER — HEPARIN 100 UNIT/ML
500 SYRINGE INTRAVENOUS
Status: DISCONTINUED | OUTPATIENT
Start: 2022-03-04 | End: 2022-03-04 | Stop reason: HOSPADM

## 2022-03-04 RX ORDER — SODIUM CHLORIDE 0.9 % (FLUSH) 0.9 %
10 SYRINGE (ML) INJECTION
Status: DISCONTINUED | OUTPATIENT
Start: 2022-03-04 | End: 2022-03-04 | Stop reason: HOSPADM

## 2022-03-04 RX ADMIN — SODIUM CHLORIDE 480 MG: 9 INJECTION, SOLUTION INTRAVENOUS at 09:03

## 2022-03-04 RX ADMIN — SODIUM CHLORIDE: 9 INJECTION, SOLUTION INTRAVENOUS at 09:03

## 2022-03-04 RX ADMIN — HEPARIN 500 UNITS: 100 SYRINGE at 10:03

## 2022-03-04 RX ADMIN — Medication 10 ML: at 10:03

## 2022-03-10 ENCOUNTER — TELEPHONE (OUTPATIENT)
Dept: GASTROENTEROLOGY | Facility: CLINIC | Age: 72
End: 2022-03-10
Payer: MEDICARE

## 2022-03-10 ENCOUNTER — PATIENT MESSAGE (OUTPATIENT)
Dept: INTERNAL MEDICINE | Facility: CLINIC | Age: 72
End: 2022-03-10
Payer: MEDICARE

## 2022-03-10 ENCOUNTER — PATIENT MESSAGE (OUTPATIENT)
Dept: GASTROENTEROLOGY | Facility: CLINIC | Age: 72
End: 2022-03-10
Payer: MEDICARE

## 2022-03-10 NOTE — TELEPHONE ENCOUNTER
No new care gaps identified.  Powered by [email or web address]. Reference number: [id number].   3/10/2022 1:59:13 PM CST

## 2022-03-11 ENCOUNTER — PATIENT MESSAGE (OUTPATIENT)
Dept: INTERNAL MEDICINE | Facility: CLINIC | Age: 72
End: 2022-03-11
Payer: MEDICARE

## 2022-03-11 RX ORDER — HYDROCODONE BITARTRATE AND ACETAMINOPHEN 5; 325 MG/1; MG/1
1 TABLET ORAL EVERY 8 HOURS PRN
Qty: 90 TABLET | Refills: 0 | Status: SHIPPED | OUTPATIENT
Start: 2022-03-11 | End: 2022-04-13 | Stop reason: SDUPTHER

## 2022-03-17 NOTE — TELEPHONE ENCOUNTER
spoke with pt on today in regards to new schedule appointment, pt is aware and has confirm.   Doing well, denies complaints. Reports good fm.

## 2022-03-25 NOTE — TELEPHONE ENCOUNTER
----- Message from Pam Dhaliwal MD sent at 9/11/2018  7:51 AM CDT -----  Schedule CBC,CMp and see me in 1 week and start Cisplatin and ALimta. IV fluids day 2.  Needs to start next week as it will be 6 weeks since surgery.  Chemo needs auth    
[Negative] : Heme/Lymph

## 2022-03-29 ENCOUNTER — HOSPITAL ENCOUNTER (OUTPATIENT)
Dept: RADIOLOGY | Facility: HOSPITAL | Age: 72
Discharge: HOME | End: 2022-03-29
Attending: INTERNAL MEDICINE
Payer: MEDICARE

## 2022-03-29 DIAGNOSIS — C34.11 MALIGNANT NEOPLASM OF UPPER LOBE, RIGHT BRONCHUS OR LUNG: ICD-10-CM

## 2022-03-29 LAB — POCT GLUCOSE: 111 MG/DL (ref 70–110)

## 2022-03-29 PROCEDURE — 78815 PET IMAGE W/CT SKULL-THIGH: CPT | Mod: PS,TC

## 2022-03-29 PROCEDURE — A9552 F18 FDG: HCPCS

## 2022-03-29 PROCEDURE — 78815 NM PET CT ROUTINE: ICD-10-PCS | Mod: 26,PS,, | Performed by: STUDENT IN AN ORGANIZED HEALTH CARE EDUCATION/TRAINING PROGRAM

## 2022-03-29 PROCEDURE — 78815 PET IMAGE W/CT SKULL-THIGH: CPT | Mod: 26,PS,, | Performed by: STUDENT IN AN ORGANIZED HEALTH CARE EDUCATION/TRAINING PROGRAM

## 2022-03-30 ENCOUNTER — OFFICE VISIT (OUTPATIENT)
Dept: HEMATOLOGY/ONCOLOGY | Facility: CLINIC | Age: 72
End: 2022-03-30
Payer: MEDICARE

## 2022-03-30 ENCOUNTER — LAB VISIT (OUTPATIENT)
Dept: LAB | Facility: HOSPITAL | Age: 72
End: 2022-03-30
Attending: INTERNAL MEDICINE
Payer: MEDICARE

## 2022-03-30 VITALS
DIASTOLIC BLOOD PRESSURE: 76 MMHG | SYSTOLIC BLOOD PRESSURE: 187 MMHG | WEIGHT: 156.06 LBS | HEART RATE: 62 BPM | HEIGHT: 66 IN | BODY MASS INDEX: 25.08 KG/M2 | RESPIRATION RATE: 20 BRPM | OXYGEN SATURATION: 99 %

## 2022-03-30 DIAGNOSIS — C34.11 MALIGNANT NEOPLASM OF UPPER LOBE, RIGHT BRONCHUS OR LUNG: Primary | ICD-10-CM

## 2022-03-30 DIAGNOSIS — E07.9 THYROID DISORDER: ICD-10-CM

## 2022-03-30 DIAGNOSIS — C34.91 NON-SMALL CELL CANCER OF RIGHT LUNG: ICD-10-CM

## 2022-03-30 DIAGNOSIS — C77.1 SECONDARY AND UNSPECIFIED MALIGNANT NEOPLASM OF INTRATHORACIC LYMPH NODES: ICD-10-CM

## 2022-03-30 DIAGNOSIS — C34.91 NON-SMALL CELL CANCER OF RIGHT LUNG: Primary | ICD-10-CM

## 2022-03-30 LAB
ALBUMIN SERPL BCP-MCNC: 3.8 G/DL (ref 3.5–5.2)
ALP SERPL-CCNC: 49 U/L (ref 55–135)
ALT SERPL W/O P-5'-P-CCNC: 14 U/L (ref 10–44)
ANION GAP SERPL CALC-SCNC: 7 MMOL/L (ref 8–16)
AST SERPL-CCNC: 18 U/L (ref 10–40)
BILIRUB SERPL-MCNC: 0.4 MG/DL (ref 0.1–1)
BUN SERPL-MCNC: 24 MG/DL (ref 8–23)
CALCIUM SERPL-MCNC: 10.6 MG/DL (ref 8.7–10.5)
CHLORIDE SERPL-SCNC: 103 MMOL/L (ref 95–110)
CO2 SERPL-SCNC: 29 MMOL/L (ref 23–29)
CREAT SERPL-MCNC: 1.1 MG/DL (ref 0.5–1.4)
ERYTHROCYTE [DISTWIDTH] IN BLOOD BY AUTOMATED COUNT: 12.4 % (ref 11.5–14.5)
EST. GFR  (AFRICAN AMERICAN): 58.4 ML/MIN/1.73 M^2
EST. GFR  (NON AFRICAN AMERICAN): 50.6 ML/MIN/1.73 M^2
GLUCOSE SERPL-MCNC: 110 MG/DL (ref 70–110)
HCT VFR BLD AUTO: 38.2 % (ref 37–48.5)
HGB BLD-MCNC: 12.5 G/DL (ref 12–16)
IMM GRANULOCYTES # BLD AUTO: 0.01 K/UL (ref 0–0.04)
MCH RBC QN AUTO: 31.1 PG (ref 27–31)
MCHC RBC AUTO-ENTMCNC: 32.7 G/DL (ref 32–36)
MCV RBC AUTO: 95 FL (ref 82–98)
NEUTROPHILS # BLD AUTO: 2.4 K/UL (ref 1.8–7.7)
PLATELET # BLD AUTO: 188 K/UL (ref 150–450)
PMV BLD AUTO: 10.3 FL (ref 9.2–12.9)
POTASSIUM SERPL-SCNC: 4.6 MMOL/L (ref 3.5–5.1)
PROT SERPL-MCNC: 6.7 G/DL (ref 6–8.4)
RBC # BLD AUTO: 4.02 M/UL (ref 4–5.4)
SODIUM SERPL-SCNC: 139 MMOL/L (ref 136–145)
T4 FREE SERPL-MCNC: 1.04 NG/DL (ref 0.71–1.51)
TSH SERPL DL<=0.005 MIU/L-ACNC: 1.78 UIU/ML (ref 0.4–4)
WBC # BLD AUTO: 5.09 K/UL (ref 3.9–12.7)

## 2022-03-30 PROCEDURE — 99215 OFFICE O/P EST HI 40 MIN: CPT | Mod: S$PBB,,, | Performed by: INTERNAL MEDICINE

## 2022-03-30 PROCEDURE — 99215 PR OFFICE/OUTPT VISIT, EST, LEVL V, 40-54 MIN: ICD-10-PCS | Mod: S$PBB,,, | Performed by: INTERNAL MEDICINE

## 2022-03-30 PROCEDURE — 99999 PR PBB SHADOW E&M-EST. PATIENT-LVL III: ICD-10-PCS | Mod: PBBFAC,,, | Performed by: INTERNAL MEDICINE

## 2022-03-30 PROCEDURE — 99999 PR PBB SHADOW E&M-EST. PATIENT-LVL III: CPT | Mod: PBBFAC,,, | Performed by: INTERNAL MEDICINE

## 2022-03-30 PROCEDURE — 36415 COLL VENOUS BLD VENIPUNCTURE: CPT | Performed by: INTERNAL MEDICINE

## 2022-03-30 PROCEDURE — 84443 ASSAY THYROID STIM HORMONE: CPT | Performed by: INTERNAL MEDICINE

## 2022-03-30 PROCEDURE — 80053 COMPREHEN METABOLIC PANEL: CPT | Performed by: INTERNAL MEDICINE

## 2022-03-30 PROCEDURE — 99213 OFFICE O/P EST LOW 20 MIN: CPT | Mod: PBBFAC | Performed by: INTERNAL MEDICINE

## 2022-03-30 PROCEDURE — 84439 ASSAY OF FREE THYROXINE: CPT | Performed by: INTERNAL MEDICINE

## 2022-03-30 PROCEDURE — 85027 COMPLETE CBC AUTOMATED: CPT | Performed by: INTERNAL MEDICINE

## 2022-03-30 RX ORDER — SODIUM CHLORIDE 0.9 % (FLUSH) 0.9 %
10 SYRINGE (ML) INJECTION
Status: CANCELLED | OUTPATIENT
Start: 2022-04-01

## 2022-03-30 RX ORDER — HEPARIN 100 UNIT/ML
500 SYRINGE INTRAVENOUS
Status: CANCELLED | OUTPATIENT
Start: 2022-04-01

## 2022-03-30 NOTE — PROGRESS NOTES
"Subjective:       Patient ID: Alesha Toney is a 71 y.o. female.    Chief Complaint: Malignant neoplasm of upper lobe, right bronchus or lung  Ms. Alesha Toney is a 71-year-old otherwise healthy female who started having some shoulder pain, which was lasting for over six weeks.  She went and saw her primary care physician and underwent an x-ray, which revealed right upper lobe lung mass worrisome for malignancy and a CT scan was recommended, which was done on 6/12/18 and that revealed a newly developing mass, pleural based, lateral posterior segment, right upper lobe 3.5 x 3.5 cm, surrounding stellate margin and patchy infiltrates, particularly superiorly,   anteriorly infiltrates extend perihilar into the anterior segment.  She then underwent CT-guided biopsy, which revealed well-differentiated adenocarcinoma.       Her PET scan from 6/29/18 There is physiologic intracranial, head, and neck activity.  There is a large right upper lobe mass SUV max 9.11.  No local or distant metastatic disease seen.  There is physiologic liver, spleen, GI and  activity.  There are a few liver cysts.  No adenopathy is seen.  The adrenal glands are not enlarged.  No adenopathy is seen.  No suspicious bone lesions are seen.     She underwent VATS with right upper lobectomy. Mediastinal lymphadenectomy on 8/9/18. Pathology revealed "Tumor site: Upper lobe.Tumor size: 7 x 4 x 2.7 cm.Tumor focality: Single tumor.  Histologic type: Mixed invasive mucinous and nonmucinous adenocarcinoma. Histologic grade: G2: Moderately differentiated.Spread through air spaces (STATS): Present. Visceral pleura invasion: Not identified.  Lymphovascular invasion: Not identified. Direct invasion of adjacent structures: No adjacent structures present. Margins: All margins are uninvolved by carcinoma.Distance of invasive carcinoma from closest margin: 1 cm from the bronchial margin.Treatment effect: No known presurgical therapy. Regional lymph nodes: " "Number of lymph nodes involved: 0. Number of lymph nodes examined: 11. Pathologic Stage Classification: pT3 N0"        She completed 4 cycles of adjuvant chemo with Cisplatin and Alimta as of 1/10/19   She is on surveillance.     CT chest of 9/17/19 which reveals "Operative change of right upper lobectomy for small-cell lung cancer with several scattered subsolid opacities and a new solid nodule in the right lower lobe measuring up to 0.5 cm.  We recommend continued surveillance with the role and schedule for such surveillance to be determined by clinical considerations. Sided chest port distal tip terminating at the confluence of the brachiocephalic vein in the SVC.  Correlate with device functioning. Apically predominant emphysematous changes, left greater than right. Aortic annular calcification and multi-vessel coronary artery atherosclerosis. Numerous unchanged hepatic hypodensities, likely cysts"     PET scan from 10/1/19 reveals "1.6 cm soft tissue lesion re-identified posterior to the bronchus intermedius.  No corresponding focal increased radiotracer uptake to suggest local recurrence or metastatic disease. Stable subcentimeter opacities throughout the bilateral lower lobes, too small to characterize with PET-CT. Focal increased radiotracer uptake and subtle wall thickening in the rectum.  Findings are concerning for primary neoplastic process.  Recommend further evaluation with direct visualization"     She returned from [facility] and was advised to proceed with chemo/RT with either Docetaxel/platinum or Carboplatin/Alimta.     She completed chemo/RT with Carboplatin and Alimta as of 12/31/19     She underwent CT chest on 5/27/2020 which revealed 1. In this patient with history of lung cancer status post right upper lobectomy, there is a soft tissue opacity at the posterior margin of the staple line.  This lesion has been enlarging progressively and now measures 1.3 x 1.6 cm.  There is also a new 1.7 x " "1.7 cm opacity at the right paravertebral pleural margin which is inseparable from this lesion.  These findings may represent post radiation change in light of the hypermetabolic lesion in this region on PET-CT of 10/01/2019 (image 69/299).There is a 1 cm opacity in the superior or lateral basal segment of the right lower lobe (axial series 4, image 243) which has enlarged since 09/17/2019.  Nature of this lesion is unclear.  Clinical considerations will determine if percutaneous biopsy or metabolic assessment is warranted. 1.0 cm solid opacity with branching configuration (series 4, image 205) is located in the lateral basal segment of the left lower lobe, stable since 02/16/2019 (02/06/2019 axial series 4, image 310). Appearance and bifurcating character suggests mucoid impaction in mildly ectatic peripheral airway, likely not significant. Persistent clustered small centrilobular nodules in the apical segment of the right upper lobe likely reflecting small airway inflammation/infection. Partially visualized left chest port with distal catheter tip at the junction of the brachiocephalic veins and SVC, similar as on 09/17/2019"  She thus underwent PET scan on 6/1/2020 which revealed "No evidence of residual viable lung malignancy.  Evolving post radiation changes in the right paramediastinal lung, with a new irregular subpleural density which may also be related to recent radiation.  Attention on follow-up. Interval decrease in size of a soft tissue lesion along the inferior margin of the lobectomy stable line.  Several stable subcentimeter soft tissue opacities in the lower lobes bilaterally, As above.  Attention on follow-up.  Persistent hypermetabolic rectal lesion concerning for malignancy.  Recommend direct visualization and tissue sampling as clinically indicated"  I discussed her case in thoracic conference today and findings are felt to be related to RT     Her restaging CT scans from 1/20/2021 which reveal " ""Persistent soft tissue opacity in the posterior margin of the staple line that is increased in prominence when compared to the prior examination and is worrisome for disease progression. No new lesions identified.  Stable pulmonary nodules. Stable hypodense lesions throughout the liver that likely represent hepatic cysts"        Restaging PET scan from 1/27/2021 reveals "Increased size and hypermetabolic activity involving soft tissue opacity along the inferior border of the right upper lobectomy stable line concerning for progression of disease. Mildly increased hypermetabolic activity involving the rectum compatible with known high-grade dysplasia/intramucosal carcinoma. Interval decrease in size and resolution of hypermetabolic activity involving subcentimeter subpleural opacity within the right lower lobe"     MRI brain from  2/3/2021 reveals no evidence of recurrence.  GUARDANT testing only reveals p53, PDL1 is 0 and no other targets        PET scan from 3/24/2021 reveals "In this patient with known lung cancer, there is a persistent hypermetabolic right hilar mass consistent with viable tumor.  Interval increase in size is equivocal for progression of fibrosis versus tumor growth. 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  Activity appears more focal and possibly more proximal than before.  Consider direct visualization for further evaluation. The previously described subcentimeter subpleural opacity within the right lower lobe is not definitely seen on today's exam"               She last received immunotherapy on 5/28/2021. She underwent EUS on 6/25/2021 which revealed "Erythematous, congested, and ulcerated mucosa  (proctitis) in rectosigmoid colon.  Pathology reveals Colon, rectosigmoid, biopsy:  Moderate active colitis     She completed steroids about 5 weeks ago.      CT scans from 11/5/2021 reveals "In this patient with lung " "cancer, there is postoperative changes of right upper lobe resection.  Persistent soft tissue opacity along the right hilum suture line which is slightly increased in size from prior exam.  More conspicuous appearing nodules within the right lower lobe seen on prior exam.  New nodule within the right middle lobe measuring 1.3 cm.  Overall findings are all worrisome for disease progression. Scattered areas of centrilobular nodularity within the bilateral lungs.  Nonspecific and can be seen in the setting of infectious or inflammatory etiologies. Unchanged hepatics cysts. Stable left adrenal gland nodule."  PET scan from 11/23/2021 revealed "New hypermetabolic nodule within the right lower lobe additional slightly subcentimeter nodules within the right lower lobe.  Findings concerning for disease progression, other differentials include infection or noninfectious inflammatory process. Interval decrease in size and uptake in the right hilar mass. Resolution of previous focal rectal uptake"        She is on Opdivo. Her Opdivo was held on 1/10/2022 due to rectal bleeding. She saw Dr. Clark and was noted to have anal fissure which contributed to rectal bleed. Her rectal bleed has since resolved.     HPI She comes in for Opdivo.   PET scan reveals "Patient with lung cancer.  Enlarging hypermetabolic nodular lesions at the superior and inferior aspect of the right perihilar consolidation/mass concerning for local disease progression. Decreased size and decreased FDG avidity of a prior hypermetabolic right lower lobe nodule.  Similar-appearing tree-in-bud micronodular opacities within the right lung.  New subcentimeter left lower lobe nodule.  Giving waxing and waning course findings favor infectious/inflammatory small airways disease.  Attention on follow-up. Increased FDG avidity within the distal rectum/anal canal.  Recommend clinical correlation in this patient with reported high-grade dysplasia/intramucosal " "carcinoma".   She notes fatigue and mild cough, notes pain in the right chest area  She sometimes notes what she describes as weird sensation (no aggravating or relieving factors resolves with eating and bowel movement, most happens in the AM and she feels that the episodes are more frequent    Review of Systems   Constitutional: Positive for fatigue. Negative for appetite change and unexpected weight change.   HENT: Negative for mouth sores.    Eyes: Negative for visual disturbance.   Respiratory: Positive for cough. Negative for shortness of breath.    Cardiovascular: Negative for chest pain.   Gastrointestinal: Negative for abdominal pain and diarrhea.   Genitourinary: Negative for frequency.   Musculoskeletal: Negative for back pain.   Integumentary:  Negative for rash.   Neurological: Negative for headaches.   Hematological: Negative for adenopathy.   Psychiatric/Behavioral: The patient is not nervous/anxious.    All other systems reviewed and are negative.        Objective:      Physical Exam  Vitals reviewed.   Constitutional:       Appearance: She is well-developed.   HENT:      Mouth/Throat:      Pharynx: No oropharyngeal exudate.   Cardiovascular:      Rate and Rhythm: Normal rate.      Heart sounds: Normal heart sounds.   Pulmonary:      Effort: Pulmonary effort is normal.      Breath sounds: Normal breath sounds. No wheezing.   Abdominal:      General: Bowel sounds are normal.      Palpations: Abdomen is soft.      Tenderness: There is no abdominal tenderness.   Musculoskeletal:         General: No tenderness.   Lymphadenopathy:      Cervical: No cervical adenopathy.   Skin:     General: Skin is warm and dry.      Findings: No rash.   Neurological:      Mental Status: She is alert and oriented to person, place, and time.      Coordination: Coordination normal.   Psychiatric:         Thought Content: Thought content normal.         Judgment: Judgment normal.           LABS:  WBC   Date Value Ref Range " Status   03/30/2022 5.09 3.90 - 12.70 K/uL Final     Hemoglobin   Date Value Ref Range Status   03/30/2022 12.5 12.0 - 16.0 g/dL Final     POC Hematocrit   Date Value Ref Range Status   08/09/2018 33 (L) 36 - 54 %PCV Final     Hematocrit   Date Value Ref Range Status   03/30/2022 38.2 37.0 - 48.5 % Final     Platelets   Date Value Ref Range Status   03/30/2022 188 150 - 450 K/uL Final     Gran # (ANC)   Date Value Ref Range Status   03/30/2022 2.4 1.8 - 7.7 K/uL Final     Comment:     The ANC is based on a white cell differential from an   automated cell counter. It has not been microscopically   reviewed for the presence of abnormal cells. Clinical   correlation is required.         Chemistry        Component Value Date/Time     03/30/2022 0756    K 4.6 03/30/2022 0756     03/30/2022 0756    CO2 29 03/30/2022 0756    BUN 24 (H) 03/30/2022 0756    CREATININE 1.1 03/30/2022 0756     03/30/2022 0756        Component Value Date/Time    CALCIUM 10.6 (H) 03/30/2022 0756    ALKPHOS 49 (L) 03/30/2022 0756    AST 18 03/30/2022 0756    ALT 14 03/30/2022 0756    BILITOT 0.4 03/30/2022 0756    ESTGFRAFRICA 58.4 (A) 03/30/2022 0756    EGFRNONAA 50.6 (A) 03/30/2022 0756          Assessment:       Problem List Items Addressed This Visit     Malignant neoplasm of upper lobe, right bronchus or lung - Primary    Relevant Orders    CBC Oncology    Comprehensive Metabolic Panel    Secondary and unspecified malignant neoplasm of intrathoracic lymph nodes      Other Visit Diagnoses     Thyroid disorder        Relevant Orders    TSH    FREE T4          Plan:         Route Chart for Scheduling    Med Onc Chart Routing      Follow up with physician Other and 4 weeks. Schedule MRI brain next available. In 4 weeks schedule CBC,CMP, TSH and free T4 and see me and for opdivo   Follow up with YAMILE    Labs    Imaging    Pharmacy appointment    Other referrals          Treatment Plan Information   OP NIVOLUMAB Q2W   Pam P.  MD Isra   Upcoming Treatment Dates - OP NIVOLUMAB Q2W    4/1/2022       Chemotherapy       nivolumab 480 mg in sodium chloride 0.9% 148 mL infusion  4/29/2022       Chemotherapy       nivolumab 480 mg in sodium chloride 0.9% 148 mL infusion    Therapy Plan Information  Flushes  heparin, porcine (PF) 100 unit/mL injection flush 500 Units  500 Units, Intravenous, Every visit  sodium chloride 0.9% flush 10 mL  10 mL, Intravenous, Every visit     She will proceed with Opdivo. Reviewed PET scan, mixed response, will repeat scans in about 8-9 weeks  Schedule MRI brain now.     Above care plan was discussed with patient and accompanying wife and all questions were addressed to their satisfaction

## 2022-03-30 NOTE — Clinical Note
Disregard prior follow-up. Schedule MRI brain next available. In 4 weeks schedule CBC,CMP, TSH and free T4 and see me and for Opdivo

## 2022-03-30 NOTE — Clinical Note
Schedule MRI brain next available. In 6 weeks schedule CBC,CMP, TSH and free T4 and see me and for Keytruda

## 2022-03-31 ENCOUNTER — PATIENT MESSAGE (OUTPATIENT)
Dept: HEMATOLOGY/ONCOLOGY | Facility: CLINIC | Age: 72
End: 2022-03-31
Payer: MEDICARE

## 2022-04-01 ENCOUNTER — INFUSION (OUTPATIENT)
Dept: INFUSION THERAPY | Facility: HOSPITAL | Age: 72
End: 2022-04-01
Attending: INTERNAL MEDICINE
Payer: MEDICARE

## 2022-04-01 VITALS
HEIGHT: 66 IN | RESPIRATION RATE: 18 BRPM | OXYGEN SATURATION: 100 % | WEIGHT: 156.94 LBS | SYSTOLIC BLOOD PRESSURE: 138 MMHG | DIASTOLIC BLOOD PRESSURE: 65 MMHG | BODY MASS INDEX: 25.22 KG/M2 | HEART RATE: 59 BPM

## 2022-04-01 DIAGNOSIS — C77.1 SECONDARY AND UNSPECIFIED MALIGNANT NEOPLASM OF INTRATHORACIC LYMPH NODES: Primary | ICD-10-CM

## 2022-04-01 DIAGNOSIS — C34.11 MALIGNANT NEOPLASM OF UPPER LOBE, RIGHT BRONCHUS OR LUNG: ICD-10-CM

## 2022-04-01 PROCEDURE — 96413 CHEMO IV INFUSION 1 HR: CPT

## 2022-04-01 PROCEDURE — 25000003 PHARM REV CODE 250: Performed by: INTERNAL MEDICINE

## 2022-04-01 PROCEDURE — 63600175 PHARM REV CODE 636 W HCPCS: Mod: JG | Performed by: INTERNAL MEDICINE

## 2022-04-01 RX ORDER — SODIUM CHLORIDE 0.9 % (FLUSH) 0.9 %
10 SYRINGE (ML) INJECTION
Status: DISCONTINUED | OUTPATIENT
Start: 2022-04-01 | End: 2022-04-01 | Stop reason: HOSPADM

## 2022-04-01 RX ORDER — HEPARIN 100 UNIT/ML
500 SYRINGE INTRAVENOUS
Status: DISCONTINUED | OUTPATIENT
Start: 2022-04-01 | End: 2022-04-01 | Stop reason: HOSPADM

## 2022-04-01 RX ADMIN — HEPARIN SODIUM (PORCINE) LOCK FLUSH IV SOLN 100 UNIT/ML 500 UNITS: 100 SOLUTION at 10:04

## 2022-04-01 RX ADMIN — SODIUM CHLORIDE 480 MG: 9 INJECTION, SOLUTION INTRAVENOUS at 08:04

## 2022-04-01 RX ADMIN — SODIUM CHLORIDE: 9 INJECTION, SOLUTION INTRAVENOUS at 08:04

## 2022-04-01 NOTE — PLAN OF CARE
Pt Arrived AAOx3, VSS, labs reviewed, orders signed. Pt tolerated opdivo without incident and was discharged in stable ambulatory condition to home with .

## 2022-04-07 ENCOUNTER — PATIENT OUTREACH (OUTPATIENT)
Dept: ADMINISTRATIVE | Facility: OTHER | Age: 72
End: 2022-04-07
Payer: MEDICARE

## 2022-04-08 NOTE — PROGRESS NOTES
Requested updates within Care Everywhere.  Patient's chart was reviewed for overdue MICHEAL topics.  Health maintenance:updated  Immunizations:reconciled   Legacy:   Media:  Orders placed:  Tasked appts:  Labs Linked:  Upcoming appt:

## 2022-04-11 ENCOUNTER — OFFICE VISIT (OUTPATIENT)
Dept: DERMATOLOGY | Facility: CLINIC | Age: 72
End: 2022-04-11
Payer: MEDICARE

## 2022-04-11 DIAGNOSIS — H02.729 HYPOTRICHOSIS OF EYELID, UNSPECIFIED LATERALITY: ICD-10-CM

## 2022-04-11 DIAGNOSIS — L82.1 SK (SEBORRHEIC KERATOSIS): ICD-10-CM

## 2022-04-11 DIAGNOSIS — L72.0 EIC (EPIDERMAL INCLUSION CYST): Primary | ICD-10-CM

## 2022-04-11 DIAGNOSIS — Q82.8 POROKERATOSIS: ICD-10-CM

## 2022-04-11 PROCEDURE — 88305 TISSUE EXAM BY PATHOLOGIST: CPT | Mod: 26,,, | Performed by: PATHOLOGY

## 2022-04-11 PROCEDURE — 11102 TANGNTL BX SKIN SINGLE LES: CPT | Mod: S$PBB,,, | Performed by: DERMATOLOGY

## 2022-04-11 PROCEDURE — 11102 TANGNTL BX SKIN SINGLE LES: CPT | Mod: PBBFAC | Performed by: DERMATOLOGY

## 2022-04-11 PROCEDURE — 88305 TISSUE EXAM BY PATHOLOGIST: ICD-10-PCS | Mod: 26,,, | Performed by: PATHOLOGY

## 2022-04-11 PROCEDURE — 99999 PR PBB SHADOW E&M-EST. PATIENT-LVL III: CPT | Mod: PBBFAC,,, | Performed by: DERMATOLOGY

## 2022-04-11 PROCEDURE — 99499 NO LOS: ICD-10-PCS | Mod: S$PBB,,, | Performed by: DERMATOLOGY

## 2022-04-11 PROCEDURE — 99499 UNLISTED E&M SERVICE: CPT | Mod: S$PBB,,, | Performed by: DERMATOLOGY

## 2022-04-11 PROCEDURE — 99213 OFFICE O/P EST LOW 20 MIN: CPT | Mod: PBBFAC | Performed by: DERMATOLOGY

## 2022-04-11 PROCEDURE — 88305 TISSUE EXAM BY PATHOLOGIST: CPT | Performed by: PATHOLOGY

## 2022-04-11 PROCEDURE — 11102 PR TANGENTIAL BIOPSY, SKIN, SINGLE LESION: ICD-10-PCS | Mod: S$PBB,,, | Performed by: DERMATOLOGY

## 2022-04-11 PROCEDURE — 99999 PR PBB SHADOW E&M-EST. PATIENT-LVL III: ICD-10-PCS | Mod: PBBFAC,,, | Performed by: DERMATOLOGY

## 2022-04-11 RX ORDER — BIMATOPROST 3 UG/ML
SOLUTION TOPICAL
Qty: 3 ML | Refills: 6 | Status: SHIPPED | OUTPATIENT
Start: 2022-04-11 | End: 2022-07-12

## 2022-04-11 NOTE — PATIENT INSTRUCTIONS
Shave Biopsy Wound Care    Your doctor has performed a shave biopsy today.  A band aid and vaseline ointment has been placed over the site.  This should remain in place for NO LONGER THAN 48 hours.  It is fine to remove the bandaid after 24 hours, if the area is no longer bleeding. It is recommended that you keep the area dry (do not wet)) for the first 24 hours.  After 24 hours, wash the area with warm soap and water and apply Vaseline jelly.  Many patients prefer to use Neosporin or Bacitracin ointment.  This is acceptable; however, know that you can develop an allergy to this medication even if you have used it safely for years.  It is important to keep the area moist.  Letting it dry out and get air slows healing time, and will worsen the scar.        If you notice increasing redness, tenderness, pain, or yellow drainage at the biopsy site, please notify your doctor.  These are signs of an infection.    If your biopsy site is bleeding, apply firm pressure for 15 minutes straight.  Repeat for another 15 minutes, if it is still bleeding.   If the surgical site continues to bleed, then please contact your doctor.      For [facility] users:   You will receive your biopsy results in [facility] as soon as they are available. Please be assured that your physician/provider will review your results and will then determine what further treatment, evaluation, or planning is required. You should be contacted by your physician's/provider's office within 5 business days of receiving your results; If not, please reach out to directly. This is one more way [facility] is putting you first.     [facility] [place] [address]/ [phone] FAX/ [email or web address]      Shave Biopsy Wound Care    Your doctor has performed a shave biopsy today.  A band aid and vaseline ointment has been placed over the site.  This should remain in place for NO LONGER THAN 48 hours.  It is fine to remove the bandaid after 24  hours, if the area is no longer bleeding. It is recommended that you keep the area dry (do not wet)) for the first 24 hours.  After 24 hours, wash the area with warm soap and water and apply Vaseline jelly.  Many patients prefer to use Neosporin or Bacitracin ointment.  This is acceptable; however, know that you can develop an allergy to this medication even if you have used it safely for years.  It is important to keep the area moist.  Letting it dry out and get air slows healing time, and will worsen the scar.        If you notice increasing redness, tenderness, pain, or yellow drainage at the biopsy site, please notify your doctor.  These are signs of an infection.    If your biopsy site is bleeding, apply firm pressure for 15 minutes straight.  Repeat for another 15 minutes, if it is still bleeding.   If the surgical site continues to bleed, then please contact your doctor.      For [facility] users:   You will receive your biopsy results in [facility] as soon as they are available. Please be assured that your physician/provider will review your results and will then determine what further treatment, evaluation, or planning is required. You should be contacted by your physician's/provider's office within 5 business days of receiving your results; If not, please reach out to directly. This is one more way [facility] is putting you first.     [facility] [place] [address]/ [phone] FAX/ [email or web address]     Wound Care    A band aid and vaseline ointment has been placed over the site.  This should remain in place for 24 hours.  It is recommended that you keep the area dry for the first 24 hours.  After 24 hours, you may remove the band aid and wash the area with warm soap and water and apply Vaseline jelly.  Many patients prefer to use Neosporin or Bacitracin ointment.  This is acceptable; however, know that you can develop an allergy to this medication even if you have used it safely  for years.  It is important to keep the area moist.  Letting it dry out and get air slows healing time, and will worsen the scar.  Band aid is optional after first 24 hours.      If you notice increasing redness, tenderness, pain, or yellow drainage at the site, please notify your doctor.  These are signs of an infection.    If your site is bleeding, apply firm pressure for 15 minutes straight.  Repeat for another 15 minutes, if it is still bleeding.   If the surgical site continues to bleed, then please contact your doctor.      [facility] [place] [address]/ [phone] FAX/ [email or web address]

## 2022-04-11 NOTE — PROGRESS NOTES
"  Subjective:       Patient ID:  Alesha Toney is a 71 y.o. female who presents for   Chief Complaint   Patient presents with    Skin Check     UBSE    Lesion     Left neck     History of Present Illness: The patient presents for follow up of skin check.    The patient was last seen on: 06/04/21 for ubse.     This is a high risk patient here to check for the development of new lesions.    Other skin complaints: left neck    Patient with new complaint of lesion(s)  Location:left neck  Duration: getting larger  Symptoms: itches  Relieving factors/Previous treatments:cryo    On opdivo for recurrent lung cancer (1/2021) - "tumor shrinking"          Review of Systems   Skin: Positive for itching, daily sunscreen use, activity-related sunscreen use and wears hat. Negative for rash and recent sunburn.   Hematologic/Lymphatic: Does not bruise/bleed easily.        Objective:    Physical Exam   Constitutional: She appears well-developed and well-nourished. No distress.   Neurological: She is alert and oriented to person, place, and time. She is not disoriented.   Psychiatric: She has a normal mood and affect.   Skin:   Areas Examined (abnormalities noted in diagram):   Neck Inspection Performed  RLE Inspected                   Diagram Legend     Erythematous scaling macule/papule c/w actinic keratosis       Vascular papule c/w angioma      Pigmented verrucoid papule/plaque c/w seborrheic keratosis      Yellow umbilicated papule c/w sebaceous hyperplasia      Irregularly shaped tan macule c/w lentigo     1-2 mm smooth white papules consistent with Milia      Movable subcutaneous cyst with punctum c/w epidermal inclusion cyst      Subcutaneous movable cyst c/w pilar cyst      Firm pink to brown papule c/w dermatofibroma      Pedunculated fleshy papule(s) c/w skin tag(s)      Evenly pigmented macule c/w junctional nevus     Mildly variegated pigmented, slightly irregular-bordered macule c/w mildly atypical nevus      Flesh " colored to evenly pigmented papule c/w intradermal nevus       Pink pearly papule/plaque c/w basal cell carcinoma      Erythematous hyperkeratotic cursted plaque c/w SCC      Surgical scar with no sign of skin cancer recurrence      Open and closed comedones      Inflammatory papules and pustules      Verrucoid papule consistent consistent with wart     Erythematous eczematous patches and plaques     Dystrophic onycholytic nail with subungual debris c/w onychomycosis     Umbilicated papule    Erythematous-base heme-crusted tan verrucoid plaque consistent with inflamed seborrheic keratosis     Erythematous Silvery Scaling Plaque c/w Psoriasis     See annotation              Assessment / Plan:      Pathology Orders:     Normal Orders This Visit    Specimen to Pathology, Dermatology     Questions:    Procedure Type: Dermatology and skin neoplasms    Number of Specimens: 1    ------------------------: -------------------------    Spec 1 Procedure: Biopsy    Spec 1 Clinical Impression: r/o eic with sk    Spec 1 Source: left lower neck    Release to patient: Immediate        EIC (epidermal inclusion cyst) with SK (seborrheic keratosis)  -     Specimen to Pathology, Dermatology    Shave biopsy procedure note:    Shave biopsy performed after verbal consent including risk of infection, scar, recurrence, need for additional treatment of site. Area prepped with alcohol, anesthetized with approximately 1.0cc of 1% lidocaine with epinephrine. Lesional tissue shaved with razor blade. Hemostasis achieved with application of aluminum chloride followed by hyfrecation. No complications. Dressing applied. Wound care explained.    With:  Procedure note for destruction via hyfrecation and curettage:    Verbal consent obtained. Risks of recurrence and scarring discussed.   1 lesions cleaned with alcohol and anesthetized with 1% lidocaine with epinephrine. Areas then lightly hyfrecated and curetted to remove gross lesion. Hemostasis  achieved with aluminum chloride application. No complications.   Areas dressed with Vaseline jelly and bandage. Wound care discussed.          Porokeratosis - leg  defer             Follow up if symptoms worsen or fail to improve.

## 2022-04-13 ENCOUNTER — OFFICE VISIT (OUTPATIENT)
Dept: INTERNAL MEDICINE | Facility: CLINIC | Age: 72
End: 2022-04-13
Payer: MEDICARE

## 2022-04-13 ENCOUNTER — PATIENT MESSAGE (OUTPATIENT)
Dept: INTERNAL MEDICINE | Facility: CLINIC | Age: 72
End: 2022-04-13

## 2022-04-13 ENCOUNTER — TELEPHONE (OUTPATIENT)
Dept: INTERNAL MEDICINE | Facility: CLINIC | Age: 72
End: 2022-04-13

## 2022-04-13 VITALS — DIASTOLIC BLOOD PRESSURE: 62 MMHG | SYSTOLIC BLOOD PRESSURE: 124 MMHG

## 2022-04-13 DIAGNOSIS — I10 ESSENTIAL HYPERTENSION: ICD-10-CM

## 2022-04-13 DIAGNOSIS — G89.3 CHRONIC PAIN DUE TO MALIGNANT NEOPLASTIC DISEASE: Primary | ICD-10-CM

## 2022-04-13 DIAGNOSIS — G62.0 DRUG-INDUCED POLYNEUROPATHY: ICD-10-CM

## 2022-04-13 PROCEDURE — 99213 PR OFFICE/OUTPT VISIT, EST, LEVL III, 20-29 MIN: ICD-10-PCS | Mod: 95,,, | Performed by: INTERNAL MEDICINE

## 2022-04-13 PROCEDURE — 99213 OFFICE O/P EST LOW 20 MIN: CPT | Mod: 95,,, | Performed by: INTERNAL MEDICINE

## 2022-04-13 RX ORDER — HYDROCODONE BITARTRATE AND ACETAMINOPHEN 5; 325 MG/1; MG/1
1 TABLET ORAL EVERY 8 HOURS PRN
Qty: 90 TABLET | Refills: 0 | Status: SHIPPED | OUTPATIENT
Start: 2022-04-13 | End: 2022-05-12 | Stop reason: SDUPTHER

## 2022-04-13 NOTE — TELEPHONE ENCOUNTER
No new care gaps identified.  Powered by [employer]. Reference number: [id number].   4/13/2022 4:17:36 PM CDT

## 2022-04-13 NOTE — PROGRESS NOTES
Subjective:    The patient location is: home  The chief complaint leading to consultation is: pain management    Visit type: audiovisual    Face to Face time with patient: 17  19 minutes of total time spent on the encounter, which includes face to face time and non-face to face time preparing to see the patient (eg, review of tests), Obtaining and/or reviewing separately obtained history, Documenting clinical information in the electronic or other health record, Independently interpreting results (not separately reported) and communicating results to the patient/family/caregiver, or Care coordination (not separately reported).         Each patient to whom he or she provides medical services by telemedicine is:  (1) informed of the relationship between the physician and patient and the respective role of any other health care provider with respect to management of the patient; and (2) notified that he or she may decline to receive medical services by telemedicine and may withdraw from such care at any time.    Notes:    Patient ID: Alesha Toney is a 71 y.o. female.    Chief Complaint: No chief complaint on file.    HPI   /62 today - readings always higher in clinic.  She has 2nd covid vaccine!    We reviewed recent PET scan.  She is to see GI.    Continued chest pain.  Little worse, more in R axilla.  MG normal.    Analgesics helpful.      See last note for details about pain, which remain stable.    At Lata's urging, has increased hydrocodone to 2 at bedtime and another at noon, with good results.    Review of Systems   Constitutional: Negative for activity change and unexpected weight change.   HENT: Negative for hearing loss, rhinorrhea and trouble swallowing.    Eyes: Positive for visual disturbance. Negative for discharge.   Respiratory: Positive for wheezing (when lying on R side.). Negative for chest tightness.    Cardiovascular: Positive for chest pain. Negative for palpitations.    Gastrointestinal: Negative for blood in stool, constipation, diarrhea and vomiting.   Endocrine: Negative for polydipsia and polyuria.   Genitourinary: Negative for difficulty urinating, dysuria, hematuria and menstrual problem.   Musculoskeletal: Positive for arthralgias and neck pain. Negative for joint swelling.   Neurological: Negative for weakness and headaches.   Psychiatric/Behavioral: Negative for confusion and dysphoric mood.         Objective:      Physical Exam  Constitutional:       General: She is not in acute distress.     Appearance: Normal appearance. She is not ill-appearing, toxic-appearing or diaphoretic.   Neurological:      Mental Status: She is alert.   Psychiatric:         Mood and Affect: Mood normal.         Behavior: Behavior normal.         Thought Content: Thought content normal.         Judgment: Judgment normal.         Assessment:       Problem List Items Addressed This Visit     Essential hypertension    Drug-induced polyneuropathy     Chronic foot numbness; stable.           Chronic pain due to malignant neoplastic disease - Primary       Pain controlled  Plan:       Diagnoses and all orders for this visit:    Chronic pain due to malignant neoplastic disease    Drug-induced polyneuropathy    Essential hypertension         rtc 3mo

## 2022-04-18 ENCOUNTER — OFFICE VISIT (OUTPATIENT)
Dept: GASTROENTEROLOGY | Facility: CLINIC | Age: 72
End: 2022-04-18
Payer: MEDICARE

## 2022-04-18 ENCOUNTER — TELEPHONE (OUTPATIENT)
Dept: GASTROENTEROLOGY | Facility: CLINIC | Age: 72
End: 2022-04-18
Payer: MEDICARE

## 2022-04-18 VITALS
BODY MASS INDEX: 25.12 KG/M2 | OXYGEN SATURATION: 99 % | SYSTOLIC BLOOD PRESSURE: 152 MMHG | HEIGHT: 66 IN | WEIGHT: 156.31 LBS | DIASTOLIC BLOOD PRESSURE: 70 MMHG | HEART RATE: 52 BPM | TEMPERATURE: 98 F

## 2022-04-18 DIAGNOSIS — R94.8 ABNORMAL POSITRON EMISSION TOMOGRAPHY (PET) OF COLON: Primary | ICD-10-CM

## 2022-04-18 DIAGNOSIS — K62.1 RECTAL POLYP: ICD-10-CM

## 2022-04-18 DIAGNOSIS — K52.9 COLITIS: ICD-10-CM

## 2022-04-18 LAB
FINAL PATHOLOGIC DIAGNOSIS: NORMAL
GROSS: NORMAL
Lab: NORMAL
MICROSCOPIC EXAM: NORMAL

## 2022-04-18 PROCEDURE — 99214 OFFICE O/P EST MOD 30 MIN: CPT | Mod: S$GLB,,, | Performed by: INTERNAL MEDICINE

## 2022-04-18 PROCEDURE — 99214 PR OFFICE/OUTPT VISIT, EST, LEVL IV, 30-39 MIN: ICD-10-PCS | Mod: S$GLB,,, | Performed by: INTERNAL MEDICINE

## 2022-04-18 RX ORDER — CHOLECALCIFEROL (VITAMIN D3) 25 MCG
2000 TABLET ORAL DAILY
COMMUNITY
End: 2022-07-25

## 2022-04-18 NOTE — PROGRESS NOTES
[facility]          Inflammatory Bowel Disease Follow Up Note         TODAY'S VISIT DATE:  4/18/2022    Reason for Consult:    Chief Complaint   Patient presents with    Immune checkpoint inhibitor colitis       PCP: Margo Caldwell      Referring MD:   No ref. provider found    History of Present Illness:  Alesha Toney who is a 71 y.o. 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  Since our last visit she has been doing quite well.  She has been taking stool softeners and reports that her constipation has improved significantly.  She is having 1 formed bowel movement daily.  She does still occasionally have small amount of blood on the toilet paper when she wipes but this is much better than it was when I saw her last.  She denies any abdominal pain, nausea, vomiting.  She recently had a PET-CT done that did show some increased uptake in the rectum.  Her recent stool calprotectin level was undetectable.    IBD History:  She has a history of lung cancer and has undergone surgical intervention as well as multiple regimens of chemotherapy.  Earlier this year she was started on immunotherapy with Opdivo and ipilimumab.  She received 3 cycles of combination treatment and 1 cycle of Opdivo alone.  In mid May she began having significant diarrhea and blood in the stools.  She underwent a flex sig for surveillance of a large polyp that was resected recently and was noted to have severe colitis extending from the rectum to 20-25 cm proximal to the anus (scope not advanced more proximal to this).  Interestingly, in April when she had a flex sig/EUS she had an area of inflamed tissue at that time that showed acute inflammation.  After the colonoscopy in June she was started on Canasa suppositories which provided some relief for 3 days but then her symptoms worsened.   She was then started on Rowasa enemas which did not seem to provide much improvement.  She was prescribed 70 mg of prednisone daily but never started this because she was concerned about the high dose.  Eventually she started on 30 mg of prednisone daily on July 21st.  She was able to wean the prednisone off and did quite well.  In December 2021 she restarted Opdivo but did not restart ipilimumab.    Pertinent Labs:  Lab Results   Component Value Date    SEDRATE 3 11/29/2021    CRP 1.5 11/29/2021     Lab Results   Component Value Date    TTGIGA 4 07/26/2021     07/26/2021     Lab Results   Component Value Date    TSH 1.785 03/30/2022    FREET4 1.04 03/30/2022     Lab Results   Component Value Date    UTUOWBUQ31EI 41 07/26/2021    ZOJHYUUT73 543 07/26/2021     Lab Results   Component Value Date    HEPBSAG Negative 07/26/2021    HEPBCAB Negative 07/26/2021    HEPCAB Negative 07/26/2021     Lab Results   Component Value Date    AWC94HZWT Negative 07/26/2021     Lab Results   Component Value Date    NIL 0.070 07/26/2021    MITOGENNIL 6.120 07/26/2021     Lab Results   Component Value Date    TPTMINTERP SEE BELOW 07/26/2021     Lab Results   Component Value Date    STOOLCULTURE  07/27/2021     No Salmonella,Shigella,Vibrio,Campylobacter,Yersinia isolated.    JPMABLBAJO0O Negative 07/27/2021    PSUIRUEMAJ5O Negative 07/27/2021    CDIFFICILEAN Negative 07/27/2021    CDIFFTOX Negative 07/27/2021     Lab Results   Component Value Date    CALPROTECTIN <27.1 01/25/2022       Therapeutic Drug Monitoring Labs:  No results found for: PROMETH  No results found for: ANSADAINIT, INFLIXIMAB, INFLIXINTERP    Vaccinations:  No results found for: HEPBSAB  Lab Results   Component Value Date    HEPAIGG Negative 07/26/2021     Lab Results   Component Value Date    VARICELLAZOS 2.67 (H) 07/26/2021    VARICELLAINT Positive (A) 07/26/2021     Immunization History   Administered Date(s) Administered    COVID-19, MRNA, LN-S, PF (MODERNA  FULL 0.5 ML DOSE) 02/11/2021, 02/14/2022    Influenza 01/12/2022    Influenza (FLUAD) - Quadrivalent - Adjuvanted - PF *Preferred* (65+) 10/08/2020, 01/12/2022    Influenza - High Dose - PF (65 years and older) 10/09/2018, 10/08/2019    Pneumococcal Polysaccharide - 23 Valent 10/24/2017         Review of Systems  Review of Systems   Constitutional: Negative for chills, fever and weight loss.   HENT: Negative for sore throat.    Eyes: Negative for pain, discharge and redness.   Respiratory: Positive for cough and shortness of breath. Negative for wheezing.    Cardiovascular: Positive for chest pain. Negative for orthopnea and leg swelling.        Chest pain due to lung mass   Gastrointestinal: Negative for abdominal pain, blood in stool, constipation, diarrhea, heartburn, melena, nausea and vomiting.   Genitourinary: Negative for dysuria, frequency and urgency.   Musculoskeletal: Negative for back pain, joint pain and myalgias.   Skin: Negative for itching and rash.   Neurological: Negative for focal weakness and seizures.   Endo/Heme/Allergies: Does not bruise/bleed easily.   Psychiatric/Behavioral: Negative for depression. The patient is nervous/anxious.        Medical History:   Past Medical History:   Diagnosis Date    Allergy     Anxiety     Bilateral epiphora     Breast cyst     Cancer     lung cancer    Cataract     Colitis     Disorder of kidney and ureter     renal stone    Dry eye syndrome     Fibrocystic breast     Immunodeficiency due to drugs     Rectal polyp     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     Squamous cell carcinoma of skin 10/05/2020    left medial thigh    Therapy     Thyroid nodule        Surgical History:  Past Surgical History:   Procedure Laterality Date    ADENOIDECTOMY  19yo    ANTERIOR CERVICAL DISCECTOMY W/ FUSION N/A 10/15/2018    Procedure: DISCECTOMY, SPINE, CERVICAL, ANTERIOR APPROACH, WITH FUSION C6/7  [facility] S[facility]: [employer]/SSE[employer]/Vocal Cord  Regular OR table;  Surgeon: Greyson Bansal MD;  Location: [facility] OR 2ND FLR;  Service: Neurosurgery;  Laterality: N/A;    APPENDECTOMY      BONE RESECTION, RIB  1980    BREAST BIOPSY Left     at least 40yrs ago in her 20's    BREAST CYST ASPIRATION      BREAST CYST EXCISION      COLONOSCOPY      ENDOBRONCHIAL ULTRASOUND N/A 7/10/2018    Procedure: ULTRASOUND, ENDOBRONCHIAL;  Surgeon: Sri Hearn MD;  Location: [facility] OR 2ND FLR;  Service: Pulmonary;  Laterality: N/A;    ENDOBRONCHIAL ULTRASOUND N/A 9/24/2019    Procedure: ENDOBRONCHIAL ULTRASOUND (EBUS);  Surgeon: Sri Hearn MD;  Location: [facility] OR 2ND FLR;  Service: Pulmonary;  Laterality: N/A;    ENDOSCOPIC MUCOSAL RESECTION OF COLON N/A 9/11/2020    Procedure: RESECTION, MUCOSA, COLON, ENDOSCOPIC;  Surgeon: Lilibeth Carbajal MD;  Location: [facility] (2ND FLR);  Service: Endoscopy;  Laterality: N/A;    ENDOSCOPIC ULTRASOUND OF LOWER GASTROINTESTINAL TRACT N/A 4/19/2021    Procedure: ULTRASOUND, LOWER GI TRACT, ENDOSCOPIC;  Surgeon: Lilibeth Carbajal MD;  Location: [facility];  Service: Endoscopy;  Laterality: N/A;    ENDOSCOPIC ULTRASOUND OF LOWER GASTROINTESTINAL TRACT N/A 6/25/2021    Procedure: ULTRASOUND, LOWER GI TRACT, ENDOSCOPIC;  Surgeon: Silvino Christopher MD;  Location: [facility];  Service: Endoscopy;  Laterality: N/A;  Needs Rapid MP    FLEXIBLE SIGMOIDOSCOPY  06/11/2020    with biopsies    FLEXIBLE SIGMOIDOSCOPY N/A 6/11/2020    Procedure: SIGMOIDOSCOPY, FLEXIBLE;  Surgeon: Gely Yeh MD;  Location: [facility];  Service: Endoscopy;  Laterality: N/A;    FLEXIBLE SIGMOIDOSCOPY N/A 4/19/2021    Procedure: SIGMOIDOSCOPY-FLEXIBLE;  Surgeon: Lilibeth Carbajal MD;  Location: [facility];  Service: Endoscopy;  Laterality: N/A;  Covid 4/16 [place] MP    HYSTERECTOMY      @47yrs of age    INSERTION OF TUNNELED CENTRAL VENOUS CATHETER (CVC) WITH SUBCUTANEOUS PORT N/A 9/25/2018    Procedure: [id number];  Surgeon: Dosc Diagnostic  Provider;  [facility];  Service: Radiology;  Laterality: N/A;    INSERTION OF VENOUS ACCESS PORT N/A 3/15/2021    Procedure: INSERTION, VENOUS ACCESS PORT;  Surgeon: Chang Mathews MD;  [facility];  Service: General;  Laterality: N/A;  NEEDS CONSENT.    KIDNEY SURGERY      17yo    MEDIPORT REMOVAL N/A 3/15/2021    Procedure: REMOVAL, CATHETER, CENTRAL VENOUS, TUNNELED, WITH PORT;  Surgeon: Chang Mathews MD;  Location: [facility] OR [address] [place] [address];  Service: General;  Laterality: N/A;    OOPHORECTOMY      @47yrs of age    SKIN BIOPSY      TONSILLECTOMY      UPPER GASTROINTESTINAL ENDOSCOPY      VIDEO-ASSISTED THORACOSCOPIC LOBECTOMY Right 2018    Procedure: VATS, WITH LOBECTOMY Possible chest wall resection;  Surgeon: Philip Messina MD;  Location: [facility] OR [address] [place] [address];  Service: Thoracic;  Laterality: Right;  Have open pan available.       Family History:   Family History   Problem Relation Age of Onset    Stroke Mother     Cataracts Mother     Cancer Father         colon cancer    Colon cancer Father     Diabetes Brother     Heart failure Brother     Hypertension Brother     Heart attack Paternal Aunt     Heart attack Maternal Grandfather     Diabetes Paternal Aunt     Anxiety disorder Paternal Grandmother         agoraphobia    Anesthesia problems Neg Hx     Blindness Neg Hx     Amblyopia Neg Hx     Glaucoma Neg Hx     Macular degeneration Neg Hx     Retinal detachment Neg Hx     Strabismus Neg Hx     Thyroid disease Neg Hx     Melanoma Neg Hx        Social History:   Social History     Tobacco Use    Smoking status: Former Smoker     Packs/day: 1.00     Years: 30.00     Pack years: 30.00     Types: Cigarettes     Quit date: 2015     Years since quittin.8    Smokeless tobacco: Never Used   Substance Use Topics    Alcohol use: Not Currently     Alcohol/week: 0.0 standard drinks     Comment: socially     Drug use: No       Allergies:  "Reviewed    Home Medications:   Medication List with Changes/Refills   Current Medications    AMLODIPINE (NORVASC) 2.5 MG TABLET    Take 1 tablet (2.5 mg total) by mouth once daily.    ASCORBIC ACID, VITAMIN C, (VITAMIN C) 500 MG TABLET    Take 500 mg by mouth once daily.    ATENOLOL (TENORMIN) 50 MG TABLET    Half a tab qid    BIMATOPROST (LATISSE) 0.03 % OPHTHALMIC SOLUTION    Place one drop on applicator and apply evenly along the skin of the upper eyelid at base of eyelashes qhs; repeat for second eye    CALCIUM CARBONATE (TUMS) 300 MG (750 MG) CHEW    Take by mouth as needed.     FAMOTIDINE (PEPCID) 10 MG TABLET    Take 10 mg by mouth daily as needed.    HYDROCODONE-ACETAMINOPHEN (NORCO) 5-325 MG PER TABLET    Take 1 tablet by mouth every 8 (eight) hours as needed for Pain.    LORAZEPAM (ATIVAN) 0.5 MG TABLET    Take 1 tablet (0.5 mg total) by mouth every 12 (twelve) hours as needed for Anxiety.    LOSARTAN (COZAAR) 50 MG TABLET    1 tab po bid    NIVOLUMAB (OPDIVO) 100 MG/10 ML INJECTION    100 mg by Other route. Infusion once a month    UNABLE TO FIND    Take 2 tablets by mouth as needed. senekot    VITAMIN D (VITAMIN D3) 1000 UNITS TAB    Take 2,000 Units by mouth once daily.       Physical Exam:  Vital Signs:  BP (!) 152/70 (BP Location: Left arm, Patient Position: Sitting)   Pulse (!) 52   Temp 97.7 °F (36.5 °C)   Ht 5' 6" (1.676 m)   Wt 70.9 kg (156 lb 4.9 oz)   LMP  (LMP Unknown)   SpO2 99%   BMI 25.23 kg/m²   Body mass index is 25.23 kg/m².    Physical Exam  Vitals and nursing note reviewed.   Constitutional:       General: She is not in acute distress.     Appearance: Normal appearance. She is well-developed. She is not ill-appearing or toxic-appearing.   Eyes:      Conjunctiva/sclera: Conjunctivae normal.      Pupils: Pupils are equal, round, and reactive to light.   Neck:      Thyroid: No thyromegaly.   Cardiovascular:      Rate and Rhythm: Normal rate and regular rhythm.      Heart sounds: " Normal heart sounds. No murmur heard.  Pulmonary:      Effort: Pulmonary effort is normal.      Breath sounds: Normal breath sounds. No wheezing or rales.   Abdominal:      General: Bowel sounds are normal. There is no distension.      Palpations: Abdomen is soft. There is no mass.      Tenderness: There is no abdominal tenderness. There is no guarding or rebound.   Musculoskeletal:         General: No tenderness. Normal range of motion.   Lymphadenopathy:      Cervical: No cervical adenopathy.   Skin:     Findings: No erythema or rash.   Neurological:      General: No focal deficit present.      Mental Status: She is alert and oriented to person, place, and time.   Psychiatric:         Mood and Affect: Mood normal.         Behavior: Behavior normal.         Thought Content: Thought content normal.         Judgment: Judgment normal.     Rectal exam deferred due to patient preference    Labs: reviewed and pertinent noted above    Assessment/Plan:  Alesha Toney is a 71 y.o. female with immune checkpoint inhibitor associated colitis here with rectal bleeding and anal pain consistent with an anal fissure. The following issues were addresssed:    1. Abnormal positron emission tomography (PET) of colon    2. Rectal polyp    3. Colitis      1. Abnormal PET CT:  We reviewed the images and compared her recent PET-CT to the 1 in November.  We also reviewed the recent colonoscopy reports.  I recommend that we proceed with a sigmoidoscopy to reassess her rectal polypectomy site and to reassess her colitis.  Given the fact that her stool calprotectin level was undetectable recently I doubt that the increased uptake on the PET-CT has to do with significant inflammatory changes.  She did have a recent anal fissures at this could cause some inflammatory changes.  My biggest concern is that there could be some recurrence of the polypoid lesion in the rectum.  We discussed these possibilities and she would prefer to schedule the  sigmoidoscopy after her next PET-CT.  I encouraged her to do this sooner rather than later but she has other thing she needs to take care of before getting this done.    2. Rectal polyp:  Evaluate with sigmoidoscopy    3. Colitis:  No significant diarrhea.  Recent stool calprotectin undetectable.          Follow up: Follow up in about 3 months (around 7/18/2022).    Thank you again for sending Alesha Toney to see Dr. Baudilio Clark today at the [facility]. Please don't hesitate to contact Dr. Clark if there are any questions regarding this evaluation, or if you have any other patients with inflammatory bowel disease for whom you would like a consultation. You can reach Dr. Clark at [phone] or by email at [email or web address]    Francesco Clark MD  Department of Gastroenterology  Inflammatory Bowel Disease

## 2022-04-27 ENCOUNTER — LAB VISIT (OUTPATIENT)
Dept: LAB | Facility: HOSPITAL | Age: 72
End: 2022-04-27
Attending: INTERNAL MEDICINE
Payer: MEDICARE

## 2022-04-27 ENCOUNTER — OFFICE VISIT (OUTPATIENT)
Dept: HEMATOLOGY/ONCOLOGY | Facility: CLINIC | Age: 72
End: 2022-04-27
Payer: MEDICARE

## 2022-04-27 VITALS
TEMPERATURE: 98 F | SYSTOLIC BLOOD PRESSURE: 173 MMHG | BODY MASS INDEX: 25.33 KG/M2 | WEIGHT: 157.63 LBS | HEART RATE: 61 BPM | RESPIRATION RATE: 18 BRPM | HEIGHT: 66 IN | OXYGEN SATURATION: 98 % | DIASTOLIC BLOOD PRESSURE: 77 MMHG

## 2022-04-27 DIAGNOSIS — G89.3 CHRONIC PAIN DUE TO MALIGNANT NEOPLASTIC DISEASE: ICD-10-CM

## 2022-04-27 DIAGNOSIS — E07.9 THYROID DISORDER: ICD-10-CM

## 2022-04-27 DIAGNOSIS — C34.11 MALIGNANT NEOPLASM OF UPPER LOBE, RIGHT BRONCHUS OR LUNG: ICD-10-CM

## 2022-04-27 DIAGNOSIS — C77.1 SECONDARY AND UNSPECIFIED MALIGNANT NEOPLASM OF INTRATHORACIC LYMPH NODES: ICD-10-CM

## 2022-04-27 DIAGNOSIS — C34.11 MALIGNANT NEOPLASM OF UPPER LOBE, RIGHT BRONCHUS OR LUNG: Primary | ICD-10-CM

## 2022-04-27 LAB
ALBUMIN SERPL BCP-MCNC: 3.6 G/DL (ref 3.5–5.2)
ALP SERPL-CCNC: 50 U/L (ref 55–135)
ALT SERPL W/O P-5'-P-CCNC: 14 U/L (ref 10–44)
ANION GAP SERPL CALC-SCNC: 5 MMOL/L (ref 8–16)
AST SERPL-CCNC: 21 U/L (ref 10–40)
BILIRUB SERPL-MCNC: 0.4 MG/DL (ref 0.1–1)
BUN SERPL-MCNC: 22 MG/DL (ref 8–23)
CALCIUM SERPL-MCNC: 10.1 MG/DL (ref 8.7–10.5)
CHLORIDE SERPL-SCNC: 105 MMOL/L (ref 95–110)
CO2 SERPL-SCNC: 29 MMOL/L (ref 23–29)
CREAT SERPL-MCNC: 1.1 MG/DL (ref 0.5–1.4)
ERYTHROCYTE [DISTWIDTH] IN BLOOD BY AUTOMATED COUNT: 12.4 % (ref 11.5–14.5)
EST. GFR  (AFRICAN AMERICAN): 58.4 ML/MIN/1.73 M^2
EST. GFR  (NON AFRICAN AMERICAN): 50.6 ML/MIN/1.73 M^2
GLUCOSE SERPL-MCNC: 101 MG/DL (ref 70–110)
HCT VFR BLD AUTO: 37.2 % (ref 37–48.5)
HGB BLD-MCNC: 12 G/DL (ref 12–16)
IMM GRANULOCYTES # BLD AUTO: 0.01 K/UL (ref 0–0.04)
MCH RBC QN AUTO: 31 PG (ref 27–31)
MCHC RBC AUTO-ENTMCNC: 32.3 G/DL (ref 32–36)
MCV RBC AUTO: 96 FL (ref 82–98)
NEUTROPHILS # BLD AUTO: 2.4 K/UL (ref 1.8–7.7)
PLATELET # BLD AUTO: 179 K/UL (ref 150–450)
PMV BLD AUTO: 10.2 FL (ref 9.2–12.9)
POTASSIUM SERPL-SCNC: 4.6 MMOL/L (ref 3.5–5.1)
PROT SERPL-MCNC: 6.4 G/DL (ref 6–8.4)
RBC # BLD AUTO: 3.87 M/UL (ref 4–5.4)
SODIUM SERPL-SCNC: 139 MMOL/L (ref 136–145)
T4 FREE SERPL-MCNC: 1.06 NG/DL (ref 0.71–1.51)
TSH SERPL DL<=0.005 MIU/L-ACNC: 1.51 UIU/ML (ref 0.4–4)
WBC # BLD AUTO: 5.64 K/UL (ref 3.9–12.7)

## 2022-04-27 PROCEDURE — 36415 COLL VENOUS BLD VENIPUNCTURE: CPT | Performed by: INTERNAL MEDICINE

## 2022-04-27 PROCEDURE — 99215 PR OFFICE/OUTPT VISIT, EST, LEVL V, 40-54 MIN: ICD-10-PCS | Mod: S$PBB,,, | Performed by: INTERNAL MEDICINE

## 2022-04-27 PROCEDURE — 99215 OFFICE O/P EST HI 40 MIN: CPT | Mod: S$PBB,,, | Performed by: INTERNAL MEDICINE

## 2022-04-27 PROCEDURE — 84443 ASSAY THYROID STIM HORMONE: CPT | Performed by: INTERNAL MEDICINE

## 2022-04-27 PROCEDURE — 80053 COMPREHEN METABOLIC PANEL: CPT | Performed by: INTERNAL MEDICINE

## 2022-04-27 PROCEDURE — 84439 ASSAY OF FREE THYROXINE: CPT | Performed by: INTERNAL MEDICINE

## 2022-04-27 PROCEDURE — 99213 OFFICE O/P EST LOW 20 MIN: CPT | Mod: PBBFAC | Performed by: INTERNAL MEDICINE

## 2022-04-27 PROCEDURE — 85027 COMPLETE CBC AUTOMATED: CPT | Performed by: INTERNAL MEDICINE

## 2022-04-27 PROCEDURE — 99999 PR PBB SHADOW E&M-EST. PATIENT-LVL III: ICD-10-PCS | Mod: PBBFAC,,, | Performed by: INTERNAL MEDICINE

## 2022-04-27 PROCEDURE — 99999 PR PBB SHADOW E&M-EST. PATIENT-LVL III: CPT | Mod: PBBFAC,,, | Performed by: INTERNAL MEDICINE

## 2022-04-27 RX ORDER — HEPARIN 100 UNIT/ML
500 SYRINGE INTRAVENOUS
Status: CANCELLED | OUTPATIENT
Start: 2022-04-29

## 2022-04-27 RX ORDER — SODIUM CHLORIDE 0.9 % (FLUSH) 0.9 %
10 SYRINGE (ML) INJECTION
Status: CANCELLED | OUTPATIENT
Start: 2022-04-29

## 2022-04-27 NOTE — PROGRESS NOTES
"Subjective:       Patient ID: Alesha Toney is a 71 y.o. female.    Chief Complaint: Malignant neoplasm of upper lobe, right bronchus or lung  Ms. Alesha Toney is a 71-year-old otherwise healthy female who started having some shoulder pain, which was lasting for over six weeks.  She went and saw her primary care physician and underwent an x-ray, which revealed right upper lobe lung mass worrisome for malignancy and a CT scan was recommended, which was done on 6/12/18 and that revealed a newly developing mass, pleural based, lateral posterior segment, right upper lobe 3.5 x 3.5 cm, surrounding stellate margin and patchy infiltrates, particularly superiorly,   anteriorly infiltrates extend perihilar into the anterior segment.  She then underwent CT-guided biopsy, which revealed well-differentiated adenocarcinoma.       Her PET scan from 6/29/18 There is physiologic intracranial, head, and neck activity.  There is a large right upper lobe mass SUV max 9.11.  No local or distant metastatic disease seen.  There is physiologic liver, spleen, GI and  activity.  There are a few liver cysts.  No adenopathy is seen.  The adrenal glands are not enlarged.  No adenopathy is seen.  No suspicious bone lesions are seen.     She underwent VATS with right upper lobectomy. Mediastinal lymphadenectomy on 8/9/18. Pathology revealed "Tumor site: Upper lobe.Tumor size: 7 x 4 x 2.7 cm.Tumor focality: Single tumor.  Histologic type: Mixed invasive mucinous and nonmucinous adenocarcinoma. Histologic grade: G2: Moderately differentiated.Spread through air spaces (STATS): Present. Visceral pleura invasion: Not identified.  Lymphovascular invasion: Not identified. Direct invasion of adjacent structures: No adjacent structures present. Margins: All margins are uninvolved by carcinoma.Distance of invasive carcinoma from closest margin: 1 cm from the bronchial margin.Treatment effect: No known presurgical therapy. Regional lymph nodes: " "Number of lymph nodes involved: 0. Number of lymph nodes examined: 11. Pathologic Stage Classification: pT3 N0"        She completed 4 cycles of adjuvant chemo with Cisplatin and Alimta as of 1/10/19   She is on surveillance.     CT chest of 9/17/19 which reveals "Operative change of right upper lobectomy for small-cell lung cancer with several scattered subsolid opacities and a new solid nodule in the right lower lobe measuring up to 0.5 cm.  We recommend continued surveillance with the role and schedule for such surveillance to be determined by clinical considerations. Sided chest port distal tip terminating at the confluence of the brachiocephalic vein in the SVC.  Correlate with device functioning. Apically predominant emphysematous changes, left greater than right. Aortic annular calcification and multi-vessel coronary artery atherosclerosis. Numerous unchanged hepatic hypodensities, likely cysts"     PET scan from 10/1/19 reveals "1.6 cm soft tissue lesion re-identified posterior to the bronchus intermedius.  No corresponding focal increased radiotracer uptake to suggest local recurrence or metastatic disease. Stable subcentimeter opacities throughout the bilateral lower lobes, too small to characterize with PET-CT. Focal increased radiotracer uptake and subtle wall thickening in the rectum.  Findings are concerning for primary neoplastic process.  Recommend further evaluation with direct visualization"     She returned from [facility] and was advised to proceed with chemo/RT with either Docetaxel/platinum or Carboplatin/Alimta.     She completed chemo/RT with Carboplatin and Alimta as of 12/31/19     She underwent CT chest on 5/27/2020 which revealed 1. In this patient with history of lung cancer status post right upper lobectomy, there is a soft tissue opacity at the posterior margin of the staple line.  This lesion has been enlarging progressively and now measures 1.3 x 1.6 cm.  There is also a new 1.7 x " "1.7 cm opacity at the right paravertebral pleural margin which is inseparable from this lesion.  These findings may represent post radiation change in light of the hypermetabolic lesion in this region on PET-CT of 10/01/2019 (image 69/[id number]).There is a 1 cm opacity in the superior or lateral basal segment of the right lower lobe (axial series 4, image 243) which has enlarged since 09/17/2019.  Nature of this lesion is unclear.  Clinical considerations will determine if percutaneous biopsy or metabolic assessment is warranted. 1.0 cm solid opacity with branching configuration (series 4, image 205) is located in the lateral basal segment of the left lower lobe, stable since 02/16/2019 (02/06/2019 axial series 4, image 310). Appearance and bifurcating character suggests mucoid impaction in mildly ectatic peripheral airway, likely not significant. Persistent clustered small centrilobular nodules in the apical segment of the right upper lobe likely reflecting small airway inflammation/infection. Partially visualized left chest port with distal catheter tip at the junction of the brachiocephalic veins and SVC, similar as on 09/17/2019"  She thus underwent PET scan on 6/1/2020 which revealed "No evidence of residual viable lung malignancy.  Evolving post radiation changes in the right paramediastinal lung, with a new irregular subpleural density which may also be related to recent radiation.  Attention on follow-up. Interval decrease in size of a soft tissue lesion along the inferior margin of the lobectomy stable line.  Several stable subcentimeter soft tissue opacities in the lower lobes bilaterally, As above.  Attention on follow-up.  Persistent hypermetabolic rectal lesion concerning for malignancy.  Recommend direct visualization and tissue sampling as clinically indicated"  I discussed her case in thoracic conference today and findings are felt to be related to RT     Her restaging CT scans from 1/20/2021 which reveal " ""Persistent soft tissue opacity in the posterior margin of the staple line that is increased in prominence when compared to the prior examination and is worrisome for disease progression. No new lesions identified.  Stable pulmonary nodules. Stable hypodense lesions throughout the liver that likely represent hepatic cysts"        Restaging PET scan from 1/27/2021 reveals "Increased size and hypermetabolic activity involving soft tissue opacity along the inferior border of the right upper lobectomy stable line concerning for progression of disease. Mildly increased hypermetabolic activity involving the rectum compatible with known high-grade dysplasia/intramucosal carcinoma. Interval decrease in size and resolution of hypermetabolic activity involving subcentimeter subpleural opacity within the right lower lobe"     MRI brain from  2/3/2021 reveals no evidence of recurrence.  GUARDANT testing only reveals p53, PDL1 is 0 and no other targets        PET scan from 3/24/2021 reveals "In this patient with known lung cancer, there is a persistent hypermetabolic right hilar mass consistent with viable tumor.  Interval increase in size is equivocal for progression of fibrosis versus tumor growth. 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  Activity appears more focal and possibly more proximal than before.  Consider direct visualization for further evaluation. The previously described subcentimeter subpleural opacity within the right lower lobe is not definitely seen on today's exam"               She last received immunotherapy on 5/28/2021. She underwent EUS on 6/25/2021 which revealed "Erythematous, congested, and ulcerated mucosa  (proctitis) in rectosigmoid colon.  Pathology reveals Colon, rectosigmoid, biopsy:  Moderate active colitis     She completed steroids about 5 weeks ago.      CT scans from 11/5/2021 reveals "In this patient with lung " "cancer, there is postoperative changes of right upper lobe resection.  Persistent soft tissue opacity along the right hilum suture line which is slightly increased in size from prior exam.  More conspicuous appearing nodules within the right lower lobe seen on prior exam.  New nodule within the right middle lobe measuring 1.3 cm.  Overall findings are all worrisome for disease progression. Scattered areas of centrilobular nodularity within the bilateral lungs.  Nonspecific and can be seen in the setting of infectious or inflammatory etiologies. Unchanged hepatics cysts. Stable left adrenal gland nodule."  PET scan from 11/23/2021 revealed "New hypermetabolic nodule within the right lower lobe additional slightly subcentimeter nodules within the right lower lobe.  Findings concerning for disease progression, other differentials include infection or noninfectious inflammatory process. Interval decrease in size and uptake in the right hilar mass. Resolution of previous focal rectal uptake"        She is on Opdivo. Her Opdivo was held on 1/10/2022 due to rectal bleeding. She saw Dr. Clark and was noted to have anal fissure which contributed to rectal bleed. Her rectal bleed has since resolved.      HPI She comes in for Opdivo. She notes worsening pain in her right chest and feels that some pain goes to the back, pain is intermittent, mostly happens at night when she lies on her right side, lasts from a few seconds to longer. She takes Norco on occasion    She is seeing Dr. Clark for sigmoidoscopy.  Shortness of breath is at baseline.      Review of Systems   Constitutional: Negative for appetite change, fatigue and unexpected weight change.   HENT: Negative for mouth sores.    Eyes: Negative for visual disturbance.   Respiratory: Negative for cough and shortness of breath.    Cardiovascular: Positive for chest pain.   Gastrointestinal: Negative for abdominal pain and diarrhea.   Genitourinary: Negative for " frequency.   Musculoskeletal: Negative for back pain.   Integumentary:  Negative for rash.   Neurological: Negative for headaches.   Hematological: Negative for adenopathy.   Psychiatric/Behavioral: The patient is not nervous/anxious.    All other systems reviewed and are negative.        Objective:      Physical Exam  Vitals reviewed.   Constitutional:       Appearance: She is well-developed.   HENT:      Mouth/Throat:      Pharynx: No oropharyngeal exudate.   Cardiovascular:      Rate and Rhythm: Normal rate.      Heart sounds: Normal heart sounds.   Pulmonary:      Effort: Pulmonary effort is normal.      Breath sounds: Normal breath sounds. No wheezing.   Chest:      Comments: Right chest tenderness+  Abdominal:      General: Bowel sounds are normal.      Palpations: Abdomen is soft.      Tenderness: There is no abdominal tenderness.   Musculoskeletal:         General: No tenderness.   Lymphadenopathy:      Cervical: No cervical adenopathy.   Skin:     General: Skin is warm and dry.      Findings: No rash.   Neurological:      Mental Status: She is alert and oriented to person, place, and time.      Coordination: Coordination normal.   Psychiatric:         Thought Content: Thought content normal.         Judgment: Judgment normal.           LABS:  WBC   Date Value Ref Range Status   04/27/2022 5.64 3.90 - 12.70 K/uL Final     Hemoglobin   Date Value Ref Range Status   04/27/2022 12.0 12.0 - 16.0 g/dL Final     POC Hematocrit   Date Value Ref Range Status   08/09/2018 33 (L) 36 - 54 %PCV Final     Hematocrit   Date Value Ref Range Status   04/27/2022 37.2 37.0 - 48.5 % Final     Platelets   Date Value Ref Range Status   04/27/2022 179 150 - 450 K/uL Final     Gran # (ANC)   Date Value Ref Range Status   04/27/2022 2.4 1.8 - 7.7 K/uL Final     Comment:     The ANC is based on a white cell differential from an   automated cell counter. It has not been microscopically   reviewed for the presence of abnormal cells.  Clinical   correlation is required.         Chemistry        Component Value Date/Time     04/27/2022 1007    K 4.6 04/27/2022 1007     04/27/2022 1007    CO2 29 04/27/2022 1007    BUN 22 04/27/2022 1007    CREATININE 1.1 04/27/2022 1007     04/27/2022 1007        Component Value Date/Time    CALCIUM 10.1 04/27/2022 1007    ALKPHOS 50 (L) 04/27/2022 1007    AST 21 04/27/2022 1007    ALT 14 04/27/2022 1007    BILITOT 0.4 04/27/2022 1007    ESTGFRAFRICA 58.4 (A) 04/27/2022 1007    EGFRNONAA 50.6 (A) 04/27/2022 1007        TSH   Date Value Ref Range Status   04/27/2022 1.509 0.400 - 4.000 uIU/mL Final     Free T4   Date Value Ref Range Status   04/27/2022 1.06 0.71 - 1.51 ng/dL Final       Assessment:       Problem List Items Addressed This Visit     Chronic pain due to malignant neoplastic disease    Malignant neoplasm of upper lobe, right bronchus or lung - Primary    Relevant Orders    CT Chest Without Contrast    CBC W/ AUTO DIFFERENTIAL    Comprehensive Metabolic Panel    Secondary and unspecified malignant neoplasm of intrathoracic lymph nodes          Plan:         Route Chart for Scheduling    Med Onc Chart Routing      Follow up with physician 4 weeks. Schedule CBC,CMP, CT chest without contrast and see me and for Opdivo   Follow up with [facility]    Labs    Imaging    Pharmacy appointment    Other referrals          Treatment Plan Information   OP NIVOLUMAB Q4W   Pam Dhaliwal MD   Upcoming Treatment Dates - OP NIVOLUMAB Q4W    4/29/2022       Chemotherapy       nivolumab 480 mg in sodium chloride 0.9% 148 mL infusion    Therapy Plan Information  Flushes  heparin, porcine (PF) 100 unit/mL injection flush 500 Units  500 Units, Intravenous, Every visit  sodium chloride 0.9% flush 10 mL  10 mL, Intravenous, Every visit     She will proceed with Opdivo and will return in 4 weeks with CT chest without contrast, may need a PET based on CT results  She will also try taking gabapentin at night to see if  it helps with chest wall pain. If any worsening of symptoms will do CT scans sooner     Above care plan was discussed with patient and all questions were addressed to her satisfaction

## 2022-04-29 ENCOUNTER — INFUSION (OUTPATIENT)
Dept: INFUSION THERAPY | Facility: HOSPITAL | Age: 72
End: 2022-04-29
Attending: INTERNAL MEDICINE
Payer: MEDICARE

## 2022-04-29 VITALS
TEMPERATURE: 98 F | BODY MASS INDEX: 25.44 KG/M2 | RESPIRATION RATE: 18 BRPM | DIASTOLIC BLOOD PRESSURE: 75 MMHG | HEART RATE: 60 BPM | WEIGHT: 157.63 LBS | SYSTOLIC BLOOD PRESSURE: 160 MMHG

## 2022-04-29 DIAGNOSIS — C77.1 SECONDARY AND UNSPECIFIED MALIGNANT NEOPLASM OF INTRATHORACIC LYMPH NODES: Primary | ICD-10-CM

## 2022-04-29 DIAGNOSIS — C34.11 MALIGNANT NEOPLASM OF UPPER LOBE, RIGHT BRONCHUS OR LUNG: ICD-10-CM

## 2022-04-29 PROCEDURE — 96413 CHEMO IV INFUSION 1 HR: CPT

## 2022-04-29 PROCEDURE — 25000003 PHARM REV CODE 250: Performed by: INTERNAL MEDICINE

## 2022-04-29 PROCEDURE — A4216 STERILE WATER/SALINE, 10 ML: HCPCS | Performed by: INTERNAL MEDICINE

## 2022-04-29 PROCEDURE — 63600175 PHARM REV CODE 636 W HCPCS: Mod: JG | Performed by: INTERNAL MEDICINE

## 2022-04-29 RX ORDER — SODIUM CHLORIDE 0.9 % (FLUSH) 0.9 %
10 SYRINGE (ML) INJECTION
Status: DISCONTINUED | OUTPATIENT
Start: 2022-04-29 | End: 2022-04-29 | Stop reason: HOSPADM

## 2022-04-29 RX ORDER — HEPARIN 100 UNIT/ML
500 SYRINGE INTRAVENOUS
Status: DISCONTINUED | OUTPATIENT
Start: 2022-04-29 | End: 2022-04-29 | Stop reason: HOSPADM

## 2022-04-29 RX ADMIN — HEPARIN SODIUM (PORCINE) LOCK FLUSH IV SOLN 100 UNIT/ML 500 UNITS: 100 SOLUTION at 10:04

## 2022-04-29 RX ADMIN — SODIUM CHLORIDE 480 MG: 9 INJECTION, SOLUTION INTRAVENOUS at 09:04

## 2022-04-29 RX ADMIN — Medication 10 ML: at 10:04

## 2022-04-29 RX ADMIN — SODIUM CHLORIDE: 9 INJECTION, SOLUTION INTRAVENOUS at 09:04

## 2022-04-29 NOTE — PLAN OF CARE
0855 pt here for D1C7 Opdivo infusion, labs, hx, meds, allergies reviewed, pt with no new complaints at this time, reclined in chair, continue to monitor

## 2022-04-29 NOTE — PLAN OF CARE
1020 pt tolerated opdivo infusion without issue, pt to rtc 5/27/22, no distress noted upon d/c to home

## 2022-05-01 ENCOUNTER — PATIENT MESSAGE (OUTPATIENT)
Dept: HEMATOLOGY/ONCOLOGY | Facility: CLINIC | Age: 72
End: 2022-05-01
Payer: MEDICARE

## 2022-05-02 ENCOUNTER — TELEPHONE (OUTPATIENT)
Dept: HEMATOLOGY/ONCOLOGY | Facility: CLINIC | Age: 72
End: 2022-05-02
Payer: MEDICARE

## 2022-05-04 ENCOUNTER — PATIENT MESSAGE (OUTPATIENT)
Dept: GASTROENTEROLOGY | Facility: CLINIC | Age: 72
End: 2022-05-04
Payer: MEDICARE

## 2022-05-04 ENCOUNTER — PATIENT MESSAGE (OUTPATIENT)
Dept: ENDOSCOPY | Facility: HOSPITAL | Age: 72
End: 2022-05-04
Payer: MEDICARE

## 2022-05-12 ENCOUNTER — PATIENT MESSAGE (OUTPATIENT)
Dept: ENDOSCOPY | Facility: HOSPITAL | Age: 72
End: 2022-05-12
Payer: MEDICARE

## 2022-05-12 ENCOUNTER — PATIENT MESSAGE (OUTPATIENT)
Dept: INTERNAL MEDICINE | Facility: CLINIC | Age: 72
End: 2022-05-12
Payer: MEDICARE

## 2022-05-12 NOTE — TELEPHONE ENCOUNTER
Spoke to pt. Increase bleeding w/clots. Bleeding stops after BM. No diarrhea no fever no pain. Pt request earlier flex sig.

## 2022-05-12 NOTE — TELEPHONE ENCOUNTER
No new care gaps identified.  [facility] Embedded Care Gaps. Reference number: [id number]. 5/12/2022   11:32:39 AM FREDYT

## 2022-05-12 NOTE — TELEPHONE ENCOUNTER
Spoke to pt.  No earlier procedure mark  Due to No diarrhea no fever will precede with flex sig scheduled 06/03   Pt verbalizes understanding.

## 2022-05-12 NOTE — TELEPHONE ENCOUNTER
Per Amber   No earlier procedure mark  Due to No diarrhea no fever will precede with flex sig scheduled 06/03

## 2022-05-13 ENCOUNTER — PATIENT MESSAGE (OUTPATIENT)
Dept: INTERNAL MEDICINE | Facility: CLINIC | Age: 72
End: 2022-05-13
Payer: MEDICARE

## 2022-05-13 RX ORDER — HYDROCODONE BITARTRATE AND ACETAMINOPHEN 5; 325 MG/1; MG/1
1 TABLET ORAL EVERY 8 HOURS PRN
Qty: 90 TABLET | Refills: 0 | Status: SHIPPED | OUTPATIENT
Start: 2022-05-13 | End: 2022-06-13 | Stop reason: SDUPTHER

## 2022-05-24 ENCOUNTER — HOSPITAL ENCOUNTER (OUTPATIENT)
Dept: RADIOLOGY | Facility: HOSPITAL | Age: 72
Discharge: HOME | End: 2022-05-24
Attending: INTERNAL MEDICINE
Payer: MEDICARE

## 2022-05-24 DIAGNOSIS — C34.11 MALIGNANT NEOPLASM OF UPPER LOBE, RIGHT BRONCHUS OR LUNG: ICD-10-CM

## 2022-05-24 PROCEDURE — 71250 CT CHEST WITHOUT CONTRAST: ICD-10-PCS | Mod: 26,,, | Performed by: RADIOLOGY

## 2022-05-24 PROCEDURE — 71250 CT THORAX DX C-: CPT | Mod: 26,,, | Performed by: RADIOLOGY

## 2022-05-24 PROCEDURE — 71250 CT THORAX DX C-: CPT | Mod: TC

## 2022-05-25 ENCOUNTER — LAB VISIT (OUTPATIENT)
Dept: LAB | Facility: HOSPITAL | Age: 72
End: 2022-05-25
Attending: INTERNAL MEDICINE
Payer: MEDICARE

## 2022-05-25 ENCOUNTER — OFFICE VISIT (OUTPATIENT)
Dept: HEMATOLOGY/ONCOLOGY | Facility: CLINIC | Age: 72
End: 2022-05-25
Payer: MEDICARE

## 2022-05-25 VITALS
HEIGHT: 66 IN | WEIGHT: 155.44 LBS | HEART RATE: 65 BPM | RESPIRATION RATE: 20 BRPM | SYSTOLIC BLOOD PRESSURE: 176 MMHG | DIASTOLIC BLOOD PRESSURE: 74 MMHG | BODY MASS INDEX: 24.98 KG/M2 | OXYGEN SATURATION: 100 %

## 2022-05-25 DIAGNOSIS — C34.11 MALIGNANT NEOPLASM OF UPPER LOBE, RIGHT BRONCHUS OR LUNG: Primary | ICD-10-CM

## 2022-05-25 DIAGNOSIS — E03.9 HYPOTHYROIDISM, UNSPECIFIED TYPE: ICD-10-CM

## 2022-05-25 DIAGNOSIS — C77.1 SECONDARY AND UNSPECIFIED MALIGNANT NEOPLASM OF INTRATHORACIC LYMPH NODES: ICD-10-CM

## 2022-05-25 DIAGNOSIS — C34.11 MALIGNANT NEOPLASM OF UPPER LOBE, RIGHT BRONCHUS OR LUNG: ICD-10-CM

## 2022-05-25 LAB
ALBUMIN SERPL BCP-MCNC: 3.6 G/DL (ref 3.5–5.2)
ALP SERPL-CCNC: 51 U/L (ref 55–135)
ALT SERPL W/O P-5'-P-CCNC: 12 U/L (ref 10–44)
ANION GAP SERPL CALC-SCNC: 7 MMOL/L (ref 8–16)
AST SERPL-CCNC: 19 U/L (ref 10–40)
BASOPHILS # BLD AUTO: 0.04 K/UL (ref 0–0.2)
BASOPHILS NFR BLD: 0.9 % (ref 0–1.9)
BILIRUB SERPL-MCNC: 0.4 MG/DL (ref 0.1–1)
BUN SERPL-MCNC: 24 MG/DL (ref 8–23)
CALCIUM SERPL-MCNC: 9.5 MG/DL (ref 8.7–10.5)
CHLORIDE SERPL-SCNC: 105 MMOL/L (ref 95–110)
CO2 SERPL-SCNC: 27 MMOL/L (ref 23–29)
CREAT SERPL-MCNC: 1.1 MG/DL (ref 0.5–1.4)
DIFFERENTIAL METHOD: ABNORMAL
EOSINOPHIL # BLD AUTO: 0.2 K/UL (ref 0–0.5)
EOSINOPHIL NFR BLD: 4.6 % (ref 0–8)
ERYTHROCYTE [DISTWIDTH] IN BLOOD BY AUTOMATED COUNT: 12.3 % (ref 11.5–14.5)
EST. GFR  (AFRICAN AMERICAN): 58.4 ML/MIN/1.73 M^2
EST. GFR  (NON AFRICAN AMERICAN): 50.6 ML/MIN/1.73 M^2
GLUCOSE SERPL-MCNC: 130 MG/DL (ref 70–110)
HCT VFR BLD AUTO: 35.2 % (ref 37–48.5)
HGB BLD-MCNC: 11.8 G/DL (ref 12–16)
IMM GRANULOCYTES # BLD AUTO: 0.01 K/UL (ref 0–0.04)
IMM GRANULOCYTES NFR BLD AUTO: 0.2 % (ref 0–0.5)
LYMPHOCYTES # BLD AUTO: 1.7 K/UL (ref 1–4.8)
LYMPHOCYTES NFR BLD: 36.2 % (ref 18–48)
MCH RBC QN AUTO: 30.8 PG (ref 27–31)
MCHC RBC AUTO-ENTMCNC: 33.5 G/DL (ref 32–36)
MCV RBC AUTO: 92 FL (ref 82–98)
MONOCYTES # BLD AUTO: 0.5 K/UL (ref 0.3–1)
MONOCYTES NFR BLD: 10.9 % (ref 4–15)
NEUTROPHILS # BLD AUTO: 2.2 K/UL (ref 1.8–7.7)
NEUTROPHILS NFR BLD: 47.2 % (ref 38–73)
NRBC BLD-RTO: 0 /100 WBC
PLATELET # BLD AUTO: 172 K/UL (ref 150–450)
PMV BLD AUTO: 10.1 FL (ref 9.2–12.9)
POTASSIUM SERPL-SCNC: 4.5 MMOL/L (ref 3.5–5.1)
PROT SERPL-MCNC: 6.4 G/DL (ref 6–8.4)
RBC # BLD AUTO: 3.83 M/UL (ref 4–5.4)
SODIUM SERPL-SCNC: 139 MMOL/L (ref 136–145)
WBC # BLD AUTO: 4.59 K/UL (ref 3.9–12.7)

## 2022-05-25 PROCEDURE — 36415 COLL VENOUS BLD VENIPUNCTURE: CPT | Performed by: INTERNAL MEDICINE

## 2022-05-25 PROCEDURE — 99999 PR PBB SHADOW E&M-EST. PATIENT-LVL III: CPT | Mod: PBBFAC,,, | Performed by: INTERNAL MEDICINE

## 2022-05-25 PROCEDURE — 99213 OFFICE O/P EST LOW 20 MIN: CPT | Mod: PBBFAC | Performed by: INTERNAL MEDICINE

## 2022-05-25 PROCEDURE — 99215 PR OFFICE/OUTPT VISIT, EST, LEVL V, 40-54 MIN: ICD-10-PCS | Mod: S$PBB,,, | Performed by: INTERNAL MEDICINE

## 2022-05-25 PROCEDURE — 99215 OFFICE O/P EST HI 40 MIN: CPT | Mod: S$PBB,,, | Performed by: INTERNAL MEDICINE

## 2022-05-25 PROCEDURE — 80053 COMPREHEN METABOLIC PANEL: CPT | Performed by: INTERNAL MEDICINE

## 2022-05-25 PROCEDURE — 99999 PR PBB SHADOW E&M-EST. PATIENT-LVL III: ICD-10-PCS | Mod: PBBFAC,,, | Performed by: INTERNAL MEDICINE

## 2022-05-25 PROCEDURE — 85025 COMPLETE CBC W/AUTO DIFF WBC: CPT | Performed by: INTERNAL MEDICINE

## 2022-05-25 RX ORDER — SODIUM CHLORIDE 0.9 % (FLUSH) 0.9 %
10 SYRINGE (ML) INJECTION
Status: CANCELLED | OUTPATIENT
Start: 2022-05-27

## 2022-05-25 RX ORDER — HEPARIN 100 UNIT/ML
500 SYRINGE INTRAVENOUS
Status: CANCELLED | OUTPATIENT
Start: 2022-05-27

## 2022-05-25 NOTE — Clinical Note
Patrice Mccoy, Mrs. Toney has been feeling irregular heart beat for the last 3 weeks associated with dizziness, no chest pain or shortness of breath, Has not seen you since 2021. What do you suggest? DO you want to see her?

## 2022-05-25 NOTE — PROGRESS NOTES
"Subjective:       Patient ID: Alesha Toney is a 71 y.o. female.    Chief Complaint: Lung Cancer  Ms. Alesha Toney is a 71-year-old otherwise healthy female who started having some shoulder pain, which was lasting for over six weeks.  She went and saw her primary care physician and underwent an x-ray, which revealed right upper lobe lung mass worrisome for malignancy and a CT scan was recommended, which was done on 6/12/18 and that revealed a newly developing mass, pleural based, lateral posterior segment, right upper lobe 3.5 x 3.5 cm, surrounding stellate margin and patchy infiltrates, particularly superiorly,   anteriorly infiltrates extend perihilar into the anterior segment.  She then underwent CT-guided biopsy, which revealed well-differentiated adenocarcinoma.       Her PET scan from 6/29/18 There is physiologic intracranial, head, and neck activity.  There is a large right upper lobe mass SUV max 9.11.  No local or distant metastatic disease seen.  There is physiologic liver, spleen, GI and  activity.  There are a few liver cysts.  No adenopathy is seen.  The adrenal glands are not enlarged.  No adenopathy is seen.  No suspicious bone lesions are seen.     She underwent VATS with right upper lobectomy. Mediastinal lymphadenectomy on 8/9/18. Pathology revealed "Tumor site: Upper lobe.Tumor size: 7 x 4 x 2.7 cm.Tumor focality: Single tumor.  Histologic type: Mixed invasive mucinous and nonmucinous adenocarcinoma. Histologic grade: G2: Moderately differentiated.Spread through air spaces (STATS): Present. Visceral pleura invasion: Not identified.  Lymphovascular invasion: Not identified. Direct invasion of adjacent structures: No adjacent structures present. Margins: All margins are uninvolved by carcinoma.Distance of invasive carcinoma from closest margin: 1 cm from the bronchial margin.Treatment effect: No known presurgical therapy. Regional lymph nodes: Number of lymph nodes involved: 0. Number of " "lymph nodes examined: 11. Pathologic Stage Classification: pT3 N0"        She completed 4 cycles of adjuvant chemo with Cisplatin and Alimta as of 1/10/19   She is on surveillance.     CT chest of 9/17/19 which reveals "Operative change of right upper lobectomy for small-cell lung cancer with several scattered subsolid opacities and a new solid nodule in the right lower lobe measuring up to 0.5 cm.  We recommend continued surveillance with the role and schedule for such surveillance to be determined by clinical considerations. Sided chest port distal tip terminating at the confluence of the brachiocephalic vein in the SVC.  Correlate with device functioning. Apically predominant emphysematous changes, left greater than right. Aortic annular calcification and multi-vessel coronary artery atherosclerosis. Numerous unchanged hepatic hypodensities, likely cysts"     PET scan from 10/1/19 reveals "1.6 cm soft tissue lesion re-identified posterior to the bronchus intermedius.  No corresponding focal increased radiotracer uptake to suggest local recurrence or metastatic disease. Stable subcentimeter opacities throughout the bilateral lower lobes, too small to characterize with PET-CT. Focal increased radiotracer uptake and subtle wall thickening in the rectum.  Findings are concerning for primary neoplastic process.  Recommend further evaluation with direct visualization"     She returned from [facility] and was advised to proceed with chemo/RT with either Docetaxel/platinum or Carboplatin/Alimta.     She completed chemo/RT with Carboplatin and Alimta as of 12/31/19     She underwent CT chest on 5/27/2020 which revealed 1. In this patient with history of lung cancer status post right upper lobectomy, there is a soft tissue opacity at the posterior margin of the staple line.  This lesion has been enlarging progressively and now measures 1.3 x 1.6 cm.  There is also a new 1.7 x 1.7 cm opacity at the right paravertebral " "pleural margin which is inseparable from this lesion.  These findings may represent post radiation change in light of the hypermetabolic lesion in this region on PET-CT of 10/01/2019 (image 69/[id number]).There is a 1 cm opacity in the superior or lateral basal segment of the right lower lobe (axial series 4, image 243) which has enlarged since 09/17/2019.  Nature of this lesion is unclear.  Clinical considerations will determine if percutaneous biopsy or metabolic assessment is warranted. 1.0 cm solid opacity with branching configuration (series 4, image 205) is located in the lateral basal segment of the left lower lobe, stable since 02/16/2019 (02/06/2019 axial series 4, image 310). Appearance and bifurcating character suggests mucoid impaction in mildly ectatic peripheral airway, likely not significant. Persistent clustered small centrilobular nodules in the apical segment of the right upper lobe likely reflecting small airway inflammation/infection. Partially visualized left chest port with distal catheter tip at the junction of the brachiocephalic veins and SVC, similar as on 09/17/2019"  She thus underwent PET scan on 6/1/2020 which revealed "No evidence of residual viable lung malignancy.  Evolving post radiation changes in the right paramediastinal lung, with a new irregular subpleural density which may also be related to recent radiation.  Attention on follow-up. Interval decrease in size of a soft tissue lesion along the inferior margin of the lobectomy stable line.  Several stable subcentimeter soft tissue opacities in the lower lobes bilaterally, As above.  Attention on follow-up.  Persistent hypermetabolic rectal lesion concerning for malignancy.  Recommend direct visualization and tissue sampling as clinically indicated"  I discussed her case in thoracic conference today and findings are felt to be related to RT     Her restaging CT scans from 1/20/2021 which reveal "Persistent soft tissue opacity in the " "posterior margin of the staple line that is increased in prominence when compared to the prior examination and is worrisome for disease progression. No new lesions identified.  Stable pulmonary nodules. Stable hypodense lesions throughout the liver that likely represent hepatic cysts"        Restaging PET scan from 1/27/2021 reveals "Increased size and hypermetabolic activity involving soft tissue opacity along the inferior border of the right upper lobectomy stable line concerning for progression of disease. Mildly increased hypermetabolic activity involving the rectum compatible with known high-grade dysplasia/intramucosal carcinoma. Interval decrease in size and resolution of hypermetabolic activity involving subcentimeter subpleural opacity within the right lower lobe"     MRI brain from  2/3/2021 reveals no evidence of recurrence.  GUARDANT testing only reveals p53, PDL1 is 0 and no other targets        PET scan from 3/24/2021 reveals "In this patient with known lung cancer, there is a persistent hypermetabolic right hilar mass consistent with viable tumor.  Interval increase in size is equivocal for progression of fibrosis versus tumor growth. 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  Activity appears more focal and possibly more proximal than before.  Consider direct visualization for further evaluation. The previously described subcentimeter subpleural opacity within the right lower lobe is not definitely seen on today's exam"               She last received immunotherapy on 5/28/2021. She underwent EUS on 6/25/2021 which revealed "Erythematous, congested, and ulcerated mucosa  (proctitis) in rectosigmoid colon.  Pathology reveals Colon, rectosigmoid, biopsy:  Moderate active colitis     She completed steroids about 5 weeks ago.      CT scans from 11/5/2021 reveals "In this patient with lung cancer, there is postoperative changes of " "right upper lobe resection.  Persistent soft tissue opacity along the right hilum suture line which is slightly increased in size from prior exam.  More conspicuous appearing nodules within the right lower lobe seen on prior exam.  New nodule within the right middle lobe measuring 1.3 cm.  Overall findings are all worrisome for disease progression. Scattered areas of centrilobular nodularity within the bilateral lungs.  Nonspecific and can be seen in the setting of infectious or inflammatory etiologies. Unchanged hepatics cysts. Stable left adrenal gland nodule."  PET scan from 11/23/2021 revealed "New hypermetabolic nodule within the right lower lobe additional slightly subcentimeter nodules within the right lower lobe.  Findings concerning for disease progression, other differentials include infection or noninfectious inflammatory process. Interval decrease in size and uptake in the right hilar mass. Resolution of previous focal rectal uptake"        She is on Opdivo. Her Opdivo was held on 1/10/2022 due to rectal bleeding. She saw Dr. Clark and was noted to have anal fissure which contributed to rectal bleed. Her rectal bleed has since resolved.        HPI She comes in for Opdivo, she notes rectal bleeding is seeing [facility] week. Denies diarrhea.  She has been having irregular heart beat on and off for the last 3 weeks, no aggravating or relieving factors but associated with dizziness  CT chest reveals "Status post right upper lobectomy for lung cancer.  Lobulated, tubular irregular mass adjacent to the margin was FDG avid on prior examination.  It appears grossly stable in size. A 15 mm infrahilar paramediastinal nodule is FDG avid on the prior examination, stable in size.  Started ground-glass opacities and small airways disease, favored infectious/inflammatory in origin"       Review of Systems   Constitutional: Negative for appetite change, fatigue and unexpected weight change.   HENT: Negative for mouth " sores.    Eyes: Negative for visual disturbance.   Respiratory: Negative for cough and shortness of breath.    Cardiovascular: Positive for palpitations. Negative for chest pain.   Gastrointestinal: Positive for anal bleeding. Negative for abdominal pain, blood in stool and diarrhea.   Genitourinary: Negative for frequency.   Musculoskeletal: Negative for back pain.   Integumentary:  Negative for rash.   Neurological: Negative for headaches.   Hematological: Negative for adenopathy.   Psychiatric/Behavioral: The patient is not nervous/anxious.    All other systems reviewed and are negative.        Objective:      Physical Exam  Vitals reviewed.   Constitutional:       Appearance: She is well-developed.   HENT:      Mouth/Throat:      Pharynx: No oropharyngeal exudate.   Cardiovascular:      Rate and Rhythm: Normal rate.      Heart sounds: Normal heart sounds.   Pulmonary:      Effort: Pulmonary effort is normal.      Breath sounds: Normal breath sounds. No wheezing.   Abdominal:      General: Bowel sounds are normal.      Palpations: Abdomen is soft.      Tenderness: There is no abdominal tenderness.   Musculoskeletal:         General: No tenderness.   Lymphadenopathy:      Cervical: No cervical adenopathy.   Skin:     General: Skin is warm and dry.      Findings: No rash.   Neurological:      Mental Status: She is alert and oriented to person, place, and time.      Coordination: Coordination normal.   Psychiatric:         Thought Content: Thought content normal.         Judgment: Judgment normal.           LABS:  WBC   Date Value Ref Range Status   05/25/2022 4.59 3.90 - 12.70 K/uL Final     Hemoglobin   Date Value Ref Range Status   05/25/2022 11.8 (L) 12.0 - 16.0 g/dL Final     POC Hematocrit   Date Value Ref Range Status   08/09/2018 33 (L) 36 - 54 %PCV Final     Hematocrit   Date Value Ref Range Status   05/25/2022 35.2 (L) 37.0 - 48.5 % Final     Platelets   Date Value Ref Range Status   05/25/2022 172 150 -  450 K/uL Final     Gran # (ANC)   Date Value Ref Range Status   05/25/2022 2.2 1.8 - 7.7 K/uL Final     Gran %   Date Value Ref Range Status   05/25/2022 47.2 38.0 - 73.0 % Final       Chemistry        Component Value Date/Time     05/25/2022 1037    K 4.5 05/25/2022 1037     05/25/2022 1037    CO2 27 05/25/2022 1037    BUN 24 (H) 05/25/2022 1037    CREATININE 1.1 05/25/2022 1037     (H) 05/25/2022 1037        Component Value Date/Time    CALCIUM 9.5 05/25/2022 1037    ALKPHOS 51 (L) 05/25/2022 1037    AST 19 05/25/2022 1037    ALT 12 05/25/2022 1037    BILITOT 0.4 05/25/2022 1037    ESTGFRAFRICA 58.4 (A) 05/25/2022 1037    EGFRNONAA 50.6 (A) 05/25/2022 1037          Assessment:       Problem List Items Addressed This Visit     Hypothyroidism    Relevant Orders    TSH    T4, Free    Malignant neoplasm of upper lobe, right bronchus or lung - Primary    Relevant Orders    NM PET CT Routine FDG    CBC W/ AUTO DIFFERENTIAL    Comprehensive Metabolic Panel    Secondary and unspecified malignant neoplasm of intrathoracic lymph nodes          Plan:         Route Chart for Scheduling    Med Onc Chart Routing      Follow up with physician 4 weeks. In 4 weeks from now schedule CBC,CP, TSh, free T4 and PET scan and see me on a wednesday, Schedule Opdivo on that Friday    Follow up with YAMILE    Labs    Imaging    Pharmacy appointment    Other referrals          Treatment Plan Information   OP NIVOLUMAB Q4W   Pam Dhaliwal MD   Upcoming Treatment Dates - OP NIVOLUMAB Q4W    No upcoming days in selected categories.    Therapy Plan Information  Flushes  heparin, porcine (PF) 100 unit/mL injection flush 500 Units  500 Units, Intravenous, Every visit  sodium chloride 0.9% flush 10 mL  10 mL, Intravenous, Every visit   She will proceed with Opdivo. Reviewed CT chest which reveals stable findings. She will return in 4 weeks with labs, PET prior to next Opdivo.    Palpitations: sent message to Dr. Mccoy    Above  care plan was discussed with patient and all questions were addressed to her satisfaction

## 2022-05-26 ENCOUNTER — CLINICAL SUPPORT (OUTPATIENT)
Dept: CARDIOLOGY | Facility: CLINIC | Age: 72
End: 2022-05-26
Payer: MEDICARE

## 2022-05-26 ENCOUNTER — PATIENT MESSAGE (OUTPATIENT)
Dept: HEMATOLOGY/ONCOLOGY | Facility: CLINIC | Age: 72
End: 2022-05-26
Payer: MEDICARE

## 2022-05-26 ENCOUNTER — HOSPITAL ENCOUNTER (OUTPATIENT)
Dept: CARDIOLOGY | Facility: HOSPITAL | Age: 72
Discharge: HOME | End: 2022-05-26
Attending: INTERNAL MEDICINE
Payer: MEDICARE

## 2022-05-26 DIAGNOSIS — I49.3 PVC'S (PREMATURE VENTRICULAR CONTRACTIONS): Primary | ICD-10-CM

## 2022-05-26 DIAGNOSIS — I47.10 SVT (SUPRAVENTRICULAR TACHYCARDIA): ICD-10-CM

## 2022-05-26 DIAGNOSIS — I47.10 SVT (SUPRAVENTRICULAR TACHYCARDIA): Primary | ICD-10-CM

## 2022-05-26 PROCEDURE — 93226 XTRNL ECG REC<48 HR SCAN A/R: CPT

## 2022-05-26 PROCEDURE — 93005 ELECTROCARDIOGRAM TRACING: CPT | Mod: PBBFAC,PO | Performed by: INTERNAL MEDICINE

## 2022-05-26 PROCEDURE — 93010 ELECTROCARDIOGRAM REPORT: CPT | Mod: S$PBB,,, | Performed by: INTERNAL MEDICINE

## 2022-05-26 PROCEDURE — 93227 XTRNL ECG REC<48 HR R&I: CPT | Mod: ,,, | Performed by: INTERNAL MEDICINE

## 2022-05-26 PROCEDURE — 93227 HOLTER MONITOR - 48 HOUR (CUPID ONLY): ICD-10-PCS | Mod: ,,, | Performed by: INTERNAL MEDICINE

## 2022-05-26 PROCEDURE — 93010 EKG 12-LEAD: ICD-10-PCS | Mod: S$PBB,,, | Performed by: INTERNAL MEDICINE

## 2022-05-26 NOTE — TELEPHONE ENCOUNTER
"Pt states she is having irregular heartbeats for approximately 3 weeks now. Pt also reports feeling dizzy and lightheaded. Pt denies any chest pains. Pt states she receives Opdivo infusion monthly and has received them for the past 3-4 months Pt states irregular heartbeat is listed as a side effect of the medication. Pt wonders if Opdivo can be causing her symptoms. Pt asking to wear a monitor. Pt states she has a history of SVT but stated "this feels different from my SVT". Please advise.  "

## 2022-05-26 NOTE — TELEPHONE ENCOUNTER
Have her come in for an ECG and a Holter. I will cancel the Holter if she has PACs/PVCs on her ECG.

## 2022-05-27 ENCOUNTER — INFUSION (OUTPATIENT)
Dept: INFUSION THERAPY | Facility: HOSPITAL | Age: 72
End: 2022-05-27
Payer: MEDICARE

## 2022-05-27 VITALS
DIASTOLIC BLOOD PRESSURE: 70 MMHG | SYSTOLIC BLOOD PRESSURE: 140 MMHG | WEIGHT: 155.44 LBS | HEIGHT: 66 IN | RESPIRATION RATE: 18 BRPM | HEART RATE: 61 BPM | TEMPERATURE: 98 F | BODY MASS INDEX: 24.98 KG/M2 | OXYGEN SATURATION: 100 %

## 2022-05-27 DIAGNOSIS — C77.1 SECONDARY AND UNSPECIFIED MALIGNANT NEOPLASM OF INTRATHORACIC LYMPH NODES: Primary | ICD-10-CM

## 2022-05-27 DIAGNOSIS — C34.11 MALIGNANT NEOPLASM OF UPPER LOBE, RIGHT BRONCHUS OR LUNG: ICD-10-CM

## 2022-05-27 PROCEDURE — 96413 CHEMO IV INFUSION 1 HR: CPT

## 2022-05-27 PROCEDURE — 63600175 PHARM REV CODE 636 W HCPCS: Performed by: INTERNAL MEDICINE

## 2022-05-27 PROCEDURE — 25000003 PHARM REV CODE 250: Performed by: INTERNAL MEDICINE

## 2022-05-27 PROCEDURE — A4216 STERILE WATER/SALINE, 10 ML: HCPCS | Performed by: INTERNAL MEDICINE

## 2022-05-27 RX ORDER — SODIUM CHLORIDE 0.9 % (FLUSH) 0.9 %
10 SYRINGE (ML) INJECTION
Status: DISCONTINUED | OUTPATIENT
Start: 2022-05-27 | End: 2022-05-27 | Stop reason: HOSPADM

## 2022-05-27 RX ORDER — HEPARIN 100 UNIT/ML
500 SYRINGE INTRAVENOUS
Status: DISCONTINUED | OUTPATIENT
Start: 2022-05-27 | End: 2022-05-27 | Stop reason: HOSPADM

## 2022-05-27 RX ADMIN — SODIUM CHLORIDE: 0.9 INJECTION, SOLUTION INTRAVENOUS at 09:05

## 2022-05-27 RX ADMIN — SODIUM CHLORIDE 480 MG: 9 INJECTION, SOLUTION INTRAVENOUS at 09:05

## 2022-05-27 RX ADMIN — Medication 10 ML: at 10:05

## 2022-05-27 RX ADMIN — HEPARIN 500 UNITS: 100 SYRINGE at 10:05

## 2022-05-27 NOTE — PLAN OF CARE
Pt received Opdivo today and tolerated well, without complications. Educated patient about Opdivo (indications, side effects, possible reactions, chemotherapy precautions) and verbalized understanding.  VSS. CW port positive for blood return, saline flushed, Heparin flush instilled to dwell  and de accessed prior to DC. Pt DC with no distress noted, ambulated off unit w/o event, w/ family, pleased.

## 2022-05-31 LAB
OHS CV EVENT MONITOR DAY: 0
OHS CV HOLTER LENGTH DECIMAL HOURS: 48
OHS CV HOLTER LENGTH HOURS: 48
OHS CV HOLTER LENGTH MINUTES: 0
OHS CV HOLTER SINUS AVERAGE HR: 61
OHS CV HOLTER SINUS MAX HR: 98
OHS CV HOLTER SINUS MIN HR: 45

## 2022-06-03 ENCOUNTER — ANESTHESIA EVENT (OUTPATIENT)
Dept: ENDOSCOPY | Facility: HOSPITAL | Age: 72
End: 2022-06-03
Payer: MEDICARE

## 2022-06-03 ENCOUNTER — ANESTHESIA (OUTPATIENT)
Dept: ENDOSCOPY | Facility: HOSPITAL | Age: 72
End: 2022-06-03
Payer: MEDICARE

## 2022-06-03 ENCOUNTER — HOSPITAL ENCOUNTER (OUTPATIENT)
Facility: HOSPITAL | Age: 72
Discharge: HOME | End: 2022-06-03
Attending: INTERNAL MEDICINE | Admitting: INTERNAL MEDICINE
Payer: MEDICARE

## 2022-06-03 VITALS
SYSTOLIC BLOOD PRESSURE: 145 MMHG | WEIGHT: 152 LBS | RESPIRATION RATE: 17 BRPM | BODY MASS INDEX: 24.43 KG/M2 | TEMPERATURE: 98 F | OXYGEN SATURATION: 100 % | HEART RATE: 60 BPM | HEIGHT: 66 IN | DIASTOLIC BLOOD PRESSURE: 65 MMHG

## 2022-06-03 DIAGNOSIS — R94.8 ABNORMAL PET SCAN OF COLON: ICD-10-CM

## 2022-06-03 PROCEDURE — E9220 PRA ENDO ANESTHESIA: HCPCS | Mod: ,,, | Performed by: NURSE ANESTHETIST, CERTIFIED REGISTERED

## 2022-06-03 PROCEDURE — 88342 IMHCHEM/IMCYTCHM 1ST ANTB: CPT | Mod: 26,,, | Performed by: STUDENT IN AN ORGANIZED HEALTH CARE EDUCATION/TRAINING PROGRAM

## 2022-06-03 PROCEDURE — 88342 IMHCHEM/IMCYTCHM 1ST ANTB: CPT | Performed by: STUDENT IN AN ORGANIZED HEALTH CARE EDUCATION/TRAINING PROGRAM

## 2022-06-03 PROCEDURE — 88341 IMHCHEM/IMCYTCHM EA ADD ANTB: CPT | Mod: 26,,, | Performed by: STUDENT IN AN ORGANIZED HEALTH CARE EDUCATION/TRAINING PROGRAM

## 2022-06-03 PROCEDURE — 45331 PR SIGMOIDOSCOPY,BIOPSY: ICD-10-PCS | Mod: ,,, | Performed by: INTERNAL MEDICINE

## 2022-06-03 PROCEDURE — E9220 PRA ENDO ANESTHESIA: ICD-10-PCS | Mod: ,,, | Performed by: NURSE ANESTHETIST, CERTIFIED REGISTERED

## 2022-06-03 PROCEDURE — 45331 SIGMOIDOSCOPY AND BIOPSY: CPT | Performed by: INTERNAL MEDICINE

## 2022-06-03 PROCEDURE — 88305 TISSUE EXAM BY PATHOLOGIST: CPT | Mod: 26,,, | Performed by: STUDENT IN AN ORGANIZED HEALTH CARE EDUCATION/TRAINING PROGRAM

## 2022-06-03 PROCEDURE — 25000003 PHARM REV CODE 250: Performed by: NURSE ANESTHETIST, CERTIFIED REGISTERED

## 2022-06-03 PROCEDURE — 37000009 HC ANESTHESIA EA ADD 15 MINS: Performed by: INTERNAL MEDICINE

## 2022-06-03 PROCEDURE — 27201012 HC FORCEPS, HOT/COLD, DISP: Performed by: INTERNAL MEDICINE

## 2022-06-03 PROCEDURE — 37000008 HC ANESTHESIA 1ST 15 MINUTES: Performed by: INTERNAL MEDICINE

## 2022-06-03 PROCEDURE — 88341 PR IHC OR ICC EACH ADD'L SINGLE ANTIBODY  STAINPR: ICD-10-PCS | Mod: 26,,, | Performed by: STUDENT IN AN ORGANIZED HEALTH CARE EDUCATION/TRAINING PROGRAM

## 2022-06-03 PROCEDURE — 63600175 PHARM REV CODE 636 W HCPCS: Performed by: NURSE ANESTHETIST, CERTIFIED REGISTERED

## 2022-06-03 PROCEDURE — 88341 IMHCHEM/IMCYTCHM EA ADD ANTB: CPT | Performed by: STUDENT IN AN ORGANIZED HEALTH CARE EDUCATION/TRAINING PROGRAM

## 2022-06-03 PROCEDURE — 88305 TISSUE EXAM BY PATHOLOGIST: CPT | Performed by: STUDENT IN AN ORGANIZED HEALTH CARE EDUCATION/TRAINING PROGRAM

## 2022-06-03 PROCEDURE — 45331 SIGMOIDOSCOPY AND BIOPSY: CPT | Mod: ,,, | Performed by: INTERNAL MEDICINE

## 2022-06-03 PROCEDURE — 88305 TISSUE EXAM BY PATHOLOGIST: ICD-10-PCS | Mod: 26,,, | Performed by: STUDENT IN AN ORGANIZED HEALTH CARE EDUCATION/TRAINING PROGRAM

## 2022-06-03 PROCEDURE — 88342 CHG IMMUNOCYTOCHEMISTRY: ICD-10-PCS | Mod: 26,,, | Performed by: STUDENT IN AN ORGANIZED HEALTH CARE EDUCATION/TRAINING PROGRAM

## 2022-06-03 PROCEDURE — 25000003 PHARM REV CODE 250: Performed by: INTERNAL MEDICINE

## 2022-06-03 RX ORDER — BUDESONIDE 2 MG/1
2 AEROSOL, FOAM RECTAL 2 TIMES DAILY
Qty: 133.6 G | Refills: 1 | Status: SHIPPED | OUTPATIENT
Start: 2022-06-03 | End: 2022-07-12

## 2022-06-03 RX ORDER — PROPOFOL 10 MG/ML
VIAL (ML) INTRAVENOUS CONTINUOUS PRN
Status: DISCONTINUED | OUTPATIENT
Start: 2022-06-03 | End: 2022-06-03

## 2022-06-03 RX ORDER — LIDOCAINE HCL/PF 100 MG/5ML
SYRINGE (ML) INTRAVENOUS
Status: DISCONTINUED | OUTPATIENT
Start: 2022-06-03 | End: 2022-06-03

## 2022-06-03 RX ORDER — PROPOFOL 10 MG/ML
VIAL (ML) INTRAVENOUS
Status: DISCONTINUED | OUTPATIENT
Start: 2022-06-03 | End: 2022-06-03

## 2022-06-03 RX ORDER — SODIUM CHLORIDE 9 MG/ML
INJECTION, SOLUTION INTRAVENOUS CONTINUOUS
Status: DISCONTINUED | OUTPATIENT
Start: 2022-06-03 | End: 2022-06-03 | Stop reason: HOSPADM

## 2022-06-03 RX ADMIN — Medication 100 MG: at 08:06

## 2022-06-03 RX ADMIN — PROPOFOL 80 MG: 10 INJECTION, EMULSION INTRAVENOUS at 08:06

## 2022-06-03 RX ADMIN — SODIUM CHLORIDE: 0.9 INJECTION, SOLUTION INTRAVENOUS at 08:06

## 2022-06-03 RX ADMIN — SODIUM CHLORIDE: 9 INJECTION, SOLUTION INTRAVENOUS at 08:06

## 2022-06-03 RX ADMIN — PROPOFOL 150 MCG/KG/MIN: 10 INJECTION, EMULSION INTRAVENOUS at 08:06

## 2022-06-03 NOTE — ANESTHESIA PREPROCEDURE EVALUATION
06/03/2022  Alesha Toney is a 71 y.o., female.  Pre-operative evaluation for SIGMOIDOSCOPY-FLEXIBLE (N/A)    Chief Complaint:    PMH:  CAD-stable  HTN-controlled  Pulmonary HTN-SP 49  Emphysema  Lung CA right lung  Past Surgical History:   Procedure Laterality Date    ADENOIDECTOMY  17yo    ANTERIOR CERVICAL DISCECTOMY W/ FUSION N/A 10/15/2018    Procedure: DISCECTOMY, SPINE, CERVICAL, ANTERIOR APPROACH, WITH FUSION C6/7  Depuy SNS: [employer]/SSEP/Vocal [employer] Regular OR table;  Surgeon: Greyson Bansal MD;  [facility];  Service: Neurosurgery;  Laterality: N/A;    APPENDECTOMY      BONE RESECTION, RIB  1980    BREAST BIOPSY Left     at least 40yrs ago in her 20's    BREAST CYST ASPIRATION      BREAST CYST EXCISION      COLONOSCOPY      ENDOBRONCHIAL ULTRASOUND N/A 7/10/2018    Procedure: ULTRASOUND, ENDOBRONCHIAL;  Surgeon: Sri Hearn MD;  [facility];  Service: Pulmonary;  Laterality: N/A;    ENDOBRONCHIAL ULTRASOUND N/A 9/24/2019    Procedure: ENDOBRONCHIAL ULTRASOUND (EBUS);  Surgeon: Sri Hearn MD;  [facility];  Service: Pulmonary;  Laterality: N/A;    ENDOSCOPIC MUCOSAL RESECTION OF COLON N/A 9/11/2020    Procedure: RESECTION, MUCOSA, COLON, ENDOSCOPIC;  Surgeon: Lilibeth Carbajal MD;  [facility]  Service: Endoscopy;  Laterality: N/A;    ENDOSCOPIC ULTRASOUND OF LOWER GASTROINTESTINAL TRACT N/A 4/19/2021    Procedure: ULTRASOUND, LOWER GI TRACT, ENDOSCOPIC;  Surgeon: Lilibeth Carbajal MD;  Location: [facility];  Service: Endoscopy;  Laterality: N/A;    ENDOSCOPIC ULTRASOUND OF LOWER GASTROINTESTINAL TRACT N/A 6/25/2021    Procedure: ULTRASOUND, LOWER GI TRACT, ENDOSCOPIC;  Surgeon: Silvino Christopher MD;  Location: [facility];  Service: Endoscopy;  Laterality: N/A;  Needs Rapid MP    FLEXIBLE SIGMOIDOSCOPY  06/11/2020     with biopsies    FLEXIBLE SIGMOIDOSCOPY N/A 6/11/2020    Procedure: SIGMOIDOSCOPY, FLEXIBLE;  Surgeon: Gely Yeh MD;  Location: [facility];  Service: Endoscopy;  Laterality: N/A;    FLEXIBLE SIGMOIDOSCOPY N/A 4/19/2021    Procedure: SIGMOIDOSCOPY-FLEXIBLE;  Surgeon: Lilibeth Carbajal MD;  Location: [facility] END[facility];  Service: Endoscopy;  Laterality: N/A;  Covid 4/16 Brian MP    HYSTERECTOMY      @47yrs of age    INSERTION OF TUNNELED CENTRAL VENOUS CATHETER (CVC) WITH SUBCUTANEOUS PORT N/A 9/25/2018    Procedure: [id number];  Surgeon: Ronald Diagnostic Provider;  [facility];  Service: Radiology;  Laterality: N/A;    INSERTION OF VENOUS ACCESS PORT N/A 3/15/2021    Procedure: INSERTION, VENOUS ACCESS PORT;  Surgeon: Chang Mathews MD;  [facility];  Service: General;  Laterality: N/A;  NEEDS CONSENT.    KIDNEY SURGERY      17yo    MEDIPORT REMOVAL N/A 3/15/2021    Procedure: REMOVAL, CATHETER, CENTRAL VENOUS, TUNNELED, WITH PORT;  Surgeon: Chang Mathews MD;  [facility];  Service: General;  Laterality: N/A;    OOPHORECTOMY      @47yrs of age    SKIN BIOPSY      TONSILLECTOMY      UPPER GASTROINTESTINAL ENDOSCOPY      VIDEO-ASSISTED THORACOSCOPIC LOBECTOMY Right 8/9/2018    Procedure: VATS, WITH LOBECTOMY Possible chest wall resection;  Surgeon: Philip Messina MD;  [facility];  Service: Thoracic;  Laterality: Right;  Have open pan available.         Vital Signs Range (Last 24H):  Temp:  [36.4 °C (97.5 °F)]   Pulse:  [64]   Resp:  [18]   BP: (147)/(65)   SpO2:  [99 %]       CBC:     Recent Labs   Lab 05/25/22  1037   WBC 4.59   RBC 3.83*   HGB 11.8*   HCT 35.2*      MCV 92   MCH 30.8   MCHC 33.5       CMP:   Recent Labs   Lab 05/25/22  1037      K 4.5      CO2 27   BUN 24*   CREATININE 1.1   *   CALCIUM 9.5   ALBUMIN 3.6   PROT 6.4   ALKPHOS 51*   ALT 12   AST 19   BILITOT 0.4       INR:  No  results for input(s): PT, INR, PROTIME, APTT in the last 720 hours.      Diagnostic Studies:      EKD Echo:    Pre-op Assessment    I have reviewed the Patient Summary Reports.    I have reviewed the NPO Status.   I have reviewed the Medications.     Review of Systems  Anesthesia Hx:  Denies Family Hx of Anesthesia complications.   Denies Personal Hx of Anesthesia complications.   Hematology/Oncology:  Hematology Normal       -- Cancer in past history:  chemotherapy and surgery    EENT/Dental:EENT/Dental Normal   Cardiovascular:   Hypertension    Pulmonary:  Pulmonary Normal    Renal/:  Renal/ Normal     Hepatic/GI:   Bowel Prep.    Musculoskeletal:  Musculoskeletal Normal    Neurological:   Neuromuscular Disease,    Endocrine:   Hypothyroidism    Dermatological:  Skin Normal    Psych:  Psychiatric Normal           Physical Exam  General: Well nourished and Cooperative    Airway:  Mallampati: II   Mouth Opening: Normal  TM Distance: Normal  Tongue: Normal  Neck ROM: Normal ROM    Dental:  Intact    Chest/Lungs:  Clear to auscultation    Heart:  Rate: Normal  Rhythm: Regular Rhythm  Sounds: Normal    Abdomen:  Normal        Anesthesia Plan  Type of Anesthesia, risks & benefits discussed:    Anesthesia Type: MAC, Gen Natural Airway  Intra-op Monitoring Plan: Standard ASA Monitors  Post Op Pain Control Plan: multimodal analgesia  Induction:  IV  Informed Consent: Informed consent signed with the Patient and all parties understand the risks and agree with anesthesia plan.  All questions answered.   ASA Score: 3  Day of Surgery Review of History & Physical: H&P Update referred to the surgeon/provider.I have interviewed and examined the patient. I have reviewed the patient's H&P dated: 6/3/22. There are no significant changes.     Ready For Surgery From Anesthesia Perspective.     .

## 2022-06-03 NOTE — PROVATION PATIENT INSTRUCTIONS
Discharge Summary/Instructions after an Endoscopic Procedure  Patient Name: Alesha Toney  Patient MRN: [id number]  Patient YOB: 1950  Friday, Melanie 3, 2022  Francesco Clark MD  Dear patient,  As a result of recent federal legislation (The Federal Cures Act), you may   receive lab or pathology results from your procedure in your [facility]   account before your physician is able to contact you. Your physician or   their representative will relay the results to you with their   recommendations at their soonest availability.  Thank you,  RESTRICTIONS:  During your procedure today, you received medications for sedation.  These   medications may affect your judgment, balance and coordination.  Therefore,   for 24 hours, you have the following restrictions:   - DO NOT drive a car, operate machinery, make legal/financial decisions,   sign important papers or drink alcohol.    ACTIVITY:  Today: no heavy lifting, straining or running due to procedural   sedation/anesthesia.  The following day: return to full activity including work.  DIET:  Eat and drink normally unless instructed otherwise.     TREATMENT FOR COMMON SIDE EFFECTS:  - Mild abdominal pain, nausea, belching, bloating or excessive gas:  rest,   eat lightly and use a heating pad.  - Sore Throat: treat with throat lozenges and/or gargle with warm salt   water.  - Because air was used during the procedure, expelling large amounts of air   from your rectum or belching is normal.  - If a bowel prep was taken, you may not have a bowel movement for 1-3 days.    This is normal.  SYMPTOMS TO WATCH FOR AND REPORT TO YOUR PHYSICIAN:  1. Abdominal pain or bloating, other than gas cramps.  2. Chest pain.  3. Back pain.  4. Signs of infection such as: chills or fever occurring within 24 hours   after the procedure.  5. Rectal bleeding, which would show as bright red, maroon, or black stools.   (A tablespoon of blood from the rectum is not serious, especially if    hemorrhoids are present.)  6. Vomiting.  7. Weakness or dizziness.  GO DIRECTLY TO THE NEAREST EMERGENCY ROOM IF YOU HAVE ANY OF THE FOLLOWING:      Difficulty breathing              Chills and/or fever over 101 F   Persistent vomiting and/or vomiting blood   Severe abdominal pain   Severe chest pain   Black, tarry stools   Bleeding- more than one tablespoon   Any other symptom or condition that you feel may need urgent attention  Your doctor recommends these additional instructions:  If any biopsies were taken, your doctors clinic will contact you in 1 to 2   weeks with any results.  - Discharge patient to home.   - Continue present medications.   - Await pathology results.   - Start Uceris rectal foam twice daily. Once biopsies are back will discuss   long term management.  - Return to my office as previously scheduled.  For questions, problems or results please call your physician - Francesco Clark MD at Work:  [phone].  [facility], EMERGENCY ROOM PHONE NUMBER: [phone]  IF A COMPLICATION OR EMERGENCY SITUATION ARISES AND YOU ARE UNABLE TO REACH   YOUR PHYSICIAN - GO DIRECTLY TO THE EMERGENCY ROOM.  Francesco Clark MD  6/3/2022 9:17:55 AM  This report has been verified and signed electronically.  Dear patient,  As a result of recent federal legislation (The Federal Cures Act), you may   receive lab or pathology results from your procedure in your [facility]   account before your physician is able to contact you. Your physician or   their representative will relay the results to you with their   recommendations at their soonest availability.  Thank you,  PROVATION

## 2022-06-03 NOTE — H&P
Short Stay Endoscopy History and Physical    PCP - Margo Caldwell MD    Procedure - Colonoscopy  ASA - 2  Mallampati - per anesthesia  History of Anesthesia problems - no  Family history Anesthesia problems -  no     HPI:  This is a 71 y.o. female here for evaluation of :     Average Risk Screening: No  High risk screening: No  History of polyps: No  Anemia: No  Blood in stools: yes  Diarrhea: No  Abdominal Pain: No  Other: F/U colitis; Abnormal PET CT    Review of Systems:  CONSTITUTIONAL: Denies weight change,  fatigue, fevers, chills, night sweats.  CARDIOVASCULAR: Denies chest pain, shortness of breath, orthopnea and edema.  RESPIRATORY: Denies cough, hemoptysis, dyspnea, and wheezing.  GI: See HPI.    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     Squamous cell carcinoma of skin 10/05/2020    left medial thigh    Therapy     Thyroid nodule        Surgical History:   Past Surgical History:   Procedure Laterality Date    ADENOIDECTOMY  19yo    ANTERIOR CERVICAL DISCECTOMY W/ FUSION N/A 10/15/2018    Procedure: DISCECTOMY, SPINE, CERVICAL, ANTERIOR APPROACH, WITH FUSION C6/7  [facility] Regular OR table;  Surgeon: Greyson Bansal MD;  Location: [facility] OR [address] [place] [address];  Service: Neurosurgery;  Laterality: N/A;    APPENDECTOMY      BONE RESECTION, RIB  1980    BREAST BIOPSY Left     at least 40yrs ago in her 20's    BREAST CYST ASPIRATION      BREAST CYST EXCISION      COLONOSCOPY      ENDOBRONCHIAL ULTRASOUND N/A 7/10/2018    Procedure: ULTRASOUND, ENDOBRONCHIAL;  Surgeon: Sri Hearn MD;  Location: [facility] OR [address] [place] [address];  Service: Pulmonary;   Laterality: N/A;    ENDOBRONCHIAL ULTRASOUND N/A 9/24/2019    Procedure: ENDOBRONCHIAL ULTRASOUND (EBUS);  Surgeon: Sri Hearn MD;  [facility];  Service: Pulmonary;  Laterality: N/A;    ENDOSCOPIC MUCOSAL RESECTION OF COLON N/A 9/11/2020    Procedure: RESECTION, MUCOSA, COLON, ENDOSCOPIC;  Surgeon: Lilibeth Carbajal MD;  Location: [facility] (2ND FLR);  Service: Endoscopy;  Laterality: N/A;    ENDOSCOPIC ULTRASOUND OF LOWER GASTROINTESTINAL TRACT N/A 4/19/2021    Procedure: ULTRASOUND, LOWER GI TRACT, ENDOSCOPIC;  Surgeon: Lilibeth Crabajal MD;  Location: [facility];  Service: Endoscopy;  Laterality: N/A;    ENDOSCOPIC ULTRASOUND OF LOWER GASTROINTESTINAL TRACT N/A 6/25/2021    Procedure: ULTRASOUND, LOWER GI TRACT, ENDOSCOPIC;  Surgeon: Silvino Christopher MD;  Location: [facility];  Service: Endoscopy;  Laterality: N/A;  Needs Rapid MP    FLEXIBLE SIGMOIDOSCOPY  06/11/2020    with biopsies    FLEXIBLE SIGMOIDOSCOPY N/A 6/11/2020    Procedure: SIGMOIDOSCOPY, FLEXIBLE;  Surgeon: Gely Yeh MD;  Location: [facility];  Service: Endoscopy;  Laterality: N/A;    FLEXIBLE SIGMOIDOSCOPY N/A 4/19/2021    Procedure: SIGMOIDOSCOPY-FLEXIBLE;  Surgeon: Lilibeth Carbajal MD;  Location: [facility];  Service: Endoscopy;  Laterality: N/A;  Covid 4/16 Brian MP    HYSTERECTOMY      @47yrs of age    INSERTION OF TUNNELED CENTRAL VENOUS CATHETER (CVC) WITH SUBCUTANEOUS PORT N/A 9/25/2018    Procedure: [id number];  Surgeon: Dos Diagnostic Provider;  [facility];  Service: Radiology;  Laterality: N/A;    INSERTION OF VENOUS ACCESS PORT N/A 3/15/2021    Procedure: INSERTION, VENOUS ACCESS PORT;  Surgeon: Chang Mathews MD;  [facility];  Service: General;  Laterality: N/A;  NEEDS CONSENT.    KIDNEY SURGERY      19yo    MEDIPORT REMOVAL N/A 3/15/2021    Procedure: REMOVAL, CATHETER, CENTRAL VENOUS, TUNNELED, WITH PORT;  Surgeon: Chang Mathews MD;   [facility];  Service: General;  Laterality: N/A;    OOPHORECTOMY      @47yrs of age    SKIN BIOPSY      TONSILLECTOMY      UPPER GASTROINTESTINAL ENDOSCOPY      VIDEO-ASSISTED THORACOSCOPIC LOBECTOMY Right 2018    Procedure: VATS, WITH LOBECTOMY Possible chest wall resection;  Surgeon: Philip Messina MD;  Location: [facility] OR [address] [place] [address];  Service: Thoracic;  Laterality: Right;  Have open pan available.       Family History:   Family History   Problem Relation Age of Onset    Stroke Mother     Cataracts Mother     Cancer Father         colon cancer    Colon cancer Father     Diabetes Brother     Heart failure Brother     Hypertension Brother     Heart attack Paternal Aunt     Heart attack Maternal Grandfather     Diabetes Paternal Aunt     Anxiety disorder Paternal Grandmother         agoraphobia    Anesthesia problems Neg Hx     Blindness Neg Hx     Amblyopia Neg Hx     Glaucoma Neg Hx     Macular degeneration Neg Hx     Retinal detachment Neg Hx     Strabismus Neg Hx     Thyroid disease Neg Hx     Melanoma Neg Hx        Social History:   Social History     Tobacco Use    Smoking status: Former Smoker     Packs/day: 1.00     Years: 30.00     Pack years: 30.00     Types: Cigarettes     Quit date: 2015     Years since quittin.0    Smokeless tobacco: Never Used   Substance Use Topics    Alcohol use: Not Currently     Alcohol/week: 0.0 standard drinks     Comment: socially     Drug use: No       Allergies: Reviewed.    Medications:  No current facility-administered medications on file prior to encounter.     Current Outpatient Medications on File Prior to Encounter   Medication Sig Dispense Refill    ascorbic acid, vitamin C, (VITAMIN C) 500 MG tablet Take 500 mg by mouth once daily.      atenoloL (TENORMIN) 50 MG tablet Half a tab qid (Patient taking differently: Half a tab 4 times a day) 180 tablet 3    bimatoprost (LATISSE) 0.03 % ophthalmic solution Place one  drop on applicator and apply evenly along the skin of the upper eyelid at base of eyelashes qhs; repeat for second eye 3 mL 6    calcium carbonate (TUMS) 300 mg (750 mg) Chew Take by mouth as needed.       famotidine (PEPCID) 10 MG tablet Take 10 mg by mouth daily as needed.      LORazepam (ATIVAN) 0.5 MG tablet Take 1 tablet (0.5 mg total) by mouth every 12 (twelve) hours as needed for Anxiety. 30 tablet 2    losartan (COZAAR) 50 MG tablet 1 tab po bid 180 tablet 3    nivolumab (OPDIVO) 100 mg/10 mL injection 100 mg by Other route. Infusion once a month      UNABLE TO FIND Take 2 tablets by mouth as needed. senekot      vitamin D (VITAMIN D3) 1000 units Tab Take 2,000 Units by mouth once daily.      amLODIPine (NORVASC) 2.5 MG tablet Take 1 tablet (2.5 mg total) by mouth once daily. 30 tablet 11       Physical Exam:  Vital Signs:   Vitals:    06/03/22 0808   BP: (!) 147/65   Pulse: 64   Resp: 18   Temp: 97.5 °F (36.4 °C)     General Appearance: Well appearing in no acute distress  ENT: OP clear  Chest: CTA B  CV: RRR, no m/r/g  Abd: s/nt/nd/nabs  Ext: no edema    Labs:  Reviewed    Imp: F/U colitis, rectal bleeding, abnormal PET CT    Plan:  I have explained the risks and benefits of colonoscopy to the patient including but not limited to bleeding, perforation, infection, and death. The patient wishes to proceed with colonoscopy.

## 2022-06-03 NOTE — ANESTHESIA POSTPROCEDURE EVALUATION
Anesthesia Post Evaluation    Patient: Alesha Toney    Procedure(s) Performed: Procedure(s) (LRB):  SIGMOIDOSCOPY-FLEXIBLE (N/A)    Final Anesthesia Type: general      Patient location during evaluation: PACU  Patient participation: Yes- Able to Participate  Level of consciousness: awake and alert and oriented  Post-procedure vital signs: reviewed and stable  Pain management: adequate  Airway patency: patent    PONV status at discharge: No PONV  Anesthetic complications: no      Cardiovascular status: hemodynamically stable  Respiratory status: unassisted, spontaneous ventilation and room air  Hydration status: euvolemic  Follow-up not needed.          Vitals Value Taken Time   /65 06/03/22 0948   Temp 36.6 °C (97.9 °F) 06/03/22 0915   Pulse 60 06/03/22 0948   Resp 17 06/03/22 0948   SpO2 100 % 06/03/22 0948         Event Time   Out of Recovery 09:51:15         Pain/Grady Score: Grady Score: 10 (6/3/2022  9:15 AM)

## 2022-06-03 NOTE — TRANSFER OF CARE
"Anesthesia Transfer of Care Note    Patient: Alesha Toney    Procedure(s) Performed: Procedure(s) (LRB):  SIGMOIDOSCOPY-FLEXIBLE (N/A)    Patient location: GI    Anesthesia Type: MAC    Transport from OR: Transported from OR on room air with adequate spontaneous ventilation    Post pain: adequate analgesia    Post assessment: no apparent anesthetic complications    Post vital signs: stable    Level of consciousness: awake    Nausea/Vomiting: no nausea/vomiting    Complications: none    Transfer of care protocol was followed      Last vitals:   Visit Vitals  BP (!) 147/65 (BP Location: Left arm, Patient Position: Lying)   Pulse 64   Temp 36.4 °C (97.5 °F) (Temporal)   Resp 18   Ht 5' 6" (1.676 m)   Wt 68.9 kg (152 lb)   LMP  (LMP Unknown)   SpO2 99%   Breastfeeding No   BMI 24.53 kg/m²     "

## 2022-06-04 ENCOUNTER — PATIENT MESSAGE (OUTPATIENT)
Dept: GASTROENTEROLOGY | Facility: CLINIC | Age: 72
End: 2022-06-04
Payer: MEDICARE

## 2022-06-05 ENCOUNTER — PATIENT MESSAGE (OUTPATIENT)
Dept: CARDIOLOGY | Facility: CLINIC | Age: 72
End: 2022-06-05
Payer: MEDICARE

## 2022-06-05 DIAGNOSIS — I47.10 SVT (SUPRAVENTRICULAR TACHYCARDIA): Primary | ICD-10-CM

## 2022-06-08 ENCOUNTER — PATIENT MESSAGE (OUTPATIENT)
Dept: PHARMACY | Facility: CLINIC | Age: 72
End: 2022-06-08
Payer: MEDICARE

## 2022-06-08 ENCOUNTER — PATIENT MESSAGE (OUTPATIENT)
Dept: GASTROENTEROLOGY | Facility: CLINIC | Age: 72
End: 2022-06-08
Payer: MEDICARE

## 2022-06-08 ENCOUNTER — TELEPHONE (OUTPATIENT)
Dept: PHARMACY | Facility: CLINIC | Age: 72
End: 2022-06-08
Payer: MEDICARE

## 2022-06-08 NOTE — TELEPHONE ENCOUNTER
I left a voicemail and sent a letter to patients [facility] portal offering assistance with Pharmacy Patient Assistance

## 2022-06-09 NOTE — TELEPHONE ENCOUNTER
Pt notified, states she did not experience any symptoms while wearing the monitor. Pt states she decreased her Atenolol dose on her own. Pt states she is taking Atenolol 25mg TID and has not experienced any symptoms since decreasing her dose of Atenolol. Pt states she feels symptoms may be caused by her chemo medication. Pt states she wishes to hold off on wearing the 30 day monitor. Pt states her next chemo infusion is in 2 weeks and she will pay close attention to her symptoms and will call our office if symptoms return and will schedule monitor at that time. notified.

## 2022-06-13 ENCOUNTER — PATIENT MESSAGE (OUTPATIENT)
Dept: INTERNAL MEDICINE | Facility: CLINIC | Age: 72
End: 2022-06-13
Payer: MEDICARE

## 2022-06-13 ENCOUNTER — PATIENT MESSAGE (OUTPATIENT)
Dept: GASTROENTEROLOGY | Facility: CLINIC | Age: 72
End: 2022-06-13
Payer: MEDICARE

## 2022-06-13 LAB
FINAL PATHOLOGIC DIAGNOSIS: NORMAL
GROSS: NORMAL
Lab: NORMAL
MICROSCOPIC EXAM: NORMAL

## 2022-06-13 RX ORDER — HYDROCODONE BITARTRATE AND ACETAMINOPHEN 5; 325 MG/1; MG/1
1 TABLET ORAL EVERY 8 HOURS PRN
Qty: 90 TABLET | Refills: 0 | Status: SHIPPED | OUTPATIENT
Start: 2022-06-13 | End: 2022-07-13

## 2022-06-13 NOTE — TELEPHONE ENCOUNTER
No new care gaps identified.  [facility] Embedded Care Gaps. Reference number: [id number]. 6/13/2022   9:09:25 AM CDT

## 2022-06-13 NOTE — TELEPHONE ENCOUNTER
Spoke with patient and advised that Dr. Clark was sending in a new rx for her.  She would like it sent to [facility], not [facility].    Also, she states she did have a very good BM this morning, with some blood and mucous in it.    Advised not to take the Miralax today, but that if she didn't have a BM tomorrow to begin taking 1 capful at night.  Pt verbalized understanding to all and has no further questions at this time.

## 2022-06-13 NOTE — TELEPHONE ENCOUNTER
Spoke with patient  PRIMARY COMPLAINT:  constipation and gas  SYMPTOM ONSET:  3 days ago  MEDS: Uceris foam, but too pricey is trying to get assistance, but needs something else in the meantime  BMs:  Last BM was approximately 6/10; is having 2-3 episodes of tenesmus with bloody mucous  PAIN:  6-7/10; cramping; relieved temporarily after passing gas  N/V:  Not assessed  FEVER: denies  RECENT ABX:  denies  SICK CONTACTS OR TRAVEL:  Denies  LAST APPT/NEXT APPT:  04/18/2022 - 07/25/2022 (flex sig on 6/3/22)  LAST DOSE/NEXT DOSE:  N/A  OTHER INFORMATION:  Has been taking Colace 2 tablet bid; took Senokot today.  Advised to begin using Miralax 1 capful nightly - if no results in a few days, can take 2 capfuls; suggested if the Senokot doesn't work today, could start with 2 capfuls, but that I would check with Dr. Clark on this

## 2022-06-15 ENCOUNTER — PATIENT MESSAGE (OUTPATIENT)
Dept: GASTROENTEROLOGY | Facility: CLINIC | Age: 72
End: 2022-06-15
Payer: MEDICARE

## 2022-06-15 NOTE — TELEPHONE ENCOUNTER
Spoke to pt. Stated no alternative meds on formulary. will contact pharmacy regarding patient assistance for Rx. Reminder set to f/u

## 2022-06-16 ENCOUNTER — PATIENT MESSAGE (OUTPATIENT)
Dept: GASTROENTEROLOGY | Facility: CLINIC | Age: 72
End: 2022-06-16
Payer: MEDICARE

## 2022-06-17 ENCOUNTER — HOSPITAL ENCOUNTER (OUTPATIENT)
Dept: RADIOLOGY | Facility: HOSPITAL | Age: 72
Discharge: HOME | End: 2022-06-17
Attending: INTERNAL MEDICINE
Payer: MEDICARE

## 2022-06-17 ENCOUNTER — PATIENT MESSAGE (OUTPATIENT)
Dept: GASTROENTEROLOGY | Facility: CLINIC | Age: 72
End: 2022-06-17
Payer: MEDICARE

## 2022-06-17 DIAGNOSIS — C34.11 MALIGNANT NEOPLASM OF UPPER LOBE, RIGHT BRONCHUS OR LUNG: ICD-10-CM

## 2022-06-17 LAB — POCT GLUCOSE: 119 MG/DL (ref 70–110)

## 2022-06-17 PROCEDURE — 78815 NM PET CT ROUTINE: ICD-10-PCS | Mod: 26,PS,, | Performed by: RADIOLOGY

## 2022-06-17 PROCEDURE — 78815 PET IMAGE W/CT SKULL-THIGH: CPT | Mod: 26,PS,, | Performed by: RADIOLOGY

## 2022-06-17 PROCEDURE — 78815 PET IMAGE W/CT SKULL-THIGH: CPT | Mod: TC,PS

## 2022-06-20 ENCOUNTER — PATIENT MESSAGE (OUTPATIENT)
Dept: GASTROENTEROLOGY | Facility: CLINIC | Age: 72
End: 2022-06-20
Payer: MEDICARE

## 2022-06-20 ENCOUNTER — TELEPHONE (OUTPATIENT)
Dept: GASTROENTEROLOGY | Facility: CLINIC | Age: 72
End: 2022-06-20
Payer: MEDICARE

## 2022-06-20 NOTE — TELEPHONE ENCOUNTER
cortifoam 10% aerosol approved til 12/31/2022. No PA# provided. Pt informed via VM. Portal message sent. Sent to be scan.

## 2022-06-22 ENCOUNTER — LAB VISIT (OUTPATIENT)
Dept: LAB | Facility: HOSPITAL | Age: 72
End: 2022-06-22
Attending: INTERNAL MEDICINE
Payer: MEDICARE

## 2022-06-22 ENCOUNTER — OFFICE VISIT (OUTPATIENT)
Dept: HEMATOLOGY/ONCOLOGY | Facility: CLINIC | Age: 72
End: 2022-06-22
Payer: MEDICARE

## 2022-06-22 ENCOUNTER — PATIENT MESSAGE (OUTPATIENT)
Dept: GASTROENTEROLOGY | Facility: CLINIC | Age: 72
End: 2022-06-22
Payer: MEDICARE

## 2022-06-22 VITALS
SYSTOLIC BLOOD PRESSURE: 188 MMHG | HEIGHT: 66 IN | WEIGHT: 156.06 LBS | RESPIRATION RATE: 18 BRPM | DIASTOLIC BLOOD PRESSURE: 80 MMHG | OXYGEN SATURATION: 99 % | HEART RATE: 67 BPM | BODY MASS INDEX: 25.08 KG/M2 | TEMPERATURE: 98 F

## 2022-06-22 DIAGNOSIS — C77.1 SECONDARY AND UNSPECIFIED MALIGNANT NEOPLASM OF INTRATHORACIC LYMPH NODES: ICD-10-CM

## 2022-06-22 DIAGNOSIS — E03.9 HYPOTHYROIDISM, UNSPECIFIED TYPE: ICD-10-CM

## 2022-06-22 DIAGNOSIS — C34.11 MALIGNANT NEOPLASM OF UPPER LOBE, RIGHT BRONCHUS OR LUNG: ICD-10-CM

## 2022-06-22 DIAGNOSIS — K52.9 COLITIS: Primary | ICD-10-CM

## 2022-06-22 DIAGNOSIS — C34.11 MALIGNANT NEOPLASM OF UPPER LOBE, RIGHT BRONCHUS OR LUNG: Primary | ICD-10-CM

## 2022-06-22 DIAGNOSIS — K52.9 COLITIS: ICD-10-CM

## 2022-06-22 DIAGNOSIS — N18.31 STAGE 3A CHRONIC KIDNEY DISEASE: ICD-10-CM

## 2022-06-22 LAB
ALBUMIN SERPL BCP-MCNC: 3.7 G/DL (ref 3.5–5.2)
ALP SERPL-CCNC: 52 U/L (ref 55–135)
ALT SERPL W/O P-5'-P-CCNC: 11 U/L (ref 10–44)
ANION GAP SERPL CALC-SCNC: 6 MMOL/L (ref 8–16)
AST SERPL-CCNC: 17 U/L (ref 10–40)
BASOPHILS # BLD AUTO: 0.03 K/UL (ref 0–0.2)
BASOPHILS NFR BLD: 0.6 % (ref 0–1.9)
BILIRUB SERPL-MCNC: 0.4 MG/DL (ref 0.1–1)
BUN SERPL-MCNC: 18 MG/DL (ref 8–23)
CALCIUM SERPL-MCNC: 9.8 MG/DL (ref 8.7–10.5)
CHLORIDE SERPL-SCNC: 104 MMOL/L (ref 95–110)
CO2 SERPL-SCNC: 28 MMOL/L (ref 23–29)
CREAT SERPL-MCNC: 1.1 MG/DL (ref 0.5–1.4)
DIFFERENTIAL METHOD: ABNORMAL
EOSINOPHIL # BLD AUTO: 0.2 K/UL (ref 0–0.5)
EOSINOPHIL NFR BLD: 3.3 % (ref 0–8)
ERYTHROCYTE [DISTWIDTH] IN BLOOD BY AUTOMATED COUNT: 12.4 % (ref 11.5–14.5)
EST. GFR  (AFRICAN AMERICAN): 58.4 ML/MIN/1.73 M^2
EST. GFR  (NON AFRICAN AMERICAN): 50.6 ML/MIN/1.73 M^2
GLUCOSE SERPL-MCNC: 152 MG/DL (ref 70–110)
HCT VFR BLD AUTO: 36.9 % (ref 37–48.5)
HGB BLD-MCNC: 12.1 G/DL (ref 12–16)
IMM GRANULOCYTES # BLD AUTO: 0.03 K/UL (ref 0–0.04)
IMM GRANULOCYTES NFR BLD AUTO: 0.6 % (ref 0–0.5)
LYMPHOCYTES # BLD AUTO: 1.7 K/UL (ref 1–4.8)
LYMPHOCYTES NFR BLD: 31 % (ref 18–48)
MCH RBC QN AUTO: 30.9 PG (ref 27–31)
MCHC RBC AUTO-ENTMCNC: 32.8 G/DL (ref 32–36)
MCV RBC AUTO: 94 FL (ref 82–98)
MONOCYTES # BLD AUTO: 0.6 K/UL (ref 0.3–1)
MONOCYTES NFR BLD: 11.3 % (ref 4–15)
NEUTROPHILS # BLD AUTO: 2.9 K/UL (ref 1.8–7.7)
NEUTROPHILS NFR BLD: 53.2 % (ref 38–73)
NRBC BLD-RTO: 0 /100 WBC
PLATELET # BLD AUTO: 193 K/UL (ref 150–450)
PMV BLD AUTO: 9.9 FL (ref 9.2–12.9)
POTASSIUM SERPL-SCNC: 4.4 MMOL/L (ref 3.5–5.1)
PROT SERPL-MCNC: 6.4 G/DL (ref 6–8.4)
RBC # BLD AUTO: 3.92 M/UL (ref 4–5.4)
SODIUM SERPL-SCNC: 138 MMOL/L (ref 136–145)
T4 FREE SERPL-MCNC: 1.13 NG/DL (ref 0.71–1.51)
TSH SERPL DL<=0.005 MIU/L-ACNC: 0.92 UIU/ML (ref 0.4–4)
WBC # BLD AUTO: 5.42 K/UL (ref 3.9–12.7)

## 2022-06-22 PROCEDURE — 99215 OFFICE O/P EST HI 40 MIN: CPT | Mod: S$PBB,,, | Performed by: INTERNAL MEDICINE

## 2022-06-22 PROCEDURE — 85025 COMPLETE CBC W/AUTO DIFF WBC: CPT | Performed by: INTERNAL MEDICINE

## 2022-06-22 PROCEDURE — 99214 OFFICE O/P EST MOD 30 MIN: CPT | Mod: PBBFAC | Performed by: INTERNAL MEDICINE

## 2022-06-22 PROCEDURE — 84439 ASSAY OF FREE THYROXINE: CPT | Performed by: INTERNAL MEDICINE

## 2022-06-22 PROCEDURE — 80053 COMPREHEN METABOLIC PANEL: CPT | Performed by: INTERNAL MEDICINE

## 2022-06-22 PROCEDURE — 36415 COLL VENOUS BLD VENIPUNCTURE: CPT | Performed by: INTERNAL MEDICINE

## 2022-06-22 PROCEDURE — 99999 PR PBB SHADOW E&M-EST. PATIENT-LVL IV: ICD-10-PCS | Mod: PBBFAC,,, | Performed by: INTERNAL MEDICINE

## 2022-06-22 PROCEDURE — 99999 PR PBB SHADOW E&M-EST. PATIENT-LVL IV: CPT | Mod: PBBFAC,,, | Performed by: INTERNAL MEDICINE

## 2022-06-22 PROCEDURE — 99215 PR OFFICE/OUTPT VISIT, EST, LEVL V, 40-54 MIN: ICD-10-PCS | Mod: S$PBB,,, | Performed by: INTERNAL MEDICINE

## 2022-06-22 PROCEDURE — 84443 ASSAY THYROID STIM HORMONE: CPT | Performed by: INTERNAL MEDICINE

## 2022-06-22 RX ORDER — HEPARIN 100 UNIT/ML
500 SYRINGE INTRAVENOUS
Status: CANCELLED | OUTPATIENT
Start: 2022-06-24

## 2022-06-22 RX ORDER — IPRATROPIUM BROMIDE AND ALBUTEROL SULFATE 2.5; .5 MG/3ML; MG/3ML
3 SOLUTION RESPIRATORY (INHALATION)
Status: CANCELLED | OUTPATIENT
Start: 2022-06-24

## 2022-06-22 RX ORDER — SODIUM CHLORIDE 0.9 % (FLUSH) 0.9 %
10 SYRINGE (ML) INJECTION
Status: CANCELLED | OUTPATIENT
Start: 2022-06-24

## 2022-06-22 RX ORDER — EPINEPHRINE 1 MG/ML
0.3 INJECTION, SOLUTION, CONCENTRATE INTRAVENOUS
Status: CANCELLED | OUTPATIENT
Start: 2022-06-24

## 2022-06-22 RX ORDER — DIPHENHYDRAMINE HYDROCHLORIDE 50 MG/ML
25 INJECTION INTRAMUSCULAR; INTRAVENOUS
Status: CANCELLED | OUTPATIENT
Start: 2022-06-24

## 2022-06-22 RX ORDER — METHYLPREDNISOLONE SOD SUCC 125 MG
40 VIAL (EA) INJECTION
Status: CANCELLED | OUTPATIENT
Start: 2022-06-24

## 2022-06-22 RX ORDER — ACETAMINOPHEN 325 MG/1
650 TABLET ORAL
Status: CANCELLED | OUTPATIENT
Start: 2022-06-24

## 2022-06-22 NOTE — TELEPHONE ENCOUNTER
I spoke with the patient and discussed her biopsy results.  She just recently filled the hydrocortisone foam and will take this in the near future.  Due to cost issues, if we need more than the current amount that she has we will plan to use hydrocortisone suppositories.  We also discussed that given the ongoing colitis that has resulted in a temporary hiatus in her immunotherapy I think adding a chronic suppressive therapy such as Entyvio would be a reasonable option.  We discussed the risks and benefits of this.  Will place an order for updated labs and place a therapy plan for the Entyvio so we can start working on insurance approval.  She has fairly severe immune mediated colitis based on her symptoms and endoscopic appearance.

## 2022-06-22 NOTE — PROGRESS NOTES
"Subjective:       Patient ID: Alesha Toney is a 71 y.o. female.    Chief Complaint: Malignant neoplasm of upper lobe, right bronchus or lung  Ms. Alesha Toney is a 71-year-old otherwise healthy female who started having some shoulder pain, which was lasting for over six weeks.  She went and saw her primary care physician and underwent an x-ray, which revealed right upper lobe lung mass worrisome for malignancy and a CT scan was recommended, which was done on 6/12/18 and that revealed a newly developing mass, pleural based, lateral posterior segment, right upper lobe 3.5 x 3.5 cm, surrounding stellate margin and patchy infiltrates, particularly superiorly,   anteriorly infiltrates extend perihilar into the anterior segment.  She then underwent CT-guided biopsy, which revealed well-differentiated adenocarcinoma.       Her PET scan from 6/29/18 There is physiologic intracranial, head, and neck activity.  There is a large right upper lobe mass SUV max 9.11.  No local or distant metastatic disease seen.  There is physiologic liver, spleen, GI and  activity.  There are a few liver cysts.  No adenopathy is seen.  The adrenal glands are not enlarged.  No adenopathy is seen.  No suspicious bone lesions are seen.     She underwent VATS with right upper lobectomy. Mediastinal lymphadenectomy on 8/9/18. Pathology revealed "Tumor site: Upper lobe.Tumor size: 7 x 4 x 2.7 cm.Tumor focality: Single tumor.  Histologic type: Mixed invasive mucinous and nonmucinous adenocarcinoma. Histologic grade: G2: Moderately differentiated.Spread through air spaces (STATS): Present. Visceral pleura invasion: Not identified.  Lymphovascular invasion: Not identified. Direct invasion of adjacent structures: No adjacent structures present. Margins: All margins are uninvolved by carcinoma.Distance of invasive carcinoma from closest margin: 1 cm from the bronchial margin.Treatment effect: No known presurgical therapy. Regional lymph nodes: " "Number of lymph nodes involved: 0. Number of lymph nodes examined: 11. Pathologic Stage Classification: pT3 N0"        She completed 4 cycles of adjuvant chemo with Cisplatin and Alimta as of 1/10/19   She is on surveillance.     CT chest of 9/17/19 which reveals "Operative change of right upper lobectomy for small-cell lung cancer with several scattered subsolid opacities and a new solid nodule in the right lower lobe measuring up to 0.5 cm.  We recommend continued surveillance with the role and schedule for such surveillance to be determined by clinical considerations. Sided chest port distal tip terminating at the confluence of the brachiocephalic vein in the SVC.  Correlate with device functioning. Apically predominant emphysematous changes, left greater than right. Aortic annular calcification and multi-vessel coronary artery atherosclerosis. Numerous unchanged hepatic hypodensities, likely cysts"     PET scan from 10/1/19 reveals "1.6 cm soft tissue lesion re-identified posterior to the bronchus intermedius.  No corresponding focal increased radiotracer uptake to suggest local recurrence or metastatic disease. Stable subcentimeter opacities throughout the bilateral lower lobes, too small to characterize with PET-CT. Focal increased radiotracer uptake and subtle wall thickening in the rectum.  Findings are concerning for primary neoplastic process.  Recommend further evaluation with direct visualization"     She returned from [facility] and was advised to proceed with chemo/RT with either Docetaxel/platinum or Carboplatin/Alimta.     She completed chemo/RT with Carboplatin and Alimta as of 12/31/19     She underwent CT chest on 5/27/2020 which revealed 1. In this patient with history of lung cancer status post right upper lobectomy, there is a soft tissue opacity at the posterior margin of the staple line.  This lesion has been enlarging progressively and now measures 1.3 x 1.6 cm.  There is also a new 1.7 x " "1.7 cm opacity at the right paravertebral pleural margin which is inseparable from this lesion.  These findings may represent post radiation change in light of the hypermetabolic lesion in this region on PET-CT of 10/01/2019 (image 69/[id number]).There is a 1 cm opacity in the superior or lateral basal segment of the right lower lobe (axial series 4, image 243) which has enlarged since 09/17/2019.  Nature of this lesion is unclear.  Clinical considerations will determine if percutaneous biopsy or metabolic assessment is warranted. 1.0 cm solid opacity with branching configuration (series 4, image 205) is located in the lateral basal segment of the left lower lobe, stable since 02/16/2019 (02/06/2019 axial series 4, image 310). Appearance and bifurcating character suggests mucoid impaction in mildly ectatic peripheral airway, likely not significant. Persistent clustered small centrilobular nodules in the apical segment of the right upper lobe likely reflecting small airway inflammation/infection. Partially visualized left chest port with distal catheter tip at the junction of the brachiocephalic veins and SVC, similar as on 09/17/2019"  She thus underwent PET scan on 6/1/2020 which revealed "No evidence of residual viable lung malignancy.  Evolving post radiation changes in the right paramediastinal lung, with a new irregular subpleural density which may also be related to recent radiation.  Attention on follow-up. Interval decrease in size of a soft tissue lesion along the inferior margin of the lobectomy stable line.  Several stable subcentimeter soft tissue opacities in the lower lobes bilaterally, As above.  Attention on follow-up.  Persistent hypermetabolic rectal lesion concerning for malignancy.  Recommend direct visualization and tissue sampling as clinically indicated"  I discussed her case in thoracic conference today and findings are felt to be related to RT     Her restaging CT scans from 1/20/2021 which reveal " ""Persistent soft tissue opacity in the posterior margin of the staple line that is increased in prominence when compared to the prior examination and is worrisome for disease progression. No new lesions identified.  Stable pulmonary nodules. Stable hypodense lesions throughout the liver that likely represent hepatic cysts"        Restaging PET scan from 1/27/2021 reveals "Increased size and hypermetabolic activity involving soft tissue opacity along the inferior border of the right upper lobectomy stable line concerning for progression of disease. Mildly increased hypermetabolic activity involving the rectum compatible with known high-grade dysplasia/intramucosal carcinoma. Interval decrease in size and resolution of hypermetabolic activity involving subcentimeter subpleural opacity within the right lower lobe"     MRI brain from  2/3/2021 reveals no evidence of recurrence.  GUARDANT testing only reveals p53, PDL1 is 0 and no other targets        PET scan from 3/24/2021 reveals "In this patient with known lung cancer, there is a persistent hypermetabolic right hilar mass consistent with viable tumor.  Interval increase in size is equivocal for progression of fibrosis versus tumor growth. 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  Activity appears more focal and possibly more proximal than before.  Consider direct visualization for further evaluation. The previously described subcentimeter subpleural opacity within the right lower lobe is not definitely seen on today's exam"               She last received immunotherapy on 5/28/2021. She underwent EUS on 6/25/2021 which revealed "Erythematous, congested, and ulcerated mucosa  (proctitis) in rectosigmoid colon.  Pathology reveals Colon, rectosigmoid, biopsy:  Moderate active colitis     She completed steroids about 5 weeks ago.      CT scans from 11/5/2021 reveals "In this patient with lung " "cancer, there is postoperative changes of right upper lobe resection.  Persistent soft tissue opacity along the right hilum suture line which is slightly increased in size from prior exam.  More conspicuous appearing nodules within the right lower lobe seen on prior exam.  New nodule within the right middle lobe measuring 1.3 cm.  Overall findings are all worrisome for disease progression. Scattered areas of centrilobular nodularity within the bilateral lungs.  Nonspecific and can be seen in the setting of infectious or inflammatory etiologies. Unchanged hepatics cysts. Stable left adrenal gland nodule."  PET scan from 11/23/2021 revealed "New hypermetabolic nodule within the right lower lobe additional slightly subcentimeter nodules within the right lower lobe.  Findings concerning for disease progression, other differentials include infection or noninfectious inflammatory process. Interval decrease in size and uptake in the right hilar mass. Resolution of previous focal rectal uptake"        She is on Opdivo. Her Opdivo was held on 1/10/2022 due to rectal bleeding. She saw Dr. Clark and was noted to have anal fissure which contributed to rectal bleed. Her rectal bleed has since resolved.          HPI She comes in for Opdivo, she notes rectal bleeding is seeing [facility] week.  Flex sigmoidoscopy on 6/3/2022 reveals "Localized severe inflammation was found in the distal rectum. Biopsied. Pathology reveals Severely active chronic colitis with ulceration (see comment)  Moderate architectural disorder. Negative for CMV by immunohistochemistry . Negative for granulomas or viral inclusions. Negative for dysplasia or carcinoma"  PET scan from 6/17/2022 reveals "In this patient with lung cancer there are enlarging hypermetabolic right perihilar opacities concerning for continued local disease progression. Continued decreased metabolic activity of the right lower lobe nodule.  Cluster of pulmonary nodules bilaterally, " "some which are new from prior PET-CT for example the superior segment the left upper lobe and are likely infectious/inflammatory origin. Persistent and more extensive region of increased FDG avidity within the distal rectum in keeping with more extensive colitis.  Malignancy could have a similar appearance.  Recommend further investigation as clinically warranted and attention on follow-up"  She comes in to discuss further management     Review of Systems   Constitutional: Positive for fatigue. Negative for appetite change and unexpected weight change.   HENT: Negative for mouth sores.    Eyes: Negative for visual disturbance.   Respiratory: Negative for cough and shortness of breath.    Cardiovascular: Positive for chest pain.   Gastrointestinal: Positive for blood in stool. Negative for abdominal pain and diarrhea.   Genitourinary: Negative for frequency.   Musculoskeletal: Negative for back pain.   Integumentary:  Negative for rash.   Neurological: Negative for headaches.   Hematological: Negative for adenopathy.   Psychiatric/Behavioral: The patient is not nervous/anxious.    All other systems reviewed and are negative.        Objective:      Physical Exam  HENT:      Nose: Nose normal.   Eyes:      Extraocular Movements: Extraocular movements intact.      Pupils: Pupils are equal, round, and reactive to light.   Cardiovascular:      Rate and Rhythm: Normal rate and regular rhythm.      Pulses: Normal pulses.      Heart sounds: Normal heart sounds.   Pulmonary:      Effort: Pulmonary effort is normal.      Breath sounds: Normal breath sounds.   Abdominal:      General: Abdomen is flat. Bowel sounds are normal.      Palpations: Abdomen is soft.   Musculoskeletal:         General: Normal range of motion.      Cervical back: Normal range of motion and neck supple.   Skin:     General: Skin is warm and dry.   Neurological:      General: No focal deficit present.      Mental Status: She is alert and oriented to " person, place, and time.   Psychiatric:         Mood and Affect: Mood normal.         Behavior: Behavior normal.           LABS:  WBC   Date Value Ref Range Status   06/22/2022 5.42 3.90 - 12.70 K/uL Final     Hemoglobin   Date Value Ref Range Status   06/22/2022 12.1 12.0 - 16.0 g/dL Final     POC Hematocrit   Date Value Ref Range Status   08/09/2018 33 (L) 36 - 54 %PCV Final     Hematocrit   Date Value Ref Range Status   06/22/2022 36.9 (L) 37.0 - 48.5 % Final     Platelets   Date Value Ref Range Status   06/22/2022 193 150 - 450 K/uL Final     Gran # (ANC)   Date Value Ref Range Status   06/22/2022 2.9 1.8 - 7.7 K/uL Final     Gran %   Date Value Ref Range Status   06/22/2022 53.2 38.0 - 73.0 % Final       Chemistry        Component Value Date/Time     06/22/2022 1046    K 4.4 06/22/2022 1046     06/22/2022 1046    CO2 28 06/22/2022 1046    BUN 18 06/22/2022 1046    CREATININE 1.1 06/22/2022 1046     (H) 06/22/2022 1046        Component Value Date/Time    CALCIUM 9.8 06/22/2022 1046    ALKPHOS 52 (L) 06/22/2022 1046    AST 17 06/22/2022 1046    ALT 11 06/22/2022 1046    BILITOT 0.4 06/22/2022 1046    ESTGFRAFRICA 58.4 (A) 06/22/2022 1046    EGFRNONAA 50.6 (A) 06/22/2022 1046          Assessment:       Problem List Items Addressed This Visit     Colitis    Hypothyroidism    Malignant neoplasm of upper lobe, right bronchus or lung - Primary    Secondary and unspecified malignant neoplasm of intrathoracic lymph nodes    Stage 3a chronic kidney disease          Plan:          Reviewed CT scans mild increase in lung lesion, no new disease, discussed recent findings on flex sig and colitis,. Sent message to Dr. Clark and he will reach out to her to discuss Entyvio. Hold immunotherapy for 4 weeks and reassess at that time    Above care plan was discussed with patient and accompanying wife and all questions were addressed to their satisfaction

## 2022-06-24 ENCOUNTER — TELEPHONE (OUTPATIENT)
Dept: GASTROENTEROLOGY | Facility: CLINIC | Age: 72
End: 2022-06-24
Payer: MEDICARE

## 2022-06-27 ENCOUNTER — TELEPHONE (OUTPATIENT)
Dept: GASTROENTEROLOGY | Facility: CLINIC | Age: 72
End: 2022-06-27
Payer: MEDICARE

## 2022-06-28 ENCOUNTER — LAB VISIT (OUTPATIENT)
Dept: LAB | Facility: HOSPITAL | Age: 72
End: 2022-06-28
Attending: INTERNAL MEDICINE
Payer: MEDICARE

## 2022-06-28 DIAGNOSIS — K52.9 COLITIS: ICD-10-CM

## 2022-06-28 LAB
HBV CORE AB SERPL QL IA: NEGATIVE
HBV SURFACE AG SERPL QL IA: NEGATIVE

## 2022-06-28 PROCEDURE — 86480 TB TEST CELL IMMUN MEASURE: CPT | Performed by: INTERNAL MEDICINE

## 2022-06-28 PROCEDURE — 36415 COLL VENOUS BLD VENIPUNCTURE: CPT | Performed by: INTERNAL MEDICINE

## 2022-06-28 PROCEDURE — 86704 HEP B CORE ANTIBODY TOTAL: CPT | Performed by: INTERNAL MEDICINE

## 2022-06-28 PROCEDURE — 87340 HEPATITIS B SURFACE AG IA: CPT | Performed by: INTERNAL MEDICINE

## 2022-06-29 LAB
GAMMA INTERFERON BACKGROUND BLD IA-ACNC: 0 IU/ML
M TB IFN-G CD4+ BCKGRND COR BLD-ACNC: 0.01 IU/ML
MITOGEN IGNF BCKGRD COR BLD-ACNC: 10 IU/ML
TB GOLD PLUS: NEGATIVE
TB2 - NIL: 0 IU/ML

## 2022-06-30 ENCOUNTER — TELEPHONE (OUTPATIENT)
Dept: HEMATOLOGY/ONCOLOGY | Facility: CLINIC | Age: 72
End: 2022-06-30
Payer: MEDICARE

## 2022-06-30 DIAGNOSIS — C34.11 MALIGNANT NEOPLASM OF UPPER LOBE, RIGHT BRONCHUS OR LUNG: Primary | ICD-10-CM

## 2022-06-30 NOTE — TELEPHONE ENCOUNTER
----- Message from Pam Dhaliwal MD sent at 6/22/2022  5:11 PM CDT -----  In 1 month schedule CBC,CMp and see me

## 2022-07-05 ENCOUNTER — PATIENT MESSAGE (OUTPATIENT)
Dept: GASTROENTEROLOGY | Facility: CLINIC | Age: 72
End: 2022-07-05
Payer: MEDICARE

## 2022-07-05 RX ORDER — HYDROCORTISONE ACETATE SUPPOSITORY 30 MG/1
30 SUPPOSITORY RECTAL DAILY
Qty: 30 EACH | Refills: 2 | Status: SHIPPED | OUTPATIENT
Start: 2022-07-05 | End: 2022-07-06

## 2022-07-06 ENCOUNTER — PATIENT MESSAGE (OUTPATIENT)
Dept: GASTROENTEROLOGY | Facility: CLINIC | Age: 72
End: 2022-07-06
Payer: MEDICARE

## 2022-07-06 ENCOUNTER — INFUSION (OUTPATIENT)
Dept: INFECTIOUS DISEASES | Facility: HOSPITAL | Age: 72
End: 2022-07-06
Attending: INTERNAL MEDICINE
Payer: MEDICARE

## 2022-07-06 ENCOUNTER — TELEPHONE (OUTPATIENT)
Dept: GASTROENTEROLOGY | Facility: CLINIC | Age: 72
End: 2022-07-06
Payer: MEDICARE

## 2022-07-06 VITALS
OXYGEN SATURATION: 96 % | DIASTOLIC BLOOD PRESSURE: 72 MMHG | SYSTOLIC BLOOD PRESSURE: 160 MMHG | BODY MASS INDEX: 25.28 KG/M2 | WEIGHT: 156.63 LBS | TEMPERATURE: 97 F | HEART RATE: 74 BPM

## 2022-07-06 DIAGNOSIS — K52.9 COLITIS: Primary | ICD-10-CM

## 2022-07-06 DIAGNOSIS — C34.11 MALIGNANT NEOPLASM OF UPPER LOBE, RIGHT BRONCHUS OR LUNG: ICD-10-CM

## 2022-07-06 PROCEDURE — 25000003 PHARM REV CODE 250: Performed by: INTERNAL MEDICINE

## 2022-07-06 PROCEDURE — 63600175 PHARM REV CODE 636 W HCPCS: Performed by: INTERNAL MEDICINE

## 2022-07-06 PROCEDURE — 96365 THER/PROPH/DIAG IV INF INIT: CPT

## 2022-07-06 PROCEDURE — 96367 TX/PROPH/DG ADDL SEQ IV INF: CPT

## 2022-07-06 RX ORDER — IPRATROPIUM BROMIDE AND ALBUTEROL SULFATE 2.5; .5 MG/3ML; MG/3ML
3 SOLUTION RESPIRATORY (INHALATION)
Status: CANCELLED | OUTPATIENT
Start: 2022-08-25

## 2022-07-06 RX ORDER — METHYLPREDNISOLONE SOD SUCC 125 MG
40 VIAL (EA) INJECTION
Status: CANCELLED | OUTPATIENT
Start: 2022-08-25

## 2022-07-06 RX ORDER — HEPARIN 100 UNIT/ML
500 SYRINGE INTRAVENOUS
Status: CANCELLED | OUTPATIENT
Start: 2022-08-25

## 2022-07-06 RX ORDER — DIPHENHYDRAMINE HYDROCHLORIDE 50 MG/ML
25 INJECTION INTRAMUSCULAR; INTRAVENOUS
Status: CANCELLED | OUTPATIENT
Start: 2022-08-25

## 2022-07-06 RX ORDER — EPINEPHRINE 1 MG/ML
0.3 INJECTION, SOLUTION, CONCENTRATE INTRAVENOUS
Status: CANCELLED | OUTPATIENT
Start: 2022-08-25

## 2022-07-06 RX ORDER — HYDROCORTISONE ACETATE 25 MG/1
25 SUPPOSITORY RECTAL 2 TIMES DAILY
Qty: 28 SUPPOSITORY | Refills: 1 | Status: SHIPPED | OUTPATIENT
Start: 2022-07-06 | End: 2022-07-12 | Stop reason: SDUPTHER

## 2022-07-06 RX ORDER — SODIUM CHLORIDE 0.9 % (FLUSH) 0.9 %
10 SYRINGE (ML) INJECTION
Status: CANCELLED | OUTPATIENT
Start: 2022-08-25

## 2022-07-06 RX ORDER — HEPARIN 100 UNIT/ML
500 SYRINGE INTRAVENOUS
Status: DISCONTINUED | OUTPATIENT
Start: 2022-07-06 | End: 2022-07-06 | Stop reason: HOSPADM

## 2022-07-06 RX ORDER — SODIUM CHLORIDE 0.9 % (FLUSH) 0.9 %
10 SYRINGE (ML) INJECTION
Status: DISCONTINUED | OUTPATIENT
Start: 2022-07-06 | End: 2022-07-06 | Stop reason: HOSPADM

## 2022-07-06 RX ORDER — ACETAMINOPHEN 325 MG/1
650 TABLET ORAL
Status: CANCELLED | OUTPATIENT
Start: 2022-08-25

## 2022-07-06 RX ADMIN — VEDOLIZUMAB 300 MG: 300 INJECTION, POWDER, LYOPHILIZED, FOR SOLUTION INTRAVENOUS at 03:07

## 2022-07-06 RX ADMIN — HEPARIN 500 UNITS: 100 SYRINGE at 03:07

## 2022-07-06 RX ADMIN — SODIUM CHLORIDE: 9 INJECTION, SOLUTION INTRAVENOUS at 03:07

## 2022-07-06 NOTE — PROGRESS NOTES
Arrived to clinic for 1st entyvio. Port accessed with positive blood return noted. Tolerated infusion. Discharged in NAD

## 2022-07-06 NOTE — PROGRESS NOTES
"Infusion medication (name/dose/frequency):  ***  Today's weight:    Wt Readings from Last 1 Encounters:   07/06/22 71.1 kg (156 lb 10.2 oz)       Checklist prior to infusion:    Recent labs within the last 3 - 6 months ?yes    Appointment with MD in past 3-6 mos? Yes    Documentation of safety questions prior to infusion:   RN TO NOTIFY MD IF "YES" ANSWERED TO ANY OF BELOW QUESTIONS PRIOR TO GIVING INFUSION:    Symptoms of infection (current or past 1 week)? (fever >100.4 F, URI, flu-like symptoms, cough, painful urination, warm/red/painful skin or skin ulcers/wounds, tooth pain): No    Antibiotics in past 2 weeks? No    Recent surgery with any complications?  No   If yes then has surgeon cleared patient prior to getting this infusion? N/A    Pregnant? N/A  If yes, is prescribing provider aware of this pregnancy?  N/A    NEW or WORSENING abdominal pain, diarrhea, nausea/vomiting? No    SOB, ankle/feet swelling, or sudden weight gain? No    Special Notes to provider:    Patient tolerated infusion well today, vital signs stable:  Yes          "

## 2022-07-06 NOTE — PROGRESS NOTES
"Infusion medication (name/dose/frequency): entyvio  Today's weight:    Wt Readings from Last 1 Encounters:   07/06/22 71.1 kg (156 lb 10.2 oz)       Checklist prior to infusion:    Recent labs within the last 3 - 6 months ? Yes    Appointment with MD in past 3-6 mos? Yes    Documentation of safety questions prior to infusion:   RN TO NOTIFY MD IF "YES" ANSWERED TO ANY OF BELOW QUESTIONS PRIOR TO GIVING INFUSION:    Symptoms of infection (current or past 1 week)? (fever >100.4 F, URI, flu-like symptoms, cough, painful urination, warm/red/painful skin or skin ulcers/wounds, tooth pain): No    Antibiotics in past 2 weeks? No      Recent surgery with any complications?  no  If yes then has surgeon cleared patient prior to getting this infusion? n/a    Pregnant?no  If yes, is prescribing provider aware of this pregnancy? na  NEW or WORSENING abdominal pain, diarrhea, nausea/vomiting? No  SOB, ankle/feet swelling, or sudden weight gain? No    Special Notes to provider:   Patient tolerated infusion well today, vital signs stable:  Yes      "

## 2022-07-07 ENCOUNTER — PATIENT MESSAGE (OUTPATIENT)
Dept: GASTROENTEROLOGY | Facility: CLINIC | Age: 72
End: 2022-07-07
Payer: MEDICARE

## 2022-07-07 RX ORDER — HYDROCORTISONE ACETATE 25 MG/1
25 SUPPOSITORY RECTAL 2 TIMES DAILY
Qty: 28 SUPPOSITORY | Refills: 4 | Status: SHIPPED | OUTPATIENT
Start: 2022-07-07 | End: 2022-07-21

## 2022-07-07 NOTE — TRANSFER OF CARE
"Anesthesia Transfer of Care Note    Patient: Alesha Toney    Procedure(s) Performed: Procedure(s) (LRB):  SIGMOIDOSCOPY, FLEXIBLE (N/A)    Patient location: GI    Anesthesia Type: MAC    Transport from OR: Transported from OR on room air with adequate spontaneous ventilation    Post pain: adequate analgesia    Post assessment: no apparent anesthetic complications and tolerated procedure well    Post vital signs: stable    Level of consciousness: awake, alert and oriented    Nausea/Vomiting: no nausea/vomiting    Complications: none    Transfer of care protocol was followed      Last vitals:   Visit Vitals  BP (!) 189/84 (Patient Position: Lying)   Pulse 72   Temp 36.8 °C (98.3 °F) (Temporal)   Resp 20   Ht 5' 6" (1.676 m)   Wt 72.6 kg (160 lb)   LMP  (LMP Unknown)   SpO2 99%   BMI 25.82 kg/m²     "
36.8

## 2022-07-11 ENCOUNTER — PATIENT MESSAGE (OUTPATIENT)
Dept: GASTROENTEROLOGY | Facility: CLINIC | Age: 72
End: 2022-07-11
Payer: MEDICARE

## 2022-07-11 DIAGNOSIS — K52.9 COLITIS: Primary | ICD-10-CM

## 2022-07-11 RX ORDER — PREDNISONE 10 MG/1
20 TABLET ORAL DAILY
Qty: 3 TABLET | Refills: 0 | Status: SHIPPED | OUTPATIENT
Start: 2022-07-11 | End: 2022-07-15

## 2022-07-12 ENCOUNTER — OFFICE VISIT (OUTPATIENT)
Dept: CARDIOLOGY | Facility: CLINIC | Age: 72
End: 2022-07-12
Payer: MEDICARE

## 2022-07-12 VITALS
BODY MASS INDEX: 25.05 KG/M2 | HEIGHT: 66 IN | DIASTOLIC BLOOD PRESSURE: 64 MMHG | WEIGHT: 155.88 LBS | SYSTOLIC BLOOD PRESSURE: 142 MMHG | HEART RATE: 58 BPM

## 2022-07-12 DIAGNOSIS — I10 ESSENTIAL HYPERTENSION: ICD-10-CM

## 2022-07-12 DIAGNOSIS — N18.31 STAGE 3A CHRONIC KIDNEY DISEASE: ICD-10-CM

## 2022-07-12 DIAGNOSIS — I27.20 PULMONARY HYPERTENSION: ICD-10-CM

## 2022-07-12 DIAGNOSIS — I25.10 CORONARY ARTERY DISEASE DUE TO CALCIFIED CORONARY LESION: Primary | ICD-10-CM

## 2022-07-12 DIAGNOSIS — I47.10 SVT (SUPRAVENTRICULAR TACHYCARDIA): ICD-10-CM

## 2022-07-12 DIAGNOSIS — J43.8 OTHER EMPHYSEMA: ICD-10-CM

## 2022-07-12 DIAGNOSIS — R73.03 PREDIABETES: ICD-10-CM

## 2022-07-12 DIAGNOSIS — E03.9 HYPOTHYROIDISM, UNSPECIFIED TYPE: ICD-10-CM

## 2022-07-12 DIAGNOSIS — E78.2 MIXED HYPERLIPIDEMIA: ICD-10-CM

## 2022-07-12 DIAGNOSIS — I25.84 CORONARY ARTERY DISEASE DUE TO CALCIFIED CORONARY LESION: Primary | ICD-10-CM

## 2022-07-12 DIAGNOSIS — I70.0 AORTIC ATHEROSCLEROSIS: ICD-10-CM

## 2022-07-12 PROCEDURE — 99999 PR PBB SHADOW E&M-EST. PATIENT-LVL III: CPT | Mod: PBBFAC,,, | Performed by: NURSE PRACTITIONER

## 2022-07-12 PROCEDURE — 99213 OFFICE O/P EST LOW 20 MIN: CPT | Mod: PBBFAC,PO | Performed by: NURSE PRACTITIONER

## 2022-07-12 PROCEDURE — 99214 PR OFFICE/OUTPT VISIT, EST, LEVL IV, 30-39 MIN: ICD-10-PCS | Mod: S$PBB,,, | Performed by: NURSE PRACTITIONER

## 2022-07-12 PROCEDURE — 99999 PR PBB SHADOW E&M-EST. PATIENT-LVL III: ICD-10-PCS | Mod: PBBFAC,,, | Performed by: NURSE PRACTITIONER

## 2022-07-12 PROCEDURE — 99214 OFFICE O/P EST MOD 30 MIN: CPT | Mod: S$PBB,,, | Performed by: NURSE PRACTITIONER

## 2022-07-12 RX ORDER — ATENOLOL 25 MG/1
25 TABLET ORAL 3 TIMES DAILY
Qty: 270 TABLET | Refills: 3 | Status: SHIPPED | OUTPATIENT
Start: 2022-07-12 | End: 2023-08-10 | Stop reason: SDUPTHER

## 2022-07-12 NOTE — PROGRESS NOTES
Ms. Toney is a patient of Dr. Mccoy and was last seen in [facility] 2/17/22      Subjective:   Patient ID:  Alesha Toney is a 71 y.o. female who presents for follow-up of Coronary Artery Disease    Problem List:  COPD - mild  SVT (atrial tachycardia)  HTN onset 2017  Adenocarcinoma lung 6/2018 s/p RUL lobectomy    HPI:   Alesha Toney is in clinic today for HTN follow up after starting amlodipine 2.5 mg but did not start.  Patient denies chest pain with exertion or at rest, palpitations, SOB, BAIRD, dizziness, syncope, edema, orthopnea, PND, or claudication.      Review of Systems   Constitutional: Negative for decreased appetite, diaphoresis, malaise/fatigue, weight gain and weight loss.   Eyes: Negative for visual disturbance.   Cardiovascular: Negative for chest pain, claudication, dyspnea on exertion, irregular heartbeat, leg swelling, near-syncope, orthopnea, palpitations, paroxysmal nocturnal dyspnea and syncope.        Denies chest pressure   Respiratory: Negative for cough, hemoptysis, shortness of breath, sleep disturbances due to breathing and snoring.    Endocrine: Negative for cold intolerance and heat intolerance.   Hematologic/Lymphatic: Negative for bleeding problem. Does not bruise/bleed easily.   Musculoskeletal: Negative for myalgias.   Gastrointestinal: Negative for bloating, abdominal pain, anorexia, change in bowel habit, constipation, diarrhea, nausea and vomiting.   Neurological: Negative for difficulty with concentration, disturbances in coordination, excessive daytime sleepiness, dizziness, headaches, light-headedness, loss of balance, numbness and weakness.   Psychiatric/Behavioral: The patient does not have insomnia.        Allergies and current medications updated and reviewed:  Review of patient's allergies indicates:   Allergen Reactions    Adhesive Itching, Rash and Blisters     INCLUDES EKG PADS    Ciprofloxacin Hives     Hives    Iodinated contrast media     Pcn  "[penicillins]      rash    Phenergan plain Other (See Comments)     "crazy behavior"    Phenergan [promethazine]     Tramadol     Vancomycin analogues      Red man syndrome     Current Outpatient Medications   Medication Sig    ascorbic acid, vitamin C, (VITAMIN C) 500 MG tablet Take 500 mg by mouth once daily.    calcium carbonate (TUMS) 300 mg (750 mg) Chew Take by mouth as needed.     famotidine (PEPCID) 10 MG tablet Take 10 mg by mouth once daily.    HYDROcodone-acetaminophen (NORCO) 5-325 mg per tablet Take 1 tablet by mouth every 8 (eight) hours as needed for Pain.    hydrocortisone (ANUSOL-HC) 25 mg suppository Place 1 suppository (25 mg total) rectally 2 (two) times daily. for 14 days    LORazepam (ATIVAN) 0.5 MG tablet Take 1 tablet (0.5 mg total) by mouth every 12 (twelve) hours as needed for Anxiety.    losartan (COZAAR) 50 MG tablet 1 tab po bid    nivolumab (OPDIVO) 100 mg/10 mL injection 100 mg by Other route. Infusion once a month    predniSONE (DELTASONE) 10 MG tablet Take 2 tablets (20 mg total) by mouth once daily.    vitamin D (VITAMIN D3) 1000 units Tab Take 2,000 Units by mouth once daily.    amLODIPine (NORVASC) 2.5 MG tablet Take 1 tablet (2.5 mg total) by mouth once daily. (Patient not taking: Reported on 7/12/2022)    atenoloL (TENORMIN) 25 MG tablet Take 1 tablet (25 mg total) by mouth 3 (three) times daily.     No current facility-administered medications for this visit.     Objective:        BP (!) 142/64 (BP Location: Left arm, Patient Position: Sitting, BP Method: Medium (Manual))   Pulse (!) 58   Ht 5' 6" (1.676 m)   Wt 70.7 kg (155 lb 13.8 oz)   LMP  (LMP Unknown)   BMI 25.16 kg/m²       Physical Exam  Vitals and nursing note reviewed.   Constitutional:       General: She is not in acute distress.     Appearance: Normal appearance. She is well-developed. She is not diaphoretic.   HENT:      Head: Normocephalic and atraumatic.   Eyes:      General: Lids are normal. " No scleral icterus.     Conjunctiva/sclera: Conjunctivae normal.   Neck:      Vascular: Normal carotid pulses. No carotid bruit, hepatojugular reflux or JVD.   Cardiovascular:      Rate and Rhythm: Normal rate and regular rhythm.      Chest Wall: PMI is not displaced.      Pulses: Intact distal pulses.           Carotid pulses are 2+ on the right side and 2+ on the left side.       Radial pulses are 2+ on the right side and 2+ on the left side.        Dorsalis pedis pulses are 2+ on the right side and 2+ on the left side.        Posterior tibial pulses are 1+ on the right side and 1+ on the left side.      Heart sounds: S1 normal and S2 normal. No murmur heard.    No friction rub. No gallop.   Pulmonary:      Effort: Pulmonary effort is normal. No respiratory distress.      Breath sounds: Normal breath sounds. No decreased breath sounds, wheezing, rhonchi or rales.   Chest:      Chest wall: No tenderness.   Abdominal:      General: Bowel sounds are normal. There is no distension or abdominal bruit.      Palpations: Abdomen is soft. There is no fluid wave or pulsatile mass.      Tenderness: There is no abdominal tenderness.   Musculoskeletal:      Cervical back: Neck supple.   Skin:     General: Skin is warm and dry.      Findings: No rash.      Nails: There is no clubbing.   Neurological:      Mental Status: She is alert and oriented to person, place, and time.      Gait: Gait normal.   Psychiatric:         Speech: Speech normal.         Behavior: Behavior normal.         Thought Content: Thought content normal.         Judgment: Judgment normal.         Chemistry        Component Value Date/Time     08/01/2022 1456    K 4.4 08/01/2022 1456     08/01/2022 1456    CO2 29 08/01/2022 1456    BUN 24 (H) 08/01/2022 1456    CREATININE 1.4 08/01/2022 1456     08/01/2022 1456        Component Value Date/Time    CALCIUM 9.5 08/01/2022 1456    ALKPHOS 50 (L) 08/01/2022 1456    AST 17 08/01/2022 1456    ALT  12 08/01/2022 1456    BILITOT 0.4 08/01/2022 1456    ESTGFRAFRICA 58.4 (A) 06/22/2022 1046    EGFRNONAA 50.6 (A) 06/22/2022 1046        Lab Results   Component Value Date    LABA1C 5.8 (H) 11/03/2017    HGBA1C 5.8 (H) 05/29/2019     Recent Labs   Lab 02/16/22  1722 03/02/22  1222 06/22/22  1046 08/01/22  1456   WBC  --    < > 5.42 7.26   Hemoglobin  --    < > 12.1 12.2   Hematocrit  --    < > 36.9 L 36.9 L   MCV  --    < > 94 94   Platelets  --    < > 193 195   TSH  --    < > 0.924  --    Cholesterol 207 H  --   --   --    HDL 49  --   --   --    LDL Cholesterol 136.4  --   --   --    Triglycerides 108  --   --   --    HDL/Cholesterol Ratio 23.7  --   --   --     < > = values in this interval not displayed.       Recent Labs   Lab 02/08/21  0748   INR 1.0        Test(s) Reviewed  I have reviewed the following in detail:  [] Stress test   [] Angiography   [x] Echocardiogram   [x] Labs   [] Other:         Assessment/Plan:   1. Coronary artery disease due to calcified coronary lesion  Asymptomatic. Not on statin therapy. See #3    2. Aortic atherosclerosis      3. Mixed hyperlipidemia  LDL not at goal <100. She had colitis while on treatment for her cancer.  She is concerned about taking a statin.  We talked about zetia and plant sterols as options.  Encouraged mediterranean diet and exercise.     4. Pulmonary hypertension      5. Essential hypertension  BP not at goal <130/80.  Start the amlodipine 2.5 mg.  Requested 2 week bp log. Continue current regimen.  DASH diet encouraged.    - atenoloL (TENORMIN) 25 MG tablet; Take 1 tablet (25 mg total) by mouth 3 (three) times daily.  Dispense: 270 tablet; Refill: 3    6. Hypothyroidism, unspecified type  Lab Results   Component Value Date    TSH 0.924 06/22/2022    FREET4 1.13 06/22/2022     Stable. Defer to PCP    7. Other emphysema      8. Prediabetes  Lab Results   Component Value Date    LABA1C 5.8 (H) 11/03/2017    HGBA1C 5.8 (H) 05/29/2019       9. Stage 3a chronic  kidney disease  Baseline 1.1-1.2  Stable. Monitor.     10. SVT (supraventricular tachycardia)  Asymptomatic. Continue BB. Monitor    - atenoloL (TENORMIN) 25 MG tablet; Take 1 tablet (25 mg total) by mouth 3 (three) times daily.  Dispense: 270 tablet; Refill: 3      A copy of this note will be forwarded to Dr. Mccoy     Follow up in about 6 months (around 1/12/2023).

## 2022-07-12 NOTE — PATIENT INSTRUCTIONS
Please check your blood pressure daily and call or send a message with your readings in 2 weeks, [phone].  It is important to sit for 5-15 minutes before doing measurements and keep feet flat on the floor with legs uncrossed and no talking to get the best readings

## 2022-07-13 ENCOUNTER — OFFICE VISIT (OUTPATIENT)
Dept: INTERNAL MEDICINE | Facility: CLINIC | Age: 72
End: 2022-07-13
Payer: MEDICARE

## 2022-07-13 VITALS
HEART RATE: 68 BPM | DIASTOLIC BLOOD PRESSURE: 78 MMHG | HEIGHT: 66 IN | WEIGHT: 154.31 LBS | OXYGEN SATURATION: 99 % | SYSTOLIC BLOOD PRESSURE: 140 MMHG | BODY MASS INDEX: 24.8 KG/M2

## 2022-07-13 DIAGNOSIS — G62.0 DRUG-INDUCED POLYNEUROPATHY: ICD-10-CM

## 2022-07-13 DIAGNOSIS — G89.3 CHRONIC PAIN DUE TO MALIGNANT NEOPLASTIC DISEASE: ICD-10-CM

## 2022-07-13 DIAGNOSIS — K51.919 ULCERATIVE COLITIS WITH COMPLICATION, UNSPECIFIED LOCATION: ICD-10-CM

## 2022-07-13 DIAGNOSIS — I10 ESSENTIAL HYPERTENSION: Primary | ICD-10-CM

## 2022-07-13 PROCEDURE — 99213 OFFICE O/P EST LOW 20 MIN: CPT | Mod: PBBFAC | Performed by: INTERNAL MEDICINE

## 2022-07-13 PROCEDURE — 99214 OFFICE O/P EST MOD 30 MIN: CPT | Mod: S$PBB,,, | Performed by: INTERNAL MEDICINE

## 2022-07-13 PROCEDURE — 99999 PR PBB SHADOW E&M-EST. PATIENT-LVL III: ICD-10-PCS | Mod: PBBFAC,,, | Performed by: INTERNAL MEDICINE

## 2022-07-13 PROCEDURE — 99999 PR PBB SHADOW E&M-EST. PATIENT-LVL III: CPT | Mod: PBBFAC,,, | Performed by: INTERNAL MEDICINE

## 2022-07-13 PROCEDURE — 99214 PR OFFICE/OUTPT VISIT, EST, LEVL IV, 30-39 MIN: ICD-10-PCS | Mod: S$PBB,,, | Performed by: INTERNAL MEDICINE

## 2022-07-13 RX ORDER — HYDROCODONE BITARTRATE AND ACETAMINOPHEN 7.5; 325 MG/1; MG/1
1 TABLET ORAL EVERY 8 HOURS PRN
Qty: 90 TABLET | Refills: 0 | Status: SHIPPED | OUTPATIENT
Start: 2022-07-13 | End: 2022-08-11 | Stop reason: SDUPTHER

## 2022-07-13 NOTE — PROGRESS NOTES
Subjective:       Patient ID: Alesha Toney is a 71 y.o. female.    Chief Complaint: Follow-up    HPI   UC due to immuno tx, for which she had Entyvio infusion x 1.     C/o persistent rectal bleeding.      Also inserting steroid enemas.       She had severe side effects with Entyvio, so will be premedicated with prednisone before next infusion.    Immunotx for lung cancer is on hold.    More ectopy, which actually improved with reducing atenolol.    BP erratic.  She is NOT taking amlodipine 2.5 mg, b/c of general reluctance to take medications.    Taking narcotics for lung cancer related pain and PN.    She required more medication after Entyvio infusion.     She takes 2.5-3 hydrocodone tabs daily.  She would like to incr dose to 7.5 mg.     She is having more pain of legs, lower abd, which she attributes to UC, and which she hopes is temporary.          reviewed.        Review of Systems   Constitutional: Negative for fever and unexpected weight change.   HENT: Negative for nasal congestion and postnasal drip.    Respiratory: Negative for chest tightness, shortness of breath and wheezing.    Cardiovascular: Negative for chest pain and leg swelling.   Gastrointestinal: Negative for abdominal pain, anal bleeding, constipation, diarrhea, nausea and vomiting.   Genitourinary: Negative for dysuria and urgency.   Integumentary:  Negative for rash.   Neurological: Negative for headaches.   Psychiatric/Behavioral: Negative for dysphoric mood and sleep disturbance. The patient is not nervous/anxious.          Objective:      Physical Exam  Constitutional:       General: She is not in acute distress.     Appearance: Normal appearance. She is not ill-appearing, toxic-appearing or diaphoretic.   Neurological:      Mental Status: She is alert.   Psychiatric:         Mood and Affect: Mood normal.         Behavior: Behavior normal.         Thought Content: Thought content normal.         Assessment:       Problem List Items  Addressed This Visit        INFUSION TREATMENT 2    Ulcerative colitis with complication       Other    Essential hypertension - Primary    Drug-induced polyneuropathy    Chronic pain due to malignant neoplastic disease          Plan:       Alesha was seen today for follow-up.    Diagnoses and all orders for this visit:    Essential hypertension    Chronic pain due to malignant neoplastic disease    Drug-induced polyneuropathy    Ulcerative colitis with complication, unspecified location    Other orders  -     HYDROcodone-acetaminophen (NORCO) 7.5-325 mg per tablet; Take 1 tablet by mouth every 8 (eight) hours as needed for Pain.         Encouraged to add amlodipine if necessary  Incr hydrocodone to 7.5 mg dosage

## 2022-07-15 DIAGNOSIS — K52.9 COLITIS: Primary | ICD-10-CM

## 2022-07-15 RX ORDER — PREDNISONE 20 MG/1
TABLET ORAL
Qty: 3 TABLET | Refills: 0 | Status: SHIPPED | OUTPATIENT
Start: 2022-07-15 | End: 2022-07-25

## 2022-07-19 ENCOUNTER — INFUSION (OUTPATIENT)
Dept: INFECTIOUS DISEASES | Facility: HOSPITAL | Age: 72
End: 2022-07-19
Attending: INTERNAL MEDICINE
Payer: MEDICARE

## 2022-07-19 VITALS
BODY MASS INDEX: 24.36 KG/M2 | HEART RATE: 76 BPM | RESPIRATION RATE: 16 BRPM | TEMPERATURE: 97 F | SYSTOLIC BLOOD PRESSURE: 155 MMHG | HEIGHT: 66 IN | OXYGEN SATURATION: 100 % | DIASTOLIC BLOOD PRESSURE: 67 MMHG | WEIGHT: 151.56 LBS

## 2022-07-19 DIAGNOSIS — C34.11 MALIGNANT NEOPLASM OF UPPER LOBE, RIGHT BRONCHUS OR LUNG: ICD-10-CM

## 2022-07-19 DIAGNOSIS — K51.819 OTHER ULCERATIVE COLITIS WITH COMPLICATION: Primary | ICD-10-CM

## 2022-07-19 PROCEDURE — 25000003 PHARM REV CODE 250: Performed by: INTERNAL MEDICINE

## 2022-07-19 PROCEDURE — 63600175 PHARM REV CODE 636 W HCPCS: Mod: JG | Performed by: INTERNAL MEDICINE

## 2022-07-19 PROCEDURE — 96365 THER/PROPH/DIAG IV INF INIT: CPT

## 2022-07-19 RX ORDER — METHYLPREDNISOLONE SOD SUCC 125 MG
40 VIAL (EA) INJECTION
Status: CANCELLED | OUTPATIENT
Start: 2022-08-25

## 2022-07-19 RX ORDER — ACETAMINOPHEN 325 MG/1
650 TABLET ORAL
Status: ACTIVE | OUTPATIENT
Start: 2022-07-19 | End: 2022-07-19

## 2022-07-19 RX ORDER — IPRATROPIUM BROMIDE AND ALBUTEROL SULFATE 2.5; .5 MG/3ML; MG/3ML
3 SOLUTION RESPIRATORY (INHALATION)
Status: CANCELLED | OUTPATIENT
Start: 2022-08-25

## 2022-07-19 RX ORDER — SODIUM CHLORIDE 0.9 % (FLUSH) 0.9 %
10 SYRINGE (ML) INJECTION
Status: CANCELLED | OUTPATIENT
Start: 2022-08-25

## 2022-07-19 RX ORDER — IPRATROPIUM BROMIDE AND ALBUTEROL SULFATE 2.5; .5 MG/3ML; MG/3ML
3 SOLUTION RESPIRATORY (INHALATION)
Status: ACTIVE | OUTPATIENT
Start: 2022-07-19 | End: 2022-07-19

## 2022-07-19 RX ORDER — EPINEPHRINE 1 MG/ML
0.3 INJECTION, SOLUTION, CONCENTRATE INTRAVENOUS
Status: CANCELLED | OUTPATIENT
Start: 2022-08-25

## 2022-07-19 RX ORDER — HEPARIN 100 UNIT/ML
500 SYRINGE INTRAVENOUS
Status: CANCELLED | OUTPATIENT
Start: 2022-08-25

## 2022-07-19 RX ORDER — ACETAMINOPHEN 325 MG/1
650 TABLET ORAL
Status: CANCELLED | OUTPATIENT
Start: 2022-08-25

## 2022-07-19 RX ORDER — DIPHENHYDRAMINE HYDROCHLORIDE 50 MG/ML
25 INJECTION INTRAMUSCULAR; INTRAVENOUS
Status: ACTIVE | OUTPATIENT
Start: 2022-07-19 | End: 2022-07-19

## 2022-07-19 RX ORDER — DIPHENHYDRAMINE HYDROCHLORIDE 50 MG/ML
25 INJECTION INTRAMUSCULAR; INTRAVENOUS
Status: CANCELLED | OUTPATIENT
Start: 2022-08-25

## 2022-07-19 RX ORDER — HEPARIN 100 UNIT/ML
500 SYRINGE INTRAVENOUS
Status: DISCONTINUED | OUTPATIENT
Start: 2022-07-19 | End: 2022-07-19 | Stop reason: HOSPADM

## 2022-07-19 RX ORDER — EPINEPHRINE 0.3 MG/.3ML
0.3 INJECTION SUBCUTANEOUS
Status: ACTIVE | OUTPATIENT
Start: 2022-07-19 | End: 2022-07-19

## 2022-07-19 RX ORDER — SODIUM CHLORIDE 0.9 % (FLUSH) 0.9 %
10 SYRINGE (ML) INJECTION
Status: DISCONTINUED | OUTPATIENT
Start: 2022-07-19 | End: 2022-07-19 | Stop reason: HOSPADM

## 2022-07-19 RX ADMIN — SODIUM CHLORIDE: 0.9 INJECTION, SOLUTION INTRAVENOUS at 03:07

## 2022-07-19 RX ADMIN — HEPARIN 500 UNITS: 100 SYRINGE at 04:07

## 2022-07-19 RX ADMIN — VEDOLIZUMAB 300 MG: 300 INJECTION, POWDER, LYOPHILIZED, FOR SOLUTION INTRAVENOUS at 03:07

## 2022-07-19 NOTE — PLAN OF CARE
Problem: Infection  Goal: Absence of Infection Signs and Symptoms  Outcome: Ongoing, Progressing  Intervention: Prevent or Manage Infection  Flowsheets (Taken 7/19/2022 1530)  Infection Management: aseptic technique maintained  Isolation Precautions:   precautions initiated   precautions maintained

## 2022-07-19 NOTE — PROGRESS NOTES
"  Infusion medication (name/dose/frequency):  Entyvio 2nd dose, pt took prednisone home medication prior to today's appointment   Today's weight:    Wt Readings from Last 1 Encounters:   07/19/22 68.8 kg (151 lb 9.1 oz)       Checklist prior to infusion:    Recent labs within the last 3 - 6 months ? Yes    Appointment with MD in past 3-6 mos? Yes, pt has a scheduled appointment this coming Monday    Documentation of safety questions prior to infusion:   RN TO NOTIFY MD IF "YES" ANSWERED TO ANY OF BELOW QUESTIONS PRIOR TO GIVING INFUSION:    Symptoms of infection (current or past 1 week)? (fever >100.4 F, URI, flu-like symptoms, cough, painful urination, warm/red/painful skin or skin ulcers/wounds, tooth pain): No    Antibiotics in past 2 weeks? No    Recent surgery with any complications?  No   If yes then has surgeon cleared patient prior to getting this infusion? N/A    Pregnant? No  If yes, is prescribing provider aware of this pregnancy?  N/A    NEW or WORSENING abdominal pain, diarrhea, nausea/vomiting? Yes, pt having abdominal pain, constipation (pt on medication)    SOB, ankle/feet swelling, or sudden weight gain? Yes, SOB (pt has hx of lung ca with resection)    Special Notes to provider:  No  Patient tolerated infusion well today, vital signs stable:  Yes          " Full range of motion of upper and lower extremities, no joint tenderness/swelling.

## 2022-07-25 ENCOUNTER — OFFICE VISIT (OUTPATIENT)
Dept: GASTROENTEROLOGY | Facility: CLINIC | Age: 72
End: 2022-07-25
Payer: MEDICARE

## 2022-07-25 DIAGNOSIS — C34.11 MALIGNANT NEOPLASM OF UPPER LOBE, RIGHT BRONCHUS OR LUNG: ICD-10-CM

## 2022-07-25 DIAGNOSIS — K52.9 COLITIS: Primary | ICD-10-CM

## 2022-07-25 PROCEDURE — 99214 PR OFFICE/OUTPT VISIT, EST, LEVL IV, 30-39 MIN: ICD-10-PCS | Mod: 95,,, | Performed by: INTERNAL MEDICINE

## 2022-07-25 PROCEDURE — 99214 OFFICE O/P EST MOD 30 MIN: CPT | Mod: 95,,, | Performed by: INTERNAL MEDICINE

## 2022-07-25 NOTE — PROGRESS NOTES
[facility]          Inflammatory Bowel Disease Follow Up Note         TODAY'S VISIT DATE:  7/25/2022    Reason for Consult:    Chief Complaint   Patient presents with    Ulcerative Colitis       PCP: Margo Caldwell      Referring MD:   No ref. provider found    History of Present Illness:  Alesha Toney who is a 71 y.o. 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  After our last visit she underwent a sigmoidoscopy due to an abnormal PET-CT and rectal bleeding.  She was found to have active inflammation in the distal rectum.  The remainder of her colon was normal appearing.  She was started on topical therapy but due to insurance coverage issues we had some difficulty finding something that was able to be covered.  She was eventually started on hydrocortisone suppositories.  She also started Entyvio July 6, 2022.  She has received 2 doses so far and is scheduled for her 3rd dose in a few weeks.  She has definitely noticed some improvement.  She is having less frequent bowel movements but still has some blood in the stools.  Her urgency is less but she continues to have a sense of incomplete evacuation and tenesmus.  She did have some body aches after her initial infusion so we premedicated her with prednisone 30 mg before her 2nd infusion and she had no problems with the 2nd infusion.    IBD History:  She has a history of lung cancer and has undergone surgical intervention as well as multiple regimens of chemotherapy.  Earlier this year she was started on immunotherapy with Opdivo and ipilimumab.  She received 3 cycles of combination treatment and 1 cycle of Opdivo alone.  In mid May she began having significant diarrhea and blood in the stools.  She underwent a flex sig for surveillance of a large polyp that was resected recently and was noted to have severe  colitis extending from the rectum to 20-25 cm proximal to the anus (scope not advanced more proximal to this).  Interestingly, in April when she had a flex sig/EUS she had an area of inflamed tissue at that time that showed acute inflammation.  After the colonoscopy in June she was started on Canasa suppositories which provided some relief for 3 days but then her symptoms worsened.  She was then started on Rowasa enemas which did not seem to provide much improvement.  She was prescribed 70 mg of prednisone daily but never started this because she was concerned about the high dose.  Eventually she started on 30 mg of prednisone daily on July 21st.  She was able to wean the prednisone off and did quite well.  In December 2021 she restarted Opdivo but did not restart ipilimumab.  In June 2022 she underwent a sigmoidoscopy because of an abnormal PET-CT.  She was found to have active proctitis.  We started hydrocortisone suppositories and on July 6, 2022 she started Entyvio.    Pertinent Labs:  Lab Results   Component Value Date    SEDRATE 3 11/29/2021    CRP 1.5 11/29/2021     Lab Results   Component Value Date    TTGIGA 4 07/26/2021     07/26/2021     Lab Results   Component Value Date    TSH 0.924 06/22/2022    FREET4 1.13 06/22/2022     Lab Results   Component Value Date    VXFOYJRJ38KG 41 07/26/2021    GHLYSURT32 543 07/26/2021     Lab Results   Component Value Date    HEPBSAG Negative 06/28/2022    HEPBCAB Negative 06/28/2022    HEPCAB Negative 07/26/2021     Lab Results   Component Value Date    NUB27KJOH Negative 07/26/2021     Lab Results   Component Value Date    NIL 0.70852 06/28/2022    MITOGENNIL 10.000 06/28/2022     Lab Results   Component Value Date    TPTMINTERP SEE BELOW 07/26/2021     Lab Results   Component Value Date    STOOLCULTURE  07/27/2021     No Salmonella,Shigella,Vibrio,Campylobacter,Yersinia isolated.    VOAJOEAKOD7N Negative 07/27/2021    QQACIVTQNL6R Negative 07/27/2021     CDIFFICILEAN Negative 07/27/2021    CDIFFTOX Negative 07/27/2021     Lab Results   Component Value Date    CALPROTECTIN <27.1 01/25/2022       Therapeutic Drug Monitoring Labs:  No results found for: PROMETH  No results found for: ANSADAINIT, INFLIXIMAB, INFLIXINTERP    Vaccinations:  No results found for: HEPBSAB  Lab Results   Component Value Date    HEPAIGG Negative 07/26/2021     Lab Results   Component Value Date    VARICELLAZOS 2.67 (H) 07/26/2021    VARICELLAINT Positive (A) 07/26/2021     Immunization History   Administered Date(s) Administered    COVID-19, MRNA, LN-S, PF (MODERNA FULL 0.5 ML DOSE) 02/11/2021, 02/14/2022    Influenza 01/12/2022    Influenza (FLUAD) - Quadrivalent - Adjuvanted - PF *Preferred* (65+) 10/08/2020, 01/12/2022    Influenza - High Dose - PF (65 years and older) 10/09/2018, 10/08/2019    Pneumococcal Polysaccharide - 23 Valent 10/24/2017         Review of Systems  Review of Systems   Constitutional: Negative for chills, fever and weight loss.   HENT: Negative for sore throat.    Eyes: Negative for pain, discharge and redness.   Respiratory: Positive for shortness of breath. Negative for cough and wheezing.    Cardiovascular: Negative for chest pain, orthopnea and leg swelling.   Gastrointestinal: Positive for blood in stool. Negative for abdominal pain, constipation, diarrhea, heartburn, melena, nausea and vomiting.   Genitourinary: Negative for dysuria, frequency and urgency.   Musculoskeletal: Negative for back pain, joint pain and myalgias.   Skin: Negative for itching and rash.   Neurological: Negative for focal weakness and seizures.   Endo/Heme/Allergies: Does not bruise/bleed easily.   Psychiatric/Behavioral: Negative for depression. The patient is nervous/anxious.        Medical History:   Past Medical History:   Diagnosis Date    Allergy     Anxiety     Bilateral epiphora     Breast cyst     Cancer     lung cancer    Cataract     Colitis     Disorder of kidney  and ureter     renal stone    Dry eye syndrome     Fibrocystic breast     Immunodeficiency due to drugs     Rectal polyp     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     Squamous cell carcinoma of skin 10/05/2020    left medial thigh    Therapy     Thyroid nodule     Ulcerative colitis with complication        Surgical History:  Past Surgical History:   Procedure Laterality Date    ADENOIDECTOMY  17yo    ANTERIOR CERVICAL DISCECTOMY W/ FUSION N/A 10/15/2018    Procedure: DISCECTOMY, SPINE, CERVICAL, ANTERIOR APPROACH, WITH FUSION C6/7  Depuy SNS: [employer]/SSE[employer]/Vocal Cord Regular OR table;  Surgeon: Greyson Bansal MD;  Location: [facility] OR [address] [place] [address];  Service: Neurosurgery;  Laterality: N/A;    APPENDECTOMY      BONE RESECTION, RIB  1980    BREAST BIOPSY Left     at least 40yrs ago in her 20's    BREAST CYST ASPIRATION      BREAST CYST EXCISION      COLONOSCOPY      ENDOBRONCHIAL ULTRASOUND N/A 7/10/2018    Procedure: ULTRASOUND, ENDOBRONCHIAL;  Surgeon: Sri Hearn MD;  Location: [facility] OR [address] [place] [address];  Service: Pulmonary;  Laterality: N/A;    ENDOBRONCHIAL ULTRASOUND N/A 9/24/2019    Procedure: ENDOBRONCHIAL ULTRASOUND (EBUS);  Surgeon: Sri Hearn MD;  Location: [facility] OR [address] [place] [address];  Service: Pulmonary;  Laterality: N/A;    ENDOSCOPIC MUCOSAL RESECTION OF COLON N/A 9/11/2020    Procedure: RESECTION, MUCOSA, COLON, ENDOSCOPIC;  Surgeon: Lilibeth Carbajal MD;  Location: [facility] ([address] [place] [address]);  Service: Endoscopy;  Laterality: N/A;    ENDOSCOPIC ULTRASOUND OF LOWER GASTROINTESTINAL TRACT N/A 4/19/2021    Procedure: ULTRASOUND, LOWER GI TRACT, ENDOSCOPIC;  Surgeon: Lilibeth Carbajal MD;  Location: [facility];  Service: Endoscopy;  Laterality: N/A;    ENDOSCOPIC ULTRASOUND OF LOWER GASTROINTESTINAL TRACT N/A 6/25/2021    Procedure: ULTRASOUND, LOWER GI TRACT, ENDOSCOPIC;  Surgeon: Silvino Christopher MD;  Location: [facility];  Service: Endoscopy;  Laterality: N/A;  Needs Rapid MP    FLEXIBLE  SIGMOIDOSCOPY  06/11/2020    with biopsies    FLEXIBLE SIGMOIDOSCOPY N/A 6/11/2020    Procedure: SIGMOIDOSCOPY, FLEXIBLE;  Surgeon: Gely Yeh MD;  Location: [facility] ENDO;  Service: Endoscopy;  Laterality: N/A;    FLEXIBLE SIGMOIDOSCOPY N/A 4/19/2021    Procedure: SIGMOIDOSCOPY-FLEXIBLE;  Surgeon: Lilibeth Carbajal MD;  Location: [facility] ENDO;  Service: Endoscopy;  Laterality: N/A;  Covid 4/16 Brian MP    FLEXIBLE SIGMOIDOSCOPY N/A 6/3/2022    Procedure: SIGMOIDOSCOPY-FLEXIBLE;  Surgeon: Francesco Clark MD;  Location: [facility] ENDO (4TH FLR);  Service: Endoscopy;  Laterality: N/A;  vacc-inst portal-tb    HYSTERECTOMY      @47yrs of age    INSERTION OF TUNNELED CENTRAL VENOUS CATHETER (CVC) WITH SUBCUTANEOUS PORT N/A 9/25/2018    Procedure: [id number]-[id number]-V-CATH;  Surgeon: Dosrhina Diagnostic Provider;  Location: [facility] OR 2ND FLR;  Service: Radiology;  Laterality: N/A;    INSERTION OF VENOUS ACCESS PORT N/A 3/15/2021    Procedure: INSERTION, VENOUS ACCESS PORT;  Surgeon: Chang Mathews MD;  [facility];  Service: General;  Laterality: N/A;  NEEDS CONSENT.    KIDNEY SURGERY      19yo    MEDIPORT REMOVAL N/A 3/15/2021    Procedure: REMOVAL, CATHETER, CENTRAL VENOUS, TUNNELED, WITH PORT;  Surgeon: Chang Mathews MD;  [facility];  Service: General;  Laterality: N/A;    OOPHORECTOMY      @47yrs of age    SKIN BIOPSY      TONSILLECTOMY      UPPER GASTROINTESTINAL ENDOSCOPY      VIDEO-ASSISTED THORACOSCOPIC LOBECTOMY Right 8/9/2018    Procedure: VATS, WITH LOBECTOMY Possible chest wall resection;  Surgeon: Philip Messina MD;  [facility];  Service: Thoracic;  Laterality: Right;  Have open pan available.       Family History:   Family History   Problem Relation Age of Onset    Stroke Mother     Cataracts Mother     Cancer Father         colon cancer    Colon cancer Father     Diabetes Brother     Heart failure Brother     Hypertension Brother      Heart attack Paternal Aunt     Heart attack Maternal Grandfather     Diabetes Paternal Aunt     Anxiety disorder Paternal Grandmother         agoraphobia    Anesthesia problems Neg Hx     Blindness Neg Hx     Amblyopia Neg Hx     Glaucoma Neg Hx     Macular degeneration Neg Hx     Retinal detachment Neg Hx     Strabismus Neg Hx     Thyroid disease Neg Hx     Melanoma Neg Hx        Social History:   Social History     Tobacco Use    Smoking status: Former Smoker     Packs/day: 1.00     Years: 30.00     Pack years: 30.00     Types: Cigarettes     Quit date: 2015     Years since quittin.1    Smokeless tobacco: Never Used   Substance Use Topics    Alcohol use: Not Currently     Alcohol/week: 0.0 standard drinks     Comment: socially     Drug use: No       Allergies: Reviewed    Home Medications:   Medication List with Changes/Refills   Current Medications    ASCORBIC ACID, VITAMIN C, (VITAMIN C) 500 MG TABLET    Take 500 mg by mouth once daily.    ATENOLOL (TENORMIN) 25 MG TABLET    Take 1 tablet (25 mg total) by mouth 3 (three) times daily.    CALCIUM CARBONATE (TUMS) 300 MG (750 MG) CHEW    Take by mouth as needed.     FAMOTIDINE (PEPCID) 10 MG TABLET    Take 10 mg by mouth once daily.    HYDROCODONE-ACETAMINOPHEN (NORCO) 7.5-325 MG PER TABLET    Take 1 tablet by mouth every 8 (eight) hours as needed for Pain.    LORAZEPAM (ATIVAN) 0.5 MG TABLET    Take 1 tablet (0.5 mg total) by mouth every 12 (twelve) hours as needed for Anxiety.    LOSARTAN (COZAAR) 50 MG TABLET    1 tab po bid    NIVOLUMAB (OPDIVO) 100 MG/10 ML INJECTION    100 mg by Other route. Infusion once a month   Discontinued Medications    PREDNISONE (DELTASONE) 20 MG TABLET    Take 3 tablets (60mg) one time dose on morning of infusion.    VITAMIN D (VITAMIN D3) 1000 UNITS TAB    Take 2,000 Units by mouth once daily.       Physical Exam:  Vital Signs:  LMP  (LMP Unknown)   There is no height or weight on file to calculate  BMI.    Physical Exam  Nursing note reviewed.   Constitutional:       General: She is not in acute distress.     Appearance: Normal appearance. She is well-developed. She is not ill-appearing or toxic-appearing.   Skin:     Findings: No rash.   Neurological:      General: No focal deficit present.      Mental Status: She is alert and oriented to person, place, and time.   Psychiatric:         Mood and Affect: Mood normal.         Behavior: Behavior normal.         Thought Content: Thought content normal.         Judgment: Judgment normal.     Telemedicine visit. Remainder of physical unable to be completed.    Labs: reviewed and pertinent noted above    Assessment/Plan:  Alesha Toney is a 71 y.o. female with immune checkpoint inhibitor associated colitis here with rectal bleeding and anal pain consistent with an anal fissure. The following issues were addresssed:    1. Colitis    2. Malignant neoplasm of upper lobe, right bronchus or lung      1. Checkpoint inhibitor colitis:  Continue Entyvio and hydrocortisone suppositories.  She is starting to have a response but not completely in remission.  Given the fact that her Opdivo has been on hold I will discuss with Dr. Dhaliwal if it would be worthwhile to try a short course of prednisone to help induce remission to allow her to get back on the chemotherapy in the near future.          Follow up: Follow up in about 2 months (around 9/25/2022).    Thank you again for sending Alesha Toney to see Dr. Baudilio Clark today at the [facility]. Please don't hesitate to contact Dr. Clark if there are any questions regarding this evaluation, or if you have any other patients with inflammatory bowel disease for whom you would like a consultation. You can reach Dr. Clark at [phone] or by email at [email or web address]    Francesco Clark MD  Department of Gastroenterology  Inflammatory Bowel Disease      The patient location is:  [place]  The chief complaint leading to consultation is: ICI Colitis    Visit type: audiovisual    Face to Face time with patient: 15  25 minutes of total time spent on the encounter, which includes face to face time and non-face to face time preparing to see the patient (eg, review of tests), Obtaining and/or reviewing separately obtained history, Documenting clinical information in the electronic or other health record, Independently interpreting results (not separately reported) and communicating results to the patient/family/caregiver, or Care coordination (not separately reported).         Each patient to whom he or she provides medical services by telemedicine is:  (1) informed of the relationship between the physician and patient and the respective role of any other health care provider with respect to management of the patient; and (2) notified that he or she may decline to receive medical services by telemedicine and may withdraw from such care at any time.    Notes:

## 2022-07-25 NOTE — PROGRESS NOTES
IBD PATIENT INTAKE:    COVID symptoms in the last 7 days (runny nose, sore throat, congestion, cough, fever): No  PCP: Margo Caldwell  If not PCP-  number given to establish [phone]: Yes    ALLERGIES REVIEWED:  Yes    CHIEF COMPLAINT:    Chief Complaint   Patient presents with    Ulcerative Colitis       VITAL SIGNS:  LMP  (LMP Unknown)      Change in medical, surgical, family or social history: No    IBD THERAPY (name, dose/frequency):  Entyvio q8w  Last dose:  2nd dose 7/19    Next dose:  3rd dose 8/17  Infusion/Pharmacy: [facility]    NSAIDs (aspirin, ibuprofen-advil or motrin, naproxen-aleve, diclofenac-voltaren, BC powder, excedrin, goodies): No    Alternative/Complementary Medications (i.e. probiotics, turmeric, fish oil, aloe vera):      no  Name/dose:      Vitamins:   Vit D:  no     Vit B-12:  no   Folic Acid: no       Calcium: yes 300mg     Iron:  no      MVI: no    Antibiotics (past 30 Days):  no  If yes   Indication:  Name of antibiotic:  Completion date:     REVIEWED MEDICATION LIST RECONCILED INCLUDING ABOVE MEDS:  yes                    Answers for HPI/ROS submitted by the patient on 7/19/2022  chest pain: Yes  shortness of breath: Yes  abdominal pain: Yes  nausea: Yes  nervous/ anxious: Yes  Joint pain? : Yes

## 2022-07-25 NOTE — PATIENT INSTRUCTIONS
1. Continue hydrocortisone suppositories and Entyvio  2. I will discuss with Dr. Dhaliwal  3. Follow-up in 2 months

## 2022-08-01 ENCOUNTER — OFFICE VISIT (OUTPATIENT)
Dept: HEMATOLOGY/ONCOLOGY | Facility: CLINIC | Age: 72
End: 2022-08-01
Payer: MEDICARE

## 2022-08-01 ENCOUNTER — LAB VISIT (OUTPATIENT)
Dept: LAB | Facility: HOSPITAL | Age: 72
End: 2022-08-01
Payer: MEDICARE

## 2022-08-01 ENCOUNTER — HOSPITAL ENCOUNTER (OUTPATIENT)
Dept: RADIOLOGY | Facility: HOSPITAL | Age: 72
Discharge: HOME | End: 2022-08-01
Attending: INTERNAL MEDICINE
Payer: MEDICARE

## 2022-08-01 VITALS
DIASTOLIC BLOOD PRESSURE: 66 MMHG | WEIGHT: 156.06 LBS | SYSTOLIC BLOOD PRESSURE: 160 MMHG | BODY MASS INDEX: 25.08 KG/M2 | HEIGHT: 66 IN | OXYGEN SATURATION: 98 % | HEART RATE: 56 BPM | RESPIRATION RATE: 18 BRPM

## 2022-08-01 DIAGNOSIS — C34.11 MALIGNANT NEOPLASM OF UPPER LOBE, RIGHT BRONCHUS OR LUNG: ICD-10-CM

## 2022-08-01 DIAGNOSIS — C34.11 MALIGNANT NEOPLASM OF UPPER LOBE, RIGHT BRONCHUS OR LUNG: Primary | ICD-10-CM

## 2022-08-01 DIAGNOSIS — K51.819 OTHER ULCERATIVE COLITIS WITH COMPLICATION: ICD-10-CM

## 2022-08-01 DIAGNOSIS — C77.1 SECONDARY AND UNSPECIFIED MALIGNANT NEOPLASM OF INTRATHORACIC LYMPH NODES: ICD-10-CM

## 2022-08-01 LAB
ALBUMIN SERPL BCP-MCNC: 3.6 G/DL (ref 3.5–5.2)
ALP SERPL-CCNC: 50 U/L (ref 55–135)
ALT SERPL W/O P-5'-P-CCNC: 12 U/L (ref 10–44)
ANION GAP SERPL CALC-SCNC: 7 MMOL/L (ref 8–16)
AST SERPL-CCNC: 17 U/L (ref 10–40)
BILIRUB SERPL-MCNC: 0.4 MG/DL (ref 0.1–1)
BUN SERPL-MCNC: 24 MG/DL (ref 8–23)
CALCIUM SERPL-MCNC: 9.5 MG/DL (ref 8.7–10.5)
CHLORIDE SERPL-SCNC: 103 MMOL/L (ref 95–110)
CO2 SERPL-SCNC: 29 MMOL/L (ref 23–29)
CREAT SERPL-MCNC: 1.4 MG/DL (ref 0.5–1.4)
ERYTHROCYTE [DISTWIDTH] IN BLOOD BY AUTOMATED COUNT: 12.8 % (ref 11.5–14.5)
EST. GFR  (NO RACE VARIABLE): 40.2 ML/MIN/1.73 M^2
GLUCOSE SERPL-MCNC: 105 MG/DL (ref 70–110)
HCT VFR BLD AUTO: 36.9 % (ref 37–48.5)
HGB BLD-MCNC: 12.2 G/DL (ref 12–16)
IMM GRANULOCYTES # BLD AUTO: 0.02 K/UL (ref 0–0.04)
MCH RBC QN AUTO: 31 PG (ref 27–31)
MCHC RBC AUTO-ENTMCNC: 33.1 G/DL (ref 32–36)
MCV RBC AUTO: 94 FL (ref 82–98)
NEUTROPHILS # BLD AUTO: 4 K/UL (ref 1.8–7.7)
PLATELET # BLD AUTO: 195 K/UL (ref 150–450)
PMV BLD AUTO: 9.4 FL (ref 9.2–12.9)
POTASSIUM SERPL-SCNC: 4.4 MMOL/L (ref 3.5–5.1)
PROT SERPL-MCNC: 6.3 G/DL (ref 6–8.4)
RBC # BLD AUTO: 3.93 M/UL (ref 4–5.4)
SODIUM SERPL-SCNC: 139 MMOL/L (ref 136–145)
WBC # BLD AUTO: 7.26 K/UL (ref 3.9–12.7)

## 2022-08-01 PROCEDURE — 85027 COMPLETE CBC AUTOMATED: CPT | Performed by: INTERNAL MEDICINE

## 2022-08-01 PROCEDURE — 99215 OFFICE O/P EST HI 40 MIN: CPT | Mod: S$PBB,,, | Performed by: INTERNAL MEDICINE

## 2022-08-01 PROCEDURE — 80053 COMPREHEN METABOLIC PANEL: CPT | Performed by: INTERNAL MEDICINE

## 2022-08-01 PROCEDURE — 99999 PR PBB SHADOW E&M-EST. PATIENT-LVL III: ICD-10-PCS | Mod: PBBFAC,,, | Performed by: INTERNAL MEDICINE

## 2022-08-01 PROCEDURE — 36415 COLL VENOUS BLD VENIPUNCTURE: CPT | Performed by: INTERNAL MEDICINE

## 2022-08-01 PROCEDURE — 99215 PR OFFICE/OUTPT VISIT, EST, LEVL V, 40-54 MIN: ICD-10-PCS | Mod: S$PBB,,, | Performed by: INTERNAL MEDICINE

## 2022-08-01 PROCEDURE — 71250 CT CHEST WITHOUT CONTRAST: ICD-10-PCS | Mod: 26,,, | Performed by: RADIOLOGY

## 2022-08-01 PROCEDURE — 99999 PR PBB SHADOW E&M-EST. PATIENT-LVL III: CPT | Mod: PBBFAC,,, | Performed by: INTERNAL MEDICINE

## 2022-08-01 PROCEDURE — 71250 CT THORAX DX C-: CPT | Mod: 26,,, | Performed by: RADIOLOGY

## 2022-08-01 PROCEDURE — 99213 OFFICE O/P EST LOW 20 MIN: CPT | Mod: PBBFAC,25 | Performed by: INTERNAL MEDICINE

## 2022-08-01 PROCEDURE — 71250 CT THORAX DX C-: CPT | Mod: TC

## 2022-08-01 NOTE — PROGRESS NOTES
"Subjective:       Patient ID: Alesha Toney is a 71 y.o. female.    Chief Complaint: Malignant neoplasm of upper lobe, right bronchus or lung  Ms. Alesha Toney is a 71-year-old otherwise healthy female who started having some shoulder pain, which was lasting for over six weeks.  She went and saw her primary care physician and underwent an x-ray, which revealed right upper lobe lung mass worrisome for malignancy and a CT scan was recommended, which was done on 6/12/18 and that revealed a newly developing mass, pleural based, lateral posterior segment, right upper lobe 3.5 x 3.5 cm, surrounding stellate margin and patchy infiltrates, particularly superiorly,   anteriorly infiltrates extend perihilar into the anterior segment.  She then underwent CT-guided biopsy, which revealed well-differentiated adenocarcinoma.       Her PET scan from 6/29/18 There is physiologic intracranial, head, and neck activity.  There is a large right upper lobe mass SUV max 9.11.  No local or distant metastatic disease seen.  There is physiologic liver, spleen, GI and  activity.  There are a few liver cysts.  No adenopathy is seen.  The adrenal glands are not enlarged.  No adenopathy is seen.  No suspicious bone lesions are seen.     She underwent VATS with right upper lobectomy. Mediastinal lymphadenectomy on 8/9/18. Pathology revealed "Tumor site: Upper lobe.Tumor size: 7 x 4 x 2.7 cm.Tumor focality: Single tumor.  Histologic type: Mixed invasive mucinous and nonmucinous adenocarcinoma. Histologic grade: G2: Moderately differentiated.Spread through air spaces (STATS): Present. Visceral pleura invasion: Not identified.  Lymphovascular invasion: Not identified. Direct invasion of adjacent structures: No adjacent structures present. Margins: All margins are uninvolved by carcinoma.Distance of invasive carcinoma from closest margin: 1 cm from the bronchial margin.Treatment effect: No known presurgical therapy. Regional lymph nodes: " "Number of lymph nodes involved: 0. Number of lymph nodes examined: 11. Pathologic Stage Classification: pT3 N0"        She completed 4 cycles of adjuvant chemo with Cisplatin and Alimta as of 1/10/19   She is on surveillance.     CT chest of 9/17/19 which reveals "Operative change of right upper lobectomy for small-cell lung cancer with several scattered subsolid opacities and a new solid nodule in the right lower lobe measuring up to 0.5 cm.  We recommend continued surveillance with the role and schedule for such surveillance to be determined by clinical considerations. Sided chest port distal tip terminating at the confluence of the brachiocephalic vein in the SVC.  Correlate with device functioning. Apically predominant emphysematous changes, left greater than right. Aortic annular calcification and multi-vessel coronary artery atherosclerosis. Numerous unchanged hepatic hypodensities, likely cysts"     PET scan from 10/1/19 reveals "1.6 cm soft tissue lesion re-identified posterior to the bronchus intermedius.  No corresponding focal increased radiotracer uptake to suggest local recurrence or metastatic disease. Stable subcentimeter opacities throughout the bilateral lower lobes, too small to characterize with PET-CT. Focal increased radiotracer uptake and subtle wall thickening in the rectum.  Findings are concerning for primary neoplastic process.  Recommend further evaluation with direct visualization"     She returned from [facility] and was advised to proceed with chemo/RT with either Docetaxel/platinum or Carboplatin/Alimta.     She completed chemo/RT with Carboplatin and Alimta as of 12/31/19     She underwent CT chest on 5/27/2020 which revealed 1. In this patient with history of lung cancer status post right upper lobectomy, there is a soft tissue opacity at the posterior margin of the staple line.  This lesion has been enlarging progressively and now measures 1.3 x 1.6 cm.  There is also a new 1.7 x " "1.7 cm opacity at the right paravertebral pleural margin which is inseparable from this lesion.  These findings may represent post radiation change in light of the hypermetabolic lesion in this region on PET-CT of 10/01/2019 (image 69/[id number]).There is a 1 cm opacity in the superior or lateral basal segment of the right lower lobe (axial series 4, image 243) which has enlarged since 09/17/2019.  Nature of this lesion is unclear.  Clinical considerations will determine if percutaneous biopsy or metabolic assessment is warranted. 1.0 cm solid opacity with branching configuration (series 4, image 205) is located in the lateral basal segment of the left lower lobe, stable since 02/16/2019 (02/06/2019 axial series 4, image 310). Appearance and bifurcating character suggests mucoid impaction in mildly ectatic peripheral airway, likely not significant. Persistent clustered small centrilobular nodules in the apical segment of the right upper lobe likely reflecting small airway inflammation/infection. Partially visualized left chest port with distal catheter tip at the junction of the brachiocephalic veins and SVC, similar as on 09/17/2019"  She thus underwent PET scan on 6/1/2020 which revealed "No evidence of residual viable lung malignancy.  Evolving post radiation changes in the right paramediastinal lung, with a new irregular subpleural density which may also be related to recent radiation.  Attention on follow-up. Interval decrease in size of a soft tissue lesion along the inferior margin of the lobectomy stable line.  Several stable subcentimeter soft tissue opacities in the lower lobes bilaterally, As above.  Attention on follow-up.  Persistent hypermetabolic rectal lesion concerning for malignancy.  Recommend direct visualization and tissue sampling as clinically indicated"  I discussed her case in thoracic conference today and findings are felt to be related to RT     Her restaging CT scans from 1/20/2021 which reveal " ""Persistent soft tissue opacity in the posterior margin of the staple line that is increased in prominence when compared to the prior examination and is worrisome for disease progression. No new lesions identified.  Stable pulmonary nodules. Stable hypodense lesions throughout the liver that likely represent hepatic cysts"        Restaging PET scan from 1/27/2021 reveals "Increased size and hypermetabolic activity involving soft tissue opacity along the inferior border of the right upper lobectomy stable line concerning for progression of disease. Mildly increased hypermetabolic activity involving the rectum compatible with known high-grade dysplasia/intramucosal carcinoma. Interval decrease in size and resolution of hypermetabolic activity involving subcentimeter subpleural opacity within the right lower lobe"     MRI brain from  2/3/2021 reveals no evidence of recurrence.  GUARDANT testing only reveals p53, PDL1 is 0 and no other targets        PET scan from 3/24/2021 reveals "In this patient with known lung cancer, there is a persistent hypermetabolic right hilar mass consistent with viable tumor.  Interval increase in size is equivocal for progression of fibrosis versus tumor growth. 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  Activity appears more focal and possibly more proximal than before.  Consider direct visualization for further evaluation. The previously described subcentimeter subpleural opacity within the right lower lobe is not definitely seen on today's exam"               She last received immunotherapy on 5/28/2021. She underwent EUS on 6/25/2021 which revealed "Erythematous, congested, and ulcerated mucosa  (proctitis) in rectosigmoid colon.  Pathology reveals Colon, rectosigmoid, biopsy:  Moderate active colitis     She completed steroids about 5 weeks ago.      CT scans from 11/5/2021 reveals "In this patient with lung " "cancer, there is postoperative changes of right upper lobe resection.  Persistent soft tissue opacity along the right hilum suture line which is slightly increased in size from prior exam.  More conspicuous appearing nodules within the right lower lobe seen on prior exam.  New nodule within the right middle lobe measuring 1.3 cm.  Overall findings are all worrisome for disease progression. Scattered areas of centrilobular nodularity within the bilateral lungs.  Nonspecific and can be seen in the setting of infectious or inflammatory etiologies. Unchanged hepatics cysts. Stable left adrenal gland nodule."  PET scan from 11/23/2021 revealed "New hypermetabolic nodule within the right lower lobe additional slightly subcentimeter nodules within the right lower lobe.  Findings concerning for disease progression, other differentials include infection or noninfectious inflammatory process. Interval decrease in size and uptake in the right hilar mass. Resolution of previous focal rectal uptake"        She is on Opdivo. Her Opdivo was held on 1/10/2022 due to rectal bleeding. She saw Dr. Clark and was noted to have anal fissure which contributed to rectal bleed. Her rectal bleed has since resolved.           She was on Opdivo until 5/24/2022, she noted rectal bleeding and underwent   Flex sigmoidoscopy on 6/3/2022 reveals "Localized severe inflammation was found in the distal rectum. Biopsied. Pathology reveals Severely active chronic colitis with ulceration (see comment)  Moderate architectural disorder. Negative for CMV by immunohistochemistry . Negative for granulomas or viral inclusions. Negative for dysplasia or carcinoma"  PET scan from 6/17/2022 reveals "In this patient with lung cancer there are enlarging hypermetabolic right perihilar opacities concerning for continued local disease progression. Continued decreased metabolic activity of the right lower lobe nodule.  Cluster of pulmonary nodules bilaterally, some " "which are new from prior PET-CT for example the superior segment the left upper lobe and are likely infectious/inflammatory origin. Persistent and more extensive region of increased FDG avidity within the distal rectum in keeping with more extensive colitis.  Malignancy could have a similar appearance.  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 She feels that the colitis is improving, denies any blood in the stool, mostly pinkish mucus, continues to have abdomen cramping   She has cough with slight brownish sputum X 2 weeks. Breathing and chest pain are at baseline  CT chest "Similar appearance of elongated, irregular opacity adjacent to the right upper lobectomy margin.  More posteriorly adjacent perihilar opacities with previous FDG avidity with detailed measurements as above. Similar areas of scattered micro-nodularity with tree-in-bud, likely infectious/inflammatory in nature.  New appearing area of grouped micro-nodules in the anterior left lower lobe, largest up to 5 mm, which could relate to aforementioned small airways disease, though technically indeterminate.  Continued close attention at follow-up. RECIST SUMMARY: Date of prior examination for comparison: CT chest dated 05/24/2022. Lesion 1: Right perihilar opacity.  2.4.  Series 2, image 45.  Previously 2 4.  (Note there is slight increased size conspicuity in the craniocaudal dimension at 1.8 cm, previously 1.5 cm). Lesion 2: Right perihilar opacity.  1.7 cm.  Series 2, image 59.  Previously 1.5 cm"    Review of Systems   Constitutional: Negative for appetite change, fatigue and unexpected weight change.   HENT: Negative for mouth sores.    Eyes: Negative for visual disturbance.   Respiratory: Negative for cough and shortness of breath.    Cardiovascular: Negative for chest pain.   Gastrointestinal: Positive " for abdominal pain. Negative for diarrhea.   Genitourinary: Negative for frequency.   Musculoskeletal: Negative for back pain.   Integumentary:  Negative for rash.   Neurological: Negative for headaches.   Hematological: Negative for adenopathy.   Psychiatric/Behavioral: The patient is not nervous/anxious.    All other systems reviewed and are negative.        Objective:      Physical Exam  Vitals reviewed.   Constitutional:       Appearance: She is well-developed.   HENT:      Mouth/Throat:      Pharynx: No oropharyngeal exudate.   Cardiovascular:      Rate and Rhythm: Normal rate.      Heart sounds: Normal heart sounds.   Pulmonary:      Effort: Pulmonary effort is normal.      Breath sounds: Normal breath sounds. No wheezing.   Abdominal:      General: Bowel sounds are normal.      Palpations: Abdomen is soft.      Tenderness: There is no abdominal tenderness.   Musculoskeletal:         General: No tenderness.   Lymphadenopathy:      Cervical: No cervical adenopathy.   Skin:     General: Skin is warm and dry.      Findings: No rash.   Neurological:      Mental Status: She is alert and oriented to person, place, and time.      Coordination: Coordination normal.   Psychiatric:         Thought Content: Thought content normal.         Judgment: Judgment normal.           LABS:  WBC   Date Value Ref Range Status   08/01/2022 7.26 3.90 - 12.70 K/uL Final     Hemoglobin   Date Value Ref Range Status   08/01/2022 12.2 12.0 - 16.0 g/dL Final     POC Hematocrit   Date Value Ref Range Status   08/09/2018 33 (L) 36 - 54 %PCV Final     Hematocrit   Date Value Ref Range Status   08/01/2022 36.9 (L) 37.0 - 48.5 % Final     Platelets   Date Value Ref Range Status   08/01/2022 195 150 - 450 K/uL Final     Gran # (ANC)   Date Value Ref Range Status   08/01/2022 4.0 1.8 - 7.7 K/uL Final     Comment:     The ANC is based on a white cell differential from an   automated cell counter. It has not been microscopically   reviewed for the  presence of abnormal cells. Clinical   correlation is required.         Chemistry        Component Value Date/Time     08/01/2022 1456    K 4.4 08/01/2022 1456     08/01/2022 1456    CO2 29 08/01/2022 1456    BUN 24 (H) 08/01/2022 1456    CREATININE 1.4 08/01/2022 1456     08/01/2022 1456        Component Value Date/Time    CALCIUM 9.5 08/01/2022 1456    ALKPHOS 50 (L) 08/01/2022 1456    AST 17 08/01/2022 1456    ALT 12 08/01/2022 1456    BILITOT 0.4 08/01/2022 1456    ESTGFRAFRICA 58.4 (A) 06/22/2022 1046    EGFRNONAA 50.6 (A) 06/22/2022 1046          Assessment:       Problem List Items Addressed This Visit     Malignant neoplasm of upper lobe, right bronchus or lung - Primary    Relevant Orders    CT Chest Without Contrast    Secondary and unspecified malignant neoplasm of intrathoracic lymph nodes    Ulcerative colitis with complication          Plan:          Reviewed CT chest which is stable currently, She is on treatment with Dr. Clark for colitis with Enytvio and notes that she is feeling better  Discussed with Dr. Clark and he will also start prednisone, once she is able to taper prednisone to 10 mg or less and colitis resolves, we can restart Opdivo, She prefers to do this as well.    Above care plan was discussed with patient and accompanying wife and all questions were addressed to their satisfaction

## 2022-08-01 NOTE — Clinical Note
Her start for Opdivo depends on when she can get off prednisone. So I could not send the follow-up to schedulers

## 2022-08-02 ENCOUNTER — PATIENT MESSAGE (OUTPATIENT)
Dept: GASTROENTEROLOGY | Facility: HOSPITAL | Age: 72
End: 2022-08-02
Payer: MEDICARE

## 2022-08-02 ENCOUNTER — PATIENT MESSAGE (OUTPATIENT)
Dept: GASTROENTEROLOGY | Facility: CLINIC | Age: 72
End: 2022-08-02
Payer: MEDICARE

## 2022-08-02 ENCOUNTER — PATIENT MESSAGE (OUTPATIENT)
Dept: HEMATOLOGY/ONCOLOGY | Facility: CLINIC | Age: 72
End: 2022-08-02
Payer: MEDICARE

## 2022-08-02 RX ORDER — PREDNISONE 5 MG/1
TABLET ORAL
Qty: 70 TABLET | Refills: 0 | Status: SHIPPED | OUTPATIENT
Start: 2022-08-02 | End: 2022-08-30

## 2022-08-03 NOTE — TELEPHONE ENCOUNTER
Spoke with patient.  Informed her CT is stable.  She thanked nurse.    Her only question is when she can start immunotherapy again.   Patient states she is on a month long short taper with dr vasquez----  And in 2 weeks will be down to prednisone 10mg ----    Nurse will speak with dr kenney to see when to schedule her for treatment---and nurse will call patient back in the morning.        Message routed to dr kenney

## 2022-08-04 ENCOUNTER — PES CALL (OUTPATIENT)
Dept: ADMINISTRATIVE | Facility: OTHER | Age: 72
End: 2022-08-04
Payer: MEDICARE

## 2022-08-11 ENCOUNTER — PATIENT MESSAGE (OUTPATIENT)
Dept: INTERNAL MEDICINE | Facility: CLINIC | Age: 72
End: 2022-08-11
Payer: MEDICARE

## 2022-08-11 RX ORDER — HYDROCODONE BITARTRATE AND ACETAMINOPHEN 7.5; 325 MG/1; MG/1
1 TABLET ORAL EVERY 8 HOURS PRN
Qty: 90 TABLET | Refills: 0 | Status: SHIPPED | OUTPATIENT
Start: 2022-08-11 | End: 2022-09-12 | Stop reason: SDUPTHER

## 2022-08-11 NOTE — TELEPHONE ENCOUNTER
No new care gaps identified.  [facility] Embedded Care Gaps. Reference number: [id number]. 8/11/2022   4:26:39 PM CDT

## 2022-08-15 ENCOUNTER — TELEPHONE (OUTPATIENT)
Dept: GASTROENTEROLOGY | Facility: CLINIC | Age: 72
End: 2022-08-15
Payer: MEDICARE

## 2022-08-15 ENCOUNTER — PATIENT MESSAGE (OUTPATIENT)
Dept: GASTROENTEROLOGY | Facility: CLINIC | Age: 72
End: 2022-08-15
Payer: MEDICARE

## 2022-08-15 DIAGNOSIS — K51.819 OTHER ULCERATIVE COLITIS WITH COMPLICATION: Primary | ICD-10-CM

## 2022-08-15 NOTE — TELEPHONE ENCOUNTER
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  She is starting to have a response but not completely in remission.  **Given the fact that her Opdivo has been on hold Dr. Clark discussed with Dr. Dhaliwal if it would be worthwhile to try a short course of prednisone to help induce remission to allow her to get back on the chemotherapy in the near future.  - Last appointment: 07/25/2022 - next visit 09/27/22  - Upcoming pertinent appointments: IBD clinc 09/27/2022; 3rd Entyvio infusion on 8/17/2022  - Symptom onset: Saturday 8/13 - 2 days after decreasing prednisone to 15 mg  - BMs: 2-3, soft with blood streaks on stool and blood and mucous on tissue  - Pain: moderate cramping associated with bowel movements; lots of gas pains, but is passing very foul gas  - N/V: some intermittent nausea, denies vomiting  - Fever/ Chills: denies  - Additional symptoms: n/a  - Additional information: Re-started prednisone at 20mg on 8/2 after Dr. Clark spoke with Dr. Dhaliwal; once she is able to taper prednisone to 10 mg or less and colitis resolves,  can restart Opdivo  - Will discuss with Dr. Christopher

## 2022-08-16 ENCOUNTER — PATIENT MESSAGE (OUTPATIENT)
Dept: INTERNAL MEDICINE | Facility: CLINIC | Age: 72
End: 2022-08-16
Payer: MEDICARE

## 2022-08-17 ENCOUNTER — PATIENT MESSAGE (OUTPATIENT)
Dept: INTERNAL MEDICINE | Facility: CLINIC | Age: 72
End: 2022-08-17
Payer: MEDICARE

## 2022-08-17 ENCOUNTER — INFUSION (OUTPATIENT)
Dept: INFECTIOUS DISEASES | Facility: HOSPITAL | Age: 72
End: 2022-08-17
Attending: INTERNAL MEDICINE
Payer: MEDICARE

## 2022-08-17 VITALS
BODY MASS INDEX: 25.26 KG/M2 | RESPIRATION RATE: 18 BRPM | WEIGHT: 156.5 LBS | TEMPERATURE: 97 F | SYSTOLIC BLOOD PRESSURE: 154 MMHG | HEART RATE: 72 BPM | DIASTOLIC BLOOD PRESSURE: 67 MMHG | OXYGEN SATURATION: 97 %

## 2022-08-17 DIAGNOSIS — C34.11 MALIGNANT NEOPLASM OF UPPER LOBE, RIGHT BRONCHUS OR LUNG: ICD-10-CM

## 2022-08-17 DIAGNOSIS — K51.819 OTHER ULCERATIVE COLITIS WITH COMPLICATION: Primary | ICD-10-CM

## 2022-08-17 PROCEDURE — 25000003 PHARM REV CODE 250: Performed by: INTERNAL MEDICINE

## 2022-08-17 PROCEDURE — 63600175 PHARM REV CODE 636 W HCPCS: Performed by: INTERNAL MEDICINE

## 2022-08-17 PROCEDURE — 96365 THER/PROPH/DIAG IV INF INIT: CPT

## 2022-08-17 RX ORDER — HEPARIN 100 UNIT/ML
500 SYRINGE INTRAVENOUS
Status: DISCONTINUED | OUTPATIENT
Start: 2022-08-17 | End: 2022-08-17 | Stop reason: HOSPADM

## 2022-08-17 RX ORDER — SODIUM CHLORIDE 0.9 % (FLUSH) 0.9 %
10 SYRINGE (ML) INJECTION
Status: CANCELLED | OUTPATIENT
Start: 2022-08-25

## 2022-08-17 RX ORDER — ACETAMINOPHEN 325 MG/1
650 TABLET ORAL
Status: CANCELLED | OUTPATIENT
Start: 2022-08-25

## 2022-08-17 RX ORDER — NYSTATIN 100000 [USP'U]/ML
6 SUSPENSION ORAL 4 TIMES DAILY
Qty: 240 ML | Refills: 0 | Status: SHIPPED | OUTPATIENT
Start: 2022-08-17 | End: 2022-08-27

## 2022-08-17 RX ORDER — EPINEPHRINE 1 MG/ML
0.3 INJECTION, SOLUTION, CONCENTRATE INTRAVENOUS
Status: CANCELLED | OUTPATIENT
Start: 2022-08-25

## 2022-08-17 RX ORDER — HEPARIN 100 UNIT/ML
500 SYRINGE INTRAVENOUS
Status: CANCELLED | OUTPATIENT
Start: 2022-08-25

## 2022-08-17 RX ORDER — SODIUM CHLORIDE 0.9 % (FLUSH) 0.9 %
10 SYRINGE (ML) INJECTION
Status: DISCONTINUED | OUTPATIENT
Start: 2022-08-17 | End: 2022-08-17 | Stop reason: HOSPADM

## 2022-08-17 RX ORDER — IPRATROPIUM BROMIDE AND ALBUTEROL SULFATE 2.5; .5 MG/3ML; MG/3ML
3 SOLUTION RESPIRATORY (INHALATION)
Status: CANCELLED | OUTPATIENT
Start: 2022-08-25

## 2022-08-17 RX ORDER — DIPHENHYDRAMINE HYDROCHLORIDE 50 MG/ML
25 INJECTION INTRAMUSCULAR; INTRAVENOUS
Status: CANCELLED | OUTPATIENT
Start: 2022-08-25

## 2022-08-17 RX ORDER — DIPHENHYDRAMINE HYDROCHLORIDE 50 MG/ML
25 INJECTION INTRAMUSCULAR; INTRAVENOUS
Status: ACTIVE | OUTPATIENT
Start: 2022-08-17 | End: 2022-08-17

## 2022-08-17 RX ORDER — METHYLPREDNISOLONE SOD SUCC 125 MG
40 VIAL (EA) INJECTION
Status: CANCELLED | OUTPATIENT
Start: 2022-08-25

## 2022-08-17 RX ORDER — EPINEPHRINE 0.3 MG/.3ML
0.3 INJECTION SUBCUTANEOUS
Status: ACTIVE | OUTPATIENT
Start: 2022-08-17 | End: 2022-08-17

## 2022-08-17 RX ORDER — IPRATROPIUM BROMIDE AND ALBUTEROL SULFATE 2.5; .5 MG/3ML; MG/3ML
3 SOLUTION RESPIRATORY (INHALATION)
Status: ACTIVE | OUTPATIENT
Start: 2022-08-17 | End: 2022-08-17

## 2022-08-17 RX ORDER — ACETAMINOPHEN 325 MG/1
650 TABLET ORAL
Status: ACTIVE | OUTPATIENT
Start: 2022-08-17 | End: 2022-08-17

## 2022-08-17 RX ADMIN — SODIUM CHLORIDE: 0.9 INJECTION, SOLUTION INTRAVENOUS at 02:08

## 2022-08-17 RX ADMIN — HEPARIN 500 UNITS: 100 SYRINGE at 03:08

## 2022-08-17 RX ADMIN — VEDOLIZUMAB 300 MG: 300 INJECTION, POWDER, LYOPHILIZED, FOR SOLUTION INTRAVENOUS at 02:08

## 2022-08-17 NOTE — TELEPHONE ENCOUNTER
Pt requesting Nystatin swish and swallow for what she believes to be oral thrush after using steroids.

## 2022-08-17 NOTE — PROGRESS NOTES
"  Infusion medication (name/dose/frequency):  Entyvio 3rd dose, pt took prednisone home medication prior to today's appointment   Today's weight:    Wt Readings from Last 1 Encounters:   08/17/22 71 kg (156 lb 8.4 oz)       Checklist prior to infusion:    Recent labs within the last 3 - 6 months ? Yes    Appointment with MD in past 3-6 mos? Yes, pt has a scheduled appointment this coming Monday    Documentation of safety questions prior to infusion:   RN TO NOTIFY MD IF "YES" ANSWERED TO ANY OF BELOW QUESTIONS PRIOR TO GIVING INFUSION:    Symptoms of infection (current or past 1 week)? (fever >100.4 F, URI, flu-like symptoms, cough, painful urination, warm/red/painful skin or skin ulcers/wounds, tooth pain): No    Antibiotics in past 2 weeks? No    Recent surgery with any complications?  No   If yes then has surgeon cleared patient prior to getting this infusion? N/A    Pregnant? No  If yes, is prescribing provider aware of this pregnancy?  N/A    NEW or WORSENING abdominal pain, diarrhea, nausea/vomiting? Yes, pt having abdominal pain, constipation (pt on medication)    SOB, ankle/feet swelling, or sudden weight gain? Yes, SOB (pt has hx of lung ca with resection)    Special Notes to provider:  No  Patient tolerated infusion well today, vital signs stable:  Yes          "

## 2022-08-19 ENCOUNTER — LAB VISIT (OUTPATIENT)
Dept: LAB | Facility: HOSPITAL | Age: 72
End: 2022-08-19
Attending: INTERNAL MEDICINE
Payer: MEDICARE

## 2022-08-19 DIAGNOSIS — K51.819 OTHER ULCERATIVE COLITIS WITH COMPLICATION: ICD-10-CM

## 2022-08-19 PROCEDURE — 83993 ASSAY FOR CALPROTECTIN FECAL: CPT | Performed by: INTERNAL MEDICINE

## 2022-08-19 PROCEDURE — 87449 NOS EACH ORGANISM AG IA: CPT | Performed by: INTERNAL MEDICINE

## 2022-08-19 PROCEDURE — 87427 SHIGA-LIKE TOXIN AG IA: CPT | Mod: 59 | Performed by: INTERNAL MEDICINE

## 2022-08-19 PROCEDURE — 87045 FECES CULTURE AEROBIC BACT: CPT | Performed by: INTERNAL MEDICINE

## 2022-08-19 PROCEDURE — 87046 STOOL CULTR AEROBIC BACT EA: CPT | Mod: 59 | Performed by: INTERNAL MEDICINE

## 2022-08-21 LAB
E COLI SXT1 STL QL IA: NEGATIVE
E COLI SXT2 STL QL IA: NEGATIVE

## 2022-08-22 ENCOUNTER — OFFICE VISIT (OUTPATIENT)
Dept: HEMATOLOGY/ONCOLOGY | Facility: CLINIC | Age: 72
End: 2022-08-22
Payer: MEDICARE

## 2022-08-22 VITALS
DIASTOLIC BLOOD PRESSURE: 67 MMHG | HEART RATE: 76 BPM | TEMPERATURE: 99 F | WEIGHT: 155.19 LBS | RESPIRATION RATE: 20 BRPM | HEIGHT: 66 IN | BODY MASS INDEX: 24.94 KG/M2 | OXYGEN SATURATION: 98 % | SYSTOLIC BLOOD PRESSURE: 154 MMHG

## 2022-08-22 DIAGNOSIS — R05.9 COUGH: Primary | ICD-10-CM

## 2022-08-22 DIAGNOSIS — E03.9 HYPOTHYROIDISM, UNSPECIFIED TYPE: ICD-10-CM

## 2022-08-22 DIAGNOSIS — C77.1 SECONDARY AND UNSPECIFIED MALIGNANT NEOPLASM OF INTRATHORACIC LYMPH NODES: ICD-10-CM

## 2022-08-22 DIAGNOSIS — C34.11 MALIGNANT NEOPLASM OF UPPER LOBE, RIGHT BRONCHUS OR LUNG: Primary | ICD-10-CM

## 2022-08-22 DIAGNOSIS — U07.1 COVID: ICD-10-CM

## 2022-08-22 LAB — SARS-COV-2 RDRP RESP QL NAA+PROBE: NEGATIVE

## 2022-08-22 PROCEDURE — 99999 PR PBB SHADOW E&M-EST. PATIENT-LVL IV: ICD-10-PCS | Mod: PBBFAC,CR,, | Performed by: INTERNAL MEDICINE

## 2022-08-22 PROCEDURE — 99999 PR PBB SHADOW E&M-EST. PATIENT-LVL IV: CPT | Mod: PBBFAC,CR,, | Performed by: INTERNAL MEDICINE

## 2022-08-22 PROCEDURE — 99214 OFFICE O/P EST MOD 30 MIN: CPT | Mod: PBBFAC | Performed by: INTERNAL MEDICINE

## 2022-08-22 PROCEDURE — 99215 PR OFFICE/OUTPT VISIT, EST, LEVL V, 40-54 MIN: ICD-10-PCS | Mod: S$PBB,CR,, | Performed by: INTERNAL MEDICINE

## 2022-08-22 PROCEDURE — U0002 COVID-19 LAB TEST NON-CDC: HCPCS | Performed by: INTERNAL MEDICINE

## 2022-08-22 PROCEDURE — 99215 OFFICE O/P EST HI 40 MIN: CPT | Mod: S$PBB,CR,, | Performed by: INTERNAL MEDICINE

## 2022-08-22 RX ORDER — DOXYCYCLINE HYCLATE 100 MG
100 TABLET ORAL 2 TIMES DAILY
Qty: 20 TABLET | Refills: 0 | Status: SHIPPED | OUTPATIENT
Start: 2022-08-22 | End: 2022-09-15

## 2022-08-22 NOTE — TELEPHONE ENCOUNTER
Spoke with patient.  covid negative.  Per dr kenney, Will obtain cbc/cmp, blood cultures x 2, chest xray and urine tomorrow. She wants this to be scheduled at [facility].    Doxy 100mg bid x 10 days called into [facility].

## 2022-08-22 NOTE — PROGRESS NOTES
"Subjective:       Patient ID: Alesha Toney is a 71 y.o. female.    Chief Complaint: Malignant neoplasm of upper lobe, right bronchus or lung  Ms. Alesha Toney is a 71-year-old otherwise healthy female who started having some shoulder pain, which was lasting for over six weeks.  She went and saw her primary care physician and underwent an x-ray, which revealed right upper lobe lung mass worrisome for malignancy and a CT scan was recommended, which was done on 6/12/18 and that revealed a newly developing mass, pleural based, lateral posterior segment, right upper lobe 3.5 x 3.5 cm, surrounding stellate margin and patchy infiltrates, particularly superiorly,   anteriorly infiltrates extend perihilar into the anterior segment.  She then underwent CT-guided biopsy, which revealed well-differentiated adenocarcinoma.       Her PET scan from 6/29/18 There is physiologic intracranial, head, and neck activity.  There is a large right upper lobe mass SUV max 9.11.  No local or distant metastatic disease seen.  There is physiologic liver, spleen, GI and  activity.  There are a few liver cysts.  No adenopathy is seen.  The adrenal glands are not enlarged.  No adenopathy is seen.  No suspicious bone lesions are seen.     She underwent VATS with right upper lobectomy. Mediastinal lymphadenectomy on 8/9/18. Pathology revealed "Tumor site: Upper lobe.Tumor size: 7 x 4 x 2.7 cm.Tumor focality: Single tumor.  Histologic type: Mixed invasive mucinous and nonmucinous adenocarcinoma. Histologic grade: G2: Moderately differentiated.Spread through air spaces (STATS): Present. Visceral pleura invasion: Not identified.  Lymphovascular invasion: Not identified. Direct invasion of adjacent structures: No adjacent structures present. Margins: All margins are uninvolved by carcinoma.Distance of invasive carcinoma from closest margin: 1 cm from the bronchial margin.Treatment effect: No known presurgical therapy. Regional lymph nodes: " "Number of lymph nodes involved: 0. Number of lymph nodes examined: 11. Pathologic Stage Classification: pT3 N0"        She completed 4 cycles of adjuvant chemo with Cisplatin and Alimta as of 1/10/19   She is on surveillance.     CT chest of 9/17/19 which reveals "Operative change of right upper lobectomy for small-cell lung cancer with several scattered subsolid opacities and a new solid nodule in the right lower lobe measuring up to 0.5 cm.  We recommend continued surveillance with the role and schedule for such surveillance to be determined by clinical considerations. Sided chest port distal tip terminating at the confluence of the brachiocephalic vein in the SVC.  Correlate with device functioning. Apically predominant emphysematous changes, left greater than right. Aortic annular calcification and multi-vessel coronary artery atherosclerosis. Numerous unchanged hepatic hypodensities, likely cysts"     PET scan from 10/1/19 reveals "1.6 cm soft tissue lesion re-identified posterior to the bronchus intermedius.  No corresponding focal increased radiotracer uptake to suggest local recurrence or metastatic disease. Stable subcentimeter opacities throughout the bilateral lower lobes, too small to characterize with PET-CT. Focal increased radiotracer uptake and subtle wall thickening in the rectum.  Findings are concerning for primary neoplastic process.  Recommend further evaluation with direct visualization"     She returned from [facility] and was advised to proceed with chemo/RT with either Docetaxel/platinum or Carboplatin/Alimta.     She completed chemo/RT with Carboplatin and Alimta as of 12/31/19     She underwent CT chest on 5/27/2020 which revealed 1. In this patient with history of lung cancer status post right upper lobectomy, there is a soft tissue opacity at the posterior margin of the staple line.  This lesion has been enlarging progressively and now measures 1.3 x 1.6 cm.  There is also a new 1.7 x " "1.7 cm opacity at the right paravertebral pleural margin which is inseparable from this lesion.  These findings may represent post radiation change in light of the hypermetabolic lesion in this region on PET-CT of 10/01/2019 (image 69/[id number]).There is a 1 cm opacity in the superior or lateral basal segment of the right lower lobe (axial series 4, image 243) which has enlarged since 09/17/2019.  Nature of this lesion is unclear.  Clinical considerations will determine if percutaneous biopsy or metabolic assessment is warranted. 1.0 cm solid opacity with branching configuration (series 4, image 205) is located in the lateral basal segment of the left lower lobe, stable since 02/16/2019 (02/06/2019 axial series 4, image 310). Appearance and bifurcating character suggests mucoid impaction in mildly ectatic peripheral airway, likely not significant. Persistent clustered small centrilobular nodules in the apical segment of the right upper lobe likely reflecting small airway inflammation/infection. Partially visualized left chest port with distal catheter tip at the junction of the brachiocephalic veins and SVC, similar as on 09/17/2019"  She thus underwent PET scan on 6/1/2020 which revealed "No evidence of residual viable lung malignancy.  Evolving post radiation changes in the right paramediastinal lung, with a new irregular subpleural density which may also be related to recent radiation.  Attention on follow-up. Interval decrease in size of a soft tissue lesion along the inferior margin of the lobectomy stable line.  Several stable subcentimeter soft tissue opacities in the lower lobes bilaterally, As above.  Attention on follow-up.  Persistent hypermetabolic rectal lesion concerning for malignancy.  Recommend direct visualization and tissue sampling as clinically indicated"  I discussed her case in thoracic conference today and findings are felt to be related to RT     Her restaging CT scans from 1/20/2021 which reveal " ""Persistent soft tissue opacity in the posterior margin of the staple line that is increased in prominence when compared to the prior examination and is worrisome for disease progression. No new lesions identified.  Stable pulmonary nodules. Stable hypodense lesions throughout the liver that likely represent hepatic cysts"        Restaging PET scan from 1/27/2021 reveals "Increased size and hypermetabolic activity involving soft tissue opacity along the inferior border of the right upper lobectomy stable line concerning for progression of disease. Mildly increased hypermetabolic activity involving the rectum compatible with known high-grade dysplasia/intramucosal carcinoma. Interval decrease in size and resolution of hypermetabolic activity involving subcentimeter subpleural opacity within the right lower lobe"     MRI brain from  2/3/2021 reveals no evidence of recurrence.  GUARDANT testing only reveals p53, PDL1 is 0 and no other targets        PET scan from 3/24/2021 reveals "In this patient with known lung cancer, there is a persistent hypermetabolic right hilar mass consistent with viable tumor.  Interval increase in size is equivocal for progression of fibrosis versus tumor growth. 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  Activity appears more focal and possibly more proximal than before.  Consider direct visualization for further evaluation. The previously described subcentimeter subpleural opacity within the right lower lobe is not definitely seen on today's exam"               She last received immunotherapy on 5/28/2021. She underwent EUS on 6/25/2021 which revealed "Erythematous, congested, and ulcerated mucosa  (proctitis) in rectosigmoid colon.  Pathology reveals Colon, rectosigmoid, biopsy:  Moderate active colitis     She completed steroids about 5 weeks ago.      CT scans from 11/5/2021 reveals "In this patient with lung " "cancer, there is postoperative changes of right upper lobe resection.  Persistent soft tissue opacity along the right hilum suture line which is slightly increased in size from prior exam.  More conspicuous appearing nodules within the right lower lobe seen on prior exam.  New nodule within the right middle lobe measuring 1.3 cm.  Overall findings are all worrisome for disease progression. Scattered areas of centrilobular nodularity within the bilateral lungs.  Nonspecific and can be seen in the setting of infectious or inflammatory etiologies. Unchanged hepatics cysts. Stable left adrenal gland nodule."  PET scan from 11/23/2021 revealed "New hypermetabolic nodule within the right lower lobe additional slightly subcentimeter nodules within the right lower lobe.  Findings concerning for disease progression, other differentials include infection or noninfectious inflammatory process. Interval decrease in size and uptake in the right hilar mass. Resolution of previous focal rectal uptake"        She is on Opdivo. Her Opdivo was held on 1/10/2022 due to rectal bleeding. She saw Dr. Clark and was noted to have anal fissure which contributed to rectal bleed. Her rectal bleed has since resolved.           She was on Opdivo until 5/24/2022, she noted rectal bleeding and underwent   Flex sigmoidoscopy on 6/3/2022 reveals "Localized severe inflammation was found in the distal rectum. Biopsied. Pathology reveals Severely active chronic colitis with ulceration (see comment)  Moderate architectural disorder. Negative for CMV by immunohistochemistry . Negative for granulomas or viral inclusions. Negative for dysplasia or carcinoma"  PET scan from 6/17/2022 reveals "In this patient with lung cancer there are enlarging hypermetabolic right perihilar opacities concerning for continued local disease progression. Continued decreased metabolic activity of the right lower lobe nodule.  Cluster of pulmonary nodules bilaterally, some " "which are new from prior PET-CT for example the superior segment the left upper lobe and are likely infectious/inflammatory origin. Persistent and more extensive region of increased FDG avidity within the distal rectum in keeping with more extensive colitis.  Malignancy could have a similar appearance.  Recommend further investigation as clinically warranted and attention on follow-up"     She has active proctitis and is on hydrocortisone suppositories and Entyvyo since early June 2022.   CT chest on 8/1/2022 reveals  "Similar appearance of elongated, irregular opacity adjacent to the right upper lobectomy margin.  More posteriorly adjacent perihilar opacities with previous FDG avidity with detailed measurements as above. Similar areas of scattered micro-nodularity with tree-in-bud, likely infectious/inflammatory in nature.  New appearing area of grouped micro-nodules in the anterior left lower lobe, largest up to 5 mm, which could relate to aforementioned small airways disease, though technically indeterminate.  Continued close attention at follow-up. RECIST SUMMARY: Date of prior examination for comparison: CT chest dated 05/24/2022. Lesion 1: Right perihilar opacity.  2.4.  Series 2, image 45.  Previously 2 4.  (Note there is slight increased size conspicuity in the craniocaudal dimension at 1.8 cm, previously 1.5 cm). Lesion 2: Right perihilar opacity.  1.7 cm.  Series 2, image 59.  Previously 1.5 cm"    HPINereyda notes that she has fever and worsening cough since Sunday, fever this morning was 101.5F, chills and sweats. COVID at home test was negative  Notes shortness of breath   Lat tylenol was 5:45 AM this morning     Review of Systems   Constitutional: Positive for chills, fatigue and fever. Negative for appetite change and unexpected weight change.   HENT: Negative for mouth sores.    Eyes: Negative for visual disturbance.   Respiratory: Positive for cough and shortness of breath.    Cardiovascular: Negative " for chest pain.   Gastrointestinal: Negative for abdominal pain and diarrhea.   Genitourinary: Negative for frequency.   Musculoskeletal: Negative for back pain.   Integumentary:  Negative for rash.   Neurological: Negative for headaches.   Hematological: Negative for adenopathy.   Psychiatric/Behavioral: The patient is not nervous/anxious.    All other systems reviewed and are negative.        Objective:      Physical Exam      LABS:  WBC   Date Value Ref Range Status   08/01/2022 7.26 3.90 - 12.70 K/uL Final     Hemoglobin   Date Value Ref Range Status   08/01/2022 12.2 12.0 - 16.0 g/dL Final     POC Hematocrit   Date Value Ref Range Status   08/09/2018 33 (L) 36 - 54 %PCV Final     Hematocrit   Date Value Ref Range Status   08/01/2022 36.9 (L) 37.0 - 48.5 % Final     Platelets   Date Value Ref Range Status   08/01/2022 195 150 - 450 K/uL Final     Gran # (ANC)   Date Value Ref Range Status   08/01/2022 4.0 1.8 - 7.7 K/uL Final     Comment:     The ANC is based on a white cell differential from an   automated cell counter. It has not been microscopically   reviewed for the presence of abnormal cells. Clinical   correlation is required.         Chemistry        Component Value Date/Time     08/01/2022 1456    K 4.4 08/01/2022 1456     08/01/2022 1456    CO2 29 08/01/2022 1456    BUN 24 (H) 08/01/2022 1456    CREATININE 1.4 08/01/2022 1456     08/01/2022 1456        Component Value Date/Time    CALCIUM 9.5 08/01/2022 1456    ALKPHOS 50 (L) 08/01/2022 1456    AST 17 08/01/2022 1456    ALT 12 08/01/2022 1456    BILITOT 0.4 08/01/2022 1456    ESTGFRAFRICA 58.4 (A) 06/22/2022 1046    EGFRNONAA 50.6 (A) 06/22/2022 1046          Assessment:       Problem List Items Addressed This Visit     Hypothyroidism    Malignant neoplasm of upper lobe, right bronchus or lung - Primary    Relevant Orders    CBC auto differential    Comprehensive Metabolic Panel    CULTURE, BLOOD    Secondary and unspecified  malignant neoplasm of intrathoracic lymph nodes      Other Visit Diagnoses     COVID        Relevant Orders    COVID-19 Rapid Screening          Plan:          Fever at home once a day, resolves with tylenol. Does not feel that she is worsening, She note pain when she coughs or takes a deep breath, COVID negative at home and also in clinic.   Escribed Doxycycline. Will also get CT chest (without contrast as she has iodine allergy) next available    .Above care plan was discussed with patient and accompanying wife and all questions were addressed to their satisfaction

## 2022-08-23 ENCOUNTER — HOSPITAL ENCOUNTER (OUTPATIENT)
Dept: RADIOLOGY | Facility: HOSPITAL | Age: 72
Discharge: HOME | End: 2022-08-23
Attending: INTERNAL MEDICINE
Payer: MEDICARE

## 2022-08-23 ENCOUNTER — PATIENT MESSAGE (OUTPATIENT)
Dept: HEMATOLOGY/ONCOLOGY | Facility: CLINIC | Age: 72
End: 2022-08-23
Payer: MEDICARE

## 2022-08-23 DIAGNOSIS — R05.9 COUGH: ICD-10-CM

## 2022-08-23 LAB
BACTERIA STL CULT: NORMAL
CALPROTECTIN STL-MCNT: 92.9 MCG/G

## 2022-08-23 PROCEDURE — 71046 XR CHEST PA AND LATERAL: ICD-10-PCS | Mod: 26,,, | Performed by: RADIOLOGY

## 2022-08-23 PROCEDURE — 71046 X-RAY EXAM CHEST 2 VIEWS: CPT | Mod: 26,,, | Performed by: RADIOLOGY

## 2022-08-23 PROCEDURE — 71046 X-RAY EXAM CHEST 2 VIEWS: CPT | Mod: TC,FY

## 2022-08-23 NOTE — TELEPHONE ENCOUNTER
See result note, she does not feel she is getting any worse, has a fever once a day which resolves with tyelenol, need CT chest without contrast soon though.

## 2022-08-24 ENCOUNTER — PATIENT MESSAGE (OUTPATIENT)
Dept: GASTROENTEROLOGY | Facility: CLINIC | Age: 72
End: 2022-08-24
Payer: MEDICARE

## 2022-08-24 ENCOUNTER — HOSPITAL ENCOUNTER (OUTPATIENT)
Dept: RADIOLOGY | Facility: HOSPITAL | Age: 72
Discharge: HOME | End: 2022-08-24
Attending: INTERNAL MEDICINE
Payer: MEDICARE

## 2022-08-24 DIAGNOSIS — C34.11 MALIGNANT NEOPLASM OF UPPER LOBE, RIGHT BRONCHUS OR LUNG: ICD-10-CM

## 2022-08-24 PROCEDURE — 71250 CT THORAX DX C-: CPT | Mod: 26,,, | Performed by: RADIOLOGY

## 2022-08-24 PROCEDURE — 71250 CT THORAX DX C-: CPT | Mod: TC

## 2022-08-24 PROCEDURE — 71250 CT CHEST WITHOUT CONTRAST: ICD-10-PCS | Mod: 26,,, | Performed by: RADIOLOGY

## 2022-08-26 ENCOUNTER — TELEPHONE (OUTPATIENT)
Dept: HEMATOLOGY/ONCOLOGY | Facility: CLINIC | Age: 72
End: 2022-08-26
Payer: MEDICARE

## 2022-08-26 NOTE — TELEPHONE ENCOUNTER
"She notes that she is feeling better, no more fever, has some pain with deep breathing, not coughing up.  Still has some shortness of breath     CT chest 8/24/2022 reveals "Status post right upper lobectomy.  Soft tissue thickening adjacent to the suture line appears stable.  However, there is interval enlargement of a right lower lobe, bilobed, paramediastinal mass-like consolidation.  When dominant components of the lesions are measured separately, dimensions are summarized below. RECIST SUMMARY:  Date of prior examination for comparison: CT chest 08/01/2022. Lesion 1: Right perihilar opacity.  4.7 cm. Series 4 image 77.  Prior measurement 2.4 cm. Lesion 2: Right perihilar (infrahilar) opacity.  2.7 cm. Series 4 image 237.  Prior measurement 1.7 cm.   This has changed even from the CT scan on 8/1/2022 so most likely related to infection    Will present at tumor board next week as well   "

## 2022-08-29 NOTE — TELEPHONE ENCOUNTER
Spoke with patient  Reason for call:  Prednisone check  Meds:  Prednisone 20 mg; doxycycline 100 mg bid for 10 days since 08/22/22; pepcid 10 mg q day  Last Dose/Next Dose:   N/A  BMs:  1 daily or every other day; formed  Blood/Mucous:  Mucous and blood in bowl, on stool and clots on tissue; blood settles in bottom of bowl and changes the water to a red color sometimes  N/V:   n/a  Pain:  denies  Fever:  fevers have stopped for the last 24 hours  Last OV/Next OV:  07/25/2022 - 09/27/2022  Other:   Patient is very hesitant to go down on the prednisone at this point due to the amount of blood that is present with her Bms    Will route to Dr. Clark

## 2022-08-30 DIAGNOSIS — K51.819 OTHER ULCERATIVE COLITIS WITH COMPLICATION: Primary | ICD-10-CM

## 2022-08-30 RX ORDER — PREDNISONE 5 MG/1
TABLET ORAL
Qty: 82 TABLET | Refills: 0 | Status: SHIPPED | OUTPATIENT
Start: 2022-08-30 | End: 2022-09-30

## 2022-08-30 NOTE — TELEPHONE ENCOUNTER
Called patient and advised to take the prednisone for about another 10 days and then we will try to taper by 5 mg/day going down every 7 days and that I would check in with him in about a week to see how she is doing.  Rx for prednisone sent to Dr. Clark for refill.

## 2022-08-31 ENCOUNTER — DOCUMENTATION ONLY (OUTPATIENT)
Dept: CARDIOTHORACIC SURGERY | Facility: CLINIC | Age: 72
End: 2022-08-31
Payer: MEDICARE

## 2022-08-31 NOTE — PATIENT CARE CONFERENCE
[facility]      THORACIC MULTIDISCIPLINARY TUMOR BOARD  PATIENT REVIEW FORM  ________________________________________________________________________    CLINIC #: [id number]  DATE: 08/31/2022    DIAGNOSIS:   NSCLC    PRESENTER:   Dr. Dhaliwal     PATIENT SUMMARY:   71 y.o.  female with history of right lower lobe adenocarcinoma diagnosed in July 2018. Underwent a right VATS right lower lobectomy with MLND on 8/9/2018. Surgical pathology showed a 7cm moderately differentiated mixed invasive adenocarcinoma, level 2,4,7,9,10,12 negative, no VPI, no LVI, pT3N0. She completed 4 cycles of adjuvant chemo with Cisplatin and Alimta as of 1/10/19. On surveillance CT on 5/28/19, showed an ill-defined GGO in lateral segment of RLL and new solid 0.5cm nodule in the superior segment of RLL. Superior segment nodule is at the location of a cluster of micro nodules identified on prior CT. Most recently CT surveillance for lung cancer showed growing soft tissue nodule at the suture line. Bronchoscopy and EBUS on 9/24/19 with pathology positive for recurrent NSCLC. Last presented at [facility] [place] on 9/19/19. PET confirmed only local recurrence. She completed definitive chemo-RT to 60 Gy in 30 fx to recurrent disease on 12/31/19. CT C/A/P 2/27/20 demonstrated slight interval decrease in treated recurrence but new nodules in Right lateral lower lobe of unclear significance. March 2020 cluster of ABDIFATAH micronodules. CT May 2020 showed enlarging soft tissue opacity at the posterior margin of staple line in RUL. Lesion measures 1.3 x 1.6cm. Additional new 1.7cm opacity at paravertebral pleural margin which is inseparable from RUL lesion. There is a 1 cm opacity in the superior or lateral basal segment of the right lower lobe which has enlarged since 09/17/2019. LLL 1cm lesion is stable. PET CT on 6/1 with little hypermetabolic activity in RUL staple line and paramediastinal pleura. Interpreted as evolving post radiation changes. Persistent  hypermetabolic rectal lesion concerning for malignancy. Presented at [facility] [place] [place] and felt hilar uptake on PET represented post-radiation changes. Repeat chest CT on 1/20/21 showed persistent soft tissue opacity at posterior margin of the staple line increased in prominence. PET CT on 1/27/21 showed increased size and hypermetabolic activity in soft tissue opacity in inferior border of right upper lobe staple line. Also showed mildly increased activity in rectum compatible with known high-grade dysplasia. all consistent with recurrent disease. She last received immunotherapy on 5/28/2021. She underwent EUS on 6/25/2021. Completed 5 weeks of steroids for colitis. Continued on Opdivo until May 2022. Early 8/2022 c/o fever, cough and treated with ABX. SOB back to baseline.     BOARD RECOMMENDATIONS:   CT concerning for disease progression. Currently on prednisone and Entyvio. If diarrhea stabilizes can consider rechallenge with immunotherapy.     CONSULT NEEDED:     [] Surgery    [] Hem/Onc    [] Rad/Onc    [] Dietary                 [] Social Service    [] Psychology       [] Pulmonology    Clinical Stage: Tumor T4 Node(s) N0 Metastasis 0 (Tumor in different lobe of ipsilateral lung)  Pathologic Stage: Tumor  Node(s)  Metastasis         GROUP STAGE:     [] O    [] 1A    [] IB    [] IIA    [] IIB     [x] IIIA     [] IIIB     [] IIIC    [] IV                               [] Local recurrence     [x] Regional recurrence     [] Distant recurrence                   [x] NSCLC     [] SCLC     Tumor type     Unstageable:      [] Yes     [] No  Metastatic site(s):          [] Rita'l Treatment Guidelines reviewed and care planned is consistent with guidelines.         (i.e., NCCN, NCI, PD, ACO, AUA, etc.)    PRESENTATION AT CANCER CONFERENCE:         [] Prospective    [] Retrospective     [] Follow-Up          [] Eligible for clinical trial

## 2022-09-06 ENCOUNTER — PATIENT MESSAGE (OUTPATIENT)
Dept: GASTROENTEROLOGY | Facility: CLINIC | Age: 72
End: 2022-09-06
Payer: MEDICARE

## 2022-09-06 ENCOUNTER — PATIENT MESSAGE (OUTPATIENT)
Dept: HEMATOLOGY/ONCOLOGY | Facility: CLINIC | Age: 72
End: 2022-09-06
Payer: MEDICARE

## 2022-09-06 NOTE — TELEPHONE ENCOUNTER
"Hi sorry about that  Here are the recs "CT concerning for disease progression. Currently on prednisone and Entyvio. If diarrhea stabilizes can consider rechallenge with immunotherapy"    Does she want to to discuss this further and see me to start .      "

## 2022-09-12 ENCOUNTER — LAB VISIT (OUTPATIENT)
Dept: LAB | Facility: HOSPITAL | Age: 72
End: 2022-09-12
Attending: INTERNAL MEDICINE
Payer: MEDICARE

## 2022-09-12 ENCOUNTER — PATIENT MESSAGE (OUTPATIENT)
Dept: INTERNAL MEDICINE | Facility: CLINIC | Age: 72
End: 2022-09-12
Payer: MEDICARE

## 2022-09-12 ENCOUNTER — OFFICE VISIT (OUTPATIENT)
Dept: HEMATOLOGY/ONCOLOGY | Facility: CLINIC | Age: 72
End: 2022-09-12
Payer: MEDICARE

## 2022-09-12 VITALS
HEART RATE: 80 BPM | TEMPERATURE: 99 F | OXYGEN SATURATION: 98 % | HEIGHT: 66 IN | DIASTOLIC BLOOD PRESSURE: 72 MMHG | SYSTOLIC BLOOD PRESSURE: 161 MMHG | BODY MASS INDEX: 24.7 KG/M2 | RESPIRATION RATE: 20 BRPM | WEIGHT: 153.69 LBS

## 2022-09-12 DIAGNOSIS — R05.9 COUGH: ICD-10-CM

## 2022-09-12 DIAGNOSIS — C34.11 MALIGNANT NEOPLASM OF UPPER LOBE, RIGHT BRONCHUS OR LUNG: Primary | ICD-10-CM

## 2022-09-12 DIAGNOSIS — E03.9 HYPOTHYROIDISM, UNSPECIFIED TYPE: ICD-10-CM

## 2022-09-12 DIAGNOSIS — C34.11 MALIGNANT NEOPLASM OF UPPER LOBE, RIGHT BRONCHUS OR LUNG: ICD-10-CM

## 2022-09-12 DIAGNOSIS — C77.1 SECONDARY AND UNSPECIFIED MALIGNANT NEOPLASM OF INTRATHORACIC LYMPH NODES: ICD-10-CM

## 2022-09-12 LAB
ALBUMIN SERPL BCP-MCNC: 3.3 G/DL (ref 3.5–5.2)
ALP SERPL-CCNC: 53 U/L (ref 55–135)
ALT SERPL W/O P-5'-P-CCNC: 14 U/L (ref 10–44)
ANION GAP SERPL CALC-SCNC: 5 MMOL/L (ref 8–16)
AST SERPL-CCNC: 14 U/L (ref 10–40)
BASOPHILS # BLD AUTO: 0.01 K/UL (ref 0–0.2)
BASOPHILS NFR BLD: 0.1 % (ref 0–1.9)
BILIRUB SERPL-MCNC: 0.3 MG/DL (ref 0.1–1)
BUN SERPL-MCNC: 25 MG/DL (ref 8–23)
CALCIUM SERPL-MCNC: 10 MG/DL (ref 8.7–10.5)
CHLORIDE SERPL-SCNC: 103 MMOL/L (ref 95–110)
CO2 SERPL-SCNC: 28 MMOL/L (ref 23–29)
CREAT SERPL-MCNC: 1.3 MG/DL (ref 0.5–1.4)
DIFFERENTIAL METHOD: ABNORMAL
EOSINOPHIL # BLD AUTO: 0 K/UL (ref 0–0.5)
EOSINOPHIL NFR BLD: 0.1 % (ref 0–8)
ERYTHROCYTE [DISTWIDTH] IN BLOOD BY AUTOMATED COUNT: 12.8 % (ref 11.5–14.5)
EST. GFR  (NO RACE VARIABLE): 44 ML/MIN/1.73 M^2
GLUCOSE SERPL-MCNC: 233 MG/DL (ref 70–110)
HCT VFR BLD AUTO: 38.3 % (ref 37–48.5)
HGB BLD-MCNC: 12.2 G/DL (ref 12–16)
IMM GRANULOCYTES # BLD AUTO: 0.1 K/UL (ref 0–0.04)
IMM GRANULOCYTES NFR BLD AUTO: 1.1 % (ref 0–0.5)
LYMPHOCYTES # BLD AUTO: 1 K/UL (ref 1–4.8)
LYMPHOCYTES NFR BLD: 10.6 % (ref 18–48)
MCH RBC QN AUTO: 31.3 PG (ref 27–31)
MCHC RBC AUTO-ENTMCNC: 31.9 G/DL (ref 32–36)
MCV RBC AUTO: 98 FL (ref 82–98)
MONOCYTES # BLD AUTO: 0.4 K/UL (ref 0.3–1)
MONOCYTES NFR BLD: 4.6 % (ref 4–15)
NEUTROPHILS # BLD AUTO: 7.6 K/UL (ref 1.8–7.7)
NEUTROPHILS NFR BLD: 83.5 % (ref 38–73)
NRBC BLD-RTO: 0 /100 WBC
PLATELET # BLD AUTO: 253 K/UL (ref 150–450)
PMV BLD AUTO: 9.7 FL (ref 9.2–12.9)
POTASSIUM SERPL-SCNC: 4.8 MMOL/L (ref 3.5–5.1)
PROT SERPL-MCNC: 6.9 G/DL (ref 6–8.4)
RBC # BLD AUTO: 3.9 M/UL (ref 4–5.4)
SODIUM SERPL-SCNC: 136 MMOL/L (ref 136–145)
T4 FREE SERPL-MCNC: 0.95 NG/DL (ref 0.71–1.51)
TSH SERPL DL<=0.005 MIU/L-ACNC: 0.69 UIU/ML (ref 0.4–4)
WBC # BLD AUTO: 9.05 K/UL (ref 3.9–12.7)

## 2022-09-12 PROCEDURE — 99999 PR PBB SHADOW E&M-EST. PATIENT-LVL IV: CPT | Mod: PBBFAC,,, | Performed by: INTERNAL MEDICINE

## 2022-09-12 PROCEDURE — 84443 ASSAY THYROID STIM HORMONE: CPT | Performed by: INTERNAL MEDICINE

## 2022-09-12 PROCEDURE — 85025 COMPLETE CBC W/AUTO DIFF WBC: CPT | Performed by: INTERNAL MEDICINE

## 2022-09-12 PROCEDURE — 99215 OFFICE O/P EST HI 40 MIN: CPT | Mod: S$PBB,,, | Performed by: INTERNAL MEDICINE

## 2022-09-12 PROCEDURE — 80053 COMPREHEN METABOLIC PANEL: CPT | Performed by: INTERNAL MEDICINE

## 2022-09-12 PROCEDURE — 99999 PR PBB SHADOW E&M-EST. PATIENT-LVL IV: ICD-10-PCS | Mod: PBBFAC,,, | Performed by: INTERNAL MEDICINE

## 2022-09-12 PROCEDURE — 99215 PR OFFICE/OUTPT VISIT, EST, LEVL V, 40-54 MIN: ICD-10-PCS | Mod: S$PBB,,, | Performed by: INTERNAL MEDICINE

## 2022-09-12 PROCEDURE — 99214 OFFICE O/P EST MOD 30 MIN: CPT | Mod: PBBFAC | Performed by: INTERNAL MEDICINE

## 2022-09-12 PROCEDURE — 84439 ASSAY OF FREE THYROXINE: CPT | Performed by: INTERNAL MEDICINE

## 2022-09-12 PROCEDURE — 36415 COLL VENOUS BLD VENIPUNCTURE: CPT | Performed by: INTERNAL MEDICINE

## 2022-09-12 RX ORDER — HYDROCODONE BITARTRATE AND ACETAMINOPHEN 7.5; 325 MG/1; MG/1
1 TABLET ORAL EVERY 8 HOURS PRN
Qty: 90 TABLET | Refills: 0 | Status: SHIPPED | OUTPATIENT
Start: 2022-09-12 | End: 2022-10-13 | Stop reason: SDUPTHER

## 2022-09-12 NOTE — Clinical Note
Get auth for Carboplatin, ALimta and keytruda and then schedule to see me with CBC,CMp, tsh and free T$ please, did not send this to .

## 2022-09-12 NOTE — Clinical Note
Patrice Fierro, Ms, Feng is feeling better now, after the antibiotic. We reviewed her case and looked at her cT from 8/31 compared to early August and it looked bigger.She also has proctitis and is on Entyvio and prednisone taper,  What do you think of repeat bronch to get tissue for molecular testing.

## 2022-09-12 NOTE — PROGRESS NOTES
"Subjective:       Patient ID: Alesha Toney is a 71 y.o. female.    Chief Complaint: Malignant neoplasm of upper lobe, right bronchus or lung  Ms. Alesha Toney is a 71-year-old otherwise healthy female who started having some shoulder pain, which was lasting for over six weeks.  She went and saw her primary care physician and underwent an x-ray, which revealed right upper lobe lung mass worrisome for malignancy and a CT scan was recommended, which was done on 6/12/18 and that revealed a newly developing mass, pleural based, lateral posterior segment, right upper lobe 3.5 x 3.5 cm, surrounding stellate margin and patchy infiltrates, particularly superiorly,   anteriorly infiltrates extend perihilar into the anterior segment.  She then underwent CT-guided biopsy, which revealed well-differentiated adenocarcinoma.       Her PET scan from 6/29/18 There is physiologic intracranial, head, and neck activity.  There is a large right upper lobe mass SUV max 9.11.  No local or distant metastatic disease seen.  There is physiologic liver, spleen, GI and  activity.  There are a few liver cysts.  No adenopathy is seen.  The adrenal glands are not enlarged.  No adenopathy is seen.  No suspicious bone lesions are seen.     She underwent VATS with right upper lobectomy. Mediastinal lymphadenectomy on 8/9/18. Pathology revealed "Tumor site: Upper lobe.Tumor size: 7 x 4 x 2.7 cm.Tumor focality: Single tumor.  Histologic type: Mixed invasive mucinous and nonmucinous adenocarcinoma. Histologic grade: G2: Moderately differentiated.Spread through air spaces (STATS): Present. Visceral pleura invasion: Not identified.  Lymphovascular invasion: Not identified. Direct invasion of adjacent structures: No adjacent structures present. Margins: All margins are uninvolved by carcinoma.Distance of invasive carcinoma from closest margin: 1 cm from the bronchial margin.Treatment effect: No known presurgical therapy. Regional lymph nodes: " "Number of lymph nodes involved: 0. Number of lymph nodes examined: 11. Pathologic Stage Classification: pT3 N0"        She completed 4 cycles of adjuvant chemo with Cisplatin and Alimta as of 1/10/19   She is on surveillance.     CT chest of 9/17/19 which reveals "Operative change of right upper lobectomy for small-cell lung cancer with several scattered subsolid opacities and a new solid nodule in the right lower lobe measuring up to 0.5 cm.  We recommend continued surveillance with the role and schedule for such surveillance to be determined by clinical considerations. Sided chest port distal tip terminating at the confluence of the brachiocephalic vein in the SVC.  Correlate with device functioning. Apically predominant emphysematous changes, left greater than right. Aortic annular calcification and multi-vessel coronary artery atherosclerosis. Numerous unchanged hepatic hypodensities, likely cysts"     PET scan from 10/1/19 reveals "1.6 cm soft tissue lesion re-identified posterior to the bronchus intermedius.  No corresponding focal increased radiotracer uptake to suggest local recurrence or metastatic disease. Stable subcentimeter opacities throughout the bilateral lower lobes, too small to characterize with PET-CT. Focal increased radiotracer uptake and subtle wall thickening in the rectum.  Findings are concerning for primary neoplastic process.  Recommend further evaluation with direct visualization"     She returned from [facility] and was advised to proceed with chemo/RT with either Docetaxel/platinum or Carboplatin/Alimta.     She completed chemo/RT with Carboplatin and Alimta as of 12/31/19     She underwent CT chest on 5/27/2020 which revealed 1. In this patient with history of lung cancer status post right upper lobectomy, there is a soft tissue opacity at the posterior margin of the staple line.  This lesion has been enlarging progressively and now measures 1.3 x 1.6 cm.  There is also a new 1.7 x " "1.7 cm opacity at the right paravertebral pleural margin which is inseparable from this lesion.  These findings may represent post radiation change in light of the hypermetabolic lesion in this region on PET-CT of 10/01/2019 (image 69/299).There is a 1 cm opacity in the superior or lateral basal segment of the right lower lobe (axial series 4, image 243) which has enlarged since 09/17/2019.  Nature of this lesion is unclear.  Clinical considerations will determine if percutaneous biopsy or metabolic assessment is warranted. 1.0 cm solid opacity with branching configuration (series 4, image 205) is located in the lateral basal segment of the left lower lobe, stable since 02/16/2019 (02/06/2019 axial series 4, image 310). Appearance and bifurcating character suggests mucoid impaction in mildly ectatic peripheral airway, likely not significant. Persistent clustered small centrilobular nodules in the apical segment of the right upper lobe likely reflecting small airway inflammation/infection. Partially visualized left chest port with distal catheter tip at the junction of the brachiocephalic veins and SVC, similar as on 09/17/2019"  She thus underwent PET scan on 6/1/2020 which revealed "No evidence of residual viable lung malignancy.  Evolving post radiation changes in the right paramediastinal lung, with a new irregular subpleural density which may also be related to recent radiation.  Attention on follow-up. Interval decrease in size of a soft tissue lesion along the inferior margin of the lobectomy stable line.  Several stable subcentimeter soft tissue opacities in the lower lobes bilaterally, As above.  Attention on follow-up.  Persistent hypermetabolic rectal lesion concerning for malignancy.  Recommend direct visualization and tissue sampling as clinically indicated"  I discussed her case in thoracic conference today and findings are felt to be related to RT     Her restaging CT scans from 1/20/2021 which reveal " ""Persistent soft tissue opacity in the posterior margin of the staple line that is increased in prominence when compared to the prior examination and is worrisome for disease progression. No new lesions identified.  Stable pulmonary nodules. Stable hypodense lesions throughout the liver that likely represent hepatic cysts"        Restaging PET scan from 1/27/2021 reveals "Increased size and hypermetabolic activity involving soft tissue opacity along the inferior border of the right upper lobectomy stable line concerning for progression of disease. Mildly increased hypermetabolic activity involving the rectum compatible with known high-grade dysplasia/intramucosal carcinoma. Interval decrease in size and resolution of hypermetabolic activity involving subcentimeter subpleural opacity within the right lower lobe"     MRI brain from  2/3/2021 reveals no evidence of recurrence.  GUARDANT testing only reveals p53, PDL1 is 0 and no other targets        PET scan from 3/24/2021 reveals "In this patient with known lung cancer, there is a persistent hypermetabolic right hilar mass consistent with viable tumor.  Interval increase in size is equivocal for progression of fibrosis versus tumor growth. 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  Activity appears more focal and possibly more proximal than before.  Consider direct visualization for further evaluation. The previously described subcentimeter subpleural opacity within the right lower lobe is not definitely seen on today's exam"               She last received immunotherapy on 5/28/2021. She underwent EUS on 6/25/2021 which revealed "Erythematous, congested, and ulcerated mucosa  (proctitis) in rectosigmoid colon.  Pathology reveals Colon, rectosigmoid, biopsy:  Moderate active colitis     She completed steroids about 5 weeks ago.      CT scans from 11/5/2021 reveals "In this patient with lung " "cancer, there is postoperative changes of right upper lobe resection.  Persistent soft tissue opacity along the right hilum suture line which is slightly increased in size from prior exam.  More conspicuous appearing nodules within the right lower lobe seen on prior exam.  New nodule within the right middle lobe measuring 1.3 cm.  Overall findings are all worrisome for disease progression. Scattered areas of centrilobular nodularity within the bilateral lungs.  Nonspecific and can be seen in the setting of infectious or inflammatory etiologies. Unchanged hepatics cysts. Stable left adrenal gland nodule."  PET scan from 11/23/2021 revealed "New hypermetabolic nodule within the right lower lobe additional slightly subcentimeter nodules within the right lower lobe.  Findings concerning for disease progression, other differentials include infection or noninfectious inflammatory process. Interval decrease in size and uptake in the right hilar mass. Resolution of previous focal rectal uptake"        She is on Opdivo. Her Opdivo was held on 1/10/2022 due to rectal bleeding. She saw Dr. Clark and was noted to have anal fissure which contributed to rectal bleed. Her rectal bleed has since resolved.           She was on Opdivo until 5/24/2022, she noted rectal bleeding and underwent   Flex sigmoidoscopy on 6/3/2022 reveals "Localized severe inflammation was found in the distal rectum. Biopsied. Pathology reveals Severely active chronic colitis with ulceration (see comment)  Moderate architectural disorder. Negative for CMV by immunohistochemistry . Negative for granulomas or viral inclusions. Negative for dysplasia or carcinoma"  PET scan from 6/17/2022 reveals "In this patient with lung cancer there are enlarging hypermetabolic right perihilar opacities concerning for continued local disease progression. Continued decreased metabolic activity of the right lower lobe nodule.  Cluster of pulmonary nodules bilaterally, some " "which are new from prior PET-CT for example the superior segment the left upper lobe and are likely infectious/inflammatory origin. Persistent and more extensive region of increased FDG avidity within the distal rectum in keeping with more extensive colitis.  Malignancy could have a similar appearance.  Recommend further investigation as clinically warranted and attention on follow-up"     She has active proctitis and is on hydrocortisone suppositories and Entyvyo since early June 2022.   CT chest on 8/1/2022 reveals  "Similar appearance of elongated, irregular opacity adjacent to the right upper lobectomy margin.  More posteriorly adjacent perihilar opacities with previous FDG avidity with detailed measurements as above. Similar areas of scattered micro-nodularity with tree-in-bud, likely infectious/inflammatory in nature.  New appearing area of grouped micro-nodules in the anterior left lower lobe, largest up to 5 mm, which could relate to aforementioned small airways disease, though technically indeterminate.  Continued close attention at follow-up. RECIST SUMMARY: Date of prior examination for comparison: CT chest dated 05/24/2022. Lesion 1: Right perihilar opacity.  2.4.  Series 2, image 45.  Previously 2 4.  (Note there is slight increased size conspicuity in the craniocaudal dimension at 1.8 cm, previously 1.5 cm). Lesion 2: Right perihilar opacity.  1.7 cm.  Series 2, image 59.  Previously 1.5 cm"    Renuka underwent a repeat CT chest 8/31/2022 and that revealed "Status post right upper lobectomy.  Soft tissue thickening adjacent to the suture line appears stable.  However, there is interval enlargement of a right lower lobe, bilobed, paramediastinal mass-like consolidation.  When dominant components of the lesions are measured separately, dimensions are summarized below. RECIST SUMMARY: Date of prior examination for comparison: CT chest 08/01/2022. Lesion 1: Right perihilar opacity.  4.7 cm. Series 4 image " "77.  Prior measurement 2.4 cm. Lesion 2: Right perihilar (infrahilar) opacity.  2.7 cm. Series 4 image 237.  Prior measurement 1.7 cm"  Case reviewed in thoracic conference, and concern for disease progression exists so plan on immunotehrapy if able to taper steroids    She is being tapered off steroids for colitis, currently on Entyvio and tapering prednisone to 12.5 mg and plan is to come back to 10 mg on 9/14/2022  She notes that she is feeling better, fever has resolved. She has a dry cough, and shortness of breath is now at baseline    Review of Systems   Constitutional:  Negative for appetite change, fatigue and unexpected weight change.   HENT:  Negative for mouth sores.    Eyes:  Negative for visual disturbance.   Respiratory:  Positive for cough and shortness of breath.    Cardiovascular:  Negative for chest pain.   Gastrointestinal:  Negative for abdominal pain and diarrhea.   Genitourinary:  Negative for frequency.   Musculoskeletal:  Negative for back pain.   Integumentary:  Negative for rash.   Neurological:  Negative for headaches.   Hematological:  Negative for adenopathy.   Psychiatric/Behavioral:  The patient is not nervous/anxious.    All other systems reviewed and are negative.      Objective:      Physical Exam  Vitals reviewed.   Constitutional:       Appearance: She is well-developed.   HENT:      Mouth/Throat:      Pharynx: No oropharyngeal exudate.   Cardiovascular:      Rate and Rhythm: Normal rate.      Heart sounds: Normal heart sounds.   Pulmonary:      Effort: Pulmonary effort is normal.      Breath sounds: Normal breath sounds. No wheezing.   Abdominal:      General: Bowel sounds are normal.      Palpations: Abdomen is soft.      Tenderness: There is no abdominal tenderness.   Musculoskeletal:         General: No tenderness.   Lymphadenopathy:      Cervical: No cervical adenopathy.   Skin:     General: Skin is warm and dry.      Findings: No rash.   Neurological:      Mental Status: She " is alert and oriented to person, place, and time.      Coordination: Coordination normal.   Psychiatric:         Thought Content: Thought content normal.         Judgment: Judgment normal.         LABS:  WBC   Date Value Ref Range Status   08/23/2022 10.53 3.90 - 12.70 K/uL Final     Hemoglobin   Date Value Ref Range Status   08/23/2022 11.6 (L) 12.0 - 16.0 g/dL Final     POC Hematocrit   Date Value Ref Range Status   08/09/2018 33 (L) 36 - 54 %PCV Final     Hematocrit   Date Value Ref Range Status   08/23/2022 35.9 (L) 37.0 - 48.5 % Final     Platelets   Date Value Ref Range Status   08/23/2022 178 150 - 450 K/uL Final     Gran # (ANC)   Date Value Ref Range Status   08/23/2022 8.4 (H) 1.8 - 7.7 K/uL Final     Comment:     The ANC is based on a white cell differential from an   automated cell counter. It has not been microscopically   reviewed for the presence of abnormal cells. Clinical   correlation is required.         Chemistry        Component Value Date/Time     08/23/2022 0955    K 4.4 08/23/2022 0955     08/23/2022 0955    CO2 23 08/23/2022 0955    BUN 27 (H) 08/23/2022 0955    CREATININE 1.2 08/23/2022 0955     (H) 08/23/2022 0955        Component Value Date/Time    CALCIUM 9.7 08/23/2022 0955    ALKPHOS 57 08/23/2022 0955    AST 16 08/23/2022 0955    ALT 17 08/23/2022 0955    BILITOT 0.5 08/23/2022 0955    ESTGFRAFRICA 58.4 (A) 06/22/2022 1046    EGFRNONAA 50.6 (A) 06/22/2022 1046          Assessment:       Problem List Items Addressed This Visit       Hypothyroidism    Relevant Orders    TSH    FREE T4    Malignant neoplasm of upper lobe, right bronchus or lung - Primary    Relevant Orders    CBC auto differential    Comprehensive Metabolic Panel    Secondary and unspecified malignant neoplasm of intrathoracic lymph nodes       Plan:         Route Chart for Scheduling    Med Onc Chart Routing      Follow up with physician Other. PET scan next available. Schedule Opdivo on 9/21/2022. 2  weeks after that schedule CBC,CMp and see me and for Opdivo   Follow up with YAMILE    Infusion scheduling note    Injection scheduling note    Labs    Imaging    Pharmacy appointment    Other referrals          Treatment Plan Information   OP NIVOLUMAB Q4W   Pam Dhaliwal MD   Upcoming Treatment Dates - OP NIVOLUMAB Q4W    9/21/2022       Chemotherapy       nivolumab 240 mg in sodium chloride 0.9% 124 mL infusion  1/2/2023       Chemotherapy       nivolumab 240 mg in sodium chloride 0.9% 124 mL infusion    Therapy Plan Information  PORT FLUSH  Flushes  heparin, porcine (PF) 100 unit/mL injection flush 500 Units  500 Units, Intravenous, Every visit  sodium chloride 0.9% flush 10 mL  10 mL, Intravenous, Every visit    ENTYVIO GI  Medications  vedolizumab (ENTYVIO) 300 mg/250 mL NS IVPB  300 mg, Intravenous, Every 8 weeks  PRN Medications  diphenhydrAMINE injection 25 mg  25 mg, Intravenous, PRN  acetaminophen tablet 650 mg  650 mg, Oral, PRN  albuterol-ipratropium 2.5 mg-0.5 mg/3 mL nebulizer solution 3 mL  3 mL, Nebulization, PRN  methylPREDNISolone sodium succinate injection 40 mg  40 mg, Intravenous, PRN  EPINEPHrine (PF) injection 0.3 mg  0.3 mg, Subcutaneous, PRN  Flushes  sodium chloride 0.9% 250 mL flush bag  Intravenous, Every visit  sodium chloride 0.9% flush 10 mL  10 mL, Intravenous, Every visit  heparin, porcine (PF) 100 unit/mL injection flush 500 Units  500 Units, Intravenous, Every visit  alteplase injection 2 mg  2 mg, Intra-Catheter, Every visit     Reviewed CT chest in thoracic tumor board and concern for progression exists. She is able to come off prednisone, 10 mg this week and will continue with Entyvio for proctitis.  Sent message to Dr. Hearn to see if EBUS can be done for additional molecular resting, Will also obtain PET scan to assess extent of disease   Labs today.    Above care plan was discussed with patient and accompanying wife and all questions were addressed to their satisfaction      Addendum: Reviewed with Dr. Hearn, concern for progression is high. Discussed with patient that a negative bronch does not exclude malignancy, Discussed starting Carboplatin, Alimta and then adding Keytruda once she has recoverd from her colitis   She is agreeable. Her crcl is between 43-51 ml/min. If she continues to stay below 45 will need to switch out Alimta for Taxol or Abraxane   Also notes that her cough has started back again so will send Doxycycline for now   Also hold off on bronch for now

## 2022-09-12 NOTE — TELEPHONE ENCOUNTER
No new care gaps identified.  [facility] Embedded Care Gaps. Reference number: [id number]. 9/12/2022   12:25:56 PM CDT

## 2022-09-12 NOTE — Clinical Note
Patrice Fierro,  Spoke with her again and reviewed what you said about the utility of bronch in this situtation. She understands that a negative bronch has no value, she understands our concern for disease progression. She is agreeable with starting chemo. I will keep you posted if she has any questions. Thanks for your input

## 2022-09-14 ENCOUNTER — PATIENT MESSAGE (OUTPATIENT)
Dept: HEMATOLOGY/ONCOLOGY | Facility: CLINIC | Age: 72
End: 2022-09-14
Payer: MEDICARE

## 2022-09-15 ENCOUNTER — TELEPHONE (OUTPATIENT)
Dept: GASTROENTEROLOGY | Facility: CLINIC | Age: 72
End: 2022-09-15
Payer: MEDICARE

## 2022-09-15 RX ORDER — DOXYCYCLINE HYCLATE 100 MG
100 TABLET ORAL 2 TIMES DAILY
Qty: 28 TABLET | Refills: 0 | Status: SHIPPED | OUTPATIENT
Start: 2022-09-15 | End: 2022-09-29

## 2022-09-15 NOTE — TELEPHONE ENCOUNTER
Discussed with Dr. Clark  Stay on 10 mg for a total of 7 days and then to 5 mg on Sunday 9/18 for 7 days and then can stop    Notified patient of same and advised to contact us if anything should change with her symptoms.  Pt verbalized understanding to all and has no further questions at this time.

## 2022-09-15 NOTE — TELEPHONE ENCOUNTER
Spoke with patient  Reason for call:  Prednisone check  Meds:  Prednisone 10 mg (taper); Entyvio 300 mg q 8 weeks  Last Dose/Next Dose:   Next dose is 10/13/22  BMs:  0-1; formed  Blood/Mucous:  denies  N/V:   denies  Pain:  denies  Fever:  occasional fever  Last OV/Next OV:  next office visit 09/27/2022  Other:  was on doxycycline (completed 9/8/22) she states did not experience any side effects from this  * Dr. Dhaliwal (?) waiting until down to 10 mg prednisone before re-starting Opdivo    Will discuss with Dr. Clark

## 2022-09-19 ENCOUNTER — TELEPHONE (OUTPATIENT)
Dept: GASTROENTEROLOGY | Facility: CLINIC | Age: 72
End: 2022-09-19
Payer: MEDICARE

## 2022-09-19 ENCOUNTER — PATIENT MESSAGE (OUTPATIENT)
Dept: HEMATOLOGY/ONCOLOGY | Facility: CLINIC | Age: 72
End: 2022-09-19
Payer: MEDICARE

## 2022-09-19 NOTE — TELEPHONE ENCOUNTER
Spoke with pt  She opted to go down to 7.5 mg of prednisone on 9/18 rather than 5 mg because she had a small amount of blood in one stool on Saturday.  She will go down to 5 mg on 9/25.

## 2022-09-22 NOTE — TELEPHONE ENCOUNTER
I called the number she gave for Dr. Jones, their clinic number is [phone], option 4  Left my cell phone number for him to call me

## 2022-09-25 ENCOUNTER — PATIENT MESSAGE (OUTPATIENT)
Dept: HEMATOLOGY/ONCOLOGY | Facility: CLINIC | Age: 72
End: 2022-09-25
Payer: MEDICARE

## 2022-09-26 NOTE — TELEPHONE ENCOUNTER
No he has not called me yet. I called the number she gave me and I left my cell number for them to call me

## 2022-09-27 ENCOUNTER — OFFICE VISIT (OUTPATIENT)
Dept: GASTROENTEROLOGY | Facility: CLINIC | Age: 72
End: 2022-09-27
Payer: MEDICARE

## 2022-09-27 VITALS
DIASTOLIC BLOOD PRESSURE: 74 MMHG | TEMPERATURE: 98 F | WEIGHT: 151.88 LBS | BODY MASS INDEX: 24.52 KG/M2 | SYSTOLIC BLOOD PRESSURE: 160 MMHG | OXYGEN SATURATION: 97 % | HEART RATE: 62 BPM

## 2022-09-27 DIAGNOSIS — C34.11 MALIGNANT NEOPLASM OF UPPER LOBE, RIGHT BRONCHUS OR LUNG: ICD-10-CM

## 2022-09-27 DIAGNOSIS — K52.9 COLITIS: Primary | ICD-10-CM

## 2022-09-27 DIAGNOSIS — K62.1 RECTAL POLYP: ICD-10-CM

## 2022-09-27 PROBLEM — K92.2 GI HEMORRHAGE: Status: RESOLVED | Noted: 2021-06-25 | Resolved: 2022-09-27

## 2022-09-27 PROBLEM — K62.9 RECTAL LESION: Status: RESOLVED | Noted: 2021-04-19 | Resolved: 2022-09-27

## 2022-09-27 PROCEDURE — 99214 OFFICE O/P EST MOD 30 MIN: CPT | Mod: S$GLB,,, | Performed by: INTERNAL MEDICINE

## 2022-09-27 PROCEDURE — 99214 PR OFFICE/OUTPT VISIT, EST, LEVL IV, 30-39 MIN: ICD-10-PCS | Mod: S$GLB,,, | Performed by: INTERNAL MEDICINE

## 2022-09-27 NOTE — PROGRESS NOTES
IBD PATIENT INTAKE:    COVID symptoms in the last 7 days (runny nose, sore throat, congestion, cough, fever): No  PCP: Margo Caldwell  If not PCP-  number given to establish [phone]: N/A    ALLERGIES REVIEWED:  Yes    CHIEF COMPLAINT:    Chief Complaint   Patient presents with    Ulcerative Colitis       VITAL SIGNS:  BP (!) 160/74 (BP Location: Right arm, Patient Position: Sitting)   Pulse 62   Temp 98.1 °F (36.7 °C)   Wt 68.9 kg (151 lb 14.4 oz)   LMP  (LMP Unknown)   SpO2 97%   BMI 24.52 kg/m²      Change in medical, surgical, family or social history: Yes pneumonia     IBD THERAPY (name, dose/frequency):  Entyvio q8w prednisone 5mg  Last dose:  8/17    Next dose:  10/13  [place] [facility] [place]    NSAIDs (aspirin, ibuprofen-advil or motrin, naproxen-aleve, diclofenac-voltaren, BC powder, excedrin, goodies): No    Alternative/Complementary Medications (i.e. probiotics, turmeric, fish oil, aloe vera):      no  Name/dose:  n/a    Vitamins:   Vit D:  no     Vit B-12:  no   Folic Acid: no       Calcium: no     Iron:  no      MVI: no    Antibiotics (past 30 Days):  yes  If yes   Indication: pneumonia   Name of antibiotic: doxycycline   Completion date:  3 weeks ago    REVIEWED MEDICATION LIST RECONCILED INCLUDING ABOVE MEDS:  yes                  Answers submitted by the patient for this visit:  Established Patient Questionnaire  (Submitted on 9/20/2022)  chest pain: Yes  shortness of breath: Yes  cough: Yes

## 2022-09-27 NOTE — PROGRESS NOTES
[facility]          Inflammatory Bowel Disease Follow Up Note         TODAY'S VISIT DATE:  9/27/2022    Reason for Consult:    Chief Complaint   Patient presents with    Ulcerative Colitis       PCP: Margo Caldwell      Referring MD:   No ref. provider found    History of Present Illness:  Alesha Toney who is a 71 y.o. 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  She is here today for a follow-up visit.  Since our last visit she has received her 3rd dose of Entyvio.  We did end up having to restart prednisone because of ongoing symptoms.  Her symptoms resolved relatively quickly and she is currently down to 5 mg of prednisone and will be dropping down to 2.5 mg on Sunday.  Following her 3rd infusion she reports that she was diagnosed with a pneumonia and was treated with doxycycline.  She is no longer receiving Opdivo are any other immunotherapy.  Plans are being made to start transitional chemotherapy in the near future but it was noted that Keytruda may be added later on.  She is waiting to get input from MD [employer] on this as well.  She reports that from a bowel standpoint she is doing well.  She has 1 formed bowel movement daily with no urgency and no tenesmus.  She has some questions today about continuing Entyvio since she is no longer going to be taking immunotherapy for the time being.    IBD History:  She has a history of lung cancer and has undergone surgical intervention as well as multiple regimens of chemotherapy.  Earlier this year she was started on immunotherapy with Opdivo and ipilimumab.  She received 3 cycles of combination treatment and 1 cycle of Opdivo alone.  In mid May she began having significant diarrhea and blood in the stools.  She underwent a flex sig for surveillance of a large polyp that was resected recently and was noted  to have severe colitis extending from the rectum to 20-25 cm proximal to the anus (scope not advanced more proximal to this).  Interestingly, in April when she had a flex sig/EUS she had an area of inflamed tissue at that time that showed acute inflammation.  After the colonoscopy in June she was started on Canasa suppositories which provided some relief for 3 days but then her symptoms worsened.  She was then started on Rowasa enemas which did not seem to provide much improvement.  She was prescribed 70 mg of prednisone daily but never started this because she was concerned about the high dose.  Eventually she started on 30 mg of prednisone daily on July 21st.  She was able to wean the prednisone off and did quite well.  In December 2021 she restarted Opdivo but did not restart ipilimumab.  In June 2022 she underwent a sigmoidoscopy because of an abnormal PET-CT.  She was found to have active proctitis.  We started hydrocortisone suppositories and on July 6, 2022 she started Entyvio.    Pertinent Labs:  Lab Results   Component Value Date    SEDRATE 3 11/29/2021    CRP 1.5 11/29/2021     Lab Results   Component Value Date    TTGIGA 4 07/26/2021     07/26/2021     Lab Results   Component Value Date    TSH 0.689 09/12/2022    FREET4 0.95 09/12/2022     Lab Results   Component Value Date    OMXUHZKN00UP 41 07/26/2021    ONMMQKKC14 543 07/26/2021     Lab Results   Component Value Date    HEPBSAG Negative 06/28/2022    HEPBCAB Negative 06/28/2022    HEPCAB Negative 07/26/2021     Lab Results   Component Value Date    VVU96OBJO Negative 07/26/2021     Lab Results   Component Value Date    NIL 0.40061 06/28/2022    MITOGENNIL 10.000 06/28/2022     Lab Results   Component Value Date    TPTMINTERP SEE BELOW 07/26/2021     Lab Results   Component Value Date    STOOLCULTURE  08/19/2022     No Salmonella,Shigella,Vibrio,Campylobacter,Yersinia isolated.    ONTDHJHLAP6N Negative 08/19/2022    PDMQTMPKKR6X Negative  08/19/2022    CDIFFICILEAN Negative 07/27/2021    CDIFFTOX Negative 07/27/2021     Lab Results   Component Value Date    CALPROTECTIN 92.9 (H) 08/19/2022       Therapeutic Drug Monitoring Labs:  No results found for: PROMETH  No results found for: ANSADAINIT, INFLIXIMAB, INFLIXINTERP    Vaccinations:  No results found for: HEPBSAB  Lab Results   Component Value Date    HEPAIGG Negative 07/26/2021     Lab Results   Component Value Date    VARICELLAZOS 2.67 (H) 07/26/2021    VARICELLAINT Positive (A) 07/26/2021     Immunization History   Administered Date(s) Administered    COVID-19, MRNA, LN-S, PF (MODERNA FULL 0.5 ML DOSE) 02/11/2021, 02/14/2022    Influenza 01/12/2022    Influenza (FLUAD) - Quadrivalent - Adjuvanted - PF *Preferred* (65+) 10/08/2020, 01/12/2022    Influenza - High Dose - PF (65 years and older) 10/09/2018, 10/08/2019    Pneumococcal Polysaccharide - 23 Valent 10/24/2017         Review of Systems  Review of Systems   Constitutional:  Negative for chills, fever and weight loss.   HENT:  Negative for sore throat.    Eyes:  Negative for pain, discharge and redness.   Respiratory:  Positive for cough and shortness of breath. Negative for wheezing.    Cardiovascular:  Positive for chest pain. Negative for orthopnea and leg swelling.   Gastrointestinal:  Negative for abdominal pain, blood in stool, constipation, diarrhea, heartburn, melena, nausea and vomiting.   Genitourinary:  Negative for dysuria, frequency and urgency.   Musculoskeletal:  Negative for back pain, joint pain and myalgias.   Skin:  Negative for itching and rash.   Neurological:  Negative for focal weakness and seizures.   Endo/Heme/Allergies:  Does not bruise/bleed easily.   Psychiatric/Behavioral:  Negative for depression. The patient is not nervous/anxious.      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 OR [address] [place] [address];  Service: Neurosurgery;  Laterality: N/A;    APPENDECTOMY      BONE RESECTION, RIB  1980    BREAST BIOPSY Left     at least 40yrs ago in her 20's    BREAST CYST ASPIRATION      BREAST CYST EXCISION      COLONOSCOPY      ENDOBRONCHIAL ULTRASOUND N/A 7/10/2018    Procedure: ULTRASOUND, ENDOBRONCHIAL;  Surgeon: Sri Hearn MD;  Location: [facility] OR [address] [place] [address];  Service: Pulmonary;  Laterality: N/A;    ENDOBRONCHIAL ULTRASOUND N/A 9/24/2019    Procedure: ENDOBRONCHIAL ULTRASOUND (EBUS);  Surgeon: Sri Hearn MD;  Location: [facility] OR [address] [place] [address];  Service: Pulmonary;  Laterality: N/A;    ENDOSCOPIC MUCOSAL RESECTION OF COLON N/A 9/11/2020    Procedure: RESECTION, MUCOSA, COLON, ENDOSCOPIC;  Surgeon: Lilibeth Carbajal MD;  Location: [facility] ([address] [place] [address]);  Service: Endoscopy;  Laterality: N/A;    ENDOSCOPIC ULTRASOUND OF LOWER GASTROINTESTINAL TRACT N/A 4/19/2021    Procedure: ULTRASOUND, LOWER GI TRACT, ENDOSCOPIC;  Surgeon: Lilibeth Carbajal MD;  Location: [facility];  Service: Endoscopy;  Laterality: N/A;    ENDOSCOPIC ULTRASOUND OF LOWER GASTROINTESTINAL TRACT N/A 6/25/2021    Procedure: ULTRASOUND, LOWER GI TRACT, ENDOSCOPIC;  Surgeon: Silvino Christopher MD;  Location: [facility];  Service: Endoscopy;  Laterality: N/A;  Needs Rapid MP    FLEXIBLE  SIGMOIDOSCOPY  06/11/2020    with biopsies    FLEXIBLE SIGMOIDOSCOPY N/A 6/11/2020    Procedure: SIGMOIDOSCOPY, FLEXIBLE;  Surgeon: Gely Yeh MD;  Location: [facility] ENDO;  Service: Endoscopy;  Laterality: N/A;    FLEXIBLE SIGMOIDOSCOPY N/A 4/19/2021    Procedure: SIGMOIDOSCOPY-FLEXIBLE;  Surgeon: Lilibeth Carbajal MD;  Location: [facility] ENDO;  Service: Endoscopy;  Laterality: N/A;  Covid 4/16 [place] MP    FLEXIBLE SIGMOIDOSCOPY N/A 6/3/2022    Procedure: SIGMOIDOSCOPY-FLEXIBLE;  Surgeon: Francesco Clark MD;  Location: [facility] ENDO (4TH FLR);  Service: Endoscopy;  Laterality: N/A;  vacc-inst portal-tb    HYSTERECTOMY      @47yrs of age    INSERTION OF TUNNELED CENTRAL VENOUS CATHETER (CVC) WITH SUBCUTANEOUS PORT N/A 9/25/2018    Procedure: [id number];  Surgeon: Dosrhina Diagnostic Provider;  Location: [facility] OR 2ND FLR;  Service: Radiology;  Laterality: N/A;    INSERTION OF VENOUS ACCESS PORT N/A 3/15/2021    Procedure: INSERTION, VENOUS ACCESS PORT;  Surgeon: Chang Mathews MD;  [facility];  Service: General;  Laterality: N/A;  NEEDS CONSENT.    KIDNEY SURGERY      19yo    MEDIPORT REMOVAL N/A 3/15/2021    Procedure: REMOVAL, CATHETER, CENTRAL VENOUS, TUNNELED, WITH PORT;  Surgeon: Chang Mathews MD;  [facility];  Service: General;  Laterality: N/A;    OOPHORECTOMY      @47yrs of age    SKIN BIOPSY      TONSILLECTOMY      UPPER GASTROINTESTINAL ENDOSCOPY      VIDEO-ASSISTED THORACOSCOPIC LOBECTOMY Right 8/9/2018    Procedure: VATS, WITH LOBECTOMY Possible chest wall resection;  Surgeon: Philip Messina MD;  [facility];  Service: Thoracic;  Laterality: Right;  Have open pan available.       Family History:   Family History   Problem Relation Age of Onset    Stroke Mother     Cataracts Mother     Cancer Father         colon cancer    Colon cancer Father     Diabetes Brother     Heart failure Brother     Hypertension Brother     Heart attack  Paternal Aunt     Heart attack Maternal Grandfather     Diabetes Paternal Aunt     Anxiety disorder Paternal Grandmother         agoraphobia    Anesthesia problems Neg Hx     Blindness Neg Hx     Amblyopia Neg Hx     Glaucoma Neg Hx     Macular degeneration Neg Hx     Retinal detachment Neg Hx     Strabismus Neg Hx     Thyroid disease Neg Hx     Melanoma Neg Hx        Social History:   Social History     Tobacco Use    Smoking status: Former     Packs/day: 1.00     Years: 30.00     Pack years: 30.00     Types: Cigarettes     Quit date: 2015     Years since quittin.3    Smokeless tobacco: Never   Substance Use Topics    Alcohol use: Not Currently     Alcohol/week: 0.0 standard drinks     Comment: socially     Drug use: No       Allergies: Reviewed    Home Medications:   Medication List with Changes/Refills   Current Medications    ASCORBIC ACID, VITAMIN C, (VITAMIN C) 500 MG TABLET    Take 500 mg by mouth once daily.    ATENOLOL (TENORMIN) 25 MG TABLET    Take 1 tablet (25 mg total) by mouth 3 (three) times daily.    CALCIUM CARBONATE (TUMS) 300 MG (750 MG) CHEW    Take by mouth as needed.     DOXYCYCLINE (VIBRA-TABS) 100 MG TABLET    Take 1 tablet (100 mg total) by mouth 2 (two) times daily. for 14 days    FAMOTIDINE (PEPCID) 10 MG TABLET    Take 10 mg by mouth once daily.    HYDROCODONE-ACETAMINOPHEN (NORCO) 7.5-325 MG PER TABLET    Take 1 tablet by mouth every 8 (eight) hours as needed for Pain.    LORAZEPAM (ATIVAN) 0.5 MG TABLET    TAKE 1 TABLET EVERY 12 HOURS AS NEEDED FOR ANXIETY    LOSARTAN (COZAAR) 50 MG TABLET    1 tab po bid    PREDNISONE (DELTASONE) 5 MG TABLET    Take 4 tablets (20 mg total) by mouth once daily for 10 days, THEN 3 tablets (15 mg total) once daily for 7 days, THEN 2 tablets (10 mg total) once daily for 7 days, THEN 1 tablet (5 mg total) once daily for 7 days.   Discontinued Medications    NIVOLUMAB (OPDIVO) 100 MG/10 ML INJECTION    100 mg by Other route. Infusion once a month        Physical Exam:  Vital Signs:  BP (!) 160/74 (BP Location: Right arm, Patient Position: Sitting)   Pulse 62   Temp 98.1 °F (36.7 °C)   Wt 68.9 kg (151 lb 14.4 oz)   LMP  (LMP Unknown)   SpO2 97%   BMI 24.52 kg/m²   Body mass index is 24.52 kg/m².    Physical Exam  Vitals and nursing note reviewed.   Constitutional:       General: She is not in acute distress.     Appearance: Normal appearance. She is well-developed. She is not ill-appearing or toxic-appearing.   Eyes:      Conjunctiva/sclera: Conjunctivae normal.      Pupils: Pupils are equal, round, and reactive to light.   Neck:      Thyroid: No thyromegaly.   Cardiovascular:      Rate and Rhythm: Normal rate and regular rhythm.      Heart sounds: Normal heart sounds. No murmur heard.  Pulmonary:      Effort: Pulmonary effort is normal.      Breath sounds: Normal breath sounds. No wheezing or rales.   Abdominal:      General: Bowel sounds are normal. There is no distension.      Palpations: Abdomen is soft. There is no mass.      Tenderness: There is no abdominal tenderness.   Musculoskeletal:         General: No tenderness. Normal range of motion.   Lymphadenopathy:      Cervical: No cervical adenopathy.   Skin:     Findings: No erythema or rash.   Neurological:      General: No focal deficit present.      Mental Status: She is alert and oriented to person, place, and time.   Psychiatric:         Mood and Affect: Mood normal.         Behavior: Behavior normal.         Thought Content: Thought content normal.         Judgment: Judgment normal.       Labs: reviewed and pertinent noted above    Assessment/Plan:  Alesha Toney is a 71 y.o. female with immune checkpoint inhibitor associated colitis here with rectal bleeding and anal pain consistent with an anal fissure. The following issues were addresssed:    1. Colitis    2. Malignant neoplasm of upper lobe, right bronchus or lung    3. Rectal polyp      1. Checkpoint inhibitor colitis:  She is doing  significantly better.  We will continue to wean her prednisone by 2.5 mg on Sunday and then after a week of this she will stop it.  With regards to the ongoing use of Entyvio, I think it would be in her best interest to continue the medication for now.  We discussed the risks and benefits again.  She is concerned because she had a cough after her 2nd infusion, flu-like symptoms after the 1st infusion, and was diagnosed with pneumonia after the 3rd infusion.  We discussed the fact that Entyvio does not have an impact on the immune system outside of the GI tract and is unlikely that this caused these issues.  It is more likely that the progression of her lung cancer has caused the pneumonia issue.  She understands that but is still concerned and considering whether she would like to continue Entyvio were not.  I think it would be in her best interest to continue this for 4-6 months and once it has been determined if she is not going to need further immunotherapy we can consider trying to stop it.  We discussed the fact that immune checkpoint inhibitor colitis can recur even after the offending drug has been stopped and it can even developed after completion of treatment.  She is going to discuss this with her oncologist as well.    2. Lung cancer: Continue to follow-up with Oncology.    3. Rectal polyp:  Recent sigmoidoscopy did not show any polyp tissue remaining.  Continue to monitor periodically.        Follow up: Follow up in about 4 months (around 1/27/2023).    Thank you again for sending Alesha Toney to see Dr. Baudilio Clark today at the [facility]. Please don't hesitate to contact Dr. Clark if there are any questions regarding this evaluation, or if you have any other patients with inflammatory bowel disease for whom you would like a consultation. You can reach Dr. Clark at [phone] or by email at [email or web address]    Francesco Clark MD  Department of  Gastroenterology  Inflammatory Bowel Disease

## 2022-09-29 NOTE — PROGRESS NOTES
Left message that I spoke with Dr. Newton and plan is for wither Carboplatin Taxol versus Docetaxel/ Rami.

## 2022-09-30 ENCOUNTER — PATIENT MESSAGE (OUTPATIENT)
Dept: HEMATOLOGY/ONCOLOGY | Facility: CLINIC | Age: 72
End: 2022-09-30
Payer: MEDICARE

## 2022-09-30 NOTE — PLAN OF CARE
DISCONTINUE ON PATHWAY REGIMEN - Non-Small Cell Lung    [id number]        Pembrolizumab (Keytruda)       Pemetrexed (Alimta)       Carboplatin (Paraplatin)           Additional Orders: Begin folic acid and vitamin B12 supplementation, and   dexamethasone prior to initiation of pemetrexed - see PI for details. In   studies, patients received up to 4 cycles of pembrolizumab + pemetrexed +   carboplatin followed by pembrolizumab up to a total of 35 cycles and pemetrexed   indefinitely until disease progression or toxicity. Serious immune-mediated   adverse events can occur with pembrolizumab. Please monitor your patient and   refer to the linked immune-mediated adverse reaction management materials for   more information.    **Always confirm dose/schedule in your pharmacy ordering system**    REASON: Other Reason  PRIOR TREATMENT: [id number]  TREATMENT RESPONSE: Unable to Evaluate    START ON PATHWAY REGIMEN - Non-Small Cell Lung    [id number]        Pemetrexed (Alimta)           Additional Orders: Note: Patient to receive the following prior to the   initiation of therapy:  1) Dexamethasone 4 mg orally twice daily x 6 doses.    First dose 24 hours before chemotherapy.  2) Folic acid greater than or equal to   400 mcg orally daily.  First dose at least 5 days prior to the first dose of   pemetrexed.  3) Vitamin B12 1,000 mcg intramuscularly every 9 weeks.  First dose   at least 5 days prior to the first dose of pemetrexed.    **Always confirm dose/schedule in your pharmacy ordering system**    Patient Characteristics:  Stage IV Metastatic, Nonsquamous, Molecular Analysis Completed, Molecular   Alteration Present and Targeted Therapy Exhausted OR EGFR Exon 20+ or KRAS G12C+   or HER2+ Present and No Prior Chemo/Immunotherapy OR No Alteration Present,   Second Line - Chemotherapy/Immunotherapy, PS = 0, 1, No Prior PD-1/PD-L1    Inhibitor or Prior PD-1/PD-L1 Inhibitor + Chemotherapy, and Not a Candidate for    Immunotherapy  Therapeutic Status: Stage IV Metastatic  Histology: Nonsquamous Cell  Broad Molecular Profiling Status: Molecular Analysis Completed  Molecular Analysis Results: No Alteration Present  ECOG Performance Status: 0  Chemotherapy/Immunotherapy Line of Therapy: Second Line   Chemotherapy/Immunotherapy  Immunotherapy Candidate Status: Not a Candidate for Immunotherapy  Prior Immunotherapy Status: Prior PD-1/PD-L1 Inhibitor + Chemotherapy  Intent of Therapy:  Non-Curative / Palliative Intent, Discussed with Patient

## 2022-09-30 NOTE — PROGRESS NOTES
Spoke with her,  Reviewed what we discussed with Dr. Newton, he recommended Carboplatin/Taxol versus Docetaxel/Cyramza.She wants to try Carboplatin and Alimta. She understands that if her crcl is less than 45 then Alimta cannot be administered. Will also hold off on Keytruda

## 2022-10-03 ENCOUNTER — TELEPHONE (OUTPATIENT)
Dept: GASTROENTEROLOGY | Facility: CLINIC | Age: 72
End: 2022-10-03
Payer: MEDICARE

## 2022-10-03 NOTE — TELEPHONE ENCOUNTER
Spoke with patient  Went down to 2.5 mg on Saturday 9/30/22  States she is feeling fine  1 formed BM q day  No blood, no mucous, no fever, no pain  Advised to take last dose on 10/7 and then can stop, but to contact me immediately if she begins to experiences a worsening of symptoms.  Pt verbalized understanding to all and has no further questions at this time.

## 2022-10-06 ENCOUNTER — HOSPITAL ENCOUNTER (OUTPATIENT)
Dept: RADIOLOGY | Facility: HOSPITAL | Age: 72
Discharge: HOME | End: 2022-10-06
Attending: INTERNAL MEDICINE
Payer: MEDICARE

## 2022-10-06 DIAGNOSIS — C34.11 MALIGNANT NEOPLASM OF UPPER LOBE, RIGHT BRONCHUS OR LUNG: ICD-10-CM

## 2022-10-06 PROCEDURE — 78815 NM PET CT ROUTINE: ICD-10-PCS | Mod: 26,PS,, | Performed by: RADIOLOGY

## 2022-10-06 PROCEDURE — 78815 PET IMAGE W/CT SKULL-THIGH: CPT | Mod: 26,PS,, | Performed by: RADIOLOGY

## 2022-10-06 PROCEDURE — 78815 PET IMAGE W/CT SKULL-THIGH: CPT | Mod: TC,PS

## 2022-10-11 ENCOUNTER — PATIENT MESSAGE (OUTPATIENT)
Dept: GASTROENTEROLOGY | Facility: CLINIC | Age: 72
End: 2022-10-11
Payer: MEDICARE

## 2022-10-12 ENCOUNTER — PATIENT MESSAGE (OUTPATIENT)
Dept: HEMATOLOGY/ONCOLOGY | Facility: CLINIC | Age: 72
End: 2022-10-12
Payer: MEDICARE

## 2022-10-13 ENCOUNTER — LAB VISIT (OUTPATIENT)
Dept: LAB | Facility: HOSPITAL | Age: 72
End: 2022-10-13
Attending: INTERNAL MEDICINE
Payer: MEDICARE

## 2022-10-13 ENCOUNTER — OFFICE VISIT (OUTPATIENT)
Dept: HEMATOLOGY/ONCOLOGY | Facility: CLINIC | Age: 72
End: 2022-10-13
Payer: MEDICARE

## 2022-10-13 ENCOUNTER — OFFICE VISIT (OUTPATIENT)
Dept: INTERNAL MEDICINE | Facility: CLINIC | Age: 72
End: 2022-10-13
Payer: MEDICARE

## 2022-10-13 VITALS — HEIGHT: 66 IN | WEIGHT: 149.38 LBS | BODY MASS INDEX: 24.01 KG/M2

## 2022-10-13 DIAGNOSIS — N18.31 STAGE 3A CHRONIC KIDNEY DISEASE: ICD-10-CM

## 2022-10-13 DIAGNOSIS — K51.819 OTHER ULCERATIVE COLITIS WITH COMPLICATION: ICD-10-CM

## 2022-10-13 DIAGNOSIS — E03.9 HYPOTHYROIDISM, UNSPECIFIED TYPE: ICD-10-CM

## 2022-10-13 DIAGNOSIS — C77.1 SECONDARY AND UNSPECIFIED MALIGNANT NEOPLASM OF INTRATHORACIC LYMPH NODES: ICD-10-CM

## 2022-10-13 DIAGNOSIS — C34.11 MALIGNANT NEOPLASM OF UPPER LOBE, RIGHT BRONCHUS OR LUNG: Primary | ICD-10-CM

## 2022-10-13 DIAGNOSIS — C34.11 MALIGNANT NEOPLASM OF UPPER LOBE, RIGHT BRONCHUS OR LUNG: ICD-10-CM

## 2022-10-13 DIAGNOSIS — F43.21 ADJUSTMENT DISORDER WITH DEPRESSED MOOD: ICD-10-CM

## 2022-10-13 LAB
ALBUMIN SERPL BCP-MCNC: 3.5 G/DL (ref 3.5–5.2)
ALP SERPL-CCNC: 48 U/L (ref 55–135)
ALT SERPL W/O P-5'-P-CCNC: 16 U/L (ref 10–44)
ANION GAP SERPL CALC-SCNC: 6 MMOL/L (ref 8–16)
AST SERPL-CCNC: 21 U/L (ref 10–40)
BASOPHILS # BLD AUTO: 0.04 K/UL (ref 0–0.2)
BASOPHILS NFR BLD: 0.6 % (ref 0–1.9)
BILIRUB SERPL-MCNC: 0.4 MG/DL (ref 0.1–1)
BUN SERPL-MCNC: 15 MG/DL (ref 8–23)
CALCIUM SERPL-MCNC: 9.9 MG/DL (ref 8.7–10.5)
CHLORIDE SERPL-SCNC: 104 MMOL/L (ref 95–110)
CO2 SERPL-SCNC: 27 MMOL/L (ref 23–29)
CREAT SERPL-MCNC: 1.1 MG/DL (ref 0.5–1.4)
DIFFERENTIAL METHOD: ABNORMAL
EOSINOPHIL # BLD AUTO: 0.2 K/UL (ref 0–0.5)
EOSINOPHIL NFR BLD: 2.6 % (ref 0–8)
ERYTHROCYTE [DISTWIDTH] IN BLOOD BY AUTOMATED COUNT: 12.9 % (ref 11.5–14.5)
EST. GFR  (NO RACE VARIABLE): 53.4 ML/MIN/1.73 M^2
GLUCOSE SERPL-MCNC: 144 MG/DL (ref 70–110)
HCT VFR BLD AUTO: 36.3 % (ref 37–48.5)
HGB BLD-MCNC: 12 G/DL (ref 12–16)
IMM GRANULOCYTES # BLD AUTO: 0.02 K/UL (ref 0–0.04)
IMM GRANULOCYTES NFR BLD AUTO: 0.3 % (ref 0–0.5)
LYMPHOCYTES # BLD AUTO: 2.5 K/UL (ref 1–4.8)
LYMPHOCYTES NFR BLD: 38.4 % (ref 18–48)
MCH RBC QN AUTO: 31.7 PG (ref 27–31)
MCHC RBC AUTO-ENTMCNC: 33.1 G/DL (ref 32–36)
MCV RBC AUTO: 96 FL (ref 82–98)
MONOCYTES # BLD AUTO: 0.7 K/UL (ref 0.3–1)
MONOCYTES NFR BLD: 11.2 % (ref 4–15)
NEUTROPHILS # BLD AUTO: 3.1 K/UL (ref 1.8–7.7)
NEUTROPHILS NFR BLD: 46.9 % (ref 38–73)
NRBC BLD-RTO: 0 /100 WBC
PLATELET # BLD AUTO: 215 K/UL (ref 150–450)
PMV BLD AUTO: 10 FL (ref 9.2–12.9)
POTASSIUM SERPL-SCNC: 4.8 MMOL/L (ref 3.5–5.1)
PROT SERPL-MCNC: 6.4 G/DL (ref 6–8.4)
RBC # BLD AUTO: 3.78 M/UL (ref 4–5.4)
SODIUM SERPL-SCNC: 137 MMOL/L (ref 136–145)
T4 FREE SERPL-MCNC: 0.96 NG/DL (ref 0.71–1.51)
TSH SERPL DL<=0.005 MIU/L-ACNC: 0.64 UIU/ML (ref 0.4–4)
WBC # BLD AUTO: 6.59 K/UL (ref 3.9–12.7)

## 2022-10-13 PROCEDURE — 99213 OFFICE O/P EST LOW 20 MIN: CPT | Mod: 95,,, | Performed by: INTERNAL MEDICINE

## 2022-10-13 PROCEDURE — 99213 PR OFFICE/OUTPT VISIT, EST, LEVL III, 20-29 MIN: ICD-10-PCS | Mod: 95,,, | Performed by: INTERNAL MEDICINE

## 2022-10-13 PROCEDURE — 36415 COLL VENOUS BLD VENIPUNCTURE: CPT | Performed by: INTERNAL MEDICINE

## 2022-10-13 PROCEDURE — 85025 COMPLETE CBC W/AUTO DIFF WBC: CPT | Performed by: INTERNAL MEDICINE

## 2022-10-13 PROCEDURE — 99215 PR OFFICE/OUTPT VISIT, EST, LEVL V, 40-54 MIN: ICD-10-PCS | Mod: 95,,, | Performed by: INTERNAL MEDICINE

## 2022-10-13 PROCEDURE — 99215 OFFICE O/P EST HI 40 MIN: CPT | Mod: 95,,, | Performed by: INTERNAL MEDICINE

## 2022-10-13 PROCEDURE — 84443 ASSAY THYROID STIM HORMONE: CPT | Performed by: INTERNAL MEDICINE

## 2022-10-13 PROCEDURE — 84439 ASSAY OF FREE THYROXINE: CPT | Performed by: INTERNAL MEDICINE

## 2022-10-13 PROCEDURE — 80053 COMPREHEN METABOLIC PANEL: CPT | Performed by: INTERNAL MEDICINE

## 2022-10-13 RX ORDER — HYDROCODONE BITARTRATE AND ACETAMINOPHEN 7.5; 325 MG/1; MG/1
1 TABLET ORAL EVERY 8 HOURS PRN
Qty: 90 TABLET | Refills: 0 | Status: SHIPPED | OUTPATIENT
Start: 2022-10-13 | End: 2022-11-10 | Stop reason: SDUPTHER

## 2022-10-13 RX ORDER — SODIUM CHLORIDE 0.9 % (FLUSH) 0.9 %
10 SYRINGE (ML) INJECTION
Status: CANCELLED | OUTPATIENT
Start: 2022-10-14

## 2022-10-13 RX ORDER — HEPARIN 100 UNIT/ML
500 SYRINGE INTRAVENOUS
Status: CANCELLED | OUTPATIENT
Start: 2022-10-14

## 2022-10-13 RX ORDER — LORAZEPAM 2 MG/ML
0.5 INJECTION INTRAMUSCULAR ONCE
Status: CANCELLED
Start: 2022-10-14 | End: 2022-10-14

## 2022-10-13 RX ORDER — FOLIC ACID 1 MG/1
TABLET ORAL
Qty: 100 TABLET | Refills: 3 | Status: SHIPPED | OUTPATIENT
Start: 2022-10-13 | End: 2022-10-14 | Stop reason: SDUPTHER

## 2022-10-13 RX ORDER — DEXAMETHASONE 6 MG/1
TABLET ORAL
Qty: 20 TABLET | Refills: 3 | Status: SHIPPED | OUTPATIENT
Start: 2022-10-13 | End: 2023-02-07

## 2022-10-13 RX ORDER — CYANOCOBALAMIN 1000 UG/ML
1000 INJECTION, SOLUTION INTRAMUSCULAR; SUBCUTANEOUS ONCE
Status: CANCELLED
Start: 2022-10-14

## 2022-10-13 NOTE — Clinical Note
Patrice Hoang, Can you review her case, She had mediastinal RT with you, she wants to know if she can do proton beam.

## 2022-10-13 NOTE — PROGRESS NOTES
"Subjective:       Patient ID: Alesha Toney is a 72 y.o. female.  The patient location is: home  The chief complaint leading to consultation is: lung cancer    Visit type: audiovisual      Notes:    Chief Complaint: Lung Cancer  Ms. Alesha Toney is a 72-year-old otherwise healthy female who started having some shoulder pain, which was lasting for over six weeks.  She went and saw her primary care physician and underwent an x-ray, which revealed right upper lobe lung mass worrisome for malignancy and a CT scan was recommended, which was done on 6/12/18 and that revealed a newly developing mass, pleural based, lateral posterior segment, right upper lobe 3.5 x 3.5 cm, surrounding stellate margin and patchy infiltrates, particularly superiorly,   anteriorly infiltrates extend perihilar into the anterior segment.  She then underwent CT-guided biopsy, which revealed well-differentiated adenocarcinoma.       Her PET scan from 6/29/18 There is physiologic intracranial, head, and neck activity.  There is a large right upper lobe mass SUV max 9.11.  No local or distant metastatic disease seen.  There is physiologic liver, spleen, GI and  activity.  There are a few liver cysts.  No adenopathy is seen.  The adrenal glands are not enlarged.  No adenopathy is seen.  No suspicious bone lesions are seen.     She underwent VATS with right upper lobectomy. Mediastinal lymphadenectomy on 8/9/18. Pathology revealed "Tumor site: Upper lobe.Tumor size: 7 x 4 x 2.7 cm.Tumor focality: Single tumor.  Histologic type: Mixed invasive mucinous and nonmucinous adenocarcinoma. Histologic grade: G2: Moderately differentiated.Spread through air spaces (STATS): Present. Visceral pleura invasion: Not identified.  Lymphovascular invasion: Not identified. Direct invasion of adjacent structures: No adjacent structures present. Margins: All margins are uninvolved by carcinoma.Distance of invasive carcinoma from closest margin: 1 cm from the " "bronchial margin.Treatment effect: No known presurgical therapy. Regional lymph nodes: Number of lymph nodes involved: 0. Number of lymph nodes examined: 11. Pathologic Stage Classification: pT3 N0"        She completed 4 cycles of adjuvant chemo with Cisplatin and Alimta as of 1/10/19   She is on surveillance.     CT chest of 9/17/19 which reveals "Operative change of right upper lobectomy for small-cell lung cancer with several scattered subsolid opacities and a new solid nodule in the right lower lobe measuring up to 0.5 cm.  We recommend continued surveillance with the role and schedule for such surveillance to be determined by clinical considerations. Sided chest port distal tip terminating at the confluence of the brachiocephalic vein in the SVC.  Correlate with device functioning. Apically predominant emphysematous changes, left greater than right. Aortic annular calcification and multi-vessel coronary artery atherosclerosis. Numerous unchanged hepatic hypodensities, likely cysts"     PET scan from 10/1/19 reveals "1.6 cm soft tissue lesion re-identified posterior to the bronchus intermedius.  No corresponding focal increased radiotracer uptake to suggest local recurrence or metastatic disease. Stable subcentimeter opacities throughout the bilateral lower lobes, too small to characterize with PET-CT. Focal increased radiotracer uptake and subtle wall thickening in the rectum.  Findings are concerning for primary neoplastic process.  Recommend further evaluation with direct visualization"     She returned from [facility] and was advised to proceed with chemo/RT with either Docetaxel/platinum or Carboplatin/Alimta.     She completed chemo/RT with Carboplatin and Alimta as of 12/31/19     She underwent CT chest on 5/27/2020 which revealed 1. In this patient with history of lung cancer status post right upper lobectomy, there is a soft tissue opacity at the posterior margin of the staple line.  This lesion has " "been enlarging progressively and now measures 1.3 x 1.6 cm.  There is also a new 1.7 x 1.7 cm opacity at the right paravertebral pleural margin which is inseparable from this lesion.  These findings may represent post radiation change in light of the hypermetabolic lesion in this region on PET-CT of 10/01/2019 (image 69/[id number]).There is a 1 cm opacity in the superior or lateral basal segment of the right lower lobe (axial series 4, image 243) which has enlarged since 09/17/2019.  Nature of this lesion is unclear.  Clinical considerations will determine if percutaneous biopsy or metabolic assessment is warranted. 1.0 cm solid opacity with branching configuration (series 4, image 205) is located in the lateral basal segment of the left lower lobe, stable since 02/16/2019 (02/06/2019 axial series 4, image 310). Appearance and bifurcating character suggests mucoid impaction in mildly ectatic peripheral airway, likely not significant. Persistent clustered small centrilobular nodules in the apical segment of the right upper lobe likely reflecting small airway inflammation/infection. Partially visualized left chest port with distal catheter tip at the junction of the brachiocephalic veins and SVC, similar as on 09/17/2019"  She thus underwent PET scan on 6/1/2020 which revealed "No evidence of residual viable lung malignancy.  Evolving post radiation changes in the right paramediastinal lung, with a new irregular subpleural density which may also be related to recent radiation.  Attention on follow-up. Interval decrease in size of a soft tissue lesion along the inferior margin of the lobectomy stable line.  Several stable subcentimeter soft tissue opacities in the lower lobes bilaterally, As above.  Attention on follow-up.  Persistent hypermetabolic rectal lesion concerning for malignancy.  Recommend direct visualization and tissue sampling as clinically indicated"  I discussed her case in thoracic conference today and " "findings are felt to be related to RT     Her restaging CT scans from 1/20/2021 which reveal "Persistent soft tissue opacity in the posterior margin of the staple line that is increased in prominence when compared to the prior examination and is worrisome for disease progression. No new lesions identified.  Stable pulmonary nodules. Stable hypodense lesions throughout the liver that likely represent hepatic cysts"        Restaging PET scan from 1/27/2021 reveals "Increased size and hypermetabolic activity involving soft tissue opacity along the inferior border of the right upper lobectomy stable line concerning for progression of disease. Mildly increased hypermetabolic activity involving the rectum compatible with known high-grade dysplasia/intramucosal carcinoma. Interval decrease in size and resolution of hypermetabolic activity involving subcentimeter subpleural opacity within the right lower lobe"     MRI brain from  2/3/2021 reveals no evidence of recurrence.  GUARDANT testing only reveals p53, PDL1 is 0 and no other targets        PET scan from 3/24/2021 reveals "In this patient with known lung cancer, there is a persistent hypermetabolic right hilar mass consistent with viable tumor.  Interval increase in size is equivocal for progression of fibrosis versus tumor growth. 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  Activity appears more focal and possibly more proximal than before.  Consider direct visualization for further evaluation. The previously described subcentimeter subpleural opacity within the right lower lobe is not definitely seen on today's exam"               She last received immunotherapy on 5/28/2021. She underwent EUS on 6/25/2021 which revealed "Erythematous, congested, and ulcerated mucosa  (proctitis) in rectosigmoid colon.  Pathology reveals Colon, rectosigmoid, biopsy:  Moderate active colitis     She completed " "steroids about 5 weeks ago.      CT scans from 11/5/2021 reveals "In this patient with lung cancer, there is postoperative changes of right upper lobe resection.  Persistent soft tissue opacity along the right hilum suture line which is slightly increased in size from prior exam.  More conspicuous appearing nodules within the right lower lobe seen on prior exam.  New nodule within the right middle lobe measuring 1.3 cm.  Overall findings are all worrisome for disease progression. Scattered areas of centrilobular nodularity within the bilateral lungs.  Nonspecific and can be seen in the setting of infectious or inflammatory etiologies. Unchanged hepatics cysts. Stable left adrenal gland nodule."  PET scan from 11/23/2021 revealed "New hypermetabolic nodule within the right lower lobe additional slightly subcentimeter nodules within the right lower lobe.  Findings concerning for disease progression, other differentials include infection or noninfectious inflammatory process. Interval decrease in size and uptake in the right hilar mass. Resolution of previous focal rectal uptake"        She is on Opdivo. Her Opdivo was held on 1/10/2022 due to rectal bleeding. She saw Dr. Clark and was noted to have anal fissure which contributed to rectal bleed. Her rectal bleed has since resolved.           She was on Opdivo until 5/24/2022, she noted rectal bleeding and underwent   Flex sigmoidoscopy on 6/3/2022 reveals "Localized severe inflammation was found in the distal rectum. Biopsied. Pathology reveals Severely active chronic colitis with ulceration (see comment)  Moderate architectural disorder. Negative for CMV by immunohistochemistry . Negative for granulomas or viral inclusions. Negative for dysplasia or carcinoma"  PET scan from 6/17/2022 reveals "In this patient with lung cancer there are enlarging hypermetabolic right perihilar opacities concerning for continued local disease progression. Continued decreased " "metabolic activity of the right lower lobe nodule.  Cluster of pulmonary nodules bilaterally, some which are new from prior PET-CT for example the superior segment the left upper lobe and are likely infectious/inflammatory origin. Persistent and more extensive region of increased FDG avidity within the distal rectum in keeping with more extensive colitis.  Malignancy could have a similar appearance.  Recommend further investigation as clinically warranted and attention on follow-up"     She has active proctitis and is on hydrocortisone suppositories and Entyvyo since early June 2022.   CT chest on 8/1/2022 reveals  "Similar appearance of elongated, irregular opacity adjacent to the right upper lobectomy margin.  More posteriorly adjacent perihilar opacities with previous FDG avidity with detailed measurements as above. Similar areas of scattered micro-nodularity with tree-in-bud, likely infectious/inflammatory in nature.  New appearing area of grouped micro-nodules in the anterior left lower lobe, largest up to 5 mm, which could relate to aforementioned small airways disease, though technically indeterminate.  Continued close attention at follow-up. RECIST SUMMARY: Date of prior examination for comparison: CT chest dated 05/24/2022. Lesion 1: Right perihilar opacity.  2.4.  Series 2, image 45.  Previously 2 4.  (Note there is slight increased size conspicuity in the craniocaudal dimension at 1.8 cm, previously 1.5 cm). Lesion 2: Right perihilar opacity.  1.7 cm.  Series 2, image 59.  Previously 1.5 cm"     Renuka underwent a repeat CT chest 8/31/2022 and that revealed "Status post right upper lobectomy.  Soft tissue thickening adjacent to the suture line appears stable.  However, there is interval enlargement of a right lower lobe, bilobed, paramediastinal mass-like consolidation.  When dominant components of the lesions are measured separately, dimensions are summarized below. RECIST SUMMARY: Date of prior " "examination for comparison: CT chest 08/01/2022. Lesion 1: Right perihilar opacity.  4.7 cm. Series 4 image 77.  Prior measurement 2.4 cm. Lesion 2: Right perihilar (infrahilar) opacity.  2.7 cm. Series 4 image 237.  Prior measurement 1.7 cm"  Case reviewed in thoracic conference, and concern for disease progression exists so plan on immunotehrapy if able to taper steroids         HPIShe comes in to resume chemo.   PET scan from 10/6/2022 reveals "Interval increase in size and radiotracer uptake of a right pulmonary lesions in this patient with known non-small cell lung cancer.  There are few new subcentimeter pulmonary nodules in the right lower lobe with no significant uptake, could represent infectious versus inflammatory process.  However, additional new metastatic disease can not be excluded.  Attention on follow-up. Improved metabolic appearance of the lower rectum compared with the prior exam"  She is currently off of prednisone and Entyvio.    She notes that her chest discomfort is at baseline. Denies fever or cough  She denies any nausea, vomiting, diarrhea, constipation, abdominal pain, weight loss or loss of appetite,  leg swelling, fatigue, pain, headache, dizziness, or mood changes. Her ECOG PS is zero     Review of Systems   Constitutional:  Negative for appetite change and unexpected weight change.   HENT:  Negative for mouth sores.    Eyes:  Negative for visual disturbance.   Respiratory:  Positive for shortness of breath. Negative for cough.    Cardiovascular:  Positive for chest pain.   Gastrointestinal:  Negative for abdominal pain and diarrhea.   Genitourinary:  Negative for frequency.   Musculoskeletal:  Positive for back pain.   Integumentary:  Negative for rash.   Neurological:  Negative for headaches.   Hematological:  Negative for adenopathy.   Psychiatric/Behavioral:  The patient is nervous/anxious.        Objective:      Physical Exam      LABS:  WBC   Date Value Ref Range Status "   10/13/2022 6.59 3.90 - 12.70 K/uL Final     Hemoglobin   Date Value Ref Range Status   10/13/2022 12.0 12.0 - 16.0 g/dL Final     POC Hematocrit   Date Value Ref Range Status   08/09/2018 33 (L) 36 - 54 %PCV Final     Hematocrit   Date Value Ref Range Status   10/13/2022 36.3 (L) 37.0 - 48.5 % Final     Platelets   Date Value Ref Range Status   10/13/2022 215 150 - 450 K/uL Final     Gran # (ANC)   Date Value Ref Range Status   10/13/2022 3.1 1.8 - 7.7 K/uL Final     Gran %   Date Value Ref Range Status   10/13/2022 46.9 38.0 - 73.0 % Final       Chemistry        Component Value Date/Time     10/13/2022 1349    K 4.8 10/13/2022 1349     10/13/2022 1349    CO2 27 10/13/2022 1349    BUN 15 10/13/2022 1349    CREATININE 1.1 10/13/2022 1349     (H) 10/13/2022 1349        Component Value Date/Time    CALCIUM 9.9 10/13/2022 1349    ALKPHOS 48 (L) 10/13/2022 1349    AST 21 10/13/2022 1349    ALT 16 10/13/2022 1349    BILITOT 0.4 10/13/2022 1349    ESTGFRAFRICA 58.4 (A) 06/22/2022 1046    EGFRNONAA 50.6 (A) 06/22/2022 1046        Crcl based on Cockcroft-Gault: 49 ml/min    Assessment:       Problem List Items Addressed This Visit       Malignant neoplasm of upper lobe, right bronchus or lung - Primary    Secondary and unspecified malignant neoplasm of intrathoracic lymph nodes    Stage 3a chronic kidney disease       Plan:        Route Chart for Scheduling    Med Onc Chart Routing      Follow up with physician . In 10 days schedule lab for CMP. In 3 weeks from now schedule CBC,CMp, mag and see me and for Carboplatin and Alimta   Follow up with YAMILE    Infusion scheduling note    Injection scheduling note    Labs    Imaging    Pharmacy appointment    Other referrals        Treatment Plan Information   OP PEMETREXED Q3W   Pam Dhaliwal MD   Upcoming Treatment Dates - OP PEMETREXED Q3W    10/14/2022       Pre-Medications       palonosetron (ALOXI) 0.25 mg with Dexamethasone (DECADRON) 10 mg in NS 50 mL  IVPB       Chemotherapy       PEMEtrexed disodium (ALIMTA) 900 mg in sodium chloride 0.9% 100 mL chemo infusion       CARBOplatin (PARAPLATIN) in sodium chloride 0.9% 250 mL chemo infusion  11/4/2022       Pre-Medications       palonosetron (ALOXI) 0.25 mg with Dexamethasone (DECADRON) 10 mg in NS 50 mL IVPB       Chemotherapy       PEMEtrexed disodium (ALIMTA) 900 mg in sodium chloride 0.9% 100 mL chemo infusion       CARBOplatin (PARAPLATIN) in sodium chloride 0.9% 250 mL chemo infusion  11/25/2022       Pre-Medications       palonosetron (ALOXI) 0.25 mg with Dexamethasone (DECADRON) 10 mg in NS 50 mL IVPB       Chemotherapy       PEMEtrexed disodium (ALIMTA) 900 mg in sodium chloride 0.9% 100 mL chemo infusion       CARBOplatin (PARAPLATIN) in sodium chloride 0.9% 250 mL chemo infusion       Supportive Care       cyanocobalamin injection 1,000 mcg  12/16/2022       Pre-Medications       palonosetron (ALOXI) 0.25 mg with Dexamethasone (DECADRON) 10 mg in NS 50 mL IVPB       Chemotherapy       PEMEtrexed disodium (ALIMTA) 900 mg in sodium chloride 0.9% 100 mL chemo infusion       CARBOplatin (PARAPLATIN) in sodium chloride 0.9% 250 mL chemo infusion    Therapy Plan Information  PORT FLUSH  Flushes  heparin, porcine (PF) 100 unit/mL injection flush 500 Units  500 Units, Intravenous, Every visit  sodium chloride 0.9% flush 10 mL  10 mL, Intravenous, Every visit    ENTYVIO GI  Medications  vedolizumab (ENTYVIO) 300 mg/250 mL NS IVPB  300 mg, Intravenous, Every 8 weeks  PRN Medications  diphenhydrAMINE injection 25 mg  25 mg, Intravenous, PRN  acetaminophen tablet 650 mg  650 mg, Oral, PRN  albuterol-ipratropium 2.5 mg-0.5 mg/3 mL nebulizer solution 3 mL  3 mL, Nebulization, PRN  methylPREDNISolone sodium succinate injection 40 mg  40 mg, Intravenous, PRN  EPINEPHrine (PF) injection 0.3 mg  0.3 mg, Subcutaneous, PRN  Flushes  sodium chloride 0.9% 250 mL flush bag  Intravenous, Every visit  sodium chloride 0.9% flush 10  mL  10 mL, Intravenous, Every visit  heparin, porcine (PF) 100 unit/mL injection flush 500 Units  500 Units, Intravenous, Every visit  alteplase injection 2 mg  2 mg, Intra-Catheter, Every visit     She will proceed with Carboplatin and Alimta (no immunotherapy) and will return in 3 weeks for next treatment.  Crcl is 49 ml/min, she knows to hydrate aggressively,   Escribed dexamethasone and folic acid.        Above care plan was discussed with patient and accompanying wife and all questions were addressed to their satisfaction

## 2022-10-13 NOTE — PROGRESS NOTES
Subjective:    The patient location is: home  The chief complaint leading to consultation is: pain    Visit type: audiovisual    Face to Face time with patient: 20  22 minutes of total time spent on the encounter, which includes face to face time and non-face to face time preparing to see the patient (eg, review of tests), Obtaining and/or reviewing separately obtained history, Documenting clinical information in the electronic or other health record, Independently interpreting results (not separately reported) and communicating results to the patient/family/caregiver, or Care coordination (not separately reported).         Each patient to whom he or she provides medical services by telemedicine is:  (1) informed of the relationship between the physician and patient and the respective role of any other health care provider with respect to management of the patient; and (2) notified that he or she may decline to receive medical services by telemedicine and may withdraw from such care at any time.    Notes:     Patient ID: Alesha Toney is a 72 y.o. female.    Chief Complaint: No chief complaint on file.    HPI  Progressive lung cancer.  To restart chemotx tomorrow.    She believes she had pneumonia also, complicating the picture.  Immunotx was stopped due to colitis.  Colitis controlled with steroids and Entyvio.      SHe is depressed, but declines SSRI.  Coping ok.    Colitis has resulted in wt loss - 6 lbs.      She c/o incr pain in r chest and upper back and shoulder.      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  She takes 3-4 hydrocodone daily.  Also taking gabapentin.   reviewed.       She is taking ativan for anxiety, and will take before chemo tomorrow.    Review of Systems   Constitutional:  Positive for activity change. Negative for unexpected weight change.   HENT:  Negative for hearing loss, rhinorrhea and trouble swallowing.    Eyes:  Negative for  discharge and visual disturbance.   Respiratory:  Positive for wheezing. Negative for chest tightness.    Cardiovascular:  Positive for chest pain. Negative for palpitations.   Gastrointestinal:  Negative for blood in stool, constipation, diarrhea and vomiting.   Endocrine: Negative for polydipsia and polyuria.   Genitourinary:  Negative for difficulty urinating, dysuria, hematuria and menstrual problem.   Musculoskeletal:  Negative for arthralgias, joint swelling and neck pain.   Neurological:  Negative for weakness and headaches.   Psychiatric/Behavioral:  Negative for confusion and dysphoric mood.        Objective:      Physical Exam  Constitutional:       Appearance: Normal appearance. She is not ill-appearing or diaphoretic.   Neurological:      Mental Status: She is alert.   Psychiatric:         Mood and Affect: Mood normal.         Behavior: Behavior normal.         Thought Content: Thought content normal.       Assessment:       Problem List Items Addressed This Visit          INFUSION TREATMENT 2    Ulcerative colitis with complication    Malignant neoplasm of upper lobe, right bronchus or lung - Primary       ONCOLOGY TREATMENT    Malignant neoplasm of upper lobe, right bronchus or lung - Primary     Other Visit Diagnoses       Adjustment disorder with depressed mood                Plan:         Diagnoses and all orders for this visit:    Malignant neoplasm of upper lobe, right bronchus or lung    Other ulcerative colitis with complication    Adjustment disorder with depressed mood    Other orders  -     HYDROcodone-acetaminophen (NORCO) 7.5-325 mg per tablet; Take 1 tablet by mouth every 8 (eight) hours as needed for Pain.     Prevnar 20 and flu vax rec'd    CC: Dr Street- encouraged to call for therapy.

## 2022-10-14 ENCOUNTER — INFUSION (OUTPATIENT)
Dept: INFUSION THERAPY | Facility: HOSPITAL | Age: 72
End: 2022-10-14
Attending: INTERNAL MEDICINE
Payer: MEDICARE

## 2022-10-14 VITALS
HEART RATE: 67 BPM | DIASTOLIC BLOOD PRESSURE: 63 MMHG | TEMPERATURE: 98 F | RESPIRATION RATE: 18 BRPM | SYSTOLIC BLOOD PRESSURE: 137 MMHG

## 2022-10-14 DIAGNOSIS — C77.1 SECONDARY AND UNSPECIFIED MALIGNANT NEOPLASM OF INTRATHORACIC LYMPH NODES: Primary | ICD-10-CM

## 2022-10-14 DIAGNOSIS — C34.11 MALIGNANT NEOPLASM OF UPPER LOBE, RIGHT BRONCHUS OR LUNG: ICD-10-CM

## 2022-10-14 PROCEDURE — A4216 STERILE WATER/SALINE, 10 ML: HCPCS | Performed by: INTERNAL MEDICINE

## 2022-10-14 PROCEDURE — 25000003 PHARM REV CODE 250: Performed by: INTERNAL MEDICINE

## 2022-10-14 PROCEDURE — 63600175 PHARM REV CODE 636 W HCPCS: Performed by: INTERNAL MEDICINE

## 2022-10-14 PROCEDURE — 96413 CHEMO IV INFUSION 1 HR: CPT

## 2022-10-14 PROCEDURE — 96417 CHEMO IV INFUS EACH ADDL SEQ: CPT

## 2022-10-14 PROCEDURE — 96367 TX/PROPH/DG ADDL SEQ IV INF: CPT

## 2022-10-14 PROCEDURE — 96372 THER/PROPH/DIAG INJ SC/IM: CPT

## 2022-10-14 RX ORDER — CYANOCOBALAMIN 1000 UG/ML
1000 INJECTION, SOLUTION INTRAMUSCULAR; SUBCUTANEOUS ONCE
Status: COMPLETED | OUTPATIENT
Start: 2022-10-14 | End: 2022-10-14

## 2022-10-14 RX ORDER — LORAZEPAM 2 MG/ML
0.5 INJECTION INTRAMUSCULAR ONCE
Status: DISCONTINUED | OUTPATIENT
Start: 2022-10-14 | End: 2022-10-14 | Stop reason: HOSPADM

## 2022-10-14 RX ORDER — SODIUM CHLORIDE 0.9 % (FLUSH) 0.9 %
10 SYRINGE (ML) INJECTION
Status: DISCONTINUED | OUTPATIENT
Start: 2022-10-14 | End: 2022-10-14 | Stop reason: HOSPADM

## 2022-10-14 RX ORDER — HEPARIN 100 UNIT/ML
500 SYRINGE INTRAVENOUS
Status: DISCONTINUED | OUTPATIENT
Start: 2022-10-14 | End: 2022-10-14 | Stop reason: HOSPADM

## 2022-10-14 RX ORDER — FOLIC ACID 1 MG/1
1 TABLET ORAL DAILY
Qty: 90 TABLET | Refills: 3 | Status: SHIPPED | OUTPATIENT
Start: 2022-10-14 | End: 2023-02-07

## 2022-10-14 RX ORDER — LORAZEPAM 0.5 MG/1
0.5 TABLET ORAL ONCE
Status: COMPLETED | OUTPATIENT
Start: 2022-10-14 | End: 2022-10-14

## 2022-10-14 RX ADMIN — Medication 10 ML: at 01:10

## 2022-10-14 RX ADMIN — CARBOPLATIN 380 MG: 10 INJECTION INTRAVENOUS at 12:10

## 2022-10-14 RX ADMIN — SODIUM CHLORIDE: 0.9 INJECTION, SOLUTION INTRAVENOUS at 11:10

## 2022-10-14 RX ADMIN — CYANOCOBALAMIN 1000 MCG: 1000 INJECTION, SOLUTION INTRAMUSCULAR at 01:10

## 2022-10-14 RX ADMIN — HEPARIN 500 UNITS: 100 SYRINGE at 01:10

## 2022-10-14 RX ADMIN — LORAZEPAM 0.5 MG: 0.5 TABLET ORAL at 11:10

## 2022-10-14 RX ADMIN — PEMETREXED DISODIUM 900 MG: 500 INJECTION, POWDER, LYOPHILIZED, FOR SOLUTION INTRAVENOUS at 01:10

## 2022-10-14 RX ADMIN — DEXAMETHASONE SODIUM PHOSPHATE 0.25 MG: 4 INJECTION, SOLUTION INTRA-ARTICULAR; INTRALESIONAL; INTRAMUSCULAR; INTRAVENOUS; SOFT TISSUE at 12:10

## 2022-10-14 NOTE — PLAN OF CARE
1352- Pt tolerated C1D1 Carbo/Alimta infusion well, no complications or side effects, POC and meds discussed with pt, pt aware of upcoming appts, pt knows to call MD with any questions or concerns. Pt ambulated off unit, no distress noted.

## 2022-10-14 NOTE — TELEPHONE ENCOUNTER
----- Message from Isidra Tellez sent at 10/14/2022  2:42 PM CDT -----  Type: Needs Medical Advice  Who Called:  Yolanda with [facility]  Symptoms (please be specific):    How long has patient had these symptoms:    Pharmacy name and phone #:    Best Call Back Number:   Additional Information: Yolanda is requesting a call back from the nurse on direction on patient med.

## 2022-10-15 ENCOUNTER — PATIENT MESSAGE (OUTPATIENT)
Dept: HEMATOLOGY/ONCOLOGY | Facility: CLINIC | Age: 72
End: 2022-10-15
Payer: MEDICARE

## 2022-10-21 ENCOUNTER — PATIENT MESSAGE (OUTPATIENT)
Dept: HEMATOLOGY/ONCOLOGY | Facility: CLINIC | Age: 72
End: 2022-10-21
Payer: MEDICARE

## 2022-10-24 ENCOUNTER — LAB VISIT (OUTPATIENT)
Dept: LAB | Facility: HOSPITAL | Age: 72
End: 2022-10-24
Attending: INTERNAL MEDICINE
Payer: MEDICARE

## 2022-10-24 DIAGNOSIS — C34.11 MALIGNANT NEOPLASM OF UPPER LOBE, RIGHT BRONCHUS OR LUNG: ICD-10-CM

## 2022-10-24 LAB
ALBUMIN SERPL BCP-MCNC: 3.6 G/DL (ref 3.5–5.2)
ALP SERPL-CCNC: 48 U/L (ref 55–135)
ALT SERPL W/O P-5'-P-CCNC: 35 U/L (ref 10–44)
ANION GAP SERPL CALC-SCNC: 7 MMOL/L (ref 8–16)
AST SERPL-CCNC: 31 U/L (ref 10–40)
BILIRUB SERPL-MCNC: 0.3 MG/DL (ref 0.1–1)
BUN SERPL-MCNC: 20 MG/DL (ref 8–23)
CALCIUM SERPL-MCNC: 9.6 MG/DL (ref 8.7–10.5)
CHLORIDE SERPL-SCNC: 104 MMOL/L (ref 95–110)
CO2 SERPL-SCNC: 27 MMOL/L (ref 23–29)
CREAT SERPL-MCNC: 1.1 MG/DL (ref 0.5–1.4)
EST. GFR  (NO RACE VARIABLE): 53.4 ML/MIN/1.73 M^2
GLUCOSE SERPL-MCNC: 104 MG/DL (ref 70–110)
POTASSIUM SERPL-SCNC: 4.6 MMOL/L (ref 3.5–5.1)
PROT SERPL-MCNC: 6.4 G/DL (ref 6–8.4)
SODIUM SERPL-SCNC: 138 MMOL/L (ref 136–145)

## 2022-10-24 PROCEDURE — 80053 COMPREHEN METABOLIC PANEL: CPT | Performed by: INTERNAL MEDICINE

## 2022-10-24 PROCEDURE — 36415 COLL VENOUS BLD VENIPUNCTURE: CPT | Performed by: INTERNAL MEDICINE

## 2022-10-25 ENCOUNTER — PATIENT MESSAGE (OUTPATIENT)
Dept: HEMATOLOGY/ONCOLOGY | Facility: CLINIC | Age: 72
End: 2022-10-25
Payer: MEDICARE

## 2022-10-31 ENCOUNTER — OFFICE VISIT (OUTPATIENT)
Dept: HEMATOLOGY/ONCOLOGY | Facility: CLINIC | Age: 72
End: 2022-10-31
Payer: MEDICARE

## 2022-10-31 VITALS
DIASTOLIC BLOOD PRESSURE: 67 MMHG | WEIGHT: 154.13 LBS | BODY MASS INDEX: 24.87 KG/M2 | SYSTOLIC BLOOD PRESSURE: 148 MMHG | HEART RATE: 59 BPM | OXYGEN SATURATION: 98 % | RESPIRATION RATE: 20 BRPM

## 2022-10-31 DIAGNOSIS — N18.31 STAGE 3A CHRONIC KIDNEY DISEASE: ICD-10-CM

## 2022-10-31 DIAGNOSIS — C34.11 MALIGNANT NEOPLASM OF UPPER LOBE, RIGHT BRONCHUS OR LUNG: Primary | ICD-10-CM

## 2022-10-31 DIAGNOSIS — C77.1 SECONDARY AND UNSPECIFIED MALIGNANT NEOPLASM OF INTRATHORACIC LYMPH NODES: ICD-10-CM

## 2022-10-31 PROCEDURE — 99999 PR PBB SHADOW E&M-EST. PATIENT-LVL III: ICD-10-PCS | Mod: PBBFAC,,, | Performed by: INTERNAL MEDICINE

## 2022-10-31 PROCEDURE — 99215 PR OFFICE/OUTPT VISIT, EST, LEVL V, 40-54 MIN: ICD-10-PCS | Mod: S$PBB,,, | Performed by: INTERNAL MEDICINE

## 2022-10-31 PROCEDURE — 99213 OFFICE O/P EST LOW 20 MIN: CPT | Mod: PBBFAC | Performed by: INTERNAL MEDICINE

## 2022-10-31 PROCEDURE — 99215 OFFICE O/P EST HI 40 MIN: CPT | Mod: S$PBB,,, | Performed by: INTERNAL MEDICINE

## 2022-10-31 PROCEDURE — 99999 PR PBB SHADOW E&M-EST. PATIENT-LVL III: CPT | Mod: PBBFAC,,, | Performed by: INTERNAL MEDICINE

## 2022-10-31 NOTE — PROGRESS NOTES
"Subjective:       Patient ID: Alesha Toney is a 72 y.o. female.    Chief Complaint: Lung Cancer  Ms. Alesha Toney is a 72-year-old otherwise healthy female who started having some shoulder pain, which was lasting for over six weeks.  She went and saw her primary care physician and underwent an x-ray, which revealed right upper lobe lung mass worrisome for malignancy and a CT scan was recommended, which was done on 6/12/18 and that revealed a newly developing mass, pleural based, lateral posterior segment, right upper lobe 3.5 x 3.5 cm, surrounding stellate margin and patchy infiltrates, particularly superiorly,   anteriorly infiltrates extend perihilar into the anterior segment.  She then underwent CT-guided biopsy, which revealed well-differentiated adenocarcinoma.       Her PET scan from 6/29/18 There is physiologic intracranial, head, and neck activity.  There is a large right upper lobe mass SUV max 9.11.  No local or distant metastatic disease seen.  There is physiologic liver, spleen, GI and  activity.  There are a few liver cysts.  No adenopathy is seen.  The adrenal glands are not enlarged.  No adenopathy is seen.  No suspicious bone lesions are seen.     She underwent VATS with right upper lobectomy. Mediastinal lymphadenectomy on 8/9/18. Pathology revealed "Tumor site: Upper lobe.Tumor size: 7 x 4 x 2.7 cm.Tumor focality: Single tumor.  Histologic type: Mixed invasive mucinous and nonmucinous adenocarcinoma. Histologic grade: G2: Moderately differentiated.Spread through air spaces (STATS): Present. Visceral pleura invasion: Not identified.  Lymphovascular invasion: Not identified. Direct invasion of adjacent structures: No adjacent structures present. Margins: All margins are uninvolved by carcinoma.Distance of invasive carcinoma from closest margin: 1 cm from the bronchial margin.Treatment effect: No known presurgical therapy. Regional lymph nodes: Number of lymph nodes involved: 0. Number of " "lymph nodes examined: 11. Pathologic Stage Classification: pT3 N0"        She completed 4 cycles of adjuvant chemo with Cisplatin and Alimta as of 1/10/19   She is on surveillance.     CT chest of 9/17/19 which reveals "Operative change of right upper lobectomy for small-cell lung cancer with several scattered subsolid opacities and a new solid nodule in the right lower lobe measuring up to 0.5 cm.  We recommend continued surveillance with the role and schedule for such surveillance to be determined by clinical considerations. Sided chest port distal tip terminating at the confluence of the brachiocephalic vein in the SVC.  Correlate with device functioning. Apically predominant emphysematous changes, left greater than right. Aortic annular calcification and multi-vessel coronary artery atherosclerosis. Numerous unchanged hepatic hypodensities, likely cysts"     PET scan from 10/1/19 reveals "1.6 cm soft tissue lesion re-identified posterior to the bronchus intermedius.  No corresponding focal increased radiotracer uptake to suggest local recurrence or metastatic disease. Stable subcentimeter opacities throughout the bilateral lower lobes, too small to characterize with PET-CT. Focal increased radiotracer uptake and subtle wall thickening in the rectum.  Findings are concerning for primary neoplastic process.  Recommend further evaluation with direct visualization"     She returned from [facility] and was advised to proceed with chemo/RT with either Docetaxel/platinum or Carboplatin/Alimta.     She completed chemo/RT with Carboplatin and Alimta as of 12/31/19     She underwent CT chest on 5/27/2020 which revealed 1. In this patient with history of lung cancer status post right upper lobectomy, there is a soft tissue opacity at the posterior margin of the staple line.  This lesion has been enlarging progressively and now measures 1.3 x 1.6 cm.  There is also a new 1.7 x 1.7 cm opacity at the right paravertebral " "pleural margin which is inseparable from this lesion.  These findings may represent post radiation change in light of the hypermetabolic lesion in this region on PET-CT of 10/01/2019 (image 69/[id number]).There is a 1 cm opacity in the superior or lateral basal segment of the right lower lobe (axial series 4, image 243) which has enlarged since 09/17/2019.  Nature of this lesion is unclear.  Clinical considerations will determine if percutaneous biopsy or metabolic assessment is warranted. 1.0 cm solid opacity with branching configuration (series 4, image 205) is located in the lateral basal segment of the left lower lobe, stable since 02/16/2019 (02/06/2019 axial series 4, image 310). Appearance and bifurcating character suggests mucoid impaction in mildly ectatic peripheral airway, likely not significant. Persistent clustered small centrilobular nodules in the apical segment of the right upper lobe likely reflecting small airway inflammation/infection. Partially visualized left chest port with distal catheter tip at the junction of the brachiocephalic veins and SVC, similar as on 09/17/2019"  She thus underwent PET scan on 6/1/2020 which revealed "No evidence of residual viable lung malignancy.  Evolving post radiation changes in the right paramediastinal lung, with a new irregular subpleural density which may also be related to recent radiation.  Attention on follow-up. Interval decrease in size of a soft tissue lesion along the inferior margin of the lobectomy stable line.  Several stable subcentimeter soft tissue opacities in the lower lobes bilaterally, As above.  Attention on follow-up.  Persistent hypermetabolic rectal lesion concerning for malignancy.  Recommend direct visualization and tissue sampling as clinically indicated"  I discussed her case in thoracic conference today and findings are felt to be related to RT     Her restaging CT scans from 1/20/2021 which reveal "Persistent soft tissue opacity in the " "posterior margin of the staple line that is increased in prominence when compared to the prior examination and is worrisome for disease progression. No new lesions identified.  Stable pulmonary nodules. Stable hypodense lesions throughout the liver that likely represent hepatic cysts"        Restaging PET scan from 1/27/2021 reveals "Increased size and hypermetabolic activity involving soft tissue opacity along the inferior border of the right upper lobectomy stable line concerning for progression of disease. Mildly increased hypermetabolic activity involving the rectum compatible with known high-grade dysplasia/intramucosal carcinoma. Interval decrease in size and resolution of hypermetabolic activity involving subcentimeter subpleural opacity within the right lower lobe"     MRI brain from  2/3/2021 reveals no evidence of recurrence.  GUARDANT testing only reveals p53, PDL1 is 0 and no other targets        PET scan from 3/24/2021 reveals "In this patient with known lung cancer, there is a persistent hypermetabolic right hilar mass consistent with viable tumor.  Interval increase in size is equivocal for progression of fibrosis versus tumor growth. 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  Activity appears more focal and possibly more proximal than before.  Consider direct visualization for further evaluation. The previously described subcentimeter subpleural opacity within the right lower lobe is not definitely seen on today's exam"               She last received immunotherapy on 5/28/2021. She underwent EUS on 6/25/2021 which revealed "Erythematous, congested, and ulcerated mucosa  (proctitis) in rectosigmoid colon.  Pathology reveals Colon, rectosigmoid, biopsy:  Moderate active colitis     She completed steroids about 5 weeks ago.      CT scans from 11/5/2021 reveals "In this patient with lung cancer, there is postoperative changes of " "right upper lobe resection.  Persistent soft tissue opacity along the right hilum suture line which is slightly increased in size from prior exam.  More conspicuous appearing nodules within the right lower lobe seen on prior exam.  New nodule within the right middle lobe measuring 1.3 cm.  Overall findings are all worrisome for disease progression. Scattered areas of centrilobular nodularity within the bilateral lungs.  Nonspecific and can be seen in the setting of infectious or inflammatory etiologies. Unchanged hepatics cysts. Stable left adrenal gland nodule."  PET scan from 11/23/2021 revealed "New hypermetabolic nodule within the right lower lobe additional slightly subcentimeter nodules within the right lower lobe.  Findings concerning for disease progression, other differentials include infection or noninfectious inflammatory process. Interval decrease in size and uptake in the right hilar mass. Resolution of previous focal rectal uptake"        She is on Opdivo. Her Opdivo was held on 1/10/2022 due to rectal bleeding. She saw Dr. Clark and was noted to have anal fissure which contributed to rectal bleed. Her rectal bleed has since resolved.           She was on Opdivo until 5/24/2022, she noted rectal bleeding and underwent   Flex sigmoidoscopy on 6/3/2022 reveals "Localized severe inflammation was found in the distal rectum. Biopsied. Pathology reveals Severely active chronic colitis with ulceration (see comment)  Moderate architectural disorder. Negative for CMV by immunohistochemistry . Negative for granulomas or viral inclusions. Negative for dysplasia or carcinoma"  PET scan from 6/17/2022 reveals "In this patient with lung cancer there are enlarging hypermetabolic right perihilar opacities concerning for continued local disease progression. Continued decreased metabolic activity of the right lower lobe nodule.  Cluster of pulmonary nodules bilaterally, some which are new from prior PET-CT for example " "the superior segment the left upper lobe and are likely infectious/inflammatory origin. Persistent and more extensive region of increased FDG avidity within the distal rectum in keeping with more extensive colitis.  Malignancy could have a similar appearance.  Recommend further investigation as clinically warranted and attention on follow-up"     She has active proctitis and is on hydrocortisone suppositories and Entyvyo since early June 2022.   CT chest on 8/1/2022 reveals  "Similar appearance of elongated, irregular opacity adjacent to the right upper lobectomy margin.  More posteriorly adjacent perihilar opacities with previous FDG avidity with detailed measurements as above. Similar areas of scattered micro-nodularity with tree-in-bud, likely infectious/inflammatory in nature.  New appearing area of grouped micro-nodules in the anterior left lower lobe, largest up to 5 mm, which could relate to aforementioned small airways disease, though technically indeterminate.  Continued close attention at follow-up. RECIST SUMMARY: Date of prior examination for comparison: CT chest dated 05/24/2022. Lesion 1: Right perihilar opacity.  2.4.  Series 2, image 45.  Previously 2 4.  (Note there is slight increased size conspicuity in the craniocaudal dimension at 1.8 cm, previously 1.5 cm). Lesion 2: Right perihilar opacity.  1.7 cm.  Series 2, image 59.  Previously 1.5 cm"     Renuka underwent a repeat CT chest 8/31/2022 and that revealed "Status post right upper lobectomy.  Soft tissue thickening adjacent to the suture line appears stable.  However, there is interval enlargement of a right lower lobe, bilobed, paramediastinal mass-like consolidation.  When dominant components of the lesions are measured separately, dimensions are summarized below. RECIST SUMMARY: Date of prior examination for comparison: CT chest 08/01/2022. Lesion 1: Right perihilar opacity.  4.7 cm. Series 4 image 77.  Prior measurement 2.4 cm. Lesion 2: " "Right perihilar (infrahilar) opacity.  2.7 cm. Series 4 image 237.  Prior measurement 1.7 cm"  Case reviewed in thoracic conference, and concern for disease progression exists so plan on immunotehrapy if able to taper steroids           PET scan from 10/6/2022 reveals "Interval increase in size and radiotracer uptake of a right pulmonary lesions in this patient with known non-small cell lung cancer.  There are few new subcentimeter pulmonary nodules in the right lower lobe with no significant uptake, could represent infectious versus inflammatory process.  However, additional new metastatic disease can not be excluded.  Attention on follow-up. Improved metabolic appearance of the lower rectum compared with the prior exam"  She is currently off of prednisone and Entyvio.  She is now on  Carboplatin and ALimta, started on 10/14/2022     HPIShe comes in today for cycle 2 of Carboplatin and Alimta  She notes runny nose and leg cramps, dry eyes and fatigue. She notes that she has flushing after stopping steroids which she takes for delayed nausea  She notes chest pain stable, and notes rhonchi, cough is improved. Shortness of breath has also improved. She notes constipation with chemo   She denies any nausea, vomiting, diarrhea, abdominal pain, weight loss or loss of appetite, leg swelling, pain, headache, dizziness, or mood changes. Her ECOG PS is zero and she is accompanied by her wife  She notes that she had occular migranes and feels they are getting more frequent, they last about 20 minutes. She sees lights when she has an episode of migrane, denies any associated headache, has not had a recent MR brain           Review of Systems   Constitutional:  Positive for fatigue. Negative for appetite change and unexpected weight change.   HENT:  Positive for rhinorrhea. Negative for mouth sores.    Eyes:  Negative for visual disturbance.        Dry eyes+   Respiratory:  Negative for cough and shortness of breath.  "   Cardiovascular:  Negative for chest pain.   Gastrointestinal:  Negative for abdominal pain and diarrhea.   Genitourinary:  Negative for frequency.   Musculoskeletal:  Negative for back pain.   Integumentary:  Negative for rash.   Neurological:  Negative for headaches.   Hematological:  Negative for adenopathy.   Psychiatric/Behavioral:  The patient is not nervous/anxious.    All other systems reviewed and are negative.      Objective:      Physical Exam  Vitals reviewed.   Constitutional:       Appearance: She is well-developed.   HENT:      Mouth/Throat:      Pharynx: No oropharyngeal exudate.   Cardiovascular:      Rate and Rhythm: Normal rate.      Heart sounds: Normal heart sounds.   Pulmonary:      Effort: Pulmonary effort is normal.      Breath sounds: Normal breath sounds. No wheezing.   Abdominal:      General: Bowel sounds are normal.      Palpations: Abdomen is soft.      Tenderness: There is no abdominal tenderness.   Musculoskeletal:         General: No tenderness.   Lymphadenopathy:      Cervical: No cervical adenopathy.   Skin:     General: Skin is warm and dry.      Findings: No rash.   Neurological:      Mental Status: She is alert and oriented to person, place, and time.      Coordination: Coordination normal.   Psychiatric:         Thought Content: Thought content normal.         Judgment: Judgment normal.         LABS:  WBC   Date Value Ref Range Status   10/13/2022 6.59 3.90 - 12.70 K/uL Final     Hemoglobin   Date Value Ref Range Status   10/13/2022 12.0 12.0 - 16.0 g/dL Final     POC Hematocrit   Date Value Ref Range Status   08/09/2018 33 (L) 36 - 54 %PCV Final     Hematocrit   Date Value Ref Range Status   10/13/2022 36.3 (L) 37.0 - 48.5 % Final     Platelets   Date Value Ref Range Status   10/13/2022 215 150 - 450 K/uL Final     Gran # (ANC)   Date Value Ref Range Status   10/13/2022 3.1 1.8 - 7.7 K/uL Final     Gran %   Date Value Ref Range Status   10/13/2022 46.9 38.0 - 73.0 % Final        Chemistry        Component Value Date/Time     10/24/2022 1335    K 4.6 10/24/2022 1335     10/24/2022 1335    CO2 27 10/24/2022 1335    BUN 20 10/24/2022 1335    CREATININE 1.1 10/24/2022 1335     10/24/2022 1335        Component Value Date/Time    CALCIUM 9.6 10/24/2022 1335    ALKPHOS 48 (L) 10/24/2022 1335    AST 31 10/24/2022 1335    ALT 35 10/24/2022 1335    BILITOT 0.3 10/24/2022 1335    ESTGFRAFRICA 58.4 (A) 06/22/2022 1046    EGFRNONAA 50.6 (A) 06/22/2022 1046          Assessment:       Problem List Items Addressed This Visit       Malignant neoplasm of upper lobe, right bronchus or lung - Primary    Relevant Orders    Magnesium    Secondary and unspecified malignant neoplasm of intrathoracic lymph nodes    Stage 3a chronic kidney disease       Plan:         Route Chart for Scheduling    Med Onc Chart Routing      Follow up with physician . Keep chemo as is 11/4/2022. 3 weeks after that schedule CBC,CMP and see me and for Carboplatin and ALimta   Follow up with YAMILE    Infusion scheduling note    Injection scheduling note    Labs    Imaging    Pharmacy appointment    Other referrals        Treatment Plan Information   OP PEMETREXED Q3W   Pam Dhaliwal MD   Upcoming Treatment Dates - OP PEMETREXED Q3W    11/4/2022       Pre-Medications       palonosetron 0.25mg/dexAMETHasone 10mg in NS IVPB 0.25 mg       LORazepam injection 0.5 mg       Chemotherapy       CARBOplatin (PARAPLATIN) in sodium chloride 0.9% 250 mL chemo infusion       PEMEtrexed disodium (ALIMTA) 900 mg in sodium chloride 0.9% 100 mL chemo infusion  11/25/2022       Pre-Medications       palonosetron (ALOXI) 0.25 mg with Dexamethasone (DECADRON) 10 mg in NS 50 mL IVPB       LORazepam injection 0.5 mg       Chemotherapy       PEMEtrexed disodium (ALIMTA) 900 mg in sodium chloride 0.9% 100 mL chemo infusion       CARBOplatin (PARAPLATIN) in sodium chloride 0.9% 250 mL chemo infusion       Supportive Care        cyanocobalamin injection 1,000 mcg  12/16/2022       Pre-Medications       palonosetron (ALOXI) 0.25 mg with Dexamethasone (DECADRON) 10 mg in NS 50 mL IVPB       LORazepam injection 0.5 mg       Chemotherapy       PEMEtrexed disodium (ALIMTA) 900 mg in sodium chloride 0.9% 100 mL chemo infusion       CARBOplatin (PARAPLATIN) in sodium chloride 0.9% 250 mL chemo infusion  1/6/2023       Pre-Medications       palonosetron (ALOXI) 0.25 mg with Dexamethasone (DECADRON) 10 mg in NS 50 mL IVPB       LORazepam injection 0.5 mg       Chemotherapy       PEMEtrexed disodium (ALIMTA) 900 mg in sodium chloride 0.9% 100 mL chemo infusion       CARBOplatin (PARAPLATIN) in sodium chloride 0.9% 250 mL chemo infusion    Therapy Plan Information  PORT FLUSH  Flushes  heparin, porcine (PF) 100 unit/mL injection flush 500 Units  500 Units, Intravenous, Every visit  sodium chloride 0.9% flush 10 mL  10 mL, Intravenous, Every visit     She will proceed with Carboplatin and Alimta and will return in 3 weeks for next cycle.  Will obtain scans in mid December and she will reach out to her MD Calderon oncologist to see if he is a candidate for proton beam    Above care plan was discussed with patient and accompanying wife and all questions were addressed to their satisfaction

## 2022-11-01 ENCOUNTER — PATIENT MESSAGE (OUTPATIENT)
Dept: HEMATOLOGY/ONCOLOGY | Facility: CLINIC | Age: 72
End: 2022-11-01
Payer: MEDICARE

## 2022-11-03 ENCOUNTER — PATIENT MESSAGE (OUTPATIENT)
Dept: HEMATOLOGY/ONCOLOGY | Facility: CLINIC | Age: 72
End: 2022-11-03
Payer: MEDICARE

## 2022-11-03 ENCOUNTER — LAB VISIT (OUTPATIENT)
Dept: LAB | Facility: HOSPITAL | Age: 72
End: 2022-11-03
Attending: INTERNAL MEDICINE
Payer: MEDICARE

## 2022-11-03 DIAGNOSIS — C34.11 MALIGNANT NEOPLASM OF UPPER LOBE, RIGHT BRONCHUS OR LUNG: ICD-10-CM

## 2022-11-03 LAB
ALBUMIN SERPL BCP-MCNC: 3.5 G/DL (ref 3.5–5.2)
ALP SERPL-CCNC: 55 U/L (ref 55–135)
ALT SERPL W/O P-5'-P-CCNC: 41 U/L (ref 10–44)
ANION GAP SERPL CALC-SCNC: 7 MMOL/L (ref 8–16)
AST SERPL-CCNC: 34 U/L (ref 10–40)
BASOPHILS # BLD AUTO: 0.01 K/UL (ref 0–0.2)
BASOPHILS NFR BLD: 0.3 % (ref 0–1.9)
BILIRUB SERPL-MCNC: 0.3 MG/DL (ref 0.1–1)
BUN SERPL-MCNC: 16 MG/DL (ref 8–23)
CALCIUM SERPL-MCNC: 9.5 MG/DL (ref 8.7–10.5)
CHLORIDE SERPL-SCNC: 102 MMOL/L (ref 95–110)
CO2 SERPL-SCNC: 27 MMOL/L (ref 23–29)
CREAT SERPL-MCNC: 1.1 MG/DL (ref 0.5–1.4)
DIFFERENTIAL METHOD: ABNORMAL
EOSINOPHIL # BLD AUTO: 0.1 K/UL (ref 0–0.5)
EOSINOPHIL NFR BLD: 3.9 % (ref 0–8)
ERYTHROCYTE [DISTWIDTH] IN BLOOD BY AUTOMATED COUNT: 13.3 % (ref 11.5–14.5)
EST. GFR  (NO RACE VARIABLE): 53.4 ML/MIN/1.73 M^2
GLUCOSE SERPL-MCNC: 114 MG/DL (ref 70–110)
HCT VFR BLD AUTO: 32.4 % (ref 37–48.5)
HGB BLD-MCNC: 10.7 G/DL (ref 12–16)
IMM GRANULOCYTES # BLD AUTO: 0.02 K/UL (ref 0–0.04)
IMM GRANULOCYTES NFR BLD AUTO: 0.6 % (ref 0–0.5)
LYMPHOCYTES # BLD AUTO: 1.7 K/UL (ref 1–4.8)
LYMPHOCYTES NFR BLD: 46.5 % (ref 18–48)
MAGNESIUM SERPL-MCNC: 1.7 MG/DL (ref 1.6–2.6)
MCH RBC QN AUTO: 31.6 PG (ref 27–31)
MCHC RBC AUTO-ENTMCNC: 33 G/DL (ref 32–36)
MCV RBC AUTO: 96 FL (ref 82–98)
MONOCYTES # BLD AUTO: 0.5 K/UL (ref 0.3–1)
MONOCYTES NFR BLD: 13.6 % (ref 4–15)
NEUTROPHILS # BLD AUTO: 1.3 K/UL (ref 1.8–7.7)
NEUTROPHILS NFR BLD: 35.1 % (ref 38–73)
NRBC BLD-RTO: 0 /100 WBC
PLATELET # BLD AUTO: 143 K/UL (ref 150–450)
PMV BLD AUTO: 9.8 FL (ref 9.2–12.9)
POTASSIUM SERPL-SCNC: 4.6 MMOL/L (ref 3.5–5.1)
PROT SERPL-MCNC: 6.3 G/DL (ref 6–8.4)
RBC # BLD AUTO: 3.39 M/UL (ref 4–5.4)
SODIUM SERPL-SCNC: 136 MMOL/L (ref 136–145)
WBC # BLD AUTO: 3.61 K/UL (ref 3.9–12.7)

## 2022-11-03 PROCEDURE — 80053 COMPREHEN METABOLIC PANEL: CPT | Performed by: INTERNAL MEDICINE

## 2022-11-03 PROCEDURE — 83735 ASSAY OF MAGNESIUM: CPT | Performed by: INTERNAL MEDICINE

## 2022-11-03 PROCEDURE — 36415 COLL VENOUS BLD VENIPUNCTURE: CPT | Performed by: INTERNAL MEDICINE

## 2022-11-03 PROCEDURE — 85025 COMPLETE CBC W/AUTO DIFF WBC: CPT | Performed by: INTERNAL MEDICINE

## 2022-11-04 ENCOUNTER — INFUSION (OUTPATIENT)
Dept: INFUSION THERAPY | Facility: HOSPITAL | Age: 72
End: 2022-11-04
Attending: INTERNAL MEDICINE
Payer: MEDICARE

## 2022-11-04 VITALS
SYSTOLIC BLOOD PRESSURE: 158 MMHG | DIASTOLIC BLOOD PRESSURE: 68 MMHG | TEMPERATURE: 98 F | RESPIRATION RATE: 18 BRPM | WEIGHT: 152.13 LBS | HEART RATE: 64 BPM | BODY MASS INDEX: 24.55 KG/M2

## 2022-11-04 DIAGNOSIS — C34.11 MALIGNANT NEOPLASM OF UPPER LOBE, RIGHT BRONCHUS OR LUNG: Primary | ICD-10-CM

## 2022-11-04 DIAGNOSIS — C34.90 LUNG CANCER: Primary | ICD-10-CM

## 2022-11-04 PROCEDURE — 63600175 PHARM REV CODE 636 W HCPCS: Performed by: INTERNAL MEDICINE

## 2022-11-04 PROCEDURE — 96523 IRRIG DRUG DELIVERY DEVICE: CPT

## 2022-11-04 RX ORDER — HEPARIN 100 UNIT/ML
500 SYRINGE INTRAVENOUS
Status: COMPLETED | OUTPATIENT
Start: 2022-11-04 | End: 2022-11-04

## 2022-11-04 RX ADMIN — HEPARIN 500 UNITS: 100 SYRINGE at 09:11

## 2022-11-04 NOTE — PLAN OF CARE
0820 pt here for D1C2 carbo/alimta infusion, labs, hx, meds, allergies reviewed, pt with no new complaints , feel tired and weak, pt reclined in chair, continue to monitor

## 2022-11-04 NOTE — NURSING
0945 pt will not receive chemo today due to anc, sol owens aware, will reschedule pt next week for labs and possible chemo

## 2022-11-09 ENCOUNTER — OFFICE VISIT (OUTPATIENT)
Dept: RADIATION ONCOLOGY | Facility: CLINIC | Age: 72
End: 2022-11-09
Payer: MEDICARE

## 2022-11-09 ENCOUNTER — PATIENT MESSAGE (OUTPATIENT)
Dept: HEMATOLOGY/ONCOLOGY | Facility: CLINIC | Age: 72
End: 2022-11-09
Payer: MEDICARE

## 2022-11-09 VITALS
TEMPERATURE: 97 F | HEIGHT: 66 IN | OXYGEN SATURATION: 99 % | DIASTOLIC BLOOD PRESSURE: 60 MMHG | WEIGHT: 153.69 LBS | HEART RATE: 66 BPM | SYSTOLIC BLOOD PRESSURE: 146 MMHG | RESPIRATION RATE: 17 BRPM | BODY MASS INDEX: 24.7 KG/M2

## 2022-11-09 DIAGNOSIS — C34.11 MALIGNANT NEOPLASM OF UPPER LOBE, RIGHT BRONCHUS OR LUNG: Primary | ICD-10-CM

## 2022-11-09 PROCEDURE — 99213 OFFICE O/P EST LOW 20 MIN: CPT | Mod: PBBFAC | Performed by: RADIOLOGY

## 2022-11-09 PROCEDURE — 99215 OFFICE O/P EST HI 40 MIN: CPT | Mod: S$PBB,,, | Performed by: RADIOLOGY

## 2022-11-09 PROCEDURE — 99999 PR PBB SHADOW E&M-EST. PATIENT-LVL III: CPT | Mod: PBBFAC,,, | Performed by: RADIOLOGY

## 2022-11-09 PROCEDURE — 99215 PR OFFICE/OUTPT VISIT, EST, LEVL V, 40-54 MIN: ICD-10-PCS | Mod: S$PBB,,, | Performed by: RADIOLOGY

## 2022-11-09 PROCEDURE — 99999 PR PBB SHADOW E&M-EST. PATIENT-LVL III: ICD-10-PCS | Mod: PBBFAC,,, | Performed by: RADIOLOGY

## 2022-11-10 ENCOUNTER — PATIENT MESSAGE (OUTPATIENT)
Dept: INTERNAL MEDICINE | Facility: CLINIC | Age: 72
End: 2022-11-10
Payer: MEDICARE

## 2022-11-10 ENCOUNTER — OFFICE VISIT (OUTPATIENT)
Dept: HEMATOLOGY/ONCOLOGY | Facility: CLINIC | Age: 72
End: 2022-11-10
Payer: MEDICARE

## 2022-11-10 ENCOUNTER — TELEPHONE (OUTPATIENT)
Dept: HEMATOLOGY/ONCOLOGY | Facility: CLINIC | Age: 72
End: 2022-11-10
Payer: MEDICARE

## 2022-11-10 DIAGNOSIS — C77.1 SECONDARY AND UNSPECIFIED MALIGNANT NEOPLASM OF INTRATHORACIC LYMPH NODES: ICD-10-CM

## 2022-11-10 DIAGNOSIS — C34.11 MALIGNANT NEOPLASM OF UPPER LOBE, RIGHT BRONCHUS OR LUNG: Primary | ICD-10-CM

## 2022-11-10 DIAGNOSIS — N18.31 STAGE 3A CHRONIC KIDNEY DISEASE: ICD-10-CM

## 2022-11-10 DIAGNOSIS — C34.91 NON-SMALL CELL CANCER OF RIGHT LUNG: ICD-10-CM

## 2022-11-10 PROCEDURE — 99215 OFFICE O/P EST HI 40 MIN: CPT | Mod: 95,,, | Performed by: INTERNAL MEDICINE

## 2022-11-10 PROCEDURE — 99215 PR OFFICE/OUTPT VISIT, EST, LEVL V, 40-54 MIN: ICD-10-PCS | Mod: 95,,, | Performed by: INTERNAL MEDICINE

## 2022-11-10 RX ORDER — HYDROCODONE BITARTRATE AND ACETAMINOPHEN 7.5; 325 MG/1; MG/1
1 TABLET ORAL EVERY 8 HOURS PRN
Qty: 90 TABLET | Refills: 0 | Status: SHIPPED | OUTPATIENT
Start: 2022-11-10 | End: 2022-12-08 | Stop reason: SDUPTHER

## 2022-11-10 NOTE — PROGRESS NOTES
"Subjective:       Patient ID: Alesha Toney is a 72 y.o. female.  The patient location is: home  The chief complaint leading to consultation is: lung cancer    Visit type: audiovisual      Notes:    Chief Complaint: Lung Cancer  Ms. Alesha Toney is a 72-year-old otherwise healthy female who started having some shoulder pain, which was lasting for over six weeks.  She went and saw her primary care physician and underwent an x-ray, which revealed right upper lobe lung mass worrisome for malignancy and a CT scan was recommended, which was done on 6/12/18 and that revealed a newly developing mass, pleural based, lateral posterior segment, right upper lobe 3.5 x 3.5 cm, surrounding stellate margin and patchy infiltrates, particularly superiorly,   anteriorly infiltrates extend perihilar into the anterior segment.  She then underwent CT-guided biopsy, which revealed well-differentiated adenocarcinoma.       Her PET scan from 6/29/18 There is physiologic intracranial, head, and neck activity.  There is a large right upper lobe mass SUV max 9.11.  No local or distant metastatic disease seen.  There is physiologic liver, spleen, GI and  activity.  There are a few liver cysts.  No adenopathy is seen.  The adrenal glands are not enlarged.  No adenopathy is seen.  No suspicious bone lesions are seen.     She underwent VATS with right upper lobectomy. Mediastinal lymphadenectomy on 8/9/18. Pathology revealed "Tumor site: Upper lobe.Tumor size: 7 x 4 x 2.7 cm.Tumor focality: Single tumor.  Histologic type: Mixed invasive mucinous and nonmucinous adenocarcinoma. Histologic grade: G2: Moderately differentiated.Spread through air spaces (STATS): Present. Visceral pleura invasion: Not identified.  Lymphovascular invasion: Not identified. Direct invasion of adjacent structures: No adjacent structures present. Margins: All margins are uninvolved by carcinoma.Distance of invasive carcinoma from closest margin: 1 cm from the " "bronchial margin.Treatment effect: No known presurgical therapy. Regional lymph nodes: Number of lymph nodes involved: 0. Number of lymph nodes examined: 11. Pathologic Stage Classification: pT3 N0"        She completed 4 cycles of adjuvant chemo with Cisplatin and Alimta as of 1/10/19   She is on surveillance.     CT chest of 9/17/19 which reveals "Operative change of right upper lobectomy for small-cell lung cancer with several scattered subsolid opacities and a new solid nodule in the right lower lobe measuring up to 0.5 cm.  We recommend continued surveillance with the role and schedule for such surveillance to be determined by clinical considerations. Sided chest port distal tip terminating at the confluence of the brachiocephalic vein in the SVC.  Correlate with device functioning. Apically predominant emphysematous changes, left greater than right. Aortic annular calcification and multi-vessel coronary artery atherosclerosis. Numerous unchanged hepatic hypodensities, likely cysts"     PET scan from 10/1/19 reveals "1.6 cm soft tissue lesion re-identified posterior to the bronchus intermedius.  No corresponding focal increased radiotracer uptake to suggest local recurrence or metastatic disease. Stable subcentimeter opacities throughout the bilateral lower lobes, too small to characterize with PET-CT. Focal increased radiotracer uptake and subtle wall thickening in the rectum.  Findings are concerning for primary neoplastic process.  Recommend further evaluation with direct visualization"     She returned from [facility] and was advised to proceed with chemo/RT with either Docetaxel/platinum or Carboplatin/Alimta.     She completed chemo/RT with Carboplatin and Alimta as of 12/31/19     She underwent CT chest on 5/27/2020 which revealed 1. In this patient with history of lung cancer status post right upper lobectomy, there is a soft tissue opacity at the posterior margin of the staple line.  This lesion has " "been enlarging progressively and now measures 1.3 x 1.6 cm.  There is also a new 1.7 x 1.7 cm opacity at the right paravertebral pleural margin which is inseparable from this lesion.  These findings may represent post radiation change in light of the hypermetabolic lesion in this region on PET-CT of 10/01/2019 (image 69/[id number]).There is a 1 cm opacity in the superior or lateral basal segment of the right lower lobe (axial series 4, image 243) which has enlarged since 09/17/2019.  Nature of this lesion is unclear.  Clinical considerations will determine if percutaneous biopsy or metabolic assessment is warranted. 1.0 cm solid opacity with branching configuration (series 4, image 205) is located in the lateral basal segment of the left lower lobe, stable since 02/16/2019 (02/06/2019 axial series 4, image 310). Appearance and bifurcating character suggests mucoid impaction in mildly ectatic peripheral airway, likely not significant. Persistent clustered small centrilobular nodules in the apical segment of the right upper lobe likely reflecting small airway inflammation/infection. Partially visualized left chest port with distal catheter tip at the junction of the brachiocephalic veins and SVC, similar as on 09/17/2019"  She thus underwent PET scan on 6/1/2020 which revealed "No evidence of residual viable lung malignancy.  Evolving post radiation changes in the right paramediastinal lung, with a new irregular subpleural density which may also be related to recent radiation.  Attention on follow-up. Interval decrease in size of a soft tissue lesion along the inferior margin of the lobectomy stable line.  Several stable subcentimeter soft tissue opacities in the lower lobes bilaterally, As above.  Attention on follow-up.  Persistent hypermetabolic rectal lesion concerning for malignancy.  Recommend direct visualization and tissue sampling as clinically indicated"  I discussed her case in thoracic conference today and " "findings are felt to be related to RT     Her restaging CT scans from 1/20/2021 which reveal "Persistent soft tissue opacity in the posterior margin of the staple line that is increased in prominence when compared to the prior examination and is worrisome for disease progression. No new lesions identified.  Stable pulmonary nodules. Stable hypodense lesions throughout the liver that likely represent hepatic cysts"        Restaging PET scan from 1/27/2021 reveals "Increased size and hypermetabolic activity involving soft tissue opacity along the inferior border of the right upper lobectomy stable line concerning for progression of disease. Mildly increased hypermetabolic activity involving the rectum compatible with known high-grade dysplasia/intramucosal carcinoma. Interval decrease in size and resolution of hypermetabolic activity involving subcentimeter subpleural opacity within the right lower lobe"     MRI brain from  2/3/2021 reveals no evidence of recurrence.  GUARDANT testing only reveals p53, PDL1 is 0 and no other targets        PET scan from 3/24/2021 reveals "In this patient with known lung cancer, there is a persistent hypermetabolic right hilar mass consistent with viable tumor.  Interval increase in size is equivocal for progression of fibrosis versus tumor growth. 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  Activity appears more focal and possibly more proximal than before.  Consider direct visualization for further evaluation. The previously described subcentimeter subpleural opacity within the right lower lobe is not definitely seen on today's exam"               She last received immunotherapy on 5/28/2021. She underwent EUS on 6/25/2021 which revealed "Erythematous, congested, and ulcerated mucosa  (proctitis) in rectosigmoid colon.  Pathology reveals Colon, rectosigmoid, biopsy:  Moderate active colitis     She completed " "steroids about 5 weeks ago.      CT scans from 11/5/2021 reveals "In this patient with lung cancer, there is postoperative changes of right upper lobe resection.  Persistent soft tissue opacity along the right hilum suture line which is slightly increased in size from prior exam.  More conspicuous appearing nodules within the right lower lobe seen on prior exam.  New nodule within the right middle lobe measuring 1.3 cm.  Overall findings are all worrisome for disease progression. Scattered areas of centrilobular nodularity within the bilateral lungs.  Nonspecific and can be seen in the setting of infectious or inflammatory etiologies. Unchanged hepatics cysts. Stable left adrenal gland nodule."  PET scan from 11/23/2021 revealed "New hypermetabolic nodule within the right lower lobe additional slightly subcentimeter nodules within the right lower lobe.  Findings concerning for disease progression, other differentials include infection or noninfectious inflammatory process. Interval decrease in size and uptake in the right hilar mass. Resolution of previous focal rectal uptake"        She is on Opdivo. Her Opdivo was held on 1/10/2022 due to rectal bleeding. She saw Dr. Clark and was noted to have anal fissure which contributed to rectal bleed. Her rectal bleed has since resolved.           She was on Opdivo until 5/24/2022, she noted rectal bleeding and underwent   Flex sigmoidoscopy on 6/3/2022 reveals "Localized severe inflammation was found in the distal rectum. Biopsied. Pathology reveals Severely active chronic colitis with ulceration (see comment)  Moderate architectural disorder. Negative for CMV by immunohistochemistry . Negative for granulomas or viral inclusions. Negative for dysplasia or carcinoma"  PET scan from 6/17/2022 reveals "In this patient with lung cancer there are enlarging hypermetabolic right perihilar opacities concerning for continued local disease progression. Continued decreased " "metabolic activity of the right lower lobe nodule.  Cluster of pulmonary nodules bilaterally, some which are new from prior PET-CT for example the superior segment the left upper lobe and are likely infectious/inflammatory origin. Persistent and more extensive region of increased FDG avidity within the distal rectum in keeping with more extensive colitis.  Malignancy could have a similar appearance.  Recommend further investigation as clinically warranted and attention on follow-up"     She has active proctitis and is on hydrocortisone suppositories and Entyvyo since early June 2022.   CT chest on 8/1/2022 reveals  "Similar appearance of elongated, irregular opacity adjacent to the right upper lobectomy margin.  More posteriorly adjacent perihilar opacities with previous FDG avidity with detailed measurements as above. Similar areas of scattered micro-nodularity with tree-in-bud, likely infectious/inflammatory in nature.  New appearing area of grouped micro-nodules in the anterior left lower lobe, largest up to 5 mm, which could relate to aforementioned small airways disease, though technically indeterminate.  Continued close attention at follow-up. RECIST SUMMARY: Date of prior examination for comparison: CT chest dated 05/24/2022. Lesion 1: Right perihilar opacity.  2.4.  Series 2, image 45.  Previously 2 4.  (Note there is slight increased size conspicuity in the craniocaudal dimension at 1.8 cm, previously 1.5 cm). Lesion 2: Right perihilar opacity.  1.7 cm.  Series 2, image 59.  Previously 1.5 cm"     Renuka underwent a repeat CT chest 8/31/2022 and that revealed "Status post right upper lobectomy.  Soft tissue thickening adjacent to the suture line appears stable.  However, there is interval enlargement of a right lower lobe, bilobed, paramediastinal mass-like consolidation.  When dominant components of the lesions are measured separately, dimensions are summarized below. RECIST SUMMARY: Date of prior " "examination for comparison: CT chest 08/01/2022. Lesion 1: Right perihilar opacity.  4.7 cm. Series 4 image 77.  Prior measurement 2.4 cm. Lesion 2: Right perihilar (infrahilar) opacity.  2.7 cm. Series 4 image 237.  Prior measurement 1.7 cm"  Case reviewed in thoracic conference, and concern for disease progression exists so plan on immunotehrapy if able to taper steroids           PET scan from 10/6/2022 reveals "Interval increase in size and radiotracer uptake of a right pulmonary lesions in this patient with known non-small cell lung cancer.  There are few new subcentimeter pulmonary nodules in the right lower lobe with no significant uptake, could represent infectious versus inflammatory process.  However, additional new metastatic disease can not be excluded.  Attention on follow-up. Improved metabolic appearance of the lower rectum compared with the prior exam"  She is currently off of prednisone and Entyvio.  She is now on  Carboplatin and ALimta, started on 10/14/2022     \[facility] [place] [facility]\""e comes in today for cycle 2 of Carboplatin and Alimta. She was supposed to get chemo last week but did not as her ANC was 1300.  She notes myalgias and fatigue, she notes episodes of nausea, mostly constipation, no diarrhea. Has some chest discomfort and shortness of breath    She denies any vomiting, diarrhea, abdominal pain, weight loss or loss of appetite, leg swelling, fatigue, pain, headache, dizziness, or mood changes. Her ECOG PS is zero.    She saw Dr. Dawn yesterday and is discussing possibility of RT   She reached out to [facility] on 11/6/2022 see if proton Beam is an option.          Review of Systems   Constitutional:  Negative for appetite change and unexpected weight change.   HENT:  Negative for mouth sores.    Eyes:  Negative for visual disturbance.   Respiratory:  Positive for shortness of breath. Negative for cough.    Cardiovascular:  Positive for chest pain.   Gastrointestinal:  Negative for abdominal pain and " diarrhea.   Genitourinary:  Negative for frequency.   Musculoskeletal:  Positive for back pain.   Integumentary:  Negative for rash.   Neurological:  Negative for headaches.   Hematological:  Negative for adenopathy.   Psychiatric/Behavioral:  The patient is nervous/anxious.        Objective:      Physical Exam      LABS:  WBC   Date Value Ref Range Status   11/09/2022 5.39 3.90 - 12.70 K/uL Final     Hemoglobin   Date Value Ref Range Status   11/09/2022 11.5 (L) 12.0 - 16.0 g/dL Final     POC Hematocrit   Date Value Ref Range Status   08/09/2018 33 (L) 36 - 54 %PCV Final     Hematocrit   Date Value Ref Range Status   11/09/2022 34.8 (L) 37.0 - 48.5 % Final     Platelets   Date Value Ref Range Status   11/09/2022 344 150 - 450 K/uL Final     Gran # (ANC)   Date Value Ref Range Status   11/09/2022 2.3 1.8 - 7.7 K/uL Final     Gran %   Date Value Ref Range Status   11/09/2022 42.5 38.0 - 73.0 % Final       Chemistry        Component Value Date/Time     11/09/2022 1440    K 4.8 11/09/2022 1440     11/09/2022 1440    CO2 27 11/09/2022 1440    BUN 19 11/09/2022 1440    CREATININE 1.3 11/09/2022 1440     (H) 11/09/2022 1440        Component Value Date/Time    CALCIUM 9.7 11/09/2022 1440    ALKPHOS 56 11/09/2022 1440    AST 34 11/09/2022 1440    ALT 33 11/09/2022 1440    BILITOT 0.3 11/09/2022 1440    ESTGFRAFRICA 58.4 (A) 06/22/2022 1046    EGFRNONAA 50.6 (A) 06/22/2022 1046        Cockcroft-Gault equation: 42 ml/min    Assessment:       Problem List Items Addressed This Visit       Malignant neoplasm of upper lobe, right bronchus or lung - Primary    Non-small cell lung cancer    Secondary and unspecified malignant neoplasm of intrathoracic lymph nodes    Stage 3a chronic kidney disease       Plan:           Route Chart for Scheduling    Med Onc Chart Routing  Urgent    Follow up with physician . Cancel chemo tomorrow. Schedule MRI brain next available. please get auth for Carboplatin and gemzar and  see me to schedule chemo. Schedule consult with palliative care next available and also schedule to see Dr. Street for follow-up   Follow up with YAMILE    Infusion scheduling note    Injection scheduling note    Labs    Imaging    Pharmacy appointment    Other referrals          Treatment Plan Information   OP PEMETREXED Q3W   Pam Dhaliwal MD   Upcoming Treatment Dates - OP PEMETREXED Q3W    11/4/2022       Pre-Medications       palonosetron 0.25mg/dexAMETHasone 10mg in NS IVPB 0.25 mg       LORazepam injection 0.5 mg       Chemotherapy       CARBOplatin (PARAPLATIN) in sodium chloride 0.9% 250 mL chemo infusion       PEMEtrexed disodium (ALIMTA) 900 mg in sodium chloride 0.9% 100 mL chemo infusion  11/25/2022       Pre-Medications       palonosetron (ALOXI) 0.25 mg with Dexamethasone (DECADRON) 10 mg in NS 50 mL IVPB       LORazepam injection 0.5 mg       Chemotherapy       PEMEtrexed disodium (ALIMTA) 900 mg in sodium chloride 0.9% 100 mL chemo infusion       CARBOplatin (PARAPLATIN) in sodium chloride 0.9% 250 mL chemo infusion       Supportive Care       cyanocobalamin injection 1,000 mcg  12/16/2022       Pre-Medications       palonosetron (ALOXI) 0.25 mg with Dexamethasone (DECADRON) 10 mg in NS 50 mL IVPB       LORazepam injection 0.5 mg       Chemotherapy       PEMEtrexed disodium (ALIMTA) 900 mg in sodium chloride 0.9% 100 mL chemo infusion       CARBOplatin (PARAPLATIN) in sodium chloride 0.9% 250 mL chemo infusion  1/6/2023       Pre-Medications       palonosetron (ALOXI) 0.25 mg with Dexamethasone (DECADRON) 10 mg in NS 50 mL IVPB       LORazepam injection 0.5 mg       Chemotherapy       PEMEtrexed disodium (ALIMTA) 900 mg in sodium chloride 0.9% 100 mL chemo infusion       CARBOplatin (PARAPLATIN) in sodium chloride 0.9% 250 mL chemo infusion    Therapy Plan Information  PORT FLUSH  Flushes  heparin, porcine (PF) 100 unit/mL injection flush 500 Units  500 Units, Intravenous, Every visit  sodium  chloride 0.9% flush 10 mL  10 mL, Intravenous, Every visit    We discussed options of treatment,. She cannot get Alimta as her crcl is around 42. She does not want to Taxol based chemo as she already has neuropathy issues. Will plan on Carboplatin and Gemzar.  She also met with Dr. Dawn to review SBRT, she is interested in Proton Beam and has reached out to [facility] and is waiting to hear from them. She will decide if RT versus chemo once she has discussed with them  I will plan on getting auth for chemo in the meantime  Also schedule MRI brain    Above care plan was discussed with patient and accompanying wife and all questions were addressed to their satisfaction

## 2022-11-10 NOTE — TELEPHONE ENCOUNTER
No new care gaps identified.  [facility] Embedded Care Gaps. Reference number: [id number]. 11/10/2022   11:33:48 AM CST

## 2022-11-10 NOTE — PLAN OF CARE
DISCONTINUE ON PATHWAY REGIMEN - Non-Small Cell Lung    [id number]        Pemetrexed (Alimta)           Additional Orders: Note: Patient to receive the following prior to the   initiation of therapy:  1) Dexamethasone 4 mg orally twice daily x 6 doses.    First dose 24 hours before chemotherapy.  2) Folic acid greater than or equal to   400 mcg orally daily.  First dose at least 5 days prior to the first dose of   pemetrexed.  3) Vitamin B12 1,000 mcg intramuscularly every 9 weeks.  First dose   at least 5 days prior to the first dose of pemetrexed.    **Always confirm dose/schedule in your pharmacy ordering system**    REASON: Toxicities / Adverse Event  PRIOR TREATMENT: [id number]  TREATMENT RESPONSE: Unable to Evaluate    START OFF PATHWAY REGIMEN - Non-Small Cell Lung            Carboplatin       Gemcitabine           Additional Orders: *All AUC calculations intended to be used in [place]   formula    **Always confirm dose/schedule in your pharmacy ordering system**    Patient Characteristics:  Stage IV Metastatic, Nonsquamous, Molecular Analysis Completed, Molecular   Alteration Present and Targeted Therapy Exhausted OR EGFR Exon 20+ or KRAS G12C+   or HER2+ Present and No Prior Chemo/Immunotherapy OR No Alteration Present,   Initial Chemotherapy/Immunotherapy, PS = 0, 1, No Alteration Present, Did Not   Order Molecular Analysis/Quantity Not Sufficient for Molecular Analysis  Therapeutic Status: Stage IV Metastatic  Histology: Nonsquamous Cell  Broad Molecular Profiling Status: Molecular Analysis Completed  Molecular Analysis Results: No Alteration Present  ECOG Performance Status: 0  Chemotherapy/Immunotherapy Line of Therapy: Initial Chemotherapy/Immunotherapy  EGFR Exons 18-21 Mutation Testing Status: Completed and Negative  ALK Fusion/Rearrangement Testing Status: Did Not Order Test  BRAF V600 Mutation Testing Status: Completed and Negative  KRAS G12C Mutation Testing Status: Completed and Negative  MET Exon 14  Mutation Testing Status: Completed and Negative  RET Fusion/Rearrangement Testing Status: Did Not Order Test  HER2 Mutation Testing Status: Completed and Negative  NTRK Fusion/Rearrangement Testing Status: Did Not Order Test  ROS1 Fusion/Rearrangement Testing Status: Did Not Order Test  Intent of Therapy:  Non-Curative / Palliative Intent, Discussed with Patient

## 2022-11-15 ENCOUNTER — TELEPHONE (OUTPATIENT)
Dept: PSYCHIATRY | Facility: CLINIC | Age: 72
End: 2022-11-15
Payer: MEDICARE

## 2022-11-15 NOTE — TELEPHONE ENCOUNTER
Spoke with pt and confirmed f/u appt with .      ----- Message from Lata Flores RN sent at 11/15/2022  4:34 PM CST -----  Regarding: FW: CC Chart - Staff Pool - Urgent  Needs f/u with dr STRATTON    ----- Message -----  From: Pam Dhaliwal MD  Sent: 11/10/2022  10:47 AM CST  To: Isra GANDARA Staff  Subject: CC Chart - Staff Pool - Urgent

## 2022-11-16 ENCOUNTER — CLINICAL SUPPORT (OUTPATIENT)
Dept: FAMILY MEDICINE | Facility: CLINIC | Age: 72
End: 2022-11-16
Payer: MEDICARE

## 2022-11-16 DIAGNOSIS — Z00.00 HEALTHCARE MAINTENANCE: Primary | ICD-10-CM

## 2022-11-16 PROCEDURE — G0008 ADMIN INFLUENZA VIRUS VAC: HCPCS | Mod: PBBFAC,PO

## 2022-11-21 ENCOUNTER — PATIENT MESSAGE (OUTPATIENT)
Dept: HEMATOLOGY/ONCOLOGY | Facility: CLINIC | Age: 72
End: 2022-11-21
Payer: MEDICARE

## 2022-11-21 ENCOUNTER — PATIENT MESSAGE (OUTPATIENT)
Dept: PSYCHIATRY | Facility: CLINIC | Age: 72
End: 2022-11-21
Payer: MEDICARE

## 2022-11-21 ENCOUNTER — PATIENT MESSAGE (OUTPATIENT)
Dept: RADIATION ONCOLOGY | Facility: CLINIC | Age: 72
End: 2022-11-21
Payer: MEDICARE

## 2022-11-21 ENCOUNTER — OFFICE VISIT (OUTPATIENT)
Dept: PSYCHIATRY | Facility: CLINIC | Age: 72
End: 2022-11-21
Payer: MEDICARE

## 2022-11-21 DIAGNOSIS — F43.23 ADJUSTMENT DISORDER WITH MIXED ANXIETY AND DEPRESSED MOOD: Primary | ICD-10-CM

## 2022-11-21 PROCEDURE — 90837 PSYTX W PT 60 MINUTES: CPT | Mod: ,,, | Performed by: PSYCHOLOGIST

## 2022-11-21 PROCEDURE — 99999 PR PBB SHADOW E&M-EST. PATIENT-LVL II: CPT | Mod: PBBFAC,,, | Performed by: PSYCHOLOGIST

## 2022-11-21 PROCEDURE — 99212 OFFICE O/P EST SF 10 MIN: CPT | Mod: PBBFAC | Performed by: PSYCHOLOGIST

## 2022-11-21 PROCEDURE — 90837 PR PSYCHOTHERAPY W/PATIENT, 60 MIN: ICD-10-PCS | Mod: ,,, | Performed by: PSYCHOLOGIST

## 2022-11-21 PROCEDURE — 99999 PR PBB SHADOW E&M-EST. PATIENT-LVL II: ICD-10-PCS | Mod: PBBFAC,,, | Performed by: PSYCHOLOGIST

## 2022-11-21 NOTE — PROGRESS NOTES
PSYCHO-ONCOLOGY NOTE/ Individual Psychotherapy     Date: 11/21/2022   Site:  [facility]        Therapeutic Intervention: Met with patient.  Outpatient - Behavior modifying psychotherapy 60 min - CPT code 95615      Patient was last seen by me on 2/25/2021    Problem list  Patient Active Problem List   Diagnosis    Essential hypertension    SVT (supraventricular tachycardia)    Other emphysema    Malignant neoplasm of upper lobe, right bronchus or lung    Secondary and unspecified malignant neoplasm of intrathoracic lymph nodes    Cervical radiculopathy    Coronary artery disease due to calcified coronary lesion    Prediabetes    Thyroid nodule    Stage 3a chronic kidney disease    Hilar mass    Adjustment disorder with mixed anxiety and depressed mood    Abnormal PET scan of colon    Rectal polyp    Anxiety    Non-small cell lung cancer    Mixed hyperlipidemia    Pulmonary hypertension    Chronic pain due to malignant neoplastic disease    Ulcerative colitis with complication    Hypothyroidism    Aortic atherosclerosis    Drug-induced polyneuropathy       Chief complaint/reason for encounter: depression and anxiety   Met with patient to evaluate psychosocial adaptation to diagnosis/treatment/survivorship of SCLC    Current Medications  Current Outpatient Medications   Medication    ascorbic acid, vitamin C, (VITAMIN C) 500 MG tablet    atenoloL (TENORMIN) 25 MG tablet    calcium carbonate (TUMS) 300 mg (750 mg) Chew    dexAMETHasone (DECADRON) 6 MG tablet    famotidine (PEPCID) 10 MG tablet    flu vac 2022 65up-[id number],PF, (FLUAD QUAD 2022-23,65Y UP,,PF,) 60 mcg (15 mcg x 4)/0.5 mL Syrg    folic acid (FOLVITE) 1 MG tablet    HYDROcodone-acetaminophen (NORCO) 7.5-325 mg per tablet    LORazepam (ATIVAN) 0.5 MG tablet    losartan (COZAAR) 50 MG tablet     No current facility-administered medications for this visit.       Objective:  Alesha Toney arrived promptly for the session.   Ms. Toney was independently  ambulatory at the time of session. The patient was fully cooperative throughout the session.  Appearance: age appropriate, casually  dressed, well groomed  Behavior/Cooperation: friendly and cooperative  Speech: normal in rate, volume, and tone and appropriate quality, quantity and organization of sentences  Mood: anxious  Affect:dysthymic  Thought Process: goal-directed, logical  Thought Content: normal,  No delusions or paranoia; did not appear to be responding to internal stimuli during the session  Orientation: grossly intact  Memory: Grossly intact  Attention Span/Concentration: Attends to session without distraction; reports no difficulty  Fund of Knowledge: average  Estimate of Intelligence: average from verbal skills and history  Cognition: grossly intact  Insight: patient has awareness of illness; good insight into own behavior and behavior of others  Judgment: the patient's behavior is adequate to circumstances    Interval history and content of current session: Patient discussed events and activities since the time of last visit. Discussed diagnosis, treatment, prognosis, current adaptation to disease and treatment status, and family's adaptation to disease and treatment status. Reports to be coping with moderate difficulty. She was very overwhelmed prior to being offered the possibility of [facility] consultation. Evaluated cognitive response, paying particular attention to negative intrusive thoughts of a persistent and detrimental nature. Thoughts of this type are in evidence with moderate distress. Provided cognitive behavioral therapy to address negative cognitions.    Patient discussed fears/decisions related to treatment decisions and potential transition away from treatment. She knows immunotherapy is no longer an option for her and she is not enthusiastic about returning to chemotherapy (costs/benefits starting to move away from chemo). She is hopeful that she will be able to get proton beam  "therapy at [facility]. Her anxiety is largely related to thoughts about mortality, future debility, end of life issues.     Good days-goals: reading, watching football, eating out with Lata    Going to [facility] this weekend   Would like to travel to [place] to see friend    Identified and evaluated psychosocial and environmental stressors secondary to diagnosis and treatment (fatigue, pain, disrupted sleep).  Examined proactive behaviors that may be implemented to minimize or ameliorate psychosocial stressors. Not currently using regular bowel regimen. Fearful of longer-acting pain medications ("what if I need them later?")- but sleep/activities are disrupted by pain on current regimen (2-3 Norco/day).  Continues to use Ativan for sleep (1/2 tablet/night) and occasionally for anxiety. Sleeping 8-9 hours total, but sleep disrupted at least 1x (sometimes more) nightly due to pain.     Partner very supportive of any of her decision-making. Lata working from home now, so can travel to [place] if needed with patient. Partner may benefit from her own support (offered).      Risk parameters:   Patient reports no suicidal ideation  Patient reports no homicidal ideation  Patient reports no self-injurious behavior  Patient reports no violent behavior   Safety needs:  None at this time      Verbal deficits: None     Patient's response to intervention:The patient's response to intervention is accepting, motivated.     Progress toward goals: No Progress - Continue Objectives        Patient reported outcomes:      Distress thermometer:   DISTRESS SCREENING 11/20/2022 11/7/2022 10/30/2022 10/11/2022 9/12/2022 9/8/2022 8/22/2022   Distress Score 5 5 5 5 0 - No Distress 7 3   Practical Problems Treatment Decisions Treatment Decisions None of these Treatment Decisions None of these Treatment Decisions None of these   Family Problems None of these None of these None of these None of these None of these None of these None of " these   Emotional Problems Fears;Nervousness;Worry Fears;Nervousness;Worry Fears;Nervousness Fears;Worry None of these Depression;Worry Nervousness   Spiritual / Gnosticism Concerns No No No No No No No   Physical Problems Breathing;Fatigue;Pain;Sleep;Tingling in hands or feet Fatigue;Pain;Sleep;Tingling in hands or feet Breathing;Constipation;Fatigue;Memory/Concentration;Nausea;Pain;Tingling in hands or feet Fatigue;Pain;Tingling in hands or feet None of these Breathing;Fatigue;Pain;Sleep Pain   Other Problems - - - - - - -           PHQ-9= Initial visit: 11  PHQ ANSWERS    Q1. Little interest or pleasure in doing things: More than half the days (11/20/22 2213)  Q2. Feeling down, depressed, or hopeless: Several days (11/20/22 2213)  Q3. Trouble falling or staying asleep, or sleeping too much: Several days (11/20/22 2213)  Q4. Feeling tired or having little energy: Nearly every day (11/20/22 2213)  Q5. Poor appetite or overeating: Not at all (11/20/22 2213)  Q6. Feeling bad about yourself - or that you are a failure or have let yourself or your family down: Not at all (11/20/22 2213)  Q7. Trouble concentrating on things, such as reading the newspaper or watching television: Not at all (11/20/22 2213)  Q8. Moving or speaking so slowly that other people could have noticed. Or the opposite - being so fidgety or restless that you have been moving around a lot more than usual: Not at all (11/20/22 2213)  Q9.      PHQ8 Score : 7 (11/20/22 2213)  PHQ-9 Total Score: 7 (11/20/22 2213)      SID-7= Initial visit:13   GAD7 11/20/2022 2/25/2021 2/13/2021   1. Feeling nervous, anxious, or on edge? 2 3 3   2. Not being able to stop or control worrying? 1 3 3   3. Worrying too much about different things? 1 3 2   4. Trouble relaxing? 1 2 2   5. Being so restless that it is hard to sit still? 0 2 0   6. Becoming easily annoyed or irritable? 0 1 1   7. Feeling afraid as if something awful might happen? 2 3 3   SID-7 Score 7 17 14           Client Strengths: verbal, intelligent, successful, good social support, good insight, commitment to wellness     Treatment Plan:individual psychotherapy and medication management by physician  Target symptoms: depression, anxiety , adjustment  Why chosen therapy is appropriate versus another modality: relevant to diagnosis, patient responds to this modality, evidence based practice  Outcome monitoring methods: self-report, checklist/rating scale  Therapeutic intervention type: behavior modifying psychotherapy, supportive psychotherapy  Prognosis: Good    Goals from last visit:  N/A DUE TO TIME SINCE FIRST VISIT   Behavioral goals prior to next visit:    Exercise:   Stress management: information about meditation class provided    Relaxation exercises provided to her   Social engagement:   Nutrition:   Smoking Cessation:   Therapy:    Return to clinic: as needed     Length of Service (minutes direct face-to-face contact): 60    Diagnosis:     ICD-10-CM ICD-9-CM   1. Adjustment disorder with mixed anxiety and depressed mood  F43.23 [phone]       Marty Street, PhD  [place] License #[id number]  [place] License #[id number]

## 2022-11-28 ENCOUNTER — TELEPHONE (OUTPATIENT)
Dept: HEMATOLOGY/ONCOLOGY | Facility: CLINIC | Age: 72
End: 2022-11-28
Payer: MEDICARE

## 2022-11-28 ENCOUNTER — PATIENT MESSAGE (OUTPATIENT)
Dept: HEMATOLOGY/ONCOLOGY | Facility: CLINIC | Age: 72
End: 2022-11-28
Payer: MEDICARE

## 2022-11-28 NOTE — TELEPHONE ENCOUNTER
Phoned pt with meditation info. She is interested and will contact us when she returns from [facility].

## 2022-12-05 ENCOUNTER — PATIENT MESSAGE (OUTPATIENT)
Dept: HEMATOLOGY/ONCOLOGY | Facility: CLINIC | Age: 72
End: 2022-12-05
Payer: MEDICARE

## 2022-12-05 ENCOUNTER — PATIENT MESSAGE (OUTPATIENT)
Dept: PALLIATIVE MEDICINE | Facility: CLINIC | Age: 72
End: 2022-12-05
Payer: MEDICARE

## 2022-12-05 ENCOUNTER — PATIENT MESSAGE (OUTPATIENT)
Dept: PSYCHIATRY | Facility: CLINIC | Age: 72
End: 2022-12-05
Payer: MEDICARE

## 2022-12-08 ENCOUNTER — PATIENT MESSAGE (OUTPATIENT)
Dept: INTERNAL MEDICINE | Facility: CLINIC | Age: 72
End: 2022-12-08
Payer: MEDICARE

## 2022-12-08 RX ORDER — HYDROCODONE BITARTRATE AND ACETAMINOPHEN 7.5; 325 MG/1; MG/1
1 TABLET ORAL EVERY 8 HOURS PRN
Qty: 90 TABLET | Refills: 0 | Status: SHIPPED | OUTPATIENT
Start: 2022-12-08 | End: 2023-02-07 | Stop reason: SDUPTHER

## 2022-12-08 NOTE — TELEPHONE ENCOUNTER
No new care gaps identified.  [facility] Embedded Care Gaps. Reference number: [id number]. 12/08/2022   3:31:21 PM CST

## 2022-12-10 ENCOUNTER — HOSPITAL ENCOUNTER (OUTPATIENT)
Dept: RADIOLOGY | Facility: HOSPITAL | Age: 72
Discharge: HOME | End: 2022-12-10
Attending: INTERNAL MEDICINE
Payer: MEDICARE

## 2022-12-10 DIAGNOSIS — C34.11 MALIGNANT NEOPLASM OF UPPER LOBE, RIGHT BRONCHUS OR LUNG: ICD-10-CM

## 2022-12-10 PROCEDURE — 70551 MRI BRAIN STEM W/O DYE: CPT | Mod: 26,,, | Performed by: RADIOLOGY

## 2022-12-10 PROCEDURE — 70551 MRI BRAIN WITHOUT CONTRAST: ICD-10-PCS | Mod: 26,,, | Performed by: RADIOLOGY

## 2022-12-10 PROCEDURE — 70551 MRI BRAIN STEM W/O DYE: CPT | Mod: TC

## 2022-12-13 ENCOUNTER — PATIENT MESSAGE (OUTPATIENT)
Dept: HEMATOLOGY/ONCOLOGY | Facility: CLINIC | Age: 72
End: 2022-12-13
Payer: MEDICARE

## 2022-12-17 ENCOUNTER — PATIENT MESSAGE (OUTPATIENT)
Dept: INTERNAL MEDICINE | Facility: CLINIC | Age: 72
End: 2022-12-17
Payer: MEDICARE

## 2022-12-19 ENCOUNTER — PATIENT MESSAGE (OUTPATIENT)
Dept: HEMATOLOGY/ONCOLOGY | Facility: CLINIC | Age: 72
End: 2022-12-19
Payer: MEDICARE

## 2022-12-20 NOTE — TELEPHONE ENCOUNTER
Yes correct, she can go ahead with proton beam at [facility] and reschedule my appointment

## 2022-12-21 DIAGNOSIS — Z78.0 MENOPAUSE: ICD-10-CM

## 2022-12-27 ENCOUNTER — HOSPITAL ENCOUNTER (OUTPATIENT)
Dept: RADIOLOGY | Facility: HOSPITAL | Age: 72
Discharge: HOME | End: 2022-12-27
Attending: INTERNAL MEDICINE
Payer: MEDICARE

## 2022-12-27 DIAGNOSIS — Z78.0 MENOPAUSE: ICD-10-CM

## 2022-12-27 PROCEDURE — 77080 DXA BONE DENSITY AXIAL: CPT | Mod: TC

## 2022-12-27 PROCEDURE — 77080 DXA BONE DENSITY AXIAL: CPT | Mod: 26,,, | Performed by: RADIOLOGY

## 2022-12-27 PROCEDURE — 77080 DEXA BONE DENSITY SPINE HIP: ICD-10-PCS | Mod: 26,,, | Performed by: RADIOLOGY

## 2023-01-10 ENCOUNTER — PATIENT MESSAGE (OUTPATIENT)
Dept: INTERNAL MEDICINE | Facility: CLINIC | Age: 73
End: 2023-01-10
Payer: MEDICARE

## 2023-01-10 RX ORDER — HYDROCODONE BITARTRATE AND ACETAMINOPHEN 7.5; 325 MG/1; MG/1
1 TABLET ORAL EVERY 8 HOURS PRN
Qty: 90 TABLET | Refills: 0 | Status: CANCELLED | OUTPATIENT
Start: 2023-01-10

## 2023-01-10 NOTE — TELEPHONE ENCOUNTER
No new care gaps identified.  [facility] Embedded Care Gaps. Reference number: [id number]. 1/10/2023   11:42:27 AM CST

## 2023-01-30 ENCOUNTER — OFFICE VISIT (OUTPATIENT)
Dept: HEMATOLOGY/ONCOLOGY | Facility: CLINIC | Age: 73
End: 2023-01-30
Payer: MEDICARE

## 2023-01-30 VITALS
OXYGEN SATURATION: 100 % | WEIGHT: 152.31 LBS | SYSTOLIC BLOOD PRESSURE: 161 MMHG | BODY MASS INDEX: 24.48 KG/M2 | RESPIRATION RATE: 18 BRPM | DIASTOLIC BLOOD PRESSURE: 65 MMHG | HEART RATE: 65 BPM | HEIGHT: 66 IN

## 2023-01-30 DIAGNOSIS — C34.11 MALIGNANT NEOPLASM OF UPPER LOBE, RIGHT BRONCHUS OR LUNG: Primary | ICD-10-CM

## 2023-01-30 DIAGNOSIS — C77.1 SECONDARY AND UNSPECIFIED MALIGNANT NEOPLASM OF INTRATHORACIC LYMPH NODES: ICD-10-CM

## 2023-01-30 PROCEDURE — 99999 PR PBB SHADOW E&M-EST. PATIENT-LVL III: ICD-10-PCS | Mod: PBBFAC,,, | Performed by: INTERNAL MEDICINE

## 2023-01-30 PROCEDURE — 99213 OFFICE O/P EST LOW 20 MIN: CPT | Mod: PBBFAC | Performed by: INTERNAL MEDICINE

## 2023-01-30 PROCEDURE — 99214 OFFICE O/P EST MOD 30 MIN: CPT | Mod: S$PBB,,, | Performed by: INTERNAL MEDICINE

## 2023-01-30 PROCEDURE — 99214 PR OFFICE/OUTPT VISIT, EST, LEVL IV, 30-39 MIN: ICD-10-PCS | Mod: S$PBB,,, | Performed by: INTERNAL MEDICINE

## 2023-01-30 PROCEDURE — 99999 PR PBB SHADOW E&M-EST. PATIENT-LVL III: CPT | Mod: PBBFAC,,, | Performed by: INTERNAL MEDICINE

## 2023-01-30 NOTE — PROGRESS NOTES
"Subjective:       Patient ID: Alesha Toney is a 72 y.o. female.    Chief Complaint: Malignant neoplasm of upper lobe, right bronchus or lung  Ms. Alesha Toney is a 72-year-old otherwise healthy female who started having some shoulder pain, which was lasting for over six weeks.  She went and saw her primary care physician and underwent an x-ray, which revealed right upper lobe lung mass worrisome for malignancy and a CT scan was recommended, which was done on 6/12/18 and that revealed a newly developing mass, pleural based, lateral posterior segment, right upper lobe 3.5 x 3.5 cm, surrounding stellate margin and patchy infiltrates, particularly superiorly,   anteriorly infiltrates extend perihilar into the anterior segment.  She then underwent CT-guided biopsy, which revealed well-differentiated adenocarcinoma.       Her PET scan from 6/29/18 There is physiologic intracranial, head, and neck activity.  There is a large right upper lobe mass SUV max 9.11.  No local or distant metastatic disease seen.  There is physiologic liver, spleen, GI and  activity.  There are a few liver cysts.  No adenopathy is seen.  The adrenal glands are not enlarged.  No adenopathy is seen.  No suspicious bone lesions are seen.     She underwent VATS with right upper lobectomy. Mediastinal lymphadenectomy on 8/9/18. Pathology revealed "Tumor site: Upper lobe.Tumor size: 7 x 4 x 2.7 cm.Tumor focality: Single tumor.  Histologic type: Mixed invasive mucinous and nonmucinous adenocarcinoma. Histologic grade: G2: Moderately differentiated.Spread through air spaces (STATS): Present. Visceral pleura invasion: Not identified.  Lymphovascular invasion: Not identified. Direct invasion of adjacent structures: No adjacent structures present. Margins: All margins are uninvolved by carcinoma.Distance of invasive carcinoma from closest margin: 1 cm from the bronchial margin.Treatment effect: No known presurgical therapy. Regional lymph nodes: " "Number of lymph nodes involved: 0. Number of lymph nodes examined: 11. Pathologic Stage Classification: pT3 N0"        She completed 4 cycles of adjuvant chemo with Cisplatin and Alimta as of 1/10/19   She is on surveillance.     CT chest of 9/17/19 which reveals "Operative change of right upper lobectomy for small-cell lung cancer with several scattered subsolid opacities and a new solid nodule in the right lower lobe measuring up to 0.5 cm.  We recommend continued surveillance with the role and schedule for such surveillance to be determined by clinical considerations. Sided chest port distal tip terminating at the confluence of the brachiocephalic vein in the SVC.  Correlate with device functioning. Apically predominant emphysematous changes, left greater than right. Aortic annular calcification and multi-vessel coronary artery atherosclerosis. Numerous unchanged hepatic hypodensities, likely cysts"     PET scan from 10/1/19 reveals "1.6 cm soft tissue lesion re-identified posterior to the bronchus intermedius.  No corresponding focal increased radiotracer uptake to suggest local recurrence or metastatic disease. Stable subcentimeter opacities throughout the bilateral lower lobes, too small to characterize with PET-CT. Focal increased radiotracer uptake and subtle wall thickening in the rectum.  Findings are concerning for primary neoplastic process.  Recommend further evaluation with direct visualization"     She returned from [facility] and was advised to proceed with chemo/RT with either Docetaxel/platinum or Carboplatin/Alimta.     She completed chemo/RT with Carboplatin and Alimta as of 12/31/19     She underwent CT chest on 5/27/2020 which revealed 1. In this patient with history of lung cancer status post right upper lobectomy, there is a soft tissue opacity at the posterior margin of the staple line.  This lesion has been enlarging progressively and now measures 1.3 x 1.6 cm.  There is also a new 1.7 x " "1.7 cm opacity at the right paravertebral pleural margin which is inseparable from this lesion.  These findings may represent post radiation change in light of the hypermetabolic lesion in this region on PET-CT of 10/01/2019 (image 69/[id number]).There is a 1 cm opacity in the superior or lateral basal segment of the right lower lobe (axial series 4, image 243) which has enlarged since 09/17/2019.  Nature of this lesion is unclear.  Clinical considerations will determine if percutaneous biopsy or metabolic assessment is warranted. 1.0 cm solid opacity with branching configuration (series 4, image 205) is located in the lateral basal segment of the left lower lobe, stable since 02/16/2019 (02/06/2019 axial series 4, image 310). Appearance and bifurcating character suggests mucoid impaction in mildly ectatic peripheral airway, likely not significant. Persistent clustered small centrilobular nodules in the apical segment of the right upper lobe likely reflecting small airway inflammation/infection. Partially visualized left chest port with distal catheter tip at the junction of the brachiocephalic veins and SVC, similar as on 09/17/2019"  She thus underwent PET scan on 6/1/2020 which revealed "No evidence of residual viable lung malignancy.  Evolving post radiation changes in the right paramediastinal lung, with a new irregular subpleural density which may also be related to recent radiation.  Attention on follow-up. Interval decrease in size of a soft tissue lesion along the inferior margin of the lobectomy stable line.  Several stable subcentimeter soft tissue opacities in the lower lobes bilaterally, As above.  Attention on follow-up.  Persistent hypermetabolic rectal lesion concerning for malignancy.  Recommend direct visualization and tissue sampling as clinically indicated"  I discussed her case in thoracic conference today and findings are felt to be related to RT     Her restaging CT scans from 1/20/2021 which reveal " ""Persistent soft tissue opacity in the posterior margin of the staple line that is increased in prominence when compared to the prior examination and is worrisome for disease progression. No new lesions identified.  Stable pulmonary nodules. Stable hypodense lesions throughout the liver that likely represent hepatic cysts"        Restaging PET scan from 1/27/2021 reveals "Increased size and hypermetabolic activity involving soft tissue opacity along the inferior border of the right upper lobectomy stable line concerning for progression of disease. Mildly increased hypermetabolic activity involving the rectum compatible with known high-grade dysplasia/intramucosal carcinoma. Interval decrease in size and resolution of hypermetabolic activity involving subcentimeter subpleural opacity within the right lower lobe"     MRI brain from  2/3/2021 reveals no evidence of recurrence.  GUARDANT testing only reveals p53, PDL1 is 0 and no other targets        PET scan from 3/24/2021 reveals "In this patient with known lung cancer, there is a persistent hypermetabolic right hilar mass consistent with viable tumor.  Interval increase in size is equivocal for progression of fibrosis versus tumor growth. 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  Activity appears more focal and possibly more proximal than before.  Consider direct visualization for further evaluation. The previously described subcentimeter subpleural opacity within the right lower lobe is not definitely seen on today's exam"               She last received immunotherapy on 5/28/2021. She underwent EUS on 6/25/2021 which revealed "Erythematous, congested, and ulcerated mucosa  (proctitis) in rectosigmoid colon.  Pathology reveals Colon, rectosigmoid, biopsy:  Moderate active colitis     She completed steroids about 5 weeks ago.      CT scans from 11/5/2021 reveals "In this patient with lung " "cancer, there is postoperative changes of right upper lobe resection.  Persistent soft tissue opacity along the right hilum suture line which is slightly increased in size from prior exam.  More conspicuous appearing nodules within the right lower lobe seen on prior exam.  New nodule within the right middle lobe measuring 1.3 cm.  Overall findings are all worrisome for disease progression. Scattered areas of centrilobular nodularity within the bilateral lungs.  Nonspecific and can be seen in the setting of infectious or inflammatory etiologies. Unchanged hepatics cysts. Stable left adrenal gland nodule."  PET scan from 11/23/2021 revealed "New hypermetabolic nodule within the right lower lobe additional slightly subcentimeter nodules within the right lower lobe.  Findings concerning for disease progression, other differentials include infection or noninfectious inflammatory process. Interval decrease in size and uptake in the right hilar mass. Resolution of previous focal rectal uptake"        She is on Opdivo. Her Opdivo was held on 1/10/2022 due to rectal bleeding. She saw Dr. Clark and was noted to have anal fissure which contributed to rectal bleed. Her rectal bleed has since resolved.           She was on Opdivo until 5/24/2022, she noted rectal bleeding and underwent   Flex sigmoidoscopy on 6/3/2022 reveals "Localized severe inflammation was found in the distal rectum. Biopsied. Pathology reveals Severely active chronic colitis with ulceration (see comment)  Moderate architectural disorder. Negative for CMV by immunohistochemistry . Negative for granulomas or viral inclusions. Negative for dysplasia or carcinoma"  PET scan from 6/17/2022 reveals "In this patient with lung cancer there are enlarging hypermetabolic right perihilar opacities concerning for continued local disease progression. Continued decreased metabolic activity of the right lower lobe nodule.  Cluster of pulmonary nodules bilaterally, some " "which are new from prior PET-CT for example the superior segment the left upper lobe and are likely infectious/inflammatory origin. Persistent and more extensive region of increased FDG avidity within the distal rectum in keeping with more extensive colitis.  Malignancy could have a similar appearance.  Recommend further investigation as clinically warranted and attention on follow-up"     She has active proctitis and is on hydrocortisone suppositories and Entyvyo since early June 2022.   CT chest on 8/1/2022 reveals  "Similar appearance of elongated, irregular opacity adjacent to the right upper lobectomy margin.  More posteriorly adjacent perihilar opacities with previous FDG avidity with detailed measurements as above. Similar areas of scattered micro-nodularity with tree-in-bud, likely infectious/inflammatory in nature.  New appearing area of grouped micro-nodules in the anterior left lower lobe, largest up to 5 mm, which could relate to aforementioned small airways disease, though technically indeterminate.  Continued close attention at follow-up. RECIST SUMMARY: Date of prior examination for comparison: CT chest dated 05/24/2022. Lesion 1: Right perihilar opacity.  2.4.  Series 2, image 45.  Previously 2 4.  (Note there is slight increased size conspicuity in the craniocaudal dimension at 1.8 cm, previously 1.5 cm). Lesion 2: Right perihilar opacity.  1.7 cm.  Series 2, image 59.  Previously 1.5 cm"     Renuka underwent a repeat CT chest 8/31/2022 and that revealed "Status post right upper lobectomy.  Soft tissue thickening adjacent to the suture line appears stable.  However, there is interval enlargement of a right lower lobe, bilobed, paramediastinal mass-like consolidation.  When dominant components of the lesions are measured separately, dimensions are summarized below. RECIST SUMMARY: Date of prior examination for comparison: CT chest 08/01/2022. Lesion 1: Right perihilar opacity.  4.7 cm. Series 4 image " "77.  Prior measurement 2.4 cm. Lesion 2: Right perihilar (infrahilar) opacity.  2.7 cm. Series 4 image 237.  Prior measurement 1.7 cm"  Case reviewed in thoracic conference, and concern for disease progression exists so plan on immunotehrapy if able to taper steroids           PET scan from 10/6/2022 reveals "Interval increase in size and radiotracer uptake of a right pulmonary lesions in this patient with known non-small cell lung cancer.  There are few new subcentimeter pulmonary nodules in the right lower lobe with no significant uptake, could represent infectious versus inflammatory process.  However, additional new metastatic disease can not be excluded.  Attention on follow-up. Improved metabolic appearance of the lower rectum compared with the prior exam"  She is currently off of prednisone and Entyvio.  She was on  Carboplatin and Alimta, started on 10/14/2022       HPIShe has just returned back from [facility] after her proton beam completed on 1/24/2023. She had one dose of Carboplatin and had an allergic reaction and did not take any more Carboplatin    CT scans at [facility] from 1/11/2023 reveal "Status post right upper lobectomy and right lung post radiation changes with no significant change of treated right lung recurrent tumors.  Interval increase of part solid and solid bilateral lung nodules concerning for multifocal and/or metastatic disease.  She is scheduled to return in March 2023"        Review of Systems   Constitutional:  Negative for appetite change, fatigue and unexpected weight change.   HENT:  Negative for mouth sores.    Eyes:  Negative for visual disturbance.   Respiratory:  Negative for cough and shortness of breath.    Cardiovascular:  Negative for chest pain.   Gastrointestinal:  Negative for abdominal pain and diarrhea.   Genitourinary:  Negative for frequency.   Musculoskeletal:  Negative for back pain.   Integumentary:  Negative for rash.   Neurological:  Negative for " headaches.   Hematological:  Negative for adenopathy.   Psychiatric/Behavioral:  The patient is not nervous/anxious.    All other systems reviewed and are negative.      Objective:      Physical Exam  Vitals reviewed.   Constitutional:       Appearance: She is well-developed.   HENT:      Mouth/Throat:      Pharynx: No oropharyngeal exudate.   Cardiovascular:      Rate and Rhythm: Normal rate.      Heart sounds: Normal heart sounds.   Pulmonary:      Effort: Pulmonary effort is normal.      Breath sounds: Normal breath sounds. No wheezing.   Abdominal:      General: Bowel sounds are normal.      Palpations: Abdomen is soft.      Tenderness: There is no abdominal tenderness.   Musculoskeletal:         General: No tenderness.   Lymphadenopathy:      Cervical: No cervical adenopathy.   Skin:     General: Skin is warm and dry.      Findings: No rash.   Neurological:      Mental Status: She is alert and oriented to person, place, and time.      Coordination: Coordination normal.   Psychiatric:         Thought Content: Thought content normal.         Judgment: Judgment normal.         LABS:  WBC   Date Value Ref Range Status   11/09/2022 5.39 3.90 - 12.70 K/uL Final     Hemoglobin   Date Value Ref Range Status   11/09/2022 11.5 (L) 12.0 - 16.0 g/dL Final     POC Hematocrit   Date Value Ref Range Status   08/09/2018 33 (L) 36 - 54 %PCV Final     Hematocrit   Date Value Ref Range Status   11/09/2022 34.8 (L) 37.0 - 48.5 % Final     Platelets   Date Value Ref Range Status   11/09/2022 344 150 - 450 K/uL Final     Gran # (ANC)   Date Value Ref Range Status   11/09/2022 2.3 1.8 - 7.7 K/uL Final     Gran %   Date Value Ref Range Status   11/09/2022 42.5 38.0 - 73.0 % Final       Chemistry        Component Value Date/Time     11/09/2022 1440    K 4.8 11/09/2022 1440     11/09/2022 1440    CO2 27 11/09/2022 1440    BUN 19 11/09/2022 1440    CREATININE 1.3 11/09/2022 1440     (H) 11/09/2022 1440        Component  Value Date/Time    CALCIUM 9.7 11/09/2022 1440    ALKPHOS 56 11/09/2022 1440    AST 34 11/09/2022 1440    ALT 33 11/09/2022 1440    BILITOT 0.3 11/09/2022 1440    ESTGFRAFRICA 58.4 (A) 06/22/2022 1046    EGFRNONAA 50.6 (A) 06/22/2022 1046          Assessment:       Problem List Items Addressed This Visit       Malignant neoplasm of upper lobe, right bronchus or lung - Primary    Secondary and unspecified malignant neoplasm of intrathoracic lymph nodes       Plan:          Route Chart for Scheduling    Med Onc Chart Routing      Follow up with physician . Schedule to see me on 3/27/2023   Follow up with YAMILE    Infusion scheduling note    Injection scheduling note    Labs    Imaging    Pharmacy appointment    Other referrals          Therapy Plan Information  PORT FLUSH  Flushes  heparin, porcine (PF) 100 unit/mL injection flush 500 Units  500 Units, Intravenous, Every visit  sodium chloride 0.9% flush 10 mL  10 mL, Intravenous, Every visit      Completed proton Beam at [facility]. Will return there for scans on 3/21/2023 and I will see her after that.      Above care plan was discussed with patient and accompanying significant other and all questions were addressed to their satisfaction

## 2023-01-31 ENCOUNTER — TELEPHONE (OUTPATIENT)
Dept: PALLIATIVE MEDICINE | Facility: CLINIC | Age: 73
End: 2023-01-31
Payer: MEDICARE

## 2023-02-01 ENCOUNTER — OFFICE VISIT (OUTPATIENT)
Dept: PALLIATIVE MEDICINE | Facility: CLINIC | Age: 73
End: 2023-02-01
Payer: MEDICARE

## 2023-02-01 VITALS
DIASTOLIC BLOOD PRESSURE: 59 MMHG | HEIGHT: 66 IN | TEMPERATURE: 97 F | WEIGHT: 151.88 LBS | SYSTOLIC BLOOD PRESSURE: 133 MMHG | BODY MASS INDEX: 24.41 KG/M2 | HEART RATE: 60 BPM

## 2023-02-01 DIAGNOSIS — C34.11 MALIGNANT NEOPLASM OF UPPER LOBE, RIGHT BRONCHUS OR LUNG: ICD-10-CM

## 2023-02-01 DIAGNOSIS — Z51.5 ENCOUNTER FOR PALLIATIVE CARE: Primary | ICD-10-CM

## 2023-02-01 DIAGNOSIS — R06.00 DYSPNEA, UNSPECIFIED TYPE: ICD-10-CM

## 2023-02-01 DIAGNOSIS — G47.00 INSOMNIA, UNSPECIFIED TYPE: ICD-10-CM

## 2023-02-01 DIAGNOSIS — R53.83 FATIGUE, UNSPECIFIED TYPE: ICD-10-CM

## 2023-02-01 DIAGNOSIS — G89.3 CANCER RELATED PAIN: ICD-10-CM

## 2023-02-01 DIAGNOSIS — M89.8X9 BONE PAIN: ICD-10-CM

## 2023-02-01 DIAGNOSIS — F43.22 ADJUSTMENT DISORDER WITH ANXIETY: ICD-10-CM

## 2023-02-01 PROCEDURE — 99999 PR PBB SHADOW E&M-EST. PATIENT-LVL III: ICD-10-PCS | Mod: PBBFAC,,, | Performed by: NURSE PRACTITIONER

## 2023-02-01 PROCEDURE — 99215 PR OFFICE/OUTPT VISIT, EST, LEVL V, 40-54 MIN: ICD-10-PCS | Mod: S$PBB,,, | Performed by: NURSE PRACTITIONER

## 2023-02-01 PROCEDURE — 99417 PR PROLONGED SVC, OUTPT, W/WO DIRECT PT CONTACT,  EA ADDTL 15 MIN: ICD-10-PCS | Mod: S$PBB,,, | Performed by: NURSE PRACTITIONER

## 2023-02-01 PROCEDURE — 99999 PR PBB SHADOW E&M-EST. PATIENT-LVL III: CPT | Mod: PBBFAC,,, | Performed by: NURSE PRACTITIONER

## 2023-02-01 PROCEDURE — 99213 OFFICE O/P EST LOW 20 MIN: CPT | Mod: PBBFAC | Performed by: NURSE PRACTITIONER

## 2023-02-01 PROCEDURE — 99417 PROLNG OP E/M EACH 15 MIN: CPT | Mod: S$PBB,,, | Performed by: NURSE PRACTITIONER

## 2023-02-01 PROCEDURE — 99215 OFFICE O/P EST HI 40 MIN: CPT | Mod: S$PBB,,, | Performed by: NURSE PRACTITIONER

## 2023-02-01 RX ORDER — NALOXONE HYDROCHLORIDE 4 MG/.1ML
1 SPRAY NASAL ONCE
Qty: 1 EACH | Refills: 0 | Status: SHIPPED | OUTPATIENT
Start: 2023-02-01 | End: 2023-02-01

## 2023-02-01 NOTE — PROGRESS NOTES
Consult Note  Palliative Care      Consult Requested By: Dr. Pam Dhaliwal  Reason for Consult: symptom management       ASSESSMENT/PLAN:     Plan/Recommendations:    Malignant neoplasm of upper lobe, right bronchus or lung  - following with Dr. Dhaliwal  - completed chemo/RT on 12/31/2019  - interval treatment at [facility]  - s/p immunotherapy     Encounter for palliative medicine  - Patient is decisional  - Patient accompanied today by partner Lata   - ACP documents are not uploaded into EMR, patient and partner Lata will review documents at home, will revisit discussion at next visit.    - Philosophy of Palliative Medicine reviewed with patient and family at first visit  - New patient folder given to and reviewed with patient and family at first visit  - Goals of care: States that her goals are to maintain function and independence as much as possible. She states that she does not want to be bedridden, does not want to lose her ability to take care of herself and does not want to be a burden to her loved ones. Please see SW note for more detail.    Cancer-related pain/bone pain   - patient reports pain in her legs and legs, the leg pain is most severe, describes as intermittent bony pain, sometimes waking her up at night  - patient reports evaluation for bone pain has not revealed a source  - no pain relief while on steroids, tylenol ineffective  - currently using Norco 7.5 mg q 8 hours with partial relief  - pt's partner states that patient is under-reporting her pain and fearful of taking higher doses, she does admit she often splits tablets in half.  - patient will instructed to use Norco 7.5 mg q 6 hrs and instructed to use 1.5 doses as pretreatment for sleep, if needed, will discuss possible switch to oxycodone at next visit  - narcan provided  - opioid safety sheet provided in first visit folder  - will continue to monitor     Dyspnea/Fatigue/insomnia  - pt endorses moderate dyspnea, insomnia and severe  "fatigue   - fatigue likely multifactorial: pain, dyspnea, diminished dietary intake   - patient does report that she tries to walk daily, even if only for 5-10 minutes, if the weather is poor, she will walk insider her house  - she is able to perform all ADL's independently   - sometimes wakes up due to leg pain   - patient reports ativan 0.25 mg qhs help with sleep  - will address symptom cluster w goal to improve fatigue  - tip sheet provided in new patient folder  - will continue to monitor      Adjustment disorder with anxiety   - patient rates anxiety 4/10, depression 0/10  - her partner (Lata) observes patient to be intermittently depressed, "sometimes she tries to hurt my feelings"  - patient was a  nurse, 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  - patient reports that the robust social support provided by friends has faded since her initial diagnosis, partner Lata is her primary support, additionally a cousin,niece, nephew who lives out of state  - patient and partner have a well-developed emotional dialogue, for example: they have named their various emotional states as alter personalities, they find humor/levity where they can  - patient states that she worries about being a burden on her partner   - continue ativan 0.5 mg q 12 hrs qhs, patient reports only using half tablets to help with sleep  - tip sheet provided in new patient folder  - will continue to monitor   - discussed availability of onc-pscyh, pall DSW  - emotional support provided  - MSW GAIL Driscoll joined for first visit assessment, see separate note for detail  - will continue to monitor      Nausea/anorexia   - rates nausea 4/10   - rates anorexia 5/10, no intervention needed at this time   - continue zofran 8 mg PRN   - tip sheet provided in new patient folder  - will continue to monitor     Constipation   - reports constipation 2/2 chemo and opioids  - promote good PO hydration  - start senna 2 tabs qhs, escalation of regimen as " needed  - tip sheet provided in new patient folder  - will continue to monitor       Understanding of illness/Prognosis: good    Follow up: 8 weeks     Patient's encounter and above plan of care discussed with patient's oncology team.     SUBJECTIVE:     History of Present Illness:  Patient is a 72 y.o. year old female presenting with  Malignant neoplasm of upper lobe, right bronchus or lung Ms. Alesha Toney is a 72-year-old otherwise healthy female who started having some shoulder pain, which was lasting for over six weeks.  She went and saw her primary care physician and underwent an x-ray, which revealed right upper lobe lung mass worrisome for malignancy and a CT scan was recommended, which was done on 6/12/18 and that revealed a newly developing mass, pleural based, lateral posterior segment, right upper lobe 3.5 x 3.5 cm, surrounding stellate margin and patchy infiltrates, particularly superiorly, anteriorly infiltrates extend perihilar into the anterior segment. She then underwent CT-guided biopsy, which revealed well-differentiated adenocarcinoma.     02/01/2023:  LA  reviewed and summarized:  01/26/2023 Lorazepam 0.5 mg tabs Disp: 60 for 30 days  01/10/2023 Hydrocodone-APAP 7.5-325 mg Disp: 90 for 30 days    Patient presents to clinic with her partner Lata for her initial visit. She is ECOG 0 with healthy appearance. She reports bony leg pain that is partially controlled with current regimen. Optimized today regimen today, will reassess at next visit. Discussed ACP, patient reports that she has been reluctant to discuss with her partner but endorses the value of establishing wants and wishes with family members, agrees to review documents at home to complete at next visit. Current regimen for anxiety and nausea are effective. Insomnia & fatigue at least partially attributable to uncontrolled pain. Optimized regimen for constipation today.     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;  Service: Neurosurgery;  Laterality: N/A;    APPENDECTOMY      BONE RESECTION, RIB  1980    BREAST BIOPSY Left     at least 40yrs ago in her 20's    BREAST CYST ASPIRATION      BREAST CYST EXCISION      COLONOSCOPY      ENDOBRONCHIAL ULTRASOUND N/A 7/10/2018    Procedure: ULTRASOUND, ENDOBRONCHIAL;  Surgeon: Sri Hearn MD;  [facility];  Service: Pulmonary;  Laterality: N/A;    ENDOBRONCHIAL ULTRASOUND N/A 9/24/2019    Procedure: ENDOBRONCHIAL ULTRASOUND (EBUS);  Surgeon: Sri Hearn MD;  [facility];  Service: Pulmonary;  Laterality: N/A;    ENDOSCOPIC MUCOSAL RESECTION OF COLON N/A 9/11/2020    Procedure: RESECTION, MUCOSA, COLON, ENDOSCOPIC;  Surgeon: Lilibeth Carbajal MD;  [facility]  Service: Endoscopy;  Laterality: N/A;    ENDOSCOPIC ULTRASOUND OF LOWER GASTROINTESTINAL TRACT N/A 4/19/2021    Procedure: ULTRASOUND, LOWER GI TRACT, ENDOSCOPIC;  Surgeon: Lilibeth Carbajal MD;  Location: [facility];  Service: Endoscopy;  Laterality: N/A;    ENDOSCOPIC ULTRASOUND OF LOWER GASTROINTESTINAL TRACT N/A 6/25/2021    Procedure: ULTRASOUND, LOWER GI TRACT, ENDOSCOPIC;  Surgeon: Silvino Christopher MD;  Location: [facility]  ENDO;  Service: Endoscopy;  Laterality: N/A;  Needs Rapid MP    FLEXIBLE SIGMOIDOSCOPY  06/11/2020    with biopsies    FLEXIBLE SIGMOIDOSCOPY N/A 6/11/2020    Procedure: SIGMOIDOSCOPY, FLEXIBLE;  Surgeon: Gely Yeh MD;  Location: [facility] ENDO;  Service: Endoscopy;  Laterality: N/A;    FLEXIBLE SIGMOIDOSCOPY N/A 4/19/2021    Procedure: SIGMOIDOSCOPY-FLEXIBLE;  Surgeon: Lilibeth Carbajal MD;  Location: [facility] ENDO;  Service: Endoscopy;  Laterality: N/A;  Covid 4/16 Brian MP    FLEXIBLE SIGMOIDOSCOPY N/A 6/3/2022    Procedure: SIGMOIDOSCOPY-FLEXIBLE;  Surgeon: Francesco Clark MD;  Location: [facility] ENDO (4TH FLR);  Service: Endoscopy;  Laterality: N/A;  vacc-inst portal-tb    HYSTERECTOMY      @47yrs of age    INSERTION OF TUNNELED CENTRAL VENOUS CATHETER (CVC) WITH SUBCUTANEOUS PORT N/A 9/25/2018    Procedure: [id number]-M-CATH;  Surgeon: Dos Diagnostic Provider;  Location: [facility] OR 2ND FLR;  Service: Radiology;  Laterality: N/A;    INSERTION OF VENOUS ACCESS PORT N/A 3/15/2021    Procedure: INSERTION, VENOUS ACCESS PORT;  Surgeon: Chang Mathews MD;  [facility];  Service: General;  Laterality: N/A;  NEEDS CONSENT.    KIDNEY SURGERY      19yo    MEDIPORT REMOVAL N/A 3/15/2021    Procedure: REMOVAL, CATHETER, CENTRAL VENOUS, TUNNELED, WITH PORT;  Surgeon: Chang Mathews MD;  [facility];  Service: General;  Laterality: N/A;    OOPHORECTOMY      @47yrs of age    SKIN BIOPSY      TONSILLECTOMY      UPPER GASTROINTESTINAL ENDOSCOPY      VIDEO-ASSISTED THORACOSCOPIC LOBECTOMY Right 8/9/2018    Procedure: VATS, WITH LOBECTOMY Possible chest wall resection;  Surgeon: Philip Messina MD;  [facility];  Service: Thoracic;  Laterality: Right;  Have open pan available.     Family History   Problem Relation Age of Onset    Stroke Mother     Cataracts Mother     Cancer Father         colon cancer    Colon cancer Father     Diabetes Brother     Heart failure  "Brother     Hypertension Brother     Heart attack Paternal Aunt     Heart attack Maternal Grandfather     Diabetes Paternal Aunt     Anxiety disorder Paternal Grandmother         agoraphobia    Anesthesia problems Neg Hx     Blindness Neg Hx     Amblyopia Neg Hx     Glaucoma Neg Hx     Macular degeneration Neg Hx     Retinal detachment Neg Hx     Strabismus Neg Hx     Thyroid disease Neg Hx     Melanoma Neg Hx      Review of patient's allergies indicates:   Allergen Reactions    Adhesive Itching, Rash and Blisters     INCLUDES EKG PADS    Ciprofloxacin Hives     Hives    Iodinated contrast media     Pcn [penicillins]      rash    Phenergan plain Other (See Comments)     "crazy behavior"    Phenergan [promethazine]     Tramadol     Vancomycin analogues      Red man syndrome       Medications:    Current Outpatient Medications:     ascorbic acid, vitamin C, (VITAMIN C) 500 MG tablet, Take 500 mg by mouth once daily., Disp: , Rfl:     atenoloL (TENORMIN) 25 MG tablet, Take 1 tablet (25 mg total) by mouth 3 (three) times daily., Disp: 270 tablet, Rfl: 3    calcium carbonate (TUMS) 300 mg (750 mg) Chew, Take by mouth as needed. , Disp: , Rfl:     dexAMETHasone (DECADRON) 6 MG tablet, Take 2 tablets for 2 days after chemo with breakfast, Disp: 20 tablet, Rfl: 3    famotidine (PEPCID) 10 MG tablet, Take 10 mg by mouth once daily., Disp: , Rfl:     flu vac 2022 65up-[id number],PF, (FLUAD QUAD 2022-23,65Y UP,,PF,) 60 mcg (15 mcg x 4)/0.5 mL Syrg, Inject into the muscle., Disp: 0.5 mL, Rfl: 0    folic acid (FOLVITE) 1 MG tablet, Take 1 tablet (1 mg total) by mouth once daily. Start today, Disp: 90 tablet, Rfl: 3    HYDROcodone-acetaminophen (NORCO) 7.5-325 mg per tablet, Take 1 tablet by mouth every 8 (eight) hours as needed for Pain., Disp: 90 tablet, Rfl: 0    LORazepam (ATIVAN) 0.5 MG tablet, TAKE 1 TABLET EVERY 12 HOURS AS NEEDED FOR ANXIETY, Disp: 60 tablet, Rfl: 2    losartan (COZAAR) 50 MG tablet, 1 tab po bid, Disp: 180 " tablet, Rfl: 3    OBJECTIVE:       ROS:  Review of Systems   Constitutional:  Positive for activity change, appetite change and fatigue.   HENT: Negative.  Negative for congestion, dental problem and drooling.    Eyes: Negative.  Negative for pain, discharge and itching.   Respiratory:  Positive for shortness of breath. Negative for cough, choking, wheezing and stridor.    Cardiovascular: Negative.  Negative for chest pain, palpitations and leg swelling.   Gastrointestinal:  Positive for constipation and nausea. Negative for diarrhea and vomiting.   Endocrine: Negative.  Negative for heat intolerance and polydipsia.   Genitourinary: Negative.  Negative for difficulty urinating, dyspareunia and dysuria.   Musculoskeletal:  Positive for arthralgias. Negative for back pain, gait problem and joint swelling.   Skin: Negative.  Negative for color change, pallor and rash.   Neurological: Negative.  Negative for seizures, speech difficulty and weakness.   Psychiatric/Behavioral:  Positive for sleep disturbance. Negative for confusion and dysphoric mood. The patient is nervous/anxious.      Review of Symptoms      Symptom Assessment (ESAS 0-10 Scale)  Pain:  3  Dyspnea:  5  Anxiety:  4  Nausea:  4  Depression:  0  Anorexia:  5  Fatigue:  7  Insomnia:  5  Restlessness:  0  Agitation:  0     CAM / Delirium:  Negative  Constipation:  Positive  Diarrhea:  Negative    Anxiety:  Is nervous/anxious  Constipation:  Constipation    Bowel Management Plan (BMP):  Yes      Pain Assessment:  OME in 24 hours:  15  Location(s): leg    Leg       Location: bilateral        Quality: Throbbing, aching and dull        Quantity: 3/10 in intensity        Chronicity: Onset 1 year(s) ago, unchanged        Aggravating Factors: Activity        Alleviating Factors: Opiates (heating pad)        Associated Symptoms: None    Modified Josefa Scale:  1    ECOG Performance Status stGstrstastdstest:st st1st Living Arrangements:  Lives with spouse    Psychosocial/Cultural:    See Palliative Psychosocial Note: Yes  **Primary  to Follow**  Palliative Care  Consult: No    Advance Care Planning   Advance Directives:   Living Will: No        Oral Declaration: No    LaPOST: No    Do Not Resuscitate Status: No    Medical Power of : Yes        Oral Declaration: Yes   Witnesses:  Mayela Beaver and Johnna Driscoll   Agent's Name:  Lata Mathews   Agent's Contact Number:  [phone]    Decision Making:  Patient answered questions  Goals of Care: The patient endorses that what is most important right now is to focus on remaining as independent as possible, symptom/pain control, and quality of life, even if it means sacrificing a little time    Accordingly, we have decided that the best plan to meet the patient's goals includes continuing with palliative care            Physical Exam:  Vitals:    Physical Exam  Constitutional:       General: She is not in acute distress.     Appearance: Normal appearance. She is normal weight. She is not ill-appearing, toxic-appearing or diaphoretic.   HENT:      Head: Normocephalic and atraumatic.      Right Ear: Tympanic membrane normal.      Left Ear: Tympanic membrane normal.      Nose: Nose normal.   Eyes:      Extraocular Movements: Extraocular movements intact.      Conjunctiva/sclera: Conjunctivae normal.   Cardiovascular:      Rate and Rhythm: Regular rhythm.   Pulmonary:      Effort: Pulmonary effort is normal. No respiratory distress.      Breath sounds: No wheezing.   Abdominal:      General: Abdomen is flat. There is no distension.      Palpations: Abdomen is soft.   Musculoskeletal:         General: No swelling or deformity. Normal range of motion.      Cervical back: Normal range of motion.   Skin:     General: Skin is warm and dry.      Coloration: Skin is not jaundiced.      Findings: No bruising.   Neurological:      General: No focal deficit present.      Mental Status: She is alert and oriented to person,  "place, and time. Mental status is at baseline.      Motor: No weakness.      Gait: Gait normal.   Psychiatric:         Mood and Affect: Mood normal.         Behavior: Behavior normal.         Thought Content: Thought content normal.         Judgment: Judgment normal.       Labs:  CBC:   WBC   Date Value Ref Range Status   11/09/2022 5.39 3.90 - 12.70 K/uL Final     Hemoglobin   Date Value Ref Range Status   11/09/2022 11.5 (L) 12.0 - 16.0 g/dL Final     POC Hematocrit   Date Value Ref Range Status   08/09/2018 33 (L) 36 - 54 %PCV Final     Hematocrit   Date Value Ref Range Status   11/09/2022 34.8 (L) 37.0 - 48.5 % Final     MCV   Date Value Ref Range Status   11/09/2022 97 82 - 98 fL Final     Platelets   Date Value Ref Range Status   11/09/2022 344 150 - 450 K/uL Final       LFT:   Lab Results   Component Value Date    AST 34 11/09/2022    ALKPHOS 56 11/09/2022    BILITOT 0.3 11/09/2022       Albumin:   Albumin   Date Value Ref Range Status   11/09/2022 3.6 3.5 - 5.2 g/dL Final     Protein:   Total Protein   Date Value Ref Range Status   11/09/2022 6.9 6.0 - 8.4 g/dL Final       Radiology:I have reviewed all pertinent imaging results/findings within the past 24 hours.    12/13/2022 MRI brain: "Impression:     Noncontrast exam demonstrates no evidence of intracranial metastatic disease.'    10/06/2022 NM PET: "Impression:     Interval increase in size and radiotracer uptake of a right pulmonary lesions in this patient with known non-small cell lung cancer.  There are few new subcentimeter pulmonary nodules in the right lower lobe with no significant uptake, could represent infectious versus inflammatory process.  However, additional new metastatic disease can not be excluded.  Attention on follow-up.     Improved metabolic appearance of the lower rectum compared with the prior exam.     Additional findings, as above.     This report was flagged in Epic as abnormal."    I spent a total of 102 minutes on the day of " the visit.This includes face to face time in discussion of goals of care, symptom assessment, coordination of care and emotional support.  This also includes non-face to face time preparing to see the patient (eg, review of tests/imaging), obtaining and/or reviewing separately obtained history, documenting clinical information in the electronic or other health record, independently interpreting results and communicating results to the patient/family/caregiver, or care coordinator.       16 minutes spent in discussing ACP    Signature: Mayela Beaver DNP

## 2023-02-01 NOTE — PROGRESS NOTES
Left voicemail and Message sent through [employer] to call to discuss CT pre-medication prior and after.        [place]- Pall[facility]  Palliative Care   Psychosocial Assessment    Patient Name: Alesha Toney  MRN: [id number]  Palliative Care Provider: Mayela Beaver DNP  Primary Care Physician: Margo Caldwell MD  Principal Problem: Lung cancer     Reason for Referral:  Advanced Care Planning and Psychosocial Support      Present during Interview: patient and spouse/SO.      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     Squamous cell carcinoma of skin 10/05/2020    left medial thigh    Therapy     Thyroid nodule     Ulcerative colitis with complication      Mental Health/Substance Use History: None identified   Risk of Abuse, neglect or exploitation:  None identified   Current or Previous Trauma and/or evidence of PTSD: None identified   Non-traditional Health practices: None identified     Understanding of diagnosis and prognosis: Good   Experience/Comfort level with health care system: Good (Patient and significant other work(ed) as nurses)    Patients Mental Status: Alert and oriented to person, place, time and situation with pleasant mood.     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 Patient and significant other report although  "they have support from niece, nephew and friends the extend of support these parties can provide is limited.     Family Dynamics/Relationships: Healthy     Patient/Family Strengths/Resilience: Patient and significant other are willing to engage in difficult discussions and display and loving, supportive relationship.   Patient/Family Coping: Appropriately     Activities of Daily Living: Independent  Support Systems-Family & Community (Home Health, HME etc): n/a     Transportation:  yes    Work/Education History: retired   Self-Care Activities/Hobbies: Walking, going to [employer]      History: no    Financial Resources:Medicare      Advanced Care Planning & Legal Concerns:   Advanced Directives/Living Will: yes  [place]: no   Planning:  no    Power of : yes  Surrogate Decision Maker: Marty Mathews/Significant other        Spirituality, Culture & Coping Mechanisms:  F- Tiana and Belief: Mu-ism     I - Importance: Moderate     C - Community/Culture Values: Patient and significant other pray together nightly      A - Address in Care:  Services offered, needs met at this time.       Goals/Hopes/Expectations: Patient hopes to maintain a good quality of life.   Fears/Anxiety/Concerns: Patient expresses concerns she would become bedridden and a "burden" to her loved ones.           Complicated Bereavement Risk Assessment Tool (CBRAT)  Reference:  [facility] Clinical Practice Group (May 2016). Bereavement Risk Screening and Management Guidelines.  Retrieved from: http://www.grpcc.com.au/wp-content/uploads//GPEXN-Rkpzrngtqpq-Qeseasjhr-and-Management-Guideline-2016.pdf      Bereaved Client Characteristics   Under 18      no  Was a Twin   no  Young Spouse   yes  Elderly Spouse    no  Isolated    no  Lacks Meaningful Social Support   no  Dissatisfied with help available during illness   no  New to Financial [place] no  New to Decision-Making   no   " " Illness  Inherited Disorder   no  Stigmatized Disease in the family/community   no  Lengthy/Burdensome   yes     Bereaved Client's History of Loss   Cumulative Multiple Losses   no  Previous Mental Health Illnesses   no  Current Mental Health Illness   no  Other Significant Health Issues   no   Migrant/Refugee   no Death  Sudden or Unexpected   no  Traumatic Circumstances Associated with Death   no  Significant Cultural/Social Burdens as a result of Death   yes   Relationship with   Profound Lifelong Partner   yes  Highly Dependent    no  Antagonistic   no  Ambivalent   no  Deeply Connected   yes  Culturally Defined   yes   Risk Factors Scores  0-2  Low  3-5  Moderate  5+  High  All persons scoring moderate to high presume to be at risk**    (** It is acknowledged that protective factors and resilience may outweigh apparent risk factors.      Total Risk Factors Score:    Moderate       SW accompanied MD during initial visit with patient and significant other. Patient presents Oriented x4 with pleasant and appropriate mood.  Patient appears to have a good understanding of her illness.  Patient reports her mood is stable. Patient's significant other notes patient exhibits occasional depression and irritability/anger. The couple report they "announce" when they are feeling angry or sad, allowing each other the space to freely express their emotions. Patient hopes to maintain her current "good" quality of life as long as possible. Patient notes she would consider ceasing treatment when the burdens outweigh the benefits and she becomes bedridden and dependant on others.  Patient has directives assigning significant other as decision maker. Patient asked to upload directives on to [employer]..  Patient denies further psychosocial concerns. SW remains available to provide assistance as needed. SW will continue to follow.     Johnna Driscoll, [facility]  Outpatient   Palliative Medicine  "

## 2023-02-01 NOTE — Clinical Note
Hello,   Met with this colin couple yesterday. The bulk of her symptoms stem from poorly  controlled pain (under reporting), we discussed her reluctance to take opioids however agrees to we can work together optimize regimen for better pain control. Started ACP discussion, excellent emotional support from partner and will benefit from her partner's background in hospice nursing.  Thanks, Mayela

## 2023-02-03 ENCOUNTER — PATIENT MESSAGE (OUTPATIENT)
Dept: CARDIOLOGY | Facility: CLINIC | Age: 73
End: 2023-02-03
Payer: MEDICARE

## 2023-02-03 DIAGNOSIS — I25.84 CORONARY ARTERY DISEASE DUE TO CALCIFIED CORONARY LESION: Primary | ICD-10-CM

## 2023-02-03 DIAGNOSIS — E78.2 MIXED HYPERLIPIDEMIA: ICD-10-CM

## 2023-02-03 DIAGNOSIS — I25.10 CORONARY ARTERY DISEASE DUE TO CALCIFIED CORONARY LESION: Primary | ICD-10-CM

## 2023-02-06 ENCOUNTER — LAB VISIT (OUTPATIENT)
Dept: LAB | Facility: HOSPITAL | Age: 73
End: 2023-02-06
Attending: INTERNAL MEDICINE
Payer: MEDICARE

## 2023-02-06 ENCOUNTER — PATIENT MESSAGE (OUTPATIENT)
Dept: PALLIATIVE MEDICINE | Facility: CLINIC | Age: 73
End: 2023-02-06
Payer: MEDICARE

## 2023-02-06 DIAGNOSIS — E78.2 MIXED HYPERLIPIDEMIA: ICD-10-CM

## 2023-02-06 DIAGNOSIS — I25.10 CORONARY ARTERY DISEASE DUE TO CALCIFIED CORONARY LESION: ICD-10-CM

## 2023-02-06 DIAGNOSIS — I25.84 CORONARY ARTERY DISEASE DUE TO CALCIFIED CORONARY LESION: ICD-10-CM

## 2023-02-06 LAB
ALBUMIN SERPL BCP-MCNC: 3.8 G/DL (ref 3.5–5.2)
ALP SERPL-CCNC: 48 U/L (ref 55–135)
ALT SERPL W/O P-5'-P-CCNC: 16 U/L (ref 10–44)
ANION GAP SERPL CALC-SCNC: 11 MMOL/L (ref 8–16)
AST SERPL-CCNC: 22 U/L (ref 10–40)
BILIRUB SERPL-MCNC: 0.3 MG/DL (ref 0.1–1)
BUN SERPL-MCNC: 23 MG/DL (ref 8–23)
CALCIUM SERPL-MCNC: 10.5 MG/DL (ref 8.7–10.5)
CHLORIDE SERPL-SCNC: 104 MMOL/L (ref 95–110)
CHOLEST SERPL-MCNC: 250 MG/DL (ref 120–199)
CHOLEST/HDLC SERPL: 4.4 {RATIO} (ref 2–5)
CO2 SERPL-SCNC: 24 MMOL/L (ref 23–29)
CREAT SERPL-MCNC: 1.2 MG/DL (ref 0.5–1.4)
EST. GFR  (NO RACE VARIABLE): 48.1 ML/MIN/1.73 M^2
GLUCOSE SERPL-MCNC: 97 MG/DL (ref 70–110)
HDLC SERPL-MCNC: 57 MG/DL (ref 40–75)
HDLC SERPL: 22.8 % (ref 20–50)
LDLC SERPL CALC-MCNC: 169 MG/DL (ref 63–159)
NONHDLC SERPL-MCNC: 193 MG/DL
POTASSIUM SERPL-SCNC: 4.5 MMOL/L (ref 3.5–5.1)
PROT SERPL-MCNC: 6.7 G/DL (ref 6–8.4)
SODIUM SERPL-SCNC: 139 MMOL/L (ref 136–145)
TRIGL SERPL-MCNC: 120 MG/DL (ref 30–150)

## 2023-02-06 PROCEDURE — 36415 COLL VENOUS BLD VENIPUNCTURE: CPT | Mod: PO | Performed by: INTERNAL MEDICINE

## 2023-02-06 PROCEDURE — 80061 LIPID PANEL: CPT | Performed by: INTERNAL MEDICINE

## 2023-02-06 PROCEDURE — 80053 COMPREHEN METABOLIC PANEL: CPT | Performed by: INTERNAL MEDICINE

## 2023-02-07 ENCOUNTER — OFFICE VISIT (OUTPATIENT)
Dept: INTERNAL MEDICINE | Facility: CLINIC | Age: 73
End: 2023-02-07
Payer: MEDICARE

## 2023-02-07 VITALS
BODY MASS INDEX: 24.38 KG/M2 | WEIGHT: 151.69 LBS | OXYGEN SATURATION: 99 % | HEART RATE: 72 BPM | DIASTOLIC BLOOD PRESSURE: 80 MMHG | HEIGHT: 66 IN | SYSTOLIC BLOOD PRESSURE: 144 MMHG | TEMPERATURE: 98 F

## 2023-02-07 DIAGNOSIS — N18.31 STAGE 3A CHRONIC KIDNEY DISEASE: ICD-10-CM

## 2023-02-07 DIAGNOSIS — I70.0 AORTIC ATHEROSCLEROSIS: ICD-10-CM

## 2023-02-07 DIAGNOSIS — I27.20 PULMONARY HYPERTENSION: ICD-10-CM

## 2023-02-07 DIAGNOSIS — G89.3 CHRONIC PAIN DUE TO MALIGNANT NEOPLASTIC DISEASE: ICD-10-CM

## 2023-02-07 DIAGNOSIS — C34.11 MALIGNANT NEOPLASM OF UPPER LOBE, RIGHT BRONCHUS OR LUNG: ICD-10-CM

## 2023-02-07 DIAGNOSIS — J06.9 VIRAL URI WITH COUGH: Primary | ICD-10-CM

## 2023-02-07 DIAGNOSIS — J43.8 OTHER EMPHYSEMA: ICD-10-CM

## 2023-02-07 DIAGNOSIS — K51.819 OTHER ULCERATIVE COLITIS WITH COMPLICATION: ICD-10-CM

## 2023-02-07 DIAGNOSIS — G62.0 DRUG-INDUCED POLYNEUROPATHY: ICD-10-CM

## 2023-02-07 DIAGNOSIS — I47.10 SVT (SUPRAVENTRICULAR TACHYCARDIA): ICD-10-CM

## 2023-02-07 LAB
CTP QC/QA: YES
SARS-COV-2 RDRP RESP QL NAA+PROBE: NEGATIVE

## 2023-02-07 PROCEDURE — 87635 SARS-COV-2 COVID-19 AMP PRB: CPT | Mod: PBBFAC | Performed by: INTERNAL MEDICINE

## 2023-02-07 PROCEDURE — 99213 OFFICE O/P EST LOW 20 MIN: CPT | Mod: PBBFAC | Performed by: INTERNAL MEDICINE

## 2023-02-07 PROCEDURE — 99999 PR PBB SHADOW E&M-EST. PATIENT-LVL III: ICD-10-PCS | Mod: PBBFAC,,, | Performed by: INTERNAL MEDICINE

## 2023-02-07 PROCEDURE — 99214 PR OFFICE/OUTPT VISIT, EST, LEVL IV, 30-39 MIN: ICD-10-PCS | Mod: S$PBB,CS,, | Performed by: INTERNAL MEDICINE

## 2023-02-07 PROCEDURE — 99999 PR PBB SHADOW E&M-EST. PATIENT-LVL III: CPT | Mod: PBBFAC,,, | Performed by: INTERNAL MEDICINE

## 2023-02-07 PROCEDURE — 99214 OFFICE O/P EST MOD 30 MIN: CPT | Mod: S$PBB,CS,, | Performed by: INTERNAL MEDICINE

## 2023-02-07 RX ORDER — HYDROCODONE BITARTRATE AND ACETAMINOPHEN 7.5; 325 MG/1; MG/1
1 TABLET ORAL EVERY 8 HOURS PRN
Qty: 90 TABLET | Refills: 0 | Status: SHIPPED | OUTPATIENT
Start: 2023-02-07 | End: 2023-03-03 | Stop reason: SDUPTHER

## 2023-02-07 NOTE — PROGRESS NOTES
Subjective:       Patient ID: Alesha Toney is a 72 y.o. female.    Chief Complaint: Nasal Congestion (Cough too)    Sore Throat   This is a new problem. The current episode started yesterday. The problem has been gradually worsening. The pain is worse on the left side. There has been no fever. The pain is at a severity of 5/10. The pain is moderate. Associated symptoms include coughing, ear pain and shortness of breath. Pertinent negatives include no abdominal pain, congestion, diarrhea, drooling, ear discharge, headaches, hoarse voice, plugged ear sensation, neck pain, stridor, swollen glands, trouble swallowing or vomiting. She has had no exposure to strep or mono. She has tried cool liquids, gargles and oral narcotic analgesics for the symptoms. The treatment provided mild relief.   Developed sore throat day or 2 ago with head congestion.  Cough, often dry.  No fever.  Stable muscle pain. May wheeze occasionally.    She has been sob x 2weeks, since she stopped Proton Radiation at [facility].  PUlse ox 99.    She is now enrolled in Palliative Care.  That dept is prescribing hydrocodone now.    .BP has been running low,     115/59, but higher here.    She has recurrent lung cancer, just completing proton tx at [facility].  To f/u with [facility] in March.        Review of Systems   HENT:  Positive for ear pain and sore throat. Negative for nasal congestion, drooling, ear discharge, hoarse voice and trouble swallowing.    Respiratory:  Positive for cough and shortness of breath. Negative for stridor.    Gastrointestinal:  Negative for abdominal pain, diarrhea and vomiting.   Musculoskeletal:  Negative for neck pain.   Neurological:  Negative for headaches.       Objective:      Physical Exam  Constitutional:       General: She is not in acute distress.     Appearance: She is well-developed. She is not ill-appearing, toxic-appearing or diaphoretic.      Comments: Mild sl bronchial cough   Eyes:      General:  No scleral icterus.  Neck:      Thyroid: No thyromegaly.      Vascular: No JVD.   Cardiovascular:      Rate and Rhythm: Normal rate and regular rhythm.      Heart sounds: Normal heart sounds. No murmur heard.  Pulmonary:      Effort: Pulmonary effort is normal. No respiratory distress.      Breath sounds: Normal breath sounds. No stridor. No wheezing, rhonchi or rales.   Abdominal:      General: There is no distension.      Palpations: Abdomen is soft. There is no mass.      Tenderness: There is no abdominal tenderness. There is no guarding.      Hernia: No hernia is present.   Musculoskeletal:      Cervical back: No rigidity or tenderness.      Right lower leg: No edema.      Left lower leg: No edema.   Lymphadenopathy:      Cervical: No cervical adenopathy.   Neurological:      Mental Status: She is alert and oriented to person, place, and time.   Psychiatric:         Mood and Affect: Mood normal.         Behavior: Behavior normal.         Thought Content: Thought content normal.     150/74  Assessment:       Problem List Items Addressed This Visit          INFUSION TREATMENT 2    Ulcerative colitis with complication     Managed by TI         Malignant neoplasm of upper lobe, right bronchus or lung       ONCOLOGY TREATMENT    Malignant neoplasm of upper lobe, right bronchus or lung       Other    SVT (supraventricular tachycardia)     asymptomatic         Stage 3a chronic kidney disease     By labs yesterday         Pulmonary hypertension    Other emphysema     Per ct          Drug-induced polyneuropathy     Managed by oncology         Chronic pain due to malignant neoplastic disease    Aortic atherosclerosis     Declined statin          Other Visit Diagnoses       Viral URI with cough    -  Primary    Relevant Orders    POCT COVID-19 Rapid Screening (Completed)            Plan:       Alesha was seen today for nasal congestion.    Diagnoses and all orders for this visit:    Viral URI with cough  -     POCT  COVID-19 Rapid Screening    Malignant neoplasm of upper lobe, right bronchus or lung    Chronic pain due to malignant neoplastic disease    Other ulcerative colitis with complication    Pulmonary hypertension    SVT (supraventricular tachycardia)    Aortic atherosclerosis    Other emphysema    Stage 3a chronic kidney disease    Drug-induced polyneuropathy         Encouragement.  Narcotics to now be given by palliative care.  Monitor BP - always higher here.    Covid negative

## 2023-02-09 ENCOUNTER — PATIENT MESSAGE (OUTPATIENT)
Dept: INTERNAL MEDICINE | Facility: CLINIC | Age: 73
End: 2023-02-09
Payer: MEDICARE

## 2023-02-10 ENCOUNTER — HOSPITAL ENCOUNTER (OUTPATIENT)
Dept: RADIOLOGY | Facility: HOSPITAL | Age: 73
Discharge: HOME | End: 2023-02-10
Attending: INTERNAL MEDICINE
Payer: MEDICARE

## 2023-02-10 ENCOUNTER — PATIENT MESSAGE (OUTPATIENT)
Dept: CARDIOLOGY | Facility: CLINIC | Age: 73
End: 2023-02-10
Payer: MEDICARE

## 2023-02-10 ENCOUNTER — OFFICE VISIT (OUTPATIENT)
Dept: CARDIOLOGY | Facility: CLINIC | Age: 73
End: 2023-02-10
Payer: MEDICARE

## 2023-02-10 VITALS — HEIGHT: 66 IN | WEIGHT: 151 LBS | BODY MASS INDEX: 24.27 KG/M2

## 2023-02-10 DIAGNOSIS — R05.2 SUBACUTE COUGH: ICD-10-CM

## 2023-02-10 DIAGNOSIS — I47.10 SVT (SUPRAVENTRICULAR TACHYCARDIA): ICD-10-CM

## 2023-02-10 DIAGNOSIS — I49.3 PVCS (PREMATURE VENTRICULAR CONTRACTIONS): ICD-10-CM

## 2023-02-10 DIAGNOSIS — I27.20 PULMONARY HYPERTENSION: ICD-10-CM

## 2023-02-10 DIAGNOSIS — I25.10 CORONARY ARTERY DISEASE DUE TO CALCIFIED CORONARY LESION: ICD-10-CM

## 2023-02-10 DIAGNOSIS — I10 ESSENTIAL HYPERTENSION: ICD-10-CM

## 2023-02-10 DIAGNOSIS — E78.00 PURE HYPERCHOLESTEROLEMIA: Primary | ICD-10-CM

## 2023-02-10 DIAGNOSIS — I25.84 CORONARY ARTERY DISEASE DUE TO CALCIFIED CORONARY LESION: ICD-10-CM

## 2023-02-10 DIAGNOSIS — C34.11 MALIGNANT NEOPLASM OF UPPER LOBE, RIGHT BRONCHUS OR LUNG: ICD-10-CM

## 2023-02-10 PROCEDURE — 71046 XR CHEST PA AND LATERAL: ICD-10-PCS | Mod: 26,,, | Performed by: RADIOLOGY

## 2023-02-10 PROCEDURE — 71046 X-RAY EXAM CHEST 2 VIEWS: CPT | Mod: 26,,, | Performed by: RADIOLOGY

## 2023-02-10 PROCEDURE — 99214 PR OFFICE/OUTPT VISIT, EST, LEVL IV, 30-39 MIN: ICD-10-PCS | Mod: 95,,, | Performed by: INTERNAL MEDICINE

## 2023-02-10 PROCEDURE — 99214 OFFICE O/P EST MOD 30 MIN: CPT | Mod: 95,,, | Performed by: INTERNAL MEDICINE

## 2023-02-10 PROCEDURE — 71046 X-RAY EXAM CHEST 2 VIEWS: CPT | Mod: TC,FY

## 2023-02-10 RX ORDER — ONDANSETRON HYDROCHLORIDE 8 MG/1
8 TABLET, FILM COATED ORAL EVERY 8 HOURS PRN
COMMUNITY
Start: 2023-01-08

## 2023-02-10 RX ORDER — NALOXONE HYDROCHLORIDE 4 MG/.1ML
1 SPRAY NASAL
Status: ON HOLD | COMMUNITY
Start: 2023-02-01 | End: 2024-01-13 | Stop reason: HOSPADM

## 2023-02-10 NOTE — PROGRESS NOTES
"The patient location is: Home, in [place].  The chief complaint leading to consultation is: hypercholesterolemia    Visit type:TELE AUDIOVISUAL  Total time spent with patient: 20 minutes.  Each patient to whom he or she provides medical services by telemedicine is:  (1) informed of the relationship between the physician and patient and the respective role of any other health care provider with respect to management of the patient; and (2) notified that he or she may decline to receive medical services by telemedicine and may withdraw from such care at any time.   Subjective:     Problem List:  COPD - mild  SVT (atrial tachycardia)  PVC  HTN onset 2017  Adenocarcinoma lung 6/2018 s/p RUL lobectomy  Coronary artery calcification    HPI:   Alesha Toney is a 72 y.o. female who presents for follow-up of Coronary Artery Disease  Underwent radiation treatments at [facility] followed by chemotherapy. She completed additional treatments at [facility].  She has a productive cough. No fever  Total chol and LDL have inched their way up from 220 and 135 to 250 and 169 mg/dl. Not taking a statin.  No palpitations recently.      Review of patient's allergies indicates:   Allergen Reactions    Adhesive Itching, Rash and Blisters     INCLUDES EKG PADS    Ciprofloxacin Hives     Hives    Iodinated contrast media     Pcn [penicillins]      rash    Phenergan plain Other (See Comments)     "crazy behavior"    Phenergan [promethazine]     Tramadol     Vancomycin analogues      Red man syndrome        Current Outpatient Medications   Medication Sig    ascorbic acid, vitamin C, (VITAMIN C) 500 MG tablet Take 500 mg by mouth once daily.    atenoloL (TENORMIN) 25 MG tablet Take 1 tablet (25 mg total) by mouth 3 (three) times daily.    calcium carbonate (TUMS) 300 mg (750 mg) Chew Take by mouth as needed.     famotidine (PEPCID) 10 MG tablet Take 10 mg by mouth once daily.    HYDROcodone-acetaminophen (NORCO) 7.5-325 mg per tablet Take 1 " "tablet by mouth every 8 (eight) hours as needed for Pain.    LORazepam (ATIVAN) 0.5 MG tablet TAKE 1 TABLET EVERY 12 HOURS AS NEEDED FOR ANXIETY    losartan (COZAAR) 50 MG tablet 1 tab po bid    naloxone (NARCAN) 4 mg/actuation Spry by Nasal route as needed.    ondansetron (ZOFRAN) 8 MG tablet Take 8 mg by mouth as needed.     No current facility-administered medications for this visit.       Social history:  Alesha Toney  reports that she quit smoking about 7 years ago. Her smoking use included cigarettes. She has a 30.00 pack-year smoking history. She has never used smokeless tobacco. She reports that she does not currently use alcohol. She reports that she does not use drugs.      Objective:     Physical Exam  Virtual visit  Ht 5' 6" (1.676 m)   Wt 68.5 kg (151 lb)   LMP  (LMP Unknown)   BMI 24.37 kg/m²        Lab Results   Component Value Date    CHOL 250 (H) 02/06/2023    HDL 57 02/06/2023    LDLCALC 169.0 (H) 02/06/2023    TRIG 120 02/06/2023    CHOLHDL 22.8 02/06/2023     Lab Results   Component Value Date    GLU 97 02/06/2023    CREATININE 1.2 02/06/2023    BUN 23 02/06/2023     02/06/2023    K 4.5 02/06/2023     02/06/2023    CO2 24 02/06/2023     Lab Results   Component Value Date    ALT 16 02/06/2023    AST 22 02/06/2023    ALKPHOS 48 (L) 02/06/2023    BILITOT 0.3 02/06/2023       Results for orders placed during the hospital encounter of 02/11/22    Echo    Interpretation Summary  · The left ventricle is normal in size with normal systolic function.  · The estimated ejection fraction is 60%.  · Indeterminate left ventricular diastolic function.  · The left ventricular global longitudinal strain is -19%. No significant changes in myocardial strain since the patient's last echocardiogram study.  · Normal right ventricular size with normal right ventricular systolic function.  · Mild tricuspid regurgitation.  · Normal central venous pressure (3 mmHg).  · There is pulmonary hypertension. " The estimated PA systolic pressure is 49 mmHg.  · The estimated PA systolic pressure is 49 mmHg.  · Cystic structure is present in the liver (the patient is known to have multiple hepatic cysts).         Assessment and Plan:       ICD-10-CM ICD-9-CM   1. Pure hypercholesterolemia  E78.00 272.0   2. Coronary artery disease due to calcified coronary lesion  I25.10 414.00    I25.84 414.4   3. Subacute cough  R05.2 786.2   4. Pulmonary hypertension  I27.20 416.8   5. Malignant neoplasm of upper lobe, right bronchus or lung  C34.11 162.3   6. Essential hypertension  I10 401.9   7. SVT (supraventricular tachycardia)  I47.1 427.89   8. PVCs (premature ventricular contractions)  I49.3 427.69           It is not my specialty but think it is reasonable for her to take the doxycycline prescribed to her by Dr. Dhaliwal and get a CXR.  She has worsening hyperlipidemia and has subclinical atherosclerosis.  A statin is appropriate.  Begin atorvastatin 20 mg.      Orders placed during this encounter:     Pure hypercholesterolemia  -     Lipid Panel; Future; Expected date: 08/10/2023    Coronary artery disease due to calcified coronary lesion    Subacute cough  -     X-Ray Chest PA And Lateral; Future; Expected date: 02/10/2023    Pulmonary hypertension  -     Echo; Future; Expected date: 02/10/2023    Malignant neoplasm of upper lobe, right bronchus or lung    Essential hypertension  -     Comprehensive Metabolic Panel; Future; Expected date: 08/10/2023    SVT (supraventricular tachycardia)  -     EKG 12-lead; Future; Expected date: 08/10/2023    PVCs (premature ventricular contractions)  -     EKG 12-lead; Future; Expected date: 08/10/2023         Follow up in about 6 months (around 8/10/2023).

## 2023-02-13 RX ORDER — ATORVASTATIN CALCIUM 20 MG/1
20 TABLET, FILM COATED ORAL DAILY
Qty: 30 TABLET | Refills: 11 | Status: SHIPPED | OUTPATIENT
Start: 2023-02-13 | End: 2023-08-10

## 2023-02-13 RX ORDER — ATORVASTATIN CALCIUM 20 MG/1
20 TABLET, FILM COATED ORAL DAILY
COMMUNITY
End: 2023-02-13 | Stop reason: SDUPTHER

## 2023-02-14 ENCOUNTER — HOSPITAL ENCOUNTER (OUTPATIENT)
Dept: CARDIOLOGY | Facility: HOSPITAL | Age: 73
Discharge: HOME | End: 2023-02-14
Attending: INTERNAL MEDICINE
Payer: MEDICARE

## 2023-02-14 VITALS
SYSTOLIC BLOOD PRESSURE: 134 MMHG | BODY MASS INDEX: 24.43 KG/M2 | DIASTOLIC BLOOD PRESSURE: 60 MMHG | HEIGHT: 66 IN | WEIGHT: 152 LBS | HEART RATE: 75 BPM

## 2023-02-14 DIAGNOSIS — I27.20 PULMONARY HYPERTENSION: ICD-10-CM

## 2023-02-14 LAB
ASCENDING AORTA: 2.97 CM
AV INDEX (PROSTH): 1.03
AV MEAN GRADIENT: 2 MMHG
AV PEAK GRADIENT: 5 MMHG
AV VALVE AREA: 3.16 CM2
AV VELOCITY RATIO: 0.96
BSA FOR ECHO PROCEDURE: 1.79 M2
CV ECHO LV RWT: 0.28 CM
DOP CALC AO PEAK VEL: 1.08 M/S
DOP CALC AO VTI: 22.46 CM
DOP CALC LVOT AREA: 3.1 CM2
DOP CALC LVOT DIAMETER: 1.98 CM
DOP CALC LVOT PEAK VEL: 1.04 M/S
DOP CALC LVOT STROKE VOLUME: 70.91 CM3
DOP CALCLVOT PEAK VEL VTI: 23.04 CM
E WAVE DECELERATION TIME: 261.81 MSEC
E/A RATIO: 1.01
E/E' RATIO: 12.67 M/S
ECHO LV POSTERIOR WALL: 0.74 CM (ref 0.6–1.1)
EJECTION FRACTION: 60 %
FRACTIONAL SHORTENING: 32 % (ref 28–44)
INTERVENTRICULAR SEPTUM: 0.68 CM (ref 0.6–1.1)
IVRT: 94.2 MSEC
LA MAJOR: 5.37 CM
LA MINOR: 5.08 CM
LA WIDTH: 3.46 CM
LEFT ATRIUM SIZE: 3.54 CM
LEFT ATRIUM VOLUME INDEX MOD: 30.6 ML/M2
LEFT ATRIUM VOLUME INDEX: 30.5 ML/M2
LEFT ATRIUM VOLUME MOD: 54.54 CM3
LEFT ATRIUM VOLUME: 54.36 CM3
LEFT INTERNAL DIMENSION IN SYSTOLE: 3.67 CM (ref 2.1–4)
LEFT VENTRICLE DIASTOLIC VOLUME INDEX: 77.91 ML/M2
LEFT VENTRICLE DIASTOLIC VOLUME: 138.68 ML
LEFT VENTRICLE MASS INDEX: 74 G/M2
LEFT VENTRICLE SYSTOLIC VOLUME INDEX: 32.1 ML/M2
LEFT VENTRICLE SYSTOLIC VOLUME: 57.05 ML
LEFT VENTRICULAR INTERNAL DIMENSION IN DIASTOLE: 5.36 CM (ref 3.5–6)
LEFT VENTRICULAR MASS: 131.79 G
LV LATERAL E/E' RATIO: 10.86 M/S
LV SEPTAL E/E' RATIO: 15.2 M/S
MV A" WAVE DURATION": 7.71 MSEC
MV PEAK A VEL: 0.75 M/S
MV PEAK E VEL: 0.76 M/S
MV STENOSIS PRESSURE HALF TIME: 75.92 MS
MV VALVE AREA P 1/2 METHOD: 2.9 CM2
PISA TR MAX VEL: 2.68 M/S
PULM VEIN S/D RATIO: 1.33
PV PEAK D VEL: 0.39 M/S
PV PEAK S VEL: 0.52 M/S
QEF: 66 %
RA MAJOR: 4.59 CM
RA PRESSURE: 3 MMHG
RA WIDTH: 3.11 CM
RIGHT VENTRICULAR END-DIASTOLIC DIMENSION: 3.02 CM
RV TISSUE DOPPLER FREE WALL SYSTOLIC VELOCITY 1 (APICAL 4 CHAMBER VIEW): 12.15 CM/S
SINUS: 3.4 CM
STJ: 2.53 CM
TDI LATERAL: 0.07 M/S
TDI SEPTAL: 0.05 M/S
TDI: 0.06 M/S
TR MAX PG: 29 MMHG
TRICUSPID ANNULAR PLANE SYSTOLIC EXCURSION: 1.56 CM
TV REST PULMONARY ARTERY PRESSURE: 32 MMHG

## 2023-02-14 PROCEDURE — 93306 TTE W/DOPPLER COMPLETE: CPT | Mod: 26,,, | Performed by: INTERNAL MEDICINE

## 2023-02-14 PROCEDURE — 93306 ECHO (CUPID ONLY): ICD-10-PCS | Mod: 26,,, | Performed by: INTERNAL MEDICINE

## 2023-02-14 PROCEDURE — 93356 MYOCRD STRAIN IMG SPCKL TRCK: CPT

## 2023-02-14 PROCEDURE — 93356 MYOCRD STRAIN IMG SPCKL TRCK: CPT | Mod: ,,, | Performed by: INTERNAL MEDICINE

## 2023-02-14 PROCEDURE — 93356 ECHO (CUPID ONLY): ICD-10-PCS | Mod: ,,, | Performed by: INTERNAL MEDICINE

## 2023-03-03 ENCOUNTER — PATIENT MESSAGE (OUTPATIENT)
Dept: PALLIATIVE MEDICINE | Facility: CLINIC | Age: 73
End: 2023-03-03
Payer: MEDICARE

## 2023-03-03 RX ORDER — HYDROCODONE BITARTRATE AND ACETAMINOPHEN 7.5; 325 MG/1; MG/1
1 TABLET ORAL EVERY 6 HOURS PRN
Qty: 120 TABLET | Refills: 0 | Status: SHIPPED | OUTPATIENT
Start: 2023-03-03 | End: 2023-04-04 | Stop reason: SDUPTHER

## 2023-03-08 ENCOUNTER — PATIENT MESSAGE (OUTPATIENT)
Dept: INTERNAL MEDICINE | Facility: CLINIC | Age: 73
End: 2023-03-08

## 2023-03-08 ENCOUNTER — E-VISIT (OUTPATIENT)
Dept: INTERNAL MEDICINE | Facility: CLINIC | Age: 73
End: 2023-03-08
Payer: MEDICARE

## 2023-03-08 DIAGNOSIS — N30.00 ACUTE CYSTITIS WITHOUT HEMATURIA: Primary | ICD-10-CM

## 2023-03-08 PROCEDURE — 99212 OFFICE O/P EST SF 10 MIN: CPT | Mod: 95,S$PBB,, | Performed by: INTERNAL MEDICINE

## 2023-03-08 PROCEDURE — 99212 PR OFFICE/OUTPT VISIT, EST, LEVL II, 10-19 MIN: ICD-10-PCS | Mod: 95,S$PBB,, | Performed by: INTERNAL MEDICINE

## 2023-03-08 RX ORDER — NITROFURANTOIN 25; 75 MG/1; MG/1
100 CAPSULE ORAL 2 TIMES DAILY
Qty: 14 CAPSULE | Refills: 0 | Status: SHIPPED | OUTPATIENT
Start: 2023-03-08 | End: 2023-03-09

## 2023-03-09 ENCOUNTER — PATIENT MESSAGE (OUTPATIENT)
Dept: PALLIATIVE MEDICINE | Facility: CLINIC | Age: 73
End: 2023-03-09
Payer: MEDICARE

## 2023-03-09 ENCOUNTER — PATIENT MESSAGE (OUTPATIENT)
Dept: INTERNAL MEDICINE | Facility: CLINIC | Age: 73
End: 2023-03-09
Payer: MEDICARE

## 2023-03-09 DIAGNOSIS — N30.00 ACUTE CYSTITIS WITHOUT HEMATURIA: Primary | ICD-10-CM

## 2023-03-09 RX ORDER — CEPHALEXIN 500 MG/1
500 CAPSULE ORAL EVERY 8 HOURS
Qty: 21 CAPSULE | Refills: 0 | Status: SHIPPED | OUTPATIENT
Start: 2023-03-09 | End: 2023-03-16

## 2023-03-27 ENCOUNTER — OFFICE VISIT (OUTPATIENT)
Dept: HEMATOLOGY/ONCOLOGY | Facility: CLINIC | Age: 73
End: 2023-03-27
Payer: MEDICARE

## 2023-03-27 VITALS
OXYGEN SATURATION: 99 % | TEMPERATURE: 98 F | DIASTOLIC BLOOD PRESSURE: 59 MMHG | SYSTOLIC BLOOD PRESSURE: 125 MMHG | HEIGHT: 66 IN | RESPIRATION RATE: 18 BRPM | WEIGHT: 151.44 LBS | BODY MASS INDEX: 24.34 KG/M2 | HEART RATE: 63 BPM

## 2023-03-27 DIAGNOSIS — N18.31 STAGE 3A CHRONIC KIDNEY DISEASE: ICD-10-CM

## 2023-03-27 DIAGNOSIS — C34.11 MALIGNANT NEOPLASM OF UPPER LOBE, RIGHT BRONCHUS OR LUNG: Primary | ICD-10-CM

## 2023-03-27 DIAGNOSIS — C34.90 MALIGNANT NEOPLASM OF UNSPECIFIED PART OF UNSPECIFIED BRONCHUS OR LUNG: ICD-10-CM

## 2023-03-27 PROCEDURE — 99214 PR OFFICE/OUTPT VISIT, EST, LEVL IV, 30-39 MIN: ICD-10-PCS | Mod: S$PBB,,, | Performed by: INTERNAL MEDICINE

## 2023-03-27 PROCEDURE — 99214 OFFICE O/P EST MOD 30 MIN: CPT | Mod: S$PBB,,, | Performed by: INTERNAL MEDICINE

## 2023-03-27 PROCEDURE — 99214 OFFICE O/P EST MOD 30 MIN: CPT | Mod: PBBFAC | Performed by: INTERNAL MEDICINE

## 2023-03-27 PROCEDURE — 99999 PR PBB SHADOW E&M-EST. PATIENT-LVL IV: CPT | Mod: PBBFAC,,, | Performed by: INTERNAL MEDICINE

## 2023-03-27 PROCEDURE — 99999 PR PBB SHADOW E&M-EST. PATIENT-LVL IV: ICD-10-PCS | Mod: PBBFAC,,, | Performed by: INTERNAL MEDICINE

## 2023-03-27 RX ORDER — ESOMEPRAZOLE MAGNESIUM 40 MG/1
40 CAPSULE, DELAYED RELEASE ORAL
COMMUNITY
Start: 2023-01-11 | End: 2023-09-27

## 2023-03-27 NOTE — PROGRESS NOTES
"Subjective:       Patient ID: Alesha Toney is a 72 y.o. female.    Chief Complaint: Malignant neoplasm of upper lobe, right bronchus or lung  Ms. Alesha Toney is a 72-year-old otherwise healthy female who started having some shoulder pain, which was lasting for over six weeks.  She went and saw her primary care physician and underwent an x-ray, which revealed right upper lobe lung mass worrisome for malignancy and a CT scan was recommended, which was done on 6/12/18 and that revealed a newly developing mass, pleural based, lateral posterior segment, right upper lobe 3.5 x 3.5 cm, surrounding stellate margin and patchy infiltrates, particularly superiorly,   anteriorly infiltrates extend perihilar into the anterior segment.  She then underwent CT-guided biopsy, which revealed well-differentiated adenocarcinoma.       Her PET scan from 6/29/18 There is physiologic intracranial, head, and neck activity.  There is a large right upper lobe mass SUV max 9.11.  No local or distant metastatic disease seen.  There is physiologic liver, spleen, GI and  activity.  There are a few liver cysts.  No adenopathy is seen.  The adrenal glands are not enlarged.  No adenopathy is seen.  No suspicious bone lesions are seen.     She underwent VATS with right upper lobectomy. Mediastinal lymphadenectomy on 8/9/18. Pathology revealed "Tumor site: Upper lobe.Tumor size: 7 x 4 x 2.7 cm.Tumor focality: Single tumor.  Histologic type: Mixed invasive mucinous and nonmucinous adenocarcinoma. Histologic grade: G2: Moderately differentiated.Spread through air spaces (STATS): Present. Visceral pleura invasion: Not identified.  Lymphovascular invasion: Not identified. Direct invasion of adjacent structures: No adjacent structures present. Margins: All margins are uninvolved by carcinoma.Distance of invasive carcinoma from closest margin: 1 cm from the bronchial margin.Treatment effect: No known presurgical therapy. Regional lymph nodes: " "Number of lymph nodes involved: 0. Number of lymph nodes examined: 11. Pathologic Stage Classification: pT3 N0"        She completed 4 cycles of adjuvant chemo with Cisplatin and Alimta as of 1/10/19   She is on surveillance.     CT chest of 9/17/19 which reveals "Operative change of right upper lobectomy for small-cell lung cancer with several scattered subsolid opacities and a new solid nodule in the right lower lobe measuring up to 0.5 cm.  We recommend continued surveillance with the role and schedule for such surveillance to be determined by clinical considerations. Sided chest port distal tip terminating at the confluence of the brachiocephalic vein in the SVC.  Correlate with device functioning. Apically predominant emphysematous changes, left greater than right. Aortic annular calcification and multi-vessel coronary artery atherosclerosis. Numerous unchanged hepatic hypodensities, likely cysts"     PET scan from 10/1/19 reveals "1.6 cm soft tissue lesion re-identified posterior to the bronchus intermedius.  No corresponding focal increased radiotracer uptake to suggest local recurrence or metastatic disease. Stable subcentimeter opacities throughout the bilateral lower lobes, too small to characterize with PET-CT. Focal increased radiotracer uptake and subtle wall thickening in the rectum.  Findings are concerning for primary neoplastic process.  Recommend further evaluation with direct visualization"     She returned from [facility] and was advised to proceed with chemo/RT with either Docetaxel/platinum or Carboplatin/Alimta.     She completed chemo/RT with Carboplatin and Alimta as of 12/31/19     She underwent CT chest on 5/27/2020 which revealed 1. In this patient with history of lung cancer status post right upper lobectomy, there is a soft tissue opacity at the posterior margin of the staple line.  This lesion has been enlarging progressively and now measures 1.3 x 1.6 cm.  There is also a new 1.7 x " "1.7 cm opacity at the right paravertebral pleural margin which is inseparable from this lesion.  These findings may represent post radiation change in light of the hypermetabolic lesion in this region on PET-CT of 10/01/2019 (image 69/[id number]).There is a 1 cm opacity in the superior or lateral basal segment of the right lower lobe (axial series 4, image 243) which has enlarged since 09/17/2019.  Nature of this lesion is unclear.  Clinical considerations will determine if percutaneous biopsy or metabolic assessment is warranted. 1.0 cm solid opacity with branching configuration (series 4, image 205) is located in the lateral basal segment of the left lower lobe, stable since 02/16/2019 (02/06/2019 axial series 4, image 310). Appearance and bifurcating character suggests mucoid impaction in mildly ectatic peripheral airway, likely not significant. Persistent clustered small centrilobular nodules in the apical segment of the right upper lobe likely reflecting small airway inflammation/infection. Partially visualized left chest port with distal catheter tip at the junction of the brachiocephalic veins and SVC, similar as on 09/17/2019"  She thus underwent PET scan on 6/1/2020 which revealed "No evidence of residual viable lung malignancy.  Evolving post radiation changes in the right paramediastinal lung, with a new irregular subpleural density which may also be related to recent radiation.  Attention on follow-up. Interval decrease in size of a soft tissue lesion along the inferior margin of the lobectomy stable line.  Several stable subcentimeter soft tissue opacities in the lower lobes bilaterally, As above.  Attention on follow-up.  Persistent hypermetabolic rectal lesion concerning for malignancy.  Recommend direct visualization and tissue sampling as clinically indicated"  I discussed her case in thoracic conference today and findings are felt to be related to RT     Her restaging CT scans from 1/20/2021 which reveal " ""Persistent soft tissue opacity in the posterior margin of the staple line that is increased in prominence when compared to the prior examination and is worrisome for disease progression. No new lesions identified.  Stable pulmonary nodules. Stable hypodense lesions throughout the liver that likely represent hepatic cysts"        Restaging PET scan from 1/27/2021 reveals "Increased size and hypermetabolic activity involving soft tissue opacity along the inferior border of the right upper lobectomy stable line concerning for progression of disease. Mildly increased hypermetabolic activity involving the rectum compatible with known high-grade dysplasia/intramucosal carcinoma. Interval decrease in size and resolution of hypermetabolic activity involving subcentimeter subpleural opacity within the right lower lobe"     MRI brain from  2/3/2021 reveals no evidence of recurrence.  GUARDANT testing only reveals p53, PDL1 is 0 and no other targets        PET scan from 3/24/2021 reveals "In this patient with known lung cancer, there is a persistent hypermetabolic right hilar mass consistent with viable tumor.  Interval increase in size is equivocal for progression of fibrosis versus tumor growth. 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  Activity appears more focal and possibly more proximal than before.  Consider direct visualization for further evaluation. The previously described subcentimeter subpleural opacity within the right lower lobe is not definitely seen on today's exam"               She last received immunotherapy on 5/28/2021. She underwent EUS on 6/25/2021 which revealed "Erythematous, congested, and ulcerated mucosa  (proctitis) in rectosigmoid colon.  Pathology reveals Colon, rectosigmoid, biopsy:  Moderate active colitis     She completed steroids about 5 weeks ago.      CT scans from 11/5/2021 reveals "In this patient with lung " "cancer, there is postoperative changes of right upper lobe resection.  Persistent soft tissue opacity along the right hilum suture line which is slightly increased in size from prior exam.  More conspicuous appearing nodules within the right lower lobe seen on prior exam.  New nodule within the right middle lobe measuring 1.3 cm.  Overall findings are all worrisome for disease progression. Scattered areas of centrilobular nodularity within the bilateral lungs.  Nonspecific and can be seen in the setting of infectious or inflammatory etiologies. Unchanged hepatics cysts. Stable left adrenal gland nodule."  PET scan from 11/23/2021 revealed "New hypermetabolic nodule within the right lower lobe additional slightly subcentimeter nodules within the right lower lobe.  Findings concerning for disease progression, other differentials include infection or noninfectious inflammatory process. Interval decrease in size and uptake in the right hilar mass. Resolution of previous focal rectal uptake"        She is on Opdivo. Her Opdivo was held on 1/10/2022 due to rectal bleeding. She saw Dr. Clark and was noted to have anal fissure which contributed to rectal bleed. Her rectal bleed has since resolved.           She was on Opdivo until 5/24/2022, she noted rectal bleeding and underwent   Flex sigmoidoscopy on 6/3/2022 reveals "Localized severe inflammation was found in the distal rectum. Biopsied. Pathology reveals Severely active chronic colitis with ulceration (see comment)  Moderate architectural disorder. Negative for CMV by immunohistochemistry . Negative for granulomas or viral inclusions. Negative for dysplasia or carcinoma"  PET scan from 6/17/2022 reveals "In this patient with lung cancer there are enlarging hypermetabolic right perihilar opacities concerning for continued local disease progression. Continued decreased metabolic activity of the right lower lobe nodule.  Cluster of pulmonary nodules bilaterally, some " "which are new from prior PET-CT for example the superior segment the left upper lobe and are likely infectious/inflammatory origin. Persistent and more extensive region of increased FDG avidity within the distal rectum in keeping with more extensive colitis.  Malignancy could have a similar appearance.  Recommend further investigation as clinically warranted and attention on follow-up"     She has active proctitis and is on hydrocortisone suppositories and Entyvyo since early June 2022.   CT chest on 8/1/2022 reveals  "Similar appearance of elongated, irregular opacity adjacent to the right upper lobectomy margin.  More posteriorly adjacent perihilar opacities with previous FDG avidity with detailed measurements as above. Similar areas of scattered micro-nodularity with tree-in-bud, likely infectious/inflammatory in nature.  New appearing area of grouped micro-nodules in the anterior left lower lobe, largest up to 5 mm, which could relate to aforementioned small airways disease, though technically indeterminate.  Continued close attention at follow-up. RECIST SUMMARY: Date of prior examination for comparison: CT chest dated 05/24/2022. Lesion 1: Right perihilar opacity.  2.4.  Series 2, image 45.  Previously 2 4.  (Note there is slight increased size conspicuity in the craniocaudal dimension at 1.8 cm, previously 1.5 cm). Lesion 2: Right perihilar opacity.  1.7 cm.  Series 2, image 59.  Previously 1.5 cm"     She underwent a repeat CT chest 8/31/2022 and that revealed "Status post right upper lobectomy.  Soft tissue thickening adjacent to the suture line appears stable.  However, there is interval enlargement of a right lower lobe, bilobed, paramediastinal mass-like consolidation.  When dominant components of the lesions are measured separately, dimensions are summarized below. RECIST SUMMARY: Date of prior examination for comparison: CT chest 08/01/2022. Lesion 1: Right perihilar opacity.  4.7 cm. Series 4 image " "77.  Prior measurement 2.4 cm. Lesion 2: Right perihilar (infrahilar) opacity.  2.7 cm. Series 4 image 237.  Prior measurement 1.7 cm"  Case reviewed in thoracic conference, and concern for disease progression exists so plan on immunotehrapy if able to taper steroids           PET scan from 10/6/2022 reveals "Interval increase in size and radiotracer uptake of a right pulmonary lesions in this patient with known non-small cell lung cancer.  There are few new subcentimeter pulmonary nodules in the right lower lobe with no significant uptake, could represent infectious versus inflammatory process.  However, additional new metastatic disease can not be excluded.  Attention on follow-up. Improved metabolic appearance of the lower rectum compared with the prior exam"  She is currently off of prednisone and Entyvio.  She was on  Carboplatin and Alimta, started on 10/14/2022        She has completed proton beam completed on 1/24/2023.     HPIShe has returned back from [facility] and [facility] chest there from 3/20/2023 reveals "Decreased in FDG avid recurrent disease in the right hemithorax. Decreased bilateral lower lobe nodules. Other lung nodules are unchanged or decreased.  New left lower lobe solid nodule in an area of clustered micronodules may be inflammatory. Reassess at follow-up" Plan on restaging scans in 3 months   She feels well and denies any new issues      Review of Systems   Constitutional:  Negative for appetite change, fatigue and unexpected weight change.   HENT:  Negative for mouth sores.    Eyes:  Negative for visual disturbance.   Respiratory:  Negative for cough and shortness of breath.    Cardiovascular:  Negative for chest pain.   Gastrointestinal:  Negative for abdominal pain and diarrhea.   Genitourinary:  Negative for frequency.   Musculoskeletal:  Negative for back pain.   Integumentary:  Negative for rash.   Neurological:  Negative for headaches.   Hematological:  Negative for adenopathy. "   Psychiatric/Behavioral:  The patient is not nervous/anxious.    All other systems reviewed and are negative.      Objective:      Physical Exam  Vitals reviewed.   Constitutional:       Appearance: She is well-developed.   HENT:      Mouth/Throat:      Pharynx: No oropharyngeal exudate.   Cardiovascular:      Rate and Rhythm: Normal rate.      Heart sounds: Normal heart sounds.   Pulmonary:      Effort: Pulmonary effort is normal.      Breath sounds: Normal breath sounds. No wheezing.   Abdominal:      General: Bowel sounds are normal.      Palpations: Abdomen is soft.      Tenderness: There is no abdominal tenderness.   Musculoskeletal:         General: No tenderness.   Lymphadenopathy:      Cervical: No cervical adenopathy.   Skin:     General: Skin is warm and dry.      Findings: No rash.   Neurological:      Mental Status: She is alert and oriented to person, place, and time.      Coordination: Coordination normal.   Psychiatric:         Thought Content: Thought content normal.         Judgment: Judgment normal.         LABS:  Reviewed from [facility]       Assessment:       Problem List Items Addressed This Visit       Malignant neoplasm of upper lobe, right bronchus or lung - Primary    Stage 3a chronic kidney disease       Plan:         Route Chart for Scheduling    Med Onc Chart Routing      Follow up with physician 3 months. Schedule CBC,CMP, CT chest without contrast and see me   Follow up with YAMILE    Infusion scheduling note    Injection scheduling note    Labs    Imaging    Pharmacy appointment    Other referrals             Therapy Plan Information  PORT FLUSH  Flushes  heparin, porcine (PF) 100 unit/mL injection flush 500 Units  500 Units, Intravenous, Every visit  sodium chloride 0.9% flush 10 mL  10 mL, Intravenous, Every visit     She is doing well clinically, CT scans from [facility] reveal an excellent response. She brought her CD from her scan. Sent for uploading. Next CT scan will be in 3  months which she would like to do here, She will return to [facility] in 6 months     Above care plan was discussed with patient and all questions were addressed to her satisfaction

## 2023-03-27 NOTE — TELEPHONE ENCOUNTER
Refills at pharmacy.  Receipt confirmed by pharmacy (1/20/2023  3:53 PM CST)   Rx Alprazolam need clarification . Please resend with frequency.

## 2023-03-28 ENCOUNTER — PATIENT MESSAGE (OUTPATIENT)
Dept: PALLIATIVE MEDICINE | Facility: CLINIC | Age: 73
End: 2023-03-28

## 2023-03-28 ENCOUNTER — OFFICE VISIT (OUTPATIENT)
Dept: PALLIATIVE MEDICINE | Facility: CLINIC | Age: 73
End: 2023-03-28
Payer: MEDICARE

## 2023-03-28 VITALS
HEART RATE: 63 BPM | OXYGEN SATURATION: 96 % | WEIGHT: 151.44 LBS | BODY MASS INDEX: 24.45 KG/M2 | DIASTOLIC BLOOD PRESSURE: 66 MMHG | SYSTOLIC BLOOD PRESSURE: 161 MMHG

## 2023-03-28 DIAGNOSIS — F43.22 ADJUSTMENT DISORDER WITH ANXIETY: ICD-10-CM

## 2023-03-28 DIAGNOSIS — Z51.5 ENCOUNTER FOR PALLIATIVE CARE: Primary | ICD-10-CM

## 2023-03-28 DIAGNOSIS — M89.8X9 BONE PAIN: ICD-10-CM

## 2023-03-28 DIAGNOSIS — C34.11 MALIGNANT NEOPLASM OF UPPER LOBE, RIGHT BRONCHUS OR LUNG: ICD-10-CM

## 2023-03-28 DIAGNOSIS — R53.83 FATIGUE, UNSPECIFIED TYPE: ICD-10-CM

## 2023-03-28 DIAGNOSIS — G47.00 INSOMNIA, UNSPECIFIED TYPE: ICD-10-CM

## 2023-03-28 DIAGNOSIS — R06.00 DYSPNEA, UNSPECIFIED TYPE: ICD-10-CM

## 2023-03-28 DIAGNOSIS — G89.3 CANCER RELATED PAIN: ICD-10-CM

## 2023-03-28 PROCEDURE — 99212 OFFICE O/P EST SF 10 MIN: CPT | Mod: PBBFAC | Performed by: NURSE PRACTITIONER

## 2023-03-28 PROCEDURE — 99999 PR PBB SHADOW E&M-EST. PATIENT-LVL II: ICD-10-PCS | Mod: PBBFAC,,, | Performed by: NURSE PRACTITIONER

## 2023-03-28 PROCEDURE — 99214 OFFICE O/P EST MOD 30 MIN: CPT | Mod: S$PBB,,, | Performed by: NURSE PRACTITIONER

## 2023-03-28 PROCEDURE — 99999 PR PBB SHADOW E&M-EST. PATIENT-LVL II: CPT | Mod: PBBFAC,,, | Performed by: NURSE PRACTITIONER

## 2023-03-28 PROCEDURE — 99214 PR OFFICE/OUTPT VISIT, EST, LEVL IV, 30-39 MIN: ICD-10-PCS | Mod: S$PBB,,, | Performed by: NURSE PRACTITIONER

## 2023-03-28 NOTE — PROGRESS NOTES
Consult Note  Palliative Care      Consult Requested By: No ref. provider found  Reason for Consult: symptom management       ASSESSMENT/PLAN:     Plan/Recommendations:    Malignant neoplasm of upper lobe, right bronchus or lung  - following with Dr. Dhaliwal  - completed chemo/RT on 12/31/2019  - interval treatment at [facility]  - s/p immunotherapy     Encounter for palliative medicine  - Patient is decisional  - Patient accompanied today by self  - ACP documents are not uploaded into EMR, patient and partner Lata will review documents at home, will revisit discussion at future visit.    - Philosophy of Palliative Medicine reviewed with patient and family at first visit  - New patient folder given to and reviewed with patient and family at first visit  - Goals of care: States that her goals are to maintain function and independence as much as possible. She states that she does not want to be bed-ridden, does not want to lose her ability to take care of herself and does not want to be a burden to her loved ones. Retired nurse. Please see SW note for more detail.    Cancer-related pain/bone pain   - pain well-controlled with medication regimen   - patient reports pain in her legs and legs, the leg pain is most severe, describes as intermittent bony pain, sometimes waking her up at night  - patient reports evaluation for bone pain has not revealed a source  - no pain relief while on steroids, tylenol ineffective  - currently using Norco 7.5 mg q 8 hours with partial relief  - pt's partner states that patient is under-reporting her pain and fearful of taking higher doses, she does admit she often splits tablets in half.  - patient will instructed to use Norco 7.5 mg q 6 hrs and instructed to use 1.5 doses as pretreatment for sleep, if needed, will discuss possible switch to oxycodone at next visit  - narcan provided  - opioid safety sheet provided in first visit folder  - will continue to monitor  "    Dyspnea/Fatigue/insomnia  - pt endorses moderate dyspnea, insomnia and severe fatigue   - fatigue likely multifactorial: pain, dyspnea, diminished dietary intake   - patient does report that she tries to walk daily, even if only for 5-10 minutes, if the weather is poor, she will walk insider her house  - she is able to perform all ADL's independently   - sometimes wakes up due to leg pain   - patient reports ativan 0.25 mg qhs help with sleep  - will address symptom cluster w goal to improve fatigue  - tip sheet provided in new patient folder  - will continue to monitor      Adjustment disorder with anxiety   - patient rates anxiety 3/10, depression 1/10  - her partner (Lata) observes patient to be intermittently depressed, "sometimes she tries to hurt my feelings"  - patient was a  nurse, 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  - patient reports that the robust social support provided by friends has faded since her initial diagnosis, partner Lata is her primary support, additionally a cousin,niece, nephew who lives out of state  - patient and partner have a well-developed emotional dialogue, for example: they have named their various emotional states as alter personalities, they find humor/levity where they can  - patient states that she worries about being a burden on her partner   - continue ativan 0.5 mg q 12 hrs qhs, patient reports only using half tablets to help with sleep  - tip sheet provided in new patient folder  - will continue to monitor   - discussed availability of onc-pscyh, pall DSW  - emotional support provided  - MSW GAIL Driscoll joined for first visit assessment, see separate note for detail  - will continue to monitor      Nausea/anorexia   - rates nausea 4/10   - rates anorexia 5/10, no intervention needed at this time   - continue zofran 8 mg PRN   - tip sheet provided in new patient folder  - will continue to monitor     Constipation   - reports constipation 2/2 chemo, opioids, " colitis   - promote good PO hydration  - start senna 2 tabs qhs, escalation of regimen as needed  - tip sheet provided in new patient folder  - will continue to monitor     Understanding of illness/Prognosis: good    Follow up: 12 weeks     Patient's encounter and above plan of care discussed with patient's oncology team.     SUBJECTIVE:     History of Present Illness:  Patient is a 72 y.o. year old female presenting with  Malignant neoplasm of upper lobe, right bronchus or lung Ms. Alesha Toney is a 72-year-old otherwise healthy female who started having some shoulder pain, which was lasting for over six weeks.  She went and saw her primary care physician and underwent an x-ray, which revealed right upper lobe lung mass worrisome for malignancy and a CT scan was recommended, which was done on 6/12/18 and that revealed a newly developing mass, pleural based, lateral posterior segment, right upper lobe 3.5 x 3.5 cm, surrounding stellate margin and patchy infiltrates, particularly superiorly, anteriorly infiltrates extend perihilar into the anterior segment. She then underwent CT-guided biopsy, which revealed well-differentiated adenocarcinoma.     03/28/2023:  QUINTEN NAIK reviewed and summarized:  As expected     Patient presents to clinic alone today. She is recently back from [facility] and is happy to report her most recent scan results show improvement. Her overall symptom burden is stable. She is still aggravated by leg pain she believes was caused by Entyvio infusions. The pain interrupts her sleep as done intermittent GERD which she admits is sometimes worsened by her dietary choices. She has been increasing her exercise lately and her mood is boosted due to scan results referenced above. Her onco at [facility] did request her to return soon for repeat scan but she deferred to have scan here, as the financial burden of trips to [place] is mounting.        02/01/2023:  QUINTEN NAIK reviewed and summarized:  01/26/2023 Lorazepam  0.5 mg tabs Disp: 60 for 30 days  01/10/2023 Hydrocodone-APAP 7.5-325 mg Disp: 90 for 30 days    Patient presents to clinic with her partner Lata for her initial visit. She is ECOG 0 with healthy appearance. She reports bony leg pain that is partially controlled with current regimen. Optimized today regimen today, will reassess at next visit. Discussed ACP, patient reports that she has been reluctant to discuss with her partner but endorses the value of establishing wants and wishes with family members, agrees to review documents at home to complete at next visit. Current regimen for anxiety and nausea are effective. Insomnia & fatigue at least partially attributable to uncontrolled pain. Optimized regimen for constipation today.     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     Squamous cell carcinoma of skin 10/05/2020    left medial thigh    Therapy     Thyroid nodule     Ulcerative colitis with complication      Past Surgical History:   Procedure Laterality Date    ADENOIDECTOMY  17yo    ANTERIOR CERVICAL DISCECTOMY W/ FUSION N/A 10/15/2018    Procedure: DISCECTOMY, SPINE, CERVICAL, ANTERIOR APPROACH, WITH FUSION C6/7  Depuy SNS: Motors/SSEP/Vocal Cord Regular OR table;  Surgeon: Greyson Bansal MD;  Location: [facility] OR [address] [place] [address];  Service: Neurosurgery;  Laterality: N/A;    APPENDECTOMY      BONE RESECTION, RIB  1980    BREAST BIOPSY Left     at least 40yrs ago in her 20's    BREAST CYST ASPIRATION      BREAST CYST EXCISION      COLONOSCOPY      ENDOBRONCHIAL ULTRASOUND N/A 7/10/2018    Procedure: ULTRASOUND, ENDOBRONCHIAL;  Surgeon: Sri Hearn MD;  Location: [facility] OR [address] [place] [address];  Service: Pulmonary;   Laterality: N/A;    ENDOBRONCHIAL ULTRASOUND N/A 9/24/2019    Procedure: ENDOBRONCHIAL ULTRASOUND (EBUS);  Surgeon: Sri Hearn MD;  Location: [facility] OR 2ND FLR;  Service: Pulmonary;  Laterality: N/A;    ENDOSCOPIC MUCOSAL RESECTION OF COLON N/A 9/11/2020    Procedure: RESECTION, MUCOSA, COLON, ENDOSCOPIC;  Surgeon: Lilibeth Carbajal MD;  Location: [facility] (2ND FLR);  Service: Endoscopy;  Laterality: N/A;    ENDOSCOPIC ULTRASOUND OF LOWER GASTROINTESTINAL TRACT N/A 4/19/2021    Procedure: ULTRASOUND, LOWER GI TRACT, ENDOSCOPIC;  Surgeon: Lilibeth Carbajal MD;  Location: [facility];  Service: Endoscopy;  Laterality: N/A;    ENDOSCOPIC ULTRASOUND OF LOWER GASTROINTESTINAL TRACT N/A 6/25/2021    Procedure: ULTRASOUND, LOWER GI TRACT, ENDOSCOPIC;  Surgeon: Silvino Christopher MD;  Location: [facility];  Service: Endoscopy;  Laterality: N/A;  Needs Rapid MP    FLEXIBLE SIGMOIDOSCOPY  06/11/2020    with biopsies    FLEXIBLE SIGMOIDOSCOPY N/A 6/11/2020    Procedure: SIGMOIDOSCOPY, FLEXIBLE;  Surgeon: Gely Yeh MD;  Location: [facility];  Service: Endoscopy;  Laterality: N/A;    FLEXIBLE SIGMOIDOSCOPY N/A 4/19/2021    Procedure: SIGMOIDOSCOPY-FLEXIBLE;  Surgeon: Lilibeth Carbajal MD;  Location: [facility];  Service: Endoscopy;  Laterality: N/A;  Covid 4/16 Brian MP    FLEXIBLE SIGMOIDOSCOPY N/A 6/3/2022    Procedure: SIGMOIDOSCOPY-FLEXIBLE;  Surgeon: Francesco Clark MD;  Location: [facility] (4TH FLR);  Service: Endoscopy;  Laterality: N/A;  vacc-inst portal-tb    HYSTERECTOMY      @47yrs of age    INSERTION OF TUNNELED CENTRAL VENOUS CATHETER (CVC) WITH SUBCUTANEOUS PORT N/A 9/25/2018    Procedure: [id number];  Surgeon: Dosc Diagnostic Provider;  Location: [facility] OR 2ND FLR;  Service: Radiology;  Laterality: N/A;    INSERTION OF VENOUS ACCESS PORT N/A 3/15/2021    Procedure: INSERTION, VENOUS ACCESS PORT;  Surgeon: Chang Mathews MD;  Location: [facility] OR 2ND FLR;  Service: General;  Laterality:  "N/A;  NEEDS CONSENT.    KIDNEY SURGERY      19yo    MEDIPORT REMOVAL N/A 3/15/2021    Procedure: REMOVAL, CATHETER, CENTRAL VENOUS, TUNNELED, WITH PORT;  Surgeon: Chang Mathews MD;  Location: [facility] OR [address] [place] [address];  Service: General;  Laterality: N/A;    OOPHORECTOMY      @47yrs of age    SKIN BIOPSY      TONSILLECTOMY      UPPER GASTROINTESTINAL ENDOSCOPY      VIDEO-ASSISTED THORACOSCOPIC LOBECTOMY Right 8/9/2018    Procedure: VATS, WITH LOBECTOMY Possible chest wall resection;  Surgeon: Philip Messina MD;  Location: [facility] OR [address] [place] [address];  Service: Thoracic;  Laterality: Right;  Have open pan available.     Family History   Problem Relation Age of Onset    Stroke Mother     Cataracts Mother     Cancer Father         colon cancer    Colon cancer Father     Diabetes Brother     Heart failure Brother     Hypertension Brother     Heart attack Paternal Aunt     Heart attack Maternal Grandfather     Diabetes Paternal Aunt     Anxiety disorder Paternal Grandmother         agoraphobia    Anesthesia problems Neg Hx     Blindness Neg Hx     Amblyopia Neg Hx     Glaucoma Neg Hx     Macular degeneration Neg Hx     Retinal detachment Neg Hx     Strabismus Neg Hx     Thyroid disease Neg Hx     Melanoma Neg Hx      Review of patient's allergies indicates:   Allergen Reactions    Adhesive Itching, Rash and Blisters     INCLUDES EKG PADS    Ciprofloxacin Hives     Hives    Iodinated contrast media     Pcn [penicillins]      rash    Phenergan plain Other (See Comments)     "crazy behavior"    Phenergan [promethazine]     Tramadol     Vancomycin analogues      Red man syndrome       Medications:    Current Outpatient Medications:     ascorbic acid, vitamin C, (VITAMIN C) 500 MG tablet, Take 500 mg by mouth once daily., Disp: , Rfl:     atenoloL (TENORMIN) 25 MG tablet, Take 1 tablet (25 mg total) by mouth 3 (three) times daily., Disp: 270 tablet, Rfl: 3    atorvastatin (LIPITOR) 20 MG tablet, Take 1 tablet (20 mg total) by mouth " once daily., Disp: 30 tablet, Rfl: 11    calcium carbonate (TUMS) 300 mg (750 mg) Chew, Take by mouth as needed. , Disp: , Rfl:     esomeprazole (NEXIUM) 40 MG capsule, Take 40 mg by mouth 2 (two) times daily before meals., Disp: , Rfl:     famotidine (PEPCID) 10 MG tablet, Take 10 mg by mouth once daily., Disp: , Rfl:     HYDROcodone-acetaminophen (NORCO) 7.5-325 mg per tablet, Take 1 tablet by mouth every 6 (six) hours as needed for Pain., Disp: 120 tablet, Rfl: 0    LORazepam (ATIVAN) 0.5 MG tablet, TAKE 1 TABLET EVERY 12 HOURS AS NEEDED FOR ANXIETY, Disp: 60 tablet, Rfl: 2    losartan (COZAAR) 50 MG tablet, 1 tab po bid, Disp: 180 tablet, Rfl: 3    naloxone (NARCAN) 4 mg/actuation Spry, by Nasal route as needed., Disp: , Rfl:     ondansetron (ZOFRAN) 8 MG tablet, Take 8 mg by mouth as needed., Disp: , Rfl:     OBJECTIVE:       ROS:  Review of Systems   Constitutional:  Positive for activity change, appetite change and fatigue.   HENT: Negative.  Negative for congestion, dental problem and drooling.    Eyes: Negative.  Negative for pain, discharge and itching.   Respiratory:  Positive for shortness of breath. Negative for cough, choking, wheezing and stridor.    Cardiovascular: Negative.  Negative for chest pain, palpitations and leg swelling.   Gastrointestinal:  Positive for constipation and nausea. Negative for diarrhea and vomiting.   Endocrine: Negative.  Negative for heat intolerance and polydipsia.   Genitourinary: Negative.  Negative for difficulty urinating, dyspareunia and dysuria.   Musculoskeletal:  Positive for arthralgias. Negative for back pain, gait problem and joint swelling.   Skin: Negative.  Negative for color change, pallor and rash.   Neurological: Negative.  Negative for seizures, speech difficulty and weakness.   Psychiatric/Behavioral:  Positive for sleep disturbance. Negative for confusion and dysphoric mood. The patient is nervous/anxious.      Review of Symptoms      Symptom Assessment  (ESAS 0-10 Scale)  Pain:  5  Dyspnea:  3  Anxiety:  3  Nausea:  4  Depression:  1  Anorexia:  2  Fatigue:  6  Insomnia:  8  Restlessness:  0  Agitation:  0     CAM / Delirium:  Negative  Constipation:  Positive  Diarrhea:  Negative    Anxiety:  Is nervous/anxious  Constipation:  Constipation    Bowel Management Plan (BMP):  Yes      Pain Assessment:  OME in 24 hours:  40  Location(s): leg    Leg       Location: bilateral        Quality: Throbbing, aching and dull        Quantity: 3/10 in intensity        Chronicity: Onset 1 year(s) ago, unchanged        Aggravating Factors: Activity        Alleviating Factors: Opiates (heating pad)        Associated Symptoms: None    Modified Josefa Scale:  1    ECOG Performance Status stGstrstastdstest:st st1st Living Arrangements:  Lives with spouse    Psychosocial/Cultural:   See Palliative Psychosocial Note: Yes  **Primary  to Follow**  Palliative Care  Consult: No    Advance Care Planning   Advance Directives:   Living Will: No        Oral Declaration: No    LaPOST: No    Do Not Resuscitate Status: No    Medical Power of : Yes        Oral Declaration: Yes   Witnesses:  Mayela Beaver and Johnna Driscoll   Agent's Name:  Lata Mathews   Agent's Contact Number:  [phone]    Decision Making:  Patient answered questions  Goals of Care: What is most important right now is to focus on remaining as independent as possible, symptom/pain control, quality of life, even if it means sacrificing a little time. Accordingly, we have decided that the best plan to meet the patient's goals includes continuing with palliative care.            Physical Exam:    Vitals:    03/28/23 1055   BP: (!) 161/66   Pulse: 63      Physical Exam  Constitutional:       General: She is not in acute distress.     Appearance: Normal appearance. She is normal weight. She is not ill-appearing, toxic-appearing or diaphoretic.   HENT:      Head: Normocephalic and atraumatic.      Right Ear:  Tympanic membrane normal.      Left Ear: Tympanic membrane normal.      Nose: Nose normal.   Eyes:      Extraocular Movements: Extraocular movements intact.      Conjunctiva/sclera: Conjunctivae normal.   Cardiovascular:      Rate and Rhythm: Regular rhythm.   Pulmonary:      Effort: Pulmonary effort is normal. No respiratory distress.      Breath sounds: No wheezing.   Abdominal:      General: Abdomen is flat. There is no distension.      Palpations: Abdomen is soft.   Musculoskeletal:         General: No swelling or deformity. Normal range of motion.      Cervical back: Normal range of motion.   Skin:     General: Skin is warm and dry.      Coloration: Skin is not jaundiced.      Findings: No bruising.   Neurological:      General: No focal deficit present.      Mental Status: She is alert and oriented to person, place, and time. Mental status is at baseline.      Motor: No weakness.      Gait: Gait normal.   Psychiatric:         Mood and Affect: Mood normal.         Behavior: Behavior normal.         Thought Content: Thought content normal.         Judgment: Judgment normal.       Labs:  CBC:   WBC   Date Value Ref Range Status   11/09/2022 5.39 3.90 - 12.70 K/uL Final     Hemoglobin   Date Value Ref Range Status   11/09/2022 11.5 (L) 12.0 - 16.0 g/dL Final     POC Hematocrit   Date Value Ref Range Status   08/09/2018 33 (L) 36 - 54 %PCV Final     Hematocrit   Date Value Ref Range Status   11/09/2022 34.8 (L) 37.0 - 48.5 % Final     MCV   Date Value Ref Range Status   11/09/2022 97 82 - 98 fL Final     Platelets   Date Value Ref Range Status   11/09/2022 344 150 - 450 K/uL Final       LFT:   Lab Results   Component Value Date    AST 22 02/06/2023    ALKPHOS 48 (L) 02/06/2023    BILITOT 0.3 02/06/2023       Albumin:   Albumin   Date Value Ref Range Status   02/06/2023 3.8 3.5 - 5.2 g/dL Final     Protein:   Total Protein   Date Value Ref Range Status   02/06/2023 6.7 6.0 - 8.4 g/dL Final       Radiology:I have  "reviewed all pertinent imaging results/findings within the past 24 hours.    Per oncology note: CT chest performed at [facility] on 3/20/2023 reveals "Decreased in FDG avid recurrent disease in the right hemithorax. Decreased bilateral lower lobe nodules. Other lung nodules are unchanged or decreased.  New left lower lobe solid nodule in an area of clustered micronodules may be inflammatory. Reassess at follow-up"      I spent a total of 50 minutes on the day of the visit.This includes face to face time in discussion of goals of care, symptom assessment, coordination of care and emotional support.  This also includes non-face to face time preparing to see the patient (eg, review of tests/imaging), obtaining and/or reviewing separately obtained history, documenting clinical information in the electronic or other health record, independently interpreting results and communicating results to the patient/family/caregiver, or care coordinator.     Signature: Mayela Beaver DNP    "

## 2023-04-04 ENCOUNTER — PATIENT MESSAGE (OUTPATIENT)
Dept: PALLIATIVE MEDICINE | Facility: CLINIC | Age: 73
End: 2023-04-04
Payer: MEDICARE

## 2023-04-04 RX ORDER — HYDROCODONE BITARTRATE AND ACETAMINOPHEN 7.5; 325 MG/1; MG/1
1 TABLET ORAL EVERY 6 HOURS PRN
Qty: 120 TABLET | Refills: 0 | Status: SHIPPED | OUTPATIENT
Start: 2023-04-04 | End: 2023-05-03 | Stop reason: SDUPTHER

## 2023-05-02 ENCOUNTER — PATIENT MESSAGE (OUTPATIENT)
Dept: PALLIATIVE MEDICINE | Facility: CLINIC | Age: 73
End: 2023-05-02
Payer: MEDICARE

## 2023-05-03 RX ORDER — HYDROCODONE BITARTRATE AND ACETAMINOPHEN 7.5; 325 MG/1; MG/1
1 TABLET ORAL EVERY 6 HOURS PRN
Qty: 120 TABLET | Refills: 0 | Status: SHIPPED | OUTPATIENT
Start: 2023-05-03 | End: 2023-06-01 | Stop reason: SDUPTHER

## 2023-05-15 ENCOUNTER — PATIENT MESSAGE (OUTPATIENT)
Dept: HEMATOLOGY/ONCOLOGY | Facility: CLINIC | Age: 73
End: 2023-05-15
Payer: MEDICARE

## 2023-05-16 ENCOUNTER — HOSPITAL ENCOUNTER (OUTPATIENT)
Dept: RADIOLOGY | Facility: HOSPITAL | Age: 73
Discharge: HOME | End: 2023-05-16
Attending: INTERNAL MEDICINE
Payer: MEDICARE

## 2023-05-16 DIAGNOSIS — C34.90 MALIGNANT NEOPLASM OF UNSPECIFIED PART OF UNSPECIFIED BRONCHUS OR LUNG: ICD-10-CM

## 2023-05-16 DIAGNOSIS — C34.90 MALIGNANT NEOPLASM OF UNSPECIFIED PART OF UNSPECIFIED BRONCHUS OR LUNG: Primary | ICD-10-CM

## 2023-05-16 PROCEDURE — 71250 CT CHEST WITHOUT CONTRAST: ICD-10-PCS | Mod: 26,,, | Performed by: RADIOLOGY

## 2023-05-16 PROCEDURE — 71250 CT THORAX DX C-: CPT | Mod: 26,,, | Performed by: RADIOLOGY

## 2023-05-16 PROCEDURE — 71250 CT THORAX DX C-: CPT | Mod: TC

## 2023-05-17 ENCOUNTER — OFFICE VISIT (OUTPATIENT)
Dept: HEMATOLOGY/ONCOLOGY | Facility: CLINIC | Age: 73
End: 2023-05-17
Payer: MEDICARE

## 2023-05-17 VITALS
TEMPERATURE: 98 F | OXYGEN SATURATION: 98 % | HEIGHT: 66 IN | HEART RATE: 62 BPM | WEIGHT: 148.38 LBS | BODY MASS INDEX: 23.84 KG/M2 | SYSTOLIC BLOOD PRESSURE: 156 MMHG | RESPIRATION RATE: 18 BRPM | DIASTOLIC BLOOD PRESSURE: 67 MMHG

## 2023-05-17 DIAGNOSIS — J98.4 PNEUMONITIS: Primary | ICD-10-CM

## 2023-05-17 PROCEDURE — 99999 PR PBB SHADOW E&M-EST. PATIENT-LVL IV: ICD-10-PCS | Mod: PBBFAC,,, | Performed by: INTERNAL MEDICINE

## 2023-05-17 PROCEDURE — 99214 OFFICE O/P EST MOD 30 MIN: CPT | Mod: PBBFAC | Performed by: INTERNAL MEDICINE

## 2023-05-17 PROCEDURE — 99215 OFFICE O/P EST HI 40 MIN: CPT | Mod: S$PBB,,, | Performed by: INTERNAL MEDICINE

## 2023-05-17 PROCEDURE — 99999 PR PBB SHADOW E&M-EST. PATIENT-LVL IV: CPT | Mod: PBBFAC,,, | Performed by: INTERNAL MEDICINE

## 2023-05-17 PROCEDURE — 99215 PR OFFICE/OUTPT VISIT, EST, LEVL V, 40-54 MIN: ICD-10-PCS | Mod: S$PBB,,, | Performed by: INTERNAL MEDICINE

## 2023-05-17 RX ORDER — SULFAMETHOXAZOLE AND TRIMETHOPRIM 400; 80 MG/1; MG/1
TABLET ORAL
Qty: 48 TABLET | Refills: 0 | Status: SHIPPED | OUTPATIENT
Start: 2023-05-17 | End: 2023-08-10 | Stop reason: ALTCHOICE

## 2023-05-17 RX ORDER — AMOXICILLIN AND CLAVULANATE POTASSIUM 875; 125 MG/1; MG/1
1 TABLET, FILM COATED ORAL 2 TIMES DAILY
Qty: 28 TABLET | Refills: 0 | Status: SHIPPED | OUTPATIENT
Start: 2023-05-17 | End: 2023-05-31

## 2023-05-17 RX ORDER — PREDNISONE 10 MG/1
TABLET ORAL
Qty: 120 TABLET | Refills: 1 | Status: SHIPPED | OUTPATIENT
Start: 2023-05-17 | End: 2023-08-10 | Stop reason: ALTCHOICE

## 2023-05-17 NOTE — PROGRESS NOTES
"Subjective     Patient ID: Alesha Toney is a 72 y.o. female.    Chief Complaint: Malignant neoplasm of upper lobe, right bronchus or lung  Ms. Alesha Toney is a 72-year-old otherwise healthy female who started having some shoulder pain, which was lasting for over six weeks.  She went and saw her primary care physician and underwent an x-ray, which revealed right upper lobe lung mass worrisome for malignancy and a CT scan was recommended, which was done on 6/12/18 and that revealed a newly developing mass, pleural based, lateral posterior segment, right upper lobe 3.5 x 3.5 cm, surrounding stellate margin and patchy infiltrates, particularly superiorly,   anteriorly infiltrates extend perihilar into the anterior segment.  She then underwent CT-guided biopsy, which revealed well-differentiated adenocarcinoma.       Her PET scan from 6/29/18 There is physiologic intracranial, head, and neck activity.  There is a large right upper lobe mass SUV max 9.11.  No local or distant metastatic disease seen.  There is physiologic liver, spleen, GI and  activity.  There are a few liver cysts.  No adenopathy is seen.  The adrenal glands are not enlarged.  No adenopathy is seen.  No suspicious bone lesions are seen.     She underwent VATS with right upper lobectomy. Mediastinal lymphadenectomy on 8/9/18. Pathology revealed "Tumor site: Upper lobe.Tumor size: 7 x 4 x 2.7 cm.Tumor focality: Single tumor.  Histologic type: Mixed invasive mucinous and nonmucinous adenocarcinoma. Histologic grade: G2: Moderately differentiated.Spread through air spaces (STATS): Present. Visceral pleura invasion: Not identified.  Lymphovascular invasion: Not identified. Direct invasion of adjacent structures: No adjacent structures present. Margins: All margins are uninvolved by carcinoma.Distance of invasive carcinoma from closest margin: 1 cm from the bronchial margin.Treatment effect: No known presurgical therapy. Regional lymph nodes: Number " "of lymph nodes involved: 0. Number of lymph nodes examined: 11. Pathologic Stage Classification: pT3 N0"        She completed 4 cycles of adjuvant chemo with Cisplatin and Alimta as of 1/10/19   She is on surveillance.     CT chest of 9/17/19 which reveals "Operative change of right upper lobectomy for small-cell lung cancer with several scattered subsolid opacities and a new solid nodule in the right lower lobe measuring up to 0.5 cm.  We recommend continued surveillance with the role and schedule for such surveillance to be determined by clinical considerations. Sided chest port distal tip terminating at the confluence of the brachiocephalic vein in the SVC.  Correlate with device functioning. Apically predominant emphysematous changes, left greater than right. Aortic annular calcification and multi-vessel coronary artery atherosclerosis. Numerous unchanged hepatic hypodensities, likely cysts"     PET scan from 10/1/19 reveals "1.6 cm soft tissue lesion re-identified posterior to the bronchus intermedius.  No corresponding focal increased radiotracer uptake to suggest local recurrence or metastatic disease. Stable subcentimeter opacities throughout the bilateral lower lobes, too small to characterize with PET-CT. Focal increased radiotracer uptake and subtle wall thickening in the rectum.  Findings are concerning for primary neoplastic process.  Recommend further evaluation with direct visualization"     She returned from [facility] and was advised to proceed with chemo/RT with either Docetaxel/platinum or Carboplatin/Alimta.     She completed chemo/RT with Carboplatin and Alimta as of 12/31/19     She underwent CT chest on 5/27/2020 which revealed 1. In this patient with history of lung cancer status post right upper lobectomy, there is a soft tissue opacity at the posterior margin of the staple line.  This lesion has been enlarging progressively and now measures 1.3 x 1.6 cm.  There is also a new 1.7 x 1.7 cm " "opacity at the right paravertebral pleural margin which is inseparable from this lesion.  These findings may represent post radiation change in light of the hypermetabolic lesion in this region on PET-CT of 10/01/2019 (image 69/[id number]).There is a 1 cm opacity in the superior or lateral basal segment of the right lower lobe (axial series 4, image 243) which has enlarged since 09/17/2019.  Nature of this lesion is unclear.  Clinical considerations will determine if percutaneous biopsy or metabolic assessment is warranted. 1.0 cm solid opacity with branching configuration (series 4, image 205) is located in the lateral basal segment of the left lower lobe, stable since 02/16/2019 (02/06/2019 axial series 4, image 310). Appearance and bifurcating character suggests mucoid impaction in mildly ectatic peripheral airway, likely not significant. Persistent clustered small centrilobular nodules in the apical segment of the right upper lobe likely reflecting small airway inflammation/infection. Partially visualized left chest port with distal catheter tip at the junction of the brachiocephalic veins and SVC, similar as on 09/17/2019"  She thus underwent PET scan on 6/1/2020 which revealed "No evidence of residual viable lung malignancy.  Evolving post radiation changes in the right paramediastinal lung, with a new irregular subpleural density which may also be related to recent radiation.  Attention on follow-up. Interval decrease in size of a soft tissue lesion along the inferior margin of the lobectomy stable line.  Several stable subcentimeter soft tissue opacities in the lower lobes bilaterally, As above.  Attention on follow-up.  Persistent hypermetabolic rectal lesion concerning for malignancy.  Recommend direct visualization and tissue sampling as clinically indicated"  I discussed her case in thoracic conference today and findings are felt to be related to RT     Her restaging CT scans from 1/20/2021 which reveal " ""Persistent soft tissue opacity in the posterior margin of the staple line that is increased in prominence when compared to the prior examination and is worrisome for disease progression. No new lesions identified.  Stable pulmonary nodules. Stable hypodense lesions throughout the liver that likely represent hepatic cysts"        Restaging PET scan from 1/27/2021 reveals "Increased size and hypermetabolic activity involving soft tissue opacity along the inferior border of the right upper lobectomy stable line concerning for progression of disease. Mildly increased hypermetabolic activity involving the rectum compatible with known high-grade dysplasia/intramucosal carcinoma. Interval decrease in size and resolution of hypermetabolic activity involving subcentimeter subpleural opacity within the right lower lobe"     MRI brain from  2/3/2021 reveals no evidence of recurrence.  GUARDANT testing only reveals p53, PDL1 is 0 and no other targets        PET scan from 3/24/2021 reveals "In this patient with known lung cancer, there is a persistent hypermetabolic right hilar mass consistent with viable tumor.  Interval increase in size is equivocal for progression of fibrosis versus tumor growth. 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  Activity appears more focal and possibly more proximal than before.  Consider direct visualization for further evaluation. The previously described subcentimeter subpleural opacity within the right lower lobe is not definitely seen on today's exam"               She last received immunotherapy on 5/28/2021. She underwent EUS on 6/25/2021 which revealed "Erythematous, congested, and ulcerated mucosa  (proctitis) in rectosigmoid colon.  Pathology reveals Colon, rectosigmoid, biopsy:  Moderate active colitis     She completed steroids about 5 weeks ago.      CT scans from 11/5/2021 reveals "In this patient with lung " "cancer, there is postoperative changes of right upper lobe resection.  Persistent soft tissue opacity along the right hilum suture line which is slightly increased in size from prior exam.  More conspicuous appearing nodules within the right lower lobe seen on prior exam.  New nodule within the right middle lobe measuring 1.3 cm.  Overall findings are all worrisome for disease progression. Scattered areas of centrilobular nodularity within the bilateral lungs.  Nonspecific and can be seen in the setting of infectious or inflammatory etiologies. Unchanged hepatics cysts. Stable left adrenal gland nodule."  PET scan from 11/23/2021 revealed "New hypermetabolic nodule within the right lower lobe additional slightly subcentimeter nodules within the right lower lobe.  Findings concerning for disease progression, other differentials include infection or noninfectious inflammatory process. Interval decrease in size and uptake in the right hilar mass. Resolution of previous focal rectal uptake"        She is on Opdivo. Her Opdivo was held on 1/10/2022 due to rectal bleeding. She saw Dr. Clark and was noted to have anal fissure which contributed to rectal bleed. Her rectal bleed has since resolved.           She was on Opdivo until 5/24/2022, she noted rectal bleeding and underwent   Flex sigmoidoscopy on 6/3/2022 reveals "Localized severe inflammation was found in the distal rectum. Biopsied. Pathology reveals Severely active chronic colitis with ulceration (see comment)  Moderate architectural disorder. Negative for CMV by immunohistochemistry . Negative for granulomas or viral inclusions. Negative for dysplasia or carcinoma"  PET scan from 6/17/2022 reveals "In this patient with lung cancer there are enlarging hypermetabolic right perihilar opacities concerning for continued local disease progression. Continued decreased metabolic activity of the right lower lobe nodule.  Cluster of pulmonary nodules bilaterally, some " "which are new from prior PET-CT for example the superior segment the left upper lobe and are likely infectious/inflammatory origin. Persistent and more extensive region of increased FDG avidity within the distal rectum in keeping with more extensive colitis.  Malignancy could have a similar appearance.  Recommend further investigation as clinically warranted and attention on follow-up"     She has active proctitis and is on hydrocortisone suppositories and Entyvyo since early June 2022.   CT chest on 8/1/2022 reveals  "Similar appearance of elongated, irregular opacity adjacent to the right upper lobectomy margin.  More posteriorly adjacent perihilar opacities with previous FDG avidity with detailed measurements as above. Similar areas of scattered micro-nodularity with tree-in-bud, likely infectious/inflammatory in nature.  New appearing area of grouped micro-nodules in the anterior left lower lobe, largest up to 5 mm, which could relate to aforementioned small airways disease, though technically indeterminate.  Continued close attention at follow-up. RECIST SUMMARY: Date of prior examination for comparison: CT chest dated 05/24/2022. Lesion 1: Right perihilar opacity.  2.4.  Series 2, image 45.  Previously 2 4.  (Note there is slight increased size conspicuity in the craniocaudal dimension at 1.8 cm, previously 1.5 cm). Lesion 2: Right perihilar opacity.  1.7 cm.  Series 2, image 59.  Previously 1.5 cm"     She underwent a repeat CT chest 8/31/2022 and that revealed "Status post right upper lobectomy.  Soft tissue thickening adjacent to the suture line appears stable.  However, there is interval enlargement of a right lower lobe, bilobed, paramediastinal mass-like consolidation.  When dominant components of the lesions are measured separately, dimensions are summarized below. RECIST SUMMARY: Date of prior examination for comparison: CT chest 08/01/2022. Lesion 1: Right perihilar opacity.  4.7 cm. Series 4 image " "77.  Prior measurement 2.4 cm. Lesion 2: Right perihilar (infrahilar) opacity.  2.7 cm. Series 4 image 237.  Prior measurement 1.7 cm"  Case reviewed in thoracic conference, and concern for disease progression exists so plan on immunotehrapy if able to taper steroids           PET scan from 10/6/2022 reveals "Interval increase in size and radiotracer uptake of a right pulmonary lesions in this patient with known non-small cell lung cancer.  There are few new subcentimeter pulmonary nodules in the right lower lobe with no significant uptake, could represent infectious versus inflammatory process.  However, additional new metastatic disease can not be excluded.  Attention on follow-up. Improved metabolic appearance of the lower rectum compared with the prior exam"  She is currently off of prednisone and Entyvio.  She was on  Carboplatin and Alimta, started on 10/14/2022        She has completed proton beam completed on 1/24/2023.      MD Mancer[facility], CT chest there from 3/20/2023 reveals "Decreased in FDG avid recurrent disease in the right hemithorax. Decreased bilateral lower lobe nodules. Other lung nodules are unchanged or decreased.  New left lower lobe solid nodule in an area of clustered micronodules may be inflammatory. Reassess at follow-up" Plan on restaging scans in 3 months     HPICT chest from 5/126/2023 reveals "New right lower lobe consolidations and a small right pleural effusion.  Concerning for pneumonia and a small parapneumonic effusion.  However, should also consider treatment related pneumonitis .Stable right lower lobe paramediastinal irregular opacity, likely status post treatment of previous disease in same site"   She notes worsening shortness of breath and pain in her right chest and lateral chest when she takes a deep breath    Review of Systems   Constitutional:  Positive for fatigue. Negative for appetite change and unexpected weight change.   HENT:  Negative for mouth sores.    Eyes:  " Negative for visual disturbance.   Respiratory:  Positive for shortness of breath. Negative for cough.         Right sided chest pain and right lateral wall chest pain worse with breathing   Cardiovascular:  Negative for chest pain.   Gastrointestinal:  Negative for abdominal pain and diarrhea.   Genitourinary:  Negative for frequency.   Musculoskeletal:  Negative for back pain.   Integumentary:  Negative for rash.   Neurological:  Negative for headaches.   Hematological:  Negative for adenopathy.   Psychiatric/Behavioral:  The patient is not nervous/anxious.    All other systems reviewed and are negative.       Objective     Physical Exam  Vitals reviewed.   Constitutional:       Appearance: She is well-developed.   HENT:      Mouth/Throat:      Pharynx: No oropharyngeal exudate.   Cardiovascular:      Rate and Rhythm: Normal rate.      Heart sounds: Normal heart sounds.   Pulmonary:      Effort: Pulmonary effort is normal.      Breath sounds: Normal breath sounds. No wheezing.   Abdominal:      General: Bowel sounds are normal.      Palpations: Abdomen is soft.      Tenderness: There is no abdominal tenderness.   Musculoskeletal:         General: No tenderness.   Lymphadenopathy:      Cervical: No cervical adenopathy.   Skin:     General: Skin is warm and dry.      Findings: No rash.   Neurological:      Mental Status: She is alert and oriented to person, place, and time.      Coordination: Coordination normal.   Psychiatric:         Thought Content: Thought content normal.         Judgment: Judgment normal.        Assessment and Plan     Problem List Items Addressed This Visit    None  Visit Diagnoses       Pneumonitis    -  Primary    Relevant Medications    amoxicillin-clavulanate 875-125mg (AUGMENTIN) 875-125 mg per tablet    predniSONE (DELTASONE) 10 MG tablet    sulfamethoxazole-trimethoprim 400-80mg (BACTRIM) 400-80 mg per tablet              Route Chart for Scheduling    Med Onc Chart Routing      Follow  up with physician . Reschedule CT chest to the week of 6/28/2023 and see Dr. Hearn in pulmonary at that time. Reschedule my appointment to mid July 2023   Follow up with YAMILE    Infusion scheduling note    Injection scheduling note    Labs    Imaging    Pharmacy appointment    Other referrals             Therapy Plan Information  PORT FLUSH  Flushes  heparin, porcine (PF) 100 unit/mL injection flush 500 Units  500 Units, Intravenous, Every visit  sodium chloride 0.9% flush 10 mL  10 mL, Intravenous, Every visit     Reviewed CT images personally with and also reviewed with Dr. Hearn in Pulmonary and Dr. Dawn In Radiation oncology. Will plan to treat like pneumonitis since she had proton beam in 1/2023.  Escribed Augmentin (has rash as a child but no further issues) and prednisone (40 mg X 2 weeks and then 20 mg X 2 weeks and then she will stay on 10 mg daily until she sees Dr. Hearn at the end of June 2023 with restaging CT chest  She will also take Pepcid twice a day and I also escribed Bactrim for PCP prophylaxis    I will see her back in mid July 2023    Above care plan was discussed with patient and accompanying significant other and all questions were addressed to their satisfaction

## 2023-05-25 ENCOUNTER — TELEPHONE (OUTPATIENT)
Dept: INTERNAL MEDICINE | Facility: CLINIC | Age: 73
End: 2023-05-25
Payer: MEDICARE

## 2023-05-25 ENCOUNTER — PATIENT MESSAGE (OUTPATIENT)
Dept: HEMATOLOGY/ONCOLOGY | Facility: CLINIC | Age: 73
End: 2023-05-25
Payer: MEDICARE

## 2023-05-25 RX ORDER — NYSTATIN 100000 [USP'U]/ML
6 SUSPENSION ORAL 4 TIMES DAILY
Qty: 240 ML | Refills: 1 | Status: SHIPPED | OUTPATIENT
Start: 2023-05-25 | End: 2023-06-04

## 2023-05-25 NOTE — TELEPHONE ENCOUNTER
----- Message from Jc Nicole sent at 5/25/2023 11:47 AM CDT -----  Contact: self [phone]  Pt stated on penicillin and have thrash in her mouth requesting an RX.      [id number] - [place] - [address] AT [facility]  [address] [address]  Phone: [phone] Fax: [phone]    Please call and advise

## 2023-05-31 ENCOUNTER — TELEPHONE (OUTPATIENT)
Dept: PULMONOLOGY | Facility: CLINIC | Age: 73
End: 2023-05-31
Payer: MEDICARE

## 2023-05-31 NOTE — TELEPHONE ENCOUNTER
Spoke with patient, informed her that I have received her message. I advised patient that Dr Hearn does not have any available appointments at this time but however being that Dr Hearn requested to visit with patient, I will forward patient message to Dr Hearn to review/advise in regards to seeing if Dr Hearn wants to squeeze patient appointment into a slot. Patient verbalized that she understand.

## 2023-05-31 NOTE — TELEPHONE ENCOUNTER
----- Message from Nena Pena sent at 5/31/2023 10:50 AM CDT -----  Regarding: Urgent for pt Lung Cancer and Pneumonitis   must be after CT June 28,29,30th  Contact: @[phone]  Lung Cancer and pneumonitis    Urgent for pt Lung Cancer and Pneumonitis   must be after CT June 28,29,30th    Please call and advise, had been at tumor board for pt and Nadiya requested to see pt @[phone]

## 2023-06-01 ENCOUNTER — PATIENT MESSAGE (OUTPATIENT)
Dept: PALLIATIVE MEDICINE | Facility: CLINIC | Age: 73
End: 2023-06-01
Payer: MEDICARE

## 2023-06-01 RX ORDER — HYDROCODONE BITARTRATE AND ACETAMINOPHEN 7.5; 325 MG/1; MG/1
1 TABLET ORAL EVERY 6 HOURS PRN
Qty: 120 TABLET | Refills: 0 | Status: SHIPPED | OUTPATIENT
Start: 2023-06-01 | End: 2023-06-28

## 2023-06-07 ENCOUNTER — TELEPHONE (OUTPATIENT)
Dept: PULMONOLOGY | Facility: CLINIC | Age: 73
End: 2023-06-07
Payer: MEDICARE

## 2023-06-07 NOTE — TELEPHONE ENCOUNTER
Spoke with patient, informed her that I have received her message. Patient states that she is scheduled for a Ct Scan on 6/27/23 and wants to follow up with

## 2023-06-07 NOTE — TELEPHONE ENCOUNTER
----- Message from Audreyjuan f Michael sent at 2023 10:42 AM CDT -----  Regardinnd call to get appt  Contact: @[phone]  Pt is calling in to get an appt, pt states that she was supposed to receive a call back to get schedule after her CT scan. Please call to discuss further. Pt states that it urgent she gets seen.

## 2023-06-09 ENCOUNTER — TELEPHONE (OUTPATIENT)
Dept: PULMONOLOGY | Facility: CLINIC | Age: 73
End: 2023-06-09
Payer: MEDICARE

## 2023-06-09 DIAGNOSIS — R06.02 SOB (SHORTNESS OF BREATH): Primary | ICD-10-CM

## 2023-06-09 NOTE — TELEPHONE ENCOUNTER
Spoke with patient, informed her that I have received her message. I also advised patient that I can schedule her appointment with Dr Hearn on 7/10/23 for 10:00 am with Pulmonary Function Test and six minute walk test scheduled prior to patient appointment with Dr Hearn. Patient verbalized that she understand and accepted the appointment.

## 2023-06-09 NOTE — TELEPHONE ENCOUNTER
----- Message from Tri Bean sent at 6/9/2023  8:23 AM CDT -----  Alesha Toney calling regarding Appointment Access for wanting to speak to Sravanthi about the request that they spoke about on 06/07/23 to get her scheduled to see the doctor after the CT scan, PT stated that she have not heard back to inform, please call back [phone]

## 2023-06-13 ENCOUNTER — PATIENT MESSAGE (OUTPATIENT)
Dept: HEMATOLOGY/ONCOLOGY | Facility: CLINIC | Age: 73
End: 2023-06-13
Payer: MEDICARE

## 2023-06-22 ENCOUNTER — LAB VISIT (OUTPATIENT)
Dept: LAB | Facility: HOSPITAL | Age: 73
End: 2023-06-22
Attending: INTERNAL MEDICINE
Payer: MEDICARE

## 2023-06-22 DIAGNOSIS — C34.11 MALIGNANT NEOPLASM OF UPPER LOBE, RIGHT BRONCHUS OR LUNG: ICD-10-CM

## 2023-06-22 LAB
ALBUMIN SERPL BCP-MCNC: 3.6 G/DL (ref 3.5–5.2)
ALP SERPL-CCNC: 49 U/L (ref 55–135)
ALT SERPL W/O P-5'-P-CCNC: 33 U/L (ref 10–44)
ANION GAP SERPL CALC-SCNC: 10 MMOL/L (ref 8–16)
AST SERPL-CCNC: 26 U/L (ref 10–40)
BASOPHILS # BLD AUTO: 0.03 K/UL (ref 0–0.2)
BASOPHILS NFR BLD: 0.5 % (ref 0–1.9)
BILIRUB SERPL-MCNC: 0.4 MG/DL (ref 0.1–1)
BUN SERPL-MCNC: 21 MG/DL (ref 8–23)
CALCIUM SERPL-MCNC: 9.8 MG/DL (ref 8.7–10.5)
CHLORIDE SERPL-SCNC: 107 MMOL/L (ref 95–110)
CO2 SERPL-SCNC: 26 MMOL/L (ref 23–29)
CREAT SERPL-MCNC: 1.2 MG/DL (ref 0.5–1.4)
DIFFERENTIAL METHOD: ABNORMAL
EOSINOPHIL # BLD AUTO: 0.2 K/UL (ref 0–0.5)
EOSINOPHIL NFR BLD: 2.3 % (ref 0–8)
ERYTHROCYTE [DISTWIDTH] IN BLOOD BY AUTOMATED COUNT: 12.6 % (ref 11.5–14.5)
EST. GFR  (NO RACE VARIABLE): 48.1 ML/MIN/1.73 M^2
GLUCOSE SERPL-MCNC: 136 MG/DL (ref 70–110)
HCT VFR BLD AUTO: 39.7 % (ref 37–48.5)
HGB BLD-MCNC: 12.8 G/DL (ref 12–16)
IMM GRANULOCYTES # BLD AUTO: 0.03 K/UL (ref 0–0.04)
IMM GRANULOCYTES NFR BLD AUTO: 0.5 % (ref 0–0.5)
LYMPHOCYTES # BLD AUTO: 2.2 K/UL (ref 1–4.8)
LYMPHOCYTES NFR BLD: 33.6 % (ref 18–48)
MCH RBC QN AUTO: 31.7 PG (ref 27–31)
MCHC RBC AUTO-ENTMCNC: 32.2 G/DL (ref 32–36)
MCV RBC AUTO: 98 FL (ref 82–98)
MONOCYTES # BLD AUTO: 0.6 K/UL (ref 0.3–1)
MONOCYTES NFR BLD: 9.7 % (ref 4–15)
NEUTROPHILS # BLD AUTO: 3.5 K/UL (ref 1.8–7.7)
NEUTROPHILS NFR BLD: 53.4 % (ref 38–73)
NRBC BLD-RTO: 0 /100 WBC
PLATELET # BLD AUTO: 200 K/UL (ref 150–450)
PMV BLD AUTO: 9.4 FL (ref 9.2–12.9)
POTASSIUM SERPL-SCNC: 4.5 MMOL/L (ref 3.5–5.1)
PROT SERPL-MCNC: 6.5 G/DL (ref 6–8.4)
RBC # BLD AUTO: 4.04 M/UL (ref 4–5.4)
SODIUM SERPL-SCNC: 143 MMOL/L (ref 136–145)
WBC # BLD AUTO: 6.61 K/UL (ref 3.9–12.7)

## 2023-06-22 PROCEDURE — 80053 COMPREHEN METABOLIC PANEL: CPT | Performed by: INTERNAL MEDICINE

## 2023-06-22 PROCEDURE — 85025 COMPLETE CBC W/AUTO DIFF WBC: CPT | Performed by: INTERNAL MEDICINE

## 2023-06-22 PROCEDURE — 36415 COLL VENOUS BLD VENIPUNCTURE: CPT | Performed by: INTERNAL MEDICINE

## 2023-06-27 ENCOUNTER — TELEPHONE (OUTPATIENT)
Dept: PALLIATIVE MEDICINE | Facility: CLINIC | Age: 73
End: 2023-06-27
Payer: MEDICARE

## 2023-06-27 ENCOUNTER — HOSPITAL ENCOUNTER (OUTPATIENT)
Dept: RADIOLOGY | Facility: HOSPITAL | Age: 73
Discharge: HOME | End: 2023-06-27
Attending: INTERNAL MEDICINE
Payer: MEDICARE

## 2023-06-27 DIAGNOSIS — C34.11 MALIGNANT NEOPLASM OF UPPER LOBE, RIGHT BRONCHUS OR LUNG: ICD-10-CM

## 2023-06-27 PROCEDURE — 74176 CT CHEST ABDOMEN PELVIS WITHOUT CONTRAST(XPD): ICD-10-PCS | Mod: 26,,, | Performed by: RADIOLOGY

## 2023-06-27 PROCEDURE — 74176 CT ABD & PELVIS W/O CONTRAST: CPT | Mod: 26,,, | Performed by: RADIOLOGY

## 2023-06-27 PROCEDURE — 74176 CT ABD & PELVIS W/O CONTRAST: CPT | Mod: TC

## 2023-06-27 PROCEDURE — 71250 CT CHEST ABDOMEN PELVIS WITHOUT CONTRAST(XPD): ICD-10-PCS | Mod: 26,,, | Performed by: RADIOLOGY

## 2023-06-27 PROCEDURE — 71250 CT THORAX DX C-: CPT | Mod: 26,,, | Performed by: RADIOLOGY

## 2023-06-28 ENCOUNTER — OFFICE VISIT (OUTPATIENT)
Dept: PALLIATIVE MEDICINE | Facility: CLINIC | Age: 73
End: 2023-06-28
Payer: MEDICARE

## 2023-06-28 VITALS
BODY MASS INDEX: 23.57 KG/M2 | SYSTOLIC BLOOD PRESSURE: 162 MMHG | OXYGEN SATURATION: 98 % | TEMPERATURE: 97 F | DIASTOLIC BLOOD PRESSURE: 70 MMHG | HEART RATE: 67 BPM | HEIGHT: 66 IN | WEIGHT: 146.63 LBS

## 2023-06-28 DIAGNOSIS — G89.3 CANCER RELATED PAIN: ICD-10-CM

## 2023-06-28 DIAGNOSIS — C34.11 MALIGNANT NEOPLASM OF UPPER LOBE, RIGHT BRONCHUS OR LUNG: ICD-10-CM

## 2023-06-28 DIAGNOSIS — R53.83 FATIGUE, UNSPECIFIED TYPE: ICD-10-CM

## 2023-06-28 DIAGNOSIS — F43.22 ADJUSTMENT DISORDER WITH ANXIETY: ICD-10-CM

## 2023-06-28 DIAGNOSIS — Z51.5 ENCOUNTER FOR PALLIATIVE CARE: Primary | ICD-10-CM

## 2023-06-28 DIAGNOSIS — R06.00 DYSPNEA, UNSPECIFIED TYPE: ICD-10-CM

## 2023-06-28 DIAGNOSIS — M89.8X9 BONE PAIN: ICD-10-CM

## 2023-06-28 DIAGNOSIS — G47.00 INSOMNIA, UNSPECIFIED TYPE: ICD-10-CM

## 2023-06-28 PROCEDURE — 99999 PR PBB SHADOW E&M-EST. PATIENT-LVL III: ICD-10-PCS | Mod: PBBFAC,,, | Performed by: NURSE PRACTITIONER

## 2023-06-28 PROCEDURE — 99999 PR PBB SHADOW E&M-EST. PATIENT-LVL III: CPT | Mod: PBBFAC,,, | Performed by: NURSE PRACTITIONER

## 2023-06-28 PROCEDURE — 99215 PR OFFICE/OUTPT VISIT, EST, LEVL V, 40-54 MIN: ICD-10-PCS | Mod: S$PBB,,, | Performed by: NURSE PRACTITIONER

## 2023-06-28 PROCEDURE — 99215 OFFICE O/P EST HI 40 MIN: CPT | Mod: S$PBB,,, | Performed by: NURSE PRACTITIONER

## 2023-06-28 PROCEDURE — 99213 OFFICE O/P EST LOW 20 MIN: CPT | Mod: PBBFAC | Performed by: NURSE PRACTITIONER

## 2023-06-28 RX ORDER — HYDROCODONE BITARTRATE AND ACETAMINOPHEN 7.5; 325 MG/1; MG/1
1 TABLET ORAL EVERY 4 HOURS PRN
Qty: 120 TABLET | Refills: 0 | Status: SHIPPED | OUTPATIENT
Start: 2023-06-28 | End: 2023-07-25 | Stop reason: SDUPTHER

## 2023-06-28 NOTE — PROGRESS NOTES
Consult Note  Palliative Care      Consult Requested By: No ref. provider found  Reason for Consult: symptom management       ASSESSMENT/PLAN:     Plan/Recommendations:    06/28/2023:  - no changes made today    Malignant neoplasm of upper lobe, right bronchus or lung  - following with Dr. Dhaliwal  - completed chemo/RT on 12/31/2019  - interval treatment at [facility]  - s/p immunotherapy     Encounter for palliative medicine  - Patient is decisional  - Patient accompanied today by self  - ACP documents are not uploaded into EMR, patient and partner Lata will review documents at home, will revisit discussion at future visit.    - Philosophy of Palliative Medicine reviewed with patient and family at first visit  - New patient folder given to and reviewed with patient and family at first visit  - Goals of care: States that her goals are to maintain function and independence as much as possible. She states that she does not want to be bed-ridden, does not want to lose her ability to take care of herself and does not want to be a burden to her loved ones. Retired nurse. Please see SW note for more detail.    Cancer-related pain/bone pain   - pain well-controlled with medication regimen   - patient reports pain in her legs and legs, the leg pain is most severe, describes as intermittent bony pain, sometimes waking her up at night  - patient reports evaluation for bone pain has not revealed a source  - no pain relief while on steroids, tylenol ineffective  - currently using Norco 7.5 mg q 8 hours with partial relief  - pt's partner states that patient is under-reporting her pain and fearful of taking higher doses, she does admit she often splits tablets in half.  - patient will instructed to use Norco 7.5 mg q 6 hrs and instructed to use 1.5 doses as pretreatment for sleep, if needed, will discuss possible switch to oxycodone at next visit  - narcan provided  - opioid safety sheet provided in first visit folder  - will  "continue to monitor     Dyspnea/Fatigue/insomnia  - dyspnea - worse s/p pneumonitis   - pt endorses moderate dyspnea, insomnia and moderate fatigue   - fatigue likely multifactorial: pain, dyspnea, diminished dietary intake   - patient does report that she tries to walk daily, even if only for 5-10 minutes, if the weather is poor, she will walk insider her house  - she is able to perform all ADL's independently   - sometimes wakes up due to leg pain   - patient reports ativan 0.25 mg qhs help with sleep  - will address symptom cluster w goal to improve fatigue  - tip sheet provided in new patient folder  - will continue to monitor      Adjustment disorder with anxiety   - patient rates anxiety 3/10, depression 1/10  - her partner (Lata) observes patient to be intermittently depressed, "sometimes she tries to hurt my feelings"  - patient was a  nurse, 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  - patient reports that the robust social support provided by friends has faded since her initial diagnosis, partner Lata is her primary support, additionally a cousin,niece, nephew who lives out of state  - patient and partner have a well-developed emotional dialogue, for example: they have named their various emotional states as alter personalities, they find humor/levity where they can  - patient states that she worries about being a burden on her partner   - continue ativan 0.5 mg q 12 hrs qhs, patient reports only using half tablets to help with sleep  - tip sheet provided in new patient folder  - will continue to monitor   - discussed availability of onc-pscyh, pall DSW  - emotional support provided  - CAM Driscoll joined for first visit assessment, see separate note for detail  - will continue to monitor      Nausea/anorexia   - reports weight loss even with good PO intake   - rates nausea 4/10   - rates anorexia 5/10, no intervention needed at this time   - continue zofran 8 mg PRN   - tip sheet provided in new " patient folder  - will continue to monitor     Constipation   - reports constipation 2/2 chemo, opioids, colitis   - promote good PO hydration  - start senna 2 tabs qhs, 2 colace, escalation of regimen as needed  - tip sheet provided in new patient folder  - will continue to monitor     Understanding of illness/Prognosis: good    Follow up: 12 weeks     Patient's encounter and above plan of care discussed with patient's oncology team.     SUBJECTIVE:     History of Present Illness:  Patient is a 72 y.o. year old female presenting with  Malignant neoplasm of upper lobe, right bronchus or lung Ms. Alesha Toney is a 72-year-old otherwise healthy female who started having some shoulder pain, which was lasting for over six weeks.  She went and saw her primary care physician and underwent an x-ray, which revealed right upper lobe lung mass worrisome for malignancy and a CT scan was recommended, which was done on 6/12/18 and that revealed a newly developing mass, pleural based, lateral posterior segment, right upper lobe 3.5 x 3.5 cm, surrounding stellate margin and patchy infiltrates, particularly superiorly, anteriorly infiltrates extend perihilar into the anterior segment. She then underwent CT-guided biopsy, which revealed well-differentiated adenocarcinoma.     06/28/2023:  LA  reviewed and summarized:  As expected     - doing well, overall  - is losing weight with good PO intake and steroid use    - s/p pneumonitis, dyspnea increased  - however, continuing good activity tolerance, taking daily walks indoors to avoid extreme heat/humidity    03/28/2023:  LA  reviewed and summarized:  As expected     Patient presents to clinic alone today. She is recently back from [facility] and is happy to report her most recent scan results show improvement. Her overall symptom burden is stable. She is still aggravated by leg pain she believes was caused by Entyvio infusions. The pain interrupts her sleep as done intermittent  GERD which she admits is sometimes worsened by her dietary choices. She has been increasing her exercise lately and her mood is boosted due to scan results referenced above. Her onco at [facility] did request her to return soon for repeat scan but she deferred to have scan here, as the financial burden of trips to [place] is mounting.        02/01/2023:  LA  reviewed and summarized:  01/26/2023 Lorazepam 0.5 mg tabs Disp: 60 for 30 days  01/10/2023 Hydrocodone-APAP 7.5-325 mg Disp: 90 for 30 days    Patient presents to clinic with her partner Lata for her initial visit. She is ECOG 0 with healthy appearance. She reports bony leg pain that is partially controlled with current regimen. Optimized today regimen today, will reassess at next visit. Discussed ACP, patient reports that she has been reluctant to discuss with her partner but endorses the value of establishing wants and wishes with family members, agrees to review documents at home to complete at next visit. Current regimen for anxiety and nausea are effective. Insomnia & fatigue at least partially attributable to uncontrolled pain. Optimized regimen for constipation today.     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     Squamous cell carcinoma of skin 10/05/2020    left medial thigh    Therapy     Thyroid nodule     Ulcerative colitis with complication      Past Surgical History:   Procedure Laterality Date    ADENOIDECTOMY  19yo    ANTERIOR CERVICAL DISCECTOMY W/ FUSION N/A 10/15/2018    Procedure: DISCECTOMY, SPINE, CERVICAL, ANTERIOR APPROACH, WITH FUSION C6/7  [facility] SNS: [employer]/S[employer] Regular OR table;  Surgeon: Greyson Bansal MD;   [facility];  Service: Neurosurgery;  Laterality: N/A;    APPENDECTOMY      BONE RESECTION, RIB  1980    BREAST BIOPSY Left     at least 40yrs ago in her 20's    BREAST CYST ASPIRATION      BREAST CYST EXCISION      COLONOSCOPY      ENDOBRONCHIAL ULTRASOUND N/A 7/10/2018    Procedure: ULTRASOUND, ENDOBRONCHIAL;  Surgeon: Sri Hearn MD;  Location: [facility] OR 2ND FLR;  Service: Pulmonary;  Laterality: N/A;    ENDOBRONCHIAL ULTRASOUND N/A 9/24/2019    Procedure: ENDOBRONCHIAL ULTRASOUND (EBUS);  Surgeon: Sri Hearn MD;  [facility];  Service: Pulmonary;  Laterality: N/A;    ENDOSCOPIC MUCOSAL RESECTION OF COLON N/A 9/11/2020    Procedure: RESECTION, MUCOSA, COLON, ENDOSCOPIC;  Surgeon: Lilibeth Carbajal MD;  Location: [facility] (2ND FLR);  Service: Endoscopy;  Laterality: N/A;    ENDOSCOPIC ULTRASOUND OF LOWER GASTROINTESTINAL TRACT N/A 4/19/2021    Procedure: ULTRASOUND, LOWER GI TRACT, ENDOSCOPIC;  Surgeon: Lilibeth Carbajal MD;  Location: [facility];  Service: Endoscopy;  Laterality: N/A;    ENDOSCOPIC ULTRASOUND OF LOWER GASTROINTESTINAL TRACT N/A 6/25/2021    Procedure: ULTRASOUND, LOWER GI TRACT, ENDOSCOPIC;  Surgeon: Silvino Christopher MD;  Location: [facility];  Service: Endoscopy;  Laterality: N/A;  Needs Rapid MP    FLEXIBLE SIGMOIDOSCOPY  06/11/2020    with biopsies    FLEXIBLE SIGMOIDOSCOPY N/A 6/11/2020    Procedure: SIGMOIDOSCOPY, FLEXIBLE;  Surgeon: Gely Yeh MD;  Location: [facility];  Service: Endoscopy;  Laterality: N/A;    FLEXIBLE SIGMOIDOSCOPY N/A 4/19/2021    Procedure: SIGMOIDOSCOPY-FLEXIBLE;  Surgeon: Lilibeth Carbajal MD;  Location: [facility];  Service: Endoscopy;  Laterality: N/A;  Covid 4/16 Brian MP    FLEXIBLE SIGMOIDOSCOPY N/A 6/3/2022    Procedure: SIGMOIDOSCOPY-FLEXIBLE;  Surgeon: Francesco Clark MD;  Location: [facility] (4TH FLR);  Service: Endoscopy;  Laterality: N/A;  vacc-inst portal-tb    HYSTERECTOMY      @47yrs of age    INSERTION OF  "TUNNELED CENTRAL VENOUS CATHETER (CVC) WITH SUBCUTANEOUS PORT N/A 9/25/2018    Procedure: BYFWEADNC-OKTH-P-CATH;  Surgeon: Ronald Diagnostic Provider;  [facility];  Service: Radiology;  Laterality: N/A;    INSERTION OF VENOUS ACCESS PORT N/A 3/15/2021    Procedure: INSERTION, VENOUS ACCESS PORT;  Surgeon: Chang Mathews MD;  [facility];  Service: General;  Laterality: N/A;  NEEDS CONSENT.    KIDNEY SURGERY      17yo    MEDIPORT REMOVAL N/A 3/15/2021    Procedure: REMOVAL, CATHETER, CENTRAL VENOUS, TUNNELED, WITH PORT;  Surgeon: Chang Mathews MD;  [facility];  Service: General;  Laterality: N/A;    OOPHORECTOMY      @47yrs of age    SKIN BIOPSY      TONSILLECTOMY      UPPER GASTROINTESTINAL ENDOSCOPY      VIDEO-ASSISTED THORACOSCOPIC LOBECTOMY Right 8/9/2018    Procedure: VATS, WITH LOBECTOMY Possible chest wall resection;  Surgeon: Philip Messina MD;  Location: [facility] OR [address] [place] [address];  Service: Thoracic;  Laterality: Right;  Have open pan available.     Family History   Problem Relation Age of Onset    Stroke Mother     Cataracts Mother     Cancer Father         colon cancer    Colon cancer Father     Diabetes Brother     Heart failure Brother     Hypertension Brother     Heart attack Paternal Aunt     Heart attack Maternal Grandfather     Diabetes Paternal Aunt     Anxiety disorder Paternal Grandmother         agoraphobia    Anesthesia problems Neg Hx     Blindness Neg Hx     Amblyopia Neg Hx     Glaucoma Neg Hx     Macular degeneration Neg Hx     Retinal detachment Neg Hx     Strabismus Neg Hx     Thyroid disease Neg Hx     Melanoma Neg Hx      Review of patient's allergies indicates:   Allergen Reactions    Adhesive Itching, Rash and Blisters     INCLUDES EKG PADS    Ciprofloxacin Hives     Hives    Iodinated contrast media     Pcn [penicillins]      rash    Phenergan plain Other (See Comments)     "crazy behavior"    Phenergan [promethazine]     Tramadol  "    Vancomycin analogues      Red man syndrome       Medications:    Current Outpatient Medications:     ascorbic acid, vitamin C, (VITAMIN C) 500 MG tablet, Take 500 mg by mouth once daily., Disp: , Rfl:     atenoloL (TENORMIN) 25 MG tablet, Take 1 tablet (25 mg total) by mouth 3 (three) times daily., Disp: 270 tablet, Rfl: 3    calcium carbonate (TUMS) 300 mg (750 mg) Chew, Take by mouth as needed. , Disp: , Rfl:     famotidine (PEPCID) 10 MG tablet, Take 10 mg by mouth once daily., Disp: , Rfl:     HYDROcodone-acetaminophen (NORCO) 7.5-325 mg per tablet, Take 1 tablet by mouth every 6 (six) hours as needed for Pain., Disp: 120 tablet, Rfl: 0    LORazepam (ATIVAN) 0.5 MG tablet, TAKE 1 TABLET EVERY 12 HOURS AS NEEDED FOR ANXIETY, Disp: 60 tablet, Rfl: 2    losartan (COZAAR) 50 MG tablet, 1 tab po bid, Disp: 180 tablet, Rfl: 3    naloxone (NARCAN) 4 mg/actuation Spry, by Nasal route as needed., Disp: , Rfl:     ondansetron (ZOFRAN) 8 MG tablet, Take 8 mg by mouth as needed., Disp: , Rfl:     predniSONE (DELTASONE) 10 MG tablet, Take 4 tablets for 2 weeks and the take 2 tablets for 2 weeks and then stay on 1 tablet until you get a CT chest and see Dr. Hearn, Disp: 120 tablet, Rfl: 1    atorvastatin (LIPITOR) 20 MG tablet, Take 1 tablet (20 mg total) by mouth once daily., Disp: 30 tablet, Rfl: 11    esomeprazole (NEXIUM) 40 MG capsule, Take 40 mg by mouth 2 (two) times daily before meals., Disp: , Rfl:     sulfamethoxazole-trimethoprim 400-80mg (BACTRIM) 400-80 mg per tablet, Take 1 tablete twice a day every other day, Disp: 48 tablet, Rfl: 0    OBJECTIVE:       ROS:  Review of Systems   Constitutional:  Positive for activity change, appetite change and fatigue.   HENT: Negative.  Negative for congestion, dental problem and drooling.    Eyes: Negative.  Negative for pain, discharge and itching.   Respiratory:  Positive for shortness of breath. Negative for cough, choking, wheezing and stridor.    Cardiovascular:  Negative.  Negative for chest pain, palpitations and leg swelling.   Gastrointestinal:  Positive for constipation and nausea. Negative for diarrhea and vomiting.   Endocrine: Negative.  Negative for heat intolerance and polydipsia.   Genitourinary: Negative.  Negative for difficulty urinating, dyspareunia and dysuria.   Musculoskeletal:  Positive for arthralgias. Negative for back pain, gait problem and joint swelling.   Skin: Negative.  Negative for color change, pallor and rash.   Neurological: Negative.  Negative for seizures, speech difficulty and weakness.   Psychiatric/Behavioral:  Positive for sleep disturbance. Negative for confusion and dysphoric mood. The patient is nervous/anxious.      Review of Symptoms      Symptom Assessment (ESAS 0-10 Scale)  Pain:  5  Dyspnea:  5  Anxiety:  3  Nausea:  2  Depression:  0  Anorexia:  7  Fatigue:  4  Insomnia:  4  Restlessness:  0  Agitation:  0     CAM / Delirium:  Negative  Constipation:  Positive  Diarrhea:  Negative    Anxiety:  Is nervous/anxious  Constipation:  Constipation    Bowel Management Plan (BMP):  Yes      Pain Assessment:  OME in 24 hours:  40  Location(s): leg    Leg       Location: bilateral        Quality: Throbbing, aching and dull        Quantity: 3/10 in intensity        Chronicity: Onset 1 year(s) ago, unchanged        Aggravating Factors: Activity        Alleviating Factors: Opiates (heating pad)        Associated Symptoms: None    Modified Josefa Scale:  1    ECOG Performance Status stGstrstastdstest:st st1st Living Arrangements:  Lives with spouse    Psychosocial/Cultural:   See Palliative Psychosocial Note: Yes  **Primary  to Follow**  Palliative Care  Consult: No    Advance Care Planning   Advance Directives:   Living Will: No        Oral Declaration: No    LaPOST: No    Do Not Resuscitate Status: No    Medical Power of : Yes        Oral Declaration: Yes   Witnesses:  Mayela Beaver and Johnna Driscoll   Agent's Name:   Lata Mathews   Agent's Contact Number:  [phone]    Decision Making:  Patient answered questions  Goals of Care: What is most important right now is to focus on remaining as independent as possible, symptom/pain control, quality of life, even if it means sacrificing a little time. Accordingly, we have decided that the best plan to meet the patient's goals includes continuing with palliative care.            Physical Exam: Temp: 97.2 °F (36.2 °C) (06/28/23 0957)  Pulse: 67 (06/28/23 0957)  BP: (!) 162/70 (06/28/23 0957)  SpO2: 98 % (06/28/23 0957)  Vitals:    06/28/23 0957   BP: (!) 162/70   Pulse: 67   Temp: 97.2 °F (36.2 °C)      Physical Exam  Constitutional:       General: She is not in acute distress.     Appearance: Normal appearance. She is normal weight. She is not ill-appearing, toxic-appearing or diaphoretic.   HENT:      Head: Normocephalic and atraumatic.      Right Ear: Tympanic membrane normal.      Left Ear: Tympanic membrane normal.      Nose: Nose normal.   Eyes:      Extraocular Movements: Extraocular movements intact.      Conjunctiva/sclera: Conjunctivae normal.   Cardiovascular:      Rate and Rhythm: Regular rhythm.   Pulmonary:      Effort: Pulmonary effort is normal. No respiratory distress.      Breath sounds: No wheezing.   Abdominal:      General: Abdomen is flat. There is no distension.      Palpations: Abdomen is soft.   Musculoskeletal:         General: No swelling or deformity. Normal range of motion.      Cervical back: Normal range of motion.   Skin:     General: Skin is warm and dry.      Coloration: Skin is not jaundiced.      Findings: No bruising.   Neurological:      General: No focal deficit present.      Mental Status: She is alert and oriented to person, place, and time. Mental status is at baseline.      Motor: No weakness.      Gait: Gait normal.   Psychiatric:         Mood and Affect: Mood normal.         Behavior: Behavior normal.         Thought Content: Thought  "content normal.         Judgment: Judgment normal.       Labs:  CBC:   WBC   Date Value Ref Range Status   06/22/2023 6.61 3.90 - 12.70 K/uL Final     Hemoglobin   Date Value Ref Range Status   06/22/2023 12.8 12.0 - 16.0 g/dL Final     POC Hematocrit   Date Value Ref Range Status   08/09/2018 33 (L) 36 - 54 %PCV Final     Hematocrit   Date Value Ref Range Status   06/22/2023 39.7 37.0 - 48.5 % Final     MCV   Date Value Ref Range Status   06/22/2023 98 82 - 98 fL Final     Platelets   Date Value Ref Range Status   06/22/2023 200 150 - 450 K/uL Final       LFT:   Lab Results   Component Value Date    AST 26 06/22/2023    ALKPHOS 49 (L) 06/22/2023    BILITOT 0.4 06/22/2023       Albumin:   Albumin   Date Value Ref Range Status   06/22/2023 3.6 3.5 - 5.2 g/dL Final     Protein:   Total Protein   Date Value Ref Range Status   06/22/2023 6.5 6.0 - 8.4 g/dL Final       Radiology:I have reviewed all pertinent imaging results/findings within the past 24 hours.    Per oncology note: CT chest performed at [facility] on 3/20/2023 reveals "Decreased in FDG avid recurrent disease in the right hemithorax. Decreased bilateral lower lobe nodules. Other lung nodules are unchanged or decreased.  New left lower lobe solid nodule in an area of clustered micronodules may be inflammatory. Reassess at follow-up"      I spent a total of 43 minutes on the day of the visit.This includes face to face time in discussion of goals of care, symptom assessment, coordination of care and emotional support.  This also includes non-face to face time preparing to see the patient (eg, review of tests/imaging), obtaining and/or reviewing separately obtained history, documenting clinical information in the electronic or other health record, independently interpreting results and communicating results to the patient/family/caregiver, or care coordinator.     Signature: Mayela Beaver DNP      "

## 2023-06-28 NOTE — Clinical Note
Hello,   I saw her in clinic a couple days ago. Overall she looks good. I just wanted to pass along that she says she has very good PO intake, but she also says she's seeing some pretty noticeable weight loss. Her clothes are baggy, etc. She says nothing has changed in her eating habits. She's also been on the steroids. Just want to let you know.  Thanks, Mayela

## 2023-06-29 ENCOUNTER — PATIENT MESSAGE (OUTPATIENT)
Dept: HEMATOLOGY/ONCOLOGY | Facility: CLINIC | Age: 73
End: 2023-06-29
Payer: MEDICARE

## 2023-06-29 NOTE — TELEPHONE ENCOUNTER
Let me have Dr. Hearn review the CT scan. Can you add to MDT next week please   Have her stay on the prednisone until above is done

## 2023-07-05 ENCOUNTER — TELEPHONE (OUTPATIENT)
Dept: HEMATOLOGY/ONCOLOGY | Facility: CLINIC | Age: 73
End: 2023-07-05
Payer: MEDICARE

## 2023-07-05 NOTE — TELEPHONE ENCOUNTER
Case reviewed in Thoracic MDT today, The scan is overall stable and plan is to monitor and repeat scasn in 3 months. She is going to [facility] around 9/18/2023 and will undergo CT chest there.   Discussed above with Dr. Hearn as well and she will see patient in 2 weeks,. She will continue with prednisone 10 mg until then    Above discussed with patient and she is comfortable with the plan

## 2023-07-12 ENCOUNTER — PATIENT MESSAGE (OUTPATIENT)
Dept: CARDIOLOGY | Facility: CLINIC | Age: 73
End: 2023-07-12
Payer: MEDICARE

## 2023-07-12 RX ORDER — LOSARTAN POTASSIUM 50 MG/1
TABLET ORAL
Qty: 180 TABLET | Refills: 3 | Status: ON HOLD | OUTPATIENT
Start: 2023-07-12 | End: 2024-01-13 | Stop reason: HOSPADM

## 2023-07-17 ENCOUNTER — HOSPITAL ENCOUNTER (OUTPATIENT)
Dept: PULMONOLOGY | Facility: CLINIC | Age: 73
Discharge: HOME | End: 2023-07-17
Payer: MEDICARE

## 2023-07-17 ENCOUNTER — OFFICE VISIT (OUTPATIENT)
Dept: PULMONOLOGY | Facility: CLINIC | Age: 73
End: 2023-07-17
Payer: MEDICARE

## 2023-07-17 VITALS — BODY MASS INDEX: 23.74 KG/M2 | HEIGHT: 66 IN | WEIGHT: 147.69 LBS

## 2023-07-17 VITALS
HEIGHT: 66 IN | HEART RATE: 64 BPM | OXYGEN SATURATION: 98 % | BODY MASS INDEX: 23.74 KG/M2 | DIASTOLIC BLOOD PRESSURE: 78 MMHG | SYSTOLIC BLOOD PRESSURE: 187 MMHG | WEIGHT: 147.69 LBS

## 2023-07-17 DIAGNOSIS — R06.02 SOB (SHORTNESS OF BREATH): ICD-10-CM

## 2023-07-17 DIAGNOSIS — R91.8 HILAR MASS: ICD-10-CM

## 2023-07-17 DIAGNOSIS — J44.9 CHRONIC OBSTRUCTIVE PULMONARY DISEASE, UNSPECIFIED COPD TYPE: Primary | ICD-10-CM

## 2023-07-17 PROCEDURE — 94729 DIFFUSING CAPACITY: CPT | Mod: PBBFAC | Performed by: INTERNAL MEDICINE

## 2023-07-17 PROCEDURE — 94729 PR C02/MEMBANE DIFFUSE CAPACITY: ICD-10-PCS | Mod: 26,S$PBB,, | Performed by: INTERNAL MEDICINE

## 2023-07-17 PROCEDURE — 94618 PULMONARY STRESS TESTING: ICD-10-PCS | Mod: 26,S$PBB,, | Performed by: INTERNAL MEDICINE

## 2023-07-17 PROCEDURE — 94727 PR PULM FUNCTION TEST BY GAS: ICD-10-PCS | Mod: 26,S$PBB,, | Performed by: INTERNAL MEDICINE

## 2023-07-17 PROCEDURE — 99204 PR OFFICE/OUTPT VISIT, NEW, LEVL IV, 45-59 MIN: ICD-10-PCS | Mod: 25,S$PBB,, | Performed by: INTERNAL MEDICINE

## 2023-07-17 PROCEDURE — 94060 EVALUATION OF WHEEZING: CPT | Mod: 26,S$PBB,, | Performed by: INTERNAL MEDICINE

## 2023-07-17 PROCEDURE — 94727 GAS DIL/WSHOT DETER LNG VOL: CPT | Mod: PBBFAC | Performed by: INTERNAL MEDICINE

## 2023-07-17 PROCEDURE — 99999 PR PBB SHADOW E&M-EST. PATIENT-LVL III: ICD-10-PCS | Mod: PBBFAC,,, | Performed by: INTERNAL MEDICINE

## 2023-07-17 PROCEDURE — 99204 OFFICE O/P NEW MOD 45 MIN: CPT | Mod: 25,S$PBB,, | Performed by: INTERNAL MEDICINE

## 2023-07-17 PROCEDURE — 99999 PR PBB SHADOW E&M-EST. PATIENT-LVL III: CPT | Mod: PBBFAC,,, | Performed by: INTERNAL MEDICINE

## 2023-07-17 PROCEDURE — 94618 PULMONARY STRESS TESTING: CPT | Mod: 26,S$PBB,, | Performed by: INTERNAL MEDICINE

## 2023-07-17 PROCEDURE — 94060 PR EVAL OF BRONCHOSPASM: ICD-10-PCS | Mod: 26,S$PBB,, | Performed by: INTERNAL MEDICINE

## 2023-07-17 PROCEDURE — 94618 PULMONARY STRESS TESTING: CPT | Mod: PBBFAC | Performed by: INTERNAL MEDICINE

## 2023-07-17 PROCEDURE — 94727 GAS DIL/WSHOT DETER LNG VOL: CPT | Mod: 26,S$PBB,, | Performed by: INTERNAL MEDICINE

## 2023-07-17 PROCEDURE — 99213 OFFICE O/P EST LOW 20 MIN: CPT | Mod: PBBFAC,25 | Performed by: INTERNAL MEDICINE

## 2023-07-17 PROCEDURE — 94060 EVALUATION OF WHEEZING: CPT | Mod: PBBFAC | Performed by: INTERNAL MEDICINE

## 2023-07-17 PROCEDURE — 94729 DIFFUSING CAPACITY: CPT | Mod: 26,S$PBB,, | Performed by: INTERNAL MEDICINE

## 2023-07-17 RX ORDER — GLYCOPYRROLATE AND FORMOTEROL FUMARATE 9; 4.8 UG/1; UG/1
2 AEROSOL, METERED RESPIRATORY (INHALATION) 2 TIMES DAILY
Qty: 10.7 G | Refills: 11 | Status: SHIPPED | OUTPATIENT
Start: 2023-07-17 | End: 2023-08-10

## 2023-07-17 RX ORDER — IPRATROPIUM BROMIDE 17 UG/1
2 AEROSOL, METERED RESPIRATORY (INHALATION) EVERY 6 HOURS
Qty: 12.9 G | Refills: 0 | Status: SHIPPED | OUTPATIENT
Start: 2023-07-17 | End: 2023-08-10

## 2023-07-17 NOTE — PROGRESS NOTES
"Subjective:       Patient ID: Alesha Toney is a 72 y.o. female.    Chief Complaint: Abnormal Ct Scan (Pneumonitis post proton therapy at [facility] follow up)    72 year old who is followed by Dr. Dhaliwal and [facility].  Recently received Proton therapy at [facility] in January then developed what appears to be a pneumonitis.      Per [facility] bronchoscopy report and call from Dr. Tiago Gonzales, "Biopsy from Rt infrahilar lesion highly suspicious for adenocarcinoma. Rt suprahilar lesion with atypical cells that can be seen post XRT. Staging EBUS with biopsy of station 7 LN negative for malignancy and BAL without any notable culture results thus far".  Upon chart review, there as a large subhilar/paraspinal lung mass that has since resolved.  Posterior segment of the RUpper division with solid component and bronchiectatic changes resoloved.    Currently tapered down to prednisone 5 mg and feeling much better.    Does have dyspnea with some activities especially when outside.  Increased cough and wheeze when on right side.  No BD therapy.    Patient with "runs of afib" for which she takes atenolol.  Currently states her BP with SBP of 190 is because she is with the doctor.  States Normal BP is 140.          Review of Systems   Respiratory:  Positive for wheezing. Negative for hemoptysis.    Cardiovascular:  Positive for palpitations.     Objective:      Vitals:    07/17/23 0955   BP: (!) 187/78   BP Location: Right arm   Patient Position: Sitting   BP Method: Medium (Manual)   Pulse: 64   SpO2: 98%   Weight: 67 kg (147 lb 11.3 oz)   Height: 5' 6" (1.676 m)      Physical Exam   Constitutional: She is oriented to person, place, and time. She appears well-developed and well-nourished.   Pulmonary/Chest: She has no wheezes.   Musculoskeletal:         General: Normal range of motion.   Neurological: She is alert and oriented to person, place, and time.   Psychiatric: She has a normal mood and affect.   Personal Diagnostic Review:  PFTs with mild " fixed obstruction.  Walk is normal only signficcant for HTN.     Images below.  Effusion and infiltrates are improving.     No flowsheet data found.      Assessment:       1. Chronic obstructive pulmonary disease, unspecified COPD type    2. Hilar mass    3. SOB (shortness of breath)        Outpatient Encounter Medications as of 7/17/2023   Medication Sig Dispense Refill    ascorbic acid, vitamin C, (VITAMIN C) 500 MG tablet Take 500 mg by mouth once daily.      atenoloL (TENORMIN) 25 MG tablet Take 1 tablet (25 mg total) by mouth 3 (three) times daily. 270 tablet 3    atorvastatin (LIPITOR) 20 MG tablet Take 1 tablet (20 mg total) by mouth once daily. 30 tablet 11    calcium carbonate (TUMS) 300 mg (750 mg) Chew Take by mouth as needed.       esomeprazole (NEXIUM) 40 MG capsule Take 40 mg by mouth 2 (two) times daily before meals.      famotidine (PEPCID) 10 MG tablet Take 10 mg by mouth once daily.      HYDROcodone-acetaminophen (NORCO) 7.5-325 mg per tablet Take 1 tablet by mouth every 4 (four) hours as needed for Pain. 120 tablet 0    LORazepam (ATIVAN) 0.5 MG tablet TAKE 1 TABLET EVERY 12 HOURS AS NEEDED FOR ANXIETY 60 tablet 2    losartan (COZAAR) 50 MG tablet 1 tab po bid 180 tablet 3    naloxone (NARCAN) 4 mg/actuation Spry by Nasal route as needed.      ondansetron (ZOFRAN) 8 MG tablet Take 8 mg by mouth as needed.      predniSONE (DELTASONE) 10 MG tablet Take 4 tablets for 2 weeks and the take 2 tablets for 2 weeks and then stay on 1 tablet until you get a CT chest and see Dr. Hearn 120 tablet 1    sulfamethoxazole-trimethoprim 400-80mg (BACTRIM) 400-80 mg per tablet Take 1 tablete twice a day every other day 48 tablet 0    glycopyrrolate-formoteroL (BEVESPI AEROSPHERE) 9-4.8 mcg HFAA Inhale 2 puffs into the lungs 2 (two) times a day. Controller 10.7 g 11    ipratropium (ATROVENT HFA) 17 mcg/actuation inhaler Inhale 2 puffs into the lungs every 6 (six) hours. Rescue 12.9 g 0     [DISCONTINUED] losartan (COZAAR) 50 MG tablet 1 tab po bid 180 tablet 3     No facility-administered encounter medications on file as of 7/17/2023.     No orders of the defined types were placed in this encounter.    Plan:     Problem List Items Addressed This Visit       SOB (shortness of breath)    Overview     Improved with tx of pneumonitis.  Reduce prednisone to 2.5 mg daily then discontinue when finished with Rx         Hilar mass    Overview     Of RLL, biopsy proven adenocarcinoma. Status post proton therapy.    Resolved.    Developed pneumonitis which is also resolving.           Other Visit Diagnoses       Chronic obstructive pulmonary disease, unspecified COPD type    -  Primary    Relevant Medications    glycopyrrolate-formoteroL (BEVESPI AEROSPHERE) 9-4.8 mcg HFAA    ipratropium (ATROVENT HFA) 17 mcg/actuation inhaler

## 2023-07-18 NOTE — PROCEDURES
Alesha Toney is a 72 y.o.  female patient, who presents for a 6 minute walk test ordered by MD Nadiya.  The diagnosis is Shortness of Breath, Qualify for Oxygen; Lung Cancer.  The patient's BMI is 23.9 kg/m2.  Predicted distance (lower limit of normal) is 309.1 meters.      Test Results:    The test was completed without stopping. The total time walked was 360 seconds. During walking, the patient reported:  Dyspnea. The patient used no assistive devices during testing.     07/17/2023---------Distance: 335.28 meters (1100 feet)     O2 Sat % Supplemental Oxygen Heart Rate Blood Pressure Josefa Scale   Pre-exercise  (Resting) 99 % Room Air 70 bpm 191/81 mmHg 0   During Exercise 97 % Room Air 69 bpm 187/78 mmHg 3   Post-exercise  (Recovery) 98 % Room Air  64 bpm       Recovery Time: 105 seconds    Performing nurse/tech: HELEN Porter      PREVIOUS STUDY:   09/12/2019---------Distance: 426.72 meters (1400 feet)       O2 Sat % Supplemental Oxygen Heart Rate Blood Pressure Josefa Scale   Pre-exercise  (Resting) 100 % Room Air 73 bpm 184/84 mmHg 0   During Exercise 99 % Room Air 98 bpm 200/84 mmHg 2   Post-exercise  (Recovery) 100 % Room Air  71 bpm 179/78 mmHg        CLINICAL INTERPRETATION:  Six minute walk distance is 335.28 meters (1100 feet) with moderate dyspnea.  During exercise, there was no significant desaturation while breathing room air.  Both blood pressure and heart rate remained stable with walking.  Hypertension was present prior to exercise.  The patient did not report non-pulmonary symptoms during exercise.  Since the previous study in September 2019, exercise capacity is significantly worse.  Based upon age and body mass index, exercise capacity is normal.

## 2023-07-19 ENCOUNTER — HOSPITAL ENCOUNTER (OUTPATIENT)
Dept: RADIOLOGY | Facility: HOSPITAL | Age: 73
Discharge: HOME | End: 2023-07-19
Attending: INTERNAL MEDICINE
Payer: MEDICARE

## 2023-07-19 ENCOUNTER — TELEPHONE (OUTPATIENT)
Dept: PULMONOLOGY | Facility: CLINIC | Age: 73
End: 2023-07-19
Payer: MEDICARE

## 2023-07-19 ENCOUNTER — OFFICE VISIT (OUTPATIENT)
Dept: HEMATOLOGY/ONCOLOGY | Facility: CLINIC | Age: 73
End: 2023-07-19
Payer: MEDICARE

## 2023-07-19 VITALS
WEIGHT: 147.69 LBS | DIASTOLIC BLOOD PRESSURE: 71 MMHG | TEMPERATURE: 98 F | OXYGEN SATURATION: 95 % | RESPIRATION RATE: 18 BRPM | SYSTOLIC BLOOD PRESSURE: 152 MMHG | HEART RATE: 66 BPM | HEIGHT: 66 IN | BODY MASS INDEX: 23.74 KG/M2

## 2023-07-19 DIAGNOSIS — C34.11 MALIGNANT NEOPLASM OF UPPER LOBE, RIGHT BRONCHUS OR LUNG: ICD-10-CM

## 2023-07-19 DIAGNOSIS — J43.8 OTHER EMPHYSEMA: ICD-10-CM

## 2023-07-19 DIAGNOSIS — N18.31 STAGE 3A CHRONIC KIDNEY DISEASE: ICD-10-CM

## 2023-07-19 DIAGNOSIS — C77.1 SECONDARY AND UNSPECIFIED MALIGNANT NEOPLASM OF INTRATHORACIC LYMPH NODES: ICD-10-CM

## 2023-07-19 DIAGNOSIS — C34.11 MALIGNANT NEOPLASM OF UPPER LOBE, RIGHT BRONCHUS OR LUNG: Primary | ICD-10-CM

## 2023-07-19 PROCEDURE — 99999 PR PBB SHADOW E&M-EST. PATIENT-LVL IV: ICD-10-PCS | Mod: PBBFAC,,, | Performed by: INTERNAL MEDICINE

## 2023-07-19 PROCEDURE — 71046 X-RAY EXAM CHEST 2 VIEWS: CPT | Mod: TC

## 2023-07-19 PROCEDURE — 71045 X-RAY EXAM CHEST 1 VIEW: CPT | Mod: 59,TC,RT

## 2023-07-19 PROCEDURE — 99215 PR OFFICE/OUTPT VISIT, EST, LEVL V, 40-54 MIN: ICD-10-PCS | Mod: S$PBB,,, | Performed by: INTERNAL MEDICINE

## 2023-07-19 PROCEDURE — 99215 OFFICE O/P EST HI 40 MIN: CPT | Mod: S$PBB,,, | Performed by: INTERNAL MEDICINE

## 2023-07-19 PROCEDURE — 71046 XR CHEST PA AND LATERAL: ICD-10-PCS | Mod: 26,,, | Performed by: RADIOLOGY

## 2023-07-19 PROCEDURE — 99999 PR PBB SHADOW E&M-EST. PATIENT-LVL IV: CPT | Mod: PBBFAC,,, | Performed by: INTERNAL MEDICINE

## 2023-07-19 PROCEDURE — 99214 OFFICE O/P EST MOD 30 MIN: CPT | Mod: PBBFAC,25 | Performed by: INTERNAL MEDICINE

## 2023-07-19 PROCEDURE — 71045 X-RAY EXAM CHEST 1 VIEW: CPT | Mod: 26,59,RT, | Performed by: RADIOLOGY

## 2023-07-19 PROCEDURE — 71045 XR CHEST LATERAL DECUBITUS RIGHT: ICD-10-PCS | Mod: 26,59,RT, | Performed by: RADIOLOGY

## 2023-07-19 PROCEDURE — 71046 X-RAY EXAM CHEST 2 VIEWS: CPT | Mod: 26,,, | Performed by: RADIOLOGY

## 2023-07-19 RX ORDER — LEVALBUTEROL TARTRATE 45 UG/1
1-2 AEROSOL, METERED ORAL EVERY 6 HOURS PRN
Qty: 15 G | Refills: 6 | Status: ON HOLD | OUTPATIENT
Start: 2023-07-19 | End: 2024-01-13 | Stop reason: HOSPADM

## 2023-07-19 NOTE — Clinical Note
Patrice Fierro She is noting more cough and wheezing since the steroid came down to 15 mg. She also notes that the inhalers you prescribed are super expensive $400 a piece,, is there anything else cheaper. I am getting chest xray and lateral decub xray

## 2023-07-19 NOTE — PROGRESS NOTES
"Subjective     Patient ID: Alesha Toney is a 72 y.o. female.    Chief Complaint: Malignant neoplasm of upper lobe, right bronchus or lung  Ms. Alesha Toney is a 72-year-old otherwise healthy female who started having some shoulder pain, which was lasting for over six weeks.  She went and saw her primary care physician and underwent an x-ray, which revealed right upper lobe lung mass worrisome for malignancy and a CT scan was recommended, which was done on 6/12/18 and that revealed a newly developing mass, pleural based, lateral posterior segment, right upper lobe 3.5 x 3.5 cm, surrounding stellate margin and patchy infiltrates, particularly superiorly,   anteriorly infiltrates extend perihilar into the anterior segment.  She then underwent CT-guided biopsy, which revealed well-differentiated adenocarcinoma.       Her PET scan from 6/29/18 There is physiologic intracranial, head, and neck activity.  There is a large right upper lobe mass SUV max 9.11.  No local or distant metastatic disease seen.  There is physiologic liver, spleen, GI and  activity.  There are a few liver cysts.  No adenopathy is seen.  The adrenal glands are not enlarged.  No adenopathy is seen.  No suspicious bone lesions are seen.     She underwent VATS with right upper lobectomy. Mediastinal lymphadenectomy on 8/9/18. Pathology revealed "Tumor site: Upper lobe.Tumor size: 7 x 4 x 2.7 cm.Tumor focality: Single tumor.  Histologic type: Mixed invasive mucinous and nonmucinous adenocarcinoma. Histologic grade: G2: Moderately differentiated.Spread through air spaces (STATS): Present. Visceral pleura invasion: Not identified.  Lymphovascular invasion: Not identified. Direct invasion of adjacent structures: No adjacent structures present. Margins: All margins are uninvolved by carcinoma.Distance of invasive carcinoma from closest margin: 1 cm from the bronchial margin.Treatment effect: No known presurgical therapy. Regional lymph nodes: Number " "of lymph nodes involved: 0. Number of lymph nodes examined: 11. Pathologic Stage Classification: pT3 N0"        She completed 4 cycles of adjuvant chemo with Cisplatin and Alimta as of 1/10/19   She is on surveillance.     CT chest of 9/17/19 which reveals "Operative change of right upper lobectomy for small-cell lung cancer with several scattered subsolid opacities and a new solid nodule in the right lower lobe measuring up to 0.5 cm.  We recommend continued surveillance with the role and schedule for such surveillance to be determined by clinical considerations. Sided chest port distal tip terminating at the confluence of the brachiocephalic vein in the SVC.  Correlate with device functioning. Apically predominant emphysematous changes, left greater than right. Aortic annular calcification and multi-vessel coronary artery atherosclerosis. Numerous unchanged hepatic hypodensities, likely cysts"     PET scan from 10/1/19 reveals "1.6 cm soft tissue lesion re-identified posterior to the bronchus intermedius.  No corresponding focal increased radiotracer uptake to suggest local recurrence or metastatic disease. Stable subcentimeter opacities throughout the bilateral lower lobes, too small to characterize with PET-CT. Focal increased radiotracer uptake and subtle wall thickening in the rectum.  Findings are concerning for primary neoplastic process.  Recommend further evaluation with direct visualization"     She returned from [facility] and was advised to proceed with chemo/RT with either Docetaxel/platinum or Carboplatin/Alimta.     She completed chemo/RT with Carboplatin and Alimta as of 12/31/19     She underwent CT chest on 5/27/2020 which revealed 1. In this patient with history of lung cancer status post right upper lobectomy, there is a soft tissue opacity at the posterior margin of the staple line.  This lesion has been enlarging progressively and now measures 1.3 x 1.6 cm.  There is also a new 1.7 x 1.7 cm " "opacity at the right paravertebral pleural margin which is inseparable from this lesion.  These findings may represent post radiation change in light of the hypermetabolic lesion in this region on PET-CT of 10/01/2019 (image 69/[id number]).There is a 1 cm opacity in the superior or lateral basal segment of the right lower lobe (axial series 4, image 243) which has enlarged since 09/17/2019.  Nature of this lesion is unclear.  Clinical considerations will determine if percutaneous biopsy or metabolic assessment is warranted. 1.0 cm solid opacity with branching configuration (series 4, image 205) is located in the lateral basal segment of the left lower lobe, stable since 02/16/2019 (02/06/2019 axial series 4, image 310). Appearance and bifurcating character suggests mucoid impaction in mildly ectatic peripheral airway, likely not significant. Persistent clustered small centrilobular nodules in the apical segment of the right upper lobe likely reflecting small airway inflammation/infection. Partially visualized left chest port with distal catheter tip at the junction of the brachiocephalic veins and SVC, similar as on 09/17/2019"  She thus underwent PET scan on 6/1/2020 which revealed "No evidence of residual viable lung malignancy.  Evolving post radiation changes in the right paramediastinal lung, with a new irregular subpleural density which may also be related to recent radiation.  Attention on follow-up. Interval decrease in size of a soft tissue lesion along the inferior margin of the lobectomy stable line.  Several stable subcentimeter soft tissue opacities in the lower lobes bilaterally, As above.  Attention on follow-up.  Persistent hypermetabolic rectal lesion concerning for malignancy.  Recommend direct visualization and tissue sampling as clinically indicated"  I discussed her case in thoracic conference today and findings are felt to be related to RT     Her restaging CT scans from 1/20/2021 which reveal " ""Persistent soft tissue opacity in the posterior margin of the staple line that is increased in prominence when compared to the prior examination and is worrisome for disease progression. No new lesions identified.  Stable pulmonary nodules. Stable hypodense lesions throughout the liver that likely represent hepatic cysts"        Restaging PET scan from 1/27/2021 reveals "Increased size and hypermetabolic activity involving soft tissue opacity along the inferior border of the right upper lobectomy stable line concerning for progression of disease. Mildly increased hypermetabolic activity involving the rectum compatible with known high-grade dysplasia/intramucosal carcinoma. Interval decrease in size and resolution of hypermetabolic activity involving subcentimeter subpleural opacity within the right lower lobe"     MRI brain from  2/3/2021 reveals no evidence of recurrence.  GUARDANT testing only reveals p53, PDL1 is 0 and no other targets        PET scan from 3/24/2021 reveals "In this patient with known lung cancer, there is a persistent hypermetabolic right hilar mass consistent with viable tumor.  Interval increase in size is equivocal for progression of fibrosis versus tumor growth. 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  Activity appears more focal and possibly more proximal than before.  Consider direct visualization for further evaluation. The previously described subcentimeter subpleural opacity within the right lower lobe is not definitely seen on today's exam"               She last received immunotherapy on 5/28/2021. She underwent EUS on 6/25/2021 which revealed "Erythematous, congested, and ulcerated mucosa  (proctitis) in rectosigmoid colon.  Pathology reveals Colon, rectosigmoid, biopsy:  Moderate active colitis     She completed steroids about 5 weeks ago.      CT scans from 11/5/2021 reveals "In this patient with lung " "cancer, there is postoperative changes of right upper lobe resection.  Persistent soft tissue opacity along the right hilum suture line which is slightly increased in size from prior exam.  More conspicuous appearing nodules within the right lower lobe seen on prior exam.  New nodule within the right middle lobe measuring 1.3 cm.  Overall findings are all worrisome for disease progression. Scattered areas of centrilobular nodularity within the bilateral lungs.  Nonspecific and can be seen in the setting of infectious or inflammatory etiologies. Unchanged hepatics cysts. Stable left adrenal gland nodule."  PET scan from 11/23/2021 revealed "New hypermetabolic nodule within the right lower lobe additional slightly subcentimeter nodules within the right lower lobe.  Findings concerning for disease progression, other differentials include infection or noninfectious inflammatory process. Interval decrease in size and uptake in the right hilar mass. Resolution of previous focal rectal uptake"        She is on Opdivo. Her Opdivo was held on 1/10/2022 due to rectal bleeding. She saw Dr. Clark and was noted to have anal fissure which contributed to rectal bleed. Her rectal bleed has since resolved.           She was on Opdivo until 5/24/2022, she noted rectal bleeding and underwent   Flex sigmoidoscopy on 6/3/2022 reveals "Localized severe inflammation was found in the distal rectum. Biopsied. Pathology reveals Severely active chronic colitis with ulceration (see comment)  Moderate architectural disorder. Negative for CMV by immunohistochemistry . Negative for granulomas or viral inclusions. Negative for dysplasia or carcinoma"  PET scan from 6/17/2022 reveals "In this patient with lung cancer there are enlarging hypermetabolic right perihilar opacities concerning for continued local disease progression. Continued decreased metabolic activity of the right lower lobe nodule.  Cluster of pulmonary nodules bilaterally, some " "which are new from prior PET-CT for example the superior segment the left upper lobe and are likely infectious/inflammatory origin. Persistent and more extensive region of increased FDG avidity within the distal rectum in keeping with more extensive colitis.  Malignancy could have a similar appearance.  Recommend further investigation as clinically warranted and attention on follow-up"     She has active proctitis and is on hydrocortisone suppositories and Entyvyo since early June 2022.   CT chest on 8/1/2022 reveals  "Similar appearance of elongated, irregular opacity adjacent to the right upper lobectomy margin.  More posteriorly adjacent perihilar opacities with previous FDG avidity with detailed measurements as above. Similar areas of scattered micro-nodularity with tree-in-bud, likely infectious/inflammatory in nature.  New appearing area of grouped micro-nodules in the anterior left lower lobe, largest up to 5 mm, which could relate to aforementioned small airways disease, though technically indeterminate.  Continued close attention at follow-up. RECIST SUMMARY: Date of prior examination for comparison: CT chest dated 05/24/2022. Lesion 1: Right perihilar opacity.  2.4.  Series 2, image 45.  Previously 2 4.  (Note there is slight increased size conspicuity in the craniocaudal dimension at 1.8 cm, previously 1.5 cm). Lesion 2: Right perihilar opacity.  1.7 cm.  Series 2, image 59.  Previously 1.5 cm"     She underwent a repeat CT chest 8/31/2022 and that revealed "Status post right upper lobectomy.  Soft tissue thickening adjacent to the suture line appears stable.  However, there is interval enlargement of a right lower lobe, bilobed, paramediastinal mass-like consolidation.  When dominant components of the lesions are measured separately, dimensions are summarized below. RECIST SUMMARY: Date of prior examination for comparison: CT chest 08/01/2022. Lesion 1: Right perihilar opacity.  4.7 cm. Series 4 image " "77.  Prior measurement 2.4 cm. Lesion 2: Right perihilar (infrahilar) opacity.  2.7 cm. Series 4 image 237.  Prior measurement 1.7 cm"  Case reviewed in thoracic conference, and concern for disease progression exists so plan on immunotehrapy if able to taper steroids           PET scan from 10/6/2022 reveals "Interval increase in size and radiotracer uptake of a right pulmonary lesions in this patient with known non-small cell lung cancer.  There are few new subcentimeter pulmonary nodules in the right lower lobe with no significant uptake, could represent infectious versus inflammatory process.  However, additional new metastatic disease can not be excluded.  Attention on follow-up. Improved metabolic appearance of the lower rectum compared with the prior exam"  She is currently off of prednisone and Entyvio.  She was on  Carboplatin and Alimta, started on 10/14/2022        She has completed proton beam completed on 1/24/2023.      [facility], CT chest there from 3/20/2023 reveals "Decreased in FDG avid recurrent disease in the right hemithorax. Decreased bilateral lower lobe nodules. Other lung nodules are unchanged or decreased.  New left lower lobe solid nodule in an area of clustered micronodules may be inflammatory. Reassess at follow-up" Plan on restaging scans in 3 months      CT chest from 5/16/2023 reveals "New right lower lobe consolidations and a small right pleural effusion.  Concerning for pneumonia and a small parapneumonic effusion.  However, should also consider treatment related pneumonitis .Stable right lower lobe paramediastinal irregular opacity, likely status post treatment of previous disease in same site    HPINereyda was started on Augmentin and prednisone (40 mg)on 5/16/2023. She notes that as she tapered prednisone to 15 mg she noticed wheezing and cough and right sided chest pain  Ct scans from 6/27/2023 reveals "Postoperative changes of right upper lobectomy.  Redemonstration of " "paramediastinal irregular opacity associated with architectural distortion and improving adjacent ground-glass opacity.  Decrease in size of the small amount loculated right-sided pleural fluid.   Enlarged right lower lobe pulmonary nodule measuring 0.9 cm, previously 0.6 cm.  Concerning for metastatic disease. Hepatic and left renal cysts.   1.6 cm left thyroid nodule.  Recommend further evaluation with nonemergent ultrasound"    Case reviewed in Thoracic conference and plan is for CT chest in 3 months    Review of Systems   Constitutional:  Negative for appetite change, fatigue and unexpected weight change.   HENT:  Negative for mouth sores.    Eyes:  Negative for visual disturbance.   Respiratory:  Positive for cough and shortness of breath.         Right chest wall pain   Cardiovascular:  Negative for chest pain.   Gastrointestinal:  Negative for abdominal pain and diarrhea.   Genitourinary:  Negative for frequency.   Musculoskeletal:  Negative for back pain.   Integumentary:  Negative for rash.   Neurological:  Negative for headaches.   Hematological:  Negative for adenopathy.   Psychiatric/Behavioral:  The patient is not nervous/anxious.    All other systems reviewed and are negative.       Objective     Physical Exam      LABS:  WBC   Date Value Ref Range Status   06/22/2023 6.61 3.90 - 12.70 K/uL Final     Hemoglobin   Date Value Ref Range Status   06/22/2023 12.8 12.0 - 16.0 g/dL Final     POC Hematocrit   Date Value Ref Range Status   08/09/2018 33 (L) 36 - 54 %PCV Final     Hematocrit   Date Value Ref Range Status   06/22/2023 39.7 37.0 - 48.5 % Final     Platelets   Date Value Ref Range Status   06/22/2023 200 150 - 450 K/uL Final     Gran # (ANC)   Date Value Ref Range Status   06/22/2023 3.5 1.8 - 7.7 K/uL Final     Gran %   Date Value Ref Range Status   06/22/2023 53.4 38.0 - 73.0 % Final       Chemistry        Component Value Date/Time     06/22/2023 1134    K 4.5 06/22/2023 1134     " 06/22/2023 1134    CO2 26 06/22/2023 1134    BUN 21 06/22/2023 1134    CREATININE 1.2 06/22/2023 1134     (H) 06/22/2023 1134        Component Value Date/Time    CALCIUM 9.8 06/22/2023 1134    ALKPHOS 49 (L) 06/22/2023 1134    AST 26 06/22/2023 1134    ALT 33 06/22/2023 1134    BILITOT 0.4 06/22/2023 1134    ESTGFRAFRICA 58.4 (A) 06/22/2022 1046    EGFRNONAA 50.6 (A) 06/22/2022 1046             Assessment and Plan     1. Malignant neoplasm of upper lobe, right bronchus or lung  Overview:  Status post resection 8/2018 status post adjuvant chemo    Orders:  -     X-Ray Chest PA And Lateral; Future; Expected date: 07/19/2023  -     X-Ray Chest Lateral Decubitus Right; Future; Expected date: 07/19/2023    2. Stage 3a chronic kidney disease    3. Secondary and unspecified malignant neoplasm of intrathoracic lymph nodes    Route Chart for Scheduling    Med Onc Chart Routing      Follow up with physician 4 weeks. Schedule to see me   Follow up with YAMILE    Infusion scheduling note    Injection scheduling note    Labs    Imaging    Pharmacy appointment    Other referrals              Therapy Plan Information  PORT FLUSH  Flushes  heparin, porcine (PF) 100 unit/mL injection flush 500 Units  500 Units, Intravenous, Every visit  sodium chloride 0.9% flush 10 mL  10 mL, Intravenous, Every visit           She is scheduled to go to [facility] for scan on 9/18/2023   Notes increase cough and wheezing, will get chest xray today    Above care plan was discussed with patient and all questions were addressed to her satisfaction

## 2023-07-20 ENCOUNTER — PATIENT MESSAGE (OUTPATIENT)
Dept: PULMONOLOGY | Facility: CLINIC | Age: 73
End: 2023-07-20
Payer: MEDICARE

## 2023-07-20 NOTE — TELEPHONE ENCOUNTER
Mrs. Toney,  AS of last week, you were doing great on 5 mg of prednisone daily which is why I wanted to continue to reduce the medication to 2.5 mg.  Please ask your insurance companies which medication can take the place of Bevespi (things like anoro, spiriva, tudorza, etc).  Xopenex is a substitute for albuterol.  Dr. Gordon

## 2023-07-20 NOTE — TELEPHONE ENCOUNTER
----- Message from Pam Dhaliwal MD sent at 7/19/2023 11:30 AM CDT -----  Patrice Fierro  She is noting more cough and wheezing since the steroid came down to 15 mg. She also notes that the inhalers you prescribed are super expensive $400 a piece,, is there anything else cheaper. I am getting chest xray and lateral decub xray

## 2023-07-21 ENCOUNTER — TELEPHONE (OUTPATIENT)
Dept: PHARMACY | Facility: CLINIC | Age: 73
End: 2023-07-21
Payer: MEDICARE

## 2023-07-21 NOTE — TELEPHONE ENCOUNTER
DOCUMENTATION ONLY  Levalbuterol Tartrate 45mcg/act aerosol  Approval date: 7/20/2023 to 12/31/2023  Case ID# [id number]

## 2023-07-25 ENCOUNTER — TELEPHONE (OUTPATIENT)
Dept: PALLIATIVE MEDICINE | Facility: CLINIC | Age: 73
End: 2023-07-25
Payer: MEDICARE

## 2023-07-25 ENCOUNTER — PATIENT MESSAGE (OUTPATIENT)
Dept: PALLIATIVE MEDICINE | Facility: CLINIC | Age: 73
End: 2023-07-25
Payer: MEDICARE

## 2023-07-25 NOTE — TELEPHONE ENCOUNTER
Unable to reach pt regarding appointment  left voicemail explaining why appointment has to be changed and provided patient with new appointment details

## 2023-07-26 RX ORDER — HYDROCODONE BITARTRATE AND ACETAMINOPHEN 7.5; 325 MG/1; MG/1
1 TABLET ORAL EVERY 4 HOURS PRN
Qty: 120 TABLET | Refills: 0 | Status: SHIPPED | OUTPATIENT
Start: 2023-07-26 | End: 2023-08-29 | Stop reason: SDUPTHER

## 2023-08-02 ENCOUNTER — PATIENT MESSAGE (OUTPATIENT)
Dept: CARDIOLOGY | Facility: CLINIC | Age: 73
End: 2023-08-02
Payer: MEDICARE

## 2023-08-10 ENCOUNTER — OFFICE VISIT (OUTPATIENT)
Dept: CARDIOLOGY | Facility: CLINIC | Age: 73
End: 2023-08-10
Payer: MEDICARE

## 2023-08-10 VITALS
SYSTOLIC BLOOD PRESSURE: 131 MMHG | WEIGHT: 147.38 LBS | BODY MASS INDEX: 23.69 KG/M2 | DIASTOLIC BLOOD PRESSURE: 62 MMHG | HEIGHT: 66 IN | HEART RATE: 71 BPM

## 2023-08-10 DIAGNOSIS — I49.3 PVCS (PREMATURE VENTRICULAR CONTRACTIONS): ICD-10-CM

## 2023-08-10 DIAGNOSIS — E78.2 MIXED HYPERLIPIDEMIA: Primary | ICD-10-CM

## 2023-08-10 DIAGNOSIS — I25.10 CORONARY ARTERY DISEASE DUE TO CALCIFIED CORONARY LESION: ICD-10-CM

## 2023-08-10 DIAGNOSIS — I10 ESSENTIAL HYPERTENSION: ICD-10-CM

## 2023-08-10 DIAGNOSIS — I47.10 SVT (SUPRAVENTRICULAR TACHYCARDIA): ICD-10-CM

## 2023-08-10 DIAGNOSIS — I27.20 PULMONARY HYPERTENSION: ICD-10-CM

## 2023-08-10 DIAGNOSIS — I25.84 CORONARY ARTERY DISEASE DUE TO CALCIFIED CORONARY LESION: ICD-10-CM

## 2023-08-10 DIAGNOSIS — C34.11 MALIGNANT NEOPLASM OF UPPER LOBE, RIGHT BRONCHUS OR LUNG: ICD-10-CM

## 2023-08-10 DIAGNOSIS — I70.0 AORTIC ATHEROSCLEROSIS: ICD-10-CM

## 2023-08-10 PROCEDURE — 93010 EKG 12-LEAD: ICD-10-PCS | Mod: S$PBB,,, | Performed by: INTERNAL MEDICINE

## 2023-08-10 PROCEDURE — 99999 PR PBB SHADOW E&M-EST. PATIENT-LVL III: ICD-10-PCS | Mod: PBBFAC,,, | Performed by: PHYSICIAN ASSISTANT

## 2023-08-10 PROCEDURE — 99214 OFFICE O/P EST MOD 30 MIN: CPT | Mod: S$PBB,,, | Performed by: PHYSICIAN ASSISTANT

## 2023-08-10 PROCEDURE — 99214 PR OFFICE/OUTPT VISIT, EST, LEVL IV, 30-39 MIN: ICD-10-PCS | Mod: S$PBB,,, | Performed by: PHYSICIAN ASSISTANT

## 2023-08-10 PROCEDURE — 93005 ELECTROCARDIOGRAM TRACING: CPT | Mod: PBBFAC,PO | Performed by: INTERNAL MEDICINE

## 2023-08-10 PROCEDURE — 93010 ELECTROCARDIOGRAM REPORT: CPT | Mod: S$PBB,,, | Performed by: INTERNAL MEDICINE

## 2023-08-10 PROCEDURE — 99999 PR PBB SHADOW E&M-EST. PATIENT-LVL III: CPT | Mod: PBBFAC,,, | Performed by: PHYSICIAN ASSISTANT

## 2023-08-10 PROCEDURE — 99213 OFFICE O/P EST LOW 20 MIN: CPT | Mod: PBBFAC,PO | Performed by: PHYSICIAN ASSISTANT

## 2023-08-10 RX ORDER — ATENOLOL 25 MG/1
25 TABLET ORAL 3 TIMES DAILY
Qty: 270 TABLET | Refills: 3 | Status: SHIPPED | OUTPATIENT
Start: 2023-08-10 | End: 2023-08-23 | Stop reason: SDUPTHER

## 2023-08-10 NOTE — PROGRESS NOTES
Cardiology Clinic Note  Reason for Visit: HTN, HLD  LOV w/ Dr. Mccoy: 2/10/2023    HPI:     Problem List:  COPD - mild  SVT (atrial tachycardia)  PVC  HTN onset 2017  Adenocarcinoma lung 6/2018 s/p RUL lobectomy  Coronary artery calcification      Alesha Toney is a 72 y.o. female who presents to clinic for follow-up regarding hypertension, hyperlipidemia and history of SVT.  She was diagnosed with right upper lobe adenocarcinoma in 2018.  She has taken several chemotherapy and immune modulating treatments that caused severe side effects.  She has also been treated with radiation, and recently has had remarkable success with proton therapy.  She is treated at [facility] in [place].  She lives locally and after her last treatment in January she developed pneumonitis.  She is been treated and condition appears to be improving.  She will be following up with [facility] and repeat imaging in September.  Throughout her cancer treatment she was understandably reluctant to start statin therapy for primary prevention.  She has been prescribed atorvastatin, but has never taken.  She had an echocardiogram in February that showed improvement in her pulmonary pressure, normal ejection fraction and normal longitudinal strain.  She has taken atenolol t.i.d. for many years and is tolerating well.  We planned for blood work in early October to decide whether or not beginning atorvastatin that time is indicated.      ROS:    Pertinent ROS included in HPI and below.  PMH:     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     Squamous cell carcinoma of skin 10/05/2020    left medial thigh    Therapy     Thyroid nodule      Ulcerative colitis with complication      Past Surgical History:   Procedure Laterality Date    ADENOIDECTOMY  17yo    ANTERIOR CERVICAL DISCECTOMY W/ FUSION N/A 10/15/2018    Procedure: DISCECTOMY, SPINE, CERVICAL, ANTERIOR APPROACH, WITH FUSION C6/7  Depuy SNS: [employer]/SSEP/Vocal Cord Regular OR table;  Surgeon: Greyson Bansal MD;  [facility];  Service: Neurosurgery;  Laterality: N/A;    APPENDECTOMY      BONE RESECTION, RIB  1980    BREAST BIOPSY Left     at least 40yrs ago in her 20's    BREAST CYST ASPIRATION      BREAST CYST EXCISION      COLONOSCOPY      ENDOBRONCHIAL ULTRASOUND N/A 7/10/2018    Procedure: ULTRASOUND, ENDOBRONCHIAL;  Surgeon: Sri Hearn MD;  Location: [facility] OR [facility] FLR;  Service: Pulmonary;  Laterality: N/A;    ENDOBRONCHIAL ULTRASOUND N/A 9/24/2019    Procedure: ENDOBRONCHIAL ULTRASOUND (EBUS);  Surgeon: Sri Hearn MD;  [facility];  Service: Pulmonary;  Laterality: N/A;    ENDOSCOPIC MUCOSAL RESECTION OF COLON N/A 9/11/2020    Procedure: RESECTION, MUCOSA, COLON, ENDOSCOPIC;  Surgeon: Lilibeth Carbajal MD;  [facility]  Service: Endoscopy;  Laterality: N/A;    ENDOSCOPIC ULTRASOUND OF LOWER GASTROINTESTINAL TRACT N/A 4/19/2021    Procedure: ULTRASOUND, LOWER GI TRACT, ENDOSCOPIC;  Surgeon: Lilibeth Carbajal MD;  Location: [facility];  Service: Endoscopy;  Laterality: N/A;    ENDOSCOPIC ULTRASOUND OF LOWER GASTROINTESTINAL TRACT N/A 6/25/2021    Procedure: ULTRASOUND, LOWER GI TRACT, ENDOSCOPIC;  Surgeon: Silvino Christopher MD;  Location: [facility];  Service: Endoscopy;  Laterality: N/A;  Needs Rapid MP    FLEXIBLE SIGMOIDOSCOPY  06/11/2020    with biopsies    FLEXIBLE SIGMOIDOSCOPY N/A 6/11/2020    Procedure: SIGMOIDOSCOPY, FLEXIBLE;  Surgeon: Gely Yeh MD;  Location: [facility];  Service: Endoscopy;  Laterality: N/A;    FLEXIBLE SIGMOIDOSCOPY N/A 4/19/2021    Procedure: SIGMOIDOSCOPY-FLEXIBLE;  Surgeon: Lilibeth Carbajal MD;   "Location: [facility] ENDO;  Service: Endoscopy;  Laterality: N/A;  Covid 4/16 Brian MALCOLM    FLEXIBLE SIGMOIDOSCOPY N/A 6/3/2022    Procedure: SIGMOIDOSCOPY-FLEXIBLE;  Surgeon: Francesco Clark MD;  Location: [facility] (4TH FLR);  Service: Endoscopy;  Laterality: N/A;  vacc-inst portal-tb    HYSTERECTOMY      @47yrs of age    INSERTION OF TUNNELED CENTRAL VENOUS CATHETER (CVC) WITH SUBCUTANEOUS PORT N/A 9/25/2018    Procedure: [id number];  Surgeon: Dosc Diagnostic Provider;  Location: [facility] OR 2ND FLR;  Service: Radiology;  Laterality: N/A;    INSERTION OF VENOUS ACCESS PORT N/A 3/15/2021    Procedure: INSERTION, VENOUS ACCESS PORT;  Surgeon: Chang Mathews MD;  [facility];  Service: General;  Laterality: N/A;  NEEDS CONSENT.    KIDNEY SURGERY      19yo    MEDIPORT REMOVAL N/A 3/15/2021    Procedure: REMOVAL, CATHETER, CENTRAL VENOUS, TUNNELED, WITH PORT;  Surgeon: Chang Mathews MD;  [facility];  Service: General;  Laterality: N/A;    OOPHORECTOMY      @47yrs of age    SKIN BIOPSY      TONSILLECTOMY      UPPER GASTROINTESTINAL ENDOSCOPY      VIDEO-ASSISTED THORACOSCOPIC LOBECTOMY Right 8/9/2018    Procedure: VATS, WITH LOBECTOMY Possible chest wall resection;  Surgeon: Philip Messina MD;  [facility];  Service: Thoracic;  Laterality: Right;  Have open pan available.     Allergies:     Review of patient's allergies indicates:   Allergen Reactions    Adhesive Itching, Rash and Blisters     INCLUDES EKG PADS    Ciprofloxacin Hives     Hives    Iodinated contrast media     Pcn [penicillins]      rash    Phenergan plain Other (See Comments)     "crazy behavior"    Phenergan [promethazine]     Tramadol     Vancomycin analogues      Red man syndrome     Medications:     Current Outpatient Medications on File Prior to Visit   Medication Sig Dispense Refill    ascorbic acid, vitamin C, (VITAMIN C) 500 MG tablet Take 500 mg by mouth once daily.      " calcium carbonate (TUMS) 300 mg (750 mg) Chew Take by mouth as needed.       esomeprazole (NEXIUM) 40 MG capsule Take 40 mg by mouth 2 (two) times daily before meals.      famotidine (PEPCID) 10 MG tablet Take 10 mg by mouth once daily.      HYDROcodone-acetaminophen (NORCO) 7.5-325 mg per tablet Take 1 tablet by mouth every 4 (four) hours as needed for Pain. 120 tablet 0    levalbuterol (XOPENEX HFA) 45 mcg/actuation inhaler Inhale 1-2 puffs into the lungs every 6 (six) hours as needed for Wheezing. Rescue 15 g 6    LORazepam (ATIVAN) 0.5 MG tablet TAKE 1 TABLET EVERY 12 HOURS AS NEEDED FOR ANXIETY 60 tablet 2    losartan (COZAAR) 50 MG tablet 1 tab po bid 180 tablet 3    naloxone (NARCAN) 4 mg/actuation Spry by Nasal route as needed.      ondansetron (ZOFRAN) 8 MG tablet Take 8 mg by mouth as needed.      [DISCONTINUED] atenoloL (TENORMIN) 25 MG tablet Take 1 tablet (25 mg total) by mouth 3 (three) times daily. 270 tablet 3    [DISCONTINUED] atorvastatin (LIPITOR) 20 MG tablet Take 1 tablet (20 mg total) by mouth once daily. 30 tablet 11    [DISCONTINUED] glycopyrrolate-formoteroL (BEVESPI AEROSPHERE) 9-4.8 mcg HFAA Inhale 2 puffs into the lungs 2 (two) times a day. Controller 10.7 g 11    [DISCONTINUED] ipratropium (ATROVENT HFA) 17 mcg/actuation inhaler Inhale 2 puffs into the lungs every 6 (six) hours. Rescue 12.9 g 0    [DISCONTINUED] predniSONE (DELTASONE) 10 MG tablet Take 4 tablets for 2 weeks and the take 2 tablets for 2 weeks and then stay on 1 tablet until you get a CT chest and see Dr. Hearn 120 tablet 1    [DISCONTINUED] sulfamethoxazole-trimethoprim 400-80mg (BACTRIM) 400-80 mg per tablet Take 1 tablete twice a day every other day 48 tablet 0     No current facility-administered medications on file prior to visit.     Social History:     Social History     Tobacco Use    Smoking status: Former     Current packs/day: 0.00     Average packs/day: 1 pack/day for 30.0 years (30.0 ttl pk-yrs)     Types:  "Cigarettes     Start date: 1985     Quit date: 2015     Years since quittin.1    Smokeless tobacco: Never   Substance Use Topics    Alcohol use: Not Currently     Alcohol/week: 0.0 standard drinks of alcohol     Comment: socially      Family History:     Family History   Problem Relation Age of Onset    Stroke Mother     Cataracts Mother     Cancer Father         colon cancer    Colon cancer Father     Diabetes Brother     Heart failure Brother     Hypertension Brother     Heart attack Paternal Aunt     Heart attack Maternal Grandfather     Diabetes Paternal Aunt     Anxiety disorder Paternal Grandmother         agoraphobia    Anesthesia problems Neg Hx     Blindness Neg Hx     Amblyopia Neg Hx     Glaucoma Neg Hx     Macular degeneration Neg Hx     Retinal detachment Neg Hx     Strabismus Neg Hx     Thyroid disease Neg Hx     Melanoma Neg Hx      Physical Exam:   /62   Pulse 71   Ht 5' 6" (1.676 m)   Wt 66.8 kg (147 lb 6 oz)   LMP  (LMP Unknown)   BMI 23.79 kg/m²      Physical Exam  Vitals and nursing note reviewed.   Constitutional:       Appearance: Normal appearance.   HENT:      Head: Normocephalic and atraumatic.   Neck:      Vascular: No carotid bruit or hepatojugular reflux.   Cardiovascular:      Rate and Rhythm: Normal rate and regular rhythm. Occasional Extrasystoles are present.     Pulses:           Radial pulses are 2+ on the right side and 2+ on the left side.        Dorsalis pedis pulses are 2+ on the right side and 2+ on the left side.        Posterior tibial pulses are 2+ on the right side and 2+ on the left side.      Heart sounds: S1 normal and S2 normal.   Pulmonary:      Effort: Pulmonary effort is normal.      Breath sounds: Normal breath sounds.   Abdominal:      General: Bowel sounds are normal.      Palpations: Abdomen is soft.      Tenderness: There is no abdominal tenderness.   Feet:      Right foot:      Skin integrity: Skin integrity normal.      Left foot:      " Skin integrity: Skin integrity normal.   Neurological:      Mental Status: She is alert.   Psychiatric:         Behavior: Behavior is cooperative.          Labs:     Blood Tests:  Lab Results   Component Value Date    BNP 26 04/29/2010     06/22/2023    K 4.5 06/22/2023     06/22/2023    CO2 26 06/22/2023    BUN 21 06/22/2023    CREATININE 1.2 06/22/2023     (H) 06/22/2023    HGBA1C 5.8 (H) 05/29/2019    MG 1.9 11/09/2022    AST 26 06/22/2023    ALT 33 06/22/2023    ALBUMIN 3.6 06/22/2023    PROT 6.5 06/22/2023    BILITOT 0.4 06/22/2023    WBC 6.61 06/22/2023    HGB 12.8 06/22/2023    HCT 39.7 06/22/2023    HCT 33 (L) 08/09/2018    MCV 98 06/22/2023     06/22/2023    INR 1.0 02/08/2021    TSH 0.636 10/13/2022       Lab Results   Component Value Date    CHOL 250 (H) 02/06/2023    HDL 57 02/06/2023    TRIG 120 02/06/2023       Lab Results   Component Value Date    LDLCALC 169.0 (H) 02/06/2023         Imaging:     Echocardiogram  TTE 2/14/2023  The left ventricle is normal in size with normal systolic function.  The estimated ejection fraction is 60%.  Indeterminate left ventricular diastolic function.  Mild mitral regurgitation.  Mild tricuspid regurgitation.  Normal right ventricular size with normal right ventricular systolic function.  The left ventricular global longitudinal strain is -20.4%. No significant changes in myocardial strain since the patient's last echocardiogram study.  The quantitatively derived ejection fraction is 66%.  Normal central venous pressure (3 mmHg).  The estimated PA systolic pressure is 32 mmHg.  PA pressures appear to have improved from prior echo.    TTE 2/11/2022  The left ventricle is normal in size with normal systolic function.  The estimated ejection fraction is 60%.  Indeterminate left ventricular diastolic function.  The left ventricular global longitudinal strain is -19%. No significant changes in myocardial strain since the patient's last  echocardiogram study.  Normal right ventricular size with normal right ventricular systolic function.  Mild tricuspid regurgitation.  Normal central venous pressure (3 mmHg).  There is pulmonary hypertension. The estimated PA systolic pressure is 49 mmHg.  The estimated PA systolic pressure is 49 mmHg.  Cystic structure is present in the liver (the patient is known to have multiple hepatic cysts).    EK/10/2023  Normal sinus rhythm   Nonspecific T wave abnormality   Abnormal ECG   When compared with ECG of 26-MAY-2022 15:06,   Premature ventricular complexes are no longer Present   QT has lengthened     Assessment:     1. Mixed hyperlipidemia    2. Coronary artery disease due to calcified coronary lesion    3. Aortic atherosclerosis    4. Malignant neoplasm of upper lobe, right bronchus or lung    5. SVT (supraventricular tachycardia)    6. PVCs (premature ventricular contractions)    7. Essential hypertension    8. Pulmonary hypertension        Plan:     Mixed hyperlipidemia  -     Lipid Panel; Future; Expected date: 10/02/2023  Patient will have labs drawn following upcoming visit at [facility] and we will advise on need to start statin based on those results. She would prefer to wait to see if additional treatment will be recommended by [facility].    Coronary artery disease due to calcified coronary lesion  Aortic atherosclerosis  Not currently receiving primary prevention based on contraindications from co-morbid conditions.     Malignant neoplasm of upper lobe, right bronchus or lung  -     Comprehensive Metabolic Panel  Followed by oncology, [facility] and local pulmonology     SVT (supraventricular tachycardia)  PVCs (premature ventricular contractions)  -     EKG 12-lead  Stable, continue atenolol and avoid common triggers    Essential hypertension  -     atenoloL (TENORMIN) 25 MG tablet; Take 1 tablet (25 mg total) by mouth 3 (three) times daily.  Dispense: 270 tablet; Refill: 3  -      Comprehensive Metabolic Panel; Future; Expected date: 10/02/2023  Stable, continue current medications     Pulmonary hypertension  Improved, continue to monitor annual echo with strain      Signed:  Ro Aguilar PA-C  Cardiology     8/10/2023 11:58 AM    Follow-up:     Future Appointments   Date Time Provider Department Center   8/16/2023  9:40 AM Pam Dhaliwal MD [id number] [place]   9/27/2023 11:00 AM Mayela Beaver DNP [facility] [place]   10/3/2023  8:30 AM LAB, [facility] [place]   10/23/2023  2:40 PM Tracy Ochoa MD [facility] [place]

## 2023-08-16 ENCOUNTER — OFFICE VISIT (OUTPATIENT)
Dept: HEMATOLOGY/ONCOLOGY | Facility: CLINIC | Age: 73
End: 2023-08-16
Payer: MEDICARE

## 2023-08-16 VITALS
HEIGHT: 66 IN | SYSTOLIC BLOOD PRESSURE: 146 MMHG | HEART RATE: 67 BPM | WEIGHT: 148.13 LBS | TEMPERATURE: 98 F | RESPIRATION RATE: 18 BRPM | BODY MASS INDEX: 23.81 KG/M2 | OXYGEN SATURATION: 98 % | DIASTOLIC BLOOD PRESSURE: 77 MMHG

## 2023-08-16 DIAGNOSIS — C77.1 SECONDARY AND UNSPECIFIED MALIGNANT NEOPLASM OF INTRATHORACIC LYMPH NODES: ICD-10-CM

## 2023-08-16 DIAGNOSIS — C34.91 NON-SMALL CELL CANCER OF RIGHT LUNG: Primary | ICD-10-CM

## 2023-08-16 DIAGNOSIS — C34.11 MALIGNANT NEOPLASM OF UPPER LOBE, RIGHT BRONCHUS OR LUNG: ICD-10-CM

## 2023-08-16 DIAGNOSIS — E03.2 HYPOTHYROIDISM DUE TO MEDICATION: ICD-10-CM

## 2023-08-16 PROCEDURE — 99214 PR OFFICE/OUTPT VISIT, EST, LEVL IV, 30-39 MIN: ICD-10-PCS | Mod: S$PBB,,, | Performed by: INTERNAL MEDICINE

## 2023-08-16 PROCEDURE — 99214 OFFICE O/P EST MOD 30 MIN: CPT | Mod: S$PBB,,, | Performed by: INTERNAL MEDICINE

## 2023-08-16 PROCEDURE — 99999 PR PBB SHADOW E&M-EST. PATIENT-LVL III: CPT | Mod: PBBFAC,,, | Performed by: INTERNAL MEDICINE

## 2023-08-16 PROCEDURE — 99999 PR PBB SHADOW E&M-EST. PATIENT-LVL III: ICD-10-PCS | Mod: PBBFAC,,, | Performed by: INTERNAL MEDICINE

## 2023-08-16 PROCEDURE — 99213 OFFICE O/P EST LOW 20 MIN: CPT | Mod: PBBFAC | Performed by: INTERNAL MEDICINE

## 2023-08-16 NOTE — PROGRESS NOTES
"Subjective     Patient ID: Alesha Toney is a 72 y.o. female.    Chief Complaint: Malignant neoplasm of upper lobe, right bronchus or lung  Ms. Alesha Toney is a 72-year-old otherwise healthy female who started having some shoulder pain, which was lasting for over six weeks.  She went and saw her primary care physician and underwent an x-ray, which revealed right upper lobe lung mass worrisome for malignancy and a CT scan was recommended, which was done on 6/12/18 and that revealed a newly developing mass, pleural based, lateral posterior segment, right upper lobe 3.5 x 3.5 cm, surrounding stellate margin and patchy infiltrates, particularly superiorly,   anteriorly infiltrates extend perihilar into the anterior segment.  She then underwent CT-guided biopsy, which revealed well-differentiated adenocarcinoma.       Her PET scan from 6/29/18 There is physiologic intracranial, head, and neck activity.  There is a large right upper lobe mass SUV max 9.11.  No local or distant metastatic disease seen.  There is physiologic liver, spleen, GI and  activity.  There are a few liver cysts.  No adenopathy is seen.  The adrenal glands are not enlarged.  No adenopathy is seen.  No suspicious bone lesions are seen.     She underwent VATS with right upper lobectomy. Mediastinal lymphadenectomy on 8/9/18. Pathology revealed "Tumor site: Upper lobe.Tumor size: 7 x 4 x 2.7 cm.Tumor focality: Single tumor.  Histologic type: Mixed invasive mucinous and nonmucinous adenocarcinoma. Histologic grade: G2: Moderately differentiated.Spread through air spaces (STATS): Present. Visceral pleura invasion: Not identified.  Lymphovascular invasion: Not identified. Direct invasion of adjacent structures: No adjacent structures present. Margins: All margins are uninvolved by carcinoma.Distance of invasive carcinoma from closest margin: 1 cm from the bronchial margin.Treatment effect: No known presurgical therapy. Regional lymph nodes: Number " "of lymph nodes involved: 0. Number of lymph nodes examined: 11. Pathologic Stage Classification: pT3 N0"        She completed 4 cycles of adjuvant chemo with Cisplatin and Alimta as of 1/10/19   She is on surveillance.     CT chest of 9/17/19 which reveals "Operative change of right upper lobectomy for small-cell lung cancer with several scattered subsolid opacities and a new solid nodule in the right lower lobe measuring up to 0.5 cm.  We recommend continued surveillance with the role and schedule for such surveillance to be determined by clinical considerations. Sided chest port distal tip terminating at the confluence of the brachiocephalic vein in the SVC.  Correlate with device functioning. Apically predominant emphysematous changes, left greater than right. Aortic annular calcification and multi-vessel coronary artery atherosclerosis. Numerous unchanged hepatic hypodensities, likely cysts"     PET scan from 10/1/19 reveals "1.6 cm soft tissue lesion re-identified posterior to the bronchus intermedius.  No corresponding focal increased radiotracer uptake to suggest local recurrence or metastatic disease. Stable subcentimeter opacities throughout the bilateral lower lobes, too small to characterize with PET-CT. Focal increased radiotracer uptake and subtle wall thickening in the rectum.  Findings are concerning for primary neoplastic process.  Recommend further evaluation with direct visualization"     She returned from [facility] and was advised to proceed with chemo/RT with either Docetaxel/platinum or Carboplatin/Alimta.     She completed chemo/RT with Carboplatin and Alimta as of 12/31/19     She underwent CT chest on 5/27/2020 which revealed 1. In this patient with history of lung cancer status post right upper lobectomy, there is a soft tissue opacity at the posterior margin of the staple line.  This lesion has been enlarging progressively and now measures 1.3 x 1.6 cm.  There is also a new 1.7 x 1.7 cm " "opacity at the right paravertebral pleural margin which is inseparable from this lesion.  These findings may represent post radiation change in light of the hypermetabolic lesion in this region on PET-CT of 10/01/2019 (image 69/[id number]).There is a 1 cm opacity in the superior or lateral basal segment of the right lower lobe (axial series 4, image 243) which has enlarged since 09/17/2019.  Nature of this lesion is unclear.  Clinical considerations will determine if percutaneous biopsy or metabolic assessment is warranted. 1.0 cm solid opacity with branching configuration (series 4, image 205) is located in the lateral basal segment of the left lower lobe, stable since 02/16/2019 (02/06/2019 axial series 4, image 310). Appearance and bifurcating character suggests mucoid impaction in mildly ectatic peripheral airway, likely not significant. Persistent clustered small centrilobular nodules in the apical segment of the right upper lobe likely reflecting small airway inflammation/infection. Partially visualized left chest port with distal catheter tip at the junction of the brachiocephalic veins and SVC, similar as on 09/17/2019"  She thus underwent PET scan on 6/1/2020 which revealed "No evidence of residual viable lung malignancy.  Evolving post radiation changes in the right paramediastinal lung, with a new irregular subpleural density which may also be related to recent radiation.  Attention on follow-up. Interval decrease in size of a soft tissue lesion along the inferior margin of the lobectomy stable line.  Several stable subcentimeter soft tissue opacities in the lower lobes bilaterally, As above.  Attention on follow-up.  Persistent hypermetabolic rectal lesion concerning for malignancy.  Recommend direct visualization and tissue sampling as clinically indicated"  I discussed her case in thoracic conference today and findings are felt to be related to RT     Her restaging CT scans from 1/20/2021 which reveal " ""Persistent soft tissue opacity in the posterior margin of the staple line that is increased in prominence when compared to the prior examination and is worrisome for disease progression. No new lesions identified.  Stable pulmonary nodules. Stable hypodense lesions throughout the liver that likely represent hepatic cysts"        Restaging PET scan from 1/27/2021 reveals "Increased size and hypermetabolic activity involving soft tissue opacity along the inferior border of the right upper lobectomy stable line concerning for progression of disease. Mildly increased hypermetabolic activity involving the rectum compatible with known high-grade dysplasia/intramucosal carcinoma. Interval decrease in size and resolution of hypermetabolic activity involving subcentimeter subpleural opacity within the right lower lobe"     MRI brain from  2/3/2021 reveals no evidence of recurrence.  GUARDANT testing only reveals p53, PDL1 is 0 and no other targets        PET scan from 3/24/2021 reveals "In this patient with known lung cancer, there is a persistent hypermetabolic right hilar mass consistent with viable tumor.  Interval increase in size is equivocal for progression of fibrosis versus tumor growth. 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  Activity appears more focal and possibly more proximal than before.  Consider direct visualization for further evaluation. The previously described subcentimeter subpleural opacity within the right lower lobe is not definitely seen on today's exam"               She last received immunotherapy on 5/28/2021. She underwent EUS on 6/25/2021 which revealed "Erythematous, congested, and ulcerated mucosa  (proctitis) in rectosigmoid colon.  Pathology reveals Colon, rectosigmoid, biopsy:  Moderate active colitis     She completed steroids about 5 weeks ago.      CT scans from 11/5/2021 reveals "In this patient with lung " "cancer, there is postoperative changes of right upper lobe resection.  Persistent soft tissue opacity along the right hilum suture line which is slightly increased in size from prior exam.  More conspicuous appearing nodules within the right lower lobe seen on prior exam.  New nodule within the right middle lobe measuring 1.3 cm.  Overall findings are all worrisome for disease progression. Scattered areas of centrilobular nodularity within the bilateral lungs.  Nonspecific and can be seen in the setting of infectious or inflammatory etiologies. Unchanged hepatics cysts. Stable left adrenal gland nodule."  PET scan from 11/23/2021 revealed "New hypermetabolic nodule within the right lower lobe additional slightly subcentimeter nodules within the right lower lobe.  Findings concerning for disease progression, other differentials include infection or noninfectious inflammatory process. Interval decrease in size and uptake in the right hilar mass. Resolution of previous focal rectal uptake"        She is on Opdivo. Her Opdivo was held on 1/10/2022 due to rectal bleeding. She saw Dr. Clark and was noted to have anal fissure which contributed to rectal bleed. Her rectal bleed has since resolved.           She was on Opdivo until 5/24/2022, she noted rectal bleeding and underwent   Flex sigmoidoscopy on 6/3/2022 reveals "Localized severe inflammation was found in the distal rectum. Biopsied. Pathology reveals Severely active chronic colitis with ulceration (see comment)  Moderate architectural disorder. Negative for CMV by immunohistochemistry . Negative for granulomas or viral inclusions. Negative for dysplasia or carcinoma"  PET scan from 6/17/2022 reveals "In this patient with lung cancer there are enlarging hypermetabolic right perihilar opacities concerning for continued local disease progression. Continued decreased metabolic activity of the right lower lobe nodule.  Cluster of pulmonary nodules bilaterally, some " "which are new from prior PET-CT for example the superior segment the left upper lobe and are likely infectious/inflammatory origin. Persistent and more extensive region of increased FDG avidity within the distal rectum in keeping with more extensive colitis.  Malignancy could have a similar appearance.  Recommend further investigation as clinically warranted and attention on follow-up"     She has active proctitis and is on hydrocortisone suppositories and Entyvyo since early June 2022.   CT chest on 8/1/2022 reveals  "Similar appearance of elongated, irregular opacity adjacent to the right upper lobectomy margin.  More posteriorly adjacent perihilar opacities with previous FDG avidity with detailed measurements as above. Similar areas of scattered micro-nodularity with tree-in-bud, likely infectious/inflammatory in nature.  New appearing area of grouped micro-nodules in the anterior left lower lobe, largest up to 5 mm, which could relate to aforementioned small airways disease, though technically indeterminate.  Continued close attention at follow-up. RECIST SUMMARY: Date of prior examination for comparison: CT chest dated 05/24/2022. Lesion 1: Right perihilar opacity.  2.4.  Series 2, image 45.  Previously 2 4.  (Note there is slight increased size conspicuity in the craniocaudal dimension at 1.8 cm, previously 1.5 cm). Lesion 2: Right perihilar opacity.  1.7 cm.  Series 2, image 59.  Previously 1.5 cm"     She underwent a repeat CT chest 8/31/2022 and that revealed "Status post right upper lobectomy.  Soft tissue thickening adjacent to the suture line appears stable.  However, there is interval enlargement of a right lower lobe, bilobed, paramediastinal mass-like consolidation.  When dominant components of the lesions are measured separately, dimensions are summarized below. RECIST SUMMARY: Date of prior examination for comparison: CT chest 08/01/2022. Lesion 1: Right perihilar opacity.  4.7 cm. Series 4 image " "77.  Prior measurement 2.4 cm. Lesion 2: Right perihilar (infrahilar) opacity.  2.7 cm. Series 4 image 237.  Prior measurement 1.7 cm"  Case reviewed in thoracic conference, and concern for disease progression exists so plan on immunotehrapy if able to taper steroids           PET scan from 10/6/2022 reveals "Interval increase in size and radiotracer uptake of a right pulmonary lesions in this patient with known non-small cell lung cancer.  There are few new subcentimeter pulmonary nodules in the right lower lobe with no significant uptake, could represent infectious versus inflammatory process.  However, additional new metastatic disease can not be excluded.  Attention on follow-up. Improved metabolic appearance of the lower rectum compared with the prior exam"  She is currently off of prednisone and Entyvio.  She was on  Carboplatin and Alimta, started on 10/14/2022        She has completed proton beam completed on 1/24/2023.      [facility], CT chest there from 3/20/2023 reveals "Decreased in FDG avid recurrent disease in the right hemithorax. Decreased bilateral lower lobe nodules. Other lung nodules are unchanged or decreased.  New left lower lobe solid nodule in an area of clustered micronodules may be inflammatory. Reassess at follow-up" Plan on restaging scans in 3 months      CT chest from 5/16/2023 reveals "New right lower lobe consolidations and a small right pleural effusion.  Concerning for pneumonia and a small parapneumonic effusion.  However, should also consider treatment related pneumonitis .Stable right lower lobe paramediastinal irregular opacity, likely status post treatment of previous disease in same site     She was started on Augmentin and prednisone (40 mg)on 5/16/2023. She notes that as she tapered prednisone to 15 mg she noticed wheezing and cough and right sided chest pain    Ct scans from 6/27/2023 revealed "Postoperative changes of right upper lobectomy.  Redemonstration of " "paramediastinal irregular opacity associated with architectural distortion and improving adjacent ground-glass opacity.  Decrease in size of the small amount loculated right-sided pleural fluid. Enlarged right lower lobe pulmonary nodule measuring 0.9 cm, previously 0.6 cm.  Concerning for metastatic disease. Hepatic and left renal cysts.   1.6 cm left thyroid nodule.  Recommend further evaluation with nonemergent ultrasound"     Case reviewed in Thoracic conference and plan is for CT chest in 3 months    Renuka is scheduled to go to [facility] on 9/18/2023 for scans  She still has cough which is wet but nothing comes out.  She also notes extreme fatigue  She notes shortness of breath and right sided chest pain which are stable.  She is here by herself ECOG 0    Review of Systems   Constitutional:  Positive for fatigue. Negative for appetite change and unexpected weight change.   HENT:  Negative for mouth sores.    Eyes:  Negative for visual disturbance.   Respiratory:  Positive for cough. Negative for shortness of breath.    Cardiovascular:  Negative for chest pain.   Gastrointestinal:  Negative for abdominal pain and diarrhea.   Genitourinary:  Negative for frequency.   Musculoskeletal:  Negative for back pain.   Integumentary:  Negative for rash.   Neurological:  Negative for headaches.   Hematological:  Negative for adenopathy.   Psychiatric/Behavioral:  The patient is not nervous/anxious.    All other systems reviewed and are negative.         Objective     Physical Exam  Vitals reviewed.   Constitutional:       Appearance: She is well-developed.   HENT:      Mouth/Throat:      Pharynx: No oropharyngeal exudate.   Cardiovascular:      Rate and Rhythm: Normal rate.      Heart sounds: Normal heart sounds.   Pulmonary:      Effort: Pulmonary effort is normal.      Breath sounds: Normal breath sounds. No wheezing.   Abdominal:      General: Bowel sounds are normal.      Palpations: Abdomen is soft.      " Tenderness: There is no abdominal tenderness.   Musculoskeletal:         General: No tenderness.   Lymphadenopathy:      Cervical: No cervical adenopathy.   Skin:     General: Skin is warm and dry.      Findings: No rash.   Neurological:      Mental Status: She is alert and oriented to person, place, and time.      Coordination: Coordination normal.   Psychiatric:         Thought Content: Thought content normal.         Judgment: Judgment normal.              LABS:  WBC   Date Value Ref Range Status   06/22/2023 6.61 3.90 - 12.70 K/uL Final     Hemoglobin   Date Value Ref Range Status   06/22/2023 12.8 12.0 - 16.0 g/dL Final     POC Hematocrit   Date Value Ref Range Status   08/09/2018 33 (L) 36 - 54 %PCV Final     Hematocrit   Date Value Ref Range Status   06/22/2023 39.7 37.0 - 48.5 % Final     Platelets   Date Value Ref Range Status   06/22/2023 200 150 - 450 K/uL Final     Gran # (ANC)   Date Value Ref Range Status   06/22/2023 3.5 1.8 - 7.7 K/uL Final     Gran %   Date Value Ref Range Status   06/22/2023 53.4 38.0 - 73.0 % Final       Chemistry        Component Value Date/Time     06/22/2023 1134    K 4.5 06/22/2023 1134     06/22/2023 1134    CO2 26 06/22/2023 1134    BUN 21 06/22/2023 1134    CREATININE 1.2 06/22/2023 1134     (H) 06/22/2023 1134        Component Value Date/Time    CALCIUM 9.8 06/22/2023 1134    ALKPHOS 49 (L) 06/22/2023 1134    AST 26 06/22/2023 1134    ALT 33 06/22/2023 1134    BILITOT 0.4 06/22/2023 1134    ESTGFRAFRICA 58.4 (A) 06/22/2022 1046    EGFRNONAA 50.6 (A) 06/22/2022 1046          Assessment and Plan     1. Non-small cell cancer of right lung  -     CBC w/ DIFF; Future; Expected date: 08/16/2023  -     CMP; Future; Expected date: 08/16/2023  -     ACTH; Future; Expected date: 08/16/2023  -     Cortisol, 8AM; Future; Expected date: 08/16/2023    2. 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 Hypothyroidism due to medication  -     TSH; Future; Expected date: 08/16/2023  -     FREE T4; Future; Expected date: 08/16/2023          Route Chart for Scheduling    Med Onc Chart Routing  Urgent    Follow up with physician . Schedule lab in [place] tomorrow at 8AM for CBC,CMP, TSH, free T4, ACTH and cortosol. Schedule to see me on 9/20/23   Follow up with YAMILE    Infusion scheduling note    Injection scheduling note    Labs    Imaging    Pharmacy appointment    Other referrals                Therapy Plan Information  PORT FLUSH  Flushes  heparin, porcine (PF) 100 unit/mL injection flush 500 Units  500 Units, Intravenous, Every visit  sodium chloride 0.9% flush 10 mL  10 mL, Intravenous, Every visit     For her extreme fatigue we will obtain labs tomorrow for CBC CMP TSH free T4 ACTH and 8:00 a.m. cortisol  She is scheduled to go to [facility] for scans around 09/18/2023 so I will see her back in clinic on 09/20/2023 to review scans and further workup as deemed necessary    All of her questions were answered to her satisfaction

## 2023-08-17 ENCOUNTER — LAB VISIT (OUTPATIENT)
Dept: LAB | Facility: HOSPITAL | Age: 73
End: 2023-08-17
Attending: INTERNAL MEDICINE
Payer: MEDICARE

## 2023-08-17 DIAGNOSIS — C34.91 NON-SMALL CELL CANCER OF RIGHT LUNG: ICD-10-CM

## 2023-08-17 DIAGNOSIS — E03.2 HYPOTHYROIDISM DUE TO MEDICATION: ICD-10-CM

## 2023-08-17 LAB
ALBUMIN SERPL BCP-MCNC: 3.8 G/DL (ref 3.5–5.2)
ALP SERPL-CCNC: 48 U/L (ref 55–135)
ALT SERPL W/O P-5'-P-CCNC: 12 U/L (ref 10–44)
ANION GAP SERPL CALC-SCNC: 10 MMOL/L (ref 8–16)
AST SERPL-CCNC: 20 U/L (ref 10–40)
BASOPHILS # BLD AUTO: 0.03 K/UL (ref 0–0.2)
BASOPHILS NFR BLD: 0.5 % (ref 0–1.9)
BILIRUB SERPL-MCNC: 0.3 MG/DL (ref 0.1–1)
BUN SERPL-MCNC: 21 MG/DL (ref 8–23)
CALCIUM SERPL-MCNC: 9.9 MG/DL (ref 8.7–10.5)
CHLORIDE SERPL-SCNC: 107 MMOL/L (ref 95–110)
CO2 SERPL-SCNC: 26 MMOL/L (ref 23–29)
CORTIS SERPL-MCNC: 10 UG/DL (ref 4.3–22.4)
CREAT SERPL-MCNC: 1.4 MG/DL (ref 0.5–1.4)
DIFFERENTIAL METHOD: ABNORMAL
EOSINOPHIL # BLD AUTO: 0.5 K/UL (ref 0–0.5)
EOSINOPHIL NFR BLD: 8.3 % (ref 0–8)
ERYTHROCYTE [DISTWIDTH] IN BLOOD BY AUTOMATED COUNT: 12.5 % (ref 11.5–14.5)
EST. GFR  (NO RACE VARIABLE): 40 ML/MIN/1.73 M^2
GLUCOSE SERPL-MCNC: 122 MG/DL (ref 70–110)
HCT VFR BLD AUTO: 37.6 % (ref 37–48.5)
HGB BLD-MCNC: 12.5 G/DL (ref 12–16)
IMM GRANULOCYTES # BLD AUTO: 0.01 K/UL (ref 0–0.04)
IMM GRANULOCYTES NFR BLD AUTO: 0.2 % (ref 0–0.5)
LYMPHOCYTES # BLD AUTO: 2 K/UL (ref 1–4.8)
LYMPHOCYTES NFR BLD: 34.6 % (ref 18–48)
MCH RBC QN AUTO: 31.4 PG (ref 27–31)
MCHC RBC AUTO-ENTMCNC: 33.2 G/DL (ref 32–36)
MCV RBC AUTO: 95 FL (ref 82–98)
MONOCYTES # BLD AUTO: 0.8 K/UL (ref 0.3–1)
MONOCYTES NFR BLD: 13.4 % (ref 4–15)
NEUTROPHILS # BLD AUTO: 2.5 K/UL (ref 1.8–7.7)
NEUTROPHILS NFR BLD: 43 % (ref 38–73)
NRBC BLD-RTO: 0 /100 WBC
PLATELET # BLD AUTO: 208 K/UL (ref 150–450)
PMV BLD AUTO: 9.6 FL (ref 9.2–12.9)
POTASSIUM SERPL-SCNC: 4.6 MMOL/L (ref 3.5–5.1)
PROT SERPL-MCNC: 6.7 G/DL (ref 6–8.4)
RBC # BLD AUTO: 3.98 M/UL (ref 4–5.4)
SODIUM SERPL-SCNC: 143 MMOL/L (ref 136–145)
T4 FREE SERPL-MCNC: 1.06 NG/DL (ref 0.71–1.51)
TSH SERPL DL<=0.005 MIU/L-ACNC: 2.98 UIU/ML (ref 0.4–4)
WBC # BLD AUTO: 5.89 K/UL (ref 3.9–12.7)

## 2023-08-17 PROCEDURE — 84439 ASSAY OF FREE THYROXINE: CPT | Performed by: INTERNAL MEDICINE

## 2023-08-17 PROCEDURE — 85025 COMPLETE CBC W/AUTO DIFF WBC: CPT | Performed by: INTERNAL MEDICINE

## 2023-08-17 PROCEDURE — 84443 ASSAY THYROID STIM HORMONE: CPT | Performed by: INTERNAL MEDICINE

## 2023-08-17 PROCEDURE — 36415 COLL VENOUS BLD VENIPUNCTURE: CPT | Performed by: INTERNAL MEDICINE

## 2023-08-17 PROCEDURE — 82024 ASSAY OF ACTH: CPT | Performed by: INTERNAL MEDICINE

## 2023-08-17 PROCEDURE — 82533 TOTAL CORTISOL: CPT | Performed by: INTERNAL MEDICINE

## 2023-08-17 PROCEDURE — 80053 COMPREHEN METABOLIC PANEL: CPT | Performed by: INTERNAL MEDICINE

## 2023-08-18 LAB — ACTH PLAS-MCNC: 15 PG/ML (ref 0–46)

## 2023-08-22 ENCOUNTER — PATIENT MESSAGE (OUTPATIENT)
Dept: CARDIOLOGY | Facility: CLINIC | Age: 73
End: 2023-08-22
Payer: MEDICARE

## 2023-08-23 DIAGNOSIS — I10 ESSENTIAL HYPERTENSION: ICD-10-CM

## 2023-08-23 DIAGNOSIS — I47.10 SVT (SUPRAVENTRICULAR TACHYCARDIA): ICD-10-CM

## 2023-08-23 RX ORDER — ATENOLOL 25 MG/1
25 TABLET ORAL 3 TIMES DAILY
Qty: 270 TABLET | Refills: 3 | Status: SHIPPED | OUTPATIENT
Start: 2023-08-23

## 2023-08-25 ENCOUNTER — PATIENT MESSAGE (OUTPATIENT)
Dept: HEMATOLOGY/ONCOLOGY | Facility: CLINIC | Age: 73
End: 2023-08-25
Payer: MEDICARE

## 2023-08-29 ENCOUNTER — PATIENT MESSAGE (OUTPATIENT)
Dept: PALLIATIVE MEDICINE | Facility: CLINIC | Age: 73
End: 2023-08-29
Payer: MEDICARE

## 2023-08-29 RX ORDER — HYDROCODONE BITARTRATE AND ACETAMINOPHEN 7.5; 325 MG/1; MG/1
1 TABLET ORAL EVERY 4 HOURS PRN
Qty: 120 TABLET | Refills: 0 | Status: SHIPPED | OUTPATIENT
Start: 2023-08-29 | End: 2023-09-27

## 2023-09-04 ENCOUNTER — PATIENT MESSAGE (OUTPATIENT)
Dept: HEMATOLOGY/ONCOLOGY | Facility: CLINIC | Age: 73
End: 2023-09-04
Payer: MEDICARE

## 2023-09-20 ENCOUNTER — OFFICE VISIT (OUTPATIENT)
Dept: HEMATOLOGY/ONCOLOGY | Facility: CLINIC | Age: 73
End: 2023-09-20
Payer: MEDICARE

## 2023-09-20 VITALS
SYSTOLIC BLOOD PRESSURE: 172 MMHG | HEIGHT: 66 IN | HEART RATE: 69 BPM | WEIGHT: 147.25 LBS | BODY MASS INDEX: 23.66 KG/M2 | RESPIRATION RATE: 18 BRPM | TEMPERATURE: 98 F | OXYGEN SATURATION: 96 % | DIASTOLIC BLOOD PRESSURE: 72 MMHG

## 2023-09-20 DIAGNOSIS — C34.11 MALIGNANT NEOPLASM OF UPPER LOBE, RIGHT BRONCHUS OR LUNG: Primary | ICD-10-CM

## 2023-09-20 DIAGNOSIS — N18.31 STAGE 3A CHRONIC KIDNEY DISEASE: ICD-10-CM

## 2023-09-20 DIAGNOSIS — J40 BRONCHITIS: ICD-10-CM

## 2023-09-20 PROCEDURE — 99214 OFFICE O/P EST MOD 30 MIN: CPT | Mod: PBBFAC | Performed by: INTERNAL MEDICINE

## 2023-09-20 PROCEDURE — 99214 PR OFFICE/OUTPT VISIT, EST, LEVL IV, 30-39 MIN: ICD-10-PCS | Mod: S$PBB,,, | Performed by: INTERNAL MEDICINE

## 2023-09-20 PROCEDURE — 99999 PR PBB SHADOW E&M-EST. PATIENT-LVL IV: ICD-10-PCS | Mod: PBBFAC,,, | Performed by: INTERNAL MEDICINE

## 2023-09-20 PROCEDURE — 99214 OFFICE O/P EST MOD 30 MIN: CPT | Mod: S$PBB,,, | Performed by: INTERNAL MEDICINE

## 2023-09-20 PROCEDURE — 99999 PR PBB SHADOW E&M-EST. PATIENT-LVL IV: CPT | Mod: PBBFAC,,, | Performed by: INTERNAL MEDICINE

## 2023-09-20 RX ORDER — METHYLPREDNISOLONE 4 MG/1
TABLET ORAL
Qty: 21 EACH | Refills: 0 | Status: SHIPPED | OUTPATIENT
Start: 2023-09-20 | End: 2023-10-11

## 2023-09-20 NOTE — PROGRESS NOTES
"Subjective     Patient ID: Alesha Toney is a 72 y.o. female.    Chief Complaint: Non-small cell cancer of right lung  Ms. Alesha Toney is a 72-year-old otherwise healthy female who started having some shoulder pain, which was lasting for over six weeks.  She went and saw her primary care physician and underwent an x-ray, which revealed right upper lobe lung mass worrisome for malignancy and a CT scan was recommended, which was done on 6/12/18 and that revealed a newly developing mass, pleural based, lateral posterior segment, right upper lobe 3.5 x 3.5 cm, surrounding stellate margin and patchy infiltrates, particularly superiorly,   anteriorly infiltrates extend perihilar into the anterior segment.  She then underwent CT-guided biopsy, which revealed well-differentiated adenocarcinoma.       Her PET scan from 6/29/18 There is physiologic intracranial, head, and neck activity.  There is a large right upper lobe mass SUV max 9.11.  No local or distant metastatic disease seen.  There is physiologic liver, spleen, GI and  activity.  There are a few liver cysts.  No adenopathy is seen.  The adrenal glands are not enlarged.  No adenopathy is seen.  No suspicious bone lesions are seen.     She underwent VATS with right upper lobectomy. Mediastinal lymphadenectomy on 8/9/18. Pathology revealed "Tumor site: Upper lobe.Tumor size: 7 x 4 x 2.7 cm.Tumor focality: Single tumor.  Histologic type: Mixed invasive mucinous and nonmucinous adenocarcinoma. Histologic grade: G2: Moderately differentiated.Spread through air spaces (STATS): Present. Visceral pleura invasion: Not identified.  Lymphovascular invasion: Not identified. Direct invasion of adjacent structures: No adjacent structures present. Margins: All margins are uninvolved by carcinoma.Distance of invasive carcinoma from closest margin: 1 cm from the bronchial margin.Treatment effect: No known presurgical therapy. Regional lymph nodes: Number of lymph nodes " "involved: 0. Number of lymph nodes examined: 11. Pathologic Stage Classification: pT3 N0"        She completed 4 cycles of adjuvant chemo with Cisplatin and Alimta as of 1/10/19   She is on surveillance.     CT chest of 9/17/19 which reveals "Operative change of right upper lobectomy for small-cell lung cancer with several scattered subsolid opacities and a new solid nodule in the right lower lobe measuring up to 0.5 cm.  We recommend continued surveillance with the role and schedule for such surveillance to be determined by clinical considerations. Sided chest port distal tip terminating at the confluence of the brachiocephalic vein in the SVC.  Correlate with device functioning. Apically predominant emphysematous changes, left greater than right. Aortic annular calcification and multi-vessel coronary artery atherosclerosis. Numerous unchanged hepatic hypodensities, likely cysts"     PET scan from 10/1/19 reveals "1.6 cm soft tissue lesion re-identified posterior to the bronchus intermedius.  No corresponding focal increased radiotracer uptake to suggest local recurrence or metastatic disease. Stable subcentimeter opacities throughout the bilateral lower lobes, too small to characterize with PET-CT. Focal increased radiotracer uptake and subtle wall thickening in the rectum.  Findings are concerning for primary neoplastic process.  Recommend further evaluation with direct visualization"     She returned from [facility] and was advised to proceed with chemo/RT with either Docetaxel/platinum or Carboplatin/Alimta.     She completed chemo/RT with Carboplatin and Alimta as of 12/31/19     She underwent CT chest on 5/27/2020 which revealed 1. In this patient with history of lung cancer status post right upper lobectomy, there is a soft tissue opacity at the posterior margin of the staple line.  This lesion has been enlarging progressively and now measures 1.3 x 1.6 cm.  There is also a new 1.7 x 1.7 cm opacity at the " "right paravertebral pleural margin which is inseparable from this lesion.  These findings may represent post radiation change in light of the hypermetabolic lesion in this region on PET-CT of 10/01/2019 (image 69/[id number]).There is a 1 cm opacity in the superior or lateral basal segment of the right lower lobe (axial series 4, image 243) which has enlarged since 09/17/2019.  Nature of this lesion is unclear.  Clinical considerations will determine if percutaneous biopsy or metabolic assessment is warranted. 1.0 cm solid opacity with branching configuration (series 4, image 205) is located in the lateral basal segment of the left lower lobe, stable since 02/16/2019 (02/06/2019 axial series 4, image 310). Appearance and bifurcating character suggests mucoid impaction in mildly ectatic peripheral airway, likely not significant. Persistent clustered small centrilobular nodules in the apical segment of the right upper lobe likely reflecting small airway inflammation/infection. Partially visualized left chest port with distal catheter tip at the junction of the brachiocephalic veins and SVC, similar as on 09/17/2019"  She thus underwent PET scan on 6/1/2020 which revealed "No evidence of residual viable lung malignancy.  Evolving post radiation changes in the right paramediastinal lung, with a new irregular subpleural density which may also be related to recent radiation.  Attention on follow-up. Interval decrease in size of a soft tissue lesion along the inferior margin of the lobectomy stable line.  Several stable subcentimeter soft tissue opacities in the lower lobes bilaterally, As above.  Attention on follow-up.  Persistent hypermetabolic rectal lesion concerning for malignancy.  Recommend direct visualization and tissue sampling as clinically indicated"  I discussed her case in thoracic conference today and findings are felt to be related to RT     Her restaging CT scans from 1/20/2021 which reveal "Persistent soft " "tissue opacity in the posterior margin of the staple line that is increased in prominence when compared to the prior examination and is worrisome for disease progression. No new lesions identified.  Stable pulmonary nodules. Stable hypodense lesions throughout the liver that likely represent hepatic cysts"        Restaging PET scan from 1/27/2021 reveals "Increased size and hypermetabolic activity involving soft tissue opacity along the inferior border of the right upper lobectomy stable line concerning for progression of disease. Mildly increased hypermetabolic activity involving the rectum compatible with known high-grade dysplasia/intramucosal carcinoma. Interval decrease in size and resolution of hypermetabolic activity involving subcentimeter subpleural opacity within the right lower lobe"     MRI brain from  2/3/2021 reveals no evidence of recurrence.  GUARDANT testing only reveals p53, PDL1 is 0 and no other targets        PET scan from 3/24/2021 reveals "In this patient with known lung cancer, there is a persistent hypermetabolic right hilar mass consistent with viable tumor.  Interval increase in size is equivocal for progression of fibrosis versus tumor growth. 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  Activity appears more focal and possibly more proximal than before.  Consider direct visualization for further evaluation. The previously described subcentimeter subpleural opacity within the right lower lobe is not definitely seen on today's exam"               She last received immunotherapy on 5/28/2021. She underwent EUS on 6/25/2021 which revealed "Erythematous, congested, and ulcerated mucosa  (proctitis) in rectosigmoid colon.  Pathology reveals Colon, rectosigmoid, biopsy:  Moderate active colitis     She completed steroids about 5 weeks ago.      CT scans from 11/5/2021 reveals "In this patient with lung cancer, there is " "postoperative changes of right upper lobe resection.  Persistent soft tissue opacity along the right hilum suture line which is slightly increased in size from prior exam.  More conspicuous appearing nodules within the right lower lobe seen on prior exam.  New nodule within the right middle lobe measuring 1.3 cm.  Overall findings are all worrisome for disease progression. Scattered areas of centrilobular nodularity within the bilateral lungs.  Nonspecific and can be seen in the setting of infectious or inflammatory etiologies. Unchanged hepatics cysts. Stable left adrenal gland nodule."  PET scan from 11/23/2021 revealed "New hypermetabolic nodule within the right lower lobe additional slightly subcentimeter nodules within the right lower lobe.  Findings concerning for disease progression, other differentials include infection or noninfectious inflammatory process. Interval decrease in size and uptake in the right hilar mass. Resolution of previous focal rectal uptake"        She is on Opdivo. Her Opdivo was held on 1/10/2022 due to rectal bleeding. She saw Dr. Clark and was noted to have anal fissure which contributed to rectal bleed. Her rectal bleed has since resolved.           She was on Opdivo until 5/24/2022, she noted rectal bleeding and underwent   Flex sigmoidoscopy on 6/3/2022 reveals "Localized severe inflammation was found in the distal rectum. Biopsied. Pathology reveals Severely active chronic colitis with ulceration (see comment)  Moderate architectural disorder. Negative for CMV by immunohistochemistry . Negative for granulomas or viral inclusions. Negative for dysplasia or carcinoma"  PET scan from 6/17/2022 reveals "In this patient with lung cancer there are enlarging hypermetabolic right perihilar opacities concerning for continued local disease progression. Continued decreased metabolic activity of the right lower lobe nodule.  Cluster of pulmonary nodules bilaterally, some which are new from " "prior PET-CT for example the superior segment the left upper lobe and are likely infectious/inflammatory origin. Persistent and more extensive region of increased FDG avidity within the distal rectum in keeping with more extensive colitis.  Malignancy could have a similar appearance.  Recommend further investigation as clinically warranted and attention on follow-up"     She has active proctitis and is on hydrocortisone suppositories and Entyvyo since early June 2022.   CT chest on 8/1/2022 reveals  "Similar appearance of elongated, irregular opacity adjacent to the right upper lobectomy margin.  More posteriorly adjacent perihilar opacities with previous FDG avidity with detailed measurements as above. Similar areas of scattered micro-nodularity with tree-in-bud, likely infectious/inflammatory in nature.  New appearing area of grouped micro-nodules in the anterior left lower lobe, largest up to 5 mm, which could relate to aforementioned small airways disease, though technically indeterminate.  Continued close attention at follow-up. RECIST SUMMARY: Date of prior examination for comparison: CT chest dated 05/24/2022. Lesion 1: Right perihilar opacity.  2.4.  Series 2, image 45.  Previously 2 4.  (Note there is slight increased size conspicuity in the craniocaudal dimension at 1.8 cm, previously 1.5 cm). Lesion 2: Right perihilar opacity.  1.7 cm.  Series 2, image 59.  Previously 1.5 cm"     She underwent a repeat CT chest 8/31/2022 and that revealed "Status post right upper lobectomy.  Soft tissue thickening adjacent to the suture line appears stable.  However, there is interval enlargement of a right lower lobe, bilobed, paramediastinal mass-like consolidation.  When dominant components of the lesions are measured separately, dimensions are summarized below. RECIST SUMMARY: Date of prior examination for comparison: CT chest 08/01/2022. Lesion 1: Right perihilar opacity.  4.7 cm. Series 4 image 77.  Prior " "measurement 2.4 cm. Lesion 2: Right perihilar (infrahilar) opacity.  2.7 cm. Series 4 image 237.  Prior measurement 1.7 cm"  Case reviewed in thoracic conference, and concern for disease progression exists so plan on immunotehrapy if able to taper steroids           PET scan from 10/6/2022 reveals "Interval increase in size and radiotracer uptake of a right pulmonary lesions in this patient with known non-small cell lung cancer.  There are few new subcentimeter pulmonary nodules in the right lower lobe with no significant uptake, could represent infectious versus inflammatory process.  However, additional new metastatic disease can not be excluded.  Attention on follow-up. Improved metabolic appearance of the lower rectum compared with the prior exam"  She is currently off of prednisone and Entyvio.  She was on  Carboplatin and Alimta, started on 10/14/2022        She has completed proton beam completed on 1/24/2023.      MD [facility], CT chest there from 3/20/2023 reveals "Decreased in FDG avid recurrent disease in the right hemithorax. Decreased bilateral lower lobe nodules. Other lung nodules are unchanged or decreased.  New left lower lobe solid nodule in an area of clustered micronodules may be inflammatory. Reassess at follow-up" Plan on restaging scans in 3 months      CT chest from 5/16/2023 reveals "New right lower lobe consolidations and a small right pleural effusion.  Concerning for pneumonia and a small parapneumonic effusion.  However, should also consider treatment related pneumonitis .Stable right lower lobe paramediastinal irregular opacity, likely status post treatment of previous disease in same site     She was started on Augmentin and prednisone (40 mg)on 5/16/2023. She notes that as she tapered prednisone to 15 mg she noticed wheezing and cough and right sided chest pain     Ct scans from 6/27/2023 revealed "Postoperative changes of right upper lobectomy.  Redemonstration of paramediastinal " "irregular opacity associated with architectural distortion and improving adjacent ground-glass opacity.  Decrease in size of the small amount loculated right-sided pleural fluid. Enlarged right lower lobe pulmonary nodule measuring 0.9 cm, previously 0.6 cm.  Concerning for metastatic disease. Hepatic and left renal cysts.   1.6 cm left thyroid nodule.  Recommend further evaluation with nonemergent ultrasound"     Case reviewed in Thoracic conference and plan is for CT chest in 3 months     Renuka underwent CT chest at [facility] on 9/18/2023 which reveals "There are postsurgical changes after right upper lobectomy. Masslike consolidation in the right lower lobe, 7.1 x 4.8 cm. On 05/16/2023 there were dense and groundglass opacities at this level. There are adjacent groundglass opacities.Subsolid nodule in the right lower lobe on image 107, 1.8 cm, previously 0.8 cm. Clustered nodules in the left upper lung on images 47-53  are new. Progression of small right pleural effusion. Calcified granuloma in the left lower lobe"  Plan is to obtain PET scan  She notes worsening cough with sputum production but nothings comes out. No fever. Notes chest pain and shortness of breath with minimal exertion    Review of Systems   Constitutional:  Negative for appetite change, fatigue and unexpected weight change.   HENT:  Negative for mouth sores.    Eyes:  Negative for visual disturbance.   Respiratory:  Positive for cough and shortness of breath.    Cardiovascular:  Positive for chest pain.   Gastrointestinal:  Negative for abdominal pain and diarrhea.   Genitourinary:  Negative for frequency.   Musculoskeletal:  Negative for back pain.   Integumentary:  Negative for rash.   Neurological:  Negative for headaches.   Hematological:  Negative for adenopathy.   Psychiatric/Behavioral:  The patient is not nervous/anxious.    All other systems reviewed and are negative.         Objective     Physical Exam  Vitals reviewed.   Constitutional:  "      Appearance: She is well-developed.   HENT:      Mouth/Throat:      Pharynx: No oropharyngeal exudate.   Cardiovascular:      Rate and Rhythm: Normal rate.      Heart sounds: Normal heart sounds.   Pulmonary:      Effort: Pulmonary effort is normal.      Breath sounds: Normal breath sounds. No wheezing.   Abdominal:      General: Bowel sounds are normal.      Palpations: Abdomen is soft.      Tenderness: There is no abdominal tenderness.   Musculoskeletal:         General: No tenderness.   Lymphadenopathy:      Cervical: No cervical adenopathy.   Skin:     General: Skin is warm and dry.      Findings: No rash.   Neurological:      Mental Status: She is alert and oriented to person, place, and time.      Coordination: Coordination normal.   Psychiatric:         Thought Content: Thought content normal.         Judgment: Judgment normal.           LABS:  WBC   Date Value Ref Range Status   08/17/2023 5.89 3.90 - 12.70 K/uL Final     Hemoglobin   Date Value Ref Range Status   08/17/2023 12.5 12.0 - 16.0 g/dL Final     POC Hematocrit   Date Value Ref Range Status   08/09/2018 33 (L) 36 - 54 %PCV Final     Hematocrit   Date Value Ref Range Status   08/17/2023 37.6 37.0 - 48.5 % Final     Platelets   Date Value Ref Range Status   08/17/2023 208 150 - 450 K/uL Final     Gran # (ANC)   Date Value Ref Range Status   08/17/2023 2.5 1.8 - 7.7 K/uL Final     Gran %   Date Value Ref Range Status   08/17/2023 43.0 38.0 - 73.0 % Final       Chemistry        Component Value Date/Time     08/17/2023 0724    K 4.6 08/17/2023 0724     08/17/2023 0724    CO2 26 08/17/2023 0724    BUN 21 08/17/2023 0724    CREATININE 1.4 08/17/2023 0724     (H) 08/17/2023 0724        Component Value Date/Time    CALCIUM 9.9 08/17/2023 0724    ALKPHOS 48 (L) 08/17/2023 0724    AST 20 08/17/2023 0724    ALT 12 08/17/2023 0724    BILITOT 0.3 08/17/2023 0724    ESTGFRAFRICA 58.4 (A) 06/22/2022 1046    EGFRNONAA 50.6 (A) 06/22/2022 1046              Assessment and Plan     1. Malignant neoplasm of upper lobe, right bronchus or lung  Overview:  Status post resection 8/2018 status post adjuvant chemo    Orders:  -     NM PET CT Routine Skull to Mid Thigh; Future; Expected date: 09/20/2023    2. Stage 3a chronic kidney disease    3. Bronchitis  -     methylPREDNISolone (MEDROL DOSEPACK) 4 mg tablet; use as directed  Dispense: 21 each; Refill: 0      She has just returned from [facility] and plan is to obtain a PET scan and she will return back to [facility] to review proton therapy options.  She would like to do the PET scan here so that will be scheduled and she will carry the disc with her to [facility]  I will see her after she returns back from [facility]    Above care plan  was discussed with the patient and her significant other and all questions were answered to their satisfaction

## 2023-09-25 ENCOUNTER — TELEPHONE (OUTPATIENT)
Dept: HEMATOLOGY/ONCOLOGY | Facility: CLINIC | Age: 73
End: 2023-09-25
Payer: MEDICARE

## 2023-09-25 ENCOUNTER — HOSPITAL ENCOUNTER (OUTPATIENT)
Dept: RADIOLOGY | Facility: HOSPITAL | Age: 73
Discharge: HOME | End: 2023-09-25
Attending: INTERNAL MEDICINE
Payer: MEDICARE

## 2023-09-25 DIAGNOSIS — C34.11 MALIGNANT NEOPLASM OF UPPER LOBE, RIGHT BRONCHUS OR LUNG: ICD-10-CM

## 2023-09-25 LAB — POCT GLUCOSE: 91 MG/DL (ref 70–110)

## 2023-09-25 PROCEDURE — 78815 NM PET CT ROUTINE: ICD-10-PCS | Mod: 26,PS,, | Performed by: STUDENT IN AN ORGANIZED HEALTH CARE EDUCATION/TRAINING PROGRAM

## 2023-09-25 PROCEDURE — A9552 F18 FDG: HCPCS

## 2023-09-25 PROCEDURE — 78815 PET IMAGE W/CT SKULL-THIGH: CPT | Mod: 26,PS,, | Performed by: STUDENT IN AN ORGANIZED HEALTH CARE EDUCATION/TRAINING PROGRAM

## 2023-09-25 NOTE — TELEPHONE ENCOUNTER
Reviewed PET scan which reveals progression,. She is going to send the CD to [facility] and will let me know of their decision

## 2023-09-27 ENCOUNTER — OFFICE VISIT (OUTPATIENT)
Dept: PALLIATIVE MEDICINE | Facility: CLINIC | Age: 73
End: 2023-09-27
Payer: MEDICARE

## 2023-09-27 VITALS
SYSTOLIC BLOOD PRESSURE: 147 MMHG | BODY MASS INDEX: 23.53 KG/M2 | DIASTOLIC BLOOD PRESSURE: 69 MMHG | HEART RATE: 69 BPM | HEIGHT: 66 IN | WEIGHT: 146.38 LBS

## 2023-09-27 DIAGNOSIS — G89.3 CANCER ASSOCIATED PAIN: Primary | ICD-10-CM

## 2023-09-27 DIAGNOSIS — R06.00 DYSPNEA, UNSPECIFIED TYPE: ICD-10-CM

## 2023-09-27 DIAGNOSIS — R53.83 FATIGUE, UNSPECIFIED TYPE: ICD-10-CM

## 2023-09-27 DIAGNOSIS — G47.00 INSOMNIA, UNSPECIFIED TYPE: ICD-10-CM

## 2023-09-27 DIAGNOSIS — C34.11 MALIGNANT NEOPLASM OF UPPER LOBE, RIGHT BRONCHUS OR LUNG: ICD-10-CM

## 2023-09-27 DIAGNOSIS — M89.8X9 BONE PAIN: ICD-10-CM

## 2023-09-27 DIAGNOSIS — Z51.5 ENCOUNTER FOR PALLIATIVE CARE: ICD-10-CM

## 2023-09-27 DIAGNOSIS — R63.0 ANOREXIA: ICD-10-CM

## 2023-09-27 DIAGNOSIS — F43.22 ADJUSTMENT DISORDER WITH ANXIETY: ICD-10-CM

## 2023-09-27 DIAGNOSIS — F43.23 ADJUSTMENT DISORDER WITH MIXED ANXIETY AND DEPRESSED MOOD: ICD-10-CM

## 2023-09-27 DIAGNOSIS — G89.3 CANCER RELATED PAIN: ICD-10-CM

## 2023-09-27 PROCEDURE — 99213 OFFICE O/P EST LOW 20 MIN: CPT | Mod: PBBFAC | Performed by: NURSE PRACTITIONER

## 2023-09-27 PROCEDURE — 99417 PR PROLONGED SVC, OUTPT, W/WO DIRECT PT CONTACT,  EA ADDTL 15 MIN: ICD-10-PCS | Mod: S$PBB,,, | Performed by: NURSE PRACTITIONER

## 2023-09-27 PROCEDURE — 99417 PROLNG OP E/M EACH 15 MIN: CPT | Mod: S$PBB,,, | Performed by: NURSE PRACTITIONER

## 2023-09-27 PROCEDURE — 99215 PR OFFICE/OUTPT VISIT, EST, LEVL V, 40-54 MIN: ICD-10-PCS | Mod: S$PBB,,, | Performed by: NURSE PRACTITIONER

## 2023-09-27 PROCEDURE — 99999 PR PBB SHADOW E&M-EST. PATIENT-LVL III: CPT | Mod: PBBFAC,,, | Performed by: NURSE PRACTITIONER

## 2023-09-27 PROCEDURE — 99215 OFFICE O/P EST HI 40 MIN: CPT | Mod: S$PBB,,, | Performed by: NURSE PRACTITIONER

## 2023-09-27 PROCEDURE — 99999 PR PBB SHADOW E&M-EST. PATIENT-LVL III: ICD-10-PCS | Mod: PBBFAC,,, | Performed by: NURSE PRACTITIONER

## 2023-09-27 RX ORDER — HYDROCODONE BITARTRATE AND ACETAMINOPHEN 10; 325 MG/1; MG/1
1 TABLET ORAL EVERY 4 HOURS PRN
Qty: 180 TABLET | Refills: 0 | Status: SHIPPED | OUTPATIENT
Start: 2023-09-27 | End: 2023-09-27 | Stop reason: CLARIF

## 2023-09-27 RX ORDER — HYDROCODONE BITARTRATE AND ACETAMINOPHEN 10; 325 MG/1; MG/1
1 TABLET ORAL EVERY 4 HOURS PRN
Qty: 180 TABLET | Refills: 0 | Status: SHIPPED | OUTPATIENT
Start: 2023-09-27 | End: 2023-09-28 | Stop reason: RX

## 2023-09-27 RX ORDER — HYDROGEN PEROXIDE 3 %
20 SOLUTION, NON-ORAL MISCELLANEOUS 2 TIMES DAILY
Status: ON HOLD | COMMUNITY
End: 2024-01-13 | Stop reason: HOSPADM

## 2023-09-27 RX ORDER — HYDROCODONE BITARTRATE AND ACETAMINOPHEN 10; 325 MG/1; MG/1
1 TABLET ORAL EVERY 4 HOURS PRN
Qty: 180 TABLET | Refills: 0 | Status: SHIPPED | OUTPATIENT
Start: 2023-09-27 | End: 2023-09-27

## 2023-09-27 NOTE — PROGRESS NOTES
Consult Note  Palliative Care      Consult Requested By: No ref. provider found  Reason for Consult: symptom management       ASSESSMENT/PLAN:     Plan/Recommendations:    09/27/2023:  - refilled norco 7.5 mg q 4 hrs prn, instructed to use 1-2 tabs     Malignant neoplasm of upper lobe, right bronchus or lung  - following with Dr. Dhaliwal  - completed chemo/RT on 12/31/2019  - interval treatment at [facility]  - s/p immunotherapy     Encounter for palliative medicine  - Patient is decisional  - Patient accompanied today by self  - ACP documents are not uploaded into EMR, patient and partner Lata will review documents at home, will revisit discussion at future visit.    - Philosophy of Palliative Medicine reviewed with patient and family at first visit  - New patient folder given to and reviewed with patient and family at first visit  - Goals of care: States that her goals are to maintain function and independence as much as possible. She states that she does not want to be bed-ridden, does not want to lose her ability to take care of herself and does not want to be a burden to her loved ones. Retired nurse. Please see SW note for more detail.    Cancer-related pain/bone pain   - increased pain in chest, lower lobe   - patient reports pain in her legs and legs, the leg pain is most severe, describes as intermittent bony pain, sometimes waking her up at night  - patient reports evaluation for bone pain has not revealed a source  - no pain relief while on steroids, tylenol ineffective  - currently on Norco 7.5 mg q 8 hours with partial relief, has started using 2 tablets before bed. Discussed conservative increase w norco to 10 mg, however unavailable at local pharmacy, refilled at current dose, instructed to use 1, 1.5 or 2 tablets q 4 hours prn.   - previously: pt's partner states that patient is under-reporting her pain and fearful of taking higher doses, she does admit she often splits tablets in half.  - can consider  "rotation to oxycodone if medication needs change  - narcan provided  - opioid safety sheet provided in first visit folder  - will continue to monitor     Dyspnea/Fatigue/insomnia  - dyspnea - worse s/p pneumonitis   - pt endorses moderate dyspnea, insomnia and moderate fatigue   - fatigue likely multifactorial: pain, dyspnea, diminished dietary intake   - patient does report that she tries to walk daily, even if only for 5-10 minutes, if the weather is poor, she will walk insider her house  - she is able to perform all ADL's independently   - sometimes wakes up due to leg pain   - patient reports ativan 0.25 mg qhs help with sleep  - will address symptom cluster w goal to improve fatigue  - tip sheet provided in new patient folder  - will continue to monitor      Adjustment disorder with anxiety   - 09/29, higher anxiety today due to recent scan results indicating possible progression  - her partner (Lata) observes patient to be intermittently depressed, "sometimes she tries to hurt my feelings"  - patient was a  nurse, 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  - patient reports that the robust social support provided by friends has faded since her initial diagnosis, partner Lata is her primary support, additionally a cousin,niece, nephew who lives out of state  - patient and partner have a well-developed emotional dialogue, for example: they have named their various emotional states as alter personalities, they find humor/levity where they can  - patient states that she worries about being a burden on her partner   - continue ativan 0.5 mg q 12 hrs qhs, patient reports only using half tablets to help with sleep  - has started using the ativan for episodes of heightened anxiety, finding this less helpful. Discussed taking the medication before anxiety becomes severe req SL format.   - will continue to monitor   - discussed availability of palliative DSW for counseling, individual or couple's, will discuss with " "partner Lata   - emotional support provided  - CAM Driscoll joined for first visit assessment, see separate note for detail  - will continue to monitor      Nausea/anorexia   - appetite poor    - denies nausea  - continue zofran 8 mg PRN   - will continue to monitor     Constipation   - controlled   - reports constipation 2/2 chemo, opioids, colitis   - promote good PO hydration  - start senna 2 tabs qhs, 2 colace, escalation of regimen as needed  - tip sheet provided in new patient folder  - will continue to monitor     Understanding of illness/Prognosis: good    Follow up: 4 weeks     Patient's encounter and above plan of care discussed with patient's oncology team.     SUBJECTIVE:     History of Present Illness:  Patient is a 72 y.o. year old female presenting with  Malignant neoplasm of upper lobe, right bronchus or lung Ms. Alesha Toney is a 72-year-old otherwise healthy female who started having some shoulder pain, which was lasting for over six weeks.  She went and saw her primary care physician and underwent an x-ray, which revealed right upper lobe lung mass worrisome for malignancy and a CT scan was recommended, which was done on 6/12/18 and that revealed a newly developing mass, pleural based, lateral posterior segment, right upper lobe 3.5 x 3.5 cm, surrounding stellate margin and patchy infiltrates, particularly superiorly, anteriorly infiltrates extend perihilar into the anterior segment. She then underwent CT-guided biopsy, which revealed well-differentiated adenocarcinoma.     09/27/2023:  QUINTEN NAIK reviewed and summarized:  As expected     Patient presents to clinic visit alone today. Her recent scans are concerning for disease progression, however she has a phone meeting scheduled tomorrow with [facility] oncologist to discuss final interpretation. She is, understandably, anxious and distracted by the "what ifs" and uncertainty about what may be ahead. She is hopeful that the scan findings are " "attributed to scar tissue but is mentally preparing for the possibility of resuming treatment. She saw something recently that describes having cancer as "like having a loaded gun pointing at the back of your head", which resonated deeply w her. Her partner Lata continues to be supportive, hopeful and does her best to reorient Alesha to the present. Physically, she has noticed a slight increase in her pain and her appetite is still poor although her weight has stabilized. Encouraged to discuss opportunity to see [facility] DSW for couples' counseling with Lata.       06/28/2023:  QUINTEN NAIK reviewed and summarized:  As expected     - doing well, overall  - is losing weight with good PO intake and steroid use    - s/p pneumonitis, dyspnea increased  - however, continuing good activity tolerance, taking daily walks indoors to avoid extreme heat/humidity    03/28/2023:  QUINTEN NAIK reviewed and summarized:  As expected     Patient presents to clinic alone today. She is recently back from [facility] and is happy to report her most recent scan results show improvement. Her overall symptom burden is stable. She is still aggravated by leg pain she believes was caused by Entyvio infusions. The pain interrupts her sleep as done intermittent GERD which she admits is sometimes worsened by her dietary choices. She has been increasing her exercise lately and her mood is boosted due to scan results referenced above. Her onco at [facility] did request her to return soon for repeat scan but she deferred to have scan here, as the financial burden of trips to [place] is mounting.        02/01/2023:  QUINTEN NAIK reviewed and summarized:  01/26/2023 Lorazepam 0.5 mg tabs Disp: 60 for 30 days  01/10/2023 Hydrocodone-APAP 7.5-325 mg Disp: 90 for 30 days    Patient presents to clinic with her partner Lata for her initial visit. She is ECOG 0 with healthy appearance. She reports bony leg pain that is partially controlled with current regimen. " Optimized today regimen today, will reassess at next visit. Discussed ACP, patient reports that she has been reluctant to discuss with her partner but endorses the value of establishing wants and wishes with family members, agrees to review documents at home to complete at next visit. Current regimen for anxiety and nausea are effective. Insomnia & fatigue at least partially attributable to uncontrolled pain. Optimized regimen for constipation today.     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     Squamous cell carcinoma of skin 10/05/2020    left medial thigh    Therapy     Thyroid nodule     Ulcerative colitis with complication      Past Surgical History:   Procedure Laterality Date    ADENOIDECTOMY  19yo    ANTERIOR CERVICAL DISCECTOMY W/ FUSION N/A 10/15/2018    Procedure: DISCECTOMY, SPINE, CERVICAL, ANTERIOR APPROACH, WITH FUSION C6/7  Depuy SNS: Motors/SSEP/Vocal Cord Regular OR table;  Surgeon: Greyson Bansal MD;  Location: [facility] OR [address] [place] [address];  Service: Neurosurgery;  Laterality: N/A;    APPENDECTOMY      BONE RESECTION, RIB  1980    BREAST BIOPSY Left     at least 40yrs ago in her 20's    BREAST CYST ASPIRATION      BREAST CYST EXCISION      COLONOSCOPY      ENDOBRONCHIAL ULTRASOUND N/A 7/10/2018    Procedure: ULTRASOUND, ENDOBRONCHIAL;  Surgeon: Sri Hearn MD;  Location: [facility] OR [address] [place] [address];  Service: Pulmonary;  Laterality: N/A;    ENDOBRONCHIAL ULTRASOUND N/A 9/24/2019    Procedure: ENDOBRONCHIAL ULTRASOUND (EBUS);  Surgeon: Sri Hearn MD;  Location: [facility] OR [address] [place] [address];  Service: Pulmonary;  Laterality: N/A;    ENDOSCOPIC MUCOSAL RESECTION OF COLON N/A 9/11/2020    Procedure: RESECTION, MUCOSA,  COLON, ENDOSCOPIC;  Surgeon: Lilibeth Carbajal MD;  Location: [facility] ENDO (2ND FLR);  Service: Endoscopy;  Laterality: N/A;    ENDOSCOPIC ULTRASOUND OF LOWER GASTROINTESTINAL TRACT N/A 4/19/2021    Procedure: ULTRASOUND, LOWER GI TRACT, ENDOSCOPIC;  Surgeon: Lilibeth Carbajal MD;  Location: [facility];  Service: Endoscopy;  Laterality: N/A;    ENDOSCOPIC ULTRASOUND OF LOWER GASTROINTESTINAL TRACT N/A 6/25/2021    Procedure: ULTRASOUND, LOWER GI TRACT, ENDOSCOPIC;  Surgeon: Silvino Christopher MD;  Location: [facility];  Service: Endoscopy;  Laterality: N/A;  Needs Rapid MP    FLEXIBLE SIGMOIDOSCOPY  06/11/2020    with biopsies    FLEXIBLE SIGMOIDOSCOPY N/A 6/11/2020    Procedure: SIGMOIDOSCOPY, FLEXIBLE;  Surgeon: Gely Yeh MD;  Location: [facility];  Service: Endoscopy;  Laterality: N/A;    FLEXIBLE SIGMOIDOSCOPY N/A 4/19/2021    Procedure: SIGMOIDOSCOPY-FLEXIBLE;  Surgeon: Lilibeth Carbajal MD;  Location: [facility];  Service: Endoscopy;  Laterality: N/A;  Covid 4/16 Brian MP    FLEXIBLE SIGMOIDOSCOPY N/A 6/3/2022    Procedure: SIGMOIDOSCOPY-FLEXIBLE;  Surgeon: Francesco Clark MD;  Location: [facility] (4TH FLR);  Service: Endoscopy;  Laterality: N/A;  vacc-inst portal-tb    HYSTERECTOMY      @47yrs of age    INSERTION OF TUNNELED CENTRAL VENOUS CATHETER (CVC) WITH SUBCUTANEOUS PORT N/A 9/25/2018    Procedure: VNUABERNC-QNFG-S-CATH;  Surgeon: [facility] Diagnostic Provider;  Location: [facility] OR 2ND FLR;  Service: Radiology;  Laterality: N/A;    INSERTION OF VENOUS ACCESS PORT N/A 3/15/2021    Procedure: INSERTION, VENOUS ACCESS PORT;  Surgeon: Chang Mathews MD;  Location: [facility] OR 2ND FLR;  Service: General;  Laterality: N/A;  NEEDS CONSENT.    KIDNEY SURGERY      17yo    MEDIPORT REMOVAL N/A 3/15/2021    Procedure: REMOVAL, CATHETER, CENTRAL VENOUS, TUNNELED, WITH PORT;  Surgeon: Chang Mathews MD;  Location: [facility] OR 2ND FLR;  Service: General;  Laterality: N/A;    OOPHORECTOMY      @47yrs of age     "SKIN BIOPSY      TONSILLECTOMY      UPPER GASTROINTESTINAL ENDOSCOPY      VIDEO-ASSISTED THORACOSCOPIC LOBECTOMY Right 8/9/2018    Procedure: VATS, WITH LOBECTOMY Possible chest wall resection;  Surgeon: Philip Messina MD;  Location: [facility] OR [address] [place] [address];  Service: Thoracic;  Laterality: Right;  Have open pan available.     Family History   Problem Relation Age of Onset    Stroke Mother     Cataracts Mother     Cancer Father         colon cancer    Colon cancer Father     Diabetes Brother     Heart failure Brother     Hypertension Brother     Heart attack Paternal Aunt     Heart attack Maternal Grandfather     Diabetes Paternal Aunt     Anxiety disorder Paternal Grandmother         agoraphobia    Anesthesia problems Neg Hx     Blindness Neg Hx     Amblyopia Neg Hx     Glaucoma Neg Hx     Macular degeneration Neg Hx     Retinal detachment Neg Hx     Strabismus Neg Hx     Thyroid disease Neg Hx     Melanoma Neg Hx      Review of patient's allergies indicates:   Allergen Reactions    Adhesive Itching, Rash and Blisters     INCLUDES EKG PADS    Ciprofloxacin Hives     Hives    Iodinated contrast media     Phenergan plain Other (See Comments)     "crazy behavior"    Phenergan [promethazine]     Tramadol     Vancomycin analogues      Red man syndrome       Medications:    Current Outpatient Medications:     ascorbic acid, vitamin C, (VITAMIN C) 500 MG tablet, Take 500 mg by mouth once daily., Disp: , Rfl:     atenoloL (TENORMIN) 25 MG tablet, Take 1 tablet (25 mg total) by mouth 3 (three) times daily., Disp: 270 tablet, Rfl: 3    calcium carbonate (TUMS) 300 mg (750 mg) Chew, Take by mouth as needed. , Disp: , Rfl:     esomeprazole (NEXIUM) 20 MG capsule, Take 20 mg by mouth., Disp: , Rfl:     famotidine (PEPCID) 10 MG tablet, Take 10 mg by mouth once daily., Disp: , Rfl:     HYDROcodone-acetaminophen (NORCO) 7.5-325 mg per tablet, Take 1 tablet by mouth every 4 (four) hours as needed for Pain., Disp: 120 tablet, Rfl: 0   "  levalbuterol (XOPENEX HFA) 45 mcg/actuation inhaler, Inhale 1-2 puffs into the lungs every 6 (six) hours as needed for Wheezing. Rescue, Disp: 15 g, Rfl: 6    LORazepam (ATIVAN) 0.5 MG tablet, TAKE 1 TABLET EVERY 12 HOURS AS NEEDED FOR ANXIETY, Disp: 60 tablet, Rfl: 2    losartan (COZAAR) 50 MG tablet, 1 tab po bid, Disp: 180 tablet, Rfl: 3    naloxone (NARCAN) 4 mg/actuation Spry, by Nasal route as needed., Disp: , Rfl:     ondansetron (ZOFRAN) 8 MG tablet, Take 8 mg by mouth as needed., Disp: , Rfl:     methylPREDNISolone (MEDROL DOSEPACK) 4 mg tablet, use as directed (Patient not taking: Reported on 9/27/2023), Disp: 21 each, Rfl: 0    OBJECTIVE:       ROS:  Review of Systems   Constitutional:  Positive for activity change, appetite change, fatigue and unexpected weight change.   HENT: Negative.  Negative for congestion, dental problem and drooling.    Eyes: Negative.  Negative for pain, discharge and itching.   Respiratory:  Positive for cough and shortness of breath. Negative for choking, wheezing and stridor.    Cardiovascular: Negative.  Negative for chest pain, palpitations and leg swelling.   Gastrointestinal:  Positive for constipation and nausea. Negative for diarrhea and vomiting.   Endocrine: Negative.  Negative for heat intolerance and polydipsia.   Genitourinary: Negative.  Negative for difficulty urinating, dyspareunia and dysuria.   Musculoskeletal:  Positive for arthralgias. Negative for back pain, gait problem and joint swelling.   Skin: Negative.  Negative for color change, pallor and rash.   Neurological: Negative.  Negative for seizures, speech difficulty and weakness.   Psychiatric/Behavioral:  Positive for dysphoric mood and sleep disturbance. Negative for confusion. The patient is nervous/anxious.        Review of Symptoms      Symptom Assessment (ESAS 0-10 Scale)  Pain:  4  Dyspnea:  0  Anxiety:  6  Nausea:  0  Depression:  3  Anorexia:  9  Fatigue:  5  Insomnia:  7  Restlessness:   5  Agitation:  0     CAM / Delirium:  Negative  Constipation:  Positive  Diarrhea:  Negative    Anxiety:  Is nervous/anxious  Constipation:  Constipation    Bowel Management Plan (BMP):  Yes      Pain Assessment:  OME in 24 hours:  40  Location(s): leg    Leg       Location: bilateral        Quality: Throbbing, aching and dull        Quantity: 3/10 in intensity        Chronicity: Onset 1 year(s) ago, unchanged        Aggravating Factors: Activity        Alleviating Factors: Opiates (heating pad)        Associated Symptoms: None    Modified Josefa Scale:  1    ECOG Performance Status stGstrstastdstest:st st1st Living Arrangements:  Lives with spouse    Psychosocial/Cultural:   See Palliative Psychosocial Note: Yes  **Primary  to Follow**  Palliative Care  Consult: No      Advance Care Planning   Advance Directives:   Living Will: No        Oral Declaration: No    LaPOST: No    Do Not Resuscitate Status: No    Medical Power of : Yes        Oral Declaration: Yes   Witnesses:  Mayela Beaver and Johnna Driscoll   Agent's Name:  Lata Mathews   Agent's Contact Number:  [phone]    Decision Making:  Patient answered questions  Goals of Care: What is most important right now is to focus on remaining as independent as possible, symptom/pain control, quality of life, even if it means sacrificing a little time. Accordingly, we have decided that the best plan to meet the patient's goals includes continuing with palliative care.          Physical Exam: Pulse: 69 (09/27/23 1100)  BP: (!) 147/69 (09/27/23 1100)  Vitals:    09/27/23 1100   BP: (!) 147/69   Pulse: 69      Physical Exam  Constitutional:       General: She is not in acute distress.     Appearance: Normal appearance. She is normal weight. She is not ill-appearing, toxic-appearing or diaphoretic.   HENT:      Head: Normocephalic and atraumatic.      Right Ear: Tympanic membrane normal.      Left Ear: Tympanic membrane normal.      Nose: Nose  normal.   Eyes:      Extraocular Movements: Extraocular movements intact.      Conjunctiva/sclera: Conjunctivae normal.   Cardiovascular:      Rate and Rhythm: Regular rhythm.   Pulmonary:      Effort: Pulmonary effort is normal. No respiratory distress.      Breath sounds: No wheezing.   Abdominal:      General: Abdomen is flat. There is no distension.      Palpations: Abdomen is soft.   Musculoskeletal:         General: No swelling or deformity. Normal range of motion.      Cervical back: Normal range of motion.   Skin:     General: Skin is warm and dry.      Coloration: Skin is not jaundiced.      Findings: No bruising.   Neurological:      General: No focal deficit present.      Mental Status: She is alert and oriented to person, place, and time. Mental status is at baseline.      Motor: No weakness.      Gait: Gait normal.   Psychiatric:         Mood and Affect: Mood normal.         Behavior: Behavior normal.         Thought Content: Thought content normal.         Judgment: Judgment normal.         Labs:  CBC:   WBC   Date Value Ref Range Status   08/17/2023 5.89 3.90 - 12.70 K/uL Final     Hemoglobin   Date Value Ref Range Status   08/17/2023 12.5 12.0 - 16.0 g/dL Final     POC Hematocrit   Date Value Ref Range Status   08/09/2018 33 (L) 36 - 54 %PCV Final     Hematocrit   Date Value Ref Range Status   08/17/2023 37.6 37.0 - 48.5 % Final     MCV   Date Value Ref Range Status   08/17/2023 95 82 - 98 fL Final     Platelets   Date Value Ref Range Status   08/17/2023 208 150 - 450 K/uL Final       LFT:   Lab Results   Component Value Date    AST 20 08/17/2023    ALKPHOS 48 (L) 08/17/2023    BILITOT 0.3 08/17/2023       Albumin:   Albumin   Date Value Ref Range Status   08/17/2023 3.8 3.5 - 5.2 g/dL Final     Protein:   Total Protein   Date Value Ref Range Status   08/17/2023 6.7 6.0 - 8.4 g/dL Final       Radiology:I have reviewed all pertinent imaging results/findings within the past 24 hours.    09/25/2023 NM  PET: Impression:     Interval progression of size and hypermetabolic activity of multiple right hilar/pulmonary lesions with enlarging and newly hypermetabolic right lower lobe pulmonary nodule.     New non-hypermetabolic subcentimeter pulmonary nodules, as detailed above.  Recommend attention on follow-up.     09/19/2023 CT chest ([facility]): Large masslike consolidation in the right lower lobe,this can be secondary to a combination of recurrent disease associated with postobstructive changes.   A nodule in the right lower lobe has increased in size and is worrisome for metastasis    I spent a total of 70 minutes on the day of the visit.This includes face to face time in discussion of goals of care, symptom assessment, coordination of care and emotional support.  This also includes non-face to face time preparing to see the patient (eg, review of tests/imaging), obtaining and/or reviewing separately obtained history, documenting clinical information in the electronic or other health record, independently interpreting results and communicating results to the patient/family/caregiver, or care coordinator.     Signature: Mayela Beaver DNP

## 2023-09-28 ENCOUNTER — PATIENT MESSAGE (OUTPATIENT)
Dept: PALLIATIVE MEDICINE | Facility: CLINIC | Age: 73
End: 2023-09-28
Payer: MEDICARE

## 2023-09-28 RX ORDER — LORAZEPAM 0.5 MG/1
0.5 TABLET ORAL 2 TIMES DAILY PRN
Qty: 60 TABLET | Refills: 0 | Status: SHIPPED | OUTPATIENT
Start: 2023-09-28 | End: 2023-11-13 | Stop reason: SDUPTHER

## 2023-09-28 RX ORDER — HYDROCODONE BITARTRATE AND ACETAMINOPHEN 7.5; 325 MG/1; MG/1
1-2 TABLET ORAL EVERY 4 HOURS PRN
Qty: 140 TABLET | Refills: 0 | Status: SHIPPED | OUTPATIENT
Start: 2023-09-28 | End: 2023-10-25 | Stop reason: SDUPTHER

## 2023-10-02 ENCOUNTER — PATIENT MESSAGE (OUTPATIENT)
Dept: HEMATOLOGY/ONCOLOGY | Facility: CLINIC | Age: 73
End: 2023-10-02
Payer: MEDICARE

## 2023-10-02 NOTE — TELEPHONE ENCOUNTER
I honestly do not think more steroids will help at this time. I think she needs to get started on systemic therapy, She wants to wait and see what [facility] has to offer which is reasonable

## 2023-10-05 NOTE — TELEPHONE ENCOUNTER
I discussed that with her and recommended to start chemo, she is going to [facility] and then will decide from there

## 2023-10-06 ENCOUNTER — PATIENT MESSAGE (OUTPATIENT)
Dept: HEMATOLOGY/ONCOLOGY | Facility: CLINIC | Age: 73
End: 2023-10-06
Payer: MEDICARE

## 2023-10-22 NOTE — PROGRESS NOTES
"Subjective     Patient ID: Alesha Toney is a 73 y.o. female.    Chief Complaint: Malignant neoplasm of upper lobe, right bronchus or lung  Ms. Alesha Toney is a 73-year-old otherwise healthy female who started having some shoulder pain, which was lasting for over six weeks.  She went and saw her primary care physician and underwent an x-ray, which revealed right upper lobe lung mass worrisome for malignancy and a CT scan was recommended, which was done on 6/12/18 and that revealed a newly developing mass, pleural based, lateral posterior segment, right upper lobe 3.5 x 3.5 cm, surrounding stellate margin and patchy infiltrates, particularly superiorly,   anteriorly infiltrates extend perihilar into the anterior segment.  She then underwent CT-guided biopsy, which revealed well-differentiated adenocarcinoma.       Her PET scan from 6/29/18 There is physiologic intracranial, head, and neck activity.  There is a large right upper lobe mass SUV max 9.11.  No local or distant metastatic disease seen.  There is physiologic liver, spleen, GI and  activity.  There are a few liver cysts.  No adenopathy is seen.  The adrenal glands are not enlarged.  No adenopathy is seen.  No suspicious bone lesions are seen.     She underwent VATS with right upper lobectomy. Mediastinal lymphadenectomy on 8/9/18. Pathology revealed "Tumor site: Upper lobe.Tumor size: 7 x 4 x 2.7 cm.Tumor focality: Single tumor.  Histologic type: Mixed invasive mucinous and nonmucinous adenocarcinoma. Histologic grade: G2: Moderately differentiated.Spread through air spaces (STATS): Present. Visceral pleura invasion: Not identified.  Lymphovascular invasion: Not identified. Direct invasion of adjacent structures: No adjacent structures present. Margins: All margins are uninvolved by carcinoma.Distance of invasive carcinoma from closest margin: 1 cm from the bronchial margin.Treatment effect: No known presurgical therapy. Regional lymph nodes: Number " "of lymph nodes involved: 0. Number of lymph nodes examined: 11. Pathologic Stage Classification: pT3 N0"        She completed 4 cycles of adjuvant chemo with Cisplatin and Alimta as of 1/10/19   She is on surveillance.     CT chest of 9/17/19 which reveals "Operative change of right upper lobectomy for small-cell lung cancer with several scattered subsolid opacities and a new solid nodule in the right lower lobe measuring up to 0.5 cm.  We recommend continued surveillance with the role and schedule for such surveillance to be determined by clinical considerations. Sided chest port distal tip terminating at the confluence of the brachiocephalic vein in the SVC.  Correlate with device functioning. Apically predominant emphysematous changes, left greater than right. Aortic annular calcification and multi-vessel coronary artery atherosclerosis. Numerous unchanged hepatic hypodensities, likely cysts"     PET scan from 10/1/19 reveals "1.6 cm soft tissue lesion re-identified posterior to the bronchus intermedius.  No corresponding focal increased radiotracer uptake to suggest local recurrence or metastatic disease. Stable subcentimeter opacities throughout the bilateral lower lobes, too small to characterize with PET-CT. Focal increased radiotracer uptake and subtle wall thickening in the rectum.  Findings are concerning for primary neoplastic process.  Recommend further evaluation with direct visualization"     She returned from [facility] and was advised to proceed with chemo/RT with either Docetaxel/platinum or Carboplatin/Alimta.     She completed chemo/RT with Carboplatin and Alimta as of 12/31/19     She underwent CT chest on 5/27/2020 which revealed 1. In this patient with history of lung cancer status post right upper lobectomy, there is a soft tissue opacity at the posterior margin of the staple line.  This lesion has been enlarging progressively and now measures 1.3 x 1.6 cm.  There is also a new 1.7 x 1.7 cm " "opacity at the right paravertebral pleural margin which is inseparable from this lesion.  These findings may represent post radiation change in light of the hypermetabolic lesion in this region on PET-CT of 10/01/2019 (image 69/[id number]).There is a 1 cm opacity in the superior or lateral basal segment of the right lower lobe (axial series 4, image 243) which has enlarged since 09/17/2019.  Nature of this lesion is unclear.  Clinical considerations will determine if percutaneous biopsy or metabolic assessment is warranted. 1.0 cm solid opacity with branching configuration (series 4, image 205) is located in the lateral basal segment of the left lower lobe, stable since 02/16/2019 (02/06/2019 axial series 4, image 310). Appearance and bifurcating character suggests mucoid impaction in mildly ectatic peripheral airway, likely not significant. Persistent clustered small centrilobular nodules in the apical segment of the right upper lobe likely reflecting small airway inflammation/infection. Partially visualized left chest port with distal catheter tip at the junction of the brachiocephalic veins and SVC, similar as on 09/17/2019"  She thus underwent PET scan on 6/1/2020 which revealed "No evidence of residual viable lung malignancy.  Evolving post radiation changes in the right paramediastinal lung, with a new irregular subpleural density which may also be related to recent radiation.  Attention on follow-up. Interval decrease in size of a soft tissue lesion along the inferior margin of the lobectomy stable line.  Several stable subcentimeter soft tissue opacities in the lower lobes bilaterally, As above.  Attention on follow-up.  Persistent hypermetabolic rectal lesion concerning for malignancy.  Recommend direct visualization and tissue sampling as clinically indicated"  I discussed her case in thoracic conference today and findings are felt to be related to RT     Her restaging CT scans from 1/20/2021 which reveal " ""Persistent soft tissue opacity in the posterior margin of the staple line that is increased in prominence when compared to the prior examination and is worrisome for disease progression. No new lesions identified.  Stable pulmonary nodules. Stable hypodense lesions throughout the liver that likely represent hepatic cysts"        Restaging PET scan from 1/27/2021 reveals "Increased size and hypermetabolic activity involving soft tissue opacity along the inferior border of the right upper lobectomy stable line concerning for progression of disease. Mildly increased hypermetabolic activity involving the rectum compatible with known high-grade dysplasia/intramucosal carcinoma. Interval decrease in size and resolution of hypermetabolic activity involving subcentimeter subpleural opacity within the right lower lobe"     MRI brain from  2/3/2021 reveals no evidence of recurrence.  GUARDANT testing only reveals p53, PDL1 is 0 and no other targets        PET scan from 3/24/2021 reveals "In this patient with known lung cancer, there is a persistent hypermetabolic right hilar mass consistent with viable tumor.  Interval increase in size is equivocal for progression of fibrosis versus tumor growth. 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  Activity appears more focal and possibly more proximal than before.  Consider direct visualization for further evaluation. The previously described subcentimeter subpleural opacity within the right lower lobe is not definitely seen on today's exam"               She last received immunotherapy on 5/28/2021. She underwent EUS on 6/25/2021 which revealed "Erythematous, congested, and ulcerated mucosa  (proctitis) in rectosigmoid colon.  Pathology reveals Colon, rectosigmoid, biopsy:  Moderate active colitis     She completed steroids about 5 weeks ago.      CT scans from 11/5/2021 reveals "In this patient with lung " "cancer, there is postoperative changes of right upper lobe resection.  Persistent soft tissue opacity along the right hilum suture line which is slightly increased in size from prior exam.  More conspicuous appearing nodules within the right lower lobe seen on prior exam.  New nodule within the right middle lobe measuring 1.3 cm.  Overall findings are all worrisome for disease progression. Scattered areas of centrilobular nodularity within the bilateral lungs.  Nonspecific and can be seen in the setting of infectious or inflammatory etiologies. Unchanged hepatics cysts. Stable left adrenal gland nodule."  PET scan from 11/23/2021 revealed "New hypermetabolic nodule within the right lower lobe additional slightly subcentimeter nodules within the right lower lobe.  Findings concerning for disease progression, other differentials include infection or noninfectious inflammatory process. Interval decrease in size and uptake in the right hilar mass. Resolution of previous focal rectal uptake"        She is on Opdivo. Her Opdivo was held on 1/10/2022 due to rectal bleeding. She saw Dr. Clark and was noted to have anal fissure which contributed to rectal bleed. Her rectal bleed has since resolved.           She was on Opdivo until 5/24/2022, she noted rectal bleeding and underwent   Flex sigmoidoscopy on 6/3/2022 reveals "Localized severe inflammation was found in the distal rectum. Biopsied. Pathology reveals Severely active chronic colitis with ulceration (see comment)  Moderate architectural disorder. Negative for CMV by immunohistochemistry . Negative for granulomas or viral inclusions. Negative for dysplasia or carcinoma"  PET scan from 6/17/2022 reveals "In this patient with lung cancer there are enlarging hypermetabolic right perihilar opacities concerning for continued local disease progression. Continued decreased metabolic activity of the right lower lobe nodule.  Cluster of pulmonary nodules bilaterally, some " "which are new from prior PET-CT for example the superior segment the left upper lobe and are likely infectious/inflammatory origin. Persistent and more extensive region of increased FDG avidity within the distal rectum in keeping with more extensive colitis.  Malignancy could have a similar appearance.  Recommend further investigation as clinically warranted and attention on follow-up"     She has active proctitis and is on hydrocortisone suppositories and Entyvyo since early June 2022.   CT chest on 8/1/2022 reveals  "Similar appearance of elongated, irregular opacity adjacent to the right upper lobectomy margin.  More posteriorly adjacent perihilar opacities with previous FDG avidity with detailed measurements as above. Similar areas of scattered micro-nodularity with tree-in-bud, likely infectious/inflammatory in nature.  New appearing area of grouped micro-nodules in the anterior left lower lobe, largest up to 5 mm, which could relate to aforementioned small airways disease, though technically indeterminate.  Continued close attention at follow-up. RECIST SUMMARY: Date of prior examination for comparison: CT chest dated 05/24/2022. Lesion 1: Right perihilar opacity.  2.4.  Series 2, image 45.  Previously 2 4.  (Note there is slight increased size conspicuity in the craniocaudal dimension at 1.8 cm, previously 1.5 cm). Lesion 2: Right perihilar opacity.  1.7 cm.  Series 2, image 59.  Previously 1.5 cm"     She underwent a repeat CT chest 8/31/2022 and that revealed "Status post right upper lobectomy.  Soft tissue thickening adjacent to the suture line appears stable.  However, there is interval enlargement of a right lower lobe, bilobed, paramediastinal mass-like consolidation.  When dominant components of the lesions are measured separately, dimensions are summarized below. RECIST SUMMARY: Date of prior examination for comparison: CT chest 08/01/2022. Lesion 1: Right perihilar opacity.  4.7 cm. Series 4 image " "77.  Prior measurement 2.4 cm. Lesion 2: Right perihilar (infrahilar) opacity.  2.7 cm. Series 4 image 237.  Prior measurement 1.7 cm"  Case reviewed in thoracic conference, and concern for disease progression exists so plan on immunotehrapy if able to taper steroids           PET scan from 10/6/2022 reveals "Interval increase in size and radiotracer uptake of a right pulmonary lesions in this patient with known non-small cell lung cancer.  There are few new subcentimeter pulmonary nodules in the right lower lobe with no significant uptake, could represent infectious versus inflammatory process.  However, additional new metastatic disease can not be excluded.  Attention on follow-up. Improved metabolic appearance of the lower rectum compared with the prior exam"  She is currently off of prednisone and Entyvio.  She was on  Carboplatin and Alimta, started on 10/14/2022        She has completed proton beam completed on 1/24/2023.      [facility], CT chest there from 3/20/2023 reveals "Decreased in FDG avid recurrent disease in the right hemithorax. Decreased bilateral lower lobe nodules. Other lung nodules are unchanged or decreased.  New left lower lobe solid nodule in an area of clustered micronodules may be inflammatory. Reassess at follow-up" Plan on restaging scans in 3 months      CT chest from 5/16/2023 reveals "New right lower lobe consolidations and a small right pleural effusion.  Concerning for pneumonia and a small parapneumonic effusion.  However, should also consider treatment related pneumonitis .Stable right lower lobe paramediastinal irregular opacity, likely status post treatment of previous disease in same site     She was started on Augmentin and prednisone (40 mg)on 5/16/2023. She notes that as she tapered prednisone to 15 mg she noticed wheezing and cough and right sided chest pain     Ct scans from 6/27/2023 revealed "Postoperative changes of right upper lobectomy.  Redemonstration of " "paramediastinal irregular opacity associated with architectural distortion and improving adjacent ground-glass opacity.  Decrease in size of the small amount loculated right-sided pleural fluid. Enlarged right lower lobe pulmonary nodule measuring 0.9 cm, previously 0.6 cm.  Concerning for metastatic disease. Hepatic and left renal cysts.   1.6 cm left thyroid nodule.  Recommend further evaluation with nonemergent ultrasound"     Case reviewed in Thoracic conference and plan is for CT chest in 3 months     She underwent CT chest at [facility] on 9/18/2023 which reveals "There are postsurgical changes after right upper lobectomy. Masslike consolidation in the right lower lobe, 7.1 x 4.8 cm. On 05/16/2023 there were dense and groundglass opacities at this level. There are adjacent groundglass opacities.Subsolid nodule in the right lower lobe on image 107, 1.8 cm, previously 0.8 cm. Clustered nodules in the left upper lung on images 47-53  are new. Progression of small right pleural effusion. Calcified granuloma in the left lower lobe"    HPIPET scan on 9/25/2023 revealed "Interval progression of size and hypermetabolic activity of multiple right hilar/pulmonary lesions with enlarging and newly hypermetabolic right lower lobe pulmonary nodule. New non-hypermetabolic subcentimeter pulmonary nodules, as detailed above.  Recommend attention on follow-up"  She underwent bronch at [facility] and path is positive   She saw Dr. Jones at [facility] and plan is for Docetaxel and Cyramza.      Review of Systems   Constitutional:  Negative for appetite change, fatigue and unexpected weight change.   HENT:  Negative for mouth sores.    Eyes:  Negative for visual disturbance.   Respiratory:  Negative for cough and shortness of breath.    Cardiovascular:  Negative for chest pain.   Gastrointestinal:  Negative for abdominal pain and diarrhea.   Genitourinary:  Negative for frequency.   Musculoskeletal:  Negative for back pain.   Integumentary:  Negative for " rash.   Neurological:  Negative for headaches.   Hematological:  Negative for adenopathy.   Psychiatric/Behavioral:  The patient is not nervous/anxious.    All other systems reviewed and are negative.         Objective     Physical Exam             Assessment and Plan     1. 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 Hypothyroidism, unspecified type  -     TSH; Future; Expected date: 10/23/2023  -     FREE T4; Future; Expected date: 10/23/2023    4. Bronchitis  -     amoxicillin-clavulanate 875-125mg (AUGMENTIN) 875-125 mg per tablet; Take 1 tablet by mouth 2 (two) times daily. for 14 days  Dispense: 28 tablet; Refill: 0    5. Neoplasm related pain          Route Chart for Scheduling  Med Onc Route Chart for Scheduling    Treatment Plan Information   OP NSCLC RAMUCIRUMAB DOCETAXEL FOR NSCL METS Q3W   Pam Dhaliwal MD   Upcoming Treatment Dates - OP NSCLC RAMUCIRUMAB DOCETAXEL FOR NSCL METS Q3W    10/30/2023       Pre-Medications       diphenhydrAMINE (BENADRYL) 50 mg in sodium chloride 0.9% 50 mL IVPB       Chemotherapy       ramucirumab (CYRAMZA) 652 mg in sodium chloride 0.9% 250 mL chemo infusion       DOCEtaxel (TAXOTERE) 75 mg/m2 = 130 mg in sodium chloride 0.9% 256.5 mL chemo infusion       Antiemetics       dexAMETHasone (DECADRON) 20 mg in sodium chloride 0.9% 50 mL IVPB  11/20/2023       Pre-Medications       diphenhydrAMINE (BENADRYL) 50 mg in sodium chloride 0.9% 50 mL IVPB       Chemotherapy       ramucirumab (CYRAMZA) 652 mg in sodium chloride 0.9% 250 mL chemo infusion       DOCEtaxel (TAXOTERE) 75 mg/m2 = 130 mg in sodium chloride  0.9% 256.5 mL chemo infusion       Antiemetics       dexAMETHasone (DECADRON) 20 mg in sodium chloride 0.9% 50 mL IVPB  12/11/2023       Pre-Medications       diphenhydrAMINE (BENADRYL) 50 mg in sodium chloride 0.9% 50 mL IVPB       Chemotherapy       ramucirumab (CYRAMZA) 652 mg in sodium chloride 0.9% 250 mL chemo infusion       DOCEtaxel (TAXOTERE) 75 mg/m2 = 130 mg in sodium chloride 0.9% 256.5 mL chemo infusion       Antiemetics       dexAMETHasone (DECADRON) 20 mg in sodium chloride 0.9% 50 mL IVPB  1/1/2024       Pre-Medications       diphenhydrAMINE (BENADRYL) 50 mg in sodium chloride 0.9% 50 mL IVPB       Chemotherapy       ramucirumab (CYRAMZA) 652 mg in sodium chloride 0.9% 250 mL chemo infusion       DOCEtaxel (TAXOTERE) 75 mg/m2 = 130 mg in sodium chloride 0.9% 256.5 mL chemo infusion       Antiemetics       dexAMETHasone (DECADRON) 20 mg in sodium chloride 0.9% 50 mL IVPB    Therapy Plan Information  PORT FLUSH  Flushes  heparin, porcine (PF) 100 unit/mL injection flush 500 Units  500 Units, Intravenous, Every visit  sodium chloride 0.9% flush 10 mL  10 mL, Intravenous, Every visit         Mrs. Toney is here with her significant other to discuss recommendations from [facility], she has been recommended to start systemic therapy with docetaxel and Cyramza and comes in to discuss this.  Reviewed PET images with her  We reviewed rationale and side effects of this regimen and she really wants to think about it before she decides to proceed.  She will call me back with her decision  Also sent message to the clinical trial group to see if there is any phase 1 clinical trials that she would qualify for    She understands her poor prognosis very well    Above plan was discussed with the patient and Lata and all questions were answered to their satisfaction

## 2023-10-23 ENCOUNTER — OFFICE VISIT (OUTPATIENT)
Dept: HEMATOLOGY/ONCOLOGY | Facility: CLINIC | Age: 73
End: 2023-10-23
Payer: MEDICARE

## 2023-10-23 VITALS
BODY MASS INDEX: 23.1 KG/M2 | WEIGHT: 143.75 LBS | DIASTOLIC BLOOD PRESSURE: 76 MMHG | RESPIRATION RATE: 18 BRPM | HEIGHT: 66 IN | SYSTOLIC BLOOD PRESSURE: 143 MMHG | OXYGEN SATURATION: 99 % | HEART RATE: 74 BPM | TEMPERATURE: 98 F

## 2023-10-23 DIAGNOSIS — C77.1 SECONDARY AND UNSPECIFIED MALIGNANT NEOPLASM OF INTRATHORACIC LYMPH NODES: ICD-10-CM

## 2023-10-23 DIAGNOSIS — E03.9 HYPOTHYROIDISM, UNSPECIFIED TYPE: ICD-10-CM

## 2023-10-23 DIAGNOSIS — J40 BRONCHITIS: ICD-10-CM

## 2023-10-23 DIAGNOSIS — G89.3 NEOPLASM RELATED PAIN: ICD-10-CM

## 2023-10-23 DIAGNOSIS — C34.11 MALIGNANT NEOPLASM OF UPPER LOBE, RIGHT BRONCHUS OR LUNG: Primary | ICD-10-CM

## 2023-10-23 PROCEDURE — 99214 OFFICE O/P EST MOD 30 MIN: CPT | Mod: PBBFAC | Performed by: INTERNAL MEDICINE

## 2023-10-23 PROCEDURE — 99215 OFFICE O/P EST HI 40 MIN: CPT | Mod: S$PBB,,, | Performed by: INTERNAL MEDICINE

## 2023-10-23 PROCEDURE — 99999 PR PBB SHADOW E&M-EST. PATIENT-LVL IV: ICD-10-PCS | Mod: PBBFAC,,, | Performed by: INTERNAL MEDICINE

## 2023-10-23 PROCEDURE — 99215 PR OFFICE/OUTPT VISIT, EST, LEVL V, 40-54 MIN: ICD-10-PCS | Mod: S$PBB,,, | Performed by: INTERNAL MEDICINE

## 2023-10-23 PROCEDURE — 99999 PR PBB SHADOW E&M-EST. PATIENT-LVL IV: CPT | Mod: PBBFAC,,, | Performed by: INTERNAL MEDICINE

## 2023-10-23 RX ORDER — PREDNISONE 2.5 MG/1
2.5 TABLET ORAL
COMMUNITY
End: 2023-10-31

## 2023-10-23 RX ORDER — AMOXICILLIN AND CLAVULANATE POTASSIUM 875; 125 MG/1; MG/1
1 TABLET, FILM COATED ORAL 2 TIMES DAILY
Qty: 28 TABLET | Refills: 0 | Status: SHIPPED | OUTPATIENT
Start: 2023-10-23 | End: 2023-11-06

## 2023-10-23 NOTE — PLAN OF CARE
DISCONTINUE OFF PATHWAY REGIMEN - Non-Small Cell Lung            Carboplatin       Gemcitabine           Additional Orders: *All AUC calculations intended to be used in Erin   formula    **Always confirm dose/schedule in your pharmacy ordering system**    REASON: Disease Progression  PRIOR TREATMENT:   TREATMENT RESPONSE: Progressive Disease (PD)    START ON PATHWAY REGIMEN - Non-Small Cell Lung    [id number]        Ramucirumab (Cyramza)       Docetaxel (Taxotere)           Additional Orders: Premedicate with dexamethasone 8 mg PO BID for three   days beginning 1 day prior to therapy.    **Always confirm dose/schedule in your pharmacy ordering system**    Patient Characteristics:  Stage IV Metastatic, Nonsquamous, Molecular Analysis Completed, Molecular   Alteration Present and Targeted Therapy Exhausted OR EGFR Exon 20+ or KRAS G12C+   or HER2+ Present and No Prior Chemo/Immunotherapy OR No Alteration Present,   Second Line - Chemotherapy/Immunotherapy, PS = 0, 1, No Prior PD-1/PD-L1    Inhibitor or Prior PD-1/PD-L1 Inhibitor + Chemotherapy, and Not a Candidate for   Immunotherapy  Therapeutic Status: Stage IV Metastatic  Histology: Nonsquamous Cell  Broad Molecular Profiling Status: Molecular Analysis Completed  Molecular Analysis Results: No Alteration Present  ECOG Performance Status: 0  Chemotherapy/Immunotherapy Line of Therapy: Second Line   Chemotherapy/Immunotherapy  Immunotherapy Candidate Status: Not a Candidate for Immunotherapy  Prior Immunotherapy Status: Prior PD-1/PD-L1 Inhibitor + Chemotherapy  Intent of Therapy:  Non-Curative / Palliative Intent, Discussed with Patient

## 2023-10-25 ENCOUNTER — TUMOR BOARD CONFERENCE (OUTPATIENT)
Dept: HEMATOLOGY/ONCOLOGY | Facility: CLINIC | Age: 73
End: 2023-10-25
Payer: MEDICARE

## 2023-10-25 ENCOUNTER — OFFICE VISIT (OUTPATIENT)
Dept: PALLIATIVE MEDICINE | Facility: CLINIC | Age: 73
End: 2023-10-25
Payer: MEDICARE

## 2023-10-25 ENCOUNTER — TELEPHONE (OUTPATIENT)
Dept: PALLIATIVE MEDICINE | Facility: CLINIC | Age: 73
End: 2023-10-25
Payer: MEDICARE

## 2023-10-25 VITALS
SYSTOLIC BLOOD PRESSURE: 133 MMHG | WEIGHT: 144.38 LBS | BODY MASS INDEX: 23.2 KG/M2 | HEART RATE: 64 BPM | HEIGHT: 66 IN | DIASTOLIC BLOOD PRESSURE: 63 MMHG

## 2023-10-25 DIAGNOSIS — R06.02 SHORTNESS OF BREATH: ICD-10-CM

## 2023-10-25 DIAGNOSIS — G89.3 CANCER ASSOCIATED PAIN: ICD-10-CM

## 2023-10-25 DIAGNOSIS — R53.83 FATIGUE, UNSPECIFIED TYPE: ICD-10-CM

## 2023-10-25 DIAGNOSIS — Z51.5 ENCOUNTER FOR PALLIATIVE CARE: Primary | ICD-10-CM

## 2023-10-25 DIAGNOSIS — G89.3 CANCER RELATED PAIN: ICD-10-CM

## 2023-10-25 DIAGNOSIS — R06.00 DYSPNEA, UNSPECIFIED TYPE: ICD-10-CM

## 2023-10-25 DIAGNOSIS — G47.00 INSOMNIA, UNSPECIFIED TYPE: ICD-10-CM

## 2023-10-25 DIAGNOSIS — F43.22 ADJUSTMENT DISORDER WITH ANXIETY: ICD-10-CM

## 2023-10-25 DIAGNOSIS — M89.8X9 BONE PAIN: ICD-10-CM

## 2023-10-25 DIAGNOSIS — R05.9 COUGH, UNSPECIFIED TYPE: ICD-10-CM

## 2023-10-25 PROCEDURE — 99215 OFFICE O/P EST HI 40 MIN: CPT | Mod: S$PBB,,, | Performed by: NURSE PRACTITIONER

## 2023-10-25 PROCEDURE — 99417 PROLNG OP E/M EACH 15 MIN: CPT | Mod: S$PBB,,, | Performed by: NURSE PRACTITIONER

## 2023-10-25 PROCEDURE — 99213 OFFICE O/P EST LOW 20 MIN: CPT | Mod: PBBFAC | Performed by: NURSE PRACTITIONER

## 2023-10-25 PROCEDURE — 99999 PR PBB SHADOW E&M-EST. PATIENT-LVL III: ICD-10-PCS | Mod: PBBFAC,,, | Performed by: NURSE PRACTITIONER

## 2023-10-25 PROCEDURE — 99417 PR PROLONGED SVC, OUTPT, W/WO DIRECT PT CONTACT,  EA ADDTL 15 MIN: ICD-10-PCS | Mod: S$PBB,,, | Performed by: NURSE PRACTITIONER

## 2023-10-25 PROCEDURE — 99215 PR OFFICE/OUTPT VISIT, EST, LEVL V, 40-54 MIN: ICD-10-PCS | Mod: S$PBB,,, | Performed by: NURSE PRACTITIONER

## 2023-10-25 PROCEDURE — 99999 PR PBB SHADOW E&M-EST. PATIENT-LVL III: CPT | Mod: PBBFAC,,, | Performed by: NURSE PRACTITIONER

## 2023-10-25 RX ORDER — MORPHINE SULFATE 20 MG/ML
5 SOLUTION ORAL EVERY 8 HOURS PRN
Qty: 15 ML | Refills: 0 | Status: SHIPPED | OUTPATIENT
Start: 2023-10-25 | End: 2023-11-16

## 2023-10-25 RX ORDER — HYDROCODONE BITARTRATE AND ACETAMINOPHEN 7.5; 325 MG/1; MG/1
1 TABLET ORAL EVERY 4 HOURS PRN
Qty: 180 TABLET | Refills: 0 | Status: SHIPPED | OUTPATIENT
Start: 2023-10-25 | End: 2023-12-04 | Stop reason: SDUPTHER

## 2023-10-25 NOTE — TELEPHONE ENCOUNTER
Called the patient to inform her to call moira and remove her penicillin allergy to receive her Augmentin.

## 2023-10-25 NOTE — PLAN OF CARE
DISCONTINUE ON PATHWAY REGIMEN - Non-Small Cell Lung    [id number]        Ramucirumab (Cyramza)       Docetaxel (Taxotere)           Additional Orders: Premedicate with dexamethasone 8 mg PO BID for three   days beginning 1 day prior to therapy.    **Always confirm dose/schedule in your pharmacy ordering system**    REASON: Other Reason  PRIOR TREATMENT: [id number]  TREATMENT RESPONSE: Unable to Evaluate    START OFF PATHWAY REGIMEN - Non-Small Cell Lung            Pembrolizumab (Keytruda)           Additional Orders: Serious immune-mediated adverse events can occur with   pembrolizumab. Please monitor your patient and refer to the linked   immune-mediated adverse reaction management materials for more information.    **Always confirm dose/schedule in your pharmacy ordering system**    Patient Characteristics:  Stage IV Metastatic, Nonsquamous, Molecular Analysis Completed, Molecular   Alteration Present and Targeted Therapy Exhausted OR EGFR Exon 20+ or KRAS G12C+   or HER2+ Present and No Prior Chemo/Immunotherapy OR No Alteration Present,   Second Line - Chemotherapy/Immunotherapy, PS = 0, 1, Prior PD-1/PD-L1 Inhibitor   and Immunotherapy Candidate  Therapeutic Status: Stage IV Metastatic  Histology: Nonsquamous Cell  Broad Molecular Profiling Status: Molecular Analysis Completed  Molecular Analysis Results: No Alteration Present  ECOG Performance Status: 0  Chemotherapy/Immunotherapy Line of Therapy: Second Line   Chemotherapy/Immunotherapy  Immunotherapy Candidate Status: Candidate for Immunotherapy  Prior Immunotherapy Status: Prior PD-1/PD-L1 Inhibitor + Chemotherapy  Intent of Therapy:  Non-Curative / Palliative Intent, Discussed with Patient

## 2023-10-25 NOTE — PROGRESS NOTES
Consult Note  Palliative Care      Consult Requested By: No ref. provider found  Reason for Consult: symptom management       ASSESSMENT/PLAN:     Plan/Recommendations:    10/25/2023:  - no changes made today    Malignant neoplasm of upper lobe, right bronchus or lung  - following with Dr. Dhaliwal  - completed chemo/RT on 12/31/2019  - interval treatment at [facility]  - s/p immunotherapy     Encounter for palliative medicine  - Patient is decisional  - Patient accompanied today by self  - ACP documents are not uploaded into EMR, patient and partner Lata will review documents at home, will revisit discussion at future visit.    - Philosophy of Palliative Medicine reviewed with patient and family at first visit  - New patient folder given to and reviewed with patient and family at first visit  - Goals of care: States that her goals are to maintain function and independence as much as possible. She states that she does not want to be bed-ridden, does not want to lose her ability to take care of herself and does not want to be a burden to her loved ones. Retired nurse. Please see SW note for more detail.    Cancer-related pain/bone pain   - increased pain in chest, lower lobe   - patient reports pain in her legs and legs, the leg pain is most severe, describes as intermittent bony pain, sometimes waking her up at night  - patient reports evaluation for bone pain has not revealed a source  - no pain relief while on steroids, tylenol ineffective  - currently on Norco 7.5 mg q 4 hours prn  - previously: pt's partner states that patient is under-reporting her pain and fearful of taking higher doses, she does admit she often splits tablets in half.  - can consider rotation to oxycodone if medication needs change  - narcan provided  - opioid safety sheet provided in first visit folder  - will continue to monitor     Dyspnea/Fatigue/insomnia  - dyspnea - worse s/p pneumonitis   - pt endorses moderate dyspnea, insomnia and  "moderate fatigue   - fatigue likely multifactorial: pain, dyspnea, diminished dietary intake   - patient does report that she tries to walk daily, even if only for 5-10 minutes, if the weather is poor, she will walk insider her house  - she is able to perform all ADL's independently   - sometimes wakes up due to leg pain   - patient reports ativan 0.25 mg qhs help with sleep  - will address symptom cluster w goal to improve fatigue  - tip sheet provided in new patient folder  - will continue to monitor      Adjustment disorder with anxiety and depression  - 09/29, higher anxiety today due to recent scan results indicating possible progression  - her partner (Lata) observes patient to be intermittently depressed, "sometimes she tries to hurt my feelings"  - patient was a HH nurse, 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  - patient reports that the robust social support provided by friends has faded since her initial diagnosis, partner Lata is her primary support, additionally a cousin,niece, nephew who lives out of state  - patient and partner have a well-developed emotional dialogue, for example: they have named their various emotional states as alter personalities, they find humor/levity where they can  - patient states that she worries about being a burden on her partner   - continue ativan 0.5 mg q 12 hrs qhs, patient reports only using half tablets to help with sleep  - has started using the ativan for episodes of heightened anxiety, finding this less helpful. Discussed taking the medication before anxiety becomes severe req SL format.   - will continue to monitor   - discussed availability of palliative DSW for counseling, individual or couple's, will discuss with partner Lata   - emotional support provided  - CAM Driscoll joined for first visit assessment, see separate note for detail  - will continue to monitor      Nausea/anorexia   - anorexia improved  - denies nausea  - continue zofran 8 mg PRN "   - will continue to monitor     Constipation   - controlled   - reports constipation 2/2 chemo, opioids, colitis   - promote good PO hydration  - cont senna 2 tabs qhs, 2 colace, escalation of regimen as needed  - tip sheet provided in new patient folder  - will continue to monitor     Understanding of illness: excellent     Follow up: 6 weeks     Patient's encounter and above plan of care discussed with patient's oncology team.     SUBJECTIVE:     History of Present Illness:  Patient is a 73 y.o. year old female presenting with  Malignant neoplasm of upper lobe, right bronchus or lung Ms. Alesha Toney is a 72-year-old otherwise healthy female who started having some shoulder pain, which was lasting for over six weeks.  She went and saw her primary care physician and underwent an x-ray, which revealed right upper lobe lung mass worrisome for malignancy and a CT scan was recommended, which was done on 6/12/18 and that revealed a newly developing mass, pleural based, lateral posterior segment, right upper lobe 3.5 x 3.5 cm, surrounding stellate margin and patchy infiltrates, particularly superiorly, anteriorly infiltrates extend perihilar into the anterior segment. She then underwent CT-guided biopsy, which revealed well-differentiated adenocarcinoma.     10/25/2023:  QUINTEN NAIK reviewed: As expected     Presents to clinic visit alone. [facility] [place] [facility] MSW joined visit. Unfortunately, 09/25/2023 PET scan results confirmed progression of disease. Ms. Toney is now deciding if she wants to pursue recommended systemic therapy. Two regimens have been offered, she declines the more aggressive of the two due to concern for side-effect burden. She says she will likely trial the alternative regimen. She brings a note to clinic written by Lata with several medication questions which we address today. She is using ativan more regularly for increased anxiety, this is helpful. Pain is controlled on lower doses of norco. RTC in 6  "weeks.     09/27/2023:  QUINTEN NAIK reviewed and summarized:  As expected     Patient presents to clinic visit alone today. Her recent scans are concerning for disease progression, however she has a phone meeting scheduled tomorrow with [facility] oncologist to discuss final interpretation. She is, understandably, anxious and distracted by the "what ifs" and uncertainty about what may be ahead. She is hopeful that the scan findings are attributed to scar tissue but is mentally preparing for the possibility of resuming treatment. She saw something recently that describes having cancer as "like having a loaded gun pointing at the back of your head", which resonated deeply w her. Her partner Lata continues to be supportive, hopeful and does her best to reorient Alesha to the present. Physically, she has noticed a slight increase in her pain and her appetite is still poor although her weight has stabilized. Encouraged to discuss opportunity to see [facility] DSW for couples' counseling with Lata.       06/28/2023:  QUINTEN NAIK reviewed and summarized:  As expected     - doing well, overall  - is losing weight with good PO intake and steroid use    - s/p pneumonitis, dyspnea increased  - however, continuing good activity tolerance, taking daily walks indoors to avoid extreme heat/humidity    03/28/2023:  QUINTEN NAIK reviewed and summarized:  As expected     Patient presents to clinic alone today. She is recently back from [facility] and is happy to report her most recent scan results show improvement. Her overall symptom burden is stable. She is still aggravated by leg pain she believes was caused by Entyvio infusions. The pain interrupts her sleep as done intermittent GERD which she admits is sometimes worsened by her dietary choices. She has been increasing her exercise lately and her mood is boosted due to scan results referenced above. Her onco at [facility] did request her to return soon for repeat scan but she deferred to have scan " here, as the financial burden of trips to [place] is mounting.        02/01/2023:  LA  reviewed and summarized:  01/26/2023 Lorazepam 0.5 mg tabs Disp: 60 for 30 days  01/10/2023 Hydrocodone-APAP 7.5-325 mg Disp: 90 for 30 days    Patient presents to clinic with her partner Lata for her initial visit. She is ECOG 0 with healthy appearance. She reports bony leg pain that is partially controlled with current regimen. Optimized today regimen today, will reassess at next visit. Discussed ACP, patient reports that she has been reluctant to discuss with her partner but endorses the value of establishing wants and wishes with family members, agrees to review documents at home to complete at next visit. Current regimen for anxiety and nausea are effective. Insomnia & fatigue at least partially attributable to uncontrolled pain. Optimized regimen for constipation today.     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     Squamous cell carcinoma of skin 10/05/2020    left medial thigh    Therapy     Thyroid nodule     Ulcerative colitis with complication      Past Surgical History:   Procedure Laterality Date    ADENOIDECTOMY  19yo    ANTERIOR CERVICAL DISCECTOMY W/ FUSION N/A 10/15/2018    Procedure: DISCECTOMY, SPINE, CERVICAL, ANTERIOR APPROACH, WITH FUSION C6/7  [place] [employer]: [employer] Regular OR table;  Surgeon: Greyson Bansal MD;  Location: [facility] OR [address] [place] [address];  Service: Neurosurgery;  Laterality: N/A;    APPENDECTOMY      BONE RESECTION, RIB  1980    BREAST BIOPSY Left     at least 40yrs ago in her 20's    BREAST CYST ASPIRATION      BREAST CYST EXCISION      COLONOSCOPY      ENDOBRONCHIAL ULTRASOUND  N/A 7/10/2018    Procedure: ULTRASOUND, ENDOBRONCHIAL;  Surgeon: Sri Hearn MD;  Location: [facility] OR 2ND FLR;  Service: Pulmonary;  Laterality: N/A;    ENDOBRONCHIAL ULTRASOUND N/A 9/24/2019    Procedure: ENDOBRONCHIAL ULTRASOUND (EBUS);  Surgeon: Sri Hearn MD;  Location: [facility] OR 2ND FLR;  Service: Pulmonary;  Laterality: N/A;    ENDOSCOPIC MUCOSAL RESECTION OF COLON N/A 9/11/2020    Procedure: RESECTION, MUCOSA, COLON, ENDOSCOPIC;  Surgeon: Lilibeth Carbajal MD;  Location: [facility] (2ND FLR);  Service: Endoscopy;  Laterality: N/A;    ENDOSCOPIC ULTRASOUND OF LOWER GASTROINTESTINAL TRACT N/A 4/19/2021    Procedure: ULTRASOUND, LOWER GI TRACT, ENDOSCOPIC;  Surgeon: Lilibeth Carbajal MD;  Location: [facility];  Service: Endoscopy;  Laterality: N/A;    ENDOSCOPIC ULTRASOUND OF LOWER GASTROINTESTINAL TRACT N/A 6/25/2021    Procedure: ULTRASOUND, LOWER GI TRACT, ENDOSCOPIC;  Surgeon: Silvino Christopher MD;  Location: [facility];  Service: Endoscopy;  Laterality: N/A;  Needs Rapid MP    FLEXIBLE SIGMOIDOSCOPY  06/11/2020    with biopsies    FLEXIBLE SIGMOIDOSCOPY N/A 6/11/2020    Procedure: SIGMOIDOSCOPY, FLEXIBLE;  Surgeon: Gely Yeh MD;  Location: [facility];  Service: Endoscopy;  Laterality: N/A;    FLEXIBLE SIGMOIDOSCOPY N/A 4/19/2021    Procedure: SIGMOIDOSCOPY-FLEXIBLE;  Surgeon: Lilibeth Carbajal MD;  Location: [facility];  Service: Endoscopy;  Laterality: N/A;  Covid 4/16 Brian MP    FLEXIBLE SIGMOIDOSCOPY N/A 6/3/2022    Procedure: SIGMOIDOSCOPY-FLEXIBLE;  Surgeon: Francesco Clark MD;  Location: [facility] (4TH FLR);  Service: Endoscopy;  Laterality: N/A;  vacc-inst portal-tb    HYSTERECTOMY      @47yrs of age    INSERTION OF TUNNELED CENTRAL VENOUS CATHETER (CVC) WITH SUBCUTANEOUS PORT N/A 9/25/2018    Procedure: [id number]-HSRA-Q-CATH;  Surgeon: Dosc Diagnostic Provider;  Location: [facility] OR 2ND FLR;  Service: Radiology;  Laterality: N/A;    INSERTION OF VENOUS ACCESS PORT N/A 3/15/2021     "Procedure: INSERTION, VENOUS ACCESS PORT;  Surgeon: Chang Mathews MD;  Location: [facility] OR 2ND FLR;  Service: General;  Laterality: N/A;  NEEDS CONSENT.    KIDNEY SURGERY      19yo    MEDIPORT REMOVAL N/A 3/15/2021    Procedure: REMOVAL, CATHETER, CENTRAL VENOUS, TUNNELED, WITH PORT;  Surgeon: Chang Mathews MD;  Location: [facility] OR 2ND FLR;  Service: General;  Laterality: N/A;    OOPHORECTOMY      @47yrs of age    SKIN BIOPSY      TONSILLECTOMY      UPPER GASTROINTESTINAL ENDOSCOPY      VIDEO-ASSISTED THORACOSCOPIC LOBECTOMY Right 8/9/2018    Procedure: VATS, WITH LOBECTOMY Possible chest wall resection;  Surgeon: Philip Messina MD;  Location: [facility] OR 2ND FLR;  Service: Thoracic;  Laterality: Right;  Have open pan available.     Family History   Problem Relation Age of Onset    Stroke Mother     Cataracts Mother     Cancer Father         colon cancer    Colon cancer Father     Diabetes Brother     Heart failure Brother     Hypertension Brother     Heart attack Paternal Aunt     Heart attack Maternal Grandfather     Diabetes Paternal Aunt     Anxiety disorder Paternal Grandmother         agoraphobia    Anesthesia problems Neg Hx     Blindness Neg Hx     Amblyopia Neg Hx     Glaucoma Neg Hx     Macular degeneration Neg Hx     Retinal detachment Neg Hx     Strabismus Neg Hx     Thyroid disease Neg Hx     Melanoma Neg Hx      Review of patient's allergies indicates:   Allergen Reactions    Adhesive Itching, Rash and Blisters     INCLUDES EKG PADS    Ciprofloxacin Hives     Hives    Iodinated contrast media     Phenergan plain Other (See Comments)     "crazy behavior"    Phenergan [promethazine]     Tramadol     Vancomycin analogues      Red man syndrome       Medications:    Current Outpatient Medications:     amoxicillin-clavulanate 875-125mg (AUGMENTIN) 875-125 mg per tablet, Take 1 tablet by mouth 2 (two) times daily. for 14 days, Disp: 28 tablet, Rfl: 0    ascorbic acid, vitamin C, (VITAMIN C) 500 " MG tablet, Take 500 mg by mouth once daily., Disp: , Rfl:     atenoloL (TENORMIN) 25 MG tablet, Take 1 tablet (25 mg total) by mouth 3 (three) times daily., Disp: 270 tablet, Rfl: 3    calcium carbonate (TUMS) 300 mg (750 mg) Chew, Take by mouth as needed. , Disp: , Rfl:     esomeprazole (NEXIUM) 20 MG capsule, Take 20 mg by mouth., Disp: , Rfl:     famotidine (PEPCID) 10 MG tablet, Take 10 mg by mouth once daily., Disp: , Rfl:     HYDROcodone-acetaminophen (NORCO) 7.5-325 mg per tablet, Take 1-2 tablets by mouth every 4 (four) hours as needed for Pain., Disp: 140 tablet, Rfl: 0    levalbuterol (XOPENEX HFA) 45 mcg/actuation inhaler, Inhale 1-2 puffs into the lungs every 6 (six) hours as needed for Wheezing. Rescue, Disp: 15 g, Rfl: 6    LORazepam (ATIVAN) 0.5 MG tablet, Take 1 tablet (0.5 mg total) by mouth 2 (two) times daily as needed for Anxiety., Disp: 60 tablet, Rfl: 0    losartan (COZAAR) 50 MG tablet, 1 tab po bid, Disp: 180 tablet, Rfl: 3    naloxone (NARCAN) 4 mg/actuation Spry, by Nasal route as needed., Disp: , Rfl:     ondansetron (ZOFRAN) 8 MG tablet, Take 8 mg by mouth as needed., Disp: , Rfl:     predniSONE (DELTASONE) 2.5 MG tablet, Take 2.5 mg by mouth., Disp: , Rfl:     OBJECTIVE:       ROS:  Review of Systems   Constitutional:  Positive for activity change, appetite change, fatigue and unexpected weight change.   HENT: Negative.  Negative for congestion, dental problem and drooling.    Eyes: Negative.  Negative for pain, discharge and itching.   Respiratory:  Positive for cough and shortness of breath. Negative for choking, wheezing and stridor.    Cardiovascular: Negative.  Negative for chest pain, palpitations and leg swelling.   Gastrointestinal:  Positive for constipation and nausea. Negative for diarrhea and vomiting.   Endocrine: Negative.  Negative for heat intolerance and polydipsia.   Genitourinary: Negative.  Negative for difficulty urinating, dyspareunia and dysuria.   Musculoskeletal:   Positive for arthralgias. Negative for back pain, gait problem and joint swelling.   Skin: Negative.  Negative for color change, pallor and rash.   Neurological: Negative.  Negative for seizures, speech difficulty and weakness.   Psychiatric/Behavioral:  Positive for dysphoric mood and sleep disturbance. Negative for confusion. The patient is nervous/anxious.        Review of Symptoms      Symptom Assessment (ESAS 0-10 Scale)  Pain:  4  Dyspnea:  4  Anxiety:  6  Nausea:  0  Depression:  6  Anorexia:  0  Fatigue:  5  Insomnia:  0  Restlessness:  7  Agitation:  0     CAM / Delirium:  Negative  Constipation:  Positive  Diarrhea:  Negative    Anxiety:  Is nervous/anxious  Constipation:  Constipation    Bowel Management Plan (BMP):  Yes      Pain Assessment:  OME in 24 hours:  40  Location(s): leg    Leg       Location: bilateral        Quality: Throbbing, aching and dull        Quantity: 3/10 in intensity        Chronicity: Onset 1 year(s) ago, unchanged        Aggravating Factors: Activity        Alleviating Factors: Opiates (heating pad)        Associated Symptoms: None    Modified Josefa Scale:  1    ECOG Performance Status stGstrstastdstest:st st1st Living Arrangements:  Lives with spouse    Psychosocial/Cultural:   See Palliative Psychosocial Note: Yes  **Primary  to Follow**  Palliative Care  Consult: No      Advance Care Planning   Advance Directives:   Living Will: No        Oral Declaration: No    LaPOST: No    Do Not Resuscitate Status: No    Medical Power of : Yes        Oral Declaration: Yes   Witnesses:  Mayela Beaver and Johnna Driscoll   Agent's Name:  Lata Mathews   Agent's Contact Number:  [phone]    Decision Making:  Patient answered questions  Goals of Care: What is most important right now is to focus on remaining as independent as possible, symptom/pain control, quality of life, even if it means sacrificing a little time. Accordingly, we have decided that the best plan to  meet the patient's goals includes continuing with palliative care.          Physical Exam:    Vitals:    10/25/23 1000   BP: 133/63   Pulse: 64          Physical Exam  Vitals reviewed.   Constitutional:       General: She is not in acute distress.     Appearance: Normal appearance. She is normal weight. She is not ill-appearing, toxic-appearing or diaphoretic.   HENT:      Head: Normocephalic and atraumatic.      Right Ear: Tympanic membrane normal.      Left Ear: Tympanic membrane normal.      Nose: Nose normal.   Eyes:      Extraocular Movements: Extraocular movements intact.      Conjunctiva/sclera: Conjunctivae normal.   Cardiovascular:      Rate and Rhythm: Regular rhythm.   Pulmonary:      Effort: Pulmonary effort is normal. No respiratory distress.      Breath sounds: No wheezing.   Abdominal:      General: Abdomen is flat. There is no distension.      Palpations: Abdomen is soft.   Musculoskeletal:         General: No swelling or deformity. Normal range of motion.      Cervical back: Normal range of motion.   Skin:     General: Skin is warm and dry.      Coloration: Skin is not jaundiced.      Findings: No bruising.   Neurological:      General: No focal deficit present.      Mental Status: She is alert and oriented to person, place, and time. Mental status is at baseline.      Motor: No weakness.      Gait: Gait normal.   Psychiatric:         Mood and Affect: Mood normal.         Behavior: Behavior normal.         Thought Content: Thought content normal.         Judgment: Judgment normal.         Labs:  CBC:   WBC   Date Value Ref Range Status   08/17/2023 5.89 3.90 - 12.70 K/uL Final     Hemoglobin   Date Value Ref Range Status   08/17/2023 12.5 12.0 - 16.0 g/dL Final     POC Hematocrit   Date Value Ref Range Status   08/09/2018 33 (L) 36 - 54 %PCV Final     Hematocrit   Date Value Ref Range Status   08/17/2023 37.6 37.0 - 48.5 % Final     MCV   Date Value Ref Range Status   08/17/2023 95 82 - 98 fL Final      Platelets   Date Value Ref Range Status   08/17/2023 208 150 - 450 K/uL Final       LFT:   Lab Results   Component Value Date    AST 20 08/17/2023    ALKPHOS 48 (L) 08/17/2023    BILITOT 0.3 08/17/2023       Albumin:   Albumin   Date Value Ref Range Status   08/17/2023 3.8 3.5 - 5.2 g/dL Final     Protein:   Total Protein   Date Value Ref Range Status   08/17/2023 6.7 6.0 - 8.4 g/dL Final       Radiology:I have reviewed all pertinent imaging results/findings within the past 24 hours.    09/25/2023 NM PET: Impression:     Interval progression of size and hypermetabolic activity of multiple right hilar/pulmonary lesions with enlarging and newly hypermetabolic right lower lobe pulmonary nodule.     New non-hypermetabolic subcentimeter pulmonary nodules, as detailed above.  Recommend attention on follow-up.     09/19/2023 CT chest ([facility]): Large masslike consolidation in the right lower lobe,this can be secondary to a combination of recurrent disease associated with postobstructive changes.   A nodule in the right lower lobe has increased in size and is worrisome for metastasis    I spent a total of 80 minutes on the day of the visit.This includes face to face time in discussion of goals of care, symptom assessment, coordination of care and emotional support.  This also includes non-face to face time preparing to see the patient (eg, review of tests/imaging), obtaining and/or reviewing separately obtained history, documenting clinical information in the electronic or other health record, independently interpreting results and communicating results to the patient/family/caregiver, or care coordinator.     Signature: Mayela Beaver DNP

## 2023-10-26 NOTE — PROGRESS NOTES
[facility] Tumor Board    Date: 10/26/2023    Patient Name: Alesha Toney    MRN: [id number]    Diagnosis: Recurrent Lung Adenocarcinoma    Referring Provider: Dr. Dhaliwal    Present PCTP Providers:     Dr. Rick Graham, Dr. Daron Sweet, Roxane Zee NP    Patient Summary:  Pathology:    Has failed Chemotherapy  Failed chemotherapy: Lines of treatment (non-metastatic):  1. Definitive chemoRT - completed 4 cycles Cisplatin and Alimta as of 1/10/19. Followed by [facility] and [facility] but three months into treatment she developed severe grade 3 colitis. CT chest on 9/17/19 demonstrated soft tissue posterior to the bronchus intermedius along the staple line measuring 1.6 x 1.3 cm, concerning for recurrence - biopsy proven. Went on surveillance. 2. December 2022- Jan 2023, she had another recurrence for which she underwent chemoRT with Carboplatin. 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 PET scan on 9/25/23 -Interval progression of size and hypermetabolic activity of multiple right hilar/pulmonary lesions with enlarging and newly hypermetabolic right lower lobe pulmonary nodule.    Current treatment(s):    ECOG: Fully active, able to carry on all pre-disease performance without restriction    Molecular Workup:    Guardant 360  Guardant 360 Details: TP53      Board Recommendations:    Standard of care recommendations: Ramucirumab+Keytruda   Trial recommendations: Late phase: no trials.  Early phase: No open slots in -[id number].   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

## 2023-10-27 NOTE — PROGRESS NOTES
"  [facility] Pall[facility]  Palliative Care   Social Work Visit      Patient Name: Alesha Toney  MRN: [id number]  Palliative Care Provider: Mayela Beaver DNP   Primary Care Physician: Margo Caldwell MD  Principal Problem: Lung cancer    SW accompanied DNP during follow up with patient. Patient presents AAOx4 with stable mood.  Patient reflected upon the treatment options offered by Oncology.  Patient acknowledges the "hail guerrero" treatment offered is unacceptable to her due to the known side effects.  Patient adds she has been offered another treatment outside of the aforementioned option of Taxotere, however she has not yet agreed.  Patient admits she has "difficulty" making decisions.  Patient notes her spouse/Lata is "Team Alesha" and will support any decision she makes.  Patient spoke about her fear of giving up the "fight" if she elects to decline all further treatment.  SW encouraged patient to consider what is most important, what she is willing to accept in terms of a lesser quality of life and challenged patient to reframed her perception of "fighting".     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  Patient reports she will accept hospice "when it's time". Patient notes she will "probably try the lesser treatment for two months".  SW encouraged patient to set a firm date on when to make and express this decision to her Oncologist.  Patient acknowledged understanding and denied further psychosocial concerns. SW remains available to provide support as needed. SW will continue to follow.    Johnna Driscoll \[facility] [place] [facility]\""[facility]  Outpatient   Palliative Medicine        "

## 2023-10-27 NOTE — PROGRESS NOTES
Patrcie Astudlilo,  So we are planning to offer Mrs. Toney a combination treatment with Cyramza and keytruda. Trying to see what you think about treating her with Entyvio to hopefully prevent the colitis. She feels well now but with plan of starting keytruda back wanted to be proactive    Thanks

## 2023-10-30 ENCOUNTER — PATIENT MESSAGE (OUTPATIENT)
Dept: HEMATOLOGY/ONCOLOGY | Facility: CLINIC | Age: 73
End: 2023-10-30
Payer: MEDICARE

## 2023-10-31 ENCOUNTER — PATIENT MESSAGE (OUTPATIENT)
Dept: HEMATOLOGY/ONCOLOGY | Facility: CLINIC | Age: 73
End: 2023-10-31

## 2023-10-31 ENCOUNTER — OFFICE VISIT (OUTPATIENT)
Dept: HEMATOLOGY/ONCOLOGY | Facility: CLINIC | Age: 73
End: 2023-10-31
Payer: MEDICARE

## 2023-10-31 ENCOUNTER — PATIENT MESSAGE (OUTPATIENT)
Dept: ADMINISTRATIVE | Facility: OTHER | Age: 73
End: 2023-10-31
Payer: MEDICARE

## 2023-10-31 DIAGNOSIS — C34.91 NON-SMALL CELL CANCER OF RIGHT LUNG: ICD-10-CM

## 2023-10-31 DIAGNOSIS — E03.9 HYPOTHYROIDISM, UNSPECIFIED TYPE: ICD-10-CM

## 2023-10-31 DIAGNOSIS — K51.819 OTHER ULCERATIVE COLITIS WITH COMPLICATION: ICD-10-CM

## 2023-10-31 DIAGNOSIS — C34.11 MALIGNANT NEOPLASM OF UPPER LOBE, RIGHT BRONCHUS OR LUNG: Primary | ICD-10-CM

## 2023-10-31 DIAGNOSIS — C77.1 SECONDARY AND UNSPECIFIED MALIGNANT NEOPLASM OF INTRATHORACIC LYMPH NODES: ICD-10-CM

## 2023-10-31 PROCEDURE — 99215 OFFICE O/P EST HI 40 MIN: CPT | Mod: 95,,, | Performed by: INTERNAL MEDICINE

## 2023-10-31 PROCEDURE — 99215 PR OFFICE/OUTPT VISIT, EST, LEVL V, 40-54 MIN: ICD-10-PCS | Mod: 95,,, | Performed by: INTERNAL MEDICINE

## 2023-10-31 RX ORDER — PREDNISONE 5 MG/1
5 TABLET ORAL DAILY
Qty: 90 TABLET | Refills: 6 | Status: SHIPPED | OUTPATIENT
Start: 2023-10-31 | End: 2024-01-07 | Stop reason: DRUGHIGH

## 2023-10-31 NOTE — Clinical Note
Schedule CBC,CMP, TSH, free T4 and UA tomorrow on [facility] cancer center lab. Then schedule to see me on main to start Cyramza and Keytruda asap, She prefers appointments after 11 AM please. Then 3 weeks after this treatment schedule CBC,CMP and see me for next treatment of Cyramza and Keytruda  Lata, she also hopes she can have either Alesha Nicole, or Silas Ivory as nurse. She needs a PORT flush but wants to know if they can withdraw from port before flushing

## 2023-10-31 NOTE — PROGRESS NOTES
"Subjective     Patient ID: Alesha Toney is a 73 y.o. female.  The patient location is: home   The chief complaint leading to consultation is: lung cancer    Visit type: audiovisual        Notes:    Chief Complaint: Lung Cancer  Ms. Alesha Toney is a 73-year-old otherwise healthy female who started having some shoulder pain, which was lasting for over six weeks.  She went and saw her primary care physician and underwent an x-ray, which revealed right upper lobe lung mass worrisome for malignancy and a CT scan was recommended, which was done on 6/12/18 and that revealed a newly developing mass, pleural based, lateral posterior segment, right upper lobe 3.5 x 3.5 cm, surrounding stellate margin and patchy infiltrates, particularly superiorly,   anteriorly infiltrates extend perihilar into the anterior segment.  She then underwent CT-guided biopsy, which revealed well-differentiated adenocarcinoma.       Her PET scan from 6/29/18 There is physiologic intracranial, head, and neck activity.  There is a large right upper lobe mass SUV max 9.11.  No local or distant metastatic disease seen.  There is physiologic liver, spleen, GI and  activity.  There are a few liver cysts.  No adenopathy is seen.  The adrenal glands are not enlarged.  No adenopathy is seen.  No suspicious bone lesions are seen.     She underwent VATS with right upper lobectomy. Mediastinal lymphadenectomy on 8/9/18. Pathology revealed "Tumor site: Upper lobe.Tumor size: 7 x 4 x 2.7 cm.Tumor focality: Single tumor.  Histologic type: Mixed invasive mucinous and nonmucinous adenocarcinoma. Histologic grade: G2: Moderately differentiated.Spread through air spaces (STATS): Present. Visceral pleura invasion: Not identified.  Lymphovascular invasion: Not identified. Direct invasion of adjacent structures: No adjacent structures present. Margins: All margins are uninvolved by carcinoma.Distance of invasive carcinoma from closest margin: 1 cm from the " "bronchial margin.Treatment effect: No known presurgical therapy. Regional lymph nodes: Number of lymph nodes involved: 0. Number of lymph nodes examined: 11. Pathologic Stage Classification: pT3 N0"        She completed 4 cycles of adjuvant chemo with Cisplatin and Alimta as of 1/10/19   She is on surveillance.     CT chest of 9/17/19 which reveals "Operative change of right upper lobectomy for small-cell lung cancer with several scattered subsolid opacities and a new solid nodule in the right lower lobe measuring up to 0.5 cm.  We recommend continued surveillance with the role and schedule for such surveillance to be determined by clinical considerations. Sided chest port distal tip terminating at the confluence of the brachiocephalic vein in the SVC.  Correlate with device functioning. Apically predominant emphysematous changes, left greater than right. Aortic annular calcification and multi-vessel coronary artery atherosclerosis. Numerous unchanged hepatic hypodensities, likely cysts"     PET scan from 10/1/19 reveals "1.6 cm soft tissue lesion re-identified posterior to the bronchus intermedius.  No corresponding focal increased radiotracer uptake to suggest local recurrence or metastatic disease. Stable subcentimeter opacities throughout the bilateral lower lobes, too small to characterize with PET-CT. Focal increased radiotracer uptake and subtle wall thickening in the rectum.  Findings are concerning for primary neoplastic process.  Recommend further evaluation with direct visualization"     She returned from [facility] and was advised to proceed with chemo/RT with either Docetaxel/platinum or Carboplatin/Alimta.     She completed chemo/RT with Carboplatin and Alimta as of 12/31/19     She underwent CT chest on 5/27/2020 which revealed 1. In this patient with history of lung cancer status post right upper lobectomy, there is a soft tissue opacity at the posterior margin of the staple line.  This lesion has " "been enlarging progressively and now measures 1.3 x 1.6 cm.  There is also a new 1.7 x 1.7 cm opacity at the right paravertebral pleural margin which is inseparable from this lesion.  These findings may represent post radiation change in light of the hypermetabolic lesion in this region on PET-CT of 10/01/2019 (image 69/[id number]).There is a 1 cm opacity in the superior or lateral basal segment of the right lower lobe (axial series 4, image 243) which has enlarged since 09/17/2019.  Nature of this lesion is unclear.  Clinical considerations will determine if percutaneous biopsy or metabolic assessment is warranted. 1.0 cm solid opacity with branching configuration (series 4, image 205) is located in the lateral basal segment of the left lower lobe, stable since 02/16/2019 (02/06/2019 axial series 4, image 310). Appearance and bifurcating character suggests mucoid impaction in mildly ectatic peripheral airway, likely not significant. Persistent clustered small centrilobular nodules in the apical segment of the right upper lobe likely reflecting small airway inflammation/infection. Partially visualized left chest port with distal catheter tip at the junction of the brachiocephalic veins and SVC, similar as on 09/17/2019"  She thus underwent PET scan on 6/1/2020 which revealed "No evidence of residual viable lung malignancy.  Evolving post radiation changes in the right paramediastinal lung, with a new irregular subpleural density which may also be related to recent radiation.  Attention on follow-up. Interval decrease in size of a soft tissue lesion along the inferior margin of the lobectomy stable line.  Several stable subcentimeter soft tissue opacities in the lower lobes bilaterally, As above.  Attention on follow-up.  Persistent hypermetabolic rectal lesion concerning for malignancy.  Recommend direct visualization and tissue sampling as clinically indicated"  I discussed her case in thoracic conference today and " "findings are felt to be related to RT     Her restaging CT scans from 1/20/2021 which reveal "Persistent soft tissue opacity in the posterior margin of the staple line that is increased in prominence when compared to the prior examination and is worrisome for disease progression. No new lesions identified.  Stable pulmonary nodules. Stable hypodense lesions throughout the liver that likely represent hepatic cysts"        Restaging PET scan from 1/27/2021 reveals "Increased size and hypermetabolic activity involving soft tissue opacity along the inferior border of the right upper lobectomy stable line concerning for progression of disease. Mildly increased hypermetabolic activity involving the rectum compatible with known high-grade dysplasia/intramucosal carcinoma. Interval decrease in size and resolution of hypermetabolic activity involving subcentimeter subpleural opacity within the right lower lobe"     MRI brain from  2/3/2021 reveals no evidence of recurrence.  GUARDANT testing only reveals p53, PDL1 is 0 and no other targets        PET scan from 3/24/2021 reveals "In this patient with known lung cancer, there is a persistent hypermetabolic right hilar mass consistent with viable tumor.  Interval increase in size is equivocal for progression of fibrosis versus tumor growth. 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  Activity appears more focal and possibly more proximal than before.  Consider direct visualization for further evaluation. The previously described subcentimeter subpleural opacity within the right lower lobe is not definitely seen on today's exam"               She last received immunotherapy on 5/28/2021. She underwent EUS on 6/25/2021 which revealed "Erythematous, congested, and ulcerated mucosa  (proctitis) in rectosigmoid colon.  Pathology reveals Colon, rectosigmoid, biopsy:  Moderate active colitis     She completed " "steroids about 5 weeks ago.      CT scans from 11/5/2021 reveals "In this patient with lung cancer, there is postoperative changes of right upper lobe resection.  Persistent soft tissue opacity along the right hilum suture line which is slightly increased in size from prior exam.  More conspicuous appearing nodules within the right lower lobe seen on prior exam.  New nodule within the right middle lobe measuring 1.3 cm.  Overall findings are all worrisome for disease progression. Scattered areas of centrilobular nodularity within the bilateral lungs.  Nonspecific and can be seen in the setting of infectious or inflammatory etiologies. Unchanged hepatics cysts. Stable left adrenal gland nodule."  PET scan from 11/23/2021 revealed "New hypermetabolic nodule within the right lower lobe additional slightly subcentimeter nodules within the right lower lobe.  Findings concerning for disease progression, other differentials include infection or noninfectious inflammatory process. Interval decrease in size and uptake in the right hilar mass. Resolution of previous focal rectal uptake"        She is on Opdivo. Her Opdivo was held on 1/10/2022 due to rectal bleeding. She saw Dr. Clark and was noted to have anal fissure which contributed to rectal bleed. Her rectal bleed has since resolved.           She was on Opdivo until 5/24/2022, she noted rectal bleeding and underwent   Flex sigmoidoscopy on 6/3/2022 reveals "Localized severe inflammation was found in the distal rectum. Biopsied. Pathology reveals Severely active chronic colitis with ulceration (see comment)  Moderate architectural disorder. Negative for CMV by immunohistochemistry . Negative for granulomas or viral inclusions. Negative for dysplasia or carcinoma"  PET scan from 6/17/2022 reveals "In this patient with lung cancer there are enlarging hypermetabolic right perihilar opacities concerning for continued local disease progression. Continued decreased " "metabolic activity of the right lower lobe nodule.  Cluster of pulmonary nodules bilaterally, some which are new from prior PET-CT for example the superior segment the left upper lobe and are likely infectious/inflammatory origin. Persistent and more extensive region of increased FDG avidity within the distal rectum in keeping with more extensive colitis.  Malignancy could have a similar appearance.  Recommend further investigation as clinically warranted and attention on follow-up"     She has active proctitis and is on hydrocortisone suppositories and Entyvyo since early June 2022.   CT chest on 8/1/2022 reveals  "Similar appearance of elongated, irregular opacity adjacent to the right upper lobectomy margin.  More posteriorly adjacent perihilar opacities with previous FDG avidity with detailed measurements as above. Similar areas of scattered micro-nodularity with tree-in-bud, likely infectious/inflammatory in nature.  New appearing area of grouped micro-nodules in the anterior left lower lobe, largest up to 5 mm, which could relate to aforementioned small airways disease, though technically indeterminate.  Continued close attention at follow-up. RECIST SUMMARY: Date of prior examination for comparison: CT chest dated 05/24/2022. Lesion 1: Right perihilar opacity.  2.4.  Series 2, image 45.  Previously 2 4.  (Note there is slight increased size conspicuity in the craniocaudal dimension at 1.8 cm, previously 1.5 cm). Lesion 2: Right perihilar opacity.  1.7 cm.  Series 2, image 59.  Previously 1.5 cm"     She underwent a repeat CT chest 8/31/2022 and that revealed "Status post right upper lobectomy.  Soft tissue thickening adjacent to the suture line appears stable.  However, there is interval enlargement of a right lower lobe, bilobed, paramediastinal mass-like consolidation.  When dominant components of the lesions are measured separately, dimensions are summarized below. RECIST SUMMARY: Date of prior examination " "for comparison: CT chest 08/01/2022. Lesion 1: Right perihilar opacity.  4.7 cm. Series 4 image 77.  Prior measurement 2.4 cm. Lesion 2: Right perihilar (infrahilar) opacity.  2.7 cm. Series 4 image 237.  Prior measurement 1.7 cm"  Case reviewed in thoracic conference, and concern for disease progression exists so plan on immunotehrapy if able to taper steroids           PET scan from 10/6/2022 reveals "Interval increase in size and radiotracer uptake of a right pulmonary lesions in this patient with known non-small cell lung cancer.  There are few new subcentimeter pulmonary nodules in the right lower lobe with no significant uptake, could represent infectious versus inflammatory process.  However, additional new metastatic disease can not be excluded.  Attention on follow-up. Improved metabolic appearance of the lower rectum compared with the prior exam"  She is currently off of prednisone and Entyvio.  She was on  Carboplatin and Alimta, started on 10/14/2022        She has completed proton beam completed on 1/24/2023.      MD [place], CT chest there from 3/20/2023 reveals "Decreased in FDG avid recurrent disease in the right hemithorax. Decreased bilateral lower lobe nodules. Other lung nodules are unchanged or decreased.  New left lower lobe solid nodule in an area of clustered micronodules may be inflammatory. Reassess at follow-up" Plan on restaging scans in 3 months      CT chest from 5/16/2023 reveals "New right lower lobe consolidations and a small right pleural effusion.  Concerning for pneumonia and a small parapneumonic effusion.  However, should also consider treatment related pneumonitis .Stable right lower lobe paramediastinal irregular opacity, likely status post treatment of previous disease in same site     She was started on Augmentin and prednisone (40 mg)on 5/16/2023. She notes that as she tapered prednisone to 15 mg she noticed wheezing and cough and right sided chest pain     Ct scans from " "6/27/2023 revealed "Postoperative changes of right upper lobectomy.  Redemonstration of paramediastinal irregular opacity associated with architectural distortion and improving adjacent ground-glass opacity.  Decrease in size of the small amount loculated right-sided pleural fluid. Enlarged right lower lobe pulmonary nodule measuring 0.9 cm, previously 0.6 cm.  Concerning for metastatic disease. Hepatic and left renal cysts.   1.6 cm left thyroid nodule.  Recommend further evaluation with nonemergent ultrasound"     Case reviewed in Thoracic conference and plan is for CT chest in 3 months     She underwent CT chest at [facility] on 9/18/2023 which reveals "There are postsurgical changes after right upper lobectomy. Masslike consolidation in the right lower lobe, 7.1 x 4.8 cm. On 05/16/2023 there were dense and groundglass opacities at this level. There are adjacent groundglass opacities.Subsolid nodule in the right lower lobe on image 107, 1.8 cm, previously 0.8 cm. Clustered nodules in the left upper lung on images 47-53  are new. Progression of small right pleural effusion. Calcified granuloma in the left lower lobe"     PET scan on 9/25/2023 revealed "Interval progression of size and hypermetabolic activity of multiple right hilar/pulmonary lesions with enlarging and newly hypermetabolic right lower lobe pulmonary nodule. New non-hypermetabolic subcentimeter pulmonary nodules, as detailed above.  Recommend attention on follow-up"  She underwent bronch at [facility] and path is positive   She saw Dr. Jones at [facility] and plan is for Docetaxel and Cyramza    HPIWe reviewed case at MDT on 10/25/2023 and plan is "Standard of care recommendations: Ramucirumab+Keytruda   Trial recommendations: Late phase: no trials.  Early phase: No open slots in -671.   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  we discussed starting Cyramza and Keytruda".      Review of Systems   Constitutional:  Negative for appetite change and " unexpected weight change.   HENT:  Negative for mouth sores.    Eyes:  Negative for visual disturbance.   Respiratory:  Positive for cough and shortness of breath.    Cardiovascular:  Positive for chest pain.   Gastrointestinal:  Negative for abdominal pain and diarrhea.   Genitourinary:  Negative for frequency.   Musculoskeletal:  Negative for back pain.   Integumentary:  Negative for rash.   Neurological:  Negative for headaches.   Hematological:  Negative for adenopathy.   Psychiatric/Behavioral:  The patient is nervous/anxious.           Objective     Physical Exam      LABS:  WBC   Date Value Ref Range Status   08/17/2023 5.89 3.90 - 12.70 K/uL Final     Hemoglobin   Date Value Ref Range Status   08/17/2023 12.5 12.0 - 16.0 g/dL Final     POC Hematocrit   Date Value Ref Range Status   08/09/2018 33 (L) 36 - 54 %PCV Final     Hematocrit   Date Value Ref Range Status   08/17/2023 37.6 37.0 - 48.5 % Final     Platelets   Date Value Ref Range Status   08/17/2023 208 150 - 450 K/uL Final     Gran # (ANC)   Date Value Ref Range Status   08/17/2023 2.5 1.8 - 7.7 K/uL Final     Gran %   Date Value Ref Range Status   08/17/2023 43.0 38.0 - 73.0 % Final       Chemistry        Component Value Date/Time     08/17/2023 0724    K 4.6 08/17/2023 0724     08/17/2023 0724    CO2 26 08/17/2023 0724    BUN 21 08/17/2023 0724    CREATININE 1.18 (H) 10/16/2023 1246    CREATININE 1.4 08/17/2023 0724     (H) 08/17/2023 0724        Component Value Date/Time    CALCIUM 9.9 08/17/2023 0724    ALKPHOS 48 (L) 08/17/2023 0724    AST 20 08/17/2023 0724    ALT 12 08/17/2023 0724    BILITOT 0.3 08/17/2023 0724    ESTGFRAFRICA 58.4 (A) 06/22/2022 1046    EGFRNONAA 50.6 (A) 06/22/2022 1046             Assessment and Plan     1. Malignant neoplasm of upper lobe, right bronchus or lung  Overview:  Status post resection 8/2018 status post adjuvant chemo    Orders:  -     CBC w/ DIFF; Future; Expected date: 10/31/2023  -     [facility];  Future; Expected date: 10/31/2023  -     Urinalysis, Reflex to Urine Culture Urine, Clean Catch; Future; Expected date: 10/31/2023    2. 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 Other ulcerative colitis with complication  -     predniSONE (DELTASONE) 5 MG tablet; Take 1 tablet (5 mg total) by mouth once daily.  Dispense: 90 tablet; Refill: 6    5. Hypothyroidism, unspecified type  -     TSH; Future; Expected date: 10/31/2023  -     FREE T4; Future; Expected date: 10/31/2023      Route Chart for Scheduling    Med Onc Chart Routing  Urgent    Follow up with physician . Schedule CBC,CMP, TSH, free T4 and UA tomorrow on main [facility] cancer center lab. Then schedule to see me on main to start Cyramza and Keytruda asap, She prefers appointments after 11 AM please. Then 3 weeks after this treatment schedule CBC,CMP and see me for next treatment of Cyramza and Keytruda   Follow up with YAMILE    Infusion scheduling note    Injection scheduling note    Labs    Imaging    Pharmacy appointment    Other referrals                  Treatment Plan Information   OP PEMBROLIZUMAB 200MG and Cyramza Q3W   Pam Dhaliwal MD   Upcoming Treatment Dates - OP PEMBROLIZUMAB 200MG and Cyramza Q3W    11/1/2023       Pre-Medications       diphenhydrAMINE injection 50 mg       Chemotherapy       pembrolizumab (KEYTRUDA) 200 mg in sodium chloride 0.9% SolP 108 mL infusion       ramucirumab (CYRAMZA) 652 mg in sodium chloride 0.9% 250 mL chemo infusion  11/22/2023       Pre-Medications       diphenhydrAMINE injection 50 mg       Chemotherapy       ramucirumab (CYRAMZA) 652 mg in sodium chloride 0.9% 250 mL chemo infusion       pembrolizumab (KEYTRUDA) 200 mg in sodium chloride 0.9% SolP 108 mL infusion  12/13/2023       Pre-Medications       diphenhydrAMINE injection 50 mg       Chemotherapy       ramucirumab (CYRAMZA) 652 mg in sodium chloride 0.9% 250 mL chemo  infusion       pembrolizumab (KEYTRUDA) 200 mg in sodium chloride 0.9% SolP 108 mL infusion  1/3/2024       Pre-Medications       diphenhydrAMINE injection 50 mg       Chemotherapy       ramucirumab (CYRAMZA) 652 mg in sodium chloride 0.9% 250 mL chemo infusion       pembrolizumab (KEYTRUDA) 200 mg in sodium chloride 0.9% SolP 108 mL infusion    Therapy Plan Information  PORT FLUSH  Flushes  heparin, porcine (PF) 100 unit/mL injection flush 500 Units  500 Units, Intravenous, Every visit  sodium chloride 0.9% flush 10 mL  10 mL, Intravenous, Every visit  5. Medications  vedolizumab (ENTYVIO) 300 mg/250 mL NS IVPB  300 mg, Intravenous, On: 10/27/2023, 11/10/2023, 12/8/2023, 2/2/2024         Mrs. Toney comes in today to discuss starting Keytruda and Cyramza, she is agreeable with this course of action and will start as soon as appointment time is available in the chemo infusion.  Labs tomorrow for CBC CMP TSH free T4 and UA in anticipation of starting treatment.  I will plan on seeing her on main campus in coordination with the treatment to signed consent  We will also sign her up for chemo care companion  Above plan discussed with the patient and her significant other and all questions were answered to their satisfaction

## 2023-11-03 ENCOUNTER — LAB VISIT (OUTPATIENT)
Dept: LAB | Facility: HOSPITAL | Age: 73
End: 2023-11-03
Attending: INTERNAL MEDICINE
Payer: MEDICARE

## 2023-11-03 DIAGNOSIS — E03.9 HYPOTHYROIDISM, UNSPECIFIED TYPE: ICD-10-CM

## 2023-11-03 DIAGNOSIS — C34.11 MALIGNANT NEOPLASM OF UPPER LOBE, RIGHT BRONCHUS OR LUNG: ICD-10-CM

## 2023-11-03 LAB
ALBUMIN SERPL BCP-MCNC: 3.4 G/DL (ref 3.5–5.2)
ALP SERPL-CCNC: 54 U/L (ref 55–135)
ALT SERPL W/O P-5'-P-CCNC: 10 U/L (ref 10–44)
ANION GAP SERPL CALC-SCNC: 8 MMOL/L (ref 8–16)
AST SERPL-CCNC: 21 U/L (ref 10–40)
BASOPHILS # BLD AUTO: 0.03 K/UL (ref 0–0.2)
BASOPHILS NFR BLD: 0.5 % (ref 0–1.9)
BILIRUB SERPL-MCNC: 0.3 MG/DL (ref 0.1–1)
BUN SERPL-MCNC: 17 MG/DL (ref 8–23)
CALCIUM SERPL-MCNC: 9.9 MG/DL (ref 8.7–10.5)
CHLORIDE SERPL-SCNC: 105 MMOL/L (ref 95–110)
CO2 SERPL-SCNC: 24 MMOL/L (ref 23–29)
CREAT SERPL-MCNC: 1.1 MG/DL (ref 0.5–1.4)
DIFFERENTIAL METHOD: ABNORMAL
EOSINOPHIL # BLD AUTO: 0.3 K/UL (ref 0–0.5)
EOSINOPHIL NFR BLD: 4.4 % (ref 0–8)
ERYTHROCYTE [DISTWIDTH] IN BLOOD BY AUTOMATED COUNT: 12.3 % (ref 11.5–14.5)
EST. GFR  (NO RACE VARIABLE): 53.1 ML/MIN/1.73 M^2
GLUCOSE SERPL-MCNC: 159 MG/DL (ref 70–110)
HCT VFR BLD AUTO: 37.9 % (ref 37–48.5)
HGB BLD-MCNC: 12.7 G/DL (ref 12–16)
IMM GRANULOCYTES # BLD AUTO: 0.02 K/UL (ref 0–0.04)
IMM GRANULOCYTES NFR BLD AUTO: 0.3 % (ref 0–0.5)
LYMPHOCYTES # BLD AUTO: 1.6 K/UL (ref 1–4.8)
LYMPHOCYTES NFR BLD: 24.7 % (ref 18–48)
MCH RBC QN AUTO: 31.1 PG (ref 27–31)
MCHC RBC AUTO-ENTMCNC: 33.5 G/DL (ref 32–36)
MCV RBC AUTO: 93 FL (ref 82–98)
MONOCYTES # BLD AUTO: 0.7 K/UL (ref 0.3–1)
MONOCYTES NFR BLD: 11.6 % (ref 4–15)
NEUTROPHILS # BLD AUTO: 3.7 K/UL (ref 1.8–7.7)
NEUTROPHILS NFR BLD: 58.5 % (ref 38–73)
NRBC BLD-RTO: 0 /100 WBC
PLATELET # BLD AUTO: 227 K/UL (ref 150–450)
PMV BLD AUTO: 9.3 FL (ref 9.2–12.9)
POTASSIUM SERPL-SCNC: 4.4 MMOL/L (ref 3.5–5.1)
PROT SERPL-MCNC: 6.8 G/DL (ref 6–8.4)
RBC # BLD AUTO: 4.09 M/UL (ref 4–5.4)
SODIUM SERPL-SCNC: 137 MMOL/L (ref 136–145)
T4 FREE SERPL-MCNC: 1 NG/DL (ref 0.71–1.51)
TSH SERPL DL<=0.005 MIU/L-ACNC: 0.69 UIU/ML (ref 0.4–4)
WBC # BLD AUTO: 6.36 K/UL (ref 3.9–12.7)

## 2023-11-03 PROCEDURE — 80053 COMPREHEN METABOLIC PANEL: CPT | Performed by: INTERNAL MEDICINE

## 2023-11-03 PROCEDURE — 84439 ASSAY OF FREE THYROXINE: CPT | Performed by: INTERNAL MEDICINE

## 2023-11-03 PROCEDURE — 85025 COMPLETE CBC W/AUTO DIFF WBC: CPT | Performed by: INTERNAL MEDICINE

## 2023-11-03 PROCEDURE — 36415 COLL VENOUS BLD VENIPUNCTURE: CPT | Performed by: INTERNAL MEDICINE

## 2023-11-03 PROCEDURE — 84443 ASSAY THYROID STIM HORMONE: CPT | Performed by: INTERNAL MEDICINE

## 2023-11-06 ENCOUNTER — OFFICE VISIT (OUTPATIENT)
Dept: HEMATOLOGY/ONCOLOGY | Facility: CLINIC | Age: 73
End: 2023-11-06
Payer: MEDICARE

## 2023-11-06 DIAGNOSIS — Z79.899 IMMUNODEFICIENCY DUE TO DRUGS: ICD-10-CM

## 2023-11-06 DIAGNOSIS — R05.9 COUGH, UNSPECIFIED TYPE: ICD-10-CM

## 2023-11-06 DIAGNOSIS — C34.11 MALIGNANT NEOPLASM OF UPPER LOBE, RIGHT BRONCHUS OR LUNG: Primary | ICD-10-CM

## 2023-11-06 DIAGNOSIS — D84.821 IMMUNODEFICIENCY DUE TO DRUGS: ICD-10-CM

## 2023-11-06 DIAGNOSIS — K51.819 OTHER ULCERATIVE COLITIS WITH COMPLICATION: ICD-10-CM

## 2023-11-06 DIAGNOSIS — C77.1 SECONDARY AND UNSPECIFIED MALIGNANT NEOPLASM OF INTRATHORACIC LYMPH NODES: ICD-10-CM

## 2023-11-06 DIAGNOSIS — N18.31 STAGE 3A CHRONIC KIDNEY DISEASE: ICD-10-CM

## 2023-11-06 PROCEDURE — 99215 PR OFFICE/OUTPT VISIT, EST, LEVL V, 40-54 MIN: ICD-10-PCS | Mod: 95,,, | Performed by: INTERNAL MEDICINE

## 2023-11-06 PROCEDURE — 99215 OFFICE O/P EST HI 40 MIN: CPT | Mod: 95,,, | Performed by: INTERNAL MEDICINE

## 2023-11-06 RX ORDER — SODIUM CHLORIDE 0.9 % (FLUSH) 0.9 %
10 SYRINGE (ML) INJECTION
Status: CANCELLED | OUTPATIENT
Start: 2023-11-07

## 2023-11-06 RX ORDER — HEPARIN 100 UNIT/ML
500 SYRINGE INTRAVENOUS
Status: CANCELLED | OUTPATIENT
Start: 2023-11-07

## 2023-11-06 RX ORDER — EPINEPHRINE 0.3 MG/.3ML
0.3 INJECTION SUBCUTANEOUS ONCE AS NEEDED
Status: CANCELLED | OUTPATIENT
Start: 2023-11-07

## 2023-11-06 RX ORDER — DEXAMETHASONE SODIUM PHOSPHATE 4 MG/ML
12 INJECTION, SOLUTION INTRA-ARTICULAR; INTRALESIONAL; INTRAMUSCULAR; INTRAVENOUS; SOFT TISSUE
Status: CANCELLED
Start: 2023-11-07

## 2023-11-06 RX ORDER — LORAZEPAM 0.5 MG/1
0.5 TABLET ORAL
Status: CANCELLED
Start: 2023-11-07

## 2023-11-06 RX ORDER — DIPHENHYDRAMINE HYDROCHLORIDE 50 MG/ML
50 INJECTION INTRAMUSCULAR; INTRAVENOUS ONCE AS NEEDED
Status: CANCELLED | OUTPATIENT
Start: 2023-11-07

## 2023-11-06 RX ORDER — FAMOTIDINE 20 MG/1
20 TABLET, FILM COATED ORAL
Status: CANCELLED
Start: 2023-11-07

## 2023-11-06 RX ORDER — BENZONATATE 200 MG/1
200 CAPSULE ORAL 3 TIMES DAILY
Qty: 90 CAPSULE | Refills: 6 | Status: SHIPPED | OUTPATIENT
Start: 2023-11-06 | End: 2023-12-07

## 2023-11-06 NOTE — PROGRESS NOTES
"Subjective     Patient ID: Alesha Toney is a 73 y.o. female.  The patient location is: home   The chief complaint leading to consultation is: lung cancer    Visit type: audiovisual      Notes:    Chief Complaint: Lung Cancer  Ms. Alesha Toney is a 73-year-old otherwise healthy female who started having some shoulder pain, which was lasting for over six weeks.  She went and saw her primary care physician and underwent an x-ray, which revealed right upper lobe lung mass worrisome for malignancy and a CT scan was recommended, which was done on 6/12/18 and that revealed a newly developing mass, pleural based, lateral posterior segment, right upper lobe 3.5 x 3.5 cm, surrounding stellate margin and patchy infiltrates, particularly superiorly,   anteriorly infiltrates extend perihilar into the anterior segment.  She then underwent CT-guided biopsy, which revealed well-differentiated adenocarcinoma.       Her PET scan from 6/29/18 There is physiologic intracranial, head, and neck activity.  There is a large right upper lobe mass SUV max 9.11.  No local or distant metastatic disease seen.  There is physiologic liver, spleen, GI and  activity.  There are a few liver cysts.  No adenopathy is seen.  The adrenal glands are not enlarged.  No adenopathy is seen.  No suspicious bone lesions are seen.     She underwent VATS with right upper lobectomy. Mediastinal lymphadenectomy on 8/9/18. Pathology revealed "Tumor site: Upper lobe.Tumor size: 7 x 4 x 2.7 cm.Tumor focality: Single tumor.  Histologic type: Mixed invasive mucinous and nonmucinous adenocarcinoma. Histologic grade: G2: Moderately differentiated.Spread through air spaces (STATS): Present. Visceral pleura invasion: Not identified.  Lymphovascular invasion: Not identified. Direct invasion of adjacent structures: No adjacent structures present. Margins: All margins are uninvolved by carcinoma.Distance of invasive carcinoma from closest margin: 1 cm from the " "bronchial margin.Treatment effect: No known presurgical therapy. Regional lymph nodes: Number of lymph nodes involved: 0. Number of lymph nodes examined: 11. Pathologic Stage Classification: pT3 N0"        She completed 4 cycles of adjuvant chemo with Cisplatin and Alimta as of 1/10/19   She is on surveillance.     CT chest of 9/17/19 which reveals "Operative change of right upper lobectomy for small-cell lung cancer with several scattered subsolid opacities and a new solid nodule in the right lower lobe measuring up to 0.5 cm.  We recommend continued surveillance with the role and schedule for such surveillance to be determined by clinical considerations. Sided chest port distal tip terminating at the confluence of the brachiocephalic vein in the SVC.  Correlate with device functioning. Apically predominant emphysematous changes, left greater than right. Aortic annular calcification and multi-vessel coronary artery atherosclerosis. Numerous unchanged hepatic hypodensities, likely cysts"     PET scan from 10/1/19 reveals "1.6 cm soft tissue lesion re-identified posterior to the bronchus intermedius.  No corresponding focal increased radiotracer uptake to suggest local recurrence or metastatic disease. Stable subcentimeter opacities throughout the bilateral lower lobes, too small to characterize with PET-CT. Focal increased radiotracer uptake and subtle wall thickening in the rectum.  Findings are concerning for primary neoplastic process.  Recommend further evaluation with direct visualization"     She returned from [facility] and was advised to proceed with chemo/RT with either Docetaxel/platinum or Carboplatin/Alimta.     She completed chemo/RT with Carboplatin and Alimta as of 12/31/19     She underwent CT chest on 5/27/2020 which revealed 1. In this patient with history of lung cancer status post right upper lobectomy, there is a soft tissue opacity at the posterior margin of the staple line.  This lesion has " "been enlarging progressively and now measures 1.3 x 1.6 cm.  There is also a new 1.7 x 1.7 cm opacity at the right paravertebral pleural margin which is inseparable from this lesion.  These findings may represent post radiation change in light of the hypermetabolic lesion in this region on PET-CT of 10/01/2019 (image 69/[id number]).There is a 1 cm opacity in the superior or lateral basal segment of the right lower lobe (axial series 4, image 243) which has enlarged since 09/17/2019.  Nature of this lesion is unclear.  Clinical considerations will determine if percutaneous biopsy or metabolic assessment is warranted. 1.0 cm solid opacity with branching configuration (series 4, image 205) is located in the lateral basal segment of the left lower lobe, stable since 02/16/2019 (02/06/2019 axial series 4, image 310). Appearance and bifurcating character suggests mucoid impaction in mildly ectatic peripheral airway, likely not significant. Persistent clustered small centrilobular nodules in the apical segment of the right upper lobe likely reflecting small airway inflammation/infection. Partially visualized left chest port with distal catheter tip at the junction of the brachiocephalic veins and SVC, similar as on 09/17/2019"  She thus underwent PET scan on 6/1/2020 which revealed "No evidence of residual viable lung malignancy.  Evolving post radiation changes in the right paramediastinal lung, with a new irregular subpleural density which may also be related to recent radiation.  Attention on follow-up. Interval decrease in size of a soft tissue lesion along the inferior margin of the lobectomy stable line.  Several stable subcentimeter soft tissue opacities in the lower lobes bilaterally, As above.  Attention on follow-up.  Persistent hypermetabolic rectal lesion concerning for malignancy.  Recommend direct visualization and tissue sampling as clinically indicated"  I discussed her case in thoracic conference today and " "findings are felt to be related to RT     Her restaging CT scans from 1/20/2021 which reveal "Persistent soft tissue opacity in the posterior margin of the staple line that is increased in prominence when compared to the prior examination and is worrisome for disease progression. No new lesions identified.  Stable pulmonary nodules. Stable hypodense lesions throughout the liver that likely represent hepatic cysts"        Restaging PET scan from 1/27/2021 reveals "Increased size and hypermetabolic activity involving soft tissue opacity along the inferior border of the right upper lobectomy stable line concerning for progression of disease. Mildly increased hypermetabolic activity involving the rectum compatible with known high-grade dysplasia/intramucosal carcinoma. Interval decrease in size and resolution of hypermetabolic activity involving subcentimeter subpleural opacity within the right lower lobe"     MRI brain from  2/3/2021 reveals no evidence of recurrence.  GUARDANT testing only reveals p53, PDL1 is 0 and no other targets        PET scan from 3/24/2021 reveals "In this patient with known lung cancer, there is a persistent hypermetabolic right hilar mass consistent with viable tumor.  Interval increase in size is equivocal for progression of fibrosis versus tumor growth. 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  Activity appears more focal and possibly more proximal than before.  Consider direct visualization for further evaluation. The previously described subcentimeter subpleural opacity within the right lower lobe is not definitely seen on today's exam"               She last received immunotherapy on 5/28/2021. She underwent EUS on 6/25/2021 which revealed "Erythematous, congested, and ulcerated mucosa  (proctitis) in rectosigmoid colon.  Pathology reveals Colon, rectosigmoid, biopsy:  Moderate active colitis     She completed " "steroids about 5 weeks ago.      CT scans from 11/5/2021 reveals "In this patient with lung cancer, there is postoperative changes of right upper lobe resection.  Persistent soft tissue opacity along the right hilum suture line which is slightly increased in size from prior exam.  More conspicuous appearing nodules within the right lower lobe seen on prior exam.  New nodule within the right middle lobe measuring 1.3 cm.  Overall findings are all worrisome for disease progression. Scattered areas of centrilobular nodularity within the bilateral lungs.  Nonspecific and can be seen in the setting of infectious or inflammatory etiologies. Unchanged hepatics cysts. Stable left adrenal gland nodule."  PET scan from 11/23/2021 revealed "New hypermetabolic nodule within the right lower lobe additional slightly subcentimeter nodules within the right lower lobe.  Findings concerning for disease progression, other differentials include infection or noninfectious inflammatory process. Interval decrease in size and uptake in the right hilar mass. Resolution of previous focal rectal uptake"        She is on Opdivo. Her Opdivo was held on 1/10/2022 due to rectal bleeding. She saw Dr. Clark and was noted to have anal fissure which contributed to rectal bleed. Her rectal bleed has since resolved.           She was on Opdivo until 5/24/2022, she noted rectal bleeding and underwent   Flex sigmoidoscopy on 6/3/2022 reveals "Localized severe inflammation was found in the distal rectum. Biopsied. Pathology reveals Severely active chronic colitis with ulceration (see comment)  Moderate architectural disorder. Negative for CMV by immunohistochemistry . Negative for granulomas or viral inclusions. Negative for dysplasia or carcinoma"  PET scan from 6/17/2022 reveals "In this patient with lung cancer there are enlarging hypermetabolic right perihilar opacities concerning for continued local disease progression. Continued decreased " "metabolic activity of the right lower lobe nodule.  Cluster of pulmonary nodules bilaterally, some which are new from prior PET-CT for example the superior segment the left upper lobe and are likely infectious/inflammatory origin. Persistent and more extensive region of increased FDG avidity within the distal rectum in keeping with more extensive colitis.  Malignancy could have a similar appearance.  Recommend further investigation as clinically warranted and attention on follow-up"     She has active proctitis and is on hydrocortisone suppositories and Entyvyo since early June 2022.   CT chest on 8/1/2022 reveals  "Similar appearance of elongated, irregular opacity adjacent to the right upper lobectomy margin.  More posteriorly adjacent perihilar opacities with previous FDG avidity with detailed measurements as above. Similar areas of scattered micro-nodularity with tree-in-bud, likely infectious/inflammatory in nature.  New appearing area of grouped micro-nodules in the anterior left lower lobe, largest up to 5 mm, which could relate to aforementioned small airways disease, though technically indeterminate.  Continued close attention at follow-up. RECIST SUMMARY: Date of prior examination for comparison: CT chest dated 05/24/2022. Lesion 1: Right perihilar opacity.  2.4.  Series 2, image 45.  Previously 2 4.  (Note there is slight increased size conspicuity in the craniocaudal dimension at 1.8 cm, previously 1.5 cm). Lesion 2: Right perihilar opacity.  1.7 cm.  Series 2, image 59.  Previously 1.5 cm"     She underwent a repeat CT chest 8/31/2022 and that revealed "Status post right upper lobectomy.  Soft tissue thickening adjacent to the suture line appears stable.  However, there is interval enlargement of a right lower lobe, bilobed, paramediastinal mass-like consolidation.  When dominant components of the lesions are measured separately, dimensions are summarized below. RECIST SUMMARY: Date of prior examination " "for comparison: CT chest 08/01/2022. Lesion 1: Right perihilar opacity.  4.7 cm. Series 4 image 77.  Prior measurement 2.4 cm. Lesion 2: Right perihilar (infrahilar) opacity.  2.7 cm. Series 4 image 237.  Prior measurement 1.7 cm"  Case reviewed in thoracic conference, and concern for disease progression exists so plan on immunotehrapy if able to taper steroids           PET scan from 10/6/2022 reveals "Interval increase in size and radiotracer uptake of a right pulmonary lesions in this patient with known non-small cell lung cancer.  There are few new subcentimeter pulmonary nodules in the right lower lobe with no significant uptake, could represent infectious versus inflammatory process.  However, additional new metastatic disease can not be excluded.  Attention on follow-up. Improved metabolic appearance of the lower rectum compared with the prior exam"  She is currently off of prednisone and Entyvio.  She was on  Carboplatin and Alimta, started on 10/14/2022        She has completed proton beam completed on 1/24/2023.      MD [facility], CT chest there from 3/20/2023 reveals "Decreased in FDG avid recurrent disease in the right hemithorax. Decreased bilateral lower lobe nodules. Other lung nodules are unchanged or decreased.  New left lower lobe solid nodule in an area of clustered micronodules may be inflammatory. Reassess at follow-up" Plan on restaging scans in 3 months      CT chest from 5/16/2023 reveals "New right lower lobe consolidations and a small right pleural effusion.  Concerning for pneumonia and a small parapneumonic effusion.  However, should also consider treatment related pneumonitis .Stable right lower lobe paramediastinal irregular opacity, likely status post treatment of previous disease in same site     She was started on Augmentin and prednisone (40 mg)on 5/16/2023. She notes that as she tapered prednisone to 15 mg she noticed wheezing and cough and right sided chest pain     Ct scans from " "6/27/2023 revealed "Postoperative changes of right upper lobectomy.  Redemonstration of paramediastinal irregular opacity associated with architectural distortion and improving adjacent ground-glass opacity.  Decrease in size of the small amount loculated right-sided pleural fluid. Enlarged right lower lobe pulmonary nodule measuring 0.9 cm, previously 0.6 cm.  Concerning for metastatic disease. Hepatic and left renal cysts.   1.6 cm left thyroid nodule.  Recommend further evaluation with nonemergent ultrasound"     Case reviewed in Thoracic conference and plan is for CT chest in 3 months     She underwent CT chest at [facility] on 9/18/2023 which reveals "There are postsurgical changes after right upper lobectomy. Masslike consolidation in the right lower lobe, 7.1 x 4.8 cm. On 05/16/2023 there were dense and groundglass opacities at this level. There are adjacent groundglass opacities.Subsolid nodule in the right lower lobe on image 107, 1.8 cm, previously 0.8 cm. Clustered nodules in the left upper lung on images 47-53  are new. Progression of small right pleural effusion. Calcified granuloma in the left lower lobe"     PET scan on 9/25/2023 revealed "Interval progression of size and hypermetabolic activity of multiple right hilar/pulmonary lesions with enlarging and newly hypermetabolic right lower lobe pulmonary nodule. New non-hypermetabolic subcentimeter pulmonary nodules, as detailed above.  Recommend attention on follow-up"  She underwent bronch at [facility] and path is positive   She saw Dr. Jones at [facility] and plan is for Docetaxel and Cyramza     We reviewed case at [facility] on 10/25/2023 and plan is "Standard of care recommendations: Ramucirumab+Keytruda   Trial recommendations: Late phase: no trials.  Early phase: No open slots in -808.   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    HPIShe comes in to start Cyramza and Keytruda  She is on prednisone 5 mg for her colitis.  She notes cough and some pain " in her right chest and shortness of breath, symptoms are stable      Review of Systems   Constitutional:  Negative for appetite change, fatigue and unexpected weight change.   HENT:  Negative for mouth sores.    Eyes:  Negative for visual disturbance.   Respiratory:  Positive for cough and shortness of breath.    Cardiovascular:  Positive for chest pain.   Gastrointestinal:  Negative for abdominal pain and diarrhea.   Genitourinary:  Negative for frequency.   Musculoskeletal:  Negative for back pain.   Integumentary:  Negative for rash.   Neurological:  Negative for headaches.   Hematological:  Negative for adenopathy.   Psychiatric/Behavioral:  The patient is not nervous/anxious.    All other systems reviewed and are negative.         Objective     Physical Exam         LABS:  WBC   Date Value Ref Range Status   11/03/2023 6.36 3.90 - 12.70 K/uL Final     Hemoglobin   Date Value Ref Range Status   11/03/2023 12.7 12.0 - 16.0 g/dL Final     POC Hematocrit   Date Value Ref Range Status   08/09/2018 33 (L) 36 - 54 %PCV Final     Hematocrit   Date Value Ref Range Status   11/03/2023 37.9 37.0 - 48.5 % Final     Platelets   Date Value Ref Range Status   11/03/2023 227 150 - 450 K/uL Final     Gran # (ANC)   Date Value Ref Range Status   11/03/2023 3.7 1.8 - 7.7 K/uL Final     Gran %   Date Value Ref Range Status   11/03/2023 58.5 38.0 - 73.0 % Final       Chemistry        Component Value Date/Time     11/03/2023 1342    K 4.4 11/03/2023 1342     11/03/2023 1342    CO2 24 11/03/2023 1342    BUN 17 11/03/2023 1342    CREATININE 1.1 11/03/2023 1342     (H) 11/03/2023 1342        Component Value Date/Time    CALCIUM 9.9 11/03/2023 1342    ALKPHOS 54 (L) 11/03/2023 1342    AST 21 11/03/2023 1342    ALT 10 11/03/2023 1342    BILITOT 0.3 11/03/2023 1342    ESTGFRAFRICA 58.4 (A) 06/22/2022 1046    EGFRNONAA 50.6 (A) 06/22/2022 1046        TSH   Date Value Ref Range Status   11/03/2023 0.693 0.400 - 4.000  uIU/mL Final     Free T4   Date Value Ref Range Status   11/03/2023 1.00 0.71 - 1.51 ng/dL Final     UA : trace protein    Assessment and Plan     1. 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 Stage 3a chronic kidney disease    4. Immunodeficiency due to drugs    5. Other ulcerative colitis with complication    6. Cough, unspecified type  -     benzonatate (TESSALON) 200 MG capsule; Take 1 capsule (200 mg total) by mouth 3 (three) times daily. for 10 days  Dispense: 90 capsule; Refill: 6    Other orders  -     LORazepam tablet 0.5 mg  -     famotidine tablet 20 mg  -     dexAMETHasone injection 12 mg  -     ramucirumab (CYRAMZA) 652 mg in sodium chloride 0.9% 250 mL chemo infusion  -     pembrolizumab (KEYTRUDA) 200 mg in sodium chloride 0.9% SolP 108 mL infusion  -     EPINEPHrine (EPIPEN) 0.3 mg/0.3 mL pen injection 0.3 mg  -     diphenhydrAMINE injection 50 mg  -     hydrocortisone sodium succinate injection 100 mg  -     sodium chloride 0.9% 100 mL flush bag  -     sodium chloride 0.9% flush 10 mL  -     heparin, porcine (PF) 100 unit/mL injection flush 500 Units  -     alteplase injection 2 mg        Route Chart for Scheduling    Med Onc Chart Routing      Follow up with physician . In 3 weeks scehdule CBC,CMp and see me 911/27) and for Cyramza and keytruda (11/28). Cancel appointments on 11/15 and 12/6 with me, labs and chemo   Follow up with YAMILE    Infusion scheduling note    Injection scheduling note    Labs    Imaging    Pharmacy appointment    Other referrals                  Treatment Plan Information   OP PEMBROLIZUMAB 200MG and Cyramza Q3W   Pam Dhaliwal MD   Upcoming Treatment Dates - OP PEMBROLIZUMAB 200MG and Cyramza Q3W    11/7/2023       Pre-Medications       LORazepam tablet 0.5 mg       famotidine tablet 20 mg        dexAMETHasone injection 12 mg       Chemotherapy       pembrolizumab (KEYTRUDA) 200 mg in sodium chloride 0.9% SolP 108 mL infusion       ramucirumab (CYRAMZA) 652 mg in sodium chloride 0.9% 250 mL chemo infusion  11/28/2023       Pre-Medications       LORazepam tablet 0.5 mg       famotidine tablet 20 mg       dexAMETHasone injection 12 mg       Chemotherapy       ramucirumab (CYRAMZA) 652 mg in sodium chloride 0.9% 250 mL chemo infusion       pembrolizumab (KEYTRUDA) 200 mg in sodium chloride 0.9% SolP 108 mL infusion  12/19/2023       Pre-Medications       LORazepam tablet 0.5 mg       famotidine tablet 20 mg       dexAMETHasone injection 12 mg       Chemotherapy       ramucirumab (CYRAMZA) 652 mg in sodium chloride 0.9% 250 mL chemo infusion       pembrolizumab (KEYTRUDA) 200 mg in sodium chloride 0.9% SolP 108 mL infusion  1/9/2024       Pre-Medications       LORazepam tablet 0.5 mg       famotidine tablet 20 mg       dexAMETHasone injection 12 mg       Chemotherapy       ramucirumab (CYRAMZA) 652 mg in sodium chloride 0.9% 250 mL chemo infusion       pembrolizumab (KEYTRUDA) 200 mg in sodium chloride 0.9% SolP 108 mL infusion    Therapy Plan Information  PORT FLUSH  Flushes  heparin, porcine (PF) 100 unit/mL injection flush 500 Units  500 Units, Intravenous, Every visit  sodium chloride 0.9% flush 10 mL  10 mL, Intravenous, Every visit  5. Medications  vedolizumab (ENTYVIO) 300 mg/250 mL NS IVPB  300 mg, Intravenous, On: 10/27/2023, 11/10/2023, 12/8/2023, 2/2/2024         Mrs. Toney will proceed with Cyramza and keytruda and will return in 3 weeks for next treatment  Patient was consented for chemotherapy today 11/6/2023 .   An extensive discussion was had which included a thorough discussion of the risk and benefits of treatment and alternatives.  Risks, including but not limited to, possible hair loss, bone marrow damage (anemia, thrombocytopenia, immune suppression, neutropenia), damage to body organs  (brain, heart, liver, kidney, lungs, nervous system, skin, and others), allergic reactions, sterility, nausea/vomiting, constipation/diarrhea, sores in the mouth, secondary cancers, local damage at possible injection sites, and rarely death were all discussed.  The patient agrees with the plan, and all questions have been answered to their satisfaction.  Consent was signed the patient, provider, and a third party witness.       Patient was consented for Immunotherapy today .   An extensive discussion was had which included a thorough discussion of the risk and benefits of treatment and alternatives.  Risks, including but not limited to, (hypothyroidsm, hyperthyroidsm, hypophysitis, pneumonitis, colitis, myositis, pancreatitis, nausea, vomiting, diarrhea, decreased appetite and fatigue) were discussed.      Colitis: The biggest concern is the colitis acting up, she wants to try prednisone which is reasonable for now, but we did discuss Entyvio with patient and with Dr. Clark as well,   CKD: stable  Cough: escribed tessalon perles    Above care plan was discussed with patient and significant other and all questions were addressed to their satisfaction

## 2023-11-06 NOTE — Clinical Note
Appointments are incorrect, her chemo is every 3 weeks and she is starting in 3 weeks.  In 3 weeks scehdule CBC,CMp and see me 911/27) and for Cyramza and keytruda (11/28). Cancel appointments on 11/15 and 12/6 with me, labs and chemo

## 2023-11-07 ENCOUNTER — PATIENT MESSAGE (OUTPATIENT)
Dept: HEMATOLOGY/ONCOLOGY | Facility: CLINIC | Age: 73
End: 2023-11-07
Payer: MEDICARE

## 2023-11-08 ENCOUNTER — PATIENT MESSAGE (OUTPATIENT)
Dept: PALLIATIVE MEDICINE | Facility: CLINIC | Age: 73
End: 2023-11-08
Payer: MEDICARE

## 2023-11-08 ENCOUNTER — PATIENT MESSAGE (OUTPATIENT)
Dept: HEMATOLOGY/ONCOLOGY | Facility: CLINIC | Age: 73
End: 2023-11-08
Payer: MEDICARE

## 2023-11-09 ENCOUNTER — PATIENT MESSAGE (OUTPATIENT)
Dept: INTERNAL MEDICINE | Facility: CLINIC | Age: 73
End: 2023-11-09
Payer: MEDICARE

## 2023-11-13 ENCOUNTER — PATIENT MESSAGE (OUTPATIENT)
Dept: PALLIATIVE MEDICINE | Facility: CLINIC | Age: 73
End: 2023-11-13
Payer: MEDICARE

## 2023-11-13 RX ORDER — LORAZEPAM 0.5 MG/1
0.5 TABLET ORAL 2 TIMES DAILY PRN
Qty: 60 TABLET | Refills: 0 | Status: SHIPPED | OUTPATIENT
Start: 2023-11-13 | End: 2023-12-27 | Stop reason: SDUPTHER

## 2023-11-21 ENCOUNTER — INFUSION (OUTPATIENT)
Dept: INFUSION THERAPY | Facility: OTHER | Age: 73
End: 2023-11-21
Attending: STUDENT IN AN ORGANIZED HEALTH CARE EDUCATION/TRAINING PROGRAM
Payer: MEDICARE

## 2023-11-21 VITALS
HEART RATE: 68 BPM | RESPIRATION RATE: 16 BRPM | DIASTOLIC BLOOD PRESSURE: 68 MMHG | TEMPERATURE: 99 F | WEIGHT: 140.19 LBS | OXYGEN SATURATION: 97 % | SYSTOLIC BLOOD PRESSURE: 160 MMHG | BODY MASS INDEX: 22.63 KG/M2

## 2023-11-21 DIAGNOSIS — C34.11 MALIGNANT NEOPLASM OF UPPER LOBE, RIGHT BRONCHUS OR LUNG: Primary | ICD-10-CM

## 2023-11-21 DIAGNOSIS — C77.1 SECONDARY AND UNSPECIFIED MALIGNANT NEOPLASM OF INTRATHORACIC LYMPH NODES: ICD-10-CM

## 2023-11-21 PROCEDURE — 36593 DECLOT VASCULAR DEVICE: CPT

## 2023-11-21 PROCEDURE — 25000242 PHARM REV CODE 250 ALT 637 W/ HCPCS: Performed by: INTERNAL MEDICINE

## 2023-11-21 PROCEDURE — 63600175 PHARM REV CODE 636 W HCPCS: Mod: JZ,JG | Performed by: INTERNAL MEDICINE

## 2023-11-21 PROCEDURE — 96413 CHEMO IV INFUSION 1 HR: CPT

## 2023-11-21 PROCEDURE — 96417 CHEMO IV INFUS EACH ADDL SEQ: CPT

## 2023-11-21 PROCEDURE — 96367 TX/PROPH/DG ADDL SEQ IV INF: CPT

## 2023-11-21 PROCEDURE — 25000003 PHARM REV CODE 250: Performed by: INTERNAL MEDICINE

## 2023-11-21 RX ORDER — LORAZEPAM 0.5 MG/1
0.5 TABLET ORAL
Status: COMPLETED | OUTPATIENT
Start: 2023-11-21 | End: 2023-11-21

## 2023-11-21 RX ORDER — SODIUM CHLORIDE 0.9 % (FLUSH) 0.9 %
10 SYRINGE (ML) INJECTION
Status: DISCONTINUED | OUTPATIENT
Start: 2023-11-21 | End: 2023-11-21 | Stop reason: HOSPADM

## 2023-11-21 RX ORDER — DIPHENHYDRAMINE HYDROCHLORIDE 50 MG/ML
50 INJECTION INTRAMUSCULAR; INTRAVENOUS ONCE AS NEEDED
Status: DISCONTINUED | OUTPATIENT
Start: 2023-11-21 | End: 2023-11-21 | Stop reason: HOSPADM

## 2023-11-21 RX ORDER — EPINEPHRINE 0.3 MG/.3ML
0.3 INJECTION SUBCUTANEOUS ONCE AS NEEDED
Status: DISCONTINUED | OUTPATIENT
Start: 2023-11-21 | End: 2023-11-21 | Stop reason: HOSPADM

## 2023-11-21 RX ORDER — FAMOTIDINE 10 MG/1
20 TABLET ORAL
Status: COMPLETED | OUTPATIENT
Start: 2023-11-21 | End: 2023-11-21

## 2023-11-21 RX ORDER — HEPARIN 100 UNIT/ML
500 SYRINGE INTRAVENOUS
Status: DISCONTINUED | OUTPATIENT
Start: 2023-11-21 | End: 2023-11-21 | Stop reason: HOSPADM

## 2023-11-21 RX ADMIN — DEXAMETHASONE SODIUM PHOSPHATE: 4 INJECTION, SOLUTION INTRA-ARTICULAR; INTRALESIONAL; INTRAMUSCULAR; INTRAVENOUS; SOFT TISSUE at 01:11

## 2023-11-21 RX ADMIN — SODIUM CHLORIDE 600 MG: 9 INJECTION, SOLUTION INTRAVENOUS at 01:11

## 2023-11-21 RX ADMIN — HEPARIN 500 UNITS: 100 SYRINGE at 03:11

## 2023-11-21 RX ADMIN — ALTEPLASE 2 MG: 2.2 INJECTION, POWDER, LYOPHILIZED, FOR SOLUTION INTRAVENOUS at 12:11

## 2023-11-21 RX ADMIN — SODIUM CHLORIDE 200 MG: 9 INJECTION, SOLUTION INTRAVENOUS at 02:11

## 2023-11-21 RX ADMIN — LORAZEPAM 0.5 MG: 0.5 TABLET ORAL at 01:11

## 2023-11-21 RX ADMIN — Medication 20 MG: at 01:11

## 2023-11-21 RX ADMIN — SODIUM CHLORIDE: 9 INJECTION, SOLUTION INTRAVENOUS at 01:11

## 2023-11-21 NOTE — PLAN OF CARE
Vital Signs Stable, No apparent distress noted; Pt tolerated _Cyramza/Keytruda_ infusion w/o difficulty.  Port-a-cath flushed, heparinized and deaccessed.;Positive blood return noted.  AVS/Discharge instructions reviewed;all questions answered ;Pt verbalizes understanding.  Follow up appointments per kristian; ambulated from clinic in NAD accompanied by family/friend

## 2023-11-22 ENCOUNTER — PATIENT MESSAGE (OUTPATIENT)
Dept: ADMINISTRATIVE | Facility: OTHER | Age: 73
End: 2023-11-22
Payer: MEDICARE

## 2023-11-23 ENCOUNTER — PATIENT MESSAGE (OUTPATIENT)
Dept: ADMINISTRATIVE | Facility: OTHER | Age: 73
End: 2023-11-23
Payer: MEDICARE

## 2023-11-24 ENCOUNTER — PATIENT MESSAGE (OUTPATIENT)
Dept: ADMINISTRATIVE | Facility: OTHER | Age: 73
End: 2023-11-24
Payer: MEDICARE

## 2023-11-28 DIAGNOSIS — C34.11 MALIGNANT NEOPLASM OF UPPER LOBE, RIGHT BRONCHUS OR LUNG: Primary | ICD-10-CM

## 2023-11-30 ENCOUNTER — PATIENT MESSAGE (OUTPATIENT)
Dept: ADMINISTRATIVE | Facility: OTHER | Age: 73
End: 2023-11-30
Payer: MEDICARE

## 2023-12-01 ENCOUNTER — PATIENT MESSAGE (OUTPATIENT)
Dept: ADMINISTRATIVE | Facility: OTHER | Age: 73
End: 2023-12-01
Payer: MEDICARE

## 2023-12-02 ENCOUNTER — PATIENT MESSAGE (OUTPATIENT)
Dept: ADMINISTRATIVE | Facility: OTHER | Age: 73
End: 2023-12-02
Payer: MEDICARE

## 2023-12-03 ENCOUNTER — PATIENT MESSAGE (OUTPATIENT)
Dept: ADMINISTRATIVE | Facility: OTHER | Age: 73
End: 2023-12-03
Payer: MEDICARE

## 2023-12-04 ENCOUNTER — OFFICE VISIT (OUTPATIENT)
Dept: PALLIATIVE MEDICINE | Facility: CLINIC | Age: 73
End: 2023-12-04
Payer: MEDICARE

## 2023-12-04 VITALS — OXYGEN SATURATION: 97 % | HEIGHT: 66 IN | BODY MASS INDEX: 23.2 KG/M2 | WEIGHT: 144.38 LBS | TEMPERATURE: 98 F

## 2023-12-04 DIAGNOSIS — F43.22 ADJUSTMENT DISORDER WITH ANXIETY: ICD-10-CM

## 2023-12-04 DIAGNOSIS — G47.00 INSOMNIA, UNSPECIFIED TYPE: ICD-10-CM

## 2023-12-04 DIAGNOSIS — M89.8X9 BONE PAIN: ICD-10-CM

## 2023-12-04 DIAGNOSIS — R06.02 SHORTNESS OF BREATH: ICD-10-CM

## 2023-12-04 DIAGNOSIS — Z51.5 ENCOUNTER FOR PALLIATIVE CARE: Primary | ICD-10-CM

## 2023-12-04 DIAGNOSIS — R63.0 ANOREXIA: ICD-10-CM

## 2023-12-04 DIAGNOSIS — G89.3 CANCER ASSOCIATED PAIN: ICD-10-CM

## 2023-12-04 DIAGNOSIS — G89.3 CANCER RELATED PAIN: ICD-10-CM

## 2023-12-04 DIAGNOSIS — R06.00 DYSPNEA, UNSPECIFIED TYPE: ICD-10-CM

## 2023-12-04 DIAGNOSIS — R05.9 COUGH, UNSPECIFIED TYPE: ICD-10-CM

## 2023-12-04 DIAGNOSIS — R53.83 FATIGUE, UNSPECIFIED TYPE: ICD-10-CM

## 2023-12-04 DIAGNOSIS — C34.11 MALIGNANT NEOPLASM OF UPPER LOBE, RIGHT BRONCHUS OR LUNG: ICD-10-CM

## 2023-12-04 PROCEDURE — 99215 OFFICE O/P EST HI 40 MIN: CPT | Mod: S$PBB,,, | Performed by: NURSE PRACTITIONER

## 2023-12-04 PROCEDURE — 99417 PROLNG OP E/M EACH 15 MIN: CPT | Mod: S$PBB,,, | Performed by: NURSE PRACTITIONER

## 2023-12-04 PROCEDURE — 99999 PR PBB SHADOW E&M-EST. PATIENT-LVL II: ICD-10-PCS | Mod: PBBFAC,,, | Performed by: NURSE PRACTITIONER

## 2023-12-04 PROCEDURE — 99215 PR OFFICE/OUTPT VISIT, EST, LEVL V, 40-54 MIN: ICD-10-PCS | Mod: S$PBB,,, | Performed by: NURSE PRACTITIONER

## 2023-12-04 PROCEDURE — 99417 PR PROLONGED SVC, OUTPT, W/WO DIRECT PT CONTACT,  EA ADDTL 15 MIN: ICD-10-PCS | Mod: S$PBB,,, | Performed by: NURSE PRACTITIONER

## 2023-12-04 PROCEDURE — 99212 OFFICE O/P EST SF 10 MIN: CPT | Mod: PBBFAC | Performed by: NURSE PRACTITIONER

## 2023-12-04 PROCEDURE — 99999 PR PBB SHADOW E&M-EST. PATIENT-LVL II: CPT | Mod: PBBFAC,,, | Performed by: NURSE PRACTITIONER

## 2023-12-04 RX ORDER — HYDROCODONE BITARTRATE AND ACETAMINOPHEN 7.5; 325 MG/1; MG/1
1 TABLET ORAL EVERY 4 HOURS PRN
Qty: 180 TABLET | Refills: 0 | Status: SHIPPED | OUTPATIENT
Start: 2023-12-04 | End: 2024-01-04

## 2023-12-04 RX ORDER — CODEINE PHOSPHATE AND GUAIFENESIN 10; 100 MG/5ML; MG/5ML
5 SOLUTION ORAL 3 TIMES DAILY PRN
Qty: 150 ML | Refills: 0 | Status: SHIPPED | OUTPATIENT
Start: 2023-12-04 | End: 2023-12-15

## 2023-12-04 NOTE — PROGRESS NOTES
Consult Note  Palliative Care      Consult Requested By: No ref. provider found  Reason for Consult: symptom management       ASSESSMENT/PLAN:     Plan/Recommendations:    12/04/2023:  - start TUSSI-ORGANIDIN NR syrup  - refill norco 7.5 mg q 4 hrs prn    Malignant neoplasm of upper lobe, right bronchus or lung  - following with Dr. Dhaliwal  - completed chemo/RT on 12/31/2019  - interval treatment at [facility]  - s/p immunotherapy   - now on keytruda/cyramza    Encounter for palliative medicine  - Patient is decisional  - Patient accompanied today by self  - ACP documents are not uploaded into EMR, patient and partner Lata will review documents at home, will revisit discussion at future visit.    - Philosophy of Palliative Medicine reviewed with patient and family at first visit  - New patient folder given to and reviewed with patient and family at first visit  - Goals of care: States that her goals are to maintain function and independence as much as possible. She states that she does not want to be bed-ridden, does not want to lose her ability to take care of herself and does not want to be a burden to her loved ones. Retired nurse. Please see SW note for more detail.    Cancer-related pain/bone pain   - increased pain in chest, lower lobe   - patient reports pain in her legs and legs, the leg pain is most severe, describes as intermittent bony pain, sometimes waking her up at night  - patient reports evaluation for bone pain has not revealed a source  - no pain relief while on steroids, tylenol ineffective  - currently on Norco 7.5 mg q 4 hours prn  - previously: pt's partner states that patient is under-reporting her pain and fearful of taking higher doses, she does admit she often splits tablets in half.  - can consider rotation to oxycodone if medication needs change  - narcan provided  - opioid safety sheet provided in first visit folder  - will continue to monitor     Dyspnea/Fatigue/insomnia  - dyspnea -  "worse s/p pneumonitis   - pt endorses moderate dyspnea, insomnia and moderate fatigue   - fatigue likely multifactorial: pain, dyspnea, diminished dietary intake   - patient does report that she tries to walk daily, even if only for 5-10 minutes, if the weather is poor, she will walk insider her house  - she is able to perform all ADL's independently   - sometimes wakes up due to leg pain   - patient reports ativan 0.25 mg qhs help with sleep  - will address symptom cluster w goal to improve fatigue  - tip sheet provided in new patient folder  - will continue to monitor      Adjustment disorder with anxiety and depression  - 09/29, higher anxiety today due to recent scan results indicating possible progression  - her partner (Lata) observes patient to be intermittently depressed, "sometimes she tries to hurt my feelings"  - patient was a  nurse, 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  - patient reports that the robust social support provided by friends has faded since her initial diagnosis, partner Lata is her primary support, additionally a cousin,niece, nephew who lives out of state  - patient and partner have a well-developed emotional dialogue, for example: they have named their various emotional states as alter personalities, they find humor/levity where they can  - patient states that she worries about being a burden on her partner   - continue ativan 0.5 mg q 12 hrs qhs, patient reports only using half tablets to help with sleep  - has started using the ativan for episodes of heightened anxiety, finding this less helpful. Discussed taking the medication before anxiety becomes severe req SL format.   - will continue to monitor   - discussed availability of palliative DSW for counseling, individual or couple's, will discuss with partner Lata   - emotional support provided  - CAM Driscoll joined for first visit assessment, see separate note for detail  - will continue to monitor  "     Nausea/anorexia   - anorexia improved  - denies nausea  - continue zofran 8 mg PRN   - will continue to monitor     Constipation   - controlled   - reports constipation 2/2 chemo, opioids, colitis   - promote good PO hydration  - cont senna 2 tabs qhs, 2 colace, escalation of regimen as needed  - tip sheet provided in new patient folder  - will continue to monitor     Understanding of illness: excellent     Follow up: 6 weeks     Patient's encounter and above plan of care discussed with patient's oncology team.     SUBJECTIVE:     History of Present Illness:  Patient is a 73 y.o. year old female presenting with  Malignant neoplasm of upper lobe, right bronchus or lung Ms. Alesha Toney is a 72-year-old otherwise healthy female who started having some shoulder pain, which was lasting for over six weeks.  She went and saw her primary care physician and underwent an x-ray, which revealed right upper lobe lung mass worrisome for malignancy and a CT scan was recommended, which was done on 6/12/18 and that revealed a newly developing mass, pleural based, lateral posterior segment, right upper lobe 3.5 x 3.5 cm, surrounding stellate margin and patchy infiltrates, particularly superiorly, anteriorly infiltrates extend perihilar into the anterior segment. She then underwent CT-guided biopsy, which revealed well-differentiated adenocarcinoma.     12/04/2023:  LA  reviewed: As expected     Presents to clinic visit with partner Lata.     - has started keytruda regimen, s/p 1st treatment, tolerating well so far   - shortness of breath, worsening, however not ready to use morphine   - has been using lorazepam with good relief   - pain increasing, using ~ 5 tabs a day, providing good relief   - recent episode of pna vs bronchitis   - worsening cough at night   - can lay on right side only   - recent trip to [place] (and won!)      10/25/2023:  LA  reviewed: As expected     Presents to clinic visit alone. Health system  "MSW joined visit. Unfortunately, 09/25/2023 PET scan results confirmed progression of disease. Ms. Toney is now deciding if she wants to pursue recommended systemic therapy. Two regimens have been offered, she declines the more aggressive of the two due to concern for side-effect burden. She says she will likely trial the alternative regimen. She brings a note to clinic written by Lata with several medication questions which we address today. She is using ativan more regularly for increased anxiety, this is helpful. Pain is controlled on lower doses of norco. RTC in 6 weeks.     09/27/2023:  QUINTEN NAIK reviewed and summarized:  As expected     Patient presents to clinic visit alone today. Her recent scans are concerning for disease progression, however she has a phone meeting scheduled tomorrow with [facility] oncologist to discuss final interpretation. She is, understandably, anxious and distracted by the "what ifs" and uncertainty about what may be ahead. She is hopeful that the scan findings are attributed to scar tissue but is mentally preparing for the possibility of resuming treatment. She saw something recently that describes having cancer as "like having a loaded gun pointing at the back of your head", which resonated deeply w her. Her partner Lata continues to be supportive, hopeful and does her best to reorient Alesha to the present. Physically, she has noticed a slight increase in her pain and her appetite is still poor although her weight has stabilized. Encouraged to discuss opportunity to see [facility] DSW for couples' counseling with Lata.       06/28/2023:  QUINTEN NAIK reviewed and summarized:  As expected     - doing well, overall  - is losing weight with good PO intake and steroid use    - s/p pneumonitis, dyspnea increased  - however, continuing good activity tolerance, taking daily walks indoors to avoid extreme heat/humidity    03/28/2023:  QUINTEN NAIK reviewed and summarized:  As expected "     Patient presents to clinic alone today. She is recently back from [facility] and is happy to report her most recent scan results show improvement. Her overall symptom burden is stable. She is still aggravated by leg pain she believes was caused by Entyvio infusions. The pain interrupts her sleep as done intermittent GERD which she admits is sometimes worsened by her dietary choices. She has been increasing her exercise lately and her mood is boosted due to scan results referenced above. Her onco at [facility] did request her to return soon for repeat scan but she deferred to have scan here, as the financial burden of trips to [place] is mounting.        02/01/2023:  LA  reviewed and summarized:  01/26/2023 Lorazepam 0.5 mg tabs Disp: 60 for 30 days  01/10/2023 Hydrocodone-APAP 7.5-325 mg Disp: 90 for 30 days    Patient presents to clinic with her partner Lata for her initial visit. She is ECOG 0 with healthy appearance. She reports bony leg pain that is partially controlled with current regimen. Optimized today regimen today, will reassess at next visit. Discussed ACP, patient reports that she has been reluctant to discuss with her partner but endorses the value of establishing wants and wishes with family members, agrees to review documents at home to complete at next visit. Current regimen for anxiety and nausea are effective. Insomnia & fatigue at least partially attributable to uncontrolled pain. Optimized regimen for constipation today.     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     Squamous cell carcinoma of skin 10/05/2020    left medial thigh    Therapy     Thyroid nodule     Ulcerative colitis with  complication      Past Surgical History:   Procedure Laterality Date    ADENOIDECTOMY  19yo    ANTERIOR CERVICAL DISCECTOMY W/ FUSION N/A 10/15/2018    Procedure: DISCECTOMY, SPINE, CERVICAL, ANTERIOR APPROACH, WITH FUSION C6/7  Depuy SNS: Motors/SSEP/Vocal Cord Regular OR table;  Surgeon: Greyson Bansal MD;  [facility];  Service: Neurosurgery;  Laterality: N/A;    APPENDECTOMY      BONE RESECTION, RIB  1980    BREAST BIOPSY Left     at least 40yrs ago in her 20's    BREAST CYST ASPIRATION      BREAST CYST EXCISION      COLONOSCOPY      ENDOBRONCHIAL ULTRASOUND N/A 7/10/2018    Procedure: ULTRASOUND, ENDOBRONCHIAL;  Surgeon: Sri Hearn MD;  Location: [facility] OR [facility] FLR;  Service: Pulmonary;  Laterality: N/A;    ENDOBRONCHIAL ULTRASOUND N/A 9/24/2019    Procedure: ENDOBRONCHIAL ULTRASOUND (EBUS);  Surgeon: Sri Hearn MD;  [facility];  Service: Pulmonary;  Laterality: N/A;    ENDOSCOPIC MUCOSAL RESECTION OF COLON N/A 9/11/2020    Procedure: RESECTION, MUCOSA, COLON, ENDOSCOPIC;  Surgeon: Lilibeth Carbajal MD;  [facility]  Service: Endoscopy;  Laterality: N/A;    ENDOSCOPIC ULTRASOUND OF LOWER GASTROINTESTINAL TRACT N/A 4/19/2021    Procedure: ULTRASOUND, LOWER GI TRACT, ENDOSCOPIC;  Surgeon: Lilibeth Carbajal MD;  Location: [facility];  Service: Endoscopy;  Laterality: N/A;    ENDOSCOPIC ULTRASOUND OF LOWER GASTROINTESTINAL TRACT N/A 6/25/2021    Procedure: ULTRASOUND, LOWER GI TRACT, ENDOSCOPIC;  Surgeon: Silvino Christopher MD;  Location: [facility];  Service: Endoscopy;  Laterality: N/A;  Needs Rapid MP    FLEXIBLE SIGMOIDOSCOPY  06/11/2020    with biopsies    FLEXIBLE SIGMOIDOSCOPY N/A 6/11/2020    Procedure: SIGMOIDOSCOPY, FLEXIBLE;  Surgeon: Gely Yeh MD;  Location: [facility];  Service: Endoscopy;  Laterality: N/A;    FLEXIBLE SIGMOIDOSCOPY N/A 4/19/2021    Procedure: SIGMOIDOSCOPY-FLEXIBLE;  Surgeon: Lilibeth Carbajal MD;  Location: [facility];   Service: Endoscopy;  Laterality: N/A;  Covid 4/16 Brian MALCOLM    FLEXIBLE SIGMOIDOSCOPY N/A 6/3/2022    Procedure: SIGMOIDOSCOPY-FLEXIBLE;  Surgeon: Francesco Clark MD;  Location: [facility] ENDO (4TH FLR);  Service: Endoscopy;  Laterality: N/A;  vacc-inst portal-tb    HYSTERECTOMY      @47yrs of age    INSERTION OF TUNNELED CENTRAL VENOUS CATHETER (CVC) WITH SUBCUTANEOUS PORT N/A 9/25/2018    Procedure: [id number];  Surgeon: Dosc Diagnostic Provider;  Location: [facility] OR 2ND FLR;  Service: Radiology;  Laterality: N/A;    INSERTION OF VENOUS ACCESS PORT N/A 3/15/2021    Procedure: INSERTION, VENOUS ACCESS PORT;  Surgeon: Chang Mathews MD;  [facility];  Service: General;  Laterality: N/A;  NEEDS CONSENT.    KIDNEY SURGERY      19yo    MEDIPORT REMOVAL N/A 3/15/2021    Procedure: REMOVAL, CATHETER, CENTRAL VENOUS, TUNNELED, WITH PORT;  Surgeon: Chang Mathews MD;  [facility];  Service: General;  Laterality: N/A;    OOPHORECTOMY      @47yrs of age    SKIN BIOPSY      TONSILLECTOMY      UPPER GASTROINTESTINAL ENDOSCOPY      VIDEO-ASSISTED THORACOSCOPIC LOBECTOMY Right 8/9/2018    Procedure: VATS, WITH LOBECTOMY Possible chest wall resection;  Surgeon: Philip Messina MD;  [facility];  Service: Thoracic;  Laterality: Right;  Have open pan available.     Family History   Problem Relation Age of Onset    Stroke Mother     Cataracts Mother     Cancer Father         colon cancer    Colon cancer Father     Diabetes Brother     Heart failure Brother     Hypertension Brother     Heart attack Paternal Aunt     Heart attack Maternal Grandfather     Diabetes Paternal Aunt     Anxiety disorder Paternal Grandmother         agoraphobia    Anesthesia problems Neg Hx     Blindness Neg Hx     Amblyopia Neg Hx     Glaucoma Neg Hx     Macular degeneration Neg Hx     Retinal detachment Neg Hx     Strabismus Neg Hx     Thyroid disease Neg Hx     Melanoma Neg Hx   "    Review of patient's allergies indicates:   Allergen Reactions    Adhesive Itching, Rash and Blisters     INCLUDES EKG PADS    Ciprofloxacin Hives     Hives    Iodinated contrast media     Phenergan plain Other (See Comments)     "crazy behavior"    Phenergan [promethazine]     Tramadol     Vancomycin analogues      Red man syndrome       Medications:    Current Outpatient Medications:     ascorbic acid, vitamin C, (VITAMIN C) 500 MG tablet, Take 500 mg by mouth once daily., Disp: , Rfl:     atenoloL (TENORMIN) 25 MG tablet, Take 1 tablet (25 mg total) by mouth 3 (three) times daily., Disp: 270 tablet, Rfl: 3    benzonatate (TESSALON) 200 MG capsule, Take 1 capsule (200 mg total) by mouth 3 (three) times daily. for 10 days, Disp: 90 capsule, Rfl: 6    calcium carbonate (TUMS) 300 mg (750 mg) Chew, Take by mouth as needed. , Disp: , Rfl:     esomeprazole (NEXIUM) 20 MG capsule, Take 20 mg by mouth., Disp: , Rfl:     famotidine (PEPCID) 10 MG tablet, Take 10 mg by mouth once daily., Disp: , Rfl:     levalbuterol (XOPENEX HFA) 45 mcg/actuation inhaler, Inhale 1-2 puffs into the lungs every 6 (six) hours as needed for Wheezing. Rescue, Disp: 15 g, Rfl: 6    LORazepam (ATIVAN) 0.5 MG tablet, Take 1 tablet (0.5 mg total) by mouth 2 (two) times daily as needed for Anxiety., Disp: 60 tablet, Rfl: 0    losartan (COZAAR) 50 MG tablet, 1 tab po bid, Disp: 180 tablet, Rfl: 3    naloxone (NARCAN) 4 mg/actuation Spry, by Nasal route as needed., Disp: , Rfl:     ondansetron (ZOFRAN) 8 MG tablet, Take 8 mg by mouth as needed., Disp: , Rfl:     predniSONE (DELTASONE) 5 MG tablet, Take 1 tablet (5 mg total) by mouth once daily., Disp: 90 tablet, Rfl: 6    OBJECTIVE:       ROS:  Review of Systems   Constitutional:  Positive for activity change, appetite change, fatigue and unexpected weight change.   HENT: Negative.  Negative for congestion, dental problem and drooling.    Eyes: Negative.  Negative for pain, discharge and itching. "   Respiratory:  Positive for cough and shortness of breath. Negative for choking, wheezing and stridor.    Cardiovascular: Negative.  Negative for chest pain, palpitations and leg swelling.   Gastrointestinal:  Positive for constipation and nausea. Negative for diarrhea and vomiting.   Endocrine: Negative.  Negative for heat intolerance and polydipsia.   Genitourinary: Negative.  Negative for difficulty urinating, dyspareunia and dysuria.   Musculoskeletal:  Positive for arthralgias. Negative for back pain, gait problem and joint swelling.   Skin: Negative.  Negative for color change, pallor and rash.   Neurological: Negative.  Negative for seizures, speech difficulty and weakness.   Psychiatric/Behavioral:  Positive for dysphoric mood and sleep disturbance. Negative for confusion. The patient is nervous/anxious.        Review of Symptoms      Symptom Assessment (ESAS 0-10 Scale)  Pain:  0  Dyspnea:  0  Anxiety:  0  Nausea:  0  Depression:  0  Anorexia:  0  Fatigue:  0  Insomnia:  0  Restlessness:  0  Agitation:  0     CAM / Delirium:  Negative  Constipation:  Positive  Diarrhea:  Negative    Anxiety:  Is nervous/anxious  Constipation:  Constipation    Bowel Management Plan (BMP):  Yes      Pain Assessment:  OME in 24 hours:  40  Location(s): leg    Leg       Location: bilateral        Quality: Throbbing, aching and dull        Quantity: 3/10 in intensity        Chronicity: Onset 1 year(s) ago, unchanged        Aggravating Factors: Activity        Alleviating Factors: Opiates (heating pad)        Associated Symptoms: None    Modified Josefa Scale:  1    ECOG Performance Status stGstrstastdstest:st st1st Living Arrangements:  Lives with spouse    Psychosocial/Cultural:   See Palliative Psychosocial Note: Yes  **Primary  to Follow**  Palliative Care  Consult: No      Advance Care Planning   Advance Directives:   Living Will: No        Oral Declaration: No    LaPOST: No    Do Not Resuscitate Status: No     Medical Power of : Yes        Oral Declaration: Yes   Witnesses:  Mayela Sd and Johnna Driscoll   Agent's Name:  Lata Mathews   Agent's Contact Number:  [phone]    Decision Making:  Patient answered questions  Goals of Care: What is most important right now is to focus on remaining as independent as possible, symptom/pain control, quality of life, even if it means sacrificing a little time. Accordingly, we have decided that the best plan to meet the patient's goals includes continuing with palliative care.          Physical Exam:        Vitals:    12/04/23 1256   Temp: 98.1 °F (36.7 °C)          Physical Exam  Vitals reviewed.   Constitutional:       General: She is not in acute distress.     Appearance: Normal appearance. She is normal weight. She is not ill-appearing, toxic-appearing or diaphoretic.   HENT:      Head: Normocephalic and atraumatic.      Right Ear: Tympanic membrane normal.      Left Ear: Tympanic membrane normal.      Nose: Nose normal.   Eyes:      Extraocular Movements: Extraocular movements intact.      Conjunctiva/sclera: Conjunctivae normal.   Cardiovascular:      Rate and Rhythm: Regular rhythm.   Pulmonary:      Effort: Pulmonary effort is normal. No respiratory distress.      Breath sounds: No wheezing.   Abdominal:      General: Abdomen is flat. There is no distension.      Palpations: Abdomen is soft.   Musculoskeletal:         General: No swelling or deformity. Normal range of motion.      Cervical back: Normal range of motion.   Skin:     General: Skin is warm and dry.      Coloration: Skin is not jaundiced.      Findings: No bruising.   Neurological:      General: No focal deficit present.      Mental Status: She is alert and oriented to person, place, and time. Mental status is at baseline.      Motor: No weakness.      Gait: Gait normal.   Psychiatric:         Mood and Affect: Mood normal.         Behavior: Behavior normal.         Thought Content: Thought  content normal.         Judgment: Judgment normal.         Labs:  CBC:   WBC   Date Value Ref Range Status   11/03/2023 6.36 3.90 - 12.70 K/uL Final     Hemoglobin   Date Value Ref Range Status   11/03/2023 12.7 12.0 - 16.0 g/dL Final     POC Hematocrit   Date Value Ref Range Status   08/09/2018 33 (L) 36 - 54 %PCV Final     Hematocrit   Date Value Ref Range Status   11/03/2023 37.9 37.0 - 48.5 % Final     MCV   Date Value Ref Range Status   11/03/2023 93 82 - 98 fL Final     Platelets   Date Value Ref Range Status   11/03/2023 227 150 - 450 K/uL Final       LFT:   Lab Results   Component Value Date    AST 21 11/03/2023    ALKPHOS 54 (L) 11/03/2023    BILITOT 0.3 11/03/2023       Albumin:   Albumin   Date Value Ref Range Status   11/03/2023 3.4 (L) 3.5 - 5.2 g/dL Final     Protein:   Total Protein   Date Value Ref Range Status   11/03/2023 6.8 6.0 - 8.4 g/dL Final       Radiology:I have reviewed all pertinent imaging results/findings within the past 24 hours.    09/25/2023 NM PET: Impression:     Interval progression of size and hypermetabolic activity of multiple right hilar/pulmonary lesions with enlarging and newly hypermetabolic right lower lobe pulmonary nodule.     New non-hypermetabolic subcentimeter pulmonary nodules, as detailed above.  Recommend attention on follow-up.     09/19/2023 CT chest ([facility]): Large masslike consolidation in the right lower lobe,this can be secondary to a combination of recurrent disease associated with postobstructive changes.   A nodule in the right lower lobe has increased in size and is worrisome for metastasis    I spent a total of 95 minutes on the day of the visit.This includes face to face time in discussion of goals of care, symptom assessment, coordination of care and emotional support.  This also includes non-face to face time preparing to see the patient (eg, review of tests/imaging), obtaining and/or reviewing separately obtained history, documenting clinical  information in the electronic or other health record, independently interpreting results and communicating results to the patient/family/caregiver, or care coordinator.     Signature: Mayela Beaver DNP

## 2023-12-05 ENCOUNTER — PATIENT MESSAGE (OUTPATIENT)
Dept: ADMINISTRATIVE | Facility: OTHER | Age: 73
End: 2023-12-05
Payer: MEDICARE

## 2023-12-05 NOTE — PROGRESS NOTES
"  [facility]- Palliative Medicine [facility]  Palliative Care   Social Work Visit      Patient Name: Alesha Tonye  MRN: [id number]  Palliative Care Provider: Mayela Beaver DNP   Primary Care Physician: Margo Caldwell MD  Principal Problem: Malignant neoplasm of upper lung    SW accompanied DNP during follow up visit with patient and significant other/Lata.  Patient presents AAOx4 with stable mood.  Patient admits to experiencing a "panic attack" prior to presenting to this appointment.  Patient explains it is because "I was coming here". Patient reports her dose of Lorazepam was effective in lessening her symptoms.      Patient reports she advocated to Oncologist regarding her reluctance to receive prescribed treatment which lead to a change in the Oncologist's plan.  Patient notes she has experienced no unbearable side effects thus far.  Patient notes she will continue to receive treatment until she feels she cannot handle the side effects.  Significant other reports if she were in patient's position she "would have stopped treatment a long time ago". SW facilitated discussion of the couple's differences in their goals and personal boundaries as it relates to their individual pain thresholds and goals of care. Significant other notes she will continue to follow patient's lead, as patient's ability to advocate for her needs has contributed to her success throughout her five-year bout with cancer.     Patient denies psychosocial concerns.  SW remains available to provide emotional support and psychosocial assistance.  SW will continue to follow.    Johnna Driscoll LCSW  Outpatient   Palliative Medicine        "

## 2023-12-07 ENCOUNTER — PATIENT MESSAGE (OUTPATIENT)
Dept: ADMINISTRATIVE | Facility: OTHER | Age: 73
End: 2023-12-07
Payer: MEDICARE

## 2023-12-11 ENCOUNTER — INFUSION (OUTPATIENT)
Dept: INFUSION THERAPY | Facility: HOSPITAL | Age: 73
End: 2023-12-11
Payer: MEDICARE

## 2023-12-11 ENCOUNTER — PATIENT MESSAGE (OUTPATIENT)
Dept: HEMATOLOGY/ONCOLOGY | Facility: CLINIC | Age: 73
End: 2023-12-11

## 2023-12-11 ENCOUNTER — DOCUMENTATION ONLY (OUTPATIENT)
Dept: HEMATOLOGY/ONCOLOGY | Facility: CLINIC | Age: 73
End: 2023-12-11
Payer: MEDICARE

## 2023-12-11 ENCOUNTER — OFFICE VISIT (OUTPATIENT)
Dept: HEMATOLOGY/ONCOLOGY | Facility: CLINIC | Age: 73
End: 2023-12-11
Payer: MEDICARE

## 2023-12-11 VITALS
RESPIRATION RATE: 18 BRPM | WEIGHT: 144.19 LBS | TEMPERATURE: 98 F | SYSTOLIC BLOOD PRESSURE: 160 MMHG | HEART RATE: 67 BPM | WEIGHT: 144.19 LBS | OXYGEN SATURATION: 97 % | RESPIRATION RATE: 14 BRPM | HEIGHT: 66 IN | SYSTOLIC BLOOD PRESSURE: 165 MMHG | BODY MASS INDEX: 23.17 KG/M2 | DIASTOLIC BLOOD PRESSURE: 90 MMHG | HEIGHT: 66 IN | BODY MASS INDEX: 23.17 KG/M2 | TEMPERATURE: 98 F | DIASTOLIC BLOOD PRESSURE: 71 MMHG | HEART RATE: 67 BPM | OXYGEN SATURATION: 97 %

## 2023-12-11 DIAGNOSIS — C34.11 MALIGNANT NEOPLASM OF UPPER LOBE, RIGHT BRONCHUS OR LUNG: Primary | ICD-10-CM

## 2023-12-11 DIAGNOSIS — C77.1 SECONDARY AND UNSPECIFIED MALIGNANT NEOPLASM OF INTRATHORACIC LYMPH NODES: ICD-10-CM

## 2023-12-11 DIAGNOSIS — J43.8 OTHER EMPHYSEMA: ICD-10-CM

## 2023-12-11 DIAGNOSIS — I10 ESSENTIAL HYPERTENSION: ICD-10-CM

## 2023-12-11 PROCEDURE — 99215 OFFICE O/P EST HI 40 MIN: CPT | Mod: S$PBB,,, | Performed by: INTERNAL MEDICINE

## 2023-12-11 PROCEDURE — 99214 OFFICE O/P EST MOD 30 MIN: CPT | Mod: PBBFAC,25 | Performed by: INTERNAL MEDICINE

## 2023-12-11 PROCEDURE — 25000003 PHARM REV CODE 250: Performed by: INTERNAL MEDICINE

## 2023-12-11 PROCEDURE — 63600175 PHARM REV CODE 636 W HCPCS: Performed by: INTERNAL MEDICINE

## 2023-12-11 PROCEDURE — 99999 PR PBB SHADOW E&M-EST. PATIENT-LVL IV: ICD-10-PCS | Mod: PBBFAC,,, | Performed by: INTERNAL MEDICINE

## 2023-12-11 PROCEDURE — 99215 PR OFFICE/OUTPT VISIT, EST, LEVL V, 40-54 MIN: ICD-10-PCS | Mod: S$PBB,,, | Performed by: INTERNAL MEDICINE

## 2023-12-11 PROCEDURE — 96413 CHEMO IV INFUSION 1 HR: CPT

## 2023-12-11 PROCEDURE — 99999 PR PBB SHADOW E&M-EST. PATIENT-LVL IV: CPT | Mod: PBBFAC,,, | Performed by: INTERNAL MEDICINE

## 2023-12-11 PROCEDURE — A4216 STERILE WATER/SALINE, 10 ML: HCPCS | Performed by: INTERNAL MEDICINE

## 2023-12-11 RX ORDER — EPINEPHRINE 0.3 MG/.3ML
0.3 INJECTION SUBCUTANEOUS ONCE AS NEEDED
Status: DISCONTINUED | OUTPATIENT
Start: 2023-12-11 | End: 2023-12-11 | Stop reason: HOSPADM

## 2023-12-11 RX ORDER — EPINEPHRINE 0.3 MG/.3ML
0.3 INJECTION SUBCUTANEOUS ONCE AS NEEDED
Status: CANCELLED | OUTPATIENT
Start: 2023-12-11

## 2023-12-11 RX ORDER — HEPARIN 100 UNIT/ML
500 SYRINGE INTRAVENOUS
Status: DISCONTINUED | OUTPATIENT
Start: 2023-12-11 | End: 2023-12-11 | Stop reason: HOSPADM

## 2023-12-11 RX ORDER — DEXAMETHASONE SODIUM PHOSPHATE 4 MG/ML
12 INJECTION, SOLUTION INTRA-ARTICULAR; INTRALESIONAL; INTRAMUSCULAR; INTRAVENOUS; SOFT TISSUE
Status: CANCELLED
Start: 2023-12-11

## 2023-12-11 RX ORDER — LEVALBUTEROL INHALATION SOLUTION 1.25 MG/3ML
1 SOLUTION RESPIRATORY (INHALATION) EVERY 4 HOURS PRN
Qty: 90 ML | Refills: 5 | Status: SHIPPED | OUTPATIENT
Start: 2023-12-11 | End: 2024-12-10

## 2023-12-11 RX ORDER — FAMOTIDINE 20 MG/1
20 TABLET, FILM COATED ORAL
Status: CANCELLED
Start: 2023-12-11

## 2023-12-11 RX ORDER — DIPHENHYDRAMINE HYDROCHLORIDE 50 MG/ML
50 INJECTION INTRAMUSCULAR; INTRAVENOUS ONCE AS NEEDED
Status: DISCONTINUED | OUTPATIENT
Start: 2023-12-11 | End: 2023-12-11 | Stop reason: HOSPADM

## 2023-12-11 RX ORDER — SODIUM CHLORIDE 0.9 % (FLUSH) 0.9 %
10 SYRINGE (ML) INJECTION
Status: CANCELLED | OUTPATIENT
Start: 2023-12-11

## 2023-12-11 RX ORDER — SODIUM CHLORIDE 0.9 % (FLUSH) 0.9 %
10 SYRINGE (ML) INJECTION
Status: DISCONTINUED | OUTPATIENT
Start: 2023-12-11 | End: 2023-12-11 | Stop reason: HOSPADM

## 2023-12-11 RX ORDER — LORAZEPAM 0.5 MG/1
0.5 TABLET ORAL
Status: CANCELLED
Start: 2023-12-11

## 2023-12-11 RX ORDER — HEPARIN 100 UNIT/ML
500 SYRINGE INTRAVENOUS
Status: CANCELLED | OUTPATIENT
Start: 2023-12-11

## 2023-12-11 RX ORDER — DIPHENHYDRAMINE HYDROCHLORIDE 50 MG/ML
50 INJECTION INTRAMUSCULAR; INTRAVENOUS ONCE AS NEEDED
Status: CANCELLED | OUTPATIENT
Start: 2023-12-11

## 2023-12-11 RX ADMIN — HEPARIN 500 UNITS: 100 SYRINGE at 03:12

## 2023-12-11 RX ADMIN — SODIUM CHLORIDE 200 MG: 9 INJECTION, SOLUTION INTRAVENOUS at 03:12

## 2023-12-11 RX ADMIN — Medication 10 ML: at 03:12

## 2023-12-11 RX ADMIN — SODIUM CHLORIDE: 9 INJECTION, SOLUTION INTRAVENOUS at 02:12

## 2023-12-11 NOTE — PROGRESS NOTES
"Subjective     Patient ID: Alesha Toney is a 73 y.o. female.    Chief Complaint: Malignant neoplasm of upper lobe, right bronchus or lung  Ms. Alesha Toney is a 73-year-old otherwise healthy female who started having some shoulder pain, which was lasting for over six weeks.  She went and saw her primary care physician and underwent an x-ray, which revealed right upper lobe lung mass worrisome for malignancy and a CT scan was recommended, which was done on 6/12/18 and that revealed a newly developing mass, pleural based, lateral posterior segment, right upper lobe 3.5 x 3.5 cm, surrounding stellate margin and patchy infiltrates, particularly superiorly,   anteriorly infiltrates extend perihilar into the anterior segment.  She then underwent CT-guided biopsy, which revealed well-differentiated adenocarcinoma.       Her PET scan from 6/29/18 There is physiologic intracranial, head, and neck activity.  There is a large right upper lobe mass SUV max 9.11.  No local or distant metastatic disease seen.  There is physiologic liver, spleen, GI and  activity.  There are a few liver cysts.  No adenopathy is seen.  The adrenal glands are not enlarged.  No adenopathy is seen.  No suspicious bone lesions are seen.     She underwent VATS with right upper lobectomy. Mediastinal lymphadenectomy on 8/9/18. Pathology revealed "Tumor site: Upper lobe.Tumor size: 7 x 4 x 2.7 cm.Tumor focality: Single tumor.  Histologic type: Mixed invasive mucinous and nonmucinous adenocarcinoma. Histologic grade: G2: Moderately differentiated.Spread through air spaces (STATS): Present. Visceral pleura invasion: Not identified.  Lymphovascular invasion: Not identified. Direct invasion of adjacent structures: No adjacent structures present. Margins: All margins are uninvolved by carcinoma.Distance of invasive carcinoma from closest margin: 1 cm from the bronchial margin.Treatment effect: No known presurgical therapy. Regional lymph nodes: Number " "of lymph nodes involved: 0. Number of lymph nodes examined: 11. Pathologic Stage Classification: pT3 N0"        She completed 4 cycles of adjuvant chemo with Cisplatin and Alimta as of 1/10/19   She is on surveillance.     CT chest of 9/17/19 which reveals "Operative change of right upper lobectomy for small-cell lung cancer with several scattered subsolid opacities and a new solid nodule in the right lower lobe measuring up to 0.5 cm.  We recommend continued surveillance with the role and schedule for such surveillance to be determined by clinical considerations. Sided chest port distal tip terminating at the confluence of the brachiocephalic vein in the SVC.  Correlate with device functioning. Apically predominant emphysematous changes, left greater than right. Aortic annular calcification and multi-vessel coronary artery atherosclerosis. Numerous unchanged hepatic hypodensities, likely cysts"     PET scan from 10/1/19 reveals "1.6 cm soft tissue lesion re-identified posterior to the bronchus intermedius.  No corresponding focal increased radiotracer uptake to suggest local recurrence or metastatic disease. Stable subcentimeter opacities throughout the bilateral lower lobes, too small to characterize with PET-CT. Focal increased radiotracer uptake and subtle wall thickening in the rectum.  Findings are concerning for primary neoplastic process.  Recommend further evaluation with direct visualization"     She returned from [facility] and was advised to proceed with chemo/RT with either Docetaxel/platinum or Carboplatin/Alimta.     She completed chemo/RT with Carboplatin and Alimta as of 12/31/19     She underwent CT chest on 5/27/2020 which revealed 1. In this patient with history of lung cancer status post right upper lobectomy, there is a soft tissue opacity at the posterior margin of the staple line.  This lesion has been enlarging progressively and now measures 1.3 x 1.6 cm.  There is also a new 1.7 x 1.7 cm " "opacity at the right paravertebral pleural margin which is inseparable from this lesion.  These findings may represent post radiation change in light of the hypermetabolic lesion in this region on PET-CT of 10/01/2019 (image 69/[id number]).There is a 1 cm opacity in the superior or lateral basal segment of the right lower lobe (axial series 4, image 243) which has enlarged since 09/17/2019.  Nature of this lesion is unclear.  Clinical considerations will determine if percutaneous biopsy or metabolic assessment is warranted. 1.0 cm solid opacity with branching configuration (series 4, image 205) is located in the lateral basal segment of the left lower lobe, stable since 02/16/2019 (02/06/2019 axial series 4, image 310). Appearance and bifurcating character suggests mucoid impaction in mildly ectatic peripheral airway, likely not significant. Persistent clustered small centrilobular nodules in the apical segment of the right upper lobe likely reflecting small airway inflammation/infection. Partially visualized left chest port with distal catheter tip at the junction of the brachiocephalic veins and SVC, similar as on 09/17/2019"  She thus underwent PET scan on 6/1/2020 which revealed "No evidence of residual viable lung malignancy.  Evolving post radiation changes in the right paramediastinal lung, with a new irregular subpleural density which may also be related to recent radiation.  Attention on follow-up. Interval decrease in size of a soft tissue lesion along the inferior margin of the lobectomy stable line.  Several stable subcentimeter soft tissue opacities in the lower lobes bilaterally, As above.  Attention on follow-up.  Persistent hypermetabolic rectal lesion concerning for malignancy.  Recommend direct visualization and tissue sampling as clinically indicated"  I discussed her case in thoracic conference today and findings are felt to be related to RT     Her restaging CT scans from 1/20/2021 which reveal " ""Persistent soft tissue opacity in the posterior margin of the staple line that is increased in prominence when compared to the prior examination and is worrisome for disease progression. No new lesions identified.  Stable pulmonary nodules. Stable hypodense lesions throughout the liver that likely represent hepatic cysts"        Restaging PET scan from 1/27/2021 reveals "Increased size and hypermetabolic activity involving soft tissue opacity along the inferior border of the right upper lobectomy stable line concerning for progression of disease. Mildly increased hypermetabolic activity involving the rectum compatible with known high-grade dysplasia/intramucosal carcinoma. Interval decrease in size and resolution of hypermetabolic activity involving subcentimeter subpleural opacity within the right lower lobe"     MRI brain from  2/3/2021 reveals no evidence of recurrence.  GUARDANT testing only reveals p53, PDL1 is 0 and no other targets        PET scan from 3/24/2021 reveals "In this patient with known lung cancer, there is a persistent hypermetabolic right hilar mass consistent with viable tumor.  Interval increase in size is equivocal for progression of fibrosis versus tumor growth. 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  Activity appears more focal and possibly more proximal than before.  Consider direct visualization for further evaluation. The previously described subcentimeter subpleural opacity within the right lower lobe is not definitely seen on today's exam"               She last received immunotherapy on 5/28/2021. She underwent EUS on 6/25/2021 which revealed "Erythematous, congested, and ulcerated mucosa  (proctitis) in rectosigmoid colon.  Pathology reveals Colon, rectosigmoid, biopsy:  Moderate active colitis     She completed steroids about 5 weeks ago.      CT scans from 11/5/2021 reveals "In this patient with lung " "cancer, there is postoperative changes of right upper lobe resection.  Persistent soft tissue opacity along the right hilum suture line which is slightly increased in size from prior exam.  More conspicuous appearing nodules within the right lower lobe seen on prior exam.  New nodule within the right middle lobe measuring 1.3 cm.  Overall findings are all worrisome for disease progression. Scattered areas of centrilobular nodularity within the bilateral lungs.  Nonspecific and can be seen in the setting of infectious or inflammatory etiologies. Unchanged hepatics cysts. Stable left adrenal gland nodule."  PET scan from 11/23/2021 revealed "New hypermetabolic nodule within the right lower lobe additional slightly subcentimeter nodules within the right lower lobe.  Findings concerning for disease progression, other differentials include infection or noninfectious inflammatory process. Interval decrease in size and uptake in the right hilar mass. Resolution of previous focal rectal uptake"        She is on Opdivo. Her Opdivo was held on 1/10/2022 due to rectal bleeding. She saw Dr. Clark and was noted to have anal fissure which contributed to rectal bleed. Her rectal bleed has since resolved.           She was on Opdivo until 5/24/2022, she noted rectal bleeding and underwent   Flex sigmoidoscopy on 6/3/2022 reveals "Localized severe inflammation was found in the distal rectum. Biopsied. Pathology reveals Severely active chronic colitis with ulceration (see comment)  Moderate architectural disorder. Negative for CMV by immunohistochemistry . Negative for granulomas or viral inclusions. Negative for dysplasia or carcinoma"  PET scan from 6/17/2022 reveals "In this patient with lung cancer there are enlarging hypermetabolic right perihilar opacities concerning for continued local disease progression. Continued decreased metabolic activity of the right lower lobe nodule.  Cluster of pulmonary nodules bilaterally, some " "which are new from prior PET-CT for example the superior segment the left upper lobe and are likely infectious/inflammatory origin. Persistent and more extensive region of increased FDG avidity within the distal rectum in keeping with more extensive colitis.  Malignancy could have a similar appearance.  Recommend further investigation as clinically warranted and attention on follow-up"     She has active proctitis and is on hydrocortisone suppositories and Entyvyo since early June 2022.   CT chest on 8/1/2022 reveals  "Similar appearance of elongated, irregular opacity adjacent to the right upper lobectomy margin.  More posteriorly adjacent perihilar opacities with previous FDG avidity with detailed measurements as above. Similar areas of scattered micro-nodularity with tree-in-bud, likely infectious/inflammatory in nature.  New appearing area of grouped micro-nodules in the anterior left lower lobe, largest up to 5 mm, which could relate to aforementioned small airways disease, though technically indeterminate.  Continued close attention at follow-up. RECIST SUMMARY: Date of prior examination for comparison: CT chest dated 05/24/2022. Lesion 1: Right perihilar opacity.  2.4.  Series 2, image 45.  Previously 2 4.  (Note there is slight increased size conspicuity in the craniocaudal dimension at 1.8 cm, previously 1.5 cm). Lesion 2: Right perihilar opacity.  1.7 cm.  Series 2, image 59.  Previously 1.5 cm"     She underwent a repeat CT chest 8/31/2022 and that revealed "Status post right upper lobectomy.  Soft tissue thickening adjacent to the suture line appears stable.  However, there is interval enlargement of a right lower lobe, bilobed, paramediastinal mass-like consolidation.  When dominant components of the lesions are measured separately, dimensions are summarized below. RECIST SUMMARY: Date of prior examination for comparison: CT chest 08/01/2022. Lesion 1: Right perihilar opacity.  4.7 cm. Series 4 image " "77.  Prior measurement 2.4 cm. Lesion 2: Right perihilar (infrahilar) opacity.  2.7 cm. Series 4 image 237.  Prior measurement 1.7 cm"  Case reviewed in thoracic conference, and concern for disease progression exists so plan on immunotehrapy if able to taper steroids           PET scan from 10/6/2022 reveals "Interval increase in size and radiotracer uptake of a right pulmonary lesions in this patient with known non-small cell lung cancer.  There are few new subcentimeter pulmonary nodules in the right lower lobe with no significant uptake, could represent infectious versus inflammatory process.  However, additional new metastatic disease can not be excluded.  Attention on follow-up. Improved metabolic appearance of the lower rectum compared with the prior exam"  She is currently off of prednisone and Entyvio.  She was on  Carboplatin and Alimta, started on 10/14/2022        She has completed proton beam completed on 1/24/2023.      MD [facility], CT chest there from 3/20/2023 reveals "Decreased in FDG avid recurrent disease in the right hemithorax. Decreased bilateral lower lobe nodules. Other lung nodules are unchanged or decreased.  New left lower lobe solid nodule in an area of clustered micronodules may be inflammatory. Reassess at follow-up" Plan on restaging scans in 3 months      CT chest from 5/16/2023 reveals "New right lower lobe consolidations and a small right pleural effusion.  Concerning for pneumonia and a small parapneumonic effusion.  However, should also consider treatment related pneumonitis .Stable right lower lobe paramediastinal irregular opacity, likely status post treatment of previous disease in same site     She was started on Augmentin and prednisone (40 mg)on 5/16/2023. She notes that as she tapered prednisone to 15 mg she noticed wheezing and cough and right sided chest pain     Ct scans from 6/27/2023 revealed "Postoperative changes of right upper lobectomy.  Redemonstration of " "paramediastinal irregular opacity associated with architectural distortion and improving adjacent ground-glass opacity.  Decrease in size of the small amount loculated right-sided pleural fluid. Enlarged right lower lobe pulmonary nodule measuring 0.9 cm, previously 0.6 cm.  Concerning for metastatic disease. Hepatic and left renal cysts.   1.6 cm left thyroid nodule.  Recommend further evaluation with nonemergent ultrasound"     Case reviewed in Thoracic conference and plan is for CT chest in 3 months     She underwent CT chest at [facility] on 9/18/2023 which reveals "There are postsurgical changes after right upper lobectomy. Masslike consolidation in the right lower lobe, 7.1 x 4.8 cm. On 05/16/2023 there were dense and groundglass opacities at this level. There are adjacent groundglass opacities.Subsolid nodule in the right lower lobe on image 107, 1.8 cm, previously 0.8 cm. Clustered nodules in the left upper lung on images 47-53  are new. Progression of small right pleural effusion. Calcified granuloma in the left lower lobe"     PET scan on 9/25/2023 revealed "Interval progression of size and hypermetabolic activity of multiple right hilar/pulmonary lesions with enlarging and newly hypermetabolic right lower lobe pulmonary nodule. New non-hypermetabolic subcentimeter pulmonary nodules, as detailed above.  Recommend attention on follow-up"  She underwent bronch at [facility] and path is positive   She saw Dr. Jones at [facility] and plan is for Docetaxel and Cyramza     We reviewed case at [facility] on 10/25/2023 and plan is "Standard of care recommendations: Ramucirumab+Keytruda   Trial recommendations: Late phase: no trials.  Early phase: No open slots in -577.   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     She started Cyramza and Keytruda on 11/21/2023     HPIShe comes in for cycle 2 of  Cyramza and Keytruda  Her BP had been around  and DBP and 60-80 mmhg    She notes wet cough but clear sputum " production, cough is worse when she lies down/ Denies any hemoptysis. She feels she is more shortness of breath. She notes its with even minimal exertion. Her pulse ox is 97%  She has her right sided chest pain which she feels is worse    She denies any nausea, vomiting, diarrhea, constipation, abdominal pain, weight loss or loss of appetite, chest pain, shortness of breath, leg swelling, fatigue, pain, headache, dizziness, or mood changes. Her ECOG PS is one. She is here with her significant other, Lata       Review of Systems   Constitutional:  Negative for appetite change, fatigue and unexpected weight change.   HENT:  Negative for mouth sores.    Eyes:  Negative for visual disturbance.   Respiratory:  Positive for cough and shortness of breath.    Cardiovascular:  Negative for chest pain.   Gastrointestinal:  Negative for abdominal pain and diarrhea.   Genitourinary:  Negative for frequency.   Musculoskeletal:  Negative for back pain.   Integumentary:  Negative for rash.   Neurological:  Negative for headaches.   Hematological:  Negative for adenopathy.   Psychiatric/Behavioral:  The patient is not nervous/anxious.    All other systems reviewed and are negative.         Objective     Physical Exam  Pulmonary:      Breath sounds: Decreased air movement present. Examination of the right-upper field reveals decreased breath sounds and wheezing. Examination of the right-middle field reveals decreased breath sounds and wheezing. Examination of the right-lower field reveals decreased breath sounds and wheezing. Decreased breath sounds and wheezing present.           LABS:  WBC   Date Value Ref Range Status   12/11/2023 7.85 3.90 - 12.70 K/uL Final     Hemoglobin   Date Value Ref Range Status   12/11/2023 13.1 12.0 - 16.0 g/dL Final     POC Hematocrit   Date Value Ref Range Status   08/09/2018 33 (L) 36 - 54 %PCV Final     Hematocrit   Date Value Ref Range Status   12/11/2023 38.8 37.0 - 48.5 % Final     Platelets    Date Value Ref Range Status   12/11/2023 237 150 - 450 K/uL Final     Gran # (ANC)   Date Value Ref Range Status   12/11/2023 4.7 1.8 - 7.7 K/uL Final     Gran %   Date Value Ref Range Status   12/11/2023 59.5 38.0 - 73.0 % Final       Chemistry        Component Value Date/Time     12/11/2023 1258    K 4.6 12/11/2023 1258     12/11/2023 1258    CO2 27 12/11/2023 1258    BUN 17 12/11/2023 1258    CREATININE 1.1 12/11/2023 1258     (H) 12/11/2023 1258        Component Value Date/Time    CALCIUM 9.7 12/11/2023 1258    ALKPHOS 51 (L) 12/11/2023 1258    AST 21 12/11/2023 1258    ALT 14 12/11/2023 1258    BILITOT 0.3 12/11/2023 1258    ESTGFRAFRICA 58.4 (A) 06/22/2022 1046    EGFRNONAA 50.6 (A) 06/22/2022 1046             Assessment and Plan     1. 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 Essential hypertension    4. Other emphysema  Overview:  Mild.  Visible on CT of chest.  Trial of albuterol with activities on a prn/rescue or prevention.          Route Chart for Scheduling    Med Onc Chart Routing      Follow up with physician . As scheduled   Follow up with YAMILE    Infusion scheduling note    Injection scheduling note    Labs    Imaging    Pharmacy appointment    Other referrals            Treatment Plan Information   OP PEMBROLIZUMAB 200MG and Cyramza Q3W   Pam Dhaliwal MD   Upcoming Treatment Dates - OP PEMBROLIZUMAB 200MG and Cyramza Q3W    12/11/2023       Pre-Medications       LORazepam tablet 0.5 mg       famotidine tablet 20 mg       dexAMETHasone injection 12 mg       Chemotherapy       pembrolizumab (KEYTRUDA) 200 mg in sodium chloride 0.9% SolP 108 mL infusion  1/1/2024       Pre-Medications       LORazepam tablet 0.5 mg       famotidine tablet 20 mg       dexAMETHasone injection 12 mg       Chemotherapy        ramucirumab (CYRAMZA) 652 mg in sodium chloride 0.9% 250 mL chemo infusion       pembrolizumab (KEYTRUDA) 200 mg in sodium chloride 0.9% SolP 108 mL infusion  1/22/2024       Pre-Medications       LORazepam tablet 0.5 mg       famotidine tablet 20 mg       dexAMETHasone injection 12 mg       Chemotherapy       ramucirumab (CYRAMZA) 652 mg in sodium chloride 0.9% 250 mL chemo infusion       pembrolizumab (KEYTRUDA) 200 mg in sodium chloride 0.9% SolP 108 mL infusion  2/12/2024       Pre-Medications       LORazepam tablet 0.5 mg       famotidine tablet 20 mg       dexAMETHasone injection 12 mg       Chemotherapy       ramucirumab (CYRAMZA) 652 mg in sodium chloride 0.9% 250 mL chemo infusion       pembrolizumab (KEYTRUDA) 200 mg in sodium chloride 0.9% SolP 108 mL infusion    Therapy Plan Information  PORT FLUSH  Flushes  heparin, porcine (PF) 100 unit/mL injection flush 500 Units  500 Units, Intravenous, Every visit  sodium chloride 0.9% flush 10 mL  10 mL, Intravenous, Every visit  5. Medications  vedolizumab (ENTYVIO) 300 mg/250 mL NS IVPB  300 mg, Intravenous, On: 10/27/2023, 11/10/2023, 12/8/2023, 2/2/2024       Mrs. Toney  comes in today for Cyramza and Keytruda,  she is however noted to have uncontrolled blood pressure so Cyramza will be held and she will only proceed with Keytruda today   She is scheduled to see her primary care physician for evaluation of her  elevated blood pressure     We will plan on seeing her back in 3 weeks and if her blood pressure is controlled at the time she will proceed with Cyramza with Keytruda      Emphysema E scribed Xopenex solution and New prescription for nebulizer     Above plan discussed with the patient and her significant other and all questions were answered to her satisfaction

## 2023-12-11 NOTE — PLAN OF CARE
1550  Infusion completed, pt tolerated; pt instructed to remain well hydrated; discussed when to contact MD, when to report to ED; pt and partner verbalized understanding of all discussed and when to report next

## 2023-12-11 NOTE — PROGRESS NOTES
Received referral from the clinic that the patient was in need of a nebulizer machine. Referral sent to [facility].

## 2023-12-12 ENCOUNTER — OFFICE VISIT (OUTPATIENT)
Dept: INTERNAL MEDICINE | Facility: CLINIC | Age: 73
End: 2023-12-12
Payer: MEDICARE

## 2023-12-12 VITALS
BODY MASS INDEX: 22.92 KG/M2 | HEIGHT: 66 IN | HEART RATE: 73 BPM | SYSTOLIC BLOOD PRESSURE: 172 MMHG | DIASTOLIC BLOOD PRESSURE: 78 MMHG | WEIGHT: 142.63 LBS | OXYGEN SATURATION: 98 %

## 2023-12-12 DIAGNOSIS — N18.31 STAGE 3A CHRONIC KIDNEY DISEASE: ICD-10-CM

## 2023-12-12 DIAGNOSIS — R06.2 WHEEZING: ICD-10-CM

## 2023-12-12 DIAGNOSIS — G89.3 CHRONIC PAIN DUE TO MALIGNANT NEOPLASTIC DISEASE: ICD-10-CM

## 2023-12-12 DIAGNOSIS — I10 ESSENTIAL HYPERTENSION: ICD-10-CM

## 2023-12-12 DIAGNOSIS — C34.11 MALIGNANT NEOPLASM OF UPPER LOBE, RIGHT BRONCHUS OR LUNG: ICD-10-CM

## 2023-12-12 DIAGNOSIS — R73.9 HYPERGLYCEMIA: Primary | ICD-10-CM

## 2023-12-12 PROCEDURE — 99999 PR PBB SHADOW E&M-EST. PATIENT-LVL III: ICD-10-PCS | Mod: PBBFAC,,, | Performed by: INTERNAL MEDICINE

## 2023-12-12 PROCEDURE — 99213 OFFICE O/P EST LOW 20 MIN: CPT | Mod: PBBFAC | Performed by: INTERNAL MEDICINE

## 2023-12-12 PROCEDURE — 99999 PR PBB SHADOW E&M-EST. PATIENT-LVL III: CPT | Mod: PBBFAC,,, | Performed by: INTERNAL MEDICINE

## 2023-12-12 PROCEDURE — 99214 PR OFFICE/OUTPT VISIT, EST, LEVL IV, 30-39 MIN: ICD-10-PCS | Mod: S$PBB,,, | Performed by: INTERNAL MEDICINE

## 2023-12-12 PROCEDURE — 99214 OFFICE O/P EST MOD 30 MIN: CPT | Mod: S$PBB,,, | Performed by: INTERNAL MEDICINE

## 2023-12-12 RX ORDER — DEXTROSE 4 G
TABLET,CHEWABLE ORAL
Qty: 1 EACH | Refills: 0 | Status: SHIPPED | OUTPATIENT
Start: 2023-12-12 | End: 2023-12-12 | Stop reason: SDUPTHER

## 2023-12-12 RX ORDER — DEXTROSE 4 G
TABLET,CHEWABLE ORAL
Qty: 1 EACH | Refills: 0 | OUTPATIENT
Start: 2023-12-12 | End: 2023-12-14 | Stop reason: SDUPTHER

## 2023-12-12 RX ORDER — HYDROCHLOROTHIAZIDE 25 MG/1
25 TABLET ORAL DAILY
Qty: 90 TABLET | Refills: 1 | Status: SHIPPED | OUTPATIENT
Start: 2023-12-12 | End: 2024-01-07

## 2023-12-12 RX ORDER — LANCETS
EACH MISCELLANEOUS
Qty: 200 EACH | Refills: 0 | Status: SHIPPED | OUTPATIENT
Start: 2023-12-12

## 2023-12-12 NOTE — Clinical Note
Juan Pablo - how can I get access to the chemotherapy digital program she is on - BP is being monitored but I can't locate readings on Epic! Thanks NE

## 2023-12-12 NOTE — PROGRESS NOTES
Subjective     Patient ID: Alesha Toney is a 73 y.o. female.    Chief Complaint: Annual Exam    Hypertension  This is a chronic problem. The current episode started more than 1 year ago. The problem has been gradually worsening since onset. The problem is resistant. Associated symptoms include anxiety, chest pain, malaise/fatigue, PND and shortness of breath. Pertinent negatives include no blurred vision, headaches, neck pain, orthopnea, palpitations, peripheral edema or sweats. Agents associated with hypertension include steroids. Risk factors for coronary artery disease include dyslipidemia, post-menopausal state and stress. Past treatments include angiotensin blockers and beta blockers. The current treatment provides mild improvement. Compliance problems include medication side effects.      BP too high to get chemo yesterday.    Her chemo  ( Cyramza)  can also raise BP.    She is also taking prednisone, to prevent colitis.     BP has been running above 155/mid 70's-80.      No edema.     Malaise.  Pain r side of chest.  With persistent cough, wheezing.    Xopenex was prescribed, but she is awaiting neblizer.    Sputum is not purulent.      Proton therapy did not help shrink tumor.    Palliative care is prescribing hydrocodone 7.5 mg and morphine liquid, which she does not take.  She prefers ativan, which Mayela Beaver is prescribing.      Glucose has been elevated since starting daily prednisone, which she will likely continue for some time.  She is not monitoring sugars.        Review of Systems   Constitutional:  Positive for malaise/fatigue.   Eyes:  Negative for blurred vision.   Respiratory:  Positive for shortness of breath.    Cardiovascular:  Positive for chest pain and PND. Negative for palpitations and orthopnea.   Musculoskeletal:  Negative for neck pain.   Neurological:  Negative for headaches.          Objective     Physical Exam  Constitutional:       General: She is not in acute distress.      Appearance: She is well-developed.   HENT:      Head: Normocephalic and atraumatic.      Right Ear: External ear normal.      Left Ear: External ear normal.      Nose: Nose normal.   Eyes:      General: No scleral icterus.        Right eye: No discharge.         Left eye: No discharge.      Conjunctiva/sclera: Conjunctivae normal.      Pupils: Pupils are equal, round, and reactive to light.   Neck:      Thyroid: No thyromegaly.      Vascular: No JVD.   Cardiovascular:      Rate and Rhythm: Normal rate and regular rhythm.      Heart sounds: Normal heart sounds. No murmur heard.     No gallop.   Pulmonary:      Effort: Pulmonary effort is normal. No respiratory distress.      Breath sounds: Wheezing (mild exp) present. No rales.   Abdominal:      General: Bowel sounds are normal. There is no distension.      Palpations: Abdomen is soft. There is no mass.      Tenderness: There is no abdominal tenderness. There is no guarding or rebound.   Musculoskeletal:         General: No tenderness. Normal range of motion.      Cervical back: Normal range of motion and neck supple.   Lymphadenopathy:      Cervical: No cervical adenopathy.   Skin:     General: Skin is warm and dry.      Findings: No rash.   Neurological:      Mental Status: She is alert and oriented to person, place, and time.      Cranial Nerves: No cranial nerve deficit.      Coordination: Coordination normal.   Psychiatric:         Behavior: Behavior normal.         Thought Content: Thought content normal.         Judgment: Judgment normal.            Assessment and Plan     1. Hyperglycemia  -     Discontinue: blood-glucose meter Misc; To check BG 1 times daily, to use with insurance preferred meter  Dispense: 1 each; Refill: 0  -     lancets Misc; To check BG 1 times daily, to use with insurance preferred meter  Dispense: 200 each; Refill: 0  -     Discontinue: blood sugar diagnostic Strp; To check BG 1 times daily, to use with insurance preferred meter   Dispense: 200 each; Refill: 0  -     [facility] - OTHER  -     blood-glucose meter Misc; To check BG 1 times daily, to use with insurance preferred meter  Dispense: 1 each; Refill: 0  -     blood sugar diagnostic Strp; To check BG 1 times daily, to use with insurance preferred meter  Dispense: 200 each; Refill: 0    2. Chronic pain due to malignant neoplastic disease    3. Essential hypertension    4. Malignant neoplasm of upper lobe, right bronchus or lung  Overview:  Status post resection 8/2018 status post adjuvant chemo      5. Stage 3a chronic kidney disease    6. Wheezing    Other orders  -     hydroCHLOROthiazide (HYDRODIURIL) 25 MG tablet; Take 1 tablet (25 mg total) by mouth once daily.  Dispense: 90 tablet; Refill: 1           Add amlodipine- 5 mg.    If bp remains elevated, add HCTZ    Juan Pablo Milton for access of  - digital record of BP recordings..    Encouragement

## 2023-12-13 ENCOUNTER — PATIENT MESSAGE (OUTPATIENT)
Dept: ADMINISTRATIVE | Facility: OTHER | Age: 73
End: 2023-12-13
Payer: MEDICARE

## 2023-12-14 ENCOUNTER — PATIENT MESSAGE (OUTPATIENT)
Dept: INTERNAL MEDICINE | Facility: CLINIC | Age: 73
End: 2023-12-14
Payer: MEDICARE

## 2023-12-14 ENCOUNTER — PATIENT MESSAGE (OUTPATIENT)
Dept: ADMINISTRATIVE | Facility: OTHER | Age: 73
End: 2023-12-14
Payer: MEDICARE

## 2023-12-14 DIAGNOSIS — R73.9 HYPERGLYCEMIA: ICD-10-CM

## 2023-12-14 RX ORDER — DEXTROSE 4 G
TABLET,CHEWABLE ORAL
Qty: 1 EACH | Refills: 0 | Status: SHIPPED | OUTPATIENT
Start: 2023-12-14 | End: 2023-12-29 | Stop reason: SDUPTHER

## 2023-12-14 NOTE — TELEPHONE ENCOUNTER
No care due was identified.  [facility] Embedded Care Due Messages. Reference number: [id number].   12/14/2023 10:34:24 AM CST

## 2023-12-15 ENCOUNTER — PATIENT MESSAGE (OUTPATIENT)
Dept: ADMINISTRATIVE | Facility: OTHER | Age: 73
End: 2023-12-15
Payer: MEDICARE

## 2023-12-16 ENCOUNTER — PATIENT MESSAGE (OUTPATIENT)
Dept: ADMINISTRATIVE | Facility: OTHER | Age: 73
End: 2023-12-16
Payer: MEDICARE

## 2023-12-19 ENCOUNTER — PATIENT MESSAGE (OUTPATIENT)
Dept: ADMINISTRATIVE | Facility: OTHER | Age: 73
End: 2023-12-19
Payer: MEDICARE

## 2023-12-19 ENCOUNTER — PATIENT MESSAGE (OUTPATIENT)
Dept: INTERNAL MEDICINE | Facility: CLINIC | Age: 73
End: 2023-12-19
Payer: MEDICARE

## 2023-12-20 ENCOUNTER — PATIENT MESSAGE (OUTPATIENT)
Dept: INTERNAL MEDICINE | Facility: CLINIC | Age: 73
End: 2023-12-20
Payer: MEDICARE

## 2023-12-20 ENCOUNTER — PATIENT MESSAGE (OUTPATIENT)
Dept: ADMINISTRATIVE | Facility: OTHER | Age: 73
End: 2023-12-20
Payer: MEDICARE

## 2023-12-20 DIAGNOSIS — R73.9 HYPERGLYCEMIA: ICD-10-CM

## 2023-12-20 RX ORDER — DEXTROSE 4 G
TABLET,CHEWABLE ORAL
Qty: 1 EACH | Refills: 0 | Status: CANCELLED | OUTPATIENT
Start: 2023-12-20 | End: 2024-12-19

## 2023-12-22 ENCOUNTER — PATIENT MESSAGE (OUTPATIENT)
Dept: ADMINISTRATIVE | Facility: OTHER | Age: 73
End: 2023-12-22
Payer: MEDICARE

## 2023-12-24 ENCOUNTER — PATIENT MESSAGE (OUTPATIENT)
Dept: ADMINISTRATIVE | Facility: OTHER | Age: 73
End: 2023-12-24
Payer: MEDICARE

## 2023-12-25 ENCOUNTER — PATIENT MESSAGE (OUTPATIENT)
Dept: ADMINISTRATIVE | Facility: OTHER | Age: 73
End: 2023-12-25
Payer: MEDICARE

## 2023-12-25 ENCOUNTER — PATIENT MESSAGE (OUTPATIENT)
Dept: HEMATOLOGY/ONCOLOGY | Facility: CLINIC | Age: 73
End: 2023-12-25
Payer: MEDICARE

## 2023-12-26 ENCOUNTER — PATIENT MESSAGE (OUTPATIENT)
Dept: ADMINISTRATIVE | Facility: OTHER | Age: 73
End: 2023-12-26
Payer: MEDICARE

## 2023-12-27 ENCOUNTER — PATIENT MESSAGE (OUTPATIENT)
Dept: PALLIATIVE MEDICINE | Facility: CLINIC | Age: 73
End: 2023-12-27
Payer: MEDICARE

## 2023-12-27 ENCOUNTER — PATIENT MESSAGE (OUTPATIENT)
Dept: HEMATOLOGY/ONCOLOGY | Facility: CLINIC | Age: 73
End: 2023-12-27
Payer: MEDICARE

## 2023-12-27 RX ORDER — LORAZEPAM 0.5 MG/1
0.5 TABLET ORAL 2 TIMES DAILY PRN
Qty: 60 TABLET | Refills: 0 | Status: SHIPPED | OUTPATIENT
Start: 2023-12-27 | End: 2024-01-28

## 2023-12-28 ENCOUNTER — PATIENT MESSAGE (OUTPATIENT)
Dept: PALLIATIVE MEDICINE | Facility: CLINIC | Age: 73
End: 2023-12-28
Payer: MEDICARE

## 2023-12-28 ENCOUNTER — PATIENT MESSAGE (OUTPATIENT)
Dept: HEMATOLOGY/ONCOLOGY | Facility: CLINIC | Age: 73
End: 2023-12-28
Payer: MEDICARE

## 2023-12-28 ENCOUNTER — OFFICE VISIT (OUTPATIENT)
Dept: HEMATOLOGY/ONCOLOGY | Facility: CLINIC | Age: 73
End: 2023-12-28
Payer: MEDICARE

## 2023-12-28 ENCOUNTER — HOSPITAL ENCOUNTER (OUTPATIENT)
Dept: RADIOLOGY | Facility: HOSPITAL | Age: 73
Discharge: HOME | End: 2023-12-28
Attending: HOSPITALIST
Payer: MEDICARE

## 2023-12-28 VITALS
WEIGHT: 141.13 LBS | SYSTOLIC BLOOD PRESSURE: 165 MMHG | HEART RATE: 90 BPM | DIASTOLIC BLOOD PRESSURE: 79 MMHG | HEIGHT: 66 IN | RESPIRATION RATE: 18 BRPM | OXYGEN SATURATION: 97 % | BODY MASS INDEX: 22.68 KG/M2 | TEMPERATURE: 98 F

## 2023-12-28 DIAGNOSIS — R07.1 CHEST PAIN ON BREATHING: Primary | ICD-10-CM

## 2023-12-28 DIAGNOSIS — R05.9 COUGH, UNSPECIFIED TYPE: ICD-10-CM

## 2023-12-28 DIAGNOSIS — R05.9 COUGH, UNSPECIFIED TYPE: Primary | ICD-10-CM

## 2023-12-28 DIAGNOSIS — J18.9 OBSTRUCTIVE PNEUMONIA: ICD-10-CM

## 2023-12-28 PROCEDURE — 99999 PR PBB SHADOW E&M-EST. PATIENT-LVL IV: ICD-10-PCS | Mod: PBBFAC,,, | Performed by: HOSPITALIST

## 2023-12-28 PROCEDURE — 99215 OFFICE O/P EST HI 40 MIN: CPT | Mod: S$PBB,,, | Performed by: HOSPITALIST

## 2023-12-28 PROCEDURE — 71250 CT THORAX DX C-: CPT | Mod: TC

## 2023-12-28 PROCEDURE — 71250 CT THORAX DX C-: CPT | Mod: 26,,, | Performed by: RADIOLOGY

## 2023-12-28 PROCEDURE — 71250 CT CHEST WITHOUT CONTRAST: ICD-10-PCS | Mod: 26,,, | Performed by: RADIOLOGY

## 2023-12-28 PROCEDURE — 99215 PR OFFICE/OUTPT VISIT, EST, LEVL V, 40-54 MIN: ICD-10-PCS | Mod: S$PBB,,, | Performed by: HOSPITALIST

## 2023-12-28 PROCEDURE — 99999 PR PBB SHADOW E&M-EST. PATIENT-LVL IV: CPT | Mod: PBBFAC,,, | Performed by: HOSPITALIST

## 2023-12-28 PROCEDURE — 99214 OFFICE O/P EST MOD 30 MIN: CPT | Mod: PBBFAC,25 | Performed by: HOSPITALIST

## 2023-12-28 RX ORDER — DOXYCYCLINE 100 MG/1
100 CAPSULE ORAL EVERY 12 HOURS
Qty: 10 CAPSULE | Refills: 0 | Status: SHIPPED | OUTPATIENT
Start: 2023-12-28 | End: 2024-01-07

## 2023-12-28 RX ORDER — AMOXICILLIN AND CLAVULANATE POTASSIUM 875; 125 MG/1; MG/1
1 TABLET, FILM COATED ORAL 2 TIMES DAILY
Qty: 10 TABLET | Refills: 0 | Status: SHIPPED | OUTPATIENT
Start: 2023-12-28 | End: 2024-01-03

## 2023-12-28 NOTE — PROGRESS NOTES
"Subjective     Patient ID: Alesha Toney is a 73 y.o. female.    Chief Complaint: Malignant neoplasm of upper lobe, right bronchus or lung  Ms. Alesha Toney is a 73-year-old otherwise healthy female who started having some shoulder pain, which was lasting for over six weeks.  She went and saw her primary care physician and underwent an x-ray, which revealed right upper lobe lung mass worrisome for malignancy and a CT scan was recommended, which was done on 6/12/18 and that revealed a newly developing mass, pleural based, lateral posterior segment, right upper lobe 3.5 x 3.5 cm, surrounding stellate margin and patchy infiltrates, particularly superiorly,   anteriorly infiltrates extend perihilar into the anterior segment.  She then underwent CT-guided biopsy, which revealed well-differentiated adenocarcinoma.       Her PET scan from 6/29/18 There is physiologic intracranial, head, and neck activity.  There is a large right upper lobe mass SUV max 9.11.  No local or distant metastatic disease seen.  There is physiologic liver, spleen, GI and  activity.  There are a few liver cysts.  No adenopathy is seen.  The adrenal glands are not enlarged.  No adenopathy is seen.  No suspicious bone lesions are seen.     She underwent VATS with right upper lobectomy. Mediastinal lymphadenectomy on 8/9/18. Pathology revealed "Tumor site: Upper lobe.Tumor size: 7 x 4 x 2.7 cm.Tumor focality: Single tumor.  Histologic type: Mixed invasive mucinous and nonmucinous adenocarcinoma. Histologic grade: G2: Moderately differentiated.Spread through air spaces (STATS): Present. Visceral pleura invasion: Not identified.  Lymphovascular invasion: Not identified. Direct invasion of adjacent structures: No adjacent structures present. Margins: All margins are uninvolved by carcinoma.Distance of invasive carcinoma from closest margin: 1 cm from the bronchial margin.Treatment effect: No known presurgical therapy. Regional lymph nodes: Number " "of lymph nodes involved: 0. Number of lymph nodes examined: 11. Pathologic Stage Classification: pT3 N0"        She completed 4 cycles of adjuvant chemo with Cisplatin and Alimta as of 1/10/19   She is on surveillance.     CT chest of 9/17/19 which reveals "Operative change of right upper lobectomy for small-cell lung cancer with several scattered subsolid opacities and a new solid nodule in the right lower lobe measuring up to 0.5 cm.  We recommend continued surveillance with the role and schedule for such surveillance to be determined by clinical considerations. Sided chest port distal tip terminating at the confluence of the brachiocephalic vein in the SVC.  Correlate with device functioning. Apically predominant emphysematous changes, left greater than right. Aortic annular calcification and multi-vessel coronary artery atherosclerosis. Numerous unchanged hepatic hypodensities, likely cysts"     PET scan from 10/1/19 reveals "1.6 cm soft tissue lesion re-identified posterior to the bronchus intermedius.  No corresponding focal increased radiotracer uptake to suggest local recurrence or metastatic disease. Stable subcentimeter opacities throughout the bilateral lower lobes, too small to characterize with PET-CT. Focal increased radiotracer uptake and subtle wall thickening in the rectum.  Findings are concerning for primary neoplastic process.  Recommend further evaluation with direct visualization"     She returned from [facility] and was advised to proceed with chemo/RT with either Docetaxel/platinum or Carboplatin/Alimta.     She completed chemo/RT with Carboplatin and Alimta as of 12/31/19     She underwent CT chest on 5/27/2020 which revealed 1. In this patient with history of lung cancer status post right upper lobectomy, there is a soft tissue opacity at the posterior margin of the staple line.  This lesion has been enlarging progressively and now measures 1.3 x 1.6 cm.  There is also a new 1.7 x 1.7 cm " "opacity at the right paravertebral pleural margin which is inseparable from this lesion.  These findings may represent post radiation change in light of the hypermetabolic lesion in this region on PET-CT of 10/01/2019 (image 69/[id number]).There is a 1 cm opacity in the superior or lateral basal segment of the right lower lobe (axial series 4, image 243) which has enlarged since 09/17/2019.  Nature of this lesion is unclear.  Clinical considerations will determine if percutaneous biopsy or metabolic assessment is warranted. 1.0 cm solid opacity with branching configuration (series 4, image 205) is located in the lateral basal segment of the left lower lobe, stable since 02/16/2019 (02/06/2019 axial series 4, image 310). Appearance and bifurcating character suggests mucoid impaction in mildly ectatic peripheral airway, likely not significant. Persistent clustered small centrilobular nodules in the apical segment of the right upper lobe likely reflecting small airway inflammation/infection. Partially visualized left chest port with distal catheter tip at the junction of the brachiocephalic veins and SVC, similar as on 09/17/2019"  She thus underwent PET scan on 6/1/2020 which revealed "No evidence of residual viable lung malignancy.  Evolving post radiation changes in the right paramediastinal lung, with a new irregular subpleural density which may also be related to recent radiation.  Attention on follow-up. Interval decrease in size of a soft tissue lesion along the inferior margin of the lobectomy stable line.  Several stable subcentimeter soft tissue opacities in the lower lobes bilaterally, As above.  Attention on follow-up.  Persistent hypermetabolic rectal lesion concerning for malignancy.  Recommend direct visualization and tissue sampling as clinically indicated"  I discussed her case in thoracic conference today and findings are felt to be related to RT     Her restaging CT scans from 1/20/2021 which reveal " ""Persistent soft tissue opacity in the posterior margin of the staple line that is increased in prominence when compared to the prior examination and is worrisome for disease progression. No new lesions identified.  Stable pulmonary nodules. Stable hypodense lesions throughout the liver that likely represent hepatic cysts"        Restaging PET scan from 1/27/2021 reveals "Increased size and hypermetabolic activity involving soft tissue opacity along the inferior border of the right upper lobectomy stable line concerning for progression of disease. Mildly increased hypermetabolic activity involving the rectum compatible with known high-grade dysplasia/intramucosal carcinoma. Interval decrease in size and resolution of hypermetabolic activity involving subcentimeter subpleural opacity within the right lower lobe"     MRI brain from  2/3/2021 reveals no evidence of recurrence.  GUARDANT testing only reveals p53, PDL1 is 0 and no other targets        PET scan from 3/24/2021 reveals "In this patient with known lung cancer, there is a persistent hypermetabolic right hilar mass consistent with viable tumor.  Interval increase in size is equivocal for progression of fibrosis versus tumor growth. 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  Activity appears more focal and possibly more proximal than before.  Consider direct visualization for further evaluation. The previously described subcentimeter subpleural opacity within the right lower lobe is not definitely seen on today's exam"               She last received immunotherapy on 5/28/2021. She underwent EUS on 6/25/2021 which revealed "Erythematous, congested, and ulcerated mucosa  (proctitis) in rectosigmoid colon.  Pathology reveals Colon, rectosigmoid, biopsy:  Moderate active colitis     She completed steroids about 5 weeks ago.      CT scans from 11/5/2021 reveals "In this patient with lung " "cancer, there is postoperative changes of right upper lobe resection.  Persistent soft tissue opacity along the right hilum suture line which is slightly increased in size from prior exam.  More conspicuous appearing nodules within the right lower lobe seen on prior exam.  New nodule within the right middle lobe measuring 1.3 cm.  Overall findings are all worrisome for disease progression. Scattered areas of centrilobular nodularity within the bilateral lungs.  Nonspecific and can be seen in the setting of infectious or inflammatory etiologies. Unchanged hepatics cysts. Stable left adrenal gland nodule."  PET scan from 11/23/2021 revealed "New hypermetabolic nodule within the right lower lobe additional slightly subcentimeter nodules within the right lower lobe.  Findings concerning for disease progression, other differentials include infection or noninfectious inflammatory process. Interval decrease in size and uptake in the right hilar mass. Resolution of previous focal rectal uptake"        She is on Opdivo. Her Opdivo was held on 1/10/2022 due to rectal bleeding. She saw Dr. Clark and was noted to have anal fissure which contributed to rectal bleed. Her rectal bleed has since resolved.           She was on Opdivo until 5/24/2022, she noted rectal bleeding and underwent   Flex sigmoidoscopy on 6/3/2022 reveals "Localized severe inflammation was found in the distal rectum. Biopsied. Pathology reveals Severely active chronic colitis with ulceration (see comment)  Moderate architectural disorder. Negative for CMV by immunohistochemistry . Negative for granulomas or viral inclusions. Negative for dysplasia or carcinoma"  PET scan from 6/17/2022 reveals "In this patient with lung cancer there are enlarging hypermetabolic right perihilar opacities concerning for continued local disease progression. Continued decreased metabolic activity of the right lower lobe nodule.  Cluster of pulmonary nodules bilaterally, some " "which are new from prior PET-CT for example the superior segment the left upper lobe and are likely infectious/inflammatory origin. Persistent and more extensive region of increased FDG avidity within the distal rectum in keeping with more extensive colitis.  Malignancy could have a similar appearance.  Recommend further investigation as clinically warranted and attention on follow-up"     She has active proctitis and is on hydrocortisone suppositories and Entyvyo since early June 2022.   CT chest on 8/1/2022 reveals  "Similar appearance of elongated, irregular opacity adjacent to the right upper lobectomy margin.  More posteriorly adjacent perihilar opacities with previous FDG avidity with detailed measurements as above. Similar areas of scattered micro-nodularity with tree-in-bud, likely infectious/inflammatory in nature.  New appearing area of grouped micro-nodules in the anterior left lower lobe, largest up to 5 mm, which could relate to aforementioned small airways disease, though technically indeterminate.  Continued close attention at follow-up. RECIST SUMMARY: Date of prior examination for comparison: CT chest dated 05/24/2022. Lesion 1: Right perihilar opacity.  2.4.  Series 2, image 45.  Previously 2 4.  (Note there is slight increased size conspicuity in the craniocaudal dimension at 1.8 cm, previously 1.5 cm). Lesion 2: Right perihilar opacity.  1.7 cm.  Series 2, image 59.  Previously 1.5 cm"     She underwent a repeat CT chest 8/31/2022 and that revealed "Status post right upper lobectomy.  Soft tissue thickening adjacent to the suture line appears stable.  However, there is interval enlargement of a right lower lobe, bilobed, paramediastinal mass-like consolidation.  When dominant components of the lesions are measured separately, dimensions are summarized below. RECIST SUMMARY: Date of prior examination for comparison: CT chest 08/01/2022. Lesion 1: Right perihilar opacity.  4.7 cm. Series 4 image " "77.  Prior measurement 2.4 cm. Lesion 2: Right perihilar (infrahilar) opacity.  2.7 cm. Series 4 image 237.  Prior measurement 1.7 cm"  Case reviewed in thoracic conference, and concern for disease progression exists so plan on immunotehrapy if able to taper steroids           PET scan from 10/6/2022 reveals "Interval increase in size and radiotracer uptake of a right pulmonary lesions in this patient with known non-small cell lung cancer.  There are few new subcentimeter pulmonary nodules in the right lower lobe with no significant uptake, could represent infectious versus inflammatory process.  However, additional new metastatic disease can not be excluded.  Attention on follow-up. Improved metabolic appearance of the lower rectum compared with the prior exam"  She is currently off of prednisone and Entyvio.  She was on  Carboplatin and Alimta, started on 10/14/2022        She has completed proton beam completed on 1/24/2023.      MD [facility], CT chest there from 3/20/2023 reveals "Decreased in FDG avid recurrent disease in the right hemithorax. Decreased bilateral lower lobe nodules. Other lung nodules are unchanged or decreased.  New left lower lobe solid nodule in an area of clustered micronodules may be inflammatory. Reassess at follow-up" Plan on restaging scans in 3 months      CT chest from 5/16/2023 reveals "New right lower lobe consolidations and a small right pleural effusion.  Concerning for pneumonia and a small parapneumonic effusion.  However, should also consider treatment related pneumonitis .Stable right lower lobe paramediastinal irregular opacity, likely status post treatment of previous disease in same site     She was started on Augmentin and prednisone (40 mg)on 5/16/2023. She notes that as she tapered prednisone to 15 mg she noticed wheezing and cough and right sided chest pain     Ct scans from 6/27/2023 revealed "Postoperative changes of right upper lobectomy.  Redemonstration of " "paramediastinal irregular opacity associated with architectural distortion and improving adjacent ground-glass opacity.  Decrease in size of the small amount loculated right-sided pleural fluid. Enlarged right lower lobe pulmonary nodule measuring 0.9 cm, previously 0.6 cm.  Concerning for metastatic disease. Hepatic and left renal cysts.   1.6 cm left thyroid nodule.  Recommend further evaluation with nonemergent ultrasound"     Case reviewed in Thoracic conference and plan is for CT chest in 3 months     She underwent CT chest at [facility] on 9/18/2023 which reveals "There are postsurgical changes after right upper lobectomy. Masslike consolidation in the right lower lobe, 7.1 x 4.8 cm. On 05/16/2023 there were dense and groundglass opacities at this level. There are adjacent groundglass opacities.Subsolid nodule in the right lower lobe on image 107, 1.8 cm, previously 0.8 cm. Clustered nodules in the left upper lung on images 47-53  are new. Progression of small right pleural effusion. Calcified granuloma in the left lower lobe"     PET scan on 9/25/2023 revealed "Interval progression of size and hypermetabolic activity of multiple right hilar/pulmonary lesions with enlarging and newly hypermetabolic right lower lobe pulmonary nodule. New non-hypermetabolic subcentimeter pulmonary nodules, as detailed above.  Recommend attention on follow-up"  She underwent bronch at [facility] and path is positive   She saw Dr. Jones at [facility] and plan is for Docetaxel and Cyramza     We reviewed case at [facility] on 10/25/2023 and plan is "Standard of care recommendations: Ramucirumab+Keytruda   Trial recommendations: Late phase: no trials.  Early phase: No open slots in -653.   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     She started Cyramza and Keytruda on 11/21/2023     HPI    Last clinic 12/11/23, proceed w/ pembrolizumab but hold ramucirumab due to high BP. RTC in 3 weeks and may add ramucirumab back. Xoponex and " nebulizer prescribed. Did note wet cough w/ clear sputum production, cough worse with lying down. Feeling more SOB, even w/ minimal exertion. Pulse ox 97%. R sided chest pain was worse.    Interval History:  - 12/11/23: Pembrolizumab C2, ramucirumab held for HTN. First cycle was on 11/21/23.     The patient describes several days of worsening cough which she has been increasingly productive of phlegm/mucous.  She states that she feels they are thick secretions that she is having difficulty coughing out.  She describes worsening shortness of breath over the same time.  Last night, she describes suddenly developing pain in her central chest which radiated to her back.  She describes the pain as 8/10, however her daughter says it was at least a 10/10.  The patient says that the pain was worse with a deep breath.  Her daughter says that the patient was crying because of the pain and that she took liquid morphine for the first time (patient is reportedly medicines to use morphine); this was on top of 3 Vicodin from earlier as well as an aspirin (patient was in her son took this because she would central chest pain).  She also says that she had high temperature to 99 as well as chills when this was occurring.  She denies any lower extremity swelling at any point.  Today, her pain is much lessened however she continues to have central chest pain of a pleuritic nature.      Review of Systems   Constitutional:  Negative for appetite change, fatigue and unexpected weight change.   HENT:  Negative for mouth sores.    Eyes:  Negative for visual disturbance.   Respiratory:  Positive for cough and shortness of breath.    Cardiovascular:  Negative for chest pain.   Gastrointestinal:  Negative for abdominal pain and diarrhea.   Genitourinary:  Negative for frequency.   Musculoskeletal:  Negative for back pain.   Integumentary:  Negative for rash.   Neurological:  Negative for headaches.   Hematological:  Negative for adenopathy.    Psychiatric/Behavioral:  The patient is not nervous/anxious.    All other systems reviewed and are negative.         Objective     Physical Exam  Constitutional:       Appearance: Normal appearance.   HENT:      Head: Normocephalic and atraumatic.   Eyes:      Extraocular Movements: Extraocular movements intact.      Conjunctiva/sclera: Conjunctivae normal.      Pupils: Pupils are equal, round, and reactive to light.   Cardiovascular:      Rate and Rhythm: Normal rate and regular rhythm.      Heart sounds: No murmur heard.     No friction rub. No gallop.   Pulmonary:      Effort: Pulmonary effort is normal.      Breath sounds: Wheezing (Over R posterior inferior lung field) and rhonchi (Over R posterior inferior lung field) present. No rales.   Musculoskeletal:         General: Normal range of motion.      Right lower leg: No edema.      Left lower leg: No edema.   Skin:     General: Skin is warm and dry.   Neurological:      Mental Status: She is alert and oriented to person, place, and time.   Psychiatric:         Mood and Affect: Mood normal.         Thought Content: Thought content normal.         Judgment: Judgment normal.           LABS:  WBC   Date Value Ref Range Status   12/11/2023 7.85 3.90 - 12.70 K/uL Final     Hemoglobin   Date Value Ref Range Status   12/11/2023 13.1 12.0 - 16.0 g/dL Final     POC Hematocrit   Date Value Ref Range Status   08/09/2018 33 (L) 36 - 54 %PCV Final     Hematocrit   Date Value Ref Range Status   12/11/2023 38.8 37.0 - 48.5 % Final     Platelets   Date Value Ref Range Status   12/11/2023 237 150 - 450 K/uL Final     Gran # (ANC)   Date Value Ref Range Status   12/11/2023 4.7 1.8 - 7.7 K/uL Final     Gran %   Date Value Ref Range Status   12/11/2023 59.5 38.0 - 73.0 % Final       Chemistry        Component Value Date/Time     12/11/2023 1258    K 4.6 12/11/2023 1258     12/11/2023 1258    CO2 27 12/11/2023 1258    BUN 17 12/11/2023 1258    CREATININE 1.1  12/11/2023 1258     (H) 12/11/2023 1258        Component Value Date/Time    CALCIUM 9.7 12/11/2023 1258    ALKPHOS 51 (L) 12/11/2023 1258    AST 21 12/11/2023 1258    ALT 14 12/11/2023 1258    BILITOT 0.3 12/11/2023 1258    ESTGFRAFRICA 58.4 (A) 06/22/2022 1046    EGFRNONAA 50.6 (A) 06/22/2022 1046             Assessment and Plan     1. Chest pain on breathing  -     NM Lung Scan Ventilation Perfusion; Future; Expected date: 12/28/2023    2. Obstructive pneumonia  -     amoxicillin-clavulanate 875-125mg (AUGMENTIN) 875-125 mg per tablet; Take 1 tablet by mouth 2 (two) times daily. for 5 days  Dispense: 10 tablet; Refill: 0  -     doxycycline (VIBRAMYCIN) 100 MG Cap; Take 1 capsule (100 mg total) by mouth every 12 (twelve) hours.  Dispense: 10 capsule; Refill: 0          Route Chart for Scheduling    Med Onc Chart Routing      Follow up with physician . Labs already scheduled 01/02/2024; follow-up and infusion already scheduled 01/03/2024.  Patient has a V/Q scan scheduled tomorrow 830.   Follow up with YAMILE    Infusion scheduling note    Injection scheduling note    Labs    Imaging    Pharmacy appointment    Other referrals                Treatment Plan Information   OP PEMBROLIZUMAB 200MG and Cyramza Q3W   Pam Dhaliwal MD   Upcoming Treatment Dates - OP PEMBROLIZUMAB 200MG and Cyramza Q3W    1/1/2024       Pre-Medications       LORazepam tablet 0.5 mg       famotidine tablet 20 mg       dexAMETHasone injection 12 mg       Chemotherapy       ramucirumab (CYRAMZA) 652 mg in sodium chloride 0.9% 250 mL chemo infusion       pembrolizumab (KEYTRUDA) 200 mg in sodium chloride 0.9% SolP 108 mL infusion  1/22/2024       Pre-Medications       LORazepam tablet 0.5 mg       famotidine tablet 20 mg       dexAMETHasone injection 12 mg       Chemotherapy       ramucirumab (CYRAMZA) 652 mg in sodium chloride 0.9% 250 mL chemo infusion       pembrolizumab (KEYTRUDA) 200 mg in sodium chloride 0.9% SolP 108 mL  infusion  2/12/2024       Pre-Medications       LORazepam tablet 0.5 mg       famotidine tablet 20 mg       dexAMETHasone injection 12 mg       Chemotherapy       ramucirumab (CYRAMZA) 652 mg in sodium chloride 0.9% 250 mL chemo infusion       pembrolizumab (KEYTRUDA) 200 mg in sodium chloride 0.9% SolP 108 mL infusion  3/4/2024       Pre-Medications       LORazepam tablet 0.5 mg       famotidine tablet 20 mg       dexAMETHasone injection 12 mg       Chemotherapy       ramucirumab (CYRAMZA) 652 mg in sodium chloride 0.9% 250 mL chemo infusion       pembrolizumab (KEYTRUDA) 200 mg in sodium chloride 0.9% SolP 108 mL infusion    Therapy Plan Information  PORT FLUSH  Flushes  heparin, porcine (PF) 100 unit/mL injection flush 500 Units  500 Units, Intravenous, Every visit  sodium chloride 0.9% flush 10 mL  10 mL, Intravenous, Every visit  5. Medications  vedolizumab (ENTYVIO) 300 mg/250 mL NS IVPB  300 mg, Intravenous, On: 10/27/2023, 11/10/2023, 12/8/2023, 2/2/2024           Central Chest Pain  Worsening Cough / SOB  CT chest today demonstrates worsening fibrotic appearance of the right lower lobe as compared to PET-CT from 09/25/2023 and CT chest from 09/18/2023.  The formal read is pending, however discussed with radiology who described possible infection infection versus pneumonitis and also noted slight increase in size of malignant disease.  On my review, there does not appear to be fibrotic change elsewhere in the lung which would be inconsistent with immunotherapy-induced pneumonitis.  Notably, patient received CRT which ended in 2019 as well as proton therapy which ended in 01/2023.  I discussed with radiation oncology who stated that radiation fibrosis would typically occur between 2 and 6 months after completion of radiation and agreed that distribution would be atypical for immunotherapy induced pneumonitis.  Given appearance on CT, history of postobstructive pneumonia, and evidence of enlarging malignant  disease (likely pseudo progression given inception of immunotherapy on 11/21/2023), favor that this represents a postobstructive pneumonia.  Therefore, we will trial 5 day course of antibiotics outpatient (pt not febrile, not immunosuppressed, low threshold to admit for treatment); if symptoms do not improve, will consider trialing high-dose steroids for possible pneumonitis (will sequence antibiotics to steroids rather than steroids to antibiotics in case of infection). 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     While patient's chest pain may be explained by a pneumonia, the pleuritic nature and central location is concerning for possible pulmonary embolism, especially in the setting of active malignancy.  Her heart rate today is in the 90s and her pulse ox is 97%; this is similar to her vitals last night when patient was having severe chest pain.  A D-dimer will likely be of little utility in the setting of active cancer and evidence of inflammation on scan.  The patient has a severe contrast allergy and so the CT obtained today was without contrast (prior to learning of pleuritic nature of chest pain).  I discussed my concern with the patient and her daughter and I have scheduled a V/Q scan for tomorrow morning (12/29/2023) at 8:30 a.m. (I confirmed that a V/Q scan could not be performed in the hospital sooner than this).  Note, it is of unclear utility to obtain a V/Q scan setting of possible pneumonia versus pneumonitis, however will obtain to rule out pulmonary emboli in other regions of the lung.  I discussed strict return protocol to the ED should any symptoms change including but not limited to shortness of breath, chest pain, tachycardia, desaturation (patient has pulse ox at home), fever, or lightheadedness or dizziness.  The patient and her daughter said that they understand and will present to the ED for any new or worsening  symptoms.    PLAN:  -- Augmentin 875-125 mg BID x 5 days  -- Doxycycline 100 mg BID x 5 days (pt declined azithromycin)  -- F/u final read of CT chest  -- V/Q scan at 08:30 on 12/29/23  -- Strict return precautions discussed  -- Consider trial of steroids if symptoms do not improve with abx  -- F/u and infusion scheduled 1/03/24, labs on 1/02/24

## 2023-12-29 ENCOUNTER — HOSPITAL ENCOUNTER (OUTPATIENT)
Dept: RADIOLOGY | Facility: HOSPITAL | Age: 73
Discharge: HOME | End: 2023-12-29
Attending: HOSPITALIST
Payer: MEDICARE

## 2023-12-29 ENCOUNTER — PATIENT MESSAGE (OUTPATIENT)
Dept: INTERNAL MEDICINE | Facility: CLINIC | Age: 73
End: 2023-12-29
Payer: MEDICARE

## 2023-12-29 ENCOUNTER — TELEPHONE (OUTPATIENT)
Dept: HEMATOLOGY/ONCOLOGY | Facility: CLINIC | Age: 73
End: 2023-12-29
Payer: MEDICARE

## 2023-12-29 ENCOUNTER — PATIENT MESSAGE (OUTPATIENT)
Dept: HEMATOLOGY/ONCOLOGY | Facility: CLINIC | Age: 73
End: 2023-12-29
Payer: MEDICARE

## 2023-12-29 DIAGNOSIS — R07.1 CHEST PAIN ON BREATHING: ICD-10-CM

## 2023-12-29 DIAGNOSIS — R73.9 HYPERGLYCEMIA: ICD-10-CM

## 2023-12-29 PROCEDURE — 78582 LUNG VENTILAT&PERFUS IMAGING: CPT | Mod: 26,,, | Performed by: STUDENT IN AN ORGANIZED HEALTH CARE EDUCATION/TRAINING PROGRAM

## 2023-12-29 PROCEDURE — A9540 TC99M MAA: HCPCS

## 2023-12-29 RX ORDER — DEXTROSE 4 G
TABLET,CHEWABLE ORAL
Qty: 1 EACH | Refills: 3 | Status: ON HOLD | OUTPATIENT
Start: 2023-12-29 | End: 2024-01-13 | Stop reason: HOSPADM

## 2023-12-29 NOTE — TELEPHONE ENCOUNTER
Spoke with patient regarding V/Q scan results.  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  Patient states that her central chest pain has entirely resolved but reiterates that this pain was a different quality than she is used to.  Discussed plan to continue with antibiotics at this time and if no improvement symptoms may consider trial of steroids for pneumonitis.  We will also message pulmonology regarding evaluation for possible impending right lower lobe collapse per discussion with Radiation Oncology. Radiation oncology also recommended a repeat PET scan in the next few weeks, can discuss further at follow up on 1/03/24.

## 2023-12-29 NOTE — TELEPHONE ENCOUNTER
No care due was identified.  [facility] Embedded Care Due Messages. Reference number: [id number].   12/29/2023 2:56:58 PM CST

## 2023-12-30 ENCOUNTER — PATIENT MESSAGE (OUTPATIENT)
Dept: ADMINISTRATIVE | Facility: OTHER | Age: 73
End: 2023-12-30
Payer: MEDICARE

## 2024-01-03 ENCOUNTER — OFFICE VISIT (OUTPATIENT)
Dept: HEMATOLOGY/ONCOLOGY | Facility: CLINIC | Age: 74
End: 2024-01-03
Payer: MEDICARE

## 2024-01-03 ENCOUNTER — LAB VISIT (OUTPATIENT)
Dept: LAB | Facility: HOSPITAL | Age: 74
End: 2024-01-03
Attending: INTERNAL MEDICINE
Payer: MEDICARE

## 2024-01-03 ENCOUNTER — PATIENT MESSAGE (OUTPATIENT)
Dept: HEMATOLOGY/ONCOLOGY | Facility: CLINIC | Age: 74
End: 2024-01-03

## 2024-01-03 VITALS
HEART RATE: 73 BPM | SYSTOLIC BLOOD PRESSURE: 147 MMHG | RESPIRATION RATE: 18 BRPM | DIASTOLIC BLOOD PRESSURE: 72 MMHG | OXYGEN SATURATION: 97 % | WEIGHT: 141.56 LBS | TEMPERATURE: 98 F | BODY MASS INDEX: 22.75 KG/M2 | HEIGHT: 66 IN

## 2024-01-03 DIAGNOSIS — J98.4 PNEUMONITIS: Primary | ICD-10-CM

## 2024-01-03 DIAGNOSIS — E03.9 HYPOTHYROIDISM, UNSPECIFIED TYPE: ICD-10-CM

## 2024-01-03 DIAGNOSIS — C34.11 MALIGNANT NEOPLASM OF UPPER LOBE, RIGHT BRONCHUS OR LUNG: ICD-10-CM

## 2024-01-03 DIAGNOSIS — C77.1 SECONDARY AND UNSPECIFIED MALIGNANT NEOPLASM OF INTRATHORACIC LYMPH NODES: ICD-10-CM

## 2024-01-03 DIAGNOSIS — C34.11 MALIGNANT NEOPLASM OF UPPER LOBE, RIGHT BRONCHUS OR LUNG: Primary | ICD-10-CM

## 2024-01-03 LAB
ALBUMIN SERPL BCP-MCNC: 3.2 G/DL (ref 3.5–5.2)
ALP SERPL-CCNC: 54 U/L (ref 55–135)
ALT SERPL W/O P-5'-P-CCNC: 10 U/L (ref 10–44)
ANION GAP SERPL CALC-SCNC: 10 MMOL/L (ref 8–16)
AST SERPL-CCNC: 16 U/L (ref 10–40)
BILIRUB SERPL-MCNC: 0.3 MG/DL (ref 0.1–1)
BUN SERPL-MCNC: 17 MG/DL (ref 8–23)
CALCIUM SERPL-MCNC: 10.3 MG/DL (ref 8.7–10.5)
CHLORIDE SERPL-SCNC: 106 MMOL/L (ref 95–110)
CO2 SERPL-SCNC: 26 MMOL/L (ref 23–29)
CREAT SERPL-MCNC: 1.2 MG/DL (ref 0.5–1.4)
ERYTHROCYTE [DISTWIDTH] IN BLOOD BY AUTOMATED COUNT: 13.3 % (ref 11.5–14.5)
EST. GFR  (NO RACE VARIABLE): 47.8 ML/MIN/1.73 M^2
GLUCOSE SERPL-MCNC: 137 MG/DL (ref 70–110)
HCT VFR BLD AUTO: 40.9 % (ref 37–48.5)
HGB BLD-MCNC: 13.6 G/DL (ref 12–16)
IMM GRANULOCYTES # BLD AUTO: 0.04 K/UL (ref 0–0.04)
MAGNESIUM SERPL-MCNC: 1.9 MG/DL (ref 1.6–2.6)
MCH RBC QN AUTO: 32.3 PG (ref 27–31)
MCHC RBC AUTO-ENTMCNC: 33.3 G/DL (ref 32–36)
MCV RBC AUTO: 97 FL (ref 82–98)
NEUTROPHILS # BLD AUTO: 6.2 K/UL (ref 1.8–7.7)
PLATELET # BLD AUTO: 268 K/UL (ref 150–450)
PMV BLD AUTO: 9.6 FL (ref 9.2–12.9)
POTASSIUM SERPL-SCNC: 5.5 MMOL/L (ref 3.5–5.1)
PROT SERPL-MCNC: 6.8 G/DL (ref 6–8.4)
RBC # BLD AUTO: 4.21 M/UL (ref 4–5.4)
SODIUM SERPL-SCNC: 142 MMOL/L (ref 136–145)
T4 FREE SERPL-MCNC: 1.18 NG/DL (ref 0.71–1.51)
TSH SERPL DL<=0.005 MIU/L-ACNC: 1.2 UIU/ML (ref 0.4–4)
WBC # BLD AUTO: 8.72 K/UL (ref 3.9–12.7)

## 2024-01-03 PROCEDURE — 85027 COMPLETE CBC AUTOMATED: CPT | Performed by: INTERNAL MEDICINE

## 2024-01-03 PROCEDURE — 83735 ASSAY OF MAGNESIUM: CPT | Performed by: INTERNAL MEDICINE

## 2024-01-03 PROCEDURE — 84443 ASSAY THYROID STIM HORMONE: CPT | Performed by: INTERNAL MEDICINE

## 2024-01-03 PROCEDURE — 99999 PR PBB SHADOW E&M-EST. PATIENT-LVL V: CPT | Mod: PBBFAC,,, | Performed by: INTERNAL MEDICINE

## 2024-01-03 PROCEDURE — 84439 ASSAY OF FREE THYROXINE: CPT | Performed by: INTERNAL MEDICINE

## 2024-01-03 PROCEDURE — 36415 COLL VENOUS BLD VENIPUNCTURE: CPT | Performed by: INTERNAL MEDICINE

## 2024-01-03 PROCEDURE — 80053 COMPREHEN METABOLIC PANEL: CPT | Performed by: INTERNAL MEDICINE

## 2024-01-03 PROCEDURE — 99215 OFFICE O/P EST HI 40 MIN: CPT | Mod: S$PBB,,, | Performed by: INTERNAL MEDICINE

## 2024-01-03 PROCEDURE — 99215 OFFICE O/P EST HI 40 MIN: CPT | Mod: PBBFAC | Performed by: INTERNAL MEDICINE

## 2024-01-03 RX ORDER — AMLODIPINE BESYLATE 5 MG/1
5 TABLET ORAL DAILY
Status: ON HOLD | COMMUNITY
End: 2024-01-13 | Stop reason: HOSPADM

## 2024-01-03 RX ORDER — SULFAMETHOXAZOLE AND TRIMETHOPRIM 800; 160 MG/1; MG/1
1 TABLET ORAL 2 TIMES DAILY
Qty: 28 TABLET | Refills: 2 | Status: SHIPPED | OUTPATIENT
Start: 2024-01-03 | End: 2024-01-17

## 2024-01-03 RX ORDER — PREDNISONE 20 MG/1
60 TABLET ORAL DAILY
Qty: 90 TABLET | Refills: 3 | Status: SHIPPED | OUTPATIENT
Start: 2024-01-03

## 2024-01-03 NOTE — PROGRESS NOTES
"Subjective     Patient ID: Alesha Toney is a 73 y.o. female.    Chief Complaint: Malignant neoplasm of upper lobe, right bronchus or lung  Ms. Alesha Toney is a 73-year-old otherwise healthy female who started having some shoulder pain, which was lasting for over six weeks.  She went and saw her primary care physician and underwent an x-ray, which revealed right upper lobe lung mass worrisome for malignancy and a CT scan was recommended, which was done on 6/12/18 and that revealed a newly developing mass, pleural based, lateral posterior segment, right upper lobe 3.5 x 3.5 cm, surrounding stellate margin and patchy infiltrates, particularly superiorly,   anteriorly infiltrates extend perihilar into the anterior segment.  She then underwent CT-guided biopsy, which revealed well-differentiated adenocarcinoma.       Her PET scan from 6/29/18 There is physiologic intracranial, head, and neck activity.  There is a large right upper lobe mass SUV max 9.11.  No local or distant metastatic disease seen.  There is physiologic liver, spleen, GI and  activity.  There are a few liver cysts.  No adenopathy is seen.  The adrenal glands are not enlarged.  No adenopathy is seen.  No suspicious bone lesions are seen.     She underwent VATS with right upper lobectomy. Mediastinal lymphadenectomy on 8/9/18. Pathology revealed "Tumor site: Upper lobe.Tumor size: 7 x 4 x 2.7 cm.Tumor focality: Single tumor.  Histologic type: Mixed invasive mucinous and nonmucinous adenocarcinoma. Histologic grade: G2: Moderately differentiated.Spread through air spaces (STATS): Present. Visceral pleura invasion: Not identified.  Lymphovascular invasion: Not identified. Direct invasion of adjacent structures: No adjacent structures present. Margins: All margins are uninvolved by carcinoma.Distance of invasive carcinoma from closest margin: 1 cm from the bronchial margin.Treatment effect: No known presurgical therapy. Regional lymph nodes: Number " "of lymph nodes involved: 0. Number of lymph nodes examined: 11. Pathologic Stage Classification: pT3 N0"        She completed 4 cycles of adjuvant chemo with Cisplatin and Alimta as of 1/10/19   She is on surveillance.     CT chest of 9/17/19 which reveals "Operative change of right upper lobectomy for small-cell lung cancer with several scattered subsolid opacities and a new solid nodule in the right lower lobe measuring up to 0.5 cm.  We recommend continued surveillance with the role and schedule for such surveillance to be determined by clinical considerations. Sided chest port distal tip terminating at the confluence of the brachiocephalic vein in the SVC.  Correlate with device functioning. Apically predominant emphysematous changes, left greater than right. Aortic annular calcification and multi-vessel coronary artery atherosclerosis. Numerous unchanged hepatic hypodensities, likely cysts"     PET scan from 10/1/19 reveals "1.6 cm soft tissue lesion re-identified posterior to the bronchus intermedius.  No corresponding focal increased radiotracer uptake to suggest local recurrence or metastatic disease. Stable subcentimeter opacities throughout the bilateral lower lobes, too small to characterize with PET-CT. Focal increased radiotracer uptake and subtle wall thickening in the rectum.  Findings are concerning for primary neoplastic process.  Recommend further evaluation with direct visualization"     She returned from [facility] and was advised to proceed with chemo/RT with either Docetaxel/platinum or Carboplatin/Alimta.     She completed chemo/RT with Carboplatin and Alimta as of 12/31/19     She underwent CT chest on 5/27/2020 which revealed 1. In this patient with history of lung cancer status post right upper lobectomy, there is a soft tissue opacity at the posterior margin of the staple line.  This lesion has been enlarging progressively and now measures 1.3 x 1.6 cm.  There is also a new 1.7 x 1.7 cm " "opacity at the right paravertebral pleural margin which is inseparable from this lesion.  These findings may represent post radiation change in light of the hypermetabolic lesion in this region on PET-CT of 10/01/2019 (image 69/[id number]).There is a 1 cm opacity in the superior or lateral basal segment of the right lower lobe (axial series 4, image 243) which has enlarged since 09/17/2019.  Nature of this lesion is unclear.  Clinical considerations will determine if percutaneous biopsy or metabolic assessment is warranted. 1.0 cm solid opacity with branching configuration (series 4, image 205) is located in the lateral basal segment of the left lower lobe, stable since 02/16/2019 (02/06/2019 axial series 4, image 310). Appearance and bifurcating character suggests mucoid impaction in mildly ectatic peripheral airway, likely not significant. Persistent clustered small centrilobular nodules in the apical segment of the right upper lobe likely reflecting small airway inflammation/infection. Partially visualized left chest port with distal catheter tip at the junction of the brachiocephalic veins and SVC, similar as on 09/17/2019"  She thus underwent PET scan on 6/1/2020 which revealed "No evidence of residual viable lung malignancy.  Evolving post radiation changes in the right paramediastinal lung, with a new irregular subpleural density which may also be related to recent radiation.  Attention on follow-up. Interval decrease in size of a soft tissue lesion along the inferior margin of the lobectomy stable line.  Several stable subcentimeter soft tissue opacities in the lower lobes bilaterally, As above.  Attention on follow-up.  Persistent hypermetabolic rectal lesion concerning for malignancy.  Recommend direct visualization and tissue sampling as clinically indicated"  I discussed her case in thoracic conference today and findings are felt to be related to RT     Her restaging CT scans from 1/20/2021 which reveal " ""Persistent soft tissue opacity in the posterior margin of the staple line that is increased in prominence when compared to the prior examination and is worrisome for disease progression. No new lesions identified.  Stable pulmonary nodules. Stable hypodense lesions throughout the liver that likely represent hepatic cysts"        Restaging PET scan from 1/27/2021 reveals "Increased size and hypermetabolic activity involving soft tissue opacity along the inferior border of the right upper lobectomy stable line concerning for progression of disease. Mildly increased hypermetabolic activity involving the rectum compatible with known high-grade dysplasia/intramucosal carcinoma. Interval decrease in size and resolution of hypermetabolic activity involving subcentimeter subpleural opacity within the right lower lobe"     MRI brain from  2/3/2021 reveals no evidence of recurrence.  GUARDANT testing only reveals p53, PDL1 is 0 and no other targets        PET scan from 3/24/2021 reveals "In this patient with known lung cancer, there is a persistent hypermetabolic right hilar mass consistent with viable tumor.  Interval increase in size is equivocal for progression of fibrosis versus tumor growth. 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  Activity appears more focal and possibly more proximal than before.  Consider direct visualization for further evaluation. The previously described subcentimeter subpleural opacity within the right lower lobe is not definitely seen on today's exam"               She last received immunotherapy on 5/28/2021. She underwent EUS on 6/25/2021 which revealed "Erythematous, congested, and ulcerated mucosa  (proctitis) in rectosigmoid colon.  Pathology reveals Colon, rectosigmoid, biopsy:  Moderate active colitis     She completed steroids about 5 weeks ago.      CT scans from 11/5/2021 reveals "In this patient with lung " "cancer, there is postoperative changes of right upper lobe resection.  Persistent soft tissue opacity along the right hilum suture line which is slightly increased in size from prior exam.  More conspicuous appearing nodules within the right lower lobe seen on prior exam.  New nodule within the right middle lobe measuring 1.3 cm.  Overall findings are all worrisome for disease progression. Scattered areas of centrilobular nodularity within the bilateral lungs.  Nonspecific and can be seen in the setting of infectious or inflammatory etiologies. Unchanged hepatics cysts. Stable left adrenal gland nodule."  PET scan from 11/23/2021 revealed "New hypermetabolic nodule within the right lower lobe additional slightly subcentimeter nodules within the right lower lobe.  Findings concerning for disease progression, other differentials include infection or noninfectious inflammatory process. Interval decrease in size and uptake in the right hilar mass. Resolution of previous focal rectal uptake"        She is on Opdivo. Her Opdivo was held on 1/10/2022 due to rectal bleeding. She saw Dr. Clark and was noted to have anal fissure which contributed to rectal bleed. Her rectal bleed has since resolved.           She was on Opdivo until 5/24/2022, she noted rectal bleeding and underwent   Flex sigmoidoscopy on 6/3/2022 reveals "Localized severe inflammation was found in the distal rectum. Biopsied. Pathology reveals Severely active chronic colitis with ulceration (see comment)  Moderate architectural disorder. Negative for CMV by immunohistochemistry . Negative for granulomas or viral inclusions. Negative for dysplasia or carcinoma"  PET scan from 6/17/2022 reveals "In this patient with lung cancer there are enlarging hypermetabolic right perihilar opacities concerning for continued local disease progression. Continued decreased metabolic activity of the right lower lobe nodule.  Cluster of pulmonary nodules bilaterally, some " "which are new from prior PET-CT for example the superior segment the left upper lobe and are likely infectious/inflammatory origin. Persistent and more extensive region of increased FDG avidity within the distal rectum in keeping with more extensive colitis.  Malignancy could have a similar appearance.  Recommend further investigation as clinically warranted and attention on follow-up"     She has active proctitis and is on hydrocortisone suppositories and Entyvyo since early June 2022.   CT chest on 8/1/2022 reveals  "Similar appearance of elongated, irregular opacity adjacent to the right upper lobectomy margin.  More posteriorly adjacent perihilar opacities with previous FDG avidity with detailed measurements as above. Similar areas of scattered micro-nodularity with tree-in-bud, likely infectious/inflammatory in nature.  New appearing area of grouped micro-nodules in the anterior left lower lobe, largest up to 5 mm, which could relate to aforementioned small airways disease, though technically indeterminate.  Continued close attention at follow-up. RECIST SUMMARY: Date of prior examination for comparison: CT chest dated 05/24/2022. Lesion 1: Right perihilar opacity.  2.4.  Series 2, image 45.  Previously 2 4.  (Note there is slight increased size conspicuity in the craniocaudal dimension at 1.8 cm, previously 1.5 cm). Lesion 2: Right perihilar opacity.  1.7 cm.  Series 2, image 59.  Previously 1.5 cm"     She underwent a repeat CT chest 8/31/2022 and that revealed "Status post right upper lobectomy.  Soft tissue thickening adjacent to the suture line appears stable.  However, there is interval enlargement of a right lower lobe, bilobed, paramediastinal mass-like consolidation.  When dominant components of the lesions are measured separately, dimensions are summarized below. RECIST SUMMARY: Date of prior examination for comparison: CT chest 08/01/2022. Lesion 1: Right perihilar opacity.  4.7 cm. Series 4 image " "77.  Prior measurement 2.4 cm. Lesion 2: Right perihilar (infrahilar) opacity.  2.7 cm. Series 4 image 237.  Prior measurement 1.7 cm"  Case reviewed in thoracic conference, and concern for disease progression exists so plan on immunotehrapy if able to taper steroids           PET scan from 10/6/2022 reveals "Interval increase in size and radiotracer uptake of a right pulmonary lesions in this patient with known non-small cell lung cancer.  There are few new subcentimeter pulmonary nodules in the right lower lobe with no significant uptake, could represent infectious versus inflammatory process.  However, additional new metastatic disease can not be excluded.  Attention on follow-up. Improved metabolic appearance of the lower rectum compared with the prior exam"  She is currently off of prednisone and Entyvio.  She was on  Carboplatin and Alimta, started on 10/14/2022        She has completed proton beam completed on 1/24/2023.      MD [place], CT chest there from 3/20/2023 reveals "Decreased in FDG avid recurrent disease in the right hemithorax. Decreased bilateral lower lobe nodules. Other lung nodules are unchanged or decreased.  New left lower lobe solid nodule in an area of clustered micronodules may be inflammatory. Reassess at follow-up" Plan on restaging scans in 3 months      CT chest from 5/16/2023 reveals "New right lower lobe consolidations and a small right pleural effusion.  Concerning for pneumonia and a small parapneumonic effusion.  However, should also consider treatment related pneumonitis .Stable right lower lobe paramediastinal irregular opacity, likely status post treatment of previous disease in same site     She was started on Augmentin and prednisone (40 mg)on 5/16/2023. She notes that as she tapered prednisone to 15 mg she noticed wheezing and cough and right sided chest pain     Ct scans from 6/27/2023 revealed "Postoperative changes of right upper lobectomy.  Redemonstration of " "paramediastinal irregular opacity associated with architectural distortion and improving adjacent ground-glass opacity.  Decrease in size of the small amount loculated right-sided pleural fluid. Enlarged right lower lobe pulmonary nodule measuring 0.9 cm, previously 0.6 cm.  Concerning for metastatic disease. Hepatic and left renal cysts.   1.6 cm left thyroid nodule.  Recommend further evaluation with nonemergent ultrasound"     Case reviewed in Thoracic conference and plan is for CT chest in 3 months     She underwent CT chest at [facility] on 9/18/2023 which reveals "There are postsurgical changes after right upper lobectomy. Masslike consolidation in the right lower lobe, 7.1 x 4.8 cm. On 05/16/2023 there were dense and groundglass opacities at this level. There are adjacent groundglass opacities.Subsolid nodule in the right lower lobe on image 107, 1.8 cm, previously 0.8 cm. Clustered nodules in the left upper lung on images 47-53  are new. Progression of small right pleural effusion. Calcified granuloma in the left lower lobe"     PET scan on 9/25/2023 revealed "Interval progression of size and hypermetabolic activity of multiple right hilar/pulmonary lesions with enlarging and newly hypermetabolic right lower lobe pulmonary nodule. New non-hypermetabolic subcentimeter pulmonary nodules, as detailed above.  Recommend attention on follow-up"  She underwent bronch at [facility] and path is positive   She saw Dr. Jones at [facility] and plan is for Docetaxel and Cyramza     We reviewed case at MDT on 10/25/2023 and plan is "Standard of care recommendations: Ramucirumab+Keytruda   Trial recommendations: Late phase: no trials.  Early phase: No open slots in -066.   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     She started Cyramza and Keytruda on 11/21/2023        HPIShe received cycle 2 of  Cyramza and Keytruda on 12/12/2023  On 12/26/2023 she vomited X 1  On 12/27/2023. Had pain in mid chest and radiating into " her back, she tried Xopenex and took 3 norco and Roxanol. The roxanol helps but pain did not go away   CT chest without contrast on 12/28/2023 reveals Postsurgical changes of right upper lobectomy, unchanged. Marked interval worsening of right perihilar and infrahilar mass and consolidation extending throughout the right lower lobe in patient with reported right lung cancer.   V/Q scan on 12/28/2023: low probability of pulmonary embolism.      Review of Systems   Constitutional:  Negative for appetite change, fatigue and unexpected weight change.   HENT:  Negative for mouth sores.    Eyes:  Negative for visual disturbance.   Respiratory:  Positive for cough and shortness of breath.    Cardiovascular:  Positive for chest pain.   Gastrointestinal:  Negative for abdominal pain and diarrhea.   Genitourinary:  Negative for frequency.   Musculoskeletal:  Negative for back pain.   Integumentary:  Negative for rash.   Neurological:  Negative for headaches.   Hematological:  Negative for adenopathy.   Psychiatric/Behavioral:  The patient is not nervous/anxious.    All other systems reviewed and are negative.         Objective     Physical Exam  Vitals reviewed.   Constitutional:       Appearance: She is well-developed.   HENT:      Mouth/Throat:      Pharynx: No oropharyngeal exudate.   Cardiovascular:      Rate and Rhythm: Normal rate.      Heart sounds: Normal heart sounds.   Pulmonary:      Effort: Pulmonary effort is normal.      Breath sounds: Examination of the right-upper field reveals decreased breath sounds. Examination of the right-middle field reveals decreased breath sounds and rhonchi. Examination of the right-lower field reveals decreased breath sounds and rhonchi. Decreased breath sounds, wheezing and rhonchi present.   Abdominal:      General: Bowel sounds are normal.      Palpations: Abdomen is soft.      Tenderness: There is no abdominal tenderness.   Musculoskeletal:         General: No tenderness.    Lymphadenopathy:      Cervical: No cervical adenopathy.   Skin:     General: Skin is warm and dry.      Findings: No rash.   Neurological:      Mental Status: She is alert and oriented to person, place, and time.      Coordination: Coordination normal.   Psychiatric:         Thought Content: Thought content normal.         Judgment: Judgment normal.           LABS:  WBC   Date Value Ref Range Status   01/03/2024 8.72 3.90 - 12.70 K/uL Final     Hemoglobin   Date Value Ref Range Status   01/03/2024 13.6 12.0 - 16.0 g/dL Final     POC Hematocrit   Date Value Ref Range Status   08/09/2018 33 (L) 36 - 54 %PCV Final     Hematocrit   Date Value Ref Range Status   01/03/2024 40.9 37.0 - 48.5 % Final     Platelets   Date Value Ref Range Status   01/03/2024 268 150 - 450 K/uL Final     Gran # (ANC)   Date Value Ref Range Status   01/03/2024 6.2 1.8 - 7.7 K/uL Final     Comment:     The ANC is based on a white cell differential from an   automated cell counter. It has not been microscopically   reviewed for the presence of abnormal cells. Clinical   correlation is required.         Chemistry        Component Value Date/Time     01/03/2024 1407    K 5.5 (H) 01/03/2024 1407     01/03/2024 1407    CO2 26 01/03/2024 1407    BUN 17 01/03/2024 1407    CREATININE 1.2 01/03/2024 1407     (H) 01/03/2024 1407        Component Value Date/Time    CALCIUM 10.3 01/03/2024 1407    ALKPHOS 54 (L) 01/03/2024 1407    AST 16 01/03/2024 1407    ALT 10 01/03/2024 1407    BILITOT 0.3 01/03/2024 1407    ESTGFRAFRICA 58.4 (A) 06/22/2022 1046    EGFRNONAA 50.6 (A) 06/22/2022 1046             Assessment and Plan     1. Pneumonitis  -     predniSONE (DELTASONE) 20 MG tablet; Take 3 tablets (60 mg total) by mouth once daily.  Dispense: 90 tablet; Refill: 3  -     sulfamethoxazole-trimethoprim 800-160mg (BACTRIM DS) 800-160 mg Tab; Take 1 tablet by mouth 2 (two) times daily. Take 1 tablet twice a day on Monday, Wednesday and Friday  for 14 days  Dispense: 28 tablet; Refill: 2    2. 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 Cancel tretament today, Schedule CMP on 1/8/2024. In 1 week schedule to see me   Follow up with YAMILE    Infusion scheduling note    Injection scheduling note    Labs    Imaging    Pharmacy appointment    Other referrals                  Treatment Plan Information   OP PEMBROLIZUMAB 200MG and Cyramza Q3W   Pam Dhaliwal MD   Upcoming Treatment Dates - OP PEMBROLIZUMAB 200MG and Cyramza Q3W    1/1/2024       Pre-Medications       LORazepam tablet 0.5 mg       famotidine tablet 20 mg       dexAMETHasone injection 12 mg       Chemotherapy       ramucirumab (CYRAMZA) 652 mg in sodium chloride 0.9% 250 mL chemo infusion       pembrolizumab (KEYTRUDA) 200 mg in sodium chloride 0.9% SolP 108 mL infusion  1/22/2024       Pre-Medications       LORazepam tablet 0.5 mg       famotidine tablet 20 mg       dexAMETHasone injection 12 mg       Chemotherapy       ramucirumab (CYRAMZA) 652 mg in sodium chloride 0.9% 250 mL chemo infusion       pembrolizumab (KEYTRUDA) 200 mg in sodium chloride 0.9% SolP 108 mL infusion  2/12/2024       Pre-Medications       LORazepam tablet 0.5 mg       famotidine tablet 20 mg       dexAMETHasone injection 12 mg       Chemotherapy       ramucirumab (CYRAMZA) 652 mg in sodium chloride 0.9% 250 mL chemo infusion       pembrolizumab (KEYTRUDA) 200 mg in sodium chloride 0.9% SolP 108 mL infusion  3/4/2024       Pre-Medications       LORazepam tablet 0.5 mg       famotidine tablet 20 mg       dexAMETHasone injection 12 mg       Chemotherapy       ramucirumab (CYRAMZA) 652 mg in sodium chloride 0.9% 250 mL  chemo infusion       pembrolizumab (KEYTRUDA) 200 mg in sodium chloride 0.9% SolP 108 mL infusion    Therapy Plan Information  PORT FLUSH  Flushes  heparin, porcine (PF) 100 unit/mL injection flush 500 Units  500 Units, Intravenous, Every visit  sodium chloride 0.9% flush 10 mL  10 mL, Intravenous, Every visit  5. Medications  vedolizumab (ENTYVIO) 300 mg/250 mL NS IVPB  300 mg, Intravenous, On: 10/27/2023, 11/10/2023, 12/8/2023, 2/2/2024         Hold treatment today. Mid chest pain is improved. Still has cough, shortness of breath and right sided chest pain which is at baseline   Reviewed CT chest, looks concerning for pneumonitis, pneumonia (has air bronchograms) versus disease progression.  Started prednisone and Bactrim, message sent to Dr. Hearn, will also review in thoracic MDT as well   Return in 1 week    Above care plan was discussed with patient and accompanying partner, Lata and all questions were addressed to their satisfaction

## 2024-01-04 ENCOUNTER — PATIENT MESSAGE (OUTPATIENT)
Dept: ADMINISTRATIVE | Facility: OTHER | Age: 74
End: 2024-01-04
Payer: MEDICARE

## 2024-01-05 ENCOUNTER — PATIENT MESSAGE (OUTPATIENT)
Dept: ADMINISTRATIVE | Facility: OTHER | Age: 74
End: 2024-01-05
Payer: MEDICARE

## 2024-01-07 ENCOUNTER — HOSPITAL ENCOUNTER (INPATIENT)
Facility: HOSPITAL | Age: 74
LOS: 7 days | Discharge: HOSPICE | DRG: 193 | End: 2024-01-14
Attending: EMERGENCY MEDICINE | Admitting: INTERNAL MEDICINE
Payer: MEDICARE

## 2024-01-07 DIAGNOSIS — R06.02 SHORTNESS OF BREATH: ICD-10-CM

## 2024-01-07 DIAGNOSIS — I49.9 ARRHYTHMIA: ICD-10-CM

## 2024-01-07 DIAGNOSIS — C34.11 MALIGNANT NEOPLASM OF UPPER LOBE, RIGHT BRONCHUS OR LUNG: ICD-10-CM

## 2024-01-07 DIAGNOSIS — R06.02 SOB (SHORTNESS OF BREATH): Primary | ICD-10-CM

## 2024-01-07 LAB
ALBUMIN SERPL BCP-MCNC: 3.7 G/DL (ref 3.5–5.2)
ALP SERPL-CCNC: 60 U/L (ref 55–135)
ALT SERPL W/O P-5'-P-CCNC: 13 U/L (ref 10–44)
ANION GAP SERPL CALC-SCNC: 14 MMOL/L (ref 8–16)
AST SERPL-CCNC: 21 U/L (ref 10–40)
BASOPHILS # BLD AUTO: 0.01 K/UL (ref 0–0.2)
BASOPHILS NFR BLD: 0.1 % (ref 0–1.9)
BILIRUB SERPL-MCNC: 0.2 MG/DL (ref 0.1–1)
BNP SERPL-MCNC: 155 PG/ML (ref 0–99)
BUN SERPL-MCNC: 19 MG/DL (ref 8–23)
CALCIUM SERPL-MCNC: 10.3 MG/DL (ref 8.7–10.5)
CHLORIDE SERPL-SCNC: 103 MMOL/L (ref 95–110)
CK SERPL-CCNC: 42 U/L (ref 20–180)
CO2 SERPL-SCNC: 24 MMOL/L (ref 23–29)
CREAT SERPL-MCNC: 1.2 MG/DL (ref 0.5–1.4)
DIFFERENTIAL METHOD BLD: ABNORMAL
EOSINOPHIL # BLD AUTO: 0 K/UL (ref 0–0.5)
EOSINOPHIL NFR BLD: 0 % (ref 0–8)
ERYTHROCYTE [DISTWIDTH] IN BLOOD BY AUTOMATED COUNT: 13.6 % (ref 11.5–14.5)
EST. GFR  (NO RACE VARIABLE): 48 ML/MIN/1.73 M^2
GLUCOSE SERPL-MCNC: 137 MG/DL (ref 70–110)
HCT VFR BLD AUTO: 43.2 % (ref 37–48.5)
HGB BLD-MCNC: 14.5 G/DL (ref 12–16)
IMM GRANULOCYTES # BLD AUTO: 0.05 K/UL (ref 0–0.04)
IMM GRANULOCYTES NFR BLD AUTO: 0.5 % (ref 0–0.5)
INR PPP: 1 (ref 0.8–1.2)
LACTATE SERPL-SCNC: 2.1 MMOL/L (ref 0.5–2.2)
LYMPHOCYTES # BLD AUTO: 0.4 K/UL (ref 1–4.8)
LYMPHOCYTES NFR BLD: 4.8 % (ref 18–48)
MAGNESIUM SERPL-MCNC: 1.9 MG/DL (ref 1.6–2.6)
MCH RBC QN AUTO: 31.9 PG (ref 27–31)
MCHC RBC AUTO-ENTMCNC: 33.6 G/DL (ref 32–36)
MCV RBC AUTO: 95 FL (ref 82–98)
MONOCYTES # BLD AUTO: 0.2 K/UL (ref 0.3–1)
MONOCYTES NFR BLD: 2.6 % (ref 4–15)
NEUTROPHILS # BLD AUTO: 8.4 K/UL (ref 1.8–7.7)
NEUTROPHILS NFR BLD: 92 % (ref 38–73)
NRBC BLD-RTO: 0 /100 WBC
PLATELET # BLD AUTO: 281 K/UL (ref 150–450)
PMV BLD AUTO: 9.7 FL (ref 9.2–12.9)
POTASSIUM SERPL-SCNC: 4.6 MMOL/L (ref 3.5–5.1)
PROT SERPL-MCNC: 7.5 G/DL (ref 6–8.4)
PROTHROMBIN TIME: 10.8 SEC (ref 9–12.5)
RBC # BLD AUTO: 4.54 M/UL (ref 4–5.4)
SODIUM SERPL-SCNC: 141 MMOL/L (ref 136–145)
TROPONIN I SERPL DL<=0.01 NG/ML-MCNC: 0.01 NG/ML (ref 0–0.03)
TROPONIN I SERPL DL<=0.01 NG/ML-MCNC: <0.006 NG/ML (ref 0–0.03)
WBC # BLD AUTO: 9.18 K/UL (ref 3.9–12.7)

## 2024-01-07 PROCEDURE — 83735 ASSAY OF MAGNESIUM: CPT | Performed by: EMERGENCY MEDICINE

## 2024-01-07 PROCEDURE — 84484 ASSAY OF TROPONIN QUANT: CPT | Performed by: EMERGENCY MEDICINE

## 2024-01-07 PROCEDURE — 63600175 PHARM REV CODE 636 W HCPCS: Performed by: EMERGENCY MEDICINE

## 2024-01-07 PROCEDURE — 83880 ASSAY OF NATRIURETIC PEPTIDE: CPT | Performed by: EMERGENCY MEDICINE

## 2024-01-07 PROCEDURE — 12000002 HC ACUTE/MED SURGE SEMI-PRIVATE ROOM

## 2024-01-07 PROCEDURE — 63600175 PHARM REV CODE 636 W HCPCS: Performed by: INTERNAL MEDICINE

## 2024-01-07 PROCEDURE — 85025 COMPLETE CBC W/AUTO DIFF WBC: CPT | Performed by: EMERGENCY MEDICINE

## 2024-01-07 PROCEDURE — 87040 BLOOD CULTURE FOR BACTERIA: CPT | Mod: 59 | Performed by: EMERGENCY MEDICINE

## 2024-01-07 PROCEDURE — 27100171 HC OXYGEN HIGH FLOW UP TO 24 HOURS

## 2024-01-07 PROCEDURE — 25000003 PHARM REV CODE 250: Performed by: INTERNAL MEDICINE

## 2024-01-07 PROCEDURE — 25000242 PHARM REV CODE 250 ALT 637 W/ HCPCS: Performed by: EMERGENCY MEDICINE

## 2024-01-07 PROCEDURE — 96374 THER/PROPH/DIAG INJ IV PUSH: CPT

## 2024-01-07 PROCEDURE — 83605 ASSAY OF LACTIC ACID: CPT | Performed by: EMERGENCY MEDICINE

## 2024-01-07 PROCEDURE — 99285 EMERGENCY DEPT VISIT HI MDM: CPT | Mod: 25

## 2024-01-07 PROCEDURE — 85610 PROTHROMBIN TIME: CPT | Performed by: EMERGENCY MEDICINE

## 2024-01-07 PROCEDURE — 84484 ASSAY OF TROPONIN QUANT: CPT | Mod: 91 | Performed by: INTERNAL MEDICINE

## 2024-01-07 PROCEDURE — 94761 N-INVAS EAR/PLS OXIMETRY MLT: CPT

## 2024-01-07 PROCEDURE — 94640 AIRWAY INHALATION TREATMENT: CPT

## 2024-01-07 PROCEDURE — 25000003 PHARM REV CODE 250: Performed by: NURSE PRACTITIONER

## 2024-01-07 PROCEDURE — 82550 ASSAY OF CK (CPK): CPT | Performed by: EMERGENCY MEDICINE

## 2024-01-07 PROCEDURE — 96375 TX/PRO/DX INJ NEW DRUG ADDON: CPT

## 2024-01-07 PROCEDURE — 80053 COMPREHEN METABOLIC PANEL: CPT | Performed by: EMERGENCY MEDICINE

## 2024-01-07 RX ORDER — HYDROCODONE BITARTRATE AND ACETAMINOPHEN 5; 325 MG/1; MG/1
1-2 TABLET ORAL EVERY 4 HOURS PRN
Status: ON HOLD | COMMUNITY
End: 2024-01-13 | Stop reason: HOSPADM

## 2024-01-07 RX ORDER — MUPIROCIN 20 MG/G
OINTMENT TOPICAL 2 TIMES DAILY
Status: COMPLETED | OUTPATIENT
Start: 2024-01-08 | End: 2024-01-12

## 2024-01-07 RX ORDER — SODIUM CHLORIDE FOR INHALATION 3 %
4 VIAL, NEBULIZER (ML) INHALATION
Status: DISCONTINUED | OUTPATIENT
Start: 2024-01-07 | End: 2024-01-10

## 2024-01-07 RX ORDER — MAG CARB/ALUMINUM HYDROX/ALGIN 358-95/15
15 SUSPENSION, ORAL (FINAL DOSE FORM) ORAL DAILY PRN
Status: ON HOLD | COMMUNITY
End: 2024-01-13 | Stop reason: HOSPADM

## 2024-01-07 RX ORDER — ACETAMINOPHEN 325 MG/1
650 TABLET ORAL EVERY 4 HOURS PRN
Status: DISCONTINUED | OUTPATIENT
Start: 2024-01-07 | End: 2024-01-14 | Stop reason: HOSPADM

## 2024-01-07 RX ORDER — ENOXAPARIN SODIUM 100 MG/ML
40 INJECTION SUBCUTANEOUS EVERY 24 HOURS
Status: DISCONTINUED | OUTPATIENT
Start: 2024-01-07 | End: 2024-01-14 | Stop reason: HOSPADM

## 2024-01-07 RX ORDER — VITAMIN E 268 MG
400 CAPSULE ORAL DAILY
Status: ON HOLD | COMMUNITY
End: 2024-01-13 | Stop reason: HOSPADM

## 2024-01-07 RX ORDER — AMLODIPINE BESYLATE 5 MG/1
5 TABLET ORAL DAILY
Status: DISCONTINUED | OUTPATIENT
Start: 2024-01-08 | End: 2024-01-07

## 2024-01-07 RX ORDER — FAMOTIDINE 10 MG/1
10 TABLET ORAL DAILY
Status: DISCONTINUED | OUTPATIENT
Start: 2024-01-08 | End: 2024-01-08

## 2024-01-07 RX ORDER — ATENOLOL 25 MG/1
25 TABLET ORAL 3 TIMES DAILY
Status: DISCONTINUED | OUTPATIENT
Start: 2024-01-07 | End: 2024-01-14 | Stop reason: HOSPADM

## 2024-01-07 RX ORDER — HYDROCODONE BITARTRATE AND ACETAMINOPHEN 7.5; 325 MG/1; MG/1
2 TABLET ORAL EVERY 4 HOURS PRN
Status: DISCONTINUED | OUTPATIENT
Start: 2024-01-07 | End: 2024-01-11

## 2024-01-07 RX ORDER — AMOXICILLIN 250 MG
3 CAPSULE ORAL DAILY PRN
COMMUNITY

## 2024-01-07 RX ORDER — LEVALBUTEROL 1.25 MG/.5ML
1.25 SOLUTION, CONCENTRATE RESPIRATORY (INHALATION) EVERY 6 HOURS PRN
Status: DISCONTINUED | OUTPATIENT
Start: 2024-01-07 | End: 2024-01-07

## 2024-01-07 RX ORDER — PREDNISONE 20 MG/1
60 TABLET ORAL DAILY
Status: DISCONTINUED | OUTPATIENT
Start: 2024-01-08 | End: 2024-01-14 | Stop reason: HOSPADM

## 2024-01-07 RX ORDER — LINEZOLID 2 MG/ML
600 INJECTION, SOLUTION INTRAVENOUS
Status: DISCONTINUED | OUTPATIENT
Start: 2024-01-07 | End: 2024-01-10

## 2024-01-07 RX ORDER — LORAZEPAM 0.5 MG/1
0.5 TABLET ORAL 2 TIMES DAILY PRN
Status: DISCONTINUED | OUTPATIENT
Start: 2024-01-07 | End: 2024-01-11

## 2024-01-07 RX ORDER — ONDANSETRON HYDROCHLORIDE 8 MG/1
8 TABLET, FILM COATED ORAL EVERY 6 HOURS PRN
Status: DISCONTINUED | OUTPATIENT
Start: 2024-01-07 | End: 2024-01-09

## 2024-01-07 RX ORDER — HYDROCODONE BITARTRATE AND ACETAMINOPHEN 7.5; 325 MG/1; MG/1
1 TABLET ORAL EVERY 4 HOURS PRN
Status: DISCONTINUED | OUTPATIENT
Start: 2024-01-07 | End: 2024-01-11

## 2024-01-07 RX ORDER — MORPHINE SULFATE 4 MG/ML
4 INJECTION, SOLUTION INTRAMUSCULAR; INTRAVENOUS EVERY 6 HOURS PRN
Status: DISCONTINUED | OUTPATIENT
Start: 2024-01-07 | End: 2024-01-08

## 2024-01-07 RX ORDER — AMLODIPINE BESYLATE 5 MG/1
5 TABLET ORAL NIGHTLY
Status: DISCONTINUED | OUTPATIENT
Start: 2024-01-07 | End: 2024-01-10

## 2024-01-07 RX ORDER — LORATADINE 10 MG/1
5 TABLET ORAL DAILY
Status: ON HOLD | COMMUNITY
End: 2024-01-13 | Stop reason: HOSPADM

## 2024-01-07 RX ORDER — SODIUM CHLORIDE 0.9 % (FLUSH) 0.9 %
10 SYRINGE (ML) INJECTION EVERY 12 HOURS PRN
Status: DISCONTINUED | OUTPATIENT
Start: 2024-01-07 | End: 2024-01-14 | Stop reason: HOSPADM

## 2024-01-07 RX ORDER — HYDROCHLOROTHIAZIDE 25 MG/1
25 TABLET ORAL DAILY
Status: DISCONTINUED | OUTPATIENT
Start: 2024-01-08 | End: 2024-01-08

## 2024-01-07 RX ORDER — LEVALBUTEROL INHALATION SOLUTION 0.63 MG/3ML
1.25 SOLUTION RESPIRATORY (INHALATION) EVERY 4 HOURS PRN
Status: DISCONTINUED | OUTPATIENT
Start: 2024-01-07 | End: 2024-01-08

## 2024-01-07 RX ORDER — LEVALBUTEROL 1.25 MG/.5ML
1.25 SOLUTION, CONCENTRATE RESPIRATORY (INHALATION)
Status: COMPLETED | OUTPATIENT
Start: 2024-01-07 | End: 2024-01-07

## 2024-01-07 RX ORDER — MORPHINE SULFATE 20 MG/5ML
2 SOLUTION ORAL EVERY 8 HOURS PRN
COMMUNITY

## 2024-01-07 RX ORDER — ASCORBIC ACID 1000 MG
1 TABLET ORAL DAILY PRN
Status: ON HOLD | COMMUNITY
End: 2024-01-13 | Stop reason: HOSPADM

## 2024-01-07 RX ORDER — SULFAMETHOXAZOLE AND TRIMETHOPRIM 800; 160 MG/1; MG/1
1 TABLET ORAL
Status: DISCONTINUED | OUTPATIENT
Start: 2024-01-08 | End: 2024-01-08

## 2024-01-07 RX ORDER — ALBUTEROL SULFATE 2.5 MG/.5ML
2.5 SOLUTION RESPIRATORY (INHALATION)
Status: DISCONTINUED | OUTPATIENT
Start: 2024-01-07 | End: 2024-01-08

## 2024-01-07 RX ORDER — ONDANSETRON HYDROCHLORIDE 2 MG/ML
4 INJECTION, SOLUTION INTRAVENOUS
Status: COMPLETED | OUTPATIENT
Start: 2024-01-07 | End: 2024-01-07

## 2024-01-07 RX ORDER — DOCUSATE SODIUM 100 MG/1
100 CAPSULE, LIQUID FILLED ORAL 2 TIMES DAILY
Status: ON HOLD | COMMUNITY
End: 2024-01-13 | Stop reason: HOSPADM

## 2024-01-07 RX ORDER — MORPHINE SULFATE 4 MG/ML
4 INJECTION, SOLUTION INTRAMUSCULAR; INTRAVENOUS
Status: COMPLETED | OUTPATIENT
Start: 2024-01-07 | End: 2024-01-07

## 2024-01-07 RX ADMIN — LINEZOLID 600 MG: 600 INJECTION, SOLUTION INTRAVENOUS at 05:01

## 2024-01-07 RX ADMIN — CEFEPIME 2 G: 2 INJECTION, POWDER, FOR SOLUTION INTRAVENOUS at 03:01

## 2024-01-07 RX ADMIN — HYDROCODONE BITARTRATE AND ACETAMINOPHEN 1 TABLET: 7.5; 325 TABLET ORAL at 10:01

## 2024-01-07 RX ADMIN — ONDANSETRON 4 MG: 2 INJECTION INTRAMUSCULAR; INTRAVENOUS at 01:01

## 2024-01-07 RX ADMIN — ATENOLOL 25 MG: 25 TABLET ORAL at 08:01

## 2024-01-07 RX ADMIN — ENOXAPARIN SODIUM 40 MG: 40 INJECTION SUBCUTANEOUS at 05:01

## 2024-01-07 RX ADMIN — HYDROCODONE BITARTRATE AND ACETAMINOPHEN 2 TABLET: 7.5; 325 TABLET ORAL at 06:01

## 2024-01-07 RX ADMIN — LEVALBUTEROL 1.25 MG: 1.25 SOLUTION, CONCENTRATE RESPIRATORY (INHALATION) at 01:01

## 2024-01-07 RX ADMIN — MORPHINE SULFATE 4 MG: 4 INJECTION INTRAVENOUS at 01:01

## 2024-01-07 RX ADMIN — AMLODIPINE BESYLATE 5 MG: 5 TABLET ORAL at 10:01

## 2024-01-07 RX ADMIN — LORAZEPAM 0.5 MG: 0.5 TABLET ORAL at 11:01

## 2024-01-07 NOTE — H&P
[facility] Medicine H&P Note     Attending Physician: Dottie Kirkland MD    Date of Admit: 1/7/2024    Chief Complaint     Shortness of Breath (SOB with cough for past 2 weeks. States PCP has prescribed multiple meds with no improvement. Presents awake, alert with O2 in progress. Resp unlabored. C/o right sided CP with h/o right sided lung CA - states pain in this area is common for her. Denies home O2. States she has been using nebulizer at home. Awake, alert, oriented.)     Assessment/Plan:     Alesha Toney is a 73 y.o. female with:    Hypoxic Respiratory Failure, Lung Cancer Possible Pneumonitis vs Post-Obstructive Pneumonia  Recent decline since Sept 2023, worsening SOB and CP since 12/25 had a CT chest and V/Q that was low probability for PE  Initially on 100% NRB  VS Afebrile 91 20 88% on RA  Trop x 1 wnl  Red man to vancomycin, tried slower infusion rate but didn't tolerate  Linezolid and pip-tazo for now  Sputum culture sent  Consult Pulm and ID, appreciate recs    Pneumonitis  -     predniSONE (DELTASONE) 20 MG tablet; Take 3 tablets (60 mg total) by mouth once daily.  Dispense: 90 tablet; Refill: 3  -     sulfamethoxazole-trimethoprim 800-160mg (BACTRIM DS) 800-160 mg Tab; Take 1 tablet by mouth 2 (two) times daily. Take 1 tablet twice a day on Monday, Wednesday and Friday for 14 days  Dispense: 28 tablet; Refill: 2     Malignant neoplasm of upper lobe, right bronchus or lung  Overview:  Status post resection 8/2018 status post adjuvant chemo    Secondary and unspecified malignant neoplasm of intrathoracic lymph nodes  Recently did not tolerate Keytruda and Cyramza and tx was discontinued recently    DVT PPx: Lovenox    Admit to inpatient under my care    Subjective:      History of Present Illness:  Alesha Toney is a 73 y.o.  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 The patient presented to [facility] on 1/7/2024 with a primary complaint of Shortness of Breath (SOB with cough for past 2 weeks. States PCP has prescribed multiple meds with no improvement. Presents awake, alert with O2 in progress. Resp unlabored. C/o right sided CP with h/o right sided lung CA - states pain in this area is common for her. Denies home O2. States she has been using nebulizer at home. Awake, alert, oriented.)    The patient was in their usual state of health until 12/25 when she presented to the ED with shortness of breath and increased R sided chest pain. She had a CT chest and V/Q scan at the time and had close follow up with her oncologist and was started on prednisone for possible pneumonitis with bactrim ppx. She has had a progressive cough with worsening R sided chest pain and noted her pulse ox to be in the 80s at home. She has had clear-whitish thick sputum. No fevers, chills, sweats. No calf swelling. At this time due to her medication side effects she is not able to continue with Keytruda or Cyramza.      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     Squamous cell carcinoma of skin 10/05/2020    left medial thigh    Therapy     Thyroid nodule     Ulcerative colitis with complication        Past Surgical History:  Past Surgical History:   Procedure Laterality Date    ADENOIDECTOMY  17yo    ANTERIOR CERVICAL DISCECTOMY W/ FUSION N/A  10/15/2018    Procedure: DISCECTOMY, SPINE, CERVICAL, ANTERIOR APPROACH, WITH FUSION C6/7  Depuy SNS: Motors/SSEP/Vocal Cord Regular OR table;  Surgeon: Greyson Bansal MD;  [facility];  Service: Neurosurgery;  Laterality: N/A;    APPENDECTOMY      BONE RESECTION, RIB  1980    BREAST BIOPSY Left     at least 40yrs ago in her 20's    BREAST CYST ASPIRATION      BREAST CYST EXCISION      COLONOSCOPY      ENDOBRONCHIAL ULTRASOUND N/A 7/10/2018    Procedure: ULTRASOUND, ENDOBRONCHIAL;  Surgeon: Sri Hearn MD;  [facility];  Service: Pulmonary;  Laterality: N/A;    ENDOBRONCHIAL ULTRASOUND N/A 9/24/2019    Procedure: ENDOBRONCHIAL ULTRASOUND (EBUS);  Surgeon: Sri Hearn MD;  [facility];  Service: Pulmonary;  Laterality: N/A;    ENDOSCOPIC MUCOSAL RESECTION OF COLON N/A 9/11/2020    Procedure: RESECTION, MUCOSA, COLON, ENDOSCOPIC;  Surgeon: Lilibeth Carbajal MD;  [facility]  Service: Endoscopy;  Laterality: N/A;    ENDOSCOPIC ULTRASOUND OF LOWER GASTROINTESTINAL TRACT N/A 4/19/2021    Procedure: ULTRASOUND, LOWER GI TRACT, ENDOSCOPIC;  Surgeon: Lilibeth Carbajal MD;  Location: [facility];  Service: Endoscopy;  Laterality: N/A;    ENDOSCOPIC ULTRASOUND OF LOWER GASTROINTESTINAL TRACT N/A 6/25/2021    Procedure: ULTRASOUND, LOWER GI TRACT, ENDOSCOPIC;  Surgeon: Silvino Christopher MD;  Location: [facility];  Service: Endoscopy;  Laterality: N/A;  Needs Rapid MP    FLEXIBLE SIGMOIDOSCOPY  06/11/2020    with biopsies    FLEXIBLE SIGMOIDOSCOPY N/A 6/11/2020    Procedure: SIGMOIDOSCOPY, FLEXIBLE;  Surgeon: Gely Yeh MD;  Location: [facility];  Service: Endoscopy;  Laterality: N/A;    FLEXIBLE SIGMOIDOSCOPY N/A 4/19/2021    Procedure: SIGMOIDOSCOPY-FLEXIBLE;  Surgeon: Lilibeth Carbajal MD;  Location: [facility];  Service: Endoscopy;  Laterality: N/A;  Covid 4/16 Brian MP    FLEXIBLE SIGMOIDOSCOPY N/A 6/3/2022    Procedure: SIGMOIDOSCOPY-FLEXIBLE;   "Surgeon: Francesco Clark MD;  Location: [facility] ENDO (4TH FLR);  Service: Endoscopy;  Laterality: N/A;  vacc-inst portal-tb    HYSTERECTOMY      @47yrs of age    INSERTION OF TUNNELED CENTRAL VENOUS CATHETER (CVC) WITH SUBCUTANEOUS PORT N/A 9/25/2018    Procedure: [id number];  Surgeon: Dosc Diagnostic Provider;  Location: [facility] OR 2ND FLR;  Service: Radiology;  Laterality: N/A;    INSERTION OF VENOUS ACCESS PORT N/A 3/15/2021    Procedure: INSERTION, VENOUS ACCESS PORT;  Surgeon: Chang Mathews MD;  [facility];  Service: General;  Laterality: N/A;  NEEDS CONSENT.    KIDNEY SURGERY      17yo    MEDIPORT REMOVAL N/A 3/15/2021    Procedure: REMOVAL, CATHETER, CENTRAL VENOUS, TUNNELED, WITH PORT;  Surgeon: Chang Mathews MD;  [facility];  Service: General;  Laterality: N/A;    OOPHORECTOMY      @47yrs of age    SKIN BIOPSY      TONSILLECTOMY      UPPER GASTROINTESTINAL ENDOSCOPY      VIDEO-ASSISTED THORACOSCOPIC LOBECTOMY Right 8/9/2018    Procedure: VATS, WITH LOBECTOMY Possible chest wall resection;  Surgeon: Philip Messina MD;  [facility];  Service: Thoracic;  Laterality: Right;  Have open pan available.       Allergies:  Review of patient's allergies indicates:   Allergen Reactions    Adhesive Itching, Rash and Blisters     INCLUDES EKG PADS    Ciprofloxacin Hives     Hives    Iodinated contrast media     Phenergan plain Other (See Comments)     "crazy behavior"    Phenergan [promethazine]     Tramadol     Vancomycin analogues      Red man syndrome       Home Medications:  Prior to Admission medications    Medication Sig Start Date End Date Taking? Authorizing Provider   amLODIPine (NORVASC) 5 MG tablet Take 5 mg by mouth once daily.    Provider, Historical   atenoloL (TENORMIN) 25 MG tablet Take 1 tablet (25 mg total) by mouth 3 (three) times daily. 8/23/23   Ro Aguilar PA-C   blood sugar diagnostic (TRUE METRIX GLUCOSE TEST " STRIP) Strp CHECK BLOOD GLUCOSE DAILY 12/29/23   Margo Caldwell MD   blood-glucose meter Misc To check BG 1 times daily, to use with insurance preferred meter 12/29/23 12/28/24  Margo Caldwell MD   calcium carbonate (TUMS) 300 mg (750 mg) Chew Take by mouth as needed.     Provider, Historical   doxycycline (VIBRAMYCIN) 100 MG Cap Take 1 capsule (100 mg total) by mouth every 12 (twelve) hours. 12/28/23   Chacho Darling IV, MD   esomeprazole (NEXIUM) 20 MG capsule Take 20 mg by mouth.    Provider, Historical   famotidine (PEPCID) 10 MG tablet Take 10 mg by mouth once daily. 8/5/20   Provider, Historical   hydroCHLOROthiazide (HYDRODIURIL) 25 MG tablet Take 1 tablet (25 mg total) by mouth once daily. 12/12/23 3/12/24  Margo Caldwell MD   lancets Misc To check BG 1 times daily, to use with insurance preferred meter 12/12/23   Margo Caldwell MD   levalbuterol (XOPENEX HFA) 45 mcg/actuation inhaler Inhale 1-2 puffs into the lungs every 6 (six) hours as needed for Wheezing. Rescue 7/19/23 7/18/24  Pam Dhaliwal MD   levalbuterol (XOPENEX) 1.25 mg/3 mL nebulizer solution Take 3 mLs (1.25 mg total) by nebulization every 4 (four) hours as needed for Wheezing. Rescue 12/11/23 12/10/24  Pam Dhaliwal MD   LORazepam (ATIVAN) 0.5 MG tablet Take 1 tablet (0.5 mg total) by mouth 2 (two) times daily as needed for Anxiety. 12/27/23 1/28/24  Mark Upton MD   losartan (COZAAR) 50 MG tablet 1 tab po bid 7/12/23   Ro Aguilar, ZINA   naloxone (NARCAN) 4 mg/actuation Spry by Nasal route as needed. 2/1/23   Provider, Historical   ondansetron (ZOFRAN) 8 MG tablet Take 8 mg by mouth as needed. 1/8/23   Provider, Historical   predniSONE (DELTASONE) 20 MG tablet Take 3 tablets (60 mg total) by mouth once daily. 1/3/24   Pam Dhaliwal MD   predniSONE (DELTASONE) 5 MG tablet Take 1 tablet (5 mg total) by mouth once daily. 10/31/23   Pam Dhaliwal MD   sulfamethoxazole-trimethoprim 800-160mg (BACTRIM DS)  800-160 mg Tab Take 1 tablet by mouth 2 (two) times daily. Take 1 tablet twice a day on Monday, Wednesday and Friday for 14 days 1/3/24 1/17/24  Pam Dhaliwal MD       Family History:  Family History   Problem Relation Age of Onset    Stroke Mother     Cataracts Mother     Cancer Father         colon cancer    Colon cancer Father     Diabetes Brother     Heart failure Brother     Hypertension Brother     Heart attack Paternal Aunt     Heart attack Maternal Grandfather     Diabetes Paternal Aunt     Anxiety disorder Paternal Grandmother         agoraphobia    Anesthesia problems Neg Hx     Blindness Neg Hx     Amblyopia Neg Hx     Glaucoma Neg Hx     Macular degeneration Neg Hx     Retinal detachment Neg Hx     Strabismus Neg Hx     Thyroid disease Neg Hx     Melanoma Neg Hx        Social History:  Social History     Tobacco Use    Smoking status: Former     Current packs/day: 0.00     Average packs/day: 1 pack/day for 30.0 years (30.0 ttl pk-yrs)     Types: Cigarettes     Start date: 1985     Quit date: 2015     Years since quittin.6    Smokeless tobacco: Never   Substance Use Topics    Alcohol use: Not Currently     Alcohol/week: 0.0 standard drinks of alcohol     Comment: socially     Drug use: No       Review of Systems   Constitutional:  Negative for chills and fever.   HENT:  Negative for congestion and sore throat.    Eyes:  Negative for blurred vision.   Respiratory:  Positive for cough, sputum production, shortness of breath and wheezing. Negative for hemoptysis.    Cardiovascular:  Positive for chest pain. Negative for leg swelling.   Gastrointestinal:  Positive for nausea and vomiting.   Genitourinary:  Negative for dysuria, frequency and urgency.   Musculoskeletal:  Negative for back pain and falls.   Neurological:  Negative for loss of consciousness and headaches.   Psychiatric/Behavioral:  Negative for substance abuse.        Objective:   Last 24 Hour Vital Signs:  BP  Min: 151/66  Max:  "161/88  Temp  Av.4 °F (36.9 °C)  Min: 98.4 °F (36.9 °C)  Max: 98.4 °F (36.9 °C)  Pulse  Av.5  Min: 75  Max: 91  Resp  Av.8  Min: 19  Max: 34  SpO2  Av.1 %  Min: 88 %  Max: 100 %  Height  Av' 6" (167.6 cm)  Min: 5' 6" (167.6 cm)  Max: 5' 6" (167.6 cm)  Weight  Av kg (139 lb)  Min: 63 kg (139 lb)  Max: 63 kg (139 lb)  Body mass index is 22.44 kg/m².  No intake/output data recorded.    Physical Exam  Constitutional:       General: She is not in acute distress.     Appearance: She is ill-appearing. She is not diaphoretic.   HENT:      Head: Normocephalic and atraumatic.   Eyes:      Extraocular Movements: Extraocular movements intact.      Pupils: Pupils are equal, round, and reactive to light.   Pulmonary:      Effort: Respiratory distress present.      Comments: Port to L side of chest  Abdominal:      General: Abdomen is flat. Bowel sounds are normal. There is no distension.      Palpations: Abdomen is soft.   Musculoskeletal:      Right lower leg: No edema.      Left lower leg: No edema.   Neurological:      General: No focal deficit present.      Mental Status: She is alert and oriented to person, place, and time.         Laboratory:  Most Recent Data:  CBC:   Lab Results   Component Value Date    WBC 9.18 2024    HGB 14.5 2024    HCT 43.2 2024     2024    MCV 95 2024    RDW 13.6 2024     WBC Differential:     BMP:   Lab Results   Component Value Date     2024    K 4.6 2024     2024    CO2 24 2024    BUN 19 2024    CREATININE 1.2 2024     (H) 2024    CALCIUM 10.3 2024    MG 1.9 2024    PHOS 3.4 2020     LFTs:   Lab Results   Component Value Date    PROT 7.5 2024    ALBUMIN 3.7 2024    BILITOT 0.2 2024    AST 21 2024    ALKPHOS 60 2024    ALT 13 2024     Coags:   Lab Results   Component Value Date    INR 1.0 2024     FLP: "   Lab Results   Component Value Date    CHOL 250 (H) 02/06/2023    HDL 57 02/06/2023    LDLCALC 169.0 (H) 02/06/2023    TRIG 120 02/06/2023    CHOLHDL 22.8 02/06/2023     DM:   Lab Results   Component Value Date    HGBA1C 5.8 (H) 05/29/2019    HGBA1C 5.4 03/07/2019    LDLCALC 169.0 (H) 02/06/2023    CREATININE 1.2 01/07/2024     Thyroid:   Lab Results   Component Value Date    TSH 1.196 01/03/2024    FREET4 1.18 01/03/2024     Anemia:   Lab Results   Component Value Date    MHPVDZOF66 543 07/26/2021     Cardiac:   Lab Results   Component Value Date    TROPONINI 0.006 01/07/2024     (H) 01/07/2024     Urinalysis:   Lab Results   Component Value Date    LABURIN NO SIGNIFICANT GROWTH 03/10/2011    COLORU Yellow 11/03/2023    SPECGRAV 1.030 11/03/2023    NITRITE Negative 11/03/2023    GLUCOSEU NEGATIVE 11/03/2017    KETONESU Negative 11/03/2023    UROBILINOGEN Negative 08/23/2022    BILIRUBINUR NEGATIVE 11/03/2017    WBCUA 0 08/23/2022       Trended Lab Data:  Recent Labs   Lab 01/03/24  1407 01/07/24  1310   WBC 8.72 9.18   HGB 13.6 14.5   HCT 40.9 43.2    281   MCV 97 95   RDW 13.3 13.6    141   K 5.5* 4.6    103   CO2 26 24   BUN 17 19   CREATININE 1.2 1.2   * 137*   PROT 6.8 7.5   ALBUMIN 3.2* 3.7   BILITOT 0.3 0.2   AST 16 21   ALKPHOS 54* 60   ALT 10 13       Trended Cardiac Data:  Recent Labs   Lab 01/07/24  1310   TROPONINI 0.006   *       Microbiology Data:      Other Results:  EKG (my interpretation): .    Radiology:  Imaging Results              X-Ray Chest 1 View (Final result)  Result time 01/07/24 14:16:21      Final result by Juan Sousa MD (01/07/24 14:16:21)                   Impression:      1. Patchy regions of consolidation/masslike foci throughout the right mid to lower lung zones, similar to the previous CT, correlation is advised.  Follow-up recommended.      Electronically signed by: Juan Sousa MD  Date:    01/07/2024  Time:    14:16                Narrative:    EXAMINATION:  XR CHEST 1 VIEW    CLINICAL HISTORY:  Shortness of breath;    TECHNIQUE:  Single frontal view of the chest was performed.    COMPARISON:  07/19/2023, chest CT 12/28/2023    FINDINGS:  Left chest wall port catheter courses cranially, beyond the field of view, then terminates at the level of the distal SVC, stable in configuration.  The cardiomediastinal silhouette is not enlarged noting calcification of the aorta..  There is no pleural effusion.  The trachea is midline.  The lungs are symmetrically expanded bilaterally with coarse interstitial attenuation bilaterally.  There is surgical change of right upper lobectomy.  There is consolidation throughout the right mid and lower lung zones, similar to the previous CT..  There is suspected bullous change involving the right upper lung zone..  There is no pneumothorax.  The osseous structures are remarkable for degenerative change..                                          Code Status:     Full    Dottie Kirkland MD  [facility]

## 2024-01-07 NOTE — ED PROVIDER NOTES
"Encounter Date: 1/7/2024       History     Chief Complaint   Patient presents with    Shortness of Breath     SOB with cough for past 2 weeks. States PCP has prescribed multiple meds with no improvement. Presents awake, alert with O2 in progress. Resp unlabored. C/o right sided CP with h/o right sided lung CA - states pain in this area is common for her. Denies home O2. States she has been using nebulizer at home. Awake, alert, oriented.     Patient is a 73-year-old female with a history of lung cancer who presents with worsening shortness of breath.  She reports oxygen saturations in the 80s at home.  She has not on home oxygen.  She has also had a cough productive for thick clear sputum.  No fever.  She has a recent diagnosis of pneumonia, on antibiotics.      Review of patient's allergies indicates:   Allergen Reactions    Adhesive Itching, Rash and Blisters     INCLUDES EKG PADS    Ciprofloxacin Hives     Hives    Iodinated contrast media     Phenergan plain Other (See Comments)     "crazy behavior"    Phenergan [promethazine]     Tramadol     Vancomycin analogues      Red man syndrome     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     Squamous cell carcinoma of skin 10/05/2020    left medial thigh    Therapy     Thyroid nodule     Ulcerative colitis with complication      Past Surgical History:   Procedure Laterality Date    ADENOIDECTOMY  17yo    ANTERIOR CERVICAL DISCECTOMY W/ FUSION N/A 10/15/2018    Procedure: DISCECTOMY, SPINE, CERVICAL, ANTERIOR APPROACH, WITH FUSION C6/7  Depuy SNS: [employer]/SSE[employer]/Vocal Cord Regular OR table;  Surgeon: Greyson Bansal MD;  Location: [facility] OR [address] [place] [address];  Service: " Neurosurgery;  Laterality: N/A;    APPENDECTOMY      BONE RESECTION, RIB  1980    BREAST BIOPSY Left     at least 40yrs ago in her 20's    BREAST CYST ASPIRATION      BREAST CYST EXCISION      COLONOSCOPY      ENDOBRONCHIAL ULTRASOUND N/A 7/10/2018    Procedure: ULTRASOUND, ENDOBRONCHIAL;  Surgeon: Sri Hearn MD;  Location: [facility] OR 2ND FLR;  Service: Pulmonary;  Laterality: N/A;    ENDOBRONCHIAL ULTRASOUND N/A 9/24/2019    Procedure: ENDOBRONCHIAL ULTRASOUND (EBUS);  Surgeon: Sri Hearn MD;  [facility];  Service: Pulmonary;  Laterality: N/A;    ENDOSCOPIC MUCOSAL RESECTION OF COLON N/A 9/11/2020    Procedure: RESECTION, MUCOSA, COLON, ENDOSCOPIC;  Surgeon: Lilibeth Carbajal MD;  Location: [facility] (2ND FLR);  Service: Endoscopy;  Laterality: N/A;    ENDOSCOPIC ULTRASOUND OF LOWER GASTROINTESTINAL TRACT N/A 4/19/2021    Procedure: ULTRASOUND, LOWER GI TRACT, ENDOSCOPIC;  Surgeon: Lilibeth Carbajal MD;  Location: [facility];  Service: Endoscopy;  Laterality: N/A;    ENDOSCOPIC ULTRASOUND OF LOWER GASTROINTESTINAL TRACT N/A 6/25/2021    Procedure: ULTRASOUND, LOWER GI TRACT, ENDOSCOPIC;  Surgeon: Silvino Christopher MD;  Location: [facility];  Service: Endoscopy;  Laterality: N/A;  Needs Rapid MP    FLEXIBLE SIGMOIDOSCOPY  06/11/2020    with biopsies    FLEXIBLE SIGMOIDOSCOPY N/A 6/11/2020    Procedure: SIGMOIDOSCOPY, FLEXIBLE;  Surgeon: Gely Yeh MD;  Location: [facility];  Service: Endoscopy;  Laterality: N/A;    FLEXIBLE SIGMOIDOSCOPY N/A 4/19/2021    Procedure: SIGMOIDOSCOPY-FLEXIBLE;  Surgeon: Lilibeth Carbajal MD;  Location: [facility];  Service: Endoscopy;  Laterality: N/A;  Covid 4/16 Brian MP    FLEXIBLE SIGMOIDOSCOPY N/A 6/3/2022    Procedure: SIGMOIDOSCOPY-FLEXIBLE;  Surgeon: Francesco Clark MD;  Location: [facility] (4TH FLR);  Service: Endoscopy;  Laterality: N/A;  vacc-inst portal-tb    HYSTERECTOMY      @47yrs of age    INSERTION OF TUNNELED CENTRAL VENOUS CATHETER (CVC)  WITH SUBCUTANEOUS PORT N/A 2018    Procedure: [id number];  Surgeon: Ronald Diagnostic Provider;  Location: [facility] OR 2ND FLR;  Service: Radiology;  Laterality: N/A;    INSERTION OF VENOUS ACCESS PORT N/A 3/15/2021    Procedure: INSERTION, VENOUS ACCESS PORT;  Surgeon: Chang Mathews MD;  [facility];  Service: General;  Laterality: N/A;  NEEDS CONSENT.    KIDNEY SURGERY      19yo    MEDIPORT REMOVAL N/A 3/15/2021    Procedure: REMOVAL, CATHETER, CENTRAL VENOUS, TUNNELED, WITH PORT;  Surgeon: Chang Mathews MD;  [facility];  Service: General;  Laterality: N/A;    OOPHORECTOMY      @47yrs of age    SKIN BIOPSY      TONSILLECTOMY      UPPER GASTROINTESTINAL ENDOSCOPY      VIDEO-ASSISTED THORACOSCOPIC LOBECTOMY Right 2018    Procedure: VATS, WITH LOBECTOMY Possible chest wall resection;  Surgeon: Philip Messina MD;  [facility];  Service: Thoracic;  Laterality: Right;  Have open pan available.     Family History   Problem Relation Age of Onset    Stroke Mother     Cataracts Mother     Cancer Father         colon cancer    Colon cancer Father     Diabetes Brother     Heart failure Brother     Hypertension Brother     Heart attack Paternal Aunt     Heart attack Maternal Grandfather     Diabetes Paternal Aunt     Anxiety disorder Paternal Grandmother         agoraphobia    Anesthesia problems Neg Hx     Blindness Neg Hx     Amblyopia Neg Hx     Glaucoma Neg Hx     Macular degeneration Neg Hx     Retinal detachment Neg Hx     Strabismus Neg Hx     Thyroid disease Neg Hx     Melanoma Neg Hx      Social History     Tobacco Use    Smoking status: Former     Current packs/day: 0.00     Average packs/day: 1 pack/day for 30.0 years (30.0 ttl pk-yrs)     Types: Cigarettes     Start date: 1985     Quit date: 2015     Years since quittin.6    Smokeless tobacco: Never   Substance Use Topics    Alcohol use: Not Currently     Alcohol/week: 0.0  standard drinks of alcohol     Comment: socially     Drug use: No     Review of Systems   Constitutional:  Negative for fever.   Respiratory:  Positive for cough and shortness of breath.    Cardiovascular:  Positive for chest pain.       Physical Exam     Initial Vitals [01/07/24 1129]   BP Pulse Resp Temp SpO2   (!) 161/88 82 20 98.4 °F (36.9 °C) 100 %      MAP       --         Physical Exam    Nursing note and vitals reviewed.  Constitutional: No distress.   HENT:   Head: Atraumatic.   Eyes: EOM are normal.   Neck: Neck supple.   Cardiovascular:  Normal rate, regular rhythm and normal heart sounds.           Pulmonary/Chest:   Coarse breath sounds mid lung fields to bases bilaterally.   Abdominal: Abdomen is soft.   Musculoskeletal:         General: No tenderness or edema.      Cervical back: Neck supple.     Neurological: She is alert and oriented to person, place, and time.   Skin: Skin is warm and dry.   Psychiatric: Thought content normal.         ED Course   Critical Care    Date/Time: 1/7/2024 4:00 PM    Performed by: Tonio Tang MD  Authorized by: Brii Lance MD  Direct patient critical care time: 60 minutes  Additional history critical care time: 5 minutes  Ordering / reviewing critical care time: 15 minutes  Documentation critical care time: 15 minutes  Consulting other physicians critical care time: 5 minutes  Total critical care time (exclusive of procedural time) : 100 minutes  Critical care was time spent personally by me on the following activities: evaluation of patient's response to treatment, discussions with primary provider, examination of patient, obtaining history from patient or surrogate, pulse oximetry and review of old charts.        Labs Reviewed   CBC W/ AUTO DIFFERENTIAL - Abnormal; Notable for the following components:       Result Value    MCH 31.9 (*)     Gran # (ANC) 8.4 (*)     Immature Grans (Abs) 0.05 (*)     Lymph # 0.4 (*)     Mono # 0.2 (*)     Gran  % 92.0 (*)     Lymph % 4.8 (*)     Mono % 2.6 (*)     All other components within normal limits   COMPREHENSIVE METABOLIC PANEL - Abnormal; Notable for the following components:    Glucose 137 (*)     eGFR 48 (*)     All other components within normal limits   B-TYPE NATRIURETIC PEPTIDE - Abnormal; Notable for the following components:     (*)     All other components within normal limits   BASIC METABOLIC PANEL - Abnormal; Notable for the following components:    Glucose 123 (*)     Creatinine 1.5 (*)     eGFR 37 (*)     All other components within normal limits   CBC W/ AUTO DIFFERENTIAL - Abnormal; Notable for the following components:    MCH 31.5 (*)     Immature Granulocytes 0.7 (*)     Gran # (ANC) 8.2 (*)     Immature Grans (Abs) 0.08 (*)     Mono # 1.1 (*)     Lymph % 17.5 (*)     All other components within normal limits   CULTURE, BLOOD   CULTURE, BLOOD   CULTURE, RESPIRATORY   CK   TROPONIN I   PROTIME-INR   MAGNESIUM   LACTIC ACID, PLASMA   TROPONIN I   MAGNESIUM   PHOSPHORUS   LEGIONELLA ANTIGEN, URINE RANDOM     EKG Readings: (Independently Interpreted)   Initial Reading: No STEMI. Rhythm: Normal Sinus Rhythm. Heart Rate: 81. ST Segment Depression: V3, V4 and V5. T Waves Flipped: V5 and V4.       Imaging Results              CT Chest Without Contrast (Final result)  Result time 01/07/24 22:45:52      Final result by Sheng Garcia MD (01/07/24 22:45:52)                   Impression:      No significant change in the opacification in the remaining right lung both with consolidation and ground-glass reticular interstitial type change.  Correlate for lymphangitic spread.      Electronically signed by: Sheng Garcia  Date:    01/07/2024  Time:    22:45               Narrative:    EXAMINATION:  CT CHEST WITHOUT CONTRAST    CLINICAL HISTORY:  concern for post-obstructive pneumonia;    TECHNIQUE:  Low dose axial images, sagittal and coronal reformations were obtained from the thoracic inlet to  the lung bases. Contrast was not administered.    COMPARISON:  Chest x-ray, same day, CT chest, 12/28/2023.    FINDINGS:  Consolidation in the right lower lobe most severely in the superior segment with adjacent loculated pleural fluid.  More reticular opacity throughout the right lower lobe with patchy areas of ground-glass opacity in the middle lobe on the right.    The upper lobe postsurgical change and remaining lung is spared as is the left lung.  Small pneumatocele is a in the left upper lobe.    No mediastinal mass or lymph node enlargement is evident.  Left chest wall Port-A-Cath is noted.  The heart pericardium and great vessels unremarkable.    Numerous low-density lesions throughout the liver with the largest in the left lobe                                       X-Ray Chest 1 View (Final result)  Result time 01/07/24 14:16:21      Final result by Juan Sousa MD (01/07/24 14:16:21)                   Impression:      1. Patchy regions of consolidation/masslike foci throughout the right mid to lower lung zones, similar to the previous CT, correlation is advised.  Follow-up recommended.      Electronically signed by: Juan Sousa MD  Date:    01/07/2024  Time:    14:16               Narrative:    EXAMINATION:  XR CHEST 1 VIEW    CLINICAL HISTORY:  Shortness of breath;    TECHNIQUE:  Single frontal view of the chest was performed.    COMPARISON:  07/19/2023, chest CT 12/28/2023    FINDINGS:  Left chest wall port catheter courses cranially, beyond the field of view, then terminates at the level of the distal SVC, stable in configuration.  The cardiomediastinal silhouette is not enlarged noting calcification of the aorta..  There is no pleural effusion.  The trachea is midline.  The lungs are symmetrically expanded bilaterally with coarse interstitial attenuation bilaterally.  There is surgical change of right upper lobectomy.  There is consolidation throughout the right mid and lower lung zones,  similar to the previous CT..  There is suspected bullous change involving the right upper lung zone..  There is no pneumothorax.  The osseous structures are remarkable for degenerative change..                                       Medications   sulfamethoxazole-trimethoprim 800-160mg per tablet 1 tablet (1 tablet Oral Given 1/8/24 0908)   predniSONE tablet 60 mg (60 mg Oral Given 1/8/24 1004)   ondansetron tablet 8 mg (has no administration in time range)   atenoloL tablet 25 mg (25 mg Oral Given 1/8/24 1631)   LORazepam tablet 0.5 mg (0.5 mg Oral Given 1/8/24 0908)   sodium chloride 3% nebulizer solution 4 mL (has no administration in time range)   sodium chloride 0.9% flush 10 mL (has no administration in time range)   enoxaparin injection 40 mg (40 mg Subcutaneous Given 1/8/24 1632)   acetaminophen tablet 650 mg (has no administration in time range)   piperacillin-tazobactam (ZOSYN) 4.5 g in dextrose 5 % in water (D5W) 100 mL IVPB (MB+) (0 g Intravenous Stopped 1/8/24 1302)   linezolid 600 mg/300 mL IVPB 600 mg (600 mg Intravenous New Bag 1/8/24 1652)   HYDROcodone-acetaminophen 7.5-325 mg per tablet 1 tablet (1 tablet Oral Given 1/7/24 2253)   HYDROcodone-acetaminophen 7.5-325 mg per tablet 2 tablet (2 tablets Oral Not Given 1/7/24 2246)   amLODIPine tablet 5 mg (5 mg Oral Given 1/7/24 2253)   mupirocin 2 % ointment ( Nasal Given 1/8/24 0908)   famotidine tablet 20 mg (20 mg Oral Given 1/8/24 0908)   levalbuterol nebulizer solution 1.25 mg (1.25 mg Nebulization Given 1/8/24 1601)   naloxone 0.4 mg/mL injection 1 mg (has no administration in time range)   hydrALAZINE tablet 25 mg (25 mg Oral Not Given 1/8/24 1400)   HYDROmorphone injection 0.5 mg (0.5 mg Intravenous Given 1/8/24 1259)   levalbuterol nebulizer solution 1.25 mg (1.25 mg Nebulization Given 1/7/24 1344)   morphine injection 4 mg (4 mg Intravenous Given 1/7/24 1342)   ondansetron injection 4 mg (4 mg Intravenous Given 1/7/24 1340)   ceFEPIme  (MAXIPIME) 2 g in dextrose 5 % in water (D5W) 100 mL IVPB (MB+) (0 g Intravenous Stopped 1/7/24 1630)     Medical Decision Making  DDx  :  Including but not limited to :  Lung cancer, postobstructive pneumonia, pleural effusion, pulmonary embolism, anemia, dehydration.      Emergent evaluation of a 73-year-old female with lung cancer presenting with increasing shortness of breath and low oxygen saturations at home.  Chest x-ray with consolidation of the right middle to lower lobes.  Blood cultures have been drawn and the patient has been given 2 g of cefepime.  Patient is not on oxygen at home and I am hesitant to discharge her due to the fact that she has been recording oxygen saturations in the mid 80s at home.  This has been discussed with the [facility] hospitalist, who will admit.    Amount and/or Complexity of Data Reviewed  Labs: ordered.     Details: CBC unremarkable.  BNP is 155.  Radiology: ordered.     Details: Chest x-ray with consolidation of the right middle to lower lung fields.  Discussion of management or test interpretation with external provider(s): Patient's presentation, vital signs imaging and pertinent lab work have been discussed with the [facility] hospitalist.    Risk  Prescription drug management.  Decision regarding hospitalization.                                      Clinical Impression:  Final diagnoses:  [R06.02] Shortness of breath          ED Disposition Condition    Admit                 Tonio Tang MD  01/08/24 1925

## 2024-01-08 ENCOUNTER — PATIENT MESSAGE (OUTPATIENT)
Dept: HEMATOLOGY/ONCOLOGY | Facility: CLINIC | Age: 74
End: 2024-01-08
Payer: MEDICARE

## 2024-01-08 LAB
ANION GAP SERPL CALC-SCNC: 12 MMOL/L (ref 8–16)
BASOPHILS # BLD AUTO: 0.02 K/UL (ref 0–0.2)
BASOPHILS NFR BLD: 0.2 % (ref 0–1.9)
BUN SERPL-MCNC: 22 MG/DL (ref 8–23)
CALCIUM SERPL-MCNC: 9.8 MG/DL (ref 8.7–10.5)
CHLORIDE SERPL-SCNC: 102 MMOL/L (ref 95–110)
CO2 SERPL-SCNC: 23 MMOL/L (ref 23–29)
CREAT SERPL-MCNC: 1.5 MG/DL (ref 0.5–1.4)
DIFFERENTIAL METHOD BLD: ABNORMAL
EOSINOPHIL # BLD AUTO: 0 K/UL (ref 0–0.5)
EOSINOPHIL NFR BLD: 0.1 % (ref 0–8)
ERYTHROCYTE [DISTWIDTH] IN BLOOD BY AUTOMATED COUNT: 13.9 % (ref 11.5–14.5)
EST. GFR  (NO RACE VARIABLE): 37 ML/MIN/1.73 M^2
GLUCOSE SERPL-MCNC: 123 MG/DL (ref 70–110)
HCT VFR BLD AUTO: 43.5 % (ref 37–48.5)
HGB BLD-MCNC: 14.1 G/DL (ref 12–16)
IMM GRANULOCYTES # BLD AUTO: 0.08 K/UL (ref 0–0.04)
IMM GRANULOCYTES NFR BLD AUTO: 0.7 % (ref 0–0.5)
LYMPHOCYTES # BLD AUTO: 2 K/UL (ref 1–4.8)
LYMPHOCYTES NFR BLD: 17.5 % (ref 18–48)
MAGNESIUM SERPL-MCNC: 2 MG/DL (ref 1.6–2.6)
MCH RBC QN AUTO: 31.5 PG (ref 27–31)
MCHC RBC AUTO-ENTMCNC: 32.4 G/DL (ref 32–36)
MCV RBC AUTO: 97 FL (ref 82–98)
MONOCYTES # BLD AUTO: 1.1 K/UL (ref 0.3–1)
MONOCYTES NFR BLD: 9.5 % (ref 4–15)
NEUTROPHILS # BLD AUTO: 8.2 K/UL (ref 1.8–7.7)
NEUTROPHILS NFR BLD: 72 % (ref 38–73)
NRBC BLD-RTO: 0 /100 WBC
PHOSPHATE SERPL-MCNC: 3.8 MG/DL (ref 2.7–4.5)
PLATELET # BLD AUTO: 288 K/UL (ref 150–450)
PMV BLD AUTO: 9.6 FL (ref 9.2–12.9)
POTASSIUM SERPL-SCNC: 4.5 MMOL/L (ref 3.5–5.1)
RBC # BLD AUTO: 4.47 M/UL (ref 4–5.4)
SODIUM SERPL-SCNC: 137 MMOL/L (ref 136–145)
WBC # BLD AUTO: 11.42 K/UL (ref 3.9–12.7)

## 2024-01-08 PROCEDURE — 87205 SMEAR GRAM STAIN: CPT | Performed by: INTERNAL MEDICINE

## 2024-01-08 PROCEDURE — 63600175 PHARM REV CODE 636 W HCPCS: Performed by: INTERNAL MEDICINE

## 2024-01-08 PROCEDURE — 25000242 PHARM REV CODE 250 ALT 637 W/ HCPCS: Performed by: INTERNAL MEDICINE

## 2024-01-08 PROCEDURE — 80048 BASIC METABOLIC PNL TOTAL CA: CPT | Performed by: INTERNAL MEDICINE

## 2024-01-08 PROCEDURE — 83735 ASSAY OF MAGNESIUM: CPT | Performed by: INTERNAL MEDICINE

## 2024-01-08 PROCEDURE — 25000003 PHARM REV CODE 250: Performed by: NURSE PRACTITIONER

## 2024-01-08 PROCEDURE — 87106 FUNGI IDENTIFICATION YEAST: CPT | Performed by: INTERNAL MEDICINE

## 2024-01-08 PROCEDURE — 99222 1ST HOSP IP/OBS MODERATE 55: CPT | Mod: GC,,, | Performed by: INTERNAL MEDICINE

## 2024-01-08 PROCEDURE — 84100 ASSAY OF PHOSPHORUS: CPT | Performed by: INTERNAL MEDICINE

## 2024-01-08 PROCEDURE — 85025 COMPLETE CBC W/AUTO DIFF WBC: CPT | Performed by: INTERNAL MEDICINE

## 2024-01-08 PROCEDURE — 99900035 HC TECH TIME PER 15 MIN (STAT)

## 2024-01-08 PROCEDURE — 94640 AIRWAY INHALATION TREATMENT: CPT

## 2024-01-08 PROCEDURE — 25000003 PHARM REV CODE 250: Performed by: INTERNAL MEDICINE

## 2024-01-08 PROCEDURE — 25000242 PHARM REV CODE 250 ALT 637 W/ HCPCS

## 2024-01-08 PROCEDURE — 11000001 HC ACUTE MED/SURG PRIVATE ROOM

## 2024-01-08 PROCEDURE — 87070 CULTURE OTHR SPECIMN AEROBIC: CPT | Performed by: INTERNAL MEDICINE

## 2024-01-08 PROCEDURE — 27000221 HC OXYGEN, UP TO 24 HOURS

## 2024-01-08 PROCEDURE — 27100171 HC OXYGEN HIGH FLOW UP TO 24 HOURS

## 2024-01-08 PROCEDURE — 87449 NOS EACH ORGANISM AG IA: CPT | Performed by: INTERNAL MEDICINE

## 2024-01-08 RX ORDER — HYDROMORPHONE HYDROCHLORIDE 1 MG/ML
0.5 INJECTION, SOLUTION INTRAMUSCULAR; INTRAVENOUS; SUBCUTANEOUS EVERY 4 HOURS PRN
Status: DISCONTINUED | OUTPATIENT
Start: 2024-01-08 | End: 2024-01-11

## 2024-01-08 RX ORDER — NALOXONE HCL 0.4 MG/ML
1 VIAL (ML) INJECTION
Status: DISCONTINUED | OUTPATIENT
Start: 2024-01-08 | End: 2024-01-14 | Stop reason: HOSPADM

## 2024-01-08 RX ORDER — LEVALBUTEROL 1.25 MG/.5ML
1.25 SOLUTION, CONCENTRATE RESPIRATORY (INHALATION) EVERY 4 HOURS
Status: DISCONTINUED | OUTPATIENT
Start: 2024-01-08 | End: 2024-01-14 | Stop reason: HOSPADM

## 2024-01-08 RX ORDER — FAMOTIDINE 20 MG/1
20 TABLET, FILM COATED ORAL EVERY OTHER DAY
Status: DISCONTINUED | OUTPATIENT
Start: 2024-01-08 | End: 2024-01-14 | Stop reason: HOSPADM

## 2024-01-08 RX ORDER — SULFAMETHOXAZOLE AND TRIMETHOPRIM 800; 160 MG/1; MG/1
1 TABLET ORAL
Status: DISCONTINUED | OUTPATIENT
Start: 2024-01-08 | End: 2024-01-14 | Stop reason: HOSPADM

## 2024-01-08 RX ORDER — HYDRALAZINE HYDROCHLORIDE 25 MG/1
25 TABLET, FILM COATED ORAL EVERY 8 HOURS
Status: DISCONTINUED | OUTPATIENT
Start: 2024-01-08 | End: 2024-01-14 | Stop reason: HOSPADM

## 2024-01-08 RX ORDER — LOSARTAN POTASSIUM 50 MG/1
50 TABLET ORAL 2 TIMES DAILY
Status: DISCONTINUED | OUTPATIENT
Start: 2024-01-08 | End: 2024-01-08

## 2024-01-08 RX ORDER — LEVALBUTEROL 1.25 MG/.5ML
1.25 SOLUTION, CONCENTRATE RESPIRATORY (INHALATION)
Status: DISCONTINUED | OUTPATIENT
Start: 2024-01-08 | End: 2024-01-08

## 2024-01-08 RX ADMIN — LINEZOLID 600 MG: 600 INJECTION, SOLUTION INTRAVENOUS at 04:01

## 2024-01-08 RX ADMIN — PIPERACILLIN AND TAZOBACTAM 4.5 G: 4; .5 INJECTION, POWDER, LYOPHILIZED, FOR SOLUTION INTRAVENOUS; PARENTERAL at 08:01

## 2024-01-08 RX ADMIN — ENOXAPARIN SODIUM 40 MG: 40 INJECTION SUBCUTANEOUS at 04:01

## 2024-01-08 RX ADMIN — ATENOLOL 25 MG: 25 TABLET ORAL at 04:01

## 2024-01-08 RX ADMIN — PIPERACILLIN AND TAZOBACTAM 4.5 G: 4; .5 INJECTION, POWDER, LYOPHILIZED, FOR SOLUTION INTRAVENOUS; PARENTERAL at 12:01

## 2024-01-08 RX ADMIN — HYDROMORPHONE HYDROCHLORIDE 0.5 MG: 1 INJECTION, SOLUTION INTRAMUSCULAR; INTRAVENOUS; SUBCUTANEOUS at 12:01

## 2024-01-08 RX ADMIN — LORAZEPAM 0.5 MG: 0.5 TABLET ORAL at 07:01

## 2024-01-08 RX ADMIN — PIPERACILLIN AND TAZOBACTAM 4.5 G: 4; .5 INJECTION, POWDER, LYOPHILIZED, FOR SOLUTION INTRAVENOUS; PARENTERAL at 06:01

## 2024-01-08 RX ADMIN — ALBUTEROL SULFATE 2.5 MG: 2.5 SOLUTION RESPIRATORY (INHALATION) at 03:01

## 2024-01-08 RX ADMIN — MUPIROCIN: 20 OINTMENT TOPICAL at 09:01

## 2024-01-08 RX ADMIN — PREDNISONE 60 MG: 20 TABLET ORAL at 10:01

## 2024-01-08 RX ADMIN — LINEZOLID 600 MG: 600 INJECTION, SOLUTION INTRAVENOUS at 06:01

## 2024-01-08 RX ADMIN — ATENOLOL 25 MG: 25 TABLET ORAL at 09:01

## 2024-01-08 RX ADMIN — SULFAMETHOXAZOLE AND TRIMETHOPRIM 1 TABLET: 800; 160 TABLET ORAL at 09:01

## 2024-01-08 RX ADMIN — HYDRALAZINE HYDROCHLORIDE 25 MG: 25 TABLET, FILM COATED ORAL at 01:01

## 2024-01-08 RX ADMIN — LEVALBUTEROL HYDROCHLORIDE 1.25 MG: 1.25 SOLUTION, CONCENTRATE RESPIRATORY (INHALATION) at 12:01

## 2024-01-08 RX ADMIN — HYDROMORPHONE HYDROCHLORIDE 0.5 MG: 1 INJECTION, SOLUTION INTRAMUSCULAR; INTRAVENOUS; SUBCUTANEOUS at 09:01

## 2024-01-08 RX ADMIN — LEVALBUTEROL HYDROCHLORIDE 1.25 MG: 1.25 SOLUTION, CONCENTRATE RESPIRATORY (INHALATION) at 07:01

## 2024-01-08 RX ADMIN — ATENOLOL 25 MG: 25 TABLET ORAL at 08:01

## 2024-01-08 RX ADMIN — FAMOTIDINE 20 MG: 20 TABLET ORAL at 09:01

## 2024-01-08 RX ADMIN — LEVALBUTEROL HYDROCHLORIDE 1.25 MG: 1.25 SOLUTION, CONCENTRATE RESPIRATORY (INHALATION) at 04:01

## 2024-01-08 RX ADMIN — LEVALBUTEROL HYDROCHLORIDE 1.25 MG: 1.25 SOLUTION, CONCENTRATE RESPIRATORY (INHALATION) at 09:01

## 2024-01-08 RX ADMIN — LORAZEPAM 0.5 MG: 0.5 TABLET ORAL at 09:01

## 2024-01-08 RX ADMIN — MORPHINE SULFATE 4 MG: 4 INJECTION INTRAVENOUS at 03:01

## 2024-01-08 RX ADMIN — LEVALBUTEROL HYDROCHLORIDE 1.25 MG: 1.25 SOLUTION, CONCENTRATE RESPIRATORY (INHALATION) at 11:01

## 2024-01-08 NOTE — HPI
Alesha Toney is a 73 y.o.  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 The patient presented to [facility] on 1/7/2024 with a primary complaint of Shortness of Breath (SOB with cough for past 2 weeks. States PCP has prescribed multiple meds with no improvement. Presents awake, alert with O2 in progress. Resp unlabored. C/o right sided CP with h/o right sided lung CA - states pain in this area is common for her. Denies home O2. States she has been using nebulizer at home. Awake, alert, oriented.)     The patient was in their usual state of health until 12/25 when she presented to the ED with shortness of breath and increased R sided chest pain. She had a CT chest and V/Q scan at the time and had close follow up with her oncologist and was started on prednisone for possible pneumonitis with bactrim ppx. She has had a progressive cough with worsening R sided chest pain and noted her pulse ox to be in the 80s at home. She has had clear-whitish thick sputum. No fevers, chills, sweats. No calf swelling. At this time due to her medication side effects she is not able to continue with Keytruda or Cyramza.

## 2024-01-08 NOTE — SUBJECTIVE & OBJECTIVE
Interval History: SOB is worsening overnight. Mild TRAM today    Review of Systems   Constitutional:  Negative for chills and fever.   HENT:  Negative for congestion and sore throat.    Respiratory:  Positive for cough, shortness of breath and wheezing.    Cardiovascular:  Positive for chest pain. Negative for leg swelling.   Gastrointestinal:  Negative for nausea and vomiting.   Genitourinary:  Negative for difficulty urinating, dysuria and flank pain.   Musculoskeletal:  Negative for myalgias.   Skin:  Negative for rash and wound.   Neurological:  Negative for dizziness, light-headedness and headaches.   Psychiatric/Behavioral:  Negative for agitation and behavioral problems.      Objective:     Vital Signs (Most Recent):  Temp: 98.6 °F (37 °C) (01/08/24 1630)  Pulse: 95 (01/08/24 1631)  Resp: 20 (01/08/24 1631)  BP: (!) 169/72 (01/08/24 1631)  SpO2: 95 % (01/08/24 1631) Vital Signs (24h Range):  Temp:  [97.7 °F (36.5 °C)-99.4 °F (37.4 °C)] 98.6 °F (37 °C)  Pulse:  [] 95  Resp:  [14-29] 20  SpO2:  [92 %-100 %] 95 %  BP: (124-199)/(57-98) 169/72     Weight: 63 kg (139 lb)  Body mass index is 22.44 kg/m².    Intake/Output Summary (Last 24 hours) at 1/8/2024 1753  Last data filed at 1/8/2024 1647  Gross per 24 hour   Intake 400 ml   Output 325 ml   Net 75 ml         Physical Exam  Constitutional:       Appearance: Normal appearance. She is ill-appearing.   HENT:      Head: Normocephalic and atraumatic.   Eyes:      Extraocular Movements: Extraocular movements intact.      Pupils: Pupils are equal, round, and reactive to light.   Cardiovascular:      Rate and Rhythm: Regular rhythm. Tachycardia present.   Pulmonary:      Effort: Respiratory distress present.      Breath sounds: Rhonchi and rales present.   Abdominal:      General: Abdomen is flat. Bowel sounds are normal. There is no distension.      Palpations: Abdomen is soft.   Musculoskeletal:      Right lower leg: No edema.      Left lower leg: No edema.    Neurological:      General: No focal deficit present.      Mental Status: She is alert and oriented to person, place, and time.             Significant Labs: All pertinent labs within the past 24 hours have been reviewed.  CBC:   Recent Labs   Lab 01/07/24  1310 01/08/24  0654   WBC 9.18 11.42   HGB 14.5 14.1   HCT 43.2 43.5    288     CMP:   Recent Labs   Lab 01/07/24  1310 01/08/24  0654    137   K 4.6 4.5    102   CO2 24 23   * 123*   BUN 19 22   CREATININE 1.2 1.5*   CALCIUM 10.3 9.8   PROT 7.5  --    ALBUMIN 3.7  --    BILITOT 0.2  --    ALKPHOS 60  --    AST 21  --    ALT 13  --    ANIONGAP 14 12       Significant Imaging: I have reviewed all pertinent imaging results/findings within the past 24 hours.

## 2024-01-08 NOTE — ASSESSMENT & PLAN NOTE
Malignant neoplasm of upper lobe, right bronchus or lung  Overview:  Status post resection 8/2018 status post adjuvant chemo     Secondary and unspecified malignant neoplasm of intrathoracic lymph nodes  Recently did not tolerate Keytruda and Cyramza and tx was discontinued recently

## 2024-01-08 NOTE — ED NOTES
Patient identifiers for Alesha Toney checked and correct.  LOC: The patient is awake, alert and aware of environment with an appropriate affect, the patient is oriented x 3 and speaking appropriately.  APPEARANCE: Patient resting quietly and in no acute distress, patient is clean and well groomed, patient's clothing are properly fastened.  SKIN: The skin is warm and dry, patient has normal skin turgor and moist mucus membranes.  MUSKULOSKELETAL: Patient moving all extremities well, no obvious swelling or deformities noted.  RESPIRATORY: Coarse.  Pt on oxygen NC.  CARDIAC: Patient has a normal rate and rhythm.  NEUROLOGIC: PERRL, facial expression is symmetrical, bilateral hand grasp equal and even, normal sensation in all extremities when touched with a finger.

## 2024-01-08 NOTE — ASSESSMENT & PLAN NOTE
Creatine stable for now. BMP reviewed- noted Estimated Creatinine Clearance: 31.3 mL/min (A) (based on SCr of 1.5 mg/dL (H)). according to latest data. Based on current GFR, CKD stage is stage 3 - GFR 30-59.  Monitor UOP and serial BMP and adjust therapy as needed. Renally dose meds. Avoid nephrotoxic medications and procedures.    Mild TRAM, hold ARB, HCTZ  Consider gentle hydration

## 2024-01-08 NOTE — ED NOTES
Patient reports shortness of breath progressively worsening. Pulm saw patient this am and breathing tx orders were modified. Resp called to admin breathing tx. Patient requesting nonrebreather at this time. Non-rebreather placed on patient. O2 sats 96%. Pt refused lung scan. To go later today. MD messaged regarding updates and BP medication requested per patient request. Daughter at bedside. Will cont to monitor

## 2024-01-08 NOTE — HOSPITAL COURSE
1/8/24 Feeling worse. Increased SOB, pain  1/9:  Continues to have symptoms of dyspnea and shortness of breath.  Dr. Dhaliwal note that is appears to be postobstructive pneumonia however continuing prednisone as there may be a component of pneumonitis as well.  ID and following as well.

## 2024-01-08 NOTE — ED NOTES
New purewick placed on patient. Pt denies pain. Continues to do well with pain medication given earlier today. 3L NC humidified O2 in place. Pt continues to wait for bed. Daughter at bedside. Has no questions or concerns. Vs stable. Will cont to monitor.

## 2024-01-08 NOTE — CONSULTS
[place] [facility] Surg  Infectious Disease  Consult Note     Patient Name: Alesha Toney  MRN: [id number]  Code Status: Full Code  Admission Date: 1/7/2024  Hospital Length of Stay: 1 day  Attending Physician: Dottie Kirkland MD  Primary Care Provider: Margo Caldwell MD     Patient information was obtained from patient, past medical records, and ER records.      Infectious Disease Consult Note  Consult performed by: Demetrius Mazariegos MD   Consulting Physician: Dottie Kirkland MD  Reason for Consult: post-obstructive pneumonia, lung adenocarcinoma       Assessment and Plan:     Post-Obstructive Pneumonia  74 yo female with PMH relevant for lung adenocarcinoma (s/p RUL lobectomy in 2018, with recent treatment of Keytruda and Cyramza last month), HTN, ulcerative colitis, and CKD3a admitted for 2 weeks of progressive SOB, with a productive cough producing white sputum and oxygen saturation in the 80s at home. Of note, pt was treated for pneumonitis with Augmentin and Prednisone 05/2023, treated for post-obstructive PNA on 12/28 with Augmentin and Doxycycline, and treated for pneumonitis 1/3 with Bactrim DS and Prednisone. WBC stable since admission (9->11) and no fevers. Pt currently on Zosyn, Linezolid, and Bactrim. Blood cultures show NGTD at 24 hrs. BP elevated, pt currently maintaining SpO2 >92% on 3 L NC.       -Continue Linezolid and Zosyn   -Can discontinue Bactrim, MRSA coverage present from Linezolid              -Follow respiratory culture data  -Continue to follow blood cultures     Thank you for your consult. We will continue to follow. Please contact us if you have any additional questions. Please appreciate any variation in plan noted in attending attestation.       Mark Valladares DO  [facility], PGY-1  Infectious Disease  [place] - Med Surg    History of Present Illness:  Alesha Toney is a 73 y.o.  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 The patient presented to [facility] Medicine on 1/7/2024 with a primary complaint of Shortness of Breath (SOB with cough for past 2 weeks. States PCP has prescribed multiple meds with no improvement. Presents awake, alert with O2 in progress. Resp unlabored. C/o right sided CP with h/o right sided lung CA - states pain in this area is common for her. Denies home O2. States she has been using nebulizer at home. Awake, alert, oriented.)     The patient was in their usual state of health until 12/25 when she presented to the ED with shortness of breath and increased R sided chest pain. She had a CT chest and V/Q scan at the time and had close follow up with her oncologist and was started on prednisone for possible pneumonitis with bactrim ppx. She has had a progressive cough with worsening R sided chest pain and noted her pulse ox to be in the 80s at home. She has had clear-whitish thick sputum. No fevers, chills, sweats. No calf swelling. At this time due to her medication side effects she is not able to continue with Keytruda or Cyramza.       Review of Systems   Constitutional:  Negative for chills and fever.   HENT:  Negative for congestion and sore throat.    Eyes:  Negative for blurred vision.   Respiratory:  Positive for cough, sputum production, shortness of breath and wheezing. Negative for hemoptysis.    Cardiovascular:  Positive for chest pain. Negative for leg swelling.   Gastrointestinal:  Positive for nausea and vomiting.   Genitourinary:  Negative for dysuria, frequency and urgency.   Musculoskeletal:  Negative for back pain and falls.   Neurological:  Negative for loss of consciousness and  headaches.   Psychiatric/Behavioral:  Negative for substance abuse.        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;  Service: Neurosurgery;  Laterality: N/A;    APPENDECTOMY      BONE RESECTION, RIB  1980    BREAST BIOPSY Left     at least 40yrs ago in her 20's    BREAST CYST ASPIRATION      BREAST CYST EXCISION      COLONOSCOPY      ENDOBRONCHIAL ULTRASOUND N/A 7/10/2018    Procedure: ULTRASOUND, ENDOBRONCHIAL;  Surgeon: Sri Hearn MD;  [facility];  Service: Pulmonary;  Laterality: N/A;    ENDOBRONCHIAL ULTRASOUND N/A 9/24/2019    Procedure: ENDOBRONCHIAL ULTRASOUND (EBUS);  Surgeon: Sri Hearn MD;  [facility];  Service: Pulmonary;  Laterality: N/A;    ENDOSCOPIC MUCOSAL RESECTION OF COLON N/A 9/11/2020    Procedure: RESECTION, MUCOSA, COLON, ENDOSCOPIC;  Surgeon: Lilibeth Carbajal MD;  [facility]  Service: Endoscopy;  Laterality: N/A;    ENDOSCOPIC ULTRASOUND OF LOWER GASTROINTESTINAL TRACT N/A 4/19/2021    Procedure: ULTRASOUND, LOWER GI TRACT, ENDOSCOPIC;  Surgeon: Lilibeth Carbajal MD;  Location: [facility];  Service: Endoscopy;   Laterality: N/A;    ENDOSCOPIC ULTRASOUND OF LOWER GASTROINTESTINAL TRACT N/A 6/25/2021    Procedure: ULTRASOUND, LOWER GI TRACT, ENDOSCOPIC;  Surgeon: Silvino Christopher MD;  Location: [facility] ENDO;  Service: Endoscopy;  Laterality: N/A;  Needs Rapid MP    FLEXIBLE SIGMOIDOSCOPY  06/11/2020    with biopsies    FLEXIBLE SIGMOIDOSCOPY N/A 6/11/2020    Procedure: SIGMOIDOSCOPY, FLEXIBLE;  Surgeon: Gely Yeh MD;  Location: [facility] ENDO;  Service: Endoscopy;  Laterality: N/A;    FLEXIBLE SIGMOIDOSCOPY N/A 4/19/2021    Procedure: SIGMOIDOSCOPY-FLEXIBLE;  Surgeon: Lilibeth Carbajal MD;  Location: [facility] ENDO;  Service: Endoscopy;  Laterality: N/A;  Covid 4/16 [place] MP    FLEXIBLE SIGMOIDOSCOPY N/A 6/3/2022    Procedure: SIGMOIDOSCOPY-FLEXIBLE;  Surgeon: Francesco Clark MD;  Location: [facility] ENDO (4TH FLR);  Service: Endoscopy;  Laterality: N/A;  vacc-inst portal-tb    HYSTERECTOMY      @47yrs of age    INSERTION OF TUNNELED CENTRAL VENOUS CATHETER (CVC) WITH SUBCUTANEOUS PORT N/A 9/25/2018    Procedure: ICEJEHALA-SNEG-H-CATH;  Surgeon: Dosc Diagnostic Provider;  Location: [facility] OR 2ND FLR;  Service: Radiology;  Laterality: N/A;    INSERTION OF VENOUS ACCESS PORT N/A 3/15/2021    Procedure: INSERTION, VENOUS ACCESS PORT;  Surgeon: Chang Mathews MD;  [facility];  Service: General;  Laterality: N/A;  NEEDS CONSENT.    KIDNEY SURGERY      19yo    MEDIPORT REMOVAL N/A 3/15/2021    Procedure: REMOVAL, CATHETER, CENTRAL VENOUS, TUNNELED, WITH PORT;  Surgeon: Chang Mathews MD;  Location: [facility] OR 2ND FLR;  Service: General;  Laterality: N/A;    OOPHORECTOMY      @47yrs of age    SKIN BIOPSY      TONSILLECTOMY      UPPER GASTROINTESTINAL ENDOSCOPY      VIDEO-ASSISTED THORACOSCOPIC LOBECTOMY Right 8/9/2018    Procedure: VATS, WITH LOBECTOMY Possible chest wall resection;  Surgeon: Philip Messina MD;  Location: [facility] OR 2ND FLR;  Service: Thoracic;  Laterality: Right;  Have open pan available.  "      Family History:  Family History   Problem Relation Age of Onset    Stroke Mother     Cataracts Mother     Cancer Father         colon cancer    Colon cancer Father     Diabetes Brother     Heart failure Brother     Hypertension Brother     Heart attack Paternal Aunt     Heart attack Maternal Grandfather     Diabetes Paternal Aunt     Anxiety disorder Paternal Grandmother         agoraphobia    Anesthesia problems Neg Hx     Blindness Neg Hx     Amblyopia Neg Hx     Glaucoma Neg Hx     Macular degeneration Neg Hx     Retinal detachment Neg Hx     Strabismus Neg Hx     Thyroid disease Neg Hx     Melanoma Neg Hx          Social History:  Social History     Socioeconomic History    Marital status: Significant Other     Spouse name: Lata   Occupational History    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 No    Breast-feeding No   Social History Narrative    Exercise:  Walks 5257-2633 steps daily.    Former RN     Allergies:  Review of patient's allergies indicates:   Allergen Reactions    Adhesive Itching, Rash and Blisters     INCLUDES EKG PADS    Ciprofloxacin Hives     Hives    Iodinated contrast media     Phenergan plain Other (See Comments)     "crazy behavior"    Phenergan [promethazine]     Tramadol     Vancomycin analogues      Red man syndrome       Pertinent Medications:  Antibiotics:   Antibiotics (From admission, onward)      Start     Stop " Route Frequency Ordered    01/08/24 0900  sulfamethoxazole-trimethoprim 800-160mg per tablet 1 tablet         01/19/24 0859 Oral 2 times per day on Mon Wed Fri 01/07/24 1509    01/08/24 0900  mupirocin 2 % ointment         01/13/24 0859 Nasl 2 times daily 01/07/24 2246    01/08/24 0000  piperacillin-tazobactam (ZOSYN) 4.5 g in dextrose 5 % in water (D5W) 100 mL IVPB (MB+)  ( Pneumonia - Moderate-High MDR )         01/12/24 2359 IV Every 8 hours (non-standard times) 01/07/24 1532    01/07/24 1645  linezolid 600 mg/300 mL IVPB 600 mg         -- IV Every 12 hours (non-standard times) 01/07/24 1537            Physical Exam:  VS (24h):   Vitals:    01/08/24 0602   BP: (!) 157/71   Pulse: 67   Resp: 15   Temp:      Temp:  [97.7 °F (36.5 °C)-98.8 °F (37.1 °C)]     Physical Exam  Constitutional:       General: She is not in acute distress.     Appearance: She is ill-appearing. She is not diaphoretic.   HENT:      Head: Normocephalic and atraumatic.   Eyes:      Extraocular Movements: Extraocular movements intact.      Pupils: Pupils are equal, round, and reactive to light.   Pulmonary:      Effort: Respiratory distress present.      Comments: Port to L side of chest  Bilateral rales present  Abdominal:      General: Abdomen is flat. Bowel sounds are normal. There is no distension.      Palpations: Abdomen is soft.   Musculoskeletal:      Right lower leg: No edema.      Left lower leg: No edema.   Neurological:      General: No focal deficit present.      Mental Status: She is alert and oriented to person, place, and time.      Labs:  CBC:   Lab Results   Component Value Date    WBC 11.42 01/08/2024    WBC 9.18 01/07/2024    WBC 8.72 01/03/2024    WBC 7.85 12/11/2023    WBC 6.36 11/03/2023    HGB 14.1 01/08/2024    HCT 43.5 01/08/2024    MCV 97 01/08/2024     01/08/2024       BMP:   Recent Labs   Lab 01/08/24  0654   *      K 4.5      CO2 23   BUN 22   CREATININE 1.5*   CALCIUM 9.8   MG 2.0        LFT:   Lab Results   Component Value Date    ALT 13 01/07/2024    AST 21 01/07/2024    ALKPHOS 60 01/07/2024    BILITOT 0.2 01/07/2024         Microbiology x 7d:   Microbiology Results (last 7 days)       Procedure Component Value Units Date/Time    Culture, Respiratory with Gram Stain [[id number]] Collected: 01/08/24 0411    Order Status: Sent Specimen: Sputum Updated: 01/08/24 0417    Blood Culture #2 **CANNOT BE ORDERED STAT** [[id number]] Collected: 01/07/24 1310    Order Status: Completed Specimen: Blood Updated: 01/08/24 0115     Blood Culture, Routine No Growth to date    Blood Culture #1 **CANNOT BE ORDERED STAT** [[id number]] Collected: 01/07/24 1310    Order Status: Completed Specimen: Blood from Antecubital, Right Arm Updated: 01/08/24 0115     Blood Culture, Routine No Growth to date          Significant Labs: All pertinent labs within the past 24 hours have been reviewed.     Significant Imaging: I have reviewed all pertinent imaging results/findings within the past 24 hours.

## 2024-01-08 NOTE — PHARMACY MED REC
"Admission Medication History     The home medication history was taken by Neli Durham CPhT.    Medication history obtained from, Patient Verified    You may go to "Admission" then "Reconcile Home Medications" tabs to review and/or act upon these items.     The home medication list has been updated by the Pharmacy department.   Please read ALL comments highlighted in yellow.   Please address this information as you see fit.    Feel free to contact us if you have any questions or require assistance.      The medications listed below were removed from the home medication list.  Please reorder if appropriate:  Patient reports no longer taking the following medication(s):  Doxycycline 100 mg  Famotidine 10 mg  HCTZ 25 mg  Tums 300 mg          Neli Durham CPhT.  Ext [phone]               .          "

## 2024-01-09 PROBLEM — K59.00 CONSTIPATION: Status: ACTIVE | Noted: 2024-01-09

## 2024-01-09 LAB
ANION GAP SERPL CALC-SCNC: 13 MMOL/L (ref 8–16)
BASOPHILS # BLD AUTO: 0 K/UL (ref 0–0.2)
BASOPHILS NFR BLD: 0 % (ref 0–1.9)
BUN SERPL-MCNC: 18 MG/DL (ref 8–23)
CALCIUM SERPL-MCNC: 9.4 MG/DL (ref 8.7–10.5)
CHLORIDE SERPL-SCNC: 101 MMOL/L (ref 95–110)
CO2 SERPL-SCNC: 23 MMOL/L (ref 23–29)
CREAT SERPL-MCNC: 1.3 MG/DL (ref 0.5–1.4)
DIFFERENTIAL METHOD BLD: ABNORMAL
EOSINOPHIL # BLD AUTO: 0 K/UL (ref 0–0.5)
EOSINOPHIL NFR BLD: 0 % (ref 0–8)
ERYTHROCYTE [DISTWIDTH] IN BLOOD BY AUTOMATED COUNT: 13.6 % (ref 11.5–14.5)
EST. GFR  (NO RACE VARIABLE): 43 ML/MIN/1.73 M^2
GLUCOSE SERPL-MCNC: 143 MG/DL (ref 70–110)
HCT VFR BLD AUTO: 40.2 % (ref 37–48.5)
HGB BLD-MCNC: 13.1 G/DL (ref 12–16)
IMM GRANULOCYTES # BLD AUTO: 0.04 K/UL (ref 0–0.04)
IMM GRANULOCYTES NFR BLD AUTO: 0.7 % (ref 0–0.5)
LYMPHOCYTES # BLD AUTO: 1 K/UL (ref 1–4.8)
LYMPHOCYTES NFR BLD: 15.5 % (ref 18–48)
MAGNESIUM SERPL-MCNC: 2 MG/DL (ref 1.6–2.6)
MCH RBC QN AUTO: 31.1 PG (ref 27–31)
MCHC RBC AUTO-ENTMCNC: 32.6 G/DL (ref 32–36)
MCV RBC AUTO: 96 FL (ref 82–98)
MONOCYTES # BLD AUTO: 0.6 K/UL (ref 0.3–1)
MONOCYTES NFR BLD: 10.5 % (ref 4–15)
NEUTROPHILS # BLD AUTO: 4.5 K/UL (ref 1.8–7.7)
NEUTROPHILS NFR BLD: 73.3 % (ref 38–73)
NRBC BLD-RTO: 0 /100 WBC
PHOSPHATE SERPL-MCNC: 3.7 MG/DL (ref 2.7–4.5)
PLATELET # BLD AUTO: 226 K/UL (ref 150–450)
PMV BLD AUTO: 9.7 FL (ref 9.2–12.9)
POTASSIUM SERPL-SCNC: 4.7 MMOL/L (ref 3.5–5.1)
RBC # BLD AUTO: 4.21 M/UL (ref 4–5.4)
SODIUM SERPL-SCNC: 137 MMOL/L (ref 136–145)
WBC # BLD AUTO: 6.12 K/UL (ref 3.9–12.7)

## 2024-01-09 PROCEDURE — 25000003 PHARM REV CODE 250: Performed by: STUDENT IN AN ORGANIZED HEALTH CARE EDUCATION/TRAINING PROGRAM

## 2024-01-09 PROCEDURE — 97161 PT EVAL LOW COMPLEX 20 MIN: CPT

## 2024-01-09 PROCEDURE — 94761 N-INVAS EAR/PLS OXIMETRY MLT: CPT

## 2024-01-09 PROCEDURE — 97530 THERAPEUTIC ACTIVITIES: CPT

## 2024-01-09 PROCEDURE — 84100 ASSAY OF PHOSPHORUS: CPT | Performed by: INTERNAL MEDICINE

## 2024-01-09 PROCEDURE — 94640 AIRWAY INHALATION TREATMENT: CPT

## 2024-01-09 PROCEDURE — 97165 OT EVAL LOW COMPLEX 30 MIN: CPT

## 2024-01-09 PROCEDURE — 25000242 PHARM REV CODE 250 ALT 637 W/ HCPCS: Performed by: INTERNAL MEDICINE

## 2024-01-09 PROCEDURE — 97116 GAIT TRAINING THERAPY: CPT

## 2024-01-09 PROCEDURE — 97535 SELF CARE MNGMENT TRAINING: CPT

## 2024-01-09 PROCEDURE — 36415 COLL VENOUS BLD VENIPUNCTURE: CPT | Performed by: INTERNAL MEDICINE

## 2024-01-09 PROCEDURE — 63600175 PHARM REV CODE 636 W HCPCS: Performed by: INTERNAL MEDICINE

## 2024-01-09 PROCEDURE — 83735 ASSAY OF MAGNESIUM: CPT | Performed by: INTERNAL MEDICINE

## 2024-01-09 PROCEDURE — 25000003 PHARM REV CODE 250: Performed by: INTERNAL MEDICINE

## 2024-01-09 PROCEDURE — 25000003 PHARM REV CODE 250: Performed by: FAMILY MEDICINE

## 2024-01-09 PROCEDURE — 99232 SBSQ HOSP IP/OBS MODERATE 35: CPT | Mod: ,,, | Performed by: INTERNAL MEDICINE

## 2024-01-09 PROCEDURE — 27000221 HC OXYGEN, UP TO 24 HOURS

## 2024-01-09 PROCEDURE — 99900035 HC TECH TIME PER 15 MIN (STAT)

## 2024-01-09 PROCEDURE — 85025 COMPLETE CBC W/AUTO DIFF WBC: CPT | Performed by: INTERNAL MEDICINE

## 2024-01-09 PROCEDURE — 11000001 HC ACUTE MED/SURG PRIVATE ROOM

## 2024-01-09 PROCEDURE — 80048 BASIC METABOLIC PNL TOTAL CA: CPT | Performed by: INTERNAL MEDICINE

## 2024-01-09 RX ORDER — LEVALBUTEROL 1.25 MG/.5ML
1.25 SOLUTION, CONCENTRATE RESPIRATORY (INHALATION) EVERY 6 HOURS PRN
Status: DISCONTINUED | OUTPATIENT
Start: 2024-01-09 | End: 2024-01-14 | Stop reason: HOSPADM

## 2024-01-09 RX ORDER — ONDANSETRON 4 MG/1
8 TABLET, ORALLY DISINTEGRATING ORAL EVERY 6 HOURS PRN
Status: DISCONTINUED | OUTPATIENT
Start: 2024-01-09 | End: 2024-01-14 | Stop reason: HOSPADM

## 2024-01-09 RX ORDER — NYSTATIN 100000 [USP'U]/ML
500000 SUSPENSION ORAL 4 TIMES DAILY
Status: DISCONTINUED | OUTPATIENT
Start: 2024-01-09 | End: 2024-01-14 | Stop reason: HOSPADM

## 2024-01-09 RX ORDER — AMOXICILLIN 250 MG
1 CAPSULE ORAL 2 TIMES DAILY
Status: DISCONTINUED | OUTPATIENT
Start: 2024-01-09 | End: 2024-01-14 | Stop reason: HOSPADM

## 2024-01-09 RX ORDER — GUAIFENESIN 100 MG/5ML
200 SOLUTION ORAL EVERY 4 HOURS PRN
Status: DISCONTINUED | OUTPATIENT
Start: 2024-01-09 | End: 2024-01-14 | Stop reason: HOSPADM

## 2024-01-09 RX ORDER — POLYETHYLENE GLYCOL 3350 17 G/17G
17 POWDER, FOR SOLUTION ORAL DAILY
Status: DISCONTINUED | OUTPATIENT
Start: 2024-01-09 | End: 2024-01-14 | Stop reason: HOSPADM

## 2024-01-09 RX ORDER — BENZONATATE 100 MG/1
100 CAPSULE ORAL 3 TIMES DAILY PRN
Status: DISCONTINUED | OUTPATIENT
Start: 2024-01-09 | End: 2024-01-14 | Stop reason: HOSPADM

## 2024-01-09 RX ADMIN — LORAZEPAM 0.5 MG: 0.5 TABLET ORAL at 10:01

## 2024-01-09 RX ADMIN — POLYETHYLENE GLYCOL 3350 17 G: 17 POWDER, FOR SOLUTION ORAL at 08:01

## 2024-01-09 RX ADMIN — LEVALBUTEROL HYDROCHLORIDE 1.25 MG: 1.25 SOLUTION, CONCENTRATE RESPIRATORY (INHALATION) at 07:01

## 2024-01-09 RX ADMIN — PIPERACILLIN AND TAZOBACTAM 4.5 G: 4; .5 INJECTION, POWDER, LYOPHILIZED, FOR SOLUTION INTRAVENOUS; PARENTERAL at 01:01

## 2024-01-09 RX ADMIN — LEVALBUTEROL HYDROCHLORIDE 1.25 MG: 1.25 SOLUTION, CONCENTRATE RESPIRATORY (INHALATION) at 08:01

## 2024-01-09 RX ADMIN — NYSTATIN 500000 UNITS: 100000 SUSPENSION ORAL at 10:01

## 2024-01-09 RX ADMIN — LEVALBUTEROL HYDROCHLORIDE 1.25 MG: 1.25 SOLUTION, CONCENTRATE RESPIRATORY (INHALATION) at 03:01

## 2024-01-09 RX ADMIN — HYDROCODONE BITARTRATE AND ACETAMINOPHEN 1 TABLET: 7.5; 325 TABLET ORAL at 10:01

## 2024-01-09 RX ADMIN — LINEZOLID 600 MG: 600 INJECTION, SOLUTION INTRAVENOUS at 05:01

## 2024-01-09 RX ADMIN — LEVALBUTEROL HYDROCHLORIDE 1.25 MG: 1.25 SOLUTION, CONCENTRATE RESPIRATORY (INHALATION) at 11:01

## 2024-01-09 RX ADMIN — ENOXAPARIN SODIUM 40 MG: 40 INJECTION SUBCUTANEOUS at 04:01

## 2024-01-09 RX ADMIN — ONDANSETRON 8 MG: 4 TABLET, ORALLY DISINTEGRATING ORAL at 05:01

## 2024-01-09 RX ADMIN — HYDRALAZINE HYDROCHLORIDE 25 MG: 25 TABLET, FILM COATED ORAL at 05:01

## 2024-01-09 RX ADMIN — LINEZOLID 600 MG: 600 INJECTION, SOLUTION INTRAVENOUS at 04:01

## 2024-01-09 RX ADMIN — ATENOLOL 25 MG: 25 TABLET ORAL at 10:01

## 2024-01-09 RX ADMIN — PREDNISONE 60 MG: 20 TABLET ORAL at 08:01

## 2024-01-09 RX ADMIN — DOCUSATE SODIUM AND SENNOSIDES 1 TABLET: 8.6; 5 TABLET, FILM COATED ORAL at 10:01

## 2024-01-09 RX ADMIN — ATENOLOL 25 MG: 25 TABLET ORAL at 08:01

## 2024-01-09 RX ADMIN — GUAIFENESIN 200 MG: 200 SOLUTION ORAL at 03:01

## 2024-01-09 RX ADMIN — NYSTATIN 500000 UNITS: 100000 SUSPENSION ORAL at 04:01

## 2024-01-09 RX ADMIN — MUPIROCIN: 20 OINTMENT TOPICAL at 10:01

## 2024-01-09 RX ADMIN — HYDROMORPHONE HYDROCHLORIDE 0.5 MG: 1 INJECTION, SOLUTION INTRAMUSCULAR; INTRAVENOUS; SUBCUTANEOUS at 10:01

## 2024-01-09 RX ADMIN — LORAZEPAM 0.5 MG: 0.5 TABLET ORAL at 01:01

## 2024-01-09 RX ADMIN — ATENOLOL 25 MG: 25 TABLET ORAL at 03:01

## 2024-01-09 RX ADMIN — HYDRALAZINE HYDROCHLORIDE 25 MG: 25 TABLET, FILM COATED ORAL at 01:01

## 2024-01-09 RX ADMIN — HYDROMORPHONE HYDROCHLORIDE 0.5 MG: 1 INJECTION, SOLUTION INTRAMUSCULAR; INTRAVENOUS; SUBCUTANEOUS at 02:01

## 2024-01-09 RX ADMIN — HYDROMORPHONE HYDROCHLORIDE 0.5 MG: 1 INJECTION, SOLUTION INTRAMUSCULAR; INTRAVENOUS; SUBCUTANEOUS at 03:01

## 2024-01-09 RX ADMIN — HYDRALAZINE HYDROCHLORIDE 25 MG: 25 TABLET, FILM COATED ORAL at 10:01

## 2024-01-09 RX ADMIN — PIPERACILLIN AND TAZOBACTAM 4.5 G: 4; .5 INJECTION, POWDER, LYOPHILIZED, FOR SOLUTION INTRAVENOUS; PARENTERAL at 03:01

## 2024-01-09 RX ADMIN — LEVALBUTEROL HYDROCHLORIDE 1.25 MG: 1.25 SOLUTION, CONCENTRATE RESPIRATORY (INHALATION) at 12:01

## 2024-01-09 RX ADMIN — DOCUSATE SODIUM AND SENNOSIDES 1 TABLET: 8.6; 5 TABLET, FILM COATED ORAL at 08:01

## 2024-01-09 RX ADMIN — MUPIROCIN: 20 OINTMENT TOPICAL at 08:01

## 2024-01-09 RX ADMIN — PIPERACILLIN AND TAZOBACTAM 4.5 G: 4; .5 INJECTION, POWDER, LYOPHILIZED, FOR SOLUTION INTRAVENOUS; PARENTERAL at 08:01

## 2024-01-09 NOTE — PROGRESS NOTES
01/08/24 2121   Admission   Initial VN Admission Questions Complete   Communication Issues? None   Safety/Activity   Patient Rounds call light in patient/parent reach;placement of personal items at bedside   Safety Promotion/Fall Prevention assistive device/personal item within reach;medications reviewed;instructed to call staff for mobility

## 2024-01-09 NOTE — ED NOTES
Report given to 4A RN, pt resting w/ VSS on monitor. Respirations regular/unlabored. Skin warm/dry. Pt did report some anxiety and worsening SOB. PRN ativan administered per MAR.

## 2024-01-09 NOTE — PT/OT/SLP EVAL
Occupational Therapy   Evaluation/tx    Name: Alesha Toney  MRN: [id number]  Admitting Diagnosis: The encounter diagnosis was Shortness of breath.    Recent Surgery: * No surgery found *      Recommendations:     Discharge Recommendations: Low Intensity Therapy  Discharge Equipment Recommendations:  shower chair  Barriers to discharge:  None    Assessment:      Pt presents w/ decreased overall endurance/conditioning, balance/mobility & coordination w/ subsequent decline in (I)/safety w/ BADLs, fxnl mobility & fxnl t/f's.  OT 5x/wk to increase phys/fxnl status & maximize potential to achieve established goals for d/c-->low intensity tx w/ rec shower chair.    Alesha Toney is a 73 y.o. female with a medical diagnosis of The encounter diagnosis was Shortness of breath.  .  She presents with performance deficits affecting function: weakness, impaired endurance, impaired sensation, impaired self care skills, impaired functional mobility, gait instability, impaired balance, decreased coordination, pain, impaired cardiopulmonary response to activity.      Rehab Prognosis: Good; patient would benefit from acute skilled OT services to address these deficits and reach maximum level of function.       Plan:     Patient to be seen 5 x/week to address the above listed problems via self-care/home management, therapeutic activities, therapeutic exercises  Plan of Care Expires: 02/09/24  Plan of Care Reviewed with: patient, significant other    Subjective     Chief Complaint: SOB  Patient/Family Comments/goals: return to PLOF    Occupational Profile:  Living Environment: w/ signif other in H w/ THE; WIS w/ GB  Previous level of function: (I)  Roles and Routines: drives; IADLs  Equipment Used at Home: grab bar  Assistance upon Discharge: signif other     Pain/Comfort:  Pain Rating 1:  (unrated)  Location - Side 1: Right  Location 1: chest  Pain Addressed 1: Reposition, Distraction, Nurse notified  Pain Rating Post-Intervention  1:  (unrated)    Patients cultural, spiritual, Anabaptist conflicts given the current situation:      Objective:     Communicated with: nsg prior to session.  Patient found HOB elevated with bed alarm, telemetry, oxygen, peripheral IV upon OT entry to room.    General Precautions: Standard, fall  Orthopedic Precautions: N/A  Braces: N/A  Respiratory Status: Nasal cannula, flow 3 L/min    Occupational Performance:    Bed Mobility:    Patient completed Supine to Sit with stand by assistance and with side rail  Patient completed Sit to Supine with stand by assistance and with side rail    Functional Mobility/Transfers:  Patient completed Sit <> Stand Transfer with stand by assistance  with  no assistive device   Functional Mobility: w/o DME w/in & for short distance outside room w/ CGA    Activities of Daily Living:  Lower Body Dressing: stand by assistance don undergarments    Cognitive/Visual Perceptual:  AO4    No deficits noted    Physical Exam:  Marianne WFL at 4/5 prox; 4+/5 distally  **subjective numbness at R D2-3    Sit balance: G-  Stand balance: G-    AMPAC 6 Click ADL:  AMPAC Total Score: 19    Treatment & Education:   Pt found in SF w/ signif other in room & agreeable to OT/PT co-eval/tx this date.  Pt c/o R lateral chest pain (chronic 2/2 CA tx) & perf the following:  -sup-->EOB via bed rail w/ SBA & tolerated w/ Sup  -don undergarments at EOB w/ SBA  -standing & fxnl mobility for short distance outside room w/o DME w/ CGA  -returned to supine w/ SBA  **noted SOB w/ mvmt & at rest, unresolving w/ PLBing  Edu/tx re: general safety techs, deep/PLBing techs & HEP. Pt/signif other verbalized understanding.    Patient left HOB elevated with all lines intact, call button in reach, bed alarm on, nsg notified, and signif other present    GOALS:   Multidisciplinary Problems       Occupational Therapy Goals          Problem: Occupational Therapy    Goal Priority Disciplines Outcome Interventions   Occupational Therapy  Goal     OT, PT/OT Ongoing, Progressing    Description: Goals to be met by: 02/09     Patient will increase functional independence with ADLs by performing:    UE Dressing with Modified [place].  LE Dressing with Modified [place].  Grooming while standing at sink with Modified [place].  Toileting from toilet with Modified [place] for hygiene and clothing management.   Toilet transfer to toilet with Modified [place].  Increased functional strength to WFL for ADLs.                         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     Squamous cell carcinoma of skin 10/05/2020    left medial thigh    Therapy     Thyroid nodule     Ulcerative colitis with complication          Past Surgical History:   Procedure Laterality Date    ADENOIDECTOMY  17yo    ANTERIOR CERVICAL DISCECTOMY W/ FUSION N/A 10/15/2018    Procedure: DISCECTOMY, SPINE, CERVICAL, ANTERIOR APPROACH, WITH FUSION C6/7  Depuy SNS: [employer]/SSE[employer] Regular OR table;  Surgeon: Greyson Bansal MD;  Location: [address];  Service: Neurosurgery;  Laterality: N/A;    APPENDECTOMY      BONE RESECTION, RIB  1980    BREAST BIOPSY Left     at least 40yrs ago in her 20's    BREAST CYST ASPIRATION      BREAST CYST EXCISION      COLONOSCOPY      ENDOBRONCHIAL ULTRASOUND N/A 7/10/2018    Procedure: ULTRASOUND, ENDOBRONCHIAL;  Surgeon: Sri Hearn MD;  Location: [facility] OR [address] [place] [address];  Service: Pulmonary;  Laterality: N/A;    ENDOBRONCHIAL ULTRASOUND N/A 9/24/2019    Procedure: ENDOBRONCHIAL ULTRASOUND (EBUS);  Surgeon: Sri Hearn MD;  Location: [address];  Service: Pulmonary;  Laterality: N/A;    ENDOSCOPIC  MUCOSAL RESECTION OF COLON N/A 9/11/2020    Procedure: RESECTION, MUCOSA, COLON, ENDOSCOPIC;  Surgeon: Lilibeth Carbajal MD;  Location: [facility] (2ND FLR);  Service: Endoscopy;  Laterality: N/A;    ENDOSCOPIC ULTRASOUND OF LOWER GASTROINTESTINAL TRACT N/A 4/19/2021    Procedure: ULTRASOUND, LOWER GI TRACT, ENDOSCOPIC;  Surgeon: Lilibeth Carbajal MD;  Location: [facility] ENDO;  Service: Endoscopy;  Laterality: N/A;    ENDOSCOPIC ULTRASOUND OF LOWER GASTROINTESTINAL TRACT N/A 6/25/2021    Procedure: ULTRASOUND, LOWER GI TRACT, ENDOSCOPIC;  Surgeon: Silvino Christpoher MD;  Location: [facility];  Service: Endoscopy;  Laterality: N/A;  Needs Rapid MP    FLEXIBLE SIGMOIDOSCOPY  06/11/2020    with biopsies    FLEXIBLE SIGMOIDOSCOPY N/A 6/11/2020    Procedure: SIGMOIDOSCOPY, FLEXIBLE;  Surgeon: Gely Yeh MD;  Location: [facility];  Service: Endoscopy;  Laterality: N/A;    FLEXIBLE SIGMOIDOSCOPY N/A 4/19/2021    Procedure: SIGMOIDOSCOPY-FLEXIBLE;  Surgeon: Lilibeth Carbajal MD;  Location: [facility];  Service: Endoscopy;  Laterality: N/A;  Covid 4/16 [place] MP    FLEXIBLE SIGMOIDOSCOPY N/A 6/3/2022    Procedure: SIGMOIDOSCOPY-FLEXIBLE;  Surgeon: Francesco Clark MD;  Location: [facility] (4TH FLR);  Service: Endoscopy;  Laterality: N/A;  vacc-inst portal-tb    HYSTERECTOMY      @47yrs of age    INSERTION OF TUNNELED CENTRAL VENOUS CATHETER (CVC) WITH SUBCUTANEOUS PORT N/A 9/25/2018    Procedure: [id number]-X-CATH;  Surgeon: Dosc Diagnostic Provider;  Location: [facility] OR 2ND FLR;  Service: Radiology;  Laterality: N/A;    INSERTION OF VENOUS ACCESS PORT N/A 3/15/2021    Procedure: INSERTION, VENOUS ACCESS PORT;  Surgeon: Chang Mathews MD;  Location: [facility] OR 2ND FLR;  Service: General;  Laterality: N/A;  NEEDS CONSENT.    KIDNEY SURGERY      17yo    MEDIPORT REMOVAL N/A 3/15/2021    Procedure: REMOVAL, CATHETER, CENTRAL VENOUS, TUNNELED, WITH PORT;  Surgeon: Chang Mathews MD;  Location: [facility] OR 2ND FLR;   Service: General;  Laterality: N/A;    OOPHORECTOMY      @47yrs of age    SKIN BIOPSY      TONSILLECTOMY      UPPER GASTROINTESTINAL ENDOSCOPY      VIDEO-ASSISTED THORACOSCOPIC LOBECTOMY Right 8/9/2018    Procedure: VATS, WITH LOBECTOMY Possible chest wall resection;  Surgeon: Philip Messina MD;  Location: [facility] OR [address] [place] [address];  Service: Thoracic;  Laterality: Right;  Have open pan available.       Time Tracking:     OT Date of Treatment: 01/09/24  OT Start Time: 1016  OT Stop Time: 1040  OT Total Time (min): 24 min    Billable Minutes:Evaluation 8  Self Care/Home Management 8  Therapeutic Activity 8  Total Time 24--co-eval/tx w/ PT    1/9/2024

## 2024-01-09 NOTE — PROGRESS NOTES
[facility] Emergency [facility] Medicine  Progress Note    Patient Name: Alesha Toney  MRN: [id number]  Patient Class: IP- Inpatient   Admission Date: 1/7/2024  Length of Stay: 1 days  Attending Physician: Dottie Kirkland MD  Primary Care Provider: Margo Caldwell MD        Subjective:     Principal Problem:<principal problem not specified>        HPI:  Alesha Toney is a 73 y.o.  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 The patient presented to [facility] Medicine on 1/7/2024 with a primary complaint of Shortness of Breath (SOB with cough for past 2 weeks. States PCP has prescribed multiple meds with no improvement. Presents awake, alert with O2 in progress. Resp unlabored. C/o right sided CP with h/o right sided lung CA - states pain in this area is common for her. Denies home O2. States she has been using nebulizer at home. Awake, alert, oriented.)     The patient was in their usual state of health until 12/25 when she presented to the ED with shortness of breath and increased R sided chest pain. She had a CT chest and V/Q scan at the time and had close follow up with her oncologist and was started on prednisone for possible pneumonitis with bactrim ppx. She has had a progressive cough with worsening R sided chest pain and noted her pulse ox to be in the 80s at home. She has had clear-whitish thick sputum. No fevers, chills, sweats. No calf swelling. At this time due to her medication side effects she is not able to continue with Keytruda or Cyramza.    Overview/Hospital Course:  1/8/24 Feeling worse. Increased SOB, pain    Interval History: SOB  is worsening overnight. Mild TRAM today    Review of Systems   Constitutional:  Negative for chills and fever.   HENT:  Negative for congestion and sore throat.    Respiratory:  Positive for cough, shortness of breath and wheezing.    Cardiovascular:  Positive for chest pain. Negative for leg swelling.   Gastrointestinal:  Negative for nausea and vomiting.   Genitourinary:  Negative for difficulty urinating, dysuria and flank pain.   Musculoskeletal:  Negative for myalgias.   Skin:  Negative for rash and wound.   Neurological:  Negative for dizziness, light-headedness and headaches.   Psychiatric/Behavioral:  Negative for agitation and behavioral problems.      Objective:     Vital Signs (Most Recent):  Temp: 98.6 °F (37 °C) (01/08/24 1630)  Pulse: 95 (01/08/24 1631)  Resp: 20 (01/08/24 1631)  BP: (!) 169/72 (01/08/24 1631)  SpO2: 95 % (01/08/24 1631) Vital Signs (24h Range):  Temp:  [97.7 °F (36.5 °C)-99.4 °F (37.4 °C)] 98.6 °F (37 °C)  Pulse:  [] 95  Resp:  [14-29] 20  SpO2:  [92 %-100 %] 95 %  BP: (124-199)/(57-98) 169/72     Weight: 63 kg (139 lb)  Body mass index is 22.44 kg/m².    Intake/Output Summary (Last 24 hours) at 1/8/2024 1753  Last data filed at 1/8/2024 1647  Gross per 24 hour   Intake 400 ml   Output 325 ml   Net 75 ml         Physical Exam  Constitutional:       Appearance: Normal appearance. She is ill-appearing.   HENT:      Head: Normocephalic and atraumatic.   Eyes:      Extraocular Movements: Extraocular movements intact.      Pupils: Pupils are equal, round, and reactive to light.   Cardiovascular:      Rate and Rhythm: Regular rhythm. Tachycardia present.   Pulmonary:      Effort: Respiratory distress present.      Breath sounds: Rhonchi and rales present.   Abdominal:      General: Abdomen is flat. Bowel sounds are normal. There is no distension.      Palpations: Abdomen is soft.   Musculoskeletal:      Right lower leg: No edema.      Left lower leg: No edema.   Neurological:       General: No focal deficit present.      Mental Status: She is alert and oriented to person, place, and time.             Significant Labs: All pertinent labs within the past 24 hours have been reviewed.  CBC:   Recent Labs   Lab 01/07/24  1310 01/08/24  0654   WBC 9.18 11.42   HGB 14.5 14.1   HCT 43.2 43.5    288     CMP:   Recent Labs   Lab 01/07/24  1310 01/08/24  0654    137   K 4.6 4.5    102   CO2 24 23   * 123*   BUN 19 22   CREATININE 1.2 1.5*   CALCIUM 10.3 9.8   PROT 7.5  --    ALBUMIN 3.7  --    BILITOT 0.2  --    ALKPHOS 60  --    AST 21  --    ALT 13  --    ANIONGAP 14 12       Significant Imaging: I have reviewed all pertinent imaging results/findings within the past 24 hours.    Assessment/Plan:      Abnormal PET scan of colon        SOB (shortness of breath)  New oxygen requirement with worsening SOB and R chest pain   She was started on steroids for possible CPI pneumonitis, continuing Bactrim PPx for PJP  Broad spectrum abx  Xopenex PRN  Appreciate ID and Pulm recommendations        Stage 3a chronic kidney disease  Creatine stable for now. BMP reviewed- noted Estimated Creatinine Clearance: 31.3 mL/min (A) (based on SCr of 1.5 mg/dL (H)). according to latest data. Based on current GFR, CKD stage is stage 3 - GFR 30-59.  Monitor UOP and serial BMP and adjust therapy as needed. Renally dose meds. Avoid nephrotoxic medications and procedures.    Mild TRAM, hold ARB, HCTZ  Consider gentle hydration    Malignant neoplasm of upper lobe, right bronchus or lung    Malignant neoplasm of upper lobe, right bronchus or lung  Overview:  Status post resection 8/2018 status post adjuvant chemo     Secondary and unspecified malignant neoplasm of intrathoracic lymph nodes  Recently did not tolerate Keytruda and Cyramza and tx was discontinued recently        VTE Risk Mitigation (From admission, onward)           Ordered     enoxaparin injection 40 mg  Daily         01/07/24 1532     IP VTE  HIGH RISK PATIENT  Once         01/07/24 1532     Place sequential compression device  Until discontinued         01/07/24 1532                    Discharge Planning   ALMAZ:      Code Status: Full Code   Is the patient medically ready for discharge?:     Reason for patient still in hospital (select all that apply): Patient trending condition, Treatment, and Consult recommendations                     Dottie Kirkland MD  Department of Hospital Medicine   [place] - Emergency Dept

## 2024-01-09 NOTE — SUBJECTIVE & OBJECTIVE
Interval History: awake and alert, lying in bed with short of breath with exertion  On 3 L nasal cannula-add PT/OT   Complain of no BM x2 days-add home center and [facility]  TRAM improved    Review of Systems   Constitutional:  Negative for chills and fever.   HENT:  Negative for sore throat.    Respiratory:  Positive for cough and shortness of breath. Negative for wheezing.    Cardiovascular:  Negative for chest pain and leg swelling.   Gastrointestinal:  Negative for nausea and vomiting.   Genitourinary:  Negative for difficulty urinating, dysuria and flank pain.   Musculoskeletal:  Negative for myalgias.   Skin:  Negative for rash and wound.   Neurological:  Negative for dizziness, light-headedness and headaches.   Psychiatric/Behavioral:  Negative for agitation and behavioral problems.      Objective:     Vital Signs (Most Recent):  Temp: 98.7 °F (37.1 °C) (01/09/24 0748)  Pulse: 72 (01/09/24 0813)  Resp: 18 (01/09/24 0813)  BP: (!) 151/65 (01/09/24 0748)  SpO2: 95 % (01/09/24 0813) Vital Signs (24h Range):  Temp:  [98.1 °F (36.7 °C)-98.7 °F (37.1 °C)] 98.7 °F (37.1 °C)  Pulse:  [] 72  Resp:  [17-29] 18  SpO2:  [92 %-97 %] 95 %  BP: (124-199)/(58-87) 151/65     Weight: 59.3 kg (130 lb 11.7 oz)  Body mass index is 21.1 kg/m².    Intake/Output Summary (Last 24 hours) at 1/9/2024 1048  Last data filed at 1/9/2024 0610  Gross per 24 hour   Intake 300 ml   Output 1225 ml   Net -925 ml         Physical Exam  Constitutional:       Appearance: Normal appearance. She is ill-appearing.   HENT:      Head: Normocephalic and atraumatic.   Eyes:      Extraocular Movements: Extraocular movements intact.      Pupils: Pupils are equal, round, and reactive to light.   Cardiovascular:      Rate and Rhythm: Regular rhythm. Tachycardia present.   Pulmonary:      Effort: No respiratory distress.      Breath sounds: Rhonchi present. No rales.   Abdominal:      General: There is no distension.   Musculoskeletal:      Right lower  "leg: No edema.      Left lower leg: No edema.   Neurological:      General: No focal deficit present.      Mental Status: She is alert and oriented to person, place, and time.             Significant Labs: A1C: No results for input(s): "HGBA1C" in the last 4320 hours.  ABGs: No results for input(s): "PH", "PCO2", "HCO3", "POCSATURATED", "BE", "TOTALHB", "COHB", "METHB", "O2HB", "POCFIO2", "PO2" in the last 48 hours.  Blood Culture:   Recent Labs   Lab 01/07/24  1310   LABBLOO No Growth to date  No Growth to date  No Growth to date  No Growth to date     CBC:   Recent Labs   Lab 01/07/24  1310 01/08/24  0654 01/09/24  0422   WBC 9.18 11.42 6.12   HGB 14.5 14.1 13.1   HCT 43.2 43.5 40.2    288 226     CMP:   Recent Labs   Lab 01/07/24  1310 01/08/24  0654 01/09/24  0422    137 137   K 4.6 4.5 4.7    102 101   CO2 24 23 23   * 123* 143*   BUN 19 22 18   CREATININE 1.2 1.5* 1.3   CALCIUM 10.3 9.8 9.4   PROT 7.5  --   --    ALBUMIN 3.7  --   --    BILITOT 0.2  --   --    ALKPHOS 60  --   --    AST 21  --   --    ALT 13  --   --    ANIONGAP 14 12 13     Coagulation:   Recent Labs   Lab 01/07/24  1310   INR 1.0     Lactic Acid:   Recent Labs   Lab 01/07/24  1310   LACTATE 2.1     Lipid Panel: No results for input(s): "CHOL", "HDL", "LDLCALC", "TRIG", "CHOLHDL" in the last 48 hours.  Magnesium:   Recent Labs   Lab 01/07/24  1310 01/08/24  0654 01/09/24  0422   MG 1.9 2.0 2.0     Troponin:   Recent Labs   Lab 01/07/24  1310 01/07/24  1753   TROPONINI 0.006 <0.006     TSH:   Recent Labs   Lab 01/03/24  1407   TSH 1.196     Urine Culture: No results for input(s): "LABURIN" in the last 48 hours.  Urine Studies: No results for input(s): "COLORU", "APPEARANCEUA", "PHUR", "SPECGRAV", "PROTEINUA", "GLUCUA", "KETONESU", "BILIRUBINUA", "OCCULTUA", "NITRITE", "UROBILINOGEN", "LEUKOCYTESUR", "RBCUA", "WBCUA", "BACTERIA", "SQUAMEPITHEL", "HYALINECASTS" in the last 48 hours.    Invalid input(s): " ""WRIGHTSUR"    Significant Imaging: I have reviewed all pertinent imaging results/findings within the past 24 hours.  "

## 2024-01-09 NOTE — CONSULTS
Consult Note  Pulmonary Medicine    SUBJECTIVE     Reason for consult: Hx of lung CA s/p RUL lobectomy and VATS, recent tx with Keytruda and Cyramza. Hypoxic with concern for post-obs PNA vs pneumonitis    History of Present Illness  PCCM is consulted for as above in this 73-yo female with PMH of RUL lobectomy and VATS for RUL adenocarcinoma first diagnosed in 2018. Patient has extensive hx dating back to 2018 where she was found to have bx confirmed adenocarcinoma of RUL now s/p lobectomy and VATS procedure. Follows closely with [facility] main [facility] oncology and [facility]. Patient presenting now for what she tells me is progressively worsening shortness of breath and productive cough since around joey. Pt states that back in 1/2023 she had Proton beam therapy and developed pneumonitis and cough that was treated, had baseline cough following that time. Pt then said that in 9/2023 cough got progressively worse on a daily basis and she began to have increasing SOB with all activity. In 9/2023 it was noted on PET that there was a new RLL nodule with increased activity and metabolic activity believed to be spread of primary malignancy. Last round of Tx was with Cyramza and Keytruda in 12/2023 which she did not tolerate well and has not had additional tx since that time. Patient had repeat CT in 12/28/23 that showed worsening and increased activity of RLL perihilar/intrahilar mass. Around that time she was having right sided chest pain, shortness of breath, chills and productive cough for which she saw PCP, went to ED and saw Heme-Onc. She tried OTC medications, VQ not indicative of PE, was eventually given Bactrim x14 days and prednisone for what HemeOnc was worried about post-obstructive PNA vs pneumonitis. Patient noted yesterday her O2 sats on home monitor were in the 80s and she overall felt tired, short of breath and much worse than her baseline. Presented to ED and found to have acute hypoxic respiratory  failure placed on NC, wears no home O2, and was eventually requiring NRB for period of time.       OVERNIGHT:  Improved breathing today over yesterday, finishing a breathing treatment immediately before exam.  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     Squamous cell carcinoma of skin 10/05/2020    left medial thigh    Therapy     Thyroid nodule     Ulcerative colitis with complication        Past Surgical History:   Procedure Laterality Date    ADENOIDECTOMY  17yo    ANTERIOR CERVICAL DISCECTOMY W/ FUSION N/A 10/15/2018    Procedure: DISCECTOMY, SPINE, CERVICAL, ANTERIOR APPROACH, WITH FUSION C6/7  Depuy SNS: Motors/SSEP/Vocal Cord Regular OR table;  Surgeon: Greyson Bansal MD;  Location: [facility] OR [address] [place] [address];  Service: Neurosurgery;  Laterality: N/A;    APPENDECTOMY      BONE RESECTION, RIB  1980    BREAST BIOPSY Left     at least 40yrs ago in her 20's    BREAST CYST ASPIRATION      BREAST CYST EXCISION      COLONOSCOPY      ENDOBRONCHIAL ULTRASOUND N/A 7/10/2018    Procedure: ULTRASOUND, ENDOBRONCHIAL;  Surgeon: Sri Hearn MD;  Location: [facility] OR [address] [place] [address];  Service: Pulmonary;  Laterality: N/A;    ENDOBRONCHIAL ULTRASOUND N/A 9/24/2019    Procedure: ENDOBRONCHIAL ULTRASOUND (EBUS);  Surgeon: Sri Hearn MD;  Location: [facility] OR [address] [place] [address];  Service: Pulmonary;  Laterality: N/A;    ENDOSCOPIC MUCOSAL RESECTION OF COLON N/A 9/11/2020    Procedure: RESECTION, MUCOSA, COLON, ENDOSCOPIC;  Surgeon: Lilibeth Carbajal MD;  Location: [facility] ([address] [place] [address]);  Service: Endoscopy;  Laterality: N/A;    ENDOSCOPIC ULTRASOUND OF LOWER GASTROINTESTINAL TRACT N/A 4/19/2021    Procedure:  ULTRASOUND, LOWER GI TRACT, ENDOSCOPIC;  Surgeon: Lilibeth Carbajal MD;  Location: [facility] ENDO;  Service: Endoscopy;  Laterality: N/A;    ENDOSCOPIC ULTRASOUND OF LOWER GASTROINTESTINAL TRACT N/A 6/25/2021    Procedure: ULTRASOUND, LOWER GI TRACT, ENDOSCOPIC;  Surgeon: Silvino Christopher MD;  Location: [facility] ENDO;  Service: Endoscopy;  Laterality: N/A;  Needs Rapid MP    FLEXIBLE SIGMOIDOSCOPY  06/11/2020    with biopsies    FLEXIBLE SIGMOIDOSCOPY N/A 6/11/2020    Procedure: SIGMOIDOSCOPY, FLEXIBLE;  Surgeon: Gely Yeh MD;  Location: [facility] ENDO;  Service: Endoscopy;  Laterality: N/A;    FLEXIBLE SIGMOIDOSCOPY N/A 4/19/2021    Procedure: SIGMOIDOSCOPY-FLEXIBLE;  Surgeon: Lilibeth Carbajal MD;  Location: [facility] ENDO;  Service: Endoscopy;  Laterality: N/A;  Covid 4/16 May MP    FLEXIBLE SIGMOIDOSCOPY N/A 6/3/2022    Procedure: SIGMOIDOSCOPY-FLEXIBLE;  Surgeon: Francesco Clark MD;  Location: [facility] ENDO (4TH FLR);  Service: Endoscopy;  Laterality: N/A;  vacc-inst portal-tb    HYSTERECTOMY      @47yrs of age    INSERTION OF TUNNELED CENTRAL VENOUS CATHETER (CVC) WITH SUBCUTANEOUS PORT N/A 9/25/2018    Procedure: [id number];  Surgeon: [facility] Diagnostic Provider;  Location: [facility] OR 2ND FLR;  Service: Radiology;  Laterality: N/A;    INSERTION OF VENOUS ACCESS PORT N/A 3/15/2021    Procedure: INSERTION, VENOUS ACCESS PORT;  Surgeon: Chang Mathews MD;  Location: [facility] OR 2ND FLR;  Service: General;  Laterality: N/A;  NEEDS CONSENT.    KIDNEY SURGERY      17yo    MEDIPORT REMOVAL N/A 3/15/2021    Procedure: REMOVAL, CATHETER, CENTRAL VENOUS, TUNNELED, WITH PORT;  Surgeon: Chang Mathews MD;  Location: [facility] OR 2ND FLR;  Service: General;  Laterality: N/A;    OOPHORECTOMY      @47yrs of age    SKIN BIOPSY      TONSILLECTOMY      UPPER GASTROINTESTINAL ENDOSCOPY      VIDEO-ASSISTED THORACOSCOPIC LOBECTOMY Right 8/9/2018    Procedure: VATS, WITH LOBECTOMY Possible chest wall resection;  Surgeon:  Philip Messina MD;  Location: [facility] OR [address] [place] [address];  Service: Thoracic;  Laterality: Right;  Have open pan available.       Family History   Problem Relation Age of Onset    Stroke Mother     Cataracts Mother     Cancer Father         colon cancer    Colon cancer Father     Diabetes Brother     Heart failure Brother     Hypertension Brother     Heart attack Paternal Aunt     Heart attack Maternal Grandfather     Diabetes Paternal Aunt     Anxiety disorder Paternal Grandmother         agoraphobia    Anesthesia problems Neg Hx     Blindness Neg Hx     Amblyopia Neg Hx     Glaucoma Neg Hx     Macular degeneration Neg Hx     Retinal detachment Neg Hx     Strabismus Neg Hx     Thyroid disease Neg Hx     Melanoma Neg Hx        Social History     Socioeconomic History    Marital status: Significant Other     Spouse name: Lata   Occupational History    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 No    Breast-feeding No   Social History Narrative    Exercise:  Walks [id number] steps daily.    Former RN     Social Determinants of Health     Financial Resource Strain: Low Risk  (2023)    Overall Financial Resource Strain (CARDIA)     Difficulty of Paying Living Expenses: Not hard at all   Food Insecurity: No Food Insecurity (2023)    Hunger Vital Sign     Worried About Running Out of Food in the Last Year: Never true     Ran Out of Food in the Last Year: Never  true   Transportation Needs: No Transportation Needs (11/27/2023)    PRAPARE - Transportation     Lack of Transportation (Medical): No     Lack of Transportation (Non-Medical): No   Physical Activity: Insufficiently Active (11/27/2023)    Exercise Vital Sign     Days of Exercise per Week: 6 days     Minutes of Exercise per Session: 20 min   Stress: Stress Concern Present (11/27/2023)    [employer] [place] [employer] - Occupational Stress Questionnaire     Feeling of Stress : To some extent   Social Connections: Unknown (11/27/2023)    Social Connection and Isolation Panel [NHANES]     Frequency of Communication with Friends and Family: Three times a week     Frequency of Social Gatherings with Friends and Family: Once a week     Active Member of Clubs or Organizations: No     Attends Club or Organization Meetings: Patient declined     Marital Status: Living with partner   Housing Stability: Low Risk  (11/27/2023)    Housing Stability Vital Sign     Unable to Pay for Housing in the Last Year: No     Number of Places Lived in the Last Year: 1     Unstable Housing in the Last Year: No       Current Facility-Administered Medications   Medication Dose Route Frequency Provider Last Rate Last Admin    acetaminophen tablet 650 mg  650 mg Oral Q4H PRN Dottie Kirkland MD        amLODIPine tablet 5 mg  5 mg Oral QHS Opal Eddy NP   5 mg at 01/07/24 2253    atenoloL tablet 25 mg  25 mg Oral TID Dottie Kirkland MD   25 mg at 01/08/24 2124    benzonatate capsule 100 mg  100 mg Oral TID PRN Geovanny Sam MD        enoxaparin injection 40 mg  40 mg Subcutaneous Daily Dottie Kirkland MD   40 mg at 01/08/24 1632    famotidine tablet 20 mg  20 mg Oral Every other day Dottie Kirkland MD   20 mg at 01/08/24 0908    guaiFENesin 100 mg/5 ml syrup 200 mg  200 mg Oral Q4H PRN Geovanny Sam MD   200 mg at 01/09/24 0311    hydrALAZINE tablet 25 mg  25 mg Oral Q8H Dottie Kirkland MD   25 mg at  01/09/24 0540    HYDROcodone-acetaminophen 7.5-325 mg per tablet 1 tablet  1 tablet Oral Q4H PRN Dottie Kirkland MD   1 tablet at 01/07/24 2253    HYDROcodone-acetaminophen 7.5-325 mg per tablet 2 tablet  2 tablet Oral Q4H PRN Dottie Kirkland MD   2 tablet at 01/07/24 1850    HYDROmorphone injection 0.5 mg  0.5 mg Intravenous Q4H PRN Dottie Kirkland MD   0.5 mg at 01/09/24 0254    levalbuterol nebulizer solution 1.25 mg  1.25 mg Nebulization Q4H Dottie Kirklnad MD   1.25 mg at 01/09/24 0313    levalbuterol nebulizer solution 1.25 mg  1.25 mg Nebulization Q6H PRN Geovanny Sam MD        linezolid 600 mg/300 mL IVPB 600 mg  600 mg Intravenous Q12H Dottie Kirkland MD   Stopped at 01/09/24 0610    LORazepam tablet 0.5 mg  0.5 mg Oral BID PRN Dottie Kirkland MD   0.5 mg at 01/08/24 1947    mupirocin 2 % ointment   Nasal BID Dottie Kirkland MD   Given at 01/08/24 2125    naloxone 0.4 mg/mL injection 1 mg  1 mg Intravenous PRN Dottie Kirkland MD        ondansetron disintegrating tablet 8 mg  8 mg Oral Q6H PRN Brii Lance MD   8 mg at 01/09/24 0540    piperacillin-tazobactam (ZOSYN) 4.5 g in dextrose 5 % in water (D5W) 100 mL IVPB (MB+)  4.5 g Intravenous Q8H Dottie Kirkland MD   Stopped at 01/09/24 0508    predniSONE tablet 60 mg  60 mg Oral Daily Dottie Kirkland MD   60 mg at 01/08/24 1004    sodium chloride 0.9% flush 10 mL  10 mL Intravenous Q12H PRN Dottie Kirkland MD        sodium chloride 3% nebulizer solution 4 mL  4 mL Nebulization PRN Dottie Kirkland MD        sulfamethoxazole-trimethoprim 800-160mg per tablet 1 tablet  1 tablet Oral 2 times per day on Mon Wed Fri Dottie Kirkland MD   1 tablet at 01/08/24 2124       Review of patient's allergies indicates:   Allergen Reactions    Adhesive Itching, Rash and Blisters     INCLUDES EKG PADS    Ciprofloxacin Hives     Hives    Iodinated contrast media     Phenergan plain Other (See Comments)      ""crazy behavior"    Phenergan [promethazine]     Tramadol     Vancomycin analogues      Red man syndrome         Review of Systems  Negative unless otherwise stated in HPI        OBJECTIVE     Vitals:    01/09/24 0534   BP: (!) 150/70   Pulse: 62   Resp: 19   Temp: 98.1 °F (36.7 °C)       Physical Exam  Physical Exam  Constitutional:       General: She is not in acute distress.     Appearance: She is obese. She is not ill-appearing.   HENT:      Head: Normocephalic and atraumatic.   Eyes:      Extraocular Movements: Extraocular movements intact.      Conjunctiva/sclera: Conjunctivae normal.   Cardiovascular:      Rate and Rhythm: Normal rate and regular rhythm.      Pulses: Normal pulses.      Heart sounds: Normal heart sounds. No murmur heard.     No friction rub. No gallop.   Pulmonary:      Effort: No respiratory distress.      Breath sounds: Rhonchi present. No wheezing or rales.      Comments: Diffuse crackles  Diminished lung sounds to upper right lobe  Port-a-Cath noted on upper left chest wall  Abdominal:      General: Abdomen is flat. Bowel sounds are normal.      Palpations: Abdomen is soft.      Tenderness: There is no abdominal tenderness.   Musculoskeletal:      Right lower leg: No edema.      Left lower leg: No edema.   Skin:     General: Skin is warm.      Capillary Refill: Capillary refill takes less than 2 seconds.      Coloration: Skin is not jaundiced.   Neurological:      General: No focal deficit present.      Mental Status: She is alert and oriented to person, place, and time.   Psychiatric:         Mood and Affect: Mood normal.         Behavior: Behavior normal.         Thought Content: Thought content normal.         Judgment: Judgment normal.           Laboratory  Recent Labs   Lab 01/07/24  1310   INR 1.0         Recent Labs   Lab 01/07/24  1310 01/08/24  0654 01/09/24  0422   WBC 9.18 11.42 6.12   HGB 14.5 14.1 13.1   HCT 43.2 43.5 40.2    288 226         Recent Labs   Lab " "01/03/24  1407 01/07/24  1310 01/08/24  0654 01/09/24  0422    141 137 137   K 5.5* 4.6 4.5 4.7    103 102 101   CO2 26 24 23 23   BUN 17 19 22 18   CREATININE 1.2 1.2 1.5* 1.3   CALCIUM 10.3 10.3 9.8 9.4   PROT 6.8 7.5  --   --    BILITOT 0.3 0.2  --   --    ALKPHOS 54* 60  --   --    ALT 10 13  --   --    AST 16 21  --   --        No results for input(s): "PH", "PCO2", "PO2", "HCO3", "POCSATURATED", "BE" in the last 24 hours.        Diagnostic Results    CT Chest 1/7/2024:  FINDINGS:  Consolidation in the right lower lobe most severely in the superior segment with adjacent loculated pleural fluid.  More reticular opacity throughout the right lower lobe with patchy areas of ground-glass opacity in the middle lobe on the right.     The upper lobe postsurgical change and remaining lung is spared as is the left lung.  Small pneumatocele is a in the left upper lobe.     No mediastinal mass or lymph node enlargement is evident.  Left chest wall Port-A-Cath is noted.  The heart pericardium and great vessels unremarkable.     Numerous low-density lesions throughout the liver with the largest in the left lobe      Assessment / Recommendations     #RUL Adenocarcinoma s/p Lobectomy and VATS  Postobstructive PNA  -Patient imaging reviewed with Dr. Davila, given unilateral findings very unlikely current presentation is 2/2 pneumonitis  -Enlarging RLL mass very concerning for post-obstructive PNA. Pt with chills, productive cough, worsening SOB, CT with GGO and air bronchograms  -Has been off Keytruda and VEGFi for over a month, chart review indicated patient does not tolerate therapy  -Follows closely with [facility] main Heme/onc, last visit end of 12/23 and sent with prednisone and Bactrim x14 days  -Improved today, able to recline, speaking in full sentences, does not appear to be in distress    Recommendations  Continue supplemental O2 as needed  Continue q4 Levalbuterol, avoid albuterol 2/2 patient reporting " ADR to albuterol  Continue Zosyn + Bactrim + Linezolid (Had Vancomycin associated infusion syndrome) for Tx of Post-obs PNA 2/2 new increasing RLL mass  Attempting to arrange bronchoscopy, likely as outpt      Pt seen and examined and plan discussed on rounds with Dr. Live, staff.       Thank you for this consult. Pulmonary will continue to follow.     Chetan Bocanegra MD  [facility], PGY-2  [facility] Pulm/Crit Team    Pt seen and examined with Pulmonary/Critical Care team and this note reviewed and validated with the following additional comments: Feels a little better today.  Afebrile.  We will likely shooot for bronch on Thursday.    Medical Decision Making (MDM) was complex.  The number and complexity of problems addressed was high.  The amount and complexity of data reviewed was high.  The risk of complications and/or morbidity/mortality was high.  The tests ordered were none.  I communicated with the following providers HO.  Time spent in the care of this patient was 35 minutes    Anthony Live MD  Phone [phone]

## 2024-01-09 NOTE — PROGRESS NOTES
[facility] - Med Surg  Infectious Disease  Consult Progress Note     Patient Name: Alesha Toney  MRN: [id number]  Code Status: Full Code  Admission Date: 1/7/2024  Attending Physician: Dottie Kirkland MD  Primary Care Provider: Margo Caldwell MD     Patient information was obtained from patient, past medical records, and ER records.      Infectious Disease Consult Note  Consult performed by: Demetrius Mazariegos MD   Consulting Physician: Dottie Kirkland MD  Reason for Consult: post-obstructive pneumonia, lung adenocarcinoma       Assessment and Plan:     Post-Obstructive Pneumonia  72 yo female with PMH relevant for lung adenocarcinoma (s/p RUL lobectomy in 2018, with recent treatment of Keytruda and Cyramza last month), HTN, ulcerative colitis, and CKD3a admitted for 2 weeks of progressive SOB, with a productive cough producing white sputum and oxygen saturation in the 80s at home. Of note, pt was treated for pneumonitis with Augmentin and Prednisone 05/2023, treated for post-obstructive PNA on 12/28 with Augmentin and Doxycycline, and treated for pneumonitis 1/3 with Bactrim DS and Prednisone. WBC decreasing since admission (11->6) and no fevers. Pt currently on Zosyn, Linezolid, and Bactrim. Blood cultures show NGTD at 24 hrs. BP elevated, pt currently maintaining SpO2 >92% on 3 L NC.                   -Continue Linezolid and Zosyn              -Can continue Bactrim for PJP prophylaxis              -Respiratory Cx (1/8): moderate WBCs, few gram + cocci, and many yeast - continue to follow   -Blood Cx (1/7): NGTD - continue to follow     Thank you for your consult. We will continue to follow. Please contact us if you have any additional questions. Please appreciate any variation in plan noted in attending attestation.       Mark Valladares DO  [facility] Internal Medicine, PGY-1  Infectious Disease  [facility] - Med Surg     History of Present Illness:  Alesha Toney is a 73 y.o.  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 The patient presented to [facility] on 1/7/2024 with a primary complaint of Shortness of Breath (SOB with cough for past 2 weeks. States PCP has prescribed multiple meds with no improvement. Presents awake, alert with O2 in progress. Resp unlabored. C/o right sided CP with h/o right sided lung CA - states pain in this area is common for her. Denies home O2. States she has been using nebulizer at home. Awake, alert, oriented.)     The patient was in their usual state of health until 12/25 when she presented to the ED with shortness of breath and increased R sided chest pain. She had a CT chest and V/Q scan at the time and had close follow up with her oncologist and was started on prednisone for possible pneumonitis with bactrim ppx. She has had a progressive cough with worsening R sided chest pain and noted her pulse ox to be in the 80s at home. She has had clear-whitish thick sputum. No fevers, chills, sweats. No calf swelling. At this time due to her medication side effects she is not able to continue with Keytruda or Cyramza.    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     Squamous cell carcinoma of skin 10/05/2020    left medial thigh    Therapy     Thyroid nodule     Ulcerative colitis with complication      Microbiology Results (last 7 days)        Procedure Component Value Units Date/Time    Culture, Respiratory with Gram Stain [[id number]]  (Abnormal) Collected: 01/08/24 0411    Order Status: Completed Specimen: Sputum Updated: 01/09/24 1257     Respiratory Culture YEAST   Many  Identification pending  Normal respiratory jett also present       Gram Stain (Respiratory) >10 epithelial cells per low power field     Gram Stain (Respiratory) Moderate WBC's     Gram Stain (Respiratory) Few Gram positive cocci     Gram Stain (Respiratory) Rare budding yeast    Blood Culture #2 **CANNOT BE ORDERED STAT** [[id number]] Collected: 01/07/24 1310    Order Status: Completed Specimen: Blood Updated: 01/08/24 1822     Blood Culture, Routine No Growth to date      No Growth to date    Blood Culture #1 **CANNOT BE ORDERED STAT** [[id number]] Collected: 01/07/24 1310    Order Status: Completed Specimen: Blood from Antecubital, Right Arm Updated: 01/08/24 1822     Blood Culture, Routine No Growth to date      No Growth to date          Antibiotics (From admission, onward)      Start     Stop Route Frequency Ordered    01/08/24 2100  sulfamethoxazole-trimethoprim 800-160mg per tablet 1 tablet         01/19/24 0859 Oral 2 times per day on Mon Wed Fri 01/08/24 1752    01/08/24 0900  mupirocin 2 % ointment         01/13/24 0859 Nasl 2 times daily 01/07/24 2246    01/08/24 0000  piperacillin-tazobactam (ZOSYN) 4.5 g in dextrose 5 % in water (D5W) 100 mL IVPB (MB+)  ( Pneumonia - Moderate-High MDR )         01/12/24 2359 IV Every 8 hours (non-standard times) 01/07/24 1532    01/07/24 1645  linezolid 600 mg/300 mL IVPB 600 mg         -- IV Every 12 hours (non-standard times) 01/07/24 1537          Objective     Temp:  [98.1 °F (36.7 °C)-98.7 °F (37.1 °C)]   Pulse:  [62-95]   Resp:  [17-29]   BP: (124-169)/(58-72)   SpO2:  [91 %-97 %]     Physical Exam  Constitutional:       General: She is not in acute distress.     Appearance: She is ill-appearing. She is not diaphoretic.    HENT:      Head: Normocephalic and atraumatic.   Eyes:      Extraocular Movements: Extraocular movements intact.      Pupils: Pupils are equal, round, and reactive to light.   Pulmonary:      Effort: Respiratory distress present. On 3 L NC.     Comments: Port to L side of chest  Bilateral rales present  Abdominal:      General: Abdomen is flat. Bowel sounds are normal. There is no distension.      Palpations: Abdomen is soft.   Musculoskeletal:      Right lower leg: No edema.      Left lower leg: No edema.   Neurological:      General: No focal deficit present.      Mental Status: She is alert and oriented to person, place, and time.     Significant Labs: All pertinent labs within the past 24 hours have been reviewed.     Significant Imaging: I have reviewed all pertinent imaging results/findings within the past 24 hours.

## 2024-01-09 NOTE — PT/OT/SLP EVAL
Physical Therapy Evaluation and Treatment    Patient Name:  Alesha Toney   MRN:  [id number]    Recommendations:     Discharge Recommendations: Low Intensity Therapy   Discharge Equipment Recommendations: shower chair   Barriers to discharge:  limited functional endurance    Assessment:     Alesha Toney is a 73 y.o. female admitted with a medical diagnosis of Malignant neoplasm of upper lobe, right bronchus or lung.  She presents with the following impairments/functional limitations: impaired endurance, gait instability, impaired balance, pain, impaired cardiopulmonary response to activity.    PT/OT co-evaluation completed due to anticipated complexity of pt's presentation. Pt was previously independent with no AD. Pt at this time requires CGA/SBA with no AD. Therapy recommending low intensity therapy. Therapy will continue to progress pt as able.        Rehab Prognosis: Good; patient would benefit from acute skilled PT services to address these deficits and reach maximum level of function.    Recent Surgery: * No surgery found *      Plan:     During this hospitalization, patient to be seen 3 x/week to address the identified rehab impairments via gait training, therapeutic activities, therapeutic exercises, neuromuscular re-education and progress toward the following goals:    Plan of Care Expires:  02/09/24    Subjective     Chief Complaint: pain to R lateral chest  Patient/Family Comments/goals: to improve independence with mobility  Pain/Comfort:  Pain Rating 1: 5/10  Location - Side 1: Right  Location 1:  (lateral chest)  Pain Addressed 1: Reposition, Cessation of Activity, Nurse notified  Pain Rating Post-Intervention 1:  (not rated)    Patients cultural, spiritual, Scientology conflicts given the current situation: no    Living Environment:  Lives with SO in 1 story home with threshold step to enter. WIS with grab bars.  Prior to admission, patients level of function was Independent with no AD.  Equipment  used at home: grab bar.  DME owned (not currently used): none.  Upon discharge, patient will have assistance from SO.    Objective:     Communicated with Nurse prior to session.  Patient found HOB elevated with bed alarm, telemetry, oxygen, peripheral IV  upon PT entry to room.    General Precautions: Standard, fall  Orthopedic Precautions:N/A   Braces: N/A  Respiratory Status: Nasal cannula, flow 3 L/min    Exams:  Cognitive Exam:  Patient is oriented to Person, Place, Time, and Situation  Sensation:    -       Intact  light/touch to BLE; reports B toes tingling  RLE ROM: WFL  RLE Strength: WFL  LLE ROM: WFL  LLE Strength: WFL    Functional Mobility:  Bed Mobility:     Supine to Sit: stand by assistance  Sit to Supine: stand by assistance  Transfers:     Sit to Stand:  contact guard assistance with no AD  Gait: ~75ft with CGA and no AD.       AM-PAC 6 CLICK MOBILITY  Total Score:19       Treatment & Education:  Pt educated on role of therapy.   Pt requires CGA/SBA with mobility with no AD.   Pt takes increased time with mobility; requiring breaks as needed due to SOB.   Pt increased to 4L O2 with ambulation and returned to 3L once returned to rest in bed.   Pt encouraged to sit up in bedside reclining chair with staff assistance for meals; pt at this time would like to return to bed.     Patient left HOB elevated with all lines intact, call button in reach, bed alarm on, Nurse notified, and SO present.    GOALS:   Multidisciplinary Problems       Physical Therapy Goals          Problem: Physical Therapy    Goal Priority Disciplines Outcome Goal Variances Interventions   Physical Therapy Goal     PT, PT/OT Ongoing, Progressing     Description: Goals to be met by: 24     Patient will increase functional independence with mobility by performin. Supine to sit with Modified [place]  2. Sit to supine with Modified [place]  3. Sit to stand transfer with [place]  4. Bed to chair transfer with  [place] using No Assistive Device  5. Gait  x 150 feet with [place] using No Assistive Device.                          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     Squamous cell carcinoma of skin 10/05/2020    left medial thigh    Therapy     Thyroid nodule     Ulcerative colitis with complication        Past Surgical History:   Procedure Laterality Date    ADENOIDECTOMY  19yo    ANTERIOR CERVICAL DISCECTOMY W/ FUSION N/A 10/15/2018    Procedure: DISCECTOMY, SPINE, CERVICAL, ANTERIOR APPROACH, WITH FUSION C6/7  Depuy SNS: [employer]/SSEP/Vocal Cord Regular OR table;  Surgeon: Greyson Bansal MD;  Location: [facility] OR [address] [place] [address];  Service: Neurosurgery;  Laterality: N/A;    APPENDECTOMY      BONE RESECTION, RIB  1980    BREAST BIOPSY Left     at least 40yrs ago in her 20's    BREAST CYST ASPIRATION      BREAST CYST EXCISION      COLONOSCOPY      ENDOBRONCHIAL ULTRASOUND N/A 7/10/2018    Procedure: ULTRASOUND, ENDOBRONCHIAL;  Surgeon: Sri Hearn MD;  Location: [facility] OR [address] [place] [address];  Service: Pulmonary;  Laterality: N/A;    ENDOBRONCHIAL ULTRASOUND N/A 9/24/2019    Procedure: ENDOBRONCHIAL ULTRASOUND (EBUS);  Surgeon: Sri Hearn MD;  [facility];  Service: Pulmonary;  Laterality: N/A;    ENDOSCOPIC MUCOSAL RESECTION OF COLON N/A 9/11/2020    Procedure: RESECTION, MUCOSA, COLON, ENDOSCOPIC;  Surgeon: Lilibeth Carbajal MD;  Location: [facility] ([address] [place] [address]);  Service: Endoscopy;  Laterality: N/A;    ENDOSCOPIC ULTRASOUND OF LOWER GASTROINTESTINAL TRACT N/A 4/19/2021    Procedure: ULTRASOUND, LOWER GI TRACT, ENDOSCOPIC;  Surgeon: Lilibeth Carbajal MD;  Location: [facility];  Service:  Endoscopy;  Laterality: N/A;    ENDOSCOPIC ULTRASOUND OF LOWER GASTROINTESTINAL TRACT N/A 6/25/2021    Procedure: ULTRASOUND, LOWER GI TRACT, ENDOSCOPIC;  Surgeon: Silvino Christopher MD;  Location: [facility] ENDO;  Service: Endoscopy;  Laterality: N/A;  Needs Rapid MP    FLEXIBLE SIGMOIDOSCOPY  06/11/2020    with biopsies    FLEXIBLE SIGMOIDOSCOPY N/A 6/11/2020    Procedure: SIGMOIDOSCOPY, FLEXIBLE;  Surgeon: Gely Yeh MD;  Location: [facility] ENDO;  Service: Endoscopy;  Laterality: N/A;    FLEXIBLE SIGMOIDOSCOPY N/A 4/19/2021    Procedure: SIGMOIDOSCOPY-FLEXIBLE;  Surgeon: Lilibeth Carbajal MD;  Location: [facility] ENDO;  Service: Endoscopy;  Laterality: N/A;  Covid 4/16 Brian MP    FLEXIBLE SIGMOIDOSCOPY N/A 6/3/2022    Procedure: SIGMOIDOSCOPY-FLEXIBLE;  Surgeon: Francesco Clark MD;  Location: [facility] ENDO (4TH FLR);  Service: Endoscopy;  Laterality: N/A;  vacc-inst portal-tb    HYSTERECTOMY      @47yrs of age    INSERTION OF TUNNELED CENTRAL VENOUS CATHETER (CVC) WITH SUBCUTANEOUS PORT N/A 9/25/2018    Procedure: [id number];  Surgeon: Dosc Diagnostic Provider;  Location: [facility] OR 2ND FLR;  Service: Radiology;  Laterality: N/A;    INSERTION OF VENOUS ACCESS PORT N/A 3/15/2021    Procedure: INSERTION, VENOUS ACCESS PORT;  Surgeon: Chang Mathews MD;  Location: [facility] OR 2ND FLR;  Service: General;  Laterality: N/A;  NEEDS CONSENT.    KIDNEY SURGERY      17yo    MEDIPORT REMOVAL N/A 3/15/2021    Procedure: REMOVAL, CATHETER, CENTRAL VENOUS, TUNNELED, WITH PORT;  Surgeon: Chang Mathews MD;  Location: [facility] OR 2ND FLR;  Service: General;  Laterality: N/A;    OOPHORECTOMY      @47yrs of age    SKIN BIOPSY      TONSILLECTOMY      UPPER GASTROINTESTINAL ENDOSCOPY      VIDEO-ASSISTED THORACOSCOPIC LOBECTOMY Right 8/9/2018    Procedure: VATS, WITH LOBECTOMY Possible chest wall resection;  Surgeon: Philip Messina MD;  Location: [facility] OR 2ND FLR;  Service: Thoracic;  Laterality: Right;  Have open pan  available.       Time Tracking:     PT Received On: 01/09/24  PT Start Time: 1017     PT Stop Time: 1042  PT Total Time (min): 25 min With OT    Billable Minutes: Evaluation 10 and Gait Training 15      01/09/2024

## 2024-01-09 NOTE — ASSESSMENT & PLAN NOTE
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

## 2024-01-09 NOTE — NURSING
Pt c/o lower back pain, SOB and productive cough, Dr. Sam notified, PRN medications administered as ordered, respiratory therapist by pt's bedside; close monitoring maintained.

## 2024-01-09 NOTE — PLAN OF CARE
[facility] - Telemetry  Initial Discharge Assessment       Primary Care Provider: Margo Caldwell MD    Admission Diagnosis: Shortness of breath [R06.02]    Admission Date: 1/7/2024  Expected Discharge Date: 1/12/2024    Consult: pulm, dty, & PT/OT    Payor: MEDICARE / Plan: MEDICARE PART A & B / Product Type: Government /     Extended Emergency Contact Information  Primary Emergency Contact: Marty Mathews  Address: [address] [place] [address]           [place] [address] [facility]  Home Phone: [phone]  Mobile Phone: [phone]  Relation: Significant other    Discharge Plan A: (P) Home with family  Discharge Plan B: (P) Home Health      [id number] - [place] - [address] AT [facility] [address]  [address]  [place] [address]  Phone: [phone] Fax: [phone]    [facility] - [place] - [address]  [address]  [place] [address]  Phone: [phone] Fax: [phone]    [facility] - [facility] [address] [facility]  [address] [facility] [address]  Phone: [phone] Fax: [phone]    [facility]  [address]  [place] [address]  Phone: [phone] Fax: [phone]      Initial Assessment (most recent)       Adult Discharge Assessment - 01/09/24 0945          Discharge Assessment    Assessment Type Discharge Planning Assessment (P)      Confirmed/corrected address, phone number and insurance Yes (P)      Confirmed Demographics Correct on Facesheet (P)      Source of Information patient;family (P)    s.o.Marty ([phone])    Communicated ALMAZ with patient/caregiver Date not available/Unable to determine (P)      People in Home significant other (P)    s.o.Marty ([phone])    Do you expect to return to your current living situation? Yes (P)      Do you have help at home or someone to help you manage your care at home? Yes (P)      Prior to  hospitilization cognitive status: Alert/Oriented (P)      Current cognitive status: Alert/Oriented (P)      Equipment Currently Used at Home nebulizer;blood pressure machine;other (see comments) (P)    pox    Readmission within 30 days? No (P)      Patient currently being followed by outpatient case management? No (P)      Do you currently have service(s) that help you manage your care at home? No (P)      Do you take prescription medications? Yes (P)      Do you have prescription coverage? Yes (P)      Do you have any problems affording any of your prescribed medications? No (P)      Is the patient taking medications as prescribed? yes (P)      How do you get to doctors appointments? family or friend will provide (P)      Are you on dialysis? No (P)      Do you take coumadin? No (P)      Discharge Plan A Home with family (P)      Discharge Plan B Home Health (P)      DME Needed Upon Discharge  other (see comments) (P)    tbd    Discharge Plan discussed with: Patient;Spouse/sig other (P)    s.o., Marty Mathews ([phone])       Physical Activity    On average, how many days per week do you engage in moderate to strenuous exercise (like a brisk walk)? 7 days (P)      On average, how many minutes do you engage in exercise at this level? 20 min (P)         Financial Resource Strain    How hard is it for you to pay for the very basics like food, housing, medical care, and heating? Not very hard (P)         Housing Stability    In the last 12 months, was there a time when you were not able to pay the mortgage or rent on time? No (P)      In the last 12 months, was there a time when you did not have a steady place to sleep or slept in a shelter (including now)? No (P)         Transportation Needs    In the past 12 months, has lack of transportation kept you from medical appointments or from getting medications? No (P)      In the past 12 months, has lack of transportation kept you from meetings, work, or from getting  things needed for daily living? No (P)         Food Insecurity    Within the past 12 months, you worried that your food would run out before you got the money to buy more. Never true (P)      Within the past 12 months, the food you bought just didn't last and you didn't have money to get more. Never true (P)         Stress    Do you feel stress - tense, restless, nervous, or anxious, or unable to sleep at night because your mind is troubled all the time - these days? Very much (P)         Social Connections    In a typical week, how many times do you talk on the phone with family, friends, or neighbors? More than three times a week (P)      How often do you get together with friends or relatives? More than three times a week (P)      How often do you attend Taoism or Mosque services? Never (P)      Do you belong to any clubs or organizations such as Taoism groups, unions, fraternal or athletic groups, or school groups? No (P)      How often do you attend meetings of the clubs or organizations you belong to? Never (P)      Are you , , , , never , or living with a partner? Living with partner (P)         Alcohol Use    Q1: How often do you have a drink containing alcohol? Never (P)      Q2: How many drinks containing alcohol do you have on a typical day when you are drinking? Patient does not drink (P)      Q3: How often do you have six or more drinks on one occasion? Never (P)                       [id number]  Patient resting quietly in bed w/s.o., Marty Mathews ([phone]), at the bedside when CM rounded. Patient was admitted with acute hypoxic respiratory failure & is being followed by pulm, dty, and PT/OT. Pt has a hx of lung CA s/p RUL lobectomy and VATS. Pox 94% on 3.5L O2 via NC this AM. Pt does not use home oxygen.     Patient lives with her s.o., is independent of all ADLs, & denied the need for assistance with transportation at time of discharge.     Previously scheduled  appointments with Dr Lucy Mills om 1/17/2024 at 0900 & DOREEN Beaver (pall care) on 1/22/2024 at 1300 noted. Information added to the pt's discharge paperwork.     CM updated patient's whiteboard with CM name & contact information.       Will continue to follow.

## 2024-01-09 NOTE — ED NOTES
Pt informed of bed assignment. Patient has no questions or other concerns. Resting comfortably. Will cont to monitor.

## 2024-01-09 NOTE — PLAN OF CARE
Problem: Physical Therapy  Goal: Physical Therapy Goal  Description: Goals to be met by: 24     Patient will increase functional independence with mobility by performin. Supine to sit with Modified [place]  2. Sit to supine with Modified [place]  3. Sit to stand transfer with [place]  4. Bed to chair transfer with [place] using No Assistive Device  5. Gait  x 150 feet with [place] using No Assistive Device.     Outcome: Ongoing, Progressing    PT/OT co-evaluation completed due to anticipated complexity of pt's presentation. Pt was previously independent with no AD. Pt at this time requires CGA/SBA with no AD. Therapy recommending low intensity therapy. Therapy will continue to progress pt as able.

## 2024-01-09 NOTE — ASSESSMENT & PLAN NOTE
Chronic pain due to malignant neoplastic disease  Malignant neoplasm of upper lobe, right bronchus or lung  Overview:  Status post resection 8/2018 status post adjuvant chemo     Secondary and unspecified malignant neoplasm of intrathoracic lymph nodes  Recently did not tolerate Keytruda and Cyramza and tx was discontinued recently

## 2024-01-09 NOTE — CONSULTS
Consult Note  Pulmonary Medicine    SUBJECTIVE     Reason for consult: Hx of lung CA s/p RUL lobectomy and VATS, recent tx with Keytruda and Cyramza. Hypoxic with concern for post-obs PNA vs pneumonitis    History of Present Illness  PCCM is consulted for as above in this 73-yo female with PMH of RUL lobectomy and VATS for RUL adenocarcinoma first diagnosed in 2018. Patient has extensive hx dating back to 2018 where she was found to have bx confirmed adenocarcinoma of RUL now s/p lobectomy and VATS procedure. Follows closely with [facility] main [facility] oncology and [facility]. Patient presenting now for what she tells me is progressively worsening shortness of breath and productive cough since around joey. Pt states that back in 1/2023 she had Proton beam therapy and developed pneumonitis and cough that was treated, had baseline cough following that time. Pt then said that in 9/2023 cough got progressively worse on a daily basis and she began to have increasing SOB with all activity. In 9/2023 it was noted on PET that there was a new RLL nodule with increased activity and metabolic activity believed to be spread of primary malignancy. Last round of Tx was with Cyramza and Keytruda in 12/2023 which she did not tolerate well and has not had additional tx since that time. Patient had repeat CT in 12/28/23 that showed worsening and increased activity of RLL perihilar/intrahilar mass. Around that time she was having right sided chest pain, shortness of breath, chills and productive cough for which she saw PCP, went to ED and saw tavoonc. She tried OTC medications, VQ not indicative of PE, was eventually given Bactrim x14 days and prednisone for what Tuan was worried about post-obstructive PNA vs pneumonitis. Patient noted yesterday her O2 sats on home monitor were in the 80s and she overall felt tired, short of breath and much worse than her baseline. Presented to ED and found to have acute hypoxic respiratory  failure placed on NC, wears no home O2, and was eventually requiring NRB for period of time.     When I met with her this morning she states that she feels very short of breath and as if she is having subjective chills. She denies rigors or fever. States she cannot take albuterol because it has sent her into SVT in past, is requesting levalbuterol. Denies CP, BRIDGETT, N/V/D.      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     Squamous cell carcinoma of skin 10/05/2020    left medial thigh    Therapy     Thyroid nodule     Ulcerative colitis with complication        Past Surgical History:   Procedure Laterality Date    ADENOIDECTOMY  17yo    ANTERIOR CERVICAL DISCECTOMY W/ FUSION N/A 10/15/2018    Procedure: DISCECTOMY, SPINE, CERVICAL, ANTERIOR APPROACH, WITH FUSION C6/7  Depuy SNS: Motors/SSEP/Vocal Cord Regular OR table;  Surgeon: Greyson Bansal MD;  Location: [address];  Service: Neurosurgery;  Laterality: N/A;    APPENDECTOMY      BONE RESECTION, RIB  1980    BREAST BIOPSY Left     at least 40yrs ago in her 20's    BREAST CYST ASPIRATION      BREAST CYST EXCISION      COLONOSCOPY      ENDOBRONCHIAL ULTRASOUND N/A 7/10/2018    Procedure: ULTRASOUND, ENDOBRONCHIAL;  Surgeon: Sri Hearn MD;  Location: [address];  Service: Pulmonary;  Laterality: N/A;    ENDOBRONCHIAL ULTRASOUND N/A 9/24/2019    Procedure: ENDOBRONCHIAL ULTRASOUND (EBUS);  Surgeon: Sri Hearn MD;  Location: [address];  Service: Pulmonary;  Laterality: N/A;    ENDOSCOPIC MUCOSAL RESECTION OF COLON N/A 9/11/2020    Procedure: RESECTION, MUCOSA, COLON, ENDOSCOPIC;  Surgeon: Lilibeth Carbajal MD;  Location: [address]  FLR);  Service: Endoscopy;  Laterality: N/A;    ENDOSCOPIC ULTRASOUND OF LOWER GASTROINTESTINAL TRACT N/A 4/19/2021    Procedure: ULTRASOUND, LOWER GI TRACT, ENDOSCOPIC;  Surgeon: Lilibeth Carbajal MD;  Location: [facility] ENDO;  Service: Endoscopy;  Laterality: N/A;    ENDOSCOPIC ULTRASOUND OF LOWER GASTROINTESTINAL TRACT N/A 6/25/2021    Procedure: ULTRASOUND, LOWER GI TRACT, ENDOSCOPIC;  Surgeon: Silvino Christopher MD;  Location: [facility];  Service: Endoscopy;  Laterality: N/A;  Needs Rapid MP    FLEXIBLE SIGMOIDOSCOPY  06/11/2020    with biopsies    FLEXIBLE SIGMOIDOSCOPY N/A 6/11/2020    Procedure: SIGMOIDOSCOPY, FLEXIBLE;  Surgeon: Gely Yeh MD;  Location: [facility];  Service: Endoscopy;  Laterality: N/A;    FLEXIBLE SIGMOIDOSCOPY N/A 4/19/2021    Procedure: SIGMOIDOSCOPY-FLEXIBLE;  Surgeon: Lilibeth Carbajal MD;  Location: [facility] ENDO;  Service: Endoscopy;  Laterality: N/A;  Covid 4/16 Brian MP    FLEXIBLE SIGMOIDOSCOPY N/A 6/3/2022    Procedure: SIGMOIDOSCOPY-FLEXIBLE;  Surgeon: Francesco Clark MD;  Location: [facility] (4TH FLR);  Service: Endoscopy;  Laterality: N/A;  vacc-inst portal-tb    HYSTERECTOMY      @47yrs of age    INSERTION OF TUNNELED CENTRAL VENOUS CATHETER (CVC) WITH SUBCUTANEOUS PORT N/A 9/25/2018    Procedure: [id number];  Surgeon: Dosc Diagnostic Provider;  Location: [facility] OR 2ND FLR;  Service: Radiology;  Laterality: N/A;    INSERTION OF VENOUS ACCESS PORT N/A 3/15/2021    Procedure: INSERTION, VENOUS ACCESS PORT;  Surgeon: Chang Mathews MD;  Location: [facility] OR 2ND FLR;  Service: General;  Laterality: N/A;  NEEDS CONSENT.    KIDNEY SURGERY      17yo    MEDIPORT REMOVAL N/A 3/15/2021    Procedure: REMOVAL, CATHETER, CENTRAL VENOUS, TUNNELED, WITH PORT;  Surgeon: Chang Mathews MD;  Location: [facility] OR 2ND FLR;  Service: General;  Laterality: N/A;    OOPHORECTOMY      @47yrs of age    SKIN BIOPSY      TONSILLECTOMY      UPPER GASTROINTESTINAL ENDOSCOPY       VIDEO-ASSISTED THORACOSCOPIC LOBECTOMY Right 2018    Procedure: VATS, WITH LOBECTOMY Possible chest wall resection;  Surgeon: Philip Msesina MD;  Location: [facility] OR [address] [place] [address];  Service: Thoracic;  Laterality: Right;  Have open pan available.       Family History   Problem Relation Age of Onset    Stroke Mother     Cataracts Mother     Cancer Father         colon cancer    Colon cancer Father     Diabetes Brother     Heart failure Brother     Hypertension Brother     Heart attack Paternal Aunt     Heart attack Maternal Grandfather     Diabetes Paternal Aunt     Anxiety disorder Paternal Grandmother         agoraphobia    Anesthesia problems Neg Hx     Blindness Neg Hx     Amblyopia Neg Hx     Glaucoma Neg Hx     Macular degeneration Neg Hx     Retinal detachment Neg Hx     Strabismus Neg Hx     Thyroid disease Neg Hx     Melanoma Neg Hx        Social History     Socioeconomic History    Marital status: Significant Other     Spouse name: Lata   Occupational History    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 No    Breast-feeding No   Social History Narrative    Exercise:  Walks [id number] steps daily.    Former RN     Social Determinants of Health     Financial Resource Strain: Low Risk  (2023)    Overall Financial Resource Strain (CARDIA)     Difficulty of Paying Living Expenses: Not hard at all   Food Insecurity: No Food Insecurity (2023)     Hunger Vital Sign     Worried About Running Out of Food in the Last Year: Never true     Ran Out of Food in the Last Year: Never true   Transportation Needs: No Transportation Needs (11/27/2023)    PRAPARE - Transportation     Lack of Transportation (Medical): No     Lack of Transportation (Non-Medical): No   Physical Activity: Insufficiently Active (11/27/2023)    Exercise Vital Sign     Days of Exercise per Week: 6 days     Minutes of Exercise per Session: 20 min   Stress: Stress Concern Present (11/27/2023)    [employer] [place] [employer] - Occupational Stress Questionnaire     Feeling of Stress : To some extent   Social Connections: Unknown (11/27/2023)    Social Connection and Isolation Panel [NHANES]     Frequency of Communication with Friends and Family: Three times a week     Frequency of Social Gatherings with Friends and Family: Once a week     Active Member of Clubs or Organizations: No     Attends Club or Organization Meetings: Patient declined     Marital Status: Living with partner   Housing Stability: Low Risk  (11/27/2023)    Housing Stability Vital Sign     Unable to Pay for Housing in the Last Year: No     Number of Places Lived in the Last Year: 1     Unstable Housing in the Last Year: No       Current Facility-Administered Medications   Medication Dose Route Frequency Provider Last Rate Last Admin    acetaminophen tablet 650 mg  650 mg Oral Q4H PRN Dottie Kirkland MD        amLODIPine tablet 5 mg  5 mg Oral QHS Opal Eddy NP   5 mg at 01/07/24 2253    atenoloL tablet 25 mg  25 mg Oral TID Dottie Kirkland MD   25 mg at 01/08/24 1631    enoxaparin injection 40 mg  40 mg Subcutaneous Daily Dottie Kirkland MD   40 mg at 01/08/24 1632    famotidine tablet 20 mg  20 mg Oral Every other day Dottie Kirkland MD   20 mg at 01/08/24 0908    hydrALAZINE tablet 25 mg  25 mg Oral Q8H Dottie Kirkland MD   25 mg at 01/08/24 1330    HYDROcodone-acetaminophen 7.5-325 mg per  tablet 1 tablet  1 tablet Oral Q4H PRN Dottie Kirkland MD   1 tablet at 01/07/24 2253    HYDROcodone-acetaminophen 7.5-325 mg per tablet 2 tablet  2 tablet Oral Q4H PRN Dottie Kirkland MD   2 tablet at 01/07/24 1850    HYDROmorphone injection 0.5 mg  0.5 mg Intravenous Q4H PRN Dottie Kirkland MD   0.5 mg at 01/08/24 1259    levalbuterol nebulizer solution 1.25 mg  1.25 mg Nebulization Q4H Dottie Kirkland MD   1.25 mg at 01/08/24 1601    linezolid 600 mg/300 mL IVPB 600 mg  600 mg Intravenous Q12H Dottie Kirkland MD   Stopped at 01/08/24 1759    LORazepam tablet 0.5 mg  0.5 mg Oral BID PRN Dtotie Kirkland MD   0.5 mg at 01/08/24 0908    mupirocin 2 % ointment   Nasal BID Dottie Kikrland MD   Given at 01/08/24 0908    naloxone 0.4 mg/mL injection 1 mg  1 mg Intravenous PRN Dottie Kirkland MD        ondansetron tablet 8 mg  8 mg Oral Q6H PRN Dottie Kirkland MD        piperacillin-tazobactam (ZOSYN) 4.5 g in dextrose 5 % in water (D5W) 100 mL IVPB (MB+)  4.5 g Intravenous Q8H Dottie Kirkland MD 25 mL/hr at 01/08/24 1811 4.5 g at 01/08/24 1811    predniSONE tablet 60 mg  60 mg Oral Daily Dottie Kirkland MD   60 mg at 01/08/24 1004    sodium chloride 0.9% flush 10 mL  10 mL Intravenous Q12H PRN Dottie Kirkland MD        sodium chloride 3% nebulizer solution 4 mL  4 mL Nebulization PRN Dottie Kirkland MD        sulfamethoxazole-trimethoprim 800-160mg per tablet 1 tablet  1 tablet Oral 2 times per day on Mon Wed Fri Dottie Kirkland MD         Current Outpatient Medications   Medication Sig Dispense Refill    aluminum hydrox-magnesium carb (GAVISCON)  mg/15 mL Susp Take 15 mLs by mouth daily as needed.      amLODIPine (NORVASC) 5 MG tablet Take 5 mg by mouth once daily.      atenoloL (TENORMIN) 25 MG tablet Take 1 tablet (25 mg total) by mouth 3 (three) times daily. 270 tablet 3    docusate sodium (COLACE) 100 MG capsule Take 100 mg by mouth 2 (two) times daily.       esomeprazole (NEXIUM) 20 MG capsule Take 20 mg by mouth 2 (two) times daily.      HYDROcodone-acetaminophen (NORCO) 5-325 mg per tablet Take 1-2 tablets by mouth every 4 (four) hours as needed for Pain.      levalbuterol (XOPENEX) 1.25 mg/3 mL nebulizer solution Take 3 mLs (1.25 mg total) by nebulization every 4 (four) hours as needed for Wheezing. Rescue 90 mL 5    loratadine (CLARITIN) 10 mg tablet Take 5 mg by mouth once daily.      LORazepam (ATIVAN) 0.5 MG tablet Take 1 tablet (0.5 mg total) by mouth 2 (two) times daily as needed for Anxiety. 60 tablet 0    losartan (COZAAR) 50 MG tablet 1 tab po bid (Patient taking differently: Take 50 mg by mouth 2 (two) times a day.) 180 tablet 3    morphine 20 mg/5 mL (4 mg/mL) solution Take 2 mg by mouth every 8 (eight) hours as needed for Pain.      mv-min-folic-calcium carb-K1 (WOMEN'S 50 PLUS MULTIVITAMIN) 400 mcg-500 mg calcium-20 mcg Tab Take 1 tablet by mouth daily as needed.      naloxone (NARCAN) 4 mg/actuation Spry 1 spray by Nasal route as needed.      predniSONE (DELTASONE) 20 MG tablet Take 3 tablets (60 mg total) by mouth once daily. 90 tablet 3    senna-docusate 8.6-50 mg (PERICOLACE) 8.6-50 mg per tablet Take 3 tablets by mouth daily as needed for Constipation.      sulfamethoxazole-trimethoprim 800-160mg (BACTRIM DS) 800-160 mg Tab Take 1 tablet by mouth 2 (two) times daily. Take 1 tablet twice a day on Monday, Wednesday and Friday for 14 days 28 tablet 2    vitamin E 400 UNIT capsule Take 400 Units by mouth once daily.      blood sugar diagnostic (TRUE METRIX GLUCOSE TEST STRIP) Strp CHECK BLOOD GLUCOSE DAILY 200 each 3    blood-glucose meter Misc To check BG 1 times daily, to use with insurance preferred meter 1 each 3    lancets Misc To check BG 1 times daily, to use with insurance preferred meter 200 each 0    levalbuterol (XOPENEX HFA) 45 mcg/actuation inhaler Inhale 1-2 puffs into the lungs every 6 (six) hours as needed for Wheezing. Rescue 15 g 6  "   ondansetron (ZOFRAN) 8 MG tablet Take 8 mg by mouth every 8 (eight) hours as needed.         Review of patient's allergies indicates:   Allergen Reactions    Adhesive Itching, Rash and Blisters     INCLUDES EKG PADS    Ciprofloxacin Hives     Hives    Iodinated contrast media     Phenergan plain Other (See Comments)     "crazy behavior"    Phenergan [promethazine]     Tramadol     Vancomycin analogues      Red man syndrome         Review of Systems  Negative unless otherwise stated in HPI        OBJECTIVE     Vitals:    01/08/24 1631   BP: (!) 169/72   Pulse: 95   Resp: 20   Temp:        Physical Exam  Physical Exam  Constitutional:       General: She is in acute distress.      Appearance: She is obese. She is ill-appearing.   HENT:      Head: Normocephalic and atraumatic.   Eyes:      Extraocular Movements: Extraocular movements intact.      Conjunctiva/sclera: Conjunctivae normal.   Cardiovascular:      Rate and Rhythm: Normal rate and regular rhythm.      Pulses: Normal pulses.      Heart sounds: Normal heart sounds. No murmur heard.     No friction rub. No gallop.   Pulmonary:      Breath sounds: Wheezing present. No rhonchi or rales.      Comments: No accessory muscle use, increased work of breathing. Poor air movement throughout, expiratory wheezes appreciated  Abdominal:      General: Abdomen is flat. Bowel sounds are normal.      Palpations: Abdomen is soft.      Tenderness: There is no abdominal tenderness.   Musculoskeletal:      Right lower leg: No edema.      Left lower leg: No edema.   Skin:     General: Skin is warm.      Capillary Refill: Capillary refill takes less than 2 seconds.      Coloration: Skin is not jaundiced.   Neurological:      General: No focal deficit present.      Mental Status: She is alert and oriented to person, place, and time.   Psychiatric:         Mood and Affect: Mood normal.         Behavior: Behavior normal.         Thought Content: Thought content normal. " "          Laboratory  Recent Labs   Lab 01/07/24  1310   INR 1.0       Recent Labs   Lab 01/03/24  1407 01/07/24  1310 01/08/24  0654   WBC 8.72 9.18 11.42   HGB 13.6 14.5 14.1   HCT 40.9 43.2 43.5    281 288       Recent Labs   Lab 01/03/24  1407 01/07/24  1310 01/08/24  0654    141 137   K 5.5* 4.6 4.5    103 102   CO2 26 24 23   BUN 17 19 22   CREATININE 1.2 1.2 1.5*   CALCIUM 10.3 10.3 9.8   PROT 6.8 7.5  --    BILITOT 0.3 0.2  --    ALKPHOS 54* 60  --    ALT 10 13  --    AST 16 21  --      No results for input(s): "PH", "PCO2", "PO2", "HCO3", "POCSATURATED", "BE" in the last 24 hours.        Diagnostic Results    CT Chest 1/7/2024:  FINDINGS:  Consolidation in the right lower lobe most severely in the superior segment with adjacent loculated pleural fluid.  More reticular opacity throughout the right lower lobe with patchy areas of ground-glass opacity in the middle lobe on the right.     The upper lobe postsurgical change and remaining lung is spared as is the left lung.  Small pneumatocele is a in the left upper lobe.     No mediastinal mass or lymph node enlargement is evident.  Left chest wall Port-A-Cath is noted.  The heart pericardium and great vessels unremarkable.     Numerous low-density lesions throughout the liver with the largest in the left lobe      Assessment / Recommendations     #RUL Adenocarcinoma s/p Lobectomy and VATS  Postobstructive PNA  -Patient imaging reviewed with Dr. Davila, given unilateral findings very unlikely current presentation is 2/2 pneumonitis  -Enlarging RLL mass very concerning for post-obstructive PNA. Pt with chills, productive cough, worsening SOB, CT with GGO and air bronchograms  -Has been off Keytruda and VEGFi for over a month, chart review indicated patient does not tolerate therapy  -Follows closely with [facility] main Heme/onc, last visit end of 12/23 and sent with prednisone and Bactrim x14 days    Recommendations  Continue supplemental O2 " as needed  Continue q4 Levalbuterol, avoid albuterol 2/2 patient reporting ADR to albuterol  Continue Zosyn + Bactrim + Linezolid (Had Vancomycin associated infusion syndrome) for Tx of Post-obs PNA 2/2 new increasing RLL mass  Pulm team will reach out to pt's primary [facility] team tomorrow for further recommendations      Pt seen and examined and plan discussed with Dr. iLve, staff.       Thank you for this consult. Pulmonary will continue to follow.     Bigg Garcia, DO  [facility] Internal Medicine, HO-1  [facility] Pulm/Crit Team      Pt seen and examined with Pulmonary/Critical Care team and this note reviewed and validated with the following additional comments: I do not think that this is ICI pneumonitis.  It is focal and in the distribution of an airway occlusion. There is an enlarging mass in the RLL at the takeoff of the superior segment that is in the area of a FDG avid mass on her last PET CT.  Most likely has post-obstructive pneumonia.  I will discuss with her oncologist.    Medical Decision Making (MDM) was complex.  The number and complexity of problems addressed was high.  The amount and complexity of data reviewed was high.  The risk of complications and/or morbidity/mortality was high.  The tests ordered were CT.  I communicated with the following providers HO.  Time spent in the care of this patient was 40 minutes    Anthony Live MD  Phone [phone]

## 2024-01-09 NOTE — PLAN OF CARE
Received patient from the emergency room on stable conditions accompanied by daughter. Orientation to room provided, voices understanding. Care plan reviewed with patient, AAOx4, oxygen infusing as ordered. NSR per cardiac monitor, no red alarm noted. C/o back pain, PRN medication administered as ordered, education provided on medication effect and side effect, voices understanding. Call light within her reach, no apparent distress noted, bed in low position, bed alarm on, educated on the importance of calling as needed, voices understanding, stable at this time.    Problem: Adult Inpatient Plan of Care  Goal: Plan of Care Review  Outcome: Ongoing, Progressing     Problem: Adult Inpatient Plan of Care  Goal: Patient-Specific Goal (Individualized)  Outcome: Ongoing, Progressing     Problem: Fall Injury Risk  Goal: Absence of Fall and Fall-Related Injury  Outcome: Ongoing, Progressing     Problem: Respiratory Compromise (Pneumonia)  Goal: Effective Oxygenation and Ventilation  Outcome: Ongoing, Progressing     Problem: Breathing Pattern Ineffective  Goal: Effective Breathing Pattern  Outcome: Ongoing, Progressing

## 2024-01-10 ENCOUNTER — TELEPHONE (OUTPATIENT)
Dept: HEMATOLOGY/ONCOLOGY | Facility: CLINIC | Age: 74
End: 2024-01-10
Payer: MEDICARE

## 2024-01-10 PROBLEM — E87.6 HYPOKALEMIA: Status: ACTIVE | Noted: 2024-01-10

## 2024-01-10 PROBLEM — J18.9 PNEUMONIA DUE TO INFECTIOUS ORGANISM: Status: ACTIVE | Noted: 2024-01-10

## 2024-01-10 LAB
ANION GAP SERPL CALC-SCNC: 13 MMOL/L (ref 8–16)
BACTERIA SPEC AEROBE CULT: ABNORMAL
BACTERIA SPEC AEROBE CULT: ABNORMAL
BASOPHILS # BLD AUTO: 0 K/UL (ref 0–0.2)
BASOPHILS NFR BLD: 0 % (ref 0–1.9)
BUN SERPL-MCNC: 20 MG/DL (ref 8–23)
CALCIUM SERPL-MCNC: 9.4 MG/DL (ref 8.7–10.5)
CHLORIDE SERPL-SCNC: 102 MMOL/L (ref 95–110)
CO2 SERPL-SCNC: 24 MMOL/L (ref 23–29)
CREAT SERPL-MCNC: 1.5 MG/DL (ref 0.5–1.4)
DIFFERENTIAL METHOD BLD: ABNORMAL
EOSINOPHIL # BLD AUTO: 0 K/UL (ref 0–0.5)
EOSINOPHIL NFR BLD: 0 % (ref 0–8)
ERYTHROCYTE [DISTWIDTH] IN BLOOD BY AUTOMATED COUNT: 13.5 % (ref 11.5–14.5)
EST. GFR  (NO RACE VARIABLE): 37 ML/MIN/1.73 M^2
GLUCOSE SERPL-MCNC: 114 MG/DL (ref 70–110)
GRAM STN SPEC: ABNORMAL
HCT VFR BLD AUTO: 38.9 % (ref 37–48.5)
HGB BLD-MCNC: 12.6 G/DL (ref 12–16)
IMM GRANULOCYTES # BLD AUTO: 0.03 K/UL (ref 0–0.04)
IMM GRANULOCYTES NFR BLD AUTO: 0.3 % (ref 0–0.5)
LYMPHOCYTES # BLD AUTO: 1.2 K/UL (ref 1–4.8)
LYMPHOCYTES NFR BLD: 13.6 % (ref 18–48)
MAGNESIUM SERPL-MCNC: 2 MG/DL (ref 1.6–2.6)
MCH RBC QN AUTO: 30.9 PG (ref 27–31)
MCHC RBC AUTO-ENTMCNC: 32.4 G/DL (ref 32–36)
MCV RBC AUTO: 95 FL (ref 82–98)
MONOCYTES # BLD AUTO: 0.7 K/UL (ref 0.3–1)
MONOCYTES NFR BLD: 8 % (ref 4–15)
NEUTROPHILS # BLD AUTO: 7 K/UL (ref 1.8–7.7)
NEUTROPHILS NFR BLD: 78.1 % (ref 38–73)
NRBC BLD-RTO: 0 /100 WBC
PHOSPHATE SERPL-MCNC: 3.5 MG/DL (ref 2.7–4.5)
PLATELET # BLD AUTO: 223 K/UL (ref 150–450)
PMV BLD AUTO: 9.7 FL (ref 9.2–12.9)
POTASSIUM SERPL-SCNC: 4.9 MMOL/L (ref 3.5–5.1)
RBC # BLD AUTO: 4.08 M/UL (ref 4–5.4)
SODIUM SERPL-SCNC: 139 MMOL/L (ref 136–145)
WBC # BLD AUTO: 8.97 K/UL (ref 3.9–12.7)

## 2024-01-10 PROCEDURE — 97530 THERAPEUTIC ACTIVITIES: CPT | Mod: CO

## 2024-01-10 PROCEDURE — 94761 N-INVAS EAR/PLS OXIMETRY MLT: CPT

## 2024-01-10 PROCEDURE — 11000001 HC ACUTE MED/SURG PRIVATE ROOM

## 2024-01-10 PROCEDURE — 36415 COLL VENOUS BLD VENIPUNCTURE: CPT | Performed by: INTERNAL MEDICINE

## 2024-01-10 PROCEDURE — 25000003 PHARM REV CODE 250: Performed by: INTERNAL MEDICINE

## 2024-01-10 PROCEDURE — 25000242 PHARM REV CODE 250 ALT 637 W/ HCPCS: Performed by: INTERNAL MEDICINE

## 2024-01-10 PROCEDURE — 84100 ASSAY OF PHOSPHORUS: CPT | Performed by: INTERNAL MEDICINE

## 2024-01-10 PROCEDURE — 25000003 PHARM REV CODE 250: Performed by: FAMILY MEDICINE

## 2024-01-10 PROCEDURE — 87633 RESP VIRUS 12-25 TARGETS: CPT | Performed by: STUDENT IN AN ORGANIZED HEALTH CARE EDUCATION/TRAINING PROGRAM

## 2024-01-10 PROCEDURE — 63600175 PHARM REV CODE 636 W HCPCS: Performed by: INTERNAL MEDICINE

## 2024-01-10 PROCEDURE — 94640 AIRWAY INHALATION TREATMENT: CPT

## 2024-01-10 PROCEDURE — 99232 SBSQ HOSP IP/OBS MODERATE 35: CPT | Mod: GC,,, | Performed by: INTERNAL MEDICINE

## 2024-01-10 PROCEDURE — 83735 ASSAY OF MAGNESIUM: CPT | Performed by: INTERNAL MEDICINE

## 2024-01-10 PROCEDURE — 63600175 PHARM REV CODE 636 W HCPCS: Performed by: STUDENT IN AN ORGANIZED HEALTH CARE EDUCATION/TRAINING PROGRAM

## 2024-01-10 PROCEDURE — 27000221 HC OXYGEN, UP TO 24 HOURS

## 2024-01-10 PROCEDURE — 85025 COMPLETE CBC W/AUTO DIFF WBC: CPT | Performed by: INTERNAL MEDICINE

## 2024-01-10 PROCEDURE — 99900035 HC TECH TIME PER 15 MIN (STAT)

## 2024-01-10 PROCEDURE — 80048 BASIC METABOLIC PNL TOTAL CA: CPT | Performed by: INTERNAL MEDICINE

## 2024-01-10 RX ORDER — SODIUM CHLORIDE, SODIUM LACTATE, POTASSIUM CHLORIDE, CALCIUM CHLORIDE 600; 310; 30; 20 MG/100ML; MG/100ML; MG/100ML; MG/100ML
INJECTION, SOLUTION INTRAVENOUS CONTINUOUS
Status: DISCONTINUED | OUTPATIENT
Start: 2024-01-10 | End: 2024-01-12

## 2024-01-10 RX ORDER — LORAZEPAM 2 MG/ML
1 INJECTION INTRAMUSCULAR ONCE
Status: COMPLETED | OUTPATIENT
Start: 2024-01-10 | End: 2024-01-10

## 2024-01-10 RX ORDER — LINEZOLID 600 MG/1
600 TABLET, FILM COATED ORAL EVERY 12 HOURS
Status: DISCONTINUED | OUTPATIENT
Start: 2024-01-10 | End: 2024-01-11

## 2024-01-10 RX ADMIN — LINEZOLID 600 MG: 600 TABLET, FILM COATED ORAL at 04:01

## 2024-01-10 RX ADMIN — HYDROMORPHONE HYDROCHLORIDE 0.5 MG: 1 INJECTION, SOLUTION INTRAMUSCULAR; INTRAVENOUS; SUBCUTANEOUS at 12:01

## 2024-01-10 RX ADMIN — NYSTATIN 500000 UNITS: 100000 SUSPENSION ORAL at 08:01

## 2024-01-10 RX ADMIN — HYDRALAZINE HYDROCHLORIDE 25 MG: 25 TABLET, FILM COATED ORAL at 06:01

## 2024-01-10 RX ADMIN — LORAZEPAM 1 MG: 2 INJECTION INTRAMUSCULAR; INTRAVENOUS at 09:01

## 2024-01-10 RX ADMIN — LEVALBUTEROL HYDROCHLORIDE 1.25 MG: 1.25 SOLUTION, CONCENTRATE RESPIRATORY (INHALATION) at 07:01

## 2024-01-10 RX ADMIN — NYSTATIN 500000 UNITS: 100000 SUSPENSION ORAL at 12:01

## 2024-01-10 RX ADMIN — ENOXAPARIN SODIUM 40 MG: 40 INJECTION SUBCUTANEOUS at 04:01

## 2024-01-10 RX ADMIN — NYSTATIN 500000 UNITS: 100000 SUSPENSION ORAL at 09:01

## 2024-01-10 RX ADMIN — SULFAMETHOXAZOLE AND TRIMETHOPRIM 1 TABLET: 800; 160 TABLET ORAL at 10:01

## 2024-01-10 RX ADMIN — HYDROCODONE BITARTRATE AND ACETAMINOPHEN 1 TABLET: 7.5; 325 TABLET ORAL at 09:01

## 2024-01-10 RX ADMIN — ONDANSETRON 8 MG: 4 TABLET, ORALLY DISINTEGRATING ORAL at 05:01

## 2024-01-10 RX ADMIN — SULFAMETHOXAZOLE AND TRIMETHOPRIM 1 TABLET: 800; 160 TABLET ORAL at 08:01

## 2024-01-10 RX ADMIN — PIPERACILLIN AND TAZOBACTAM 4.5 G: 4; .5 INJECTION, POWDER, LYOPHILIZED, FOR SOLUTION INTRAVENOUS; PARENTERAL at 08:01

## 2024-01-10 RX ADMIN — ATENOLOL 25 MG: 25 TABLET ORAL at 03:01

## 2024-01-10 RX ADMIN — NYSTATIN 500000 UNITS: 100000 SUSPENSION ORAL at 04:01

## 2024-01-10 RX ADMIN — FAMOTIDINE 20 MG: 20 TABLET ORAL at 08:01

## 2024-01-10 RX ADMIN — PIPERACILLIN AND TAZOBACTAM 4.5 G: 4; .5 INJECTION, POWDER, LYOPHILIZED, FOR SOLUTION INTRAVENOUS; PARENTERAL at 11:01

## 2024-01-10 RX ADMIN — MUPIROCIN: 20 OINTMENT TOPICAL at 08:01

## 2024-01-10 RX ADMIN — ATENOLOL 25 MG: 25 TABLET ORAL at 09:01

## 2024-01-10 RX ADMIN — HYDRALAZINE HYDROCHLORIDE 25 MG: 25 TABLET, FILM COATED ORAL at 01:01

## 2024-01-10 RX ADMIN — LEVALBUTEROL HYDROCHLORIDE 1.25 MG: 1.25 SOLUTION, CONCENTRATE RESPIRATORY (INHALATION) at 02:01

## 2024-01-10 RX ADMIN — PIPERACILLIN AND TAZOBACTAM 4.5 G: 4; .5 INJECTION, POWDER, LYOPHILIZED, FOR SOLUTION INTRAVENOUS; PARENTERAL at 12:01

## 2024-01-10 RX ADMIN — LINEZOLID 600 MG: 600 INJECTION, SOLUTION INTRAVENOUS at 05:01

## 2024-01-10 RX ADMIN — MUPIROCIN: 20 OINTMENT TOPICAL at 09:01

## 2024-01-10 RX ADMIN — PIPERACILLIN AND TAZOBACTAM 4.5 G: 4; .5 INJECTION, POWDER, LYOPHILIZED, FOR SOLUTION INTRAVENOUS; PARENTERAL at 03:01

## 2024-01-10 RX ADMIN — ATENOLOL 25 MG: 25 TABLET ORAL at 08:01

## 2024-01-10 RX ADMIN — HYDROCODONE BITARTRATE AND ACETAMINOPHEN 1 TABLET: 7.5; 325 TABLET ORAL at 01:01

## 2024-01-10 RX ADMIN — HYDROMORPHONE HYDROCHLORIDE 0.5 MG: 1 INJECTION, SOLUTION INTRAMUSCULAR; INTRAVENOUS; SUBCUTANEOUS at 03:01

## 2024-01-10 RX ADMIN — HYDRALAZINE HYDROCHLORIDE 25 MG: 25 TABLET, FILM COATED ORAL at 10:01

## 2024-01-10 RX ADMIN — SODIUM CHLORIDE, POTASSIUM CHLORIDE, SODIUM LACTATE AND CALCIUM CHLORIDE: 600; 310; 30; 20 INJECTION, SOLUTION INTRAVENOUS at 12:01

## 2024-01-10 RX ADMIN — LEVALBUTEROL HYDROCHLORIDE 1.25 MG: 1.25 SOLUTION, CONCENTRATE RESPIRATORY (INHALATION) at 04:01

## 2024-01-10 RX ADMIN — HYDROMORPHONE HYDROCHLORIDE 0.5 MG: 1 INJECTION, SOLUTION INTRAMUSCULAR; INTRAVENOUS; SUBCUTANEOUS at 06:01

## 2024-01-10 RX ADMIN — LORAZEPAM 0.5 MG: 0.5 TABLET ORAL at 10:01

## 2024-01-10 RX ADMIN — LEVALBUTEROL HYDROCHLORIDE 1.25 MG: 1.25 SOLUTION, CONCENTRATE RESPIRATORY (INHALATION) at 11:01

## 2024-01-10 RX ADMIN — PREDNISONE 60 MG: 20 TABLET ORAL at 08:01

## 2024-01-10 NOTE — PLAN OF CARE
Problem: Occupational Therapy  Goal: Occupational Therapy Goal  Description: Goals to be met by: 02/09     Patient will increase functional independence with ADLs by performing:    UE Dressing with Modified [place].  LE Dressing with Modified [place].  Grooming while standing at sink with Modified [place].  Toileting from toilet with Modified [place] for hygiene and clothing management.   Toilet transfer to toilet with Modified [place].  Increased functional strength to WFL for ADLs.    Outcome: Ongoing, Progressing   Alesha Toney is a 73 y.o. female with a medical diagnosis of Pneumonia due to infectious organism.  Performance deficits affecting function are impaired endurance, impaired self care skills, impaired functional mobility, gait instability, weakness, impaired balance, impaired cardiopulmonary response to activity, pain.    Pt found in bed, agreeable to therapy.  Pt with fair tolerance of therapy 2/2 SOB, fatigue and generalized weakness. Continue OT services to address functional goals, progressing as able.

## 2024-01-10 NOTE — Clinical Note
Lata Submit for docetaxel auth She needs to see me once discharged first and then I will decide on Doce but submit for auth

## 2024-01-10 NOTE — PT/OT/SLP PROGRESS
"Occupational Therapy   Treatment    Name: Alesha Toney  MRN: [id number]  Admitting Diagnosis:  Pneumonia due to infectious organism       Recommendations:     Discharge Recommendations: Low Intensity Therapy  Discharge Equipment Recommendations:  shower chair  Barriers to discharge:  None    Assessment:     Alesha Toney is a 73 y.o. female with a medical diagnosis of Pneumonia due to infectious organism.  Performance deficits affecting function are impaired endurance, impaired self care skills, impaired functional mobility, gait instability, weakness, impaired balance, impaired cardiopulmonary response to activity, pain.    Pt found in bed, agreeable to therapy.  Pt with fair tolerance of therapy 2/2 SOB, fatigue and generalized weakness. Continue OT services to address functional goals, progressing as able.      Rehab Prognosis:  Good; patient would benefit from acute skilled OT services to address these deficits and reach maximum level of function.       Plan:     Patient to be seen 5 x/week to address the above listed problems via self-care/home management, therapeutic activities, therapeutic exercises  Plan of Care Expires: 02/09/24  Plan of Care Reviewed with: patient, family    Subjective     Chief Complaint: "I can move, it's just my breathing."  Patient/Family Comments/goals: to return to PLOF  Pain/Comfort:  Pain Rating 1: 0/10  Pain Rating Post-Intervention 1: 0/10    Objective:     Communicated with: RN prior to session.  Patient found HOB elevated with bed alarm, telemetry, oxygen, peripheral IV upon OT entry to room.    General Precautions: Standard, fall    Orthopedic Precautions:N/A  Braces: N/A  Respiratory Status: Nasal cannula, flow 3.5 L/min.  Increased to 4L with mobility O2 sats above 88%.      Occupational Performance:     Bed Mobility:    Patient completed Rolling/Turning to Right with stand by assistance  Patient completed Scooting/Bridging with stand by assistance  Patient completed " Supine to Sit with stand by assistance, HOB elevated    Functional Mobility/Transfers:  Patient completed Sit <> Stand Transfer with stand by assistance  with  no assistive device   Functional Mobility: Pt ambulated ~60 feet with CGA.     [facility] 6 Click ADL: 19    Treatment & Education:  Encouraged OOB to chair and explained benefits however pt declined stating she did not sleep much last night.  Pt reports she will attempt chair tomorrow.    Encouraged EOB sitting for all meals and ambulating to bathroom with assistance as able/tolerated.  Pt agreeable.       Patient left sitting edge of bed with all lines intact, call button in reach, RN notified, and family present    GOALS:   Multidisciplinary Problems       Occupational Therapy Goals          Problem: Occupational Therapy    Goal Priority Disciplines Outcome Interventions   Occupational Therapy Goal     OT, PT/OT Ongoing, Progressing    Description: Goals to be met by: 02/09     Patient will increase functional independence with ADLs by performing:    UE Dressing with Modified [place].  LE Dressing with Modified [place].  Grooming while standing at sink with Modified [place].  Toileting from toilet with Modified [place] for hygiene and clothing management.   Toilet transfer to toilet with Modified [place].  Increased functional strength to WFL for ADLs.                         Time Tracking:     OT Date of Treatment: 01/10/24  OT Start Time: 1334  OT Stop Time: 1357  OT Total Time (min): 23 min    Billable Minutes:Therapeutic Activity 23               1/10/2024

## 2024-01-10 NOTE — ASSESSMENT & PLAN NOTE
Chronic pain due to malignant neoplastic disease  Malignant neoplasm of upper lobe, right bronchus or lung  Overview:  Status post resection 8/2018 status post adjuvant chemo     Secondary and unspecified malignant neoplasm of intrathoracic lymph nodes  Recently did not tolerate Keytruda and Cyramza and tx was discontinued recently  She did have increase in her mass size which may have caused a postobstructive pneumonia.  She has been given broad-spectrum antibiotics as well as steroids to treat for both a postobstructive pneumonia as well as the possibility of pneumonitis however she notes minimal response.  Unclear if there is a different pathological process going on.  Will discuss with pulmonology.  Plan:  - continue prednisone 60 mg q.d. for possible pneumonitis  - possible bronchoscopy tomorrow.  Will discuss with pulmonology.

## 2024-01-10 NOTE — CONSULTS
Thank you for your consult to [facility]. We have reviewed the patient chart. This patient does meet criteria for [facility] service at this time.  Will assume care on 01/10/24 at 7AM.

## 2024-01-10 NOTE — ASSESSMENT & PLAN NOTE
See shortness of breath    2007 Infectious Disease CAP Severity Criteria     Major Criteria:  Patient does not have septic shock with need for vasopressors  Patient does not have respiratory failure requiring mechanical ventilation    Minor Criteria:      Patient does not have a respiratory rate >30 BPM  Patient does not have a PaO2/FiO2 ratio >250  Patient does not have multilobar infiltrates  Patient does not have confusion or disorientation  Patient does have BUN >20 mg/dL  Patient does not have new onset leukopenia <4000 cells/uL  Patient does not have thrombocytopenia (<100,000/uL)  Patient does not have hypothermia  Patient does not have hypotension requiring fluid resuscitation    Severe Pneumonia defined as 1+ major criteria or 3+ minor criteria. Based on this definition, patient does not have severe pneumonia    Patient's Pneumonia Severity Index (PSI) is 113 indicating a 8.2-9.3% risk of mortality.    Plan:  - Sputum and blood culture (Severe/concern for MRSA/Pseudomonas and/or recent hospitalization within 90 days only) is indicated.  Growing Candida in sputum.  Blood cultures no growth to date  - There is not the presence of an empyema or lung abscess warranting anaerobic coverage  - flu testing  - ID consultation with continuation of empiric prophylactic antibiotics    -Continue Linezolid and Zosyn  -Can continue Bactrim for PJP prophylaxis  - Oxygen PRN to maintain SpO2 of >92%.  Currently on 3 L  - continuing prednisone at this time.  May consider bronchoscopy versus sampling of fluid from pleural effusion given loculated appearance however deferring to pulmonology at this time.

## 2024-01-10 NOTE — ASSESSMENT & PLAN NOTE
Creatine stable for now. BMP reviewed- noted Estimated Creatinine Clearance: 31.3 mL/min (A) (based on SCr of 1.5 mg/dL (H)). according to latest data. Based on current GFR, CKD stage is stage 3 - GFR 30-59.  Monitor UOP and serial BMP and adjust therapy as needed. Renally dose meds. Avoid nephrotoxic medications and procedures.    Mild TRAM, hold ARB, HCTZ  Started on LR maintenance fluid

## 2024-01-10 NOTE — PLAN OF CARE
Problem: Adult Inpatient Plan of Care  Goal: Patient-Specific Goal (Individualized)  Outcome: Ongoing, Progressing     Problem: Respiratory Compromise (Pneumonia)  Goal: Effective Oxygenation and Ventilation  Outcome: Ongoing, Progressing

## 2024-01-10 NOTE — PROGRESS NOTES
[facility]  Telemedicine Progress Note    Patient Name: Alesha Toney  MRN: [id number]  Patient Class: IP- Inpatient   Admission Date: 1/7/2024  Length of Stay: 3 days  Attending Physician: Yoan Ruiz MD  Primary Care Provider: Margo Caldwell MD          Subjective:     Principal Problem:Pneumonia due to infectious organism        HPI:  Alesha Toney is a 73 y.o.  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 The patient presented to [facility] on 1/7/2024 with a primary complaint of Shortness of Breath (SOB with cough for past 2 weeks. States PCP has prescribed multiple meds with no improvement. Presents awake, alert with O2 in progress. Resp unlabored. C/o right sided CP with h/o right sided lung CA - states pain in this area is common for her. Denies home O2. States she has been using nebulizer at home. Awake, alert, oriented.)     The patient was in their usual state of health until 12/25 when she presented to the ED with shortness of breath and increased R sided chest pain. She had a CT chest and V/Q scan at the time and had close follow up with her oncologist and was started on prednisone for possible pneumonitis with bactrim ppx. She has had a progressive cough with worsening R sided chest pain and noted her pulse ox to be in the 80s at home. She has had clear-whitish thick sputum. No fevers, chills, sweats. No calf swelling. At this time due to her medication side effects she is not able to continue with Keytruda or Cyramza.    Overview/Hospital Course:  1/8/24 Feeling worse. Increased SOB, pain  1/9:   Continues to have symptoms of dyspnea and shortness of breath.  Dr. Dhaliwal note that is appears to be postobstructive pneumonia however continuing prednisone as there may be a component of pneumonitis as well.  ID and following as well.    Interval History: i patient continues to feel worse despite use of broad-spectrum antibiotics and steroids.  Possible role for bronchoscopy but will need to discuss with pulmonology.  Unclear if this is a just a postobstructive pneumonia however increases in the mass size as well as consolidative changes suggestive.  CT notes possibility of lymphangitic spread which would explain why she would continue to progress.  Oxygen requirements remain the same but subjectively the patient feels more short of breath.      Objective:     Vital Signs (Most Recent):  Temp: 98.3 °F (36.8 °C) (01/10/24 1108)  Pulse: 103 (01/10/24 1215)  Resp: 18 (01/10/24 1108)  BP: 131/60 (01/10/24 1108)  SpO2: (!) 92 % (01/10/24 1118) Vital Signs (24h Range):  Temp:  [98.2 °F (36.8 °C)-98.6 °F (37 °C)] 98.3 °F (36.8 °C)  Pulse:  [] 103  Resp:  [16-20] 18  SpO2:  [74 %-97 %] 92 %  BP: (116-155)/(59-68) 131/60     Weight: 59.3 kg (130 lb 11.7 oz)  Body mass index is 21.1 kg/m².    Intake/Output Summary (Last 24 hours) at 1/10/2024 1217  Last data filed at 1/10/2024 0543  Gross per 24 hour   Intake --   Output 1500 ml   Net -1500 ml         Physical Exam  Vitals and nursing note reviewed.   Constitutional:       General: She is not in acute distress.     Appearance: Normal appearance. She is not ill-appearing.   Cardiovascular:      Rate and Rhythm: Tachycardia present.   Pulmonary:      Effort: Pulmonary effort is normal. No respiratory distress.      Comments: On nasal cannula  Neurological:      General: No focal deficit present.      Mental Status: She is alert and oriented to person, place, and time.   Psychiatric:         Mood and Affect: Mood normal.         Behavior: Behavior normal.              Significant Labs: All pertinent labs within the past 24 hours have been reviewed.    Significant Imaging: I have reviewed all pertinent imaging results/findings within the past 24 hours.    Assessment/Plan:      * Pneumonia due to infectious organism  See shortness of breath    2007 Infectious Disease CAP Severity Criteria     Major Criteria:  Patient does not have septic shock with need for vasopressors  Patient does not have respiratory failure requiring mechanical ventilation    Minor Criteria:      Patient does not have a respiratory rate >30 BPM  Patient does not have a PaO2/FiO2 ratio >250  Patient does not have multilobar infiltrates  Patient does not have confusion or disorientation  Patient does have BUN >20 mg/dL  Patient does not have new onset leukopenia <4000 cells/uL  Patient does not have thrombocytopenia (<100,000/uL)  Patient does not have hypothermia  Patient does not have hypotension requiring fluid resuscitation    Severe Pneumonia defined as 1+ major criteria or 3+ minor criteria. Based on this definition, patient does not have severe pneumonia    Patient's Pneumonia Severity Index (PSI) is 113 indicating a 8.2-9.3% risk of mortality.    Plan:  - Sputum and blood culture (Severe/concern for MRSA/Pseudomonas and/or recent hospitalization within 90 days only) is indicated.  Growing Candida in sputum.  Blood cultures no growth to date  - There is not the presence of an empyema or lung abscess warranting anaerobic coverage  - flu testing  - ID consultation with continuation of empiric prophylactic antibiotics    -Continue Linezolid and Zosyn  -Can continue Bactrim for PJP prophylaxis  - Oxygen PRN to maintain SpO2 of >92%.  Currently on 3 L  - continuing prednisone at this time.  May consider bronchoscopy versus sampling of fluid from pleural effusion given loculated appearance however deferring to pulmonology at this time.        Constipation  Resume home center  Add MiraLax      Chronic pain  due to malignant neoplastic disease  Continue her home opiate dosing.  She appears to use medications for both subjective dyspnea as well as for pain.      Abnormal PET scan of colon        SOB (shortness of breath)  New oxygen requirement with worsening SOB and R chest pain.  Appears to be stable however subjective increase in shortness of breath.  She was started on steroids for possible CPI pneumonitis, continuing Bactrim PPx for PJP  Broad spectrum abx  Xopenex PRN  Appreciate ID and Pulm recommendations    DDX:  Postobstructive pneumonia, pneumonitis, lymphangitic spread        Stage 3a chronic kidney disease  Creatine stable for now. BMP reviewed- noted Estimated Creatinine Clearance: 31.3 mL/min (A) (based on SCr of 1.5 mg/dL (H)). according to latest data. Based on current GFR, CKD stage is stage 3 - GFR 30-59.  Monitor UOP and serial BMP and adjust therapy as needed. Renally dose meds. Avoid nephrotoxic medications and procedures.    Mild TRAM, hold ARB, HCTZ  Started on LR maintenance fluid    Secondary and unspecified malignant neoplasm of intrathoracic lymph nodes        Malignant neoplasm of upper lobe, right bronchus or lung  Chronic pain due to malignant neoplastic disease  Malignant neoplasm of upper lobe, right bronchus or lung  Overview:  Status post resection 8/2018 status post adjuvant chemo     Secondary and unspecified malignant neoplasm of intrathoracic lymph nodes  Recently did not tolerate Keytruda and Cyramza and tx was discontinued recently  She did have increase in her mass size which may have caused a postobstructive pneumonia.  She has been given broad-spectrum antibiotics as well as steroids to treat for both a postobstructive pneumonia as well as the possibility of pneumonitis however she notes minimal response.  Unclear if there is a different pathological process going on.  Will discuss with pulmonology.  Plan:  - continue prednisone 60 mg q.d. for possible pneumonitis  - possible  bronchoscopy tomorrow.  Will discuss with pulmonology.        VTE Risk Mitigation (From admission, onward)           Ordered     enoxaparin injection 40 mg  Daily         01/07/24 1532     IP VTE HIGH RISK PATIENT  Once         01/07/24 1532     Place sequential compression device  Until discontinued         01/07/24 1532                          I have completed this tele-visit without the assistance of a telepresenter.    The attending portion of this evaluation, treatment, and documentation was performed per Yoan Ruiz MD via Telemedicine AudioVisual using the secure [employer] software platform with 2 way audio/video. The provider was located off-site and the patient is located in the hospital. The aforementioned video software was utilized to document the relevant history and physical exam    Yoan Ruiz MD  Department of [facility]   [facility]

## 2024-01-10 NOTE — PLAN OF CARE
Patient (Alesha Toney) is present with her aunt Dana Marshall for her current hospitalization which began 1/7/2024.     If there are any questions, please feel free to reach out.    Yoan Ruiz MD Msc  Department of Hospital Medicine  [email or web address]

## 2024-01-10 NOTE — SUBJECTIVE & OBJECTIVE
Interval History: i patient continues to feel worse despite use of broad-spectrum antibiotics and steroids.  Possible role for bronchoscopy but will need to discuss with pulmonology.  Unclear if this is a just a postobstructive pneumonia however increases in the mass size as well as consolidative changes suggestive.  CT notes possibility of lymphangitic spread which would explain why she would continue to progress.  Oxygen requirements remain the same but subjectively the patient feels more short of breath.      Objective:     Vital Signs (Most Recent):  Temp: 98.3 °F (36.8 °C) (01/10/24 1108)  Pulse: 103 (01/10/24 1215)  Resp: 18 (01/10/24 1108)  BP: 131/60 (01/10/24 1108)  SpO2: (!) 92 % (01/10/24 1118) Vital Signs (24h Range):  Temp:  [98.2 °F (36.8 °C)-98.6 °F (37 °C)] 98.3 °F (36.8 °C)  Pulse:  [] 103  Resp:  [16-20] 18  SpO2:  [74 %-97 %] 92 %  BP: (116-155)/(59-68) 131/60     Weight: 59.3 kg (130 lb 11.7 oz)  Body mass index is 21.1 kg/m².    Intake/Output Summary (Last 24 hours) at 1/10/2024 1217  Last data filed at 1/10/2024 0543  Gross per 24 hour   Intake --   Output 1500 ml   Net -1500 ml         Physical Exam  Vitals and nursing note reviewed.   Constitutional:       General: She is not in acute distress.     Appearance: Normal appearance. She is not ill-appearing.   Cardiovascular:      Rate and Rhythm: Tachycardia present.   Pulmonary:      Effort: Pulmonary effort is normal. No respiratory distress.      Comments: On nasal cannula  Neurological:      General: No focal deficit present.      Mental Status: She is alert and oriented to person, place, and time.   Psychiatric:         Mood and Affect: Mood normal.         Behavior: Behavior normal.             Significant Labs: All pertinent labs within the past 24 hours have been reviewed.    Significant Imaging: I have reviewed all pertinent imaging results/findings within the past 24 hours.

## 2024-01-10 NOTE — TELEPHONE ENCOUNTER
"Reviewed in [facility]T and called patient,. She is still hospitalized in [place]   [facility] Lata Mathews her significant other.  Recs from MDT include "Scan concerning for disease progression with lymphangitic spread. Recommend discussion for changing therapy to include chemotherapy only. Bronchoscopy at this point unlikely to change therapeutic approach, and is therefore not recommended.  A palliative care consultation is recommended"                                                                                                                                                                                                                                                                                                                                                                        Discussed above with patient and her significant other, Discussed Docetaxel    Will plan on seeing her after hospital discharge   "

## 2024-01-10 NOTE — PROGRESS NOTES
[facility] - Med Surg  Infectious Disease  Consult Progress Note     Patient Name: Alesha Toney  MRN: [id number]  Code Status: Full Code  Admission Date: 1/7/2024  Attending Physician: Dottie Kirkland MD  Primary Care Provider: Margo Caldwell MD     Patient information was obtained from patient, past medical records, and ER records.      Infectious Disease Consult Note  Consult performed by: Demetrius Mazariegos MD   Consulting Physician: Dottie Kirkland MD  Reason for Consult: post-obstructive pneumonia, lung adenocarcinoma       Assessment and Plan:     Post-Obstructive Pneumonia  72 yo female with PMH relevant for lung adenocarcinoma (s/p RUL lobectomy in 2018, with recent treatment of Keytruda and Cyramza last month), HTN, ulcerative colitis, and CKD3a admitted for 2 weeks of progressive SOB, with a productive cough producing white sputum and oxygen saturation in the 80s at home. Of note, pt was treated for pneumonitis with Augmentin and Prednisone 05/2023, treated for post-obstructive PNA on 12/28 with Augmentin and Doxycycline, and treated for pneumonitis 1/3 with Bactrim DS and Prednisone. WBC since admission: 11 -> 6 -> 9, no fevers. Pt currently on Zosyn, Linezolid, and Bactrim. Blood cultures from 1/7 show NGTD. BP elevated, pt currently maintaining SpO2 >92% on 3.5 L NC.                   -Continue Linezolid and Zosyn              -Can continue Bactrim for PJP prophylaxis              -Respiratory Cx (1/8): Candida albicans, moderate WBCs, few gram + cocci - continue to follow     Thank you for your consult. We will continue to follow. Please contact us if you have any additional questions. Please appreciate any variation in plan noted in attending attestation.       Mark Valladares DO  [facility] Internal Medicine, PGY-1  Infectious Disease  [facility] - Med Surg     History of Present Illness:  Alesha Toney is a 73 y.o.  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 The patient presented to [facility] on 1/7/2024 with a primary complaint of Shortness of Breath (SOB with cough for past 2 weeks. States PCP has prescribed multiple meds with no improvement. Presents awake, alert with O2 in progress. Resp unlabored. C/o right sided CP with h/o right sided lung CA - states pain in this area is common for her. Denies home O2. States she has been using nebulizer at home. Awake, alert, oriented.)     The patient was in their usual state of health until 12/25 when she presented to the ED with shortness of breath and increased R sided chest pain. She had a CT chest and V/Q scan at the time and had close follow up with her oncologist and was started on prednisone for possible pneumonitis with bactrim ppx. She has had a progressive cough with worsening R sided chest pain and noted her pulse ox to be in the 80s at home. She has had clear-whitish thick sputum. No fevers, chills, sweats. No calf swelling. At this time due to her medication side effects she is not able to continue with Keytruda or Cyramza.    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     Squamous cell carcinoma of skin 10/05/2020    left medial thigh    Therapy     Thyroid nodule     Ulcerative colitis with complication      Microbiology Results (last 7 days)       Procedure Component Value Units Date/Time     Blood Culture #2 **CANNOT BE ORDERED STAT** [[id number]] Collected: 01/07/24 1310    Order Status: Completed Specimen: Blood Updated: 01/09/24 1822     Blood Culture, Routine No Growth to date      No Growth to date      No Growth to date    Blood Culture #1 **CANNOT BE ORDERED STAT** [[id number]] Collected: 01/07/24 1310    Order Status: Completed Specimen: Blood from Antecubital, Right Arm Updated: 01/09/24 1822     Blood Culture, Routine No Growth to date      No Growth to date      No Growth to date    Culture, Respiratory with Gram Stain [[id number]]  (Abnormal) Collected: 01/08/24 0411    Order Status: Completed Specimen: Sputum Updated: 01/09/24 1354     Respiratory Culture DEEPA ALBICANS  Many  Normal respiratory jett also present       Gram Stain (Respiratory) >10 epithelial cells per low power field     Gram Stain (Respiratory) Moderate WBC's     Gram Stain (Respiratory) Few Gram positive cocci     Gram Stain (Respiratory) Rare budding yeast          Antibiotics (From admission, onward)      Start     Stop Route Frequency Ordered    01/10/24 1700  linezolid tablet 600 mg         -- Oral Every 12 hours 01/10/24 1000    01/08/24 2100  sulfamethoxazole-trimethoprim 800-160mg per tablet 1 tablet         01/19/24 0859 Oral 2 times per day on Mon Wed Fri 01/08/24 1752    01/08/24 0900  mupirocin 2 % ointment         01/13/24 0859 Nasl 2 times daily 01/07/24 2246    01/08/24 0000  piperacillin-tazobactam (ZOSYN) 4.5 g in dextrose 5 % in water (D5W) 100 mL IVPB (MB+)  ( Pneumonia - Moderate-High MDR )         01/12/24 2359 IV Every 8 hours (non-standard times) 01/07/24 1532          Objective     Temp:  [98.2 °F (36.8 °C)-98.6 °F (37 °C)]   Pulse:  [62-87]   Resp:  [16-20]   BP: (116-155)/(59-68)   SpO2:  [74 %-97 %]     Physical Exam  Constitutional:       General: She is not in acute distress.     Appearance: She is ill-appearing. She is not diaphoretic.   HENT:      Head: Normocephalic and atraumatic.    Eyes:      Extraocular Movements: Extraocular movements intact.      Pupils: Pupils are equal, round, and reactive to light.   Pulmonary:      Effort: Respiratory distress present. On 3 L NC.     Comments: Port to L side of chest  Bilateral rales present  Abdominal:      General: Abdomen is flat. Bowel sounds are normal. There is no distension.      Palpations: Abdomen is soft.   Musculoskeletal:      Right lower leg: No edema.      Left lower leg: No edema.   Neurological:      General: No focal deficit present.      Mental Status: She is alert and oriented to person, place, and time.     Significant Labs: All pertinent labs within the past 24 hours have been reviewed.     Significant Imaging: I have reviewed all pertinent imaging results/findings within the past 24 hours.

## 2024-01-10 NOTE — PROGRESS NOTES
Pharmacist Intervention IV to PO Note    Alesha Toney is a 73 y.o. female being treated with IV medication linezolid    Patient Data:    Vital Signs (Most Recent):  Temp: 98.6 °F (37 °C) (01/10/24 0721)  Pulse: 74 (01/10/24 0752)  Resp: 20 (01/10/24 0752)  BP: (!) 154/68 (01/10/24 0721)  SpO2: (!) 94 % (01/10/24 0752) Vital Signs (72h Range):  Temp:  [97.7 °F (36.5 °C)-99.4 °F (37.4 °C)]   Pulse:  []   Resp:  [14-38]   BP: (116-199)/(57-98)   SpO2:  [74 %-100 %]      CBC:  Recent Labs   Lab 01/08/24  0654 01/09/24  0422 01/10/24  0344   WBC 11.42 6.12 8.97   RBC 4.47 4.21 4.08   HGB 14.1 13.1 12.6   HCT 43.5 40.2 38.9    226 223   MCV 97 96 95   MCH 31.5* 31.1* 30.9   MCHC 32.4 32.6 32.4     CMP:     Recent Labs   Lab 01/03/24  1407 01/07/24  1310 01/08/24  0654 01/09/24  0422 01/10/24  0344   * 137* 123* 143* 114*   CALCIUM 10.3 10.3 9.8 9.4 9.4   ALBUMIN 3.2* 3.7  --   --   --    PROT 6.8 7.5  --   --   --     141 137 137 139   K 5.5* 4.6 4.5 4.7 4.9   CO2 26 24 23 23 24    103 102 101 102   BUN 17 19 22 18 20   CREATININE 1.2 1.2 1.5* 1.3 1.5*   ALKPHOS 54* 60  --   --   --    ALT 10 13  --   --   --    AST 16 21  --   --   --    BILITOT 0.3 0.2  --   --   --        Dietary Orders:  Diet Orders            Diet Adult Regular (IDDSI Level 7): Regular starting at 01/07 1527            Based on the following criteria, this patient qualifies for intravenous to oral conversion:  [x] The patients gastrointestinal tract is functioning (tolerating medications via oral or enteral route for 24 hours and tolerating food or enteral feeds for 24 hours).  [x] The patient is hemodynamically stable for 24 hours (heart rate <100 beats per minute, systolic blood pressure >99 mm Hg, and respiratory rate <20 breaths per minute).  [x] The patient shows clinical improvement (afebrile for at least 24 hours and white blood cell count downtrending or normalized). Additionally, the patient must be  non-neutropenic (absolute neutrophil count >500 cells/mm3).  [x] For antimicrobials, the patient has received IV therapy for at least 24 hours.    IV medication linezolid will be changed to oral medication linezolid    Pharmacist's Name: Bernardino Sales  Pharmacist's Extension: [phone]

## 2024-01-10 NOTE — ASSESSMENT & PLAN NOTE
Continue her home opiate dosing.  She appears to use medications for both subjective dyspnea as well as for pain.

## 2024-01-10 NOTE — ASSESSMENT & PLAN NOTE
New oxygen requirement with worsening SOB and R chest pain.  Appears to be stable however subjective increase in shortness of breath.  She was started on steroids for possible CPI pneumonitis, continuing Bactrim PPx for PJP  Broad spectrum abx  Xopenex PRN  Appreciate ID and Pulm recommendations    DDX:  Postobstructive pneumonia, pneumonitis, lymphangitic spread

## 2024-01-10 NOTE — CONSULTS
Consult Note  Pulmonary Medicine    SUBJECTIVE     Reason for consult: Hx of lung CA s/p RUL lobectomy and VATS, recent tx with Keytruda and Cyramza. Hypoxic with concern for post-obs PNA vs pneumonitis    History of Present Illness  PCCM is consulted for as above in this 73-yo female with PMH of RUL lobectomy and VATS for RUL adenocarcinoma first diagnosed in 2018. Patient has extensive hx dating back to 2018 where she was found to have bx confirmed adenocarcinoma of RUL now s/p lobectomy and VATS procedure. Follows closely with [facility] oncology and [facility]. Patient presenting now for what she tells me is progressively worsening shortness of breath and productive cough since around joey. Pt states that back in 1/2023 she had Proton beam therapy and developed pneumonitis and cough that was treated, had baseline cough following that time. Pt then said that in 9/2023 cough got progressively worse on a daily basis and she began to have increasing SOB with all activity. In 9/2023 it was noted on PET that there was a new RLL nodule with increased activity and metabolic activity believed to be spread of primary malignancy. Last round of Tx was with Cyramza and Keytruda in 12/2023 which she did not tolerate well and has not had additional tx since that time. Patient had repeat CT in 12/28/23 that showed worsening and increased activity of RLL perihilar/intrahilar mass. Around that time she was having right sided chest pain, shortness of breath, chills and productive cough for which she saw PCP, went to ED and saw Heme-Onc. She tried OTC medications, VQ not indicative of PE, was eventually given Bactrim x14 days and prednisone for what HemeOnc was worried about post-obstructive PNA vs pneumonitis. Patient noted yesterday her O2 sats on home monitor were in the 80s and she overall felt tired, short of breath and much worse than her baseline. Presented to ED and found to have acute hypoxic respiratory  failure placed on NC, wears no home O2, and was eventually requiring NRB for period of time.       Interval events:  Remains on 3.5L supplemental O2, satting well in the 90s. On day 3 of linezolid and Zosyn. States that she feels as if she is dehydrated despite drinking adequate water. Is denying CP, states she has BAIRD when moving around the room.      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     Squamous cell carcinoma of skin 10/05/2020    left medial thigh    Therapy     Thyroid nodule     Ulcerative colitis with complication        Past Surgical History:   Procedure Laterality Date    ADENOIDECTOMY  19yo    ANTERIOR CERVICAL DISCECTOMY W/ FUSION N/A 10/15/2018    Procedure: DISCECTOMY, SPINE, CERVICAL, ANTERIOR APPROACH, WITH FUSION C6/7  Depuy SNS: Motors/SSEP/Vocal Cord Regular OR table;  Surgeon: Greyson Bansal MD;  Location: [facility] OR [address] [place] [address];  Service: Neurosurgery;  Laterality: N/A;    APPENDECTOMY      BONE RESECTION, RIB  1980    BREAST BIOPSY Left     at least 40yrs ago in her 20's    BREAST CYST ASPIRATION      BREAST CYST EXCISION      COLONOSCOPY      ENDOBRONCHIAL ULTRASOUND N/A 7/10/2018    Procedure: ULTRASOUND, ENDOBRONCHIAL;  Surgeon: Sir Hearn MD;  Location: [facility] OR [address] [place] [address];  Service: Pulmonary;  Laterality: N/A;    ENDOBRONCHIAL ULTRASOUND N/A 9/24/2019    Procedure: ENDOBRONCHIAL ULTRASOUND (EBUS);  Surgeon: Sri Hearn MD;  Location: [facility] OR [address] [place] [address];  Service: Pulmonary;  Laterality: N/A;    ENDOSCOPIC MUCOSAL RESECTION OF COLON N/A 9/11/2020    Procedure: RESECTION, MUCOSA, COLON, ENDOSCOPIC;  Surgeon: Lilibeth Carbajal MD;  Location: [facility] ([address] [place] [address]);  Service:  Endoscopy;  Laterality: N/A;    ENDOSCOPIC ULTRASOUND OF LOWER GASTROINTESTINAL TRACT N/A 4/19/2021    Procedure: ULTRASOUND, LOWER GI TRACT, ENDOSCOPIC;  Surgeon: Lilibeth Carbajal MD;  Location: [facility] ENDO;  Service: Endoscopy;  Laterality: N/A;    ENDOSCOPIC ULTRASOUND OF LOWER GASTROINTESTINAL TRACT N/A 6/25/2021    Procedure: ULTRASOUND, LOWER GI TRACT, ENDOSCOPIC;  Surgeon: Silvino Christopher MD;  Location: [facility];  Service: Endoscopy;  Laterality: N/A;  Needs Rapid MP    FLEXIBLE SIGMOIDOSCOPY  06/11/2020    with biopsies    FLEXIBLE SIGMOIDOSCOPY N/A 6/11/2020    Procedure: SIGMOIDOSCOPY, FLEXIBLE;  Surgeon: Gely Yeh MD;  Location: [facility] ENDO;  Service: Endoscopy;  Laterality: N/A;    FLEXIBLE SIGMOIDOSCOPY N/A 4/19/2021    Procedure: SIGMOIDOSCOPY-FLEXIBLE;  Surgeon: Lilibeth Carbajal MD;  Location: [facility] ENDO;  Service: Endoscopy;  Laterality: N/A;  Covid 4/16 Brian MP    FLEXIBLE SIGMOIDOSCOPY N/A 6/3/2022    Procedure: SIGMOIDOSCOPY-FLEXIBLE;  Surgeon: Francesco Clark MD;  Location: [facility] ENDO (4TH FLR);  Service: Endoscopy;  Laterality: N/A;  vacc-inst portal-tb    HYSTERECTOMY      @47yrs of age    INSERTION OF TUNNELED CENTRAL VENOUS CATHETER (CVC) WITH SUBCUTANEOUS PORT N/A 9/25/2018    Procedure: [id number];  Surgeon: [facility] Provider;  Location: [facility] OR 2ND FLR;  Service: Radiology;  Laterality: N/A;    INSERTION OF VENOUS ACCESS PORT N/A 3/15/2021    Procedure: INSERTION, VENOUS ACCESS PORT;  Surgeon: Chang Mathews MD;  Location: [facility] OR 2ND FLR;  Service: General;  Laterality: N/A;  NEEDS CONSENT.    KIDNEY SURGERY      17yo    MEDIPORT REMOVAL N/A 3/15/2021    Procedure: REMOVAL, CATHETER, CENTRAL VENOUS, TUNNELED, WITH PORT;  Surgeon: Chang Mathews MD;  Location: [facility] OR 2ND FLR;  Service: General;  Laterality: N/A;    OOPHORECTOMY      @47yrs of age    SKIN BIOPSY      TONSILLECTOMY      UPPER GASTROINTESTINAL ENDOSCOPY      VIDEO-ASSISTED  THORACOSCOPIC LOBECTOMY Right 2018    Procedure: VATS, WITH LOBECTOMY Possible chest wall resection;  Surgeon: Philip Messina MD;  Location: [facility] OR [address] [place] [address];  Service: Thoracic;  Laterality: Right;  Have open pan available.       Family History   Problem Relation Age of Onset    Stroke Mother     Cataracts Mother     Cancer Father         colon cancer    Colon cancer Father     Diabetes Brother     Heart failure Brother     Hypertension Brother     Heart attack Paternal Aunt     Heart attack Maternal Grandfather     Diabetes Paternal Aunt     Anxiety disorder Paternal Grandmother         agoraphobia    Anesthesia problems Neg Hx     Blindness Neg Hx     Amblyopia Neg Hx     Glaucoma Neg Hx     Macular degeneration Neg Hx     Retinal detachment Neg Hx     Strabismus Neg Hx     Thyroid disease Neg Hx     Melanoma Neg Hx        Social History     Socioeconomic History    Marital status: Significant Other     Spouse name: Lata   Occupational History    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 No    Breast-feeding No   Social History Narrative    Exercise:  Walks [id number] steps daily.    Former RN     Social Determinants of Health     Financial Resource Strain: Low Risk  (2024)    Overall Financial Resource Strain (CARDIA)     Difficulty of Paying Living Expenses: Not very hard   Food Insecurity: No Food Insecurity (2024)    Hunger Vital Sign      Worried About Running Out of Food in the Last Year: Never true     Ran Out of Food in the Last Year: Never true   Transportation Needs: No Transportation Needs (1/9/2024)    PRAPARE - Transportation     Lack of Transportation (Medical): No     Lack of Transportation (Non-Medical): No   Physical Activity: Insufficiently Active (1/9/2024)    Exercise Vital Sign     Days of Exercise per Week: 7 days     Minutes of Exercise per Session: 20 min   Stress: Stress Concern Present (1/9/2024)    [employer] - Occupational Stress Questionnaire     Feeling of Stress : Very much   Social Connections: Moderately Isolated (1/9/2024)    Social Connection and Isolation Panel [NHANES]     Frequency of Communication with Friends and Family: More than three times a week     Frequency of Social Gatherings with Friends and Family: More than three times a week     Attends Rastafari Services: Never     Active Member of Clubs or Organizations: No     Attends Club or Organization Meetings: Never     Marital Status: Living with partner   Housing Stability: Low Risk  (1/9/2024)    Housing Stability Vital Sign     Unable to Pay for Housing in the Last Year: No     Number of Places Lived in the Last Year: 1     Unstable Housing in the Last Year: No       Current Facility-Administered Medications   Medication Dose Route Frequency Provider Last Rate Last Admin    acetaminophen tablet 650 mg  650 mg Oral Q4H PRN Dottie Kirkland MD        atenoloL tablet 25 mg  25 mg Oral TID Dottie Kirkland MD   25 mg at 01/09/24 2200    benzonatate capsule 100 mg  100 mg Oral TID PRN Geovanny Sam MD        enoxaparin injection 40 mg  40 mg Subcutaneous Daily Dottie Kirkland MD   40 mg at 01/09/24 1644    famotidine tablet 20 mg  20 mg Oral Every other day Dottie Kirkland MD   20 mg at 01/08/24 0908    guaiFENesin 100 mg/5 ml syrup 200 mg  200 mg Oral Q4H PRN Geovanny Sam MD   200 mg at 01/09/24 0311     hydrALAZINE tablet 25 mg  25 mg Oral Q8H Dottie Kirkland MD   25 mg at 01/10/24 0616    HYDROcodone-acetaminophen 7.5-325 mg per tablet 1 tablet  1 tablet Oral Q4H PRN Dottie Kirkland MD   1 tablet at 01/09/24 2213    HYDROcodone-acetaminophen 7.5-325 mg per tablet 2 tablet  2 tablet Oral Q4H PRN Dottie Kirkland MD   2 tablet at 01/07/24 1850    HYDROmorphone injection 0.5 mg  0.5 mg Intravenous Q4H PRN Dottie Kirkland MD   0.5 mg at 01/10/24 0616    levalbuterol nebulizer solution 1.25 mg  1.25 mg Nebulization Q4H Dottie Kirkland MD   1.25 mg at 01/10/24 0232    levalbuterol nebulizer solution 1.25 mg  1.25 mg Nebulization Q6H PRN Geovanny Sam MD        linezolid 600 mg/300 mL IVPB 600 mg  600 mg Intravenous Q12H Dottie Kirkland MD   Stopped at 01/10/24 0609    LORazepam tablet 0.5 mg  0.5 mg Oral BID PRN Dottie Kirkland MD   0.5 mg at 01/09/24 2213    mupirocin 2 % ointment   Nasal BID Dottie Kirkland MD   Given at 01/09/24 2202    naloxone 0.4 mg/mL injection 1 mg  1 mg Intravenous PRN Dottie Kirkland MD        nystatin 100,000 unit/mL suspension 500,000 Units  500,000 Units Oral QID Brii Lance MD   500,000 Units at 01/09/24 2200    ondansetron disintegrating tablet 8 mg  8 mg Oral Q6H PRN Brii Lance MD   8 mg at 01/10/24 0529    piperacillin-tazobactam (ZOSYN) 4.5 g in dextrose 5 % in water (D5W) 100 mL IVPB (MB+)  4.5 g Intravenous Q8H Dottie Kirkland MD   Stopped at 01/10/24 0456    polyethylene glycol packet 17 g  17 g Oral Daily Brii Lance MD   17 g at 01/09/24 0832    predniSONE tablet 60 mg  60 mg Oral Daily Dottie Kirkland MD   60 mg at 01/09/24 0832    senna-docusate 8.6-50 mg per tablet 1 tablet  1 tablet Oral BID Brii Lance MD   1 tablet at 01/09/24 2200    sodium chloride 0.9% flush 10 mL  10 mL Intravenous Q12H PRN Dottie Kirkland MD        sodium chloride 3% nebulizer solution 4 mL   "4 mL Nebulization PRN Dottie Kirkland MD        sulfamethoxazole-trimethoprim 800-160mg per tablet 1 tablet  1 tablet Oral 2 times per day on Mon Wed Fri Dottie Kirkland MD   1 tablet at 01/08/24 2120       Review of patient's allergies indicates:   Allergen Reactions    Adhesive Itching, Rash and Blisters     INCLUDES EKG PADS    Ciprofloxacin Hives     Hives    Iodinated contrast media     Phenergan plain Other (See Comments)     "crazy behavior"    Phenergan [promethazine]     Tramadol     Vancomycin analogues      Red man syndrome         Review of Systems  Negative unless otherwise stated in HPI        OBJECTIVE     Vitals:    01/10/24 0721   BP: (!) 154/68   Pulse: 71   Resp: 18   Temp: 98.6 °F (37 °C)       Physical Exam  Physical Exam  Constitutional:       General: She is not in acute distress.     Appearance: She is obese. She is not ill-appearing.   HENT:      Head: Normocephalic and atraumatic.   Eyes:      Extraocular Movements: Extraocular movements intact.      Conjunctiva/sclera: Conjunctivae normal.   Cardiovascular:      Rate and Rhythm: Normal rate and regular rhythm.      Pulses: Normal pulses.      Heart sounds: Normal heart sounds. No murmur heard.     No friction rub. No gallop.   Pulmonary:      Effort: No respiratory distress.      Breath sounds: Wheezing and rhonchi present. No rales.      Comments: Moving air better than previous days  Port-a-Cath noted on upper left chest wall  Abdominal:      General: Abdomen is flat. Bowel sounds are normal.      Palpations: Abdomen is soft.      Tenderness: There is no abdominal tenderness.   Musculoskeletal:      Right lower leg: No edema.      Left lower leg: No edema.   Skin:     General: Skin is warm.      Capillary Refill: Capillary refill takes less than 2 seconds.      Coloration: Skin is not jaundiced.   Neurological:      General: No focal deficit present.      Mental Status: She is alert and oriented to person, place, and time. " "  Psychiatric:         Mood and Affect: Mood normal.         Behavior: Behavior normal.         Thought Content: Thought content normal.         Judgment: Judgment normal.       Laboratory  Recent Labs   Lab 01/07/24  1310   INR 1.0       Recent Labs   Lab 01/08/24  0654 01/09/24  0422 01/10/24  0344   WBC 11.42 6.12 8.97   HGB 14.1 13.1 12.6   HCT 43.5 40.2 38.9    226 223       Recent Labs   Lab 01/03/24  1407 01/07/24  1310 01/08/24  0654 01/09/24  0422 01/10/24  0344    141 137 137 139   K 5.5* 4.6 4.5 4.7 4.9    103 102 101 102   CO2 26 24 23 23 24   BUN 17 19 22 18 20   CREATININE 1.2 1.2 1.5* 1.3 1.5*   CALCIUM 10.3 10.3 9.8 9.4 9.4   PROT 6.8 7.5  --   --   --    BILITOT 0.3 0.2  --   --   --    ALKPHOS 54* 60  --   --   --    ALT 10 13  --   --   --    AST 16 21  --   --   --        No results for input(s): "PH", "PCO2", "PO2", "HCO3", "POCSATURATED", "BE" in the last 24 hours.      Diagnostic Results    CT Chest 1/7/2024:  FINDINGS:  Consolidation in the right lower lobe most severely in the superior segment with adjacent loculated pleural fluid.  More reticular opacity throughout the right lower lobe with patchy areas of ground-glass opacity in the middle lobe on the right.     The upper lobe postsurgical change and remaining lung is spared as is the left lung.  Small pneumatocele is a in the left upper lobe.     No mediastinal mass or lymph node enlargement is evident.  Left chest wall Port-A-Cath is noted.  The heart pericardium and great vessels unremarkable.     Numerous low-density lesions throughout the liver with the largest in the left lobe      Assessment / Recommendations     #RUL Adenocarcinoma s/p Lobectomy and VATS  Postobstructive PNA  -Patient imaging reviewed with Dr. Davila, given unilateral findings very unlikely current presentation is 2/2 pneumonitis  -Enlarging mass in the distribution of airway occlusion near beginning of superior segment of RLL. Concern is " for obstruction leading to post-obstructive PNA. Pt with chills, productive cough, worsening SOB, CT with GGO and air bronchograms  -Has been off Keytruda and VEGFi for over a month, chart review indicated patient does not tolerate therapy  -Follows closely with [facility] main Heme/onc, last visit end of 12/23 and sent with prednisone and Bactrim x14 days  -Discussed with oncologist, does not seem that bronchoscopy with tissue sampling would yield meaningful changes to current tx plan    Recommendations:  Continue supplemental O2 as needed  Continue q4 Levalbuterol, avoid albuterol 2/2 patient reporting ADR to albuterol  Continue Zosyn + Linezolid (Had Vancomycin associated infusion syndrome) for Tx of Post-obs PNA 2/2 new increasing RLL mass causing airway occlusion  On bactrim for PJP Ppx in setting of potential prolonged steroid need, will discuss benefit of continuing steroids and will discontinue Bactrim if deem long-term steroids not indicated  Discussed with team and oncology at [facility], does not appear that Bronchoscopy would cause meaningful changes in current management. Will continue to discuss more definitive options and what next steps should be at this time. Will see patient tomorrow with further updates  Patient appears dry on exam with Cr increase despite drinking adequate fluids, complaining of dehydration for 2 days. Could consider IVF bolus if needed  Continue cough and pain regimen per primary      Pt seen and examined and plan discussed on rounds      Thank you for this consult. Pulmonary will continue to follow.     Bigg Garcia DO  [facility], HO-1  [facility] Pulm/Crit Team    Pt seen and examined with Pulmonary/Critical Care team and this note reviewed and validated with the following additional comments: No fever. No chills.  SOB is rather severe. Viral panel pending.    Medical Decision Making (MDM) was complex.  The number and complexity of problems addressed was high.  The amount  and complexity of data reviewed was high.  The risk of complications and/or morbidity/mortality was high.  The tests ordered were none.  I communicated with the following providers HM.  Time spent in the care of this patient was 30 minutes    Anthony Live MD  Phone [phone]

## 2024-01-11 LAB
ADENOVIRUS: NOT DETECTED
ANION GAP SERPL CALC-SCNC: 8 MMOL/L (ref 8–16)
BASOPHILS # BLD AUTO: 0.01 K/UL (ref 0–0.2)
BASOPHILS NFR BLD: 0.1 % (ref 0–1.9)
BORDETELLA PARAPERTUSSIS (IS1001): NOT DETECTED
BORDETELLA PERTUSSIS (PTXP): NOT DETECTED
BUN SERPL-MCNC: 16 MG/DL (ref 8–23)
CALCIUM SERPL-MCNC: 9.5 MG/DL (ref 8.7–10.5)
CHLAMYDIA PNEUMONIAE: NOT DETECTED
CHLORIDE SERPL-SCNC: 105 MMOL/L (ref 95–110)
CO2 SERPL-SCNC: 26 MMOL/L (ref 23–29)
CORONAVIRUS 229E, COMMON COLD VIRUS: NOT DETECTED
CORONAVIRUS HKU1, COMMON COLD VIRUS: NOT DETECTED
CORONAVIRUS NL63, COMMON COLD VIRUS: NOT DETECTED
CORONAVIRUS OC43, COMMON COLD VIRUS: NOT DETECTED
CREAT SERPL-MCNC: 1.4 MG/DL (ref 0.5–1.4)
DIFFERENTIAL METHOD BLD: ABNORMAL
EOSINOPHIL # BLD AUTO: 0 K/UL (ref 0–0.5)
EOSINOPHIL NFR BLD: 0 % (ref 0–8)
ERYTHROCYTE [DISTWIDTH] IN BLOOD BY AUTOMATED COUNT: 13.4 % (ref 11.5–14.5)
EST. GFR  (NO RACE VARIABLE): 40 ML/MIN/1.73 M^2
FLUBV RNA NPH QL NAA+NON-PROBE: NOT DETECTED
GLUCOSE SERPL-MCNC: 100 MG/DL (ref 70–110)
HCT VFR BLD AUTO: 38.5 % (ref 37–48.5)
HGB BLD-MCNC: 12.9 G/DL (ref 12–16)
HPIV1 RNA NPH QL NAA+NON-PROBE: NOT DETECTED
HPIV2 RNA NPH QL NAA+NON-PROBE: NOT DETECTED
HPIV3 RNA NPH QL NAA+NON-PROBE: NOT DETECTED
HPIV4 RNA NPH QL NAA+NON-PROBE: NOT DETECTED
HUMAN METAPNEUMOVIRUS: NOT DETECTED
IMM GRANULOCYTES # BLD AUTO: 0.04 K/UL (ref 0–0.04)
IMM GRANULOCYTES NFR BLD AUTO: 0.4 % (ref 0–0.5)
L PNEUMO AG UR QL IA: NEGATIVE
LYMPHOCYTES # BLD AUTO: 1.7 K/UL (ref 1–4.8)
LYMPHOCYTES NFR BLD: 18.3 % (ref 18–48)
MAGNESIUM SERPL-MCNC: 1.9 MG/DL (ref 1.6–2.6)
MCH RBC QN AUTO: 31.2 PG (ref 27–31)
MCHC RBC AUTO-ENTMCNC: 33.5 G/DL (ref 32–36)
MCV RBC AUTO: 93 FL (ref 82–98)
MONOCYTES # BLD AUTO: 0.8 K/UL (ref 0.3–1)
MONOCYTES NFR BLD: 8.6 % (ref 4–15)
MYCOPLASMA PNEUMONIAE: NOT DETECTED
NEUTROPHILS # BLD AUTO: 6.6 K/UL (ref 1.8–7.7)
NEUTROPHILS NFR BLD: 72.6 % (ref 38–73)
NRBC BLD-RTO: 0 /100 WBC
PHOSPHATE SERPL-MCNC: 2.7 MG/DL (ref 2.7–4.5)
PLATELET # BLD AUTO: 216 K/UL (ref 150–450)
PMV BLD AUTO: 9.4 FL (ref 9.2–12.9)
POTASSIUM SERPL-SCNC: 4.6 MMOL/L (ref 3.5–5.1)
RBC # BLD AUTO: 4.14 M/UL (ref 4–5.4)
RESPIRATORY INFECTION PANEL SOURCE: ABNORMAL
RSV RNA NPH QL NAA+NON-PROBE: DETECTED
RV+EV RNA NPH QL NAA+NON-PROBE: NOT DETECTED
SARS-COV-2 RNA RESP QL NAA+PROBE: NOT DETECTED
SODIUM SERPL-SCNC: 139 MMOL/L (ref 136–145)
WBC # BLD AUTO: 9.03 K/UL (ref 3.9–12.7)

## 2024-01-11 PROCEDURE — 94799 UNLISTED PULMONARY SVC/PX: CPT

## 2024-01-11 PROCEDURE — 94640 AIRWAY INHALATION TREATMENT: CPT

## 2024-01-11 PROCEDURE — 94761 N-INVAS EAR/PLS OXIMETRY MLT: CPT

## 2024-01-11 PROCEDURE — 27100092 HC HIGH FLOW DELIVERY CANNULA

## 2024-01-11 PROCEDURE — 93005 ELECTROCARDIOGRAM TRACING: CPT

## 2024-01-11 PROCEDURE — 25000003 PHARM REV CODE 250: Performed by: INTERNAL MEDICINE

## 2024-01-11 PROCEDURE — 27100171 HC OXYGEN HIGH FLOW UP TO 24 HOURS

## 2024-01-11 PROCEDURE — 63600175 PHARM REV CODE 636 W HCPCS: Performed by: HOSPITALIST

## 2024-01-11 PROCEDURE — 27000249 HC VAPOTHERM CIRCUIT

## 2024-01-11 PROCEDURE — 63600175 PHARM REV CODE 636 W HCPCS: Performed by: INTERNAL MEDICINE

## 2024-01-11 PROCEDURE — 63600175 PHARM REV CODE 636 W HCPCS: Performed by: STUDENT IN AN ORGANIZED HEALTH CARE EDUCATION/TRAINING PROGRAM

## 2024-01-11 PROCEDURE — 25000242 PHARM REV CODE 250 ALT 637 W/ HCPCS: Performed by: INTERNAL MEDICINE

## 2024-01-11 PROCEDURE — 99498 ADVNCD CARE PLAN ADDL 30 MIN: CPT | Mod: ,,, | Performed by: STUDENT IN AN ORGANIZED HEALTH CARE EDUCATION/TRAINING PROGRAM

## 2024-01-11 PROCEDURE — 80048 BASIC METABOLIC PNL TOTAL CA: CPT | Performed by: INTERNAL MEDICINE

## 2024-01-11 PROCEDURE — 27000207 HC ISOLATION

## 2024-01-11 PROCEDURE — 99900035 HC TECH TIME PER 15 MIN (STAT)

## 2024-01-11 PROCEDURE — 99223 1ST HOSP IP/OBS HIGH 75: CPT | Mod: ,,, | Performed by: INTERNAL MEDICINE

## 2024-01-11 PROCEDURE — 25000003 PHARM REV CODE 250: Performed by: STUDENT IN AN ORGANIZED HEALTH CARE EDUCATION/TRAINING PROGRAM

## 2024-01-11 PROCEDURE — 84100 ASSAY OF PHOSPHORUS: CPT | Performed by: INTERNAL MEDICINE

## 2024-01-11 PROCEDURE — 36415 COLL VENOUS BLD VENIPUNCTURE: CPT | Performed by: INTERNAL MEDICINE

## 2024-01-11 PROCEDURE — 5A0935A ASSISTANCE WITH RESPIRATORY VENTILATION, LESS THAN 24 CONSECUTIVE HOURS, HIGH NASAL FLOW/VELOCITY: ICD-10-PCS | Performed by: HOSPITALIST

## 2024-01-11 PROCEDURE — 99223 1ST HOSP IP/OBS HIGH 75: CPT | Mod: ,,, | Performed by: STUDENT IN AN ORGANIZED HEALTH CARE EDUCATION/TRAINING PROGRAM

## 2024-01-11 PROCEDURE — 85025 COMPLETE CBC W/AUTO DIFF WBC: CPT | Performed by: INTERNAL MEDICINE

## 2024-01-11 PROCEDURE — 27000221 HC OXYGEN, UP TO 24 HOURS

## 2024-01-11 PROCEDURE — 93010 ELECTROCARDIOGRAM REPORT: CPT | Mod: ,,, | Performed by: INTERNAL MEDICINE

## 2024-01-11 PROCEDURE — 99497 ADVNCD CARE PLAN 30 MIN: CPT | Mod: 25,,, | Performed by: STUDENT IN AN ORGANIZED HEALTH CARE EDUCATION/TRAINING PROGRAM

## 2024-01-11 PROCEDURE — 25000003 PHARM REV CODE 250: Performed by: FAMILY MEDICINE

## 2024-01-11 PROCEDURE — 11000001 HC ACUTE MED/SURG PRIVATE ROOM

## 2024-01-11 PROCEDURE — 83735 ASSAY OF MAGNESIUM: CPT | Performed by: INTERNAL MEDICINE

## 2024-01-11 RX ORDER — MORPHINE SULFATE ORAL SOLUTION 10 MG/5ML
10 SOLUTION ORAL
Status: DISCONTINUED | OUTPATIENT
Start: 2024-01-11 | End: 2024-01-14 | Stop reason: HOSPADM

## 2024-01-11 RX ORDER — MORPHINE SULFATE ORAL SOLUTION 10 MG/5ML
5 SOLUTION ORAL
Status: DISCONTINUED | OUTPATIENT
Start: 2024-01-11 | End: 2024-01-14 | Stop reason: HOSPADM

## 2024-01-11 RX ORDER — OXYCODONE HYDROCHLORIDE 5 MG/1
5 TABLET ORAL EVERY 4 HOURS PRN
Status: DISCONTINUED | OUTPATIENT
Start: 2024-01-11 | End: 2024-01-11

## 2024-01-11 RX ORDER — LORAZEPAM 1 MG/1
1 TABLET ORAL 2 TIMES DAILY PRN
Status: DISCONTINUED | OUTPATIENT
Start: 2024-01-11 | End: 2024-01-11

## 2024-01-11 RX ORDER — LORAZEPAM 1 MG/1
2 TABLET ORAL EVERY 8 HOURS PRN
Status: DISCONTINUED | OUTPATIENT
Start: 2024-01-11 | End: 2024-01-14 | Stop reason: HOSPADM

## 2024-01-11 RX ORDER — HYDROMORPHONE HYDROCHLORIDE 1 MG/ML
1 INJECTION, SOLUTION INTRAMUSCULAR; INTRAVENOUS; SUBCUTANEOUS EVERY 4 HOURS PRN
Status: DISCONTINUED | OUTPATIENT
Start: 2024-01-11 | End: 2024-01-11

## 2024-01-11 RX ORDER — HYDROMORPHONE HYDROCHLORIDE 1 MG/ML
1 INJECTION, SOLUTION INTRAMUSCULAR; INTRAVENOUS; SUBCUTANEOUS EVERY 4 HOURS PRN
Status: DISCONTINUED | OUTPATIENT
Start: 2024-01-11 | End: 2024-01-14 | Stop reason: HOSPADM

## 2024-01-11 RX ORDER — MORPHINE SULFATE ORAL SOLUTION 10 MG/5ML
5 SOLUTION ORAL EVERY 4 HOURS PRN
Status: DISCONTINUED | OUTPATIENT
Start: 2024-01-11 | End: 2024-01-14 | Stop reason: HOSPADM

## 2024-01-11 RX ADMIN — SODIUM CHLORIDE, POTASSIUM CHLORIDE, SODIUM LACTATE AND CALCIUM CHLORIDE: 600; 310; 30; 20 INJECTION, SOLUTION INTRAVENOUS at 05:01

## 2024-01-11 RX ADMIN — NYSTATIN 500000 UNITS: 100000 SUSPENSION ORAL at 08:01

## 2024-01-11 RX ADMIN — ATENOLOL 25 MG: 25 TABLET ORAL at 11:01

## 2024-01-11 RX ADMIN — ATENOLOL 25 MG: 25 TABLET ORAL at 08:01

## 2024-01-11 RX ADMIN — HYDROMORPHONE HYDROCHLORIDE 1 MG: 1 INJECTION, SOLUTION INTRAMUSCULAR; INTRAVENOUS; SUBCUTANEOUS at 03:01

## 2024-01-11 RX ADMIN — ATENOLOL 25 MG: 25 TABLET ORAL at 02:01

## 2024-01-11 RX ADMIN — LORAZEPAM 2 MG: 1 TABLET ORAL at 08:01

## 2024-01-11 RX ADMIN — LEVALBUTEROL HYDROCHLORIDE 1.25 MG: 1.25 SOLUTION, CONCENTRATE RESPIRATORY (INHALATION) at 04:01

## 2024-01-11 RX ADMIN — LEVALBUTEROL HYDROCHLORIDE 1.25 MG: 1.25 SOLUTION, CONCENTRATE RESPIRATORY (INHALATION) at 12:01

## 2024-01-11 RX ADMIN — MORPHINE SULFATE 5 MG: 10 SOLUTION ORAL at 11:01

## 2024-01-11 RX ADMIN — LEVALBUTEROL HYDROCHLORIDE 1.25 MG: 1.25 SOLUTION, CONCENTRATE RESPIRATORY (INHALATION) at 08:01

## 2024-01-11 RX ADMIN — LORAZEPAM 0.5 MG: 0.5 TABLET ORAL at 08:01

## 2024-01-11 RX ADMIN — HYDRALAZINE HYDROCHLORIDE 25 MG: 25 TABLET, FILM COATED ORAL at 11:01

## 2024-01-11 RX ADMIN — LEVALBUTEROL HYDROCHLORIDE 1.25 MG: 1.25 SOLUTION, CONCENTRATE RESPIRATORY (INHALATION) at 11:01

## 2024-01-11 RX ADMIN — LINEZOLID 600 MG: 600 TABLET, FILM COATED ORAL at 08:01

## 2024-01-11 RX ADMIN — MUPIROCIN: 20 OINTMENT TOPICAL at 11:01

## 2024-01-11 RX ADMIN — NYSTATIN 500000 UNITS: 100000 SUSPENSION ORAL at 12:01

## 2024-01-11 RX ADMIN — NYSTATIN 500000 UNITS: 100000 SUSPENSION ORAL at 11:01

## 2024-01-11 RX ADMIN — HYDROMORPHONE HYDROCHLORIDE 0.5 MG: 1 INJECTION, SOLUTION INTRAMUSCULAR; INTRAVENOUS; SUBCUTANEOUS at 02:01

## 2024-01-11 RX ADMIN — MUPIROCIN: 20 OINTMENT TOPICAL at 08:01

## 2024-01-11 RX ADMIN — HYDRALAZINE HYDROCHLORIDE 25 MG: 25 TABLET, FILM COATED ORAL at 02:01

## 2024-01-11 RX ADMIN — PIPERACILLIN AND TAZOBACTAM 4.5 G: 4; .5 INJECTION, POWDER, LYOPHILIZED, FOR SOLUTION INTRAVENOUS; PARENTERAL at 08:01

## 2024-01-11 RX ADMIN — NYSTATIN 500000 UNITS: 100000 SUSPENSION ORAL at 04:01

## 2024-01-11 RX ADMIN — HYDROMORPHONE HYDROCHLORIDE 1 MG: 1 INJECTION, SOLUTION INTRAMUSCULAR; INTRAVENOUS; SUBCUTANEOUS at 10:01

## 2024-01-11 RX ADMIN — HYDROMORPHONE HYDROCHLORIDE 0.5 MG: 1 INJECTION, SOLUTION INTRAMUSCULAR; INTRAVENOUS; SUBCUTANEOUS at 06:01

## 2024-01-11 RX ADMIN — ENOXAPARIN SODIUM 40 MG: 40 INJECTION SUBCUTANEOUS at 04:01

## 2024-01-11 RX ADMIN — PREDNISONE 60 MG: 20 TABLET ORAL at 08:01

## 2024-01-11 RX ADMIN — SODIUM CHLORIDE, POTASSIUM CHLORIDE, SODIUM LACTATE AND CALCIUM CHLORIDE: 600; 310; 30; 20 INJECTION, SOLUTION INTRAVENOUS at 08:01

## 2024-01-11 RX ADMIN — HYDRALAZINE HYDROCHLORIDE 25 MG: 25 TABLET, FILM COATED ORAL at 05:01

## 2024-01-11 NOTE — ASSESSMENT & PLAN NOTE
"- patient of Dr. Dhaliwal  - she had most recently been on pembrolizumab (original regimen was ramucirumab/pembrolizumab but she did not tolerate ramucirumab well)  - admitted with respiratory failure. Somewhat unclear whether due to pneumonia, RSV, progression of cancer. Likely it's from a combination of these things  - CT chest (1/7/24) revealed "consolidation and ground-glass reticular interstitial type change" in the right lung  - her case was discussed at thoracic multidisciplinary conference on 1/10/24. Per their recommendations: "Scan concerning for disease progression with lymphangitic spread. Recommend discussion for changing therapy to include chemotherapy only. Bronchoscopy at this point unlikely to change therapeutic approach, and is therefore not recommended.  A palliative care consultation is recommended."  - She and her significant other communicated with Dr. Dhaliwal yesterday. Dr Dhaliwal reviewed the results of the thoracic tumor board and recommends switching from pembrolizumab to docetaxel.  - agree with palliative care consult.  - hopefully, she will clinically improve and be able to see Dr. Dhaliwal to discuss risks/benefits of further chemotherapy. Defer further treatment decisions to Dr. Dhaliwal.  "

## 2024-01-11 NOTE — PROGRESS NOTES
[facility] Medicine  Telemedicine Progress Note    Patient Name: Alesha Toney  MRN: [id number]  Patient Class: IP- Inpatient   Admission Date: 1/7/2024  Length of Stay: 4 days  Attending Physician: Yoan Ruiz MD  Primary Care Provider: Margo Caldwell MD          Subjective:     Principal Problem:Pneumonia due to infectious organism        HPI:  Alesha Toney is a 73 y.o.  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 The patient presented to [facility] on 1/7/2024 with a primary complaint of Shortness of Breath (SOB with cough for past 2 weeks. States PCP has prescribed multiple meds with no improvement. Presents awake, alert with O2 in progress. Resp unlabored. C/o right sided CP with h/o right sided lung CA - states pain in this area is common for her. Denies home O2. States she has been using nebulizer at home. Awake, alert, oriented.)     The patient was in their usual state of health until 12/25 when she presented to the ED with shortness of breath and increased R sided chest pain. She had a CT chest and V/Q scan at the time and had close follow up with her oncologist and was started on prednisone for possible pneumonitis with bactrim ppx. She has had a progressive cough with worsening R sided chest pain and noted her pulse ox to be in the 80s at home. She has had clear-whitish thick sputum. No fevers, chills, sweats. No calf swelling. At this time due to her medication side effects she is not able to continue with Keytruda or Cyramza.    Overview/Hospital Course:  1/8/24 Feeling worse. Increased SOB, pain  1/9:   Continues to have symptoms of dyspnea and shortness of breath.  Dr. Dhaliwal note that is appears to be postobstructive pneumonia however continuing prednisone as there may be a component of pneumonitis as well.  ID and following as well.    Interval History: patient with more shortness of breath and dyspnea today. She is not improved with current treatment. Significant other at bedside. Discussed plan of care and treatment options. They are open to palliative care consult at this time.     Review of Systems   Constitutional:  Positive for activity change and fatigue.   Respiratory:  Positive for cough and shortness of breath.    Gastrointestinal:  Negative for abdominal pain.     Objective:     Vital Signs (Most Recent):  Temp: 98 °F (36.7 °C) (01/11/24 0740)  Pulse: 77 (01/11/24 0810)  Resp: (!) 22 (01/11/24 0810)  BP: (!) 143/63 (01/11/24 0740)  SpO2: 96 % (01/11/24 0810) Vital Signs (24h Range):  Temp:  [97.1 °F (36.2 °C)-98.4 °F (36.9 °C)] 98 °F (36.7 °C)  Pulse:  [] 77  Resp:  [16-22] 22  SpO2:  [92 %-98 %] 96 %  BP: (131-178)/(60-73) 143/63     Weight: 59.3 kg (130 lb 11.7 oz)  Body mass index is 21.1 kg/m².    Intake/Output Summary (Last 24 hours) at 1/11/2024 0848  Last data filed at 1/10/2024 1222  Gross per 24 hour   Intake --   Output 400 ml   Net -400 ml         Physical Exam  Vitals and nursing note reviewed.   Constitutional:       General: She is awake. She is in acute distress.      Appearance: Normal appearance. She is well-developed and well-groomed. She is ill-appearing.      Interventions: Nasal cannula in place.   HENT:      Head: Normocephalic and atraumatic.   Cardiovascular:      Rate and Rhythm: Normal rate.   Pulmonary:      Effort: Tachypnea present.   Neurological:      Mental Status: She is alert and oriented to person, place, and time. Mental status is at baseline.   Psychiatric:         Mood and Affect: Mood normal.         Behavior: Behavior normal.         Thought Content:  Thought content normal.         Judgment: Judgment normal.             Significant Labs: All pertinent labs within the past 24 hours have been reviewed.    Significant Imaging: I have reviewed all pertinent imaging results/findings within the past 24 hours.    Assessment/Plan:      * Pneumonia due to infectious organism  Post-Obstructive Pneumonia   Sputum and blood culture (Severe/concern for MRSA/Pseudomonas and/or recent hospitalization within 90 days only) is indicated.    Growing Candida in sputum.    Blood cultures no growth to date  There is not the presence of an empyema or lung abscess warranting anaerobic coverage  ID consultation with continuation of empiric prophylactic antibiotics    Continue Linezolid and Zosyn  Continue Bactrim for PJP prophylaxis  Oxygen PRN to maintain SpO2 of >92%.  Currently on 3 L  continuing prednisone at this time for possible pneumonitis seen on imaging.     Antibiotics (From admission, onward)      Start     Stop Route Frequency Ordered    01/10/24 1700  linezolid tablet 600 mg         -- Oral Every 12 hours 01/10/24 1000    01/08/24 2100  sulfamethoxazole-trimethoprim 800-160mg per tablet 1 tablet         01/19/24 0859 Oral 2 times per day on Mon Wed Fri 01/08/24 1752    01/08/24 0900  mupirocin 2 % ointment         01/13/24 0859 Nasl 2 times daily 01/07/24 2246    01/08/24 0000  piperacillin-tazobactam (ZOSYN) 4.5 g in dextrose 5 % in water (D5W) 100 mL IVPB (MB+)  ( Pneumonia - Moderate-High MDR )         01/12/24 2359 IV Every 8 hours (non-standard times) 01/07/24 1532            Malignant neoplasm of upper lobe, right bronchus or lung  Chronic pain due to malignant neoplastic disease  Malignant neoplasm of upper lobe, right bronchus or lung  Overview:  Status post resection 8/2018 status post adjuvant chemo     Secondary and unspecified malignant neoplasm of intrathoracic lymph nodes  Recently did not tolerate Keytruda and Cyramza and tx was discontinued recently  She  did have increase in her mass size which may have caused a postobstructive pneumonia.    She has been given broad-spectrum antibiotics as well as steroids to treat for both a postobstructive pneumonia as well as steroids for the possibility of pneumonitis however she notes minimal response.    Pulmonary following.   Plan:  - continue prednisone 60 mg q.d. for possible pneumonitis      Secondary and unspecified malignant neoplasm of intrathoracic lymph nodes  Plan as above.   Will consult palliative care      Constipation  Resume home center  Add MiraLax      Chronic pain due to malignant neoplastic disease  Continue her home opiate dosing.  She appears to use medications for both subjective dyspnea as well as for pain.      Abnormal PET scan of colon        SOB (shortness of breath)  New oxygen requirement with worsening SOB and R chest pain.  Appears to be stable however subjective increase in shortness of breath.  She was started on steroids for possible CPI pneumonitis, continuing Bactrim PPx for PJP  Broad spectrum abx  Xopenex PRN  Appreciate ID and Pulm recommendations    DDX:  Postobstructive pneumonia, pneumonitis, lymphangitic spread        Stage 3a chronic kidney disease  Creatine stable for now. BMP reviewed- noted Estimated Creatinine Clearance: 33.5 mL/min (based on SCr of 1.4 mg/dL). according to latest data. Based on current GFR, CKD stage is stage 3 - GFR 30-59.  Monitor UOP and serial BMP and adjust therapy as needed. Renally dose meds. Avoid nephrotoxic medications and procedures.    Mild TRAM, hold ARB, HCTZ  Started on LR maintenance fluid      VTE Risk Mitigation (From admission, onward)           Ordered     enoxaparin injection 40 mg  Daily         01/07/24 1532     IP VTE HIGH RISK PATIENT  Once         01/07/24 1532     Place sequential compression device  Until discontinued         01/07/24 1532                          I have completed this tele-visit without the assistance of a  telepresenter.    The attending portion of this evaluation, treatment, and documentation was performed per Patria Sanderson MD via Telemedicine [employer] using the secure [employer] software platform with 2 way audio/video. The provider was located off-site and the patient is located in the hospital. The aforementioned video software was utilized to document the relevant history and physical exam    Patria Sanderson MD  Department of [facility] Medicine   [facility]

## 2024-01-11 NOTE — ASSESSMENT & PLAN NOTE
Chronic pain due to malignant neoplastic disease  Malignant neoplasm of upper lobe, right bronchus or lung  Overview:  Status post resection 8/2018 status post adjuvant chemo     Secondary and unspecified malignant neoplasm of intrathoracic lymph nodes  Recently did not tolerate Keytruda and Cyramza and tx was discontinued recently  She did have increase in her mass size which may have caused a postobstructive pneumonia.    She has been given broad-spectrum antibiotics as well as steroids to treat for both a postobstructive pneumonia as well as steroids for the possibility of pneumonitis however she notes minimal response.    Pulmonary following.   Plan:  - continue prednisone 60 mg q.d. for possible pneumonitis

## 2024-01-11 NOTE — PLAN OF CARE
Problem: Adult Inpatient Plan of Care  Goal: Plan of Care Review  Outcome: Ongoing, Progressing     Problem: Respiratory Compromise (Pneumonia)  Goal: Effective Oxygenation and Ventilation  Outcome: Ongoing, Progressing

## 2024-01-11 NOTE — PT/OT/SLP PROGRESS
Occupational Therapy      Patient Name:  Alesha Toney   MRN:  [id number]    Patient not seen today secondary to Other (Comment). : Pt's family member requested to hold therapy at this time 2/2 pt with increased SOB.  Nurse aware.     1/11/2024

## 2024-01-11 NOTE — CONSULTS
Consult Note  Palliative Care      Consult Requested By: Patria Cintron MD  Reason for Consult: Goals of care    SUBJECTIVE:     History of Present Illness:  Disease Process: Cancer    (Synopsis per clinic notes of primary onc Dr. Dhaliwal)    73F with PMH of IBD and a 5.5 year hx of primary lung adenocarcinoma with tissue dx in 6/2018 treated with VATS RUL lobectomy, clear margins though with evidence of spreading through airspaces was reported. Received adjuvant chemo with Cisplatin and Alimta through 1/10/19. Surveillance CT 9/2019 revealed a new RLL subcentimeter RLL nodule and PET 10/2019 revealed multiple small bibasilar nodular lesions consistent with disease recurrence.    Pt went to [facility] and elected for chemo-RT (carboplatin, Alimta) with good tissue response and appeared disease free as of 6/2020 PET.     Restaging in 1/2021 was notable for soft tissue mass at surgical incision c/f wound seeding, confirmed by PET one week later. MRI brain was negative for mets. 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 Changed to Entyvio with NC hydrocortisone.    Next sets of imaging showed progressive disease and she resumed carboplatin/Alimta in 10/2022, completed proton beam tx in 1/2023. Disease progressed on serial imaging and she was started on Cymraza and Keytruda on 11/21/23. One month later (12/26/23) developed vomiting and chest pain with CT chest showing a rapidly growing infiltrative tumor of the lower R lung with collapse of the RLL and RML. VQ scan was low probability for PE. Immunotherapy was held at that point    Pt had f/u with onc on 1/3 and reported SOB and wheezing, concern for pneumonitis, treated with Bactrim and steroids.    Pt's case has been presented at thoracic tumor board since admission and rec is for consideration of salvage docetaxel.    Chief Complaint Leading to  Admission    Pt was BIBEMS on 1/7 due to worsening SOB and chest pain unresponsive to her course of outpt abx and steroids. Reported sputum change but no f/c or pedal edema. Had been using nebs at home but no O2. Sat 88% on RA corrected to 100% on NRB, trop neg. Started on vanc/zosyn for sepsis 2/2 PNA but developed red man syndrome, changed to linezolid/zosyn, cultured, ID and pulm consulted.    Interval Hospital Course    1/8: Worsening SOB and new TRAM. ID consulted, no changes to abx. Pulm consulted, reports low concern for immunotherapy-induced pneumonitis, felt more likely post-obstructive PNA with poor prognosis.    1/9: Modest improvement in dyspnea and speech tolerance. Pulm discussing outpt bronch.    1/10: Satting low 90s on 3L O2. No CP but BAIRD was present on minimal exertion.    1/11: Pt found positive for RSV. Started on vapotherm for comfort due to dry sinuses (not due to rising O2 requirements precluding routine NC) and tolerating well.    Palliative has been consulted for goals of care.    At time of writing pt's partner Marty and her niece Dana are at bedside and pt is resting comfortably in bed, in good spirits with pleasant affect, speech is hoarse and she frequently has to catch her breath prison through a sentence but is not using accessory respiratory muscles. 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 They are not legally  but Marty and Dana are both designated as durable HCPOA and pt has completed a living will that expresses a general preference for natural death but allows for her POA to request life prolonging procedures after pt is permanently incapacitated, essentially non-binding guidance  specifically stating that her POA has the final say on any decisions to augment or withdraw her care. Marty admits that being hospice experienced she tends to advocate for higher doses of opioids and benzos whereas pt tends to be more med averse and they are frequently negotiating on her comfort regimen. Both request her current pain regimen be escalated. They are clearly in a loving relationship and both have excellent health literacy and disease insight.    Family had multiple questions about the significance of pneumonitis/RSV confounding her new O2 requirement and severe symptoms in a background of rapidly progressive disease. In sum, I do not assess that this viral illness is responsible for pt's abrupt decline over the past two weeks, nor do I expect that clearing this  viral URI will restore pt's function sufficiently to qualify for chemo. My reasoning is that the densely geographic nature of her RLL opacity on CT is more consistent with infiltrative/lymphangitic spread of metastatic disease and such a tumor is hypodense, branching, and with a large surface area per volume vs that of a classic nodular tumor; this can lead to accelerating progression as we have unfortunately seen in this pt recently and chemo delivery into infiltrated areas tends to be poor.    Both pt and Marty have been informed by two oncologists now that docetaxel is available as a last line treatment but with low expectations and pt was advised to strongly consider her priorities at this late stage in her disease. She is also aware that docetaxel has high chemo burden including prominent painful neuropathy. She would not wish for any placement.    I advised pt that given she is not a candidate to receive chemotherapy in this condition and the reversible elements of her condition appear to be limited in significance, my best advice is for her to return home under the care of hospice. I stressed to all present that hospice is not a  one-way street and if pt rallies at home and feels prepared to attempt last line chemo, she is free to withdraw from hospice and request docetaxel. All available evidence unfortunately suggests that pt's rapid decline will continue and prove terminal.    Pt and family state that they wish to review pt's advance directives and discuss next steps and potential change to DNR status. For our part we have changed pt's pain regimen to her familiar oral liquid morphine (with stepwise escalation and two separate indications for pain and dyspnea) and escalated PRN ativan as pt reports it is dosed too low and too infrequently. I did advise them that morphine can improve pulmonary circulation and thus improve pt's oxygenation and work of breathing in addition to dulling her subjective pain and dyspnea, and hopefully we can determine a safe and effective home-going regimen for pt to continue under hospice if that is her wish.     Pt is on minimal meds and I do not see any treatments she would need to cease to accept hospice; from this perspective our position is that accepting hospice now would bring substantial benefits and at minimal risk.                                          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     Squamous cell carcinoma of skin 10/05/2020    left medial thigh    Therapy     Thyroid nodule     Ulcerative colitis with complication      Past Surgical History:   Procedure Laterality Date    ADENOIDECTOMY  17yo    ANTERIOR CERVICAL DISCECTOMY W/ FUSION N/A 10/15/2018    Procedure: DISCECTOMY, SPINE, CERVICAL, ANTERIOR APPROACH, WITH FUSION C6/7  [facility]: [employer] Regular OR table;   Surgeon: Greyson Bansal MD;  [facility];  Service: Neurosurgery;  Laterality: N/A;    APPENDECTOMY      BONE RESECTION, RIB  1980    BREAST BIOPSY Left     at least 40yrs ago in her 20's    BREAST CYST ASPIRATION      BREAST CYST EXCISION      COLONOSCOPY      ENDOBRONCHIAL ULTRASOUND N/A 7/10/2018    Procedure: ULTRASOUND, ENDOBRONCHIAL;  Surgeon: Sri Hearn MD;  Location: [facility] OR 2ND FLR;  Service: Pulmonary;  Laterality: N/A;    ENDOBRONCHIAL ULTRASOUND N/A 9/24/2019    Procedure: ENDOBRONCHIAL ULTRASOUND (EBUS);  Surgeon: Sri Hearn MD;  Location: [facility] OR 2ND FLR;  Service: Pulmonary;  Laterality: N/A;    ENDOSCOPIC MUCOSAL RESECTION OF COLON N/A 9/11/2020    Procedure: RESECTION, MUCOSA, COLON, ENDOSCOPIC;  Surgeon: Lilibeth Carbajal MD;  Location: [facility] (2ND FLR);  Service: Endoscopy;  Laterality: N/A;    ENDOSCOPIC ULTRASOUND OF LOWER GASTROINTESTINAL TRACT N/A 4/19/2021    Procedure: ULTRASOUND, LOWER GI TRACT, ENDOSCOPIC;  Surgeon: Lilibeth Carbajal MD;  Location: [facility];  Service: Endoscopy;  Laterality: N/A;    ENDOSCOPIC ULTRASOUND OF LOWER GASTROINTESTINAL TRACT N/A 6/25/2021    Procedure: ULTRASOUND, LOWER GI TRACT, ENDOSCOPIC;  Surgeon: Silvino Christopher MD;  Location: [facility];  Service: Endoscopy;  Laterality: N/A;  Needs Rapid MP    FLEXIBLE SIGMOIDOSCOPY  06/11/2020    with biopsies    FLEXIBLE SIGMOIDOSCOPY N/A 6/11/2020    Procedure: SIGMOIDOSCOPY, FLEXIBLE;  Surgeon: Gely Yeh MD;  Location: [facility];  Service: Endoscopy;  Laterality: N/A;    FLEXIBLE SIGMOIDOSCOPY N/A 4/19/2021    Procedure: SIGMOIDOSCOPY-FLEXIBLE;  Surgeon: Lilibeth Carbajal MD;  Location: [facility];  Service: Endoscopy;  Laterality: N/A;  Covid 4/16 Brian MP    FLEXIBLE SIGMOIDOSCOPY N/A 6/3/2022    Procedure: SIGMOIDOSCOPY-FLEXIBLE;  Surgeon: Francesco Clark MD;  Location: [facility] (4TH FLR);  Service: Endoscopy;  Laterality: N/A;  vacc-inst portal-tb    HYSTERECTOMY       @47yrs of age    INSERTION OF TUNNELED CENTRAL VENOUS CATHETER (CVC) WITH SUBCUTANEOUS PORT N/A 2018    Procedure: [id number];  Surgeon: Ronald Diagnostic Provider;  [facility];  Service: Radiology;  Laterality: N/A;    INSERTION OF VENOUS ACCESS PORT N/A 3/15/2021    Procedure: INSERTION, VENOUS ACCESS PORT;  Surgeon: Chang Mathews MD;  Location: [facility] OR [address] [place] [address];  Service: General;  Laterality: N/A;  NEEDS CONSENT.    KIDNEY SURGERY      19yo    MEDIPORT REMOVAL N/A 3/15/2021    Procedure: REMOVAL, CATHETER, CENTRAL VENOUS, TUNNELED, WITH PORT;  Surgeon: Chang Mathews MD;  Location: [facility] OR [address] [place] [address];  Service: General;  Laterality: N/A;    OOPHORECTOMY      @47yrs of age    SKIN BIOPSY      TONSILLECTOMY      UPPER GASTROINTESTINAL ENDOSCOPY      VIDEO-ASSISTED THORACOSCOPIC LOBECTOMY Right 2018    Procedure: VATS, WITH LOBECTOMY Possible chest wall resection;  Surgeon: Philip Messina MD;  Location: [facility] OR [address] [place] [address];  Service: Thoracic;  Laterality: Right;  Have open pan available.     Family History   Problem Relation Age of Onset    Stroke Mother     Cataracts Mother     Cancer Father         colon cancer    Colon cancer Father     Diabetes Brother     Heart failure Brother     Hypertension Brother     Heart attack Paternal Aunt     Heart attack Maternal Grandfather     Diabetes Paternal Aunt     Anxiety disorder Paternal Grandmother         agoraphobia    Anesthesia problems Neg Hx     Blindness Neg Hx     Amblyopia Neg Hx     Glaucoma Neg Hx     Macular degeneration Neg Hx     Retinal detachment Neg Hx     Strabismus Neg Hx     Thyroid disease Neg Hx     Melanoma Neg Hx      Social History     Tobacco Use    Smoking status: Former     Current packs/day: 0.00     Average packs/day: 1 pack/day for 30.0 years (30.0 ttl pk-yrs)     Types: Cigarettes     Start date: 1985     Quit date: 2015     Years since quittin.6    Smokeless tobacco: Never    Substance Use Topics    Alcohol use: Not Currently     Alcohol/week: 0.0 standard drinks of alcohol     Comment: socially     Drug use: No       Mental Status: Oriented x3, Engaged    ECOG Performance Status Grade: 3 - Confined to bed or chair 50% of waking hours    Review of Systems:  Positive for chest pain, shortness of breath, fatigue.    Review of Symptoms      Symptom Assessment (ESAS 0-10 Scale)  Pain:  0  Dyspnea:  5  Anxiety:  3  Nausea:  0  Depression:  0  Anorexia:  0  Fatigue:  5  Insomnia:  0  Restlessness:  0  Agitation:  0     CAM / Delirium:  Negative  Constipation:  Negative  Diarrhea:  Negative      ECOG Performance Status ndGndrndanddndend:nd nd2nd Living Arrangements:  Lives in home and Lives with family    Psychosocial/Cultural:   See Palliative Psychosocial Note: No  Social Issues Identified: Coping deficit pt/family  Bereavement Risk: Yes: Close or dependent relationship to the  person  Caregiver Needs Discussed. Caregiver Distress: Yes: Need for respite, Issues of guilt, Intensity of family caregiving, Caregiver Burnout Risk, and Caregiver support and community resources discussed.    Cultural: No specific concerns. Extensive home health / home hospice experience in family.  **Primary  to Follow**  Palliative Care  Consult: No    Spiritual:  F - Tiana and Belief:  Taoist  I - Importance:  Moderate  C - Community:  Home Jehovah's witness  A - Address in Care:  Sacramental visits prn     Time-Based Charting:  Yes  Chart Review: 35 minutes  Face to Face: 13 minutes  Symptom Assessment: 12 minutes  Coordination of Care: 8 minutes  Discharge Plannin minutes  Advance Care Planning: 15 minutes  Goals of Care: 50 minutes    Total Time Spent: 144 minutes      Advance Care Planning   Advance Directives:   Goals of Care: What is most important right now is to focus on remaining as independent as possible, symptom/pain control, quality of life, even if it means sacrificing a little  time. Accordingly, we have decided that the best plan to meet the patient's goals includes continuing with palliative care.         OBJECTIVE:     Physical Exam  Constitutional:       General: She is not in acute distress.     Appearance: Normal appearance. She is ill-appearing. She is not toxic-appearing or diaphoretic.      Interventions: Nasal cannula in place.      Comments: Vapotherm   HENT:      Head: Normocephalic and atraumatic.      Mouth/Throat:      Mouth: Mucous membranes are moist.      Pharynx: Oropharynx is clear.   Eyes:      Extraocular Movements: Extraocular movements intact.      Pupils: Pupils are equal, round, and reactive to light.   Cardiovascular:      Rate and Rhythm: Normal rate and regular rhythm.   Pulmonary:      Effort: No tachypnea, accessory muscle usage, prolonged expiration, respiratory distress or retractions.      Breath sounds: No transmitted upper airway sounds. Examination of the right-upper field reveals decreased breath sounds, wheezing, rhonchi and rales. Examination of the right-middle field reveals decreased breath sounds, wheezing, rhonchi and rales. Examination of the right-lower field reveals decreased breath sounds, wheezing, rhonchi and rales. Decreased breath sounds, wheezing, rhonchi and rales present.      Comments: Dull to percussion throughout right fields posteriorly  Abdominal:      General: Abdomen is flat. Bowel sounds are normal. There is no distension.      Palpations: Abdomen is soft.      Tenderness: There is no abdominal tenderness.   Musculoskeletal:         General: No swelling. Normal range of motion.   Skin:     General: Skin is warm and dry.      Capillary Refill: Capillary refill takes less than 2 seconds.      Coloration: Skin is pale.   Neurological:      General: No focal deficit present.      Mental Status: She is alert and oriented to person, place, and time. Mental status is at baseline.      Cranial Nerves: No cranial nerve deficit.       Sensory: No sensory deficit.      Motor: No weakness.      Coordination: Coordination normal.      Gait: Gait normal.      Deep Tendon Reflexes: Reflexes normal.   Psychiatric:         Mood and Affect: Mood normal.         Behavior: Behavior normal.         Thought Content: Thought content normal.         Judgment: Judgment normal.           ASSESSMENT/PLAN:     73F with PMH of primary lung adenoCA first diagnosed 6/2018 s/p VATS, multiple lines of chemoradiation and chemoimmunotherapy, now with rapidly progressive lymphangitic spread throughout the right lung with airway obstruction and RLL/RML total collapse, remaining RUL nonfunctional. Pt is no longer tolerating immunotherapy and while thoracic tumor board recommends consideration of salvage docetaxel pt is not presently a chemo candidate and my overall impression is of precipitous terminal decline. Palliative consulted for transitions in care.    Pt has excellent insight and support from her partner and relatives. She is self evidently motivated and has tolerated heroic efforts at cancer directed treatment to date, unfortunately this new and rapidly progressive infiltration is a grave development and the odds of adequate chemo delivery let alone meaningful response appear to be scant nor does pt have convincing rehab potential to contemplate further cancer directed treatments.    I have consolidated pt's opioid orders around liquid morphine as a home going regimen and hope to avoid requiring PRN IV doses tonight.     If pt does develop a genuine high flow requirement she can return home with an AirVo and and understanding that her prognosis is very poor on the order of days to weeks. I am optimistic that pt can return home with a routine canula though there is a high likelihood of her developing refractory dyspnea that may ultimately require readmission for inpt hospice care.    Recommendations:  Medical: wean off vapotherm - please offer an electric fan to  blow on pt's face, she is reassured by the air flow, not the oxygen  Symptom Management:    # Cancer related pain  - PO morphine soln 5mg q4h mild pain / 10mg q3h severe pain (1st line)  - IV hydromorphone 1mg q4h prn breakthrough pain (2nd line)    # Dyspnea 2/2 advanced R lung CA with lobar collapse  - PO morphine soln 5mg q1h prn dyspnea (1st line)  - PO ativan 2mg q4h prn resistant dyspnea (2nd line)  - IV hydromorphone 1mg q4h prn refractory dyspnea (3rd line)  - continue prednisone, no urgency to taper with this poor prognosis    Psychosocial: has unquestioned capacity, appropriate mood and affect, supportive family  Legal: partner Marty holds HCPOA shared with niece Dana  Prognosis: weeks to 2 months for end stage lung cancer no longer tolerating cancer directed treatments and qualified to access hospice care at any time    Golden Clinton MD  Hospice and Palliative Medicine  Palliative Care Pager: [phone]    Advance Care Planning     Date: 01/11/2024    [facility]  I engaged the patient and family in a voluntary conversation about advance care planning and we specifically addressed what the goals of care would be moving forward, in light of the patient's change in clinical status, specifically rapidly progressive lung cancer.  We did specifically address the patient's likely prognosis, which is poor.  We explored the patient's values and preferences for future care.  The patient and family endorses that what is most important right now is to focus on spending time at home, avoiding the hospital, remaining as independent as possible, symptom/pain control, and improvement in condition but with limits to invasive therapies    Accordingly, we have decided that the best plan to meet the patient's goals includes pivot to comfort-focused care    I did explain the role for hospice care at this stage of the patient's illness, including its ability to help the patient live with the best quality of life possible.  We  will not be making a hospice referral at this time but will revisit urgently.    I spent a total of 65 minutes engaging the patient in this advance care planning discussion.

## 2024-01-11 NOTE — ASSESSMENT & PLAN NOTE
Post-Obstructive Pneumonia   Sputum and blood culture (Severe/concern for MRSA/Pseudomonas and/or recent hospitalization within 90 days only) is indicated.    Growing Candida in sputum.    Blood cultures no growth to date  There is not the presence of an empyema or lung abscess warranting anaerobic coverage  ID consultation with continuation of empiric prophylactic antibiotics    Continue Linezolid and Zosyn  Continue Bactrim for PJP prophylaxis  Oxygen PRN to maintain SpO2 of >92%.  Currently on 3 L  continuing prednisone at this time for possible pneumonitis seen on imaging.     Antibiotics (From admission, onward)      Start     Stop Route Frequency Ordered    01/10/24 1700  linezolid tablet 600 mg         -- Oral Every 12 hours 01/10/24 1000    01/08/24 2100  sulfamethoxazole-trimethoprim 800-160mg per tablet 1 tablet         01/19/24 0859 Oral 2 times per day on Mon Wed Fri 01/08/24 1752    01/08/24 0900  mupirocin 2 % ointment         01/13/24 0859 Nasl 2 times daily 01/07/24 2246    01/08/24 0000  piperacillin-tazobactam (ZOSYN) 4.5 g in dextrose 5 % in water (D5W) 100 mL IVPB (MB+)  ( Pneumonia - Moderate-High MDR )         01/12/24 3299 IV Every 8 hours (non-standard times) 01/07/24 1532

## 2024-01-11 NOTE — CONSULTS
[facility] - Telemetry  Hematology/Oncology  Consult Note    Patient Name: Alesha Toney  MRN: [id number]  Admission Date: 1/7/2024  Hospital Length of Stay: 4 days  Code Status: Full Code   Attending Provider: Patria Cintron MD  Consulting Provider: Arben Ortiz MD  Primary Care Physician: Margo Caldwell MD  Principal Problem:Pneumonia due to infectious organism    Inpatient consult to Hematology/Oncology  Consult performed by: Arben Ortiz MD  Consult ordered by: Patria Cintron MD  Reason for consult: lung cancer        Subjective:     HPI:  73 year-old female with lung cancer most recently on pembrolizumab (did not tolerate cyramza) was admitted on 1/7/24 for acute respiratory failure. Consult is for lung cancer/prognosis.    - she endorses fatigue, weakness, dyspnea upon exertion, cough. She and her significant other communicated with Dr. Dhaliwal yesterday. Dr Dhaliwal reviewed the results of the thoracic tumor board and recommends switching from pembrolizumab to docetaxel.          Oncology Treatment Plan:   OP DOCETAXEL (75 MG/M2) Q3W    Medications:  Continuous Infusions:   lactated ringers 100 mL/hr at 01/11/24 0821     Scheduled Meds:   atenoloL  25 mg Oral TID    enoxparin  40 mg Subcutaneous Daily    famotidine  20 mg Oral Every other day    hydrALAZINE  25 mg Oral Q8H    levalbuterol  1.25 mg Nebulization Q4H    mupirocin   Nasal BID    nystatin  500,000 Units Oral QID    polyethylene glycol  17 g Oral Daily    predniSONE  60 mg Oral Daily    senna-docusate 8.6-50 mg  1 tablet Oral BID    sulfamethoxazole-trimethoprim 800-160mg  1 tablet Oral 2 times per day on Mon Wed Fri     PRN Meds:acetaminophen, benzonatate, guaiFENesin 100 mg/5 ml, HYDROmorphone, levalbuterol, LORazepam, naloxone, ondansetron, oxyCODONE, sodium chloride 0.9%     Review of patient's allergies indicates:   Allergen Reactions    Adhesive Itching, Rash and Blisters     INCLUDES EKG PADS    Ciprofloxacin Hives     Hives    Iodinated  "contrast media     Phenergan plain Other (See Comments)     "crazy behavior"    Phenergan [promethazine]     Tramadol     Vancomycin analogues      Red man syndrome        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     Squamous cell carcinoma of skin 10/05/2020    left medial thigh    Therapy     Thyroid nodule     Ulcerative colitis with complication      Past Surgical History:   Procedure Laterality Date    ADENOIDECTOMY  17yo    ANTERIOR CERVICAL DISCECTOMY W/ FUSION N/A 10/15/2018    Procedure: DISCECTOMY, SPINE, CERVICAL, ANTERIOR APPROACH, WITH FUSION C6/7  Depuy SNS: [employer]/S[employer] Regular OR table;  Surgeon: Greyson Bansal MD;  Location: [facility] OR [address] [place] [address];  Service: Neurosurgery;  Laterality: N/A;    APPENDECTOMY      BONE RESECTION, RIB  1980    BREAST BIOPSY Left     at least 40yrs ago in her 20's    BREAST CYST ASPIRATION      BREAST CYST EXCISION      COLONOSCOPY      ENDOBRONCHIAL ULTRASOUND N/A 7/10/2018    Procedure: ULTRASOUND, ENDOBRONCHIAL;  Surgeon: Sri Hearn MD;  Location: [facility] OR [address] [place] [address];  Service: Pulmonary;  Laterality: N/A;    ENDOBRONCHIAL ULTRASOUND N/A 9/24/2019    Procedure: ENDOBRONCHIAL ULTRASOUND (EBUS);  Surgeon: Sri Hearn MD;  [facility];  Service: Pulmonary;  Laterality: N/A;    ENDOSCOPIC MUCOSAL RESECTION OF COLON N/A 9/11/2020    Procedure: RESECTION, MUCOSA, COLON, ENDOSCOPIC;  Surgeon: Lilibeth Carbajal MD;  Location: [facility] ([address] [place] [address]);  Service: Endoscopy;  Laterality: N/A;    ENDOSCOPIC ULTRASOUND OF LOWER GASTROINTESTINAL TRACT N/A 4/19/2021    Procedure: ULTRASOUND, LOWER GI TRACT, ENDOSCOPIC;  Surgeon: Lilibeth Carbajal MD;  " Location: [facility] ENDO;  Service: Endoscopy;  Laterality: N/A;    ENDOSCOPIC ULTRASOUND OF LOWER GASTROINTESTINAL TRACT N/A 6/25/2021    Procedure: ULTRASOUND, LOWER GI TRACT, ENDOSCOPIC;  Surgeon: Silvino Christopher MD;  Location: [facility] ENDO;  Service: Endoscopy;  Laterality: N/A;  Needs Rapid MP    FLEXIBLE SIGMOIDOSCOPY  06/11/2020    with biopsies    FLEXIBLE SIGMOIDOSCOPY N/A 6/11/2020    Procedure: SIGMOIDOSCOPY, FLEXIBLE;  Surgeon: Gely Yeh MD;  Location: [facility] ENDO;  Service: Endoscopy;  Laterality: N/A;    FLEXIBLE SIGMOIDOSCOPY N/A 4/19/2021    Procedure: SIGMOIDOSCOPY-FLEXIBLE;  Surgeon: Lilibeth Carbajal MD;  Location: [facility] ENDO;  Service: Endoscopy;  Laterality: N/A;  Covid 4/16 Brian MP    FLEXIBLE SIGMOIDOSCOPY N/A 6/3/2022    Procedure: SIGMOIDOSCOPY-FLEXIBLE;  Surgeon: Francesco Clark MD;  Location: [facility] ENDO (4TH FLR);  Service: Endoscopy;  Laterality: N/A;  vacc-inst portal-tb    HYSTERECTOMY      @47yrs of age    INSERTION OF TUNNELED CENTRAL VENOUS CATHETER (CVC) WITH SUBCUTANEOUS PORT N/A 9/25/2018    Procedure: [id number];  Surgeon: Dosc Diagnostic Provider;  Location: [facility] OR 2ND FLR;  Service: Radiology;  Laterality: N/A;    INSERTION OF VENOUS ACCESS PORT N/A 3/15/2021    Procedure: INSERTION, VENOUS ACCESS PORT;  Surgeon: Chang Mathews MD;  Location: [facility] OR 2ND FLR;  Service: General;  Laterality: N/A;  NEEDS CONSENT.    KIDNEY SURGERY      19yo    MEDIPORT REMOVAL N/A 3/15/2021    Procedure: REMOVAL, CATHETER, CENTRAL VENOUS, TUNNELED, WITH PORT;  Surgeon: Chang Mathews MD;  Location: [facility] OR 2ND FLR;  Service: General;  Laterality: N/A;    OOPHORECTOMY      @47yrs of age    SKIN BIOPSY      TONSILLECTOMY      UPPER GASTROINTESTINAL ENDOSCOPY      VIDEO-ASSISTED THORACOSCOPIC LOBECTOMY Right 8/9/2018    Procedure: VATS, WITH LOBECTOMY Possible chest wall resection;  Surgeon: Philip Messina MD;  Location: [facility] OR 2ND FLR;  Service: Thoracic;   Laterality: Right;  Have open pan available.     Family History       Problem Relation (Age of Onset)    Anxiety disorder Paternal Grandmother    Cancer Father    Cataracts Mother    Colon cancer Father    Diabetes Brother, Paternal Aunt    Heart attack Paternal Aunt, Maternal Grandfather    Heart failure Brother    Hypertension Brother    Stroke Mother          Tobacco Use    Smoking status: Former     Current packs/day: 0.00     Average packs/day: 1 pack/day for 30.0 years (30.0 ttl pk-yrs)     Types: Cigarettes     Start date: 1985     Quit date: 2015     Years since quittin.6    Smokeless tobacco: Never   Substance and Sexual Activity    Alcohol use: Not Currently     Alcohol/week: 0.0 standard drinks of alcohol     Comment: socially     Drug use: No    Sexual activity: Yes     Partners: Female     Birth control/protection: None       Review of Systems   Constitutional:  Positive for fatigue and unexpected weight change.   HENT:  Negative for sore throat.    Eyes:  Negative for visual disturbance.   Respiratory:  Positive for shortness of breath. Negative for cough.    Cardiovascular:  Negative for chest pain.   Gastrointestinal:  Negative for abdominal pain, constipation, diarrhea, nausea and vomiting.   Genitourinary:  Negative for dysuria.   Musculoskeletal:  Negative for back pain.   Skin:  Negative for rash.   Neurological:  Positive for weakness. Negative for headaches.   Hematological:  Negative for adenopathy.   Psychiatric/Behavioral:  The patient is not nervous/anxious.      Objective:     Vital Signs (Most Recent):  Temp: 98 °F (36.7 °C) (24 0740)  Pulse: 97 (24 1228)  Resp: 18 (24 1228)  BP: (!) 142/66 (24 1228)  SpO2: (!) 92 % (24 1228) Vital Signs (24h Range):  Temp:  [97.1 °F (36.2 °C)-98.4 °F (36.9 °C)] 98 °F (36.7 °C)  Pulse:  [72-97] 97  Resp:  [16-22] 18  SpO2:  [92 %-98 %] 92 %  BP: (135-178)/(62-73) 142/66     Weight: 59.3 kg (130 lb 11.7 oz)  Body  mass index is 21.1 kg/m².  Body surface area is 1.66 meters squared.    No intake or output data in the 24 hours ending 01/11/24 1243     Physical Exam  Vitals and nursing note reviewed.   Constitutional:       Appearance: She is well-developed.   HENT:      Head: Normocephalic and atraumatic.   Eyes:      Pupils: Pupils are equal, round, and reactive to light.   Cardiovascular:      Rate and Rhythm: Regular rhythm. Tachycardia present.   Pulmonary:      Breath sounds: Wheezing and rhonchi present.   Abdominal:      General: Bowel sounds are normal.      Palpations: Abdomen is soft.   Musculoskeletal:         General: Normal range of motion.      Cervical back: Normal range of motion and neck supple.   Skin:     General: Skin is warm and dry.   Neurological:      Mental Status: She is alert and oriented to person, place, and time.   Psychiatric:         Behavior: Behavior normal.         Thought Content: Thought content normal.         Judgment: Judgment normal.          Significant Labs:   CBC:   Recent Labs   Lab 01/10/24  0344 01/11/24  0435   WBC 8.97 9.03   HGB 12.6 12.9   HCT 38.9 38.5    216    and CMP:   Recent Labs   Lab 01/10/24  0344 01/11/24  0436    139   K 4.9 4.6    105   CO2 24 26   * 100   BUN 20 16   CREATININE 1.5* 1.4   CALCIUM 9.4 9.5   ANIONGAP 13 8       Diagnostic Results:  CT chest (1/7/24): I have personally reviewed the images  Consolidation in the right lower lobe most severely in the superior segment with adjacent loculated pleural fluid.  More reticular opacity throughout the right lower lobe with patchy areas of ground-glass opacity in the middle lobe on the right.     The upper lobe postsurgical change and remaining lung is spared as is the left lung.  Small pneumatocele is a in the left upper lobe.     No mediastinal mass or lymph node enlargement is evident.  Left chest wall Port-A-Cath is noted.  The heart pericardium and great vessels unremarkable.    "  Numerous low-density lesions throughout the liver with the largest in the left lobe     Impression:     No significant change in the opacification in the remaining right lung both with consolidation and ground-glass reticular interstitial type change.  Correlate for lymphangitic spread.      Assessment/Plan:     Malignant neoplasm of upper lobe, right bronchus or lung  - patient of Dr. Dhaliwal  - she had most recently been on pembrolizumab (original regimen was ramucirumab/pembrolizumab but she did not tolerate ramucirumab well)  - admitted with respiratory failure. Somewhat unclear whether due to pneumonia, RSV, progression of cancer. Likely it's from a combination of these things  - CT chest (1/7/24) revealed "consolidation and ground-glass reticular interstitial type change" in the right lung  - her case was discussed at thoracic multidisciplinary conference on 1/10/24. Per their recommendations: "Scan concerning for disease progression with lymphangitic spread. Recommend discussion for changing therapy to include chemotherapy only. Bronchoscopy at this point unlikely to change therapeutic approach, and is therefore not recommended.  A palliative care consultation is recommended."  - She and her significant other communicated with Dr. Dhaliwal yesterday. Dr Dhaliwal reviewed the results of the thoracic tumor board and recommends switching from pembrolizumab to docetaxel.  - agree with palliative care consult.  - hopefully, she will clinically improve and be able to see Dr. Dhaliwal to discuss risks/benefits of further chemotherapy. Defer further treatment decisions to Dr. Dhaliwal.        Thank you for your consult.     Arben Ortiz MD  Hematology/Oncology  [facility] - Telemetry  "

## 2024-01-11 NOTE — CONSULTS
Consult Note  Pulmonary Medicine    SUBJECTIVE     Reason for consult: Hx of lung CA s/p RUL lobectomy and VATS, recent tx with Keytruda and Cyramza. Hypoxic with concern for post-obs PNA vs pneumonitis    History of Present Illness  PCCM is consulted for as above in this 73-yo female with PMH of RUL lobectomy and VATS for RUL adenocarcinoma first diagnosed in 2018. Patient has extensive hx dating back to 2018 where she was found to have bx confirmed adenocarcinoma of RUL now s/p lobectomy and VATS procedure. Follows closely with [facility] oncology and [facility]. Patient presenting now for what she tells me is progressively worsening shortness of breath and productive cough since around joey. Pt states that back in 1/2023 she had Proton beam therapy and developed pneumonitis and cough that was treated, had baseline cough following that time. Pt then said that in 9/2023 cough got progressively worse on a daily basis and she began to have increasing SOB with all activity. In 9/2023 it was noted on PET that there was a new RLL nodule with increased activity and metabolic activity believed to be spread of primary malignancy. Last round of Tx was with Cyramza and Keytruda in 12/2023 which she did not tolerate well and has not had additional tx since that time. Patient had repeat CT in 12/28/23 that showed worsening and increased activity of RLL perihilar/intrahilar mass. Around that time she was having right sided chest pain, shortness of breath, chills and productive cough for which she saw PCP, went to ED and saw Heme-Onc. She tried OTC medications, VQ not indicative of PE, was eventually given Bactrim x14 days and prednisone for what HemeOnc was worried about post-obstructive PNA vs pneumonitis. Patient noted yesterday her O2 sats on home monitor were in the 80s and she overall felt tired, short of breath and much worse than her baseline. Presented to ED and found to have acute hypoxic respiratory  failure placed on NC, wears no home O2, and was eventually requiring NRB for period of time.       Interval events:  Visited patient this afternoon and she felt much better on the vapotherm. Had discussion with palliative medicine. Patient tested positive for RSV on resp panel, satting well 20/40% Vapotherm. Patient received LR for dehydration.       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     Squamous cell carcinoma of skin 10/05/2020    left medial thigh    Therapy     Thyroid nodule     Ulcerative colitis with complication        Past Surgical History:   Procedure Laterality Date    ADENOIDECTOMY  19yo    ANTERIOR CERVICAL DISCECTOMY W/ FUSION N/A 10/15/2018    Procedure: DISCECTOMY, SPINE, CERVICAL, ANTERIOR APPROACH, WITH FUSION C6/7  Depuy SNS: Motors/SSEP/Vocal Cord Regular OR table;  Surgeon: Greyson Bansal MD;  Location: [facility] OR [address] [place] [address];  Service: Neurosurgery;  Laterality: N/A;    APPENDECTOMY      BONE RESECTION, RIB  1980    BREAST BIOPSY Left     at least 40yrs ago in her 20's    BREAST CYST ASPIRATION      BREAST CYST EXCISION      COLONOSCOPY      ENDOBRONCHIAL ULTRASOUND N/A 7/10/2018    Procedure: ULTRASOUND, ENDOBRONCHIAL;  Surgeon: Sri Hearn MD;  Location: [facility] OR [address] [place] [address];  Service: Pulmonary;  Laterality: N/A;    ENDOBRONCHIAL ULTRASOUND N/A 9/24/2019    Procedure: ENDOBRONCHIAL ULTRASOUND (EBUS);  Surgeon: Sri Hearn MD;  Location: [facility] OR [address] [place] [address];  Service: Pulmonary;  Laterality: N/A;    ENDOSCOPIC MUCOSAL RESECTION OF COLON N/A 9/11/2020    Procedure: RESECTION, MUCOSA, COLON, ENDOSCOPIC;  Surgeon: Lilibeth Carbajal MD;  Location: [facility] ([address] [place] [address]);  Service: Endoscopy;   Laterality: N/A;    ENDOSCOPIC ULTRASOUND OF LOWER GASTROINTESTINAL TRACT N/A 4/19/2021    Procedure: ULTRASOUND, LOWER GI TRACT, ENDOSCOPIC;  Surgeon: Lilibeth Carbajal MD;  Location: [facility] ENDO;  Service: Endoscopy;  Laterality: N/A;    ENDOSCOPIC ULTRASOUND OF LOWER GASTROINTESTINAL TRACT N/A 6/25/2021    Procedure: ULTRASOUND, LOWER GI TRACT, ENDOSCOPIC;  Surgeon: Silvino Christopher MD;  Location: [facility] ENDO;  Service: Endoscopy;  Laterality: N/A;  Needs Rapid MP    FLEXIBLE SIGMOIDOSCOPY  06/11/2020    with biopsies    FLEXIBLE SIGMOIDOSCOPY N/A 6/11/2020    Procedure: SIGMOIDOSCOPY, FLEXIBLE;  Surgeon: Gely Yeh MD;  Location: [facility] ENDO;  Service: Endoscopy;  Laterality: N/A;    FLEXIBLE SIGMOIDOSCOPY N/A 4/19/2021    Procedure: SIGMOIDOSCOPY-FLEXIBLE;  Surgeon: Lilibeth Carbajal MD;  Location: [facility] ENDO;  Service: Endoscopy;  Laterality: N/A;  Covid 4/16 Brian MP    FLEXIBLE SIGMOIDOSCOPY N/A 6/3/2022    Procedure: SIGMOIDOSCOPY-FLEXIBLE;  Surgeon: Francesco Clark MD;  Location: [facility] ENDO (4TH FLR);  Service: Endoscopy;  Laterality: N/A;  vacc-inst portal-tb    HYSTERECTOMY      @47yrs of age    INSERTION OF TUNNELED CENTRAL VENOUS CATHETER (CVC) WITH SUBCUTANEOUS PORT N/A 9/25/2018    Procedure: [id number];  Surgeon: Ronald Diagnostic Provider;  Location: [facility] OR 2ND FLR;  Service: Radiology;  Laterality: N/A;    INSERTION OF VENOUS ACCESS PORT N/A 3/15/2021    Procedure: INSERTION, VENOUS ACCESS PORT;  Surgeon: Chang Mathews MD;  Location: [facility] OR 2ND FLR;  Service: General;  Laterality: N/A;  NEEDS CONSENT.    KIDNEY SURGERY      19yo    MEDIPORT REMOVAL N/A 3/15/2021    Procedure: REMOVAL, CATHETER, CENTRAL VENOUS, TUNNELED, WITH PORT;  Surgeon: Chnag Mathews MD;  Location: [facility] OR 2ND FLR;  Service: General;  Laterality: N/A;    OOPHORECTOMY      @47yrs of age    SKIN BIOPSY      TONSILLECTOMY      UPPER GASTROINTESTINAL ENDOSCOPY      VIDEO-ASSISTED THORACOSCOPIC  LOBECTOMY Right 2018    Procedure: VATS, WITH LOBECTOMY Possible chest wall resection;  Surgeon: Philip Messina MD;  Location: [facility] OR [address] [place] [address];  Service: Thoracic;  Laterality: Right;  Have open pan available.       Family History   Problem Relation Age of Onset    Stroke Mother     Cataracts Mother     Cancer Father         colon cancer    Colon cancer Father     Diabetes Brother     Heart failure Brother     Hypertension Brother     Heart attack Paternal Aunt     Heart attack Maternal Grandfather     Diabetes Paternal Aunt     Anxiety disorder Paternal Grandmother         agoraphobia    Anesthesia problems Neg Hx     Blindness Neg Hx     Amblyopia Neg Hx     Glaucoma Neg Hx     Macular degeneration Neg Hx     Retinal detachment Neg Hx     Strabismus Neg Hx     Thyroid disease Neg Hx     Melanoma Neg Hx        Social History     Socioeconomic History    Marital status: Significant Other     Spouse name: Lata   Occupational History    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 No    Breast-feeding No   Social History Narrative    Exercise:  Walks [id number] steps daily.    Former RN     Social Determinants of Health     Financial Resource Strain: Low Risk  (2024)    Overall Financial Resource Strain (CARDIA)     Difficulty of Paying Living Expenses: Not very hard   Food Insecurity: No Food Insecurity (2024)    Hunger Vital Sign     Worried  About Running Out of Food in the Last Year: Never true     Ran Out of Food in the Last Year: Never true   Transportation Needs: No Transportation Needs (1/9/2024)    PRAPARE - Transportation     Lack of Transportation (Medical): No     Lack of Transportation (Non-Medical): No   Physical Activity: Insufficiently Active (1/9/2024)    Exercise Vital Sign     Days of Exercise per Week: 7 days     Minutes of Exercise per Session: 20 min   Stress: Stress Concern Present (1/9/2024)    [employer] [place] [employer] - Occupational Stress Questionnaire     Feeling of Stress : Very much   Social Connections: Moderately Isolated (1/9/2024)    Social Connection and Isolation Panel [NHANES]     Frequency of Communication with Friends and Family: More than three times a week     Frequency of Social Gatherings with Friends and Family: More than three times a week     Attends Gnosticism Services: Never     Active Member of Clubs or Organizations: No     Attends Club or Organization Meetings: Never     Marital Status: Living with partner   Housing Stability: Low Risk  (1/9/2024)    Housing Stability Vital Sign     Unable to Pay for Housing in the Last Year: No     Number of Places Lived in the Last Year: 1     Unstable Housing in the Last Year: No       Current Facility-Administered Medications   Medication Dose Route Frequency Provider Last Rate Last Admin    acetaminophen tablet 650 mg  650 mg Oral Q4H PRN Dottie Kirkland MD        atenoloL tablet 25 mg  25 mg Oral TID Dottie Kirkland MD   25 mg at 01/11/24 0821    benzonatate capsule 100 mg  100 mg Oral TID PRN Geovanny Sam MD        enoxaparin injection 40 mg  40 mg Subcutaneous Daily Dottie Kirkland MD   40 mg at 01/10/24 1635    famotidine tablet 20 mg  20 mg Oral Every other day Dottie Kirkland MD   20 mg at 01/10/24 0814    guaiFENesin 100 mg/5 ml syrup 200 mg  200 mg Oral Q4H PRN Geovanny Sam MD   200 mg at 01/09/24 0311    hydrALAZINE  "tablet 25 mg  25 mg Oral Q8H Dottie Kirkland MD   25 mg at 01/11/24 0530    HYDROmorphone injection 1 mg  1 mg Intravenous Q4H PRN Patria Cintron MD   1 mg at 01/11/24 1031    lactated ringers infusion   Intravenous Continuous UnisYoan  mL/hr at 01/11/24 0821 New Bag at 01/11/24 0821    levalbuterol nebulizer solution 1.25 mg  1.25 mg Nebulization Q4H Dottie Kirkland MD   1.25 mg at 01/11/24 1125    levalbuterol nebulizer solution 1.25 mg  1.25 mg Nebulization Q6H PRN Geovanny Sam MD        LORazepam tablet 1 mg  1 mg Oral BID PRN Patria Cintron MD        mupirocin 2 % ointment   Nasal BID Dottie Kirkland MD   Given at 01/11/24 0824    naloxone 0.4 mg/mL injection 1 mg  1 mg Intravenous PRN Dottie Kirkland MD        nystatin 100,000 unit/mL suspension 500,000 Units  500,000 Units Oral QID Brii Lance MD   500,000 Units at 01/11/24 1231    ondansetron disintegrating tablet 8 mg  8 mg Oral Q6H PRN Brii Lance MD   8 mg at 01/10/24 0529    oxyCODONE immediate release tablet 5 mg  5 mg Oral Q4H PRN Patria Cintron MD        polyethylene glycol packet 17 g  17 g Oral Daily Brii Lance MD   17 g at 01/09/24 0832    predniSONE tablet 60 mg  60 mg Oral Daily Dottie Kirkland MD   60 mg at 01/11/24 0820    senna-docusate 8.6-50 mg per tablet 1 tablet  1 tablet Oral BID Brii Lance MD   1 tablet at 01/09/24 2200    sodium chloride 0.9% flush 10 mL  10 mL Intravenous Q12H PRN Dottie Kirkland MD        sulfamethoxazole-trimethoprim 800-160mg per tablet 1 tablet  1 tablet Oral 2 times per day on Mon Wed Fri Dottie Kirkland MD   1 tablet at 01/10/24 4309       Review of patient's allergies indicates:   Allergen Reactions    Adhesive Itching, Rash and Blisters     INCLUDES EKG PADS    Ciprofloxacin Hives     Hives    Iodinated contrast media     Phenergan plain Other (See Comments)     "crazy behavior"    Phenergan [promethazine] "     Tramadol     Vancomycin analogues      Red man syndrome         Review of Systems  Negative unless otherwise stated in HPI        OBJECTIVE     Vitals:    01/11/24 0618   BP:    Pulse:    Resp: 20   Temp:        Physical Exam  Physical Exam  Constitutional:       General: She is not in acute distress.     Appearance: She is obese. She is not ill-appearing.   HENT:      Head: Normocephalic and atraumatic.      Nose:      Comments: HFNC in place  Eyes:      Extraocular Movements: Extraocular movements intact.      Conjunctiva/sclera: Conjunctivae normal.   Cardiovascular:      Rate and Rhythm: Normal rate and regular rhythm.      Pulses: Normal pulses.      Heart sounds: Normal heart sounds. No murmur heard.     No friction rub. No gallop.   Pulmonary:      Effort: Pulmonary effort is normal. No respiratory distress.      Breath sounds: Wheezing present. No rales.      Comments: Moving air better than previous days  Port-a-Cath noted on upper left chest wall  Abdominal:      General: Abdomen is flat. Bowel sounds are normal.      Palpations: Abdomen is soft.      Tenderness: There is no abdominal tenderness.   Musculoskeletal:      Right lower leg: No edema.      Left lower leg: No edema.   Skin:     General: Skin is warm.      Capillary Refill: Capillary refill takes less than 2 seconds.      Coloration: Skin is not jaundiced.   Neurological:      General: No focal deficit present.      Mental Status: She is alert and oriented to person, place, and time.   Psychiatric:         Mood and Affect: Mood normal.         Behavior: Behavior normal.         Thought Content: Thought content normal.         Judgment: Judgment normal.       Laboratory  Recent Labs   Lab 01/07/24  1310   INR 1.0     Recent Labs   Lab 01/09/24  0422 01/10/24  0344 01/11/24  0435   WBC 6.12 8.97 9.03   HGB 13.1 12.6 12.9   HCT 40.2 38.9 38.5    223 216     Recent Labs   Lab 01/07/24  1310 01/08/24  0654 01/09/24  0422 01/10/24  0344  "01/11/24  0436      < > 137 139 139   K 4.6   < > 4.7 4.9 4.6      < > 101 102 105   CO2 24   < > 23 24 26   BUN 19   < > 18 20 16   CREATININE 1.2   < > 1.3 1.5* 1.4   CALCIUM 10.3   < > 9.4 9.4 9.5   PROT 7.5  --   --   --   --    BILITOT 0.2  --   --   --   --    ALKPHOS 60  --   --   --   --    ALT 13  --   --   --   --    AST 21  --   --   --   --     < > = values in this interval not displayed.     No results for input(s): "PH", "PCO2", "PO2", "HCO3", "POCSATURATED", "BE" in the last 24 hours.      Diagnostic Results    CT Chest 1/7/2024:  FINDINGS:  Consolidation in the right lower lobe most severely in the superior segment with adjacent loculated pleural fluid.  More reticular opacity throughout the right lower lobe with patchy areas of ground-glass opacity in the middle lobe on the right.     The upper lobe postsurgical change and remaining lung is spared as is the left lung.  Small pneumatocele is a in the left upper lobe.     No mediastinal mass or lymph node enlargement is evident.  Left chest wall Port-A-Cath is noted.  The heart pericardium and great vessels unremarkable.     Numerous low-density lesions throughout the liver with the largest in the left lobe      Assessment / Recommendations     #RUL Adenocarcinoma s/p Lobectomy and VATS  RLL mass with possible lymphangitic spread  RSV  -Patient imaging reviewed with Dr. Davila, given unilateral findings very unlikely current presentation is 2/2 pneumonitis  -Most recent PET showed progression of RLL lesion, bronch and MDA path positive now s/p 2 tx cycles of Keytruda and Cyramaza which patient did not tolerate (last dose 12/12/23)  -Enlarging mass in the distribution of airway occlusion near beginning of superior segment of RLL. Concern for lymph node involvement. Concern was for obstruction leading to post-obstructive PNA, however patient RSV positive which is more likely cause of current sx. Pt with chills, productive cough, " worsening SOB.  -Follows closely with [facility] Heme/onc, last visit end of 12/23 and sent with prednisone and Bactrim x14 days for concern for pneumonitis  -Consulted thoracic disease- scan concerning for disease progression with lymphangitic spread- stage 4 CA. Recommend changing therapy to include chemo only if desired, not recommending bronchoscopy as it would unlikely change therapeutic approach.  Recommended palliative consult  -Sputum Cx with candida albicans and normal respiratory jett, few GPC no SA or pseudomonas  -Tested positive for RSV    Recommendations:  Discussed with team at [facility] main, bronch not recommended, recommended chemo only if therapeutic regimen is pursued as there are signs of disease progression and lymphangitic spread  Started HFNC with Vapotherm 20/40% and patient claims she feels much more comfortable  Continue q4 Levalbuterol, avoid albuterol 2/2 patient reporting ADR to albuterol  Discontinued Abx as patient is reporting continued diarrhea, lower concern for post-obstructive PNA and more likely has had progression of symptoms 2/2 acute RSV infxn  On bactrim for PJP Ppx in setting of potential prolonged steroid need, will discuss benefit of continuing steroids and will discontinue Bactrim if deem long-term steroids not indicated. Very low suspicion for pneumonitis  Appears NSCLC is stage 4 and terminal with recent progression. Agree with palliative consult  Continue cough and pain regimen, she reports the current regimen is working well. Currently on tessalon pearls and guaifenesin. Patient states that the opiates, especially IV, help her air hunger and pain    Pt seen and examined and plan discussed on rounds    Thank you for this consult. Pulmonary will continue to follow.     Bigg Garcia,   [facility], HO-1  [facility] Pulm/Crit Team    Pt seen and examined with Pulmonary/Critical Care team and this note reviewed and validated with the following additional comments:  Positive for RSV.  I spoke with pt about goals of care and shared the information that I had received from Dr. Dhaliwal.  In view of her RSV, it is reasonable to hope that she might have clinical improvement with time.  Feels better with HFNC.    Medical Decision Making (MDM) was complex.  The number and complexity of problems addressed was high.  The amount and complexity of data reviewed was high.  The risk of complications and/or morbidity/mortality was high.  The tests ordered were Viral Panel.  I communicated with the following providers HM.  Time spent in the care of this patient was 30. minutes    Anthony Live MD  Phone [phone]

## 2024-01-11 NOTE — SUBJECTIVE & OBJECTIVE
Interval History: patient with more shortness of breath and dyspnea today. She is not improved with current treatment. Significant other at bedside. Discussed plan of care and treatment options. They are open to palliative care consult at this time.     Review of Systems   Constitutional:  Positive for activity change and fatigue.   Respiratory:  Positive for cough and shortness of breath.    Gastrointestinal:  Negative for abdominal pain.     Objective:     Vital Signs (Most Recent):  Temp: 98 °F (36.7 °C) (01/11/24 0740)  Pulse: 77 (01/11/24 0810)  Resp: (!) 22 (01/11/24 0810)  BP: (!) 143/63 (01/11/24 0740)  SpO2: 96 % (01/11/24 0810) Vital Signs (24h Range):  Temp:  [97.1 °F (36.2 °C)-98.4 °F (36.9 °C)] 98 °F (36.7 °C)  Pulse:  [] 77  Resp:  [16-22] 22  SpO2:  [92 %-98 %] 96 %  BP: (131-178)/(60-73) 143/63     Weight: 59.3 kg (130 lb 11.7 oz)  Body mass index is 21.1 kg/m².    Intake/Output Summary (Last 24 hours) at 1/11/2024 0848  Last data filed at 1/10/2024 1222  Gross per 24 hour   Intake --   Output 400 ml   Net -400 ml         Physical Exam  Vitals and nursing note reviewed.   Constitutional:       General: She is awake. She is in acute distress.      Appearance: Normal appearance. She is well-developed and well-groomed. She is ill-appearing.      Interventions: Nasal cannula in place.   HENT:      Head: Normocephalic and atraumatic.   Cardiovascular:      Rate and Rhythm: Normal rate.   Pulmonary:      Effort: Tachypnea present.   Neurological:      Mental Status: She is alert and oriented to person, place, and time. Mental status is at baseline.   Psychiatric:         Mood and Affect: Mood normal.         Behavior: Behavior normal.         Thought Content: Thought content normal.         Judgment: Judgment normal.             Significant Labs: All pertinent labs within the past 24 hours have been reviewed.    Significant Imaging: I have reviewed all pertinent imaging results/findings within the  past 24 hours.

## 2024-01-11 NOTE — SUBJECTIVE & OBJECTIVE
"- she endorses fatigue, weakness, dyspnea upon exertion, cough. She and her significant other communicated with Dr. Dhaliwal yesterday. Dr Dhaliwal reviewed the results of the thoracic tumor board and recommends switching from pembrolizumab to docetaxel.          Oncology Treatment Plan:   OP DOCETAXEL (75 MG/M2) Q3W    Medications:  Continuous Infusions:   lactated ringers 100 mL/hr at 01/11/24 0821     Scheduled Meds:   atenoloL  25 mg Oral TID    enoxparin  40 mg Subcutaneous Daily    famotidine  20 mg Oral Every other day    hydrALAZINE  25 mg Oral Q8H    levalbuterol  1.25 mg Nebulization Q4H    mupirocin   Nasal BID    nystatin  500,000 Units Oral QID    polyethylene glycol  17 g Oral Daily    predniSONE  60 mg Oral Daily    senna-docusate 8.6-50 mg  1 tablet Oral BID    sulfamethoxazole-trimethoprim 800-160mg  1 tablet Oral 2 times per day on Mon Wed Fri     PRN Meds:acetaminophen, benzonatate, guaiFENesin 100 mg/5 ml, HYDROmorphone, levalbuterol, LORazepam, naloxone, ondansetron, oxyCODONE, sodium chloride 0.9%     Review of patient's allergies indicates:   Allergen Reactions    Adhesive Itching, Rash and Blisters     INCLUDES EKG PADS    Ciprofloxacin Hives     Hives    Iodinated contrast media     Phenergan plain Other (See Comments)     "crazy behavior"    Phenergan [promethazine]     Tramadol     Vancomycin analogues      Red man syndrome        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     Squamous cell carcinoma of skin 10/05/2020    left medial thigh    Therapy     Thyroid nodule     Ulcerative colitis with complication      Past Surgical History:   Procedure Laterality Date    ADENOIDECTOMY  17yo    " ANTERIOR CERVICAL DISCECTOMY W/ FUSION N/A 10/15/2018    Procedure: DISCECTOMY, SPINE, CERVICAL, ANTERIOR APPROACH, WITH FUSION C6/7  Depuy SNS: [employer]/S[employer] Regular OR table;  Surgeon: Greysno Bansal MD;  [facility];  Service: Neurosurgery;  Laterality: N/A;    APPENDECTOMY      BONE RESECTION, RIB  1980    BREAST BIOPSY Left     at least 40yrs ago in her 20's    BREAST CYST ASPIRATION      BREAST CYST EXCISION      COLONOSCOPY      ENDOBRONCHIAL ULTRASOUND N/A 7/10/2018    Procedure: ULTRASOUND, ENDOBRONCHIAL;  Surgeon: Sri Hearn MD;  [facility];  Service: Pulmonary;  Laterality: N/A;    ENDOBRONCHIAL ULTRASOUND N/A 9/24/2019    Procedure: ENDOBRONCHIAL ULTRASOUND (EBUS);  Surgeon: Sri Hearn MD;  [facility];  Service: Pulmonary;  Laterality: N/A;    ENDOSCOPIC MUCOSAL RESECTION OF COLON N/A 9/11/2020    Procedure: RESECTION, MUCOSA, COLON, ENDOSCOPIC;  Surgeon: Lilibeth Carbajal MD;  [facility]  Service: Endoscopy;  Laterality: N/A;    ENDOSCOPIC ULTRASOUND OF LOWER GASTROINTESTINAL TRACT N/A 4/19/2021    Procedure: ULTRASOUND, LOWER GI TRACT, ENDOSCOPIC;  Surgeon: Lilibeth Carbajal MD;  Location: [facility];  Service: Endoscopy;  Laterality: N/A;    ENDOSCOPIC ULTRASOUND OF LOWER GASTROINTESTINAL TRACT N/A 6/25/2021    Procedure: ULTRASOUND, LOWER GI TRACT, ENDOSCOPIC;  Surgeon: Silvino Christopher MD;  Location: [facility];  Service: Endoscopy;  Laterality: N/A;  Needs Rapid MP    FLEXIBLE SIGMOIDOSCOPY  06/11/2020    with biopsies    FLEXIBLE SIGMOIDOSCOPY N/A 6/11/2020    Procedure: SIGMOIDOSCOPY, FLEXIBLE;  Surgeon: Gely Yeh MD;  Location: [facility];  Service: Endoscopy;  Laterality: N/A;    FLEXIBLE SIGMOIDOSCOPY N/A 4/19/2021    Procedure: SIGMOIDOSCOPY-FLEXIBLE;  Surgeon: Lilibeth Carbajal MD;  Location: [facility];  Service: Endoscopy;  Laterality: N/A;  Covid 4/16 Brian MP    FLEXIBLE SIGMOIDOSCOPY N/A 6/3/2022     Procedure: SIGMOIDOSCOPY-FLEXIBLE;  Surgeon: Francesco Clark MD;  Location: [facility] ENDO (4TH FLR);  Service: Endoscopy;  Laterality: N/A;  vacc-inst portal-tb    HYSTERECTOMY      @47yrs of age    INSERTION OF TUNNELED CENTRAL VENOUS CATHETER (CVC) WITH SUBCUTANEOUS PORT N/A 2018    Procedure: [id number];  Surgeon: Dosc Diagnostic Provider;  Location: [facility] OR 2ND FLR;  Service: Radiology;  Laterality: N/A;    INSERTION OF VENOUS ACCESS PORT N/A 3/15/2021    Procedure: INSERTION, VENOUS ACCESS PORT;  Surgeon: Chang Mathews MD;  Location: [facility] OR 2ND FLR;  Service: General;  Laterality: N/A;  NEEDS CONSENT.    KIDNEY SURGERY      19yo    MEDIPORT REMOVAL N/A 3/15/2021    Procedure: REMOVAL, CATHETER, CENTRAL VENOUS, TUNNELED, WITH PORT;  Surgeon: Chang Mathews MD;  [facility];  Service: General;  Laterality: N/A;    OOPHORECTOMY      @47yrs of age    SKIN BIOPSY      TONSILLECTOMY      UPPER GASTROINTESTINAL ENDOSCOPY      VIDEO-ASSISTED THORACOSCOPIC LOBECTOMY Right 2018    Procedure: VATS, WITH LOBECTOMY Possible chest wall resection;  Surgeon: Philip Messina MD;  [facility];  Service: Thoracic;  Laterality: Right;  Have open pan available.     Family History       Problem Relation (Age of Onset)    Anxiety disorder Paternal Grandmother    Cancer Father    Cataracts Mother    Colon cancer Father    Diabetes Brother, Paternal Aunt    Heart attack Paternal Aunt, Maternal Grandfather    Heart failure Brother    Hypertension Brother    Stroke Mother          Tobacco Use    Smoking status: Former     Current packs/day: 0.00     Average packs/day: 1 pack/day for 30.0 years (30.0 ttl pk-yrs)     Types: Cigarettes     Start date: 1985     Quit date: 2015     Years since quittin.6    Smokeless tobacco: Never   Substance and Sexual Activity    Alcohol use: Not Currently     Alcohol/week: 0.0 standard drinks of alcohol     Comment: socially      Drug use: No    Sexual activity: Yes     Partners: Female     Birth control/protection: None       Review of Systems   Constitutional:  Positive for fatigue and unexpected weight change.   HENT:  Negative for sore throat.    Eyes:  Negative for visual disturbance.   Respiratory:  Positive for shortness of breath. Negative for cough.    Cardiovascular:  Negative for chest pain.   Gastrointestinal:  Negative for abdominal pain, constipation, diarrhea, nausea and vomiting.   Genitourinary:  Negative for dysuria.   Musculoskeletal:  Negative for back pain.   Skin:  Negative for rash.   Neurological:  Positive for weakness. Negative for headaches.   Hematological:  Negative for adenopathy.   Psychiatric/Behavioral:  The patient is not nervous/anxious.      Objective:     Vital Signs (Most Recent):  Temp: 98 °F (36.7 °C) (01/11/24 0740)  Pulse: 97 (01/11/24 1228)  Resp: 18 (01/11/24 1228)  BP: (!) 142/66 (01/11/24 1228)  SpO2: (!) 92 % (01/11/24 1228) Vital Signs (24h Range):  Temp:  [97.1 °F (36.2 °C)-98.4 °F (36.9 °C)] 98 °F (36.7 °C)  Pulse:  [72-97] 97  Resp:  [16-22] 18  SpO2:  [92 %-98 %] 92 %  BP: (135-178)/(62-73) 142/66     Weight: 59.3 kg (130 lb 11.7 oz)  Body mass index is 21.1 kg/m².  Body surface area is 1.66 meters squared.    No intake or output data in the 24 hours ending 01/11/24 1243     Physical Exam  Vitals and nursing note reviewed.   Constitutional:       Appearance: She is well-developed.   HENT:      Head: Normocephalic and atraumatic.   Eyes:      Pupils: Pupils are equal, round, and reactive to light.   Cardiovascular:      Rate and Rhythm: Regular rhythm. Tachycardia present.   Pulmonary:      Breath sounds: Wheezing and rhonchi present.   Abdominal:      General: Bowel sounds are normal.      Palpations: Abdomen is soft.   Musculoskeletal:         General: Normal range of motion.      Cervical back: Normal range of motion and neck supple.   Skin:     General: Skin is warm and dry.    Neurological:      Mental Status: She is alert and oriented to person, place, and time.   Psychiatric:         Behavior: Behavior normal.         Thought Content: Thought content normal.         Judgment: Judgment normal.          Significant Labs:   CBC:   Recent Labs   Lab 01/10/24  0344 01/11/24  0435   WBC 8.97 9.03   HGB 12.6 12.9   HCT 38.9 38.5    216    and CMP:   Recent Labs   Lab 01/10/24  0344 01/11/24  0436    139   K 4.9 4.6    105   CO2 24 26   * 100   BUN 20 16   CREATININE 1.5* 1.4   CALCIUM 9.4 9.5   ANIONGAP 13 8       Diagnostic Results:  CT chest (1/7/24): I have personally reviewed the images  Consolidation in the right lower lobe most severely in the superior segment with adjacent loculated pleural fluid.  More reticular opacity throughout the right lower lobe with patchy areas of ground-glass opacity in the middle lobe on the right.     The upper lobe postsurgical change and remaining lung is spared as is the left lung.  Small pneumatocele is a in the left upper lobe.     No mediastinal mass or lymph node enlargement is evident.  Left chest wall Port-A-Cath is noted.  The heart pericardium and great vessels unremarkable.     Numerous low-density lesions throughout the liver with the largest in the left lobe     Impression:     No significant change in the opacification in the remaining right lung both with consolidation and ground-glass reticular interstitial type change.  Correlate for lymphangitic spread.

## 2024-01-11 NOTE — PT/OT/SLP PROGRESS
Physical Therapy      Patient Name:  Alesha Toney   MRN:  [id number]    Patient not seen today secondary to Other (Comment) (Pt not seen per nursing due to pallative care team just entering the room to consult the pt.). Will follow-up when able.

## 2024-01-11 NOTE — PROGRESS NOTES
Noted plan is for comfort care per primary team. Can stop antibiotics as they have not influenced her symptoms. ID will sign off

## 2024-01-11 NOTE — HPI
73 year-old female with lung cancer most recently on pembrolizumab (did not tolerate cyramza) was admitted on 1/7/24 for acute respiratory failure. Consult is for lung cancer/prognosis.

## 2024-01-11 NOTE — ASSESSMENT & PLAN NOTE
Creatine stable for now. BMP reviewed- noted Estimated Creatinine Clearance: 33.5 mL/min (based on SCr of 1.4 mg/dL). according to latest data. Based on current GFR, CKD stage is stage 3 - GFR 30-59.  Monitor UOP and serial BMP and adjust therapy as needed. Renally dose meds. Avoid nephrotoxic medications and procedures.    Mild TRAM, hold ARB, HCTZ  Started on LR maintenance fluid

## 2024-01-11 NOTE — PLAN OF CARE
Problem: Adult Inpatient Plan of Care  Goal: Plan of Care Review  1/11/2024 0612 by Elsie Avila, NAVJOT  Outcome: Ongoing, Progressing     Problem: Adult Inpatient Plan of Care  Goal: Optimal Comfort and Wellbeing  1/11/2024 0715 by Elsie Avila, RN  Outcome: Ongoing, Progressing

## 2024-01-12 PROBLEM — Z51.5 PALLIATIVE CARE ENCOUNTER: Status: ACTIVE | Noted: 2024-01-12

## 2024-01-12 LAB
ANION GAP SERPL CALC-SCNC: 7 MMOL/L (ref 8–16)
BACTERIA BLD CULT: NORMAL
BACTERIA BLD CULT: NORMAL
BASOPHILS # BLD AUTO: 0 K/UL (ref 0–0.2)
BASOPHILS NFR BLD: 0 % (ref 0–1.9)
BUN SERPL-MCNC: 15 MG/DL (ref 8–23)
CALCIUM SERPL-MCNC: 9.1 MG/DL (ref 8.7–10.5)
CHLORIDE SERPL-SCNC: 104 MMOL/L (ref 95–110)
CO2 SERPL-SCNC: 28 MMOL/L (ref 23–29)
CREAT SERPL-MCNC: 1.2 MG/DL (ref 0.5–1.4)
DIFFERENTIAL METHOD BLD: ABNORMAL
EOSINOPHIL # BLD AUTO: 0 K/UL (ref 0–0.5)
EOSINOPHIL NFR BLD: 0 % (ref 0–8)
ERYTHROCYTE [DISTWIDTH] IN BLOOD BY AUTOMATED COUNT: 13.5 % (ref 11.5–14.5)
EST. GFR  (NO RACE VARIABLE): 48 ML/MIN/1.73 M^2
GLUCOSE SERPL-MCNC: 103 MG/DL (ref 70–110)
HCT VFR BLD AUTO: 37.3 % (ref 37–48.5)
HGB BLD-MCNC: 12.2 G/DL (ref 12–16)
IMM GRANULOCYTES # BLD AUTO: 0.02 K/UL (ref 0–0.04)
IMM GRANULOCYTES NFR BLD AUTO: 0.3 % (ref 0–0.5)
LYMPHOCYTES # BLD AUTO: 1.5 K/UL (ref 1–4.8)
LYMPHOCYTES NFR BLD: 21.8 % (ref 18–48)
MAGNESIUM SERPL-MCNC: 1.8 MG/DL (ref 1.6–2.6)
MCH RBC QN AUTO: 30.9 PG (ref 27–31)
MCHC RBC AUTO-ENTMCNC: 32.7 G/DL (ref 32–36)
MCV RBC AUTO: 94 FL (ref 82–98)
MONOCYTES # BLD AUTO: 0.7 K/UL (ref 0.3–1)
MONOCYTES NFR BLD: 9.8 % (ref 4–15)
NEUTROPHILS # BLD AUTO: 4.7 K/UL (ref 1.8–7.7)
NEUTROPHILS NFR BLD: 68.1 % (ref 38–73)
NRBC BLD-RTO: 0 /100 WBC
PHOSPHATE SERPL-MCNC: 2.2 MG/DL (ref 2.7–4.5)
PLATELET # BLD AUTO: 196 K/UL (ref 150–450)
PMV BLD AUTO: 9.2 FL (ref 9.2–12.9)
POTASSIUM SERPL-SCNC: 4.2 MMOL/L (ref 3.5–5.1)
RBC # BLD AUTO: 3.95 M/UL (ref 4–5.4)
SODIUM SERPL-SCNC: 139 MMOL/L (ref 136–145)
WBC # BLD AUTO: 6.83 K/UL (ref 3.9–12.7)

## 2024-01-12 PROCEDURE — 25000242 PHARM REV CODE 250 ALT 637 W/ HCPCS: Performed by: INTERNAL MEDICINE

## 2024-01-12 PROCEDURE — 36415 COLL VENOUS BLD VENIPUNCTURE: CPT | Performed by: INTERNAL MEDICINE

## 2024-01-12 PROCEDURE — 99233 SBSQ HOSP IP/OBS HIGH 50: CPT | Mod: ,,,

## 2024-01-12 PROCEDURE — 94761 N-INVAS EAR/PLS OXIMETRY MLT: CPT

## 2024-01-12 PROCEDURE — 25000003 PHARM REV CODE 250: Performed by: INTERNAL MEDICINE

## 2024-01-12 PROCEDURE — 11000001 HC ACUTE MED/SURG PRIVATE ROOM

## 2024-01-12 PROCEDURE — 5A09357 ASSISTANCE WITH RESPIRATORY VENTILATION, LESS THAN 24 CONSECUTIVE HOURS, CONTINUOUS POSITIVE AIRWAY PRESSURE: ICD-10-PCS | Performed by: HOSPITALIST

## 2024-01-12 PROCEDURE — 25000242 PHARM REV CODE 250 ALT 637 W/ HCPCS: Performed by: STUDENT IN AN ORGANIZED HEALTH CARE EDUCATION/TRAINING PROGRAM

## 2024-01-12 PROCEDURE — 94660 CPAP INITIATION&MGMT: CPT

## 2024-01-12 PROCEDURE — 99900035 HC TECH TIME PER 15 MIN (STAT)

## 2024-01-12 PROCEDURE — 27000190 HC CPAP FULL FACE MASK W/VALVE

## 2024-01-12 PROCEDURE — 25000003 PHARM REV CODE 250: Performed by: STUDENT IN AN ORGANIZED HEALTH CARE EDUCATION/TRAINING PROGRAM

## 2024-01-12 PROCEDURE — 27000221 HC OXYGEN, UP TO 24 HOURS

## 2024-01-12 PROCEDURE — 94640 AIRWAY INHALATION TREATMENT: CPT

## 2024-01-12 PROCEDURE — 83735 ASSAY OF MAGNESIUM: CPT | Performed by: INTERNAL MEDICINE

## 2024-01-12 PROCEDURE — 63600175 PHARM REV CODE 636 W HCPCS: Performed by: INTERNAL MEDICINE

## 2024-01-12 PROCEDURE — 27100171 HC OXYGEN HIGH FLOW UP TO 24 HOURS

## 2024-01-12 PROCEDURE — 80048 BASIC METABOLIC PNL TOTAL CA: CPT | Performed by: INTERNAL MEDICINE

## 2024-01-12 PROCEDURE — 85025 COMPLETE CBC W/AUTO DIFF WBC: CPT | Performed by: INTERNAL MEDICINE

## 2024-01-12 PROCEDURE — 27000207 HC ISOLATION

## 2024-01-12 PROCEDURE — 25000003 PHARM REV CODE 250: Performed by: FAMILY MEDICINE

## 2024-01-12 PROCEDURE — 84100 ASSAY OF PHOSPHORUS: CPT | Performed by: INTERNAL MEDICINE

## 2024-01-12 RX ORDER — LORAZEPAM 1 MG/1
1 TABLET ORAL ONCE
Status: COMPLETED | OUTPATIENT
Start: 2024-01-12 | End: 2024-01-12

## 2024-01-12 RX ADMIN — LEVALBUTEROL HYDROCHLORIDE 1.25 MG: 1.25 SOLUTION, CONCENTRATE RESPIRATORY (INHALATION) at 03:01

## 2024-01-12 RX ADMIN — ATENOLOL 25 MG: 25 TABLET ORAL at 10:01

## 2024-01-12 RX ADMIN — FAMOTIDINE 20 MG: 20 TABLET ORAL at 09:01

## 2024-01-12 RX ADMIN — ATENOLOL 25 MG: 25 TABLET ORAL at 09:01

## 2024-01-12 RX ADMIN — HYDRALAZINE HYDROCHLORIDE 25 MG: 25 TABLET, FILM COATED ORAL at 02:01

## 2024-01-12 RX ADMIN — PREDNISONE 60 MG: 20 TABLET ORAL at 09:01

## 2024-01-12 RX ADMIN — LORAZEPAM 2 MG: 1 TABLET ORAL at 05:01

## 2024-01-12 RX ADMIN — MUPIROCIN: 20 OINTMENT TOPICAL at 09:01

## 2024-01-12 RX ADMIN — NYSTATIN 500000 UNITS: 100000 SUSPENSION ORAL at 09:01

## 2024-01-12 RX ADMIN — MORPHINE SULFATE 5 MG: 10 SOLUTION ORAL at 03:01

## 2024-01-12 RX ADMIN — ENOXAPARIN SODIUM 40 MG: 40 INJECTION SUBCUTANEOUS at 05:01

## 2024-01-12 RX ADMIN — MORPHINE SULFATE 5 MG: 10 SOLUTION ORAL at 06:01

## 2024-01-12 RX ADMIN — SULFAMETHOXAZOLE AND TRIMETHOPRIM 1 TABLET: 800; 160 TABLET ORAL at 10:01

## 2024-01-12 RX ADMIN — NYSTATIN 500000 UNITS: 100000 SUSPENSION ORAL at 05:01

## 2024-01-12 RX ADMIN — LORAZEPAM 1 MG: 1 TABLET ORAL at 10:01

## 2024-01-12 RX ADMIN — NYSTATIN 500000 UNITS: 100000 SUSPENSION ORAL at 10:01

## 2024-01-12 RX ADMIN — ATENOLOL 25 MG: 25 TABLET ORAL at 02:01

## 2024-01-12 RX ADMIN — LEVALBUTEROL 1.25 MG: 1.25 SOLUTION, CONCENTRATE RESPIRATORY (INHALATION) at 06:01

## 2024-01-12 RX ADMIN — HYDRALAZINE HYDROCHLORIDE 25 MG: 25 TABLET, FILM COATED ORAL at 06:01

## 2024-01-12 RX ADMIN — MORPHINE SULFATE 5 MG: 10 SOLUTION ORAL at 02:01

## 2024-01-12 RX ADMIN — LORAZEPAM 1 MG: 1 TABLET ORAL at 12:01

## 2024-01-12 RX ADMIN — LEVALBUTEROL 1.25 MG: 1.25 SOLUTION, CONCENTRATE RESPIRATORY (INHALATION) at 12:01

## 2024-01-12 RX ADMIN — LEVALBUTEROL HYDROCHLORIDE 1.25 MG: 1.25 SOLUTION, CONCENTRATE RESPIRATORY (INHALATION) at 07:01

## 2024-01-12 RX ADMIN — MORPHINE SULFATE 5 MG: 10 SOLUTION ORAL at 10:01

## 2024-01-12 RX ADMIN — MUPIROCIN: 20 OINTMENT TOPICAL at 10:01

## 2024-01-12 RX ADMIN — HYDRALAZINE HYDROCHLORIDE 25 MG: 25 TABLET, FILM COATED ORAL at 10:01

## 2024-01-12 RX ADMIN — LEVALBUTEROL HYDROCHLORIDE 1.25 MG: 1.25 SOLUTION, CONCENTRATE RESPIRATORY (INHALATION) at 11:01

## 2024-01-12 RX ADMIN — MORPHINE SULFATE 5 MG: 10 SOLUTION ORAL at 12:01

## 2024-01-12 RX ADMIN — NYSTATIN 500000 UNITS: 100000 SUSPENSION ORAL at 12:01

## 2024-01-12 RX ADMIN — LEVALBUTEROL HYDROCHLORIDE 1.25 MG: 1.25 SOLUTION, CONCENTRATE RESPIRATORY (INHALATION) at 06:01

## 2024-01-12 NOTE — PLAN OF CARE
"1010  CM was informed by Braydon ([phone]) w/[facility] that he will meet with the pt/family today to sign admission paperwork, requested an "ambulatory referral to hospice" & completed "CTI" form.    1100  Order for "social order consult for hospice" entered as requested.     1105  CM was informed by Dr Cintron that the pt will not discharge home with hospice today. Awaiting completed CTI.     1400  Patient resting quietly in bed with s.o., Marty Mathews ([phone]), at the bedside when CM rounded via [employer]. S.o. stated that they met with [facility] today, that the pt has not decided if she wants hospice, will make the decision, & plans to dc tomorrow. CM informed Dr Cintron of s.o.'s request for BIPAP order while hospitalized & BIPAP w/hospice orders. Awaiting response.       Will continue to follow.   "

## 2024-01-12 NOTE — HPI
"Per chart, "73F with PMH of IBD and a 5.5 year hx of primary lung adenocarcinoma with tissue dx in 6/2018 treated with VATS RUL lobectomy, clear margins though with evidence of spreading through airspaces was reported. Received adjuvant chemo with Cisplatin and Alimta through 1/10/19. Surveillance CT 9/2019 revealed a new RLL subcentimeter RLL nodule and PET 10/2019 revealed multiple small bibasilar nodular lesions consistent with disease recurrence.     Pt went to [facility] and elected for chemo-RT (carboplatin, Alimta) with good tissue response and appeared disease free as of 6/2020 PET.      Restaging in 1/2021 was notable for soft tissue mass at surgical incision c/f wound seeding, confirmed by PET one week later. MRI brain was negative for mets. 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 Changed to Entyvio with [place] hydrocortisone.     Next sets of imaging showed progressive disease and she resumed carboplatin/Alimta in 10/2022, completed proton beam tx in 1/2023. Disease progressed on serial imaging and she was started on Cymraza and Keytruda on 11/21/23. One month later (12/26/23) developed vomiting and chest pain with CT chest showing a rapidly growing infiltrative tumor of the lower R lung with collapse of the RLL and RML. VQ scan was low probability for PE. Immunotherapy was held at that point     Pt had f/u with onc on 1/3 and reported SOB and wheezing, concern for pneumonitis, treated with Bactrim and steroids.     Pt's case has been presented at thoracic tumor board since admission and rec is for consideration of salvage docetaxel.     Chief Complaint Leading to Admission     Pt was BIBEMS on 1/7 due to worsening SOB and chest pain unresponsive to her course of outpt abx and steroids. Reported sputum change but no f/c or pedal edema. Had been using nebs at home but no O2. Sat 88% on " "RA corrected to 100% on NRB, trop neg. Started on vanc/zosyn for sepsis 2/2 PNA but developed red man syndrome, changed to linezolid/zosyn, cultured, ID and pulm consulted.     Interval Hospital Course     1/8: Worsening SOB and new TRAM. ID consulted, no changes to abx. Pulm consulted, reports low concern for immunotherapy-induced pneumonitis, felt more likely post-obstructive PNA with poor prognosis.     1/9: Modest improvement in dyspnea and speech tolerance. Pulm discussing outpt bronch.     1/10: Satting low 90s on 3L O2. No CP but BAIRD was present on minimal exertion.     1/11: Pt found positive for RSV. Started on vapotherm for comfort due to dry sinuses (not due to rising O2 requirements precluding routine NC) and tolerating well.     Palliative has been consulted for goals of care.     At time of writing pt's partner Marty and her niece Dana are at bedside and pt is resting comfortably in bed, in good spirits with pleasant affect, speech is hoarse and she frequently has to catch her breath CHCF through a sentence but is not using accessory respiratory muscles. 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

## 2024-01-12 NOTE — ASSESSMENT & PLAN NOTE
Creatine stable for now. BMP reviewed- noted Estimated Creatinine Clearance: 39.1 mL/min (based on SCr of 1.2 mg/dL). according to latest data. Based on current GFR, CKD stage is stage 3 - GFR 30-59.  Monitor UOP and serial BMP and adjust therapy as needed. Renally dose meds. Avoid nephrotoxic medications and procedures.    Mild TRAM, hold ARB, HCTZ  Started on LR maintenance fluid

## 2024-01-12 NOTE — PROGRESS NOTES
[facility]  Telemedicine Progress Note    Patient Name: Alesha Toney  MRN: [id number]  Patient Class: IP- Inpatient   Admission Date: 1/7/2024  Length of Stay: 5 days  Attending Physician: Patria Cintron MD  Primary Care Provider: Margo Caldwell MD          Subjective:     Principal Problem:Pneumonia due to infectious organism        HPI:  lAesha Toney is a 73 y.o.  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 The patient presented to [facility] on 1/7/2024 with a primary complaint of Shortness of Breath (SOB with cough for past 2 weeks. States PCP has prescribed multiple meds with no improvement. Presents awake, alert with O2 in progress. Resp unlabored. C/o right sided CP with h/o right sided lung CA - states pain in this area is common for her. Denies home O2. States she has been using nebulizer at home. Awake, alert, oriented.)     The patient was in their usual state of health until 12/25 when she presented to the ED with shortness of breath and increased R sided chest pain. She had a CT chest and V/Q scan at the time and had close follow up with her oncologist and was started on prednisone for possible pneumonitis with bactrim ppx. She has had a progressive cough with worsening R sided chest pain and noted her pulse ox to be in the 80s at home. She has had clear-whitish thick sputum. No fevers, chills, sweats. No calf swelling. At this time due to her medication side effects she is not able to continue with Keytruda or Cyramza.    Overview/Hospital Course:  1/8/24 Feeling worse. Increased SOB, pain  1/9:   Continues to have symptoms of dyspnea and shortness of breath.  Dr. Dhaliwal note that is appears to be postobstructive pneumonia however continuing prednisone as there may be a component of pneumonitis as well.  ID and following as well.    Interval History: patient more symptomatic overnight. She is increasingly lethargic. She is not improved with current treatment. Antibiotics have been stopped. Started treatment for comfort per palliative care. Significant other at bedside who is her power of . Discussed plan of care and treatment options. They are considering home hospice at this time.     Review of Systems   Constitutional:  Positive for activity change and fatigue.   Respiratory:  Positive for cough and shortness of breath.    Gastrointestinal:  Negative for abdominal pain.     Objective:     Vital Signs (Most Recent):  Temp: 98.6 °F (37 °C) (01/12/24 0746)  Pulse: 86 (01/12/24 0746)  Resp: 18 (01/12/24 0746)  BP: (!) 142/65 (01/12/24 0746)  SpO2: 95 % (01/12/24 0746) Vital Signs (24h Range):  Temp:  [98.2 °F (36.8 °C)-98.6 °F (37 °C)] 98.6 °F (37 °C)  Pulse:  [] 86  Resp:  [18-22] 18  SpO2:  [90 %-99 %] 95 %  BP: (142-176)/(65-84) 142/65     Weight: 59.3 kg (130 lb 11.7 oz)  Body mass index is 21.1 kg/m².    Intake/Output Summary (Last 24 hours) at 1/12/2024 0832  Last data filed at 1/12/2024 0544  Gross per 24 hour   Intake --   Output 900 ml   Net -900 ml           Physical Exam  Vitals and nursing note reviewed.   Constitutional:       General: She is awake. She is in acute distress.      Appearance: Normal appearance. She is well-developed and well-groomed. She is ill-appearing.      Interventions: Nasal cannula in place.   HENT:      Head: Normocephalic and atraumatic.   Cardiovascular:      Rate and Rhythm: Normal rate.   Pulmonary:      Effort: Tachypnea present.   Neurological:      Mental Status: She is alert and oriented to person, place, and time. Mental status is at baseline.    Psychiatric:         Mood and Affect: Mood normal.         Behavior: Behavior normal.         Thought Content: Thought content normal.         Judgment: Judgment normal.             Significant Labs: All pertinent labs within the past 24 hours have been reviewed.    Significant Imaging: I have reviewed all pertinent imaging results/findings within the past 24 hours.    Assessment/Plan:      * Pneumonia due to infectious organism  Post-Obstructive Pneumonia   Sputum and blood culture (Severe/concern for MRSA/Pseudomonas and/or recent hospitalization within 90 days only) is indicated.    Growing Candida in sputum.    Blood cultures no growth to date  There is not the presence of an empyema or lung abscess warranting anaerobic coverage  ID consulted  Have discontinued abx due transitioning to comfort care  Oxygen PRN  continuing prednisone at this time for possible pneumonitis seen on imaging.     Antibiotics (From admission, onward)      Start     Stop Route Frequency Ordered    01/08/24 2100  sulfamethoxazole-trimethoprim 800-160mg per tablet 1 tablet         01/19/24 0859 Oral 2 times per day on Mon Wed Fri 01/08/24 1752    01/08/24 0900  mupirocin 2 % ointment         01/13/24 0859 Nasl 2 times daily 01/07/24 2246            Malignant neoplasm of upper lobe, right bronchus or lung  Chronic pain due to malignant neoplastic disease  Malignant neoplasm of upper lobe, right bronchus or lung  Overview:  Status post resection 8/2018 status post adjuvant chemo     Secondary and unspecified malignant neoplasm of intrathoracic lymph nodes  Recently did not tolerate Keytruda and Cyramza and tx was discontinued recently  She did have increase in her mass size which may have caused a postobstructive pneumonia.    She has been given broad-spectrum antibiotics as well as steroids to treat for both a postobstructive pneumonia as well as steroids for the possibility of pneumonitis however she notes minimal response.    We are  now transitioning to comfort care.   Pulmonary following.   Oncology following     Secondary and unspecified malignant neoplasm of intrathoracic lymph nodes  Plan as above.   palliative care following      Constipation  Resume home center  Add MiraLax      Chronic pain due to malignant neoplastic disease  Cont current regimen as outlined by palliative care        Abnormal PET scan of colon        SOB (shortness of breath)  New oxygen requirement with worsening SOB and R chest pain.  Appears to be stable however subjective increase in shortness of breath.  She was started on steroids for possible CPI pneumonitis, continuing Bactrim PPx for PJP  Xopenex PRN  Appreciate ID and Pulm recommendations    DDX:  Postobstructive pneumonia, pneumonitis, lymphangitic spread        Stage 3a chronic kidney disease  Creatine stable for now. BMP reviewed- noted Estimated Creatinine Clearance: 39.1 mL/min (based on SCr of 1.2 mg/dL). according to latest data. Based on current GFR, CKD stage is stage 3 - GFR 30-59.  Monitor UOP and serial BMP and adjust therapy as needed. Renally dose meds. Avoid nephrotoxic medications and procedures.    Mild TRAM, hold ARB, HCTZ  Started on LR maintenance fluid      VTE Risk Mitigation (From admission, onward)           Ordered     enoxaparin injection 40 mg  Daily         01/07/24 1532     IP VTE HIGH RISK PATIENT  Once         01/07/24 1532     Place sequential compression device  Until discontinued         01/07/24 1532                          I have completed this tele-visit without the assistance of a telepresenter.    The attending portion of this evaluation, treatment, and documentation was performed per Patria Sanderson MD via Telemedicine AudioVisual using the secure [employer] software platform with 2 way audio/video. The provider was located off-site and the patient is located in the hospital. The aforementioned video software was utilized to document the relevant history and physical  exam    Patria Sanderson MD  Department of Hospital Medicine   [facility]

## 2024-01-12 NOTE — NURSING
"RAPID RESPONSE NURSE PROACTIVE ROUNDING NOTE     Time of Visit: 0045    Admit Date: 2024  LOS: 5  Code Status: Full Code   Date of Visit: 2024  : 1950  Age: 73 y.o.  Sex: female  Race: White  Bed: K424/K424 A:   MRN: [id number]  Was the patient discharged from an ICU this admission? No   Was the patient discharged from a PACU within last 24 hours?  No  Did the patient receive conscious sedation/general anesthesia in last 24 hours?  No  Was the patient in the ED within the past 24 hours?  No  Was the patient started on NIPPV within the past 24 hours?  No  Attending Physician: Patria Cintron MD  Primary Service: Networked reference to record PCT     ASSESSMENT     Notified by charge RN via phone call.  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    Notified by charge RN of need for PIV access. Pt receiving continuous fluids. Notably, pt does have a port that is not accessed at this time. Unsure if oncologist has been contacted for okay to use. Attempted IV access via US x1, unsuccessful. Pt requested that her oncologist be contacted regarding port use in AM. Discussed midline if needed in future. At this time, pt would like her oncologist contacted versus any other attempts at access.      INTERVENTIONS/ RECOMMENDATIONS "     Contact oncology MD to discuss use of port for IV fluid administration.  Primary team notified. Okay for fluids to be held until discussion with oncologist in AM. Port will need to be accessed for administration.    Discussed plan of care with charge RN, Yee. NAVJOT Zimmerman.    PHYSICIAN ESCALATION     Yes/No  Yes    Orders received and case discussed with Dr. Mejia .    Disposition: Remain in room 424.    FOLLOW-UP     Call back the Rapid Response Nurse, RACHELE MCDONALD RN at [facility] Phone: [phone] for additional questions or concerns.

## 2024-01-12 NOTE — ASSESSMENT & PLAN NOTE
Post-Obstructive Pneumonia   Sputum and blood culture (Severe/concern for MRSA/Pseudomonas and/or recent hospitalization within 90 days only) is indicated.    Growing Candida in sputum.    Blood cultures no growth to date  There is not the presence of an empyema or lung abscess warranting anaerobic coverage  ID consulted  Have discontinued abx due transitioning to comfort care  Oxygen PRN  continuing prednisone at this time for possible pneumonitis seen on imaging.     Antibiotics (From admission, onward)      Start     Stop Route Frequency Ordered    01/08/24 2100  sulfamethoxazole-trimethoprim 800-160mg per tablet 1 tablet         01/19/24 0859 Oral 2 times per day on Mon Wed Fri 01/08/24 1752    01/08/24 0900  mupirocin 2 % ointment         01/13/24 0859 Nasl 2 times daily 01/07/24 8054

## 2024-01-12 NOTE — ASSESSMENT & PLAN NOTE
New oxygen requirement with worsening SOB and R chest pain.  Appears to be stable however subjective increase in shortness of breath.  She was started on steroids for possible CPI pneumonitis, continuing Bactrim PPx for PJP  Xopenex PRN  Appreciate ID and Pulm recommendations    DDX:  Postobstructive pneumonia, pneumonitis, lymphangitic spread

## 2024-01-12 NOTE — PROGRESS NOTES
[place] [facility]  Palliative Medicine  Progress Note    Patient Name: Alesha Toney  MRN: [id number]  Admission Date: 1/7/2024  Hospital Length of Stay: 5 days  Code Status: Full Code   Attending Provider: Patria Cintron MD  Consulting Provider: Ruma Rene NP  Primary Care Physician: Margo Caldwell MD  Principal Problem:Pneumonia due to infectious organism    Patient information was obtained from patient, spouse/SO, past medical records, and primary team.      Assessment/Plan:     Palliative Care  Palliative care encounter  73F with PMH of primary lung adenoCA first diagnosed 6/2018 s/p VATS, multiple lines of chemoradiation and chemoimmunotherapy, now with rapidly progressive lymphangitic spread throughout the right lung with airway obstruction and RLL/RML total collapse, remaining RUL nonfunctional. Pt is no longer tolerating immunotherapy and while thoracic tumor board recommends consideration of salvage docetaxel pt is not presently a chemo candidate and my overall impression is of precipitous terminal decline. Palliative consulted for transitions in care.     Please see initial consult note written by Dr gabby Clinton on 1/11/2024.    Met with pts partner and CLIFFORD Ferguson privately.  After further discussion last night, pt has decided that she will no longer attempt any additional chemotherapy and at this time is considering enrolling into the services of hospice care as she places priority on comfort and symptom management.  Pt is still struggling to accept the limitations of cancer directed therapies moving forward. Pt is hopeful for a reduction in symptom burden and return to baseline with the resolution of RSV..  Marty recognizes pts overall decline and limitation in options.    -[facility] representative was present at the bedside to further discuss the options of home hospice and equipment. They also discussed inpatient hospice at [facility] if the pt were to require that level of care.   -Agree  with Dr Anthony Dumont [facility] and recommend that pt should discharge home with an Airvo/ BiPAP machine as part of her home hospice equipment.  Pt continues to weigh hospice options.  However, I do not see an alternative way for pt to discharge home given increased O2 needs and support resources required.   -Reports that symptoms were well controlled overnight with liquid morphine and ativan. Will not make changes to current regimen.       1/11/2024  Pt has excellent insight and support from her partner and relatives. She is self evidently motivated and has tolerated heroic efforts at cancer directed treatment to date, unfortunately this new and rapidly progressive infiltration is a grave development and the odds of adequate chemo delivery let alone meaningful response appear to be scant nor does pt have convincing rehab potential to contemplate further cancer directed treatments.     I have consolidated pt's opioid orders around liquid morphine as a home going regimen and hope to avoid requiring PRN IV doses tonight.      If pt does develop a genuine high flow requirement she can return home with an AirVo and and understanding that her prognosis is very poor on the order of days to weeks. I am optimistic that pt can return home with a routine canula though there is a high likelihood of her developing refractory dyspnea that may ultimately require readmission for inpt hospice care.     Recommendations:  Medical: wean off vapotherm - please offer an electric fan to blow on pt's face, she is reassured by the air flow, not the oxygen  Symptom Management:     # Cancer related pain  - PO morphine soln 5mg q4h mild pain / 10mg q3h severe pain (1st line)  - IV hydromorphone 1mg q4h prn breakthrough pain (2nd line)     # Dyspnea 2/2 advanced R lung CA with lobar collapse  - PO morphine soln 5mg q1h prn dyspnea (1st line)  - PO ativan 2mg q4h prn resistant dyspnea (2nd line)  - IV hydromorphone 1mg q4h prn refractory dyspnea  "(3rd line)  - continue prednisone, no urgency to taper with this poor prognosis     Psychosocial: has unquestioned capacity, appropriate mood and affect, supportive family  Legal: partner Marty holds HCPOA shared with niece Dana  Prognosis: weeks to 2 months for end stage lung cancer no longer tolerating cancer directed treatments and qualified to access hospice care at any time      Subjective:     Chief Complaint:   Chief Complaint   Patient presents with    Shortness of Breath     SOB with cough for past 2 weeks. States PCP has prescribed multiple meds with no improvement. Presents awake, alert with O2 in progress. Resp unlabored. C/o right sided CP with h/o right sided lung CA - states pain in this area is common for her. Denies home O2. States she has been using nebulizer at home. Awake, alert, oriented.       HPI:   Per chart, "73F with PMH of IBD and a 5.5 year hx of primary lung adenocarcinoma with tissue dx in 6/2018 treated with VATS RUL lobectomy, clear margins though with evidence of spreading through airspaces was reported. Received adjuvant chemo with Cisplatin and Alimta through 1/10/19. Surveillance CT 9/2019 revealed a new RLL subcentimeter RLL nodule and PET 10/2019 revealed multiple small bibasilar nodular lesions consistent with disease recurrence.     Pt went to [facility] and elected for chemo-RT (carboplatin, Alimta) with good tissue response and appeared disease free as of 6/2020 PET.      Restaging in 1/2021 was notable for soft tissue mass at surgical incision c/f wound seeding, confirmed by PET one week later. MRI brain was negative for mets. 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 Changed to Entyvio with IN hydrocortisone.     Next sets of imaging showed progressive disease and she resumed carboplatin/Alimta in 10/2022, completed proton beam tx in 1/2023. " Disease progressed on serial imaging and she was started on Cymraza and Keytruda on 11/21/23. One month later (12/26/23) developed vomiting and chest pain with CT chest showing a rapidly growing infiltrative tumor of the lower R lung with collapse of the RLL and RML. VQ scan was low probability for PE. Immunotherapy was held at that point     Pt had f/u with onc on 1/3 and reported SOB and wheezing, concern for pneumonitis, treated with Bactrim and steroids.     Pt's case has been presented at thoracic tumor board since admission and rec is for consideration of salvage docetaxel.     Chief Complaint Leading to Admission     Pt was BIBEMS on 1/7 due to worsening SOB and chest pain unresponsive to her course of outpt abx and steroids. Reported sputum change but no f/c or pedal edema. Had been using nebs at home but no O2. Sat 88% on RA corrected to 100% on NRB, trop neg. Started on vanc/zosyn for sepsis 2/2 PNA but developed red man syndrome, changed to linezolid/zosyn, cultured, ID and pulm consulted.     Interval Hospital Course     1/8: Worsening SOB and new TRAM. ID consulted, no changes to abx. Pulm consulted, reports low concern for immunotherapy-induced pneumonitis, felt more likely post-obstructive PNA with poor prognosis.     1/9: Modest improvement in dyspnea and speech tolerance. Pulm discussing outpt bronch.     1/10: Satting low 90s on 3L O2. No CP but BAIRD was present on minimal exertion.     1/11: Pt found positive for RSV. Started on vapotherm for comfort due to dry sinuses (not due to rising O2 requirements precluding routine NC) and tolerating well.     Palliative has been consulted for goals of care.     At time of writing pt's partner Mraty and her niece Dana are at bedside and pt is resting comfortably in bed, in good spirits with pleasant affect, speech is hoarse and she frequently has to catch her breath shelter through a sentence but is not using accessory respiratory muscles. 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 Continues to consider home hospice, met with [facility] representative today.     Medications:  Continuous Infusions:  Scheduled Meds:   atenoloL  25 mg Oral TID    enoxparin  40 mg Subcutaneous Daily    famotidine  20 mg Oral Every other day    hydrALAZINE  25 mg Oral Q8H    levalbuterol  1.25 mg Nebulization Q4H    mupirocin   Nasal BID    nystatin  500,000 Units Oral QID    polyethylene glycol  17 g Oral Daily    predniSONE  60 mg Oral Daily    senna-docusate 8.6-50 mg  1 tablet Oral BID    sulfamethoxazole-trimethoprim 800-160mg  1 tablet Oral 2 times per day on Mon Wed Fri     PRN Meds:acetaminophen, benzonatate, guaiFENesin 100 mg/5 ml, HYDROmorphone, levalbuterol, LORazepam, morphine, morphine, morphine, naloxone, ondansetron, sodium chloride 0.9%    Objective:     Vital Signs (Most Recent):  Temp: 98.6 °F (37 °C) (01/12/24 1217)  Pulse: 97 (01/12/24 1253)  Resp: 18 (01/12/24 1452)  BP: (!) 156/97 (01/12/24 1217)  SpO2: 96 % (01/12/24 1253) Vital Signs (24h Range):  Temp:  [98.2 °F (36.8 °C)-98.6 °F (37 °C)] 98.6 °F (37 °C)  Pulse:  [] 97  Resp:  [18-26] 18  SpO2:  [90 %-100 %] 96 %  BP: (142-176)/(65-97) 156/97     Weight: 59.3 kg (130 lb 11.7 oz)  Body mass index is 21.1 kg/m².       Physical Exam  Constitutional:       General: She is not in acute distress.     Appearance: Normal appearance. She is ill-appearing.      Comments: Vapotherm   HENT:      Head: Normocephalic and atraumatic.     Cardiovascular:      Rate and Rhythm: Normal rate and regular rhythm.   Musculoskeletal:         General: No swelling. Normal range of motion.   Skin:     General: Skin is warm and dry.      Coloration: Skin is pale.   Neurological:      General: No focal " deficit present.      Mental Status: She is alert and oriented to person, place, and time. Mental status is at baseline.      Psychiatric:         Mood and Affect: Mood normal.         Behavior: Behavior normal.         Thought Content: Thought content normal.         Judgment: Judgment normal.        Review of Symptoms      Symptom Assessment (ESAS 0-10 Scale)  Pain:  2  Dyspnea:  6  Anxiety:  6  Nausea:  0  Depression:  4  Anorexia:  0  Fatigue:  8  Insomnia:  0  Restlessness:  0  Agitation:  0     CAM / Delirium:  Negative  Performance Status:  30    Living Arrangements:  Lives in home and Lives with spouse    Psychosocial/Cultural:   See Palliative Psychosocial Note: No  Social Issues Identified: Coping deficit pt/family and New Diagnosis/Trauma  Bereavement Risk: No  Caregiver Needs Discussed. Caregiver Distress: Yes: Issues of guilt, Intensity of family caregiving, and Caregiver Burnout Risk  Cultural: none identified   **Primary  to Follow**  Palliative Care  Consult: No    Spiritual:  F - Tiana and Belief:  Scientologist   I - Importance:  Moderate   C - Community:  Family support   A - Address in Care:  Pastoral visits PRN      Time-Based Charting:  Yes  Chart Review: 18 minutes  Face to Face: 22 minutes  Symptom Assessment: 11 minutes  Coordination of Care: 6 minutes  Discharge Plannin minutes    Total Time Spent: 62 minutes    Advance Care Planning  Advance Directives:   Living Will: Yes    LaPOST: No    Do Not Resuscitate Status: No    Medical Power of : Yes    Agent's Name:  Marty Mathews   Agent's Contact Number:  [phone]    Decision Making:  Patient answered questions and Family answered questions  Goals of Care: What is most important right now is to focus on spending time at home, avoiding the hospital, remaining as independent as possible, symptom/pain control, improvement in condition but with limits to invasive therapies. Accordingly, we have decided that the  best plan to meet the patient's goals includes pivot to comfort-focused care.         Significant Labs: All pertinent labs within the past 24 hours have been reviewed.  CBC:   Recent Labs   Lab 01/12/24 0427   WBC 6.83   HGB 12.2   HCT 37.3   MCV 94        BMP:  Recent Labs   Lab 01/12/24 0427         K 4.2      CO2 28   BUN 15   CREATININE 1.2   CALCIUM 9.1   MG 1.8     LFT:  Lab Results   Component Value Date    AST 21 01/07/2024    ALKPHOS 60 01/07/2024    BILITOT 0.2 01/07/2024     Albumin:   Albumin   Date Value Ref Range Status   01/07/2024 3.7 3.5 - 5.2 g/dL Final     Protein:   Total Protein   Date Value Ref Range Status   01/07/2024 7.5 6.0 - 8.4 g/dL Final     Lactic acid:   Lab Results   Component Value Date    LACTATE 2.1 01/07/2024       Significant Imaging: I have reviewed all pertinent imaging results/findings within the past 24 hours.    Ruma Rene NP  Palliative Medicine  [place] [facility]

## 2024-01-12 NOTE — ASSESSMENT & PLAN NOTE
73F with PMH of primary lung adenoCA first diagnosed 6/2018 s/p VATS, multiple lines of chemoradiation and chemoimmunotherapy, now with rapidly progressive lymphangitic spread throughout the right lung with airway obstruction and RLL/RML total collapse, remaining RUL nonfunctional. Pt is no longer tolerating immunotherapy and while thoracic tumor board recommends consideration of salvage docetaxel pt is not presently a chemo candidate and my overall impression is of precipitous terminal decline. Palliative consulted for transitions in care.     Please see initial consult note written by Dr gabby Clinton on 1/11/2024.    Met with pts partner and CLIFFORD Ferguson privately.  After further discussion last night, pt has decided that she will no longer attempt any additional chemotherapy and at this time is considering enrolling into the services of hospice care as she places priority on comfort and symptom management.  Pt is still struggling to accept the limitations of cancer directed therapies moving forward. Pt is hopeful for a reduction in symptom burden and return to baseline with the resolution of RSV..  Marty recognizes pts overall decline and limitation in options.    -[facility] representative was present at the bedside to further discuss the options of home hospice and equipment. They also discussed inpatient hospice at [facility] if the pt were to require that level of care.   -Agree with Dr Anthony Dumont [facility] and recommend that pt should discharge home with an Airvo/ BiPAP machine as part of her home hospice equipment.  Pt continues to weigh hospice options.  However, I do not see an alternative way for pt to discharge home given increased O2 needs and support resources required.   -Reports that symptoms were well controlled overnight with liquid morphine and ativan. Will not make changes to current regimen.       1/11/2024  Pt has excellent insight and support from her partner and relatives. She is self  evidently motivated and has tolerated heroic efforts at cancer directed treatment to date, unfortunately this new and rapidly progressive infiltration is a grave development and the odds of adequate chemo delivery let alone meaningful response appear to be scant nor does pt have convincing rehab potential to contemplate further cancer directed treatments.     I have consolidated pt's opioid orders around liquid morphine as a home going regimen and hope to avoid requiring PRN IV doses tonight.      If pt does develop a genuine high flow requirement she can return home with an AirVo and and understanding that her prognosis is very poor on the order of days to weeks. I am optimistic that pt can return home with a routine canula though there is a high likelihood of her developing refractory dyspnea that may ultimately require readmission for inpt hospice care.     Recommendations:  Medical: wean off vapotherm - please offer an electric fan to blow on pt's face, she is reassured by the air flow, not the oxygen  Symptom Management:     # Cancer related pain  - PO morphine soln 5mg q4h mild pain / 10mg q3h severe pain (1st line)  - IV hydromorphone 1mg q4h prn breakthrough pain (2nd line)     # Dyspnea 2/2 advanced R lung CA with lobar collapse  - PO morphine soln 5mg q1h prn dyspnea (1st line)  - PO ativan 2mg q4h prn resistant dyspnea (2nd line)  - IV hydromorphone 1mg q4h prn refractory dyspnea (3rd line)  - continue prednisone, no urgency to taper with this poor prognosis     Psychosocial: has unquestioned capacity, appropriate mood and affect, supportive family  Legal: partner Marty holds HCPOA shared with niece Dana  Prognosis: weeks to 2 months for end stage lung cancer no longer tolerating cancer directed treatments and qualified to access hospice care at any time

## 2024-01-12 NOTE — PT/OT/SLP DISCHARGE
Occupational Therapy Discharge Summary    Alesha Toney  MRN: [id number]   Principal Problem: Pneumonia due to infectious organism      Patient Discharged from acute Occupational Therapy on 01/12/23.  Please refer to prior OT note dated 01/11/23 for functional status.    Assessment:       Pt placed on palliative care    Objective:     GOALS:   Multidisciplinary Problems       Occupational Therapy Goals          Problem: Occupational Therapy    Goal Priority Disciplines Outcome Interventions   Occupational Therapy Goal     OT, PT/OT Ongoing, Progressing    Description: Goals to be met by: 02/09     Patient will increase functional independence with ADLs by performing:    UE Dressing with Modified [place].  LE Dressing with Modified [place].  Grooming while standing at sink with Modified [place].  Toileting from toilet with Modified [place] for hygiene and clothing management.   Toilet transfer to toilet with Modified [place].  Increased functional strength to WFL for ADLs.                         Reasons for Discontinuation of Therapy Services  Patient is unable to continue work toward goals because of medical or psychosocial complications.      Plan:     Patient Discharged to: Palliative Care/Hospice    1/12/2024

## 2024-01-12 NOTE — NURSING
"Notified Dr. Dockery of patients multifocal PVCs on telemetry monitor.  Patient denies pain but states that she does feels like her heart "skips" at times.  EKG ordered by physician.  "

## 2024-01-12 NOTE — SUBJECTIVE & OBJECTIVE
Interval History: Abx stopped per primary, pt continues to be symptomatic but improved with medication adjustments. Continues to consider home hospice, met with [employer] representative today.     Medications:  Continuous Infusions:  Scheduled Meds:   atenoloL  25 mg Oral TID    enoxparin  40 mg Subcutaneous Daily    famotidine  20 mg Oral Every other day    hydrALAZINE  25 mg Oral Q8H    levalbuterol  1.25 mg Nebulization Q4H    mupirocin   Nasal BID    nystatin  500,000 Units Oral QID    polyethylene glycol  17 g Oral Daily    predniSONE  60 mg Oral Daily    senna-docusate 8.6-50 mg  1 tablet Oral BID    sulfamethoxazole-trimethoprim 800-160mg  1 tablet Oral 2 times per day on Mon Wed Fri     PRN Meds:acetaminophen, benzonatate, guaiFENesin 100 mg/5 ml, HYDROmorphone, levalbuterol, LORazepam, morphine, morphine, morphine, naloxone, ondansetron, sodium chloride 0.9%    Objective:     Vital Signs (Most Recent):  Temp: 98.6 °F (37 °C) (01/12/24 1217)  Pulse: 97 (01/12/24 1253)  Resp: 18 (01/12/24 1452)  BP: (!) 156/97 (01/12/24 1217)  SpO2: 96 % (01/12/24 1253) Vital Signs (24h Range):  Temp:  [98.2 °F (36.8 °C)-98.6 °F (37 °C)] 98.6 °F (37 °C)  Pulse:  [] 97  Resp:  [18-26] 18  SpO2:  [90 %-100 %] 96 %  BP: (142-176)/(65-97) 156/97     Weight: 59.3 kg (130 lb 11.7 oz)  Body mass index is 21.1 kg/m².       Physical Exam  Constitutional:       General: She is not in acute distress.     Appearance: Normal appearance. She is ill-appearing.      Comments: Vapotherm   HENT:      Head: Normocephalic and atraumatic.     Cardiovascular:      Rate and Rhythm: Normal rate and regular rhythm.   Musculoskeletal:         General: No swelling. Normal range of motion.   Skin:     General: Skin is warm and dry.      Coloration: Skin is pale.   Neurological:      General: No focal deficit present.      Mental Status: She is alert and oriented to person, place, and time. Mental status is at baseline.      Psychiatric:          Mood and Affect: Mood normal.         Behavior: Behavior normal.         Thought Content: Thought content normal.         Judgment: Judgment normal.        Review of Symptoms      Symptom Assessment (ESAS 0-10 Scale)  Pain:  0  Dyspnea:  6  Anxiety:  6  Nausea:  0  Depression:  4  Anorexia:  0  Fatigue:  8  Insomnia:  0  Restlessness:  0  Agitation:  0     CAM / Delirium:  Negative      Performance Status:  30    Living Arrangements:  Lives in home and Lives with spouse    Psychosocial/Cultural:   See Palliative Psychosocial Note: No  Social Issues Identified: Coping deficit pt/family and New Diagnosis/Trauma  Bereavement Risk: No  Caregiver Needs Discussed. Caregiver Distress: Yes: Issues of guilt, Intensity of family caregiving, and Caregiver Burnout Risk  Cultural: none identified   **Primary  to Follow**  Palliative Care  Consult: No    Spiritual:  F - [place] and Belief:  Confucianism   I - Importance:  Moderate   C - Community:  Family support   A - Address in Care:  Pastoral visits PRN      Time-Based Charting:  Yes  Chart Review: 18 minutes  Face to Face: 22 minutes  Symptom Assessment: 11 minutes  Coordination of Care: 6 minutes  Discharge Plannin minutes    Total Time Spent: 62 minutes        Advance Care Planning   Advance Directives:   Living Will: Yes    [place]: No    Do Not Resuscitate Status: No    Medical Power of : Yes    Agent's Name:  Marty Mathews   Agent's Contact Number:  [phone]    Decision Making:  Patient answered questions and Family answered questions  Goals of Care: What is most important right now is to focus on spending time at home, avoiding the hospital, remaining as independent as possible, symptom/pain control, improvement in condition but with limits to invasive therapies. Accordingly, we have decided that the best plan to meet the patient's goals includes pivot to comfort-focused care.         Significant Labs: All pertinent labs within the  past 24 hours have been reviewed.  CBC:   Recent Labs   Lab 01/12/24 0427   WBC 6.83   HGB 12.2   HCT 37.3   MCV 94        BMP:  Recent Labs   Lab 01/12/24 0427         K 4.2      CO2 28   BUN 15   CREATININE 1.2   CALCIUM 9.1   MG 1.8     LFT:  Lab Results   Component Value Date    AST 21 01/07/2024    ALKPHOS 60 01/07/2024    BILITOT 0.2 01/07/2024     Albumin:   Albumin   Date Value Ref Range Status   01/07/2024 3.7 3.5 - 5.2 g/dL Final     Protein:   Total Protein   Date Value Ref Range Status   01/07/2024 7.5 6.0 - 8.4 g/dL Final     Lactic acid:   Lab Results   Component Value Date    LACTATE 2.1 01/07/2024       Significant Imaging: I have reviewed all pertinent imaging results/findings within the past 24 hours.

## 2024-01-12 NOTE — ASSESSMENT & PLAN NOTE
Chronic pain due to malignant neoplastic disease  Malignant neoplasm of upper lobe, right bronchus or lung  Overview:  Status post resection 8/2018 status post adjuvant chemo     Secondary and unspecified malignant neoplasm of intrathoracic lymph nodes  Recently did not tolerate Keytruda and Cyramza and tx was discontinued recently  She did have increase in her mass size which may have caused a postobstructive pneumonia.    She has been given broad-spectrum antibiotics as well as steroids to treat for both a postobstructive pneumonia as well as steroids for the possibility of pneumonitis however she notes minimal response.    We are now transitioning to comfort care.   Pulmonary following.   Oncology following

## 2024-01-12 NOTE — PT/OT/SLP DISCHARGE
"Physical Therapy Discharge Summary    Name: Alesha Toney  MRN: [id number]   Principal Problem: Pneumonia due to infectious organism     Patient Discharged from acute Physical Therapy on 24.  Please refer to prior PT note date on 24 for functional status.     Assessment:     Family/pt wishes to d/c from therapy at this date.     Objective:     GOALS:   Multidisciplinary Problems       Physical Therapy Goals          Problem: Physical Therapy    Goal Priority Disciplines Outcome Goal Variances Interventions   Physical Therapy Goal     PT, PT/OT Ongoing, Progressing     Description: Goals to be met by: 24     Patient will increase functional independence with mobility by performin. Supine to sit with Modified [place]  2. Sit to supine with Modified [place]  3. Sit to stand transfer with [place]  4. Bed to chair transfer with [place] using No Assistive Device  5. Gait  x 150 feet with [place] using No Assistive Device.                          Reasons for Discontinuation of Therapy Services  Family/pt wishes to d/c from therapy at this date; Per latest MD note "pt started treatment for comfort per palliative care."        Plan:     Patient Discharged to: Palliative Care/Hospice.      2024    "

## 2024-01-12 NOTE — CONSULTS
Progress Note  Pulmonary Medicine    SUBJECTIVE     Reason for consult: Hx of lung CA s/p RUL lobectomy and VATS, recent tx with Keytruda and Cyramza. Hypoxic with concern for post-obs PNA vs pneumonitis    History of Present Illness  PCCM is consulted for as above in this 73-yo female with PMH of RUL lobectomy and VATS for RUL adenocarcinoma first diagnosed in 2018. Patient has extensive hx dating back to 2018 where she was found to have bx confirmed adenocarcinoma of RUL now s/p lobectomy and VATS procedure. Follows closely with [facility] oncology and [facility]. Patient presenting now for what she tells me is progressively worsening shortness of breath and productive cough since around joey. Pt states that back in 1/2023 she had Proton beam therapy and developed pneumonitis and cough that was treated, had baseline cough following that time. Pt then said that in 9/2023 cough got progressively worse on a daily basis and she began to have increasing SOB with all activity. In 9/2023 it was noted on PET that there was a new RLL nodule with increased activity and metabolic activity believed to be spread of primary malignancy. Last round of Tx was with Cyramza and Keytruda in 12/2023 which she did not tolerate well and has not had additional tx since that time. Patient had repeat CT in 12/28/23 that showed worsening and increased activity of RLL perihilar/intrahilar mass. Around that time she was having right sided chest pain, shortness of breath, chills and productive cough for which she saw PCP, went to ED and saw Heme-Onc. She tried OTC medications, VQ not indicative of PE, was eventually given Bactrim x14 days and prednisone for what HemeOnc was worried about post-obstructive PNA vs pneumonitis. Patient noted yesterday her O2 sats on home monitor were in the 80s and she overall felt tired, short of breath and much worse than her baseline. Presented to ED and found to have acute hypoxic respiratory  normal appearance , without tenderness upon palpation , no deformities , trachea midline , Thyroid normal size , no thyroid nodules , no masses , no JVD , thyroid nontender failure placed on NC, wears no home O2, and was eventually requiring NRB for period of time.       Interval events:  Patient states that she feels a little better today, still short of breath especially on exertion. States that the Vapotherm helps for short periods but she has increased mucous production prompting switch back to NC. She is requesting to try BiPAP to see if she can tolerate the therapy. Has seen palliative who has adjusted prn medications, plan is to go home with palliative once more stable.      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     Squamous cell carcinoma of skin 10/05/2020    left medial thigh    Therapy     Thyroid nodule     Ulcerative colitis with complication        Past Surgical History:   Procedure Laterality Date    ADENOIDECTOMY  17yo    ANTERIOR CERVICAL DISCECTOMY W/ FUSION N/A 10/15/2018    Procedure: DISCECTOMY, SPINE, CERVICAL, ANTERIOR APPROACH, WITH FUSION C6/7  Depuy SNS: [employer]/SSEP/Vocal Cord Regular OR table;  Surgeon: Greyson Bansal MD;  Location: [facility] OR [address] [place] [address];  Service: Neurosurgery;  Laterality: N/A;    APPENDECTOMY      BONE RESECTION, RIB  1980    BREAST BIOPSY Left     at least 40yrs ago in her 20's    BREAST CYST ASPIRATION      BREAST CYST EXCISION      COLONOSCOPY      ENDOBRONCHIAL ULTRASOUND N/A 7/10/2018    Procedure: ULTRASOUND, ENDOBRONCHIAL;  Surgeon: Sri Hearn MD;  Location: [facility] OR [address] [place] [address];  Service: Pulmonary;  Laterality: N/A;    ENDOBRONCHIAL ULTRASOUND N/A 9/24/2019    Procedure: ENDOBRONCHIAL ULTRASOUND (EBUS);  Surgeon: Sri Hearn MD;  Location: [facility] OR [address] [place] [address];  Service: Pulmonary;  Laterality: N/A;    ENDOSCOPIC MUCOSAL  RESECTION OF COLON N/A 9/11/2020    Procedure: RESECTION, MUCOSA, COLON, ENDOSCOPIC;  Surgeon: Lilibeth Carbajal MD;  Location: [facility] (2ND FLR);  Service: Endoscopy;  Laterality: N/A;    ENDOSCOPIC ULTRASOUND OF LOWER GASTROINTESTINAL TRACT N/A 4/19/2021    Procedure: ULTRASOUND, LOWER GI TRACT, ENDOSCOPIC;  Surgeon: Lilibeth Carbajal MD;  Location: [facility] ENDO;  Service: Endoscopy;  Laterality: N/A;    ENDOSCOPIC ULTRASOUND OF LOWER GASTROINTESTINAL TRACT N/A 6/25/2021    Procedure: ULTRASOUND, LOWER GI TRACT, ENDOSCOPIC;  Surgeon: Silvino Christopher MD;  Location: [facility];  Service: Endoscopy;  Laterality: N/A;  Needs Rapid MP    FLEXIBLE SIGMOIDOSCOPY  06/11/2020    with biopsies    FLEXIBLE SIGMOIDOSCOPY N/A 6/11/2020    Procedure: SIGMOIDOSCOPY, FLEXIBLE;  Surgeon: Gely Yeh MD;  Location: [facility];  Service: Endoscopy;  Laterality: N/A;    FLEXIBLE SIGMOIDOSCOPY N/A 4/19/2021    Procedure: SIGMOIDOSCOPY-FLEXIBLE;  Surgeon: Lilibeth Carbajal MD;  Location: [facility];  Service: Endoscopy;  Laterality: N/A;  Covid 4/16 [place]da MP    FLEXIBLE SIGMOIDOSCOPY N/A 6/3/2022    Procedure: SIGMOIDOSCOPY-FLEXIBLE;  Surgeon: Francesco Clark MD;  Location: [facility] (4TH FLR);  Service: Endoscopy;  Laterality: N/A;  vacc-inst portal-tb    HYSTERECTOMY      @47yrs of age    INSERTION OF TUNNELED CENTRAL VENOUS CATHETER (CVC) WITH SUBCUTANEOUS PORT N/A 9/25/2018    Procedure: [id number]-[id number]-W-CATH;  Surgeon: Dosc Diagnostic Provider;  Location: [facility] OR 2ND FLR;  Service: Radiology;  Laterality: N/A;    INSERTION OF VENOUS ACCESS PORT N/A 3/15/2021    Procedure: INSERTION, VENOUS ACCESS PORT;  Surgeon: Chang Mathews MD;  Location: [facility] OR 2ND FLR;  Service: General;  Laterality: N/A;  NEEDS CONSENT.    KIDNEY SURGERY      19yo    MEDIPORT REMOVAL N/A 3/15/2021    Procedure: REMOVAL, CATHETER, CENTRAL VENOUS, TUNNELED, WITH PORT;  Surgeon: Chang Mathews MD;  Location: [facility] OR 2ND FLR;   Service: General;  Laterality: N/A;    OOPHORECTOMY      @47yrs of age    SKIN BIOPSY      TONSILLECTOMY      UPPER GASTROINTESTINAL ENDOSCOPY      VIDEO-ASSISTED THORACOSCOPIC LOBECTOMY Right 2018    Procedure: VATS, WITH LOBECTOMY Possible chest wall resection;  Surgeon: Philip Messina MD;  Location: [facility] OR [address] [place] [address];  Service: Thoracic;  Laterality: Right;  Have open pan available.       Family History   Problem Relation Age of Onset    Stroke Mother     Cataracts Mother     Cancer Father         colon cancer    Colon cancer Father     Diabetes Brother     Heart failure Brother     Hypertension Brother     Heart attack Paternal Aunt     Heart attack Maternal Grandfather     Diabetes Paternal Aunt     Anxiety disorder Paternal Grandmother         agoraphobia    Anesthesia problems Neg Hx     Blindness Neg Hx     Amblyopia Neg Hx     Glaucoma Neg Hx     Macular degeneration Neg Hx     Retinal detachment Neg Hx     Strabismus Neg Hx     Thyroid disease Neg Hx     Melanoma Neg Hx        Social History     Socioeconomic History    Marital status: Significant Other     Spouse name: Lata   Occupational History    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 No    Breast-feeding No   Social History Narrative    Exercise:  Walks [id number] steps daily.    Former RN     Social Determinants of Health     Financial Resource Strain: Low Risk  (2024)    Overall  Financial Resource Strain (CARDIA)     Difficulty of Paying Living Expenses: Not very hard   Food Insecurity: No Food Insecurity (1/9/2024)    Hunger Vital Sign     Worried About Running Out of Food in the Last Year: Never true     Ran Out of Food in the Last Year: Never true   Transportation Needs: No Transportation Needs (1/9/2024)    PRAPARE - Transportation     Lack of Transportation (Medical): No     Lack of Transportation (Non-Medical): No   Physical Activity: Insufficiently Active (1/9/2024)    Exercise Vital Sign     Days of Exercise per Week: 7 days     Minutes of Exercise per Session: 20 min   Stress: Stress Concern Present (1/9/2024)    [employer] [place] [employer] - Occupational Stress Questionnaire     Feeling of Stress : Very much   Social Connections: Moderately Isolated (1/9/2024)    Social Connection and Isolation Panel [NHANES]     Frequency of Communication with Friends and Family: More than three times a week     Frequency of Social Gatherings with Friends and Family: More than three times a week     Attends Scientologist Services: Never     Active Member of Clubs or Organizations: No     Attends Club or Organization Meetings: Never     Marital Status: Living with partner   Housing Stability: Low Risk  (1/9/2024)    Housing Stability Vital Sign     Unable to Pay for Housing in the Last Year: No     Number of Places Lived in the Last Year: 1     Unstable Housing in the Last Year: No       Current Facility-Administered Medications   Medication Dose Route Frequency Provider Last Rate Last Admin    acetaminophen tablet 650 mg  650 mg Oral Q4H PRN Dottie Kirkland MD        atenoloL tablet 25 mg  25 mg Oral TID Dottie Kirkland MD   25 mg at 01/12/24 1411    benzonatate capsule 100 mg  100 mg Oral TID PRN Geovanny Sam MD        enoxaparin injection 40 mg  40 mg Subcutaneous Daily Dottie Kirkland MD   40 mg at 01/11/24 1646    famotidine tablet 20 mg  20 mg Oral Every other day  Dottie Kirkland MD   20 mg at 01/12/24 0940    guaiFENesin 100 mg/5 ml syrup 200 mg  200 mg Oral Q4H PRN Geovanny Sam MD   200 mg at 01/09/24 0311    hydrALAZINE tablet 25 mg  25 mg Oral Q8H Dottie Kirkland MD   25 mg at 01/12/24 1411    HYDROmorphone injection 1 mg  1 mg Intravenous Q4H PRN Golden Clinton Jr., MD        levalbuterol nebulizer solution 1.25 mg  1.25 mg Nebulization Q4H Dottie Kirkland MD   1.25 mg at 01/12/24 1118    levalbuterol nebulizer solution 1.25 mg  1.25 mg Nebulization Q6H PRN Geovanny Sam MD   1.25 mg at 01/12/24 1253    LORazepam tablet 2 mg  2 mg Oral Q8H PRN Golden Clinton Jr., MD   2 mg at 01/12/24 0521    morphine 10 mg/5 mL solution 10 mg  10 mg Oral Q3H PRN Golden Clinton Jr., MD        morphine 10 mg/5 mL solution 5 mg  5 mg Oral Q4H PRN Golden Clinton Jr., MD   5 mg at 01/12/24 1227    morphine 10 mg/5 mL solution 5 mg  5 mg Oral Q2H PRN Golden Clinton Jr., MD   5 mg at 01/12/24 0658    mupirocin 2 % ointment   Nasal BID Dottie Kirkland MD   Given at 01/12/24 0940    naloxone 0.4 mg/mL injection 1 mg  1 mg Intravenous PRN Dottie Kirkland MD        nystatin 100,000 unit/mL suspension 500,000 Units  500,000 Units Oral QID Brii Lance MD   500,000 Units at 01/12/24 1257    ondansetron disintegrating tablet 8 mg  8 mg Oral Q6H PRN Brii Lance MD   8 mg at 01/10/24 0529    polyethylene glycol packet 17 g  17 g Oral Daily Brii Lance MD   17 g at 01/09/24 0832    predniSONE tablet 60 mg  60 mg Oral Daily Dottie Kirkland MD   60 mg at 01/12/24 0940    senna-docusate 8.6-50 mg per tablet 1 tablet  1 tablet Oral BID Brii Lance MD   1 tablet at 01/09/24 2200    sodium chloride 0.9% flush 10 mL  10 mL Intravenous Q12H PRN Dottie Kirkland MD        sulfamethoxazole-trimethoprim 800-160mg per tablet 1 tablet  1 tablet Oral 2 times per day on Mon Wed Fri Dottie Kirkland MD   1  "tablet at 01/10/24 9702       Review of patient's allergies indicates:   Allergen Reactions    Adhesive Itching, Rash and Blisters     INCLUDES EKG PADS    Ciprofloxacin Hives     Hives    Iodinated contrast media     Phenergan plain Other (See Comments)     "crazy behavior"    Phenergan [promethazine]     Tramadol     Vancomycin analogues      Red man syndrome         Review of Systems  Negative unless otherwise stated in HPI        OBJECTIVE     Vitals:    01/12/24 1253   BP:    Pulse: 97   Resp: (!) 26   Temp:        Physical Exam  Physical Exam  Constitutional:       General: She is not in acute distress.     Appearance: She is obese. She is not ill-appearing.   HENT:      Head: Normocephalic and atraumatic.      Nose:      Comments: HFNC in place  Eyes:      Extraocular Movements: Extraocular movements intact.      Conjunctiva/sclera: Conjunctivae normal.   Cardiovascular:      Rate and Rhythm: Normal rate and regular rhythm.      Pulses: Normal pulses.      Heart sounds: Normal heart sounds. No murmur heard.     No friction rub. No gallop.   Pulmonary:      Effort: Pulmonary effort is normal. No respiratory distress.      Breath sounds: Wheezing present. No rales.      Comments: Moving air better than previous days. Wheezes and squeaks b/l  Port-a-Cath noted on upper left chest wall  Abdominal:      General: Abdomen is flat. Bowel sounds are normal.      Palpations: Abdomen is soft.      Tenderness: There is no abdominal tenderness.   Musculoskeletal:      Right lower leg: No edema.      Left lower leg: No edema.   Skin:     General: Skin is warm.      Capillary Refill: Capillary refill takes less than 2 seconds.      Coloration: Skin is not jaundiced.   Neurological:      General: No focal deficit present.      Mental Status: She is alert and oriented to person, place, and time.   Psychiatric:         Mood and Affect: Mood normal.         Behavior: Behavior normal.         Thought Content: Thought content " "normal.         Judgment: Judgment normal.       Laboratory  Recent Labs   Lab 01/07/24  1310   INR 1.0       Recent Labs   Lab 01/10/24  0344 01/11/24  0435 01/12/24  0427   WBC 8.97 9.03 6.83   HGB 12.6 12.9 12.2   HCT 38.9 38.5 37.3    216 196       Recent Labs   Lab 01/07/24  1310 01/08/24  0654 01/10/24  0344 01/11/24  0436 01/12/24  0427      < > 139 139 139   K 4.6   < > 4.9 4.6 4.2      < > 102 105 104   CO2 24   < > 24 26 28   BUN 19   < > 20 16 15   CREATININE 1.2   < > 1.5* 1.4 1.2   CALCIUM 10.3   < > 9.4 9.5 9.1   PROT 7.5  --   --   --   --    BILITOT 0.2  --   --   --   --    ALKPHOS 60  --   --   --   --    ALT 13  --   --   --   --    AST 21  --   --   --   --     < > = values in this interval not displayed.       No results for input(s): "PH", "PCO2", "PO2", "HCO3", "POCSATURATED", "BE" in the last 24 hours.      Diagnostic Results    CT Chest 1/7/2024:  FINDINGS:  Consolidation in the right lower lobe most severely in the superior segment with adjacent loculated pleural fluid.  More reticular opacity throughout the right lower lobe with patchy areas of ground-glass opacity in the middle lobe on the right.     The upper lobe postsurgical change and remaining lung is spared as is the left lung.  Small pneumatocele is a in the left upper lobe.     No mediastinal mass or lymph node enlargement is evident.  Left chest wall Port-A-Cath is noted.  The heart pericardium and great vessels unremarkable.     Numerous low-density lesions throughout the liver with the largest in the left lobe      Assessment / Recommendations     #RUL Adenocarcinoma s/p Lobectomy and VATS  RLL mass with possible lymphangitic spread  Tracheobronchitis 2/2 RSV  -Patient imaging reviewed with Dr. Davila, given unilateral findings very unlikely current presentation is 2/2 pneumonitis  -Most recent PET showed progression of RLL lesion, bronch and MDA path positive now s/p 2 tx cycles of Keytruda and Cyramaza " which patient did not tolerate (last dose 12/12/23)  -Enlarging mass in the distribution of airway occlusion near beginning of superior segment of RLL. Concern for lymph node involvement. Concern was for obstruction leading to post-obstructive PNA, however patient RSV positive which is more likely cause of current sx. Has b/l lung findings on exam and sx out of proportion to CT, likely has tracheobronchitis 2/2 RSV  -Consulted thoracic disease- scan concerning for disease progression with lymphangitic spread- stage 4 CA. Recommend changing therapy to include chemo only if desired, not recommending bronchoscopy as it would unlikely change therapeutic approach.   -Sputum Cx with candida albicans and normal respiratory jett, few GPC no SA or pseudomonas  -Tested positive for RSV    Recommendations:  Appears NSCLC is stage 4 and terminal with recent progression. Discussed with team at [facility] main, Dr. Dhaliwal is primary oncologist. Bronch not recommended, will need risk/benefit discussion with Dr. Dhaliwal concerning future chemo regimen, tumor board recommending switch from Keytruda to Paclitaxel if chemo is pursued  Discontinued Abx as patient is reporting continued diarrhea, lower concern for post-obstructive PNA (but certainly has some obstruction 2/2 mass) and more likely has had progression of symptoms 2/2 acute RSV infxn and subsequent tracheobronchitis  Started HFNC with Vapotherm 20/40% and patient claims she feels much more comfortable for periods. Can continue to use prn and arrange for similar device at home with hospice. Will start BiPAP qhs to see if patient sees any benefit and can tolerate  Continue q4 Levalbuterol, avoid albuterol 2/2 patient reporting ADR to albuterol  Continue bactrim for PJP prophylaxis as palliative recommending continuation of steroids  Continue cough and pain regimen per palliative  Overall patient states she is not ready to go home at this point, ongoing discussions with her  significant other. Hope is that she continues to clinically improve back to prior baseline with symptomatic management    Pt seen and examined and plan discussed on rounds    Thank you for this consult. Pulmonary will continue to follow.     Bigg Garcia, DO  [facility], HO-1  U Pulm/Crit Team    Pt seen and examined with Pulmonary/Critical Care team and this note reviewed and validated with the following additional comments: Symptomatically improved with HFNC.  Has wheezes in left lung as well as right.  The left lung is not the side with tumor. Her sputum is mucoid.  I think she likely has RSV tracheobrochitis and that this is what has pushed her over the edge.    Medical Decision Making (MDM) was complex.  The number and complexity of problems addressed was high.  The amount and complexity of data reviewed was high.  The risk of complications and/or morbidity/mortality was high.  The tests ordered were none.  I communicated with the following providers HM, PC.  Time spent in the care of this patient was 25 minutes    Anthony Live MD  Phone [phone]

## 2024-01-12 NOTE — SUBJECTIVE & OBJECTIVE
Interval History: patient more symptomatic overnight. She is increasingly lethargic. She is not improved with current treatment. Antibiotics have been stopped. Started treatment for comfort per palliative care. Significant other at bedside who is her power of . Discussed plan of care and treatment options. They are considering home hospice at this time.     Review of Systems   Constitutional:  Positive for activity change and fatigue.   Respiratory:  Positive for cough and shortness of breath.    Gastrointestinal:  Negative for abdominal pain.     Objective:     Vital Signs (Most Recent):  Temp: 98.6 °F (37 °C) (01/12/24 0746)  Pulse: 86 (01/12/24 0746)  Resp: 18 (01/12/24 0746)  BP: (!) 142/65 (01/12/24 0746)  SpO2: 95 % (01/12/24 0746) Vital Signs (24h Range):  Temp:  [98.2 °F (36.8 °C)-98.6 °F (37 °C)] 98.6 °F (37 °C)  Pulse:  [] 86  Resp:  [18-22] 18  SpO2:  [90 %-99 %] 95 %  BP: (142-176)/(65-84) 142/65     Weight: 59.3 kg (130 lb 11.7 oz)  Body mass index is 21.1 kg/m².    Intake/Output Summary (Last 24 hours) at 1/12/2024 0832  Last data filed at 1/12/2024 0544  Gross per 24 hour   Intake --   Output 900 ml   Net -900 ml           Physical Exam  Vitals and nursing note reviewed.   Constitutional:       General: She is awake. She is in acute distress.      Appearance: Normal appearance. She is well-developed and well-groomed. She is ill-appearing.      Interventions: Nasal cannula in place.   HENT:      Head: Normocephalic and atraumatic.   Cardiovascular:      Rate and Rhythm: Normal rate.   Pulmonary:      Effort: Tachypnea present.   Neurological:      Mental Status: She is alert and oriented to person, place, and time. Mental status is at baseline.   Psychiatric:         Mood and Affect: Mood normal.         Behavior: Behavior normal.         Thought Content: Thought content normal.         Judgment: Judgment normal.             Significant Labs: All pertinent labs within the past 24 hours  have been reviewed.    Significant Imaging: I have reviewed all pertinent imaging results/findings within the past 24 hours.

## 2024-01-13 LAB
BASOPHILS # BLD AUTO: 0.01 K/UL (ref 0–0.2)
BASOPHILS NFR BLD: 0.2 % (ref 0–1.9)
DIFFERENTIAL METHOD BLD: ABNORMAL
EOSINOPHIL # BLD AUTO: 0 K/UL (ref 0–0.5)
EOSINOPHIL NFR BLD: 0 % (ref 0–8)
ERYTHROCYTE [DISTWIDTH] IN BLOOD BY AUTOMATED COUNT: 13.2 % (ref 11.5–14.5)
HCT VFR BLD AUTO: 37.9 % (ref 37–48.5)
HGB BLD-MCNC: 12.6 G/DL (ref 12–16)
IMM GRANULOCYTES # BLD AUTO: 0.04 K/UL (ref 0–0.04)
IMM GRANULOCYTES NFR BLD AUTO: 0.7 % (ref 0–0.5)
LYMPHOCYTES # BLD AUTO: 1.4 K/UL (ref 1–4.8)
LYMPHOCYTES NFR BLD: 23.1 % (ref 18–48)
MCH RBC QN AUTO: 31.4 PG (ref 27–31)
MCHC RBC AUTO-ENTMCNC: 33.2 G/DL (ref 32–36)
MCV RBC AUTO: 95 FL (ref 82–98)
MONOCYTES # BLD AUTO: 0.6 K/UL (ref 0.3–1)
MONOCYTES NFR BLD: 10.2 % (ref 4–15)
NEUTROPHILS # BLD AUTO: 4 K/UL (ref 1.8–7.7)
NEUTROPHILS NFR BLD: 65.8 % (ref 38–73)
NRBC BLD-RTO: 0 /100 WBC
PLATELET # BLD AUTO: 199 K/UL (ref 150–450)
PMV BLD AUTO: 9.5 FL (ref 9.2–12.9)
RBC # BLD AUTO: 4.01 M/UL (ref 4–5.4)
WBC # BLD AUTO: 6.1 K/UL (ref 3.9–12.7)

## 2024-01-13 PROCEDURE — 36415 COLL VENOUS BLD VENIPUNCTURE: CPT | Performed by: INTERNAL MEDICINE

## 2024-01-13 PROCEDURE — 25000003 PHARM REV CODE 250

## 2024-01-13 PROCEDURE — 94640 AIRWAY INHALATION TREATMENT: CPT

## 2024-01-13 PROCEDURE — 63600175 PHARM REV CODE 636 W HCPCS: Performed by: INTERNAL MEDICINE

## 2024-01-13 PROCEDURE — 25000003 PHARM REV CODE 250: Performed by: STUDENT IN AN ORGANIZED HEALTH CARE EDUCATION/TRAINING PROGRAM

## 2024-01-13 PROCEDURE — 94761 N-INVAS EAR/PLS OXIMETRY MLT: CPT

## 2024-01-13 PROCEDURE — 25000003 PHARM REV CODE 250: Performed by: INTERNAL MEDICINE

## 2024-01-13 PROCEDURE — 27100171 HC OXYGEN HIGH FLOW UP TO 24 HOURS

## 2024-01-13 PROCEDURE — 11000001 HC ACUTE MED/SURG PRIVATE ROOM

## 2024-01-13 PROCEDURE — 25000242 PHARM REV CODE 250 ALT 637 W/ HCPCS: Performed by: INTERNAL MEDICINE

## 2024-01-13 PROCEDURE — 25000003 PHARM REV CODE 250: Performed by: FAMILY MEDICINE

## 2024-01-13 PROCEDURE — 99900035 HC TECH TIME PER 15 MIN (STAT)

## 2024-01-13 PROCEDURE — 27000207 HC ISOLATION

## 2024-01-13 PROCEDURE — 94660 CPAP INITIATION&MGMT: CPT

## 2024-01-13 PROCEDURE — 85025 COMPLETE CBC W/AUTO DIFF WBC: CPT | Performed by: INTERNAL MEDICINE

## 2024-01-13 RX ORDER — GLYCOPYRROLATE 1 MG/1
1 TABLET ORAL 3 TIMES DAILY
Status: DISCONTINUED | OUTPATIENT
Start: 2024-01-13 | End: 2024-01-14 | Stop reason: HOSPADM

## 2024-01-13 RX ORDER — HYDRALAZINE HYDROCHLORIDE 25 MG/1
25 TABLET, FILM COATED ORAL EVERY 8 HOURS
Qty: 90 TABLET | Refills: 11
Start: 2024-01-13 | End: 2025-01-12

## 2024-01-13 RX ORDER — GLYCOPYRROLATE 1 MG/1
1 TABLET ORAL 3 TIMES DAILY
Qty: 90 TABLET | Refills: 0
Start: 2024-01-13 | End: 2024-02-12

## 2024-01-13 RX ADMIN — MORPHINE SULFATE 5 MG: 10 SOLUTION ORAL at 01:01

## 2024-01-13 RX ADMIN — MORPHINE SULFATE 5 MG: 10 SOLUTION ORAL at 09:01

## 2024-01-13 RX ADMIN — LORAZEPAM 1 MG: 1 TABLET ORAL at 11:01

## 2024-01-13 RX ADMIN — ATENOLOL 25 MG: 25 TABLET ORAL at 09:01

## 2024-01-13 RX ADMIN — NYSTATIN 500000 UNITS: 100000 SUSPENSION ORAL at 09:01

## 2024-01-13 RX ADMIN — HYDRALAZINE HYDROCHLORIDE 25 MG: 25 TABLET, FILM COATED ORAL at 06:01

## 2024-01-13 RX ADMIN — NYSTATIN 500000 UNITS: 100000 SUSPENSION ORAL at 10:01

## 2024-01-13 RX ADMIN — LEVALBUTEROL HYDROCHLORIDE 1.25 MG: 1.25 SOLUTION, CONCENTRATE RESPIRATORY (INHALATION) at 11:01

## 2024-01-13 RX ADMIN — GLYCOPYRROLATE 1 MG: 1 TABLET ORAL at 09:01

## 2024-01-13 RX ADMIN — LEVALBUTEROL HYDROCHLORIDE 1.25 MG: 1.25 SOLUTION, CONCENTRATE RESPIRATORY (INHALATION) at 03:01

## 2024-01-13 RX ADMIN — ATENOLOL 25 MG: 25 TABLET ORAL at 10:01

## 2024-01-13 RX ADMIN — LEVALBUTEROL HYDROCHLORIDE 1.25 MG: 1.25 SOLUTION, CONCENTRATE RESPIRATORY (INHALATION) at 07:01

## 2024-01-13 RX ADMIN — HYDRALAZINE HYDROCHLORIDE 25 MG: 25 TABLET, FILM COATED ORAL at 09:01

## 2024-01-13 RX ADMIN — PREDNISONE 60 MG: 20 TABLET ORAL at 10:01

## 2024-01-13 RX ADMIN — ENOXAPARIN SODIUM 40 MG: 40 INJECTION SUBCUTANEOUS at 05:01

## 2024-01-13 RX ADMIN — GLYCOPYRROLATE 1 MG: 1 TABLET ORAL at 02:01

## 2024-01-13 RX ADMIN — LORAZEPAM 1 MG: 1 TABLET ORAL at 07:01

## 2024-01-13 RX ADMIN — MORPHINE SULFATE 5 MG: 10 SOLUTION ORAL at 02:01

## 2024-01-13 RX ADMIN — ATENOLOL 25 MG: 25 TABLET ORAL at 02:01

## 2024-01-13 RX ADMIN — MORPHINE SULFATE 5 MG: 10 SOLUTION ORAL at 07:01

## 2024-01-13 RX ADMIN — HYDRALAZINE HYDROCHLORIDE 25 MG: 25 TABLET, FILM COATED ORAL at 02:01

## 2024-01-13 RX ADMIN — DOCUSATE SODIUM AND SENNOSIDES 1 TABLET: 8.6; 5 TABLET, FILM COATED ORAL at 09:01

## 2024-01-13 NOTE — NURSING
RAPID RESPONSE NURSE PROACTIVE ROUNDING NOTE       Time of Visit: 1100    Admit Date: 2024  LOS: 6  Code Status: Full Code   Date of Visit: 2024  : 1950  Age: 73 y.o.  Sex: female  Race: White  Bed: K424/K424 A:   MRN: [id number]  Was the patient discharged from an ICU this admission? No   Was the patient discharged from a PACU within last 24 hours? No   Did the patient receive conscious sedation/general anesthesia in last 24 hours? No   Was the patient in the ED within the past 24 hours? No   Was the patient on NIPPV within the past 24 hours? 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    Last Vitals:  Temp: 98.2 °F (36.8 °C) ( 1209)  Pulse: 79 ( 1209)  Resp: 18 ( 120)  BP: 139/63 ( 120)  SpO2: 96 % (1209)    24 Hour Vitals Range:  Temp:  [97.8 °F (36.6 °C)-99.1 °F (37.3 °C)]   Pulse:  [61-90]   Resp:  [18-24]   BP: (136-171)/(63-79)   SpO2:  [93 %-98 %]     Clinical Issues:  PIV    ASSESSMENT/INTERVENTIONS    Pt in bed with 2 family members at her bedside. Pain and anxiety controlled with PO medications. No need for PIV at this time.     RECOMMENDATIONS  Pt is comfortable with PO pain med and anxiety med. No need for PIV at this time. Waiting on inpatient hospice.    Discussed plan of care with  daughter at bedside.    PROVIDER ESCALATION    Physician escalation: No    Orders received and  case discussed with NA.    Disposition:Remain in room 424    FOLLOW UP    Call back the Rapid Response Nurse, Jessica RIOS RN at [phone] for additional questions or concerns.

## 2024-01-13 NOTE — PLAN OF CARE
0830  Patient resting quietly in bed with s.o., Marty Mathews ([phone]), at the bedside when CM rounded via [employer]. Pt & s.o. stated that they plan to meet with Braydon from [facility] today but voiced concerns about the pt being discharge today since she is still having so much coughing. Marty stated that the pulmonologist said she needs another day. CM informed Dr Cintron of above & requested hospice orders.     CM informed Braydon of above, requested to be notified after hospice paperwork is completed, & faxed completed CTI to [phone].     8347  CM was informed by Braydon that he met with the pt & s.o., that admission paperwork, & that home hospice orders with BIPAP settings is needed prior to being able to order equipment. Message sent to Dr Cintron informing of above. Awaiting response.    1445  Patient resting quietly in bed with niece at the bedside when CM rounded. Pt stated that pulm rounded recently, made some changes to meds to improve secretions, & that Marty went home to make room for hospice equipment. Pt unsure if she will need assistance with transportation at time of discharge tomorrow.     Message sent to Dr Cintron informing that the pt will discharge home tomorrow after home hospice equipment is delivered.       Will continue to follow.

## 2024-01-13 NOTE — PLAN OF CARE
Problem: Adult Inpatient Plan of Care  Goal: Plan of Care Review  Outcome: Ongoing, Progressing  Goal: Absence of Hospital-Acquired Illness or Injury  Outcome: Ongoing, Progressing     Problem: Fall Injury Risk  Goal: Absence of Fall and Fall-Related Injury  Outcome: Ongoing, Progressing   Oxygen therapy maintained, VSS and pain managed with oral analgesics. Family remained at beside through out day. NADN, safety precautions maintained and call light within reach. Will continue with POC.   no

## 2024-01-13 NOTE — SUBJECTIVE & OBJECTIVE
Interval History: No acute events overnight. Symptoms are stable. She tolerated Bipap but complaining of secretions. She is not improved with current treatment. Antibiotics have been stopped. Started treatment for comfort per palliative care. Significant other at bedside who is her power of . Discussed plan of care and treatment options. They are considering home hospice at this time. POA requesting to be discharged tomorrow as she will need time to go to house and prepare before patient going home. She will speak with CM.     Review of Systems   Constitutional:  Positive for activity change and fatigue.   Respiratory:  Positive for cough and shortness of breath.    Gastrointestinal:  Negative for abdominal pain.     Objective:     Vital Signs (Most Recent):  Temp: 97.8 °F (36.6 °C) (01/13/24 0349)  Pulse: 67 (01/13/24 0722)  Resp: 20 (01/13/24 0722)  BP: (!) 171/77 (01/13/24 0349)  SpO2: 97 % (01/13/24 0722) Vital Signs (24h Range):  Temp:  [97.8 °F (36.6 °C)-99.1 °F (37.3 °C)] 97.8 °F (36.6 °C)  Pulse:  [61-97] 67  Resp:  [18-26] 20  SpO2:  [93 %-100 %] 97 %  BP: (136-171)/(65-97) 171/77     Weight: 59.3 kg (130 lb 11.7 oz)  Body mass index is 21.1 kg/m².    Intake/Output Summary (Last 24 hours) at 1/13/2024 0818  Last data filed at 1/13/2024 0606  Gross per 24 hour   Intake --   Output 575 ml   Net -575 ml           Physical Exam  Vitals and nursing note reviewed.   Constitutional:       General: She is awake. She is in acute distress.      Appearance: Normal appearance. She is well-developed and well-groomed. She is ill-appearing.      Interventions: Nasal cannula in place.   HENT:      Head: Normocephalic and atraumatic.   Cardiovascular:      Rate and Rhythm: Normal rate.   Pulmonary:      Effort: Tachypnea present.   Neurological:      Mental Status: She is alert and oriented to person, place, and time. Mental status is at baseline.   Psychiatric:         Mood and Affect: Mood normal.          Behavior: Behavior normal.         Thought Content: Thought content normal.         Judgment: Judgment normal.             Significant Labs: All pertinent labs within the past 24 hours have been reviewed.    Significant Imaging: I have reviewed all pertinent imaging results/findings within the past 24 hours.

## 2024-01-13 NOTE — RESPIRATORY THERAPY
Notified by pts nurse that pt was wheezing and had become more sob. Upon entering room, pt sitting on edge of bed with daughter. Pt was in moderate distress. Pt has congested cough and wheezing. I placed bipap mask on pt to give pt some relief. Placed pt back on nasal cannula for RN to give PO meds. Will reassess pt.

## 2024-01-13 NOTE — PLAN OF CARE
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  Reduce prednisone to 2.5 mg daily then discontinue when finished with Rx      Stage 3a chronic kidney disease [N18.31]  Yes      Resolved Hospital Problems   No resolved problems to display.       Hospice Qualifying Diagnoses: Lung Ca       Patient has a life expectancy < 6 months due to: Lung CA  Primary Hospice Diagnosis:  Lung Ca  Comorbid Conditions Contributing to Decline:  respiratory failure     Vital Signs: Routine per Hospice Protocol.    Code Status: full    Allergies:   Review of patient's allergies indicates:   Allergen Reactions    Adhesive Itching, Rash and Blisters     INCLUDES EKG PADS    Ciprofloxacin Hives     Hives    Iodinated contrast media     Phenergan plain Other (See Comments)     "crazy behavior"    Phenergan [promethazine]     Tramadol     Vancomycin analogues      Red man syndrome       Diet: regular     Activities: As tolerated    Goals of Care Treatment Preferences:  Code Status: Full Code    Health care agent: Marty Mathews  Health care agent number: [phone]    Living Will: Yes     What is most important right now is to focus " on spending time at home, avoiding the hospital, remaining as independent as possible, symptom/pain control, improvement in condition but with limits to invasive therapies.  Accordingly, we have decided that the best plan to meet the patient's goals includes pivot to comfort-focused care.      Nursing: Per Hospice Routine.      Routine Skin for Bedridden Patients: Apply moisture barrier cream to all skin folds and   wet areas in perineal area daily and after baths and all bowel movements.    Oxygen: High flow oxygen via comfort flow 40%FiO2 30LPM    Bipap 10/5 FiO2 30% QHS        Medications:        Medication List        START taking these medications      glycopyrrolate 1 mg Tab  Commonly known as: ROBINUL  Take 1 tablet (1 mg total) by mouth 3 (three) times daily.     hydrALAZINE 25 MG tablet  Commonly known as: APRESOLINE  Take 1 tablet (25 mg total) by mouth every 8 (eight) hours.       CONTINUE taking these medications      atenoloL 25 MG tablet  Commonly known as: TENORMIN  Take 1 tablet (25 mg total) by mouth 3 (three) times daily.     levalbuterol 1.25 mg/3 mL nebulizer solution  Commonly known as: XOPENEX  Take 3 mLs (1.25 mg total) by nebulization every 4 (four) hours as needed for Wheezing. Rescue     LORazepam 0.5 MG tablet  Commonly known as: ATIVAN  Take 1 tablet (0.5 mg total) by mouth 2 (two) times daily as needed for Anxiety.     morphine 20 mg/5 mL (4 mg/mL) solution  Take 2 mg by mouth every 8 (eight) hours as needed for Pain.     ondansetron 8 MG tablet  Commonly known as: ZOFRAN  Take 8 mg by mouth every 8 (eight) hours as needed.     senna-docusate 8.6-50 mg 8.6-50 mg per tablet  Commonly known as: PERICOLACE  Take 1 tablets by mouth daily as needed for Constipation.              Future Orders:  Hospice Medical Director may dictate new orders for comfortable care measures & sign death certificate.        _________________________________  Patria Sanderson MD  01/14/2024

## 2024-01-13 NOTE — PROGRESS NOTES
[facility]  Telemedicine Progress Note    Patient Name: Alesha Toney  MRN: [id number]  Patient Class: IP- Inpatient   Admission Date: 1/7/2024  Length of Stay: 6 days  Attending Physician: Patria Cintron MD  Primary Care Provider: Margo Caldwell MD          Subjective:     Principal Problem:Pneumonia due to infectious organism        HPI:  Alesha Toney is a 73 y.o.  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 The patient presented to [facility] on 1/7/2024 with a primary complaint of Shortness of Breath (SOB with cough for past 2 weeks. States PCP has prescribed multiple meds with no improvement. Presents awake, alert with O2 in progress. Resp unlabored. C/o right sided CP with h/o right sided lung CA - states pain in this area is common for her. Denies home O2. States she has been using nebulizer at home. Awake, alert, oriented.)     The patient was in their usual state of health until 12/25 when she presented to the ED with shortness of breath and increased R sided chest pain. She had a CT chest and V/Q scan at the time and had close follow up with her oncologist and was started on prednisone for possible pneumonitis with bactrim ppx. She has had a progressive cough with worsening R sided chest pain and noted her pulse ox to be in the 80s at home. She has had clear-whitish thick sputum. No fevers, chills, sweats. No calf swelling. At this time due to her medication side effects she is not able to continue with Keytruda or Cyramza.    Overview/Hospital Course:  1/8/24 Feeling worse. Increased SOB, pain  1/9:   Continues to have symptoms of dyspnea and shortness of breath.  Dr. Dhaliwal note that is appears to be postobstructive pneumonia however continuing prednisone as there may be a component of pneumonitis as well.  ID and following as well.    Interval History: No acute events overnight. Symptoms are stable. She tolerated Bipap but complaining of secretions. She is not improved with current treatment. Antibiotics have been stopped. Started treatment for comfort per palliative care. Significant other at bedside who is her power of . Discussed plan of care and treatment options. They are considering home hospice at this time. POA requesting to be discharged tomorrow as she will need time to go to house and prepare before patient going home. She will speak with CM.     Review of Systems   Constitutional:  Positive for activity change and fatigue.   Respiratory:  Positive for cough and shortness of breath.    Gastrointestinal:  Negative for abdominal pain.     Objective:     Vital Signs (Most Recent):  Temp: 97.8 °F (36.6 °C) (01/13/24 0349)  Pulse: 67 (01/13/24 0722)  Resp: 20 (01/13/24 0722)  BP: (!) 171/77 (01/13/24 0349)  SpO2: 97 % (01/13/24 0722) Vital Signs (24h Range):  Temp:  [97.8 °F (36.6 °C)-99.1 °F (37.3 °C)] 97.8 °F (36.6 °C)  Pulse:  [61-97] 67  Resp:  [18-26] 20  SpO2:  [93 %-100 %] 97 %  BP: (136-171)/(65-97) 171/77     Weight: 59.3 kg (130 lb 11.7 oz)  Body mass index is 21.1 kg/m².    Intake/Output Summary (Last 24 hours) at 1/13/2024 0818  Last data filed at 1/13/2024 0606  Gross per 24 hour   Intake --   Output 575 ml   Net -575 ml           Physical Exam  Vitals and nursing note reviewed.   Constitutional:       General: She is awake. She is in acute distress.      Appearance: Normal appearance. She is well-developed and well-groomed. She is ill-appearing.      Interventions: Nasal cannula in place.   HENT:      Head: Normocephalic and atraumatic.   Cardiovascular:      Rate and Rhythm: Normal  rate.   Pulmonary:      Effort: Tachypnea present.   Neurological:      Mental Status: She is alert and oriented to person, place, and time. Mental status is at baseline.   Psychiatric:         Mood and Affect: Mood normal.         Behavior: Behavior normal.         Thought Content: Thought content normal.         Judgment: Judgment normal.             Significant Labs: All pertinent labs within the past 24 hours have been reviewed.    Significant Imaging: I have reviewed all pertinent imaging results/findings within the past 24 hours.    Assessment/Plan:      * Pneumonia due to infectious organism  Post-Obstructive Pneumonia   Tracheobronchitis 2/2 RSV   Sputum and blood culture (Severe/concern for MRSA/Pseudomonas and/or recent hospitalization within 90 days only) is indicated.    Growing Candida in sputum.    Blood cultures no growth to date  There is not the presence of an empyema or lung abscess   ID consulted - initially started on empiric abx but did not improve with treatment  Have discontinued abx due transitioning to comfort care  continuing prednisone at this time for possible pneumonitis seen on imaging.   Pulmonary following   Started HFNC with Vapotherm 20/40%    BiPAP qhs     Antibiotics (From admission, onward)      Start     Stop Route Frequency Ordered    01/08/24 2100  sulfamethoxazole-trimethoprim 800-160mg per tablet 1 tablet         01/19/24 0859 Oral 2 times per day on Mon Wed Fri 01/08/24 1752            Malignant neoplasm of upper lobe, right bronchus or lung  Chronic pain due to malignant neoplastic disease  Malignant neoplasm of upper lobe, right bronchus or lung  Overview:  Status post resection 8/2018 status post adjuvant chemo     Secondary and unspecified malignant neoplasm of intrathoracic lymph nodes  Recently did not tolerate Keytruda and Cyramza and tx was discontinued recently  She did have increase in her mass size which may have caused a postobstructive pneumonia.    She has  been given broad-spectrum antibiotics as well as steroids to treat for both a postobstructive pneumonia as well as steroids for the possibility of pneumonitis however she notes minimal response.    We are now transitioning to comfort care.   Pulmonary following.   Oncology following   Per palliative care:  After further discussion last night, pt has decided that she will no longer attempt any additional chemotherapy and at this time is considering enrolling into the services of hospice care as she places priority on comfort and symptom management.      Secondary and unspecified malignant neoplasm of intrathoracic lymph nodes  Plan as above.   palliative care following      Constipation  Resume home center  Add MiraLax      Chronic pain due to malignant neoplastic disease  Cont current regimen as outlined by palliative care        Abnormal PET scan of colon        SOB (shortness of breath)  New oxygen requirement with worsening SOB and R chest pain.  Appears to be stable however subjective increase in shortness of breath.  She was started on steroids for possible CPI pneumonitis, continuing Bactrim PPx for PJP  Xopenex PRN  Appreciate ID and Pulm recommendations    DDX:  Postobstructive pneumonia, pneumonitis, lymphangitic spread        Stage 3a chronic kidney disease  Creatine stable for now. BMP reviewed- noted Estimated Creatinine Clearance: 39.1 mL/min (based on SCr of 1.2 mg/dL). according to latest data. Based on current GFR, CKD stage is stage 3 - GFR 30-59.  Monitor UOP and serial BMP and adjust therapy as needed. Renally dose meds. Avoid nephrotoxic medications and procedures.    Mild TRAM, hold ARB, HCTZ  Started on LR maintenance fluid      VTE Risk Mitigation (From admission, onward)           Ordered     enoxaparin injection 40 mg  Daily         01/07/24 1532     IP VTE HIGH RISK PATIENT  Once         01/07/24 1532     Place sequential compression device  Until discontinued         01/07/24 1532                           I have completed this tele-visit without the assistance of a telepresenter.    The attending portion of this evaluation, treatment, and documentation was performed per Patria Sanderson MD via Telemedicine AudioVisual using the secure [employer] software platform with 2 way audio/video. The provider was located off-site and the patient is located in the hospital. The aforementioned video software was utilized to document the relevant history and physical exam    Patria Sanderson MD  Department of [facility]   [facility]

## 2024-01-13 NOTE — ASSESSMENT & PLAN NOTE
Chronic pain due to malignant neoplastic disease  Malignant neoplasm of upper lobe, right bronchus or lung  Overview:  Status post resection 8/2018 status post adjuvant chemo     Secondary and unspecified malignant neoplasm of intrathoracic lymph nodes  Recently did not tolerate Keytruda and Cyramza and tx was discontinued recently  She did have increase in her mass size which may have caused a postobstructive pneumonia.    She has been given broad-spectrum antibiotics as well as steroids to treat for both a postobstructive pneumonia as well as steroids for the possibility of pneumonitis however she notes minimal response.    We are now transitioning to comfort care.   Pulmonary following.   Oncology following   Per palliative care:  After further discussion last night, pt has decided that she will no longer attempt any additional chemotherapy and at this time is considering enrolling into the services of hospice care as she places priority on comfort and symptom management.

## 2024-01-13 NOTE — ASSESSMENT & PLAN NOTE
Post-Obstructive Pneumonia   Tracheobronchitis 2/2 RSV   Sputum and blood culture (Severe/concern for MRSA/Pseudomonas and/or recent hospitalization within 90 days only) is indicated.    Growing Candida in sputum.    Blood cultures no growth to date  There is not the presence of an empyema or lung abscess   ID consulted - initially started on empiric abx but did not improve with treatment  Have discontinued abx due transitioning to comfort care  continuing prednisone at this time for possible pneumonitis seen on imaging.   Pulmonary following   Started HFNC with Vapotherm 20/40%    BiPAP qhs     Antibiotics (From admission, onward)      Start     Stop Route Frequency Ordered    01/08/24 2100  sulfamethoxazole-trimethoprim 800-160mg per tablet 1 tablet         01/19/24 0859 Oral 2 times per day on Mon Wed Fri 01/08/24 9609

## 2024-01-14 VITALS
RESPIRATION RATE: 22 BRPM | OXYGEN SATURATION: 99 % | HEART RATE: 78 BPM | DIASTOLIC BLOOD PRESSURE: 84 MMHG | SYSTOLIC BLOOD PRESSURE: 191 MMHG | TEMPERATURE: 98 F | WEIGHT: 130.75 LBS | HEIGHT: 66 IN | BODY MASS INDEX: 21.01 KG/M2

## 2024-01-14 LAB
BASOPHILS # BLD AUTO: 0 K/UL (ref 0–0.2)
BASOPHILS NFR BLD: 0 % (ref 0–1.9)
DIFFERENTIAL METHOD BLD: ABNORMAL
EOSINOPHIL # BLD AUTO: 0 K/UL (ref 0–0.5)
EOSINOPHIL NFR BLD: 0.1 % (ref 0–8)
ERYTHROCYTE [DISTWIDTH] IN BLOOD BY AUTOMATED COUNT: 13.2 % (ref 11.5–14.5)
HCT VFR BLD AUTO: 36.7 % (ref 37–48.5)
HGB BLD-MCNC: 12.5 G/DL (ref 12–16)
IMM GRANULOCYTES # BLD AUTO: 0.02 K/UL (ref 0–0.04)
IMM GRANULOCYTES NFR BLD AUTO: 0.3 % (ref 0–0.5)
LYMPHOCYTES # BLD AUTO: 1.4 K/UL (ref 1–4.8)
LYMPHOCYTES NFR BLD: 19.1 % (ref 18–48)
MCH RBC QN AUTO: 31.3 PG (ref 27–31)
MCHC RBC AUTO-ENTMCNC: 34.1 G/DL (ref 32–36)
MCV RBC AUTO: 92 FL (ref 82–98)
MONOCYTES # BLD AUTO: 0.7 K/UL (ref 0.3–1)
MONOCYTES NFR BLD: 9.4 % (ref 4–15)
NEUTROPHILS # BLD AUTO: 5.1 K/UL (ref 1.8–7.7)
NEUTROPHILS NFR BLD: 71.1 % (ref 38–73)
NRBC BLD-RTO: 0 /100 WBC
PLATELET # BLD AUTO: 182 K/UL (ref 150–450)
PMV BLD AUTO: 9.1 FL (ref 9.2–12.9)
RBC # BLD AUTO: 4 M/UL (ref 4–5.4)
SARS-COV-2 RDRP RESP QL NAA+PROBE: NEGATIVE
WBC # BLD AUTO: 7.22 K/UL (ref 3.9–12.7)

## 2024-01-14 PROCEDURE — 27100171 HC OXYGEN HIGH FLOW UP TO 24 HOURS

## 2024-01-14 PROCEDURE — U0002 COVID-19 LAB TEST NON-CDC: HCPCS | Performed by: HOSPITALIST

## 2024-01-14 PROCEDURE — 25000003 PHARM REV CODE 250: Performed by: STUDENT IN AN ORGANIZED HEALTH CARE EDUCATION/TRAINING PROGRAM

## 2024-01-14 PROCEDURE — 63600175 PHARM REV CODE 636 W HCPCS: Performed by: INTERNAL MEDICINE

## 2024-01-14 PROCEDURE — 94761 N-INVAS EAR/PLS OXIMETRY MLT: CPT

## 2024-01-14 PROCEDURE — 25000003 PHARM REV CODE 250: Performed by: INTERNAL MEDICINE

## 2024-01-14 PROCEDURE — 99900035 HC TECH TIME PER 15 MIN (STAT)

## 2024-01-14 PROCEDURE — 25000242 PHARM REV CODE 250 ALT 637 W/ HCPCS: Performed by: INTERNAL MEDICINE

## 2024-01-14 PROCEDURE — 51798 US URINE CAPACITY MEASURE: CPT

## 2024-01-14 PROCEDURE — 94640 AIRWAY INHALATION TREATMENT: CPT

## 2024-01-14 PROCEDURE — 36415 COLL VENOUS BLD VENIPUNCTURE: CPT | Performed by: INTERNAL MEDICINE

## 2024-01-14 PROCEDURE — 85025 COMPLETE CBC W/AUTO DIFF WBC: CPT | Performed by: INTERNAL MEDICINE

## 2024-01-14 PROCEDURE — 27000221 HC OXYGEN, UP TO 24 HOURS

## 2024-01-14 PROCEDURE — 51701 INSERT BLADDER CATHETER: CPT

## 2024-01-14 PROCEDURE — 25000003 PHARM REV CODE 250: Performed by: FAMILY MEDICINE

## 2024-01-14 RX ADMIN — FAMOTIDINE 20 MG: 20 TABLET ORAL at 09:01

## 2024-01-14 RX ADMIN — LEVALBUTEROL HYDROCHLORIDE 1.25 MG: 1.25 SOLUTION, CONCENTRATE RESPIRATORY (INHALATION) at 03:01

## 2024-01-14 RX ADMIN — MORPHINE SULFATE 5 MG: 10 SOLUTION ORAL at 03:01

## 2024-01-14 RX ADMIN — POLYETHYLENE GLYCOL 3350 17 G: 17 POWDER, FOR SOLUTION ORAL at 09:01

## 2024-01-14 RX ADMIN — MORPHINE SULFATE 5 MG: 10 SOLUTION ORAL at 05:01

## 2024-01-14 RX ADMIN — LORAZEPAM 1 MG: 1 TABLET ORAL at 05:01

## 2024-01-14 RX ADMIN — HYDRALAZINE HYDROCHLORIDE 25 MG: 25 TABLET, FILM COATED ORAL at 08:01

## 2024-01-14 RX ADMIN — ATENOLOL 25 MG: 25 TABLET ORAL at 08:01

## 2024-01-14 RX ADMIN — ENOXAPARIN SODIUM 40 MG: 40 INJECTION SUBCUTANEOUS at 05:01

## 2024-01-14 RX ADMIN — LEVALBUTEROL HYDROCHLORIDE 1.25 MG: 1.25 SOLUTION, CONCENTRATE RESPIRATORY (INHALATION) at 08:01

## 2024-01-14 RX ADMIN — MORPHINE SULFATE 5 MG: 10 SOLUTION ORAL at 09:01

## 2024-01-14 RX ADMIN — DOCUSATE SODIUM AND SENNOSIDES 1 TABLET: 8.6; 5 TABLET, FILM COATED ORAL at 09:01

## 2024-01-14 RX ADMIN — LORAZEPAM 1 MG: 1 TABLET ORAL at 09:01

## 2024-01-14 RX ADMIN — ATENOLOL 25 MG: 25 TABLET ORAL at 03:01

## 2024-01-14 RX ADMIN — LEVALBUTEROL HYDROCHLORIDE 1.25 MG: 1.25 SOLUTION, CONCENTRATE RESPIRATORY (INHALATION) at 07:01

## 2024-01-14 RX ADMIN — ATENOLOL 25 MG: 25 TABLET ORAL at 09:01

## 2024-01-14 RX ADMIN — LEVALBUTEROL HYDROCHLORIDE 1.25 MG: 1.25 SOLUTION, CONCENTRATE RESPIRATORY (INHALATION) at 12:01

## 2024-01-14 RX ADMIN — MORPHINE SULFATE 5 MG: 10 SOLUTION ORAL at 07:01

## 2024-01-14 RX ADMIN — HYDRALAZINE HYDROCHLORIDE 25 MG: 25 TABLET, FILM COATED ORAL at 05:01

## 2024-01-14 RX ADMIN — MORPHINE SULFATE 5 MG: 10 SOLUTION ORAL at 01:01

## 2024-01-14 RX ADMIN — DOCUSATE SODIUM AND SENNOSIDES 1 TABLET: 8.6; 5 TABLET, FILM COATED ORAL at 08:01

## 2024-01-14 RX ADMIN — HYDRALAZINE HYDROCHLORIDE 25 MG: 25 TABLET, FILM COATED ORAL at 03:01

## 2024-01-14 RX ADMIN — PREDNISONE 60 MG: 20 TABLET ORAL at 09:01

## 2024-01-14 RX ADMIN — MORPHINE SULFATE 5 MG: 10 SOLUTION ORAL at 08:01

## 2024-01-14 NOTE — PLAN OF CARE
Pt and family agreeable with discharge to Inpatient Hospice with [facility]. Transfer orders and Covid test results faxed to facility [phone] per Nurse Braydon request. Floor nurse was notified to call report to [phone] going to room 2. Transportation set up with ambulance for 5 PM as  time per facility request(Braydon [phone]). Pt and family notified of transfer time.   01/14/24 1355   Final Note   Assessment Type Final Discharge Note   Anticipated Discharge Disposition [facility]  ([facility])   What phone number can be called within the next 1-3 days to see how you are doing after discharge? [phone]   Post-Acute Status   Post-Acute Authorization Placement   Post-Acute Placement Status Set-up Complete/Auth obtained   Discharge Delays None known at this time

## 2024-01-14 NOTE — ASSESSMENT & PLAN NOTE
Post-Obstructive Pneumonia   Tracheobronchitis 2/2 RSV   Sputum and blood culture (Severe/concern for MRSA/Pseudomonas and/or recent hospitalization within 90 days only) is indicated.    Growing Candida in sputum.    Blood cultures no growth to date  There is not the presence of an empyema or lung abscess   ID consulted - initially started on empiric abx but did not improve with treatment  Have discontinued abx due transitioning to comfort care  continuing prednisone at this time for possible pneumonitis seen on imaging.   Pulmonary following   Started HFNC with Vapotherm 20/40%    BiPAP qhs     Antibiotics (From admission, onward)    Start     Stop Route Frequency Ordered    01/08/24 2100  sulfamethoxazole-trimethoprim 800-160mg per tablet 1 tablet         01/19/24 0859 Oral 2 times per day on Mon Wed Fri 01/08/24 6705

## 2024-01-14 NOTE — PROGRESS NOTES
Pulmonary & Critical Care Medicine   Follow Up Progress Note    Subjective:   States that she feels somewhat better today, had episodes overnight where she felt smothered, but HFNC has helped. States that the glycopyrrolate did not help and the dried out secretions were bothersome. Claims that the morphine is still helping with pain and air hunger. Plan today is to move to inpatient hospice.    Past medical, surgical, family, and social history from initial consult was reviewed and verified during today's visit.     Vital Signs:   Vitals:    01/14/24 1207   BP:    Pulse: 80   Resp: 16   Temp:        Fluid Balance:     Intake/Output Summary (Last 24 hours) at 1/14/2024 1218  Last data filed at 1/14/2024 1157  Gross per 24 hour   Intake 180 ml   Output 1900 ml   Net -1720 ml       Review of Systems:   A comprehensive 12-point review of systems was performed, and is negative except for those items mentioned above in the HPI section of this note.     Physical Exam  Constitutional:       General: She is not in acute distress.     Appearance: She is obese. She is not ill-appearing.   HENT:      Head: Normocephalic and atraumatic.      Nose:      Comments: HFNC in place  Eyes:      Extraocular Movements: Extraocular movements intact.      Conjunctiva/sclera: Conjunctivae normal.   Cardiovascular:      Rate and Rhythm: Normal rate and regular rhythm.      Pulses: Normal pulses.      Heart sounds: Normal heart sounds. No murmur heard.     No friction rub. No gallop.   Pulmonary:      Effort: Pulmonary effort is normal. No respiratory distress.      Breath sounds: Wheezing present. No rales.      Comments: Moving air well this AM. Wheezes and squeaks b/l  Port-a-Cath noted on upper left chest wall  Abdominal:      General: Abdomen is flat. Bowel sounds are normal.      Palpations: Abdomen is soft.      Tenderness: There is no abdominal tenderness.   Musculoskeletal:      Right lower leg: No edema.      Left lower leg: No  "edema.   Skin:     General: Skin is warm.      Capillary Refill: Capillary refill takes less than 2 seconds.      Coloration: Skin is not jaundiced.   Neurological:      General: No focal deficit present.      Mental Status: She is alert and oriented to person, place, and time.   Psychiatric:         Mood and Affect: Mood normal.         Behavior: Behavior normal.         Thought Content: Thought content normal.         Judgment: Judgment normal.     Personal Review and Summary of Interval Diagnostics    Laboratory Studies:   No results for input(s): "PH", "PCO2", "PO2", "HCO3", "POCSATURATED", "BE" in the last 24 hours.  Recent Labs   Lab 01/14/24  0241   WBC 7.22   RBC 4.00   HGB 12.5   HCT 36.7*      MCV 92   MCH 31.3*   MCHC 34.1     No results for input(s): "GLUCOSE", "NA", "K", "CL", "CO2", "BUN", "CREATININE", "CALCIUM", "MG" in the last 24 hours.    Microbiology Data:   Microbiology Results (last 7 days)       Procedure Component Value Units Date/Time    Blood Culture #2 **CANNOT BE ORDERED STAT** [[id number]] Collected: 01/07/24 1310    Order Status: Completed Specimen: Blood Updated: 01/12/24 1822     Blood Culture, Routine No growth after 5 days.    Blood Culture #1 **CANNOT BE ORDERED STAT** [[id number]] Collected: 01/07/24 1310    Order Status: Completed Specimen: Blood from Antecubital, Right Arm Updated: 01/12/24 1822     Blood Culture, Routine No growth after 5 days.    Respiratory Infection Panel (PCR), Nasopharyngeal [[id number]]  (Abnormal) Collected: 01/10/24 1541    Order Status: Completed Specimen: Nasopharyngeal Swab Updated: 01/11/24 0354     Respiratory Infection Panel Source NP Swab     Adenovirus Not Detected     Coronavirus 229E, Common Cold Virus Not Detected     Coronavirus HKU1, Common Cold Virus Not Detected     Coronavirus NL63, Common Cold Virus Not Detected     Coronavirus OC43, Common Cold Virus Not Detected     Comment: The Coronavirus strains detected in this test cause " the common cold.  These strains are not the COVID-19 (novel Coronavirus)strain   associated with the respiratory disease outbreak.          SARS-CoV2 (COVID-19) Qualitative PCR Not Detected     Human Metapneumovirus Not Detected     Human Rhinovirus/Enterovirus Not Detected     Influenza B Not Detected     Parainfluenza Virus 1 Not Detected     Parainfluenza Virus 2 Not Detected     Parainfluenza Virus 3 Not Detected     Parainfluenza Virus 4 Not Detected     Respiratory Syncytial Virus Detected     Bordetella Parapertussis (QV7446) Not Detected     Bordetella pertussis (ptxP) Not Detected     Chlamydia pneumoniae Not Detected     Mycoplasma pneumoniae Not Detected    Narrative:      For all other respiratory sources, order [id number] -  Respiratory Viral Panel by PCR    Culture, Respiratory with Gram Stain [[id number]]  (Abnormal) Collected: 01/08/24 0411    Order Status: Completed Specimen: Sputum Updated: 01/10/24 1208     Respiratory Culture No S aureus or Pseudomonas isolated.      DEEPA ALBICANS  Many  Normal respiratory jett also present       Gram Stain (Respiratory) >10 epithelial cells per low power field     Gram Stain (Respiratory) Moderate WBC's     Gram Stain (Respiratory) Few Gram positive cocci     Gram Stain (Respiratory) Rare budding yeast            Scheduled Medications:    atenoloL  25 mg Oral TID    enoxparin  40 mg Subcutaneous Daily    famotidine  20 mg Oral Every other day    glycopyrrolate  1 mg Oral TID    hydrALAZINE  25 mg Oral Q8H    levalbuterol  1.25 mg Nebulization Q4H    nystatin  500,000 Units Oral QID    polyethylene glycol  17 g Oral Daily    predniSONE  60 mg Oral Daily    senna-docusate 8.6-50 mg  1 tablet Oral BID    sulfamethoxazole-trimethoprim 800-160mg  1 tablet Oral 2 times per day on Mon Wed Fri       PRN Medications:   acetaminophen, benzonatate, guaiFENesin 100 mg/5 ml, HYDROmorphone, levalbuterol, LORazepam, morphine, morphine, morphine, naloxone, ondansetron, sodium  chloride 0.9%    Impression & Recommendations    #RUL Adenocarcinoma s/p Lobectomy and VATS  RLL mass with possible lymphangitic spread  Tracheobronchitis 2/2 RSV  -Patient imaging reviewed with Dr. Davila, given unilateral findings very unlikely current presentation is 2/2 pneumonitis  -Most recent PET showed progression of RLL lesion, bronch and MDA path positive now s/p 2 tx cycles of Keytruda and Cyramaza which patient did not tolerate (last dose 12/12/23)  -Enlarging mass in the distribution of airway occlusion near beginning of superior segment of RLL. Concern for lymph node involvement. Concern was for obstruction leading to post-obstructive PNA, however patient RSV positive which is more likely cause of current sx. Has b/l lung findings on exam and sx out of proportion to CT, likely has tracheobronchitis 2/2 RSV  -Consulted thoracic disease- scan concerning for disease progression with lymphangitic spread- stage 4 CA. Recommend changing therapy to include chemo only if desired, not recommending bronchoscopy as it would unlikely change therapeutic approach.   -Sputum Cx with candida albicans and normal respiratory jett, few GPC no SA or pseudomonas  -Tested positive for RSV     Final Recommendations:  Appears NSCLC is stage 4 and terminal with recent progression. Discussed with team at [facility], Dr. Dhaliwal is primary oncologist. Bronch not recommended  Continue Vapotherm 20/40% and patient claims she feels much more comfortable for periods. Can continue to use prn and continue BiPAP qhs  Continue q4 Levalbuterol, avoid albuterol 2/2 patient reporting ADR to albuterol  Continue bactrim for PJP prophylaxis as palliative recommending continuation of steroids  Continue cough and pain regimen per palliative note. Pt reports glycopyrrolate made her more uncomfortable, but can use prn for inc secretion burden if desired  Patient decided no further chemotherapy tx at this time  Agree with plan for inpatient  hospice as patient still very apprehensive about going home and becoming apneic    Bigg Garcia,   [facility], HO-1  [facility] Pulm/Crit Team    Pt seen and examined with Pulmonary/Critical Care team and this note reviewed and validated with the following additional comments: To inpatient Hospice today.  Pt has my phone number.    Medical Decision Making (MDM) was complex.  The number and complexity of problems addressed was high.  The amount and complexity of data reviewed was high.  The risk of complications and/or morbidity/mortality was high.  The tests ordered were none.  I communicated with the following providers HM.  Time spent in the care of this patient was 10 minutes    Anthony Live MD  Phone [phone]

## 2024-01-14 NOTE — DISCHARGE SUMMARY
[facility] Medicine  Discharge Summary      Patient Name: Alesha Toney  MRN: [id number]  Patient Class: IP- Inpatient  Admission Date: 1/7/2024  Hospital Length of Stay: 7 days  Discharge Date and Time:  01/14/2024 8:34 AM  Attending Physician: Patria Cintron MD   Discharging Provider: Patria Sanderson MD  Primary Care Provider: Margo Caldwell MD      HPI:   Alesha Toney is a 73 y.o.  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 The patient presented to [facility] on 1/7/2024 with a primary complaint of Shortness of Breath (SOB with cough for past 2 weeks. States PCP has prescribed multiple meds with no improvement. Presents awake, alert with O2 in progress. Resp unlabored. C/o right sided CP with h/o right sided lung CA - states pain in this area is common for her. Denies home O2. States she has been using nebulizer at home. Awake, alert, oriented.)     The patient was in their usual state of health until 12/25 when she presented to the ED with shortness of breath and increased R sided chest pain. She had a CT chest and V/Q scan at the time and had close follow up with her oncologist and was started on prednisone for possible pneumonitis with bactrim ppx. She has had a progressive cough with worsening R sided chest pain and noted her pulse ox to be in the 80s at home. She has had clear-whitish thick sputum. No fevers, chills, sweats. No calf swelling. At this time due to her medication side effects she is not able to continue with Keytruda or Cyramza.    * No surgery found *      Hospital Course:   1/8/24 Feeling worse. Increased  SOB, pain  1/9:  Continues to have symptoms of dyspnea and shortness of breath.  Dr. Dhaliwal note that is appears to be postobstructive pneumonia however continuing prednisone as there may be a component of pneumonitis as well.  ID and following as well.     Goals of Care Treatment Preferences:  Code Status: Full Code    Health care agent: Marty Mathews  [facility] care agent number: [phone]    Living Will: Yes     What is most important right now is to focus on spending time at home, avoiding the hospital, remaining as independent as possible, symptom/pain control, improvement in condition but with limits to invasive therapies.  Accordingly, we have decided that the best plan to meet the patient's goals includes pivot to comfort-focused care.      Consults:   Consults (From admission, onward)          Status Ordering Provider     Inpatient consult to Social Work  Once        Provider:  (Not yet assigned)    Completed MAURICE CM     Inpatient consult to Palliative Care  Once        Provider:  (Not yet assigned)    Completed JUAN ANTONIO MARIE     Inpatient consult to Hematology/Oncology  Once        Provider:  (Not yet assigned)    Completed MAURICE CM     Inpatient consult to Palliative Care  Once        Provider:  (Not yet assigned)    Completed MAURICE CM     Inpatient virtual consult to Hospital Medicine  Once        Provider:  (Not yet assigned)    Completed SOFIA HOFFMAN     Inpatient consult to Pulmonology  Once        Provider:  (Not yet assigned)    Completed REZA PARISH     Inpatient consult to Infectious Diseases  Once        Provider:  (Not yet assigned)    Completed REZA PARISH            Pulmonary  * Pneumonia due to infectious organism  Post-Obstructive Pneumonia   Tracheobronchitis 2/2 RSV   Sputum and blood culture (Severe/concern for MRSA/Pseudomonas and/or recent hospitalization within 90 days only) is indicated.    Growing Candida in sputum.    Blood cultures no  growth to date  There is not the presence of an empyema or lung abscess   ID consulted - initially started on empiric abx but did not improve with treatment  Have discontinued abx due transitioning to comfort care  continuing prednisone at this time for possible pneumonitis seen on imaging.   Pulmonary following   Started HFNC with Vapotherm 20/40%    BiPAP qhs     Antibiotics (From admission, onward)      Start     Stop Route Frequency Ordered    01/08/24 2100  sulfamethoxazole-trimethoprim 800-160mg per tablet 1 tablet         01/19/24 0859 Oral 2 times per day on Mon Wed Fri 01/08/24 1752            SOB (shortness of breath)  New oxygen requirement with worsening SOB and R chest pain.  Appears to be stable however subjective increase in shortness of breath.  She was started on steroids for possible CPI pneumonitis, continuing Bactrim PPx for PJP  Xopenex PRN  Appreciate ID and Pulm recommendations    DDX:  Postobstructive pneumonia, pneumonitis, lymphangitic spread        Renal/  Stage 3a chronic kidney disease  Creatine stable for now. BMP reviewed- noted Estimated Creatinine Clearance: 39.1 mL/min (based on SCr of 1.2 mg/dL). according to latest data. Based on current GFR, CKD stage is stage 3 - GFR 30-59.  Monitor UOP and serial BMP and adjust therapy as needed. Renally dose meds. Avoid nephrotoxic medications and procedures.    Mild TRAM, hold ARB, HCTZ  Started on LR maintenance fluid    Oncology  Chronic pain due to malignant neoplastic disease  Cont current regimen as outlined by palliative care        Secondary and unspecified malignant neoplasm of intrathoracic lymph nodes  Plan as above.   palliative care following      Malignant neoplasm of upper lobe, right bronchus or lung  Chronic pain due to malignant neoplastic disease  Malignant neoplasm of upper lobe, right bronchus or lung  Overview:  Status post resection 8/2018 status post adjuvant chemo     Secondary and unspecified malignant neoplasm of  intrathoracic lymph nodes  Recently did not tolerate Keytruda and Cyramza and tx was discontinued recently  She did have increase in her mass size which may have caused a postobstructive pneumonia.    She has been given broad-spectrum antibiotics as well as steroids to treat for both a postobstructive pneumonia as well as steroids for the possibility of pneumonitis however she notes minimal response.    We are now transitioning to comfort care.   Pulmonary following.   Oncology following   Per palliative care:  After further discussion last night, pt has decided that she will no longer attempt any additional chemotherapy and at this time is considering enrolling into the services of hospice care as she places priority on comfort and symptom management.      GI  Constipation  [facility]  [facility]      Palliative Care  Palliative care encounter          Final Active Diagnoses:    Diagnosis Date Noted POA    PRINCIPAL PROBLEM:  Pneumonia due to infectious organism [J18.9] 01/10/2024 Yes    Malignant neoplasm of upper lobe, right bronchus or lung [C34.11] 08/09/2018 Yes    Secondary and unspecified malignant neoplasm of intrathoracic lymph nodes [C77.1] 09/25/2018 Yes    Palliative care encounter [Z51.5] 01/12/2024 Not Applicable    Constipation [K59.00] 01/09/2024 Yes    Chronic pain due to malignant neoplastic disease [G89.3] 05/26/2021 Yes    SOB (shortness of breath) [R06.02]  Yes    Stage 3a chronic kidney disease [N18.31] 05/29/2019 Yes      Problems Resolved During this Admission:       Discharged Condition: poor    Disposition: Hospice/Home    Follow Up:   Follow-up Information       Lucy Mills MD Follow up on 1/17/2024.    Specialty: Internal Medicine  Why: at 9:00 AM; hospital follow up appointment in the [facility]  Contact information:  [address]  [address]  [place] [address]  [phone]               Mayela Beaver DNP Follow up on 1/22/2024.    Specialties:  Palliative Medicine, Neurology  Why: at 1:00pm; palliative care appointment  Contact information:  [address] [facility]  Palliative Care  [facility] [address]  [phone]                           Patient Instructions:      Diet Adult Regular     Notify your health care provider if you experience any of the following:  severe uncontrolled pain     Activity as tolerated       Significant Diagnostic Studies: N/A    Pending Diagnostic Studies:       Procedure Component Value Units Date/Time    NM Lung Scan Ventilation Perfusion [[id number]]     Order Status: Sent Lab Status: No result            Medications:  Reconciled Home Medications:      Medication List        START taking these medications      glycopyrrolate 1 mg Tab  Commonly known as: ROBINUL  Take 1 tablet (1 mg total) by mouth 3 (three) times daily.     hydrALAZINE 25 MG tablet  Commonly known as: APRESOLINE  Take 1 tablet (25 mg total) by mouth every 8 (eight) hours.            CONTINUE taking these medications      atenoloL 25 MG tablet  Commonly known as: TENORMIN  Take 1 tablet (25 mg total) by mouth 3 (three) times daily.     lancets Misc  To check BG 1 times daily, to use with insurance preferred meter     levalbuterol 1.25 mg/3 mL nebulizer solution  Commonly known as: XOPENEX  Take 3 mLs (1.25 mg total) by nebulization every 4 (four) hours as needed for Wheezing. Rescue     LORazepam 0.5 MG tablet  Commonly known as: ATIVAN  Take 1 tablet (0.5 mg total) by mouth 2 (two) times daily as needed for Anxiety.     morphine 20 mg/5 mL (4 mg/mL) solution  Take 2 mg by mouth every 8 (eight) hours as needed for Pain.     ondansetron 8 MG tablet  Commonly known as: ZOFRAN  Take 8 mg by mouth every 8 (eight) hours as needed.     predniSONE 20 MG tablet  Commonly known as: DELTASONE  Take 3 tablets (60 mg total) by mouth once daily.     senna-docusate 8.6-50 mg 8.6-50 mg per tablet  Commonly known as: PERICOLACE  Take 3 tablets by mouth daily as needed for  Constipation.     sulfamethoxazole-trimethoprim 800-160mg 800-160 mg Tab  Commonly known as: BACTRIM DS  Take 1 tablet by mouth 2 (two) times daily. Take 1 tablet twice a day on Monday, Wednesday and Friday for 14 days            STOP taking these medications      amLODIPine 5 MG tablet  Commonly known as: NORVASC     blood sugar diagnostic Strp  Commonly known as: TRUE METRIX GLUCOSE TEST STRIP     blood-glucose meter Misc     docusate sodium 100 MG capsule  Commonly known as: COLACE     esomeprazole 20 MG capsule  Commonly known as: NEXIUM     GAVISCON  mg/15 mL Susp  Generic drug: aluminum hydrox-magnesium carb     HYDROcodone-acetaminophen 5-325 mg per tablet  Commonly known as: NORCO     levalbuterol 45 mcg/actuation inhaler  Commonly known as: XOPENEX HFA     loratadine 10 mg tablet  Commonly known as: CLARITIN     losartan 50 MG tablet  Commonly known as: COZAAR     naloxone 4 mg/actuation Spry  Commonly known as: NARCAN     vitamin E 400 UNIT capsule     WOMEN'S 50 PLUS MULTIVITAMIN 400 mcg-500 mg calcium-20 mcg Tab  Generic drug: mv-min-folic-calcium carb-K1              Indwelling Lines/Drains at time of discharge:   Lines/Drains/Airways       Central Venous Catheter Line  Duration                  PowerPort A Cath Single Lumen 02/08/21 0937 left internal jugular 1069 days              Drain  Duration             Female External Urinary Catheter w/ Suction 01/08/24 2254 5 days              Airway  Duration                  Airway - Non-Surgical 04/19/21 1005 Nasal Cannula 999 days                    Time spent on the discharge of patient: 45 minutes         The attending portion of this evaluation, treatment, and documentation was performed per Patria Sanderson MD via Telemedicine AudioVisual using the secure nuevoStage software platform with 2 way audio/video. The provider was located off-site and the patient is located in the hospital. The aforementioned video software was utilized to document the  relevant history and physical exam    Patria Sanderson MD  Department of Hospital Medicine  [place] [facility]

## 2024-01-14 NOTE — PLAN OF CARE
Discharge orders noted for home hospice. AVS prepared with medication details, clinical reference list and 24/7 [facility] Nurse On Call number attached. AVS review to follow.

## 2024-01-20 ENCOUNTER — PATIENT MESSAGE (OUTPATIENT)
Dept: HEMATOLOGY/ONCOLOGY | Facility: CLINIC | Age: 74
End: 2024-01-20
Payer: MEDICARE

## 2024-01-22 NOTE — TELEPHONE ENCOUNTER
So lets do 50 mg X 4 days and then 40 mg X 4 days, 30 mg X 4 days., 20 Mg X 4 days and then 10 mg X 4 days and then stop

## 2024-02-07 DIAGNOSIS — E11.9 TYPE 2 DIABETES MELLITUS WITHOUT COMPLICATION: ICD-10-CM

## 2024-02-12 ENCOUNTER — PATIENT MESSAGE (OUTPATIENT)
Dept: ADMINISTRATIVE | Facility: HOSPITAL | Age: 74
End: 2024-02-12
Payer: MEDICARE

## 2024-02-19 ENCOUNTER — PATIENT MESSAGE (OUTPATIENT)
Dept: HEMATOLOGY/ONCOLOGY | Facility: CLINIC | Age: 74
End: 2024-02-19
Payer: MEDICARE

## (undated) DEVICE — ELECTRODE REM PLYHSV RETURN 9

## (undated) DEVICE — SPONGE DERMACEA 4X4IN 12PLY

## (undated) DEVICE — SYS LABEL CORRECT MED

## (undated) DEVICE — TRAY MINOR GEN SURG

## (undated) DEVICE — NDL SPINAL 18GX3.5 SPINOCAN

## (undated) DEVICE — DRAIN CHAN RND HUBLS 8MM 24FR

## (undated) DEVICE — SEE MEDLINE ITEM 146313

## (undated) DEVICE — CORD BIPOLAR 12 FOOT

## (undated) DEVICE — PENCIL GOLF STERILE

## (undated) DEVICE — PIN FIXATION TEMPORARY STRGHT
Type: IMPLANTABLE DEVICE | Site: SPINE CERVICAL | Status: NON-FUNCTIONAL
Removed: 2018-10-15

## (undated) DEVICE — APPLIER LIGACLIP LG 13IN

## (undated) DEVICE — DRAPE THYROID WITH ARMBOARD

## (undated) DEVICE — PACK SET UP CONVERTORS

## (undated) DEVICE — SEE MEDLINE ITEM 157131

## (undated) DEVICE — ADHESIVE MASTISOL VIAL 48/BX

## (undated) DEVICE — SUT VICRYL 3-0 27 SH

## (undated) DEVICE — GAUZE SPONGE 4X4 12PLY

## (undated) DEVICE — DRAPE ABDOMINAL TIBURON 14X11

## (undated) DEVICE — CUP MEDICINE STERILE 2OZ

## (undated) DEVICE — SUT VICRYL 4-0 18 P-3

## (undated) DEVICE — SUT MONOCRYL 5-0 P-3 UND 18

## (undated) DEVICE — BIT DRILL FLAT CHUCK 14 MM

## (undated) DEVICE — CATH BRONCHOSCOPE F/BF

## (undated) DEVICE — SUT SILK 0 STRANDS 30IN BLK

## (undated) DEVICE — MARKER SKIN STND TIP BLUE BARR

## (undated) DEVICE — DRESSING TELFA STRL 4X3 LF

## (undated) DEVICE — ELECTRODE BLADE INSULATED 1 IN

## (undated) DEVICE — DRESSING SURGICAL 1/2X1/2

## (undated) DEVICE — ADAPTER SWIVEL

## (undated) DEVICE — BAG TISS RETRV MONARCH 10MM

## (undated) DEVICE — BLADE SURG CARBON STEEL SZ11

## (undated) DEVICE — SUT SILK 3-0 SH 18IN BLACK

## (undated) DEVICE — SUT 0 VICRYL PLUS CT-1 27IN

## (undated) DEVICE — GOWN SMART IMP BREATHABLE XXLG

## (undated) DEVICE — SUT 5/0 18IN COATED VICRYL

## (undated) DEVICE — NDL 22GA X1 1/2 REG BEVEL

## (undated) DEVICE — SOL NACL 0.9% INJ PF/50151

## (undated) DEVICE — LUBRICANT SURGILUBE 2 OZ

## (undated) DEVICE — ECHELON FLEX POWERED VAS STPLR

## (undated) DEVICE — EVACUATOR WOUND BULB 100CC

## (undated) DEVICE — NDL ASPIRATING VIZISHOT 20-40M

## (undated) DEVICE — POWDER ARISTA AH 3G

## (undated) DEVICE — SYR ONLY LUER LOCK 20CC

## (undated) DEVICE — BUR BONE CUT MICRO TPS 3X3.8MM

## (undated) DEVICE — CONTAINER SPECIMEN STRL 4OZ

## (undated) DEVICE — SYR SLIP TIP 20CC

## (undated) DEVICE — SEE MEDLINE ITEM 157117

## (undated) DEVICE — ALCOHOL BLEND 95%

## (undated) DEVICE — TAPE MEDIPORE 4IN X 2YDS

## (undated) DEVICE — DRAIN CHEST DRY SUCTION

## (undated) DEVICE — SET DECANTER MEDICHOICE

## (undated) DEVICE — Device

## (undated) DEVICE — SEE MEDLINE ITEM 146292

## (undated) DEVICE — GOWN SURGICAL X-LARGE

## (undated) DEVICE — GUIDEWIRE STF .035X180CM ANG

## (undated) DEVICE — DRAIN CHANNEL ROUND 10FR

## (undated) DEVICE — ENDOCATCH 15MM

## (undated) DEVICE — DRAPE OPMI STERILE

## (undated) DEVICE — DRESSING TEGADERM IV 3.5 X 4.5

## (undated) DEVICE — TRAY FOLEY 16FR INFECTION CONT

## (undated) DEVICE — SEE MEDLINE ITEM 152487

## (undated) DEVICE — SUT 2-0 VICRYL / CT-1

## (undated) DEVICE — SUT SILK 2-0 BLK BR PS-2 18

## (undated) DEVICE — CHLORAPREP 3ML APPLICATOR TINT

## (undated) DEVICE — SUT MCRYL PLUS 4-0 PS2 27IN

## (undated) DEVICE — SUT COATED VICRYL 4/0 27IN

## (undated) DEVICE — DRAPE C ARM 42 X 120 10/BX

## (undated) DEVICE — RELOAD ECHELON ENDOPATH 45MM

## (undated) DEVICE — DRESSING ADH FILM TELFA 2X3IN

## (undated) DEVICE — KIT ANTIFOG

## (undated) DEVICE — DRESSING TRANS 4X4 TEGADERM

## (undated) DEVICE — SYR DISP LL 5CC

## (undated) DEVICE — SUT 2/0 30IN SILK BLK BRAI

## (undated) DEVICE — SEE MEDLINE ITEM 157150

## (undated) DEVICE — DIFFUSER

## (undated) DEVICE — CARTRIDGE OIL

## (undated) DEVICE — CLIP LIGATION MEDIUM CLIPS 1

## (undated) DEVICE — BLADE ELECTRO EDGE INSULATED

## (undated) DEVICE — ENDOAPTH VAS RELOAD WHITE 2.5

## (undated) DEVICE — SUT PROLENE 0 MO6 30IN BLUE

## (undated) DEVICE — KIT SURGIFLO EVITHROM

## (undated) DEVICE — STAPLER ECHELON FLEX GST 45MM

## (undated) DEVICE — SUT 3-0 12-18IN SILK

## (undated) DEVICE — CYTOSPIN COLLECTION FLUID BLT

## (undated) DEVICE — SYR 10CC LUER LOCK

## (undated) DEVICE — GAUZE SPONGE PEANUT STRL

## (undated) DEVICE — CLOSURE SKIN 1X5 STERI-STRIP

## (undated) DEVICE — STRIP STERI REIN CLSR 1/2X2IN

## (undated) DEVICE — RELOAD GREEN FOR ECHELON

## (undated) DEVICE — DRESSING TEGADERM 4.4X5IN

## (undated) DEVICE — GOWN SURG 2XL DISP TIE BACK

## (undated) DEVICE — SEE MEDLINE ITEM 156905